# Patient Record
Sex: FEMALE | Race: OTHER | HISPANIC OR LATINO | Employment: UNEMPLOYED | URBAN - METROPOLITAN AREA
[De-identification: names, ages, dates, MRNs, and addresses within clinical notes are randomized per-mention and may not be internally consistent; named-entity substitution may affect disease eponyms.]

---

## 2024-01-26 ENCOUNTER — APPOINTMENT (OUTPATIENT)
Dept: RADIOLOGY | Facility: HOSPITAL | Age: 49
End: 2024-01-26
Payer: COMMERCIAL

## 2024-01-26 ENCOUNTER — HOSPITAL ENCOUNTER (OUTPATIENT)
Facility: HOSPITAL | Age: 49
Setting detail: OBSERVATION
Discharge: HOME | End: 2024-01-29
Attending: EMERGENCY MEDICINE | Admitting: INTERNAL MEDICINE
Payer: COMMERCIAL

## 2024-01-26 DIAGNOSIS — R18.8 OTHER ASCITES: Primary | ICD-10-CM

## 2024-01-26 DIAGNOSIS — N39.0 ACUTE UTI: ICD-10-CM

## 2024-01-26 LAB
ALBUMIN SERPL-MCNC: 2.6 G/DL (ref 3.5–5)
ALP BLD-CCNC: 417 U/L (ref 35–125)
ALT SERPL-CCNC: 31 U/L (ref 5–40)
AMMONIA PLAS-SCNC: 89 UMOL/L (ref 12–45)
ANION GAP SERPL CALC-SCNC: 8 MMOL/L
APPEARANCE UR: ABNORMAL
AST SERPL-CCNC: 48 U/L (ref 5–40)
BACTERIA #/AREA URNS AUTO: ABNORMAL /HPF
BASOPHILS # BLD AUTO: 0.02 X10*3/UL (ref 0–0.1)
BASOPHILS NFR BLD AUTO: 0.4 %
BILIRUB SERPL-MCNC: 1.4 MG/DL (ref 0.1–1.2)
BILIRUB UR STRIP.AUTO-MCNC: NEGATIVE MG/DL
BUN SERPL-MCNC: 11 MG/DL (ref 8–25)
CALCIUM SERPL-MCNC: 8.4 MG/DL (ref 8.5–10.4)
CHLORIDE SERPL-SCNC: 105 MMOL/L (ref 97–107)
CO2 SERPL-SCNC: 23 MMOL/L (ref 24–31)
COLOR UR: YELLOW
CREAT SERPL-MCNC: 0.5 MG/DL (ref 0.4–1.6)
EGFRCR SERPLBLD CKD-EPI 2021: >90 ML/MIN/1.73M*2
EOSINOPHIL # BLD AUTO: 0.21 X10*3/UL (ref 0–0.7)
EOSINOPHIL NFR BLD AUTO: 4.3 %
ERYTHROCYTE [DISTWIDTH] IN BLOOD BY AUTOMATED COUNT: 18.6 % (ref 11.5–14.5)
GLUCOSE BLD MANUAL STRIP-MCNC: 147 MG/DL (ref 74–99)
GLUCOSE SERPL-MCNC: 323 MG/DL (ref 65–99)
GLUCOSE UR STRIP.AUTO-MCNC: ABNORMAL MG/DL
HCT VFR BLD AUTO: 32.2 % (ref 36–46)
HGB BLD-MCNC: 9.6 G/DL (ref 12–16)
HYALINE CASTS #/AREA URNS AUTO: ABNORMAL /LPF
IMM GRANULOCYTES # BLD AUTO: 0.01 X10*3/UL (ref 0–0.7)
IMM GRANULOCYTES NFR BLD AUTO: 0.2 % (ref 0–0.9)
KETONES UR STRIP.AUTO-MCNC: NEGATIVE MG/DL
LACTATE BLDV-SCNC: 1.1 MMOL/L (ref 0.4–2)
LEUKOCYTE ESTERASE UR QL STRIP.AUTO: ABNORMAL
LYMPHOCYTES # BLD AUTO: 1.19 X10*3/UL (ref 1.2–4.8)
LYMPHOCYTES NFR BLD AUTO: 24.6 %
MCH RBC QN AUTO: 26.1 PG (ref 26–34)
MCHC RBC AUTO-ENTMCNC: 29.8 G/DL (ref 32–36)
MCV RBC AUTO: 88 FL (ref 80–100)
MONOCYTES # BLD AUTO: 0.48 X10*3/UL (ref 0.1–1)
MONOCYTES NFR BLD AUTO: 9.9 %
MUCOUS THREADS #/AREA URNS AUTO: ABNORMAL /LPF
NEUTROPHILS # BLD AUTO: 2.92 X10*3/UL (ref 1.2–7.7)
NEUTROPHILS NFR BLD AUTO: 60.6 %
NITRITE UR QL STRIP.AUTO: NEGATIVE
NRBC BLD-RTO: 0 /100 WBCS (ref 0–0)
PH UR STRIP.AUTO: 6 [PH]
PLATELET # BLD AUTO: 93 X10*3/UL (ref 150–450)
POTASSIUM SERPL-SCNC: 4.5 MMOL/L (ref 3.4–5.1)
PROT SERPL-MCNC: 7.2 G/DL (ref 5.9–7.9)
PROT UR STRIP.AUTO-MCNC: ABNORMAL MG/DL
RBC # BLD AUTO: 3.68 X10*6/UL (ref 4–5.2)
RBC # UR STRIP.AUTO: ABNORMAL /UL
RBC #/AREA URNS AUTO: ABNORMAL /HPF
RBC MORPH BLD: NORMAL
SODIUM SERPL-SCNC: 136 MMOL/L (ref 133–145)
SP GR UR STRIP.AUTO: 1.04
SQUAMOUS #/AREA URNS AUTO: ABNORMAL /HPF
UROBILINOGEN UR STRIP.AUTO-MCNC: NORMAL MG/DL
WBC # BLD AUTO: 4.8 X10*3/UL (ref 4.4–11.3)
WBC #/AREA URNS AUTO: ABNORMAL /HPF
WBC CLUMPS #/AREA URNS AUTO: ABNORMAL /HPF
YEAST BUDDING #/AREA UR COMP ASSIST: PRESENT /HPF

## 2024-01-26 PROCEDURE — 83605 ASSAY OF LACTIC ACID: CPT | Performed by: EMERGENCY MEDICINE

## 2024-01-26 PROCEDURE — 99285 EMERGENCY DEPT VISIT HI MDM: CPT | Performed by: EMERGENCY MEDICINE

## 2024-01-26 PROCEDURE — G0378 HOSPITAL OBSERVATION PER HR: HCPCS

## 2024-01-26 PROCEDURE — 80053 COMPREHEN METABOLIC PANEL: CPT | Performed by: EMERGENCY MEDICINE

## 2024-01-26 PROCEDURE — 96376 TX/PRO/DX INJ SAME DRUG ADON: CPT

## 2024-01-26 PROCEDURE — 96376 TX/PRO/DX INJ SAME DRUG ADON: CPT | Mod: 59 | Performed by: EMERGENCY MEDICINE

## 2024-01-26 PROCEDURE — 82140 ASSAY OF AMMONIA: CPT | Performed by: EMERGENCY MEDICINE

## 2024-01-26 PROCEDURE — 96375 TX/PRO/DX INJ NEW DRUG ADDON: CPT

## 2024-01-26 PROCEDURE — 36415 COLL VENOUS BLD VENIPUNCTURE: CPT | Performed by: EMERGENCY MEDICINE

## 2024-01-26 PROCEDURE — 2550000001 HC RX 255 CONTRASTS: Performed by: EMERGENCY MEDICINE

## 2024-01-26 PROCEDURE — 74177 CT ABD & PELVIS W/CONTRAST: CPT

## 2024-01-26 PROCEDURE — 2500000004 HC RX 250 GENERAL PHARMACY W/ HCPCS (ALT 636 FOR OP/ED): Performed by: EMERGENCY MEDICINE

## 2024-01-26 PROCEDURE — 2500000005 HC RX 250 GENERAL PHARMACY W/O HCPCS: Performed by: INTERNAL MEDICINE

## 2024-01-26 PROCEDURE — 96375 TX/PRO/DX INJ NEW DRUG ADDON: CPT | Mod: 59 | Performed by: EMERGENCY MEDICINE

## 2024-01-26 PROCEDURE — 82947 ASSAY GLUCOSE BLOOD QUANT: CPT | Mod: 59

## 2024-01-26 PROCEDURE — 49083 ABD PARACENTESIS W/IMAGING: CPT

## 2024-01-26 PROCEDURE — 87086 URINE CULTURE/COLONY COUNT: CPT | Mod: WESLAB | Performed by: EMERGENCY MEDICINE

## 2024-01-26 PROCEDURE — 2500000001 HC RX 250 WO HCPCS SELF ADMINISTERED DRUGS (ALT 637 FOR MEDICARE OP): Performed by: INTERNAL MEDICINE

## 2024-01-26 PROCEDURE — 96365 THER/PROPH/DIAG IV INF INIT: CPT | Mod: 59 | Performed by: EMERGENCY MEDICINE

## 2024-01-26 PROCEDURE — 85025 COMPLETE CBC W/AUTO DIFF WBC: CPT | Performed by: EMERGENCY MEDICINE

## 2024-01-26 PROCEDURE — 96365 THER/PROPH/DIAG IV INF INIT: CPT

## 2024-01-26 PROCEDURE — 81001 URINALYSIS AUTO W/SCOPE: CPT | Performed by: EMERGENCY MEDICINE

## 2024-01-26 RX ORDER — DOCUSATE SODIUM 100 MG/1
100 CAPSULE, LIQUID FILLED ORAL 2 TIMES DAILY
Status: ON HOLD | COMMUNITY
End: 2024-02-15 | Stop reason: SDUPTHER

## 2024-01-26 RX ORDER — INSULIN GLARGINE 100 [IU]/ML
25 INJECTION, SOLUTION SUBCUTANEOUS 2 TIMES DAILY
COMMUNITY

## 2024-01-26 RX ORDER — ACETAMINOPHEN 650 MG/1
650 SUPPOSITORY RECTAL EVERY 4 HOURS PRN
Status: DISCONTINUED | OUTPATIENT
Start: 2024-01-26 | End: 2024-01-27

## 2024-01-26 RX ORDER — ONDANSETRON HYDROCHLORIDE 2 MG/ML
4 INJECTION, SOLUTION INTRAVENOUS EVERY 8 HOURS PRN
Status: DISCONTINUED | OUTPATIENT
Start: 2024-01-26 | End: 2024-01-26 | Stop reason: DRUGHIGH

## 2024-01-26 RX ORDER — URSODIOL 300 MG/1
300 CAPSULE ORAL 3 TIMES DAILY
COMMUNITY

## 2024-01-26 RX ORDER — SPIRONOLACTONE 50 MG/1
50 TABLET, FILM COATED ORAL DAILY
Status: ON HOLD | COMMUNITY
End: 2024-01-29 | Stop reason: SDUPTHER

## 2024-01-26 RX ORDER — ACETAMINOPHEN 500 MG
5 TABLET ORAL NIGHTLY
Status: DISCONTINUED | OUTPATIENT
Start: 2024-01-26 | End: 2024-01-29 | Stop reason: HOSPADM

## 2024-01-26 RX ORDER — PANTOPRAZOLE SODIUM 20 MG/1
20 TABLET, DELAYED RELEASE ORAL
COMMUNITY
End: 2024-02-19 | Stop reason: WASHOUT

## 2024-01-26 RX ORDER — GUAIFENESIN 600 MG/1
600 TABLET, EXTENDED RELEASE ORAL EVERY 12 HOURS PRN
Status: DISCONTINUED | OUTPATIENT
Start: 2024-01-26 | End: 2024-01-29 | Stop reason: HOSPADM

## 2024-01-26 RX ORDER — MORPHINE SULFATE 4 MG/ML
4 INJECTION, SOLUTION INTRAMUSCULAR; INTRAVENOUS ONCE
Status: COMPLETED | OUTPATIENT
Start: 2024-01-26 | End: 2024-01-26

## 2024-01-26 RX ORDER — ACETAMINOPHEN 160 MG/5ML
650 SOLUTION ORAL EVERY 4 HOURS PRN
Status: DISCONTINUED | OUTPATIENT
Start: 2024-01-26 | End: 2024-01-27

## 2024-01-26 RX ORDER — ONDANSETRON HYDROCHLORIDE 2 MG/ML
4 INJECTION, SOLUTION INTRAVENOUS ONCE
Status: COMPLETED | OUTPATIENT
Start: 2024-01-26 | End: 2024-01-26

## 2024-01-26 RX ORDER — FENOFIBRATE 145 MG/1
145 TABLET, FILM COATED ORAL DAILY
COMMUNITY

## 2024-01-26 RX ORDER — FUROSEMIDE 40 MG/1
40 TABLET ORAL DAILY
Status: ON HOLD | COMMUNITY
End: 2024-01-29 | Stop reason: SDUPTHER

## 2024-01-26 RX ORDER — ATORVASTATIN CALCIUM 80 MG/1
80 TABLET, FILM COATED ORAL DAILY
COMMUNITY

## 2024-01-26 RX ORDER — ONDANSETRON HYDROCHLORIDE 2 MG/ML
4 INJECTION, SOLUTION INTRAVENOUS EVERY 6 HOURS PRN
Status: DISCONTINUED | OUTPATIENT
Start: 2024-01-26 | End: 2024-01-29 | Stop reason: HOSPADM

## 2024-01-26 RX ORDER — ACETAMINOPHEN 325 MG/1
325 TABLET ORAL EVERY 6 HOURS PRN
COMMUNITY
End: 2024-02-23 | Stop reason: WASHOUT

## 2024-01-26 RX ORDER — GUAIFENESIN/DEXTROMETHORPHAN 100-10MG/5
5 SYRUP ORAL EVERY 4 HOURS PRN
Status: DISCONTINUED | OUTPATIENT
Start: 2024-01-26 | End: 2024-01-29 | Stop reason: HOSPADM

## 2024-01-26 RX ORDER — CEFTRIAXONE 1 G/50ML
1 INJECTION, SOLUTION INTRAVENOUS ONCE
Status: COMPLETED | OUTPATIENT
Start: 2024-01-26 | End: 2024-01-26

## 2024-01-26 RX ORDER — TRAZODONE HYDROCHLORIDE 50 MG/1
25 TABLET ORAL NIGHTLY
COMMUNITY

## 2024-01-26 RX ORDER — CEPHALEXIN 500 MG/1
500 CAPSULE ORAL 3 TIMES DAILY
COMMUNITY
End: 2024-01-29 | Stop reason: HOSPADM

## 2024-01-26 RX ORDER — INSULIN LISPRO 100 [IU]/ML
4 INJECTION, SOLUTION INTRAVENOUS; SUBCUTANEOUS
Status: ON HOLD | COMMUNITY
End: 2024-05-31 | Stop reason: WASHOUT

## 2024-01-26 RX ORDER — POLYETHYLENE GLYCOL 3350 17 G/17G
17 POWDER, FOR SOLUTION ORAL DAILY PRN
Status: DISCONTINUED | OUTPATIENT
Start: 2024-01-26 | End: 2024-01-29 | Stop reason: HOSPADM

## 2024-01-26 RX ORDER — ONDANSETRON 4 MG/1
4 TABLET, FILM COATED ORAL EVERY 8 HOURS PRN
Status: DISCONTINUED | OUTPATIENT
Start: 2024-01-26 | End: 2024-01-29 | Stop reason: HOSPADM

## 2024-01-26 RX ORDER — CEFTRIAXONE 1 G/50ML
1 INJECTION, SOLUTION INTRAVENOUS EVERY 24 HOURS
Status: DISCONTINUED | OUTPATIENT
Start: 2024-01-27 | End: 2024-01-29 | Stop reason: HOSPADM

## 2024-01-26 RX ORDER — ACETAMINOPHEN 325 MG/1
650 TABLET ORAL EVERY 4 HOURS PRN
Status: DISCONTINUED | OUTPATIENT
Start: 2024-01-26 | End: 2024-01-27

## 2024-01-26 RX ADMIN — Medication 5 MG: at 21:58

## 2024-01-26 RX ADMIN — IOHEXOL 75 ML: 350 INJECTION, SOLUTION INTRAVENOUS at 10:53

## 2024-01-26 RX ADMIN — ONDANSETRON HYDROCHLORIDE 4 MG: 4 TABLET, FILM COATED ORAL at 21:58

## 2024-01-26 RX ADMIN — MORPHINE SULFATE 4 MG: 4 INJECTION, SOLUTION INTRAMUSCULAR; INTRAVENOUS at 13:59

## 2024-01-26 RX ADMIN — MORPHINE SULFATE 4 MG: 4 INJECTION, SOLUTION INTRAMUSCULAR; INTRAVENOUS at 08:54

## 2024-01-26 RX ADMIN — ONDANSETRON 4 MG: 2 INJECTION INTRAMUSCULAR; INTRAVENOUS at 08:55

## 2024-01-26 RX ADMIN — CEFTRIAXONE SODIUM 1 G: 1 INJECTION, SOLUTION INTRAVENOUS at 13:59

## 2024-01-26 SDOH — ECONOMIC STABILITY: FOOD INSECURITY: WITHIN THE PAST 12 MONTHS, THE FOOD YOU BOUGHT JUST DIDN'T LAST AND YOU DIDN'T HAVE MONEY TO GET MORE.: NEVER TRUE

## 2024-01-26 SDOH — HEALTH STABILITY: PHYSICAL HEALTH: ON AVERAGE, HOW MANY DAYS PER WEEK DO YOU ENGAGE IN MODERATE TO STRENUOUS EXERCISE (LIKE A BRISK WALK)?: 0 DAYS

## 2024-01-26 SDOH — SOCIAL STABILITY: SOCIAL NETWORK: ARE YOU MARRIED, WIDOWED, DIVORCED, SEPARATED, NEVER MARRIED, OR LIVING WITH A PARTNER?: MARRIED

## 2024-01-26 SDOH — ECONOMIC STABILITY: INCOME INSECURITY: IN THE PAST 12 MONTHS, HAS THE ELECTRIC, GAS, OIL, OR WATER COMPANY THREATENED TO SHUT OFF SERVICE IN YOUR HOME?: NO

## 2024-01-26 SDOH — HEALTH STABILITY: MENTAL HEALTH: HOW OFTEN DO YOU HAVE 6 OR MORE DRINKS ON ONE OCCASION?: NEVER

## 2024-01-26 SDOH — SOCIAL STABILITY: SOCIAL INSECURITY: DO YOU FEEL UNSAFE GOING BACK TO THE PLACE WHERE YOU ARE LIVING?: NO

## 2024-01-26 SDOH — ECONOMIC STABILITY: HOUSING INSECURITY
IN THE LAST 12 MONTHS, WAS THERE A TIME WHEN YOU DID NOT HAVE A STEADY PLACE TO SLEEP OR SLEPT IN A SHELTER (INCLUDING NOW)?: NO

## 2024-01-26 SDOH — SOCIAL STABILITY: SOCIAL INSECURITY: DOES ANYONE TRY TO KEEP YOU FROM HAVING/CONTACTING OTHER FRIENDS OR DOING THINGS OUTSIDE YOUR HOME?: NO

## 2024-01-26 SDOH — SOCIAL STABILITY: SOCIAL NETWORK: HOW OFTEN DO YOU ATTENT MEETINGS OF THE CLUB OR ORGANIZATION YOU BELONG TO?: NEVER

## 2024-01-26 SDOH — SOCIAL STABILITY: SOCIAL INSECURITY
WITHIN THE LAST YEAR, HAVE YOU BEEN KICKED, HIT, SLAPPED, OR OTHERWISE PHYSICALLY HURT BY YOUR PARTNER OR EX-PARTNER?: NO

## 2024-01-26 SDOH — SOCIAL STABILITY: SOCIAL INSECURITY: HAS ANYONE EVER THREATENED TO HURT YOUR FAMILY OR YOUR PETS?: NO

## 2024-01-26 SDOH — ECONOMIC STABILITY: INCOME INSECURITY: HOW HARD IS IT FOR YOU TO PAY FOR THE VERY BASICS LIKE FOOD, HOUSING, MEDICAL CARE, AND HEATING?: NOT HARD AT ALL

## 2024-01-26 SDOH — SOCIAL STABILITY: SOCIAL INSECURITY: WITHIN THE LAST YEAR, HAVE YOU BEEN HUMILIATED OR EMOTIONALLY ABUSED IN OTHER WAYS BY YOUR PARTNER OR EX-PARTNER?: NO

## 2024-01-26 SDOH — SOCIAL STABILITY: SOCIAL NETWORK
IN A TYPICAL WEEK, HOW MANY TIMES DO YOU TALK ON THE PHONE WITH FAMILY, FRIENDS, OR NEIGHBORS?: MORE THAN THREE TIMES A WEEK

## 2024-01-26 SDOH — SOCIAL STABILITY: SOCIAL INSECURITY
WITHIN THE LAST YEAR, HAVE TO BEEN RAPED OR FORCED TO HAVE ANY KIND OF SEXUAL ACTIVITY BY YOUR PARTNER OR EX-PARTNER?: NO

## 2024-01-26 SDOH — ECONOMIC STABILITY: TRANSPORTATION INSECURITY
IN THE PAST 12 MONTHS, HAS THE LACK OF TRANSPORTATION KEPT YOU FROM MEDICAL APPOINTMENTS OR FROM GETTING MEDICATIONS?: NO

## 2024-01-26 SDOH — HEALTH STABILITY: MENTAL HEALTH: HOW MANY STANDARD DRINKS CONTAINING ALCOHOL DO YOU HAVE ON A TYPICAL DAY?: PATIENT DOES NOT DRINK

## 2024-01-26 SDOH — ECONOMIC STABILITY: FOOD INSECURITY: WITHIN THE PAST 12 MONTHS, YOU WORRIED THAT YOUR FOOD WOULD RUN OUT BEFORE YOU GOT MONEY TO BUY MORE.: NEVER TRUE

## 2024-01-26 SDOH — SOCIAL STABILITY: SOCIAL NETWORK: HOW OFTEN DO YOU ATTEND CHURCH OR RELIGIOUS SERVICES?: NEVER

## 2024-01-26 SDOH — HEALTH STABILITY: PHYSICAL HEALTH: ON AVERAGE, HOW MANY MINUTES DO YOU ENGAGE IN EXERCISE AT THIS LEVEL?: 0 MIN

## 2024-01-26 SDOH — HEALTH STABILITY: MENTAL HEALTH
STRESS IS WHEN SOMEONE FEELS TENSE, NERVOUS, ANXIOUS, OR CAN'T SLEEP AT NIGHT BECAUSE THEIR MIND IS TROUBLED. HOW STRESSED ARE YOU?: NOT AT ALL

## 2024-01-26 SDOH — SOCIAL STABILITY: SOCIAL NETWORK
DO YOU BELONG TO ANY CLUBS OR ORGANIZATIONS SUCH AS CHURCH GROUPS UNIONS, FRATERNAL OR ATHLETIC GROUPS, OR SCHOOL GROUPS?: NO

## 2024-01-26 SDOH — SOCIAL STABILITY: SOCIAL INSECURITY: WITHIN THE LAST YEAR, HAVE YOU BEEN AFRAID OF YOUR PARTNER OR EX-PARTNER?: NO

## 2024-01-26 SDOH — HEALTH STABILITY: MENTAL HEALTH: HOW OFTEN DO YOU HAVE A DRINK CONTAINING ALCOHOL?: NEVER

## 2024-01-26 SDOH — SOCIAL STABILITY: SOCIAL INSECURITY: DO YOU FEEL ANYONE HAS EXPLOITED OR TAKEN ADVANTAGE OF YOU FINANCIALLY OR OF YOUR PERSONAL PROPERTY?: NO

## 2024-01-26 SDOH — ECONOMIC STABILITY: INCOME INSECURITY: IN THE LAST 12 MONTHS, WAS THERE A TIME WHEN YOU WERE NOT ABLE TO PAY THE MORTGAGE OR RENT ON TIME?: NO

## 2024-01-26 SDOH — SOCIAL STABILITY: SOCIAL NETWORK: HOW OFTEN DO YOU GET TOGETHER WITH FRIENDS OR RELATIVES?: MORE THAN THREE TIMES A WEEK

## 2024-01-26 SDOH — SOCIAL STABILITY: SOCIAL INSECURITY: HAVE YOU HAD THOUGHTS OF HARMING ANYONE ELSE?: NO

## 2024-01-26 SDOH — ECONOMIC STABILITY: TRANSPORTATION INSECURITY
IN THE PAST 12 MONTHS, HAS LACK OF TRANSPORTATION KEPT YOU FROM MEETINGS, WORK, OR FROM GETTING THINGS NEEDED FOR DAILY LIVING?: NO

## 2024-01-26 SDOH — SOCIAL STABILITY: SOCIAL INSECURITY: ARE THERE ANY APPARENT SIGNS OF INJURIES/BEHAVIORS THAT COULD BE RELATED TO ABUSE/NEGLECT?: NO

## 2024-01-26 SDOH — SOCIAL STABILITY: SOCIAL INSECURITY: ABUSE: ADULT

## 2024-01-26 SDOH — SOCIAL STABILITY: SOCIAL INSECURITY: ARE YOU OR HAVE YOU BEEN THREATENED OR ABUSED PHYSICALLY, EMOTIONALLY, OR SEXUALLY BY ANYONE?: NO

## 2024-01-26 SDOH — ECONOMIC STABILITY: HOUSING INSECURITY: IN THE LAST 12 MONTHS, HOW MANY PLACES HAVE YOU LIVED?: 1

## 2024-01-26 ASSESSMENT — PATIENT HEALTH QUESTIONNAIRE - PHQ9
SUM OF ALL RESPONSES TO PHQ9 QUESTIONS 1 & 2: 0
1. LITTLE INTEREST OR PLEASURE IN DOING THINGS: NOT AT ALL
2. FEELING DOWN, DEPRESSED OR HOPELESS: NOT AT ALL

## 2024-01-26 ASSESSMENT — COGNITIVE AND FUNCTIONAL STATUS - GENERAL
PATIENT BASELINE BEDBOUND: NO
DAILY ACTIVITIY SCORE: 24
MOBILITY SCORE: 24

## 2024-01-26 ASSESSMENT — LIFESTYLE VARIABLES
HAVE PEOPLE ANNOYED YOU BY CRITICIZING YOUR DRINKING: NO
HOW OFTEN DO YOU HAVE 6 OR MORE DRINKS ON ONE OCCASION: NEVER
AUDIT-C TOTAL SCORE: 0
SUBSTANCE_ABUSE_PAST_12_MONTHS: NO
AUDIT-C TOTAL SCORE: 0
REASON UNABLE TO ASSESS: NO
AUDIT-C TOTAL SCORE: 0
EVER FELT BAD OR GUILTY ABOUT YOUR DRINKING: NO
SKIP TO QUESTIONS 9-10: 1
EVER HAD A DRINK FIRST THING IN THE MORNING TO STEADY YOUR NERVES TO GET RID OF A HANGOVER: NO
HAVE YOU EVER FELT YOU SHOULD CUT DOWN ON YOUR DRINKING: NO
HOW MANY STANDARD DRINKS CONTAINING ALCOHOL DO YOU HAVE ON A TYPICAL DAY: PATIENT DOES NOT DRINK
PRESCIPTION_ABUSE_PAST_12_MONTHS: NO
SKIP TO QUESTIONS 9-10: 1
HOW OFTEN DO YOU HAVE A DRINK CONTAINING ALCOHOL: NEVER

## 2024-01-26 ASSESSMENT — ACTIVITIES OF DAILY LIVING (ADL)
DRESSING YOURSELF: INDEPENDENT
PATIENT'S MEMORY ADEQUATE TO SAFELY COMPLETE DAILY ACTIVITIES?: YES
WALKS IN HOME: INDEPENDENT
HEARING - RIGHT EAR: FUNCTIONAL
JUDGMENT_ADEQUATE_SAFELY_COMPLETE_DAILY_ACTIVITIES: YES
FEEDING YOURSELF: INDEPENDENT
TOILETING: INDEPENDENT
HEARING - LEFT EAR: FUNCTIONAL
GROOMING: INDEPENDENT
BATHING: INDEPENDENT
LACK_OF_TRANSPORTATION: NO
LACK_OF_TRANSPORTATION: NO
ADEQUATE_TO_COMPLETE_ADL: YES

## 2024-01-26 ASSESSMENT — PAIN DESCRIPTION - LOCATION: LOCATION: ABDOMEN

## 2024-01-26 ASSESSMENT — PAIN - FUNCTIONAL ASSESSMENT: PAIN_FUNCTIONAL_ASSESSMENT: 0-10

## 2024-01-26 ASSESSMENT — COLUMBIA-SUICIDE SEVERITY RATING SCALE - C-SSRS
2. HAVE YOU ACTUALLY HAD ANY THOUGHTS OF KILLING YOURSELF?: NO
1. IN THE PAST MONTH, HAVE YOU WISHED YOU WERE DEAD OR WISHED YOU COULD GO TO SLEEP AND NOT WAKE UP?: NO
6. HAVE YOU EVER DONE ANYTHING, STARTED TO DO ANYTHING, OR PREPARED TO DO ANYTHING TO END YOUR LIFE?: NO

## 2024-01-26 ASSESSMENT — PAIN SCALES - GENERAL
PAINLEVEL_OUTOF10: 4
PAINLEVEL_OUTOF10: 8
PAINLEVEL_OUTOF10: 4

## 2024-01-26 NOTE — PROGRESS NOTES
01/26/24 1808   Current Planned Discharge Disposition   Current Planned Discharge Disposition Home

## 2024-01-26 NOTE — PROGRESS NOTES
01/26/24 1807   [facility] Disability Status   Are you deaf or do you have serious difficulty hearing? N   Are you blind or do you have serious difficulty seeing, even when wearing glasses? N   Because of a physical, mental, or emotional condition, do you have serious difficulty concentrating, remembering, or making decisions? (5 years old or older) N   Do you have serious difficulty walking or climbing stairs? N   Do you have serious difficulty dressing or bathing? N   Because of a physical, mental, or emotional condition, do you have serious difficulty doing errands alone such as visiting the doctor? N

## 2024-01-26 NOTE — PROGRESS NOTES
01/26/24 1806   Discharge Planning   Living Arrangements Spouse/significant other;Children   Support Systems Spouse/significant other;Children   Type of Residence Private residence   Number of Stairs to Enter Residence 14   Number of Stairs Within Residence 0   Who is requesting discharge planning? Provider   Home or Post Acute Services None   Patient expects to be discharged to: Home   Does the patient need discharge transport arranged? No   Financial Resource Strain   How hard is it for you to pay for the very basics like food, housing, medical care, and heating? Not hard   Housing Stability   In the last 12 months, was there a time when you were not able to pay the mortgage or rent on time? N   In the last 12 months, how many places have you lived? 1   In the last 12 months, was there a time when you did not have a steady place to sleep or slept in a shelter (including now)? N   Transportation Needs   In the past 12 months, has lack of transportation kept you from medical appointments or from getting medications? no   In the past 12 months, has lack of transportation kept you from meetings, work, or from getting things needed for daily living? No   Patient Choice   Patient / Family choosing to utilize agency / facility established prior to hospitalization No

## 2024-01-26 NOTE — PROGRESS NOTES
01/26/24 1802   Physical Activity   On average, how many days per week do you engage in moderate to strenuous exercise (like a brisk walk)? 0 days   On average, how many minutes do you engage in exercise at this level? 0 min   Financial Resource Strain   How hard is it for you to pay for the very basics like food, housing, medical care, and heating? Not hard   Housing Stability   In the last 12 months, was there a time when you were not able to pay the mortgage or rent on time? N   In the last 12 months, how many places have you lived? 1   In the last 12 months, was there a time when you did not have a steady place to sleep or slept in a shelter (including now)? N   Transportation Needs   In the past 12 months, has lack of transportation kept you from medical appointments or from getting medications? no   In the past 12 months, has lack of transportation kept you from meetings, work, or from getting things needed for daily living? No   Food Insecurity   Within the past 12 months, you worried that your food would run out before you got the money to buy more. Never true   Within the past 12 months, the food you bought just didn't last and you didn't have money to get more. Never true   Stress   Do you feel stress - tense, restless, nervous, or anxious, or unable to sleep at night because your mind is troubled all the time - these days? Not at all   Social Connections   In a typical week, how many times do you talk on the phone with family, friends, or neighbors? More than 3   How often do you get together with friends or relatives? More than 3   How often do you attend Mandaeism or Mormonism services? Never   Do you belong to any clubs or organizations such as Mandaeism groups, unions, fraternal or athletic groups, or school groups? No   How often do you attend meetings of the clubs or organizations you belong to? Never   Are you , , , , never , or living with a partner?     Intimate Partner Violence   Within the last year, have you been afraid of your partner or ex-partner? No   Within the last year, have you been humiliated or emotionally abused in other ways by your partner or ex-partner? No   Within the last year, have you been kicked, hit, slapped, or otherwise physically hurt by your partner or ex-partner? No   Within the last year, have you been raped or forced to have any kind of sexual activity by your partner or ex-partner? No   Alcohol Use   Q1: How often do you have a drink containing alcohol? Never   Q2: How many drinks containing alcohol do you have on a typical day when you are drinking? None   Q3: How often do you have six or more drinks on one occasion? Never   Utilities   In the past 12 months has the electric, gas, oil, or water company threatened to shut off services in your home? No

## 2024-01-26 NOTE — ED PROVIDER NOTES
HPI   Chief Complaint   Patient presents with    Abdominal Pain     Patient with hx of cirrhosis coming in with abdominal distention, abdominal pain, 17 episodes of diarrhea since yesterday with nausea and vomiting. Patient has appointment for paracentesis on Feb 1st.       79-year-old female here for chief complaint of abdominal pain nausea and diarrhea for the last week.  She has chills but no fever.  She has a history of fatty liver and does get her belly drained.  She is scheduled to have it drained on the first but feels like she cannot wait.  She has had 19 episodes of diarrhea overnight.  She has a past medical history of fatty liver only she states.                          No data recorded                  Patient History   History reviewed. No pertinent past medical history.  Past Surgical History:   Procedure Laterality Date    CT GUIDED IMAGING FOR NEEDLE PLACEMENT  10/14/2020    CT GUIDED IMAGING FOR NEEDLE PLACEMENT [facility]    US GUIDED ABDOMINAL PARACENTESIS  10/8/2020    US GUIDED ABDOMINAL PARACENTESIS [facility]     No family history on file.  Social History     Tobacco Use    Smoking status: Never    Smokeless tobacco: Never   Substance Use Topics    Alcohol use: Never    Drug use: Never       Physical Exam   ED Triage Vitals [01/26/24 0655]   Temperature Heart Rate Respirations BP   36.3 °C (97.3 °F) 98 18 127/76      Pulse Ox Temp Source Heart Rate Source Patient Position   100 % Temporal Monitor Sitting      BP Location FiO2 (%)     Left arm --       Physical Exam  Vitals and nursing note reviewed.   Constitutional:       Appearance: Normal appearance.   HENT:      Head: Normocephalic and atraumatic.      Nose: Nose normal.      Mouth/Throat:      Mouth: Mucous membranes are moist.   Eyes:      Extraocular Movements: Extraocular movements intact.      Pupils: Pupils are equal, round, and reactive to light.   Cardiovascular:      Rate and Rhythm: Normal rate and regular  rhythm.   Pulmonary:      Effort: Pulmonary effort is normal.      Breath sounds: Normal breath sounds.   Abdominal:      General: Abdomen is protuberant. Bowel sounds are increased. There is distension.      Palpations: Abdomen is soft.      Tenderness: There is abdominal tenderness.      Comments: Positive fluid wave   Musculoskeletal:         General: Normal range of motion.      Cervical back: Normal range of motion.   Skin:     General: Skin is warm and dry.      Capillary Refill: Capillary refill takes less than 2 seconds.      Comments: 4+ lower extremity edema bilaterally   Neurological:      General: No focal deficit present.      Mental Status: She is alert.   Psychiatric:         Mood and Affect: Mood normal.         ED Course & MDM   Diagnoses as of 02/03/24 0806   Other ascites   Acute UTI       Medical Decision Making      Medical Decision Making: Patient was worked up with lab work and found to have a significant urinary tract infection.  Patient states she feels very weak.  I spoke with interventional radiology and they will drain this patient's abdomen today for paracentesis.  Patient still does not feel comfortable going home and at this time cannot get up and get out of bed.  Dr. Parmer accepted the patient for admission at this time.  I do not feel at this time the patient is infected with peritonitis.  White blood cell count normal, patient states she just needs drained.  [unfilled]     EKG interpreted by myself (ED attending physician): N/A    Differential Diagnoses Considered: Ascites worsening liver disease sepsis    Chronic Medical Conditions Significantly Affecting Care: Patient has a history of nonalcoholic liver cirrhosis    External Records Reviewed: I reviewed recent and relevant outside records including: Previous CMP    Social Determinants of Health Significantly Affecting Care: Lives with family    Diagnostic testing considered: Paracentesis    Negra Bartlett  [facility]  Emergency Medicine          Procedure  Procedures     Negra Bartlett DO  02/03/24 0806

## 2024-01-26 NOTE — CARE PLAN
Pt has a Living Will --not on file    ADOD: 1-2 days    Pt lives at home with her  and dtr in a 2nd floor apt; 14 steps to climb to get to her apt.  She works Full time with her    She does not drive; her family drives her to her appointments  She does not use 02, not cpap or bipap, no nebulizer. She is a diabetic and she monitors her BS daily  She is being tx for depression with Trazadone; denies SI  She is independent with ADL's  She wears glasses and no hearing aids.  She is here with a UTI  No anticipated discharge needs    DISCHARGE PLAN: HOME WITH FAMILY

## 2024-01-27 LAB
ALBUMIN SERPL-MCNC: 2.2 G/DL (ref 3.5–5)
ALP BLD-CCNC: 284 U/L (ref 35–125)
ALT SERPL-CCNC: 26 U/L (ref 5–40)
ANION GAP SERPL CALC-SCNC: 8 MMOL/L
AST SERPL-CCNC: 46 U/L (ref 5–40)
BACTERIA UR CULT: NORMAL
BILIRUB SERPL-MCNC: 1.2 MG/DL (ref 0.1–1.2)
BUN SERPL-MCNC: 11 MG/DL (ref 8–25)
CALCIUM SERPL-MCNC: 7.9 MG/DL (ref 8.5–10.4)
CHLORIDE SERPL-SCNC: 108 MMOL/L (ref 97–107)
CO2 SERPL-SCNC: 23 MMOL/L (ref 24–31)
CREAT SERPL-MCNC: 0.4 MG/DL (ref 0.4–1.6)
EGFRCR SERPLBLD CKD-EPI 2021: >90 ML/MIN/1.73M*2
ERYTHROCYTE [DISTWIDTH] IN BLOOD BY AUTOMATED COUNT: 18.5 % (ref 11.5–14.5)
GLUCOSE BLD MANUAL STRIP-MCNC: 205 MG/DL (ref 74–99)
GLUCOSE BLD MANUAL STRIP-MCNC: 259 MG/DL (ref 74–99)
GLUCOSE BLD MANUAL STRIP-MCNC: 283 MG/DL (ref 74–99)
GLUCOSE SERPL-MCNC: 246 MG/DL (ref 65–99)
HCT VFR BLD AUTO: 29.3 % (ref 36–46)
HGB BLD-MCNC: 9.1 G/DL (ref 12–16)
MCH RBC QN AUTO: 26.3 PG (ref 26–34)
MCHC RBC AUTO-ENTMCNC: 31.1 G/DL (ref 32–36)
MCV RBC AUTO: 85 FL (ref 80–100)
NRBC BLD-RTO: 0 /100 WBCS (ref 0–0)
PLATELET # BLD AUTO: 76 X10*3/UL (ref 150–450)
POTASSIUM SERPL-SCNC: 3.9 MMOL/L (ref 3.4–5.1)
PROT SERPL-MCNC: 6.1 G/DL (ref 5.9–7.9)
RBC # BLD AUTO: 3.46 X10*6/UL (ref 4–5.2)
SODIUM SERPL-SCNC: 139 MMOL/L (ref 133–145)
WBC # BLD AUTO: 3.8 X10*3/UL (ref 4.4–11.3)

## 2024-01-27 PROCEDURE — 80053 COMPREHEN METABOLIC PANEL: CPT | Performed by: INTERNAL MEDICINE

## 2024-01-27 PROCEDURE — 97165 OT EVAL LOW COMPLEX 30 MIN: CPT | Mod: GO

## 2024-01-27 PROCEDURE — 2500000001 HC RX 250 WO HCPCS SELF ADMINISTERED DRUGS (ALT 637 FOR MEDICARE OP): Performed by: INTERNAL MEDICINE

## 2024-01-27 PROCEDURE — 36415 COLL VENOUS BLD VENIPUNCTURE: CPT

## 2024-01-27 PROCEDURE — 97535 SELF CARE MNGMENT TRAINING: CPT | Mod: GO

## 2024-01-27 PROCEDURE — 85027 COMPLETE CBC AUTOMATED: CPT | Performed by: INTERNAL MEDICINE

## 2024-01-27 PROCEDURE — 2500000004 HC RX 250 GENERAL PHARMACY W/ HCPCS (ALT 636 FOR OP/ED): Performed by: INTERNAL MEDICINE

## 2024-01-27 PROCEDURE — 82947 ASSAY GLUCOSE BLOOD QUANT: CPT | Mod: 59

## 2024-01-27 PROCEDURE — 36415 COLL VENOUS BLD VENIPUNCTURE: CPT | Performed by: INTERNAL MEDICINE

## 2024-01-27 PROCEDURE — 96366 THER/PROPH/DIAG IV INF ADDON: CPT | Mod: 59 | Performed by: EMERGENCY MEDICINE

## 2024-01-27 PROCEDURE — 97161 PT EVAL LOW COMPLEX 20 MIN: CPT | Mod: GP

## 2024-01-27 PROCEDURE — 2500000005 HC RX 250 GENERAL PHARMACY W/O HCPCS: Performed by: INTERNAL MEDICINE

## 2024-01-27 PROCEDURE — 82105 ALPHA-FETOPROTEIN SERUM: CPT | Mod: WESLAB

## 2024-01-27 PROCEDURE — 2500000001 HC RX 250 WO HCPCS SELF ADMINISTERED DRUGS (ALT 637 FOR MEDICARE OP)

## 2024-01-27 PROCEDURE — 80074 ACUTE HEPATITIS PANEL: CPT | Mod: WESLAB

## 2024-01-27 PROCEDURE — G0378 HOSPITAL OBSERVATION PER HR: HCPCS

## 2024-01-27 RX ORDER — FUROSEMIDE 40 MG/1
40 TABLET ORAL DAILY
Status: DISCONTINUED | OUTPATIENT
Start: 2024-01-27 | End: 2024-01-28

## 2024-01-27 RX ORDER — TRAMADOL HYDROCHLORIDE 50 MG/1
50 TABLET ORAL EVERY 6 HOURS PRN
Status: DISCONTINUED | OUTPATIENT
Start: 2024-01-27 | End: 2024-01-29 | Stop reason: HOSPADM

## 2024-01-27 RX ORDER — TRAMADOL HYDROCHLORIDE 50 MG/1
50 TABLET ORAL EVERY 6 HOURS PRN
Status: DISCONTINUED | OUTPATIENT
Start: 2024-01-27 | End: 2024-01-27 | Stop reason: SDUPTHER

## 2024-01-27 RX ORDER — SPIRONOLACTONE 50 MG/1
50 TABLET, FILM COATED ORAL DAILY
Status: DISCONTINUED | OUTPATIENT
Start: 2024-01-27 | End: 2024-01-28

## 2024-01-27 RX ADMIN — SPIRONOLACTONE 50 MG: 50 TABLET ORAL at 15:20

## 2024-01-27 RX ADMIN — ACETAMINOPHEN 650 MG: 325 TABLET ORAL at 13:28

## 2024-01-27 RX ADMIN — ONDANSETRON HYDROCHLORIDE 4 MG: 4 TABLET, FILM COATED ORAL at 13:28

## 2024-01-27 RX ADMIN — FUROSEMIDE 40 MG: 40 TABLET ORAL at 15:20

## 2024-01-27 RX ADMIN — TRAMADOL HYDROCHLORIDE 50 MG: 50 TABLET ORAL at 20:17

## 2024-01-27 RX ADMIN — CEFTRIAXONE SODIUM 1 G: 1 INJECTION, SOLUTION INTRAVENOUS at 13:28

## 2024-01-27 RX ADMIN — ONDANSETRON HYDROCHLORIDE 4 MG: 4 TABLET, FILM COATED ORAL at 20:18

## 2024-01-27 RX ADMIN — Medication 5 MG: at 20:17

## 2024-01-27 ASSESSMENT — COGNITIVE AND FUNCTIONAL STATUS - GENERAL
HELP NEEDED FOR BATHING: A LITTLE
WALKING IN HOSPITAL ROOM: A LITTLE
MOBILITY SCORE: 22
TOILETING: A LITTLE
DAILY ACTIVITIY SCORE: 24
DAILY ACTIVITIY SCORE: 21
DRESSING REGULAR LOWER BODY CLOTHING: A LITTLE
CLIMB 3 TO 5 STEPS WITH RAILING: A LITTLE

## 2024-01-27 ASSESSMENT — ENCOUNTER SYMPTOMS
ALLERGIC/IMMUNOLOGIC NEGATIVE: 1
CONSTITUTIONAL NEGATIVE: 1
RESPIRATORY NEGATIVE: 1
PSYCHIATRIC NEGATIVE: 1
EYES NEGATIVE: 1
ABDOMINAL DISTENTION: 1
HEMATOLOGIC/LYMPHATIC NEGATIVE: 1
ENDOCRINE NEGATIVE: 1
MUSCULOSKELETAL NEGATIVE: 1
CARDIOVASCULAR NEGATIVE: 1
NEUROLOGICAL NEGATIVE: 1

## 2024-01-27 ASSESSMENT — PAIN SCALES - GENERAL
PAINLEVEL_OUTOF10: 0 - NO PAIN
PAINLEVEL_OUTOF10: 7
PAINLEVEL_OUTOF10: 6
PAINLEVEL_OUTOF10: 4
PAINLEVEL_OUTOF10: 7

## 2024-01-27 ASSESSMENT — ACTIVITIES OF DAILY LIVING (ADL)
BATHING_ASSISTANCE: MINIMAL
ADL_ASSISTANCE: INDEPENDENT
HOME_MANAGEMENT_TIME_ENTRY: 8

## 2024-01-27 ASSESSMENT — PAIN - FUNCTIONAL ASSESSMENT
PAIN_FUNCTIONAL_ASSESSMENT: 0-10

## 2024-01-27 ASSESSMENT — PAIN DESCRIPTION - LOCATION: LOCATION: ABDOMEN

## 2024-01-27 NOTE — CARE PLAN
The patient's goals for the shift include no pain    The clinical goals for the shift include decrease fluid

## 2024-01-27 NOTE — PROGRESS NOTES
Physical Therapy    Physical Therapy Evaluation    Patient Name: Alfonzo Flanagan  MRN: [id number]  Today's Date: 1/27/2024   Time Calculation  Start Time: 0826  Stop Time: 0838  Time Calculation (min): 12 min    Assessment/Plan   PT Assessment  PT Assessment Results: Decreased endurance, Decreased strength, Impaired balance, Decreased mobility, Impaired sensation, Obesity, Pain  Rehab Prognosis: Good  Barriers to Discharge: none  Evaluation/Treatment Tolerance:  (Pt tolerated eval well)  Medical Staff Made Aware: Yes  Strengths: Ability to acquire knowledge, Premorbid level of function, Support of Caregivers  End of Session Communication: Bedside nurse  Assessment Comment: Pt is a 48 y/o female evaluated by PT for impaired mobility. Per EMR, pt is hospitalized d/t acute UTI and ascites. Pt presents with the problems listed above which negatively impact her tolerance to functional mobility. Pt would benefit from continued PT intervention during hospitalization to maximize functional endurance and independence upon discharge. She has 14 steps to enter her apartment. She may also benefit from low intensity rehab upon discharge to maximize safety and return to OF upon return home.  End of Session Patient Position: Bed, 2 rail up, Alarm off, not on at start of session  IP OR SWING BED PT PLAN  Inpatient or Swing Bed: Inpatient  PT Plan  Treatment/Interventions: Transfer training, Gait training, Bed mobility, Stair training, Balance training, Neuromuscular re-education, Strengthening, Endurance training, Therapeutic exercise, Therapeutic activity, Home exercise program  PT Plan: Skilled PT  PT Frequency: 3 times per week  PT Discharge Recommendations: Low intensity level of continued care  Equipment Recommended upon Discharge: Wheeled walker  PT Recommended Transfer Status: Independent  PT - OK to Discharge: Yes      Subjective   General Visit Information:  General  Reason for Referral: Impaired mobility  Referred By:  Dr. Howard MD  Past Medical History Relevant to Rehab: DM, Ascites, UTI  Family/Caregiver Present: No  Prior to Session Communication: Bedside nurse  Patient Position Received: Bed, 2 rail up, Alarm off, not on at start of session  Preferred Learning Style: verbal  General Comment: Pt is agreeable to PT eval. RN clears pt for participation.  Home Living:  Home Living  Type of Home: Apartment  Lives With: Spouse, Adult children  Home Adaptive Equipment: Walker rolling or standard (FWW)  Home Layout: One level  Home Access: Stairs to enter with rails  Entrance Stairs-Rails: Left  Entrance Stairs-Number of Steps: 14  Prior Level of Function:  Prior Function Per Pt/Caregiver Report  Level of [place]: Independent with ADLs and functional transfers, Needs assistance with homemaking  Receives Help From: Family  ADL Assistance: Independent  Ambulatory Assistance: Independent  Vocational: Retired  Prior Function Comments: Pt amb household using FWW. One person assist for 14 steps to enter apartment.  Precautions:  Precautions  Medical Precautions: No known precautions/limitation  Vital Signs:  Vital Signs  Heart Rate: 87  Heart Rate Source: Monitor  Patient Position: Sitting    Objective   Pain:  Pain Assessment  Pain Assessment: 0-10  Pain Score: 6  Pain Type: Acute pain  Pain Location: Abdomen  Patient's Stated Pain Goal: No pain  Pain Interventions: Ambulation/increased activity, Repositioned, Distraction  Cognition:  Cognition  Overall Cognitive Status: Within Functional Limits    General Assessments:       Activity Tolerance  Endurance: Decreased tolerance for upright activites  Activity Tolerance Comments:  (Pt reports decreased tolerance to upright activities, requires increased time for home ADL performance. Amb distance limited in PT eval d/t fatigue.)    Sensation  Sensation Comment: Pt reports parasthesias b/l feet d/t diabetic neuropathy    Strength  Strength Comments: BLE strength assessed via function.  Gross BLE strength 4/5.  Strength  Strength Comments: BLE strength assessed via function. Gross BLE strength 4/5.    Coordination  Movements are Fluid and Coordinated: Yes (assess via function)    Postural Control  Postural Control: Within Functional Limits    Static Sitting Balance  Static Sitting-Balance Support: No upper extremity supported  Static Sitting-Level of Assistance: Independent  Static Sitting-Comment/Number of Minutes: 5    Static Standing Balance  Static Standing-Balance Support: No upper extremity supported  Static Standing-Level of Assistance: Independent  Static Standing-Comment/Number of Minutes: 2  Dynamic Standing Balance  Dynamic Standing-Balance Support: No upper extremity supported  Dynamic Standing-Balance: Reaching for objects, Forward lean  Dynamic Standing-Comments:  (CGA for safety with functional standing reaching for items at bedside)  Functional Assessments:       Bed Mobility  Bed Mobility: Yes  Bed Mobility 1  Bed Mobility 1: Supine to sitting  Level of Assistance 1: Modified independent  Bed Mobility Comments 1: HOB elevated  Bed Mobility 2  Bed Mobility  2: Sitting to supine  Level of Assistance 2: Modified independent  Bed Mobility Comments 2: HOB elevated    Transfers  Transfer: Yes  Transfer 1  Transfer From 1: Sit to, Stand to  Transfer to 1: Bed, Sit, Stand  Technique 1: Stand to sit, Sit to stand  Transfer Device 1:  (no device)  Transfer Level of Assistance 1: Modified independent    Ambulation/Gait Training  Ambulation/Gait Training Performed: Yes  Ambulation/Gait Training 1  Surface 1: Level tile  Device 1: Rolling walker  Assistance 1: Distant supervision  Comments/Distance (ft) 1: 70 (Distant sup for safety, slowed gait speed, WNL foot clearance and step length)    Stairs  Stairs: Yes  Stairs  Rails 1: Left (BUE on unilateral HR)  Assistance 1: Contact guard  Comment/Number of Steps 1: 4 (step-to pattern, increased time required d/t fatigue, pt leans on handrail with  elbow for support/balance)      Encounter Problems       Encounter Problems (Active)       Mobility       LTG - Patient will navigate 8 steps with unilateral rail and CGA (Progressing)       Start:  01/27/24    Expected End:  02/24/24            STG - Patient will ascend and descend a flight of stairs using unilateral handrail with CGA (Progressing)       Start:  01/27/24    Expected End:  02/24/24            Goal 2 (Progressing)       Start:  01/27/24    Expected End:  02/24/24       Pt will ambulate 300' using FWW with mod I                Education Documentation  No documentation found.  Education Comments  No comments found.

## 2024-01-27 NOTE — NURSING NOTE
Unable to reach  Dr. Lawrence to update him about patient home medication.   Followed up with a message which was seen by him.  Waiting for admission orders.

## 2024-01-27 NOTE — PROGRESS NOTES
Occupational Therapy    Evaluation/Treatment    Patient Name: Alfonzo Flanagan  MRN: [id number]  : 1975  Today's Date: 24  Time Calculation  Start Time: 1335  Stop Time: 1358  Time Calculation (min): 23 min       Assessment:  OT Assessment: Referral recieved, chart reviewed, and evaluation complete.  Patient presents with decreased standing balance which negatively effects ADLs and mobility.  Would benefit from skilled OT services.  Recommend low intensity rehab services  Prognosis: Good  Barriers to Discharge: None  Evaluation/Treatment Tolerance: Patient tolerated treatment well  Medical Staff Made Aware: Yes  End of Session Communication: Bedside nurse  End of Session Patient Position: Bed, 2 rail up    Plan:  Treatment Interventions: ADL retraining, Patient/family training, Functional transfer training  OT Frequency: 3 times per week  OT Discharge Recommendations: Low intensity level of continued care  Equipment Recommended upon Discharge: Wheeled walker  OT Recommended Transfer Status: Stand by assist  OT - OK to Discharge: Yes  Treatment Interventions: ADL retraining, Patient/family training, Functional transfer training    Subjective   Current Problem:  1. Other ascites        2. Acute UTI          General:   OT Received On: 24  General  Reason for Referral: decreased functional status  Referred By: Luis Alfredo Lawrence MD  Past Medical History Relevant to Rehab: cirrhosis, ascites, UTI  Family/Caregiver Present: No  Prior to Session Communication: Bedside nurse  Patient Position Received: Bed, 2 rail up  General Comment: 49 year old  female admitted with c/o abdomimnal pain, hausea, dirrarhea    Precautions:  Hearing/Visual Limitations: hearing WFL, wears glasses  Medical Precautions: Fall precautions     Pain:  Pain Assessment  Pain Assessment: 0-10  Pain Score: 7  Pain Type: Acute pain  Pain Location: Abdomen    Objective   Cognition:  Overall Cognitive Status: Within Functional  Limits      Home Living:  Type of Home: Apartment  Lives With: Spouse  Home Adaptive Equipment: Walker rolling or standard  Home Layout: One level  Home Access: Stairs to enter with rails    Prior Function:  Level of Person: Independent with ADLs and functional transfers, Independent with homemaking with ambulation     ADL:  Eating Assistance: Independent  Grooming Assistance: Independent  Bathing Assistance: Minimal  UE Dressing Assistance: Independent  LE Dressing Assistance: Minimal  Toileting Assistance with Device: Minimal    Activities of Daily Living: Grooming  Grooming Level of Assistance: Independent  Grooming Where Assessed: Standing sinkside    Toileting  Toileting Level of Assistance: Close supervision  Where Assessed: Toilet    Activity Tolerance:  Endurance: Decreased tolerance for upright activites    Bed Mobility/Transfers: Bed Mobility  Bed Mobility: Yes  Bed Mobility 1  Bed Mobility 1: Supine to sitting  Level of Assistance 1: Independent  Bed Mobility 2  Bed Mobility  2: Sitting to supine  Level of Assistance 2: Independent    Transfers  Transfer: Yes  Transfer 1  Transfer From 1: Bed to  Transfer to 1: Stand  Technique 1: Sit to stand  Transfer Level of Assistance 1: Close supervision  Transfers 2  Transfer From 2: Stand to  Transfer to 2: Sit  Technique 2: Stand to sit  Transfer Level of Assistance 2: Close supervision    Sitting Balance:  Static Sitting Balance  Static Sitting-Balance Support: Feet supported  Static Sitting-Level of Assistance: Independent      Therapy/Activity:   performed functional mobiliy to and from the bathroom with minimal assist and no device.  She was reaching out for objects to stabilize herself.  Had LOB x2 and would benefit from use of 2ww.      Sensation:  Light Touch: No apparent deficits    Strength:  Strength Comments: BUE 3+/5     Coordination:  Movements are Fluid and Coordinated: Yes     Hand Function:  Hand Function  Gross Grasp:  Functional  Coordination: Impaired    Outcome Measures: [facility] Daily Activity  Putting on and taking off regular lower body clothing: A little  Bathing (including washing, rinsing, drying): A little  Putting on and taking off regular upper body clothing: None  Toileting, which includes using toilet, bedpan or urinal: A little  Taking care of personal grooming such as brushing teeth: None  Eating Meals: None  Daily Activity - Total Score: 21      Goals:    Problem: Bathing  Goal: STG - Patient will bathe body UB/LB independent   Outcome: Progressing     Problem: Dressings Lower Extremities  Goal: LTG - Patient will dress lower body independent  Outcome: Progressing     Problem: Instrumental Activities of Daily Living  Goal: LTG - Patient will independently complete basic home making activities to return to independent functioning at home independent with 2ww as needed  Outcome: Progressing     Problem: Mobility  Goal: Performed functional mobility independent with 2ww  Outcome: Progressing

## 2024-01-27 NOTE — CARE PLAN
Problem: Bathing  Goal: STG - Patient will bathe body UB/LB independent   Outcome: Progressing     Problem: Dressings Lower Extremities  Goal: LTG - Patient will dress lower body independent  Outcome: Progressing     Problem: Instrumental Activities of Daily Living  Goal: LTG - Patient will independently complete basic home making activities to return to independent functioning at home independent with 2ww as needed  Outcome: Progressing     Problem: Mobility  Goal: Performed functional mobility independent with 2ww  Outcome: Progressing

## 2024-01-27 NOTE — NURSING NOTE
PT walked patient to bathroom and said she was unsteady and would need a walker to and stand by assist. Messaged attending for med orders, and hypo and hyper glycemic protocol.

## 2024-01-27 NOTE — CARE PLAN
Problem: Pain  Goal: My pain/discomfort is manageable  Outcome: Progressing   The patient's goals for the shift include no pain    The clinical goals for the shift include decreased fluid

## 2024-01-27 NOTE — H&P
History Of Present Illness  Alfonzo Flanagan is a 49 y.o. female presenting with abdominal pain and abdominal surgery.  Patient had a UTI and was started on antibiotics.  Patient said her symptoms did not improve.  With this complaint she came to the emergency room emergency room initial workup was done and patient found to have UTI with hematuria.  Patient also had ascites and was complaining of severe abdominal pain.  With this complaint she has been admitted to the floor for further management.  Patient denies any nausea or vomiting.  No diarrhea, dysuria,.  Currently.  No hematemesis no hematochezia or melena.      Past Medical History  History reviewed. No pertinent past medical history.    Surgical History  Past Surgical History:   Procedure Laterality Date    CT GUIDED IMAGING FOR NEEDLE PLACEMENT  10/14/2020    CT GUIDED IMAGING FOR NEEDLE PLACEMENT [facility]    US GUIDED ABDOMINAL PARACENTESIS  10/8/2020    US GUIDED ABDOMINAL PARACENTESIS [facility]        Social History  Nonalcoholic cirrhosis, hypertriglyceridemia, hyperlipidemia, diabetes mellitus type 2, and cholelithiasis    Family History  No family history on file.     Allergies  Patient has no known allergies.    Review of Systems  I reviewed all systems reviewed as above otherwise is negative.  Physical Exam  HEENT:  Head externally atraumatic, no pallor, no icterus, extraocular movements intact, pupils reactive to light, oral mucosa moist and throat clear.  Neck:  Supple, no JVP, no palpable adenopathy or thyromegaly.  No carotid bruit.  Chest:  Clear to auscultation and resonant.  Heart:  Regular rate and rhythm, no murmur or gallop could be appreciated.  Abdomen: Distended, ascites present, bowel sounds present, normoactive, no palpable hepatosplenomegaly.  Extremities: Bilateral edema, pulses present, no cyanosis or clubbing.  CNS:  Patient alert, oriented to time, place and person.  Power 5/5 all over and deep tendon  reflexes symmetrical, cranial nerves 2-12 grossly intact.  Skin:  No active rash.  Musculoskeletal:  No joint swelling or erythema, range of movement normal.  Last Recorded Vitals  Heart Rate:  [66-87]   Temp:  [36.2 °C (97.2 °F)-37 °C (98.6 °F)]   Resp:  [14-17]   BP: (114-126)/(57-71)   SpO2:  [97 %-100 %]       Relevant Results        Results for orders placed or performed during the hospital encounter of 01/26/24 (from the past 24 hour(s))   POCT GLUCOSE   Result Value Ref Range    POCT Glucose 147 (H) 74 - 99 mg/dL   Comprehensive metabolic panel   Result Value Ref Range    Glucose 246 (H) 65 - 99 mg/dL    Sodium 139 133 - 145 mmol/L    Potassium 3.9 3.4 - 5.1 mmol/L    Chloride 108 (H) 97 - 107 mmol/L    Bicarbonate 23 (L) 24 - 31 mmol/L    Urea Nitrogen 11 8 - 25 mg/dL    Creatinine 0.40 0.40 - 1.60 mg/dL    eGFR >90 >60 mL/min/1.73m*2    Calcium 7.9 (L) 8.5 - 10.4 mg/dL    Albumin 2.2 (L) 3.5 - 5.0 g/dL    Alkaline Phosphatase 284 (H) 35 - 125 U/L    Total Protein 6.1 5.9 - 7.9 g/dL    AST 46 (H) 5 - 40 U/L    Bilirubin, Total 1.2 0.1 - 1.2 mg/dL    ALT 26 5 - 40 U/L    Anion Gap 8 <=19 mmol/L   CBC   Result Value Ref Range    WBC 3.8 (L) 4.4 - 11.3 x10*3/uL    nRBC 0.0 0.0 - 0.0 /100 WBCs    RBC 3.46 (L) 4.00 - 5.20 x10*6/uL    Hemoglobin 9.1 (L) 12.0 - 16.0 g/dL    Hematocrit 29.3 (L) 36.0 - 46.0 %    MCV 85 80 - 100 fL    MCH 26.3 26.0 - 34.0 pg    MCHC 31.1 (L) 32.0 - 36.0 g/dL    RDW 18.5 (H) 11.5 - 14.5 %    Platelets 76 (L) 150 - 450 x10*3/uL   POCT GLUCOSE   Result Value Ref Range    POCT Glucose 205 (H) 74 - 99 mg/dL   POCT GLUCOSE   Result Value Ref Range    POCT Glucose 283 (H) 74 - 99 mg/dL     Prior to Admission medications    Medication Sig Start Date End Date Taking? Authorizing Provider   acetaminophen (Tylenol) 325 mg tablet Take 1 tablet (325 mg) by mouth every 6 hours if needed for mild pain (1 - 3).    Historical Provider, MD   atorvastatin (Lipitor) 80 mg tablet Take 1 tablet (80 mg) by  mouth once daily.    Aleshia Provider, MD   cephalexin (Keflex) 500 mg capsule Take 1 capsule (500 mg) by mouth 3 times a day.    Aleshia Maynard MD   docusate sodium (Colace) 100 mg capsule Take 1 capsule (100 mg) by mouth 2 times a day.    Aleshia Provider, MD   fenofibrate (Tricor) 145 mg tablet Take 1 tablet (145 mg) by mouth once daily.    Aleshia Maynard MD   furosemide (Lasix) 40 mg tablet Take 1 tablet (40 mg) by mouth once daily.    Aleshia Provider, MD   insulin glargine (Lantus U-100 Insulin) 100 unit/mL injection Inject 20 Units under the skin once daily in the morning. Take as directed per insulin instructions.    Aleshia Provider, MD   insulin lispro (HumaLOG) 100 unit/mL injection Inject under the skin 3 times a day with meals. Take as directed per insulin instructions.    Aleshia Provider, MD   pantoprazole (ProtoNix) 20 mg EC tablet Take 1 tablet (20 mg) by mouth 2 times a day before meals. Do not crush, chew, or split.    Historical Provider, MD   spironolactone (Aldactone) 50 mg tablet Take 1 tablet (50 mg) by mouth once daily.    Historical Provider, MD   traZODone (Desyrel) 50 mg tablet Take 0.5 tablets (25 mg) by mouth once daily at bedtime.    Historical Provider, MD   ursodiol (Actigall) 300 mg capsule Take 1 capsule (300 mg) by mouth 3 times a day.    Aleshia Provider, MD       Current Facility-Administered Medications:     acetaminophen (Tylenol) tablet 650 mg, 650 mg, oral, q4h PRN, 650 mg at 01/27/24 1328 **OR** acetaminophen (Tylenol) oral liquid 650 mg, 650 mg, oral, q4h PRN **OR** acetaminophen (Tylenol) suppository 650 mg, 650 mg, rectal, q4h PRN, Luis Alfredo Lawrence MD    benzocaine-menthol (Cepastat Sore Throat) 15-3.6 mg lozenge 1 lozenge, 1 lozenge, Mouth/Throat, q2h PRN, Luis Alfredo Lawrence MD    cefTRIAXone (Rocephin) IVPB 1 g, 1 g, intravenous, q24h, Luis Alfredo Lawrence MD, Stopped at 01/27/24 1358    dextromethorphan-guaifenesin (Robitussin DM)   mg/5 mL oral liquid 5 mL, 5 mL, oral, q4h PRN, Luis Alfredo Lawrence MD    furosemide (Lasix) tablet 40 mg, 40 mg, oral, Daily, ESME Knutson, 40 mg at 01/27/24 1520    guaiFENesin (Mucinex) 12 hr tablet 600 mg, 600 mg, oral, q12h PRN, Luis Alfredo Lawrence MD    melatonin tablet 5 mg, 5 mg, oral, Nightly, Luis Alfredo Lawrence MD, 5 mg at 01/26/24 2158    ondansetron (Zofran) injection 4 mg, 4 mg, intravenous, q6h PRN, Luis Alfredo Lawrence MD    ondansetron (Zofran) tablet 4 mg, 4 mg, oral, q8h PRN, 4 mg at 01/27/24 1328 **OR** [DISCONTINUED] ondansetron (Zofran) injection 4 mg, 4 mg, intravenous, q8h PRN, Luis Alfredo Lawrence MD    polyethylene glycol (Glycolax, Miralax) packet 17 g, 17 g, oral, Daily PRN, Luis Alfredo Lawrence MD    spironolactone (Aldactone) tablet 50 mg, 50 mg, oral, Daily, ESME Knutson, 50 mg at 01/27/24 1520    traMADol (Ultram) tablet 50 mg, 50 mg, oral, q6h PRN, ESME Knutson  CT abdomen pelvis w IV contrast    Result Date: 1/26/2024  Interpreted By:  Goyo Peterson, STUDY: CT ABDOMEN PELVIS W IV CONTRAST; 1/26/2024 10:52 am   INDICATION: Signs/Symptoms:abd pain ascites;   COMPARISON: None   ACCESSION NUMBER(S): [id number]   ORDERING CLINICIAN: LILLIE IYER   TECHNIQUE: Contiguous axial images of the abdomen/pelvis were performed with IV contrast. 75 ml of Omnipaque 350 was utilized. Coronal and sagittal reformatted images were also obtained. All CT examinations are performed with 1 or more of the following dose reduction techniques: Automated exposure control, adjustment of mA and/or kv according to patient's size, or use of iterative reconstruction techniques.     FINDINGS: The liver is nodular in contour consistent with cirrhosis. No focal hepatic lesions are seen. Prior cholecystectomy with surgical clips in the gallbladder fossa. The common bile duct, pancreas, and adrenal glands are unremarkable. The spleen is mildly enlarged  measuring 13.7 cm in length.   The kidneys enhance symmetrically. No urolithiasis is seen. No hydroureteronephrosis is seen.   The visualized aorta is unremarkable.   The small bowel is not dilated. The appendix is not visualized however there has no evidence to suggest appendicitis. The colon is unremarkable with no evidence for acute inflammatory process.   Mild-moderate volume ascites throughout the abdomen and pelvis. There is also mild diffuse anasarca.   The bladder is well distended with no gross wall thickening.   The visualized osseous structures are intact.   Limited images of the lower thorax are unremarkable.       Hepatic cirrhosis. No focal hepatic lesion.   Mild splenomegaly.   Mild-moderate volume ascites throughout the abdomen and pelvis.   Signed by: Goyo Peterson 1/26/2024 12:54 PM Dictation workstation:   [id number]    US ASCITES SURVEY    Result Date: 1/22/2024  * * *Final Report* * * DATE OF EXAM: Jan 22 2024 10:27AM   [facility]   1016  -  US ASCITES SURVEY  / ACCESSION #  [id number] PROCEDURE REASON: ascites      * * * * Physician Interpretation * * * * RESULT: US ASCITES SURVEY HISTORY: Ascites TECHNIQUE: Grayscale ultrasound imaging was performed in the area of concern and images were saved to the permanent image archive. RESULT: See impression    IMPRESSION: Ascites survey was obtained throughout the abdomen. Mild amount of ascites in all 4 quadrants of the abdomen. No large single pocket was present and therapeutic paracentesis was not performed at this time. Transcribed Using Voice Recognition Transcribe Date/Time: Jan 22 2024 10:46A Dictated by: LEENA VINSON MD This examination was interpreted and the report reviewed and electronically signed by: LEENA VINSON MD on Jan 22 2024 10:48AM  EST    CT ABD/PEL WO IVCON    Result Date: 1/21/2024  * * *Final Report* * * DATE OF EXAM: Jan 21 2024  9:43AM   [facility]   0531  -  CT ABD/PEL WO IVCON  / ACCESSION #  [id number] PROCEDURE REASON: Bowel  obstruction suspected      * * * * Physician Interpretation * * * * RESULT: EXAMINATION:  CT ABDOMEN AND PELVIS WITHOUT IV CONTRAST CLINICAL HISTORY: Bowel obstruction suspected TECHNIQUE: Non-IV contrast imaging of the abdomen and pelvis was performed using standard technique, scanning from just above the dome of the diaphragm to the symphysis pubis.  Unenhanced imaging is limited for the evaluation of some intra-abdominal and pelvic pathology. MQ:  CTAPWO_3 Contrast: IV: None : ml of CT Radiation dose: Integrated Dose-length product (DLP) for this visit =   428 mGy*cm. CT Dose Reduction Employed: Automated exposure control(AEC) and iterative recon COMPARISON: CT 01/10/2024. RESULT: Abdomen / Pelvis: Liver: Liver has a nodular contour and is atrophic, consistent with cirrhosis.  No focal hepatic lesions within limitations of this noncontrasted study. Biliary: Gallbladder is absent. Spleen: No splenomegaly. Pancreas: Unremarkable. Adrenals: No mass. Kidneys: Kidneys are symmetric in size without hydronephrosis or renal stones. GI Tract: Diffuse gastric wall thickening. Colon is normal in caliber without obstruction.  Wall thickening of the transverse colon at the splenic flexure.  Appendix not visualized. Small bowel is normal in caliber without obstruction.  Walls of the small bowel are edematous. Lymph Nodes: No lymphadenopathy. Mesentery/peritoneum: Moderate volume ascites. Retroperitoneum: No mass. Vasculature: Arterial atherosclerotic disease without aneurysm. Pelvis: Urinary bladder is unremarkable.  Uterus is present.  No pelvic lymphadenopathy. Bones/Soft Tissues: Fat-containing ventral wall hernias.  Subcutaneous edema.  No suspicious osseous lesions.  L5 pars defect on the left.  Mild degenerative changes in thoracolumbar spine.. Lower thorax: 0.9 cm nodule in the right middle lobe (image 4, 2)  (topogram) images: No additional findings.    IMPRESSION: 1.  Cirrhotic hepatic morphology with moderate  volume ascites 2.  Wall thickening of the stomach, could be reactive due to surrounding ascites but gastritis is possible. 3.  Walls of the small bowel and colon are edematous, likely reactive due to adjacent ascites. 4.  No evidence of small bowel obstruction. 5.  0.9 cm nodule in the right middle lobe.  Recommend short-term follow-up in 3 months to document stability. ACTIONABLE RESULT: FOLLOW-UP Acuity: Actionable Findings: Thoracic-Lung nodules Routing code:  RI_1 Recommendation: CT Chest WO IVCON Time Frame: Additional evaluation as described in the impression COMMUNICATION: Results will be communicated with the ordering provider via Epic staff message or phone message by [facility] within 2 business days of report finalization. --END OF FINDING-- Transcribed Using Voice Recognition Transcribe Date/Time: Jan 21 2024 10:50A Dictated by: KAIN RUSH MD This examination was interpreted and the report reviewed and electronically signed by: KAIN RUSH MD on Jan 21 2024 11:00AM  EST    XR CHEST 1V FRONTAL PORT    Result Date: 1/21/2024  * * *Final Report* * * DATE OF EXAM: Jan 21 2024  9:10AM   [facility]   5376  -  XR CHEST 1V FRONTAL PORT  / ACCESSION #  [id number] PROCEDURE REASON: Shortness of breath      * * * * Physician Interpretation * * * * RESULT: EXAMINATION:  CHEST RADIOGRAPH (PORTABLE SINGLE VIEW AP) Exam Date/Time:  1/21/2024 9:10 AM CLINICAL HISTORY: Shortness of breath MQ:  XCPR_5 Comparison:  01/07/2024. RESULT: Lines, tubes, and devices:  None. Lungs and pleura:  The lungs remain unremarkable with no evidence of infiltrates.  The pleural margins appear normal. Cardiomediastinal silhouette:  Stable cardiomediastinal silhouette. Other:  The visualized bony thorax appears unremarkable.    IMPRESSION: Stable exam with no evidence of acute disease. Transcribed Using Voice Recognition Transcribe Date/Time: Jan 21 2024  9:41A Dictated by: DAMASO PERALTA MD This examination was interpreted  and the report reviewed and electronically signed by: DAMASO PERALTA MD on Jan 21 2024  9:42AM  EST    US ASCITES SURVEY    Result Date: 1/11/2024  * * *Final Report* * * DATE OF EXAM: Jan 11 2024 10:41AM   [facility]   1016  -  US ASCITES SURVEY  / ACCESSION #  [id number] PROCEDURE REASON: ascites      * * * * Physician Interpretation * * * * RESULT: ULTRASOUND ASCITES SURVEY AND ULTRASOUND-GUIDED PARACENTESIS: 01/11/2024 CLINICAL DATA: Ascites FINDINGS: Fluid in the RLQ was localized by ultrasound. An image was stored in the electronic archive. The procedure, risks, benefits, and alternatives were discussed with the patient.  The patient provided prior written consent. An audible timeout was conducted to verify the patients name, MRN, procedure, Allergies and clotting profile. Following sterile preparation and local anesthesia in the RLQ using  8cc of 1% lidocaine an 8 Uzbek fenestrated catheter was introduced into the fluid.  There was spontaneous return of straw colored fluid.  A volume of 2500 cc of fluid was aspirated. Fluid was prepared for laboratory evaluation per primary service. The patient tolerated the procedure satisfactorily without complication and was discharged to her room in stable condition. Impression: COMPLETED PARACENTESIS. Transcribed Using Voice Recognition Transcribe Date/Time: Jan 11 2024 10:47A Dictated by: FEI HAMMOND MD This examination was interpreted and the report reviewed and electronically signed by: FEI HAMMOND MD on Jan 11 2024 10:48AM  EST    IMAGING GUIDED PARACENTESIS    Result Date: 1/11/2024  * * *Final Report* * * DATE OF EXAM: Jan 11 2024 10:41AM   [facility]   2039  -  US PARACENTESIS BI  / ACCESSION #  [id number] PROCEDURE REASON: ascites      * * * * Physician Interpretation * * * * RESULT: ULTRASOUND ASCITES SURVEY AND ULTRASOUND-GUIDED PARACENTESIS: 01/11/2024 CLINICAL DATA: Ascites FINDINGS: Fluid in the RLQ was localized by ultrasound. An image was stored in the electronic  archive. The procedure, risks, benefits, and alternatives were discussed with the patient.  The patient provided prior written consent. An audible timeout was conducted to verify the patients name, MRN, procedure, Allergies and clotting profile. Following sterile preparation and local anesthesia in the RLQ using  8cc of 1% lidocaine an 8 Georgian fenestrated catheter was introduced into the fluid.  There was spontaneous return of straw colored fluid.  A volume of 2500 cc of fluid was aspirated. Fluid was prepared for laboratory evaluation per primary service. The patient tolerated the procedure satisfactorily without complication and was discharged to her room in stable condition. Impression: COMPLETED PARACENTESIS. Transcribed Using Voice Recognition Transcribe Date/Time: Jan 11 2024 10:47A Dictated by: FEI HAMMOND MD This examination was interpreted and the report reviewed and electronically signed by: FEI HAMMOND MD on Jan 11 2024 10:48AM  EST    CT ABD/PEL W IVCON    Result Date: 1/10/2024  * * *Final Report* * * DATE OF EXAM: Davi 10 2024  2:26PM   [facility]   0530  -  CT ABD/PEL W IVCON  / ACCESSION #  [id number] PROCEDURE REASON: Abdominal pain, acute, nonlocalized      * * * * Physician Interpretation * * * * RESULT: EXAMINATION:  CT ABD/PEL W IVCON CLINICAL HISTORY: Abdominal pain, acute, nonlocalized abdominal ascites/pain TECHNIQUE: CT of the abdomen and pelvis was performed using standard technique, scanning from just above the dome of the diaphragm to the symphysis pubis. Contrast: IV:  100 ml of Omnipaque 350 Dose-Length Product (DLP): 830 mGy*cm CT Dose Reduction Employed: Automated exposure control(AEC) and iterative recon COMPARISON: CT abdomen and pelvis without contrast 01/07/2024 RESULT: Lower thorax: No consolidations. Liver: Cirrhotic liver morphology. Biliary: Cholecystectomy Spleen: The spleen measures up to 14 cm in AP diameter Pancreas: No mass or ductal dilation. Adrenals: No mass. Kidneys: No  enhancing mass or hydronephrosis. GI tract: Stable wall thickening of loops of small bowel, stomach and ascending colon, likely due to portal hypertension. No bowel obstruction. Appendix: Not visualized Lymph nodes: No lymphadenopathy. Mesentry/Peritoneum/Retroperitoneum: Moderate volume ascites. Vasculature: Recannulization of the periumbilical vein, esophageal, gastric and mesenteric varices.  The main portal and splenic veins are patent. No abdominal aortic aneurysm or branch occlusion. Pelvis: Normal appearance of uterus and adnexa. Normal bladder.  Mild hyperattenuating fluid in the bladder, possibly due to proteinaceous debris. Soft Tissues: Small ventral hernias containing fat, varices and fluid. Bones: No acute findings.    IMPRESSION: Cirrhosis with sequela of portal hypertension including moderate volume ascites. Transcribed Using Voice Recognition Transcribe Date/Time: Davi 10 2024  3:37P Dictated by: JEMMA DENNY MD This examination was interpreted and the report reviewed and electronically signed by: JEMMA DENNY MD on Davi 10 2024  3:47PM  EST    US ASCITES SURVEY    Result Date: 1/10/2024  * * *Final Report* * * DATE OF EXAM: Davi 10 2024 10:47AM   [facility]   1016  -  US ASCITES SURVEY  / ACCESSION #  [id number] PROCEDURE REASON: Ascites      * * * * Physician Interpretation * * * * RESULT: ULTRASOUND ASCITES SURVEY: 01/10/2024 CLINICAL DATA: Ascites FINDINGS: Four quadrant examination of the abdomen identifies a small amount of ascites, minimal in the right upper quadrant and small bowel in the right lower quadrant and left upper quadrant.  Minimal in the left lower quadrant.    IMPRESSION: SMALL AMOUNT OF ABDOMINAL ASCITES. Transcribed Using Voice Recognition Transcribe Date/Time: Davi 10 2024 11:02A Dictated by: FEI HAMMOND MD This examination was interpreted and the report reviewed and electronically signed by: FEI HAMMOND MD on Davi 10 2024 11:03AM  EST    CT ABD/PEL W IVCON    Result Date:  1/7/2024  * * *Final Report* * * DATE OF EXAM: Jan 7 2024 10:21PM   [facility]   0530  -  CT ABD/PEL W IVCON  / ACCESSION #  [id number] PROCEDURE REASON: Abdominal abscess/infection suspected      * * * * Physician Interpretation * * * * RESULT: EXAMINATION:  CT ABDOMEN AND PELVIS WITH IV CONTRAST CLINICAL HISTORY: Abdominal pain TECHNIQUE: CT of the abdomen and pelvis was performed using standard technique, scanning from just above the dome of the diaphragm to the symphysis pubis. MQ:  CTAP_3  Contrast: IV:  100 ml of Omnipaque 300 : ml of CT Radiation dose: Integrated Dose-length product (DLP) for this visit =   736 mGy*cm. CT Dose Reduction Employed: Automated exposure control(AEC) and iterative recon COMPARISON: CT abdomen pelvis 12/25/2023. RESULT: Liver: Cirrhotic liver.. Biliary: No bile duct dilation. Spleen: No mass. No splenomegaly. Pancreas: No mass or duct dilation. Adrenals: No mass. Kidneys: No hydronephrosis. GI tract: The stomach is mildly distended with intraluminal enteric contents. There is associated wall thickening of the stomach. A few mildly dilated small bowel loops are present, without a discrete transition point. These loops measure up to 3.1 cm in diameter and are mildly thick-walled. Lymph nodes: No abdominal or pelvic lymphadenopathy. Mesentery/Peritoneum: moderate volume ascites. Retroperitoneum: No mass. Vasculature: Recanalized umbilical vein. Esophageal varices. Pelvis: Free fluid. Bones/Soft Tissues: Small umbilical hernia containing mesenteric fat and vessels. Lower thorax: Unchanged right middle lobe 9 mm nodule (2-7).  (topogram) images: No additional findings.    IMPRESSION: Cirrhotic liver with stigmata of portal hypertension including ascites and varices. Mildly distended stomach with associated gastric wall thickening which may represent gastritis. A few mildly dilated thick-walled bowel loops without a discrete transition point. The dilation likely represents an underlying  ileus. The wall thickening may represent portal enteropathy or enteritis. Transcribed Using Voice Recognition Transcribe Date/Time: Jan 7 2024 10:43P Dictated by: GREGORY FUNK MD This examination was interpreted and the report reviewed and electronically signed by: GREGORY FUNK MD on Jan 7 2024 10:52PM  EST    XR CHEST 1V FRONTAL PORT    Result Date: 1/7/2024  * * *Final Report* * * DATE OF EXAM: Jan 7 2024  8:21PM   [facility]   5376  -  XR CHEST 1V FRONTAL PORT  / ACCESSION #  [id number] PROCEDURE REASON: Fatigue and malaise      * * * * Physician Interpretation * * * * RESULT: EXAMINATION:  CHEST RADIOGRAPH (PORTABLE SINGLE VIEW AP) Exam Date/Time:  1/7/2024 8:21 PM CLINICAL HISTORY: Fatigue and malaise MQ:  XCPR_5 Comparison:  11/11/2023 RESULT: There has been a relatively shallow inspiration, with bibasilar atelectasis.  There is multilevel degenerative change seen within the thoracic spine.  Bilateral shoulder DJD.  Calcific tendinitis is seen about both shoulders.  There is no griffin pulmonary consolidation, pleural effusion, pneumothorax, or pulmonary vascular redistribution.    IMPRESSION: Relatively shallow inspiration, with bibasilar atelectasis.   No acute pulmonary process is identified. Transcribed Using Voice Recognition Transcribe Date/Time: Jan 7 2024  8:53P Dictated by: KIYA FLAHERTY MD This examination was interpreted and the report reviewed and electronically signed by: KIYA FLAHERTY MD on Jan 7 2024  8:54PM  EST    No results found for the last 90 days.       Assessment/Plan   Principal Problem:    UTI (urinary tract infection)  Active Problems:    Other ascites    Acute UTI  Cirrhosis  Ascites  Hypertension  Diabetes mellitus type 2  Hyperlipidemia  Hypertriglyceridemia    Plan: Continue current medication.  Give supportive care.  Give physical therapy and Occupational Therapy.  Patient has ascites.  Patient had paracentesis.  Interventional radiology consulted and obtained.  Monitor for  now.  Monitor blood sugar q. H&H's and cover the sliding scale insulin.  Patient was consulted and obtained.  Patient complaining of severe abdominal pain.  Patient started on tramadol.  Patient has got UTI.  Patient has been started antibiotics empirically.  Check urine culture and change antibiotics accordingly.  We will take DVT, fall, aspiration, decubitus and DVT precautions.      Luis Alfredo Lawrence MD

## 2024-01-27 NOTE — PROGRESS NOTES
Alfonzo Flanagan is a 49 y.o. female on day 0 of admission presenting with UTI (urinary tract infection).    Subjective   Patient seen and examined.  Lying in the bed.  Comfortable.  Not in any acute distress.  Patient denies any headache or dizziness.  No nausea or vomiting.  Pain is better with tramadol       Objective     Physical Exam  HEENT: Atraumatic, no pallor, no icterus, oral mucosa moist and throat clear  Neck:  Supple, no JVP, no palpable adenopathy or thyromegaly.  No carotid bruit.  Chest:  Clear to auscultation and resonant.  Heart:  Regular rate and rhythm, no murmur or gallop could be appreciated.  Abdomen: Ascites present.  Bowel sounds present.  Extremities: Bilateral 2+ edema, pulses present, no cyanosis or clubbing.  CNS:  Patient alert, oriented to time, place and person.  Power 5/5 all over and deep tendon reflexes symmetrical, cranial nerves 2-12 grossly intact.  Skin:  No active rash.  Musculoskeletal:  No joint swelling or erythema, range of movement normal.  Last Recorded Vitals  Heart Rate:  [66-87]   Temp:  [36.2 °C (97.2 °F)-37 °C (98.6 °F)]   Resp:  [14-17]   BP: (114-126)/(57-71)   SpO2:  [97 %-100 %]     Intake/Output last 3 Shifts:  No intake/output data recorded.    Relevant Results  No results found for the last 90 days.    Results for orders placed or performed during the hospital encounter of 01/26/24 (from the past 24 hour(s))   POCT GLUCOSE   Result Value Ref Range    POCT Glucose 147 (H) 74 - 99 mg/dL   Comprehensive metabolic panel   Result Value Ref Range    Glucose 246 (H) 65 - 99 mg/dL    Sodium 139 133 - 145 mmol/L    Potassium 3.9 3.4 - 5.1 mmol/L    Chloride 108 (H) 97 - 107 mmol/L    Bicarbonate 23 (L) 24 - 31 mmol/L    Urea Nitrogen 11 8 - 25 mg/dL    Creatinine 0.40 0.40 - 1.60 mg/dL    eGFR >90 >60 mL/min/1.73m*2    Calcium 7.9 (L) 8.5 - 10.4 mg/dL    Albumin 2.2 (L) 3.5 - 5.0 g/dL    Alkaline Phosphatase 284 (H) 35 - 125 U/L    Total Protein 6.1 5.9 - 7.9 g/dL     AST 46 (H) 5 - 40 U/L    Bilirubin, Total 1.2 0.1 - 1.2 mg/dL    ALT 26 5 - 40 U/L    Anion Gap 8 <=19 mmol/L   CBC   Result Value Ref Range    WBC 3.8 (L) 4.4 - 11.3 x10*3/uL    nRBC 0.0 0.0 - 0.0 /100 WBCs    RBC 3.46 (L) 4.00 - 5.20 x10*6/uL    Hemoglobin 9.1 (L) 12.0 - 16.0 g/dL    Hematocrit 29.3 (L) 36.0 - 46.0 %    MCV 85 80 - 100 fL    MCH 26.3 26.0 - 34.0 pg    MCHC 31.1 (L) 32.0 - 36.0 g/dL    RDW 18.5 (H) 11.5 - 14.5 %    Platelets 76 (L) 150 - 450 x10*3/uL   POCT GLUCOSE   Result Value Ref Range    POCT Glucose 205 (H) 74 - 99 mg/dL   POCT GLUCOSE   Result Value Ref Range    POCT Glucose 283 (H) 74 - 99 mg/dL        Current Facility-Administered Medications:     acetaminophen (Tylenol) tablet 650 mg, 650 mg, oral, q4h PRN, 650 mg at 01/27/24 1328 **OR** acetaminophen (Tylenol) oral liquid 650 mg, 650 mg, oral, q4h PRN **OR** acetaminophen (Tylenol) suppository 650 mg, 650 mg, rectal, q4h PRN, Luis Alfredo Lawrence MD    benzocaine-menthol (Cepastat Sore Throat) 15-3.6 mg lozenge 1 lozenge, 1 lozenge, Mouth/Throat, q2h PRN, Luis Alfredo Lawrence MD    cefTRIAXone (Rocephin) IVPB 1 g, 1 g, intravenous, q24h, Luis Alfredo Lawrence MD, Stopped at 01/27/24 1358    dextromethorphan-guaifenesin (Robitussin DM)  mg/5 mL oral liquid 5 mL, 5 mL, oral, q4h PRN, Luis Alfredo Lawrence MD    furosemide (Lasix) tablet 40 mg, 40 mg, oral, Daily, Adrianne A Primiano, APRN-CNP, 40 mg at 01/27/24 1520    guaiFENesin (Mucinex) 12 hr tablet 600 mg, 600 mg, oral, q12h PRN, Luis Alfredo Lawrence MD    melatonin tablet 5 mg, 5 mg, oral, Nightly, Luis Alfredo Lawrence MD, 5 mg at 01/26/24 9757    ondansetron (Zofran) injection 4 mg, 4 mg, intravenous, q6h PRN, Luis Alfredo Lawrence MD    ondansetron (Zofran) tablet 4 mg, 4 mg, oral, q8h PRN, 4 mg at 01/27/24 1328 **OR** [DISCONTINUED] ondansetron (Zofran) injection 4 mg, 4 mg, intravenous, q8h PRN, Luis Alfredo Lawrence MD    polyethylene glycol (Glycolax, Miralax) packet 17 g, 17 g,  oral, Daily PRN, Luis Alfredo Lawrence MD    spironolactone (Aldactone) tablet 50 mg, 50 mg, oral, Daily, Adrianne SAV Primiano, APRN-CNP, 50 mg at 01/27/24 1520    traMADol (Ultram) tablet 50 mg, 50 mg, oral, q6h PRN, Adrianne A Primiano, APRN-CNP    traMADol (Ultram) tablet 50 mg, 50 mg, oral, q6h PRN, Luis Alfredo Lawrence MD   Assessment/Plan   Principal Problem:    UTI (urinary tract infection)  Active Problems:    Other ascites    Acute UTI  Ascites  Hypertension  Diabetes mellitus type 2  Hyperlipidemia  Triglyceridemia    Plan: Continue current medication.  Patient started on diuretics.  Monitor input output and daily weight.  Monitor blood sugar.  We will treat DVT, fall, aspiration, decubitus and DVT precautions.           Luis Alfredo Lawrence MD

## 2024-01-27 NOTE — CONSULTS
Inpatient consult to gastroenterology  Consult performed by: Adrianne Powell, APRN-CNP  Consult ordered by: Luis Alfredo Lawrence MD          Reason For Consult  Abdominal distention   Ascites of liver     History Of Present Illness  Alfonzo Flanagan is a 49 y.o. female presenting with abdominal distention.  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 Patient was recently seen by her PCP 3 days ago and was noted that she had gained 26 pounds in the past 3 weeks. Colonoscopy on 6/5/2023 at [facility] showed hemorrhoids found on perianal exam. The examination was otherwise normal. Recent EGD on 12/27/23 Esophageal ulcer, no bleeding. No varices.  Patients states 7/10 generalized abd pain. Denies any nausea, vomiting. Abd is very distended. She reports regular bowel movements with no blood in stool. She states she normally sees Dr. Whitlock for GI.     Past Medical History  She has no past medical history on file.    Surgical History  She has a past surgical history that includes CT guided imaging for needle placement (10/14/2020) and US guided abdominal paracentesis (10/8/2020).     Social History  She reports that she has never smoked. She has never used smokeless tobacco. She reports that she does not drink alcohol and does not use drugs.    Family History  No family history on file.     Allergies  Patient has no known allergies.    Review of Systems   Constitutional: Negative.    HENT: Negative.     Eyes: Negative.    Respiratory: Negative.     Cardiovascular: Negative.    Gastrointestinal:  Positive for abdominal distention.   Endocrine: Negative.    Genitourinary: Negative.    Musculoskeletal: Negative.    Allergic/Immunologic: Negative.    Neurological: Negative.    Hematological: Negative.    Psychiatric/Behavioral: Negative.          Physical Exam  HENT:      Head: Normocephalic.      Nose: Nose normal.       "Mouth/Throat:      Mouth: Mucous membranes are moist.   Eyes:      Pupils: Pupils are equal, round, and reactive to light.   Cardiovascular:      Rate and Rhythm: Normal rate.   Pulmonary:      Effort: Pulmonary effort is normal.   Abdominal:      General: There is distension.   Musculoskeletal:         General: Swelling present.   Skin:     General: Skin is warm.   Neurological:      General: No focal deficit present.      Mental Status: She is alert.   Psychiatric:         Mood and Affect: Mood normal.          Last Recorded Vitals  Blood pressure 126/71, pulse 86, temperature 36.7 °C (98.1 °F), temperature source Oral, resp. rate 16, height 1.6 m (5' 3\"), weight 83.9 kg (185 lb), SpO2 100 %.    Relevant Results  CT abdomen pelvis w IV contrast    Result Date: 1/26/2024  Interpreted By:  Goyo Peterson, STUDY: CT ABDOMEN PELVIS W IV CONTRAST; 1/26/2024 10:52 am   INDICATION: Signs/Symptoms:abd pain ascites;   COMPARISON: None   ACCESSION NUMBER(S): [id number]   ORDERING CLINICIAN: LILLIE IYER   TECHNIQUE: Contiguous axial images of the abdomen/pelvis were performed with IV contrast. 75 ml of Omnipaque 350 was utilized. Coronal and sagittal reformatted images were also obtained. All CT examinations are performed with 1 or more of the following dose reduction techniques: Automated exposure control, adjustment of mA and/or kv according to patient's size, or use of iterative reconstruction techniques.     FINDINGS: The liver is nodular in contour consistent with cirrhosis. No focal hepatic lesions are seen. Prior cholecystectomy with surgical clips in the gallbladder fossa. The common bile duct, pancreas, and adrenal glands are unremarkable. The spleen is mildly enlarged measuring 13.7 cm in length.   The kidneys enhance symmetrically. No urolithiasis is seen. No hydroureteronephrosis is seen.   The visualized aorta is unremarkable.   The small bowel is not dilated. The appendix is not visualized " however there has no evidence to suggest appendicitis. The colon is unremarkable with no evidence for acute inflammatory process.   Mild-moderate volume ascites throughout the abdomen and pelvis. There is also mild diffuse anasarca.   The bladder is well distended with no gross wall thickening.   The visualized osseous structures are intact.   Limited images of the lower thorax are unremarkable.       Hepatic cirrhosis. No focal hepatic lesion.   Mild splenomegaly.   Mild-moderate volume ascites throughout the abdomen and pelvis.   Signed by: Goyo Peterson 1/26/2024 12:54 PM Dictation workstation:   [id number]    US ASCITES SURVEY    Result Date: 1/22/2024  * * *Final Report* * * DATE OF EXAM: Jan 22 2024 10:27AM   [facility]   1016  -  US ASCITES SURVEY  / ACCESSION #  [id number] PROCEDURE REASON: ascites      * * * * Physician Interpretation * * * * RESULT: US ASCITES SURVEY HISTORY: Ascites TECHNIQUE: Grayscale ultrasound imaging was performed in the area of concern and images were saved to the permanent image archive. RESULT: See impression    IMPRESSION: Ascites survey was obtained throughout the abdomen. Mild amount of ascites in all 4 quadrants of the abdomen. No large single pocket was present and therapeutic paracentesis was not performed at this time. Transcribed Using Voice Recognition Transcribe Date/Time: Jan 22 2024 10:46A Dictated by: LEENA VINSON MD This examination was interpreted and the report reviewed and electronically signed by: LEENA VINSON MD on Jan 22 2024 10:48AM  EST    CT ABD/PEL WO IVCON    Result Date: 1/21/2024  * * *Final Report* * * DATE OF EXAM: Jan 21 2024  9:43AM   [facility]   0531  -  CT ABD/PEL WO IVCON  / ACCESSION #  [id number] PROCEDURE REASON: Bowel obstruction suspected      * * * * Physician Interpretation * * * * RESULT: EXAMINATION:  CT ABDOMEN AND PELVIS WITHOUT IV CONTRAST CLINICAL HISTORY: Bowel obstruction suspected TECHNIQUE: Non-IV contrast imaging of the abdomen and  pelvis was performed using standard technique, scanning from just above the dome of the diaphragm to the symphysis pubis.  Unenhanced imaging is limited for the evaluation of some intra-abdominal and pelvic pathology. MQ:  CTAPWO_3 Contrast: IV: None : ml of CT Radiation dose: Integrated Dose-length product (DLP) for this visit =   428 mGy*cm. CT Dose Reduction Employed: Automated exposure control(AEC) and iterative recon COMPARISON: CT 01/10/2024. RESULT: Abdomen / Pelvis: Liver: Liver has a nodular contour and is atrophic, consistent with cirrhosis.  No focal hepatic lesions within limitations of this noncontrasted study. Biliary: Gallbladder is absent. Spleen: No splenomegaly. Pancreas: Unremarkable. Adrenals: No mass. Kidneys: Kidneys are symmetric in size without hydronephrosis or renal stones. GI Tract: Diffuse gastric wall thickening. Colon is normal in caliber without obstruction.  Wall thickening of the transverse colon at the splenic flexure.  Appendix not visualized. Small bowel is normal in caliber without obstruction.  Walls of the small bowel are edematous. Lymph Nodes: No lymphadenopathy. Mesentery/peritoneum: Moderate volume ascites. Retroperitoneum: No mass. Vasculature: Arterial atherosclerotic disease without aneurysm. Pelvis: Urinary bladder is unremarkable.  Uterus is present.  No pelvic lymphadenopathy. Bones/Soft Tissues: Fat-containing ventral wall hernias.  Subcutaneous edema.  No suspicious osseous lesions.  L5 pars defect on the left.  Mild degenerative changes in thoracolumbar spine.. Lower thorax: 0.9 cm nodule in the right middle lobe (image 4, 2)  (topogram) images: No additional findings.    IMPRESSION: 1.  Cirrhotic hepatic morphology with moderate volume ascites 2.  Wall thickening of the stomach, could be reactive due to surrounding ascites but gastritis is possible. 3.  Walls of the small bowel and colon are edematous, likely reactive due to adjacent ascites. 4.  No evidence  of small bowel obstruction. 5.  0.9 cm nodule in the right middle lobe.  Recommend short-term follow-up in 3 months to document stability. ACTIONABLE RESULT: FOLLOW-UP Acuity: Actionable Findings: Thoracic-Lung nodules Routing code:  RI_1 Recommendation: CT Chest WO IVCON Time Frame: Additional evaluation as described in the impression COMMUNICATION: Results will be communicated with the ordering provider via Epic staff message or phone message by [facility] within 2 business days of report finalization. --END OF FINDING-- Transcribed Using Voice Recognition Transcribe Date/Time: Jan 21 2024 10:50A Dictated by: KAIN RUSH MD This examination was interpreted and the report reviewed and electronically signed by: KAIN RUSH MD on Jan 21 2024 11:00AM  EST    XR CHEST 1V FRONTAL PORT    Result Date: 1/21/2024  * * *Final Report* * * DATE OF EXAM: Jan 21 2024  9:10AM   E[facility]   5376  -  XR CHEST 1V FRONTAL PORT  / ACCESSION #  [id number] PROCEDURE REASON: Shortness of breath      * * * * Physician Interpretation * * * * RESULT: EXAMINATION:  CHEST RADIOGRAPH (PORTABLE SINGLE VIEW AP) Exam Date/Time:  1/21/2024 9:10 AM CLINICAL HISTORY: Shortness of breath MQ:  XCPR_5 Comparison:  01/07/2024. RESULT: Lines, tubes, and devices:  None. Lungs and pleura:  The lungs remain unremarkable with no evidence of infiltrates.  The pleural margins appear normal. Cardiomediastinal silhouette:  Stable cardiomediastinal silhouette. Other:  The visualized bony thorax appears unremarkable.    IMPRESSION: Stable exam with no evidence of acute disease. Transcribed Using Voice Recognition Transcribe Date/Time: Jan 21 2024  9:41A Dictated by: DAMASO PEARLTA MD This examination was interpreted and the report reviewed and electronically signed by: DAMASO PERALTA MD on Jan 21 2024  9:42AM  EST    US ASCITES SURVEY    Result Date: 1/11/2024  * * *Final Report* * * DATE OF EXAM: Jan 11 2024 10:41AM   [facility]   1016  -  US ASCITES  SURVEY  / ACCESSION #  [id number] PROCEDURE REASON: ascites      * * * * Physician Interpretation * * * * RESULT: ULTRASOUND ASCITES SURVEY AND ULTRASOUND-GUIDED PARACENTESIS: 01/11/2024 CLINICAL DATA: Ascites FINDINGS: Fluid in the RLQ was localized by ultrasound. An image was stored in the electronic archive. The procedure, risks, benefits, and alternatives were discussed with the patient.  The patient provided prior written consent. An audible timeout was conducted to verify the patients name, MRN, procedure, Allergies and clotting profile. Following sterile preparation and local anesthesia in the RLQ using  8cc of 1% lidocaine an 8 [place] fenestrated catheter was introduced into the fluid.  There was spontaneous return of straw colored fluid.  A volume of 2500 cc of fluid was aspirated. Fluid was prepared for laboratory evaluation per primary service. The patient tolerated the procedure satisfactorily without complication and was discharged to her room in stable condition. Impression: COMPLETED PARACENTESIS. Transcribed Using Voice Recognition Transcribe Date/Time: Jan 11 2024 10:47A Dictated by: FEI HAMMOND MD This examination was interpreted and the report reviewed and electronically signed by: FEI HAMMOND MD on Jan 11 2024 10:48AM  EST    IMAGING GUIDED PARACENTESIS    Result Date: 1/11/2024  * * *Final Report* * * DATE OF EXAM: Jan 11 2024 10:41AM   EU[facility]   2039  -  US PARACENTESIS BI  / ACCESSION #  [id number] PROCEDURE REASON: ascites      * * * * Physician Interpretation * * * * RESULT: ULTRASOUND ASCITES SURVEY AND ULTRASOUND-GUIDED PARACENTESIS: 01/11/2024 CLINICAL DATA: Ascites FINDINGS: Fluid in the RLQ was localized by ultrasound. An image was stored in the electronic archive. The procedure, risks, benefits, and alternatives were discussed with the patient.  The patient provided prior written consent. An audible timeout was conducted to verify the patients name, MRN, procedure, Allergies and clotting  profile. Following sterile preparation and local anesthesia in the RLQ using  8cc of 1% lidocaine an 8 [place] fenestrated catheter was introduced into the fluid.  There was spontaneous return of straw colored fluid.  A volume of 2500 cc of fluid was aspirated. Fluid was prepared for laboratory evaluation per primary service. The patient tolerated the procedure satisfactorily without complication and was discharged to her room in stable condition. Impression: COMPLETED PARACENTESIS. Transcribed Using Voice Recognition Transcribe Date/Time: Jan 11 2024 10:47A Dictated by: FEI HAMMOND MD This examination was interpreted and the report reviewed and electronically signed by: FEI HAMMOND MD on Jan 11 2024 10:48AM  EST    CT ABD/PEL W IVCON    Result Date: 1/10/2024  * * *Final Report* * * DATE OF EXAM: Davi 10 2024  2:26PM   [facility]   0530  -  CT ABD/PEL W IVCON  / ACCESSION #  [id number] PROCEDURE REASON: Abdominal pain, acute, nonlocalized      * * * * Physician Interpretation * * * * RESULT: EXAMINATION:  CT ABD/PEL W IVCON CLINICAL HISTORY: Abdominal pain, acute, nonlocalized abdominal ascites/pain TECHNIQUE: CT of the abdomen and pelvis was performed using standard technique, scanning from just above the dome of the diaphragm to the symphysis pubis. Contrast: IV:  100 ml of Omnipaque 350 Dose-Length Product (DLP): 830 mGy*cm CT Dose Reduction Employed: Automated exposure control(AEC) and iterative recon COMPARISON: CT abdomen and pelvis without contrast 01/07/2024 RESULT: Lower thorax: No consolidations. Liver: Cirrhotic liver morphology. Biliary: Cholecystectomy Spleen: The spleen measures up to 14 cm in AP diameter Pancreas: No mass or ductal dilation. Adrenals: No mass. Kidneys: No enhancing mass or hydronephrosis. GI tract: Stable wall thickening of loops of small bowel, stomach and ascending colon, likely due to portal hypertension. No bowel obstruction. Appendix: Not visualized Lymph nodes: No lymphadenopathy.  Mesentry/Peritoneum/Retroperitoneum: Moderate volume ascites. Vasculature: Recannulization of the periumbilical vein, esophageal, gastric and mesenteric varices.  The main portal and splenic veins are patent. No abdominal aortic aneurysm or branch occlusion. Pelvis: Normal appearance of uterus and adnexa. Normal bladder.  Mild hyperattenuating fluid in the bladder, possibly due to proteinaceous debris. Soft Tissues: Small ventral hernias containing fat, varices and fluid. Bones: No acute findings.    IMPRESSION: Cirrhosis with sequela of portal hypertension including moderate volume ascites. Transcribed Using Voice Recognition Transcribe Date/Time: Davi 10 2024  3:37P Dictated by: JEMMA DENNY MD This examination was interpreted and the report reviewed and electronically signed by: JEMMA DENNY MD on Davi 10 2024  3:47PM  EST    US ASCITES SURVEY    Result Date: 1/10/2024  * * *Final Report* * * DATE OF EXAM: Davi 10 2024 10:47AM   U   1016  -  US ASCITES SURVEY  / ACCESSION #  [id number] PROCEDURE REASON: Ascites      * * * * Physician Interpretation * * * * RESULT: ULTRASOUND ASCITES SURVEY: 01/10/2024 CLINICAL DATA: Ascites FINDINGS: Four quadrant examination of the abdomen identifies a small amount of ascites, minimal in the right upper quadrant and small bowel in the right lower quadrant and left upper quadrant.  Minimal in the left lower quadrant.    IMPRESSION: SMALL AMOUNT OF ABDOMINAL ASCITES. Transcribed Using Voice Recognition Transcribe Date/Time: Davi 10 2024 11:02A Dictated by: FEI HAMMOND MD This examination was interpreted and the report reviewed and electronically signed by: FEI HAMMOND MD on Davi 10 2024 11:03AM  EST    CT ABD/PEL W IVCON    Result Date: 1/7/2024  * * *Final Report* * * DATE OF EXAM: Jan 7 2024 10:21PM   [facility]   0530  -  CT ABD/PEL W IVCON  / ACCESSION #  [id number] PROCEDURE REASON: Abdominal abscess/infection suspected      * * * * Physician Interpretation * * * *  RESULT: EXAMINATION:  CT ABDOMEN AND PELVIS WITH IV CONTRAST CLINICAL HISTORY: Abdominal pain TECHNIQUE: CT of the abdomen and pelvis was performed using standard technique, scanning from just above the dome of the diaphragm to the symphysis pubis. MQ:  CTAP_3  Contrast: IV:  100 ml of Omnipaque 300 : ml of CT Radiation dose: Integrated Dose-length product (DLP) for this visit =   736 mGy*cm. CT Dose Reduction Employed: Automated exposure control(AEC) and iterative recon COMPARISON: CT abdomen pelvis 12/25/2023. RESULT: Liver: Cirrhotic liver.. Biliary: No bile duct dilation. Spleen: No mass. No splenomegaly. Pancreas: No mass or duct dilation. Adrenals: No mass. Kidneys: No hydronephrosis. GI tract: The stomach is mildly distended with intraluminal enteric contents. There is associated wall thickening of the stomach. A few mildly dilated small bowel loops are present, without a discrete transition point. These loops measure up to 3.1 cm in diameter and are mildly thick-walled. Lymph nodes: No abdominal or pelvic lymphadenopathy. Mesentery/Peritoneum: moderate volume ascites. Retroperitoneum: No mass. Vasculature: Recanalized umbilical vein. Esophageal varices. Pelvis: Free fluid. Bones/Soft Tissues: Small umbilical hernia containing mesenteric fat and vessels. Lower thorax: Unchanged right middle lobe 9 mm nodule (2-7).  (topogram) images: No additional findings.    IMPRESSION: Cirrhotic liver with stigmata of portal hypertension including ascites and varices. Mildly distended stomach with associated gastric wall thickening which may represent gastritis. A few mildly dilated thick-walled bowel loops without a discrete transition point. The dilation likely represents an underlying ileus. The wall thickening may represent portal enteropathy or enteritis. Transcribed Using Voice Recognition Transcribe Date/Time: Jan 7 2024 10:43P Dictated by: GREGORY FUNK MD This examination was interpreted and the report  reviewed and electronically signed by: GREGORY FUNK MD on Jan 7 2024 10:52PM  EST    XR CHEST 1V FRONTAL PORT    Result Date: 1/7/2024  * * *Final Report* * * DATE OF EXAM: Jan 7 2024  8:21PM   EUX   5376  -  XR CHEST 1V FRONTAL PORT  / ACCESSION #  [id number] PROCEDURE REASON: Fatigue and malaise      * * * * Physician Interpretation * * * * RESULT: EXAMINATION:  CHEST RADIOGRAPH (PORTABLE SINGLE VIEW AP) Exam Date/Time:  1/7/2024 8:21 PM CLINICAL HISTORY: Fatigue and malaise MQ:  XCPR_5 Comparison:  11/11/2023 RESULT: There has been a relatively shallow inspiration, with bibasilar atelectasis.  There is multilevel degenerative change seen within the thoracic spine.  Bilateral shoulder DJD.  Calcific tendinitis is seen about both shoulders.  There is no griffin pulmonary consolidation, pleural effusion, pneumothorax, or pulmonary vascular redistribution.    IMPRESSION: Relatively shallow inspiration, with bibasilar atelectasis.   No acute pulmonary process is identified. Transcribed Using Voice Recognition Transcribe Date/Time: Jan 7 2024  8:53P Dictated by: KIYA FLAHERTY MD This examination was interpreted and the report reviewed and electronically signed by: KIYA FLAHERTY MD on Jan 7 2024  8:54PM  EST    Scheduled medications  cefTRIAXone, 1 g, intravenous, q24h  melatonin, 5 mg, oral, Nightly      Continuous medications     PRN medications  PRN medications: acetaminophen **OR** acetaminophen **OR** acetaminophen, benzocaine-menthol, dextromethorphan-guaifenesin, guaiFENesin, ondansetron, ondansetron **OR** [DISCONTINUED] ondansetron, polyethylene glycol  Results for orders placed or performed during the hospital encounter of 01/26/24 (from the past 24 hour(s))   POCT GLUCOSE   Result Value Ref Range    POCT Glucose 147 (H) 74 - 99 mg/dL   Comprehensive metabolic panel   Result Value Ref Range    Glucose 246 (H) 65 - 99 mg/dL    Sodium 139 133 - 145 mmol/L    Potassium 3.9 3.4 - 5.1 mmol/L    Chloride 108  (H) 97 - 107 mmol/L    Bicarbonate 23 (L) 24 - 31 mmol/L    Urea Nitrogen 11 8 - 25 mg/dL    Creatinine 0.40 0.40 - 1.60 mg/dL    eGFR >90 >60 mL/min/1.73m*2    Calcium 7.9 (L) 8.5 - 10.4 mg/dL    Albumin 2.2 (L) 3.5 - 5.0 g/dL    Alkaline Phosphatase 284 (H) 35 - 125 U/L    Total Protein 6.1 5.9 - 7.9 g/dL    AST 46 (H) 5 - 40 U/L    Bilirubin, Total 1.2 0.1 - 1.2 mg/dL    ALT 26 5 - 40 U/L    Anion Gap 8 <=19 mmol/L   CBC   Result Value Ref Range    WBC 3.8 (L) 4.4 - 11.3 x10*3/uL    nRBC 0.0 0.0 - 0.0 /100 WBCs    RBC 3.46 (L) 4.00 - 5.20 x10*6/uL    Hemoglobin 9.1 (L) 12.0 - 16.0 g/dL    Hematocrit 29.3 (L) 36.0 - 46.0 %    MCV 85 80 - 100 fL    MCH 26.3 26.0 - 34.0 pg    MCHC 31.1 (L) 32.0 - 36.0 g/dL    RDW 18.5 (H) 11.5 - 14.5 %    Platelets 76 (L) 150 - 450 x10*3/uL   POCT GLUCOSE   Result Value Ref Range    POCT Glucose 205 (H) 74 - 99 mg/dL   POCT GLUCOSE   Result Value Ref Range    POCT Glucose 283 (H) 74 - 99 mg/dL        Assessment/Plan     #Liver Cirrhosis due to primary biliary cholangitis   HCC- AFP 3.6 on  7/19/23  HE: Monitor mental status, A/O x3   EV: monitor for varices, last EGD on 12/27/23 by Dr. Zaman at [facility] Normal oropharynx. Esophageal ulcer, no bleeding and no stigmata of recent bleeding.  Ascites: CT shows mild-moderate volume ascites throughout the abdomen and pelvis. Start lasix/aldactone.  IR consult placed     Adrianne Powell, APRN-CNP

## 2024-01-28 LAB
AFP SERPL-MCNC: 4 NG/ML (ref 0–9)
ALBUMIN SERPL-MCNC: 2.6 G/DL (ref 3.5–5)
ALP BLD-CCNC: 302 U/L (ref 35–125)
ALT SERPL-CCNC: 30 U/L (ref 5–40)
ANION GAP SERPL CALC-SCNC: 7 MMOL/L
AST SERPL-CCNC: 56 U/L (ref 5–40)
BASOPHILS # BLD AUTO: 0.02 X10*3/UL (ref 0–0.1)
BASOPHILS NFR BLD AUTO: 0.5 %
BILIRUB SERPL-MCNC: 1.3 MG/DL (ref 0.1–1.2)
BUN SERPL-MCNC: 7 MG/DL (ref 8–25)
CALCIUM SERPL-MCNC: 8.2 MG/DL (ref 8.5–10.4)
CHLORIDE SERPL-SCNC: 104 MMOL/L (ref 97–107)
CO2 SERPL-SCNC: 27 MMOL/L (ref 24–31)
CREAT SERPL-MCNC: 0.5 MG/DL (ref 0.4–1.6)
EGFRCR SERPLBLD CKD-EPI 2021: >90 ML/MIN/1.73M*2
EOSINOPHIL # BLD AUTO: 0.19 X10*3/UL (ref 0–0.7)
EOSINOPHIL NFR BLD AUTO: 5 %
ERYTHROCYTE [DISTWIDTH] IN BLOOD BY AUTOMATED COUNT: 18.3 % (ref 11.5–14.5)
GLUCOSE BLD MANUAL STRIP-MCNC: 210 MG/DL (ref 74–99)
GLUCOSE BLD MANUAL STRIP-MCNC: 291 MG/DL (ref 74–99)
GLUCOSE BLD MANUAL STRIP-MCNC: 313 MG/DL (ref 74–99)
GLUCOSE SERPL-MCNC: 355 MG/DL (ref 65–99)
HAV IGM SER QL: NONREACTIVE
HBV CORE IGM SER QL: NONREACTIVE
HBV SURFACE AG SERPL QL IA: NONREACTIVE
HCT VFR BLD AUTO: 30.3 % (ref 36–46)
HCV AB SER QL: NONREACTIVE
HGB BLD-MCNC: 9.5 G/DL (ref 12–16)
IMM GRANULOCYTES # BLD AUTO: 0.01 X10*3/UL (ref 0–0.7)
IMM GRANULOCYTES NFR BLD AUTO: 0.3 % (ref 0–0.9)
INR PPP: 1.3 (ref 0.9–1.2)
LYMPHOCYTES # BLD AUTO: 0.97 X10*3/UL (ref 1.2–4.8)
LYMPHOCYTES NFR BLD AUTO: 25.5 %
MCH RBC QN AUTO: 26.8 PG (ref 26–34)
MCHC RBC AUTO-ENTMCNC: 31.4 G/DL (ref 32–36)
MCV RBC AUTO: 85 FL (ref 80–100)
MONOCYTES # BLD AUTO: 0.49 X10*3/UL (ref 0.1–1)
MONOCYTES NFR BLD AUTO: 12.9 %
NEUTROPHILS # BLD AUTO: 2.13 X10*3/UL (ref 1.2–7.7)
NEUTROPHILS NFR BLD AUTO: 55.8 %
NRBC BLD-RTO: 0 /100 WBCS (ref 0–0)
PLATELET # BLD AUTO: 79 X10*3/UL (ref 150–450)
POTASSIUM SERPL-SCNC: 3.9 MMOL/L (ref 3.4–5.1)
PROT SERPL-MCNC: 6.9 G/DL (ref 5.9–7.9)
PROTHROMBIN TIME: 13.3 SECONDS (ref 9.3–12.7)
RBC # BLD AUTO: 3.55 X10*6/UL (ref 4–5.2)
SODIUM SERPL-SCNC: 138 MMOL/L (ref 133–145)
WBC # BLD AUTO: 3.8 X10*3/UL (ref 4.4–11.3)

## 2024-01-28 PROCEDURE — 82947 ASSAY GLUCOSE BLOOD QUANT: CPT | Mod: 59

## 2024-01-28 PROCEDURE — 85025 COMPLETE CBC W/AUTO DIFF WBC: CPT

## 2024-01-28 PROCEDURE — 2500000001 HC RX 250 WO HCPCS SELF ADMINISTERED DRUGS (ALT 637 FOR MEDICARE OP): Performed by: INTERNAL MEDICINE

## 2024-01-28 PROCEDURE — 36415 COLL VENOUS BLD VENIPUNCTURE: CPT

## 2024-01-28 PROCEDURE — 96366 THER/PROPH/DIAG IV INF ADDON: CPT | Mod: 59 | Performed by: EMERGENCY MEDICINE

## 2024-01-28 PROCEDURE — 2500000004 HC RX 250 GENERAL PHARMACY W/ HCPCS (ALT 636 FOR OP/ED): Performed by: INTERNAL MEDICINE

## 2024-01-28 PROCEDURE — 80053 COMPREHEN METABOLIC PANEL: CPT

## 2024-01-28 PROCEDURE — 2500000005 HC RX 250 GENERAL PHARMACY W/O HCPCS: Performed by: INTERNAL MEDICINE

## 2024-01-28 PROCEDURE — 85610 PROTHROMBIN TIME: CPT

## 2024-01-28 PROCEDURE — G0378 HOSPITAL OBSERVATION PER HR: HCPCS

## 2024-01-28 PROCEDURE — 2500000001 HC RX 250 WO HCPCS SELF ADMINISTERED DRUGS (ALT 637 FOR MEDICARE OP)

## 2024-01-28 RX ORDER — DEXTROSE MONOHYDRATE 100 MG/ML
0.3 INJECTION, SOLUTION INTRAVENOUS ONCE AS NEEDED
Status: DISCONTINUED | OUTPATIENT
Start: 2024-01-28 | End: 2024-01-29 | Stop reason: HOSPADM

## 2024-01-28 RX ORDER — PANTOPRAZOLE SODIUM 20 MG/1
20 TABLET, DELAYED RELEASE ORAL
Status: DISCONTINUED | OUTPATIENT
Start: 2024-01-29 | End: 2024-01-29 | Stop reason: HOSPADM

## 2024-01-28 RX ORDER — TRAZODONE HYDROCHLORIDE 50 MG/1
25 TABLET ORAL NIGHTLY
Status: DISCONTINUED | OUTPATIENT
Start: 2024-01-28 | End: 2024-01-29 | Stop reason: HOSPADM

## 2024-01-28 RX ORDER — INSULIN GLARGINE 100 [IU]/ML
20 INJECTION, SOLUTION SUBCUTANEOUS EVERY MORNING
Status: DISCONTINUED | OUTPATIENT
Start: 2024-01-29 | End: 2024-01-29 | Stop reason: HOSPADM

## 2024-01-28 RX ORDER — ATORVASTATIN CALCIUM 80 MG/1
80 TABLET, FILM COATED ORAL DAILY
Status: DISCONTINUED | OUTPATIENT
Start: 2024-01-28 | End: 2024-01-29 | Stop reason: HOSPADM

## 2024-01-28 RX ORDER — DOCUSATE SODIUM 100 MG/1
100 CAPSULE, LIQUID FILLED ORAL 2 TIMES DAILY
Status: DISCONTINUED | OUTPATIENT
Start: 2024-01-28 | End: 2024-01-29 | Stop reason: HOSPADM

## 2024-01-28 RX ORDER — URSODIOL 300 MG/1
300 CAPSULE ORAL 3 TIMES DAILY
Status: DISCONTINUED | OUTPATIENT
Start: 2024-01-28 | End: 2024-01-29 | Stop reason: HOSPADM

## 2024-01-28 RX ORDER — FENOFIBRATE 160 MG/1
160 TABLET ORAL DAILY
Status: DISCONTINUED | OUTPATIENT
Start: 2024-01-28 | End: 2024-01-29 | Stop reason: HOSPADM

## 2024-01-28 RX ORDER — SPIRONOLACTONE 50 MG/1
50 TABLET, FILM COATED ORAL DAILY
Status: DISCONTINUED | OUTPATIENT
Start: 2024-01-28 | End: 2024-01-29 | Stop reason: HOSPADM

## 2024-01-28 RX ORDER — FUROSEMIDE 40 MG/1
40 TABLET ORAL DAILY
Status: DISCONTINUED | OUTPATIENT
Start: 2024-01-28 | End: 2024-01-29 | Stop reason: HOSPADM

## 2024-01-28 RX ORDER — DEXTROSE 50 % IN WATER (D50W) INTRAVENOUS SYRINGE
25
Status: DISCONTINUED | OUTPATIENT
Start: 2024-01-28 | End: 2024-01-29 | Stop reason: HOSPADM

## 2024-01-28 RX ADMIN — SPIRONOLACTONE 50 MG: 50 TABLET ORAL at 08:37

## 2024-01-28 RX ADMIN — ONDANSETRON HYDROCHLORIDE 4 MG: 4 TABLET, FILM COATED ORAL at 14:23

## 2024-01-28 RX ADMIN — TRAMADOL HYDROCHLORIDE 50 MG: 50 TABLET ORAL at 02:10

## 2024-01-28 RX ADMIN — FUROSEMIDE 40 MG: 40 TABLET ORAL at 08:37

## 2024-01-28 RX ADMIN — ATORVASTATIN CALCIUM 80 MG: 80 TABLET, FILM COATED ORAL at 18:51

## 2024-01-28 RX ADMIN — FENOFIBRATE 160 MG: 160 TABLET ORAL at 18:51

## 2024-01-28 RX ADMIN — CEFTRIAXONE SODIUM 1 G: 1 INJECTION, SOLUTION INTRAVENOUS at 14:23

## 2024-01-28 RX ADMIN — Medication 5 MG: at 20:54

## 2024-01-28 RX ADMIN — URSODIOL 300 MG: 300 CAPSULE ORAL at 20:53

## 2024-01-28 RX ADMIN — TRAMADOL HYDROCHLORIDE 50 MG: 50 TABLET ORAL at 20:53

## 2024-01-28 RX ADMIN — TRAZODONE HYDROCHLORIDE 25 MG: 50 TABLET ORAL at 20:54

## 2024-01-28 RX ADMIN — DOCUSATE SODIUM 100 MG: 100 CAPSULE, LIQUID FILLED ORAL at 20:53

## 2024-01-28 RX ADMIN — TRAMADOL HYDROCHLORIDE 50 MG: 50 TABLET ORAL at 08:37

## 2024-01-28 RX ADMIN — TRAMADOL HYDROCHLORIDE 50 MG: 50 TABLET ORAL at 14:24

## 2024-01-28 ASSESSMENT — PAIN DESCRIPTION - LOCATION
LOCATION: ABDOMEN

## 2024-01-28 ASSESSMENT — COGNITIVE AND FUNCTIONAL STATUS - GENERAL
DAILY ACTIVITIY SCORE: 24
WALKING IN HOSPITAL ROOM: A LITTLE
CLIMB 3 TO 5 STEPS WITH RAILING: A LITTLE
MOBILITY SCORE: 22

## 2024-01-28 ASSESSMENT — PAIN - FUNCTIONAL ASSESSMENT
PAIN_FUNCTIONAL_ASSESSMENT: 0-10
PAIN_FUNCTIONAL_ASSESSMENT: FLACC (FACE, LEGS, ACTIVITY, CRY, CONSOLABILITY)
PAIN_FUNCTIONAL_ASSESSMENT: 0-10
PAIN_FUNCTIONAL_ASSESSMENT: FLACC (FACE, LEGS, ACTIVITY, CRY, CONSOLABILITY)
PAIN_FUNCTIONAL_ASSESSMENT: 0-10
PAIN_FUNCTIONAL_ASSESSMENT: 0-10

## 2024-01-28 ASSESSMENT — PAIN SCALES - GENERAL
PAINLEVEL_OUTOF10: 1
PAINLEVEL_OUTOF10: 2
PAINLEVEL_OUTOF10: 7
PAINLEVEL_OUTOF10: 0 - NO PAIN
PAINLEVEL_OUTOF10: 6
PAINLEVEL_OUTOF10: 0 - NO PAIN
PAINLEVEL_OUTOF10: 7
PAINLEVEL_OUTOF10: 6

## 2024-01-28 NOTE — CARE PLAN
The patient's goals for the shift include no pain    The clinical goals for the shift include The patient will remain safe during this shift.    Over the shift, the patient did not make progress toward the following goals. Barriers to progression include . Recommendations to address these barriers include   Problem: Pain  Goal: My pain/discomfort is manageable  Outcome: Progressing     Problem: Bathing  Goal: STG - Patient will bathe body UB/LB independent   Outcome: Progressing     Problem: Dressings Lower Extremities  Goal: LTG - Patient will dress lower body independent  Outcome: Progressing     Problem: Instrumental Activities of Daily Living  Goal: LTG - Patient will independently complete basic home making activities to return to independent functioning at home independent with 2ww as needed  Outcome: Progressing   .

## 2024-01-28 NOTE — PROGRESS NOTES
Alfonzo Flanagan is a 49 y.o. female on day 0 of admission presenting with UTI (urinary tract infection).    Subjective   The patient was seen and examined.  Complaining of loss of appetite.  Denies any headache or dizziness.       Objective     Physical Exam  HEENT:  Head externally atraumatic, no pallor, no icterus, extraocular movements intact, oral mucosa moist and throat clear.  Neck:  Supple, no JVP, no palpable adenopathy or thyromegaly.  No carotid bruit.  Chest:  Clear to auscultation and resonant.  Heart:  Regular rate and rhythm, no murmur or gallop could be appreciated.  Abdomen:  Soft, nontender, bowel sounds present, normoactive, no palpable hepatosplenomegaly.  Ascites present.  Extremities: Bilateral edema is better, pulses present, no cyanosis or clubbing.  CNS:  Patient alert, oriented to time, place and person.  Power 5/5 all over and deep tendon reflexes symmetrical, cranial nerves 2-12 grossly intact.  Skin:  No active rash.  Musculoskeletal:  No joint swelling or erythema, range of movement normal.  Last Recorded Vitals  Heart Rate:  [81-88]   Temp:  [36.8 °C (98.2 °F)-37 °C (98.6 °F)]   Resp:  [17-18]   BP: (104-124)/(54-74)   SpO2:  [97 %-99 %]     Intake/Output last 3 Shifts:  I/O last 3 completed shifts:  In: 460 (5.5 mL/kg) [P.O.:360; IV Piggyback:100]  Out: - (0 mL/kg)   Weight: 83.9 kg     Relevant Results  No results found for the last 90 days.    Results for orders placed or performed during the hospital encounter of 01/26/24 (from the past 24 hour(s))   POCT GLUCOSE   Result Value Ref Range    POCT Glucose 259 (H) 74 - 99 mg/dL   Hepatitis Panel, Acute   Result Value Ref Range    Hepatitis B Surface AG Nonreactive Nonreactive    Hepatitis A  AB- IgM      Hepatitis B Core AB; IgM      Hepatitis C AB     Alpha-Fetoprotein   Result Value Ref Range    Alpha-Fetoprotein 4 0 - 9 ng/mL   POCT GLUCOSE   Result Value Ref Range    POCT Glucose 210 (H) 74 - 99 mg/dL   POCT GLUCOSE   Result  Value Ref Range    POCT Glucose 291 (H) 74 - 99 mg/dL   Protime-INR   Result Value Ref Range    Protime 13.3 (H) 9.3 - 12.7 seconds    INR 1.3 (H) 0.9 - 1.2   CBC and Auto Differential   Result Value Ref Range    WBC 3.8 (L) 4.4 - 11.3 x10*3/uL    nRBC 0.0 0.0 - 0.0 /100 WBCs    RBC 3.55 (L) 4.00 - 5.20 x10*6/uL    Hemoglobin 9.5 (L) 12.0 - 16.0 g/dL    Hematocrit 30.3 (L) 36.0 - 46.0 %    MCV 85 80 - 100 fL    MCH 26.8 26.0 - 34.0 pg    MCHC 31.4 (L) 32.0 - 36.0 g/dL    RDW 18.3 (H) 11.5 - 14.5 %    Platelets 79 (L) 150 - 450 x10*3/uL    Neutrophils % 55.8 40.0 - 80.0 %    Immature Granulocytes %, Automated 0.3 0.0 - 0.9 %    Lymphocytes % 25.5 13.0 - 44.0 %    Monocytes % 12.9 2.0 - 10.0 %    Eosinophils % 5.0 0.0 - 6.0 %    Basophils % 0.5 0.0 - 2.0 %    Neutrophils Absolute 2.13 1.20 - 7.70 x10*3/uL    Immature Granulocytes Absolute, Automated 0.01 0.00 - 0.70 x10*3/uL    Lymphocytes Absolute 0.97 (L) 1.20 - 4.80 x10*3/uL    Monocytes Absolute 0.49 0.10 - 1.00 x10*3/uL    Eosinophils Absolute 0.19 0.00 - 0.70 x10*3/uL    Basophils Absolute 0.02 0.00 - 0.10 x10*3/uL   Comprehensive Metabolic Panel   Result Value Ref Range    Glucose 355 (H) 65 - 99 mg/dL    Sodium 138 133 - 145 mmol/L    Potassium 3.9 3.4 - 5.1 mmol/L    Chloride 104 97 - 107 mmol/L    Bicarbonate 27 24 - 31 mmol/L    Urea Nitrogen 7 (L) 8 - 25 mg/dL    Creatinine 0.50 0.40 - 1.60 mg/dL    eGFR >90 >60 mL/min/1.73m*2    Calcium 8.2 (L) 8.5 - 10.4 mg/dL    Albumin 2.6 (L) 3.5 - 5.0 g/dL    Alkaline Phosphatase 302 (H) 35 - 125 U/L    Total Protein 6.9 5.9 - 7.9 g/dL    AST 56 (H) 5 - 40 U/L    Bilirubin, Total 1.3 (H) 0.1 - 1.2 mg/dL    ALT 30 5 - 40 U/L    Anion Gap 7 <=19 mmol/L        Current Facility-Administered Medications:     benzocaine-menthol (Cepastat Sore Throat) 15-3.6 mg lozenge 1 lozenge, 1 lozenge, Mouth/Throat, q2h PRN, Luis Alfredo Lawrence MD    cefTRIAXone (Rocephin) IVPB 1 g, 1 g, intravenous, q24h, Luis Alfredo Lawrence MD,  Stopped at 01/28/24 1453    dextromethorphan-guaifenesin (Robitussin DM)  mg/5 mL oral liquid 5 mL, 5 mL, oral, q4h PRN, Luis Alfredo Lawrence MD    furosemide (Lasix) tablet 40 mg, 40 mg, oral, Daily, Adriannedominga Powell APRN-CNP, 40 mg at 01/28/24 0837    guaiFENesin (Mucinex) 12 hr tablet 600 mg, 600 mg, oral, q12h PRN, Luis Alfredo Lawrence MD    melatonin tablet 5 mg, 5 mg, oral, Nightly, Luis Alfredo Lawrence MD, 5 mg at 01/27/24 2017    ondansetron (Zofran) injection 4 mg, 4 mg, intravenous, q6h PRN, Luis Alfredo Lawrence MD    ondansetron (Zofran) tablet 4 mg, 4 mg, oral, q8h PRN, 4 mg at 01/28/24 1423 **OR** [DISCONTINUED] ondansetron (Zofran) injection 4 mg, 4 mg, intravenous, q8h PRN, Luis Alfredo Lawrence MD    polyethylene glycol (Glycolax, Miralax) packet 17 g, 17 g, oral, Daily PRN, Luis Alfredo Lawrence MD    spironolactone (Aldactone) tablet 50 mg, 50 mg, oral, Daily, Adrianne SAV Powell, APRN-CNP, 50 mg at 01/28/24 0837    traMADol (Ultram) tablet 50 mg, 50 mg, oral, q6h PRN, Adrianne SAV Powell, APRN-CNP, 50 mg at 01/28/24 1424   Assessment/Plan   Principal Problem:    UTI (urinary tract infection)  Active Problems:    Other ascites    Acute UTI  Ascites      Plan: Continue current medication.  Supportive care.  Continue antibiotics.  Patient will need a paracentesis.  Possible tomorrow.  Continue the diuretic.  Monitor input output and will monitor electrolytes.  We will take DVT, fall, aspiration, decubitus and DVT precautions.         Luis Alfredo Lawrence MD

## 2024-01-28 NOTE — PROGRESS NOTES
"Alfonzo Flanagan is a 49 y.o. female on day 0 of admission presenting with UTI (urinary tract infection).    Subjective      Patient sitting up in bed, no acute distress.  She still has abdominal pain/distention , scheduled for paracentesis tomorrow    Objective     Physical Exam  HENT:      Head: Normocephalic.      Right Ear: Tympanic membrane normal.      Nose: Nose normal.      Mouth/Throat:      Mouth: Mucous membranes are moist.   Eyes:      Pupils: Pupils are equal, round, and reactive to light.   Cardiovascular:      Rate and Rhythm: Normal rate.   Pulmonary:      Effort: Pulmonary effort is normal.   Abdominal:      General: There is distension.   Musculoskeletal:         General: Normal range of motion.      Cervical back: Normal range of motion.   Skin:     General: Skin is warm.   Neurological:      General: No focal deficit present.      Mental Status: She is alert.   Psychiatric:         Mood and Affect: Mood normal.         Last Recorded Vitals  Blood pressure 104/55, pulse 84, temperature 37 °C (98.6 °F), temperature source Oral, resp. rate 18, height 1.6 m (5' 3\"), weight 83.9 kg (185 lb), SpO2 97 %.  Intake/Output last 3 Shifts:  I/O last 3 completed shifts:  In: 460 (5.5 mL/kg) [P.O.:360; IV Piggyback:100]  Out: - (0 mL/kg)   Weight: 83.9 kg     CT abdomen pelvis w IV contrast    Result Date: 1/26/2024  Interpreted By:  Goyo Peterson, STUDY: CT ABDOMEN PELVIS W IV CONTRAST; 1/26/2024 10:52 am   INDICATION: Signs/Symptoms:abd pain ascites;   COMPARISON: None   ACCESSION NUMBER(S): [id number]   ORDERING CLINICIAN: LILLIE IYER   TECHNIQUE: Contiguous axial images of the abdomen/pelvis were performed with IV contrast. 75 ml of Omnipaque 350 was utilized. Coronal and sagittal reformatted images were also obtained. All CT examinations are performed with 1 or more of the following dose reduction techniques: Automated exposure control, adjustment of mA and/or kv according to patient's " size, or use of iterative reconstruction techniques.     FINDINGS: The liver is nodular in contour consistent with cirrhosis. No focal hepatic lesions are seen. Prior cholecystectomy with surgical clips in the gallbladder fossa. The common bile duct, pancreas, and adrenal glands are unremarkable. The spleen is mildly enlarged measuring 13.7 cm in length.   The kidneys enhance symmetrically. No urolithiasis is seen. No hydroureteronephrosis is seen.   The visualized aorta is unremarkable.   The small bowel is not dilated. The appendix is not visualized however there has no evidence to suggest appendicitis. The colon is unremarkable with no evidence for acute inflammatory process.   Mild-moderate volume ascites throughout the abdomen and pelvis. There is also mild diffuse anasarca.   The bladder is well distended with no gross wall thickening.   The visualized osseous structures are intact.   Limited images of the lower thorax are unremarkable.       Hepatic cirrhosis. No focal hepatic lesion.   Mild splenomegaly.   Mild-moderate volume ascites throughout the abdomen and pelvis.   Signed by: Goyo Peterson 1/26/2024 12:54 PM Dictation workstation:   [id number]    US ASCITES SURVEY    Result Date: 1/22/2024  * * *Final Report* * * DATE OF EXAM: Jan 22 2024 10:27AM   [facility]   1016  -  US ASCITES SURVEY  / ACCESSION #  [id number] PROCEDURE REASON: ascites      * * * * Physician Interpretation * * * * RESULT: US ASCITES SURVEY HISTORY: Ascites TECHNIQUE: Grayscale ultrasound imaging was performed in the area of concern and images were saved to the permanent image archive. RESULT: See impression    IMPRESSION: Ascites survey was obtained throughout the abdomen. Mild amount of ascites in all 4 quadrants of the abdomen. No large single pocket was present and therapeutic paracentesis was not performed at this time. Transcribed Using Voice Recognition Transcribe Date/Time: Jan 22 2024 10:46A Dictated by: LEENA VINSON MD This  examination was interpreted and the report reviewed and electronically signed by: LEENA VINSON MD on Jan 22 2024 10:48AM  EST    CT ABD/PEL WO IVCON    Result Date: 1/21/2024  * * *Final Report* * * DATE OF EXAM: Jan 21 2024  9:43AM   [facility]   0531  -  CT ABD/PEL WO IVCON  / ACCESSION #  [id number] PROCEDURE REASON: Bowel obstruction suspected      * * * * Physician Interpretation * * * * RESULT: EXAMINATION:  CT ABDOMEN AND PELVIS WITHOUT IV CONTRAST CLINICAL HISTORY: Bowel obstruction suspected TECHNIQUE: Non-IV contrast imaging of the abdomen and pelvis was performed using standard technique, scanning from just above the dome of the diaphragm to the symphysis pubis.  Unenhanced imaging is limited for the evaluation of some intra-abdominal and pelvic pathology. MQ:  CTAPWO_3 Contrast: IV: None : ml of CT Radiation dose: Integrated Dose-length product (DLP) for this visit =   428 mGy*cm. CT Dose Reduction Employed: Automated exposure control(AEC) and iterative recon COMPARISON: CT 01/10/2024. RESULT: Abdomen / Pelvis: Liver: Liver has a nodular contour and is atrophic, consistent with cirrhosis.  No focal hepatic lesions within limitations of this noncontrasted study. Biliary: Gallbladder is absent. Spleen: No splenomegaly. Pancreas: Unremarkable. Adrenals: No mass. Kidneys: Kidneys are symmetric in size without hydronephrosis or renal stones. GI Tract: Diffuse gastric wall thickening. Colon is normal in caliber without obstruction.  Wall thickening of the transverse colon at the splenic flexure.  Appendix not visualized. Small bowel is normal in caliber without obstruction.  Walls of the small bowel are edematous. Lymph Nodes: No lymphadenopathy. Mesentery/peritoneum: Moderate volume ascites. Retroperitoneum: No mass. Vasculature: Arterial atherosclerotic disease without aneurysm. Pelvis: Urinary bladder is unremarkable.  Uterus is present.  No pelvic lymphadenopathy. Bones/Soft Tissues: Fat-containing ventral wall  hernias.  Subcutaneous edema.  No suspicious osseous lesions.  L5 pars defect on the left.  Mild degenerative changes in thoracolumbar spine.. Lower thorax: 0.9 cm nodule in the right middle lobe (image 4, 2)  (topogram) images: No additional findings.    IMPRESSION: 1.  Cirrhotic hepatic morphology with moderate volume ascites 2.  Wall thickening of the stomach, could be reactive due to surrounding ascites but gastritis is possible. 3.  Walls of the small bowel and colon are edematous, likely reactive due to adjacent ascites. 4.  No evidence of small bowel obstruction. 5.  0.9 cm nodule in the right middle lobe.  Recommend short-term follow-up in 3 months to document stability. ACTIONABLE RESULT: FOLLOW-UP Acuity: Actionable Findings: Thoracic-Lung nodules Routing code:  RI_1 Recommendation: CT Chest WO IVCON Time Frame: Additional evaluation as described in the impression COMMUNICATION: Results will be communicated with the ordering provider via Epic staff message or phone message by [facility] within 2 business days of report finalization. --END OF FINDING-- Transcribed Using Voice Recognition Transcribe Date/Time: Jan 21 2024 10:50A Dictated by: KAIN RUSH MD This examination was interpreted and the report reviewed and electronically signed by: KAIN RUSH MD on Jan 21 2024 11:00AM  EST    XR CHEST 1V FRONTAL PORT    Result Date: 1/21/2024  * * *Final Report* * * DATE OF EXAM: Jan 21 2024  9:10AM   [facility]   [id number]  -  XR CHEST 1V FRONTAL PORT  / ACCESSION #  [id number] PROCEDURE REASON: Shortness of breath      * * * * Physician Interpretation * * * * RESULT: EXAMINATION:  CHEST RADIOGRAPH (PORTABLE SINGLE VIEW AP) Exam Date/Time:  1/21/2024 9:10 AM CLINICAL HISTORY: Shortness of breath MQ:  XCPR_5 Comparison:  01/07/2024. RESULT: Lines, tubes, and devices:  None. Lungs and pleura:  The lungs remain unremarkable with no evidence of infiltrates.  The pleural margins appear normal.  Cardiomediastinal silhouette:  Stable cardiomediastinal silhouette. Other:  The visualized bony thorax appears unremarkable.    IMPRESSION: Stable exam with no evidence of acute disease. Transcribed Using Voice Recognition Transcribe Date/Time: Jan 21 2024  9:41A Dictated by: DAMASO PERALTA MD This examination was interpreted and the report reviewed and electronically signed by: DAMASO PERALTA MD on Jan 21 2024  9:42AM  EST    US ASCITES SURVEY    Result Date: 1/11/2024  * * *Final Report* * * DATE OF EXAM: Jan 11 2024 10:41AM   [place] [id number]  -  US ASCITES SURVEY  / ACCESSION #  [id number] PROCEDURE REASON: ascites      * * * * Physician Interpretation * * * * RESULT: ULTRASOUND ASCITES SURVEY AND ULTRASOUND-GUIDED PARACENTESIS: 01/11/2024 CLINICAL DATA: Ascites FINDINGS: Fluid in the RLQ was localized by ultrasound. An image was stored in the electronic archive. The procedure, risks, benefits, and alternatives were discussed with the patient.  The patient provided prior written consent. An audible timeout was conducted to verify the patients name, MRN, procedure, Allergies and clotting profile. Following sterile preparation and local anesthesia in the RLQ using  8cc of 1% lidocaine an 8 Persian fenestrated catheter was introduced into the fluid.  There was spontaneous return of straw colored fluid.  A volume of 2500 cc of fluid was aspirated. Fluid was prepared for laboratory evaluation per primary service. The patient tolerated the procedure satisfactorily without complication and was discharged to her room in stable condition. Impression: COMPLETED PARACENTESIS. Transcribed Using Voice Recognition Transcribe Date/Time: Jan 11 2024 10:47A Dictated by: FEI HAMMOND MD This examination was interpreted and the report reviewed and electronically signed by: FEI HAMMOND MD on Jan 11 2024 10:48AM  EST    IMAGING GUIDED PARACENTESIS    Result Date: 1/11/2024  * * *Final Report* * * DATE OF EXAM: Jan 11 2024 10:41AM   [facility]    2039  -  US PARACENTESIS BI  / ACCESSION #  [id number] PROCEDURE REASON: ascites      * * * * Physician Interpretation * * * * RESULT: ULTRASOUND ASCITES SURVEY AND ULTRASOUND-GUIDED PARACENTESIS: 01/11/2024 CLINICAL DATA: Ascites FINDINGS: Fluid in the RLQ was localized by ultrasound. An image was stored in the electronic archive. The procedure, risks, benefits, and alternatives were discussed with the patient.  The patient provided prior written consent. An audible timeout was conducted to verify the patients name, MRN, procedure, Allergies and clotting profile. Following sterile preparation and local anesthesia in the RLQ using  8cc of 1% lidocaine an 8 Amharic fenestrated catheter was introduced into the fluid.  There was spontaneous return of straw colored fluid.  A volume of 2500 cc of fluid was aspirated. Fluid was prepared for laboratory evaluation per primary service. The patient tolerated the procedure satisfactorily without complication and was discharged to her room in stable condition. Impression: COMPLETED PARACENTESIS. Transcribed Using Voice Recognition Transcribe Date/Time: Jan 11 2024 10:47A Dictated by: FEI HAMMOND MD This examination was interpreted and the report reviewed and electronically signed by: FEI HAMMOND MD on Jan 11 2024 10:48AM  EST    CT ABD/PEL W IVCON    Result Date: 1/10/2024  * * *Final Report* * * DATE OF EXAM: Davi 10 2024  2:26PM   [facility]   0530  -  CT ABD/PEL W IVCON  / ACCESSION #  [id number] PROCEDURE REASON: Abdominal pain, acute, nonlocalized      * * * * Physician Interpretation * * * * RESULT: EXAMINATION:  CT ABD/PEL W IVCON CLINICAL HISTORY: Abdominal pain, acute, nonlocalized abdominal ascites/pain TECHNIQUE: CT of the abdomen and pelvis was performed using standard technique, scanning from just above the dome of the diaphragm to the symphysis pubis. Contrast: IV:  100 ml of Omnipaque 350 Dose-Length Product (DLP): 830 mGy*cm CT Dose Reduction Employed: Automated  exposure control(AEC) and iterative recon COMPARISON: CT abdomen and pelvis without contrast 01/07/2024 RESULT: Lower thorax: No consolidations. Liver: Cirrhotic liver morphology. Biliary: Cholecystectomy Spleen: The spleen measures up to 14 cm in AP diameter Pancreas: No mass or ductal dilation. Adrenals: No mass. Kidneys: No enhancing mass or hydronephrosis. GI tract: Stable wall thickening of loops of small bowel, stomach and ascending colon, likely due to portal hypertension. No bowel obstruction. Appendix: Not visualized Lymph nodes: No lymphadenopathy. Mesentry/Peritoneum/Retroperitoneum: Moderate volume ascites. Vasculature: Recannulization of the periumbilical vein, esophageal, gastric and mesenteric varices.  The main portal and splenic veins are patent. No abdominal aortic aneurysm or branch occlusion. Pelvis: Normal appearance of uterus and adnexa. Normal bladder.  Mild hyperattenuating fluid in the bladder, possibly due to proteinaceous debris. Soft Tissues: Small ventral hernias containing fat, varices and fluid. Bones: No acute findings.    IMPRESSION: Cirrhosis with sequela of portal hypertension including moderate volume ascites. Transcribed Using Voice Recognition Transcribe Date/Time: Daiv 10 2024  3:37P Dictated by: JEMMA DENNY MD This examination was interpreted and the report reviewed and electronically signed by: JEMMA DENNY MD on Davi 10 2024  3:47PM  [facility]    US ASCITES SURVEY    Result Date: 1/10/2024  * * *Final Report* * * DATE OF EXAM: Davi 10 2024 10:47AM   [facility]   1016  -   ASCITES SURVEY  / ACCESSION #  [id number] PROCEDURE REASON: Ascites      * * * * Physician Interpretation * * * * RESULT: ULTRASOUND ASCITES SURVEY: 01/10/2024 CLINICAL DATA: Ascites FINDINGS: Four quadrant examination of the abdomen identifies a small amount of ascites, minimal in the right upper quadrant and small bowel in the right lower quadrant and left upper quadrant.  Minimal in the left lower  quadrant.    IMPRESSION: SMALL AMOUNT OF ABDOMINAL ASCITES. Transcribed Using Voice Recognition Transcribe Date/Time: Davi 10 2024 11:02A Dictated by: FEI HAMMOND MD This examination was interpreted and the report reviewed and electronically signed by: FEI HAMMOND MD on Davi 10 2024 11:03AM  EST    CT ABD/PEL W IVCON    Result Date: 1/7/2024  * * *Final Report* * * DATE OF EXAM: Jan 7 2024 10:21PM   [facility]   0530  -  CT ABD/PEL W IVCON  / ACCESSION #  [id number] PROCEDURE REASON: Abdominal abscess/infection suspected      * * * * Physician Interpretation * * * * RESULT: EXAMINATION:  CT ABDOMEN AND PELVIS WITH IV CONTRAST CLINICAL HISTORY: Abdominal pain TECHNIQUE: CT of the abdomen and pelvis was performed using standard technique, scanning from just above the dome of the diaphragm to the symphysis pubis. MQ:  CTAP_3  Contrast: IV:  100 ml of Omnipaque 300 : ml of CT Radiation dose: Integrated Dose-length product (DLP) for this visit =   736 mGy*cm. CT Dose Reduction Employed: Automated exposure control(AEC) and iterative recon COMPARISON: CT abdomen pelvis 12/25/2023. RESULT: Liver: Cirrhotic liver.. Biliary: No bile duct dilation. Spleen: No mass. No splenomegaly. Pancreas: No mass or duct dilation. Adrenals: No mass. Kidneys: No hydronephrosis. GI tract: The stomach is mildly distended with intraluminal enteric contents. There is associated wall thickening of the stomach. A few mildly dilated small bowel loops are present, without a discrete transition point. These loops measure up to 3.1 cm in diameter and are mildly thick-walled. Lymph nodes: No abdominal or pelvic lymphadenopathy. Mesentery/Peritoneum: moderate volume ascites. Retroperitoneum: No mass. Vasculature: Recanalized umbilical vein. Esophageal varices. Pelvis: Free fluid. Bones/Soft Tissues: Small umbilical hernia containing mesenteric fat and vessels. Lower thorax: Unchanged right middle lobe 9 mm nodule (2-7).  (topogram) images: No additional  findings.    IMPRESSION: Cirrhotic liver with stigmata of portal hypertension including ascites and varices. Mildly distended stomach with associated gastric wall thickening which may represent gastritis. A few mildly dilated thick-walled bowel loops without a discrete transition point. The dilation likely represents an underlying ileus. The wall thickening may represent portal enteropathy or enteritis. Transcribed Using Voice Recognition Transcribe Date/Time: Jan 7 2024 10:43P Dictated by: GREGORY FUNK MD This examination was interpreted and the report reviewed and electronically signed by: GREGORY FUNK MD on Jan 7 2024 10:52PM  EST    XR CHEST 1V FRONTAL PORT    Result Date: 1/7/2024  * * *Final Report* * * DATE OF EXAM: Jan 7 2024  8:21PM   [facility]   [id number]  -  XR CHEST 1V FRONTAL PORT  / ACCESSION #  [id number] PROCEDURE REASON: Fatigue and malaise      * * * * Physician Interpretation * * * * RESULT: EXAMINATION:  CHEST RADIOGRAPH (PORTABLE SINGLE VIEW AP) Exam Date/Time:  1/7/2024 8:21 PM CLINICAL HISTORY: Fatigue and malaise MQ:  XCPR_5 Comparison:  11/11/2023 RESULT: There has been a relatively shallow inspiration, with bibasilar atelectasis.  There is multilevel degenerative change seen within the thoracic spine.  Bilateral shoulder DJD.  Calcific tendinitis is seen about both shoulders.  There is no griffin pulmonary consolidation, pleural effusion, pneumothorax, or pulmonary vascular redistribution.    IMPRESSION: Relatively shallow inspiration, with bibasilar atelectasis.   No acute pulmonary process is identified. Transcribed Using Voice Recognition Transcribe Date/Time: Jan 7 2024  8:53P Dictated by: KIYA FLAHERTY MD This examination was interpreted and the report reviewed and electronically signed by: KIYA FLAHERTY MD on Jan 7 2024  8:54PM  EST      Scheduled medications  cefTRIAXone, 1 g, intravenous, q24h  furosemide, 40 mg, oral, Daily  melatonin, 5 mg, oral, Nightly  spironolactone, 50 mg,  oral, Daily      Continuous medications     PRN medications  PRN medications: benzocaine-menthol, dextromethorphan-guaifenesin, guaiFENesin, ondansetron, ondansetron **OR** [DISCONTINUED] ondansetron, polyethylene glycol, traMADol  Results for orders placed or performed during the hospital encounter of 01/26/24 (from the past 24 hour(s))   POCT GLUCOSE   Result Value Ref Range    POCT Glucose 259 (H) 74 - 99 mg/dL   POCT GLUCOSE   Result Value Ref Range    POCT Glucose 210 (H) 74 - 99 mg/dL   POCT GLUCOSE   Result Value Ref Range    POCT Glucose 291 (H) 74 - 99 mg/dL               Assessment/Plan   Principal Problem:    UTI (urinary tract infection)  Active Problems:    Other ascites    Acute UTI     #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 Zaman at [facility] Normal oropharynx. Esophageal ulcer, no bleeding and no stigmata of recent bleeding.  Ascites: CT shows mild-moderate volume ascites throughout the abdomen and pelvis. Start lasix/aldactone.  IR consult placed.  Will have paracentesis tomorrow  Fluid analysis ordered  Hep panel pending    Anemia   Normocytic anemia Hgb 9.1  Iron panel ordered  No overt bleeding   Monitor H/H   Transfuse less than 7  Increased risk for bleeding   Will recheck CBC, CMP, INR     Adrianne Powell, APRN-CNP

## 2024-01-29 ENCOUNTER — APPOINTMENT (OUTPATIENT)
Dept: RADIOLOGY | Facility: HOSPITAL | Age: 49
End: 2024-01-29
Payer: COMMERCIAL

## 2024-01-29 VITALS
RESPIRATION RATE: 16 BRPM | OXYGEN SATURATION: 99 % | BODY MASS INDEX: 32.78 KG/M2 | DIASTOLIC BLOOD PRESSURE: 55 MMHG | HEART RATE: 89 BPM | SYSTOLIC BLOOD PRESSURE: 102 MMHG | TEMPERATURE: 98.2 F | HEIGHT: 63 IN | WEIGHT: 185 LBS

## 2024-01-29 LAB
ALBUMIN SERPL-MCNC: 2.3 G/DL (ref 3.5–5)
ALP BLD-CCNC: 269 U/L (ref 35–125)
ALT SERPL-CCNC: 26 U/L (ref 5–40)
ANION GAP SERPL CALC-SCNC: 7 MMOL/L
AST SERPL-CCNC: 50 U/L (ref 5–40)
BASOPHILS # BLD AUTO: 0.02 X10*3/UL (ref 0–0.1)
BASOPHILS NFR BLD AUTO: 0.5 %
BILIRUB SERPL-MCNC: 1.1 MG/DL (ref 0.1–1.2)
BUN SERPL-MCNC: 9 MG/DL (ref 8–25)
CALCIUM SERPL-MCNC: 8 MG/DL (ref 8.5–10.4)
CHLORIDE SERPL-SCNC: 105 MMOL/L (ref 97–107)
CO2 SERPL-SCNC: 27 MMOL/L (ref 24–31)
CREAT SERPL-MCNC: 0.5 MG/DL (ref 0.4–1.6)
EGFRCR SERPLBLD CKD-EPI 2021: >90 ML/MIN/1.73M*2
EOSINOPHIL # BLD AUTO: 0.23 X10*3/UL (ref 0–0.7)
EOSINOPHIL NFR BLD AUTO: 5.9 %
ERYTHROCYTE [DISTWIDTH] IN BLOOD BY AUTOMATED COUNT: 18.4 % (ref 11.5–14.5)
FERRITIN SERPL-MCNC: 33 NG/ML (ref 13–150)
FOLATE SERPL-MCNC: 13.6 NG/ML (ref 4.2–19.9)
GLUCOSE BLD MANUAL STRIP-MCNC: 133 MG/DL (ref 74–99)
GLUCOSE BLD MANUAL STRIP-MCNC: 179 MG/DL (ref 74–99)
GLUCOSE BLD MANUAL STRIP-MCNC: 298 MG/DL (ref 74–99)
GLUCOSE SERPL-MCNC: 151 MG/DL (ref 65–99)
HCT VFR BLD AUTO: 28.5 % (ref 36–46)
HGB BLD-MCNC: 9.1 G/DL (ref 12–16)
IMM GRANULOCYTES # BLD AUTO: 0.01 X10*3/UL (ref 0–0.7)
IMM GRANULOCYTES NFR BLD AUTO: 0.3 % (ref 0–0.9)
IRON SATN MFR SERPL: 13 % (ref 12–50)
IRON SERPL-MCNC: 32 UG/DL (ref 30–160)
LYMPHOCYTES # BLD AUTO: 1.29 X10*3/UL (ref 1.2–4.8)
LYMPHOCYTES NFR BLD AUTO: 32.8 %
MCH RBC QN AUTO: 26.3 PG (ref 26–34)
MCHC RBC AUTO-ENTMCNC: 31.9 G/DL (ref 32–36)
MCV RBC AUTO: 82 FL (ref 80–100)
MONOCYTES # BLD AUTO: 0.66 X10*3/UL (ref 0.1–1)
MONOCYTES NFR BLD AUTO: 16.8 %
NEUTROPHILS # BLD AUTO: 1.72 X10*3/UL (ref 1.2–7.7)
NEUTROPHILS NFR BLD AUTO: 43.7 %
NRBC BLD-RTO: 0 /100 WBCS (ref 0–0)
PLATELET # BLD AUTO: 79 X10*3/UL (ref 150–450)
POTASSIUM SERPL-SCNC: 3.5 MMOL/L (ref 3.4–5.1)
PROT SERPL-MCNC: 6.4 G/DL (ref 5.9–7.9)
RBC # BLD AUTO: 3.46 X10*6/UL (ref 4–5.2)
SODIUM SERPL-SCNC: 139 MMOL/L (ref 133–145)
TIBC SERPL-MCNC: 253 UG/DL (ref 228–428)
UIBC SERPL-MCNC: 221 UG/DL (ref 110–370)
VIT B12 SERPL-MCNC: 726 PG/ML (ref 211–946)
WBC # BLD AUTO: 3.9 X10*3/UL (ref 4.4–11.3)

## 2024-01-29 PROCEDURE — G0378 HOSPITAL OBSERVATION PER HR: HCPCS

## 2024-01-29 PROCEDURE — 83540 ASSAY OF IRON: CPT

## 2024-01-29 PROCEDURE — 82607 VITAMIN B-12: CPT

## 2024-01-29 PROCEDURE — 36415 COLL VENOUS BLD VENIPUNCTURE: CPT | Performed by: INTERNAL MEDICINE

## 2024-01-29 PROCEDURE — 82947 ASSAY GLUCOSE BLOOD QUANT: CPT | Mod: 59

## 2024-01-29 PROCEDURE — 84075 ASSAY ALKALINE PHOSPHATASE: CPT | Performed by: INTERNAL MEDICINE

## 2024-01-29 PROCEDURE — 97116 GAIT TRAINING THERAPY: CPT | Mod: GP

## 2024-01-29 PROCEDURE — 2500000004 HC RX 250 GENERAL PHARMACY W/ HCPCS (ALT 636 FOR OP/ED): Performed by: INTERNAL MEDICINE

## 2024-01-29 PROCEDURE — 49083 ABD PARACENTESIS W/IMAGING: CPT

## 2024-01-29 PROCEDURE — 2500000002 HC RX 250 W HCPCS SELF ADMINISTERED DRUGS (ALT 637 FOR MEDICARE OP, ALT 636 FOR OP/ED): Performed by: INTERNAL MEDICINE

## 2024-01-29 PROCEDURE — 2500000001 HC RX 250 WO HCPCS SELF ADMINISTERED DRUGS (ALT 637 FOR MEDICARE OP): Performed by: INTERNAL MEDICINE

## 2024-01-29 PROCEDURE — 82728 ASSAY OF FERRITIN: CPT

## 2024-01-29 PROCEDURE — 96366 THER/PROPH/DIAG IV INF ADDON: CPT | Mod: 59 | Performed by: EMERGENCY MEDICINE

## 2024-01-29 PROCEDURE — 82746 ASSAY OF FOLIC ACID SERUM: CPT

## 2024-01-29 PROCEDURE — 85025 COMPLETE CBC W/AUTO DIFF WBC: CPT | Performed by: INTERNAL MEDICINE

## 2024-01-29 PROCEDURE — 2500000005 HC RX 250 GENERAL PHARMACY W/O HCPCS: Performed by: RADIOLOGY

## 2024-01-29 PROCEDURE — 2500000001 HC RX 250 WO HCPCS SELF ADMINISTERED DRUGS (ALT 637 FOR MEDICARE OP)

## 2024-01-29 PROCEDURE — 2720000007 HC OR 272 NO HCPCS

## 2024-01-29 RX ORDER — FUROSEMIDE 40 MG/1
40 TABLET ORAL DAILY
Qty: 30 TABLET | Refills: 1 | Status: ON HOLD | OUTPATIENT
Start: 2024-01-29 | End: 2024-02-15 | Stop reason: SDUPTHER

## 2024-01-29 RX ORDER — SPIRONOLACTONE 50 MG/1
50 TABLET, FILM COATED ORAL 2 TIMES DAILY
Qty: 60 TABLET | Refills: 1 | Status: ON HOLD | OUTPATIENT
Start: 2024-01-29 | End: 2024-02-15 | Stop reason: SDUPTHER

## 2024-01-29 RX ORDER — LIDOCAINE HYDROCHLORIDE 10 MG/ML
INJECTION, SOLUTION EPIDURAL; INFILTRATION; INTRACAUDAL; PERINEURAL
Status: COMPLETED | OUTPATIENT
Start: 2024-01-29 | End: 2024-01-29

## 2024-01-29 RX ADMIN — CEFTRIAXONE SODIUM 1 G: 1 INJECTION, SOLUTION INTRAVENOUS at 14:19

## 2024-01-29 RX ADMIN — TRAMADOL HYDROCHLORIDE 50 MG: 50 TABLET ORAL at 06:37

## 2024-01-29 RX ADMIN — INSULIN GLARGINE 20 UNITS: 100 INJECTION, SOLUTION SUBCUTANEOUS at 09:06

## 2024-01-29 RX ADMIN — FUROSEMIDE 40 MG: 40 TABLET ORAL at 08:38

## 2024-01-29 RX ADMIN — URSODIOL 300 MG: 300 CAPSULE ORAL at 14:19

## 2024-01-29 RX ADMIN — PANTOPRAZOLE SODIUM 20 MG: 20 TABLET, DELAYED RELEASE ORAL at 06:37

## 2024-01-29 RX ADMIN — INSULIN HUMAN 9 UNITS: 100 INJECTION, SOLUTION PARENTERAL at 16:20

## 2024-01-29 RX ADMIN — FENOFIBRATE 160 MG: 160 TABLET ORAL at 08:38

## 2024-01-29 RX ADMIN — INSULIN HUMAN 3 UNITS: 100 INJECTION, SOLUTION PARENTERAL at 11:30

## 2024-01-29 RX ADMIN — ATORVASTATIN CALCIUM 80 MG: 80 TABLET, FILM COATED ORAL at 08:38

## 2024-01-29 RX ADMIN — PANTOPRAZOLE SODIUM 20 MG: 20 TABLET, DELAYED RELEASE ORAL at 15:32

## 2024-01-29 RX ADMIN — DOCUSATE SODIUM 100 MG: 100 CAPSULE, LIQUID FILLED ORAL at 08:38

## 2024-01-29 RX ADMIN — SPIRONOLACTONE 50 MG: 50 TABLET ORAL at 08:38

## 2024-01-29 RX ADMIN — URSODIOL 300 MG: 300 CAPSULE ORAL at 08:38

## 2024-01-29 RX ADMIN — LIDOCAINE HYDROCHLORIDE 5 ML: 10 INJECTION, SOLUTION EPIDURAL; INFILTRATION; INTRACAUDAL; PERINEURAL at 09:42

## 2024-01-29 ASSESSMENT — PAIN SCALES - GENERAL
PAINLEVEL_OUTOF10: 5 - MODERATE PAIN
PAINLEVEL_OUTOF10: 0 - NO PAIN

## 2024-01-29 ASSESSMENT — COGNITIVE AND FUNCTIONAL STATUS - GENERAL
CLIMB 3 TO 5 STEPS WITH RAILING: A LITTLE
MOBILITY SCORE: 22
DAILY ACTIVITIY SCORE: 24
WALKING IN HOSPITAL ROOM: A LITTLE
MOBILITY SCORE: 22
CLIMB 3 TO 5 STEPS WITH RAILING: A LITTLE
WALKING IN HOSPITAL ROOM: A LITTLE

## 2024-01-29 ASSESSMENT — PAIN - FUNCTIONAL ASSESSMENT
PAIN_FUNCTIONAL_ASSESSMENT: 0-10

## 2024-01-29 ASSESSMENT — PAIN DESCRIPTION - LOCATION: LOCATION: ABDOMEN

## 2024-01-29 NOTE — CARE PLAN
The patient's goals for the shift include no pain    The clinical goals for the shift include control pain during shift      Problem: Pain  Goal: My pain/discomfort is manageable  Outcome: Progressing     Problem: Bathing  Goal: STG - Patient will bathe body UB/LB independent   Outcome: Progressing     Problem: Bathing  Goal: STG - Patient will bathe body UB/LB independent   Outcome: Progressing

## 2024-01-29 NOTE — CARE PLAN
The patient's goals for the shift include no pain    The clinical goals for the shift include fluid management, ambulation    Problem: Pain  Goal: My pain/discomfort is manageable  Outcome: Met  Flowsheets (Taken 1/29/2024 1547)  Resident's pain/discomfort is manageable:   Include resident/family/caregiver in decisions related to pain management   Offer non-pharmacological pain management interventions   Administer pain medication prior to activities that may trigger pain   Identify and avoid pain triggers     Problem: Fall/Injury  Goal: Not fall by end of shift  Outcome: Met     Problem: Risk for excess fluid volume  Goal: I will remain free from complications r/t CHF  Outcome: Met     Problem: Skin  Goal: Prevent/manage excess moisture  Outcome: Met  Flowsheets (Taken 1/29/2024 3383)  Prevent/manage excess moisture:   Moisturize dry skin   Cleanse incontinence/protect with barrier cream  Goal: Prevent/minimize sheer/friction injuries  Outcome: Met  Flowsheets (Taken 1/29/2024 7460)  Prevent/minimize sheer/friction injuries: Use pull sheet

## 2024-01-29 NOTE — NURSING NOTE
pt verbalizes understanding of medications, next dose due, where to  medications and side effects of meds.  Pt verbalizes understanding of follow up appointments and when and how to schedule these.  Pt understands s/s to call MD for. Pt verbalizes understanding of all components of discharge instructions.  PIV removed.

## 2024-01-29 NOTE — NURSING NOTE
Pt resting in bed, denies needs at this time. Respirations regular and unlabred, no sign of distress noted.

## 2024-01-29 NOTE — PROGRESS NOTES
Alfonzo Flanagan is a 49 y.o. female on day 0 of admission presenting with UTI (urinary tract infection).     Paracentesis today. Patient is up ad allie in the room.  Discharge plan is home with spouse.  No skilled needs.     DC PLAN IS SECURE                       Shannan Puentes RN

## 2024-01-29 NOTE — PROGRESS NOTES
Physical Therapy    Physical Therapy Treatment    Patient Name: Alfonzo Flanagan  MRN: [id number]  Today's Date: 1/29/2024  Time Calculation  Start Time: 1345  Stop Time: 1355  Time Calculation (min): 10 min     Assessment/Plan   PT Assessment  PT Assessment Results: Decreased strength, Decreased endurance, Impaired balance, Decreased mobility, Decreased safety awareness  Rehab Prognosis: Good  Evaluation/Treatment Tolerance: Patient tolerated treatment well  Medical Staff Made Aware: Yes  Strengths: Ability to acquire knowledge, Premorbid level of function  End of Session Communication: Bedside nurse  Assessment Comment: Good progress towards therapy goals, continues to demonstrate mild balance deficits and decreased overall activity tolerance; will continue to treat as tolerated.  End of Session Patient Position: Bed, 2 rail up, Alarm off, not on at start of session  PT Plan  Inpatient/Swing Bed or Outpatient: Inpatient  PT Plan  Treatment/Interventions: Gait training, Stair training, Balance training, Strengthening, Endurance training, Therapeutic exercise  PT Plan: Skilled PT  PT Frequency: 3 times per week  PT Discharge Recommendations: Low intensity level of continued care  Equipment Recommended upon Discharge: Wheeled walker  PT Recommended Transfer Status: Contact guard  PT - OK to Discharge: Yes    General Visit Information:   PT  Visit  PT Received On: 01/29/24  Response to Previous Treatment: Patient with no complaints from previous session.  General  Reason for Referral: impaired functional mobility  Referred By: Luis Alfredo Lawrence MD  Past Medical History Relevant to Rehab: cirrhosis, ascites, UTI  Family/Caregiver Present: No  Prior to Session Communication: Bedside nurse  Patient Position Received: Bed, 2 rail up, Alarm on  Preferred Learning Style: verbal  General Comment: Pt cleared for therapy via RN, received in supine, NAD, agreeable to participate in therapy.    Subjective  "  Precautions:  Precautions  Hearing/Visual Limitations: hearing WFL, wears glasses  Medical Precautions: Fall precautions    Objective   Pain:  Pain Assessment  Pain Assessment: 0-10  Pain Score: 0 - No pain  Cognition:  Cognition  Overall Cognitive Status: Within Functional Limits  Postural Control:  Postural Control  Postural Control: Within Functional Limits  Posture Comment: abdominal ascites  Static Sitting Balance  Static Sitting-Balance Support: Feet supported, No upper extremity supported  Static Sitting-Level of Assistance: Independent  Static Standing Balance  Static Standing-Balance Support: No upper extremity supported  Static Standing-Level of Assistance: Close supervision  Extremity/Trunk Assessments:  RLE   RLE : Within Functional Limits  LLE   LLE : Within Functional Limits  Activity Tolerance:  Activity Tolerance  Endurance: Tolerates 10 - 20 min exercise with multiple rests  Treatments:  Therapeutic Exercise  Therapeutic Exercise Performed: No (declined; states \"it will hurt if I exercise my legs.\")    Bed Mobility  Bed Mobility: Yes  Bed Mobility 1  Bed Mobility 1: Supine to sitting  Level of Assistance 1: Independent  Bed Mobility 2  Bed Mobility  2: Sitting to supine  Level of Assistance 2: Independent    Ambulation/Gait Training  Ambulation/Gait Training Performed: Yes  Ambulation/Gait Training 1  Surface 1: Level tile  Device 1: No device  Assistance 1: Hand held assistance, Contact guard  Quality of Gait 1:  (decreased prateek, reciprocating pattern; 3-4 episodes of minor LOB with pt self-correction; states if she moves her eyes she gets dizzy)  Comments/Distance (ft) 1: 40' x 2  Transfers  Transfer: Yes  Transfer 1  Transfer From 1: Sit to  Transfer to 1: Stand  Technique 1: Sit to stand  Transfer Level of Assistance 1: Close supervision  Transfers 2  Transfer From 2: Stand to  Transfer to 2: Sit  Technique 2: Stand to sit  Transfer Level of Assistance 2: Close " supervision    Stairs  Stairs: Yes  Stairs  Rails 1: Right  Device 1: Railing  Assistance 1: Minimum assistance (non-reciprocating pattern to ascend/descend; min assist x 1 to steady)  Comment/Number of Steps 1: 9    Outcome Measures:  [facility] Basic Mobility  Turning from your back to your side while in a flat bed without using bedrails: None  Moving from lying on your back to sitting on the side of a flat bed without using bedrails: None  Moving to and from bed to chair (including a wheelchair): None  Standing up from a chair using your arms (e.g. wheelchair or bedside chair): None  To walk in hospital room: A little  Climbing 3-5 steps with railing: A little  Basic Mobility - Total Score: 22    Education Documentation  Handouts, taught by Melida Schulz PT at 1/29/2024  2:26 PM.  Learner: Patient  Readiness: Acceptance  Method: Explanation, Demonstration  Response: Needs Reinforcement    Precautions, taught by Melida Schulz PT at 1/29/2024  2:26 PM.  Learner: Patient  Readiness: Acceptance  Method: Explanation, Demonstration  Response: Needs Reinforcement    Body Mechanics, taught by Melida Schulz PT at 1/29/2024  2:26 PM.  Learner: Patient  Readiness: Acceptance  Method: Explanation, Demonstration  Response: Needs Reinforcement    Home Exercise Program, taught by Melida Schulz PT at 1/29/2024  2:26 PM.  Learner: Patient  Readiness: Acceptance  Method: Explanation, Demonstration  Response: Needs Reinforcement    Mobility Training, taught by Melida Schulz, PT at 1/29/2024  2:26 PM.  Learner: Patient  Readiness: Acceptance  Method: Explanation, Demonstration  Response: Needs Reinforcement    Education Comments  No comments found.        OP EDUCATION:  Outpatient Education  Individual(s) Educated: Patient  Education Provided: Fall Risk  Risk and Benefits Discussed with Patient/Caregiver/Other: yes  Patient/Caregiver Demonstrated Understanding: yes  Plan of Care Discussed and Agreed Upon: yes  Patient  Response to Education: Patient/Caregiver Verbalized Understanding of Information    Encounter Problems       Encounter Problems (Active)       Mobility       LTG - Patient will navigate 8 steps with unilateral rail and CGA (Progressing)       Start:  01/27/24    Expected End:  01/29/24            STG - Patient will ascend and descend a flight of stairs using unilateral handrail with CGA (Progressing)       Start:  01/27/24    Expected End:  01/29/24            Goal 2 (Progressing)       Start:  01/27/24    Expected End:  01/29/24       Pt will ambulate 300' using FWW with mod I            Mobility       Performed functional mobility independent with 2ww (Progressing)       Start:  01/27/24    Expected End:  01/29/24

## 2024-01-29 NOTE — PROGRESS NOTES
Alfonzo Flanagan is a 49 y.o. female on day 0 of admission presenting with UTI (urinary tract infection).    Subjective   Patient was seen and examined.  Lying in the bed.  Comfortable.  Not in acute distress.  Patient denies any headache or dizziness.  No nausea or vomiting.  Patient had a paracentesis done today.  Fluid was noted.  Patient is feeling better.       Objective     Physical Exam  HEENT:  Head externally atraumatic, no pallor, no icterus, extraocular movements intact, pupils reacting to light, oral mucosa moist and throat clear.  Neck:  Supple, no JVP, no palpable adenopathy or thyromegaly.  No carotid bruit.  Chest:  Clear to auscultation and resonant.  Heart:  Regular rate and rhythm, no murmur or gallop could be appreciated.  Abdomen:  Soft, nontender, bowel sounds present, normoactive, no palpable hepatosplenomegaly.  Ascites present.  Extremities: Bilateral edema present, pulses present, no cyanosis or clubbing.  CNS:  Patient alert, oriented to time, place and person.  Power 5/5 all over and deep tendon reflexes symmetrical, cranial nerves 2-12 grossly intact.  Skin:  No active rash.  Musculoskeletal:  No joint swelling or erythema, range of movement normal.  Last Recorded Vitals  Heart Rate:  [85-95]   Temp:  [36.7 °C (98.1 °F)-36.9 °C (98.4 °F)]   Resp:  [17-18]   BP: (101-124)/(56-76)   SpO2:  [95 %-100 %]     Intake/Output last 3 Shifts:  I/O last 3 completed shifts:  In: 1130 (13.5 mL/kg) [P.O.:1080; IV Piggyback:50]  Out: - (0 mL/kg)   Weight: 83.9 kg     Relevant Results  No results found for the last 90 days.    Results for orders placed or performed during the hospital encounter of 01/26/24 (from the past 24 hour(s))   POCT GLUCOSE   Result Value Ref Range    POCT Glucose 313 (H) 74 - 99 mg/dL   CBC and Auto Differential   Result Value Ref Range    WBC 3.9 (L) 4.4 - 11.3 x10*3/uL    nRBC 0.0 0.0 - 0.0 /100 WBCs    RBC 3.46 (L) 4.00 - 5.20 x10*6/uL    Hemoglobin 9.1 (L) 12.0 - 16.0  g/dL    Hematocrit 28.5 (L) 36.0 - 46.0 %    MCV 82 80 - 100 fL    MCH 26.3 26.0 - 34.0 pg    MCHC 31.9 (L) 32.0 - 36.0 g/dL    RDW 18.4 (H) 11.5 - 14.5 %    Platelets 79 (L) 150 - 450 x10*3/uL    Neutrophils % 43.7 40.0 - 80.0 %    Immature Granulocytes %, Automated 0.3 0.0 - 0.9 %    Lymphocytes % 32.8 13.0 - 44.0 %    Monocytes % 16.8 2.0 - 10.0 %    Eosinophils % 5.9 0.0 - 6.0 %    Basophils % 0.5 0.0 - 2.0 %    Neutrophils Absolute 1.72 1.20 - 7.70 x10*3/uL    Immature Granulocytes Absolute, Automated 0.01 0.00 - 0.70 x10*3/uL    Lymphocytes Absolute 1.29 1.20 - 4.80 x10*3/uL    Monocytes Absolute 0.66 0.10 - 1.00 x10*3/uL    Eosinophils Absolute 0.23 0.00 - 0.70 x10*3/uL    Basophils Absolute 0.02 0.00 - 0.10 x10*3/uL   Comprehensive Metabolic Panel   Result Value Ref Range    Glucose 151 (H) 65 - 99 mg/dL    Sodium 139 133 - 145 mmol/L    Potassium 3.5 3.4 - 5.1 mmol/L    Chloride 105 97 - 107 mmol/L    Bicarbonate 27 24 - 31 mmol/L    Urea Nitrogen 9 8 - 25 mg/dL    Creatinine 0.50 0.40 - 1.60 mg/dL    eGFR >90 >60 mL/min/1.73m*2    Calcium 8.0 (L) 8.5 - 10.4 mg/dL    Albumin 2.3 (L) 3.5 - 5.0 g/dL    Alkaline Phosphatase 269 (H) 35 - 125 U/L    Total Protein 6.4 5.9 - 7.9 g/dL    AST 50 (H) 5 - 40 U/L    Bilirubin, Total 1.1 0.1 - 1.2 mg/dL    ALT 26 5 - 40 U/L    Anion Gap 7 <=19 mmol/L   POCT GLUCOSE   Result Value Ref Range    POCT Glucose 133 (H) 74 - 99 mg/dL   POCT GLUCOSE   Result Value Ref Range    POCT Glucose 179 (H) 74 - 99 mg/dL        Current Facility-Administered Medications:     atorvastatin (Lipitor) tablet 80 mg, 80 mg, oral, Daily, Luis Alfredo Lawrence MD, 80 mg at 01/29/24 0838    benzocaine-menthol (Cepastat Sore Throat) 15-3.6 mg lozenge 1 lozenge, 1 lozenge, Mouth/Throat, q2h PRN, Luis Alfredo Lawrence MD    cefTRIAXone (Rocephin) IVPB 1 g, 1 g, intravenous, q24h, Luis Alfredo Lawrence MD, Last Rate: 100 mL/hr at 01/29/24 1419, 1 g at 01/29/24 1419    dextromethorphan-guaifenesin  (Robitussin DM)  mg/5 mL oral liquid 5 mL, 5 mL, oral, q4h PRN, Luis Alfredo Lawrence MD    dextrose 10 % in water (D10W) infusion, 0.3 g/kg/hr, intravenous, Once PRN, Luis Alfredo Lawrence MD    dextrose 50 % injection 25 g, 25 g, intravenous, q15 min PRN, Luis Alfredo Lawrence MD    docusate sodium (Colace) capsule 100 mg, 100 mg, oral, BID, Luis Alfredo Lawrence MD, 100 mg at 01/29/24 0838    fenofibrate (Triglide) tablet 160 mg, 160 mg, oral, Daily, Luis Alfredo Lawrence MD, 160 mg at 01/29/24 0838    furosemide (Lasix) tablet 40 mg, 40 mg, oral, Daily, Luis Alfredo Lawrence MD, 40 mg at 01/29/24 0838    glucagon (Glucagen) injection 1 mg, 1 mg, intramuscular, q15 min PRN, Luis Alfredo Lawrence MD    guaiFENesin (Mucinex) 12 hr tablet 600 mg, 600 mg, oral, q12h PRN, Luis Alfredo Lawrence MD    insulin glargine (Lantus) injection 20 Units, 20 Units, subcutaneous, q AM, Luis Alfredo Lawrence MD, 20 Units at 01/29/24 0906    insulin regular (HumuLIN R) injection 0-15 Units, 0-15 Units, subcutaneous, TID with meals, Luis Alfredo Lawrence MD, 3 Units at 01/29/24 1130    melatonin tablet 5 mg, 5 mg, oral, Nightly, Luis Alfredo Lawrence MD, 5 mg at 01/28/24 2054    ondansetron (Zofran) injection 4 mg, 4 mg, intravenous, q6h PRN, Luis Alfredo Lawrence MD    ondansetron (Zofran) tablet 4 mg, 4 mg, oral, q8h PRN, 4 mg at 01/28/24 1423 **OR** [DISCONTINUED] ondansetron (Zofran) injection 4 mg, 4 mg, intravenous, q8h PRN, Luis Alfredo Lawrence MD    pantoprazole (ProtoNix) EC tablet 20 mg, 20 mg, oral, BID AC, Luis Alfredo Lawrence MD, 20 mg at 01/29/24 0637    polyethylene glycol (Glycolax, Miralax) packet 17 g, 17 g, oral, Daily PRN, Luis Alfredo Lawrence MD    spironolactone (Aldactone) tablet 50 mg, 50 mg, oral, Daily, Luis Alfredo Lawrence MD, 50 mg at 01/29/24 0838    traMADol (Ultram) tablet 50 mg, 50 mg, oral, q6h PRN, Luis Alfredo Lawrence MD, 50 mg at 01/29/24 0637    traZODone (Desyrel) tablet 25 mg, 25 mg, oral, Nightly, Luis Alfredo ELIZALDE  MD Howard, 25 mg at 01/28/24 2054    ursodiol (Actigall) capsule 300 mg, 300 mg, oral, TID, Luis Alfredo Lawrence MD, 300 mg at 01/29/24 1419   Assessment/Plan   Principal Problem:    UTI (urinary tract infection)  Active Problems:    Other ascites    Acute UTI  Ascites  Cirrhosis      Plan: Continue current medication get supportive care.  Patient had paracentesis done.  Followed fluid removed.  Patient has been started on Lasix and Aldactone.  Swelling is improved.  Patient is doing fine now.  Patient being discharged in stable condition.       Luis Alfredo Lawrence MD

## 2024-01-29 NOTE — DISCHARGE SUMMARY
Discharge Diagnosis  UTI (urinary tract infection)    Issues Requiring Follow-Up  Ascites  Cirrhosis  Leg edema  Hyperlipidemia  Diabetes  Hypertriglyceridemia  Cholelithiasis    Discharge Meds     Your medication list        ASK your doctor about these medications        Instructions Last Dose Given Next Dose Due   acetaminophen 325 mg tablet  Commonly known as: Tylenol           atorvastatin 80 mg tablet  Commonly known as: Lipitor           cephalexin 500 mg capsule  Commonly known as: Keflex           docusate sodium 100 mg capsule  Commonly known as: Colace           fenofibrate 145 mg tablet  Commonly known as: Tricor           furosemide 40 mg tablet  Commonly known as: Lasix           insulin lispro 100 unit/mL injection  Commonly known as: HumaLOG           Lantus U-100 Insulin 100 unit/mL injection  Generic drug: insulin glargine           pantoprazole 20 mg EC tablet  Commonly known as: ProtoNix           spironolactone 50 mg tablet  Commonly known as: Aldactone           traZODone 50 mg tablet  Commonly known as: Desyrel           ursodiol 300 mg capsule  Commonly known as: Actigall                    Test Results Pending At Discharge  Pending Labs       Order Current Status    Extra Urine Gray Tube Collected (01/26/24 1019)    Urinalysis with Reflex Culture and Microscopic In process            Hospital Course   Patient was admitted with a complaint of abdominal pain and ascites.  Patient was also found to have a UTI.  Patient was started on antibiotics.  Patient had ascites.  Paracentesis was done.  Condition improved.  Patient doing fine now.  Patient is being discharged in stable condition.    Pertinent Physical Exam At Time of Discharge  Physical Exam    Outpatient Follow-Up  No future appointments.      Luis Alfredo Lawrence MD

## 2024-01-29 NOTE — PROGRESS NOTES
Occupational Therapy                 Therapy Communication Note    Patient Name: Alfonzo Flanagan  MRN: [id number]  Today's Date: 1/29/2024     Discipline: Occupational Therapy    Missed Visit Reason: Missed Visit Reason: Other (Comment) (pt up ad allie in room, completing ADL tasks and functional mobility without assist per RN.)    Missed Time: Cancel    Comment:

## 2024-02-07 ENCOUNTER — APPOINTMENT (OUTPATIENT)
Dept: RADIOLOGY | Facility: HOSPITAL | Age: 49
End: 2024-02-07
Payer: COMMERCIAL

## 2024-02-07 ENCOUNTER — HOSPITAL ENCOUNTER (INPATIENT)
Facility: HOSPITAL | Age: 49
LOS: 6 days | Discharge: HOME | End: 2024-02-15
Attending: STUDENT IN AN ORGANIZED HEALTH CARE EDUCATION/TRAINING PROGRAM | Admitting: INTERNAL MEDICINE
Payer: COMMERCIAL

## 2024-02-07 DIAGNOSIS — R10.13 ABDOMINAL PAIN, EPIGASTRIC: Primary | ICD-10-CM

## 2024-02-07 DIAGNOSIS — K59.00 CONSTIPATION, UNSPECIFIED CONSTIPATION TYPE: ICD-10-CM

## 2024-02-07 DIAGNOSIS — R18.8 ASCITES OF LIVER: ICD-10-CM

## 2024-02-07 DIAGNOSIS — R18.8 OTHER ASCITES: ICD-10-CM

## 2024-02-07 DIAGNOSIS — R10.9 ABDOMINAL PAIN, UNSPECIFIED ABDOMINAL LOCATION: ICD-10-CM

## 2024-02-07 DIAGNOSIS — L02.214 ABSCESS OF LEFT GROIN: ICD-10-CM

## 2024-02-07 LAB
ALBUMIN SERPL-MCNC: 2.8 G/DL (ref 3.5–5)
ALP BLD-CCNC: 429 U/L (ref 35–125)
ALT SERPL-CCNC: 33 U/L (ref 5–40)
ANION GAP SERPL CALC-SCNC: 10 MMOL/L
APPEARANCE UR: CLEAR
AST SERPL-CCNC: 50 U/L (ref 5–40)
BASOPHILS # BLD AUTO: 0.03 X10*3/UL (ref 0–0.1)
BASOPHILS NFR BLD AUTO: 0.7 %
BILIRUB SERPL-MCNC: 1.2 MG/DL (ref 0.1–1.2)
BILIRUB UR STRIP.AUTO-MCNC: NEGATIVE MG/DL
BUN SERPL-MCNC: 9 MG/DL (ref 8–25)
CALCIUM SERPL-MCNC: 8.9 MG/DL (ref 8.5–10.4)
CHLORIDE SERPL-SCNC: 96 MMOL/L (ref 97–107)
CO2 SERPL-SCNC: 24 MMOL/L (ref 24–31)
COLOR UR: COLORLESS
CREAT SERPL-MCNC: 0.5 MG/DL (ref 0.4–1.6)
EGFRCR SERPLBLD CKD-EPI 2021: >90 ML/MIN/1.73M*2
EOSINOPHIL # BLD AUTO: 0.31 X10*3/UL (ref 0–0.7)
EOSINOPHIL NFR BLD AUTO: 7 %
ERYTHROCYTE [DISTWIDTH] IN BLOOD BY AUTOMATED COUNT: 17.2 % (ref 11.5–14.5)
GLUCOSE BLD MANUAL STRIP-MCNC: 339 MG/DL (ref 74–99)
GLUCOSE BLD MANUAL STRIP-MCNC: 364 MG/DL (ref 74–99)
GLUCOSE SERPL-MCNC: 558 MG/DL (ref 65–99)
GLUCOSE UR STRIP.AUTO-MCNC: ABNORMAL MG/DL
HCG SERPL-ACNC: <1 MIU/ML
HCT VFR BLD AUTO: 33.8 % (ref 36–46)
HGB BLD-MCNC: 10.6 G/DL (ref 12–16)
IMM GRANULOCYTES # BLD AUTO: 0.01 X10*3/UL (ref 0–0.7)
IMM GRANULOCYTES NFR BLD AUTO: 0.2 % (ref 0–0.9)
KETONES UR STRIP.AUTO-MCNC: NEGATIVE MG/DL
LACTATE BLDV-SCNC: 2.4 MMOL/L (ref 0.4–2)
LACTATE BLDV-SCNC: 3.3 MMOL/L (ref 0.4–2)
LEUKOCYTE ESTERASE UR QL STRIP.AUTO: NEGATIVE
LIPASE SERPL-CCNC: 66 U/L (ref 16–63)
LYMPHOCYTES # BLD AUTO: 1.15 X10*3/UL (ref 1.2–4.8)
LYMPHOCYTES NFR BLD AUTO: 25.8 %
MAGNESIUM SERPL-MCNC: 2 MG/DL (ref 1.6–3.1)
MCH RBC QN AUTO: 26.7 PG (ref 26–34)
MCHC RBC AUTO-ENTMCNC: 31.4 G/DL (ref 32–36)
MCV RBC AUTO: 85 FL (ref 80–100)
MONOCYTES # BLD AUTO: 0.41 X10*3/UL (ref 0.1–1)
MONOCYTES NFR BLD AUTO: 9.2 %
NEUTROPHILS # BLD AUTO: 2.55 X10*3/UL (ref 1.2–7.7)
NEUTROPHILS NFR BLD AUTO: 57.1 %
NITRITE UR QL STRIP.AUTO: NEGATIVE
NRBC BLD-RTO: 0 /100 WBCS (ref 0–0)
PH UR STRIP.AUTO: 6 [PH]
PLATELET # BLD AUTO: 83 X10*3/UL (ref 150–450)
POTASSIUM SERPL-SCNC: 4.8 MMOL/L (ref 3.4–5.1)
PROT SERPL-MCNC: 8.5 G/DL (ref 5.9–7.9)
PROT UR STRIP.AUTO-MCNC: NEGATIVE MG/DL
RBC # BLD AUTO: 3.97 X10*6/UL (ref 4–5.2)
RBC # UR STRIP.AUTO: NEGATIVE /UL
RBC MORPH BLD: NORMAL
SODIUM SERPL-SCNC: 130 MMOL/L (ref 133–145)
SP GR UR STRIP.AUTO: 1.03
UROBILINOGEN UR STRIP.AUTO-MCNC: NORMAL MG/DL
WBC # BLD AUTO: 4.5 X10*3/UL (ref 4.4–11.3)

## 2024-02-07 PROCEDURE — G0378 HOSPITAL OBSERVATION PER HR: HCPCS

## 2024-02-07 PROCEDURE — 99285 EMERGENCY DEPT VISIT HI MDM: CPT | Mod: 25 | Performed by: STUDENT IN AN ORGANIZED HEALTH CARE EDUCATION/TRAINING PROGRAM

## 2024-02-07 PROCEDURE — 2500000001 HC RX 250 WO HCPCS SELF ADMINISTERED DRUGS (ALT 637 FOR MEDICARE OP): Performed by: STUDENT IN AN ORGANIZED HEALTH CARE EDUCATION/TRAINING PROGRAM

## 2024-02-07 PROCEDURE — 81003 URINALYSIS AUTO W/O SCOPE: CPT | Performed by: EMERGENCY MEDICINE

## 2024-02-07 PROCEDURE — 83605 ASSAY OF LACTIC ACID: CPT | Performed by: EMERGENCY MEDICINE

## 2024-02-07 PROCEDURE — 84702 CHORIONIC GONADOTROPIN TEST: CPT | Performed by: EMERGENCY MEDICINE

## 2024-02-07 PROCEDURE — 85025 COMPLETE CBC W/AUTO DIFF WBC: CPT | Performed by: EMERGENCY MEDICINE

## 2024-02-07 PROCEDURE — 36415 COLL VENOUS BLD VENIPUNCTURE: CPT | Performed by: EMERGENCY MEDICINE

## 2024-02-07 PROCEDURE — 82947 ASSAY GLUCOSE BLOOD QUANT: CPT

## 2024-02-07 PROCEDURE — 83690 ASSAY OF LIPASE: CPT | Performed by: EMERGENCY MEDICINE

## 2024-02-07 PROCEDURE — 83735 ASSAY OF MAGNESIUM: CPT | Performed by: EMERGENCY MEDICINE

## 2024-02-07 PROCEDURE — 96361 HYDRATE IV INFUSION ADD-ON: CPT

## 2024-02-07 PROCEDURE — 74177 CT ABD & PELVIS W/CONTRAST: CPT

## 2024-02-07 PROCEDURE — 80053 COMPREHEN METABOLIC PANEL: CPT | Performed by: EMERGENCY MEDICINE

## 2024-02-07 PROCEDURE — C9113 INJ PANTOPRAZOLE SODIUM, VIA: HCPCS | Performed by: STUDENT IN AN ORGANIZED HEALTH CARE EDUCATION/TRAINING PROGRAM

## 2024-02-07 PROCEDURE — 2550000001 HC RX 255 CONTRASTS: Performed by: STUDENT IN AN ORGANIZED HEALTH CARE EDUCATION/TRAINING PROGRAM

## 2024-02-07 PROCEDURE — 2500000004 HC RX 250 GENERAL PHARMACY W/ HCPCS (ALT 636 FOR OP/ED): Performed by: EMERGENCY MEDICINE

## 2024-02-07 PROCEDURE — 2500000004 HC RX 250 GENERAL PHARMACY W/ HCPCS (ALT 636 FOR OP/ED): Performed by: STUDENT IN AN ORGANIZED HEALTH CARE EDUCATION/TRAINING PROGRAM

## 2024-02-07 PROCEDURE — 96374 THER/PROPH/DIAG INJ IV PUSH: CPT

## 2024-02-07 RX ORDER — POLYETHYLENE GLYCOL 3350 17 G/17G
17 POWDER, FOR SOLUTION ORAL DAILY PRN
Status: DISCONTINUED | OUTPATIENT
Start: 2024-02-07 | End: 2024-02-09

## 2024-02-07 RX ORDER — PANTOPRAZOLE SODIUM 20 MG/1
20 TABLET, DELAYED RELEASE ORAL
Status: DISCONTINUED | OUTPATIENT
Start: 2024-02-08 | End: 2024-02-15 | Stop reason: HOSPADM

## 2024-02-07 RX ORDER — FENTANYL CITRATE 50 UG/ML
50 INJECTION, SOLUTION INTRAMUSCULAR; INTRAVENOUS ONCE
Status: COMPLETED | OUTPATIENT
Start: 2024-02-07 | End: 2024-02-07

## 2024-02-07 RX ORDER — INSULIN GLARGINE 100 [IU]/ML
20 INJECTION, SOLUTION SUBCUTANEOUS EVERY MORNING
Status: DISCONTINUED | OUTPATIENT
Start: 2024-02-08 | End: 2024-02-15 | Stop reason: HOSPADM

## 2024-02-07 RX ORDER — DOCUSATE SODIUM 100 MG/1
100 CAPSULE, LIQUID FILLED ORAL 2 TIMES DAILY
Status: DISCONTINUED | OUTPATIENT
Start: 2024-02-08 | End: 2024-02-15 | Stop reason: HOSPADM

## 2024-02-07 RX ORDER — INSULIN LISPRO 100 [IU]/ML
0-15 INJECTION, SOLUTION INTRAVENOUS; SUBCUTANEOUS EVERY 4 HOURS
Status: DISCONTINUED | OUTPATIENT
Start: 2024-02-08 | End: 2024-02-14

## 2024-02-07 RX ORDER — GUAIFENESIN 600 MG/1
600 TABLET, EXTENDED RELEASE ORAL EVERY 12 HOURS PRN
Status: DISCONTINUED | OUTPATIENT
Start: 2024-02-07 | End: 2024-02-15 | Stop reason: HOSPADM

## 2024-02-07 RX ORDER — DEXTROSE 50 % IN WATER (D50W) INTRAVENOUS SYRINGE
25
Status: DISCONTINUED | OUTPATIENT
Start: 2024-02-07 | End: 2024-02-15 | Stop reason: HOSPADM

## 2024-02-07 RX ORDER — ACETAMINOPHEN 325 MG/1
650 TABLET ORAL EVERY 4 HOURS PRN
Status: DISCONTINUED | OUTPATIENT
Start: 2024-02-07 | End: 2024-02-15 | Stop reason: HOSPADM

## 2024-02-07 RX ORDER — FENOFIBRATE 160 MG/1
160 TABLET ORAL DAILY
Status: DISCONTINUED | OUTPATIENT
Start: 2024-02-08 | End: 2024-02-15 | Stop reason: HOSPADM

## 2024-02-07 RX ORDER — ACETAMINOPHEN 325 MG/1
325 TABLET ORAL EVERY 6 HOURS PRN
Status: DISCONTINUED | OUTPATIENT
Start: 2024-02-07 | End: 2024-02-15 | Stop reason: HOSPADM

## 2024-02-07 RX ORDER — ACETAMINOPHEN 650 MG/1
650 SUPPOSITORY RECTAL EVERY 4 HOURS PRN
Status: DISCONTINUED | OUTPATIENT
Start: 2024-02-07 | End: 2024-02-15 | Stop reason: HOSPADM

## 2024-02-07 RX ORDER — PANTOPRAZOLE SODIUM 40 MG/10ML
40 INJECTION, POWDER, LYOPHILIZED, FOR SOLUTION INTRAVENOUS ONCE
Status: COMPLETED | OUTPATIENT
Start: 2024-02-07 | End: 2024-02-07

## 2024-02-07 RX ORDER — DEXTROSE MONOHYDRATE 100 MG/ML
0.3 INJECTION, SOLUTION INTRAVENOUS ONCE AS NEEDED
Status: DISCONTINUED | OUTPATIENT
Start: 2024-02-07 | End: 2024-02-15 | Stop reason: HOSPADM

## 2024-02-07 RX ORDER — ALUMINUM HYDROXIDE, MAGNESIUM HYDROXIDE, AND SIMETHICONE 1200; 120; 1200 MG/30ML; MG/30ML; MG/30ML
30 SUSPENSION ORAL ONCE
Status: COMPLETED | OUTPATIENT
Start: 2024-02-07 | End: 2024-02-07

## 2024-02-07 RX ORDER — ATORVASTATIN CALCIUM 80 MG/1
80 TABLET, FILM COATED ORAL DAILY
Status: DISCONTINUED | OUTPATIENT
Start: 2024-02-08 | End: 2024-02-15 | Stop reason: HOSPADM

## 2024-02-07 RX ORDER — LIDOCAINE HYDROCHLORIDE 20 MG/ML
15 SOLUTION OROPHARYNGEAL ONCE
Status: COMPLETED | OUTPATIENT
Start: 2024-02-07 | End: 2024-02-07

## 2024-02-07 RX ORDER — GUAIFENESIN/DEXTROMETHORPHAN 100-10MG/5
5 SYRUP ORAL EVERY 4 HOURS PRN
Status: DISCONTINUED | OUTPATIENT
Start: 2024-02-07 | End: 2024-02-15 | Stop reason: HOSPADM

## 2024-02-07 RX ORDER — TRAZODONE HYDROCHLORIDE 50 MG/1
25 TABLET ORAL NIGHTLY
Status: DISCONTINUED | OUTPATIENT
Start: 2024-02-08 | End: 2024-02-15 | Stop reason: HOSPADM

## 2024-02-07 RX ORDER — URSODIOL 300 MG/1
300 CAPSULE ORAL 3 TIMES DAILY
Status: DISCONTINUED | OUTPATIENT
Start: 2024-02-08 | End: 2024-02-15 | Stop reason: HOSPADM

## 2024-02-07 RX ORDER — ONDANSETRON HYDROCHLORIDE 2 MG/ML
4 INJECTION, SOLUTION INTRAVENOUS ONCE
Status: COMPLETED | OUTPATIENT
Start: 2024-02-07 | End: 2024-02-07

## 2024-02-07 RX ORDER — ACETAMINOPHEN 160 MG/5ML
650 SOLUTION ORAL EVERY 4 HOURS PRN
Status: DISCONTINUED | OUTPATIENT
Start: 2024-02-07 | End: 2024-02-15 | Stop reason: HOSPADM

## 2024-02-07 RX ORDER — SODIUM CHLORIDE 9 MG/ML
75 INJECTION, SOLUTION INTRAVENOUS CONTINUOUS
Status: DISCONTINUED | OUTPATIENT
Start: 2024-02-07 | End: 2024-02-15 | Stop reason: HOSPADM

## 2024-02-07 RX ORDER — FUROSEMIDE 40 MG/1
40 TABLET ORAL DAILY
Status: DISCONTINUED | OUTPATIENT
Start: 2024-02-08 | End: 2024-02-15 | Stop reason: HOSPADM

## 2024-02-07 RX ORDER — SPIRONOLACTONE 50 MG/1
50 TABLET, FILM COATED ORAL 2 TIMES DAILY
Status: DISCONTINUED | OUTPATIENT
Start: 2024-02-08 | End: 2024-02-15 | Stop reason: HOSPADM

## 2024-02-07 RX ADMIN — IOHEXOL 75 ML: 350 INJECTION, SOLUTION INTRAVENOUS at 15:34

## 2024-02-07 RX ADMIN — LIDOCAINE HYDROCHLORIDE 15 ML: 20 SOLUTION ORAL; TOPICAL at 19:22

## 2024-02-07 RX ADMIN — PANTOPRAZOLE SODIUM 40 MG: 40 INJECTION, POWDER, FOR SOLUTION INTRAVENOUS at 19:22

## 2024-02-07 RX ADMIN — FENTANYL CITRATE 50 MCG: 0.05 INJECTION, SOLUTION INTRAMUSCULAR; INTRAVENOUS at 14:45

## 2024-02-07 RX ADMIN — ALUMINUM HYDROXIDE, MAGNESIUM HYDROXIDE, AND SIMETHICONE 30 ML: 200; 200; 20 SUSPENSION ORAL at 19:22

## 2024-02-07 RX ADMIN — SODIUM CHLORIDE 75 ML/HR: 900 INJECTION, SOLUTION INTRAVENOUS at 19:22

## 2024-02-07 RX ADMIN — ONDANSETRON HYDROCHLORIDE 4 MG: 2 INJECTION INTRAMUSCULAR; INTRAVENOUS at 14:45

## 2024-02-07 RX ADMIN — SODIUM CHLORIDE 500 ML: 9 INJECTION, SOLUTION INTRAVENOUS at 14:45

## 2024-02-07 SDOH — SOCIAL STABILITY: SOCIAL INSECURITY: ARE YOU OR HAVE YOU BEEN THREATENED OR ABUSED PHYSICALLY, EMOTIONALLY, OR SEXUALLY BY ANYONE?: NO

## 2024-02-07 SDOH — SOCIAL STABILITY: SOCIAL INSECURITY: DO YOU FEEL ANYONE HAS EXPLOITED OR TAKEN ADVANTAGE OF YOU FINANCIALLY OR OF YOUR PERSONAL PROPERTY?: NO

## 2024-02-07 SDOH — SOCIAL STABILITY: SOCIAL INSECURITY: HAS ANYONE EVER THREATENED TO HURT YOUR FAMILY OR YOUR PETS?: NO

## 2024-02-07 SDOH — SOCIAL STABILITY: SOCIAL INSECURITY: DOES ANYONE TRY TO KEEP YOU FROM HAVING/CONTACTING OTHER FRIENDS OR DOING THINGS OUTSIDE YOUR HOME?: NO

## 2024-02-07 SDOH — SOCIAL STABILITY: SOCIAL INSECURITY: DO YOU FEEL UNSAFE GOING BACK TO THE PLACE WHERE YOU ARE LIVING?: NO

## 2024-02-07 SDOH — SOCIAL STABILITY: SOCIAL INSECURITY: WERE YOU ABLE TO COMPLETE ALL THE BEHAVIORAL HEALTH SCREENINGS?: YES

## 2024-02-07 SDOH — SOCIAL STABILITY: SOCIAL INSECURITY: ABUSE: ADULT

## 2024-02-07 SDOH — SOCIAL STABILITY: SOCIAL INSECURITY: HAVE YOU HAD THOUGHTS OF HARMING ANYONE ELSE?: YES

## 2024-02-07 SDOH — SOCIAL STABILITY: SOCIAL INSECURITY: ARE THERE ANY APPARENT SIGNS OF INJURIES/BEHAVIORS THAT COULD BE RELATED TO ABUSE/NEGLECT?: NO

## 2024-02-07 ASSESSMENT — COLUMBIA-SUICIDE SEVERITY RATING SCALE - C-SSRS
6. HAVE YOU EVER DONE ANYTHING, STARTED TO DO ANYTHING, OR PREPARED TO DO ANYTHING TO END YOUR LIFE?: NO
1. IN THE PAST MONTH, HAVE YOU WISHED YOU WERE DEAD OR WISHED YOU COULD GO TO SLEEP AND NOT WAKE UP?: NO
2. HAVE YOU ACTUALLY HAD ANY THOUGHTS OF KILLING YOURSELF?: NO

## 2024-02-07 ASSESSMENT — PAIN SCALES - GENERAL
PAINLEVEL_OUTOF10: 7
PAINLEVEL_OUTOF10: 8

## 2024-02-07 ASSESSMENT — PAIN DESCRIPTION - LOCATION: LOCATION: ABDOMEN

## 2024-02-07 ASSESSMENT — COGNITIVE AND FUNCTIONAL STATUS - GENERAL
MOBILITY SCORE: 24
PATIENT BASELINE BEDBOUND: NO
DAILY ACTIVITIY SCORE: 24

## 2024-02-07 ASSESSMENT — LIFESTYLE VARIABLES
HAVE PEOPLE ANNOYED YOU BY CRITICIZING YOUR DRINKING: NO
HOW OFTEN DO YOU HAVE 6 OR MORE DRINKS ON ONE OCCASION: NEVER
EVER HAD A DRINK FIRST THING IN THE MORNING TO STEADY YOUR NERVES TO GET RID OF A HANGOVER: NO
AUDIT-C TOTAL SCORE: 0
SKIP TO QUESTIONS 9-10: 1
SUBSTANCE_ABUSE_PAST_12_MONTHS: NO
PRESCIPTION_ABUSE_PAST_12_MONTHS: NO
AUDIT-C TOTAL SCORE: 0
HOW MANY STANDARD DRINKS CONTAINING ALCOHOL DO YOU HAVE ON A TYPICAL DAY: PATIENT DOES NOT DRINK
HOW OFTEN DO YOU HAVE A DRINK CONTAINING ALCOHOL: NEVER
EVER FELT BAD OR GUILTY ABOUT YOUR DRINKING: NO
HAVE YOU EVER FELT YOU SHOULD CUT DOWN ON YOUR DRINKING: NO

## 2024-02-07 ASSESSMENT — PATIENT HEALTH QUESTIONNAIRE - PHQ9
1. LITTLE INTEREST OR PLEASURE IN DOING THINGS: NOT AT ALL
2. FEELING DOWN, DEPRESSED OR HOPELESS: NOT AT ALL
SUM OF ALL RESPONSES TO PHQ9 QUESTIONS 1 & 2: 0

## 2024-02-07 ASSESSMENT — ACTIVITIES OF DAILY LIVING (ADL)
HEARING - RIGHT EAR: FUNCTIONAL
PATIENT'S MEMORY ADEQUATE TO SAFELY COMPLETE DAILY ACTIVITIES?: YES
DRESSING YOURSELF: INDEPENDENT
JUDGMENT_ADEQUATE_SAFELY_COMPLETE_DAILY_ACTIVITIES: YES
FEEDING YOURSELF: INDEPENDENT
BATHING: INDEPENDENT
ASSISTIVE_DEVICE: NONE;WALKER
ADEQUATE_TO_COMPLETE_ADL: YES
TOILETING: INDEPENDENT
WALKS IN HOME: INDEPENDENT
HEARING - LEFT EAR: FUNCTIONAL
LACK_OF_TRANSPORTATION: NO
GROOMING: INDEPENDENT

## 2024-02-07 ASSESSMENT — PAIN DESCRIPTION - PAIN TYPE: TYPE: ACUTE PAIN

## 2024-02-07 ASSESSMENT — PAIN - FUNCTIONAL ASSESSMENT: PAIN_FUNCTIONAL_ASSESSMENT: 0-10

## 2024-02-07 NOTE — ED TRIAGE NOTES
TRIAGE NOTE   I saw the patient as the Clinician in Triage and performed a brief history and physical exam, established acuity, and ordered appropriate tests to develop basic plan of care. Patient will be seen by an MEG, resident and/or physician who will independently evaluate the patient. Please see subsequent provider notes for further details and disposition.     Brief HPI: In brief, Alfonzo Flanagan is a 49 y.o. female that presents for abdominal pain.  She has a significant diagnosis of primary biliary cholangitis with resultant liver failure.  Since last Thursday she has been having worsening cramping spasmodic abdominal pain in the epigastric area.  She states that when she has pain Tylenol or Naprosyn will take it away however this has been unrelenting.  She has associated nausea and vomiting.  Decreased appetite.    Focused Physical exam:   Diffuse abdominal tenderness without overt distention or guarding.  Abdomen is nonsurgical    Plan/MDM:   Abdominal workup initiated she was provided pain and nausea medicine.  CT scan ordered.  No significant ascites.  Low suspicion for SBP.    Please see subsequent provider note for further details and disposition

## 2024-02-07 NOTE — ED PROVIDER NOTES
HPI   Chief Complaint   Patient presents with    Abdominal Pain    Nausea    Vomiting       49 y.o. female that presents for abdominal pain.  History of primary biliary cholangitis with resultant liver failure.  Since last Thursday she has been having worsening cramping spasmodic abdominal pain in the epigastric area.  She states that when she has pain Tylenol or Naprosyn will take it away however this has been unrelenting.  She has associated nausea and vomiting and diarrhea.  Decreased appetite.    Son at bedside, states that she has been increasingly weak over the past few days.  No fevers.                          [place] Coma Scale Score: 15                     Patient History   No past medical history on file.  Past Surgical History:   Procedure Laterality Date    CT GUIDED IMAGING FOR NEEDLE PLACEMENT  10/14/2020    CT GUIDED IMAGING FOR NEEDLE PLACEMENT [facility]    US GUIDED ABDOMINAL PARACENTESIS  10/8/2020    US GUIDED ABDOMINAL PARACENTESIS [facility]     No family history on file.  Social History     Tobacco Use    Smoking status: Never    Smokeless tobacco: Never   Substance Use Topics    Alcohol use: Never    Drug use: Never       Physical Exam   ED Triage Vitals [02/07/24 1341]   Temperature Heart Rate Respirations BP   36.5 °C (97.7 °F) 94 18 135/73      Pulse Ox Temp Source Heart Rate Source Patient Position   99 % Temporal Monitor --      BP Location FiO2 (%)     -- --       Physical Exam  Vitals and nursing note reviewed.   Constitutional:       General: She is not in acute distress.     Appearance: She is not ill-appearing.   HENT:      Head: Normocephalic and atraumatic.      Mouth/Throat:      Mouth: Mucous membranes are moist.      Pharynx: Oropharynx is clear.   Eyes:      Extraocular Movements: Extraocular movements intact.      Conjunctiva/sclera: Conjunctivae normal.      Pupils: Pupils are equal, round, and reactive to light.   Cardiovascular:      Rate and Rhythm:  Normal rate and regular rhythm.   Pulmonary:      Effort: Pulmonary effort is normal. No respiratory distress.      Breath sounds: Normal breath sounds.   Abdominal:      General: There is no distension.      Palpations: Abdomen is soft.      Tenderness: There is abdominal tenderness in the right upper quadrant, epigastric area, periumbilical area and left upper quadrant.   Musculoskeletal:         General: No swelling or deformity. Normal range of motion.      Cervical back: Normal range of motion and neck supple.      Right lower le+ Pitting Edema present.      Left lower le+ Pitting Edema present.   Skin:     General: Skin is warm and dry.      Capillary Refill: Capillary refill takes less than 2 seconds.      Coloration: Skin is jaundiced.   Neurological:      General: No focal deficit present.      Mental Status: She is alert and oriented to person, place, and time. Mental status is at baseline.   Psychiatric:         Mood and Affect: Mood normal.         Behavior: Behavior normal.         ED Course & MDM   ED Course as of 24   1436 POCT Lactate, Venous(!): 3.3  Hx of liver cirrhosis [JM]   1448 Urinalysis with Reflex Culture and Microscopic(!)  No evidence of infection [JM]   1524 GLUCOSE(!!): 558 [JM]   1535 WBC: 4.5 [JM]   1535 HEMOGLOBIN(!): 10.6  baseline [JM]   1535 Platelets(!): 83  baseline [JM]   1612 POCT Lactate, Venous(!): 2.4  Improved after 500cc fluid [JM]   1743 CT abdomen pelvis w IV contrast  IMPRESSION:  Findings consistent with cirrhosis and portal hypertension. Free fluid within the peritoneal cavity is again noted, though decreased  as compared to previous exam   []      ED Course User Index  [] Renae Hernández MD         Diagnoses as of 24   Abdominal pain, epigastric   Ascites of liver       Medical Decision Making  49 y.o. female presents with upper abdominal pain.  Considered cholecystitis, choledocholithiasis, pancreatitis,  PUD, perforated bowel, mesenteric ischemia, colitis, IBD, small bowel obstruction, AAA, ACS.  Patient with history of cirrhosis secondary to primary biliary cholangitis.  Patient lipase 66.  CT abdomen without acute change from prior imaging.  Patient reexamined multiple times, with persistent severe upper abdominal pain consistent with pancreatitis clinically.  Spoke with patient's primary care provider who agrees to admission for further pain management.  Patient started on IV fluids and recommended to stay NPO.    Chronic Medical Conditions Significantly Affecting Care: Primary biliary cholangitis, liver cirrhosis     External Records Reviewed: I reviewed recent and relevant outside records including: Previous notes, inpatient records, previous Labs, previous radiology          Procedure  Procedures     Renae Hernández MD  02/07/24 6222

## 2024-02-08 ENCOUNTER — APPOINTMENT (OUTPATIENT)
Dept: CARDIOLOGY | Facility: HOSPITAL | Age: 49
End: 2024-02-08
Payer: COMMERCIAL

## 2024-02-08 LAB
ALBUMIN SERPL-MCNC: 2.2 G/DL (ref 3.5–5)
ALP BLD-CCNC: 289 U/L (ref 35–125)
ALT SERPL-CCNC: 26 U/L (ref 5–40)
ANION GAP SERPL CALC-SCNC: 6 MMOL/L
APPEARANCE UR: CLEAR
AST SERPL-CCNC: 40 U/L (ref 5–40)
BILIRUB SERPL-MCNC: 0.9 MG/DL (ref 0.1–1.2)
BILIRUB UR STRIP.AUTO-MCNC: NEGATIVE MG/DL
BUN SERPL-MCNC: 7 MG/DL (ref 8–25)
CALCIUM SERPL-MCNC: 8 MG/DL (ref 8.5–10.4)
CHLORIDE SERPL-SCNC: 105 MMOL/L (ref 97–107)
CO2 SERPL-SCNC: 26 MMOL/L (ref 24–31)
COLOR UR: YELLOW
CREAT SERPL-MCNC: 0.5 MG/DL (ref 0.4–1.6)
EGFRCR SERPLBLD CKD-EPI 2021: >90 ML/MIN/1.73M*2
ERYTHROCYTE [DISTWIDTH] IN BLOOD BY AUTOMATED COUNT: 16.6 % (ref 11.5–14.5)
GLUCOSE BLD MANUAL STRIP-MCNC: 119 MG/DL (ref 74–99)
GLUCOSE BLD MANUAL STRIP-MCNC: 138 MG/DL (ref 74–99)
GLUCOSE BLD MANUAL STRIP-MCNC: 213 MG/DL (ref 74–99)
GLUCOSE BLD MANUAL STRIP-MCNC: 232 MG/DL (ref 74–99)
GLUCOSE BLD MANUAL STRIP-MCNC: 235 MG/DL (ref 74–99)
GLUCOSE BLD MANUAL STRIP-MCNC: 265 MG/DL (ref 74–99)
GLUCOSE BLD MANUAL STRIP-MCNC: 411 MG/DL (ref 74–99)
GLUCOSE SERPL-MCNC: 138 MG/DL (ref 65–99)
GLUCOSE UR STRIP.AUTO-MCNC: ABNORMAL MG/DL
HCT VFR BLD AUTO: 26.7 % (ref 36–46)
HGB BLD-MCNC: 8.8 G/DL (ref 12–16)
KETONES UR STRIP.AUTO-MCNC: NEGATIVE MG/DL
LEUKOCYTE ESTERASE UR QL STRIP.AUTO: ABNORMAL
MCH RBC QN AUTO: 25.9 PG (ref 26–34)
MCHC RBC AUTO-ENTMCNC: 33 G/DL (ref 32–36)
MCV RBC AUTO: 79 FL (ref 80–100)
MUCOUS THREADS #/AREA URNS AUTO: ABNORMAL /LPF
NITRITE UR QL STRIP.AUTO: NEGATIVE
NRBC BLD-RTO: 0 /100 WBCS (ref 0–0)
PH UR STRIP.AUTO: 7 [PH]
PLATELET # BLD AUTO: 81 X10*3/UL (ref 150–450)
POTASSIUM SERPL-SCNC: 3.7 MMOL/L (ref 3.4–5.1)
PROT SERPL-MCNC: 6.7 G/DL (ref 5.9–7.9)
PROT UR STRIP.AUTO-MCNC: NEGATIVE MG/DL
RBC # BLD AUTO: 3.4 X10*6/UL (ref 4–5.2)
RBC # UR STRIP.AUTO: NEGATIVE /UL
RBC #/AREA URNS AUTO: ABNORMAL /HPF
SODIUM SERPL-SCNC: 137 MMOL/L (ref 133–145)
SP GR UR STRIP.AUTO: 1.02
SQUAMOUS #/AREA URNS AUTO: ABNORMAL /HPF
UROBILINOGEN UR STRIP.AUTO-MCNC: NORMAL MG/DL
WBC # BLD AUTO: 3.8 X10*3/UL (ref 4.4–11.3)
WBC #/AREA URNS AUTO: ABNORMAL /HPF

## 2024-02-08 PROCEDURE — 97161 PT EVAL LOW COMPLEX 20 MIN: CPT | Mod: GP

## 2024-02-08 PROCEDURE — 80053 COMPREHEN METABOLIC PANEL: CPT | Performed by: INTERNAL MEDICINE

## 2024-02-08 PROCEDURE — 93005 ELECTROCARDIOGRAM TRACING: CPT

## 2024-02-08 PROCEDURE — 85027 COMPLETE CBC AUTOMATED: CPT | Performed by: INTERNAL MEDICINE

## 2024-02-08 PROCEDURE — 2500000001 HC RX 250 WO HCPCS SELF ADMINISTERED DRUGS (ALT 637 FOR MEDICARE OP): Performed by: INTERNAL MEDICINE

## 2024-02-08 PROCEDURE — 2500000002 HC RX 250 W HCPCS SELF ADMINISTERED DRUGS (ALT 637 FOR MEDICARE OP, ALT 636 FOR OP/ED): Performed by: INTERNAL MEDICINE

## 2024-02-08 PROCEDURE — 2500000004 HC RX 250 GENERAL PHARMACY W/ HCPCS (ALT 636 FOR OP/ED): Performed by: NURSE PRACTITIONER

## 2024-02-08 PROCEDURE — 2500000004 HC RX 250 GENERAL PHARMACY W/ HCPCS (ALT 636 FOR OP/ED): Performed by: INTERNAL MEDICINE

## 2024-02-08 PROCEDURE — 82947 ASSAY GLUCOSE BLOOD QUANT: CPT

## 2024-02-08 PROCEDURE — G0378 HOSPITAL OBSERVATION PER HR: HCPCS

## 2024-02-08 PROCEDURE — 36415 COLL VENOUS BLD VENIPUNCTURE: CPT | Performed by: INTERNAL MEDICINE

## 2024-02-08 PROCEDURE — 81001 URINALYSIS AUTO W/SCOPE: CPT | Performed by: INTERNAL MEDICINE

## 2024-02-08 RX ORDER — ONDANSETRON HYDROCHLORIDE 2 MG/ML
4 INJECTION, SOLUTION INTRAVENOUS EVERY 6 HOURS PRN
Status: DISCONTINUED | OUTPATIENT
Start: 2024-02-08 | End: 2024-02-11

## 2024-02-08 RX ORDER — CEFTRIAXONE 1 G/50ML
1 INJECTION, SOLUTION INTRAVENOUS EVERY 24 HOURS
Status: DISCONTINUED | OUTPATIENT
Start: 2024-02-08 | End: 2024-02-13

## 2024-02-08 RX ORDER — MORPHINE SULFATE 2 MG/ML
2 INJECTION, SOLUTION INTRAMUSCULAR; INTRAVENOUS EVERY 4 HOURS PRN
Status: DISCONTINUED | OUTPATIENT
Start: 2024-02-08 | End: 2024-02-13

## 2024-02-08 RX ORDER — KETOROLAC TROMETHAMINE 30 MG/ML
15 INJECTION, SOLUTION INTRAMUSCULAR; INTRAVENOUS EVERY 6 HOURS PRN
Status: DISPENSED | OUTPATIENT
Start: 2024-02-08 | End: 2024-02-13

## 2024-02-08 RX ADMIN — DOCUSATE SODIUM 100 MG: 100 CAPSULE, LIQUID FILLED ORAL at 00:46

## 2024-02-08 RX ADMIN — TRAZODONE HYDROCHLORIDE 25 MG: 50 TABLET ORAL at 22:16

## 2024-02-08 RX ADMIN — KETOROLAC TROMETHAMINE 15 MG: 30 INJECTION, SOLUTION INTRAMUSCULAR; INTRAVENOUS at 17:47

## 2024-02-08 RX ADMIN — INSULIN LISPRO 6 UNITS: 100 INJECTION, SOLUTION INTRAVENOUS; SUBCUTANEOUS at 13:13

## 2024-02-08 RX ADMIN — CEFTRIAXONE SODIUM 1 G: 1 INJECTION, SOLUTION INTRAVENOUS at 22:29

## 2024-02-08 RX ADMIN — URSODIOL 300 MG: 300 CAPSULE ORAL at 15:35

## 2024-02-08 RX ADMIN — DOCUSATE SODIUM 100 MG: 100 CAPSULE, LIQUID FILLED ORAL at 22:16

## 2024-02-08 RX ADMIN — FUROSEMIDE 40 MG: 40 TABLET ORAL at 09:29

## 2024-02-08 RX ADMIN — INSULIN LISPRO 15 UNITS: 100 INJECTION, SOLUTION INTRAVENOUS; SUBCUTANEOUS at 23:26

## 2024-02-08 RX ADMIN — FENOFIBRATE 160 MG: 160 TABLET ORAL at 09:29

## 2024-02-08 RX ADMIN — ATORVASTATIN CALCIUM 80 MG: 80 TABLET, FILM COATED ORAL at 09:29

## 2024-02-08 RX ADMIN — PANTOPRAZOLE SODIUM 20 MG: 20 TABLET, DELAYED RELEASE ORAL at 17:49

## 2024-02-08 RX ADMIN — TRAZODONE HYDROCHLORIDE 25 MG: 50 TABLET ORAL at 00:46

## 2024-02-08 RX ADMIN — URSODIOL 300 MG: 300 CAPSULE ORAL at 09:30

## 2024-02-08 RX ADMIN — MORPHINE SULFATE 2 MG: 2 INJECTION, SOLUTION INTRAMUSCULAR; INTRAVENOUS at 22:24

## 2024-02-08 RX ADMIN — ONDANSETRON 4 MG: 2 INJECTION INTRAMUSCULAR; INTRAVENOUS at 12:09

## 2024-02-08 RX ADMIN — INSULIN LISPRO 9 UNITS: 100 INJECTION, SOLUTION INTRAVENOUS; SUBCUTANEOUS at 00:42

## 2024-02-08 RX ADMIN — ACETAMINOPHEN 650 MG: 325 TABLET ORAL at 15:24

## 2024-02-08 RX ADMIN — SPIRONOLACTONE 50 MG: 50 TABLET ORAL at 22:16

## 2024-02-08 RX ADMIN — PANTOPRAZOLE SODIUM 20 MG: 20 TABLET, DELAYED RELEASE ORAL at 06:23

## 2024-02-08 RX ADMIN — URSODIOL 300 MG: 300 CAPSULE ORAL at 22:16

## 2024-02-08 RX ADMIN — SPIRONOLACTONE 50 MG: 50 TABLET ORAL at 09:29

## 2024-02-08 RX ADMIN — ACETAMINOPHEN 325 MG: 325 TABLET ORAL at 09:36

## 2024-02-08 RX ADMIN — SODIUM CHLORIDE 75 ML/HR: 900 INJECTION, SOLUTION INTRAVENOUS at 07:15

## 2024-02-08 RX ADMIN — DOCUSATE SODIUM 100 MG: 100 CAPSULE, LIQUID FILLED ORAL at 09:29

## 2024-02-08 RX ADMIN — INSULIN GLARGINE 20 UNITS: 100 INJECTION, SOLUTION SUBCUTANEOUS at 09:30

## 2024-02-08 RX ADMIN — INSULIN LISPRO 6 UNITS: 100 INJECTION, SOLUTION INTRAVENOUS; SUBCUTANEOUS at 18:33

## 2024-02-08 RX ADMIN — KETOROLAC TROMETHAMINE 15 MG: 30 INJECTION, SOLUTION INTRAMUSCULAR; INTRAVENOUS at 12:08

## 2024-02-08 ASSESSMENT — PAIN SCALES - GENERAL
PAINLEVEL_OUTOF10: 6
PAINLEVEL_OUTOF10: 8
PAINLEVEL_OUTOF10: 4
PAINLEVEL_OUTOF10: 3
PAINLEVEL_OUTOF10: 8
PAINLEVEL_OUTOF10: 6
PAINLEVEL_OUTOF10: 7
PAINLEVEL_OUTOF10: 8

## 2024-02-08 ASSESSMENT — PAIN - FUNCTIONAL ASSESSMENT
PAIN_FUNCTIONAL_ASSESSMENT: 0-10

## 2024-02-08 ASSESSMENT — PAIN DESCRIPTION - LOCATION
LOCATION: ABDOMEN

## 2024-02-08 ASSESSMENT — COGNITIVE AND FUNCTIONAL STATUS - GENERAL
WALKING IN HOSPITAL ROOM: A LITTLE
MOBILITY SCORE: 21
CLIMB 3 TO 5 STEPS WITH RAILING: A LOT

## 2024-02-08 ASSESSMENT — ACTIVITIES OF DAILY LIVING (ADL)
LACK_OF_TRANSPORTATION: NO
ADL_ASSISTANCE: INDEPENDENT

## 2024-02-08 NOTE — PROGRESS NOTES
Physical Therapy    Physical Therapy Evaluation    Patient Name: Alfonzo Flanagan  MRN: [id number]  Today's Date: 2/8/2024   Time Calculation  Start Time: 1022  Stop Time: 1032  Time Calculation (min): 10 min    Assessment/Plan   PT Assessment  PT Assessment Results: Decreased strength, Decreased endurance, Impaired balance, Decreased mobility, Pain  Rehab Prognosis: Good  Evaluation/Treatment Tolerance: Patient tolerated treatment well  Medical Staff Made Aware: Yes  Strengths: Ability to acquire knowledge, Premorbid level of function, Support of extended family/friends  Barriers to Participation: Comorbidities  End of Session Communication: Bedside nurse  Assessment Comment: Pt demonstrates mildly impaired functional mobility from baseline level; pt with pain, mild balance deficits, and decreased overall activity tolerance; would benefit from additional acute PT services to increase ambulation distances and practice stair negotiation.  End of Session Patient Position: Bed, 2 rail up, Alarm off, not on at start of session  IP OR SWING BED PT PLAN  Inpatient or Swing Bed: Inpatient  PT Plan  Treatment/Interventions: Gait training, Stair training, Balance training, Therapeutic exercise, Endurance training  PT Plan: Skilled PT  PT Frequency: 3 times per week  PT Discharge Recommendations: Low intensity level of continued care  Equipment Recommended upon Discharge: Wheeled walker  PT Recommended Transfer Status: Stand by assist  PT - OK to Discharge: Yes    Subjective   General Visit Information:  General  Reason for Referral: impaired functional mobility (Pt is a 49 year-old F with h/o primary biliary cholangitis with liver failure; admitted from home with c/o abdominal pain; findings consistent with pacreatitis clinically; CT abdomen negative for acute changes. Pt admitted for pain management.)  Referred By: Dr. Howard MD  Past Medical History Relevant to Rehab: ascites, UTI  Family/Caregiver Present: No  Prior to  Session Communication: Bedside nurse  Patient Position Received: Bed, 2 rail up, Alarm off, not on at start of session  Preferred Learning Style: verbal  General Comment: Pt cleared for therapy via RN, received in long sitting in bed, NAD, agreeable to participate in therapy. (+) IV  Home Living:  Home Living  Type of Home: Apartment  Lives With: Spouse, Adult children (daughter; son comes during day while daughter is at work)  Home Adaptive Equipment: Walker rolling or standard  Home Layout: One level  Home Access: Stairs to enter with rails  Entrance Stairs-Rails: Left  Entrance Stairs-Number of Steps: 14  Bathroom Shower/Tub: Tub/shower unit  Prior Level of Function:  Prior Function Per Pt/Caregiver Report  Level of [place]: Independent with ADLs and functional transfers, Independent with homemaking with ambulation  Receives Help From: Family  ADL Assistance: Independent  Homemaking Assistance: Independent  Ambulatory Assistance: Independent (mod indep with RW; supervision for stair negotiation)  Precautions:  Precautions  Hearing/Visual Limitations: hearing/vision WFL  Medical Precautions: Fall precautions    Objective   Pain:  Pain Assessment  Pain Assessment: 0-10  Pain Score: 8  Pain Type: Acute pain  Pain Location: Abdomen  Pain Interventions: Ambulation/increased activity (RN aware)  Cognition:  Cognition  Overall Cognitive Status: Within Functional Limits    General Assessments:  General Observation  General Observation: pleasant/cooperative     Activity Tolerance  Endurance: Tolerates 10 - 20 min exercise with multiple rests    Sensation  Light Touch: No apparent deficits  Sensation Comment: pt denies paresthesias    Strength  Strength Comments: BLE > 4/5  Strength  Strength Comments: BLE > 4/5    Coordination  Movements are Fluid and Coordinated: Yes    Postural Control  Postural Control: Within Functional Limits    Static Sitting Balance  Static Sitting-Balance Support: No upper extremity  supported, Feet supported  Static Sitting-Level of Assistance: Independent    Static Standing Balance  Static Standing-Balance Support: Bilateral upper extremity supported  Static Standing-Level of Assistance: Close supervision  Functional Assessments:       Bed Mobility  Bed Mobility: Yes  Bed Mobility 1  Bed Mobility 1: Supine to sitting, Sitting to supine  Level of Assistance 1: Independent    Transfers  Transfer: Yes  Transfer 1  Transfer From 1: Sit to, Stand to  Transfer to 1: Sit, Stand  Technique 1: Sit to stand, Stand to sit  Transfer Device 1: Walker  Transfer Level of Assistance 1: Modified independent    Ambulation/Gait Training  Ambulation/Gait Training Performed: Yes  Ambulation/Gait Training 1  Surface 1: Level tile  Device 1: Rolling walker  Assistance 1: Close supervision  Quality of Gait 1:  (decreased prateek, reciprocating pattern)  Comments/Distance (ft) 1: 75' x 2    Stairs  Stairs: No       Extremity/Trunk Assessments:  RLE   RLE : Within Functional Limits  LLE   LLE : Within Functional Limits  Outcome Measures:  [facility] Basic Mobility  Turning from your back to your side while in a flat bed without using bedrails: None  Moving from lying on your back to sitting on the side of a flat bed without using bedrails: None  Moving to and from bed to chair (including a wheelchair): None  Standing up from a chair using your arms (e.g. wheelchair or bedside chair): None  To walk in hospital room: A little  Climbing 3-5 steps with railing: A lot  Basic Mobility - Total Score: 21    Encounter Problems       Encounter Problems (Active)       Mobility       STG - Patient will ambulate 150' with rolling walker and modified independence. (Progressing)       Start:  02/08/24    Expected End:  02/22/24            STG - Patient will ascend and descend a flight of stairs with use of one handrail and supervision for safety. (Not Progressing)       Start:  02/08/24    Expected End:  02/22/24               Pain               Education Documentation  Home Exercise Program, taught by Melida Schulz, PT at 2/8/2024 11:25 AM.  Learner: Patient  Readiness: Acceptance  Method: Explanation, Demonstration  Response: Needs Reinforcement    Mobility Training, taught by Melida Schulz, PT at 2/8/2024 11:25 AM.  Learner: Patient  Readiness: Acceptance  Method: Explanation, Demonstration  Response: Needs Reinforcement    Education Comments  No comments found.

## 2024-02-08 NOTE — CARE PLAN
The patient's goals for the shift include      The clinical goals for the shift include Pain control, adequate sleep    Over the shift, the patient did not make progress toward the following goals. Barriers to progression include . Recommendations to address these barriers include .      Problem: Pain  Goal: My pain/discomfort is manageable  Outcome: Progressing     Problem: Safety  Goal: Patient will be injury free during hospitalization  Outcome: Progressing  Goal: I will remain free of falls  Outcome: Progressing     Problem: Daily Care  Goal: Daily care needs are met  Outcome: Progressing     Problem: Psychosocial Needs  Goal: Demonstrates ability to cope with hospitalization/illness  Outcome: Progressing  Goal: Collaborate with me, my family, and caregiver to identify my specific goals  Outcome: Progressing  Flowsheets (Taken 2/7/2024 2140)  Cultural Requests During Hospitalization: None  Spiritual Requests During Hospitalization: None     Problem: Discharge Barriers  Goal: My discharge needs are met  Outcome: Progressing     Problem: Pain  Goal: Takes deep breaths with improved pain control throughout the shift  Outcome: Progressing  Goal: Turns in bed with improved pain control throughout the shift  Outcome: Progressing  Goal: Walks with improved pain control throughout the shift  Outcome: Progressing  Goal: Performs ADL's with improved pain control throughout shift  Outcome: Progressing  Goal: Participates in PT with improved pain control throughout the shift  Outcome: Progressing  Goal: Free from opioid side effects throughout the shift  Outcome: Progressing  Goal: Free from acute confusion related to pain meds throughout the shift  Outcome: Progressing     Order review at end of shift completed. Patient on bed awake, no distress noted.  Expresses no other needs at the present.  Safety measures in place, call light within reach.  Plan of care ongoing.

## 2024-02-08 NOTE — H&P
Chief complaint abdominal pain    History Of Present Illness  Alfonzo Flanagan is a 49 y.o. female presenting with abdominal pain, nausea, vomiting.  Reviewed, stated that medical history significant for cirrhosis with ascites, cholelithiasis and chronic pancreatitis as well as type 2 diabetes and hypertriglyceridemia.  She was recently admitted to [facility] on 1/26.  She was treated for urinary tract infection and ascites.  She underwent a paracentesis.  She was discharged 1/29.  She states that on Thursday, her symptoms returned.  She reports diffuse abdominal pain mostly in the upper abdomen.  The pain was worse than before.  She reports nausea and vomiting.  She denies any hematemesis.  Her last bowel movement was the date prior to admission.  She denies any black tarry or red bloody stools.  She denies NSAID use.  She does not smoke or drink alcohol.  She denies any chest pain, palpitation shortness of breath or cough.  She denies any subjective fevers chills or sweats.  She presented back to the emergency department for evaluation.  In the emergency department, initial workup was done.  CT abdomen and pelvis per radiology showed findings consistent with cirrhosis and portal hypertension, free fluid within the peritoneal cavity again noted decreased compared to prior to exam.  Initial urinalysis obtained showed glucosuria.  Otherwise, urinalysis was unremarkable.  Lactate was 3.3 with a repeat of 2.4.  Lipase was 66.  AST 50.  ALT 33.  Alk Phos 429.  Glucose 558.  CBC showed a stable white blood cell count of 4.5.  Stable hemoglobin 10.6 with hematocrit of 33.8.  Platelet count 83.  She was treated in the emergency department IV fluids and analgesics and was admitted.  At the time of my evaluation, she is resting in bed in no acute distress.  She complains of diffuse abdominal pain mostly in the upper abdomen.  She is tolerating clear liquids.  She is passing gas but has  not had a bowel movement today.  She denies any urinary symptoms.  She denies any subjective fevers chills or sweats.  She denies any other complaints at this time.    Past Medical History  Significant for hypertension, hypertriglyceridemia, hyperlipidemia, type 2 diabetes mellitus, cirrhosis with ascites, cholelithiasis, chronic pancreatitis,    History reviewed. No pertinent past medical history.    Surgical History  Past Surgical History:   Procedure Laterality Date    CT GUIDED IMAGING FOR NEEDLE PLACEMENT  10/14/2020    CT GUIDED IMAGING FOR NEEDLE PLACEMENT [facility]    US GUIDED ABDOMINAL PARACENTESIS  10/8/2020    US GUIDED ABDOMINAL PARACENTESIS [facility]        Social History  She reports that she has never smoked. She has never used smokeless tobacco. She reports that she does not drink alcohol and does not use drugs.    Family History  No family history on file.     Allergies  Gluten    Review of Systems   Constitutional: Negative.    HENT: Negative.     Eyes: Negative.    Respiratory: Negative.     Cardiovascular: Negative.    Gastrointestinal:  Positive for abdominal pain, nausea and vomiting.   Endocrine: Negative.    Genitourinary: Negative.    Musculoskeletal: Negative.    Skin: Negative.    Allergic/Immunologic: Negative.    Neurological: Negative.    Hematological: Negative.    Psychiatric/Behavioral: Negative.       Physical Exam  Vitals reviewed.   Constitutional:       General: She is not in acute distress.     Appearance: She is obese. She is ill-appearing. She is not toxic-appearing.   HENT:      Head: Normocephalic and atraumatic.      Right Ear: Tympanic membrane normal.      Left Ear: Tympanic membrane normal.      Nose: Nose normal.      Mouth/Throat:      Mouth: Mucous membranes are moist.      Pharynx: Oropharynx is clear.   Eyes:      Extraocular Movements: Extraocular movements intact.      Conjunctiva/sclera: Conjunctivae normal.      Pupils: Pupils are equal,  "round, and reactive to light.   Cardiovascular:      Rate and Rhythm: Normal rate and regular rhythm.      Pulses: Normal pulses.      Heart sounds: Normal heart sounds.   Pulmonary:      Effort: Pulmonary effort is normal. No respiratory distress.      Breath sounds: Normal breath sounds. No wheezing or rales.   Abdominal:      General: Bowel sounds are normal. There is distension.      Palpations: Abdomen is soft.      Tenderness: There is abdominal tenderness.   Genitourinary:     Comments: Deferred  Musculoskeletal:         General: No swelling or tenderness. Normal range of motion.      Cervical back: Normal range of motion and neck supple.   Skin:     General: Skin is warm and dry.      Capillary Refill: Capillary refill takes less than 2 seconds.   Neurological:      General: No focal deficit present.      Mental Status: She is alert and oriented to person, place, and time.   Psychiatric:         Mood and Affect: Mood normal.         Behavior: Behavior normal.       Last Recorded Vitals  Blood pressure 109/56, pulse 80, temperature 36.6 °C (97.9 °F), temperature source Oral, resp. rate 16, height 1.575 m (5' 2\"), weight 72.6 kg (160 lb), SpO2 96 %.    Relevant Results  Results for orders placed or performed during the hospital encounter of 02/07/24 (from the past 24 hour(s))   POCT GLUCOSE   Result Value Ref Range    POCT Glucose 364 (H) 74 - 99 mg/dL   POCT GLUCOSE   Result Value Ref Range    POCT Glucose 339 (H) 74 - 99 mg/dL   POCT GLUCOSE   Result Value Ref Range    POCT Glucose 265 (H) 74 - 99 mg/dL   POCT GLUCOSE   Result Value Ref Range    POCT Glucose 138 (H) 74 - 99 mg/dL   Urinalysis with Reflex Microscopic   Result Value Ref Range    Color, Urine Yellow Light-Yellow, Yellow, Dark-Yellow    Appearance, Urine Clear Clear    Specific Gravity, Urine 1.020 1.005 - 1.035    pH, Urine 7.0 5.0, 5.5, 6.0, 6.5, 7.0, 7.5, 8.0    Protein, Urine NEGATIVE NEGATIVE, 10 (TRACE), 20 (TRACE) mg/dL    Glucose, Urine " 1000 (4+) (A) Normal mg/dL    Blood, Urine NEGATIVE NEGATIVE    Ketones, Urine NEGATIVE NEGATIVE mg/dL    Bilirubin, Urine NEGATIVE NEGATIVE    Urobilinogen, Urine Normal Normal mg/dL    Nitrite, Urine NEGATIVE NEGATIVE    Leukocyte Esterase, Urine 250 Mckenzie/µL (A) NEGATIVE   Microscopic Only, Urine   Result Value Ref Range    WBC, Urine 6-10 (A) 1-5, NONE /HPF    RBC, Urine 6-10 (A) NONE, 1-2, 3-5 /HPF    Squamous Epithelial Cells, Urine 10-25 (FEW) Reference range not established. /HPF    Mucus, Urine FEW Reference range not established. /LPF   Comprehensive metabolic panel   Result Value Ref Range    Glucose 138 (H) 65 - 99 mg/dL    Sodium 137 133 - 145 mmol/L    Potassium 3.7 3.4 - 5.1 mmol/L    Chloride 105 97 - 107 mmol/L    Bicarbonate 26 24 - 31 mmol/L    Urea Nitrogen 7 (L) 8 - 25 mg/dL    Creatinine 0.50 0.40 - 1.60 mg/dL    eGFR >90 >60 mL/min/1.73m*2    Calcium 8.0 (L) 8.5 - 10.4 mg/dL    Albumin 2.2 (L) 3.5 - 5.0 g/dL    Alkaline Phosphatase 289 (H) 35 - 125 U/L    Total Protein 6.7 5.9 - 7.9 g/dL    AST 40 5 - 40 U/L    Bilirubin, Total 0.9 0.1 - 1.2 mg/dL    ALT 26 5 - 40 U/L    Anion Gap 6 <=19 mmol/L   CBC   Result Value Ref Range    WBC 3.8 (L) 4.4 - 11.3 x10*3/uL    nRBC 0.0 0.0 - 0.0 /100 WBCs    RBC 3.40 (L) 4.00 - 5.20 x10*6/uL    Hemoglobin 8.8 (L) 12.0 - 16.0 g/dL    Hematocrit 26.7 (L) 36.0 - 46.0 %    MCV 79 (L) 80 - 100 fL    MCH 25.9 (L) 26.0 - 34.0 pg    MCHC 33.0 32.0 - 36.0 g/dL    RDW 16.6 (H) 11.5 - 14.5 %    Platelets 81 (L) 150 - 450 x10*3/uL   POCT GLUCOSE   Result Value Ref Range    POCT Glucose 119 (H) 74 - 99 mg/dL   POCT GLUCOSE   Result Value Ref Range    POCT Glucose 213 (H) 74 - 99 mg/dL   POCT GLUCOSE   Result Value Ref Range    POCT Glucose 232 (H) 74 - 99 mg/dL     CT abdomen pelvis w IV contrast    Result Date: 2/7/2024  Interpreted By:  Bebeto Gottlieb, STUDY: CT ABDOMEN PELVIS W IV CONTRAST; 2/7/2024 3:43 pm   INDICATION: Signs/Symptoms:epigastric colicky pain.    COMPARISON: January 26, 2024.   ACCESSION NUMBER(S): [id number]   ORDERING CLINICIAN: PRANEETH BARRERA   TECHNIQUE: Oral contrast was not administered. 75 ml Omnipaque 350 was injected intravenously. CT of the Abdomen and Pelvis with intravenous contrast was performed. Axial, sagittal and coronal reformatted images were reviewed.   All CT examinations are performed with 1 or more of the following dose reduction techniques: Automated exposure control, adjustment of mA and/or kv according to patient's size, or use of iterative reconstruction techniques.   FINDINGS: Lower Chest: Unremarkable.   Abdomen: Liver: There is diffuse nodular contour of the liver. There is recanalization of the umbilical vein collateral vessels are noted in the gastrohepatic ligament. Esophageal varices are noted. Bile Ducts: Normal caliber. Gallbladder: Surgical clips are noted in the gallbladder fossa. Pancreas: Unremarkable. Spleen: Unremarkable. Adrenals: Normal. Kidneys: Normal.   Pelvis: No pelvic masses. Bladder: Unremarkable.   Bowel: No bowel wall thickening or abnormal distention to suggest bowel obstruction.. Mesenteric Lymph Nodes: No enlarged mesenteric lymph nodes. Peritoneum: Free fluid is noted lateral to the liver extending into the subhepatic recess and right pericolic gutter. Free fluid is also noted lateral to the spleen. Free fluid is also noted within the pelvis. Vessels: Unremarkable Retroperitoneum: No retroperitoneal adenopathy. Abdominal Wall: Umbilical hernia contains fluid Bones: No acute bony abnormalities.       Findings consistent with cirrhosis and portal hypertension. Free fluid within the peritoneal cavity is again noted, though decreased as compared to previous exam   MACRO: none   Signed by: Bebeto Gottlieb 2/7/2024 4:52 PM Dictation workstation:   [id number]     Scheduled medications  atorvastatin, 80 mg, oral, Daily  docusate sodium, 100 mg, oral, BID  fenofibrate, 160 mg, oral, Daily  furosemide, 40 mg,  oral, Daily  insulin glargine, 20 Units, subcutaneous, q AM  insulin lispro, 0-15 Units, subcutaneous, q4h  pantoprazole, 20 mg, oral, BID AC  spironolactone, 50 mg, oral, BID  traZODone, 25 mg, oral, Nightly  ursodiol, 300 mg, oral, TID      Continuous medications  sodium chloride 0.9%, 75 mL/hr, Last Rate: 75 mL/hr (02/08/24 0715)      PRN medications  PRN medications: acetaminophen **OR** acetaminophen **OR** acetaminophen, acetaminophen, benzocaine-menthoL, dextromethorphan-guaifenesin, dextrose 10 % in water (D10W), dextrose, glucagon, guaiFENesin, ketorolac, ondansetron, polyethylene glycol    ASSESSMENT:  Abdominal pain  Elevated lipase - chronic pancreatitis  Hepatic cirrhosis, ascites  Portal hypertension  Elevated LFT's  Lactic acidosis  Pyuria  Type 2 diabetes mellitus  Hyperglycemia  Hypertriglyceridemia  Hyperlipidemia    PLAN:  Consult Gastroenterology.  IV fluids.  Clear liquid diet only.  IV Ceftriaxone for pyuria, also to cover continuous bacterial peritonitis.  As needed IV analgesics, antiemetics.  Follow urine culture.  Monitor temperature and white blood cell count.  Monitor point-of-care glucose.  Continue insulin.  Adjust insulin as needed for glycemic control.  Monitor CMP, CBC.  Increase activity as tolerated.  Supportive care.  Patient reassured.  At this time, she has persistent pain requiring IV analgesics for pain control and at this time her condition requires IV fluids, IV antibiotics, IV analgesics and continued monitoring.  Case management consultation for discharge planning.  We will take DVT, fall, aspiration and decubitus precautions during her stay in the hospital.  The plan is discussed with the patient, she is agreeable.  Orders written per Dr. Lawrence.  Any additions or modifications at his discretion.      Leny Daniels, APRN-CNP

## 2024-02-08 NOTE — NURSING NOTE
Patient stating complaints of 7/10 abd pain. Messaged Sol GOMES toradol and tylenol are not working.

## 2024-02-08 NOTE — PROGRESS NOTES
Patient is day 1 admission for abdominal pain, ADOD 1 day.      Met with patient at bedside to discuss discharge planning. Patient is A&OX3 from home with her spouse and her daughter, they reside in a second floor apartment with 10 steps to enter.   There is no elevator access.  Prior to admission patient was primarily independent with her ADLs, however she sometimes needs assistance from her spouse and her daughter.   Patient uses a walker for ambulation and is not active with any home care services.  Discussed LOW level therapy recommendations, and patient is agreeable to home health care.  Preferences reviewed and discussed, she is agreeable to [facility].  Dr. Freeman is her PCP.  Patient expresses some difficulty managing her medications at home, will add SN to home care referral.       Patient will need an internal home care referral for SN, PT, OT services on discharge.          Home with [facility].  Discharge plan NOT secure  DO NOT DC without speaking to care coordination   Will need INTERNAL home health care referral placed prior to discharge

## 2024-02-08 NOTE — NURSING NOTE
Received report from Jesús, ED Nurse.    2136H: Patient arrived on the floor per wheelchair from ED.  No distress or discomfort noted.  Oriented to room.  All needs attended.  Safety measures ensured and call light in reach.      2223H: Called Dr. Lawrence for admission orders.  Waiting for the orders.

## 2024-02-09 PROBLEM — R10.13 ABDOMINAL PAIN, EPIGASTRIC: Status: ACTIVE | Noted: 2024-02-09

## 2024-02-09 LAB
ALBUMIN SERPL-MCNC: 2 G/DL (ref 3.5–5)
ALP BLD-CCNC: 254 U/L (ref 35–125)
ALT SERPL-CCNC: 25 U/L (ref 5–40)
ANION GAP SERPL CALC-SCNC: 4 MMOL/L
AST SERPL-CCNC: 40 U/L (ref 5–40)
BILIRUB SERPL-MCNC: 0.7 MG/DL (ref 0.1–1.2)
BUN SERPL-MCNC: 13 MG/DL (ref 8–25)
CALCIUM SERPL-MCNC: 7.9 MG/DL (ref 8.5–10.4)
CHLORIDE SERPL-SCNC: 106 MMOL/L (ref 97–107)
CO2 SERPL-SCNC: 28 MMOL/L (ref 24–31)
CREAT SERPL-MCNC: 0.7 MG/DL (ref 0.4–1.6)
EGFRCR SERPLBLD CKD-EPI 2021: >90 ML/MIN/1.73M*2
ERYTHROCYTE [DISTWIDTH] IN BLOOD BY AUTOMATED COUNT: 17.2 % (ref 11.5–14.5)
EST. AVERAGE GLUCOSE BLD GHB EST-MCNC: 278 MG/DL
GLUCOSE BLD MANUAL STRIP-MCNC: 163 MG/DL (ref 74–99)
GLUCOSE BLD MANUAL STRIP-MCNC: 180 MG/DL (ref 74–99)
GLUCOSE BLD MANUAL STRIP-MCNC: 182 MG/DL (ref 74–99)
GLUCOSE BLD MANUAL STRIP-MCNC: 209 MG/DL (ref 74–99)
GLUCOSE BLD MANUAL STRIP-MCNC: 221 MG/DL (ref 74–99)
GLUCOSE BLD MANUAL STRIP-MCNC: 344 MG/DL (ref 74–99)
GLUCOSE SERPL-MCNC: 235 MG/DL (ref 65–99)
HBA1C MFR BLD: 11.3 %
HCT VFR BLD AUTO: 27.4 % (ref 36–46)
HGB BLD-MCNC: 8.8 G/DL (ref 12–16)
MCH RBC QN AUTO: 26.1 PG (ref 26–34)
MCHC RBC AUTO-ENTMCNC: 32.1 G/DL (ref 32–36)
MCV RBC AUTO: 81 FL (ref 80–100)
NRBC BLD-RTO: 0 /100 WBCS (ref 0–0)
PLATELET # BLD AUTO: 76 X10*3/UL (ref 150–450)
POTASSIUM SERPL-SCNC: 4.3 MMOL/L (ref 3.4–5.1)
PROT SERPL-MCNC: 6.2 G/DL (ref 5.9–7.9)
RBC # BLD AUTO: 3.37 X10*6/UL (ref 4–5.2)
SODIUM SERPL-SCNC: 138 MMOL/L (ref 133–145)
WBC # BLD AUTO: 4.1 X10*3/UL (ref 4.4–11.3)

## 2024-02-09 PROCEDURE — 83036 HEMOGLOBIN GLYCOSYLATED A1C: CPT | Performed by: NURSE PRACTITIONER

## 2024-02-09 PROCEDURE — 1200000002 HC GENERAL ROOM WITH TELEMETRY DAILY

## 2024-02-09 PROCEDURE — 2500000002 HC RX 250 W HCPCS SELF ADMINISTERED DRUGS (ALT 637 FOR MEDICARE OP, ALT 636 FOR OP/ED): Performed by: INTERNAL MEDICINE

## 2024-02-09 PROCEDURE — 36415 COLL VENOUS BLD VENIPUNCTURE: CPT | Performed by: NURSE PRACTITIONER

## 2024-02-09 PROCEDURE — 2500000004 HC RX 250 GENERAL PHARMACY W/ HCPCS (ALT 636 FOR OP/ED): Performed by: NURSE PRACTITIONER

## 2024-02-09 PROCEDURE — 2500000004 HC RX 250 GENERAL PHARMACY W/ HCPCS (ALT 636 FOR OP/ED): Performed by: INTERNAL MEDICINE

## 2024-02-09 PROCEDURE — 2500000002 HC RX 250 W HCPCS SELF ADMINISTERED DRUGS (ALT 637 FOR MEDICARE OP, ALT 636 FOR OP/ED)

## 2024-02-09 PROCEDURE — 2500000004 HC RX 250 GENERAL PHARMACY W/ HCPCS (ALT 636 FOR OP/ED)

## 2024-02-09 PROCEDURE — 80053 COMPREHEN METABOLIC PANEL: CPT | Performed by: NURSE PRACTITIONER

## 2024-02-09 PROCEDURE — 82947 ASSAY GLUCOSE BLOOD QUANT: CPT

## 2024-02-09 PROCEDURE — 85027 COMPLETE CBC AUTOMATED: CPT | Performed by: NURSE PRACTITIONER

## 2024-02-09 PROCEDURE — 2500000001 HC RX 250 WO HCPCS SELF ADMINISTERED DRUGS (ALT 637 FOR MEDICARE OP): Performed by: INTERNAL MEDICINE

## 2024-02-09 RX ORDER — POLYETHYLENE GLYCOL 3350 17 G/17G
17 POWDER, FOR SOLUTION ORAL 2 TIMES DAILY
Status: DISCONTINUED | OUTPATIENT
Start: 2024-02-09 | End: 2024-02-15 | Stop reason: HOSPADM

## 2024-02-09 RX ORDER — SUCRALFATE 1 G/10ML
1 SUSPENSION ORAL
Status: DISCONTINUED | OUTPATIENT
Start: 2024-02-09 | End: 2024-02-15 | Stop reason: HOSPADM

## 2024-02-09 RX ADMIN — URSODIOL 300 MG: 300 CAPSULE ORAL at 10:07

## 2024-02-09 RX ADMIN — FENOFIBRATE 160 MG: 160 TABLET ORAL at 10:08

## 2024-02-09 RX ADMIN — INSULIN LISPRO 3 UNITS: 100 INJECTION, SOLUTION INTRAVENOUS; SUBCUTANEOUS at 10:20

## 2024-02-09 RX ADMIN — SUCRALFATE 1 G: 1 SUSPENSION ORAL at 12:35

## 2024-02-09 RX ADMIN — INSULIN LISPRO 3 UNITS: 100 INJECTION, SOLUTION INTRAVENOUS; SUBCUTANEOUS at 12:35

## 2024-02-09 RX ADMIN — INSULIN LISPRO 6 UNITS: 100 INJECTION, SOLUTION INTRAVENOUS; SUBCUTANEOUS at 04:51

## 2024-02-09 RX ADMIN — FUROSEMIDE 40 MG: 40 TABLET ORAL at 10:08

## 2024-02-09 RX ADMIN — PANTOPRAZOLE SODIUM 20 MG: 20 TABLET, DELAYED RELEASE ORAL at 16:12

## 2024-02-09 RX ADMIN — ATORVASTATIN CALCIUM 80 MG: 80 TABLET, FILM COATED ORAL at 10:08

## 2024-02-09 RX ADMIN — URSODIOL 300 MG: 300 CAPSULE ORAL at 20:41

## 2024-02-09 RX ADMIN — KETOROLAC TROMETHAMINE 15 MG: 30 INJECTION, SOLUTION INTRAMUSCULAR; INTRAVENOUS at 10:24

## 2024-02-09 RX ADMIN — URSODIOL 300 MG: 300 CAPSULE ORAL at 16:12

## 2024-02-09 RX ADMIN — POLYETHYLENE GLYCOL 3350 17 G: 17 POWDER, FOR SOLUTION ORAL at 12:35

## 2024-02-09 RX ADMIN — ONDANSETRON 4 MG: 2 INJECTION INTRAMUSCULAR; INTRAVENOUS at 20:51

## 2024-02-09 RX ADMIN — INSULIN LISPRO 12 UNITS: 100 INJECTION, SOLUTION INTRAVENOUS; SUBCUTANEOUS at 01:14

## 2024-02-09 RX ADMIN — MORPHINE SULFATE 2 MG: 2 INJECTION, SOLUTION INTRAMUSCULAR; INTRAVENOUS at 16:12

## 2024-02-09 RX ADMIN — SUCRALFATE 1 G: 1 SUSPENSION ORAL at 16:12

## 2024-02-09 RX ADMIN — INSULIN LISPRO 6 UNITS: 100 INJECTION, SOLUTION INTRAVENOUS; SUBCUTANEOUS at 20:41

## 2024-02-09 RX ADMIN — PANTOPRAZOLE SODIUM 20 MG: 20 TABLET, DELAYED RELEASE ORAL at 06:28

## 2024-02-09 RX ADMIN — DOCUSATE SODIUM 100 MG: 100 CAPSULE, LIQUID FILLED ORAL at 20:41

## 2024-02-09 RX ADMIN — SUCRALFATE 1 G: 1 SUSPENSION ORAL at 20:40

## 2024-02-09 RX ADMIN — POLYETHYLENE GLYCOL 3350 17 G: 17 POWDER, FOR SOLUTION ORAL at 20:41

## 2024-02-09 RX ADMIN — SPIRONOLACTONE 50 MG: 50 TABLET ORAL at 20:41

## 2024-02-09 RX ADMIN — TRAZODONE HYDROCHLORIDE 25 MG: 50 TABLET ORAL at 20:41

## 2024-02-09 RX ADMIN — SPIRONOLACTONE 50 MG: 50 TABLET ORAL at 10:08

## 2024-02-09 RX ADMIN — DOCUSATE SODIUM 100 MG: 100 CAPSULE, LIQUID FILLED ORAL at 10:07

## 2024-02-09 RX ADMIN — INSULIN GLARGINE 20 UNITS: 100 INJECTION, SOLUTION SUBCUTANEOUS at 10:21

## 2024-02-09 RX ADMIN — ONDANSETRON 4 MG: 2 INJECTION INTRAMUSCULAR; INTRAVENOUS at 10:05

## 2024-02-09 ASSESSMENT — ENCOUNTER SYMPTOMS
CONSTIPATION: 1
ENDOCRINE NEGATIVE: 1
NEUROLOGICAL NEGATIVE: 1
CONSTITUTIONAL NEGATIVE: 1
ABDOMINAL PAIN: 1
CARDIOVASCULAR NEGATIVE: 1
VOMITING: 1
RESPIRATORY NEGATIVE: 1
HEMATOLOGIC/LYMPHATIC NEGATIVE: 1
EYES NEGATIVE: 1
NAUSEA: 1
ALLERGIC/IMMUNOLOGIC NEGATIVE: 1
MUSCULOSKELETAL NEGATIVE: 1
PSYCHIATRIC NEGATIVE: 1

## 2024-02-09 ASSESSMENT — COGNITIVE AND FUNCTIONAL STATUS - GENERAL
WALKING IN HOSPITAL ROOM: A LITTLE
CLIMB 3 TO 5 STEPS WITH RAILING: A LITTLE
MOBILITY SCORE: 22
DAILY ACTIVITIY SCORE: 24

## 2024-02-09 ASSESSMENT — PAIN - FUNCTIONAL ASSESSMENT
PAIN_FUNCTIONAL_ASSESSMENT: WONG-BAKER FACES
PAIN_FUNCTIONAL_ASSESSMENT: 0-10

## 2024-02-09 ASSESSMENT — PAIN DESCRIPTION - DESCRIPTORS: DESCRIPTORS: ACHING

## 2024-02-09 ASSESSMENT — PAIN SCALES - WONG BAKER: WONGBAKER_NUMERICALRESPONSE: NO HURT

## 2024-02-09 ASSESSMENT — PAIN SCALES - GENERAL
PAINLEVEL_OUTOF10: 8
PAINLEVEL_OUTOF10: 8

## 2024-02-09 NOTE — PROGRESS NOTES
Alfonzo Flanagan is a 49 y.o. female on day 0 of admission presenting with Abdominal pain.    Met with patient at bedside.  Patient would still like [facility] when discharged. PCP is Dr. Freeman. Will need an internal referral for [facility] RN, PT, OT.  Patient stated she is having difficulty figuring out her medications and would like a nurse to help her.              Shannan Puentes RN

## 2024-02-09 NOTE — NURSING NOTE
"2245 Attending physician aware of . To place orders.   2325 alright per attending physician to give 15 units of humalog.  0129 contacted attending physician regarding BS of 344. Per attending \"follow the protocol\", you \"don't have to call me all the time.\"  "

## 2024-02-09 NOTE — CONSULTS
Inpatient consult to Gastroenterology  Consult performed by: ESME Knutson  Consult ordered by: ESME Sotelo      Reason For Consult  Abdominal pain    History Of Present Illness  Alfonzo Flanagan is a 49 y.o. female presenting with abdominal pain, nausea, distention.  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  Patient was recently seen by our service on 1/27. She was treated for urinary tract infection and ascites.  She underwent a paracentesis and 3.5 L removed.  She is primarily followed by Dr. Whitlock, she had a colonoscopy on 6/5/2023 at [facility] showed hemorrhoids found on perianal exam. The examination was otherwise normal. Recent EGD on 12/27/23 Esophageal ulcer, no bleeding. No varices. ER workup showed CT abdomen and pelvis findings consistent with cirrhosis and portal hypertension, free fluid within the peritoneal cavity again noted decreased compared to prior to exam. Lactate was 3.3 with a repeat of 2.4.  Lipase was 66.  AST 50.  ALT 33.  Alk Phos 429. Glucose 558.  Normal white count, H/H stable at 10.6.  Patient seen and examined this morning, her main complaint is epigastric pain, with nausea and vomiting.  She reports feeling more constipated the last couple days, no BM yesterday or today.  Patient is not currently on any blood thinners, denies any use of NSAIDs, alcohol.     Past Medical History  She has no past medical history on file.    Surgical History  She has a past surgical history that includes CT guided imaging for needle placement (10/14/2020) and US guided abdominal paracentesis (10/8/2020).     Social History  She reports that she has never smoked. She has never used smokeless tobacco. She reports that she does not drink alcohol and does not use drugs.    Family History  No family history on file.     Allergies  Gluten    Review of Systems   Constitutional:  "Negative.    HENT: Negative.     Eyes: Negative.    Respiratory: Negative.     Cardiovascular: Negative.    Gastrointestinal:  Positive for constipation.   Endocrine: Negative.    Genitourinary: Negative.    Musculoskeletal: Negative.    Skin: Negative.    Allergic/Immunologic: Negative.    Neurological: Negative.    Hematological: Negative.    Psychiatric/Behavioral: Negative.          Physical Exam  HENT:      Head: Normocephalic.      Nose: Nose normal.      Mouth/Throat:      Mouth: Mucous membranes are moist.   Eyes:      Pupils: Pupils are equal, round, and reactive to light.   Cardiovascular:      Rate and Rhythm: Normal rate.      Pulses: Normal pulses.   Pulmonary:      Effort: Pulmonary effort is normal.   Abdominal:      General: Abdomen is flat.   Musculoskeletal:         General: Normal range of motion.      Cervical back: Normal range of motion.   Skin:     General: Skin is warm.   Neurological:      General: No focal deficit present.      Mental Status: She is alert.   Psychiatric:         Mood and Affect: Mood normal.          Last Recorded Vitals  Blood pressure 98/56, pulse 87, temperature 36.7 °C (98.1 °F), temperature source Oral, resp. rate 16, height 1.575 m (5' 2\"), weight 72.6 kg (160 lb), SpO2 98 %.    Relevant Results  CT abdomen pelvis w IV contrast    Result Date: 2/7/2024  Interpreted By:  Bebeto Gottlieb, STUDY: CT ABDOMEN PELVIS W IV CONTRAST; 2/7/2024 3:43 pm   INDICATION: Signs/Symptoms:epigastric colicky pain.   COMPARISON: January 26, 2024.   ACCESSION NUMBER(S): [id number]   ORDERING CLINICIAN: PRANEETH BARRERA   TECHNIQUE: Oral contrast was not administered. 75 ml Omnipaque 350 was injected intravenously. CT of the Abdomen and Pelvis with intravenous contrast was performed. Axial, sagittal and coronal reformatted images were reviewed.   All CT examinations are performed with 1 or more of the following dose reduction techniques: Automated exposure control, adjustment of mA and/or " kv according to patient's size, or use of iterative reconstruction techniques.   FINDINGS: Lower Chest: Unremarkable.   Abdomen: Liver: There is diffuse nodular contour of the liver. There is recanalization of the umbilical vein collateral vessels are noted in the gastrohepatic ligament. Esophageal varices are noted. Bile Ducts: Normal caliber. Gallbladder: Surgical clips are noted in the gallbladder fossa. Pancreas: Unremarkable. Spleen: Unremarkable. Adrenals: Normal. Kidneys: Normal.   Pelvis: No pelvic masses. Bladder: Unremarkable.   Bowel: No bowel wall thickening or abnormal distention to suggest bowel obstruction.. Mesenteric Lymph Nodes: No enlarged mesenteric lymph nodes. Peritoneum: Free fluid is noted lateral to the liver extending into the subhepatic recess and right pericolic gutter. Free fluid is also noted lateral to the spleen. Free fluid is also noted within the pelvis. Vessels: Unremarkable Retroperitoneum: No retroperitoneal adenopathy. Abdominal Wall: Umbilical hernia contains fluid Bones: No acute bony abnormalities.       Findings consistent with cirrhosis and portal hypertension. Free fluid within the peritoneal cavity is again noted, though decreased as compared to previous exam   MACRO: none   Signed by: Bebeto Gottlieb 2/7/2024 4:52 PM Dictation workstation:   [id number]    US guided abdominal paracentesis    Result Date: 1/31/2024  Interpreted By:  Cornelius Weber, STUDY: US GUIDED ABDOMINAL PARACENTESIS; 1/29/2024 10:04 am   INDICATION: Signs/Symptoms:ascites.   COMPARISON: None.   ACCESSION NUMBER(S): [id number]   ORDERING CLINICIAN: ANUPAM FRANCIS   TECHNIQUE: Ultrasound-guided paracentesis   FINDINGS: Informed consent obtained. Patient positioned supine. Right lower quadrant prepped, draped and anesthetized. Under ultrasound guidance, 8 Portuguese centesis sheath needle inserted into peritoneal cavity. 3.5 Lof clear yellowfluid aspirated. Patient tolerated procedure well        Ultrasound-guided paracentesis.   Signed by: Cornelius Weber 1/31/2024 3:36 PM Dictation workstation:   [id number]    CT abdomen pelvis w IV contrast    Result Date: 1/26/2024  Interpreted By:  Goyo Peterson, STUDY: CT ABDOMEN PELVIS W IV CONTRAST; 1/26/2024 10:52 am   INDICATION: Signs/Symptoms:abd pain ascites;   COMPARISON: None   ACCESSION NUMBER(S): [id number]   ORDERING CLINICIAN: LILLIE IYER   TECHNIQUE: Contiguous axial images of the abdomen/pelvis were performed with IV contrast. 75 ml of Omnipaque 350 was utilized. Coronal and sagittal reformatted images were also obtained. All CT examinations are performed with 1 or more of the following dose reduction techniques: Automated exposure control, adjustment of mA and/or kv according to patient's size, or use of iterative reconstruction techniques.     FINDINGS: The liver is nodular in contour consistent with cirrhosis. No focal hepatic lesions are seen. Prior cholecystectomy with surgical clips in the gallbladder fossa. The common bile duct, pancreas, and adrenal glands are unremarkable. The spleen is mildly enlarged measuring 13.7 cm in length.   The kidneys enhance symmetrically. No urolithiasis is seen. No hydroureteronephrosis is seen.   The visualized aorta is unremarkable.   The small bowel is not dilated. The appendix is not visualized however there has no evidence to suggest appendicitis. The colon is unremarkable with no evidence for acute inflammatory process.   Mild-moderate volume ascites throughout the abdomen and pelvis. There is also mild diffuse anasarca.   The bladder is well distended with no gross wall thickening.   The visualized osseous structures are intact.   Limited images of the lower thorax are unremarkable.       Hepatic cirrhosis. No focal hepatic lesion.   Mild splenomegaly.   Mild-moderate volume ascites throughout the abdomen and pelvis.   Signed by: Goyo Peterson 1/26/2024 12:54 PM Dictation workstation:    [id number]    US ASCITES SURVEY    Result Date: 1/22/2024  * * *Final Report* * * DATE OF EXAM: Jan 22 2024 10:27AM   [facility]   1016  -  US ASCITES SURVEY  / ACCESSION #  [id number] PROCEDURE REASON: ascites      * * * * Physician Interpretation * * * * RESULT: US ASCITES SURVEY HISTORY: Ascites TECHNIQUE: Grayscale ultrasound imaging was performed in the area of concern and images were saved to the permanent image archive. RESULT: See impression    IMPRESSION: Ascites survey was obtained throughout the abdomen. Mild amount of ascites in all 4 quadrants of the abdomen. No large single pocket was present and therapeutic paracentesis was not performed at this time. Transcribed Using Voice Recognition Transcribe Date/Time: Jan 22 2024 10:46A Dictated by: LEENA VINSON MD This examination was interpreted and the report reviewed and electronically signed by: LEENA VINSON MD on Jan 22 2024 10:48AM  EST    CT ABD/PEL WO IVCON    Result Date: 1/21/2024  * * *Final Report* * * DATE OF EXAM: Jan 21 2024  9:43AM   [facility]   0531  -  CT ABD/PEL WO IVCON  / ACCESSION #  [id number] PROCEDURE REASON: Bowel obstruction suspected      * * * * Physician Interpretation * * * * RESULT: EXAMINATION:  CT ABDOMEN AND PELVIS WITHOUT IV CONTRAST CLINICAL HISTORY: Bowel obstruction suspected TECHNIQUE: Non-IV contrast imaging of the abdomen and pelvis was performed using standard technique, scanning from just above the dome of the diaphragm to the symphysis pubis.  Unenhanced imaging is limited for the evaluation of some intra-abdominal and pelvic pathology. MQ:  CTAPWO_3 Contrast: IV: None : ml of CT Radiation dose: Integrated Dose-length product (DLP) for this visit =   428 mGy*cm. CT Dose Reduction Employed: Automated exposure control(AEC) and iterative recon COMPARISON: CT 01/10/2024. RESULT: Abdomen / Pelvis: Liver: Liver has a nodular contour and is atrophic, consistent with cirrhosis.  No focal hepatic lesions within limitations of this  noncontrasted study. Biliary: Gallbladder is absent. Spleen: No splenomegaly. Pancreas: Unremarkable. Adrenals: No mass. Kidneys: Kidneys are symmetric in size without hydronephrosis or renal stones. GI Tract: Diffuse gastric wall thickening. Colon is normal in caliber without obstruction.  Wall thickening of the transverse colon at the splenic flexure.  Appendix not visualized. Small bowel is normal in caliber without obstruction.  Walls of the small bowel are edematous. Lymph Nodes: No lymphadenopathy. Mesentery/peritoneum: Moderate volume ascites. Retroperitoneum: No mass. Vasculature: Arterial atherosclerotic disease without aneurysm. Pelvis: Urinary bladder is unremarkable.  Uterus is present.  No pelvic lymphadenopathy. Bones/Soft Tissues: Fat-containing ventral wall hernias.  Subcutaneous edema.  No suspicious osseous lesions.  L5 pars defect on the left.  Mild degenerative changes in thoracolumbar spine.. Lower thorax: 0.9 cm nodule in the right middle lobe (image 4, 2)  (topogram) images: No additional findings.    IMPRESSION: 1.  Cirrhotic hepatic morphology with moderate volume ascites 2.  Wall thickening of the stomach, could be reactive due to surrounding ascites but gastritis is possible. 3.  Walls of the small bowel and colon are edematous, likely reactive due to adjacent ascites. 4.  No evidence of small bowel obstruction. 5.  0.9 cm nodule in the right middle lobe.  Recommend short-term follow-up in 3 months to document stability. ACTIONABLE RESULT: FOLLOW-UP Acuity: Actionable Findings: Thoracic-Lung nodules Routing code:  RI_1 Recommendation: CT Chest WO IVCON Time Frame: Additional evaluation as described in the impression COMMUNICATION: Results will be communicated with the ordering provider via Epic staff message or phone message by [facility] within 2 business days of report finalization. --END OF FINDING-- Transcribed Using Voice Recognition Transcribe Date/Time: Jan 21  2024 10:50A Dictated by: KAIN RUSH MD This examination was interpreted and the report reviewed and electronically signed by: KAIN RUSH MD on Jan 21 2024 11:00AM  EST    XR CHEST 1V FRONTAL PORT    Result Date: 1/21/2024  * * *Final Report* * * DATE OF EXAM: Jan 21 2024  9:10AM   [facility]   5376  -  XR CHEST 1V FRONTAL PORT  / ACCESSION #  [id number] PROCEDURE REASON: Shortness of breath      * * * * Physician Interpretation * * * * RESULT: EXAMINATION:  CHEST RADIOGRAPH (PORTABLE SINGLE VIEW AP) Exam Date/Time:  1/21/2024 9:10 AM CLINICAL HISTORY: Shortness of breath MQ:  XCPR_5 Comparison:  01/07/2024. RESULT: Lines, tubes, and devices:  None. Lungs and pleura:  The lungs remain unremarkable with no evidence of infiltrates.  The pleural margins appear normal. Cardiomediastinal silhouette:  Stable cardiomediastinal silhouette. Other:  The visualized bony thorax appears unremarkable.    IMPRESSION: Stable exam with no evidence of acute disease. Transcribed Using Voice Recognition Transcribe Date/Time: Jan 21 2024  9:41A Dictated by: DAMASO PERALTA MD This examination was interpreted and the report reviewed and electronically signed by: DAMASO PERALTA MD on Jan 21 2024  9:42AM  EST    US ASCITES SURVEY    Result Date: 1/11/2024  * * *Final Report* * * DATE OF EXAM: Jan 11 2024 10:41AM   [facility]   1016  -  US ASCITES SURVEY  / ACCESSION #  [id number] PROCEDURE REASON: ascites      * * * * Physician Interpretation * * * * RESULT: ULTRASOUND ASCITES SURVEY AND ULTRASOUND-GUIDED PARACENTESIS: 01/11/2024 CLINICAL DATA: Ascites FINDINGS: Fluid in the RLQ was localized by ultrasound. An image was stored in the electronic archive. The procedure, risks, benefits, and alternatives were discussed with the patient.  The patient provided prior written consent. An audible timeout was conducted to verify the patients name, MRN, procedure, Allergies and clotting profile. Following sterile preparation and local anesthesia in the  RLQ using  8cc of 1% lidocaine an 8 [place] fenestrated catheter was introduced into the fluid.  There was spontaneous return of straw colored fluid.  A volume of 2500 cc of fluid was aspirated. Fluid was prepared for laboratory evaluation per primary service. The patient tolerated the procedure satisfactorily without complication and was discharged to her room in stable condition. Impression: COMPLETED PARACENTESIS. Transcribed Using Voice Recognition Transcribe Date/Time: Jan 11 2024 10:47A Dictated by: FEI HAMMOND MD This examination was interpreted and the report reviewed and electronically signed by: FEI HAMMOND MD on Jan 11 2024 10:48AM  EST    IMAGING GUIDED PARACENTESIS    Result Date: 1/11/2024  * * *Final Report* * * DATE OF EXAM: Jan 11 2024 10:41AM   [facility]   2039  -  US PARACENTESIS BI  / ACCESSION #  [id number] PROCEDURE REASON: ascites      * * * * Physician Interpretation * * * * RESULT: ULTRASOUND ASCITES SURVEY AND ULTRASOUND-GUIDED PARACENTESIS: 01/11/2024 CLINICAL DATA: Ascites FINDINGS: Fluid in the RLQ was localized by ultrasound. An image was stored in the electronic archive. The procedure, risks, benefits, and alternatives were discussed with the patient.  The patient provided prior written consent. An audible timeout was conducted to verify the patients name, MRN, procedure, Allergies and clotting profile. Following sterile preparation and local anesthesia in the RLQ using  8cc of 1% lidocaine an 8 [place]ian fenestrated catheter was introduced into the fluid.  There was spontaneous return of straw colored fluid.  A volume of 2500 cc of fluid was aspirated. Fluid was prepared for laboratory evaluation per primary service. The patient tolerated the procedure satisfactorily without complication and was discharged to her room in stable condition. Impression: COMPLETED PARACENTESIS. Transcribed Using Voice Recognition Transcribe Date/Time: Jan 11 2024 10:47A Dictated by: FEI HAMMOND MD This  examination was interpreted and the report reviewed and electronically signed by: FEI HAMMOND MD on Jan 11 2024 10:48AM  EST    CT ABD/PEL W IVCON    Result Date: 1/10/2024  * * *Final Report* * * DATE OF EXAM: Davi 10 2024  2:26PM   [facility]   0530  -  CT ABD/PEL W IVCON  / ACCESSION #  [id number] PROCEDURE REASON: Abdominal pain, acute, nonlocalized      * * * * Physician Interpretation * * * * RESULT: EXAMINATION:  CT ABD/PEL W IVCON CLINICAL HISTORY: Abdominal pain, acute, nonlocalized abdominal ascites/pain TECHNIQUE: CT of the abdomen and pelvis was performed using standard technique, scanning from just above the dome of the diaphragm to the symphysis pubis. Contrast: IV:  100 ml of Omnipaque 350 Dose-Length Product (DLP): 830 mGy*cm CT Dose Reduction Employed: Automated exposure control(AEC) and iterative recon COMPARISON: CT abdomen and pelvis without contrast 01/07/2024 RESULT: Lower thorax: No consolidations. Liver: Cirrhotic liver morphology. Biliary: Cholecystectomy Spleen: The spleen measures up to 14 cm in AP diameter Pancreas: No mass or ductal dilation. Adrenals: No mass. Kidneys: No enhancing mass or hydronephrosis. GI tract: Stable wall thickening of loops of small bowel, stomach and ascending colon, likely due to portal hypertension. No bowel obstruction. Appendix: Not visualized Lymph nodes: No lymphadenopathy. Mesentry/Peritoneum/Retroperitoneum: Moderate volume ascites. Vasculature: Recannulization of the periumbilical vein, esophageal, gastric and mesenteric varices.  The main portal and splenic veins are patent. No abdominal aortic aneurysm or branch occlusion. Pelvis: Normal appearance of uterus and adnexa. Normal bladder.  Mild hyperattenuating fluid in the bladder, possibly due to proteinaceous debris. Soft Tissues: Small ventral hernias containing fat, varices and fluid. Bones: No acute findings.    IMPRESSION: Cirrhosis with sequela of portal hypertension including moderate volume ascites.  Transcribed Using Voice Recognition Transcribe Date/Time: Davi 10 2024  3:37P Dictated by: JEMMA DENNY MD This examination was interpreted and the report reviewed and electronically signed by: JEMMA DENNY MD on Davi 10 2024  3:47PM  EST    US ASCITES SURVEY    Result Date: 1/10/2024  * * *Final Report* * * DATE OF EXAM: Davi 10 2024 10:47AM   [place] [id number]  -   ASCITES SURVEY  / ACCESSION #  [id number] PROCEDURE REASON: Ascites      * * * * Physician Interpretation * * * * RESULT: ULTRASOUND ASCITES SURVEY: 01/10/2024 CLINICAL DATA: Ascites FINDINGS: Four quadrant examination of the abdomen identifies a small amount of ascites, minimal in the right upper quadrant and small bowel in the right lower quadrant and left upper quadrant.  Minimal in the left lower quadrant.    IMPRESSION: SMALL AMOUNT OF ABDOMINAL ASCITES. Transcribed Using Voice Recognition Transcribe Date/Time: Davi 10 2024 11:02A Dictated by: FEI HAMMOND MD This examination was interpreted and the report reviewed and electronically signed by: FEI HAMMOND MD on Davi 10 2024 11:03AM  EST      Scheduled medications  atorvastatin, 80 mg, oral, Daily  cefTRIAXone, 1 g, intravenous, q24h  docusate sodium, 100 mg, oral, BID  fenofibrate, 160 mg, oral, Daily  furosemide, 40 mg, oral, Daily  insulin glargine, 20 Units, subcutaneous, q AM  insulin lispro, 0-15 Units, subcutaneous, q4h  insulin regular, 0-10 Units, subcutaneous, Nightly at 0300  pantoprazole, 20 mg, oral, BID AC  spironolactone, 50 mg, oral, BID  traZODone, 25 mg, oral, Nightly  ursodiol, 300 mg, oral, TID      Continuous medications  sodium chloride 0.9%, 75 mL/hr, Last Rate: 75 mL/hr (02/09/24 0626)      PRN medications  PRN medications: acetaminophen **OR** acetaminophen **OR** acetaminophen, acetaminophen, benzocaine-menthoL, dextromethorphan-guaifenesin, dextrose 10 % in water (D10W), dextrose, glucagon, guaiFENesin, ketorolac, morphine, ondansetron, polyethylene glycol  Results  for orders placed or performed during the hospital encounter of 02/07/24 (from the past 24 hour(s))   POCT GLUCOSE   Result Value Ref Range    POCT Glucose 213 (H) 74 - 99 mg/dL   POCT GLUCOSE   Result Value Ref Range    POCT Glucose 232 (H) 74 - 99 mg/dL   POCT GLUCOSE   Result Value Ref Range    POCT Glucose 235 (H) 74 - 99 mg/dL   POCT GLUCOSE   Result Value Ref Range    POCT Glucose 411 (H) 74 - 99 mg/dL   POCT GLUCOSE   Result Value Ref Range    POCT Glucose 344 (H) 74 - 99 mg/dL   POCT GLUCOSE   Result Value Ref Range    POCT Glucose 221 (H) 74 - 99 mg/dL   Comprehensive Metabolic Panel   Result Value Ref Range    Glucose 235 (H) 65 - 99 mg/dL    Sodium 138 133 - 145 mmol/L    Potassium 4.3 3.4 - 5.1 mmol/L    Chloride 106 97 - 107 mmol/L    Bicarbonate 28 24 - 31 mmol/L    Urea Nitrogen 13 8 - 25 mg/dL    Creatinine 0.70 0.40 - 1.60 mg/dL    eGFR >90 >60 mL/min/1.73m*2    Calcium 7.9 (L) 8.5 - 10.4 mg/dL    Albumin 2.0 (L) 3.5 - 5.0 g/dL    Alkaline Phosphatase 254 (H) 35 - 125 U/L    Total Protein 6.2 5.9 - 7.9 g/dL    AST 40 5 - 40 U/L    Bilirubin, Total 0.7 0.1 - 1.2 mg/dL    ALT 25 5 - 40 U/L    Anion Gap 4 <=19 mmol/L   CBC   Result Value Ref Range    WBC 4.1 (L) 4.4 - 11.3 x10*3/uL    nRBC 0.0 0.0 - 0.0 /100 WBCs    RBC 3.37 (L) 4.00 - 5.20 x10*6/uL    Hemoglobin 8.8 (L) 12.0 - 16.0 g/dL    Hematocrit 27.4 (L) 36.0 - 46.0 %    MCV 81 80 - 100 fL    MCH 26.1 26.0 - 34.0 pg    MCHC 32.1 32.0 - 36.0 g/dL    RDW 17.2 (H) 11.5 - 14.5 %    Platelets 76 (L) 150 - 450 x10*3/uL   Hemoglobin A1c   Result Value Ref Range    Hemoglobin A1C 11.3 (H) See below %    Estimated Average Glucose 278 Not Established mg/dL   POCT GLUCOSE   Result Value Ref Range    POCT Glucose 163 (H) 74 - 99 mg/dL        Assessment/Plan     Epigastric pain/nausea/vomiting  CT scan unremarkable and consistent with cirrhosis and portal hypertension.   Recent EGD on 12/27/23 Esophageal ulcer, no bleeding. No varices.  Will start on PPI  twice daily  Add Carafate  Was started on IV antibiotic for pyuria, UA culture pending  Continue IV antiemetics as ordered    #Liver Cirrhosis due to primary biliary cholangitis   HCC- AFP 3.6 on  7/19/23  HE: Monitor mental status, A/O x3   EV: monitor for varices, last EGD on 12/27/23 by Dr. Zaman at [facility] Normal oropharynx. Esophageal ulcer, no bleeding and no stigmata of recent bleeding.  Ascites: CT shows Free fluid within the peritoneal cavity is again noted   Will continue lasix/aldactone.  IR consult placed     Adrianne Powell, APRN-CNP

## 2024-02-09 NOTE — PROGRESS NOTES
Alfonzo Flanagan is a 49 y.o. female on day 0 of admission presenting with Abdominal pain.    Subjective   Patient seen and examined.  Resting in bed in no acute distress.  Awake alert oriented x 3.  Complains of increased nausea, abdominal pain improved to 7/10 from 8/10 yesterday.  No vomiting.  No intake.  + Flatus.  No bowel movement yesterday or today.  + Urinary symptoms dysuria, itching.  No fevers chills or sweats.    Objective     Physical Exam  Vitals reviewed.   Constitutional:       General: She is not in acute distress.     Appearance: She is obese. She is not ill-appearing, toxic-appearing or diaphoretic.   HENT:      Head: Normocephalic and atraumatic.      Right Ear: Tympanic membrane normal.      Left Ear: Tympanic membrane normal.      Nose: Nose normal.      Mouth/Throat:      Mouth: Mucous membranes are moist.      Pharynx: Oropharynx is clear.   Eyes:      Extraocular Movements: Extraocular movements intact.      Conjunctiva/sclera: Conjunctivae normal.      Pupils: Pupils are equal, round, and reactive to light.   Cardiovascular:      Rate and Rhythm: Normal rate and regular rhythm.      Pulses: Normal pulses.      Heart sounds: Normal heart sounds.   Pulmonary:      Effort: Pulmonary effort is normal. No respiratory distress.      Breath sounds: Normal breath sounds. No wheezing, rhonchi or rales.      Comments: Deferred  Abdominal:      General: Bowel sounds are normal. There is distension.      Palpations: Abdomen is soft.      Tenderness: There is abdominal tenderness. There is right CVA tenderness and left CVA tenderness.   Genitourinary:     Comments: Deferred  Musculoskeletal:         General: No swelling or tenderness. Normal range of motion.      Cervical back: Normal range of motion and neck supple.   Skin:     General: Skin is warm and dry.      Capillary Refill: Capillary refill takes less than 2 seconds.   Neurological:      General: No focal deficit present.      Mental Status:  "She is alert and oriented to person, place, and time.   Psychiatric:         Mood and Affect: Mood normal.         Behavior: Behavior normal.       Last Recorded Vitals  Blood pressure 98/56, pulse 87, temperature 36.7 °C (98.1 °F), temperature source Oral, resp. rate 16, height 1.575 m (5' 2\"), weight 72.6 kg (160 lb), SpO2 98 %.    Intake/Output last 3 Shifts:  I/O last 3 completed shifts:  In: 2090 (28.8 mL/kg) [I.V.:2090 (28.8 mL/kg)]  Out: 0 (0 mL/kg)   Weight: 72.6 kg     Telemetry normal sinus rhythm rate 70's    Relevant Results  Results for orders placed or performed during the hospital encounter of 02/07/24 (from the past 24 hour(s))   POCT GLUCOSE   Result Value Ref Range    POCT Glucose 213 (H) 74 - 99 mg/dL   POCT GLUCOSE   Result Value Ref Range    POCT Glucose 232 (H) 74 - 99 mg/dL   POCT GLUCOSE   Result Value Ref Range    POCT Glucose 235 (H) 74 - 99 mg/dL   POCT GLUCOSE   Result Value Ref Range    POCT Glucose 411 (H) 74 - 99 mg/dL   POCT GLUCOSE   Result Value Ref Range    POCT Glucose 344 (H) 74 - 99 mg/dL   POCT GLUCOSE   Result Value Ref Range    POCT Glucose 221 (H) 74 - 99 mg/dL   Comprehensive Metabolic Panel   Result Value Ref Range    Glucose 235 (H) 65 - 99 mg/dL    Sodium 138 133 - 145 mmol/L    Potassium 4.3 3.4 - 5.1 mmol/L    Chloride 106 97 - 107 mmol/L    Bicarbonate 28 24 - 31 mmol/L    Urea Nitrogen 13 8 - 25 mg/dL    Creatinine 0.70 0.40 - 1.60 mg/dL    eGFR >90 >60 mL/min/1.73m*2    Calcium 7.9 (L) 8.5 - 10.4 mg/dL    Albumin 2.0 (L) 3.5 - 5.0 g/dL    Alkaline Phosphatase 254 (H) 35 - 125 U/L    Total Protein 6.2 5.9 - 7.9 g/dL    AST 40 5 - 40 U/L    Bilirubin, Total 0.7 0.1 - 1.2 mg/dL    ALT 25 5 - 40 U/L    Anion Gap 4 <=19 mmol/L   CBC   Result Value Ref Range    WBC 4.1 (L) 4.4 - 11.3 x10*3/uL    nRBC 0.0 0.0 - 0.0 /100 WBCs    RBC 3.37 (L) 4.00 - 5.20 x10*6/uL    Hemoglobin 8.8 (L) 12.0 - 16.0 g/dL    Hematocrit 27.4 (L) 36.0 - 46.0 %    MCV 81 80 - 100 fL    MCH 26.1 " 26.0 - 34.0 pg    MCHC 32.1 32.0 - 36.0 g/dL    RDW 17.2 (H) 11.5 - 14.5 %    Platelets 76 (L) 150 - 450 x10*3/uL   Hemoglobin A1c   Result Value Ref Range    Hemoglobin A1C 11.3 (H) See below %    Estimated Average Glucose 278 Not Established mg/dL   POCT GLUCOSE   Result Value Ref Range    POCT Glucose 163 (H) 74 - 99 mg/dL     No results found for the last 90 days.    CT abdomen pelvis w IV contrast    Result Date: 2/7/2024  Interpreted By:  Bebeto Gottlieb, STUDY: CT ABDOMEN PELVIS W IV CONTRAST; 2/7/2024 3:43 pm   INDICATION: Signs/Symptoms:epigastric colicky pain.   COMPARISON: January 26, 2024.   ACCESSION NUMBER(S): [id number]   ORDERING CLINICIAN: PRANEETH BARRERA   TECHNIQUE: Oral contrast was not administered. 75 ml Omnipaque 350 was injected intravenously. CT of the Abdomen and Pelvis with intravenous contrast was performed. Axial, sagittal and coronal reformatted images were reviewed.   All CT examinations are performed with 1 or more of the following dose reduction techniques: Automated exposure control, adjustment of mA and/or kv according to patient's size, or use of iterative reconstruction techniques.   FINDINGS: Lower Chest: Unremarkable.   Abdomen: Liver: There is diffuse nodular contour of the liver. There is recanalization of the umbilical vein collateral vessels are noted in the gastrohepatic ligament. Esophageal varices are noted. Bile Ducts: Normal caliber. Gallbladder: Surgical clips are noted in the gallbladder fossa. Pancreas: Unremarkable. Spleen: Unremarkable. Adrenals: Normal. Kidneys: Normal.   Pelvis: No pelvic masses. Bladder: Unremarkable.   Bowel: No bowel wall thickening or abnormal distention to suggest bowel obstruction.. Mesenteric Lymph Nodes: No enlarged mesenteric lymph nodes. Peritoneum: Free fluid is noted lateral to the liver extending into the subhepatic recess and right pericolic gutter. Free fluid is also noted lateral to the spleen. Free fluid is also noted within the  pelvis. Vessels: Unremarkable Retroperitoneum: No retroperitoneal adenopathy. Abdominal Wall: Umbilical hernia contains fluid Bones: No acute bony abnormalities.       Findings consistent with cirrhosis and portal hypertension. Free fluid within the peritoneal cavity is again noted, though decreased as compared to previous exam   MACRO: none   Signed by: Bebeto Gottlieb 2/7/2024 4:52 PM Dictation workstation:   [id number]     Scheduled medications  atorvastatin, 80 mg, oral, Daily  cefTRIAXone, 1 g, intravenous, q24h  docusate sodium, 100 mg, oral, BID  fenofibrate, 160 mg, oral, Daily  furosemide, 40 mg, oral, Daily  insulin glargine, 20 Units, subcutaneous, q AM  insulin lispro, 0-15 Units, subcutaneous, q4h  insulin regular, 0-10 Units, subcutaneous, Nightly at 0300  pantoprazole, 20 mg, oral, BID AC  spironolactone, 50 mg, oral, BID  traZODone, 25 mg, oral, Nightly  ursodiol, 300 mg, oral, TID      Continuous medications  sodium chloride 0.9%, 75 mL/hr, Last Rate: 75 mL/hr (02/09/24 0626)      PRN medications  PRN medications: acetaminophen **OR** acetaminophen **OR** acetaminophen, acetaminophen, benzocaine-menthoL, dextromethorphan-guaifenesin, dextrose 10 % in water (D10W), dextrose, glucagon, guaiFENesin, ketorolac, morphine, ondansetron, polyethylene glycol    ASSESSMENT:  Abdominal pain, nausea (vomiting - resolved)   Back pain  Elevated lipase - chronic pancreatitis  Hepatic cirrhosis, ascites  Portal hypertension  Elevated LFT's  Lactic acidosis  Pyuria rule out UTI  Type 2 diabetes mellitus  Hyperglycemia  Hypertriglyceridemia  Hyperlipidemia    PLAN:  Gastroenterology consultation.  Discussed with team.  IR consultation for paracentesis.  Symptom control.  IV fluids.  P.r.n IV analgesics, anti-emetics.  Clear liquids as tolerated.  Check urine culture.  IV Ceftriaxone.  I's and O's.  Bowel regime.  Avoid constipation.  Point of care glucose reviewed.  Monitor point-of-care glucose AC/HS.  Continue  insulin.  Adjust insulin as needed for glycemic control.  Increase activity.  Supportive care.  Patient reassured.  Case management following for discharge planning.  Discussed with Gastroenterology NP and Dr. aLwrence.    MADYSON Sotelo-CNP

## 2024-02-09 NOTE — CARE PLAN
Problem: Pain  Goal: My pain/discomfort is manageable  Outcome: Progressing     Problem: Safety  Goal: Patient will be injury free during hospitalization  Outcome: Progressing  Goal: I will remain free of falls  Outcome: Progressing     Problem: Daily Care  Goal: Daily care needs are met  Outcome: Progressing     Problem: Psychosocial Needs  Goal: Demonstrates ability to cope with hospitalization/illness  Outcome: Progressing  Goal: Collaborate with me, my family, and caregiver to identify my specific goals  Outcome: Progressing     Problem: Discharge Barriers  Goal: My discharge needs are met  Outcome: Progressing     Problem: Pain  Goal: Takes deep breaths with improved pain control throughout the shift  Outcome: Progressing  Goal: Turns in bed with improved pain control throughout the shift  Outcome: Progressing  Goal: Walks with improved pain control throughout the shift  Outcome: Progressing  Goal: Performs ADL's with improved pain control throughout shift  Outcome: Progressing  Goal: Participates in PT with improved pain control throughout the shift  Outcome: Progressing  Goal: Free from opioid side effects throughout the shift  Outcome: Progressing  Goal: Free from acute confusion related to pain meds throughout the shift  Outcome: Progressing     Problem: Pain  Goal: STG - Patients pain is managed to allow active participation in daily activities  Outcome: Progressing

## 2024-02-10 LAB
ALBUMIN SERPL-MCNC: 2 G/DL (ref 3.5–5)
ALP BLD-CCNC: 225 U/L (ref 35–125)
ALT SERPL-CCNC: 23 U/L (ref 5–40)
ANION GAP SERPL CALC-SCNC: 5 MMOL/L
AST SERPL-CCNC: 37 U/L (ref 5–40)
BILIRUB SERPL-MCNC: 0.8 MG/DL (ref 0.1–1.2)
BUN SERPL-MCNC: 20 MG/DL (ref 8–25)
CALCIUM SERPL-MCNC: 7.8 MG/DL (ref 8.5–10.4)
CHLORIDE SERPL-SCNC: 106 MMOL/L (ref 97–107)
CO2 SERPL-SCNC: 27 MMOL/L (ref 24–31)
CREAT SERPL-MCNC: 0.7 MG/DL (ref 0.4–1.6)
EGFRCR SERPLBLD CKD-EPI 2021: >90 ML/MIN/1.73M*2
ERYTHROCYTE [DISTWIDTH] IN BLOOD BY AUTOMATED COUNT: 17.2 % (ref 11.5–14.5)
GLUCOSE BLD MANUAL STRIP-MCNC: 121 MG/DL (ref 74–99)
GLUCOSE BLD MANUAL STRIP-MCNC: 126 MG/DL (ref 74–99)
GLUCOSE BLD MANUAL STRIP-MCNC: 176 MG/DL (ref 74–99)
GLUCOSE BLD MANUAL STRIP-MCNC: 177 MG/DL (ref 74–99)
GLUCOSE BLD MANUAL STRIP-MCNC: 190 MG/DL (ref 74–99)
GLUCOSE BLD MANUAL STRIP-MCNC: 202 MG/DL (ref 74–99)
GLUCOSE BLD MANUAL STRIP-MCNC: 291 MG/DL (ref 74–99)
GLUCOSE SERPL-MCNC: 239 MG/DL (ref 65–99)
HCT VFR BLD AUTO: 27.1 % (ref 36–46)
HGB BLD-MCNC: 9 G/DL (ref 12–16)
MCH RBC QN AUTO: 25.8 PG (ref 26–34)
MCHC RBC AUTO-ENTMCNC: 33.2 G/DL (ref 32–36)
MCV RBC AUTO: 78 FL (ref 80–100)
NRBC BLD-RTO: 0 /100 WBCS (ref 0–0)
PLATELET # BLD AUTO: 77 X10*3/UL (ref 150–450)
POTASSIUM SERPL-SCNC: 3.9 MMOL/L (ref 3.4–5.1)
PROT SERPL-MCNC: 6.2 G/DL (ref 5.9–7.9)
RBC # BLD AUTO: 3.49 X10*6/UL (ref 4–5.2)
SODIUM SERPL-SCNC: 138 MMOL/L (ref 133–145)
WBC # BLD AUTO: 4.3 X10*3/UL (ref 4.4–11.3)

## 2024-02-10 PROCEDURE — 97165 OT EVAL LOW COMPLEX 30 MIN: CPT | Mod: GO

## 2024-02-10 PROCEDURE — 2500000002 HC RX 250 W HCPCS SELF ADMINISTERED DRUGS (ALT 637 FOR MEDICARE OP, ALT 636 FOR OP/ED): Performed by: INTERNAL MEDICINE

## 2024-02-10 PROCEDURE — 82947 ASSAY GLUCOSE BLOOD QUANT: CPT

## 2024-02-10 PROCEDURE — 2500000001 HC RX 250 WO HCPCS SELF ADMINISTERED DRUGS (ALT 637 FOR MEDICARE OP): Performed by: INTERNAL MEDICINE

## 2024-02-10 PROCEDURE — 85027 COMPLETE CBC AUTOMATED: CPT | Performed by: NURSE PRACTITIONER

## 2024-02-10 PROCEDURE — 1200000002 HC GENERAL ROOM WITH TELEMETRY DAILY

## 2024-02-10 PROCEDURE — 2500000004 HC RX 250 GENERAL PHARMACY W/ HCPCS (ALT 636 FOR OP/ED): Performed by: INTERNAL MEDICINE

## 2024-02-10 PROCEDURE — 2500000002 HC RX 250 W HCPCS SELF ADMINISTERED DRUGS (ALT 637 FOR MEDICARE OP, ALT 636 FOR OP/ED)

## 2024-02-10 PROCEDURE — 2500000001 HC RX 250 WO HCPCS SELF ADMINISTERED DRUGS (ALT 637 FOR MEDICARE OP): Performed by: NURSE PRACTITIONER

## 2024-02-10 PROCEDURE — 2500000004 HC RX 250 GENERAL PHARMACY W/ HCPCS (ALT 636 FOR OP/ED): Performed by: NURSE PRACTITIONER

## 2024-02-10 PROCEDURE — 2500000005 HC RX 250 GENERAL PHARMACY W/O HCPCS: Performed by: INTERNAL MEDICINE

## 2024-02-10 PROCEDURE — 80053 COMPREHEN METABOLIC PANEL: CPT | Performed by: NURSE PRACTITIONER

## 2024-02-10 PROCEDURE — 36415 COLL VENOUS BLD VENIPUNCTURE: CPT | Performed by: NURSE PRACTITIONER

## 2024-02-10 PROCEDURE — 2500000004 HC RX 250 GENERAL PHARMACY W/ HCPCS (ALT 636 FOR OP/ED)

## 2024-02-10 RX ORDER — LIDOCAINE 560 MG/1
1 PATCH PERCUTANEOUS; TOPICAL; TRANSDERMAL DAILY
Status: DISCONTINUED | OUTPATIENT
Start: 2024-02-10 | End: 2024-02-15 | Stop reason: HOSPADM

## 2024-02-10 RX ORDER — TIZANIDINE 2 MG/1
2 TABLET ORAL EVERY 8 HOURS PRN
Status: DISCONTINUED | OUTPATIENT
Start: 2024-02-10 | End: 2024-02-15 | Stop reason: HOSPADM

## 2024-02-10 RX ORDER — METOCLOPRAMIDE 10 MG/1
5 TABLET ORAL
Status: DISCONTINUED | OUTPATIENT
Start: 2024-02-10 | End: 2024-02-11

## 2024-02-10 RX ADMIN — FENOFIBRATE 160 MG: 160 TABLET ORAL at 09:19

## 2024-02-10 RX ADMIN — CEFTRIAXONE SODIUM 1 G: 1 INJECTION, SOLUTION INTRAVENOUS at 22:36

## 2024-02-10 RX ADMIN — URSODIOL 300 MG: 300 CAPSULE ORAL at 20:48

## 2024-02-10 RX ADMIN — METOCLOPRAMIDE 5 MG: 10 TABLET ORAL at 20:46

## 2024-02-10 RX ADMIN — POLYETHYLENE GLYCOL 3350 17 G: 17 POWDER, FOR SOLUTION ORAL at 09:20

## 2024-02-10 RX ADMIN — MORPHINE SULFATE 2 MG: 2 INJECTION, SOLUTION INTRAMUSCULAR; INTRAVENOUS at 20:46

## 2024-02-10 RX ADMIN — INSULIN LISPRO 6 UNITS: 100 INJECTION, SOLUTION INTRAVENOUS; SUBCUTANEOUS at 05:16

## 2024-02-10 RX ADMIN — INSULIN LISPRO 3 UNITS: 100 INJECTION, SOLUTION INTRAVENOUS; SUBCUTANEOUS at 20:50

## 2024-02-10 RX ADMIN — INSULIN GLARGINE 20 UNITS: 100 INJECTION, SOLUTION SUBCUTANEOUS at 09:17

## 2024-02-10 RX ADMIN — URSODIOL 300 MG: 300 CAPSULE ORAL at 09:20

## 2024-02-10 RX ADMIN — INSULIN LISPRO 3 UNITS: 100 INJECTION, SOLUTION INTRAVENOUS; SUBCUTANEOUS at 17:04

## 2024-02-10 RX ADMIN — SODIUM CHLORIDE 75 ML/HR: 900 INJECTION, SOLUTION INTRAVENOUS at 17:09

## 2024-02-10 RX ADMIN — SUCRALFATE 1 G: 1 SUSPENSION ORAL at 12:25

## 2024-02-10 RX ADMIN — KETOROLAC TROMETHAMINE 15 MG: 30 INJECTION, SOLUTION INTRAMUSCULAR; INTRAVENOUS at 13:01

## 2024-02-10 RX ADMIN — SUCRALFATE 1 G: 1 SUSPENSION ORAL at 20:48

## 2024-02-10 RX ADMIN — ONDANSETRON 4 MG: 2 INJECTION INTRAMUSCULAR; INTRAVENOUS at 20:45

## 2024-02-10 RX ADMIN — INSULIN LISPRO 3 UNITS: 100 INJECTION, SOLUTION INTRAVENOUS; SUBCUTANEOUS at 12:55

## 2024-02-10 RX ADMIN — INSULIN LISPRO 9 UNITS: 100 INJECTION, SOLUTION INTRAVENOUS; SUBCUTANEOUS at 00:09

## 2024-02-10 RX ADMIN — SUCRALFATE 1 G: 1 SUSPENSION ORAL at 15:28

## 2024-02-10 RX ADMIN — URSODIOL 300 MG: 300 CAPSULE ORAL at 17:04

## 2024-02-10 RX ADMIN — CEFTRIAXONE SODIUM 1 G: 1 INJECTION, SOLUTION INTRAVENOUS at 00:09

## 2024-02-10 RX ADMIN — PANTOPRAZOLE SODIUM 20 MG: 20 TABLET, DELAYED RELEASE ORAL at 06:15

## 2024-02-10 RX ADMIN — LIDOCAINE 1 PATCH: 4 PATCH TOPICAL at 15:27

## 2024-02-10 RX ADMIN — PANTOPRAZOLE SODIUM 20 MG: 20 TABLET, DELAYED RELEASE ORAL at 15:28

## 2024-02-10 RX ADMIN — TRAZODONE HYDROCHLORIDE 25 MG: 50 TABLET ORAL at 20:47

## 2024-02-10 RX ADMIN — METOCLOPRAMIDE 5 MG: 10 TABLET ORAL at 12:24

## 2024-02-10 RX ADMIN — METOCLOPRAMIDE 5 MG: 10 TABLET ORAL at 15:28

## 2024-02-10 RX ADMIN — SUCRALFATE 1 G: 1 SUSPENSION ORAL at 06:15

## 2024-02-10 RX ADMIN — DOCUSATE SODIUM 100 MG: 100 CAPSULE, LIQUID FILLED ORAL at 09:19

## 2024-02-10 ASSESSMENT — COGNITIVE AND FUNCTIONAL STATUS - GENERAL
MOBILITY SCORE: 24
MOBILITY SCORE: 24
DAILY ACTIVITIY SCORE: 24
MOBILITY SCORE: 24

## 2024-02-10 ASSESSMENT — PAIN - FUNCTIONAL ASSESSMENT
PAIN_FUNCTIONAL_ASSESSMENT: 0-10

## 2024-02-10 ASSESSMENT — PAIN SCALES - GENERAL
PAINLEVEL_OUTOF10: 3
PAINLEVEL_OUTOF10: 4
PAINLEVEL_OUTOF10: 0 - NO PAIN
PAINLEVEL_OUTOF10: 6
PAINLEVEL_OUTOF10: 7
PAINLEVEL_OUTOF10: 0 - NO PAIN

## 2024-02-10 ASSESSMENT — ACTIVITIES OF DAILY LIVING (ADL)
BATHING_ASSISTANCE: MINIMAL
ADL_ASSISTANCE: INDEPENDENT

## 2024-02-10 ASSESSMENT — PAIN DESCRIPTION - LOCATION: LOCATION: NECK

## 2024-02-10 NOTE — NURSING NOTE
This nurse notified Dr. Lawrence that patients last blood pressure was 91/51. This nurse was given telephone orders with read back from Dr. Lawrence to not administer patients ordered Lipitor, Lasix, Aldactone. Will reassess patient vital signs. Will continue to monitor patient to ensure patient safety.

## 2024-02-10 NOTE — PROGRESS NOTES
Occupational Therapy    Evaluation    Patient Name: Alfonzo Flanagan  MRN: [id number]  Today's Date: 2/10/2024  Time Calculation  Start Time: 0845  Stop Time: 0902  Time Calculation (min): 17 min        Assessment:  OT Assessment: Patient demonstrating to be completing ADLs, transfers, short functional mobility at baseline  End of Session Communication: Bedside nurse  End of Session Patient Position: Bed, 2 rail up, Alarm off, not on at start of session  Strengths: Premorbid level of function, Support of extended family/friends  Plan:  No Skilled OT: At baseline function  OT Frequency: OT eval only  OT Discharge Recommendations: Low intensity level of continued care  OT - OK to Discharge: Yes       Subjective   Current Problem:  1. Abdominal pain, epigastric        2. Ascites of liver          General:  General  Reason for Referral: Abdominal Pain  Referred By: Dr. Howard MD  Past Medical History Relevant to Rehab: ascites, UTI  Prior to Session Communication: Bedside nurse  Patient Position Received: Alarm off, not on at start of session  Preferred Learning Style: verbal  General Comment: Standing next to bed upon arrival, agreeable to participate in therapy. (+) IV  Pain:  Pain Assessment  Pain Assessment: 0-10  Pain Score: 4  Pain Type: Acute pain  Pain Location: Abdomen  Pain Interventions: Ambulation/increased activity    Objective   Cognition:  Overall Cognitive Status: Within Functional Limits       Home Living:  Type of Home: Apartment  Lives With: Spouse, Adult children  Home Adaptive Equipment: Walker rolling or standard  Home Layout: One level  Home Access: Stairs to enter with rails  Entrance Stairs-Rails: Left  Entrance Stairs-Number of Steps: 14  Bathroom Shower/Tub: Tub/shower unit  Bathroom Toilet: Standard  Prior Function:  Level of [place]: Independent with ADLs and functional transfers, Independent with homemaking with ambulation  Receives Help From: Family  ADL Assistance: Independent  "(\"daughter helps with showers a little\")  Homemaking Assistance: Independent  Ambulatory Assistance: Independent  Hand Dominance: Left    ADL:  Eating Assistance: Independent  Grooming Assistance: Independent  Bathing Assistance: Minimal  Bathing Deficit: Setup, Steadying, Supervision/safety, Increased time to complete   UE Dressing Assistance: Independent  LE Dressing Assistance: Modified independent (Device)  LE Dressing Deficit: Don/doff R sock, Don/doff L sock (Sitting EOB,)  Toileting Assistance with Device: Modified independent (Reports being up as needed in room to use the restroom)  ADL Comments: Patient demonstrating ADLs upon eval at baseline  Activity Tolerance:  Endurance: Tolerates 10 - 20 min exercise with multiple rests  Bed Mobility/Transfers: Bed Mobility  Bed Mobility: Yes  Bed Mobility 1  Bed Mobility 1: Supine to sitting, Sitting to supine  Level of Assistance 1: Independent    Transfers  Transfer: Yes  Transfer 1  Transfer From 1: Sit to, Stand to  Transfer to 1: Sit, Stand  Technique 1: Sit to stand, Stand to sit  Transfer Level of Assistance 1: Modified independent  Transfers 2  Transfer From 2: Sit to, Stand to  Transfer to 2: Sit, Stand, Toilet  Technique 2: Sit to stand, Stand to sit  Transfer Level of Assistance 2: Modified independent  Ambulation/Gait Training:  Ambulation/Gait Training  Ambulation/Gait Training Performed: Yes  Ambulation/Gait Training 1  Surface 1: Level tile  Device 1:  (Utilizing IV pole for support)  Assistance 1: Independent  Comments/Distance (ft) 1: Short functional mobility bed<>bathroom    Vision:Vision - Complex Assessment  Vision Comments: Wears reading glasses  Sensation:  Sensation Comment: Pt reports of numbess in hands and feet  Strength:  Strength Comments: WFL    Coordination:  Movements are Fluid and Coordinated: Yes     Extremities: RUE   RUE : Within Functional Limits and LUE   LUE: Within Functional Limits  Outcome Measures:[facility] Daily " Activity  Putting on and taking off regular lower body clothing: None  Bathing (including washing, rinsing, drying): None  Putting on and taking off regular upper body clothing: None  Toileting, which includes using toilet, bedpan or urinal: None  Taking care of personal grooming such as brushing teeth: None  Eating Meals: None  Daily Activity - Total Score: 24

## 2024-02-10 NOTE — CARE PLAN
Problem: Pain  Goal: My pain/discomfort is manageable  Outcome: Progressing     Problem: Safety  Goal: Patient will be injury free during hospitalization  Outcome: Progressing  Goal: I will remain free of falls  Outcome: Progressing     Problem: Daily Care  Goal: Daily care needs are met  Outcome: Progressing     Problem: Psychosocial Needs  Goal: Demonstrates ability to cope with hospitalization/illness  Outcome: Progressing  Goal: Collaborate with me, my family, and caregiver to identify my specific goals  Outcome: Progressing     Problem: Discharge Barriers  Goal: My discharge needs are met  Outcome: Progressing     Problem: Pain  Goal: Takes deep breaths with improved pain control throughout the shift  Outcome: Progressing  Goal: Turns in bed with improved pain control throughout the shift  Outcome: Progressing  Goal: Walks with improved pain control throughout the shift  Outcome: Progressing  Goal: Performs ADL's with improved pain control throughout shift  Outcome: Progressing  Goal: Participates in PT with improved pain control throughout the shift  Outcome: Progressing  Goal: Free from opioid side effects throughout the shift  Outcome: Progressing  Goal: Free from acute confusion related to pain meds throughout the shift  Outcome: Progressing     Problem: Pain  Goal: STG - Patients pain is managed to allow active participation in daily activities  Outcome: Progressing   The patient's goals for the shift include  maintain stable blood sugar, blood pressure    The clinical goals for the shift include stable BS / BP

## 2024-02-10 NOTE — NURSING NOTE
Patient alert and oriented x 4. Patient is in bed, watching television. Patient assessed at this time. Patient has clear lung sounds upon auscultation. Patient is on room air. Patient is on TELE. Patients abdomen is round, distended, tender with active bowel sounds. Patient reports nausea and diarrhea. Providers are notified and aware. Patient does not appear to be in any distress. Fall precautions reviewed and implemented. Bed brakes locked, bed in lowest position, call light within reach, bed alarm activated. Will continue to monitor patient to ensure patient safety.

## 2024-02-10 NOTE — PROGRESS NOTES
Alfonzo Flanagan is a 49 y.o. female on day 1 of admission presenting with Abdominal pain.    Subjective   The patient was seen and examined.  Lying in the bed.  Comfortable.  Did not look in acute distress.  Patient denied any headache or dizziness.  No nausea or vomiting.  No diarrhea, dysuria, hematuria frequency.  Patient is still complaining of pain in the back and also complaining of pain in the neck now.  Is also complaining of abdominal pain.       Objective     Physical Exam  HEENT:  Head externally atraumatic, no pallor, no icterus, extraocular movements intact, pupils reacting to light, oral mucosa moist and throat clear.  Neck:  Supple, no JVP, no palpable adenopathy or thyromegaly.  No carotid bruit.  Chest:  Clear to auscultation and resonant.  Heart:  Regular rate and rhythm, no murmur or gallop could be appreciated.  Abdomen:  Soft, nontender, bowel sounds present, normoactive, no palpable hepatosplenomegaly.  Extremities:  No edema, pulses present, no cyanosis or clubbing.  CNS:  Patient alert, oriented to time, place and person.  Power 5/5 all over and deep tendon reflexes symmetrical, cranial nerves 2-12 grossly intact.  Skin:  No active rash.  Musculoskeletal:  No joint swelling or erythema, range of movement normal.  Last Recorded Vitals  Heart Rate:  [79-88]   Temp:  [36.6 °C (97.9 °F)-37 °C (98.6 °F)]   Resp:  [16-17]   BP: ()/(45-55)   SpO2:  [97 %-100 %]     Intake/Output last 3 Shifts:  I/O last 3 completed shifts:  In: 386.5 (5.3 mL/kg) [I.V.:386.5 (5.3 mL/kg)]  Out: 0 (0 mL/kg)   Weight: 72.6 kg     Relevant Results  No results found for the last 90 days.    Results for orders placed or performed during the hospital encounter of 02/07/24 (from the past 24 hour(s))   POCT GLUCOSE   Result Value Ref Range    POCT Glucose 180 (H) 74 - 99 mg/dL   POCT GLUCOSE   Result Value Ref Range    POCT Glucose 209 (H) 74 - 99 mg/dL   POCT GLUCOSE   Result Value Ref Range    POCT Glucose 291 (H)  74 - 99 mg/dL   Comprehensive Metabolic Panel   Result Value Ref Range    Glucose 239 (H) 65 - 99 mg/dL    Sodium 138 133 - 145 mmol/L    Potassium 3.9 3.4 - 5.1 mmol/L    Chloride 106 97 - 107 mmol/L    Bicarbonate 27 24 - 31 mmol/L    Urea Nitrogen 20 8 - 25 mg/dL    Creatinine 0.70 0.40 - 1.60 mg/dL    eGFR >90 >60 mL/min/1.73m*2    Calcium 7.8 (L) 8.5 - 10.4 mg/dL    Albumin 2.0 (L) 3.5 - 5.0 g/dL    Alkaline Phosphatase 225 (H) 35 - 125 U/L    Total Protein 6.2 5.9 - 7.9 g/dL    AST 37 5 - 40 U/L    Bilirubin, Total 0.8 0.1 - 1.2 mg/dL    ALT 23 5 - 40 U/L    Anion Gap 5 <=19 mmol/L   CBC   Result Value Ref Range    WBC 4.3 (L) 4.4 - 11.3 x10*3/uL    nRBC 0.0 0.0 - 0.0 /100 WBCs    RBC 3.49 (L) 4.00 - 5.20 x10*6/uL    Hemoglobin 9.0 (L) 12.0 - 16.0 g/dL    Hematocrit 27.1 (L) 36.0 - 46.0 %    MCV 78 (L) 80 - 100 fL    MCH 25.8 (L) 26.0 - 34.0 pg    MCHC 33.2 32.0 - 36.0 g/dL    RDW 17.2 (H) 11.5 - 14.5 %    Platelets 77 (L) 150 - 450 x10*3/uL   POCT GLUCOSE   Result Value Ref Range    POCT Glucose 202 (H) 74 - 99 mg/dL   POCT GLUCOSE   Result Value Ref Range    POCT Glucose 121 (H) 74 - 99 mg/dL   POCT GLUCOSE   Result Value Ref Range    POCT Glucose 176 (H) 74 - 99 mg/dL        Current Facility-Administered Medications:     acetaminophen (Tylenol) tablet 650 mg, 650 mg, oral, q4h PRN, 650 mg at 02/08/24 1524 **OR** acetaminophen (Tylenol) oral liquid 650 mg, 650 mg, oral, q4h PRN **OR** acetaminophen (Tylenol) suppository 650 mg, 650 mg, rectal, q4h PRN, Luis Alfredo Lawrence MD    acetaminophen (Tylenol) tablet 325 mg, 325 mg, oral, q6h PRN, Luis Alfredo Lawrence MD, 325 mg at 02/08/24 0936    atorvastatin (Lipitor) tablet 80 mg, 80 mg, oral, Daily, Luis Alfredo Lawrence MD, 80 mg at 02/09/24 1008    benzocaine-menthoL (Chloraseptic) 6-10 mg lozenge 1 lozenge, 1 lozenge, Mouth/Throat, q2h PRN, Luis Alfredo Lawrence MD    cefTRIAXone (Rocephin) IVPB 1 g, 1 g, intravenous, q24h, Leny Daniels, APRN-CNP, Stopped at  02/10/24 0039    dextromethorphan-guaifenesin (Robitussin DM)  mg/5 mL oral liquid 5 mL, 5 mL, oral, q4h PRN, Luis Alfredo Lawrence MD    dextrose 10 % in water (D10W) infusion, 0.3 g/kg/hr, intravenous, Once PRN, Luis Alfredo Lawrence MD    dextrose 50 % injection 25 g, 25 g, intravenous, q15 min PRN, Luis Alfredo Lawrence MD    docusate sodium (Colace) capsule 100 mg, 100 mg, oral, BID, Luis Alfredo Lawrence MD, 100 mg at 02/10/24 0919    fenofibrate (Triglide) tablet 160 mg, 160 mg, oral, Daily, Luis Alfredo Lawrence MD, 160 mg at 02/10/24 0919    furosemide (Lasix) tablet 40 mg, 40 mg, oral, Daily, Luis Alfredo Lawrence MD, 40 mg at 02/09/24 1008    glucagon (Glucagen) injection 1 mg, 1 mg, intramuscular, q15 min PRN, Luis Alfredo Lawrence MD    guaiFENesin (Mucinex) 12 hr tablet 600 mg, 600 mg, oral, q12h PRN, Luis Alfredo Lawrence MD    insulin glargine (Lantus) injection 20 Units, 20 Units, subcutaneous, q AM, Luis Alfredo Lawrence MD, 20 Units at 02/10/24 0917    insulin lispro (HumaLOG) injection 0-15 Units, 0-15 Units, subcutaneous, q4h, Luis Alfredo Lawrence MD, 3 Units at 02/10/24 1255    insulin regular (HumuLIN R,NovoLIN R) injection 0-10 Units, 0-10 Units, subcutaneous, Nightly at 0300, Luis Alfredo Lawrence MD    ketorolac (Toradol) injection 15 mg, 15 mg, intravenous, q6h PRN, Leny Daniels, APRN-CNP, 15 mg at 02/10/24 1301    metoclopramide (Reglan) tablet 5 mg, 5 mg, oral, Before meals & nightly, Alaina Mishra, MADYSON-CNP, 5 mg at 02/10/24 1224    morphine injection 2 mg, 2 mg, intravenous, q4h PRN, Luis Alfredo Lawrence MD, 2 mg at 02/09/24 1612    ondansetron (Zofran) injection 4 mg, 4 mg, intravenous, q6h PRN, MADYSON Sotelo-CNP, 4 mg at 02/09/24 2051    pantoprazole (ProtoNix) EC tablet 20 mg, 20 mg, oral, BID AC, Luis Alfredo Lawrence MD, 20 mg at 02/10/24 0615    polyethylene glycol (Glycolax, Miralax) packet 17 g, 17 g, oral, BID, MADYSON Knutson-CNP, 17 g at 02/10/24 0920    sodium  chloride 0.9% infusion, 75 mL/hr, intravenous, Continuous, Luis Alfredo Lawrence MD, Last Rate: 75 mL/hr at 02/10/24 0645, 75 mL/hr at 02/10/24 0645    spironolactone (Aldactone) tablet 50 mg, 50 mg, oral, BID, Luis Alfredo Lawrence MD, 50 mg at 02/09/24 2041    sucralfate (Carafate) suspension 1 g, 1 g, oral, Before meals & nightly, Adrianne Powell, APRN-CNP, 1 g at 02/10/24 1225    traZODone (Desyrel) tablet 25 mg, 25 mg, oral, Nightly, Luis Alfredo Lawrence MD, 25 mg at 02/09/24 2041    ursodiol (Actigall) capsule 300 mg, 300 mg, oral, TID, Luis Alfredo Lawrence MD, 300 mg at 02/10/24 0920   Assessment/Plan   Principal Problem:    Abdominal pain  Active Problems:    Abdominal pain, epigastric  Neck pain  Back pain  Primary.  Cholangitis  Cirrhosis  Continue current medication.  Pain control.  Patient is complaining of pain in the neck.  Lidocaine patch.  Give patient muscle relaxants.  Will take DVT, fall, aspiration decubitus question.  This has been discussed with patient and she is agreeable to it.         Luis Alfredo Lawrence MD

## 2024-02-10 NOTE — NURSING NOTE
2359 notified attending physician of manual BP 86/45 and patient reporting some dizziness. No new orders.   0513 checked patient BP, manual BP 84/46. Notified attending physician. No new orders.

## 2024-02-10 NOTE — PROGRESS NOTES
"Alfonzo Flanagan is a 49 y.o. female on day 1 of admission presenting with Abdominal pain.    Subjective   Persistent epigastric pain        Objective     Physical Exam  Constitutional:       Appearance: Normal appearance.   HENT:      Head: Normocephalic and atraumatic.      Mouth/Throat:      Mouth: Mucous membranes are moist.   Pulmonary:      Effort: Pulmonary effort is normal.   Abdominal:      General: There is no distension.      Palpations: Abdomen is soft.      Tenderness: There is abdominal tenderness. There is no guarding.   Musculoskeletal:         General: Normal range of motion.      Cervical back: Normal range of motion.   Skin:     General: Skin is warm and dry.   Neurological:      General: No focal deficit present.      Mental Status: She is alert. Mental status is at baseline.   Psychiatric:         Mood and Affect: Mood normal.         Last Recorded Vitals  Blood pressure 91/51, pulse 79, temperature 37 °C (98.6 °F), temperature source Oral, resp. rate 17, height 1.575 m (5' 2\"), weight 72.6 kg (160 lb), SpO2 97 %.  Intake/Output last 3 Shifts:  I/O last 3 completed shifts:  In: 386.5 (5.3 mL/kg) [I.V.:386.5 (5.3 mL/kg)]  Out: 0 (0 mL/kg)   Weight: 72.6 kg     Relevant Results                Results for orders placed or performed during the hospital encounter of 02/07/24 (from the past 24 hour(s))   POCT GLUCOSE   Result Value Ref Range    POCT Glucose 182 (H) 74 - 99 mg/dL   POCT GLUCOSE   Result Value Ref Range    POCT Glucose 180 (H) 74 - 99 mg/dL   POCT GLUCOSE   Result Value Ref Range    POCT Glucose 209 (H) 74 - 99 mg/dL   POCT GLUCOSE   Result Value Ref Range    POCT Glucose 291 (H) 74 - 99 mg/dL   Comprehensive Metabolic Panel   Result Value Ref Range    Glucose 239 (H) 65 - 99 mg/dL    Sodium 138 133 - 145 mmol/L    Potassium 3.9 3.4 - 5.1 mmol/L    Chloride 106 97 - 107 mmol/L    Bicarbonate 27 24 - 31 mmol/L    Urea Nitrogen 20 8 - 25 mg/dL    Creatinine 0.70 0.40 - 1.60 mg/dL    " eGFR >90 >60 mL/min/1.73m*2    Calcium 7.8 (L) 8.5 - 10.4 mg/dL    Albumin 2.0 (L) 3.5 - 5.0 g/dL    Alkaline Phosphatase 225 (H) 35 - 125 U/L    Total Protein 6.2 5.9 - 7.9 g/dL    AST 37 5 - 40 U/L    Bilirubin, Total 0.8 0.1 - 1.2 mg/dL    ALT 23 5 - 40 U/L    Anion Gap 5 <=19 mmol/L   CBC   Result Value Ref Range    WBC 4.3 (L) 4.4 - 11.3 x10*3/uL    nRBC 0.0 0.0 - 0.0 /100 WBCs    RBC 3.49 (L) 4.00 - 5.20 x10*6/uL    Hemoglobin 9.0 (L) 12.0 - 16.0 g/dL    Hematocrit 27.1 (L) 36.0 - 46.0 %    MCV 78 (L) 80 - 100 fL    MCH 25.8 (L) 26.0 - 34.0 pg    MCHC 33.2 32.0 - 36.0 g/dL    RDW 17.2 (H) 11.5 - 14.5 %    Platelets 77 (L) 150 - 450 x10*3/uL   POCT GLUCOSE   Result Value Ref Range    POCT Glucose 202 (H) 74 - 99 mg/dL   POCT GLUCOSE   Result Value Ref Range    POCT Glucose 121 (H) 74 - 99 mg/dL     CT abdomen pelvis w IV contrast    Result Date: 2/7/2024  Interpreted By:  Bebeto Gottlieb, STUDY: CT ABDOMEN PELVIS W IV CONTRAST; 2/7/2024 3:43 pm   INDICATION: Signs/Symptoms:epigastric colicky pain.   COMPARISON: January 26, 2024.   ACCESSION NUMBER(S): [id number]   ORDERING CLINICIAN: PRANEETH BARRERA   TECHNIQUE: Oral contrast was not administered. 75 ml Omnipaque 350 was injected intravenously. CT of the Abdomen and Pelvis with intravenous contrast was performed. Axial, sagittal and coronal reformatted images were reviewed.   All CT examinations are performed with 1 or more of the following dose reduction techniques: Automated exposure control, adjustment of mA and/or kv according to patient's size, or use of iterative reconstruction techniques.   FINDINGS: Lower Chest: Unremarkable.   Abdomen: Liver: There is diffuse nodular contour of the liver. There is recanalization of the umbilical vein collateral vessels are noted in the gastrohepatic ligament. Esophageal varices are noted. Bile Ducts: Normal caliber. Gallbladder: Surgical clips are noted in the gallbladder fossa. Pancreas: Unremarkable. Spleen:  Unremarkable. Adrenals: Normal. Kidneys: Normal.   Pelvis: No pelvic masses. Bladder: Unremarkable.   Bowel: No bowel wall thickening or abnormal distention to suggest bowel obstruction.. Mesenteric Lymph Nodes: No enlarged mesenteric lymph nodes. Peritoneum: Free fluid is noted lateral to the liver extending into the subhepatic recess and right pericolic gutter. Free fluid is also noted lateral to the spleen. Free fluid is also noted within the pelvis. Vessels: Unremarkable Retroperitoneum: No retroperitoneal adenopathy. Abdominal Wall: Umbilical hernia contains fluid Bones: No acute bony abnormalities.       Findings consistent with cirrhosis and portal hypertension. Free fluid within the peritoneal cavity is again noted, though decreased as compared to previous exam   MACRO: none   Signed by: Bebeto Gottlieb 2/7/2024 4:52 PM Dictation workstation:   [id number]    US guided abdominal paracentesis    Result Date: 1/31/2024  Interpreted By:  Cornelius Weber, STUDY: US GUIDED ABDOMINAL PARACENTESIS; 1/29/2024 10:04 am   INDICATION: Signs/Symptoms:ascites.   COMPARISON: None.   ACCESSION NUMBER(S): [id number]   ORDERING CLINICIAN: ANUPAM FRANCIS   TECHNIQUE: Ultrasound-guided paracentesis   FINDINGS: Informed consent obtained. Patient positioned supine. Right lower quadrant prepped, draped and anesthetized. Under ultrasound guidance, 8 [place]ian centesis sheath needle inserted into peritoneal cavity. 3.5 Lof clear yellowfluid aspirated. Patient tolerated procedure well       Ultrasound-guided paracentesis.   Signed by: Cornelius Weber 1/31/2024 3:36 PM Dictation workstation:   [id number]    CT abdomen pelvis w IV contrast    Result Date: 1/26/2024  Interpreted By:  Goyo Peterson, STUDY: CT ABDOMEN PELVIS W IV CONTRAST; 1/26/2024 10:52 am   INDICATION: Signs/Symptoms:abd pain ascites;   COMPARISON: None   ACCESSION NUMBER(S): [id number]   ORDERING CLINICIAN: LILLIE IYER   TECHNIQUE: Contiguous  axial images of the abdomen/pelvis were performed with IV contrast. 75 ml of Omnipaque 350 was utilized. Coronal and sagittal reformatted images were also obtained. All CT examinations are performed with 1 or more of the following dose reduction techniques: Automated exposure control, adjustment of mA and/or kv according to patient's size, or use of iterative reconstruction techniques.     FINDINGS: The liver is nodular in contour consistent with cirrhosis. No focal hepatic lesions are seen. Prior cholecystectomy with surgical clips in the gallbladder fossa. The common bile duct, pancreas, and adrenal glands are unremarkable. The spleen is mildly enlarged measuring 13.7 cm in length.   The kidneys enhance symmetrically. No urolithiasis is seen. No hydroureteronephrosis is seen.   The visualized aorta is unremarkable.   The small bowel is not dilated. The appendix is not visualized however there has no evidence to suggest appendicitis. The colon is unremarkable with no evidence for acute inflammatory process.   Mild-moderate volume ascites throughout the abdomen and pelvis. There is also mild diffuse anasarca.   The bladder is well distended with no gross wall thickening.   The visualized osseous structures are intact.   Limited images of the lower thorax are unremarkable.       Hepatic cirrhosis. No focal hepatic lesion.   Mild splenomegaly.   Mild-moderate volume ascites throughout the abdomen and pelvis.   Signed by: Goyo Peterson 1/26/2024 12:54 PM Dictation workstation:   [id number]    US ASCITES SURVEY    Result Date: 1/22/2024  * * *Final Report* * * DATE OF EXAM: Jan 22 2024 10:27AM   REFUGIO   1016  -  US ASCITES SURVEY  / ACCESSION #  [id number] PROCEDURE REASON: ascites      * * * * Physician Interpretation * * * * RESULT: US ASCITES SURVEY HISTORY: Ascites TECHNIQUE: Grayscale ultrasound imaging was performed in the area of concern and images were saved to the permanent image archive. RESULT: See  impression    IMPRESSION: Ascites survey was obtained throughout the abdomen. Mild amount of ascites in all 4 quadrants of the abdomen. No large single pocket was present and therapeutic paracentesis was not performed at this time. Transcribed Using Voice Recognition Transcribe Date/Time: Jan 22 2024 10:46A Dictated by: LEENA VINSON MD This examination was interpreted and the report reviewed and electronically signed by: LEENA VINSON MD on Jan 22 2024 10:48AM  EST    CT ABD/PEL WO IVCON    Result Date: 1/21/2024  * * *Final Report* * * DATE OF EXAM: Jan 21 2024  9:43AM   E[facility]   0531  -  CT ABD/PEL WO IVCON  / ACCESSION #  [id number] PROCEDURE REASON: Bowel obstruction suspected      * * * * Physician Interpretation * * * * RESULT: EXAMINATION:  CT ABDOMEN AND PELVIS WITHOUT IV CONTRAST CLINICAL HISTORY: Bowel obstruction suspected TECHNIQUE: Non-IV contrast imaging of the abdomen and pelvis was performed using standard technique, scanning from just above the dome of the diaphragm to the symphysis pubis.  Unenhanced imaging is limited for the evaluation of some intra-abdominal and pelvic pathology. MQ:  CTAPWO_3 Contrast: IV: None : ml of CT Radiation dose: Integrated Dose-length product (DLP) for this visit =   428 mGy*cm. CT Dose Reduction Employed: Automated exposure control(AEC) and iterative recon COMPARISON: CT 01/10/2024. RESULT: Abdomen / Pelvis: Liver: Liver has a nodular contour and is atrophic, consistent with cirrhosis.  No focal hepatic lesions within limitations of this noncontrasted study. Biliary: Gallbladder is absent. Spleen: No splenomegaly. Pancreas: Unremarkable. Adrenals: No mass. Kidneys: Kidneys are symmetric in size without hydronephrosis or renal stones. GI Tract: Diffuse gastric wall thickening. Colon is normal in caliber without obstruction.  Wall thickening of the transverse colon at the splenic flexure.  Appendix not visualized. Small bowel is normal in caliber without obstruction.   Walls of the small bowel are edematous. Lymph Nodes: No lymphadenopathy. Mesentery/peritoneum: Moderate volume ascites. Retroperitoneum: No mass. Vasculature: Arterial atherosclerotic disease without aneurysm. Pelvis: Urinary bladder is unremarkable.  Uterus is present.  No pelvic lymphadenopathy. Bones/Soft Tissues: Fat-containing ventral wall hernias.  Subcutaneous edema.  No suspicious osseous lesions.  L5 pars defect on the left.  Mild degenerative changes in thoracolumbar spine.. Lower thorax: 0.9 cm nodule in the right middle lobe (image 4, 2)  (topogram) images: No additional findings.    IMPRESSION: 1.  Cirrhotic hepatic morphology with moderate volume ascites 2.  Wall thickening of the stomach, could be reactive due to surrounding ascites but gastritis is possible. 3.  Walls of the small bowel and colon are edematous, likely reactive due to adjacent ascites. 4.  No evidence of small bowel obstruction. 5.  0.9 cm nodule in the right middle lobe.  Recommend short-term follow-up in 3 months to document stability. ACTIONABLE RESULT: FOLLOW-UP Acuity: Actionable Findings: Thoracic-Lung nodules Routing code:  RI_1 Recommendation: CT Chest WO IVCON Time Frame: Additional evaluation as described in the impression COMMUNICATION: Results will be communicated with the ordering provider via Epic staff message or phone message by [facility] within 2 business days of report finalization. --END OF FINDING-- Transcribed Using Voice Recognition Transcribe Date/Time: Jan 21 2024 10:50A Dictated by: KAIN RUSH MD This examination was interpreted and the report reviewed and electronically signed by: KAIN RUSH MD on Jan 21 2024 11:00AM  EST    XR CHEST 1V FRONTAL PORT    Result Date: 1/21/2024  * * *Final Report* * * DATE OF EXAM: Jan 21 2024  9:10AM   E[facility]   5376  -  XR CHEST 1V FRONTAL PORT  / ACCESSION #  [id number] PROCEDURE REASON: Shortness of breath      * * * * Physician Interpretation * *  "* * RESULT: EXAMINATION:  CHEST RADIOGRAPH (PORTABLE SINGLE VIEW AP) Exam Date/Time:  1/21/2024 9:10 AM CLINICAL HISTORY: Shortness of breath MQ:  XCPR_5 Comparison:  01/07/2024. RESULT: Lines, tubes, and devices:  None. Lungs and pleura:  The lungs remain unremarkable with no evidence of infiltrates.  The pleural margins appear normal. Cardiomediastinal silhouette:  Stable cardiomediastinal silhouette. Other:  The visualized bony thorax appears unremarkable.    IMPRESSION: Stable exam with no evidence of acute disease. Transcribed Using Voice Recognition Transcribe Date/Time: Jan 21 2024  9:41A Dictated by: DAMASO PERALTA MD This examination was interpreted and the report reviewed and electronically signed by: DAMASO PERALTA MD on Jan 21 2024  9:42AM  EST                Assessment/Plan   Principal Problem:    Abdominal pain  Active Problems:    Abdominal pain, epigastric    Epigastric pain/nausea/vomiting (chronic recurrent unclear etiology)  CT scan no acute findings   Recent EGD on 12/27/23 Esophageal ulcer, no bleeding. No varices. Was recently on Fluconazole as well   PPI twice daily  Continue Carafate  Was started on IV antibiotic for pyuria, UA culture pending  Continue IV antiemetics as ordered    Patient mentions vomiting \"pieces of food.\" Hgb A1C 11.3. ?component of gastroparesis contributing to chronic NV . We can trial scheduled Reglan. 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 Zaman at [facility] Normal oropharynx. Esophageal ulcer, no bleeding and no stigmata of recent bleeding.  Ascites: CT shows Free fluid within the peritoneal cavity is again noted (small amount, unlikely to need paracentesis)  Will continue lasix/aldactone.       I spent 20 minutes in the professional and overall care of this patient.      Alaina Mishra, APRN-CNP      "

## 2024-02-11 LAB
GLUCOSE BLD MANUAL STRIP-MCNC: 217 MG/DL (ref 74–99)
GLUCOSE BLD MANUAL STRIP-MCNC: 240 MG/DL (ref 74–99)
GLUCOSE BLD MANUAL STRIP-MCNC: 241 MG/DL (ref 74–99)
GLUCOSE BLD MANUAL STRIP-MCNC: 73 MG/DL (ref 74–99)
GLUCOSE BLD MANUAL STRIP-MCNC: 76 MG/DL (ref 74–99)
GLUCOSE BLD MANUAL STRIP-MCNC: 85 MG/DL (ref 74–99)

## 2024-02-11 PROCEDURE — 2500000002 HC RX 250 W HCPCS SELF ADMINISTERED DRUGS (ALT 637 FOR MEDICARE OP, ALT 636 FOR OP/ED)

## 2024-02-11 PROCEDURE — 2500000004 HC RX 250 GENERAL PHARMACY W/ HCPCS (ALT 636 FOR OP/ED)

## 2024-02-11 PROCEDURE — 1200000002 HC GENERAL ROOM WITH TELEMETRY DAILY

## 2024-02-11 PROCEDURE — 2500000004 HC RX 250 GENERAL PHARMACY W/ HCPCS (ALT 636 FOR OP/ED): Performed by: NURSE PRACTITIONER

## 2024-02-11 PROCEDURE — 82947 ASSAY GLUCOSE BLOOD QUANT: CPT

## 2024-02-11 PROCEDURE — 2500000005 HC RX 250 GENERAL PHARMACY W/O HCPCS: Performed by: INTERNAL MEDICINE

## 2024-02-11 PROCEDURE — 2500000001 HC RX 250 WO HCPCS SELF ADMINISTERED DRUGS (ALT 637 FOR MEDICARE OP): Performed by: NURSE PRACTITIONER

## 2024-02-11 PROCEDURE — 2500000004 HC RX 250 GENERAL PHARMACY W/ HCPCS (ALT 636 FOR OP/ED): Performed by: INTERNAL MEDICINE

## 2024-02-11 PROCEDURE — 2500000002 HC RX 250 W HCPCS SELF ADMINISTERED DRUGS (ALT 637 FOR MEDICARE OP, ALT 636 FOR OP/ED): Performed by: INTERNAL MEDICINE

## 2024-02-11 PROCEDURE — 2500000001 HC RX 250 WO HCPCS SELF ADMINISTERED DRUGS (ALT 637 FOR MEDICARE OP): Performed by: INTERNAL MEDICINE

## 2024-02-11 RX ORDER — ERYTHROMYCIN 250 MG/1
250 TABLET, DELAYED RELEASE ORAL
Status: DISCONTINUED | OUTPATIENT
Start: 2024-02-11 | End: 2024-02-13

## 2024-02-11 RX ADMIN — METOCLOPRAMIDE 5 MG: 10 TABLET ORAL at 06:03

## 2024-02-11 RX ADMIN — SUCRALFATE 1 G: 1 SUSPENSION ORAL at 13:00

## 2024-02-11 RX ADMIN — ERYTHROMYCIN 250 MG: 250 TABLET, DELAYED RELEASE ORAL at 13:00

## 2024-02-11 RX ADMIN — DOCUSATE SODIUM 100 MG: 100 CAPSULE, LIQUID FILLED ORAL at 21:00

## 2024-02-11 RX ADMIN — SODIUM CHLORIDE 75 ML/HR: 900 INJECTION, SOLUTION INTRAVENOUS at 21:08

## 2024-02-11 RX ADMIN — KETOROLAC TROMETHAMINE 15 MG: 30 INJECTION, SOLUTION INTRAMUSCULAR; INTRAVENOUS at 21:08

## 2024-02-11 RX ADMIN — SPIRONOLACTONE 50 MG: 50 TABLET ORAL at 21:01

## 2024-02-11 RX ADMIN — URSODIOL 300 MG: 300 CAPSULE ORAL at 10:02

## 2024-02-11 RX ADMIN — TRAZODONE HYDROCHLORIDE 25 MG: 50 TABLET ORAL at 21:01

## 2024-02-11 RX ADMIN — ERYTHROMYCIN 250 MG: 250 TABLET, DELAYED RELEASE ORAL at 21:02

## 2024-02-11 RX ADMIN — SUCRALFATE 1 G: 1 SUSPENSION ORAL at 17:26

## 2024-02-11 RX ADMIN — URSODIOL 300 MG: 300 CAPSULE ORAL at 17:26

## 2024-02-11 RX ADMIN — ERYTHROMYCIN 250 MG: 250 TABLET, DELAYED RELEASE ORAL at 17:26

## 2024-02-11 RX ADMIN — URSODIOL 300 MG: 300 CAPSULE ORAL at 21:02

## 2024-02-11 RX ADMIN — INSULIN LISPRO 6 UNITS: 100 INJECTION, SOLUTION INTRAVENOUS; SUBCUTANEOUS at 17:26

## 2024-02-11 RX ADMIN — INSULIN LISPRO 6 UNITS: 100 INJECTION, SOLUTION INTRAVENOUS; SUBCUTANEOUS at 13:01

## 2024-02-11 RX ADMIN — FENOFIBRATE 160 MG: 160 TABLET ORAL at 10:02

## 2024-02-11 RX ADMIN — POLYETHYLENE GLYCOL 3350 17 G: 17 POWDER, FOR SOLUTION ORAL at 21:07

## 2024-02-11 RX ADMIN — SUCRALFATE 1 G: 1 SUSPENSION ORAL at 06:04

## 2024-02-11 RX ADMIN — SUCRALFATE 1 G: 1 SUSPENSION ORAL at 21:01

## 2024-02-11 RX ADMIN — DOCUSATE SODIUM 100 MG: 100 CAPSULE, LIQUID FILLED ORAL at 10:02

## 2024-02-11 RX ADMIN — PANTOPRAZOLE SODIUM 20 MG: 20 TABLET, DELAYED RELEASE ORAL at 17:26

## 2024-02-11 RX ADMIN — INSULIN LISPRO 6 UNITS: 100 INJECTION, SOLUTION INTRAVENOUS; SUBCUTANEOUS at 21:13

## 2024-02-11 RX ADMIN — PANTOPRAZOLE SODIUM 20 MG: 20 TABLET, DELAYED RELEASE ORAL at 06:04

## 2024-02-11 RX ADMIN — CEFTRIAXONE SODIUM 1 G: 1 INJECTION, SOLUTION INTRAVENOUS at 22:16

## 2024-02-11 RX ADMIN — LIDOCAINE 1 PATCH: 4 PATCH TOPICAL at 10:01

## 2024-02-11 ASSESSMENT — COGNITIVE AND FUNCTIONAL STATUS - GENERAL
DAILY ACTIVITIY SCORE: 24
DAILY ACTIVITIY SCORE: 24
MOBILITY SCORE: 24
MOBILITY SCORE: 24

## 2024-02-11 ASSESSMENT — PAIN DESCRIPTION - LOCATION: LOCATION: NECK

## 2024-02-11 ASSESSMENT — PAIN - FUNCTIONAL ASSESSMENT
PAIN_FUNCTIONAL_ASSESSMENT: 0-10

## 2024-02-11 ASSESSMENT — PAIN SCALES - GENERAL
PAINLEVEL_OUTOF10: 0 - NO PAIN
PAINLEVEL_OUTOF10: 0 - NO PAIN
PAINLEVEL_OUTOF10: 3
PAINLEVEL_OUTOF10: 0 - NO PAIN
PAINLEVEL_OUTOF10: 0 - NO PAIN

## 2024-02-11 NOTE — NURSING NOTE
Notified physician regarding pt low BP 98/55, Held spironolactone 50 mg.patient is asymptomatic. No New orders at this time.

## 2024-02-11 NOTE — PROGRESS NOTES
Alfonzo Flanagan is a 49 y.o. female on day 2 of admission presenting with Abdominal pain.    Subjective   The patient was seen and examined.  Lying in the bed.  Comfortable.  Did not appear in acute distress.  Patient denied any headache, dizziness, nausea, vomiting, diarrhea, dysuria, hematuria frequency.  Patient still complaining of epigastric pain    Objective     Physical Exam  HEENT:  Head externally atraumatic, no pallor, no icterus, extraocular movements intact, pupils reacting to light, oral mucosa moist and throat clear.  Neck:  Supple, no JVP, no palpable adenopathy or thyromegaly.  No carotid bruit.  Chest:  Clear to auscultation and resonant.  Heart:  Regular rate and rhythm, no murmur or gallop could be appreciated.  Abdomen: Distended.  Drain in the right upper quadrant epigastric tenderness.  Pulses present.    Extremities:  No edema, pulses present, no cyanosis or clubbing.  CNS:  Patient alert, oriented to time, place and person.  Power 5/5 all over and deep tendon reflexes symmetrical, cranial nerves 2-12 grossly intact.  Skin:  No active rash.  Musculoskeletal:  No joint swelling or erythema, range of movement normal.  Last Recorded Vitals  Heart Rate:  [79-97]   Temp:  [36.9 °C (98.4 °F)-37.1 °C (98.8 °F)]   Resp:  [16-18]   BP: ()/(44-55)   SpO2:  [95 %-96 %]     Intake/Output last 3 Shifts:  I/O last 3 completed shifts:  In: 1874.8 (25.8 mL/kg) [P.O.:240; I.V.:1634.8 (22.5 mL/kg)]  Out: - (0 mL/kg)   Weight: 72.6 kg     Relevant Results  No results found for the last 90 days.    Results for orders placed or performed during the hospital encounter of 02/07/24 (from the past 24 hour(s))   POCT GLUCOSE   Result Value Ref Range    POCT Glucose 177 (H) 74 - 99 mg/dL   POCT GLUCOSE   Result Value Ref Range    POCT Glucose 126 (H) 74 - 99 mg/dL   POCT GLUCOSE   Result Value Ref Range    POCT Glucose 85 74 - 99 mg/dL   POCT GLUCOSE   Result Value Ref Range    POCT Glucose 73 (L) 74 - 99  mg/dL   POCT GLUCOSE   Result Value Ref Range    POCT Glucose 76 74 - 99 mg/dL   POCT GLUCOSE   Result Value Ref Range    POCT Glucose 240 (H) 74 - 99 mg/dL        Current Facility-Administered Medications:     acetaminophen (Tylenol) tablet 650 mg, 650 mg, oral, q4h PRN, 650 mg at 02/08/24 1524 **OR** acetaminophen (Tylenol) oral liquid 650 mg, 650 mg, oral, q4h PRN **OR** acetaminophen (Tylenol) suppository 650 mg, 650 mg, rectal, q4h PRN, Luis Alfredo Lawrence MD    acetaminophen (Tylenol) tablet 325 mg, 325 mg, oral, q6h PRN, Luis Alfredo Lawrence MD, 325 mg at 02/08/24 0936    atorvastatin (Lipitor) tablet 80 mg, 80 mg, oral, Daily, Luis Alfredo Lawrence MD, 80 mg at 02/09/24 1008    benzocaine-menthoL (Chloraseptic) 6-10 mg lozenge 1 lozenge, 1 lozenge, Mouth/Throat, q2h PRN, Luis Alfredo Lawrence MD    cefTRIAXone (Rocephin) IVPB 1 g, 1 g, intravenous, q24h, ESME Sotelo, Stopped at 02/10/24 2306    dextromethorphan-guaifenesin (Robitussin DM)  mg/5 mL oral liquid 5 mL, 5 mL, oral, q4h PRN, Luis Alfredo Lawrence MD    dextrose 10 % in water (D10W) infusion, 0.3 g/kg/hr, intravenous, Once PRN, Luis Alfredo Lawrence MD    dextrose 50 % injection 25 g, 25 g, intravenous, q15 min PRN, Luis Alfredo Lawrence MD    docusate sodium (Colace) capsule 100 mg, 100 mg, oral, BID, Luis Alfredo Lawrence MD, 100 mg at 02/11/24 1002    erythromycin (Deven-Tab) EC tablet 250 mg, 250 mg, oral, Before meals & nightly, MADYSON Thibodeaux-CNP, 250 mg at 02/11/24 1300    fenofibrate (Triglide) tablet 160 mg, 160 mg, oral, Daily, Luis Alfredo Lawrence MD, 160 mg at 02/11/24 1002    furosemide (Lasix) tablet 40 mg, 40 mg, oral, Daily, Luis Alfredo Lawrence MD, 40 mg at 02/09/24 1008    glucagon (Glucagen) injection 1 mg, 1 mg, intramuscular, q15 min PRN, Luis Alfredo Lawrence MD    guaiFENesin (Mucinex) 12 hr tablet 600 mg, 600 mg, oral, q12h PRN, Luis Alfredo Lawrence MD    insulin glargine (Lantus) injection 20 Units, 20 Units,  subcutaneous, q AM, Luis Alfredo Lawrence MD, 20 Units at 02/10/24 0917    insulin lispro (HumaLOG) injection 0-15 Units, 0-15 Units, subcutaneous, q4h, Luis Alfredo Lawrence MD, 6 Units at 02/11/24 1301    insulin regular (HumuLIN R,NovoLIN R) injection 0-10 Units, 0-10 Units, subcutaneous, Nightly at 0300, Luis Alfredo Lawrence MD    ketorolac (Toradol) injection 15 mg, 15 mg, intravenous, q6h PRN, MADYSON Sotelo-CNP, 15 mg at 02/10/24 1301    lidocaine 4 % patch 1 patch, 1 patch, transdermal, Daily, Luis Alfredo Lawrence MD, 1 patch at 02/11/24 1001    morphine injection 2 mg, 2 mg, intravenous, q4h PRN, Luis Alfredo Lawrence MD, 2 mg at 02/10/24 2046    pantoprazole (ProtoNix) EC tablet 20 mg, 20 mg, oral, BID AC, Luis Alfredo Lawrence MD, 20 mg at 02/11/24 0604    polyethylene glycol (Glycolax, Miralax) packet 17 g, 17 g, oral, BID, ESME Knutson, 17 g at 02/10/24 0920    sodium chloride 0.9% infusion, 75 mL/hr, intravenous, Continuous, Luis Alfredo Lawrence MD, Last Rate: 75 mL/hr at 02/11/24 0347, 75 mL/hr at 02/11/24 0347    spironolactone (Aldactone) tablet 50 mg, 50 mg, oral, BID, Luis Alfredo Lawrence MD, 50 mg at 02/09/24 2041    sucralfate (Carafate) suspension 1 g, 1 g, oral, Before meals & nightly, ALBAN KnutsonCNP, 1 g at 02/11/24 1300    tiZANidine (Zanaflex) tablet 2 mg, 2 mg, oral, q8h PRN, Luis Alfredo Lawrence MD    traZODone (Desyrel) tablet 25 mg, 25 mg, oral, Nightly, Luis Alfredo Lawrence MD, 25 mg at 02/10/24 2047    ursodiol (Actigall) capsule 300 mg, 300 mg, oral, TID, Luis Alfredo Lawrence MD, 300 mg at 02/11/24 1002   Assessment/Plan   Principal Problem:    Abdominal pain  Active Problems:    Abdominal pain, epigastric  Acute on chronic pancreatitis  Portal hypertension  Lactic acidosis  Pyuria  Diabetes mellitus type 2  Hyperglycemia  Hypertriglyceridemia  Hyperlipidemia.    Continue current medications.  Supportive care.  Physical therapy and Occupational Therapy.   Monitor association improved.  Give supportive care.  Control pain.  Continue to advance diet.  Stop pain subsides tomorrow and patient is able to tolerate diet tomorrow without significant pain, patient can be discharged home.  Patient has low blood pressure.  Lasix and Aldactone was held temporarily.  Monitor for now      Luis Alfredo Lawrence MD

## 2024-02-11 NOTE — NURSING NOTE
Patient alert and oriented x 4. Patient is in bed, watching television. Patient assessed at this time. Patient has clear lung sounds upon auscultation. Patient is on room air. Patient is on TELE. Patients abdomen is round, distended, tender with active bowel sounds. Patient reports nausea. Patient denying any diarrhea or vomiting at this time. Providers are notified and aware. Patient does not appear to be in any distress. Fall precautions reviewed and implemented. Bed brakes locked, bed in lowest position, call light within reach, bed alarm activated. Will continue to monitor patient to ensure patient safety.

## 2024-02-11 NOTE — NURSING NOTE
This nurse notified  that patients blood pressure at 0607 is 93/55 and at 0843 patients blood 103/53 and patients blood sugar was 76. This nurse was given telephone orders with read back to hold patients Lipitor, Lasix, Spironolactone, and Lantus at this time. Will reassess patient to ensure patient safety.

## 2024-02-11 NOTE — CARE PLAN
Problem: Pain  Goal: My pain/discomfort is manageable  Outcome: Progressing     Problem: Safety  Goal: Patient will be injury free during hospitalization  Outcome: Progressing  Goal: I will remain free of falls  Outcome: Progressing     Problem: Daily Care  Goal: Daily care needs are met  Outcome: Progressing     Problem: Psychosocial Needs  Goal: Demonstrates ability to cope with hospitalization/illness  Outcome: Progressing  Goal: Collaborate with me, my family, and caregiver to identify my specific goals  Outcome: Progressing     Problem: Discharge Barriers  Goal: My discharge needs are met  Outcome: Progressing     Problem: Pain  Goal: Takes deep breaths with improved pain control throughout the shift  Outcome: Progressing  Goal: Turns in bed with improved pain control throughout the shift  Outcome: Progressing  Goal: Walks with improved pain control throughout the shift  Outcome: Progressing  Goal: Performs ADL's with improved pain control throughout shift  Outcome: Progressing  Goal: Participates in PT with improved pain control throughout the shift  Outcome: Progressing  Goal: Free from opioid side effects throughout the shift  Outcome: Progressing  Goal: Free from acute confusion related to pain meds throughout the shift  Outcome: Progressing     Problem: Pain  Goal: STG - Patients pain is managed to allow active participation in daily activities  Outcome: Progressing   The patient's goals for the shift include  no nausea, no diarrhea, no dizziness    The clinical goals for the shift include PATIENT WILL REMAIN STABLE Blood Pressure

## 2024-02-11 NOTE — CARE PLAN
The patient's goals for the shift include      The clinical goals for the shift include PATIENT WILL REMAIN STABLE Blood Pressure      Problem: Pain  Goal: My pain/discomfort is manageable  Outcome: Progressing     Problem: Safety  Goal: Patient will be injury free during hospitalization  Outcome: Progressing  Goal: I will remain free of falls  Outcome: Progressing     Problem: Daily Care  Goal: Daily care needs are met  Outcome: Progressing     Problem: Psychosocial Needs  Goal: Demonstrates ability to cope with hospitalization/illness  Outcome: Progressing  Goal: Collaborate with me, my family, and caregiver to identify my specific goals  Outcome: Progressing     Problem: Discharge Barriers  Goal: My discharge needs are met  Outcome: Progressing     Problem: Pain  Goal: Takes deep breaths with improved pain control throughout the shift  Outcome: Progressing  Goal: Turns in bed with improved pain control throughout the shift  Outcome: Progressing  Goal: Walks with improved pain control throughout the shift  Outcome: Progressing  Goal: Performs ADL's with improved pain control throughout shift  Outcome: Progressing  Goal: Participates in PT with improved pain control throughout the shift  Outcome: Progressing  Goal: Free from opioid side effects throughout the shift  Outcome: Progressing  Goal: Free from acute confusion related to pain meds throughout the shift  Outcome: Progressing     Problem: Pain  Goal: STG - Patients pain is managed to allow active participation in daily activities  Outcome: Progressing

## 2024-02-12 LAB
ANION GAP SERPL CALC-SCNC: 6 MMOL/L
APPEARANCE UR: CLEAR
BASOPHILS # BLD AUTO: 0.03 X10*3/UL (ref 0–0.1)
BASOPHILS NFR BLD AUTO: 0.6 %
BILIRUB UR STRIP.AUTO-MCNC: NEGATIVE MG/DL
BUN SERPL-MCNC: 11 MG/DL (ref 8–25)
CALCIUM SERPL-MCNC: 8 MG/DL (ref 8.5–10.4)
CHLORIDE SERPL-SCNC: 105 MMOL/L (ref 97–107)
CO2 SERPL-SCNC: 24 MMOL/L (ref 24–31)
COLOR UR: YELLOW
CREAT SERPL-MCNC: 0.6 MG/DL (ref 0.4–1.6)
EGFRCR SERPLBLD CKD-EPI 2021: >90 ML/MIN/1.73M*2
EOSINOPHIL # BLD AUTO: 0.28 X10*3/UL (ref 0–0.7)
EOSINOPHIL NFR BLD AUTO: 5.9 %
ERYTHROCYTE [DISTWIDTH] IN BLOOD BY AUTOMATED COUNT: 17.4 % (ref 11.5–14.5)
GLUCOSE BLD MANUAL STRIP-MCNC: 131 MG/DL (ref 74–99)
GLUCOSE BLD MANUAL STRIP-MCNC: 165 MG/DL (ref 74–99)
GLUCOSE BLD MANUAL STRIP-MCNC: 172 MG/DL (ref 74–99)
GLUCOSE BLD MANUAL STRIP-MCNC: 208 MG/DL (ref 74–99)
GLUCOSE BLD MANUAL STRIP-MCNC: 254 MG/DL (ref 74–99)
GLUCOSE BLD MANUAL STRIP-MCNC: 295 MG/DL (ref 74–99)
GLUCOSE SERPL-MCNC: 148 MG/DL (ref 65–99)
GLUCOSE UR STRIP.AUTO-MCNC: ABNORMAL MG/DL
HCT VFR BLD AUTO: 30.7 % (ref 36–46)
HGB BLD-MCNC: 9.5 G/DL (ref 12–16)
IMM GRANULOCYTES # BLD AUTO: 0.02 X10*3/UL (ref 0–0.7)
IMM GRANULOCYTES NFR BLD AUTO: 0.4 % (ref 0–0.9)
KETONES UR STRIP.AUTO-MCNC: NEGATIVE MG/DL
LEUKOCYTE ESTERASE UR QL STRIP.AUTO: NEGATIVE
LYMPHOCYTES # BLD AUTO: 1.58 X10*3/UL (ref 1.2–4.8)
LYMPHOCYTES NFR BLD AUTO: 33.5 %
MCH RBC QN AUTO: 25.9 PG (ref 26–34)
MCHC RBC AUTO-ENTMCNC: 30.9 G/DL (ref 32–36)
MCV RBC AUTO: 84 FL (ref 80–100)
MONOCYTES # BLD AUTO: 0.62 X10*3/UL (ref 0.1–1)
MONOCYTES NFR BLD AUTO: 13.1 %
NEUTROPHILS # BLD AUTO: 2.19 X10*3/UL (ref 1.2–7.7)
NEUTROPHILS NFR BLD AUTO: 46.5 %
NITRITE UR QL STRIP.AUTO: NEGATIVE
NRBC BLD-RTO: 0 /100 WBCS (ref 0–0)
PH UR STRIP.AUTO: 5.5 [PH]
PLATELET # BLD AUTO: 68 X10*3/UL (ref 150–450)
POTASSIUM SERPL-SCNC: 4.3 MMOL/L (ref 3.4–5.1)
PROT UR STRIP.AUTO-MCNC: NEGATIVE MG/DL
RBC # BLD AUTO: 3.67 X10*6/UL (ref 4–5.2)
RBC # UR STRIP.AUTO: NEGATIVE /UL
SODIUM SERPL-SCNC: 135 MMOL/L (ref 133–145)
SP GR UR STRIP.AUTO: 1.02
UROBILINOGEN UR STRIP.AUTO-MCNC: ABNORMAL MG/DL
WBC # BLD AUTO: 4.7 X10*3/UL (ref 4.4–11.3)

## 2024-02-12 PROCEDURE — 2500000002 HC RX 250 W HCPCS SELF ADMINISTERED DRUGS (ALT 637 FOR MEDICARE OP, ALT 636 FOR OP/ED)

## 2024-02-12 PROCEDURE — 81003 URINALYSIS AUTO W/O SCOPE: CPT | Performed by: NURSE PRACTITIONER

## 2024-02-12 PROCEDURE — 36415 COLL VENOUS BLD VENIPUNCTURE: CPT | Performed by: INTERNAL MEDICINE

## 2024-02-12 PROCEDURE — 82947 ASSAY GLUCOSE BLOOD QUANT: CPT

## 2024-02-12 PROCEDURE — 2500000004 HC RX 250 GENERAL PHARMACY W/ HCPCS (ALT 636 FOR OP/ED): Performed by: INTERNAL MEDICINE

## 2024-02-12 PROCEDURE — 97116 GAIT TRAINING THERAPY: CPT | Mod: GP,CQ

## 2024-02-12 PROCEDURE — 2500000001 HC RX 250 WO HCPCS SELF ADMINISTERED DRUGS (ALT 637 FOR MEDICARE OP): Performed by: NURSE PRACTITIONER

## 2024-02-12 PROCEDURE — 2500000002 HC RX 250 W HCPCS SELF ADMINISTERED DRUGS (ALT 637 FOR MEDICARE OP, ALT 636 FOR OP/ED): Performed by: INTERNAL MEDICINE

## 2024-02-12 PROCEDURE — 83993 ASSAY FOR CALPROTECTIN FECAL: CPT | Performed by: NURSE PRACTITIONER

## 2024-02-12 PROCEDURE — 2500000001 HC RX 250 WO HCPCS SELF ADMINISTERED DRUGS (ALT 637 FOR MEDICARE OP): Performed by: INTERNAL MEDICINE

## 2024-02-12 PROCEDURE — 2500000004 HC RX 250 GENERAL PHARMACY W/ HCPCS (ALT 636 FOR OP/ED): Performed by: NURSE PRACTITIONER

## 2024-02-12 PROCEDURE — 85025 COMPLETE CBC W/AUTO DIFF WBC: CPT | Performed by: INTERNAL MEDICINE

## 2024-02-12 PROCEDURE — 97530 THERAPEUTIC ACTIVITIES: CPT | Mod: GP,CQ

## 2024-02-12 PROCEDURE — 97110 THERAPEUTIC EXERCISES: CPT | Mod: GP,CQ

## 2024-02-12 PROCEDURE — 2500000005 HC RX 250 GENERAL PHARMACY W/O HCPCS: Performed by: INTERNAL MEDICINE

## 2024-02-12 PROCEDURE — 1200000002 HC GENERAL ROOM WITH TELEMETRY DAILY

## 2024-02-12 PROCEDURE — 80048 BASIC METABOLIC PNL TOTAL CA: CPT | Performed by: INTERNAL MEDICINE

## 2024-02-12 RX ADMIN — PANTOPRAZOLE SODIUM 20 MG: 20 TABLET, DELAYED RELEASE ORAL at 06:22

## 2024-02-12 RX ADMIN — URSODIOL 300 MG: 300 CAPSULE ORAL at 22:52

## 2024-02-12 RX ADMIN — MORPHINE SULFATE 2 MG: 2 INJECTION, SOLUTION INTRAMUSCULAR; INTRAVENOUS at 00:32

## 2024-02-12 RX ADMIN — ERYTHROMYCIN 250 MG: 250 TABLET, DELAYED RELEASE ORAL at 12:01

## 2024-02-12 RX ADMIN — CEFTRIAXONE SODIUM 1 G: 1 INJECTION, SOLUTION INTRAVENOUS at 22:53

## 2024-02-12 RX ADMIN — SUCRALFATE 1 G: 1 SUSPENSION ORAL at 22:59

## 2024-02-12 RX ADMIN — FENOFIBRATE 160 MG: 160 TABLET ORAL at 12:01

## 2024-02-12 RX ADMIN — INSULIN LISPRO 9 UNITS: 100 INJECTION, SOLUTION INTRAVENOUS; SUBCUTANEOUS at 17:56

## 2024-02-12 RX ADMIN — LIDOCAINE 1 PATCH: 4 PATCH TOPICAL at 12:01

## 2024-02-12 RX ADMIN — SUCRALFATE 1 G: 1 SUSPENSION ORAL at 06:22

## 2024-02-12 RX ADMIN — ACETAMINOPHEN 650 MG: 325 TABLET ORAL at 22:50

## 2024-02-12 RX ADMIN — URSODIOL 300 MG: 300 CAPSULE ORAL at 12:02

## 2024-02-12 RX ADMIN — TRAZODONE HYDROCHLORIDE 25 MG: 50 TABLET ORAL at 22:52

## 2024-02-12 RX ADMIN — INSULIN LISPRO 9 UNITS: 100 INJECTION, SOLUTION INTRAVENOUS; SUBCUTANEOUS at 12:05

## 2024-02-12 RX ADMIN — ERYTHROMYCIN 250 MG: 250 TABLET, DELAYED RELEASE ORAL at 06:22

## 2024-02-12 RX ADMIN — TIZANIDINE 2 MG: 2 TABLET ORAL at 22:50

## 2024-02-12 RX ADMIN — ERYTHROMYCIN 250 MG: 250 TABLET, DELAYED RELEASE ORAL at 15:57

## 2024-02-12 RX ADMIN — KETOROLAC TROMETHAMINE 15 MG: 30 INJECTION, SOLUTION INTRAMUSCULAR; INTRAVENOUS at 18:27

## 2024-02-12 RX ADMIN — INSULIN LISPRO 6 UNITS: 100 INJECTION, SOLUTION INTRAVENOUS; SUBCUTANEOUS at 22:56

## 2024-02-12 RX ADMIN — DOCUSATE SODIUM 100 MG: 100 CAPSULE, LIQUID FILLED ORAL at 12:01

## 2024-02-12 RX ADMIN — ERYTHROMYCIN 250 MG: 250 TABLET, DELAYED RELEASE ORAL at 22:52

## 2024-02-12 RX ADMIN — URSODIOL 300 MG: 300 CAPSULE ORAL at 15:57

## 2024-02-12 RX ADMIN — INSULIN GLARGINE 20 UNITS: 100 INJECTION, SOLUTION SUBCUTANEOUS at 12:04

## 2024-02-12 RX ADMIN — INSULIN LISPRO 3 UNITS: 100 INJECTION, SOLUTION INTRAVENOUS; SUBCUTANEOUS at 00:32

## 2024-02-12 RX ADMIN — SUCRALFATE 1 G: 1 SUSPENSION ORAL at 15:57

## 2024-02-12 RX ADMIN — PANTOPRAZOLE SODIUM 20 MG: 20 TABLET, DELAYED RELEASE ORAL at 15:57

## 2024-02-12 RX ADMIN — KETOROLAC TROMETHAMINE 15 MG: 30 INJECTION, SOLUTION INTRAMUSCULAR; INTRAVENOUS at 12:16

## 2024-02-12 RX ADMIN — SPIRONOLACTONE 50 MG: 50 TABLET ORAL at 22:53

## 2024-02-12 RX ADMIN — SUCRALFATE 1 G: 1 SUSPENSION ORAL at 12:09

## 2024-02-12 ASSESSMENT — PAIN - FUNCTIONAL ASSESSMENT
PAIN_FUNCTIONAL_ASSESSMENT: 0-10
PAIN_FUNCTIONAL_ASSESSMENT: WONG-BAKER FACES
PAIN_FUNCTIONAL_ASSESSMENT: 0-10

## 2024-02-12 ASSESSMENT — COGNITIVE AND FUNCTIONAL STATUS - GENERAL
MOBILITY SCORE: 22
WALKING IN HOSPITAL ROOM: A LITTLE
DAILY ACTIVITIY SCORE: 24
CLIMB 3 TO 5 STEPS WITH RAILING: A LITTLE

## 2024-02-12 ASSESSMENT — PAIN DESCRIPTION - LOCATION
LOCATION: ABDOMEN
LOCATION: ABDOMEN

## 2024-02-12 ASSESSMENT — PAIN SCALES - GENERAL
PAINLEVEL_OUTOF10: 0 - NO PAIN
PAINLEVEL_OUTOF10: 6
PAINLEVEL_OUTOF10: 0 - NO PAIN
PAINLEVEL_OUTOF10: 6
PAINLEVEL_OUTOF10: 7
PAINLEVEL_OUTOF10: 0 - NO PAIN
PAINLEVEL_OUTOF10: 0 - NO PAIN

## 2024-02-12 NOTE — NURSING NOTE
This nurse notified  that patient is reporting dizziness and blood pressure is 107/63 and heart rate is 81, and blood sugar 131. This nurse was given instruction to give ordered lantus and to recheck blood pressure. Will reassess blood pressure and update . Will continue to monitor patient to ensure patient safety.

## 2024-02-12 NOTE — CARE PLAN
Problem: Pain  Goal: My pain/discomfort is manageable  2/12/2024 0051 by Nataliia Mejia RN  Outcome: Progressing  2/12/2024 0050 by Nataliia Mejia RN  Outcome: Progressing     Problem: Safety  Goal: Patient will be injury free during hospitalization  2/12/2024 0051 by Nataliia Mejia RN  Outcome: Progressing  2/12/2024 0050 by Nataliia Mejia RN  Outcome: Progressing  Goal: I will remain free of falls  2/12/2024 0051 by Nataliia Mejia RN  Outcome: Progressing  2/12/2024 0050 by Nataliia Mejia RN  Outcome: Progressing     Problem: Daily Care  Goal: Daily care needs are met  2/12/2024 0051 by Nataliia Mejia RN  Outcome: Progressing  2/12/2024 0050 by Nataliia Mejia RN  Outcome: Progressing     Problem: Psychosocial Needs  Goal: Demonstrates ability to cope with hospitalization/illness  2/12/2024 0051 by Nataliia Mejia RN  Outcome: Progressing  2/12/2024 0050 by Nataliia Mejia RN  Outcome: Progressing  Goal: Collaborate with me, my family, and caregiver to identify my specific goals  2/12/2024 0051 by Nataliia Mejia RN  Outcome: Progressing  2/12/2024 0050 by Nataliia Mejia RN  Outcome: Progressing     Problem: Discharge Barriers  Goal: My discharge needs are met  2/12/2024 0051 by Nataliia Mejia RN  Outcome: Progressing  2/12/2024 0050 by Nataliia Mejia RN  Outcome: Progressing     Problem: Pain  Goal: Takes deep breaths with improved pain control throughout the shift  2/12/2024 0051 by Nataliia Mejia RN  Outcome: Progressing  2/12/2024 0050 by Nataliia Mejia RN  Outcome: Progressing  Goal: Turns in bed with improved pain control throughout the shift  2/12/2024 0051 by Nataliia Mejia RN  Outcome: Progressing  2/12/2024 0050 by Nataliia Mejia RN  Outcome: Progressing  Goal: Walks with improved pain control throughout the shift  2/12/2024 0051 by Nataliia Mejia RN  Outcome: Progressing  2/12/2024 0050 by Nataliia Mejia RN  Outcome:  Progressing  Goal: Performs ADL's with improved pain control throughout shift  2/12/2024 0051 by Nataliia Mejia RN  Outcome: Progressing  2/12/2024 0050 by Nataliia Mejia RN  Outcome: Progressing  Goal: Participates in PT with improved pain control throughout the shift  2/12/2024 0051 by Nataliia Mejia RN  Outcome: Progressing  2/12/2024 0050 by Nataliia Mejia RN  Outcome: Progressing  Goal: Free from opioid side effects throughout the shift  2/12/2024 0051 by Nataliia Mejia RN  Outcome: Progressing  2/12/2024 0050 by Nataliia Mejia RN  Outcome: Progressing  Goal: Free from acute confusion related to pain meds throughout the shift  2/12/2024 0051 by Nataliia Mejia RN  Outcome: Progressing  2/12/2024 0050 by Nataliia Mejia RN  Outcome: Progressing     Problem: Pain  Goal: STG - Patients pain is managed to allow active participation in daily activities  2/12/2024 0051 by Nataliia Mejia RN  Outcome: Progressing  2/12/2024 0050 by Nataliia Mejia RN  Outcome: Progressing   The patient's goals for the shift include      The clinical goals for the shift include pt will remain safe during shift

## 2024-02-12 NOTE — CONSULTS
"Inpatient Diabetes Education Consult    Reason for Visit:  Alfonzo Flanagan is a 49 y.o. female who presents to ED on 2/7/2024 at 13:51 with c/o \"abdominal pain, nausea & vomiting\".  BS in ED was 558 mg/dl.      Consulting Service/Provider: WALDEMAR Avery    Visit Type: Initial visit    Visit Modality: In-person    Discharge Equipment/Supply Needs:  Patient states, \"she needs a glucometer, test strips,, lancets and lancing device. Also needs prescription for insulin pen.    Met with patient for 30 minutes to discuss self care management of T2DM. Patient states, Korean is her first language but she understands English.    Patient states, [facility] doctor took her off all her insulin about 1-2 months ago and not sure why.  States, \"only taking Metformin now but not sure of dose\".  Admits to testing her BS at least one time a day but states, \"she doesn't do it more because \"it hurts her fingers\".  Encouraged her to use the sides of her fingers not the pads of her fingers.  Verbalizes understanding.  Discussed HbA1c 11.3% dated 2/9/24, discussed long term complications of hyperglycemia on the body.  Verbalizes understanding.  At the bedside she is drinking a sugary drink from [employer]  Discussed how she needs to only drink fluids without sugar, such as diet pop, diet tea, water, diet fruit juices.  Patient admits to drinking a log of apple juice at home.  Encouraged her again to only drink diet drinks or water.  Verbalizes understanding.      Encouraged outpatient diabetes education for free but patient denies stating, \"she doesn't need it\".    BS today 2/12/24 at 06:21 was 131 mg/dl & 11:15 was 254 mg/dl. Patient getting Lantus 20 units qAM, Lispro 0-15 units q4hour, regular insulin 0-10 SSI qHS      Patient History and Assessment:    Previous diagnosis: Type 2  Patient known to Diabetes Education department: No  Treatment prior to hospital admission: Oral medications Metformin,   Insulin: Rapid acting, Long acting    PTA " "Medications:    Current Outpatient Medications   Medication Instructions    acetaminophen (TYLENOL) 325 mg, oral, Every 6 hours PRN    atorvastatin (LIPITOR) 80 mg, oral, Daily    docusate sodium (COLACE) 100 mg, oral, 2 times daily    fenofibrate (TRICOR) 145 mg, oral, Daily    furosemide (LASIX) 40 mg, oral, Daily    insulin lispro (HumaLOG) 100 unit/mL injection subcutaneous, 3 times daily with meals, Take as directed per insulin instructions.    Lantus U-100 Insulin 20 Units, subcutaneous, Every morning, Take as directed per insulin instructions.    pantoprazole (PROTONIX) 20 mg, oral, 2 times daily before meals, Do not crush, chew, or split.    spironolactone (ALDACTONE) 50 mg, oral, 2 times daily    traZODone (DESYREL) 25 mg, oral, Nightly    ursodiol (ACTIGALL) 300 mg, oral, 3 times daily       Glucose   Date/Time Value Ref Range Status   02/12/2024 04:55  (H) 65 - 99 mg/dL Final   02/10/2024 04:40  (H) 65 - 99 mg/dL Final   02/09/2024 05:20  (H) 65 - 99 mg/dL Final   02/08/2024 06:33  (H) 65 - 99 mg/dL Final   02/07/2024 02:13  (HH) 65 - 99 mg/dL Final     Comment:     Result rechecked   01/29/2024 05:02  (H) 65 - 99 mg/dL Final   01/28/2024 02:05  (H) 65 - 99 mg/dL Final   01/27/2024 05:18  (H) 65 - 99 mg/dL Final   01/26/2024 08:33  (H) 65 - 99 mg/dL Final   06/23/2023 01:47  (H) 65 - 99 MG/DL Final     Comment:     RESULT CHECKED   VERIPHY     02/20/2023 12:05  (H) 65 - 99 MG/DL Final   02/06/2023 07:38  (H) 65 - 99 MG/DL Final   01/06/2023 06:59  (H) 65 - 99 MG/DL Final   11/16/2022 10:13  (H) 65 - 99 MG/DL Final   11/10/2022 11:38  (H) 65 - 99 MG/DL Final   10/02/2022 09:23  (H) 65 - 99 MG/DL Final   09/21/2022 01:08  (H) 65 - 99 MG/DL Final   09/10/2022 03:35  (H) 65 - 99 MG/DL Final   07/30/2022 11:46  (H) 65 - 99 MG/DL Final     No results found for: \"CPEPTIDE\"  Hemoglobin A1C   Date " Value Ref Range Status   02/09/2024 11.3 (H) See below % Final   10/21/2023 11.7 (H) 4.3 - 5.6 % Final     Comment:     American Diabetes Association guidelines indicate that patients with HgbA1c in the range 5.7-6.4% are at increased risk for development of diabetes, and intervention by lifestyle modification may be beneficial. HgbA1c greater or equal to 6.5% is considered diagnostic of diabetes.   09/07/2023 11.5 (H) 4.3 - 5.6 % Final     Comment:     American Diabetes Association guidelines indicate that patients with HgbA1c in the range 5.7-6.4% are at increased risk for development of diabetes, and intervention by lifestyle modification may be beneficial. HgbA1c greater or equal to 6.5% is considered diagnostic of diabetes.   08/08/2023 10.7 (H) 4.3 - 5.6 % Final     Comment:     American Diabetes Association guidelines indicate that patients with HgbA1c in the range 5.7-6.4% are at increased risk for development of diabetes, and intervention by lifestyle modification may be beneficial. HgbA1c greater or equal to 6.5% is considered diagnostic of diabetes.   05/27/2023 10.0 (H) 4.3 - 5.6 % Final     Comment:     American Diabetes Association guidelines indicate that patients with HgbA1c in the range 5.7-6.4% are at increased risk for development of diabetes, and intervention by lifestyle modification may be beneficial. HgbA1c greater or equal to 6.5% is considered diagnostic of diabetes.       Patient Learning/Readiness Assessment:  Discussed target BS ranges, s/s of hypo-hyper glycemia and actions to take.  Patient verbalizes understanding.            Education Outcome/Recommendations:        Recommendations for bedside nursing:  Pt is drinking a sugary drink from [employer], encouraged her to drink only diet drinks.  Please keep encouraging her to only drink sugar free fluids.     Recommendations for Providers: Inpatient consult to Dietitian    Patient denies outpatient diabetes education.

## 2024-02-12 NOTE — PROGRESS NOTES
Reviewed patient record given persistent epigastric pain. She has had 22 CT scans of the abdomen and pelvis within the past 12 months. In Fall 2023, she was seen by [facility] having a CT enterography. There was narrow and inlammation within small intestine. No diagnosed IBD but had + lactoferrin level as well. She was scheduled for an outpatient small bowel capsule at some point as well     She will likely need repeat endoscopy at biopsy

## 2024-02-12 NOTE — PROGRESS NOTES
Physical Therapy    Physical Therapy Treatment    Patient Name: Alfonzo Flanagan  MRN: [id number]  Today's Date: 2/12/2024  Time Calculation  Start Time: 1413  Stop Time: 1440  Time Calculation (min): 27 min       Assessment/Plan   PT Assessment  End of Session Communication: Bedside nurse  End of Session Patient Position: Up in chair, Alarm on  PT Plan  Inpatient/Swing Bed or Outpatient: Inpatient  PT Plan  Treatment/Interventions: Gait training, Stair training, Balance training, Therapeutic exercise, Endurance training  PT Plan: Skilled PT  PT Frequency: 3 times per week  PT Discharge Recommendations: Low intensity level of continued care  Equipment Recommended upon Discharge: Wheeled walker  PT Recommended Transfer Status: Stand by assist  PT - OK to Discharge: Yes      General Visit Information:   PT  Visit  PT Received On: 02/12/24  General  Prior to Session Communication: Bedside nurse  Patient Position Received: Bed, 2 rail up (EOB)  General Comment: Cleared by nursing for therapy, Prior to treatment patient at EOB and agreeable to tx.    Subjective        Objective   Pain:  Pain Assessment  Pain Assessment: Hall-Baker FACES  Pain Score: 0 - No pain       Treatments:  Therapeutic Exercise  Therapeutic Exercise Performed: Yes  Therapeutic Exercise Activity 1: Bilateral standing hip flexion x5/ Bilateral seated hip flexion x5  Therapeutic Exercise Activity 2: Bilateral hamstring curls x15  Therapeutic Exercise Activity 3: Bilateral knee extension x15  Therapeutic Exercise Activity 4: Bilateral standing calf raises x15  Therapeutic Exercise Activity 5: Glute sets x15    Ambulation/Gait Training  Ambulation/Gait Training Performed: Yes  Ambulation/Gait Training 1  Surface 1: Level tile  Device 1: Rolling walker  Gait Support Devices: Gait belt  Assistance 1: Close supervision  Comments/Distance (ft) 1: 50' with RW, decreased step height , decreased step length,  and narrow SLADE.  Transfers  Transfer:  Yes  Transfer 1  Transfer From 1: Sit to  Transfer to 1: Stand  Transfer Device 1: Walker  Transfer Level of Assistance 1: Close supervision  Transfers 2  Transfer From 2: Stand to  Transfer to 2: Sit  Transfer Device 2: Walker  Transfer Level of Assistance 2: Close supervision    Stairs: Yes  Stair Training: Ascend/Descend 1 flight, 1 rail (left) reciprocating pattern, close supervision.     Outcome Measures:  [facility] Basic Mobility  Turning from your back to your side while in a flat bed without using bedrails: None  Moving from lying on your back to sitting on the side of a flat bed without using bedrails: None  Moving to and from bed to chair (including a wheelchair): None  Standing up from a chair using your arms (e.g. wheelchair or bedside chair): None  To walk in hospital room: A little  Climbing 3-5 steps with railing: A little  Basic Mobility - Total Score: 22      DARREL EMMANUELELL, S-PTA           Encounter Problems       Encounter Problems (Active)       Mobility       STG - Patient will ambulate 150' with rolling walker and modified independence. (Progressing)       Start:  02/08/24    Expected End:  02/22/24            STG - Patient will ascend and descend a flight of stairs with use of one handrail and supervision for safety. (Met)       Start:  02/08/24    Expected End:  02/22/24               Pain

## 2024-02-12 NOTE — PROGRESS NOTES
Alfonzo Flanagan is a 49 y.o. female on day 3 of admission presenting with Abdominal pain.    Subjective   Patient seen and examined.  Resting in bed in no acute distress.  Awake alert oriented x 3.  Complains of abdominal pain, nausea.  No vomiting.  Tolerating diet without increased pain or nausea.  + Bowel movements.  Increased urinary symptoms.  No fevers chills or sweats.    Spoke with nursing, diuretics held yesterday due to low blood pressure.    Objective     Physical Exam  Vitals reviewed.   Constitutional:       General: She is not in acute distress.     Appearance: She is obese. She is not ill-appearing, toxic-appearing or diaphoretic.   HENT:      Head: Normocephalic and atraumatic.      Right Ear: Tympanic membrane normal.      Left Ear: Tympanic membrane normal.      Nose: Nose normal.      Mouth/Throat:      Mouth: Mucous membranes are moist.      Pharynx: Oropharynx is clear.   Eyes:      Extraocular Movements: Extraocular movements intact.      Conjunctiva/sclera: Conjunctivae normal.      Pupils: Pupils are equal, round, and reactive to light.   Cardiovascular:      Rate and Rhythm: Normal rate and regular rhythm.      Pulses: Normal pulses.      Heart sounds: Normal heart sounds.   Pulmonary:      Effort: Pulmonary effort is normal. No respiratory distress.      Breath sounds: Normal breath sounds. No wheezing, rhonchi or rales.      Comments: Deferred  Abdominal:      General: Bowel sounds are normal. There is distension.      Palpations: Abdomen is soft.      Tenderness: There is abdominal tenderness.   Genitourinary:     Comments: Deferred  Musculoskeletal:         General: No swelling or tenderness. Normal range of motion.      Cervical back: Normal range of motion and neck supple.   Skin:     General: Skin is warm and dry.      Capillary Refill: Capillary refill takes less than 2 seconds.   Neurological:      General: No focal deficit present.      Mental Status: She is alert and oriented  "to person, place, and time.   Psychiatric:         Mood and Affect: Mood normal.         Behavior: Behavior normal.       Last Recorded Vitals  Blood pressure 107/63, pulse 81, temperature 36.3 °C (97.3 °F), temperature source Oral, resp. rate 17, height 1.575 m (5' 2\"), weight 72.6 kg (160 lb), SpO2 99 %.    Intake/Output last 3 Shifts:  I/O last 3 completed shifts:  In: 1448.4 (20 mL/kg) [I.V.:1448.4 (20 mL/kg)]  Out: - (0 mL/kg)   Weight: 72.6 kg     Relevant Results  Results for orders placed or performed during the hospital encounter of 02/07/24 (from the past 24 hour(s))   POCT GLUCOSE   Result Value Ref Range    POCT Glucose 241 (H) 74 - 99 mg/dL   POCT GLUCOSE   Result Value Ref Range    POCT Glucose 217 (H) 74 - 99 mg/dL   POCT GLUCOSE   Result Value Ref Range    POCT Glucose 172 (H) 74 - 99 mg/dL   POCT GLUCOSE   Result Value Ref Range    POCT Glucose 165 (H) 74 - 99 mg/dL   CBC and Auto Differential   Result Value Ref Range    WBC 4.7 4.4 - 11.3 x10*3/uL    nRBC 0.0 0.0 - 0.0 /100 WBCs    RBC 3.67 (L) 4.00 - 5.20 x10*6/uL    Hemoglobin 9.5 (L) 12.0 - 16.0 g/dL    Hematocrit 30.7 (L) 36.0 - 46.0 %    MCV 84 80 - 100 fL    MCH 25.9 (L) 26.0 - 34.0 pg    MCHC 30.9 (L) 32.0 - 36.0 g/dL    RDW 17.4 (H) 11.5 - 14.5 %    Platelets 68 (L) 150 - 450 x10*3/uL    Neutrophils % 46.5 40.0 - 80.0 %    Immature Granulocytes %, Automated 0.4 0.0 - 0.9 %    Lymphocytes % 33.5 13.0 - 44.0 %    Monocytes % 13.1 2.0 - 10.0 %    Eosinophils % 5.9 0.0 - 6.0 %    Basophils % 0.6 0.0 - 2.0 %    Neutrophils Absolute 2.19 1.20 - 7.70 x10*3/uL    Immature Granulocytes Absolute, Automated 0.02 0.00 - 0.70 x10*3/uL    Lymphocytes Absolute 1.58 1.20 - 4.80 x10*3/uL    Monocytes Absolute 0.62 0.10 - 1.00 x10*3/uL    Eosinophils Absolute 0.28 0.00 - 0.70 x10*3/uL    Basophils Absolute 0.03 0.00 - 0.10 x10*3/uL   Basic Metabolic Panel   Result Value Ref Range    Glucose 148 (H) 65 - 99 mg/dL    Sodium 135 133 - 145 mmol/L    Potassium " 4.3 3.4 - 5.1 mmol/L    Chloride 105 97 - 107 mmol/L    Bicarbonate 24 24 - 31 mmol/L    Urea Nitrogen 11 8 - 25 mg/dL    Creatinine 0.60 0.40 - 1.60 mg/dL    eGFR >90 >60 mL/min/1.73m*2    Calcium 8.0 (L) 8.5 - 10.4 mg/dL    Anion Gap 6 <=19 mmol/L   POCT GLUCOSE   Result Value Ref Range    POCT Glucose 131 (H) 74 - 99 mg/dL   POCT GLUCOSE   Result Value Ref Range    POCT Glucose 254 (H) 74 - 99 mg/dL     No results found.    Scheduled medications  atorvastatin, 80 mg, oral, Daily  cefTRIAXone, 1 g, intravenous, q24h  docusate sodium, 100 mg, oral, BID  erythromycin, 250 mg, oral, Before meals & nightly  fenofibrate, 160 mg, oral, Daily  furosemide, 40 mg, oral, Daily  insulin glargine, 20 Units, subcutaneous, q AM  insulin lispro, 0-15 Units, subcutaneous, q4h  insulin regular, 0-10 Units, subcutaneous, Nightly at 0300  lidocaine, 1 patch, transdermal, Daily  pantoprazole, 20 mg, oral, BID AC  polyethylene glycol, 17 g, oral, BID  spironolactone, 50 mg, oral, BID  sucralfate, 1 g, oral, Before meals & nightly  traZODone, 25 mg, oral, Nightly  ursodiol, 300 mg, oral, TID      Continuous medications  sodium chloride 0.9%, 75 mL/hr, Last Rate: 75 mL/hr (02/11/24 2108)      PRN medications  PRN medications: acetaminophen **OR** acetaminophen **OR** acetaminophen, acetaminophen, benzocaine-menthoL, dextromethorphan-guaifenesin, dextrose 10 % in water (D10W), dextrose, glucagon, guaiFENesin, ketorolac, morphine, tiZANidine    ASSESSMENT:  Abdominal pain, nausea (vomiting - resolved)   Back pain  Elevated lipase - chronic pancreatitis  Hepatic cirrhosis, ascites  Portal hypertension  Elevated LFT's  Lactic acidosis  Pyuria - dysuria  Type 2 diabetes mellitus  Hyperglycemia  Hypertriglyceridemia  Hyperlipidemia  Normocytic anemia  Thrombocytopenia    PLAN:  Patient continues to complain of abdominal pain and nausea.  Gastroenterology following.  Input appreciated.  Monitor symptoms.  If no improvement, further plan per  Gastroenterology.  Erythromycin.  Diet.  Symptom control.  Ppi.  P.r.n analgesics, anti-emetics.  No indication for paracentesis on CT.  Continue Lasix, Aldactone.  Repeat urinalysis, culture.  Continue IV Ceftriaxone for now.  Monitor temperature and white blood cell count.  Glucose reviewed.  Continue insulin.  Adjust insulin as needed for glycemic control.  Increase activity.  Ambulate.  Monitor CMP, CBC.  Supportive care.  Patient reassured.  Case management following for discharge planning.    ADDENDUM:  Spoke with nursing, orthostatic blood pressures reviewed, negative, asymptomatic.  Monitor.      Leny Daniels, APRN-CNP

## 2024-02-12 NOTE — NURSING NOTE
This nurse notified Sol VICTORIA that patient is reporting dizziness and blood pressure was 107/63. This nurse notified her that over the weekend patients blood pressures were running low and Dr. Lawrence held lasix, lipitor, and spirolactone. This nurse received verbal orders to get orthostatic blood pressure on patient, and to hold patients lasix, lipitor, and spirolactone. Will continue to monitor patient to ensure patient safety.

## 2024-02-12 NOTE — NURSING NOTE
"Patient alert and oriented x 4. Patient is in bed, watching television. Patient assessed at this time. Patient has clear lung sounds upon auscultation. Patient is on room air. Patient is on TELE. Patients abdomen is round, distended, tender with active bowel sounds. Patient reports feeling \"weak\" at this time. Providers are aware and notified. This nurse completed ortho static blood pressures and notified Sol VICTORIA of results. Patient denying any nausea, diarrhea or vomiting at this time. Patient does not appear to be in any distress. Fall precautions reviewed and implemented. Bed brakes locked, bed in lowest position, call light within reach, bed alarm activated. Will continue to monitor patient to ensure patient safety.   "

## 2024-02-12 NOTE — PROGRESS NOTES
"Alfonzo Flanagan is a 49 y.o. female on day 3 of admission presenting with Abdominal pain.    Subjective   Still feeling a little \"jumpy\" today. She had some nausea overnight. Epigastric pain a little better        Objective     Physical Exam  Constitutional:       Appearance: Normal appearance.   HENT:      Head: Normocephalic and atraumatic.      Mouth/Throat:      Mouth: Mucous membranes are moist.   Pulmonary:      Effort: Pulmonary effort is normal.   Abdominal:      General: There is no distension.      Palpations: Abdomen is soft.      Tenderness: There is abdominal tenderness. There is no guarding.   Musculoskeletal:         General: Normal range of motion.      Cervical back: Normal range of motion.   Skin:     General: Skin is warm and dry.   Neurological:      General: No focal deficit present.      Mental Status: She is alert. Mental status is at baseline.   Psychiatric:         Mood and Affect: Mood normal.         Last Recorded Vitals  Blood pressure 114/55, pulse 81, temperature 36.8 °C (98.2 °F), temperature source Oral, resp. rate 18, height 1.575 m (5' 2\"), weight 72.6 kg (160 lb), SpO2 100 %.  Intake/Output last 3 Shifts:  I/O last 3 completed shifts:  In: 1448.4 (20 mL/kg) [I.V.:1448.4 (20 mL/kg)]  Out: - (0 mL/kg)   Weight: 72.6 kg     Relevant Results                Results for orders placed or performed during the hospital encounter of 02/07/24 (from the past 24 hour(s))   POCT GLUCOSE   Result Value Ref Range    POCT Glucose 240 (H) 74 - 99 mg/dL   POCT GLUCOSE   Result Value Ref Range    POCT Glucose 241 (H) 74 - 99 mg/dL   POCT GLUCOSE   Result Value Ref Range    POCT Glucose 217 (H) 74 - 99 mg/dL   POCT GLUCOSE   Result Value Ref Range    POCT Glucose 172 (H) 74 - 99 mg/dL   POCT GLUCOSE   Result Value Ref Range    POCT Glucose 165 (H) 74 - 99 mg/dL   CBC and Auto Differential   Result Value Ref Range    WBC 4.7 4.4 - 11.3 x10*3/uL    nRBC 0.0 0.0 - 0.0 /100 WBCs    RBC 3.67 (L) 4.00 - " 5.20 x10*6/uL    Hemoglobin 9.5 (L) 12.0 - 16.0 g/dL    Hematocrit 30.7 (L) 36.0 - 46.0 %    MCV 84 80 - 100 fL    MCH 25.9 (L) 26.0 - 34.0 pg    MCHC 30.9 (L) 32.0 - 36.0 g/dL    RDW 17.4 (H) 11.5 - 14.5 %    Platelets 68 (L) 150 - 450 x10*3/uL    Neutrophils % 46.5 40.0 - 80.0 %    Immature Granulocytes %, Automated 0.4 0.0 - 0.9 %    Lymphocytes % 33.5 13.0 - 44.0 %    Monocytes % 13.1 2.0 - 10.0 %    Eosinophils % 5.9 0.0 - 6.0 %    Basophils % 0.6 0.0 - 2.0 %    Neutrophils Absolute 2.19 1.20 - 7.70 x10*3/uL    Immature Granulocytes Absolute, Automated 0.02 0.00 - 0.70 x10*3/uL    Lymphocytes Absolute 1.58 1.20 - 4.80 x10*3/uL    Monocytes Absolute 0.62 0.10 - 1.00 x10*3/uL    Eosinophils Absolute 0.28 0.00 - 0.70 x10*3/uL    Basophils Absolute 0.03 0.00 - 0.10 x10*3/uL   Basic Metabolic Panel   Result Value Ref Range    Glucose 148 (H) 65 - 99 mg/dL    Sodium 135 133 - 145 mmol/L    Potassium 4.3 3.4 - 5.1 mmol/L    Chloride 105 97 - 107 mmol/L    Bicarbonate 24 24 - 31 mmol/L    Urea Nitrogen 11 8 - 25 mg/dL    Creatinine 0.60 0.40 - 1.60 mg/dL    eGFR >90 >60 mL/min/1.73m*2    Calcium 8.0 (L) 8.5 - 10.4 mg/dL    Anion Gap 6 <=19 mmol/L   POCT GLUCOSE   Result Value Ref Range    POCT Glucose 131 (H) 74 - 99 mg/dL     CT abdomen pelvis w IV contrast    Result Date: 2/7/2024  Interpreted By:  Bebeto Gottlieb, STUDY: CT ABDOMEN PELVIS W IV CONTRAST; 2/7/2024 3:43 pm   INDICATION: Signs/Symptoms:epigastric colicky pain.   COMPARISON: January 26, 2024.   ACCESSION NUMBER(S): [id number]   ORDERING CLINICIAN: PRANEETH BARRERA   TECHNIQUE: Oral contrast was not administered. 75 ml Omnipaque 350 was injected intravenously. CT of the Abdomen and Pelvis with intravenous contrast was performed. Axial, sagittal and coronal reformatted images were reviewed.   All CT examinations are performed with 1 or more of the following dose reduction techniques: Automated exposure control, adjustment of mA and/or kv according to  patient's size, or use of iterative reconstruction techniques.   FINDINGS: Lower Chest: Unremarkable.   Abdomen: Liver: There is diffuse nodular contour of the liver. There is recanalization of the umbilical vein collateral vessels are noted in the gastrohepatic ligament. Esophageal varices are noted. Bile Ducts: Normal caliber. Gallbladder: Surgical clips are noted in the gallbladder fossa. Pancreas: Unremarkable. Spleen: Unremarkable. Adrenals: Normal. Kidneys: Normal.   Pelvis: No pelvic masses. Bladder: Unremarkable.   Bowel: No bowel wall thickening or abnormal distention to suggest bowel obstruction.. Mesenteric Lymph Nodes: No enlarged mesenteric lymph nodes. Peritoneum: Free fluid is noted lateral to the liver extending into the subhepatic recess and right pericolic gutter. Free fluid is also noted lateral to the spleen. Free fluid is also noted within the pelvis. Vessels: Unremarkable Retroperitoneum: No retroperitoneal adenopathy. Abdominal Wall: Umbilical hernia contains fluid Bones: No acute bony abnormalities.       Findings consistent with cirrhosis and portal hypertension. Free fluid within the peritoneal cavity is again noted, though decreased as compared to previous exam   MACRO: none   Signed by: Bebeto Gottlieb 2/7/2024 4:52 PM Dictation workstation:   [id number]    US guided abdominal paracentesis    Result Date: 1/31/2024  Interpreted By:  Cornelius Weber, STUDY: US GUIDED ABDOMINAL PARACENTESIS; 1/29/2024 10:04 am   INDICATION: Signs/Symptoms:ascites.   COMPARISON: None.   ACCESSION NUMBER(S): [id number]   ORDERING CLINICIAN: ANUPAM FRANCIS   TECHNIQUE: Ultrasound-guided paracentesis   FINDINGS: Informed consent obtained. Patient positioned supine. Right lower quadrant prepped, draped and anesthetized. Under ultrasound guidance, 8 Romanian centesis sheath needle inserted into peritoneal cavity. 3.5 Lof clear yellowfluid aspirated. Patient tolerated procedure well       Ultrasound-guided  paracentesis.   Signed by: Cornelius Weber 1/31/2024 3:36 PM Dictation workstation:   [id number]    CT abdomen pelvis w IV contrast    Result Date: 1/26/2024  Interpreted By:  Goyo Peterson, STUDY: CT ABDOMEN PELVIS W IV CONTRAST; 1/26/2024 10:52 am   INDICATION: Signs/Symptoms:abd pain ascites;   COMPARISON: None   ACCESSION NUMBER(S): [id number]   ORDERING CLINICIAN: LILLIE IYER   TECHNIQUE: Contiguous axial images of the abdomen/pelvis were performed with IV contrast. 75 ml of Omnipaque 350 was utilized. Coronal and sagittal reformatted images were also obtained. All CT examinations are performed with 1 or more of the following dose reduction techniques: Automated exposure control, adjustment of mA and/or kv according to patient's size, or use of iterative reconstruction techniques.     FINDINGS: The liver is nodular in contour consistent with cirrhosis. No focal hepatic lesions are seen. Prior cholecystectomy with surgical clips in the gallbladder fossa. The common bile duct, pancreas, and adrenal glands are unremarkable. The spleen is mildly enlarged measuring 13.7 cm in length.   The kidneys enhance symmetrically. No urolithiasis is seen. No hydroureteronephrosis is seen.   The visualized aorta is unremarkable.   The small bowel is not dilated. The appendix is not visualized however there has no evidence to suggest appendicitis. The colon is unremarkable with no evidence for acute inflammatory process.   Mild-moderate volume ascites throughout the abdomen and pelvis. There is also mild diffuse anasarca.   The bladder is well distended with no gross wall thickening.   The visualized osseous structures are intact.   Limited images of the lower thorax are unremarkable.       Hepatic cirrhosis. No focal hepatic lesion.   Mild splenomegaly.   Mild-moderate volume ascites throughout the abdomen and pelvis.   Signed by: Goyo Peterson 1/26/2024 12:54 PM Dictation workstation:   [id number]    US  ASCITES SURVEY    Result Date: 1/22/2024  * * *Final Report* * * DATE OF EXAM: Jan 22 2024 10:27AM   EUU   1016  -  US ASCITES SURVEY  / ACCESSION #  [id number] PROCEDURE REASON: ascites      * * * * Physician Interpretation * * * * RESULT: US ASCITES SURVEY HISTORY: Ascites TECHNIQUE: Grayscale ultrasound imaging was performed in the area of concern and images were saved to the permanent image archive. RESULT: See impression    IMPRESSION: Ascites survey was obtained throughout the abdomen. Mild amount of ascites in all 4 quadrants of the abdomen. No large single pocket was present and therapeutic paracentesis was not performed at this time. Transcribed Using Voice Recognition Transcribe Date/Time: Jan 22 2024 10:46A Dictated by: LEENA VINSON MD This examination was interpreted and the report reviewed and electronically signed by: LEENA VINSON MD on Jan 22 2024 10:48AM  EST    CT ABD/PEL WO IVCON    Result Date: 1/21/2024  * * *Final Report* * * DATE OF EXAM: Jan 21 2024  9:43AM   [facility]   0531  -  CT ABD/PEL WO IVCON  / ACCESSION #  [id number] PROCEDURE REASON: Bowel obstruction suspected      * * * * Physician Interpretation * * * * RESULT: EXAMINATION:  CT ABDOMEN AND PELVIS WITHOUT IV CONTRAST CLINICAL HISTORY: Bowel obstruction suspected TECHNIQUE: Non-IV contrast imaging of the abdomen and pelvis was performed using standard technique, scanning from just above the dome of the diaphragm to the symphysis pubis.  Unenhanced imaging is limited for the evaluation of some intra-abdominal and pelvic pathology. MQ:  CTAPWO_3 Contrast: IV: None : ml of CT Radiation dose: Integrated Dose-length product (DLP) for this visit =   428 mGy*cm. CT Dose Reduction Employed: Automated exposure control(AEC) and iterative recon COMPARISON: CT 01/10/2024. RESULT: Abdomen / Pelvis: Liver: Liver has a nodular contour and is atrophic, consistent with cirrhosis.  No focal hepatic lesions within limitations of this noncontrasted study.  Biliary: Gallbladder is absent. Spleen: No splenomegaly. Pancreas: Unremarkable. Adrenals: No mass. Kidneys: Kidneys are symmetric in size without hydronephrosis or renal stones. GI Tract: Diffuse gastric wall thickening. Colon is normal in caliber without obstruction.  Wall thickening of the transverse colon at the splenic flexure.  Appendix not visualized. Small bowel is normal in caliber without obstruction.  Walls of the small bowel are edematous. Lymph Nodes: No lymphadenopathy. Mesentery/peritoneum: Moderate volume ascites. Retroperitoneum: No mass. Vasculature: Arterial atherosclerotic disease without aneurysm. Pelvis: Urinary bladder is unremarkable.  Uterus is present.  No pelvic lymphadenopathy. Bones/Soft Tissues: Fat-containing ventral wall hernias.  Subcutaneous edema.  No suspicious osseous lesions.  L5 pars defect on the left.  Mild degenerative changes in thoracolumbar spine.. Lower thorax: 0.9 cm nodule in the right middle lobe (image 4, 2)  (topogram) images: No additional findings.    IMPRESSION: 1.  Cirrhotic hepatic morphology with moderate volume ascites 2.  Wall thickening of the stomach, could be reactive due to surrounding ascites but gastritis is possible. 3.  Walls of the small bowel and colon are edematous, likely reactive due to adjacent ascites. 4.  No evidence of small bowel obstruction. 5.  0.9 cm nodule in the right middle lobe.  Recommend short-term follow-up in 3 months to document stability. ACTIONABLE RESULT: FOLLOW-UP Acuity: Actionable Findings: Thoracic-Lung nodules Routing code:  RI_1 Recommendation: CT Chest WO IVCON Time Frame: Additional evaluation as described in the impression COMMUNICATION: Results will be communicated with the ordering provider via Epic staff message or phone message by [facility] within 2 business days of report finalization. --END OF FINDING-- Transcribed Using Voice Recognition Transcribe Date/Time: Jan 21 2024 10:50A Dictated by:  "KAIN RUSH MD This examination was interpreted and the report reviewed and electronically signed by: KAIN RUSH MD on Jan 21 2024 11:00AM  EST    XR CHEST 1V FRONTAL PORT    Result Date: 1/21/2024  * * *Final Report* * * DATE OF EXAM: Jan 21 2024  9:10AM   E[facility]   5376  -  XR CHEST 1V FRONTAL PORT  / ACCESSION #  [id number] PROCEDURE REASON: Shortness of breath      * * * * Physician Interpretation * * * * RESULT: EXAMINATION:  CHEST RADIOGRAPH (PORTABLE SINGLE VIEW AP) Exam Date/Time:  1/21/2024 9:10 AM CLINICAL HISTORY: Shortness of breath MQ:  XCPR_5 Comparison:  01/07/2024. RESULT: Lines, tubes, and devices:  None. Lungs and pleura:  The lungs remain unremarkable with no evidence of infiltrates.  The pleural margins appear normal. Cardiomediastinal silhouette:  Stable cardiomediastinal silhouette. Other:  The visualized bony thorax appears unremarkable.    IMPRESSION: Stable exam with no evidence of acute disease. Transcribed Using Voice Recognition Transcribe Date/Time: Jan 21 2024  9:41A Dictated by: DAMASO PERALTA MD This examination was interpreted and the report reviewed and electronically signed by: DAMASO PERALTA MD on Jan 21 2024  9:42AM  EST                Assessment/Plan   Principal Problem:    Abdominal pain  Active Problems:    Abdominal pain, epigastric    Epigastric pain/nausea/vomiting (chronic recurrent unclear etiology)  CT scan no acute findings   Recent EGD on 12/27/23 Esophageal ulcer, no bleeding. No varices. Was recently on Fluconazole as well   PPI twice daily  Continue Carafate    Patient mentions vomiting \"pieces of food.\" Hgb A1C 11.3. ?component of gastroparesis contributing to chronic NV. I do think underlying gastroparesis exacerbation related to UTI is in the differential     Patient reports feeling jumpy today. I did stop the Reglan. Will trial scheduled Erythromycin for possible gastroparesis     If able to tolerate a diet, she can be discharged from a GI standpoint " with outpatient follow up. We can determine need for repeat EGD at that time. 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 Zaman at [facility] Normal oropharynx. Esophageal ulcer, no bleeding and no stigmata of recent bleeding.  Ascites: CT shows Free fluid within the peritoneal cavity is again noted (small amount, unlikely to need paracentesis)  Will continue lasix/aldactone.       I spent 20 minutes in the professional and overall care of this patient.      Alaina Mishra, APRN-CNP

## 2024-02-12 NOTE — CARE PLAN
The patient's goals for the shift include      The clinical goals for the shift include pt will remain safe during shift    Problem: Pain  Goal: My pain/discomfort is manageable  Outcome: Progressing     Problem: Safety  Goal: Patient will be injury free during hospitalization  Outcome: Progressing  Goal: I will remain free of falls  Outcome: Progressing     Problem: Daily Care  Goal: Daily care needs are met  Outcome: Progressing     Problem: Psychosocial Needs  Goal: Demonstrates ability to cope with hospitalization/illness  Outcome: Progressing  Goal: Collaborate with me, my family, and caregiver to identify my specific goals  Outcome: Progressing     Problem: Discharge Barriers  Goal: My discharge needs are met  Outcome: Progressing     Problem: Pain  Goal: My pain/discomfort is manageable  Outcome: Progressing     Problem: Pain  Goal: Takes deep breaths with improved pain control throughout the shift  Outcome: Progressing  Goal: Turns in bed with improved pain control throughout the shift  Outcome: Progressing  Goal: Walks with improved pain control throughout the shift  Outcome: Progressing  Goal: Performs ADL's with improved pain control throughout shift  Outcome: Progressing  Goal: Participates in PT with improved pain control throughout the shift  Outcome: Progressing  Goal: Free from opioid side effects throughout the shift  Outcome: Progressing  Goal: Free from acute confusion related to pain meds throughout the shift  Outcome: Progressing

## 2024-02-13 LAB
ALBUMIN SERPL-MCNC: 1.9 G/DL (ref 3.5–5)
ALP BLD-CCNC: 222 U/L (ref 35–125)
ALT SERPL-CCNC: 18 U/L (ref 5–40)
ANION GAP SERPL CALC-SCNC: 5 MMOL/L
AST SERPL-CCNC: 30 U/L (ref 5–40)
BILIRUB SERPL-MCNC: 0.8 MG/DL (ref 0.1–1.2)
BUN SERPL-MCNC: 15 MG/DL (ref 8–25)
CALCIUM SERPL-MCNC: 7.8 MG/DL (ref 8.5–10.4)
CHLORIDE SERPL-SCNC: 107 MMOL/L (ref 97–107)
CO2 SERPL-SCNC: 24 MMOL/L (ref 24–31)
CREAT SERPL-MCNC: 0.6 MG/DL (ref 0.4–1.6)
EGFRCR SERPLBLD CKD-EPI 2021: >90 ML/MIN/1.73M*2
ERYTHROCYTE [DISTWIDTH] IN BLOOD BY AUTOMATED COUNT: 17.4 % (ref 11.5–14.5)
GLUCOSE BLD MANUAL STRIP-MCNC: 163 MG/DL (ref 74–99)
GLUCOSE BLD MANUAL STRIP-MCNC: 170 MG/DL (ref 74–99)
GLUCOSE BLD MANUAL STRIP-MCNC: 185 MG/DL (ref 74–99)
GLUCOSE BLD MANUAL STRIP-MCNC: 203 MG/DL (ref 74–99)
GLUCOSE BLD MANUAL STRIP-MCNC: 295 MG/DL (ref 74–99)
GLUCOSE SERPL-MCNC: 235 MG/DL (ref 65–99)
HCT VFR BLD AUTO: 28.6 % (ref 36–46)
HGB BLD-MCNC: 8.9 G/DL (ref 12–16)
HOLD SPECIMEN: NORMAL
MCH RBC QN AUTO: 25.9 PG (ref 26–34)
MCHC RBC AUTO-ENTMCNC: 31.1 G/DL (ref 32–36)
MCV RBC AUTO: 83 FL (ref 80–100)
NRBC BLD-RTO: 0 /100 WBCS (ref 0–0)
PLATELET # BLD AUTO: 78 X10*3/UL (ref 150–450)
POTASSIUM SERPL-SCNC: 4.2 MMOL/L (ref 3.4–5.1)
PROT SERPL-MCNC: 6 G/DL (ref 5.9–7.9)
RBC # BLD AUTO: 3.44 X10*6/UL (ref 4–5.2)
SODIUM SERPL-SCNC: 136 MMOL/L (ref 133–145)
WBC # BLD AUTO: 4.2 X10*3/UL (ref 4.4–11.3)

## 2024-02-13 PROCEDURE — 97110 THERAPEUTIC EXERCISES: CPT | Mod: GP,CQ

## 2024-02-13 PROCEDURE — 36415 COLL VENOUS BLD VENIPUNCTURE: CPT | Performed by: NURSE PRACTITIONER

## 2024-02-13 PROCEDURE — 85027 COMPLETE CBC AUTOMATED: CPT | Performed by: NURSE PRACTITIONER

## 2024-02-13 PROCEDURE — 2500000004 HC RX 250 GENERAL PHARMACY W/ HCPCS (ALT 636 FOR OP/ED): Performed by: NURSE PRACTITIONER

## 2024-02-13 PROCEDURE — 82947 ASSAY GLUCOSE BLOOD QUANT: CPT

## 2024-02-13 PROCEDURE — 2500000001 HC RX 250 WO HCPCS SELF ADMINISTERED DRUGS (ALT 637 FOR MEDICARE OP): Performed by: NURSE PRACTITIONER

## 2024-02-13 PROCEDURE — 80053 COMPREHEN METABOLIC PANEL: CPT | Performed by: NURSE PRACTITIONER

## 2024-02-13 PROCEDURE — 2500000002 HC RX 250 W HCPCS SELF ADMINISTERED DRUGS (ALT 637 FOR MEDICARE OP, ALT 636 FOR OP/ED): Performed by: INTERNAL MEDICINE

## 2024-02-13 PROCEDURE — 1200000002 HC GENERAL ROOM WITH TELEMETRY DAILY

## 2024-02-13 PROCEDURE — 2500000004 HC RX 250 GENERAL PHARMACY W/ HCPCS (ALT 636 FOR OP/ED): Performed by: INTERNAL MEDICINE

## 2024-02-13 PROCEDURE — 99222 1ST HOSP IP/OBS MODERATE 55: CPT | Performed by: STUDENT IN AN ORGANIZED HEALTH CARE EDUCATION/TRAINING PROGRAM

## 2024-02-13 PROCEDURE — 2500000002 HC RX 250 W HCPCS SELF ADMINISTERED DRUGS (ALT 637 FOR MEDICARE OP, ALT 636 FOR OP/ED)

## 2024-02-13 PROCEDURE — 2500000004 HC RX 250 GENERAL PHARMACY W/ HCPCS (ALT 636 FOR OP/ED)

## 2024-02-13 PROCEDURE — 2500000001 HC RX 250 WO HCPCS SELF ADMINISTERED DRUGS (ALT 637 FOR MEDICARE OP): Performed by: INTERNAL MEDICINE

## 2024-02-13 PROCEDURE — 97116 GAIT TRAINING THERAPY: CPT | Mod: GP,CQ

## 2024-02-13 RX ADMIN — MORPHINE SULFATE 2 MG: 2 INJECTION, SOLUTION INTRAMUSCULAR; INTRAVENOUS at 08:35

## 2024-02-13 RX ADMIN — POLYETHYLENE GLYCOL 3350 17 G: 17 POWDER, FOR SOLUTION ORAL at 20:39

## 2024-02-13 RX ADMIN — INSULIN GLARGINE 20 UNITS: 100 INJECTION, SOLUTION SUBCUTANEOUS at 08:29

## 2024-02-13 RX ADMIN — ERYTHROMYCIN 250 MG: 250 TABLET, DELAYED RELEASE ORAL at 11:33

## 2024-02-13 RX ADMIN — URSODIOL 300 MG: 300 CAPSULE ORAL at 08:29

## 2024-02-13 RX ADMIN — TIZANIDINE 2 MG: 2 TABLET ORAL at 20:40

## 2024-02-13 RX ADMIN — INSULIN LISPRO 6 UNITS: 100 INJECTION, SOLUTION INTRAVENOUS; SUBCUTANEOUS at 16:49

## 2024-02-13 RX ADMIN — URSODIOL 300 MG: 300 CAPSULE ORAL at 20:40

## 2024-02-13 RX ADMIN — DOCUSATE SODIUM 100 MG: 100 CAPSULE, LIQUID FILLED ORAL at 20:40

## 2024-02-13 RX ADMIN — ACETAMINOPHEN 650 MG: 325 TABLET ORAL at 20:40

## 2024-02-13 RX ADMIN — SUCRALFATE 1 G: 1 SUSPENSION ORAL at 11:33

## 2024-02-13 RX ADMIN — INSULIN LISPRO 9 UNITS: 100 INJECTION, SOLUTION INTRAVENOUS; SUBCUTANEOUS at 03:00

## 2024-02-13 RX ADMIN — INSULIN LISPRO 2 UNITS: 100 INJECTION, SOLUTION INTRAVENOUS; SUBCUTANEOUS at 12:00

## 2024-02-13 RX ADMIN — INSULIN LISPRO 3 UNITS: 100 INJECTION, SOLUTION INTRAVENOUS; SUBCUTANEOUS at 08:29

## 2024-02-13 RX ADMIN — PANTOPRAZOLE SODIUM 20 MG: 20 TABLET, DELAYED RELEASE ORAL at 07:02

## 2024-02-13 RX ADMIN — INSULIN LISPRO 3 UNITS: 100 INJECTION, SOLUTION INTRAVENOUS; SUBCUTANEOUS at 20:45

## 2024-02-13 RX ADMIN — SUCRALFATE 1 G: 1 SUSPENSION ORAL at 20:39

## 2024-02-13 RX ADMIN — ERYTHROMYCIN 250 MG: 250 TABLET, DELAYED RELEASE ORAL at 07:02

## 2024-02-13 RX ADMIN — PIPERACILLIN SODIUM AND TAZOBACTAM SODIUM 3.38 G: 3; .375 INJECTION, SOLUTION INTRAVENOUS at 21:58

## 2024-02-13 RX ADMIN — PANTOPRAZOLE SODIUM 20 MG: 20 TABLET, DELAYED RELEASE ORAL at 16:49

## 2024-02-13 RX ADMIN — TRAZODONE HYDROCHLORIDE 25 MG: 50 TABLET ORAL at 20:43

## 2024-02-13 RX ADMIN — URSODIOL 300 MG: 300 CAPSULE ORAL at 16:49

## 2024-02-13 RX ADMIN — SUCRALFATE 1 G: 1 SUSPENSION ORAL at 16:49

## 2024-02-13 RX ADMIN — ATORVASTATIN CALCIUM 80 MG: 80 TABLET, FILM COATED ORAL at 20:40

## 2024-02-13 RX ADMIN — SODIUM CHLORIDE 75 ML/HR: 900 INJECTION, SOLUTION INTRAVENOUS at 11:33

## 2024-02-13 RX ADMIN — FENOFIBRATE 160 MG: 160 TABLET ORAL at 08:29

## 2024-02-13 RX ADMIN — SPIRONOLACTONE 50 MG: 50 TABLET ORAL at 20:40

## 2024-02-13 RX ADMIN — ERYTHROMYCIN 250 MG: 250 TABLET, DELAYED RELEASE ORAL at 16:49

## 2024-02-13 RX ADMIN — SUCRALFATE 1 G: 1 SUSPENSION ORAL at 07:02

## 2024-02-13 ASSESSMENT — PAIN - FUNCTIONAL ASSESSMENT
PAIN_FUNCTIONAL_ASSESSMENT: WONG-BAKER FACES
PAIN_FUNCTIONAL_ASSESSMENT: 0-10
PAIN_FUNCTIONAL_ASSESSMENT: 0-10

## 2024-02-13 ASSESSMENT — ENCOUNTER SYMPTOMS
NAUSEA: 0
BRUISES/BLEEDS EASILY: 0
CHEST TIGHTNESS: 0
FACIAL ASYMMETRY: 0
UNEXPECTED WEIGHT CHANGE: 0
ADENOPATHY: 0
WOUND: 1
SPEECH DIFFICULTY: 0
TROUBLE SWALLOWING: 0
ABDOMINAL DISTENTION: 1
HEMATURIA: 0
DIARRHEA: 0
BLOOD IN STOOL: 0
FEVER: 0
VOMITING: 0
SHORTNESS OF BREATH: 0
ABDOMINAL PAIN: 1
DYSURIA: 0
CHILLS: 0
PALPITATIONS: 0
SORE THROAT: 0
HEADACHES: 0
ARTHRALGIAS: 0
VOICE CHANGE: 0

## 2024-02-13 ASSESSMENT — COGNITIVE AND FUNCTIONAL STATUS - GENERAL
CLIMB 3 TO 5 STEPS WITH RAILING: A LITTLE
DAILY ACTIVITIY SCORE: 24
MOBILITY SCORE: 24
MOBILITY SCORE: 22
WALKING IN HOSPITAL ROOM: A LITTLE
CLIMB 3 TO 5 STEPS WITH RAILING: A LITTLE
MOBILITY SCORE: 22
WALKING IN HOSPITAL ROOM: A LITTLE
DAILY ACTIVITIY SCORE: 24

## 2024-02-13 ASSESSMENT — PAIN SCALES - GENERAL
PAINLEVEL_OUTOF10: 5 - MODERATE PAIN
PAINLEVEL_OUTOF10: 8
PAINLEVEL_OUTOF10: 4

## 2024-02-13 ASSESSMENT — ACTIVITIES OF DAILY LIVING (ADL): LACK_OF_TRANSPORTATION: NO

## 2024-02-13 NOTE — PROGRESS NOTES
Alfonzo Flanagan is a 49 y.o. female on day 4 of admission presenting with Abdominal pain.    Subjective   Patient seen and examined.  Resting in bed in no acute distress.  Awake alert oriented x 3.  Complains of abdominal pain, nausea, no vomiting, tolerating diet states she plans to have soup for lunch, soft bowel movements.  States she does not feel ready to go home.    Objective     Physical Exam  Vitals reviewed.   Constitutional:       General: She is not in acute distress.     Appearance: She is obese. She is not ill-appearing, toxic-appearing or diaphoretic.   HENT:      Head: Normocephalic and atraumatic.      Right Ear: Tympanic membrane normal.      Left Ear: Tympanic membrane normal.      Nose: Nose normal.      Mouth/Throat:      Mouth: Mucous membranes are moist.      Pharynx: Oropharynx is clear.   Eyes:      Extraocular Movements: Extraocular movements intact.      Conjunctiva/sclera: Conjunctivae normal.      Pupils: Pupils are equal, round, and reactive to light.   Cardiovascular:      Rate and Rhythm: Normal rate and regular rhythm.      Pulses: Normal pulses.      Heart sounds: Normal heart sounds.   Pulmonary:      Effort: Pulmonary effort is normal. No respiratory distress.      Breath sounds: Normal breath sounds. No wheezing, rhonchi or rales.      Comments: Deferred  Abdominal:      General: Bowel sounds are normal. There is distension.      Palpations: Abdomen is soft.      Tenderness: There is abdominal tenderness.   Genitourinary:     Comments: Deferred  Musculoskeletal:         General: No swelling or tenderness. Normal range of motion.      Cervical back: Normal range of motion and neck supple.   Skin:     General: Skin is warm and dry.      Capillary Refill: Capillary refill takes less than 2 seconds.   Neurological:      General: No focal deficit present.      Mental Status: She is alert and oriented to person, place, and time.   Psychiatric:         Mood and Affect: Mood normal.    "      Behavior: Behavior normal.       Last Recorded Vitals  Blood pressure 95/56, pulse 100, temperature 36.8 °C (98.2 °F), temperature source Oral, resp. rate 17, height 1.575 m (5' 2\"), weight 72.6 kg (160 lb), SpO2 98 %.    Intake/Output last 3 Shifts:  No intake/output data recorded.    Telemetry normal sinus rhythm rate 80's    Relevant Results  Results for orders placed or performed during the hospital encounter of 02/07/24 (from the past 24 hour(s))   Urinalysis with Reflex Culture and Microscopic   Result Value Ref Range    Color, Urine Yellow Light-Yellow, Yellow, Dark-Yellow    Appearance, Urine Clear Clear    Specific Gravity, Urine 1.022 1.005 - 1.035    pH, Urine 5.5 5.0, 5.5, 6.0, 6.5, 7.0, 7.5, 8.0    Protein, Urine NEGATIVE NEGATIVE, 10 (TRACE), 20 (TRACE) mg/dL    Glucose, Urine OVER (4+) (A) Normal mg/dL    Blood, Urine NEGATIVE NEGATIVE    Ketones, Urine NEGATIVE NEGATIVE mg/dL    Bilirubin, Urine NEGATIVE NEGATIVE    Urobilinogen, Urine 3 (1+) (A) Normal mg/dL    Nitrite, Urine NEGATIVE NEGATIVE    Leukocyte Esterase, Urine NEGATIVE NEGATIVE   Extra Urine Gray Tube   Result Value Ref Range    Extra Tube Hold for add-ons.    POCT GLUCOSE   Result Value Ref Range    POCT Glucose 295 (H) 74 - 99 mg/dL   POCT GLUCOSE   Result Value Ref Range    POCT Glucose 208 (H) 74 - 99 mg/dL   POCT GLUCOSE   Result Value Ref Range    POCT Glucose 295 (H) 74 - 99 mg/dL   Comprehensive Metabolic Panel   Result Value Ref Range    Glucose 235 (H) 65 - 99 mg/dL    Sodium 136 133 - 145 mmol/L    Potassium 4.2 3.4 - 5.1 mmol/L    Chloride 107 97 - 107 mmol/L    Bicarbonate 24 24 - 31 mmol/L    Urea Nitrogen 15 8 - 25 mg/dL    Creatinine 0.60 0.40 - 1.60 mg/dL    eGFR >90 >60 mL/min/1.73m*2    Calcium 7.8 (L) 8.5 - 10.4 mg/dL    Albumin 1.9 (L) 3.5 - 5.0 g/dL    Alkaline Phosphatase 222 (H) 35 - 125 U/L    Total Protein 6.0 5.9 - 7.9 g/dL    AST 30 5 - 40 U/L    Bilirubin, Total 0.8 0.1 - 1.2 mg/dL    ALT 18 5 - 40 U/L "    Anion Gap 5 <=19 mmol/L   CBC   Result Value Ref Range    WBC 4.2 (L) 4.4 - 11.3 x10*3/uL    nRBC 0.0 0.0 - 0.0 /100 WBCs    RBC 3.44 (L) 4.00 - 5.20 x10*6/uL    Hemoglobin 8.9 (L) 12.0 - 16.0 g/dL    Hematocrit 28.6 (L) 36.0 - 46.0 %    MCV 83 80 - 100 fL    MCH 25.9 (L) 26.0 - 34.0 pg    MCHC 31.1 (L) 32.0 - 36.0 g/dL    RDW 17.4 (H) 11.5 - 14.5 %    Platelets 78 (L) 150 - 450 x10*3/uL   POCT GLUCOSE   Result Value Ref Range    POCT Glucose 170 (H) 74 - 99 mg/dL   POCT GLUCOSE   Result Value Ref Range    POCT Glucose 163 (H) 74 - 99 mg/dL     No results found.    Scheduled medications  atorvastatin, 80 mg, oral, Daily  cefTRIAXone, 1 g, intravenous, q24h  docusate sodium, 100 mg, oral, BID  erythromycin, 250 mg, oral, Before meals & nightly  fenofibrate, 160 mg, oral, Daily  furosemide, 40 mg, oral, Daily  insulin glargine, 20 Units, subcutaneous, q AM  insulin lispro, 0-15 Units, subcutaneous, q4h  insulin regular, 0-10 Units, subcutaneous, Nightly at 0300  lidocaine, 1 patch, transdermal, Daily  pantoprazole, 20 mg, oral, BID AC  polyethylene glycol, 17 g, oral, BID  spironolactone, 50 mg, oral, BID  sucralfate, 1 g, oral, Before meals & nightly  traZODone, 25 mg, oral, Nightly  ursodiol, 300 mg, oral, TID      Continuous medications  sodium chloride 0.9%, 75 mL/hr, Last Rate: 75 mL/hr (02/13/24 1133)      PRN medications  PRN medications: acetaminophen **OR** acetaminophen **OR** acetaminophen, acetaminophen, benzocaine-menthoL, dextromethorphan-guaifenesin, dextrose 10 % in water (D10W), dextrose, glucagon, guaiFENesin, morphine, tiZANidine    ASSESSMENT:  Abdominal pain, nausea (vomiting - resolved)   Back pain  Elevated lipase - chronic pancreatitis  Hepatic cirrhosis, ascites  Portal hypertension  Elevated LFT's  Lactic acidosis  Pyuria - dysuria  Type 2 diabetes mellitus  Hyperglycemia  Hypertriglyceridemia  Hyperlipidemia  Normocytic anemia  Thrombocytopenia    PLAN:  Per Gastroenterology, no further  inpatient work-up, treatment at this time, okay to discharge, outpatient follow up with Gastroenterologist, Dr. Zaman, as scheduled.  Outpatient small bowel capsule endoscopy.  Discussed with patient, she does not feel ready to go home.  Diet as tolerated.  P.r.n analgesics - p.o.  P.r.n anti-emetics.  Ppi.  Monitor symptoms.  Urinalysis culture unremarkable.  Point of care glucose reviewed.  Continue insulin.  Adjust insulin as needed for glycemic control.  Increase activity.  Ambulate.  Supportive care.  Patient reassured.  Case management following for discharge planning.  Discharge plan home with home health care.  Anticipate discharge.  Discussed with Dr. Lawrence.    ADDENDUM:  Per nursing patient with groin hydradenitis painful and bleeding.  On examination, left labial/groin region firm area, previous bloody drainage, surrounding erythema, tenderness.  Adjust IV antibiotics.  Consult General surgery and Infectious disease.  Discussed with Dr. Lawrence.    Leny Daniels, APRKYRA-CNP

## 2024-02-13 NOTE — CARE PLAN
The patient's goals for the shift include      The clinical goals for the shift include patient will remain free from injury    Problem: Pain  Goal: My pain/discomfort is manageable  Outcome: Progressing     Problem: Safety  Goal: Patient will be injury free during hospitalization  Outcome: Progressing  Goal: I will remain free of falls  Outcome: Progressing     Problem: Daily Care  Goal: Daily care needs are met  Outcome: Progressing     Problem: Psychosocial Needs  Goal: Demonstrates ability to cope with hospitalization/illness  Outcome: Progressing  Goal: Collaborate with me, my family, and caregiver to identify my specific goals  Outcome: Progressing     Problem: Discharge Barriers  Goal: My discharge needs are met  Outcome: Progressing     Problem: Pain  Goal: Takes deep breaths with improved pain control throughout the shift  Outcome: Progressing  Goal: Turns in bed with improved pain control throughout the shift  Outcome: Progressing  Goal: Walks with improved pain control throughout the shift  Outcome: Progressing  Goal: Performs ADL's with improved pain control throughout shift  Outcome: Progressing  Goal: Participates in PT with improved pain control throughout the shift  Outcome: Progressing  Goal: Free from opioid side effects throughout the shift  Outcome: Progressing  Goal: Free from acute confusion related to pain meds throughout the shift  Outcome: Progressing     Problem: Pain  Goal: STG - Patients pain is managed to allow active participation in daily activities  Outcome: Progressing

## 2024-02-13 NOTE — PROGRESS NOTES
"Alfonzo Flanagan is a 49 y.o. female on day 4 of admission presenting with Abdominal pain.    Subjective   Overall feeling better. Having pain at night \"can't sleep\"    Objective     Physical Exam  Constitutional:       Appearance: Normal appearance.   HENT:      Head: Normocephalic and atraumatic.      Mouth/Throat:      Mouth: Mucous membranes are moist.   Pulmonary:      Effort: Pulmonary effort is normal.   Abdominal:      General: There is no distension.      Palpations: Abdomen is soft.      Tenderness: There is abdominal tenderness. There is no guarding.   Musculoskeletal:         General: Normal range of motion.      Cervical back: Normal range of motion.   Skin:     General: Skin is warm and dry.   Neurological:      General: No focal deficit present.      Mental Status: She is alert. Mental status is at baseline.   Psychiatric:         Mood and Affect: Mood normal.         Last Recorded Vitals  Blood pressure 95/56, pulse 100, temperature 36.8 °C (98.2 °F), temperature source Oral, resp. rate 17, height 1.575 m (5' 2\"), weight 72.6 kg (160 lb), SpO2 98 %.  Intake/Output last 3 Shifts:  No intake/output data recorded.    Relevant Results                Results for orders placed or performed during the hospital encounter of 02/07/24 (from the past 24 hour(s))   POCT GLUCOSE   Result Value Ref Range    POCT Glucose 254 (H) 74 - 99 mg/dL   Urinalysis with Reflex Culture and Microscopic   Result Value Ref Range    Color, Urine Yellow Light-Yellow, Yellow, Dark-Yellow    Appearance, Urine Clear Clear    Specific Gravity, Urine 1.022 1.005 - 1.035    pH, Urine 5.5 5.0, 5.5, 6.0, 6.5, 7.0, 7.5, 8.0    Protein, Urine NEGATIVE NEGATIVE, 10 (TRACE), 20 (TRACE) mg/dL    Glucose, Urine OVER (4+) (A) Normal mg/dL    Blood, Urine NEGATIVE NEGATIVE    Ketones, Urine NEGATIVE NEGATIVE mg/dL    Bilirubin, Urine NEGATIVE NEGATIVE    Urobilinogen, Urine 3 (1+) (A) Normal mg/dL    Nitrite, Urine NEGATIVE NEGATIVE    " Leukocyte Esterase, Urine NEGATIVE NEGATIVE   Extra Urine Gray Tube   Result Value Ref Range    Extra Tube Hold for add-ons.    POCT GLUCOSE   Result Value Ref Range    POCT Glucose 295 (H) 74 - 99 mg/dL   POCT GLUCOSE   Result Value Ref Range    POCT Glucose 208 (H) 74 - 99 mg/dL   POCT GLUCOSE   Result Value Ref Range    POCT Glucose 295 (H) 74 - 99 mg/dL   Comprehensive Metabolic Panel   Result Value Ref Range    Glucose 235 (H) 65 - 99 mg/dL    Sodium 136 133 - 145 mmol/L    Potassium 4.2 3.4 - 5.1 mmol/L    Chloride 107 97 - 107 mmol/L    Bicarbonate 24 24 - 31 mmol/L    Urea Nitrogen 15 8 - 25 mg/dL    Creatinine 0.60 0.40 - 1.60 mg/dL    eGFR >90 >60 mL/min/1.73m*2    Calcium 7.8 (L) 8.5 - 10.4 mg/dL    Albumin 1.9 (L) 3.5 - 5.0 g/dL    Alkaline Phosphatase 222 (H) 35 - 125 U/L    Total Protein 6.0 5.9 - 7.9 g/dL    AST 30 5 - 40 U/L    Bilirubin, Total 0.8 0.1 - 1.2 mg/dL    ALT 18 5 - 40 U/L    Anion Gap 5 <=19 mmol/L   CBC   Result Value Ref Range    WBC 4.2 (L) 4.4 - 11.3 x10*3/uL    nRBC 0.0 0.0 - 0.0 /100 WBCs    RBC 3.44 (L) 4.00 - 5.20 x10*6/uL    Hemoglobin 8.9 (L) 12.0 - 16.0 g/dL    Hematocrit 28.6 (L) 36.0 - 46.0 %    MCV 83 80 - 100 fL    MCH 25.9 (L) 26.0 - 34.0 pg    MCHC 31.1 (L) 32.0 - 36.0 g/dL    RDW 17.4 (H) 11.5 - 14.5 %    Platelets 78 (L) 150 - 450 x10*3/uL   POCT GLUCOSE   Result Value Ref Range    POCT Glucose 170 (H) 74 - 99 mg/dL     CT abdomen pelvis w IV contrast    Result Date: 2/7/2024  Interpreted By:  Bebeto Gottlieb, STUDY: CT ABDOMEN PELVIS W IV CONTRAST; 2/7/2024 3:43 pm   INDICATION: Signs/Symptoms:epigastric colicky pain.   COMPARISON: January 26, 2024.   ACCESSION NUMBER(S): [id number]   ORDERING CLINICIAN: PRANEETH BARRERA   TECHNIQUE: Oral contrast was not administered. 75 ml Omnipaque 350 was injected intravenously. CT of the Abdomen and Pelvis with intravenous contrast was performed. Axial, sagittal and coronal reformatted images were reviewed.   All CT examinations  are performed with 1 or more of the following dose reduction techniques: Automated exposure control, adjustment of mA and/or kv according to patient's size, or use of iterative reconstruction techniques.   FINDINGS: Lower Chest: Unremarkable.   Abdomen: Liver: There is diffuse nodular contour of the liver. There is recanalization of the umbilical vein collateral vessels are noted in the gastrohepatic ligament. Esophageal varices are noted. Bile Ducts: Normal caliber. Gallbladder: Surgical clips are noted in the gallbladder fossa. Pancreas: Unremarkable. Spleen: Unremarkable. Adrenals: Normal. Kidneys: Normal.   Pelvis: No pelvic masses. Bladder: Unremarkable.   Bowel: No bowel wall thickening or abnormal distention to suggest bowel obstruction.. Mesenteric Lymph Nodes: No enlarged mesenteric lymph nodes. Peritoneum: Free fluid is noted lateral to the liver extending into the subhepatic recess and right pericolic gutter. Free fluid is also noted lateral to the spleen. Free fluid is also noted within the pelvis. Vessels: Unremarkable Retroperitoneum: No retroperitoneal adenopathy. Abdominal Wall: Umbilical hernia contains fluid Bones: No acute bony abnormalities.       Findings consistent with cirrhosis and portal hypertension. Free fluid within the peritoneal cavity is again noted, though decreased as compared to previous exam   MACRO: none   Signed by: Bebeto Gottlieb 2/7/2024 4:52 PM Dictation workstation:   [id number]    US guided abdominal paracentesis    Result Date: 1/31/2024  Interpreted By:  Cornelius Weber, STUDY: US GUIDED ABDOMINAL PARACENTESIS; 1/29/2024 10:04 am   INDICATION: Signs/Symptoms:ascites.   COMPARISON: None.   ACCESSION NUMBER(S): [id number]   ORDERING CLINICIAN: ANUPAM FRANCIS   TECHNIQUE: Ultrasound-guided paracentesis   FINDINGS: Informed consent obtained. Patient positioned supine. Right lower quadrant prepped, draped and anesthetized. Under ultrasound guidance, 8 Citizen of [place] [employer]  sheath needle inserted into peritoneal cavity. 3.5 Lof clear yellowfluid aspirated. Patient tolerated procedure well       Ultrasound-guided paracentesis.   Signed by: Cornelius Weber 1/31/2024 3:36 PM Dictation workstation:   [id number]    CT abdomen pelvis w IV contrast    Result Date: 1/26/2024  Interpreted By:  Goyo Peterson, STUDY: CT ABDOMEN PELVIS W IV CONTRAST; 1/26/2024 10:52 am   INDICATION: Signs/Symptoms:abd pain ascites;   COMPARISON: None   ACCESSION NUMBER(S): [id number]   ORDERING CLINICIAN: LILLIE IYER   TECHNIQUE: Contiguous axial images of the abdomen/pelvis were performed with IV contrast. 75 ml of Omnipaque 350 was utilized. Coronal and sagittal reformatted images were also obtained. All CT examinations are performed with 1 or more of the following dose reduction techniques: Automated exposure control, adjustment of mA and/or kv according to patient's size, or use of iterative reconstruction techniques.     FINDINGS: The liver is nodular in contour consistent with cirrhosis. No focal hepatic lesions are seen. Prior cholecystectomy with surgical clips in the gallbladder fossa. The common bile duct, pancreas, and adrenal glands are unremarkable. The spleen is mildly enlarged measuring 13.7 cm in length.   The kidneys enhance symmetrically. No urolithiasis is seen. No hydroureteronephrosis is seen.   The visualized aorta is unremarkable.   The small bowel is not dilated. The appendix is not visualized however there has no evidence to suggest appendicitis. The colon is unremarkable with no evidence for acute inflammatory process.   Mild-moderate volume ascites throughout the abdomen and pelvis. There is also mild diffuse anasarca.   The bladder is well distended with no gross wall thickening.   The visualized osseous structures are intact.   Limited images of the lower thorax are unremarkable.       Hepatic cirrhosis. No focal hepatic lesion.   Mild splenomegaly.   Mild-moderate  volume ascites throughout the abdomen and pelvis.   Signed by: Goyo Peterson 1/26/2024 12:54 PM Dictation workstation:   [id number]    US ASCITES SURVEY    Result Date: 1/22/2024  * * *Final Report* * * DATE OF EXAM: Jan 22 2024 10:27AM   EUU   1016  -  US ASCITES SURVEY  / ACCESSION #  [id number] PROCEDURE REASON: ascites      * * * * Physician Interpretation * * * * RESULT: US ASCITES SURVEY HISTORY: Ascites TECHNIQUE: Grayscale ultrasound imaging was performed in the area of concern and images were saved to the permanent image archive. RESULT: See impression    IMPRESSION: Ascites survey was obtained throughout the abdomen. Mild amount of ascites in all 4 quadrants of the abdomen. No large single pocket was present and therapeutic paracentesis was not performed at this time. Transcribed Using Voice Recognition Transcribe Date/Time: Jan 22 2024 10:46A Dictated by: LEENA VINSON MD This examination was interpreted and the report reviewed and electronically signed by: LEENA VINSON MD on Jan 22 2024 10:48AM  EST    CT ABD/PEL WO IVCON    Result Date: 1/21/2024  * * *Final Report* * * DATE OF EXAM: Jan 21 2024  9:43AM   E[facility]   0531  -  CT ABD/PEL WO IVCON  / ACCESSION #  [id number] PROCEDURE REASON: Bowel obstruction suspected      * * * * Physician Interpretation * * * * RESULT: EXAMINATION:  CT ABDOMEN AND PELVIS WITHOUT IV CONTRAST CLINICAL HISTORY: Bowel obstruction suspected TECHNIQUE: Non-IV contrast imaging of the abdomen and pelvis was performed using standard technique, scanning from just above the dome of the diaphragm to the symphysis pubis.  Unenhanced imaging is limited for the evaluation of some intra-abdominal and pelvic pathology. MQ:  CTAPWO_3 Contrast: IV: None : ml of CT Radiation dose: Integrated Dose-length product (DLP) for this visit =   428 mGy*cm. CT Dose Reduction Employed: Automated exposure control(AEC) and iterative recon COMPARISON: CT 01/10/2024. RESULT: Abdomen / Pelvis: Liver:  Liver has a nodular contour and is atrophic, consistent with cirrhosis.  No focal hepatic lesions within limitations of this noncontrasted study. Biliary: Gallbladder is absent. Spleen: No splenomegaly. Pancreas: Unremarkable. Adrenals: No mass. Kidneys: Kidneys are symmetric in size without hydronephrosis or renal stones. GI Tract: Diffuse gastric wall thickening. Colon is normal in caliber without obstruction.  Wall thickening of the transverse colon at the splenic flexure.  Appendix not visualized. Small bowel is normal in caliber without obstruction.  Walls of the small bowel are edematous. Lymph Nodes: No lymphadenopathy. Mesentery/peritoneum: Moderate volume ascites. Retroperitoneum: No mass. Vasculature: Arterial atherosclerotic disease without aneurysm. Pelvis: Urinary bladder is unremarkable.  Uterus is present.  No pelvic lymphadenopathy. Bones/Soft Tissues: Fat-containing ventral wall hernias.  Subcutaneous edema.  No suspicious osseous lesions.  L5 pars defect on the left.  Mild degenerative changes in thoracolumbar spine.. Lower thorax: 0.9 cm nodule in the right middle lobe (image 4, 2)  (topogram) images: No additional findings.    IMPRESSION: 1.  Cirrhotic hepatic morphology with moderate volume ascites 2.  Wall thickening of the stomach, could be reactive due to surrounding ascites but gastritis is possible. 3.  Walls of the small bowel and colon are edematous, likely reactive due to adjacent ascites. 4.  No evidence of small bowel obstruction. 5.  0.9 cm nodule in the right middle lobe.  Recommend short-term follow-up in 3 months to document stability. ACTIONABLE RESULT: FOLLOW-UP Acuity: Actionable Findings: Thoracic-Lung nodules Routing code:  RI_1 Recommendation: CT Chest WO IVCON Time Frame: Additional evaluation as described in the impression COMMUNICATION: Results will be communicated with the ordering provider via Epic staff message or phone message by [facility] within 2  business days of report finalization. --END OF FINDING-- Transcribed Using Voice Recognition Transcribe Date/Time: Jan 21 2024 10:50A Dictated by: KAIN RUSH MD This examination was interpreted and the report reviewed and electronically signed by: KAIN RUSH MD on Jan 21 2024 11:00AM  EST    XR CHEST 1V FRONTAL PORT    Result Date: 1/21/2024  * * *Final Report* * * DATE OF EXAM: Jan 21 2024  9:10AM   EUX   5376  -  XR CHEST 1V FRONTAL PORT  / ACCESSION #  [id number] PROCEDURE REASON: Shortness of breath      * * * * Physician Interpretation * * * * RESULT: EXAMINATION:  CHEST RADIOGRAPH (PORTABLE SINGLE VIEW AP) Exam Date/Time:  1/21/2024 9:10 AM CLINICAL HISTORY: Shortness of breath MQ:  XCPR_5 Comparison:  01/07/2024. RESULT: Lines, tubes, and devices:  None. Lungs and pleura:  The lungs remain unremarkable with no evidence of infiltrates.  The pleural margins appear normal. Cardiomediastinal silhouette:  Stable cardiomediastinal silhouette. Other:  The visualized bony thorax appears unremarkable.    IMPRESSION: Stable exam with no evidence of acute disease. Transcribed Using Voice Recognition Transcribe Date/Time: Jan 21 2024  9:41A Dictated by: DAMASO PERALTA MD This examination was interpreted and the report reviewed and electronically signed by: DAMSAO PERALTA MD on Jan 21 2024  9:42AM  EST                Assessment/Plan   Principal Problem:    Abdominal pain  Active Problems:    Abdominal pain, epigastric    Epigastric pain/nausea/vomiting (chronic recurrent unclear etiology)  CT scan no acute findings   Recent EGD on 12/27/23 Esophageal ulcer, no bleeding. No varices. Was recently on Fluconazole as well   PPI twice daily  Continue Carafate    Reviewed patient record given persistent epigastric pain. She has had 22 CT scans of just the abdomen and pelvis within the past 12 months. In Fall 2023, she was seen by [facility] having a CT enterography. There was narrow and inlammation within small intestine.  No diagnosed IBD but had + lactoferrin level as well. She does have celiac. She was scheduled for an outpatient small bowel capsule at some point but never followed up     This is a chronic, recurrent epigastric pain of unclear etiology. At this point, she is able to tolerate a diet. I do not think inpatient endoscopy is of clinical benefit. She will need to have outpatient capsule. ?IBD. I did send fecal clem yesterday     No barriers to DC from GI standpoint. She is scheduled to see Dr Zaman tomorrow. 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 Zaman at [facility] Normal oropharynx. Esophageal ulcer, no bleeding and no stigmata of recent bleeding.  Ascites: CT shows Free fluid within the peritoneal cavity is again noted (small amount, unlikely to need paracentesis)  Will continue lasix/aldactone.       I spent 20 minutes in the professional and overall care of this patient.      Alaina Mishra, APRN-CNP

## 2024-02-13 NOTE — PROGRESS NOTES
Physical Therapy    Physical Therapy Treatment    Patient Name: Alfonzo Flanagan  MRN: [id number]  Today's Date: 2/13/2024  Time Calculation  Start Time: 1329  Stop Time: 1401  Time Calculation (min): 32 min       Assessment/Plan   PT Assessment  End of Session Communication: Bedside nurse  End of Session Patient Position: Bed, 2 rail up, Alarm off, not on at start of session  PT Plan  Inpatient/Swing Bed or Outpatient: Inpatient  PT Plan  Treatment/Interventions: Gait training, Stair training, Balance training, Therapeutic exercise, Endurance training  PT Plan: Skilled PT  PT Frequency: 3 times per week  PT Discharge Recommendations: Low intensity level of continued care  Equipment Recommended upon Discharge: Wheeled walker  PT Recommended Transfer Status: Stand by assist  PT - OK to Discharge: Yes      General Visit Information:   PT  Visit  PT Received On: 02/13/24  General  Prior to Session Communication: Bedside nurse  Patient Position Received: Bed, 2 rail up, Alarm off, not on at start of session  General Comment: Cleared by nursing for therapy, Prior to treatment patient in supine and agreeable to tx.    Subjective        Objective   Pain:  Pain Assessment  Pain Assessment: Hall-Baker FACES  Pain Score: 5 - Moderate pain  Pain Type: Acute pain  Pain Location: Leg  Pain Orientation: Left (RN notified)       Treatments:  Therapeutic Exercise  Therapeutic Exercise Performed: Yes  Therapeutic Exercise Activity 1: Bilateral seated hip flexion x15  Therapeutic Exercise Activity 2: Bilateral hamstring curls x15  Therapeutic Exercise Activity 3: Bilateral knee extension x15  Therapeutic Exercise Activity 4: Bilateral standing hip extension x15  Therapeutic Exercise Activity 5: Bilateral standing hip abduction x15  Therapeutic Exercise Activity 6: Bilateral seated adduction with pillow x15    Bed Mobility  Bed Mobility: Yes  Bed Mobility 1  Bed Mobility 1: Supine to sitting  Level of Assistance 1: Distant  supervision  Bed Mobility 2  Bed Mobility  2: Sitting to supine  Level of Assistance 2: Distant supervision    Ambulation/Gait Training  Ambulation/Gait Training Performed: Yes  Ambulation/Gait Training 1  Surface 1: Level tile  Device 1: Rolling walker  Assistance 1: Close supervision  Comments/Distance (ft) 1: 75'x2 with RW, slow prateek, decreased step length and forward flexed posture.  Transfers  Transfer: Yes  Transfer 1  Transfer From 1: Sit to  Transfer to 1: Stand  Transfer Device 1: Walker  Transfer Level of Assistance 1: Close supervision  Transfers 2  Transfer From 2: Stand to  Transfer to 2: Sit  Transfer Device 2: Walker  Transfer Level of Assistance 2: Close supervision    Outcome Measures:  [facility] Basic Mobility  Turning from your back to your side while in a flat bed without using bedrails: None  Moving from lying on your back to sitting on the side of a flat bed without using bedrails: None  Moving to and from bed to chair (including a wheelchair): None  Standing up from a chair using your arms (e.g. wheelchair or bedside chair): None  To walk in hospital room: A little  Climbing 3-5 steps with railing: A little  Basic Mobility - Total Score: 22      DARREL EMMANUELELL, S-PTA         Encounter Problems       Encounter Problems (Active)       Pain             Encounter Problems (Resolved)       Mobility       STG - Patient will ambulate 150' with rolling walker and modified independence. (Met)       Start:  02/08/24    Expected End:  02/22/24    Resolved:  02/13/24         STG - Patient will ascend and descend a flight of stairs with use of one handrail and supervision for safety. (Met)       Start:  02/08/24    Expected End:  02/22/24    Resolved:  02/12/24

## 2024-02-13 NOTE — CONSULTS
"Inpatient Diabetes Education Consult Follow up:    BS today 2/13/24 at 11:28 was 163 mg/dl at 07:14 was 170 mg/dl.  No changes with insulin medications, getting the same.   Current Outpatient Medications   Medication Instructions    acetaminophen (TYLENOL) 325 mg, oral, Every 6 hours PRN    atorvastatin (LIPITOR) 80 mg, oral, Daily    docusate sodium (COLACE) 100 mg, oral, 2 times daily    fenofibrate (TRICOR) 145 mg, oral, Daily    furosemide (LASIX) 40 mg, oral, Daily    insulin lispro (HumaLOG) 100 unit/mL injection subcutaneous, 3 times daily with meals, Take as directed per insulin instructions.    Lantus U-100 Insulin 20 Units, subcutaneous, Every morning, Take as directed per insulin instructions.    pantoprazole (PROTONIX) 20 mg, oral, 2 times daily before meals, Do not crush, chew, or split.    spironolactone (ALDACTONE) 50 mg, oral, 2 times daily    traZODone (DESYREL) 25 mg, oral, Nightly    ursodiol (ACTIGALL) 300 mg, oral, 3 times daily       Glucose   Date/Time Value Ref Range Status   02/13/2024 05:53  (H) 65 - 99 mg/dL Final   02/12/2024 04:55  (H) 65 - 99 mg/dL Final   02/10/2024 04:40  (H) 65 - 99 mg/dL Final   02/09/2024 05:20  (H) 65 - 99 mg/dL Final   02/08/2024 06:33  (H) 65 - 99 mg/dL Final   02/07/2024 02:13  (HH) 65 - 99 mg/dL Final     Comment:     Result rechecked   01/29/2024 05:02  (H) 65 - 99 mg/dL Final   01/28/2024 02:05  (H) 65 - 99 mg/dL Final   01/27/2024 05:18  (H) 65 - 99 mg/dL Final   01/26/2024 08:33  (H) 65 - 99 mg/dL Final     No results found for: \"CPEPTIDE\"  Hemoglobin A1C   Date Value Ref Range Status   02/09/2024 11.3 (H) See below % Final   10/21/2023 11.7 (H) 4.3 - 5.6 % Final     Comment:     American Diabetes Association guidelines indicate that patients with HgbA1c in the range 5.7-6.4% are at increased risk for development of diabetes, and intervention by lifestyle modification may be beneficial. HgbA1c " greater or equal to 6.5% is considered diagnostic of diabetes.   09/07/2023 11.5 (H) 4.3 - 5.6 % Final     Comment:     American Diabetes Association guidelines indicate that patients with HgbA1c in the range 5.7-6.4% are at increased risk for development of diabetes, and intervention by lifestyle modification may be beneficial. HgbA1c greater or equal to 6.5% is considered diagnostic of diabetes.   08/08/2023 10.7 (H) 4.3 - 5.6 % Final     Comment:     American Diabetes Association guidelines indicate that patients with HgbA1c in the range 5.7-6.4% are at increased risk for development of diabetes, and intervention by lifestyle modification may be beneficial. HgbA1c greater or equal to 6.5% is considered diagnostic of diabetes.   05/27/2023 10.0 (H) 4.3 - 5.6 % Final     Comment:     American Diabetes Association guidelines indicate that patients with HgbA1c in the range 5.7-6.4% are at increased risk for development of diabetes, and intervention by lifestyle modification may be beneficial. HgbA1c greater or equal to 6.5% is considered diagnostic of diabetes.          Picato Counseling:  I discussed with the patient the risks of Picato including but not limited to erythema, scaling, itching, weeping, crusting, and pain.

## 2024-02-13 NOTE — PROGRESS NOTES
TCC spoke to patient at the bedside. Pt is concerned about all of her medications she is taking. She wants help at home for this. TCC will notify bed RN to help with medication education. Also referred patient to [facility] at home for this also. Requested an INTERNAL referral for home care for RN to be placed by Sol ROSAS.     PATIENT DOES NOT HAVE A SECURE DISCHARGE PLAN.        02/13/24 1201   Discharge Planning   Living Arrangements Spouse/significant other   Support Systems Spouse/significant other   Assistance Needed medication education   Type of Residence Private residence   Number of Stairs to Enter Residence 10   Number of Stairs Within Residence 0   Do you have animals or pets at home? No   Who is requesting discharge planning? Provider   Home or Post Acute Services In home services   Type of Home Care Services Home nursing visits   Patient expects to be discharged to: Health @ home   Does the patient need discharge transport arranged? Yes   RoundTrip coordination needed? Yes   Has discharge transport been arranged? No   Financial Resource Strain   How hard is it for you to pay for the very basics like food, housing, medical care, and heating? Not hard   Housing Stability   In the last 12 months, was there a time when you were not able to pay the mortgage or rent on time? N   In the last 12 months, how many places have you lived? 0   Transportation Needs   In the past 12 months, has lack of transportation kept you from medical appointments or from getting medications? no   In the past 12 months, has lack of transportation kept you from meetings, work, or from getting things needed for daily living? No   Patient Choice   Provider Choice list and CMS website (https://medicare.gov/care-compare#search) for post-acute Quality and Resource Measure Data were provided and reviewed with: Patient   Patient / Family choosing to utilize agency / facility established prior to hospitalization No

## 2024-02-13 NOTE — PROGRESS NOTES
02/13/24 1211   Current Planned Discharge Disposition   Current Planned Discharge Disposition Home H

## 2024-02-14 LAB
ALBUMIN SERPL-MCNC: 2 G/DL (ref 3.5–5)
ALP BLD-CCNC: 208 U/L (ref 35–125)
ALT SERPL-CCNC: 19 U/L (ref 5–40)
ANION GAP SERPL CALC-SCNC: 10 MMOL/L
AST SERPL-CCNC: 32 U/L (ref 5–40)
BILIRUB SERPL-MCNC: 1 MG/DL (ref 0.1–1.2)
BUN SERPL-MCNC: 11 MG/DL (ref 8–25)
CALCIUM SERPL-MCNC: 8 MG/DL (ref 8.5–10.4)
CHLORIDE SERPL-SCNC: 108 MMOL/L (ref 97–107)
CO2 SERPL-SCNC: 22 MMOL/L (ref 24–31)
CREAT SERPL-MCNC: 0.6 MG/DL (ref 0.4–1.6)
EGFRCR SERPLBLD CKD-EPI 2021: >90 ML/MIN/1.73M*2
ERYTHROCYTE [DISTWIDTH] IN BLOOD BY AUTOMATED COUNT: 17.4 % (ref 11.5–14.5)
GLUCOSE BLD MANUAL STRIP-MCNC: 125 MG/DL (ref 74–99)
GLUCOSE BLD MANUAL STRIP-MCNC: 165 MG/DL (ref 74–99)
GLUCOSE BLD MANUAL STRIP-MCNC: 176 MG/DL (ref 74–99)
GLUCOSE BLD MANUAL STRIP-MCNC: 271 MG/DL (ref 74–99)
GLUCOSE BLD MANUAL STRIP-MCNC: 288 MG/DL (ref 74–99)
GLUCOSE BLD MANUAL STRIP-MCNC: 297 MG/DL (ref 74–99)
GLUCOSE BLD MANUAL STRIP-MCNC: 94 MG/DL (ref 74–99)
GLUCOSE SERPL-MCNC: 116 MG/DL (ref 65–99)
HCT VFR BLD AUTO: 30.2 % (ref 36–46)
HGB BLD-MCNC: 9.5 G/DL (ref 12–16)
MCH RBC QN AUTO: 26.2 PG (ref 26–34)
MCHC RBC AUTO-ENTMCNC: 31.5 G/DL (ref 32–36)
MCV RBC AUTO: 83 FL (ref 80–100)
NRBC BLD-RTO: 0 /100 WBCS (ref 0–0)
PLATELET # BLD AUTO: 87 X10*3/UL (ref 150–450)
POTASSIUM SERPL-SCNC: 4 MMOL/L (ref 3.4–5.1)
PROT SERPL-MCNC: 6.4 G/DL (ref 5.9–7.9)
RBC # BLD AUTO: 3.63 X10*6/UL (ref 4–5.2)
SODIUM SERPL-SCNC: 140 MMOL/L (ref 133–145)
WBC # BLD AUTO: 4.4 X10*3/UL (ref 4.4–11.3)

## 2024-02-14 PROCEDURE — 85027 COMPLETE CBC AUTOMATED: CPT | Performed by: NURSE PRACTITIONER

## 2024-02-14 PROCEDURE — 84075 ASSAY ALKALINE PHOSPHATASE: CPT | Performed by: NURSE PRACTITIONER

## 2024-02-14 PROCEDURE — 2500000005 HC RX 250 GENERAL PHARMACY W/O HCPCS: Performed by: INTERNAL MEDICINE

## 2024-02-14 PROCEDURE — 2500000004 HC RX 250 GENERAL PHARMACY W/ HCPCS (ALT 636 FOR OP/ED): Performed by: INTERNAL MEDICINE

## 2024-02-14 PROCEDURE — 2500000001 HC RX 250 WO HCPCS SELF ADMINISTERED DRUGS (ALT 637 FOR MEDICARE OP): Performed by: INTERNAL MEDICINE

## 2024-02-14 PROCEDURE — 2500000002 HC RX 250 W HCPCS SELF ADMINISTERED DRUGS (ALT 637 FOR MEDICARE OP, ALT 636 FOR OP/ED)

## 2024-02-14 PROCEDURE — 36415 COLL VENOUS BLD VENIPUNCTURE: CPT | Performed by: NURSE PRACTITIONER

## 2024-02-14 PROCEDURE — 97116 GAIT TRAINING THERAPY: CPT | Mod: GP

## 2024-02-14 PROCEDURE — 2500000002 HC RX 250 W HCPCS SELF ADMINISTERED DRUGS (ALT 637 FOR MEDICARE OP, ALT 636 FOR OP/ED): Performed by: INTERNAL MEDICINE

## 2024-02-14 PROCEDURE — 2500000004 HC RX 250 GENERAL PHARMACY W/ HCPCS (ALT 636 FOR OP/ED): Performed by: NURSE PRACTITIONER

## 2024-02-14 PROCEDURE — 1200000002 HC GENERAL ROOM WITH TELEMETRY DAILY

## 2024-02-14 PROCEDURE — 2500000004 HC RX 250 GENERAL PHARMACY W/ HCPCS (ALT 636 FOR OP/ED)

## 2024-02-14 PROCEDURE — 2500000001 HC RX 250 WO HCPCS SELF ADMINISTERED DRUGS (ALT 637 FOR MEDICARE OP): Performed by: STUDENT IN AN ORGANIZED HEALTH CARE EDUCATION/TRAINING PROGRAM

## 2024-02-14 PROCEDURE — 82947 ASSAY GLUCOSE BLOOD QUANT: CPT

## 2024-02-14 RX ORDER — INSULIN LISPRO 100 [IU]/ML
0-15 INJECTION, SOLUTION INTRAVENOUS; SUBCUTANEOUS
Status: DISCONTINUED | OUTPATIENT
Start: 2024-02-14 | End: 2024-02-15 | Stop reason: HOSPADM

## 2024-02-14 RX ORDER — MORPHINE SULFATE 2 MG/ML
2 INJECTION, SOLUTION INTRAMUSCULAR; INTRAVENOUS EVERY 4 HOURS PRN
Status: DISCONTINUED | OUTPATIENT
Start: 2024-02-14 | End: 2024-02-15 | Stop reason: HOSPADM

## 2024-02-14 RX ORDER — DOXYCYCLINE 100 MG/1
100 CAPSULE ORAL EVERY 12 HOURS SCHEDULED
Status: DISCONTINUED | OUTPATIENT
Start: 2024-02-14 | End: 2024-02-15 | Stop reason: HOSPADM

## 2024-02-14 RX ADMIN — PIPERACILLIN SODIUM AND TAZOBACTAM SODIUM 3.38 G: 3; .375 INJECTION, SOLUTION INTRAVENOUS at 11:02

## 2024-02-14 RX ADMIN — DOXYCYCLINE HYCLATE 100 MG: 100 CAPSULE ORAL at 21:00

## 2024-02-14 RX ADMIN — SUCRALFATE 1 G: 1 SUSPENSION ORAL at 11:02

## 2024-02-14 RX ADMIN — INSULIN LISPRO 3 UNITS: 100 INJECTION, SOLUTION INTRAVENOUS; SUBCUTANEOUS at 00:14

## 2024-02-14 RX ADMIN — SPIRONOLACTONE 50 MG: 50 TABLET ORAL at 21:00

## 2024-02-14 RX ADMIN — URSODIOL 300 MG: 300 CAPSULE ORAL at 17:52

## 2024-02-14 RX ADMIN — SUCRALFATE 1 G: 1 SUSPENSION ORAL at 21:00

## 2024-02-14 RX ADMIN — PANTOPRAZOLE SODIUM 20 MG: 20 TABLET, DELAYED RELEASE ORAL at 05:33

## 2024-02-14 RX ADMIN — DOCUSATE SODIUM 100 MG: 100 CAPSULE, LIQUID FILLED ORAL at 09:02

## 2024-02-14 RX ADMIN — TIZANIDINE 2 MG: 2 TABLET ORAL at 11:02

## 2024-02-14 RX ADMIN — ATORVASTATIN CALCIUM 80 MG: 80 TABLET, FILM COATED ORAL at 21:00

## 2024-02-14 RX ADMIN — FUROSEMIDE 40 MG: 40 TABLET ORAL at 09:02

## 2024-02-14 RX ADMIN — URSODIOL 300 MG: 300 CAPSULE ORAL at 21:00

## 2024-02-14 RX ADMIN — SUCRALFATE 1 G: 1 SUSPENSION ORAL at 17:19

## 2024-02-14 RX ADMIN — URSODIOL 300 MG: 300 CAPSULE ORAL at 09:03

## 2024-02-14 RX ADMIN — INSULIN LISPRO 9 UNITS: 100 INJECTION, SOLUTION INTRAVENOUS; SUBCUTANEOUS at 17:21

## 2024-02-14 RX ADMIN — PIPERACILLIN SODIUM AND TAZOBACTAM SODIUM 3.38 G: 3; .375 INJECTION, SOLUTION INTRAVENOUS at 22:41

## 2024-02-14 RX ADMIN — TRAZODONE HYDROCHLORIDE 25 MG: 50 TABLET ORAL at 21:00

## 2024-02-14 RX ADMIN — TIZANIDINE 2 MG: 2 TABLET ORAL at 21:14

## 2024-02-14 RX ADMIN — INSULIN LISPRO 3 UNITS: 100 INJECTION, SOLUTION INTRAVENOUS; SUBCUTANEOUS at 11:46

## 2024-02-14 RX ADMIN — SPIRONOLACTONE 50 MG: 50 TABLET ORAL at 09:02

## 2024-02-14 RX ADMIN — POLYETHYLENE GLYCOL 3350 17 G: 17 POWDER, FOR SOLUTION ORAL at 21:06

## 2024-02-14 RX ADMIN — POLYETHYLENE GLYCOL 3350 17 G: 17 POWDER, FOR SOLUTION ORAL at 09:05

## 2024-02-14 RX ADMIN — ACETAMINOPHEN 650 MG: 325 TABLET ORAL at 11:01

## 2024-02-14 RX ADMIN — ACETAMINOPHEN 650 MG: 325 TABLET ORAL at 02:12

## 2024-02-14 RX ADMIN — INSULIN GLARGINE 20 UNITS: 100 INJECTION, SOLUTION SUBCUTANEOUS at 09:00

## 2024-02-14 RX ADMIN — DOCUSATE SODIUM 100 MG: 100 CAPSULE, LIQUID FILLED ORAL at 21:00

## 2024-02-14 RX ADMIN — MORPHINE SULFATE 2 MG: 2 INJECTION, SOLUTION INTRAMUSCULAR; INTRAVENOUS at 15:31

## 2024-02-14 RX ADMIN — PIPERACILLIN SODIUM AND TAZOBACTAM SODIUM 3.38 G: 3; .375 INJECTION, SOLUTION INTRAVENOUS at 17:19

## 2024-02-14 RX ADMIN — SODIUM CHLORIDE 75 ML/HR: 900 INJECTION, SOLUTION INTRAVENOUS at 17:27

## 2024-02-14 RX ADMIN — PIPERACILLIN SODIUM AND TAZOBACTAM SODIUM 3.38 G: 3; .375 INJECTION, SOLUTION INTRAVENOUS at 05:33

## 2024-02-14 RX ADMIN — LIDOCAINE 1 PATCH: 4 PATCH TOPICAL at 09:03

## 2024-02-14 RX ADMIN — SUCRALFATE 1 G: 1 SUSPENSION ORAL at 05:33

## 2024-02-14 RX ADMIN — PANTOPRAZOLE SODIUM 20 MG: 20 TABLET, DELAYED RELEASE ORAL at 17:19

## 2024-02-14 RX ADMIN — FENOFIBRATE 160 MG: 160 TABLET ORAL at 09:02

## 2024-02-14 ASSESSMENT — PAIN DESCRIPTION - LOCATION
LOCATION: ABDOMEN

## 2024-02-14 ASSESSMENT — PAIN SCALES - GENERAL
PAINLEVEL_OUTOF10: 3
PAINLEVEL_OUTOF10: 4
PAINLEVEL_OUTOF10: 8
PAINLEVEL_OUTOF10: 8
PAINLEVEL_OUTOF10: 7
PAINLEVEL_OUTOF10: 8

## 2024-02-14 ASSESSMENT — PAIN DESCRIPTION - ORIENTATION
ORIENTATION: MID;ANTERIOR
ORIENTATION: MID;ANTERIOR

## 2024-02-14 ASSESSMENT — PAIN - FUNCTIONAL ASSESSMENT
PAIN_FUNCTIONAL_ASSESSMENT: 0-10
PAIN_FUNCTIONAL_ASSESSMENT: FLACC (FACE, LEGS, ACTIVITY, CRY, CONSOLABILITY)

## 2024-02-14 ASSESSMENT — COGNITIVE AND FUNCTIONAL STATUS - GENERAL
CLIMB 3 TO 5 STEPS WITH RAILING: A LITTLE
MOBILITY SCORE: 23

## 2024-02-14 ASSESSMENT — PAIN SCALES - PAIN ASSESSMENT IN ADVANCED DEMENTIA (PAINAD): BREATHING: NORMAL

## 2024-02-14 NOTE — PROGRESS NOTES
"Alfonzo Flanagan is a 49 y.o. female on day 5 of admission presenting with Abdominal pain.    Subjective   NO fevers. No leukocytosis. Left groin abscess with very little drainage.   Complains of chronic abdominal pain.        Objective     Physical Exam  Constitutional:       Appearance: Normal appearance.   HENT:      Head: Normocephalic and atraumatic.      Right Ear: Tympanic membrane normal.      Nose: Nose normal.      Mouth/Throat:      Mouth: Mucous membranes are moist.   Eyes:      Pupils: Pupils are equal, round, and reactive to light.   Cardiovascular:      Rate and Rhythm: Normal rate and regular rhythm.      Pulses: Normal pulses.   Pulmonary:      Effort: Pulmonary effort is normal.      Breath sounds: Normal breath sounds.   Abdominal:      General: Bowel sounds are normal. There is distension.      Palpations: Abdomen is soft.      Tenderness: There is no abdominal tenderness. There is no guarding.      Hernia: No hernia is present.   Genitourinary:     Rectum: Normal.   Musculoskeletal:         General: Normal range of motion.   Skin:     General: Skin is warm and dry.   Neurological:      General: No focal deficit present.      Mental Status: She is alert.   Psychiatric:         Mood and Affect: Mood normal.         Behavior: Behavior normal.         Last Recorded Vitals  Blood pressure 99/50, pulse 85, temperature 36.8 °C (98.2 °F), temperature source Oral, resp. rate 17, height 1.575 m (5' 2\"), weight 72.6 kg (160 lb), SpO2 100 %.  Intake/Output last 3 Shifts:  I/O last 3 completed shifts:  In: 2586.5 (35.6 mL/kg) [P.O.:1025; I.V.:1561.5 (21.5 mL/kg)]  Out: - (0 mL/kg)   Weight: 72.6 kg     Relevant Results  Lab Results   Component Value Date    GLUCOSE 116 (H) 02/14/2024    CALCIUM 8.0 (L) 02/14/2024     02/14/2024    K 4.0 02/14/2024    CO2 22 (L) 02/14/2024     (H) 02/14/2024    BUN 11 02/14/2024    CREATININE 0.60 02/14/2024     Lab Results   Component Value Date    WBC 4.4 " 02/14/2024    HGB 9.5 (L) 02/14/2024    HCT 30.2 (L) 02/14/2024    MCV 83 02/14/2024    PLT 87 (L) 02/14/2024                       Assessment/Plan   Principal Problem:    Abdominal pain  Active Problems:    Abdominal pain, epigastric    2/14/24 still with chronic abdominal pain. Abscess to left groin with minimal drainage. Continue iv antbx. Dischargeable from surgical perspective with GI/hepatology follow up. Gi prophylaxis.        I spent 15 minutes in the professional and overall care of this patient.      Brittany Olguin, APRN-CNP

## 2024-02-14 NOTE — CARE PLAN
Plan on discharge home on Thursday; internal order for [facility]; notified [facility] of referral at this date and time      DISCHARGE PLAN: HOME WITH [facility]; REFERRAL PLACED TO [facility]--MUST MAKE SURE THAT [facility] CAN ACCEPT THIS PT PRIOR TO DISCHARGE

## 2024-02-14 NOTE — PROGRESS NOTES
Physical Therapy    Physical Therapy Treatment    Patient Name: Alfonzo Flanagan  MRN: [id number]  Today's Date: 2/14/2024  Time Calculation  Start Time: 1020  Stop Time: 1035  Time Calculation (min): 15 min     Assessment/Plan   PT Assessment  Rehab Prognosis: Good  Evaluation/Treatment Tolerance: Patient tolerated treatment well  Medical Staff Made Aware: Yes  Strengths: Ability to acquire knowledge, Premorbid level of function, Support of extended family/friends  Barriers to Participation: Comorbidities  End of Session Communication: Bedside nurse  Assessment Comment: Pt has met all therapy goals at this time, no further acute skilled PT needs indicated at this time; will d/c from PT.  End of Session Patient Position: Bed, 2 rail up, Alarm on  PT Plan  Inpatient/Swing Bed or Outpatient: Inpatient  PT Plan  Treatment/Interventions: Gait training, Stair training, Balance training, Therapeutic exercise, Endurance training  PT Plan: Skilled PT  PT Eval Only Reason: At baseline function  PT Frequency: 3 times per week  PT Discharge Recommendations: No further acute PT  Equipment Recommended upon Discharge: Wheeled walker  PT Recommended Transfer Status: Independent  PT - OK to Discharge: Yes    General Visit Information:   PT  Visit  PT Received On: 02/14/24  Response to Previous Treatment: Patient with no complaints from previous session.  General  Reason for Referral: impaired functional mobility  Referred By: Dr. Howard MD  Past Medical History Relevant to Rehab: ascites, UTI  Family/Caregiver Present: No  Prior to Session Communication: Bedside nurse  Patient Position Received: Bed, 2 rail up, Alarm off, not on at start of session  Preferred Learning Style: verbal  General Comment: Pt cleared for therapy via RN, received in supine, NAD, requesting assistance with toileting d/t incontinent of BM; (+) IV    Subjective   Precautions:  Precautions  Hearing/Visual Limitations: hearing/vision WFL    Objective    Pain:  Pain Assessment  Pain Assessment: FLACC (Face, Legs, Activity, Cry, Consolability)  Pain Score: 3  Pain Type: Acute pain  Pain Location: Abdomen  Pain Interventions: Ambulation/increased activity  Cognition:  Cognition  Overall Cognitive Status: Within Functional Limits  Orientation Level: Oriented X4  Postural Control:  Postural Control  Postural Control: Within Functional Limits  Static Sitting Balance  Static Sitting-Balance Support: No upper extremity supported, Feet supported  Static Sitting-Level of Assistance: Independent  Dynamic Sitting Balance  Dynamic Sitting-Balance Support: No upper extremity supported, Feet supported  Dynamic Sitting-Balance:  (toileting)  Dynamic Sitting-Comments: independent  Static Standing Balance  Static Standing-Balance Support: Bilateral upper extremity supported  Static Standing-Level of Assistance: Modified independent  Extremity/Trunk Assessments:  RLE   RLE : Within Functional Limits  LLE   LLE : Within Functional Limits  Activity Tolerance:  Activity Tolerance  Endurance: Tolerates 10 - 20 min exercise with multiple rests  Treatments:  Therapeutic Exercise  Therapeutic Exercise Performed: No    Therapeutic Activity  Therapeutic Activity Performed: Yes  Therapeutic Activity 1: indep to perform seated toileting activities and standing activites in bathroom including donning new brief and hand washing    Bed Mobility 1  Bed Mobility 1: Supine to sitting, Sitting to supine  Level of Assistance 1: Independent    Ambulation/Gait Training  Ambulation/Gait Training Performed: Yes  Ambulation/Gait Training 1  Surface 1: Level tile  Device 1: Rolling walker  Assistance 1: Modified independent  Quality of Gait 1:  (decreased prateek, reciprocating pattern, steady gait)  Comments/Distance (ft) 1: 150;  Transfers  Transfer: Yes  Transfer 1  Transfer From 1: Sit to, Stand to  Transfer to 1: Stand, Sit  Technique 1: Sit to stand, Stand to sit  Transfer Device 1: Walker  Transfer  Level of Assistance 1: Modified independent    Stairs  Stairs: No (declined, states she has already practiced stairs)    Outcome Measures:  [facility] Basic Mobility  Turning from your back to your side while in a flat bed without using bedrails: None  Moving from lying on your back to sitting on the side of a flat bed without using bedrails: None  Moving to and from bed to chair (including a wheelchair): None  Standing up from a chair using your arms (e.g. wheelchair or bedside chair): None  To walk in hospital room: None  Climbing 3-5 steps with railing: A little  Basic Mobility - Total Score: 23    Education Documentation  Home Exercise Program, taught by Melida Schulz PT at 2/14/2024 11:28 AM.  Learner: Patient  Readiness: Acceptance  Method: Explanation, Demonstration  Response: Demonstrated Understanding, Needs Reinforcement    Mobility Training, taught by Melida Schulz, PT at 2/14/2024 11:28 AM.  Learner: Patient  Readiness: Acceptance  Method: Explanation, Demonstration  Response: Demonstrated Understanding, Needs Reinforcement    Education Comments  No comments found.      OP EDUCATION:  Outpatient Education  Individual(s) Educated: Patient  Education Provided: Home Exercise Program, POC  Risk and Benefits Discussed with Patient/Caregiver/Other: yes  Patient/Caregiver Demonstrated Understanding: yes  Plan of Care Discussed and Agreed Upon: yes  Patient Response to Education: Patient/Caregiver Verbalized Understanding of Information    Encounter Problems       Encounter Problems (Active)       Pain             Encounter Problems (Resolved)       Mobility       STG - Patient will ambulate 150' with rolling walker and modified independence. (Met)       Start:  02/08/24    Expected End:  02/22/24    Resolved:  02/13/24         STG - Patient will ascend and descend a flight of stairs with use of one handrail and supervision for safety. (Met)       Start:  02/08/24    Expected End:  02/22/24    Resolved:   02/12/24

## 2024-02-14 NOTE — CONSULTS
Reason For Consult  Abscess- hidradenitis    History Of Present Illness  Alfonzo Flanagan is a 49 y.o. 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 Imaging shows ascites but no surgical causes, currently being managed by GI. Surgery consulted for left groin abscess. Pt with hx hidradenitis and has numerous abscesses. She reports they enlarge and she starts to feel ill with occasional nausea and fevers however they drain on their own and she feels better. This abscess started 2 days ago and drain with a lot of bloody purulent output earlier today. Zosyn has been started.      Past Medical History  Cirrhosis  hidradenitis    Surgical History  She has a past surgical history that includes CT guided imaging for needle placement (10/14/2020) and US guided abdominal paracentesis (10/8/2020).     Social History  She reports that she has never smoked. She has never used smokeless tobacco. She reports that she does not drink alcohol and does not use drugs.    Family History  No family history on file.     Allergies  Gluten    Review of Systems  Review of Systems   Constitutional:  Negative for chills, fever and unexpected weight change.   HENT:  Negative for sneezing, sore throat, trouble swallowing and voice change.    Respiratory:  Negative for chest tightness and shortness of breath.    Cardiovascular:  Negative for chest pain and palpitations.   Gastrointestinal:  Positive for abdominal distention and abdominal pain. Negative for blood in stool, diarrhea, nausea and vomiting.   Endocrine: Negative for cold intolerance and heat intolerance.   Genitourinary:  Negative for decreased urine volume, dysuria and hematuria.   Musculoskeletal:  Negative for arthralgias and gait problem.   Skin:  Positive for wound. Negative for rash.   Neurological:  Negative for facial asymmetry, speech difficulty and headaches.   Hematological:  Negative for adenopathy. Does  "not bruise/bleed easily.   Psychiatric/Behavioral:  Negative for self-injury and suicidal ideas.          Physical Exam  Physical Exam  Vitals and nursing note reviewed.   Constitutional:       Appearance: Normal appearance.   HENT:      Head: Normocephalic and atraumatic.      Mouth/Throat:      Mouth: Mucous membranes are moist.      Pharynx: Oropharynx is clear.   Eyes:      Extraocular Movements: Extraocular movements intact.      Pupils: Pupils are equal, round, and reactive to light.   Cardiovascular:      Rate and Rhythm: Normal rate and regular rhythm.      Pulses: Normal pulses.   Pulmonary:      Effort: Pulmonary effort is normal.      Breath sounds: Normal breath sounds.   Abdominal:      General: There is distension.      Palpations: Abdomen is soft.      Tenderness: There is abdominal tenderness (generalized).   Musculoskeletal:      Cervical back: Normal range of motion and neck supple.   Skin:     General: Skin is warm and dry.      Comments: Hidradenitis of bilateral groin, left groin with evidence of previous drainage, no fluctuance, induration present   Neurological:      General: No focal deficit present.      Mental Status: She is alert and oriented to person, place, and time.   Psychiatric:         Mood and Affect: Mood normal.         Behavior: Behavior normal.           Last Recorded Vitals  Blood pressure 97/55, pulse 98, temperature 37.1 °C (98.8 °F), temperature source Oral, resp. rate 17, height 1.575 m (5' 2\"), weight 72.6 kg (160 lb), SpO2 98 %.    Relevant Results  Reviewed CT scan     Assessment/Plan   Hidradenitis    Hidradenitis present in multiple locations, bilateral groin and axilla. Groin abscess spontaneously drained today without evidence of persistent collection. There is induration and cellulitis of the surrounding skin. She has been started on Zosyn. Would recommend empiric coverage for MRSA given recurrent abscesses and multiple hospital encounters. No evidence of systemic " or deep space infection so would do clindamycin for 1 week. At the time of this note, ID has been consulted so will defer ABX recs to their discretion.    Florecita Vivas MD

## 2024-02-14 NOTE — CONSULTS
Inpatient consult to Infectious Diseases  Consult performed by: Brittany Clements DO  Consult ordered by: MADYSON Sotelo-CNP          Referred by Dr Howard Hardy MD: Landen Freeman MD    Reason For Consult  Left labia abscess    History Of Present Illness  Alfonzo Flanagan is a 49 y.o. female presenting with abd pain, N/V. She had a history of recent UTI it seems    CT A/P on admission with findings consistent with cirrhosis and portal hypertension with free fluid in the peritoneal cavity    She was evaluated by GI team.    She was noted to have groin/labial abscess over the past 48 hours. General surgery team evaluated the patient. She does have a hx of hidradenitis in the b/l groin and axilla. The groin abscess was drained yesterday by general surgery.     ID consult was requested for abx recommendations. No cultures that I see. She feels a bit better overall. Abscess still draining     Past Medical History  She has no past medical history on file.    Surgical History  She has a past surgical history that includes CT guided imaging for needle placement (10/14/2020) and US guided abdominal paracentesis (10/8/2020).     Social History  She reports that she has never smoked. She has never used smokeless tobacco. She reports that she does not drink alcohol and does not use drugs.   Travel Screening       Question Response    Do you have any of the following new or worsening symptoms? Abdominal pain    In the last 10 days, have you been in contact with someone who was confirmed or suspected to have Coronavirus/COVID-19? No / Unsure    Have you had a COVID-19 viral test in the last 10 days? No    Have you traveled internationally or domestically in the last month? No          Travel History   Travel since 01/14/24    No documented travel since 01/14/24           Family History  No family history on file.  Allergies  Gluten     Immunization History   Administered Date(s) Administered    Pfizer  "Purple Cap SARS-CoV-2 05/16/2021, 06/13/2021     Review of Systems  No fevers, chills, N/V/D, abd pain, SOB, chest pain, headache, sore throat, dysuria. +left groin pain. Otherwise ROS is negative     Physical Exam  General: AAOx3, NAD, nontoxic appearing  Eyes: PERRL, EOMI, no scleral icterus  ENT: no oral thrush or lesions  Resp:  normal resp effort  Abd: soft, nondistended  : no granda; left labial abscess with drainage  Ext: no edema  Skin: no rashes     Range of Vitals (last 24 hours)  Heart Rate:  [85-98]   Temp:  [36.8 °C (98.2 °F)-37.1 °C (98.8 °F)]   Resp:  [17]   BP: (97-99)/(50-59)   SpO2:  [96 %-100 %]     Relevant Results  Lab Results   Component Value Date    WBC 4.4 02/14/2024    HGB 9.5 (L) 02/14/2024    HCT 30.2 (L) 02/14/2024    MCV 83 02/14/2024    PLT 87 (L) 02/14/2024      Results from last 72 hours   Lab Units 02/14/24  0636   SODIUM mmol/L 140   POTASSIUM mmol/L 4.0   CHLORIDE mmol/L 108*   CO2 mmol/L 22*   BUN mg/dL 11   CREATININE mg/dL 0.60   GLUCOSE mg/dL 116*   CALCIUM mg/dL 8.0*   ANION GAP mmol/L 10   EGFR mL/min/1.73m*2 >90     Results from last 72 hours   Lab Units 02/14/24  0636   ALK PHOS U/L 208*   BILIRUBIN TOTAL mg/dL 1.0   PROTEIN TOTAL g/dL 6.4   ALT U/L 19   AST U/L 32   ALBUMIN g/dL 2.0*     Estimated Creatinine Clearance: 108.1 mL/min (by C-G formula based on SCr of 0.6 mg/dL).  No results found for: \"CRP\", \"SEDRATE\"  No results found for: \"HIV1X2\", \"HIVCONF\", \"[id number]\"  No results found for: \"HCVPCRQUANT\"    Cultures/Micro  No results found for the last 14 days.  none     Imaging:  CT A/P  IMPRESSION:  Findings consistent with cirrhosis and portal hypertension. Free  fluid within the peritoneal cavity is again noted, though decreased  as compared to previous exam    Assessment:  Groin lesion in the setting of hidradenitis  Abd pain    Plan/Recommendations:  Wound care per general surgery team  Mi lassiter while she remains in house  Will add oral doxycyline 100mg " BID  When ready for discharge, ok to switch to doxycyline 100mg BID plus augmentin 875/125 BID to complete a 7 day course, in the absence of culture data    Will sign off, please recall if any questions. Discussed with NP Sol Clements DO  [facility]  #[phone]

## 2024-02-14 NOTE — PROGRESS NOTES
Alfonzo Flanagan is a 49 y.o. female on day 5 of admission presenting with Abdominal pain.    Subjective   Patient seen and examined.  Resting in bed in no acute distress.  Awake alert oriented x 3.  Better.  Abdominal pain.  Nausea.  No vomiting.  States she is not eating much other than liquids.  + Bowel movement today.  She has an outpatient appointment scheduled this am with her Gastroenterologist, Dr. Zaman.  Discussed outpatient endoscopy.  She does not feel ready to go home.    Objective     Physical Exam  Vitals reviewed.   Constitutional:       General: She is not in acute distress.     Appearance: She is obese. She is not ill-appearing, toxic-appearing or diaphoretic.   HENT:      Head: Normocephalic and atraumatic.      Right Ear: Tympanic membrane normal.      Left Ear: Tympanic membrane normal.      Nose: Nose normal.      Mouth/Throat:      Mouth: Mucous membranes are moist.      Pharynx: Oropharynx is clear.   Eyes:      Extraocular Movements: Extraocular movements intact.      Conjunctiva/sclera: Conjunctivae normal.      Pupils: Pupils are equal, round, and reactive to light.   Cardiovascular:      Rate and Rhythm: Normal rate and regular rhythm.      Pulses: Normal pulses.      Heart sounds: Normal heart sounds.   Pulmonary:      Effort: Pulmonary effort is normal. No respiratory distress.      Breath sounds: Normal breath sounds. No wheezing, rhonchi or rales.      Comments: Deferred  Abdominal:      General: Bowel sounds are normal. There is distension.      Palpations: Abdomen is soft.      Tenderness: There is abdominal tenderness.   Genitourinary:     Comments: Deferred  Musculoskeletal:         General: No swelling or tenderness. Normal range of motion.      Cervical back: Normal range of motion and neck supple.   Skin:     General: Skin is warm and dry.      Capillary Refill: Capillary refill takes less than 2 seconds.   Neurological:      General: No focal deficit present.      Mental  "Status: She is alert and oriented to person, place, and time.   Psychiatric:         Mood and Affect: Mood normal.         Behavior: Behavior normal.       Last Recorded Vitals  Blood pressure 99/50, pulse 85, temperature 36.8 °C (98.2 °F), temperature source Oral, resp. rate 17, height 1.575 m (5' 2\"), weight 72.6 kg (160 lb), SpO2 100 %.    Intake/Output last 3 Shifts:  I/O last 3 completed shifts:  In: 2586.5 (35.6 mL/kg) [P.O.:1025; I.V.:1561.5 (21.5 mL/kg)]  Out: - (0 mL/kg)   Weight: 72.6 kg     Telemetry normal sinus rhythm rate 90's    Relevant Results  Results for orders placed or performed during the hospital encounter of 02/07/24 (from the past 24 hour(s))   POCT GLUCOSE   Result Value Ref Range    POCT Glucose 163 (H) 74 - 99 mg/dL   POCT GLUCOSE   Result Value Ref Range    POCT Glucose 203 (H) 74 - 99 mg/dL   POCT GLUCOSE   Result Value Ref Range    POCT Glucose 185 (H) 74 - 99 mg/dL   POCT GLUCOSE   Result Value Ref Range    POCT Glucose 165 (H) 74 - 99 mg/dL   POCT GLUCOSE   Result Value Ref Range    POCT Glucose 94 74 - 99 mg/dL   Comprehensive Metabolic Panel   Result Value Ref Range    Glucose 116 (H) 65 - 99 mg/dL    Sodium 140 133 - 145 mmol/L    Potassium 4.0 3.4 - 5.1 mmol/L    Chloride 108 (H) 97 - 107 mmol/L    Bicarbonate 22 (L) 24 - 31 mmol/L    Urea Nitrogen 11 8 - 25 mg/dL    Creatinine 0.60 0.40 - 1.60 mg/dL    eGFR >90 >60 mL/min/1.73m*2    Calcium 8.0 (L) 8.5 - 10.4 mg/dL    Albumin 2.0 (L) 3.5 - 5.0 g/dL    Alkaline Phosphatase 208 (H) 35 - 125 U/L    Total Protein 6.4 5.9 - 7.9 g/dL    AST 32 5 - 40 U/L    Bilirubin, Total 1.0 0.1 - 1.2 mg/dL    ALT 19 5 - 40 U/L    Anion Gap 10 <=19 mmol/L   CBC   Result Value Ref Range    WBC 4.4 4.4 - 11.3 x10*3/uL    nRBC 0.0 0.0 - 0.0 /100 WBCs    RBC 3.63 (L) 4.00 - 5.20 x10*6/uL    Hemoglobin 9.5 (L) 12.0 - 16.0 g/dL    Hematocrit 30.2 (L) 36.0 - 46.0 %    MCV 83 80 - 100 fL    MCH 26.2 26.0 - 34.0 pg    MCHC 31.5 (L) 32.0 - 36.0 g/dL    RDW " 17.4 (H) 11.5 - 14.5 %    Platelets 87 (L) 150 - 450 x10*3/uL   POCT GLUCOSE   Result Value Ref Range    POCT Glucose 125 (H) 74 - 99 mg/dL     No results found for the last 90 days.      No results found.    Scheduled medications  atorvastatin, 80 mg, oral, Daily  docusate sodium, 100 mg, oral, BID  fenofibrate, 160 mg, oral, Daily  furosemide, 40 mg, oral, Daily  insulin glargine, 20 Units, subcutaneous, q AM  insulin lispro, 0-15 Units, subcutaneous, q4h  insulin regular, 0-10 Units, subcutaneous, Nightly at 0300  lidocaine, 1 patch, transdermal, Daily  pantoprazole, 20 mg, oral, BID AC  piperacillin-tazobactam, 3.375 g, intravenous, q6h  polyethylene glycol, 17 g, oral, BID  spironolactone, 50 mg, oral, BID  sucralfate, 1 g, oral, Before meals & nightly  traZODone, 25 mg, oral, Nightly  ursodiol, 300 mg, oral, TID      Continuous medications  sodium chloride 0.9%, 75 mL/hr, Last Rate: 75 mL/hr (02/14/24 0339)      PRN medications  PRN medications: acetaminophen **OR** acetaminophen **OR** acetaminophen, acetaminophen, benzocaine-menthoL, dextromethorphan-guaifenesin, dextrose 10 % in water (D10W), dextrose, glucagon, guaiFENesin, tiZANidine    ASSESSMENT:  Abdominal pain, nausea (vomiting - resolved)   Back pain  Elevated lipase - chronic pancreatitis  Hepatic cirrhosis, ascites  Portal hypertension  Elevated LFT's  Lactic acidosis  Pyuria - dysuria  Type 2 diabetes mellitus  Hyperglycemia  Hypertriglyceridemia  Hyperlipidemia  Normocytic anemia  Thrombocytopenia  Hydradenitis/abscess    PLAN:  Patient continues to complain of abdominal pain and nausea.  Gastroenterology advised she follow up outpatient for small bowel capsule endoscopy.  She has an outpatient appointment scheduled this am with her Gastroenterologist, Dr. Zaman.  She complains of abdominal pain and nausea and does not feel ready to go home.  Discussed with Dr. Lawrence.  Symptom control.  Analgesics, anti-emetics.  Diet as tolerated.  Avoid  constipation.  Gastroenterology consultation -- follow-up.  General surgery also following.  Input appreciated.  Left groin hydradenitis/abscess improved.  Culture drainage, if able.  Local care.  IV Zosyn.  Infectious disease consultation.  Monitor.  Continue current medications.  Monitor blood pressure.  Point of care glucose reviewed.  Continue insulin.  Adjust insulin as needed for glycemic control.  PT/OT.  Fall precautions.  Ambulating.  Supportive care.  Patient reassured.  Case management following for discharge planning.  Discharge plan home with home health care.  Anticipate discharge after cleared by consultations.  Discussed with patient, nursing and Dr. Lawrence.      Leny Daniels, APRN-CNP

## 2024-02-14 NOTE — NURSING NOTE
Assumed care of patient at this time, patient is resting in bed with brake in place and call light in reach denies any needs

## 2024-02-15 ENCOUNTER — HOME HEALTH ADMISSION (OUTPATIENT)
Dept: HOME HEALTH SERVICES | Facility: HOME HEALTH | Age: 49
End: 2024-02-15
Payer: COMMERCIAL

## 2024-02-15 VITALS
RESPIRATION RATE: 16 BRPM | SYSTOLIC BLOOD PRESSURE: 85 MMHG | TEMPERATURE: 98.2 F | HEIGHT: 62 IN | BODY MASS INDEX: 29.44 KG/M2 | HEART RATE: 79 BPM | DIASTOLIC BLOOD PRESSURE: 52 MMHG | WEIGHT: 160 LBS | OXYGEN SATURATION: 99 %

## 2024-02-15 LAB
CALPROTECTIN STL-MCNT: 293 UG/G
GLUCOSE BLD MANUAL STRIP-MCNC: 162 MG/DL (ref 74–99)
GLUCOSE BLD MANUAL STRIP-MCNC: 227 MG/DL (ref 74–99)
GLUCOSE BLD MANUAL STRIP-MCNC: 236 MG/DL (ref 74–99)
GLUCOSE BLD MANUAL STRIP-MCNC: 250 MG/DL (ref 74–99)

## 2024-02-15 PROCEDURE — 2500000004 HC RX 250 GENERAL PHARMACY W/ HCPCS (ALT 636 FOR OP/ED)

## 2024-02-15 PROCEDURE — 2500000002 HC RX 250 W HCPCS SELF ADMINISTERED DRUGS (ALT 637 FOR MEDICARE OP, ALT 636 FOR OP/ED)

## 2024-02-15 PROCEDURE — 2500000001 HC RX 250 WO HCPCS SELF ADMINISTERED DRUGS (ALT 637 FOR MEDICARE OP): Performed by: STUDENT IN AN ORGANIZED HEALTH CARE EDUCATION/TRAINING PROGRAM

## 2024-02-15 PROCEDURE — 2500000005 HC RX 250 GENERAL PHARMACY W/O HCPCS: Performed by: INTERNAL MEDICINE

## 2024-02-15 PROCEDURE — 2500000004 HC RX 250 GENERAL PHARMACY W/ HCPCS (ALT 636 FOR OP/ED): Performed by: NURSE PRACTITIONER

## 2024-02-15 PROCEDURE — 2500000004 HC RX 250 GENERAL PHARMACY W/ HCPCS (ALT 636 FOR OP/ED): Performed by: INTERNAL MEDICINE

## 2024-02-15 PROCEDURE — 82947 ASSAY GLUCOSE BLOOD QUANT: CPT

## 2024-02-15 PROCEDURE — 2500000001 HC RX 250 WO HCPCS SELF ADMINISTERED DRUGS (ALT 637 FOR MEDICARE OP): Performed by: INTERNAL MEDICINE

## 2024-02-15 PROCEDURE — 2500000002 HC RX 250 W HCPCS SELF ADMINISTERED DRUGS (ALT 637 FOR MEDICARE OP, ALT 636 FOR OP/ED): Performed by: INTERNAL MEDICINE

## 2024-02-15 PROCEDURE — 2500000002 HC RX 250 W HCPCS SELF ADMINISTERED DRUGS (ALT 637 FOR MEDICARE OP, ALT 636 FOR OP/ED): Performed by: NURSE PRACTITIONER

## 2024-02-15 RX ORDER — DOCUSATE SODIUM 100 MG/1
100 CAPSULE, LIQUID FILLED ORAL 2 TIMES DAILY
Start: 2024-02-15

## 2024-02-15 RX ORDER — LIDOCAINE 560 MG/1
1 PATCH PERCUTANEOUS; TOPICAL; TRANSDERMAL DAILY
Qty: 30 PATCH | Refills: 0 | Status: SHIPPED | OUTPATIENT
Start: 2024-02-16 | End: 2024-06-10 | Stop reason: WASHOUT

## 2024-02-15 RX ORDER — FUROSEMIDE 40 MG/1
40 TABLET ORAL DAILY
Start: 2024-02-15

## 2024-02-15 RX ORDER — AMOXICILLIN AND CLAVULANATE POTASSIUM 875; 125 MG/1; MG/1
1 TABLET, FILM COATED ORAL 2 TIMES DAILY
Qty: 12 TABLET | Refills: 0 | Status: SHIPPED | OUTPATIENT
Start: 2024-02-15 | End: 2024-02-21

## 2024-02-15 RX ORDER — DOXYCYCLINE 100 MG/1
100 CAPSULE ORAL EVERY 12 HOURS SCHEDULED
Qty: 12 CAPSULE | Refills: 0 | Status: SHIPPED | OUTPATIENT
Start: 2024-02-15 | End: 2024-02-21

## 2024-02-15 RX ORDER — BUDESONIDE 3 MG/1
3 CAPSULE, COATED PELLETS ORAL 3 TIMES DAILY
COMMUNITY
End: 2024-02-23 | Stop reason: WASHOUT

## 2024-02-15 RX ORDER — SUCRALFATE 1 G/10ML
1 SUSPENSION ORAL
Qty: 1200 ML | Refills: 0 | Status: SHIPPED | OUTPATIENT
Start: 2024-02-15

## 2024-02-15 RX ORDER — POLYETHYLENE GLYCOL 3350 17 G/17G
17 POWDER, FOR SOLUTION ORAL 2 TIMES DAILY
Qty: 60 PACKET | Refills: 0 | Status: SHIPPED | OUTPATIENT
Start: 2024-02-15

## 2024-02-15 RX ORDER — SPIRONOLACTONE 50 MG/1
50 TABLET, FILM COATED ORAL 2 TIMES DAILY
Start: 2024-02-15

## 2024-02-15 RX ORDER — METFORMIN HYDROCHLORIDE 1000 MG/1
1000 TABLET ORAL
COMMUNITY

## 2024-02-15 RX ADMIN — URSODIOL 300 MG: 300 CAPSULE ORAL at 16:14

## 2024-02-15 RX ADMIN — DOXYCYCLINE HYCLATE 100 MG: 100 CAPSULE ORAL at 09:09

## 2024-02-15 RX ADMIN — LIDOCAINE 1 PATCH: 4 PATCH TOPICAL at 09:08

## 2024-02-15 RX ADMIN — ACETAMINOPHEN 650 MG: 325 TABLET ORAL at 10:22

## 2024-02-15 RX ADMIN — SUCRALFATE 1 G: 1 SUSPENSION ORAL at 12:12

## 2024-02-15 RX ADMIN — PANTOPRAZOLE SODIUM 20 MG: 20 TABLET, DELAYED RELEASE ORAL at 06:18

## 2024-02-15 RX ADMIN — INSULIN LISPRO 3 UNITS: 100 INJECTION, SOLUTION INTRAVENOUS; SUBCUTANEOUS at 08:57

## 2024-02-15 RX ADMIN — SODIUM CHLORIDE 75 ML/HR: 900 INJECTION, SOLUTION INTRAVENOUS at 10:23

## 2024-02-15 RX ADMIN — INSULIN GLARGINE 20 UNITS: 100 INJECTION, SOLUTION SUBCUTANEOUS at 09:08

## 2024-02-15 RX ADMIN — SUCRALFATE 1 G: 1 SUSPENSION ORAL at 16:15

## 2024-02-15 RX ADMIN — PANTOPRAZOLE SODIUM 20 MG: 20 TABLET, DELAYED RELEASE ORAL at 16:15

## 2024-02-15 RX ADMIN — FENOFIBRATE 160 MG: 160 TABLET ORAL at 09:09

## 2024-02-15 RX ADMIN — INSULIN LISPRO 6 UNITS: 100 INJECTION, SOLUTION INTRAVENOUS; SUBCUTANEOUS at 16:17

## 2024-02-15 RX ADMIN — DOCUSATE SODIUM 100 MG: 100 CAPSULE, LIQUID FILLED ORAL at 09:09

## 2024-02-15 RX ADMIN — INSULIN HUMAN 4 UNITS: 100 INJECTION, SOLUTION PARENTERAL at 03:09

## 2024-02-15 RX ADMIN — URSODIOL 300 MG: 300 CAPSULE ORAL at 09:08

## 2024-02-15 RX ADMIN — POLYETHYLENE GLYCOL 3350 17 G: 17 POWDER, FOR SOLUTION ORAL at 09:09

## 2024-02-15 RX ADMIN — PIPERACILLIN SODIUM AND TAZOBACTAM SODIUM 3.38 G: 3; .375 INJECTION, SOLUTION INTRAVENOUS at 10:10

## 2024-02-15 RX ADMIN — SUCRALFATE 1 G: 1 SUSPENSION ORAL at 06:18

## 2024-02-15 RX ADMIN — PIPERACILLIN SODIUM AND TAZOBACTAM SODIUM 3.38 G: 3; .375 INJECTION, SOLUTION INTRAVENOUS at 03:58

## 2024-02-15 RX ADMIN — INSULIN LISPRO 6 UNITS: 100 INJECTION, SOLUTION INTRAVENOUS; SUBCUTANEOUS at 12:12

## 2024-02-15 ASSESSMENT — PAIN SCALES - GENERAL
PAINLEVEL_OUTOF10: 8
PAINLEVEL_OUTOF10: 0 - NO PAIN
PAINLEVEL_OUTOF10: 7

## 2024-02-15 ASSESSMENT — COGNITIVE AND FUNCTIONAL STATUS - GENERAL
CLIMB 3 TO 5 STEPS WITH RAILING: A LOT
MOVING TO AND FROM BED TO CHAIR: A LITTLE
DRESSING REGULAR UPPER BODY CLOTHING: A LITTLE
TOILETING: A LITTLE
WALKING IN HOSPITAL ROOM: A LOT
PERSONAL GROOMING: A LITTLE
DAILY ACTIVITIY SCORE: 19
MOBILITY SCORE: 16
MOVING FROM LYING ON BACK TO SITTING ON SIDE OF FLAT BED WITH BEDRAILS: A LITTLE
HELP NEEDED FOR BATHING: A LITTLE
TURNING FROM BACK TO SIDE WHILE IN FLAT BAD: A LITTLE
DRESSING REGULAR LOWER BODY CLOTHING: A LITTLE
STANDING UP FROM CHAIR USING ARMS: A LITTLE

## 2024-02-15 ASSESSMENT — PAIN - FUNCTIONAL ASSESSMENT
PAIN_FUNCTIONAL_ASSESSMENT: 0-10
PAIN_FUNCTIONAL_ASSESSMENT: 0-10

## 2024-02-15 NOTE — PROGRESS NOTES
Alfonzo Flanagan is a 49 y.o. female on day 6 of admission presenting with Abdominal pain.    Subjective   Patient seen and examined.  Resting in bed in no acute distress.  Awake alert and oriented x 3.  No new complaints.  Abdominal pain improved - not controlled with Tylenol.  Intermittent nausea.  No vomiting.  Tolerating 75% of diet.  Gluten-free.  + Bowel movements.  Left groin improving.  No dizziness.  Discharge plan home with spouse, child and home health care RN for medication assistance.    Objective     Physical Exam  Vitals reviewed.   Constitutional:       General: She is not in acute distress.     Appearance: She is obese. She is not ill-appearing, toxic-appearing or diaphoretic.   HENT:      Head: Normocephalic and atraumatic.      Right Ear: Tympanic membrane normal.      Left Ear: Tympanic membrane normal.      Nose: Nose normal.      Mouth/Throat:      Mouth: Mucous membranes are moist.      Pharynx: Oropharynx is clear.   Eyes:      Extraocular Movements: Extraocular movements intact.      Conjunctiva/sclera: Conjunctivae normal.      Pupils: Pupils are equal, round, and reactive to light.   Cardiovascular:      Rate and Rhythm: Normal rate and regular rhythm.      Pulses: Normal pulses.      Heart sounds: Normal heart sounds.   Pulmonary:      Effort: Pulmonary effort is normal. No respiratory distress.      Breath sounds: Normal breath sounds. No wheezing, rhonchi or rales.      Comments: Deferred  Abdominal:      General: Bowel sounds are normal. There is no distension.      Palpations: Abdomen is soft.      Tenderness: There is abdominal tenderness.   Genitourinary:     Comments: Deferred  Musculoskeletal:         General: No swelling or tenderness. Normal range of motion.      Cervical back: Normal range of motion and neck supple.   Skin:     General: Skin is warm and dry.      Capillary Refill: Capillary refill takes less than 2 seconds.      Comments: Left labia/groin abscess decreased  "in size no drainage or surrounding erythema, decreased tenderness.   Neurological:      General: No focal deficit present.      Mental Status: She is alert and oriented to person, place, and time.   Psychiatric:         Mood and Affect: Mood normal.         Behavior: Behavior normal.       Last Recorded Vitals  Blood pressure (!) 92/44, pulse 81, temperature 36.8 °C (98.2 °F), temperature source Oral, resp. rate 16, height 1.575 m (5' 2\"), weight 72.6 kg (160 lb), SpO2 99 %.    Intake/Output last 3 Shifts:  I/O last 3 completed shifts:  In: 1918.8 (26.4 mL/kg) [I.V.:1918.8 (26.4 mL/kg)]  Out: - (0 mL/kg)   Weight: 72.6 kg     Relevant Results  Results for orders placed or performed during the hospital encounter of 02/07/24 (from the past 24 hour(s))   POCT GLUCOSE   Result Value Ref Range    POCT Glucose 271 (H) 74 - 99 mg/dL   POCT GLUCOSE   Result Value Ref Range    POCT Glucose 288 (H) 74 - 99 mg/dL   POCT GLUCOSE   Result Value Ref Range    POCT Glucose 297 (H) 74 - 99 mg/dL   POCT GLUCOSE   Result Value Ref Range    POCT Glucose 227 (H) 74 - 99 mg/dL   POCT GLUCOSE   Result Value Ref Range    POCT Glucose 162 (H) 74 - 99 mg/dL   POCT GLUCOSE   Result Value Ref Range    POCT Glucose 236 (H) 74 - 99 mg/dL     No results found.    Scheduled medications  atorvastatin, 80 mg, oral, Daily  docusate sodium, 100 mg, oral, BID  doxycycline, 100 mg, oral, q12h RIVKA  fenofibrate, 160 mg, oral, Daily  furosemide, 40 mg, oral, Daily  insulin glargine, 20 Units, subcutaneous, q AM  insulin lispro, 0-15 Units, subcutaneous, TID with meals  insulin regular, 0-10 Units, subcutaneous, Nightly at 0300  lidocaine, 1 patch, transdermal, Daily  pantoprazole, 20 mg, oral, BID AC  piperacillin-tazobactam, 3.375 g, intravenous, q6h  polyethylene glycol, 17 g, oral, BID  spironolactone, 50 mg, oral, BID  sucralfate, 1 g, oral, Before meals & nightly  traZODone, 25 mg, oral, Nightly  ursodiol, 300 mg, oral, TID      Continuous " medications  sodium chloride 0.9%, 75 mL/hr, Last Rate: 75 mL/hr (02/15/24 1023)      PRN medications  PRN medications: acetaminophen **OR** acetaminophen **OR** acetaminophen, acetaminophen, benzocaine-menthoL, dextromethorphan-guaifenesin, dextrose 10 % in water (D10W), dextrose, glucagon, guaiFENesin, morphine, tiZANidine      ASSESSMENT:  Abdominal pain, nausea (vomiting - resolved)   Back pain  Elevated lipase - chronic pancreatitis  Hepatic cirrhosis, ascites  Portal hypertension  Elevated LFT's  Lactic acidosis  Pyuria - dysuria  Type 2 diabetes mellitus  Hyperglycemia  Hypertriglyceridemia  Hyperlipidemia  Normocytic anemia  Thrombocytopenia  Hydradenitis/abscess improved  Hypotension    PLAN:  Abdominal pain improved.  Nausea no vomiting.  Tolerating diet.  + Bowel movements.  Discussed with Gastroenterology NP.  Advised okay to discharge and follow-up outpatient with Gastroenterologist as scheduled Monday for evaluation for outpatient capsule endoscopy.  Continue current medications.  Symptom control.  Analgesics.  Anti-emetics.  Diabetic, gluten-free diet as tolerated.  Avoid constipation.  Left groin hidradenitis abscess improved.  No drainage.  General surgery and Infectious disease input appreciated.  Local care.  Antibiotics per Infectious disease -- IV Zosyn inpatient, p.o Doxycycline.  Transition to p.o Doxycycline 100 milligrams p.o BID + Augmentin 875/125 BID to complete a 7 day course pending any culture data.  Warm compresses and as needed analgesics.  Outpatient follow-up.  Blood pressures reviewed.  Repeat orthostatic blood pressures today and follow-up.  Ambulating.  Increase activity.  Supportive care.  Patient reassured.  Case management following for discharge planning.  Anticipate discharge home with home health care, RN for medication assistance.  Discussed with patient, nursing and Dr. Lawrence.    1335:  Spoke with nursing orthostatic vital signs reviewed. Asymptomatic.  Add knee high  compression stockings.  Okay to discharge home.    Leny Daniels, APRN-CNP

## 2024-02-15 NOTE — CONSULTS
"Inpatient Diabetes Education Consult follow up:     BS today 2/15/24 at 11:39 was 236 mg/dl.  No change in medication, still getting Lantus 20 units qAM. Humalog 0-15 units q 4 hour, regular insulin 0-10 units at bedtime.  Will continue to monitor POCT.  Denies needs.   Current Outpatient Medications   Medication Instructions    acetaminophen (TYLENOL) 325 mg, oral, Every 6 hours PRN    atorvastatin (LIPITOR) 80 mg, oral, Daily    docusate sodium (COLACE) 100 mg, oral, 2 times daily    fenofibrate (TRICOR) 145 mg, oral, Daily    furosemide (LASIX) 40 mg, oral, Daily    insulin lispro (HumaLOG) 100 unit/mL injection subcutaneous, 3 times daily with meals, Take as directed per insulin instructions.    Lantus U-100 Insulin 20 Units, subcutaneous, Every morning, Take as directed per insulin instructions.    pantoprazole (PROTONIX) 20 mg, oral, 2 times daily before meals, Do not crush, chew, or split.    spironolactone (ALDACTONE) 50 mg, oral, 2 times daily    traZODone (DESYREL) 25 mg, oral, Nightly    ursodiol (ACTIGALL) 300 mg, oral, 3 times daily       Glucose   Date/Time Value Ref Range Status   02/14/2024 06:36  (H) 65 - 99 mg/dL Final   02/13/2024 05:53  (H) 65 - 99 mg/dL Final   02/12/2024 04:55  (H) 65 - 99 mg/dL Final   02/10/2024 04:40  (H) 65 - 99 mg/dL Final   02/09/2024 05:20  (H) 65 - 99 mg/dL Final   02/08/2024 06:33  (H) 65 - 99 mg/dL Final   02/07/2024 02:13  (HH) 65 - 99 mg/dL Final     Comment:     Result rechecked   01/29/2024 05:02  (H) 65 - 99 mg/dL Final   01/28/2024 02:05  (H) 65 - 99 mg/dL Final   01/27/2024 05:18  (H) 65 - 99 mg/dL Final     No results found for: \"CPEPTIDE\"  Hemoglobin A1C   Date Value Ref Range Status   02/09/2024 11.3 (H) See below % Final   10/21/2023 11.7 (H) 4.3 - 5.6 % Final     Comment:     American Diabetes Association guidelines indicate that patients with HgbA1c in the range 5.7-6.4% are at increased risk for " development of diabetes, and intervention by lifestyle modification may be beneficial. HgbA1c greater or equal to 6.5% is considered diagnostic of diabetes.   09/07/2023 11.5 (H) 4.3 - 5.6 % Final     Comment:     American Diabetes Association guidelines indicate that patients with HgbA1c in the range 5.7-6.4% are at increased risk for development of diabetes, and intervention by lifestyle modification may be beneficial. HgbA1c greater or equal to 6.5% is considered diagnostic of diabetes.   08/08/2023 10.7 (H) 4.3 - 5.6 % Final     Comment:     American Diabetes Association guidelines indicate that patients with HgbA1c in the range 5.7-6.4% are at increased risk for development of diabetes, and intervention by lifestyle modification may be beneficial. HgbA1c greater or equal to 6.5% is considered diagnostic of diabetes.   05/27/2023 10.0 (H) 4.3 - 5.6 % Final     Comment:     American Diabetes Association guidelines indicate that patients with HgbA1c in the range 5.7-6.4% are at increased risk for development of diabetes, and intervention by lifestyle modification may be beneficial. HgbA1c greater or equal to 6.5% is considered diagnostic of diabetes.

## 2024-02-15 NOTE — CARE PLAN
Problem: Pain  Goal: My pain/discomfort is manageable  Outcome: Progressing     Problem: Safety  Goal: Patient will be injury free during hospitalization  Outcome: Progressing  Goal: I will remain free of falls  Outcome: Progressing     Problem: Daily Care  Goal: Daily care needs are met  Outcome: Progressing     Problem: Psychosocial Needs  Goal: Demonstrates ability to cope with hospitalization/illness  Outcome: Progressing  Goal: Collaborate with me, my family, and caregiver to identify my specific goals  Outcome: Progressing     Problem: Discharge Barriers  Goal: My discharge needs are met  Outcome: Progressing     Problem: Pain  Goal: Takes deep breaths with improved pain control throughout the shift  Outcome: Progressing  Goal: Turns in bed with improved pain control throughout the shift  Outcome: Progressing  Goal: Walks with improved pain control throughout the shift  Outcome: Progressing  Goal: Performs ADL's with improved pain control throughout shift  Outcome: Progressing  Goal: Participates in PT with improved pain control throughout the shift  Outcome: Progressing  Goal: Free from opioid side effects throughout the shift  Outcome: Progressing  Goal: Free from acute confusion related to pain meds throughout the shift  Outcome: Progressing   The patient's goals for the shift include      The clinical goals for the shift include maintain safety and improve pain control    Over the shift, the patient did not make progress toward the following goals. Barriers to progression include . Recommendations to address these barriers include .

## 2024-02-15 NOTE — PROGRESS NOTES
Patient is inpatient day 6 for abdominal pain.  ADOD 1 day.     Met with patient at bedside to discuss discharge planning.  Patient maintains plans to return home with [facility], internal home care referral is needed for RN services for medication management.   [facility] has already been placed.        UPDATE 1630:  Patient is medically cleared for discharge today.  Internal home care referral in place.  Message sent to GARETH Melvin with [facility] to review and process home care referral.   [facility] referral also in place for follow up on discharge      DC plan secure

## 2024-02-15 NOTE — PROGRESS NOTES
"Alfonzo Flanagan is a 49 y.o. female on day 6 of admission presenting with Abdominal pain.    Subjective   Patient continues to complain of abdominal pain.  She  is tolerating diet.  No nausea, vomiting       Objective     Physical Exam  HENT:      Head: Normocephalic.      Right Ear: Tympanic membrane normal.      Nose: Nose normal.      Mouth/Throat:      Mouth: Mucous membranes are moist.   Eyes:      Pupils: Pupils are equal, round, and reactive to light.   Cardiovascular:      Rate and Rhythm: Normal rate.   Pulmonary:      Effort: Pulmonary effort is normal.   Abdominal:      Palpations: Abdomen is soft.   Musculoskeletal:         General: Normal range of motion.      Cervical back: Normal range of motion.   Skin:     General: Skin is warm.   Neurological:      General: No focal deficit present.      Mental Status: She is alert.   Psychiatric:         Mood and Affect: Mood normal.         Last Recorded Vitals  Blood pressure 85/52, pulse 79, temperature 36.8 °C (98.2 °F), temperature source Oral, resp. rate 16, height 1.575 m (5' 2\"), weight 72.6 kg (160 lb), SpO2 99 %.  Intake/Output last 3 Shifts:  I/O last 3 completed shifts:  In: 1918.8 (26.4 mL/kg) [I.V.:1918.8 (26.4 mL/kg)]  Out: - (0 mL/kg)   Weight: 72.6 kg     Relevant Results      Scheduled medications  atorvastatin, 80 mg, oral, Daily  docusate sodium, 100 mg, oral, BID  doxycycline, 100 mg, oral, q12h RIVKA  fenofibrate, 160 mg, oral, Daily  furosemide, 40 mg, oral, Daily  insulin glargine, 20 Units, subcutaneous, q AM  insulin lispro, 0-15 Units, subcutaneous, TID with meals  insulin regular, 0-10 Units, subcutaneous, Nightly at 0300  lidocaine, 1 patch, transdermal, Daily  pantoprazole, 20 mg, oral, BID AC  piperacillin-tazobactam, 3.375 g, intravenous, q6h  polyethylene glycol, 17 g, oral, BID  spironolactone, 50 mg, oral, BID  sucralfate, 1 g, oral, Before meals & nightly  traZODone, 25 mg, oral, Nightly  ursodiol, 300 mg, oral, " TID      Continuous medications  sodium chloride 0.9%, 75 mL/hr, Last Rate: 75 mL/hr (02/15/24 1023)      PRN medications  PRN medications: acetaminophen **OR** acetaminophen **OR** acetaminophen, acetaminophen, benzocaine-menthoL, dextromethorphan-guaifenesin, dextrose 10 % in water (D10W), dextrose, glucagon, guaiFENesin, morphine, tiZANidine  Results for orders placed or performed during the hospital encounter of 02/07/24 (from the past 24 hour(s))   POCT GLUCOSE   Result Value Ref Range    POCT Glucose 271 (H) 74 - 99 mg/dL   POCT GLUCOSE   Result Value Ref Range    POCT Glucose 288 (H) 74 - 99 mg/dL   POCT GLUCOSE   Result Value Ref Range    POCT Glucose 297 (H) 74 - 99 mg/dL   POCT GLUCOSE   Result Value Ref Range    POCT Glucose 227 (H) 74 - 99 mg/dL   POCT GLUCOSE   Result Value Ref Range    POCT Glucose 162 (H) 74 - 99 mg/dL   POCT GLUCOSE   Result Value Ref Range    POCT Glucose 236 (H) 74 - 99 mg/dL             Assessment/Plan   Principal Problem:    Abdominal pain  Active Problems:    Abdominal pain, epigastric    Epigastric pain/nausea/vomiting (chronic recurrent unclear etiology)  No reported fevers, leukocytosis.    Patient has a scheduled follow-up with Dr. Zaman on Monday. This is a chronic, recurrent epigastric pain of unclear etiology.   She ultimately needs capsule endoscopy  Agree with plan to DC with home care     Reviewed patient record given persistent epigastric pain. She has had 22 CT scans of just the abdomen and pelvis within the past 12 months. In Fall 2023, she was seen by [facility] having a CT enterography. There was narrow and inlammation within small intestine. No diagnosed IBD but had + lactoferrin level as well. She does have celiac. She was scheduled for an outpatient small bowel capsule at some point but never followed up        Adrianne Pwoell, APRN-CNP

## 2024-02-15 NOTE — DISCHARGE INSTR - OTHER ORDERS
The Home Care Agency you selected will contact you within 72 hours to schedule a Home Care Visit. If you have any questions prior to the call, please feel free to contact  home care [phone].

## 2024-02-15 NOTE — NURSING NOTE
Report received from off-going RN, assumed patient care at this time. Patient lying in bed, watching TV,A&O x 3. IV fluid NS infusing @ 75 ml/hr per MAR. Call light in reach, no needs expressed at this time. Will continue current plan of care and update as necessary.

## 2024-02-15 NOTE — DISCHARGE SUMMARY
Discharge Diagnosis  Abdominal pain, nausea (vomiting - resolved)   Back pain  Elevated lipase - chronic pancreatitis  Hepatic cirrhosis, ascites  Portal hypertension  Elevated LFT's  Lactic acidosis  Pyuria - dysuria  Type 2 diabetes mellitus  Hyperglycemia  Hypertriglyceridemia  Hyperlipidemia  Normocytic anemia  Thrombocytopenia  Hydradenitis/abscess improved  Hypotension    Issues Requiring Follow-Up  Above    Discharge Meds     Your medication list        START taking these medications        Instructions Last Dose Given Next Dose Due   amoxicillin-pot clavulanate 875-125 mg tablet  Commonly known as: Augmentin      Take 1 tablet by mouth 2 times a day for 6 days.       doxycycline 100 mg capsule  Commonly known as: Vibramycin      Take 1 capsule (100 mg) by mouth every 12 hours for 12 doses. Take with at least 8 ounces (large glass) of water, do not lie down for 30 minutes after       lidocaine 4 % patch  Start taking on: February 16, 2024      Place 1 patch over 12 hours on the skin once daily. Apply to site of pain. Remove & discard patch within 12 hours or as directed by MD. Do not start before February 16, 2024.       polyethylene glycol 17 gram packet  Commonly known as: Glycolax, Miralax      Take 17 g by mouth 2 times a day. Hold for loose stool.       sucralfate 100 mg/mL suspension  Commonly known as: Carafate      Take 10 mL (1 g) by mouth 4 times a day before meals.              CHANGE how you take these medications        Instructions Last Dose Given Next Dose Due   docusate sodium 100 mg capsule  Commonly known as: Colace  What changed: additional instructions      Take 1 capsule (100 mg) by mouth 2 times a day. Hold for loose stool.       furosemide 40 mg tablet  Commonly known as: Lasix  What changed: additional instructions      Take 1 tablet (40 mg) by mouth once daily. Hold for dizziness.       spironolactone 50 mg tablet  Commonly known as: Aldactone  What changed: additional instructions       Take 1 tablet (50 mg) by mouth 2 times a day. Hold for dizziness.              CONTINUE taking these medications        Instructions Last Dose Given Next Dose Due   acetaminophen 325 mg tablet  Commonly known as: Tylenol           atorvastatin 80 mg tablet  Commonly known as: Lipitor           budesonide EC 3 mg 24 hr capsule  Commonly known as: Entocort EC           fenofibrate 145 mg tablet  Commonly known as: Tricor           insulin lispro 100 unit/mL injection  Commonly known as: HumaLOG           Lantus U-100 Insulin 100 unit/mL injection  Generic drug: insulin glargine           metFORMIN 1,000 mg tablet  Commonly known as: Glucophage           pantoprazole 20 mg EC tablet  Commonly known as: ProtoNix           traZODone 50 mg tablet  Commonly known as: Desyrel           ursodiol 300 mg capsule  Commonly known as: Actigall                     Where to Get Your Medications        These medications were sent to [id number] - [place], [place] - [address] [address]  [address] [facility] [address]      Phone: [phone]   amoxicillin-pot clavulanate 875-125 mg tablet  doxycycline 100 mg capsule  lidocaine 4 % patch  polyethylene glycol 17 gram packet  sucralfate 100 mg/mL suspension       Information about where to get these medications is not yet available    Ask your nurse or doctor about these medications  docusate sodium 100 mg capsule  furosemide 40 mg tablet  spironolactone 50 mg tablet         Test Results Pending At Discharge  Pending Labs       Order Current Status    Extra Urine Gray Tube Collected (02/07/24 1413)    Urinalysis with Reflex Culture and Microscopic In process          Hospital Course  This is a very pleasant 49-year-old female with a past medical history significant for 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  She was recently hospitalized at [facility]  [facility].  She was treated for urinary tract infection and ascites.  She underwent paracentesis.  She was discharged home.  She developed diffuse abdominal pain, nausea and vomiting.  She presented back to the emergency department.  In the emergency department, initial workup was done.  CT abdomen pelvis per radiology showed findings consistent with cirrhosis and portal hypertension, free fluid within the peritoneal cavity decreased compared to the prior exam.  Initial urinalysis showed glucosuria, otherwise was unremarkable.  Lactic acid was 2.3 with repeat of 2.4.  Lipase was 66.  AST 50.  ALT 33. Alk phosphatase 429.  Glucose 558.  CBC showed a stable white blood cell count of 4.5.  Hemoglobin 10.6.  Hematocrit of 33.8.  Platelet count 83.  She was treated with IV fluids and analgesics and was admitted.  She was evaluated and treated by Gastroenterology.  She was treated conservatively with analgesics and antiemetics.  She was cleared for discharge, advised outpatient follow-up with her Gastroenterologist for outpatient capsule endoscopy.  She was evaluated and treated by General surgery and Infectious disease for left groin hidradenitis/abscess.  She was treated with IV antibiotics and local care.  She was cleared for discharge on oral Doxycycline and Augmentin.  Her condition improved.  She is stable for discharge home.      Pertinent Physical Exam At Time of Discharge  See physical examination.    Outpatient Follow-Up  Follow up with Gastroenterology Monday 2/19/2024 as scheduled.     Follow up with primary care provider in 1 week.       MADYSON Sotelo-CNP

## 2024-02-16 ENCOUNTER — PATIENT OUTREACH (OUTPATIENT)
Dept: CARE COORDINATION | Facility: CLINIC | Age: 49
End: 2024-02-16
Payer: COMMERCIAL

## 2024-02-16 ENCOUNTER — DOCUMENTATION (OUTPATIENT)
Dept: HOME HEALTH SERVICES | Facility: HOME HEALTH | Age: 49
End: 2024-02-16
Payer: COMMERCIAL

## 2024-02-16 ENCOUNTER — PATIENT OUTREACH (OUTPATIENT)
Dept: HOME HEALTH SERVICES | Age: 49
End: 2024-02-16
Payer: COMMERCIAL

## 2024-02-16 SDOH — HEALTH STABILITY: MENTAL HEALTH: HOW OFTEN DO YOU HAVE 6 OR MORE DRINKS ON ONE OCCASION?: NEVER

## 2024-02-16 SDOH — SOCIAL STABILITY: SOCIAL INSECURITY: WITHIN THE LAST YEAR, HAVE YOU BEEN AFRAID OF YOUR PARTNER OR EX-PARTNER?: NO

## 2024-02-16 SDOH — ECONOMIC STABILITY: INCOME INSECURITY: IN THE PAST 12 MONTHS, HAS THE ELECTRIC, GAS, OIL, OR WATER COMPANY THREATENED TO SHUT OFF SERVICE IN YOUR HOME?: NO

## 2024-02-16 SDOH — HEALTH STABILITY: MENTAL HEALTH
STRESS IS WHEN SOMEONE FEELS TENSE, NERVOUS, ANXIOUS, OR CAN'T SLEEP AT NIGHT BECAUSE THEIR MIND IS TROUBLED. HOW STRESSED ARE YOU?: TO SOME EXTENT

## 2024-02-16 SDOH — ECONOMIC STABILITY: FOOD INSECURITY
ARE ANY OF YOUR NEEDS URGENT? FOR EXAMPLE, UNCERTAINTY OF WHERE YOU WILL GET YOUR NEXT MEAL OR NOT HAVING THE MEDICATIONS YOU NEED TO TAKE TOMORROW.: NO

## 2024-02-16 SDOH — SOCIAL STABILITY: SOCIAL NETWORK: ARE YOU MARRIED, WIDOWED, DIVORCED, SEPARATED, NEVER MARRIED, OR LIVING WITH A PARTNER?: MARRIED

## 2024-02-16 SDOH — ECONOMIC STABILITY: INCOME INSECURITY: HOW HARD IS IT FOR YOU TO PAY FOR THE VERY BASICS LIKE FOOD, HOUSING, MEDICAL CARE, AND HEATING?: VERY HARD

## 2024-02-16 SDOH — ECONOMIC STABILITY: INCOME INSECURITY: IN THE LAST 12 MONTHS, WAS THERE A TIME WHEN YOU WERE NOT ABLE TO PAY THE MORTGAGE OR RENT ON TIME?: NO

## 2024-02-16 SDOH — ECONOMIC STABILITY: FOOD INSECURITY: WITHIN THE PAST 12 MONTHS, THE FOOD YOU BOUGHT JUST DIDN'T LAST AND YOU DIDN'T HAVE MONEY TO GET MORE.: OFTEN TRUE

## 2024-02-16 SDOH — ECONOMIC STABILITY: HOUSING INSECURITY: IN THE LAST 12 MONTHS, HOW MANY PLACES HAVE YOU LIVED?: 0

## 2024-02-16 SDOH — HEALTH STABILITY: MENTAL HEALTH: HOW OFTEN DO YOU HAVE A DRINK CONTAINING ALCOHOL?: NEVER

## 2024-02-16 SDOH — HEALTH STABILITY: PHYSICAL HEALTH: ON AVERAGE, HOW MANY DAYS PER WEEK DO YOU ENGAGE IN MODERATE TO STRENUOUS EXERCISE (LIKE A BRISK WALK)?: 0 DAYS

## 2024-02-16 SDOH — ECONOMIC STABILITY: FOOD INSECURITY: WITHIN THE PAST 12 MONTHS, YOU WORRIED THAT YOUR FOOD WOULD RUN OUT BEFORE YOU GOT MONEY TO BUY MORE.: OFTEN TRUE

## 2024-02-16 SDOH — SOCIAL STABILITY: SOCIAL INSECURITY: WITHIN THE LAST YEAR, HAVE YOU BEEN HUMILIATED OR EMOTIONALLY ABUSED IN OTHER WAYS BY YOUR PARTNER OR EX-PARTNER?: NO

## 2024-02-16 SDOH — SOCIAL STABILITY: SOCIAL NETWORK: HOW OFTEN DO YOU GET TOGETHER WITH FRIENDS OR RELATIVES?: NEVER

## 2024-02-16 SDOH — HEALTH STABILITY: PHYSICAL HEALTH: ON AVERAGE, HOW MANY MINUTES DO YOU ENGAGE IN EXERCISE AT THIS LEVEL?: 0 MIN

## 2024-02-16 SDOH — SOCIAL STABILITY: SOCIAL NETWORK: HOW OFTEN DO YOU ATTEND CHURCH OR RELIGIOUS SERVICES?: NEVER

## 2024-02-16 SDOH — ECONOMIC STABILITY: GENERAL: WOULD YOU LIKE HELP WITH ANY OF THE FOLLOWING NEEDS?: FOOD INSECURITY

## 2024-02-16 SDOH — HEALTH STABILITY: MENTAL HEALTH: HOW MANY STANDARD DRINKS CONTAINING ALCOHOL DO YOU HAVE ON A TYPICAL DAY?: PATIENT DOES NOT DRINK

## 2024-02-16 SDOH — SOCIAL STABILITY: SOCIAL NETWORK: HOW OFTEN DO YOU ATTENT MEETINGS OF THE CLUB OR ORGANIZATION YOU BELONG TO?: NEVER

## 2024-02-16 ASSESSMENT — LIFESTYLE VARIABLES
SKIP TO QUESTIONS 9-10: 1
AUDIT-C TOTAL SCORE: 0

## 2024-02-16 NOTE — PROGRESS NOTES
Daily Call Note:     Received incoming call from patient's daughter d/t  message left for the patient.  Pt is primarily Armenian speaking, so her daughter M&M translated during the call.  Initially pt wasn't interested d/t her previous hospitalization at [facility].  She was upset with the care, and was often confused about her meds, etc.  But, I did explain how the program could help assist and benefit the patient and they were both agreeable.  Initial [facility] 2/19 @ 1830.  Per dtr if calls were made around 1800 she would be there to assist, as patient and  only speak a little bit of English.  So, if dtr not available interpretor line would need to be called.     Pt Education:   Barriers:   Topics for Daily Review:   Pt demonstrates clear understanding:     Daily Weight:  There were no vitals filed for this visit.   Last 3 Weights:  Wt Readings from Last 7 Encounters:   02/07/24 72.6 kg (160 lb)   01/26/24 83.9 kg (185 lb)   05/03/23 80.7 kg (178 lb)   03/29/23 86.4 kg (190 lb 6.4 oz)   02/28/23 76.2 kg (168 lb)   08/02/22 83 kg (183 lb)   03/02/22 87.1 kg (192 lb)       Masimo Device:    Masimo Clinical Impression:     Virtual Visits--Scheduled (Most Recent Date at Top)  Follow up Appointments  Recent Visits  No visits were found meeting these conditions.  Showing recent visits within past 30 days and meeting all other requirements  Future Appointments  No visits were found meeting these conditions.  Showing future appointments within next 90 days and meeting all other requirements       Frequency of RN Calls & Virtual Visits per Team Agreement:     Medication issues Addressed (what was done):     Follow up appointments scheduled by [facility] Staff:   Referrals made by [facility] staff:       Acute Hospital At Home Care Transitions Assessment    Patient's Address:   [address]  [place] [address]  **  If this is not the address patient will receive services - alert team and address in EMR**       Patient  Contacts:  Extended Emergency Contact Information  Primary Emergency Contact: Rah Modi  Address: [address] [place] [address]  Home Phone: [phone]  Relation: Spouse  Secondary Emergency Contact: Claudia Hartley  Mobile Phone: [phone]  Relation: Daughter                                Patient's Preferred Phone: [phone]  Patient's E-mail: [email or web address]

## 2024-02-16 NOTE — HH CARE COORDINATION
Home Care received a Referral for Nursing. We have processed the referral for a Start of Care on 02/17.     If you have any questions or concerns, please feel free to contact us at [phone]. Follow the prompts, enter your five digit zip code, and you will be directed to your care team on EAST 1.

## 2024-02-17 ENCOUNTER — HOME CARE VISIT (OUTPATIENT)
Dept: HOME HEALTH SERVICES | Facility: HOME HEALTH | Age: 49
End: 2024-02-17
Payer: COMMERCIAL

## 2024-02-17 ENCOUNTER — PATIENT OUTREACH (OUTPATIENT)
Dept: HOME HEALTH SERVICES | Age: 49
End: 2024-02-17
Payer: COMMERCIAL

## 2024-02-17 VITALS
OXYGEN SATURATION: 99 % | RESPIRATION RATE: 12 BRPM | SYSTOLIC BLOOD PRESSURE: 118 MMHG | DIASTOLIC BLOOD PRESSURE: 64 MMHG | HEART RATE: 84 BPM | TEMPERATURE: 98.7 F

## 2024-02-17 PROCEDURE — 0023 HH SOC

## 2024-02-17 PROCEDURE — G0299 HHS/HOSPICE OF RN EA 15 MIN: HCPCS

## 2024-02-17 ASSESSMENT — PAIN SCALES - PAIN ASSESSMENT IN ADVANCED DEMENTIA (PAINAD)
NEGVOCALIZATION: 0 - NONE.
NEGVOCALIZATION: 0
FACIALEXPRESSION: 0 - SMILING OR INEXPRESSIVE.
BREATHING: 0
TOTALSCORE: 0
BODYLANGUAGE: 0 - RELAXED.
BODYLANGUAGE: 0
CONSOLABILITY: 0
FACIALEXPRESSION: 0
CONSOLABILITY: 0 - NO NEED TO CONSOLE.

## 2024-02-17 ASSESSMENT — ENCOUNTER SYMPTOMS
VOMITING: 1
COUGH: 1
DEPRESSION: 0
PAIN LOCATION: BACK
SHORTNESS OF BREATH: 1
DRY SKIN: 1
NAUSEA: 1
SORE THROAT: 1
SPUTUM PRODUCTION: 1
HIGHEST PAIN SEVERITY IN PAST 24 HOURS: 8/10
SUBJECTIVE PAIN PROGRESSION: UNCHANGED
DIZZINESS: 1
OCCASIONAL FEELINGS OF UNSTEADINESS: 1
MUSCLE WEAKNESS: 1
DIARRHEA: 1
SPUTUM COLOR: CLEAR
LAST BOWEL MOVEMENT: 66887
LOWEST PAIN SEVERITY IN PAST 24 HOURS: 3/10
FORGETFULNESS: 1
PAIN: 1
TREMORS: 1
FATIGUE: 1
LOSS OF SENSATION IN FEET: 1
CHANGE IN APPETITE: UNCHANGED
STOOL FREQUENCY: LESS THAN DAILY
DYSPNEA ACTIVITY LEVEL: AFTER AMBULATING LESS THAN 10 FT
APPETITE LEVEL: GOOD
TINGLING: 1
DESCRIPTION OF MEMORY LOSS: SHORT TERM
DESCRIPTION OF MEMORY LOSS: LONG TERM
ABDOMINAL PAIN: 1
HEADACHES: 1
PAIN SEVERITY GOAL: 0/10

## 2024-02-17 ASSESSMENT — ACTIVITIES OF DAILY LIVING (ADL)
ENTERING_EXITING_HOME: MINIMUM ASSIST
AMBULATION ASSISTANCE: 1
OASIS_M1830: 03
AMBULATION ASSISTANCE: ONE PERSON

## 2024-02-19 ENCOUNTER — PATIENT OUTREACH (OUTPATIENT)
Dept: HOME HEALTH SERVICES | Age: 49
End: 2024-02-19

## 2024-02-19 RX ORDER — PANTOPRAZOLE SODIUM 40 MG/1
40 TABLET, DELAYED RELEASE ORAL
COMMUNITY
Start: 2024-01-24 | End: 2024-04-23

## 2024-02-20 ENCOUNTER — HOME CARE VISIT (OUTPATIENT)
Dept: HOME HEALTH SERVICES | Facility: HOME HEALTH | Age: 49
End: 2024-02-20
Payer: COMMERCIAL

## 2024-02-21 ENCOUNTER — PATIENT OUTREACH (OUTPATIENT)
Dept: HOME HEALTH SERVICES | Age: 49
End: 2024-02-21
Payer: COMMERCIAL

## 2024-02-22 ENCOUNTER — PATIENT OUTREACH (OUTPATIENT)
Dept: HOME HEALTH SERVICES | Age: 49
End: 2024-02-22
Payer: COMMERCIAL

## 2024-02-22 RX ORDER — TRAMADOL HYDROCHLORIDE 50 MG/1
50 TABLET ORAL
COMMUNITY
Start: 2024-02-21 | End: 2024-06-10 | Stop reason: ALTCHOICE

## 2024-02-22 RX ORDER — BLOOD-GLUCOSE SENSOR
EACH MISCELLANEOUS
Status: ON HOLD | COMMUNITY
Start: 2024-02-21 | End: 2024-04-18

## 2024-02-22 NOTE — PROGRESS NOTES
Daily call completed. Pt states she is feeling much better. Pt just received her sensor today but has not gotten a reading yet. Pt and daughter deny any further needs/questions/concerns at this time. Aware of upcoming appts. [facility] 2/23 at 0900.    Pt Education: y  Barriers: language (daughter translates)  Topics for Daily Review: bgt, daily needs  Pt demonstrates clear understanding: Yes    Daily Weight:  There were no vitals filed for this visit.   Last 3 Weights:  Wt Readings from Last 7 Encounters:   02/07/24 72.6 kg (160 lb)   01/26/24 83.9 kg (185 lb)   05/03/23 80.7 kg (178 lb)   03/29/23 86.4 kg (190 lb 6.4 oz)   02/28/23 76.2 kg (168 lb)   08/02/22 83 kg (183 lb)   03/02/22 87.1 kg (192 lb)       Masimo Device: No   Masimo Clinical Impression: na    Virtual Visits--Scheduled (Most Recent Date at Top)  Follow up Appointments  Recent Visits  No visits were found meeting these conditions.  Showing recent visits within past 30 days and meeting all other requirements  Future Appointments  No visits were found meeting these conditions.  Showing future appointments within next 90 days and meeting all other requirements       Frequency of RN Calls & Virtual Visits per Team Agreement: Healthy at Home Frequency: Daily    Medication issues Addressed (what was done): na    Follow up appointments scheduled by [facility] Staff: na  Referrals made by [facility] staff: na      [facility] Care Transitions Assessment    Patient's Address:   [address] [place] [address] [address]  [facility] [address]  **  If this is not the address patient will receive services - alert team and address in EMR**       Patient Contacts:  Extended Emergency Contact Information  Primary Emergency Contact: Rah Modi  Address: [address] [place] [address]  Home Phone: [phone]  Relation: Spouse  Secondary Emergency Contact: Claudia Hartley  Mobile Phone: [phone]  Relation: Daughter                                Patient's Preferred  Phone: [phone]  Patient's E-mail: [email or web address]

## 2024-02-22 NOTE — PROGRESS NOTES
Daily call completed. Pt states she is doing fine today. States that she went to the pharmacy today but they didn't give her the sensors and doesn't understand why. Pt and daughter say they are going to call tomorrow to figure out why she wasn't able to receive the sensors. Pt was not able to check her sugar at all today. Denies any symptoms of high or low blood sugar. Denies any needs/questions/concerns at this time. Aware of upcoming appts. [facility] scheduled for 2/23 at 0900.0    Pt Education: y  Barriers: language (assisted w daughter)  Topics for Daily Review: bgts, daily needs  Pt demonstrates clear understanding: Yes    Daily Weight:  There were no vitals filed for this visit.   Last 3 Weights:  Wt Readings from Last 7 Encounters:   02/07/24 72.6 kg (160 lb)   01/26/24 83.9 kg (185 lb)   05/03/23 80.7 kg (178 lb)   03/29/23 86.4 kg (190 lb 6.4 oz)   02/28/23 76.2 kg (168 lb)   08/02/22 83 kg (183 lb)   03/02/22 87.1 kg (192 lb)       Masimo Device: No   Masimo Clinical Impression: na    Virtual Visits--Scheduled (Most Recent Date at Top)  Follow up Appointments  Recent Visits  No visits were found meeting these conditions.  Showing recent visits within past 30 days and meeting all other requirements  Future Appointments  No visits were found meeting these conditions.  Showing future appointments within next 90 days and meeting all other requirements       Frequency of RN Calls & Virtual Visits per Team Agreement: Healthy at Home Frequency: Daily    Medication issues Addressed (what was done): na    Follow up appointments scheduled by [facility] Staff: na  Referrals made by [facility] staff: linda          Patient's Address:   [address]  [place] [address]  **  If this is not the address patient will receive services - alert team and address in EMR**       Patient Contacts:  Extended Emergency Contact Information  Primary Emergency Contact: Rah Modi  Address: [address] [place]  [address]  Home Phone: [phone]  Relation: Spouse  Secondary Emergency Contact: Claudia Hartley  Mobile Phone: [phone]  Relation: Daughter                                Patient's Preferred Phone: [phone]  Patient's E-mail: [email or web address]

## 2024-02-23 ENCOUNTER — HOME CARE VISIT (OUTPATIENT)
Dept: HOME HEALTH SERVICES | Facility: HOME HEALTH | Age: 49
End: 2024-02-23
Payer: COMMERCIAL

## 2024-02-23 ENCOUNTER — PATIENT OUTREACH (OUTPATIENT)
Dept: HOME HEALTH SERVICES | Age: 49
End: 2024-02-23

## 2024-02-23 VITALS
SYSTOLIC BLOOD PRESSURE: 110 MMHG | OXYGEN SATURATION: 100 % | RESPIRATION RATE: 16 BRPM | HEART RATE: 86 BPM | DIASTOLIC BLOOD PRESSURE: 64 MMHG | TEMPERATURE: 98.7 F

## 2024-02-23 PROCEDURE — G0300 HHS/HOSPICE OF LPN EA 15 MIN: HCPCS

## 2024-02-23 ASSESSMENT — ENCOUNTER SYMPTOMS
DENIES PAIN: 1
LAST BOWEL MOVEMENT: 66893
CHANGE IN APPETITE: UNCHANGED
APPETITE LEVEL: GOOD

## 2024-02-23 NOTE — PROGRESS NOTES
Weekly [facility] call completed with Dr SAV munguia and Joaquim from pharmacy patients daughter Gila on the call also Patient is doing ok she has started using the Dexcom CGM yesterday confirmed patient is doing lantus 25units in the AM went over her meal time insulin patient should be doing mealtime insulin and SS unclear if she is doing that correctly patient has been taking her metformin patient has not had any SOB but she did have some chest pain last evening it has resolved Glucose this am 240 patient has been able to get all her medications no issues with cost she has been getting to all of her appointments patient has homecare they have been coming out to the house ok medication list reviewed with pharmacy patient was discharged on 2 antibiotics have not completed yet patient is on lasix and spironolactone should be doing daily weights  patient has tramadol for pain if needed patient states the wound to her groin has improved reviewed her insulin she is going to start taking 25 units lantus @ HS tonight and she is taking Lispro 4 units with each meal encouraged to document her glucose levels and keep a log patient has a scale reviewed the importance of doing her daily weights the same time each day no other medications needs questions and concerns addressed next [facility] scheduled 3/1/24 @ 0900     Patient's Address:   [address] [place] [address] [address]  [place] [address]  **  If this is not the address patient will receive services - alert team and address in EMR**       Patient Contacts:  Extended Emergency Contact Information  Primary Emergency Contact: Rah Modi  Address: [address] [place] [address]  Home Phone: [phone]  Relation: Spouse  Secondary Emergency Contact: Claudia Hartley  Mobile Phone: [phone]  Relation: Daughter                                Patient's Preferred Phone: [phone]  Patient's E-mail: [email or web address]

## 2024-02-24 ENCOUNTER — PATIENT OUTREACH (OUTPATIENT)
Dept: HOME HEALTH SERVICES | Age: 49
End: 2024-02-24
Payer: COMMERCIAL

## 2024-02-24 NOTE — PROGRESS NOTES
Daily call completed. Pt states she is doing well today. Has no complaints. Pt states she took her bgt before lunch, it was 327. Pt took her insulin and daily meds. Took bgt during daily call and it was 210. Pt denies any further questions or needs from us today. Aware of upcoming appts. [facility] scheduled for 3/1 at 0900.    Pt Education: y  Barriers: language  Topics for Daily Review: daily needs, bgt  Pt demonstrates clear understanding: Yes    Daily Weight:  There were no vitals filed for this visit.   Last 3 Weights:  Wt Readings from Last 7 Encounters:   02/07/24 72.6 kg (160 lb)   01/26/24 83.9 kg (185 lb)   05/03/23 80.7 kg (178 lb)   03/29/23 86.4 kg (190 lb 6.4 oz)   02/28/23 76.2 kg (168 lb)   08/02/22 83 kg (183 lb)   03/02/22 87.1 kg (192 lb)       Masimo Device: No   Masimo Clinical Impression: na    Virtual Visits--Scheduled (Most Recent Date at Top)  Follow up Appointments  Recent Visits  No visits were found meeting these conditions.  Showing recent visits within past 30 days and meeting all other requirements  Future Appointments  No visits were found meeting these conditions.  Showing future appointments within next 90 days and meeting all other requirements       Frequency of RN Calls & Virtual Visits per Team Agreement: Healthy at Home Frequency: Daily    Medication issues Addressed (what was done): linda    Follow up appointments scheduled by [facility] Staff: linda  Referrals made by [facility] staff: linda

## 2024-02-26 ENCOUNTER — PATIENT OUTREACH (OUTPATIENT)
Dept: HOME HEALTH SERVICES | Age: 49
End: 2024-02-26
Payer: COMMERCIAL

## 2024-02-26 NOTE — PROGRESS NOTES
Daily Call Note:   @ 18:41 Daughter, Mika, translated for Ms. Cedillo.  Ms. Flanagan declined a trans later.  I spoke with the patient's daughter, Mika.  Patient states she is skipping doses of insulin because sometimes she is afraid to give herself insulin.  Ms. Flanagan has been a diabetic for about 13 years, per her daughter.  I stressed the importance of taking insulin as prescribed.  I advised patient and her daughter of possible hospitalization due to high blood sugars if she continue to miss doses of insulin.      Blood Sugar Readings  2/23/24 -Blood sugar 240  2/24/24- Blood sugar 327  2/26/24  -Blood sugar 326  Notified Marilyn GOMES via secure chat.    Ms. Flanagan states she is feeling fine.  Blood sugar is 326. Blood sugar reading.  It was difficult to understand patient.  I advised patient we will call her back later with a trans later.      Pt Education:   Barriers:   Topics for Daily Review:   Pt demonstrates clear understanding:     Daily Weight:  There were no vitals filed for this visit.   Last 3 Weights:  Wt Readings from Last 7 Encounters:   02/07/24 72.6 kg (160 lb)   01/26/24 83.9 kg (185 lb)   05/03/23 80.7 kg (178 lb)   03/29/23 86.4 kg (190 lb 6.4 oz)   02/28/23 76.2 kg (168 lb)   08/02/22 83 kg (183 lb)   03/02/22 87.1 kg (192 lb)       Masimo Device:    Masimo Clinical Impression:     Virtual Visits--Scheduled (Most Recent Date at Top)  Follow up Appointments  Recent Visits  No visits were found meeting these conditions.  Showing recent visits within past 30 days and meeting all other requirements  Future Appointments  No visits were found meeting these conditions.  Showing future appointments within next 90 days and meeting all other requirements       Frequency of RN Calls & Virtual Visits per Team Agreement:     Medication issues Addressed (what was done):     Follow up appointments scheduled by [facility] Staff: Referrals made by [facility] staff:       Acute Hospital At Home Care Transitions  Assessment    Patient's Address:   [address]  [facility] [address]  **  If this is not the address patient will receive services - alert team and address in EMR**       Patient Contacts:  Extended Emergency Contact Information  Primary Emergency Contact: Rah Modi  Address: [address] [place] [address]  Home Phone: [phone]  Relation: Spouse  Secondary Emergency Contact: Claudia Hartley  Mobile Phone: [phone]  Relation: Daughter                                Patient's Preferred Phone: [phone]  Patient's E-mail: [email or web address]

## 2024-02-27 ENCOUNTER — PATIENT OUTREACH (OUTPATIENT)
Dept: HOME HEALTH SERVICES | Age: 49
End: 2024-02-27
Payer: COMMERCIAL

## 2024-02-27 ENCOUNTER — HOME CARE VISIT (OUTPATIENT)
Dept: HOME HEALTH SERVICES | Facility: HOME HEALTH | Age: 49
End: 2024-02-27
Payer: COMMERCIAL

## 2024-02-27 VITALS
SYSTOLIC BLOOD PRESSURE: 110 MMHG | OXYGEN SATURATION: 99 % | DIASTOLIC BLOOD PRESSURE: 62 MMHG | TEMPERATURE: 97.8 F | RESPIRATION RATE: 18 BRPM | HEART RATE: 85 BPM

## 2024-02-27 PROCEDURE — G0300 HHS/HOSPICE OF LPN EA 15 MIN: HCPCS

## 2024-02-27 ASSESSMENT — ENCOUNTER SYMPTOMS
LAST BOWEL MOVEMENT: 66897
DENIES PAIN: 1
APPETITE LEVEL: GOOD
CHANGE IN APPETITE: UNCHANGED

## 2024-02-27 NOTE — PROGRESS NOTES
Acute Hospital [facility] Home Care Transitions Assessment  Daily Call Note:   Alfonzo Flanagan blood sugar has been elevated over the last 3 days as of yesterday evening.   Pt states she is skipping doses because she is afraid of giving herself insulin.  Her daughter stated patient has been diabetic for about 13 years.   I explained the risk of skipping insulin.  2/23 -240 2/24- 327 2/26 -326  I notified Dr. Matos today due to Marilyn NP was not added to secure chat in error. Dr. Matos stated patient need to take Insulin.  No new orders.    We will discuss during today [facility].    We will notify Ms. Flanagan and continue to monitor.      Pt Education:   Barriers:   Topics for Daily Review:   Pt demonstrates clear understanding:     Daily Weight:  There were no vitals filed for this visit.   Last 3 Weights:  Wt Readings from Last 7 Encounters:   02/07/24 72.6 kg (160 lb)   01/26/24 83.9 kg (185 lb)   05/03/23 80.7 kg (178 lb)   03/29/23 86.4 kg (190 lb 6.4 oz)   02/28/23 76.2 kg (168 lb)   08/02/22 83 kg (183 lb)   03/02/22 87.1 kg (192 lb)       Masimo Device:    Masimo Clinical Impression:     Virtual Visits--Scheduled (Most Recent Date at Top)  Follow up Appointments  Recent Visits  No visits were found meeting these conditions.  Showing recent visits within past 30 days and meeting all other requirements  Future Appointments  No visits were found meeting these conditions.  Showing future appointments within next 90 days and meeting all other requirements       Frequency of RN Calls & Virtual Visits per Team Agreement:     Medication issues Addressed (what was done):     Follow up appointments scheduled by [facility] Staff:   Referrals made by [facility] staff:           Patient's Address:   [address]  [facility] [facility] [address]  **  If this is not the address patient will receive services - alert team and address in EMR**       Patient Contacts:  Extended Emergency Contact Information  Primary Emergency Contact:  Rah Modi  Address: [address] [place] [address]  Home Phone: [phone]  Relation: Spouse  Secondary Emergency Contact: Claudia Hartley  Mobile Phone: [phone]  Relation: Daughter                                Patient's Preferred Phone: [phone]  Patient's E-mail: [email or web address]

## 2024-02-27 NOTE — PROGRESS NOTES
Daily Call Note: Daily call complete, spoke with daughter, Claudia. She states patient is sleeping now because she wasn't feeling well this morning. Her stomach was hurting, and yesterday she had a sharp abdominal pain and felt dizzy, this lasted for couple minutes, then resolved. She reports blood glucose  241 this morning. She reports patient did take her insulin yesterday. I explained the importance of taking her insulin as ordered, she verbalized understanding and said that she does tell her that she needs to take it. Reminded of next [facility] 3/1/24 at 0900, verbalized understanding. No other questions or concerns at this time.    Pt Education:   Barriers:   Topics for Daily Review: blood sugar  Pt demonstrates clear understanding: Yes    Daily Weight:  There were no vitals filed for this visit.   Last 3 Weights:  Wt Readings from Last 7 Encounters:   02/07/24 72.6 kg (160 lb)   01/26/24 83.9 kg (185 lb)   05/03/23 80.7 kg (178 lb)   03/29/23 86.4 kg (190 lb 6.4 oz)   02/28/23 76.2 kg (168 lb)   08/02/22 83 kg (183 lb)   03/02/22 87.1 kg (192 lb)       Masimo Device: No   Masimo Clinical Impression: N/A    Virtual Visits--Scheduled (Most Recent Date at Top)  Follow up Appointments  Recent Visits  No visits were found meeting these conditions.  Showing recent visits within past 30 days and meeting all other requirements  Future Appointments  No visits were found meeting these conditions.  Showing future appointments within next 90 days and meeting all other requirements       Frequency of RN Calls & Virtual Visits per Team Agreement: Healthy at Home Frequency: Daily    Medication issues Addressed (what was done): none    Follow up appointments scheduled by [facility] Staff: none  Referrals made by [facility] staff: none      Acute Hospital At Home Care Transitions Assessment    Patient's Address:   [address]  [facility] [address]  **  If this is not the address patient will receive services - alert team and  address in EMR**       Patient Contacts:  Extended Emergency Contact Information  Primary Emergency Contact: Rah Modi  Address: [address] [place] [address]  Home Phone: [phone]  Relation: Spouse  Secondary Emergency Contact: Claudia Hartley  Mobile Phone: [phone]  Relation: Daughter                                Patient's Preferred Phone: [phone]  Patient's E-mail: [email or web address]

## 2024-02-28 ENCOUNTER — PATIENT OUTREACH (OUTPATIENT)
Dept: HOME HEALTH SERVICES | Age: 49
End: 2024-02-28
Payer: COMMERCIAL

## 2024-02-29 ENCOUNTER — PATIENT OUTREACH (OUTPATIENT)
Dept: HOME HEALTH SERVICES | Age: 49
End: 2024-02-29
Payer: COMMERCIAL

## 2024-02-29 ENCOUNTER — DOCUMENTATION (OUTPATIENT)
Dept: HOME HEALTH SERVICES | Age: 49
End: 2024-02-29
Payer: COMMERCIAL

## 2024-02-29 NOTE — PROGRESS NOTES
Attempted to do daily call, no answer, unable to leave a message.

## 2024-02-29 NOTE — PROGRESS NOTES
Daily Call Note: Daily call completed,     Pt Education: ***  Barriers: ***  Topics for Daily Review: ***  Pt demonstrates clear understanding: {Patient Understandin}    Daily Weight:  There were no vitals filed for this visit.   Last 3 Weights:  Wt Readings from Last 7 Encounters:   24 72.6 kg (160 lb)   24 83.9 kg (185 lb)   23 80.7 kg (178 lb)   23 86.4 kg (190 lb 6.4 oz)   23 76.2 kg (168 lb)   22 83 kg (183 lb)   22 87.1 kg (192 lb)       Masimo Device: {Healthy at Home Masimo:21035}   Masimo Clinical Impression: ***    Virtual Visits--Scheduled (Most Recent Date at Top)  Follow up Appointments  Recent Visits  No visits were found meeting these conditions.  Showing recent visits within past 30 days and meeting all other requirements  Future Appointments  No visits were found meeting these conditions.  Showing future appointments within next 90 days and meeting all other requirements       Frequency of RN Calls & Virtual Visits per Team Agreement: {Healthy at Home Frequency:01176}    Medication issues Addressed (what was done): ***    Follow up appointments scheduled by [facility] Staff: ***  Referrals made by [facility] staff: ***      Acute Hospital At Home Care Transitions Assessment    Patient's Address:   [address] [place], [place] [address]  **  If this is not the address patient will receive services - alert team and address in EMR**       Patient Contacts:  Extended Emergency Contact Information  Primary Emergency Contact: LyRah  Address: [address] [place], [place] [address]  Home Phone: [phone]  Relation: Spouse  Secondary Emergency Contact: Claudia Hartley  Mobile Phone: [phone]  Relation: Daughter                                Patient's Preferred Phone: [phone]  Patient's E-mail: [email or web address]

## 2024-03-01 ENCOUNTER — TELEMEDICINE (OUTPATIENT)
Dept: PHARMACY | Facility: HOSPITAL | Age: 49
End: 2024-03-01
Payer: COMMERCIAL

## 2024-03-01 ENCOUNTER — PATIENT OUTREACH (OUTPATIENT)
Dept: HOME HEALTH SERVICES | Age: 49
End: 2024-03-01

## 2024-03-01 ENCOUNTER — HOME CARE VISIT (OUTPATIENT)
Dept: HOME HEALTH SERVICES | Facility: HOME HEALTH | Age: 49
End: 2024-03-01
Payer: COMMERCIAL

## 2024-03-01 VITALS
TEMPERATURE: 98.7 F | RESPIRATION RATE: 18 BRPM | OXYGEN SATURATION: 100 % | HEART RATE: 86 BPM | DIASTOLIC BLOOD PRESSURE: 64 MMHG | SYSTOLIC BLOOD PRESSURE: 118 MMHG

## 2024-03-01 DIAGNOSIS — Z79.4 TYPE 2 DIABETES MELLITUS WITH HYPERGLYCEMIA, WITH LONG-TERM CURRENT USE OF INSULIN (MULTI): ICD-10-CM

## 2024-03-01 DIAGNOSIS — E11.65 TYPE 2 DIABETES MELLITUS WITH HYPERGLYCEMIA, WITH LONG-TERM CURRENT USE OF INSULIN (MULTI): ICD-10-CM

## 2024-03-01 PROCEDURE — G0300 HHS/HOSPICE OF LPN EA 15 MIN: HCPCS

## 2024-03-01 ASSESSMENT — ENCOUNTER SYMPTOMS
DENIES PAIN: 1
CHANGE IN APPETITE: VARYING
APPETITE LEVEL: GOOD
LAST BOWEL MOVEMENT: 66900

## 2024-03-01 NOTE — PROGRESS NOTES
"Daily Call Note: Weekly HHVC complete with Lynn VICTORIA, Laisha Delong, patient and patient's daughter, Claudia. Daughter reports that she feels better today, but she does have a HA. She states that blood sugar at 0700 was 393 and now it's just reading \"high\". She reports that she did take lantus 25 units last night. She also took 8 units of Lispro insulin x2 doses last night for \"high\" reading on CGM. Lynn instructed patient and daughter to increase lantus insulin to 25 units q 12 hours instead of just at bedtime, verbalized understanding. She has a nurse that visits weekly that was also notified of increase in insulin. Lynn also instructed patient to increase fluid intake, specifically, increase water intake today, and to call and report if blood sugar drops less than 70, verbalized understanding. Reviewed upcoming appointments, daughter will call endocrinologist to schedule appointment and try to get sooner appointment with PCP. Next HHVC 3/8/24 at 0900. No other questions or concerns at this time.      Pt Education: see note  Barriers: Salvadorean speaking  Topics for Daily Review: blood glucose  Pt demonstrates clear understanding: Yes    Daily Weight:  There were no vitals filed for this visit.   Last 3 Weights:  Wt Readings from Last 7 Encounters:   02/07/24 72.6 kg (160 lb)   01/26/24 83.9 kg (185 lb)   05/03/23 80.7 kg (178 lb)   03/29/23 86.4 kg (190 lb 6.4 oz)   02/28/23 76.2 kg (168 lb)   08/02/22 83 kg (183 lb)   03/02/22 87.1 kg (192 lb)       Masimo Device: No   Masimo Clinical Impression: N/A    Virtual Visits--Scheduled (Most Recent Date at Top)  Follow up Appointments  Recent Visits  No visits were found meeting these conditions.  Showing recent visits within past 30 days and meeting all other requirements  Future Appointments  No visits were found meeting these conditions.  Showing future appointments within next 90 days and meeting all other requirements       Frequency of RN " Calls & Virtual Visits per Team Agreement: Healthy at Home Frequency: Daily    Medication issues Addressed (what was done): see note    Follow up appointments scheduled by [facility] Staff: none  Referrals made by [facility] staff: none      Acute Hospital At Home Care Transitions Assessment    Patient's Address:   [address] [facility] [address]  [facility] [address]  **  If this is not the address patient will receive services - alert team and address in EMR**       Patient Contacts:  Extended Emergency Contact Information  Primary Emergency Contact: Rah Modi  Address: [address] [place] [address]  Home Phone: [phone]  Relation: Spouse  Secondary Emergency Contact: Claudia Hartley  Mobile Phone: [phone]  Relation: Daughter                                Patient's Preferred Phone: [phone]  Patient's E-mail: [email or web address] ([email or web address])

## 2024-03-01 NOTE — PROGRESS NOTES
Pharmacy Post-Discharge Visit  Alfonzo Flanagan is a 49 y.o. female was referred to Clinical Pharmacy Team to complete a post-discharge medication optimization and monitoring visit.  The patient was referred for their diabetes management. She also has a history of pancreatitis. She is also currently enrolled in our [facility]. She is Montenegrin speaking so her daughter is on the calls to help translate.     Admission Date: 2/7/24  Discharge Date: 2/15/24    Referring Provider: Gaston Silva DO  PCP: Landen Freeman MD    Subjective   Allergies   Allergen Reactions    Gluten Diarrhea       [id number] - [facility] [address] [facility]  [address] [facility] [address]  Phone: [phone] Fax: [phone]      Social History     Social History Narrative    Not on file        Notable Medication changes following discharge:  Start: augmentin, doxy, lidocaine, carafate and miralax   Stop: none  Change: none    Diabetes  She presents for her follow-up diabetic visit. She has type 2 diabetes mellitus. Her disease course has been worsening. There are no hypoglycemic associated symptoms. There are no hypoglycemic complications. Current diabetic treatment includes insulin injections and oral agent (monotherapy). She is compliant with treatment some of the time. Her weight is stable. Her home blood glucose trend is fluctuating dramatically. Her overall blood glucose range is >200 mg/dl. An ACE inhibitor/angiotensin II receptor blocker is not being taken.         Review of Systems    Medication System Management:  Affordability/Accessibility: Medicaid  Adherence/Organization: some concern - daughter tries to help as best she can and she also has a home care nurse that comes once a week and tries to help but if she has to do her insulin by herself majority of the time she will not do it  Adverse Effects: headaches     Objective     There were no vitals taken for this visit.      LAB  Lab Results   Component Value Date    BILITOT 1.0 02/14/2024    CALCIUM 8.0 (L) 02/14/2024    CO2 22 (L) 02/14/2024     (H) 02/14/2024    CREATININE 0.60 02/14/2024    GLUCOSE 116 (H) 02/14/2024    ALKPHOS 208 (H) 02/14/2024    K 4.0 02/14/2024    PROT 6.4 02/14/2024     02/14/2024    AST 32 02/14/2024    ALT 19 02/14/2024    BUN 11 02/14/2024    ANIONGAP 10 02/14/2024    MG 2.00 02/07/2024    ALBUMIN 2.0 (L) 02/14/2024    LIPASE 66 (H) 02/07/2024     Lab Results   Component Value Date    TRIG 98 10/12/2022    CHOL 183 10/12/2022    LDLCALC 146 (H) 10/12/2022    HDL 37 (L) 10/12/2022     Lab Results   Component Value Date    HGBA1C 10.2 (H) 02/19/2024         Current Outpatient Medications on File Prior to Visit   Medication Sig Dispense Refill    atorvastatin (Lipitor) 80 mg tablet Take 1 tablet (80 mg) by mouth once daily.      docusate sodium (Colace) 100 mg capsule Take 1 capsule (100 mg) by mouth 2 times a day. Hold for loose stool.      ergocalciferol (Vitamin D-2) 1.25 MG (26450 UT) capsule Take 1 capsule (50,000 Units) by mouth 1 (one) time per week.      fenofibrate (Tricor) 145 mg tablet Take 1 tablet (145 mg) by mouth once daily.      FreeStyle Juan 3 Sensor device       furosemide (Lasix) 40 mg tablet Take 1 tablet (40 mg) by mouth once daily. Hold for dizziness.      insulin glargine (Lantus U-100 Insulin) 100 unit/mL injection Inject 25 Units under the skin once daily at bedtime. Take as directed per insulin instructions.      insulin lispro (HumaLOG) 100 unit/mL injection Inject 0.04 mL (4 Units) under the skin 3 times a day with meals. Take as directed per insulin instructions.      lidocaine 4 % patch Place 1 patch over 12 hours on the skin once daily. Apply to site of pain. Remove & discard patch within 12 hours or as directed by MD. Do not start before February 16, 2024. 30 patch 0    metFORMIN (Glucophage) 1,000 mg tablet Take 1 tablet (1,000 mg) by mouth 2 times a day with  "meals.      pantoprazole (ProtoNix) 40 mg EC tablet Take 1 tablet (40 mg) by mouth once daily.      polyethylene glycol (Glycolax, Miralax) 17 gram packet Take 17 g by mouth 2 times a day. Hold for loose stool. 60 packet 0    spironolactone (Aldactone) 50 mg tablet Take 1 tablet (50 mg) by mouth 2 times a day. Hold for dizziness.      sucralfate (Carafate) 100 mg/mL suspension Take 10 mL (1 g) by mouth 4 times a day before meals. 1200 mL 0    traMADol (Ultram) 50 mg tablet Take 1 tablet (50 mg) by mouth.      traZODone (Desyrel) 50 mg tablet Take 0.5 tablets (25 mg) by mouth once daily at bedtime.      ursodiol (Actigall) 300 mg capsule Take 1 capsule (300 mg) by mouth 3 times a day.       No current facility-administered medications on file prior to visit.        HISTORICAL PHARMACOTHERAPY  -completed both antibiotics from the hospital for a wound in [facility] groin area - says has healed fine     DRUG INTERACTIONS  - none    Assessment/Plan   Problem List Items Addressed This Visit    None  Visit Diagnoses       Type 2 diabetes mellitus with hyperglycemia, with long-term current use of insulin (CMS/[facility])            -she is very non-compliant with giving herself her insulin, says she is \"scared\" to do it because she does not want to go low. We had a discussion about the importance of doing her injections as directed because her sugars have been very high and she is going to end up back in the hospital if they do not become more controlled again  -increase lantus to 25 units twice daily   -we will continue the 4 units with meals (hard to add a SS because hard to teach how to do this over the phone), if day time sugars still elevated the next few days though we will increase the meal time doses  -continue metformin 1,000mg twice and stated she has been compliant with taking this  -she has a CGM so able to monitor and we will having nursing do daily calls with her to keep log as well and make any further adjustments as " needed     Will follow up in 1 week     Continue all meds under the continuation of care with the referring provider and clinical pharmacy team.    Joaquim Zaragoza PharmD     Verbal consent to manage patient's drug therapy was obtained from [the patient and/or an individual authorized to act on behalf of a patient]. They were informed they may decline to participate or withdraw from participation in pharmacy services at any time.

## 2024-03-01 NOTE — PROGRESS NOTES
Daily Call Note: Daily call completed. Pt had no complaints, Blood sugar was reading High on CGM. Talked with Dr. Ash,  Pt was instructed to give herself another 8 units of Lispro. Pt had taken her Lantus 25 units in the AM instead of PM, then she did not take her Lispro in the AM, or in the afternoon, had taken 4 unit of Lispro around 1830. Pts accucheck at 10:21 was 366. (Look at chart below with details). At 1953 CGM still read high, pt doubled checked with her accucheck machine and it also read high. Dr. Ash was updated, pt gave herself another 8 units of Lispro per verbal order.  At 2045 pts CGM read 393. Dr. Ash was updated. Pt will check her sugar before she goes to bed and if remains on the higher side pt will take her Lantus 25 units. Pt has a [facility] conference call at 0900 tomorrow.      Time  Heart Rate BP POX Blood Glucose Weight   10:21    366    1830    High-pt received lispro 4 units Dr. Ash had pt give 8 more units   19:17    High 1953-compared with accucheck still high, Pt received 8 more units of Lispro   20:45     393 Pt to take her Lantus 25 units at HS-recheck her sugars     Pt Education: y  Barriers: slight language barrier   Topics for Daily Review: insulin dosing  Pt demonstrates clear understanding: Yes    Daily Weight:  There were no vitals filed for this visit.   Last 3 Weights:  Wt Readings from Last 7 Encounters:   02/07/24 72.6 kg (160 lb)   01/26/24 83.9 kg (185 lb)   05/03/23 80.7 kg (178 lb)   03/29/23 86.4 kg (190 lb 6.4 oz)   02/28/23 76.2 kg (168 lb)   08/02/22 83 kg (183 lb)   03/02/22 87.1 kg (192 lb)       Masimo Device: No   Masimo Clinical Impression:     Virtual Visits--Scheduled (Most Recent Date at Top)  Follow up Appointments  Recent Visits  No visits were found meeting these conditions.  Showing recent visits within past 30 days and meeting all other requirements  Future Appointments  No visits were found meeting these conditions.  Showing future  appointments within next 90 days and meeting all other requirements       Frequency of RN Calls & Virtual Visits per Team Agreement: Healthy at Home Frequency: Daily    Medication issues Addressed (what was done): yes, re enforced insulin    Follow up appointments scheduled by [facility] Staff: n  Referrals made by [facility] staff: n      Acute Hospital At Home Care Transitions Assessment    Patient's Address:   [address] [facility] [address]  [place] [id number]  **  If this is not the address patient will receive services - alert team and address in EMR**       Patient Contacts:  Extended Emergency Contact Information  Primary Emergency Contact: Rah Modi  Address: [address]           [place] [id number]  Home Phone: [phone]  Relation: Spouse  Secondary Emergency Contact: Claudia Hartley  Mobile Phone: [phone]  Relation: Daughter                                Patient's Preferred Phone: [phone]  Patient's E-mail: [email or web address]

## 2024-03-02 ENCOUNTER — PATIENT OUTREACH (OUTPATIENT)
Dept: HOME HEALTH SERVICES | Age: 49
End: 2024-03-02

## 2024-03-02 NOTE — PROGRESS NOTES
Daily call completed patient is doing ok except for her chronic belly pain she states her glucose levels have been better today currently 285 has been 225-305 range today states she is doing better with her insulin staes she has been using the 4 units of lispro with meals no other questions and concerns no medication needs     Patient's Address:   [address] [facility] [address]  [facility] [address]  **  If this is not the address patient will receive services - alert team and address in EMR**       Patient Contacts:  Extended Emergency Contact Information  Primary Emergency Contact: Rah Modi  Address: [address] [place] [address]  Home Phone: [phone]  Relation: Spouse  Secondary Emergency Contact: Claudia Hartley  Mobile Phone: [phone]  Relation: Daughter                                Patient's Preferred Phone: [phone]  Patient's E-mail: [email or web address]

## 2024-03-03 ENCOUNTER — DOCUMENTATION (OUTPATIENT)
Dept: HOME HEALTH SERVICES | Age: 49
End: 2024-03-03

## 2024-03-04 ENCOUNTER — PATIENT OUTREACH (OUTPATIENT)
Dept: HOME HEALTH SERVICES | Age: 49
End: 2024-03-04

## 2024-03-04 NOTE — PROGRESS NOTES
Daily Call Note: 19:30- called pt to check on her blood sugars today.  Pt states she took the 25 units of Lantus this morning and her 4 units of Lispro.  At 1:30 her sugar was 324, took 4 units of her Lispro. At dinner she took 4 units of her Lispro but did not check her sugars. At 6:32 her CGM read high, then at 19:30 it was 400. I instructed pt to always check her sugar levels before she administers her insulin. Ask if she follows a diabetic diet and she said yes but today she did eat a lot of rice and that is why her sugars might be high. I sent a secure chat to Dr. Ash. He wants pt to take lispro 10 units and her Lantus 25 units at bedtime if her sugar is higher than 300. I called pt, now her CGM is reading high, Pt is going to take her insulin, take her blood sugar at 2100 and call me if it is still high. Pt has no complaints.    Pt Education: y  Barriers: slight language barrier, needs continuous diabetic teaching  Topics for Daily Review: insulin dosing, checking her sugars,   Pt demonstrates clear understanding: Yes    Daily Weight:  There were no vitals filed for this visit.   Last 3 Weights:  Wt Readings from Last 7 Encounters:   02/07/24 72.6 kg (160 lb)   01/26/24 83.9 kg (185 lb)   05/03/23 80.7 kg (178 lb)   03/29/23 86.4 kg (190 lb 6.4 oz)   02/28/23 76.2 kg (168 lb)   08/02/22 83 kg (183 lb)   03/02/22 87.1 kg (192 lb)       Masimo Device: No   Masimo Clinical Impression:     Virtual Visits--Scheduled (Most Recent Date at Top)  Follow up Appointments  Recent Visits  No visits were found meeting these conditions.  Showing recent visits within past 30 days and meeting all other requirements  Future Appointments  No visits were found meeting these conditions.  Showing future appointments within next 90 days and meeting all other requirements       Frequency of RN Calls & Virtual Visits per Team Agreement: Healthy at Home Frequency: Daily    Medication issues Addressed (what was done): n    Follow up  appointments scheduled by [facility] Staff: n  Referrals made by [facility] staff: n      Acute Hospital At Home Care Transitions Assessment    Patient's Address:   [address] [address]  **  If this is not the address patient will receive services - alert team and address in EMR**       Patient Contacts:  Extended Emergency Contact Information  Primary Emergency Contact: Rah Modi  Address: [address] [place] [address]  Home Phone: [phone]  Relation: Spouse  Secondary Emergency Contact: Claudia Hartley  Mobile Phone: [phone]  Relation: Daughter                                Patient's Preferred Phone: [phone]  Patient's E-mail: [email or web address]

## 2024-03-05 NOTE — PROGRESS NOTES
Daily Call Note: Daily call complete. Patient reports blood sugars today   9:48 am 181  1:09 pm 241  5:45 pm HIGH  7:09 pm HIGH- she just took 25 units lantus, which she takes twice a day and 4 units humalog with each meal. She reports that she feels ok but she does have a FERNANDEZ. Dr. Solo notified of above blood sugar results, awaiting orders. Reminded patient of next [facility] 3/8/24 at 0900, verbalized understanding. No other questions at this time.     Pt Education:   Barriers: none  Topics for Daily Review: blood sugar  Pt demonstrates clear understanding: Yes    Daily Weight:  There were no vitals filed for this visit.   Last 3 Weights:  Wt Readings from Last 7 Encounters:   02/07/24 72.6 kg (160 lb)   01/26/24 83.9 kg (185 lb)   05/03/23 80.7 kg (178 lb)   03/29/23 86.4 kg (190 lb 6.4 oz)   02/28/23 76.2 kg (168 lb)   08/02/22 83 kg (183 lb)   03/02/22 87.1 kg (192 lb)       Masimo Device: No   Masimo Clinical Impression: N/A    Virtual Visits--Scheduled (Most Recent Date at Top)  Follow up Appointments  Recent Visits  No visits were found meeting these conditions.  Showing recent visits within past 30 days and meeting all other requirements  Future Appointments  No visits were found meeting these conditions.  Showing future appointments within next 90 days and meeting all other requirements       Frequency of RN Calls & Virtual Visits per Team Agreement: Healthy at Home Frequency: Daily    Medication issues Addressed (what was done): see note    Follow up appointments scheduled by [facility] Staff: none  Referrals made by [facility] staff: none      Acute Hospital At Home Care Transitions Assessment    Patient's Address:   [address] [place] [address] [address]  [facility] [address]  **  If this is not the address patient will receive services - alert team and address in EMR**       Patient Contacts:  Extended Emergency Contact Information  Primary Emergency Contact: Rah Modi  Address: [address] [place] [address]  [address]  Home Phone: [phone]  Relation: Spouse  Secondary Emergency Contact: Claudia Hartley  Mobile Phone: [phone]  Relation: Daughter                                Patient's Preferred Phone: [phone]  Patient's E-mail: [email or web address]

## 2024-03-06 ENCOUNTER — PATIENT OUTREACH (OUTPATIENT)
Dept: HOME HEALTH SERVICES | Age: 49
End: 2024-03-06

## 2024-03-06 ENCOUNTER — HOME CARE VISIT (OUTPATIENT)
Dept: HOME HEALTH SERVICES | Facility: HOME HEALTH | Age: 49
End: 2024-03-06
Payer: COMMERCIAL

## 2024-03-06 ASSESSMENT — ACTIVITIES OF DAILY LIVING (ADL)
OASIS_M1830: 02
HOME_HEALTH_OASIS: 00

## 2024-03-06 NOTE — PROGRESS NOTES
Daily Call Note: [facility] daily call complete.  Spoke w pt's daughter, she reports pt is feeling well.  , pt taking insulin as ordered.  Dr Cristina Montano notified of elevated  BGM, no new orders at this time.  All questions/ concerns addressed.       Pt Education:   Barriers:   Topics for Daily Review:   Pt demonstrates clear understanding: No    Daily Weight:  There were no vitals filed for this visit.   Last 3 Weights:  Wt Readings from Last 7 Encounters:   02/07/24 72.6 kg (160 lb)   01/26/24 83.9 kg (185 lb)   05/03/23 80.7 kg (178 lb)   03/29/23 86.4 kg (190 lb 6.4 oz)   02/28/23 76.2 kg (168 lb)   08/02/22 83 kg (183 lb)   03/02/22 87.1 kg (192 lb)       Masimo Device: No   Masimo Clinical Impression:     Virtual Visits--Scheduled (Most Recent Date at Top)  Follow up Appointments  Recent Visits  No visits were found meeting these conditions.  Showing recent visits within past 30 days and meeting all other requirements  Future Appointments  No visits were found meeting these conditions.  Showing future appointments within next 90 days and meeting all other requirements       Frequency of RN Calls & Virtual Visits per Team Agreement: Healthy at Home Frequency: Daily    Medication issues Addressed (what was done):     Follow up appointments scheduled by [facility] Staff:   Referrals made by [facility] staff:       [facility] Care Transitions Assessment    Patient's Address:   [address] [place] [address] [address]  [place] [id number]  **  If this is not the address patient will receive services - alert team and address in EMR**       Patient Contacts:  Extended Emergency Contact Information  Primary Emergency Contact: Rah Modi  Address: [id number] [place], [place] [address]  Home Phone: [phone]  Relation: Spouse  Secondary Emergency Contact: Claudia Hartley  Mobile Phone: [phone]  Relation: Daughter                                Patient's Preferred Phone: [phone]  Patient's E-mail:  [email or web address]

## 2024-03-07 NOTE — PROGRESS NOTES
[facility] Care Transitions Assessment    Patient's Address:   [address]  [facility] [address]  **  If this is not the address patient will receive services - alert team and address in EMR**       Patient Contacts:  Extended Emergency Contact Information  Primary Emergency Contact: Rah Modi  Address: [address] [place] [address]  Home Phone: [phone]  Relation: Spouse  Secondary Emergency Contact: Claudia Hartley  Mobile Phone: [phone]  Relation: Daughter                                Patient's Preferred Phone: [phone]  Patient's E-mail: [email or web address]

## 2024-03-08 ENCOUNTER — APPOINTMENT (OUTPATIENT)
Dept: PHARMACY | Facility: HOSPITAL | Age: 49
End: 2024-03-08

## 2024-03-08 ENCOUNTER — APPOINTMENT (OUTPATIENT)
Dept: CARE COORDINATION | Age: 49
End: 2024-03-08

## 2024-03-15 NOTE — PROGRESS NOTES
Outreach call to patient to support a smooth transition of care from recent admission.  Left voicemail message for patient with my contact information [phone].  Brittany Cid RN

## 2024-04-17 ENCOUNTER — APPOINTMENT (OUTPATIENT)
Dept: RADIOLOGY | Facility: HOSPITAL | Age: 49
End: 2024-04-17

## 2024-04-17 ENCOUNTER — HOSPITAL ENCOUNTER (OUTPATIENT)
Facility: HOSPITAL | Age: 49
Setting detail: OBSERVATION
Discharge: HOME | End: 2024-04-18
Attending: EMERGENCY MEDICINE | Admitting: INTERNAL MEDICINE

## 2024-04-17 DIAGNOSIS — E44.0 MODERATE PROTEIN-CALORIE MALNUTRITION (MULTI): ICD-10-CM

## 2024-04-17 DIAGNOSIS — R11.0 NAUSEA: ICD-10-CM

## 2024-04-17 DIAGNOSIS — R10.10 PAIN OF UPPER ABDOMEN: ICD-10-CM

## 2024-04-17 DIAGNOSIS — R18.8 OTHER ASCITES: Primary | ICD-10-CM

## 2024-04-17 DIAGNOSIS — E72.20 HYPERAMMONEMIA (MULTI): ICD-10-CM

## 2024-04-17 DIAGNOSIS — R18.8 CIRRHOSIS OF LIVER WITH ASCITES, UNSPECIFIED HEPATIC CIRRHOSIS TYPE (MULTI): ICD-10-CM

## 2024-04-17 DIAGNOSIS — K74.60 CIRRHOSIS OF LIVER WITH ASCITES, UNSPECIFIED HEPATIC CIRRHOSIS TYPE (MULTI): ICD-10-CM

## 2024-04-17 DIAGNOSIS — R06.02 SHORTNESS OF BREATH: ICD-10-CM

## 2024-04-17 DIAGNOSIS — N30.00 ACUTE CYSTITIS WITHOUT HEMATURIA: ICD-10-CM

## 2024-04-17 PROCEDURE — 85610 PROTHROMBIN TIME: CPT | Performed by: EMERGENCY MEDICINE

## 2024-04-17 PROCEDURE — 81025 URINE PREGNANCY TEST: CPT | Performed by: EMERGENCY MEDICINE

## 2024-04-17 PROCEDURE — 85025 COMPLETE CBC W/AUTO DIFF WBC: CPT | Performed by: EMERGENCY MEDICINE

## 2024-04-17 PROCEDURE — 80053 COMPREHEN METABOLIC PANEL: CPT | Performed by: EMERGENCY MEDICINE

## 2024-04-17 PROCEDURE — 99285 EMERGENCY DEPT VISIT HI MDM: CPT

## 2024-04-17 PROCEDURE — 87086 URINE CULTURE/COLONY COUNT: CPT | Mod: WESLAB | Performed by: EMERGENCY MEDICINE

## 2024-04-17 PROCEDURE — 80320 DRUG SCREEN QUANTALCOHOLS: CPT | Performed by: EMERGENCY MEDICINE

## 2024-04-17 PROCEDURE — 80179 DRUG ASSAY SALICYLATE: CPT | Performed by: EMERGENCY MEDICINE

## 2024-04-17 PROCEDURE — 82140 ASSAY OF AMMONIA: CPT | Performed by: EMERGENCY MEDICINE

## 2024-04-17 PROCEDURE — 83690 ASSAY OF LIPASE: CPT | Performed by: EMERGENCY MEDICINE

## 2024-04-17 PROCEDURE — 81001 URINALYSIS AUTO W/SCOPE: CPT | Performed by: EMERGENCY MEDICINE

## 2024-04-17 PROCEDURE — 85730 THROMBOPLASTIN TIME PARTIAL: CPT | Performed by: EMERGENCY MEDICINE

## 2024-04-17 PROCEDURE — 83735 ASSAY OF MAGNESIUM: CPT | Performed by: EMERGENCY MEDICINE

## 2024-04-17 PROCEDURE — 36415 COLL VENOUS BLD VENIPUNCTURE: CPT | Performed by: EMERGENCY MEDICINE

## 2024-04-17 PROCEDURE — 84484 ASSAY OF TROPONIN QUANT: CPT | Performed by: EMERGENCY MEDICINE

## 2024-04-17 PROCEDURE — 71046 X-RAY EXAM CHEST 2 VIEWS: CPT

## 2024-04-17 RX ORDER — KETOROLAC TROMETHAMINE 30 MG/ML
15 INJECTION, SOLUTION INTRAMUSCULAR; INTRAVENOUS ONCE
Status: COMPLETED | OUTPATIENT
Start: 2024-04-17 | End: 2024-04-18

## 2024-04-17 RX ORDER — ONDANSETRON HYDROCHLORIDE 2 MG/ML
4 INJECTION, SOLUTION INTRAVENOUS ONCE
Status: COMPLETED | OUTPATIENT
Start: 2024-04-17 | End: 2024-04-18

## 2024-04-17 RX ORDER — FAMOTIDINE 10 MG/ML
20 INJECTION INTRAVENOUS ONCE
Status: COMPLETED | OUTPATIENT
Start: 2024-04-17 | End: 2024-04-18

## 2024-04-17 ASSESSMENT — PAIN DESCRIPTION - LOCATION: LOCATION: ABDOMEN

## 2024-04-17 ASSESSMENT — LIFESTYLE VARIABLES
HAVE YOU EVER FELT YOU SHOULD CUT DOWN ON YOUR DRINKING: NO
EVER HAD A DRINK FIRST THING IN THE MORNING TO STEADY YOUR NERVES TO GET RID OF A HANGOVER: NO
TOTAL SCORE: 0
HAVE PEOPLE ANNOYED YOU BY CRITICIZING YOUR DRINKING: NO
EVER FELT BAD OR GUILTY ABOUT YOUR DRINKING: NO

## 2024-04-17 ASSESSMENT — PAIN - FUNCTIONAL ASSESSMENT: PAIN_FUNCTIONAL_ASSESSMENT: 0-10

## 2024-04-17 ASSESSMENT — PAIN SCALES - GENERAL: PAINLEVEL_OUTOF10: 9

## 2024-04-18 ENCOUNTER — APPOINTMENT (OUTPATIENT)
Dept: RADIOLOGY | Facility: HOSPITAL | Age: 49
End: 2024-04-18

## 2024-04-18 ENCOUNTER — APPOINTMENT (OUTPATIENT)
Dept: CARDIOLOGY | Facility: HOSPITAL | Age: 49
End: 2024-04-18

## 2024-04-18 VITALS
BODY MASS INDEX: 30.16 KG/M2 | HEART RATE: 97 BPM | HEIGHT: 63 IN | RESPIRATION RATE: 19 BRPM | WEIGHT: 170.2 LBS | SYSTOLIC BLOOD PRESSURE: 117 MMHG | TEMPERATURE: 98.6 F | OXYGEN SATURATION: 100 % | DIASTOLIC BLOOD PRESSURE: 68 MMHG

## 2024-04-18 LAB
ALBUMIN SERPL-MCNC: 2.4 G/DL (ref 3.5–5)
ALBUMIN SERPL-MCNC: 2.7 G/DL (ref 3.5–5)
ALP BLD-CCNC: 359 U/L (ref 35–125)
ALP BLD-CCNC: 419 U/L (ref 35–125)
ALT SERPL-CCNC: 29 U/L (ref 5–40)
ALT SERPL-CCNC: 32 U/L (ref 5–40)
AMMONIA PLAS-SCNC: 103 UMOL/L (ref 12–45)
AMMONIA PLAS-SCNC: 174 UMOL/L (ref 12–45)
ANION GAP SERPL CALC-SCNC: 10 MMOL/L
ANION GAP SERPL CALC-SCNC: 9 MMOL/L
APAP SERPL-MCNC: <5 UG/ML
APPEARANCE UR: CLEAR
APTT PPP: 25.2 SECONDS (ref 22–32.5)
AST SERPL-CCNC: 46 U/L (ref 5–40)
AST SERPL-CCNC: 58 U/L (ref 5–40)
ATRIAL RATE: 99 BPM
BASOPHILS # BLD AUTO: 0.02 X10*3/UL (ref 0–0.1)
BASOPHILS NFR BLD AUTO: 0.4 %
BASOPHILS NFR FLD MANUAL: 0 %
BILIRUB SERPL-MCNC: 1.3 MG/DL (ref 0.1–1.2)
BILIRUB SERPL-MCNC: 1.3 MG/DL (ref 0.1–1.2)
BILIRUB UR STRIP.AUTO-MCNC: NEGATIVE MG/DL
BLASTS NFR FLD MANUAL: 0 %
BUN SERPL-MCNC: 6 MG/DL (ref 8–25)
BUN SERPL-MCNC: 9 MG/DL (ref 8–25)
CALCIUM SERPL-MCNC: 7.8 MG/DL (ref 8.5–10.4)
CALCIUM SERPL-MCNC: 8.5 MG/DL (ref 8.5–10.4)
CHLORIDE SERPL-SCNC: 104 MMOL/L (ref 97–107)
CHLORIDE SERPL-SCNC: 110 MMOL/L (ref 97–107)
CLARITY FLD: CLEAR
CO2 SERPL-SCNC: 22 MMOL/L (ref 24–31)
CO2 SERPL-SCNC: 24 MMOL/L (ref 24–31)
COLOR FLD: YELLOW
COLOR UR: YELLOW
CREAT SERPL-MCNC: 0.4 MG/DL (ref 0.4–1.6)
CREAT SERPL-MCNC: 0.5 MG/DL (ref 0.4–1.6)
EGFRCR SERPLBLD CKD-EPI 2021: >90 ML/MIN/1.73M*2
EGFRCR SERPLBLD CKD-EPI 2021: >90 ML/MIN/1.73M*2
EOSINOPHIL # BLD AUTO: 0.27 X10*3/UL (ref 0–0.7)
EOSINOPHIL NFR BLD AUTO: 5.2 %
EOSINOPHIL NFR FLD MANUAL: 0 %
ERYTHROCYTE [DISTWIDTH] IN BLOOD BY AUTOMATED COUNT: 18.1 % (ref 11.5–14.5)
ERYTHROCYTE [DISTWIDTH] IN BLOOD BY AUTOMATED COUNT: 18.8 % (ref 11.5–14.5)
ETHANOL SERPL-MCNC: <0.01 G/DL
FERRITIN SERPL-MCNC: 27 NG/ML (ref 13–150)
FOLATE SERPL-MCNC: 17.6 NG/ML (ref 4.2–19.9)
GIANT PLATELETS BLD QL SMEAR: NORMAL
GLUCOSE BLD MANUAL STRIP-MCNC: 91 MG/DL (ref 74–99)
GLUCOSE SERPL-MCNC: 124 MG/DL (ref 65–99)
GLUCOSE SERPL-MCNC: 284 MG/DL (ref 65–99)
GLUCOSE UR STRIP.AUTO-MCNC: ABNORMAL MG/DL
HCG UR QL IA.RAPID: NEGATIVE
HCT VFR BLD AUTO: 27.5 % (ref 36–46)
HCT VFR BLD AUTO: 30.9 % (ref 36–46)
HGB BLD-MCNC: 9.1 G/DL (ref 12–16)
HGB BLD-MCNC: 9.7 G/DL (ref 12–16)
HOLD SPECIMEN: NORMAL
IMM GRANULOCYTES # BLD AUTO: 0.01 X10*3/UL (ref 0–0.7)
IMM GRANULOCYTES NFR BLD AUTO: 0.2 % (ref 0–0.9)
IMMATURE GRANULOCYTES IN FLUID: 0 %
INR PPP: 1.2 (ref 0.9–1.2)
IRON SATN MFR SERPL: 14 % (ref 12–50)
IRON SERPL-MCNC: 36 UG/DL (ref 30–160)
KETONES UR STRIP.AUTO-MCNC: ABNORMAL MG/DL
LEUKOCYTE ESTERASE UR QL STRIP.AUTO: ABNORMAL
LIPASE SERPL-CCNC: 51 U/L (ref 16–63)
LYMPHOCYTES # BLD AUTO: 1.52 X10*3/UL (ref 1.2–4.8)
LYMPHOCYTES NFR BLD AUTO: 29.4 %
LYMPHOCYTES NFR FLD MANUAL: 23 %
MAGNESIUM SERPL-MCNC: 1.8 MG/DL (ref 1.6–3.1)
MCH RBC QN AUTO: 24.6 PG (ref 26–34)
MCH RBC QN AUTO: 24.9 PG (ref 26–34)
MCHC RBC AUTO-ENTMCNC: 31.4 G/DL (ref 32–36)
MCHC RBC AUTO-ENTMCNC: 33.1 G/DL (ref 32–36)
MCV RBC AUTO: 74 FL (ref 80–100)
MCV RBC AUTO: 79 FL (ref 80–100)
MONOCYTES # BLD AUTO: 0.56 X10*3/UL (ref 0.1–1)
MONOCYTES NFR BLD AUTO: 10.8 %
MONOS+MACROS NFR FLD MANUAL: 77 %
MUCOUS THREADS #/AREA URNS AUTO: ABNORMAL /LPF
NEUTROPHILS # BLD AUTO: 2.79 X10*3/UL (ref 1.2–7.7)
NEUTROPHILS NFR BLD AUTO: 54 %
NEUTROPHILS NFR FLD MANUAL: 0 %
NITRITE UR QL STRIP.AUTO: NEGATIVE
NRBC BLD-RTO: 0 /100 WBCS (ref 0–0)
NRBC BLD-RTO: 0 /100 WBCS (ref 0–0)
OTHER CELLS NFR FLD MANUAL: 0 %
P AXIS: 47 DEGREES
P OFFSET: 179 MS
P ONSET: 128 MS
PH UR STRIP.AUTO: 5.5 [PH]
PLASMA CELLS NFR FLD MANUAL: 0 %
PLATELET # BLD AUTO: 102 X10*3/UL (ref 150–450)
PLATELET # BLD AUTO: 77 X10*3/UL (ref 150–450)
POTASSIUM SERPL-SCNC: 4 MMOL/L (ref 3.4–5.1)
POTASSIUM SERPL-SCNC: 4.1 MMOL/L (ref 3.4–5.1)
PR INTERVAL: 180 MS
PROT SERPL-MCNC: 6.5 G/DL (ref 5.9–7.9)
PROT SERPL-MCNC: 7.6 G/DL (ref 5.9–7.9)
PROT UR STRIP.AUTO-MCNC: ABNORMAL MG/DL
PROTHROMBIN TIME: 12.8 SECONDS (ref 9.3–12.7)
Q ONSET: 218 MS
QRS COUNT: 16 BEATS
QRS DURATION: 74 MS
QT INTERVAL: 368 MS
QTC CALCULATION(BAZETT): 472 MS
QTC FREDERICIA: 434 MS
R AXIS: 10 DEGREES
RBC # BLD AUTO: 3.7 X10*6/UL (ref 4–5.2)
RBC # BLD AUTO: 3.9 X10*6/UL (ref 4–5.2)
RBC # FLD AUTO: 1000 /UL
RBC # UR STRIP.AUTO: NEGATIVE /UL
RBC #/AREA URNS AUTO: ABNORMAL /HPF
RBC MORPH BLD: NORMAL
SALICYLATES SERPL-MCNC: <0 MG/DL
SODIUM SERPL-SCNC: 138 MMOL/L (ref 133–145)
SODIUM SERPL-SCNC: 141 MMOL/L (ref 133–145)
SP GR UR STRIP.AUTO: 1.04
SQUAMOUS #/AREA URNS AUTO: ABNORMAL /HPF
T AXIS: 5 DEGREES
T OFFSET: 402 MS
TIBC SERPL-MCNC: 253 UG/DL (ref 228–428)
TOTAL CELLS COUNTED FLD: 100
TROPONIN T SERPL-MCNC: <6 NG/L
UIBC SERPL-MCNC: 217 UG/DL (ref 110–370)
UROBILINOGEN UR STRIP.AUTO-MCNC: NORMAL MG/DL
VENTRICULAR RATE: 99 BPM
VIT B12 SERPL-MCNC: 837 PG/ML (ref 211–946)
WBC # BLD AUTO: 4.8 X10*3/UL (ref 4.4–11.3)
WBC # BLD AUTO: 5.2 X10*3/UL (ref 4.4–11.3)
WBC # FLD AUTO: 225 /UL
WBC #/AREA URNS AUTO: ABNORMAL /HPF

## 2024-04-18 PROCEDURE — 2500000005 HC RX 250 GENERAL PHARMACY W/O HCPCS: Performed by: INTERNAL MEDICINE

## 2024-04-18 PROCEDURE — 82140 ASSAY OF AMMONIA: CPT | Performed by: NURSE PRACTITIONER

## 2024-04-18 PROCEDURE — 96361 HYDRATE IV INFUSION ADD-ON: CPT | Mod: 59 | Performed by: INTERNAL MEDICINE

## 2024-04-18 PROCEDURE — 36415 COLL VENOUS BLD VENIPUNCTURE: CPT | Performed by: EMERGENCY MEDICINE

## 2024-04-18 PROCEDURE — 87070 CULTURE OTHR SPECIMN AEROBIC: CPT | Mod: WESLAB | Performed by: INTERNAL MEDICINE

## 2024-04-18 PROCEDURE — 74177 CT ABD & PELVIS W/CONTRAST: CPT

## 2024-04-18 PROCEDURE — G0378 HOSPITAL OBSERVATION PER HR: HCPCS

## 2024-04-18 PROCEDURE — 96365 THER/PROPH/DIAG IV INF INIT: CPT | Mod: 59 | Performed by: INTERNAL MEDICINE

## 2024-04-18 PROCEDURE — 84157 ASSAY OF PROTEIN OTHER: CPT | Mod: WESLAB | Performed by: INTERNAL MEDICINE

## 2024-04-18 PROCEDURE — 85027 COMPLETE CBC AUTOMATED: CPT | Performed by: NURSE PRACTITIONER

## 2024-04-18 PROCEDURE — 84484 ASSAY OF TROPONIN QUANT: CPT | Performed by: EMERGENCY MEDICINE

## 2024-04-18 PROCEDURE — 89051 BODY FLUID CELL COUNT: CPT | Performed by: INTERNAL MEDICINE

## 2024-04-18 PROCEDURE — 71046 X-RAY EXAM CHEST 2 VIEWS: CPT | Performed by: RADIOLOGY

## 2024-04-18 PROCEDURE — 49083 ABD PARACENTESIS W/IMAGING: CPT

## 2024-04-18 PROCEDURE — 82945 GLUCOSE OTHER FLUID: CPT | Mod: WESLAB | Performed by: INTERNAL MEDICINE

## 2024-04-18 PROCEDURE — 2500000001 HC RX 250 WO HCPCS SELF ADMINISTERED DRUGS (ALT 637 FOR MEDICARE OP): Performed by: INTERNAL MEDICINE

## 2024-04-18 PROCEDURE — 2500000005 HC RX 250 GENERAL PHARMACY W/O HCPCS: Performed by: EMERGENCY MEDICINE

## 2024-04-18 PROCEDURE — 2500000004 HC RX 250 GENERAL PHARMACY W/ HCPCS (ALT 636 FOR OP/ED): Performed by: NURSE PRACTITIONER

## 2024-04-18 PROCEDURE — 82607 VITAMIN B-12: CPT | Performed by: NURSE PRACTITIONER

## 2024-04-18 PROCEDURE — 82947 ASSAY GLUCOSE BLOOD QUANT: CPT | Mod: 59

## 2024-04-18 PROCEDURE — C9113 INJ PANTOPRAZOLE SODIUM, VIA: HCPCS | Performed by: NURSE PRACTITIONER

## 2024-04-18 PROCEDURE — C1729 CATH, DRAINAGE: HCPCS

## 2024-04-18 PROCEDURE — 84075 ASSAY ALKALINE PHOSPHATASE: CPT | Performed by: NURSE PRACTITIONER

## 2024-04-18 PROCEDURE — 96375 TX/PRO/DX INJ NEW DRUG ADDON: CPT | Mod: 59 | Performed by: INTERNAL MEDICINE

## 2024-04-18 PROCEDURE — 76705 ECHO EXAM OF ABDOMEN: CPT | Performed by: RADIOLOGY

## 2024-04-18 PROCEDURE — 2720000007 HC OR 272 NO HCPCS

## 2024-04-18 PROCEDURE — 2500000006 HC RX 250 W HCPCS SELF ADMINISTERED DRUGS (ALT 637 FOR ALL PAYERS): Performed by: INTERNAL MEDICINE

## 2024-04-18 PROCEDURE — 2500000004 HC RX 250 GENERAL PHARMACY W/ HCPCS (ALT 636 FOR OP/ED): Performed by: EMERGENCY MEDICINE

## 2024-04-18 PROCEDURE — 93005 ELECTROCARDIOGRAM TRACING: CPT

## 2024-04-18 PROCEDURE — 82042 OTHER SOURCE ALBUMIN QUAN EA: CPT | Mod: WESLAB | Performed by: INTERNAL MEDICINE

## 2024-04-18 PROCEDURE — 2550000001 HC RX 255 CONTRASTS: Performed by: EMERGENCY MEDICINE

## 2024-04-18 PROCEDURE — 83540 ASSAY OF IRON: CPT | Performed by: NURSE PRACTITIONER

## 2024-04-18 PROCEDURE — 82746 ASSAY OF FOLIC ACID SERUM: CPT | Performed by: NURSE PRACTITIONER

## 2024-04-18 PROCEDURE — 83550 IRON BINDING TEST: CPT | Performed by: NURSE PRACTITIONER

## 2024-04-18 PROCEDURE — 82728 ASSAY OF FERRITIN: CPT | Performed by: NURSE PRACTITIONER

## 2024-04-18 PROCEDURE — 74177 CT ABD & PELVIS W/CONTRAST: CPT | Performed by: RADIOLOGY

## 2024-04-18 PROCEDURE — 2500000001 HC RX 250 WO HCPCS SELF ADMINISTERED DRUGS (ALT 637 FOR MEDICARE OP): Performed by: EMERGENCY MEDICINE

## 2024-04-18 RX ORDER — GUAIFENESIN 600 MG/1
600 TABLET, EXTENDED RELEASE ORAL EVERY 12 HOURS PRN
Status: DISCONTINUED | OUTPATIENT
Start: 2024-04-18 | End: 2024-04-18 | Stop reason: HOSPADM

## 2024-04-18 RX ORDER — PANTOPRAZOLE SODIUM 40 MG/1
40 TABLET, DELAYED RELEASE ORAL DAILY
Status: DISCONTINUED | OUTPATIENT
Start: 2024-04-18 | End: 2024-04-18 | Stop reason: HOSPADM

## 2024-04-18 RX ORDER — URSODIOL 300 MG/1
300 CAPSULE ORAL 3 TIMES DAILY
Status: DISCONTINUED | OUTPATIENT
Start: 2024-04-18 | End: 2024-04-18 | Stop reason: HOSPADM

## 2024-04-18 RX ORDER — LIDOCAINE 560 MG/1
1 PATCH PERCUTANEOUS; TOPICAL; TRANSDERMAL DAILY
Status: DISCONTINUED | OUTPATIENT
Start: 2024-04-18 | End: 2024-04-18 | Stop reason: HOSPADM

## 2024-04-18 RX ORDER — CEFTRIAXONE 1 G/50ML
1 INJECTION, SOLUTION INTRAVENOUS EVERY 24 HOURS
Status: DISCONTINUED | OUTPATIENT
Start: 2024-04-18 | End: 2024-04-18 | Stop reason: HOSPADM

## 2024-04-18 RX ORDER — FENOFIBRATE 160 MG/1
160 TABLET ORAL DAILY
Status: DISCONTINUED | OUTPATIENT
Start: 2024-04-18 | End: 2024-04-18 | Stop reason: HOSPADM

## 2024-04-18 RX ORDER — TRAMADOL HYDROCHLORIDE 50 MG/1
50 TABLET ORAL EVERY 6 HOURS PRN
Status: DISCONTINUED | OUTPATIENT
Start: 2024-04-18 | End: 2024-04-18 | Stop reason: HOSPADM

## 2024-04-18 RX ORDER — LACTULOSE 10 G/15ML
20 SOLUTION ORAL DAILY
Status: DISCONTINUED | OUTPATIENT
Start: 2024-04-18 | End: 2024-04-18 | Stop reason: HOSPADM

## 2024-04-18 RX ORDER — SPIRONOLACTONE 50 MG/1
50 TABLET, FILM COATED ORAL 2 TIMES DAILY
Status: DISCONTINUED | OUTPATIENT
Start: 2024-04-18 | End: 2024-04-18 | Stop reason: HOSPADM

## 2024-04-18 RX ORDER — GUAIFENESIN/DEXTROMETHORPHAN 100-10MG/5
5 SYRUP ORAL EVERY 4 HOURS PRN
Status: DISCONTINUED | OUTPATIENT
Start: 2024-04-18 | End: 2024-04-18 | Stop reason: HOSPADM

## 2024-04-18 RX ORDER — LACTULOSE 10 G/15ML
20 SOLUTION ORAL DAILY
Qty: 900 ML | Refills: 0 | Status: SHIPPED | OUTPATIENT
Start: 2024-04-19 | End: 2024-07-12 | Stop reason: HOSPADM

## 2024-04-18 RX ORDER — TRAZODONE HYDROCHLORIDE 50 MG/1
25 TABLET ORAL NIGHTLY
Status: DISCONTINUED | OUTPATIENT
Start: 2024-04-18 | End: 2024-04-18 | Stop reason: HOSPADM

## 2024-04-18 RX ORDER — ACETAMINOPHEN 650 MG/1
650 SUPPOSITORY RECTAL EVERY 4 HOURS PRN
Status: DISCONTINUED | OUTPATIENT
Start: 2024-04-18 | End: 2024-04-18 | Stop reason: HOSPADM

## 2024-04-18 RX ORDER — POLYETHYLENE GLYCOL 3350 17 G/17G
17 POWDER, FOR SOLUTION ORAL 2 TIMES DAILY
Status: DISCONTINUED | OUTPATIENT
Start: 2024-04-18 | End: 2024-04-18 | Stop reason: HOSPADM

## 2024-04-18 RX ORDER — ACETAMINOPHEN 325 MG/1
650 TABLET ORAL EVERY 4 HOURS PRN
Status: DISCONTINUED | OUTPATIENT
Start: 2024-04-18 | End: 2024-04-18 | Stop reason: HOSPADM

## 2024-04-18 RX ORDER — FUROSEMIDE 40 MG/1
40 TABLET ORAL DAILY
Status: DISCONTINUED | OUTPATIENT
Start: 2024-04-18 | End: 2024-04-18 | Stop reason: HOSPADM

## 2024-04-18 RX ORDER — CIPROFLOXACIN 250 MG/1
250 TABLET, FILM COATED ORAL 2 TIMES DAILY
Qty: 6 TABLET | Refills: 0 | Status: SHIPPED | OUTPATIENT
Start: 2024-04-18 | End: 2024-04-21

## 2024-04-18 RX ORDER — ERGOCALCIFEROL 1.25 MG/1
50000 CAPSULE ORAL
Status: DISCONTINUED | OUTPATIENT
Start: 2024-04-21 | End: 2024-04-18 | Stop reason: HOSPADM

## 2024-04-18 RX ORDER — INSULIN GLARGINE 100 [IU]/ML
25 INJECTION, SOLUTION SUBCUTANEOUS 2 TIMES DAILY
Status: DISCONTINUED | OUTPATIENT
Start: 2024-04-18 | End: 2024-04-18 | Stop reason: HOSPADM

## 2024-04-18 RX ORDER — INSULIN LISPRO 100 [IU]/ML
4 INJECTION, SOLUTION INTRAVENOUS; SUBCUTANEOUS
Status: DISCONTINUED | OUTPATIENT
Start: 2024-04-18 | End: 2024-04-18 | Stop reason: HOSPADM

## 2024-04-18 RX ORDER — MORPHINE SULFATE 2 MG/ML
2 INJECTION, SOLUTION INTRAMUSCULAR; INTRAVENOUS ONCE
Status: COMPLETED | OUTPATIENT
Start: 2024-04-18 | End: 2024-04-18

## 2024-04-18 RX ORDER — POLYETHYLENE GLYCOL 3350 17 G/17G
17 POWDER, FOR SOLUTION ORAL DAILY PRN
Status: DISCONTINUED | OUTPATIENT
Start: 2024-04-18 | End: 2024-04-18 | Stop reason: HOSPADM

## 2024-04-18 RX ORDER — METFORMIN HYDROCHLORIDE 1000 MG/1
1000 TABLET ORAL
Status: DISCONTINUED | OUTPATIENT
Start: 2024-04-18 | End: 2024-04-18 | Stop reason: HOSPADM

## 2024-04-18 RX ORDER — PANTOPRAZOLE SODIUM 40 MG/10ML
40 INJECTION, POWDER, LYOPHILIZED, FOR SOLUTION INTRAVENOUS DAILY
Status: DISCONTINUED | OUTPATIENT
Start: 2024-04-18 | End: 2024-04-18 | Stop reason: HOSPADM

## 2024-04-18 RX ORDER — DOCUSATE SODIUM 100 MG/1
100 CAPSULE, LIQUID FILLED ORAL 2 TIMES DAILY
Status: DISCONTINUED | OUTPATIENT
Start: 2024-04-18 | End: 2024-04-18 | Stop reason: HOSPADM

## 2024-04-18 RX ORDER — SUCRALFATE 1 G/10ML
1 SUSPENSION ORAL
Status: DISCONTINUED | OUTPATIENT
Start: 2024-04-18 | End: 2024-04-18 | Stop reason: HOSPADM

## 2024-04-18 RX ORDER — ACETAMINOPHEN 160 MG/5ML
650 SOLUTION ORAL EVERY 4 HOURS PRN
Status: DISCONTINUED | OUTPATIENT
Start: 2024-04-18 | End: 2024-04-18 | Stop reason: HOSPADM

## 2024-04-18 RX ORDER — ATORVASTATIN CALCIUM 80 MG/1
80 TABLET, FILM COATED ORAL NIGHTLY
Status: DISCONTINUED | OUTPATIENT
Start: 2024-04-18 | End: 2024-04-18 | Stop reason: HOSPADM

## 2024-04-18 RX ADMIN — SODIUM CHLORIDE 500 ML: 900 INJECTION, SOLUTION INTRAVENOUS at 00:02

## 2024-04-18 RX ADMIN — PANTOPRAZOLE SODIUM 40 MG: 40 INJECTION, POWDER, FOR SOLUTION INTRAVENOUS at 13:11

## 2024-04-18 RX ADMIN — FUROSEMIDE 40 MG: 40 TABLET ORAL at 17:58

## 2024-04-18 RX ADMIN — DIPHENHYDRAMINE HYDROCHLORIDE AND LIDOCAINE HYDROCHLORIDE AND ALUMINUM HYDROXIDE AND MAGNESIUM HYDRO 10 ML: KIT at 00:32

## 2024-04-18 RX ADMIN — SUCRALFATE 1 G: 1 SUSPENSION ORAL at 17:57

## 2024-04-18 RX ADMIN — KETOROLAC TROMETHAMINE 15 MG: 30 INJECTION, SOLUTION INTRAMUSCULAR at 00:03

## 2024-04-18 RX ADMIN — MORPHINE SULFATE 2 MG: 2 INJECTION, SOLUTION INTRAMUSCULAR; INTRAVENOUS at 02:28

## 2024-04-18 RX ADMIN — URSODIOL 300 MG: 300 CAPSULE ORAL at 17:58

## 2024-04-18 RX ADMIN — SPIRONOLACTONE 50 MG: 50 TABLET ORAL at 17:58

## 2024-04-18 RX ADMIN — IOHEXOL 75 ML: 350 INJECTION, SOLUTION INTRAVENOUS at 00:57

## 2024-04-18 RX ADMIN — LACTULOSE 20 G: 10 SOLUTION ORAL at 09:12

## 2024-04-18 RX ADMIN — FAMOTIDINE 20 MG: 10 INJECTION INTRAVENOUS at 00:03

## 2024-04-18 RX ADMIN — FENOFIBRATE 160 MG: 160 TABLET ORAL at 17:58

## 2024-04-18 RX ADMIN — ONDANSETRON 4 MG: 2 INJECTION INTRAMUSCULAR; INTRAVENOUS at 00:03

## 2024-04-18 RX ADMIN — METFORMIN HYDROCHLORIDE 1000 MG: 1000 TABLET ORAL at 17:58

## 2024-04-18 RX ADMIN — CEFTRIAXONE SODIUM 1 G: 1 INJECTION, SOLUTION INTRAVENOUS at 13:11

## 2024-04-18 RX ADMIN — LIDOCAINE 1 PATCH: 4 PATCH TOPICAL at 17:57

## 2024-04-18 SDOH — SOCIAL STABILITY: SOCIAL INSECURITY: WITHIN THE LAST YEAR, HAVE YOU BEEN AFRAID OF YOUR PARTNER OR EX-PARTNER?: NO

## 2024-04-18 SDOH — SOCIAL STABILITY: SOCIAL INSECURITY: ARE THERE ANY APPARENT SIGNS OF INJURIES/BEHAVIORS THAT COULD BE RELATED TO ABUSE/NEGLECT?: NO

## 2024-04-18 SDOH — ECONOMIC STABILITY: INCOME INSECURITY: IN THE PAST 12 MONTHS, HAS THE ELECTRIC, GAS, OIL, OR WATER COMPANY THREATENED TO SHUT OFF SERVICE IN YOUR HOME?: NO

## 2024-04-18 SDOH — SOCIAL STABILITY: SOCIAL INSECURITY: WITHIN THE LAST YEAR, HAVE YOU BEEN HUMILIATED OR EMOTIONALLY ABUSED IN OTHER WAYS BY YOUR PARTNER OR EX-PARTNER?: NO

## 2024-04-18 SDOH — ECONOMIC STABILITY: INCOME INSECURITY: HOW HARD IS IT FOR YOU TO PAY FOR THE VERY BASICS LIKE FOOD, HOUSING, MEDICAL CARE, AND HEATING?: NOT HARD AT ALL

## 2024-04-18 SDOH — ECONOMIC STABILITY: FOOD INSECURITY: WITHIN THE PAST 12 MONTHS, YOU WORRIED THAT YOUR FOOD WOULD RUN OUT BEFORE YOU GOT MONEY TO BUY MORE.: NEVER TRUE

## 2024-04-18 SDOH — SOCIAL STABILITY: SOCIAL NETWORK: HOW OFTEN DO YOU ATTEND CHURCH OR RELIGIOUS SERVICES?: MORE THAN 4 TIMES PER YEAR

## 2024-04-18 SDOH — HEALTH STABILITY: MENTAL HEALTH: HOW OFTEN DO YOU HAVE A DRINK CONTAINING ALCOHOL?: NEVER

## 2024-04-18 SDOH — SOCIAL STABILITY: SOCIAL NETWORK: ARE YOU MARRIED, WIDOWED, DIVORCED, SEPARATED, NEVER MARRIED, OR LIVING WITH A PARTNER?: MARRIED

## 2024-04-18 SDOH — SOCIAL STABILITY: SOCIAL NETWORK: IN A TYPICAL WEEK, HOW MANY TIMES DO YOU TALK ON THE PHONE WITH FAMILY, FRIENDS, OR NEIGHBORS?: NEVER

## 2024-04-18 SDOH — ECONOMIC STABILITY: INCOME INSECURITY: IN THE LAST 12 MONTHS, WAS THERE A TIME WHEN YOU WERE NOT ABLE TO PAY THE MORTGAGE OR RENT ON TIME?: NO

## 2024-04-18 SDOH — ECONOMIC STABILITY: FOOD INSECURITY: WITHIN THE PAST 12 MONTHS, THE FOOD YOU BOUGHT JUST DIDN'T LAST AND YOU DIDN'T HAVE MONEY TO GET MORE.: NEVER TRUE

## 2024-04-18 SDOH — SOCIAL STABILITY: SOCIAL NETWORK: HOW OFTEN DO YOU GET TOGETHER WITH FRIENDS OR RELATIVES?: NEVER

## 2024-04-18 SDOH — SOCIAL STABILITY: SOCIAL INSECURITY: HAS ANYONE EVER THREATENED TO HURT YOUR FAMILY OR YOUR PETS?: NO

## 2024-04-18 SDOH — SOCIAL STABILITY: SOCIAL INSECURITY: DOES ANYONE TRY TO KEEP YOU FROM HAVING/CONTACTING OTHER FRIENDS OR DOING THINGS OUTSIDE YOUR HOME?: NO

## 2024-04-18 SDOH — HEALTH STABILITY: MENTAL HEALTH
HOW OFTEN DO YOU NEED TO HAVE SOMEONE HELP YOU WHEN YOU READ INSTRUCTIONS, PAMPHLETS, OR OTHER WRITTEN MATERIAL FROM YOUR DOCTOR OR PHARMACY?: ALWAYS

## 2024-04-18 SDOH — SOCIAL STABILITY: SOCIAL NETWORK: HOW OFTEN DO YOU ATTENT MEETINGS OF THE CLUB OR ORGANIZATION YOU BELONG TO?: NEVER

## 2024-04-18 SDOH — HEALTH STABILITY: MENTAL HEALTH: HOW MANY STANDARD DRINKS CONTAINING ALCOHOL DO YOU HAVE ON A TYPICAL DAY?: PATIENT DOES NOT DRINK

## 2024-04-18 SDOH — SOCIAL STABILITY: SOCIAL INSECURITY: DO YOU FEEL ANYONE HAS EXPLOITED OR TAKEN ADVANTAGE OF YOU FINANCIALLY OR OF YOUR PERSONAL PROPERTY?: NO

## 2024-04-18 SDOH — HEALTH STABILITY: MENTAL HEALTH: HOW OFTEN DO YOU HAVE 6 OR MORE DRINKS ON ONE OCCASION?: NEVER

## 2024-04-18 SDOH — ECONOMIC STABILITY: HOUSING INSECURITY: IN THE LAST 12 MONTHS, HOW MANY PLACES HAVE YOU LIVED?: 1

## 2024-04-18 SDOH — HEALTH STABILITY: PHYSICAL HEALTH: ON AVERAGE, HOW MANY MINUTES DO YOU ENGAGE IN EXERCISE AT THIS LEVEL?: 0 MIN

## 2024-04-18 SDOH — SOCIAL STABILITY: SOCIAL INSECURITY: HAVE YOU HAD THOUGHTS OF HARMING ANYONE ELSE?: NO

## 2024-04-18 SDOH — SOCIAL STABILITY: SOCIAL INSECURITY: HAVE YOU HAD ANY THOUGHTS OF HARMING ANYONE ELSE?: NO

## 2024-04-18 SDOH — SOCIAL STABILITY: SOCIAL INSECURITY: DO YOU FEEL UNSAFE GOING BACK TO THE PLACE WHERE YOU ARE LIVING?: NO

## 2024-04-18 SDOH — SOCIAL STABILITY: SOCIAL INSECURITY: ARE YOU OR HAVE YOU BEEN THREATENED OR ABUSED PHYSICALLY, EMOTIONALLY, OR SEXUALLY BY ANYONE?: NO

## 2024-04-18 SDOH — HEALTH STABILITY: PHYSICAL HEALTH: ON AVERAGE, HOW MANY DAYS PER WEEK DO YOU ENGAGE IN MODERATE TO STRENUOUS EXERCISE (LIKE A BRISK WALK)?: 0 DAYS

## 2024-04-18 SDOH — SOCIAL STABILITY: SOCIAL INSECURITY: ABUSE: ADULT

## 2024-04-18 ASSESSMENT — ACTIVITIES OF DAILY LIVING (ADL)
HEARING - LEFT EAR: FUNCTIONAL
HEARING - LEFT EAR: FUNCTIONAL
PATIENT'S MEMORY ADEQUATE TO SAFELY COMPLETE DAILY ACTIVITIES?: YES
BATHING: INDEPENDENT
JUDGMENT_ADEQUATE_SAFELY_COMPLETE_DAILY_ACTIVITIES: YES
ADEQUATE_TO_COMPLETE_ADL: YES
PATIENT'S MEMORY ADEQUATE TO SAFELY COMPLETE DAILY ACTIVITIES?: YES
LACK_OF_TRANSPORTATION: NO
FEEDING YOURSELF: INDEPENDENT
TOILETING: INDEPENDENT
DRESSING YOURSELF: INDEPENDENT
HEARING - RIGHT EAR: FUNCTIONAL
ADEQUATE_TO_COMPLETE_ADL: YES
WALKS IN HOME: INDEPENDENT
JUDGMENT_ADEQUATE_SAFELY_COMPLETE_DAILY_ACTIVITIES: YES
WALKS IN HOME: INDEPENDENT
DRESSING YOURSELF: INDEPENDENT
ASSISTIVE_DEVICE: NONE;WALKER
LACK_OF_TRANSPORTATION: NO
BATHING: INDEPENDENT
GROOMING: INDEPENDENT
ASSISTIVE_DEVICE: NONE;WALKER
GROOMING: INDEPENDENT
FEEDING YOURSELF: INDEPENDENT
LACK_OF_TRANSPORTATION: NO
HEARING - RIGHT EAR: FUNCTIONAL

## 2024-04-18 ASSESSMENT — PAIN - FUNCTIONAL ASSESSMENT
PAIN_FUNCTIONAL_ASSESSMENT: 0-10
PAIN_FUNCTIONAL_ASSESSMENT: 0-10

## 2024-04-18 ASSESSMENT — COGNITIVE AND FUNCTIONAL STATUS - GENERAL
PERSONAL GROOMING: A LITTLE
CLIMB 3 TO 5 STEPS WITH RAILING: A LITTLE
PATIENT BASELINE BEDBOUND: NO
DAILY ACTIVITIY SCORE: 22
HELP NEEDED FOR BATHING: A LITTLE
WALKING IN HOSPITAL ROOM: A LITTLE
MOBILITY SCORE: 22

## 2024-04-18 ASSESSMENT — LIFESTYLE VARIABLES
AUDIT-C TOTAL SCORE: 0
AUDIT-C TOTAL SCORE: 0
HOW OFTEN DO YOU HAVE 6 OR MORE DRINKS ON ONE OCCASION: NEVER
HOW OFTEN DO YOU HAVE A DRINK CONTAINING ALCOHOL: NEVER
HOW MANY STANDARD DRINKS CONTAINING ALCOHOL DO YOU HAVE ON A TYPICAL DAY: PATIENT DOES NOT DRINK
SKIP TO QUESTIONS 9-10: 1
SKIP TO QUESTIONS 9-10: 1
AUDIT-C TOTAL SCORE: 0

## 2024-04-18 ASSESSMENT — PAIN SCALES - GENERAL
PAINLEVEL_OUTOF10: 0 - NO PAIN
PAINLEVEL_OUTOF10: 4
PAINLEVEL_OUTOF10: 0 - NO PAIN
PAINLEVEL_OUTOF10: 6
PAINLEVEL_OUTOF10: 7
PAINLEVEL_OUTOF10: 9

## 2024-04-18 ASSESSMENT — ENCOUNTER SYMPTOMS
NEUROLOGICAL NEGATIVE: 1
ENDOCRINE NEGATIVE: 1
RESPIRATORY NEGATIVE: 1
HEMATOLOGIC/LYMPHATIC NEGATIVE: 1
ALLERGIC/IMMUNOLOGIC NEGATIVE: 1
NAUSEA: 1
MUSCULOSKELETAL NEGATIVE: 1
APPETITE CHANGE: 1
EYES NEGATIVE: 1
ABDOMINAL PAIN: 1
PSYCHIATRIC NEGATIVE: 1
CARDIOVASCULAR NEGATIVE: 1

## 2024-04-18 ASSESSMENT — PAIN DESCRIPTION - LOCATION
LOCATION: ABDOMEN
LOCATION: ABDOMEN

## 2024-04-18 ASSESSMENT — PAIN DESCRIPTION - ORIENTATION: ORIENTATION: MID

## 2024-04-18 NOTE — PROGRESS NOTES
Occupational Therapy                 Therapy Communication Note    Patient Name: Alfonzo Flanagan  MRN: [id number]  Today's Date: 4/18/2024     Discipline: Occupational Therapy    Missed Visit Reason: Missed Visit Reason: Other (Comment) (patient currently being transported from ED to 4th floor at time of attempt.)    Missed Time: Attempt    Comment:

## 2024-04-18 NOTE — PROGRESS NOTES
04/18/24 0941   Current Planned Discharge Disposition   Current Planned Discharge Disposition Home

## 2024-04-18 NOTE — DISCHARGE SUMMARY
Discharge Diagnosis  Abdominal pain  Nausea  Hepatic cirrhosis with ascites status post paracentesis  Portal hypertension  Hyperammonemia improved  Chronic pancreatitis  Pyuria urinary tract infection  Hyperlipidemia  Hypertriglyceridemia  Type 2 diabetes mellitus  Moderate protein calorie malnutrition    Issues Requiring Follow-Up  As above    Discharge Meds     Your medication list        START taking these medications        Instructions Last Dose Given Next Dose Due   ciprofloxacin 250 mg tablet  Commonly known as: Cipro      Take 1 tablet (250 mg) by mouth 2 times a day for 3 days.       lactulose 20 gram/30 mL oral solution  Start taking on: April 19, 2024      Take 30 mL (20 g) by mouth once daily. Hold for greater than 3 bowel movements in 24 hours.              CONTINUE taking these medications        Instructions Last Dose Given Next Dose Due   atorvastatin 80 mg tablet  Commonly known as: Lipitor           docusate sodium 100 mg capsule  Commonly known as: Colace      Take 1 capsule (100 mg) by mouth 2 times a day. Hold for loose stool.       ergocalciferol 1.25 MG (87522 UT) capsule  Commonly known as: Vitamin D-2           fenofibrate 145 mg tablet  Commonly known as: Tricor           furosemide 40 mg tablet  Commonly known as: Lasix      Take 1 tablet (40 mg) by mouth once daily. Hold for dizziness.       insulin lispro 100 unit/mL injection  Commonly known as: HumaLOG           Lantus U-100 Insulin 100 unit/mL injection  Generic drug: insulin glargine           lidocaine 4 % patch      Place 1 patch over 12 hours on the skin once daily. Apply to site of pain. Remove & discard patch within 12 hours or as directed by MD. Do not start before February 16, 2024.       metFORMIN 1,000 mg tablet  Commonly known as: Glucophage           pantoprazole 40 mg EC tablet  Commonly known as: ProtoNix           polyethylene glycol 17 gram packet  Commonly known as: Glycolax, Miralax      Take 17 g by mouth 2 times a  day. Hold for loose stool.       spironolactone 50 mg tablet  Commonly known as: Aldactone      Take 1 tablet (50 mg) by mouth 2 times a day. Hold for dizziness.       sucralfate 100 mg/mL suspension  Commonly known as: Carafate      Take 10 mL (1 g) by mouth 4 times a day before meals.       traMADol 50 mg tablet  Commonly known as: Ultram           traZODone 50 mg tablet  Commonly known as: Desyrel           ursodiol 300 mg capsule  Commonly known as: Actigall                     Where to Get Your Medications        These medications were sent to [id number] - [facility] [address] [facility]  [id number] [place] [address]      Phone: [phone]   ciprofloxacin 250 mg tablet  lactulose 20 gram/30 mL oral solution         Test Results Pending At Discharge  Pending Labs       Order Current Status    Albumin, Fluid In process    Albumin, Fluid In process    Glucose, Fluid In process    Glucose, Fluid In process    Protein, Total Fluid In process    Protein, Total Fluid In process    Sterile Fluid Culture/Smear In process    Urine Culture In process            Hospital Course  This is a very pleasant 49-year-old female with past medical history significant for hepatic cirrhosis with ascites requiring paracentesis, portal hypertension, chronic pancreatitis presenting with abdominal pain and nausea.  In the emergency department, initial workup was done.  CT abdomen/pelvis per radiology mild abdominal and pelvic ascites, fluid-filled ascending colon, correlate for diarrhea, colitis.  Urinalysis showed glucose, ketones, 25 leukocytes, 1-20 white blood cells.  Culture was sent.  She was treated in the emergency department.  Interventional radiology was consulted for abdominal paracentesis.  She underwent an abdominal paracentesis yielding 1.8 L of clear yellow fluid.  The fluid was sent for analysis.  She was treated with IV Ceftriaxone for pyuria, possible urinary tract infection.  She was treated  lactulose for hyperammonemia.  She is asymptomatic.  Her condition improved.  She is stable for discharge home.  She is advised outpatient follow-up with her primary care provider and [facility].    Pertinent Physical Exam At Time of Discharge  See physical examination    Outpatient Follow-Up  No future appointments.    Follow-up with primary care provider in 1 week    Follow-up with [facility]ology in 1 week    CMP, ammonia level Monday.  Results to primary care provider    MADYSON Sotelo-CNP

## 2024-04-18 NOTE — DISCHARGE INSTRUCTIONS
If you have any questions, please contact Dr. Lawrence's office at [phone].    Call in 2 days for final urine culture results [phone].

## 2024-04-18 NOTE — PROGRESS NOTES
04/18/24 0938   Discharge Planning   Living Arrangements Spouse/significant other   Support Systems Spouse/significant other;Children   Type of Residence Private residence   Number of Stairs to Enter Residence 0  (no steps to enter the bldg but pt lives on the second floor)   Number of Stairs Within Residence 0   Do you have animals or pets at home? No   Who is requesting discharge planning? Provider   Home or Post Acute Services None   Patient expects to be discharged to: Home with family   Does the patient need discharge transport arranged? No   Financial Resource Strain   How hard is it for you to pay for the very basics like food, housing, medical care, and heating? Not hard   Housing Stability   In the last 12 months, was there a time when you were not able to pay the mortgage or rent on time? N   In the last 12 months, how many places have you lived? 1   In the last 12 months, was there a time when you did not have a steady place to sleep or slept in a shelter (including now)? N   Transportation Needs   In the past 12 months, has lack of transportation kept you from medical appointments or from getting medications? no   In the past 12 months, has lack of transportation kept you from meetings, work, or from getting things needed for daily living? No   Patient Choice   Patient / Family choosing to utilize agency / facility established prior to hospitalization No

## 2024-04-18 NOTE — CARE PLAN
Pt does not have a POA or Living Will  ADOD: 2 days  Pt lives at home with her  and dtr  She lives on the second floor of an apt bldg with no elevator. She has neuropathy from diabetes and finds it hard to climb the stairs; she said that she has requested a 1st floor apt; sometimes she uses a walker, she has had 3 recent falls.  Pt does not have insurance at this time; she said that she has signed up for a medicaid product and she should receive her card in the mail; pt request another Medicaid phone number so she can call to track her insurance/card; will give pt the number for [place] medicaid  She has a hx of depression and anxiety and is treated for both.  She does the grocery shopping with her  and dtr; her  and dtr do the cooking and cleaning and laundry. She wears glasses, no hearing aids.  She can speak English, but reads very little English. Her primary Language is Bel[place]ian   Pt is here for ABD pain  10:47 pt was given information in [place]ian to pt for ObamaCare, [place] Medicaid and [facility] on [facility] [place] and [facility] who can provide a PCP and they have a pharmacy    DISCHARGE PLAN: HOME WITH FAMILY

## 2024-04-18 NOTE — PROGRESS NOTES
04/18/24 0940   [facility] Disability Status   Are you deaf or do you have serious difficulty hearing? N   Are you blind or do you have serious difficulty seeing, even when wearing glasses? N   Because of a physical, mental, or emotional condition, do you have serious difficulty concentrating, remembering, or making decisions? (5 years old or older) N   Do you have serious difficulty walking or climbing stairs? Y  (Pt has neuropathy from diabetes)   Do you have serious difficulty dressing or bathing? N   Because of a physical, mental, or emotional condition, do you have serious difficulty doing errands alone such as visiting the doctor? N  (Pt does the shopping with her dtr and )

## 2024-04-18 NOTE — H&P
Chief Complaint Abdominal pain, nausea    History Of Present Illness  Alfonzo Flanagan is a very pleasant 49 y.o. female with a past medical history significant for hepatic cirrhosis with ascites, portal hypertension, chronic pancreatitis presenting with abdominal pain and nausea.  She was in her usual state of health until the date prior to admission.  Last month, she was admitted to [facility].  She underwent abdominal paracentesis.  She reports taking all medications as prescribed including 2 diuretics.  She denies any alcohol use.  She denies any blood thinners or NSAIDs.  She reports increased abdominal pain, distention and associated nausea.  She denies any vomiting.  She denies any diarrhea or constipation.  She presented to [facility] for evaluation.  In the emergency department, initial workup was done.  CMP showed a glucose of 284.  Sodium 138.  Potassium 4.0.  Bicarbonate 24.  Anion gap 10.  BUN 6.  Creatinine 0.4.  Calcium 8.5.  Albumin 2.7.  Alk phosphatase 419.  ALT 32.  AST 58.  Total bilirubin 1.3.  Ammonia 174.  Total protein 7.6.  Magnesium 1.8.  Lipase 51.  WBC showed a white blood cell count of 5.2.  Hemoglobin 9.7.  Hematocrit 30.9.  Platelet count 102.  Alcohol level less than 0.010.  Two-view chest x-ray per radiology showed no evidence of acute cardiopulmonary disease.  CT abdomen and pelvis, per radiology, showed liver cirrhosis and sequela of portal hypertension with recanalization of the periumbilical vein, mild abdominal and pelvic ascites, fluid-filled ascending colon, correlate for diarrhea, colitis.  She was treated in the emergency department and was admitted.  Interventional radiology was consulted for paracentesis.  Gastroenterology was consulted.    Past Medical History  History reviewed. No pertinent past medical history.    Surgical History  Past Surgical History:   Procedure Laterality Date    CT GUIDED IMAGING FOR NEEDLE PLACEMENT   10/14/2020    CT GUIDED IMAGING FOR NEEDLE PLACEMENT [facility]    US GUIDED ABDOMINAL PARACENTESIS  10/8/2020    US GUIDED ABDOMINAL PARACENTESIS [facility]        Social History  She reports that she has never smoked. She has never used smokeless tobacco. She reports that she does not drink alcohol and does not use drugs.    Family History  No family history on file.     Allergies  Gluten    Review of Systems   Constitutional:  Positive for appetite change.   HENT: Negative.     Eyes: Negative.    Respiratory: Negative.     Cardiovascular: Negative.    Gastrointestinal:  Positive for abdominal pain and nausea.   Endocrine: Negative.    Genitourinary: Negative.    Musculoskeletal: Negative.    Skin: Negative.    Allergic/Immunologic: Negative.    Neurological: Negative.    Hematological: Negative.    Psychiatric/Behavioral: Negative.     All other systems reviewed and are negative.      Physical Exam  Vitals and nursing note reviewed.   Constitutional:       General: She is not in acute distress.     Appearance: Normal appearance. She is normal weight. She is ill-appearing. She is not toxic-appearing or diaphoretic.   HENT:      Head: Normocephalic and atraumatic.      Right Ear: Tympanic membrane normal.      Left Ear: Tympanic membrane normal.      Nose: Nose normal.      Mouth/Throat:      Mouth: Mucous membranes are moist.      Pharynx: Oropharynx is clear.   Eyes:      Extraocular Movements: Extraocular movements intact.      Conjunctiva/sclera: Conjunctivae normal.      Pupils: Pupils are equal, round, and reactive to light.   Cardiovascular:      Rate and Rhythm: Normal rate and regular rhythm.      Pulses: Normal pulses.      Heart sounds: Normal heart sounds. No murmur heard.  Pulmonary:      Effort: Pulmonary effort is normal. No respiratory distress.      Breath sounds: Normal breath sounds. No wheezing, rhonchi or rales.      Comments: Room air.  Abdominal:      General: Bowel sounds  "are normal. There is distension.      Palpations: Abdomen is soft.      Tenderness: There is abdominal tenderness.   Genitourinary:     Comments: Deferred.  Musculoskeletal:         General: No swelling or tenderness. Normal range of motion.      Cervical back: Normal range of motion and neck supple.   Skin:     General: Skin is warm and dry.      Capillary Refill: Capillary refill takes less than 2 seconds.   Neurological:      General: No focal deficit present.      Mental Status: She is alert and oriented to person, place, and time.   Psychiatric:         Mood and Affect: Mood normal.         Behavior: Behavior normal.       Last Recorded Vitals  Blood pressure 100/65, pulse 80, temperature 36.6 °C (97.9 °F), temperature source Temporal, resp. rate 20, height 1.6 m (5' 3\"), weight 77.2 kg (170 lb 3.2 oz), SpO2 99%.    Relevant Results  Results for orders placed or performed during the hospital encounter of 04/17/24 (from the past 24 hour(s))   CBC and Auto Differential   Result Value Ref Range    WBC 5.2 4.4 - 11.3 x10*3/uL    nRBC 0.0 0.0 - 0.0 /100 WBCs    RBC 3.90 (L) 4.00 - 5.20 x10*6/uL    Hemoglobin 9.7 (L) 12.0 - 16.0 g/dL    Hematocrit 30.9 (L) 36.0 - 46.0 %    MCV 79 (L) 80 - 100 fL    MCH 24.9 (L) 26.0 - 34.0 pg    MCHC 31.4 (L) 32.0 - 36.0 g/dL    RDW 18.8 (H) 11.5 - 14.5 %    Platelets 102 (L) 150 - 450 x10*3/uL    Neutrophils % 54.0 40.0 - 80.0 %    Immature Granulocytes %, Automated 0.2 0.0 - 0.9 %    Lymphocytes % 29.4 13.0 - 44.0 %    Monocytes % 10.8 2.0 - 10.0 %    Eosinophils % 5.2 0.0 - 6.0 %    Basophils % 0.4 0.0 - 2.0 %    Neutrophils Absolute 2.79 1.20 - 7.70 x10*3/uL    Immature Granulocytes Absolute, Automated 0.01 0.00 - 0.70 x10*3/uL    Lymphocytes Absolute 1.52 1.20 - 4.80 x10*3/uL    Monocytes Absolute 0.56 0.10 - 1.00 x10*3/uL    Eosinophils Absolute 0.27 0.00 - 0.70 x10*3/uL    Basophils Absolute 0.02 0.00 - 0.10 x10*3/uL   Comprehensive Metabolic Panel   Result Value Ref Range    " Glucose 284 (H) 65 - 99 mg/dL    Sodium 138 133 - 145 mmol/L    Potassium 4.0 3.4 - 5.1 mmol/L    Chloride 104 97 - 107 mmol/L    Bicarbonate 24 24 - 31 mmol/L    Urea Nitrogen 6 (L) 8 - 25 mg/dL    Creatinine 0.40 0.40 - 1.60 mg/dL    eGFR >90 >60 mL/min/1.73m*2    Calcium 8.5 8.5 - 10.4 mg/dL    Albumin 2.7 (L) 3.5 - 5.0 g/dL    Alkaline Phosphatase 419 (H) 35 - 125 U/L    Total Protein 7.6 5.9 - 7.9 g/dL    AST 58 (H) 5 - 40 U/L    Bilirubin, Total 1.3 (H) 0.1 - 1.2 mg/dL    ALT 32 5 - 40 U/L    Anion Gap 10 <=19 mmol/L   Magnesium   Result Value Ref Range    Magnesium 1.80 1.60 - 3.10 mg/dL   Lipase   Result Value Ref Range    Lipase 51 16 - 63 U/L   Ammonia   Result Value Ref Range    Ammonia 174 (H) 12 - 45 umol/L   Protime-INR   Result Value Ref Range    Protime 12.8 (H) 9.3 - 12.7 seconds    INR 1.2 0.9 - 1.2   APTT   Result Value Ref Range    aPTT 25.2 22.0 - 32.5 seconds   Acute Toxicology Panel, Blood   Result Value Ref Range    Acetaminophen <5.0 15.0 - 30.0 ug/mL    Salicylate  <0 3 - 25 mg/dL    Alcohol <0.010 0.000 - 0.010 g/dL   Serial Troponin, Initial ([facility])   Result Value Ref Range    Troponin T, High Sensitivity <6 <=14 ng/L   Morphology   Result Value Ref Range    RBC Morphology See Below     Giant Platelets Few    hCG, Urine, Qualitative   Result Value Ref Range    HCG, Urine NEGATIVE NEGATIVE   Urinalysis with Reflex Culture and Microscopic   Result Value Ref Range    Color, Urine Yellow Light-Yellow, Yellow, Dark-Yellow    Appearance, Urine Clear Clear    Specific Gravity, Urine 1.043 (N) 1.005 - 1.035    pH, Urine 5.5 5.0, 5.5, 6.0, 6.5, 7.0, 7.5, 8.0    Protein, Urine 10 (TRACE) NEGATIVE, 10 (TRACE), 20 (TRACE) mg/dL    Glucose, Urine OVER (4+) (A) Normal mg/dL    Blood, Urine NEGATIVE NEGATIVE    Ketones, Urine TRACE (A) NEGATIVE mg/dL    Bilirubin, Urine NEGATIVE NEGATIVE    Urobilinogen, Urine Normal Normal mg/dL    Nitrite, Urine NEGATIVE NEGATIVE    Leukocyte Esterase, Urine 25 Mckenzie/µL  (A) NEGATIVE   Extra Urine Gray Tube   Result Value Ref Range    Extra Tube Hold for add-ons.    Microscopic Only, Urine   Result Value Ref Range    WBC, Urine 11-20 (A) 1-5, NONE /HPF    RBC, Urine NONE NONE, 1-2, 3-5 /HPF    Squamous Epithelial Cells, Urine 1-9 (SPARSE) Reference range not established. /HPF    Mucus, Urine 2+ Reference range not established. /LPF   Serial Troponin, 2 Hour ([facility])   Result Value Ref Range    Troponin T, High Sensitivity <6 <=14 ng/L   ECG 12 lead   Result Value Ref Range    Ventricular Rate 99 BPM    Atrial Rate 99 BPM    IA Interval 180 ms    QRS Duration 74 ms    QT Interval 368 ms    QTC Calculation(Bazett) 472 ms    P Axis 47 degrees    R Axis 10 degrees    T Axis 5 degrees    QRS Count 16 beats    Q Onset 218 ms    P Onset 128 ms    P Offset 179 ms    T Offset 402 ms    QTC Fredericia 434 ms   Serial Troponin, 6 Hour ([facility])   Result Value Ref Range    Troponin T, High Sensitivity <6 <=14 ng/L   Comprehensive Metabolic Panel   Result Value Ref Range    Glucose 124 (H) 65 - 99 mg/dL    Sodium 141 133 - 145 mmol/L    Potassium 4.1 3.4 - 5.1 mmol/L    Chloride 110 (H) 97 - 107 mmol/L    Bicarbonate 22 (L) 24 - 31 mmol/L    Urea Nitrogen 9 8 - 25 mg/dL    Creatinine 0.50 0.40 - 1.60 mg/dL    eGFR >90 >60 mL/min/1.73m*2    Calcium 7.8 (L) 8.5 - 10.4 mg/dL    Albumin 2.4 (L) 3.5 - 5.0 g/dL    Alkaline Phosphatase 359 (H) 35 - 125 U/L    Total Protein 6.5 5.9 - 7.9 g/dL    AST 46 (H) 5 - 40 U/L    Bilirubin, Total 1.3 (H) 0.1 - 1.2 mg/dL    ALT 29 5 - 40 U/L    Anion Gap 9 <=19 mmol/L   Ammonia   Result Value Ref Range    Ammonia 103 (H) 12 - 45 umol/L   CBC   Result Value Ref Range    WBC 4.8 4.4 - 11.3 x10*3/uL    nRBC 0.0 0.0 - 0.0 /100 WBCs    RBC 3.70 (L) 4.00 - 5.20 x10*6/uL    Hemoglobin 9.1 (L) 12.0 - 16.0 g/dL    Hematocrit 27.5 (L) 36.0 - 46.0 %    MCV 74 (L) 80 - 100 fL    MCH 24.6 (L) 26.0 - 34.0 pg    MCHC 33.1 32.0 - 36.0 g/dL    RDW 18.1 (H) 11.5 - 14.5 %    Platelets 77  (L) 150 - 450 x10*3/uL   POCT GLUCOSE   Result Value Ref Range    POCT Glucose 91 74 - 99 mg/dL     No results found for the last 90 days.    US guided abdominal paracentesis    Result Date: 4/18/2024  Interpreted By:  Cornelius Weber, STUDY: US GUIDED ABDOMINAL PARACENTESIS; 4/18/2024 3:27 pm   INDICATION: Ascites.   COMPARISON: None.   ACCESSION NUMBER(S): [id number]   ORDERING CLINICIAN: SADE LARRY   TECHNIQUE: Ultrasound-guided paracentesis   FINDINGS: Informed consent obtained. Patient positioned supine. Right lower quadrant prepped, draped and anesthetized. Under ultrasound guidance, 8 Wolof centesis sheath needle inserted into peritoneal cavity. 1.8 Lof clear yellowfluid aspiratedwith sample sent for analysis. Patient tolerated procedure well       Ultrasound-guided paracentesis.   Signed by: Cornelius Weber 4/18/2024 3:38 PM Dictation workstation:   [id number]    ECG 12 lead    Result Date: 4/18/2024  Normal sinus rhythm Cannot rule out Anterior infarct , age undetermined Abnormal ECG No previous ECGs available    CT abdomen pelvis w IV contrast    Result Date: 4/18/2024  Interpreted By:  Fredy Hernandez, STUDY: CT ABDOMEN PELVIS W IV CONTRAST;  4/18/2024 1:04 am   INDICATION: Signs/Symptoms:CP.   COMPARISON: 2/7/2024   ACCESSION NUMBER(S): [id number]   ORDERING CLINICIAN: MOIZ ORELLANA   TECHNIQUE: Contiguous axial images of the abdomen and pelvis were obtained after the intravenous administration of  contrast. Coronal and sagittal reformatted images were obtained from the axial images.   FINDINGS: There is limited evaluation of the lung bases. No basilar airspace disease. No pleural effusion.   There is diminutive size of the liver and nodular contour compatible with cirrhosis. Postsurgical change of cholecystectomy. Sequela of portal hypertension with recanalization of the paraumbilical vein.   The pancreas, spleen, and adrenal glands appear unremarkable.   Stable enhancement of the  kidneys. No hydronephrosis.   There is mild abdominal and pelvic ascites. There is midline abdominal wall hernia with mild herniation of ascites.   Fluid-filled ascending colon. No evidence of bowel obstruction. Mild wall thickening of small bowel loops may be reactive to the ascites or secondary to hypoproteinemia.   Urinary bladder is underdistended and not well evaluated.   Limited evaluation of the uterus and adnexa.   No acute fracture of the lumbar spine.       Liver cirrhosis and sequela of portal hypertension with recanalization of the paraumbilical vein. Mild abdominal and pelvic ascites.   Fluid-filled ascending colon; please correlate for diarrhea/colitis.   MACRO: None   Signed by: Fredy Hernandez 4/18/2024 1:35 AM Dictation workstation:   [id number]    XR chest 2 views    Result Date: 4/18/2024  Interpreted By:  Agus Mosqueda, STUDY: XR CHEST 2 VIEWS;  4/18/2024 12:03 am   INDICATION: Signs/Symptoms:Chest Pain.   COMPARISON: None.   ACCESSION NUMBER(S): [id number]   ORDERING CLINICIAN: MOIZ ORELLANA   FINDINGS:         CARDIOMEDIASTINAL SILHOUETTE: Cardiomediastinal silhouette is normal in size and configuration.   LUNGS: Lungs are clear.   ABDOMEN: No remarkable upper abdominal findings.   BONES: No acute osseous changes.       1.  No evidence of acute cardiopulmonary process.       MACRO: None   Signed by: Agus Mosqueda 4/18/2024 12:20 AM Dictation workstation:   [id number]     Scheduled medications  atorvastatin, 80 mg, oral, Nightly  cefTRIAXone, 1 g, intravenous, q24h  docusate sodium, 100 mg, oral, BID  [START ON 4/21/2024] ergocalciferol, 50,000 Units, oral, Every Sunday  fenofibrate, 160 mg, oral, Daily  furosemide, 40 mg, oral, Daily  insulin glargine, 25 Units, subcutaneous, BID  insulin lispro, 4 Units, subcutaneous, TID with meals  lactulose, 20 g, oral, Daily  lidocaine, 1 patch, transdermal, Daily  metFORMIN, 1,000 mg, oral, BID with meals  pantoprazole, 40 mg, oral,  Daily  pantoprazole, 40 mg, intravenous, Daily  polyethylene glycol, 17 g, oral, BID  spironolactone, 50 mg, oral, BID  sucralfate, 1 g, oral, Before meals & nightly  traZODone, 25 mg, oral, Nightly  ursodiol, 300 mg, oral, TID      Continuous medications     PRN medications  PRN medications: acetaminophen **OR** acetaminophen **OR** acetaminophen, benzocaine-menthol, dextromethorphan-guaifenesin, guaiFENesin, polyethylene glycol, traMADol      ASSESSMENT:  Abdominal pain  Nausea  Hepatic cirrhosis with ascites  Portal hypertension  Hyperammonemia  Chronic pancreatitis  Pyuria rule out urinary tract infection  Hyperlipidemia  Hypertriglyceridemia  Type 2 diabetes mellitus  Moderate protein calorie malnutrition    PLAN:  IR consultation for abdominal paracentesis.  Symptom control.  As needed analgesics, antiemetics.  Continue diuretics.  Low-sodium diet.  Elevated ammonia level noted.  Asymptomatic.  Lactulose.  Hold for > 3 bowel movements in 24 hours.  Urinalysis noted.  IV Ceftriaxone.  Follow-up urine culture results.  Afebrile.  Nontoxic-appearing.  Follow-up labs.  Continue home medications as prescribed as appropriate.  Point-of-care glucose reviewed.  Monitor point-of-care glucose ACHS.  Continue home insulin.  Glycemic control.  Diabetes education.  Outpatient follow-up with PCP for blood glucose monitoring and management.  Discussed CODE STATUS.  She wishes to be a full code.  Supportive.  Patient reassured.  Case management consultation for discharge planning.  We will take DVT, fall, aspiration and decubitus precautions during her stay in the hospital.  The plan has been discussed with the patient.  She is agreeable.  Orders written per Dr. Lawrence.  Any additions or modifications at his discretion.    ADDENDUM:  Status post paracentesis yielding 1.8 L of clear yellow fluid aspirated, sample sent for analysis.  Discussed with Dr. Lawrence.  Symptoms improved.  Okay to discharge home today.  Discharge on  p.o. Ciprofloxacin.  Follow-up in 2 days for final urine culture results.  Follow-up with PCP in 1 week.  Follow-up with [facility].      Leny Daniels, MADYSON-CNP

## 2024-04-18 NOTE — ED PROVIDER NOTES
HPI   Chief Complaint   Patient presents with    Abdominal Pain     Patient with hx of liver disease and having worsening abdominal pain, had a paracentesis last week with 2L drained.        49-year-old female with a history of nonalcoholic fatty liver with cirrhosis, diabetes, hypertension, obesity, pancreatitis, remote cholecystectomy,  comes to the emergency department with a chief complaint of abdominal pain.  The patient states that she has been compliant with her medications including the lactulose and a couple of weeks ago required a 2 L drainage at [facility] for tense ascites.  She states she has not had any fever or mental status changes.  She states that she is having 6-8 bowel movements daily due to compliance with lactulose.  No sick contacts.  She states the pain is worst in the area of her pancreas and that she has had pancreatitis previously.  She also notes an acute weight gain and significant abdominal distention.  She notes early satiety and that the chest pain radiates from the upper abdomen.  She denies any unilateral leg swelling, history of chemotherapy, shortness of breath, cough, history of PE or DVT.       used: No (I am a native Japanese speaker and the history and exam were done in Japanese)                        Niya Coma Scale Score: 15                     Patient History   No past medical history on file.  Past Surgical History:   Procedure Laterality Date    CT GUIDED IMAGING FOR NEEDLE PLACEMENT  10/14/2020    CT GUIDED IMAGING FOR NEEDLE PLACEMENT [facility]    US GUIDED ABDOMINAL PARACENTESIS  10/8/2020    US GUIDED ABDOMINAL PARACENTESIS [facility]     No family history on file.  Social History     Tobacco Use    Smoking status: Never    Smokeless tobacco: Never   Substance Use Topics    Alcohol use: Never    Drug use: Never       Physical Exam   ED Triage Vitals [24 2300]   Temperature Heart Rate Respirations BP   36.6  °C (97.9 °F) (!) 106 16 120/80      Pulse Ox Temp Source Heart Rate Source Patient Position   98 % Temporal Monitor Sitting      BP Location FiO2 (%)     Left arm --       Physical Exam  Exam conducted with a chaperone present.   Constitutional:       General: She is not in acute distress.     Appearance: She is obese. She is ill-appearing. She is not toxic-appearing or diaphoretic.   Eyes:      General: Scleral icterus present.      Extraocular Movements: Extraocular movements intact.      Pupils: Pupils are equal, round, and reactive to light.   Cardiovascular:      Rate and Rhythm: Tachycardia present.   Pulmonary:      Effort: Pulmonary effort is normal.      Breath sounds: No wheezing.   Chest:      Chest wall: No tenderness.   Abdominal:      General: Abdomen is protuberant. There is distension.      Palpations: Abdomen is soft. There is shifting dullness and hepatomegaly.      Tenderness: There is abdominal tenderness in the right upper quadrant, epigastric area and left upper quadrant. There is guarding. There is no right CVA tenderness, left CVA tenderness or rebound.      Hernia: A hernia is present. Hernia is present in the umbilical area.   Skin:     General: Skin is dry.      Coloration: Skin is pale.   Neurological:      General: No focal deficit present.      Mental Status: She is alert and oriented to person, place, and time.   Psychiatric:         Mood and Affect: Mood normal.         ED Course & MDM   ED Course as of 04/18/24 1004   Thu Apr 18, 2024   0205 CT abdomen pelvis w IV contrast  IMPRESSION:  Liver cirrhosis and sequela of portal hypertension with  recanalization of the paraumbilical vein. Mild abdominal and pelvic  ascites.      Fluid-filled ascending colon; please correlate for diarrhea/colitis.   [EG]   0208 Negative urine pregnancy test, [EG]   0209 Comprehensive Metabolic Panel(!)  CMP remarkable for hyperglycemia without elevated anion gap acidosis or evidence of HHS.  Chronic  hepatitis [EG]   0209 CBC and Auto Differential(!)  Chronic anemia and thrombocytopenia at baseline.  No leukocytosis or leukopenia [EG]   0210 Acute Toxicology Panel, Blood  Negative for Tylenol and alcohol [EG]   0210 Protime-INR(!)  Minimally elevated pro time likely secondary to chronic hepatitis [EG]   0210 Lipase  Not consistent with pancreatitis [EG]   0249 Urinalysis with Reflex Culture and Microscopic(!)  Evidence of minimal leukocyte Estrace and significant glycosuria, but nitrite negative and not complaining of a urinary tract infection symptoms.  Most likely contaminant and culture is pending [EG]   0250 ECG 12 lead  ECG performed at 2:30 AM on April 18, 2024 and interpreted by me at 2:35 AM showing a normal sinus rhythm, indeterminate axis, otherwise intervals within normal limits, no STEMI.  Nonspecific ST-T wave abnormality.  No previous for comparison. [EG]      ED Course User Index  [EG] Melida Guthrie MD         Diagnoses as of 04/18/24 1004   Other ascites   Nausea   Pain of upper abdomen   Hyperammonemia (Multi)   Cirrhosis of liver with ascites, unspecified hepatic cirrhosis type (Multi)   Shortness of breath       Medical Decision Making    HPI:  As Above  PMHx/PSHx/Meds/Allergies/SH/FH as per nursing documentation and reviewed.  Review of systems: Total of 10 systems reviewed and otherwise negative except as noted elsewhere    DDX: As described in MDM    If performed, radiology listed above interpreted by me and confirmed by the Radiologist.  Medications administered during this visit (name and route): see MAR  Social determinants of health considered for this visit: lives at home  If performed, EKG interpreted by me and detailed above    MDM Summary/considerations:  49-year-old female presenting with recurrence of ascites.  This is causing her shortness of breath and would benefit from repeat paracentesis for palliation of symptoms.  Her labs were reviewed and she has an elevated  ammonia despite being compliant with lactulose.  The patient's primary care doctor admits to Dr. Lawrence    The patient was seen and triaged by our nursing/medic staff, their vitals were taken and the staff notes were reviewed.  If the patient arrived by an EMS squad or an outside agency, we discussed the case with transporting EMS medic, police, or other historians. My initial assessment was attention to their airway, breathing, and circulatory status.  We addressed any immediate or life threatening findings and completed a medical history and a physical exam if the patient or those legally responsible were in agreement with this.     **Disclaimer:  This note was dictated by speech recognition technology.  Minor errors in transcription may be present.  Please contact for clarification or corrections.        Amount and/or Complexity of Data Reviewed  External Data Reviewed: labs and notes.  Labs: ordered. Decision-making details documented in ED Course.  Radiology: ordered and independent interpretation performed. Decision-making details documented in ED Course.  ECG/medicine tests: ordered and independent interpretation performed. Decision-making details documented in ED Course.        Procedure  Procedures     Melida Guthrie MD  04/18/24 6910

## 2024-04-18 NOTE — CARE PLAN
The patient's goals for the shift include pain will decrease    The clinical goals for the shift include patient will have a decreasepain formy shift

## 2024-04-18 NOTE — PROGRESS NOTES
04/18/24 0935   Physical Activity   On average, how many days per week do you engage in moderate to strenuous exercise (like a brisk walk)? 0 days   On average, how many minutes do you engage in exercise at this level? 0 min   Financial Resource Strain   How hard is it for you to pay for the very basics like food, housing, medical care, and heating? Not hard   Housing Stability   In the last 12 months, was there a time when you were not able to pay the mortgage or rent on time? N   In the last 12 months, how many places have you lived? 1   In the last 12 months, was there a time when you did not have a steady place to sleep or slept in a shelter (including now)? N   Transportation Needs   In the past 12 months, has lack of transportation kept you from medical appointments or from getting medications? no   In the past 12 months, has lack of transportation kept you from meetings, work, or from getting things needed for daily living? No   Food Insecurity   Within the past 12 months, you worried that your food would run out before you got the money to buy more. Never true   Within the past 12 months, the food you bought just didn't last and you didn't have money to get more. Never true   Stress   Do you feel stress - tense, restless, nervous, or anxious, or unable to sleep at night because your mind is troubled all the time - these days? Not at all   Social Connections   In a typical week, how many times do you talk on the phone with family, friends, or neighbors? Never  (Pt lives with her  and dtr)   How often do you get together with friends or relatives? Never  (Pts closest family are her  and dtr)   How often do you attend Mu-ism or Catholic services? More than 4   Do you belong to any clubs or organizations such as Mu-ism groups, unions, fraternal or athletic groups, or school groups? No   How often do you attend meetings of the clubs or organizations you belong to? Never   Are you ,  , , , never , or living with a partner?    Intimate Partner Violence   Within the last year, have you been afraid of your partner or ex-partner? No   Within the last year, have you been humiliated or emotionally abused in other ways by your partner or ex-partner? No   Within the last year, have you been kicked, hit, slapped, or otherwise physically hurt by your partner or ex-partner? No   Within the last year, have you been raped or forced to have any kind of sexual activity by your partner or ex-partner? No   Alcohol Use   Q1: How often do you have a drink containing alcohol? Never   Q2: How many drinks containing alcohol do you have on a typical day when you are drinking? None   Q3: How often do you have six or more drinks on one occasion? Never   Utilities   In the past 12 months has the electric, gas, oil, or water company threatened to shut off services in your home? No   Health Literacy   How often do you need to have someone help you when you read instructions, pamphlets, or other written material from your doctor or pharmacy? Always  (Pt is literate in Ecuadorean, no Englist)

## 2024-04-19 ENCOUNTER — PATIENT OUTREACH (OUTPATIENT)
Dept: CARE COORDINATION | Facility: CLINIC | Age: 49
End: 2024-04-19

## 2024-04-19 LAB
ALBUMIN FLD-MCNC: <0.5 G/DL
BACTERIA UR CULT: NORMAL
GLUCOSE FLD-MCNC: 127 MG/DL
PROT FLD-MCNC: 0.8 G/DL

## 2024-04-19 NOTE — PROGRESS NOTES
"Discharge facility: [facility]  Discharge diagnosis:   Abdominal pain  Nausea  Hepatic cirrhosis with ascites status post paracentesis  Portal hypertension  Hyperammonemia improved  Chronic pancreatitis  Pyuria urinary tract infection  Hyperlipidemia  Hypertriglyceridemia  Type 2 diabetes mellitus  Moderate protein calorie malnutrition  Admission date: 4/18/24  Discharge date: 4/18/24  PCP Appointment Date: 5/3/24  A1C 11.3 on 2/9/24    Outreach call to patient to support a smooth transition of care from recent admission.  Spoke with patient, Ms. Flanagan informed me that \"she is not interested in CLAUDIA or LTCM service at this time due to she already has a CM w/ [facility] and she will continue to work with one person, she works closely with a CM at [facility] in [place] and they are working on all of her healthcare needs, she also has a DM Educator to help her lower her A1C, she will continue to work with them.\"    Thanked patient for her time to speak w/ me today.    Brittany Cid RN    "

## 2024-04-22 LAB
BACTERIA FLD CULT: NORMAL
GRAM STN SPEC: NORMAL
GRAM STN SPEC: NORMAL

## 2024-05-15 ENCOUNTER — HOSPITAL ENCOUNTER (EMERGENCY)
Facility: HOSPITAL | Age: 49
Discharge: HOME | End: 2024-05-15
Attending: EMERGENCY MEDICINE
Payer: COMMERCIAL

## 2024-05-15 ENCOUNTER — APPOINTMENT (OUTPATIENT)
Dept: CARDIOLOGY | Facility: HOSPITAL | Age: 49
End: 2024-05-15
Payer: COMMERCIAL

## 2024-05-15 ENCOUNTER — APPOINTMENT (OUTPATIENT)
Dept: RADIOLOGY | Facility: HOSPITAL | Age: 49
End: 2024-05-15
Payer: COMMERCIAL

## 2024-05-15 VITALS
HEIGHT: 63 IN | TEMPERATURE: 98.4 F | SYSTOLIC BLOOD PRESSURE: 126 MMHG | BODY MASS INDEX: 31.36 KG/M2 | DIASTOLIC BLOOD PRESSURE: 65 MMHG | RESPIRATION RATE: 15 BRPM | HEART RATE: 80 BPM | OXYGEN SATURATION: 100 % | WEIGHT: 177 LBS

## 2024-05-15 DIAGNOSIS — R19.7 DIARRHEA, UNSPECIFIED TYPE: ICD-10-CM

## 2024-05-15 DIAGNOSIS — R11.2 NAUSEA AND VOMITING, UNSPECIFIED VOMITING TYPE: ICD-10-CM

## 2024-05-15 DIAGNOSIS — N30.90 CYSTITIS: ICD-10-CM

## 2024-05-15 DIAGNOSIS — K74.60 CIRRHOSIS OF LIVER WITH ASCITES, UNSPECIFIED HEPATIC CIRRHOSIS TYPE (MULTI): Primary | ICD-10-CM

## 2024-05-15 DIAGNOSIS — R18.8 CIRRHOSIS OF LIVER WITH ASCITES, UNSPECIFIED HEPATIC CIRRHOSIS TYPE (MULTI): Primary | ICD-10-CM

## 2024-05-15 LAB
ALBUMIN SERPL-MCNC: 2.9 G/DL (ref 3.5–5)
ALP BLD-CCNC: 541 U/L (ref 35–125)
ALT SERPL-CCNC: 35 U/L (ref 5–40)
ANION GAP SERPL CALC-SCNC: 7 MMOL/L
APPEARANCE UR: CLEAR
AST SERPL-CCNC: 64 U/L (ref 5–40)
BACTERIA #/AREA URNS AUTO: ABNORMAL /HPF
BASOPHILS # BLD AUTO: 0.02 X10*3/UL (ref 0–0.1)
BASOPHILS NFR BLD AUTO: 0.7 %
BILIRUB SERPL-MCNC: 1.4 MG/DL (ref 0.1–1.2)
BILIRUB UR STRIP.AUTO-MCNC: NEGATIVE MG/DL
BUN SERPL-MCNC: 9 MG/DL (ref 8–25)
CALCIUM SERPL-MCNC: 8 MG/DL (ref 8.5–10.4)
CHLORIDE SERPL-SCNC: 107 MMOL/L (ref 97–107)
CO2 SERPL-SCNC: 26 MMOL/L (ref 24–31)
COLOR UR: ABNORMAL
CREAT SERPL-MCNC: 0.4 MG/DL (ref 0.4–1.6)
EGFRCR SERPLBLD CKD-EPI 2021: >90 ML/MIN/1.73M*2
EOSINOPHIL # BLD AUTO: 0.15 X10*3/UL (ref 0–0.7)
EOSINOPHIL NFR BLD AUTO: 5 %
ERYTHROCYTE [DISTWIDTH] IN BLOOD BY AUTOMATED COUNT: 20.3 % (ref 11.5–14.5)
GLUCOSE SERPL-MCNC: 298 MG/DL (ref 65–99)
GLUCOSE UR STRIP.AUTO-MCNC: ABNORMAL MG/DL
HCG UR QL IA.RAPID: NEGATIVE
HCT VFR BLD AUTO: 30.9 % (ref 36–46)
HGB BLD-MCNC: 9.6 G/DL (ref 12–16)
HOLD SPECIMEN: NORMAL
HYPOCHROMIA BLD QL SMEAR: NORMAL
IMM GRANULOCYTES # BLD AUTO: 0.01 X10*3/UL (ref 0–0.7)
IMM GRANULOCYTES NFR BLD AUTO: 0.3 % (ref 0–0.9)
KETONES UR STRIP.AUTO-MCNC: NEGATIVE MG/DL
LEUKOCYTE ESTERASE UR QL STRIP.AUTO: ABNORMAL
LIPASE SERPL-CCNC: 43 U/L (ref 16–63)
LYMPHOCYTES # BLD AUTO: 0.97 X10*3/UL (ref 1.2–4.8)
LYMPHOCYTES NFR BLD AUTO: 32.6 %
MCH RBC QN AUTO: 24.5 PG (ref 26–34)
MCHC RBC AUTO-ENTMCNC: 31.1 G/DL (ref 32–36)
MCV RBC AUTO: 79 FL (ref 80–100)
MONOCYTES # BLD AUTO: 0.42 X10*3/UL (ref 0.1–1)
MONOCYTES NFR BLD AUTO: 14.1 %
MUCOUS THREADS #/AREA URNS AUTO: ABNORMAL /LPF
NEUTROPHILS # BLD AUTO: 1.41 X10*3/UL (ref 1.2–7.7)
NEUTROPHILS NFR BLD AUTO: 47.3 %
NITRITE UR QL STRIP.AUTO: NEGATIVE
NRBC BLD-RTO: 0 /100 WBCS (ref 0–0)
NT-PROBNP SERPL-MCNC: 39 PG/ML (ref 0–192)
OVALOCYTES BLD QL SMEAR: NORMAL
PH UR STRIP.AUTO: 6 [PH]
PLATELET # BLD AUTO: 72 X10*3/UL (ref 150–450)
POTASSIUM SERPL-SCNC: 4 MMOL/L (ref 3.4–5.1)
PROT SERPL-MCNC: 7.2 G/DL (ref 5.9–7.9)
PROT UR STRIP.AUTO-MCNC: NEGATIVE MG/DL
RBC # BLD AUTO: 3.92 X10*6/UL (ref 4–5.2)
RBC # UR STRIP.AUTO: NEGATIVE /UL
RBC #/AREA URNS AUTO: ABNORMAL /HPF
RBC MORPH BLD: NORMAL
SODIUM SERPL-SCNC: 140 MMOL/L (ref 133–145)
SP GR UR STRIP.AUTO: 1.02
SQUAMOUS #/AREA URNS AUTO: ABNORMAL /HPF
TARGETS BLD QL SMEAR: NORMAL
UROBILINOGEN UR STRIP.AUTO-MCNC: NORMAL MG/DL
WBC # BLD AUTO: 3 X10*3/UL (ref 4.4–11.3)
WBC #/AREA URNS AUTO: ABNORMAL /HPF

## 2024-05-15 PROCEDURE — 85025 COMPLETE CBC W/AUTO DIFF WBC: CPT | Performed by: EMERGENCY MEDICINE

## 2024-05-15 PROCEDURE — 80053 COMPREHEN METABOLIC PANEL: CPT | Performed by: EMERGENCY MEDICINE

## 2024-05-15 PROCEDURE — 83880 ASSAY OF NATRIURETIC PEPTIDE: CPT | Performed by: EMERGENCY MEDICINE

## 2024-05-15 PROCEDURE — 87186 SC STD MICRODIL/AGAR DIL: CPT | Mod: WESLAB | Performed by: EMERGENCY MEDICINE

## 2024-05-15 PROCEDURE — 36415 COLL VENOUS BLD VENIPUNCTURE: CPT | Performed by: EMERGENCY MEDICINE

## 2024-05-15 PROCEDURE — 81025 URINE PREGNANCY TEST: CPT | Performed by: EMERGENCY MEDICINE

## 2024-05-15 PROCEDURE — 96375 TX/PRO/DX INJ NEW DRUG ADDON: CPT

## 2024-05-15 PROCEDURE — 71046 X-RAY EXAM CHEST 2 VIEWS: CPT

## 2024-05-15 PROCEDURE — 83690 ASSAY OF LIPASE: CPT | Performed by: EMERGENCY MEDICINE

## 2024-05-15 PROCEDURE — 87086 URINE CULTURE/COLONY COUNT: CPT | Mod: WESLAB | Performed by: EMERGENCY MEDICINE

## 2024-05-15 PROCEDURE — 93005 ELECTROCARDIOGRAM TRACING: CPT

## 2024-05-15 PROCEDURE — 81001 URINALYSIS AUTO W/SCOPE: CPT | Performed by: EMERGENCY MEDICINE

## 2024-05-15 PROCEDURE — 99284 EMERGENCY DEPT VISIT MOD MDM: CPT | Mod: 25

## 2024-05-15 PROCEDURE — 71046 X-RAY EXAM CHEST 2 VIEWS: CPT | Performed by: RADIOLOGY

## 2024-05-15 PROCEDURE — 2500000004 HC RX 250 GENERAL PHARMACY W/ HCPCS (ALT 636 FOR OP/ED): Performed by: EMERGENCY MEDICINE

## 2024-05-15 PROCEDURE — 96374 THER/PROPH/DIAG INJ IV PUSH: CPT

## 2024-05-15 RX ORDER — FUROSEMIDE 10 MG/ML
40 INJECTION INTRAMUSCULAR; INTRAVENOUS ONCE
Status: COMPLETED | OUTPATIENT
Start: 2024-05-15 | End: 2024-05-15

## 2024-05-15 RX ORDER — NITROFURANTOIN 25; 75 MG/1; MG/1
100 CAPSULE ORAL 2 TIMES DAILY
Qty: 10 CAPSULE | Refills: 0 | Status: SHIPPED | OUTPATIENT
Start: 2024-05-15 | End: 2024-05-20 | Stop reason: ALTCHOICE

## 2024-05-15 RX ORDER — ONDANSETRON 4 MG/1
4 TABLET, ORALLY DISINTEGRATING ORAL EVERY 8 HOURS PRN
Qty: 30 TABLET | Refills: 0 | Status: SHIPPED | OUTPATIENT
Start: 2024-05-15

## 2024-05-15 RX ORDER — ONDANSETRON HYDROCHLORIDE 2 MG/ML
4 INJECTION, SOLUTION INTRAVENOUS ONCE
Status: COMPLETED | OUTPATIENT
Start: 2024-05-15 | End: 2024-05-15

## 2024-05-15 RX ORDER — DICYCLOMINE HYDROCHLORIDE 20 MG/1
20 TABLET ORAL
Qty: 30 TABLET | Refills: 0 | Status: SHIPPED | OUTPATIENT
Start: 2024-05-15 | End: 2024-06-10 | Stop reason: SDUPTHER

## 2024-05-15 RX ORDER — KETOROLAC TROMETHAMINE 30 MG/ML
15 INJECTION, SOLUTION INTRAMUSCULAR; INTRAVENOUS ONCE
Status: COMPLETED | OUTPATIENT
Start: 2024-05-15 | End: 2024-05-15

## 2024-05-15 RX ADMIN — KETOROLAC TROMETHAMINE 15 MG: 30 INJECTION, SOLUTION INTRAMUSCULAR at 10:02

## 2024-05-15 RX ADMIN — FUROSEMIDE 40 MG: 10 INJECTION, SOLUTION INTRAMUSCULAR; INTRAVENOUS at 10:02

## 2024-05-15 RX ADMIN — ONDANSETRON 4 MG: 2 INJECTION INTRAMUSCULAR; INTRAVENOUS at 10:03

## 2024-05-15 ASSESSMENT — LIFESTYLE VARIABLES
EVER FELT BAD OR GUILTY ABOUT YOUR DRINKING: NO
HAVE PEOPLE ANNOYED YOU BY CRITICIZING YOUR DRINKING: NO
EVER HAD A DRINK FIRST THING IN THE MORNING TO STEADY YOUR NERVES TO GET RID OF A HANGOVER: NO
TOTAL SCORE: 0
HAVE YOU EVER FELT YOU SHOULD CUT DOWN ON YOUR DRINKING: NO

## 2024-05-15 ASSESSMENT — PAIN SCALES - GENERAL: PAINLEVEL_OUTOF10: 2

## 2024-05-15 ASSESSMENT — PAIN DESCRIPTION - PAIN TYPE: TYPE: ACUTE PAIN

## 2024-05-15 ASSESSMENT — COLUMBIA-SUICIDE SEVERITY RATING SCALE - C-SSRS
6. HAVE YOU EVER DONE ANYTHING, STARTED TO DO ANYTHING, OR PREPARED TO DO ANYTHING TO END YOUR LIFE?: NO
2. HAVE YOU ACTUALLY HAD ANY THOUGHTS OF KILLING YOURSELF?: NO
1. IN THE PAST MONTH, HAVE YOU WISHED YOU WERE DEAD OR WISHED YOU COULD GO TO SLEEP AND NOT WAKE UP?: NO

## 2024-05-15 ASSESSMENT — PAIN - FUNCTIONAL ASSESSMENT: PAIN_FUNCTIONAL_ASSESSMENT: 0-10

## 2024-05-15 ASSESSMENT — PAIN DESCRIPTION - ORIENTATION: ORIENTATION: LEFT;LOWER

## 2024-05-15 ASSESSMENT — PAIN DESCRIPTION - LOCATION: LOCATION: ABDOMEN

## 2024-05-15 NOTE — ED PROVIDER NOTES
HPI   Chief Complaint   Patient presents with    Abdominal Pain    Chest Pain    Vomiting       49-year-old female with chronic nonalcoholic cirrhosis presents for evaluation of abdominal bloating, leg swelling over the past few days.  She does take Lasix which she reports she has been taking.  States she had some vomiting and diarrhea.  No griffin abdominal pain just a bloating sensation.  Legs are more swollen than usual.  Does admit to having to have paracentesis most recently last month and follows with gastroenterology at [facility].  She states that they advised her to schedule outpatient paracentesis but she reports they did not give her the phone number.  No fever.  Denies other complaints.  In triage patient reported she was having chest pain but denies this on my assessment just states that she feels pressure in her abdomen as though she had eaten large amount of food.      History provided by:  Medical records and patient                      [place] Coma Scale Score: 15                     Patient History   No past medical history on file.  Past Surgical History:   Procedure Laterality Date    CT GUIDED IMAGING FOR NEEDLE PLACEMENT  10/14/2020    CT GUIDED IMAGING FOR NEEDLE PLACEMENT [facility]    US GUIDED ABDOMINAL PARACENTESIS  10/8/2020    US GUIDED ABDOMINAL PARACENTESIS [facility]     No family history on file.  Social History     Tobacco Use    Smoking status: Never    Smokeless tobacco: Never   Substance Use Topics    Alcohol use: Never    Drug use: Never       Physical Exam   ED Triage Vitals [05/15/24 0923]   Temperature Heart Rate Respirations BP   36.9 °C (98.4 °F) 80 15 126/65      Pulse Ox Temp Source Heart Rate Source Patient Position   100 % Temporal Monitor Sitting      BP Location FiO2 (%)     Left arm --       Physical Exam  Vitals and nursing note reviewed.     General: Vitals reviewed. Awake, alert, well-developed, well-nourished, NAD  HEENT: NC/AT, PERRL,  MMM  Neck: Supple, trachea midline  Respiratory: No respiratory distress, lungs clear to auscultation bilaterally, no wheezes, rhonchi, or rales  CV: Regular rate and regular rhythm, no murmur/gallop/rubs  Abdomen/GI: Soft, non-tender, distended with positive fluid wave, no rebound, guarding, or rigidity, normal bowel sounds  Extremities: Moving all extremities, no deformities, 2-3+ bilateral symmetrical pitting edema of the lower legs and ankles  Neuro: A/O, normal speech  Skin: Warm, dry. No rashes identified    ED Course & MDM   ED Course as of 05/15/24 1410   Wed May 15, 2024   0922 EKG on my independent or potation: Normal sinus rhythm 77 bpm, normal axis, normal intervals, no acute ST or T wave abnormalities [MARIBELL]      ED Course User Index  [MARIBELL] Tamiko Mcmullen MD         Diagnoses as of 05/15/24 1410   Cirrhosis of liver with ascites, unspecified hepatic cirrhosis type (Multi)   Nausea and vomiting, unspecified vomiting type   Diarrhea, unspecified type   Cystitis       Medical Decision Making  49-year-old female presents for abdominal bloating/distention and leg swelling in the setting of chronic ascites and cirrhosis.  She does have a positive fluid wave but otherwise nontender exam.  She is hemodynamically stable nontoxic-appearing.  She does report she had some vomiting and diarrhea therefore consider additional intra-abdominal pathologies possibly viral gastroenteritis, overall low suspicion for appendicitis, pancreatitis, cholecystitis among others given lack of focality on exam.  Exam is also nonobstructive therefore while CT was considered do not feel advanced imaging is indicated at this time.  Labs obtained with mild leukopenia otherwise without significant abnormality.  She does have chronic anemia not significant change from her baseline.  Hyperglycemia without evidence of DKA.  Symptomatically with IV Zofran, IV Lasix with improvement.  At this time patient may require outpatient paracentesis  preferred to interventional radiology for outpatient scheduling no indication for emergent need at this time.  Given lack of abdominal tenderness low suspicion for spontaneous bacterial peritonitis.  Advised to increase her Lasix to twice daily for the next 3 days in the interim.  Return and follow-up instructions discussed.    Discussed that more than 50% of abdominal pain that comes to the Emergency Department goes undiagnosed and that there were no emergent findings in workup today. Discussed that certain diagnoese such as appendicitis, colitis, diverticulitis, cholelithiasis or other illnesses are undetectable early on in their course and may not be seen on the first visit.  I recommended abdominal re-examination in 12-24 hours if  symptoms are not significantly improved, sooner if worsening.       Amount and/or Complexity of Data Reviewed  External Data Reviewed: notes.     Details: 4/18/2024 internal medicine discharge summary reviewed at which time patient had paracentesis during hospitalization  Labs: ordered. Decision-making details documented in ED Course.  ECG/medicine tests: ordered and independent interpretation performed. Decision-making details documented in ED Course.        Procedure  Procedures     Tamiko Mcmullen MD  05/15/24 1898

## 2024-05-15 NOTE — DISCHARGE INSTRUCTIONS
The cause of your symptoms is somewhat unclear may be related to increasing fluid accumulation in your abdomen.  Increase your Lasix to 40 milligrams twice daily next 3 days and schedule an appointment to follow-up with interventional radiology as above.  A consultation was placed and they should call you as well or you may discuss with your gastroenterologist for outpatient scheduling

## 2024-05-15 NOTE — Clinical Note
Alfonzo Flanagan was seen and treated in our emergency department on 5/15/2024.  She may return to work on 05/16/2024.       If you have any questions or concerns, please don't hesitate to call.      Tamiko Mcmullen MD

## 2024-05-15 NOTE — ED TRIAGE NOTES
Abd swelling, bilateral leg swelling, cp, vomiting with diarrhea since 0700 today.  Diabetic T2, 250 mg/dl this morning. AOx4

## 2024-05-16 LAB
ATRIAL RATE: 77 BPM
P AXIS: 72 DEGREES
P OFFSET: 188 MS
P ONSET: 139 MS
PR INTERVAL: 174 MS
Q ONSET: 226 MS
QRS COUNT: 12 BEATS
QRS DURATION: 78 MS
QT INTERVAL: 426 MS
QTC CALCULATION(BAZETT): 482 MS
QTC FREDERICIA: 462 MS
R AXIS: 70 DEGREES
T AXIS: 56 DEGREES
T OFFSET: 439 MS
VENTRICULAR RATE: 77 BPM

## 2024-05-19 LAB
BACTERIA UR CULT: ABNORMAL
BACTERIA UR CULT: ABNORMAL

## 2024-05-20 ENCOUNTER — TELEPHONE (OUTPATIENT)
Dept: PHARMACY | Facility: HOSPITAL | Age: 49
End: 2024-05-20

## 2024-05-20 DIAGNOSIS — N39.0 URINARY TRACT INFECTION WITHOUT HEMATURIA, SITE UNSPECIFIED: Primary | ICD-10-CM

## 2024-05-20 RX ORDER — AMOXICILLIN AND CLAVULANATE POTASSIUM 875; 125 MG/1; MG/1
875 TABLET, FILM COATED ORAL 2 TIMES DAILY
Qty: 14 TABLET | Refills: 0 | Status: SHIPPED | OUTPATIENT
Start: 2024-05-20 | End: 2024-06-01 | Stop reason: HOSPADM

## 2024-05-20 NOTE — PROGRESS NOTES
EDPD Note: Bug-Drug Mismatch    Contacted Ms. Alfonzo Flanagan regarding a positive urine culture/result that was taken during their recent emergency room visit. I completed education with patient. The patient is not being treated appropriately with Macrobid 100mg and was instructed to stop taking this.    Patient in ED with abdominal fullness, likely iso of ascites. UTI resulted (+). During call, patient endorsed ongoing symptoms of urinary frequency, urgency, and burning indicating UTI not completed covered by Macrobid. Augmentin appropriate to cover BOTH kleb pneumoniae and E Faecium in urine.    The following prescription was sent to the patient's preferred pharmacy. No further follow up needed from EDPD Team.   Drug: Augmentin 875mg tablets  Sig: Take 1 tablet by mouth twice a day for 7 days  Days Supply: 7 (14 tablets)    Susceptibility data from last 90 days.  Collected Specimen Info Organism Amoxicillin/Clavulanate Ampicillin Ampicillin/Sulbactam Cefazolin Cefazolin (uncomplicated UTIs only) Ciprofloxacin Gentamicin Levofloxacin Nitrofurantoin Penicillin Piperacillin/Tazobactam   05/15/24 Urine from Clean Catch/Voided Enterococcus faecium   S     R   S  S  S      Klebsiella pneumoniae/variicola  S  R  S  S  S  S  S   I   S     Collected Specimen Info Organism Trimethoprim/Sulfamethoxazole Vancomycin   05/15/24 Urine from Clean Catch/Voided Enterococcus faecium  R  S     Klebsiella pneumoniae/variicola  S        Rebecca Mark, PharmD

## 2024-05-25 ENCOUNTER — HOSPITAL ENCOUNTER (INPATIENT)
Facility: HOSPITAL | Age: 49
LOS: 2 days | Discharge: HOME | End: 2024-06-01
Attending: EMERGENCY MEDICINE | Admitting: INTERNAL MEDICINE
Payer: COMMERCIAL

## 2024-05-25 ENCOUNTER — APPOINTMENT (OUTPATIENT)
Dept: RADIOLOGY | Facility: HOSPITAL | Age: 49
End: 2024-05-25
Payer: COMMERCIAL

## 2024-05-25 ENCOUNTER — APPOINTMENT (OUTPATIENT)
Dept: CARDIOLOGY | Facility: HOSPITAL | Age: 49
End: 2024-05-25
Payer: COMMERCIAL

## 2024-05-25 DIAGNOSIS — K76.6 PORTAL HYPERTENSION (MULTI): ICD-10-CM

## 2024-05-25 DIAGNOSIS — M54.9 OTHER CHRONIC BACK PAIN: ICD-10-CM

## 2024-05-25 DIAGNOSIS — R21 RASH: ICD-10-CM

## 2024-05-25 DIAGNOSIS — M79.604 PAIN IN BOTH LOWER EXTREMITIES: ICD-10-CM

## 2024-05-25 DIAGNOSIS — E83.42 HYPOMAGNESEMIA: ICD-10-CM

## 2024-05-25 DIAGNOSIS — R10.84 ABDOMINAL PAIN, GENERALIZED: Primary | ICD-10-CM

## 2024-05-25 DIAGNOSIS — R10.13 ABDOMINAL PAIN, EPIGASTRIC: ICD-10-CM

## 2024-05-25 DIAGNOSIS — E11.649 UNCONTROLLED TYPE 2 DIABETES MELLITUS WITH HYPOGLYCEMIA WITHOUT COMA (MULTI): ICD-10-CM

## 2024-05-25 DIAGNOSIS — R18.8 OTHER ASCITES: ICD-10-CM

## 2024-05-25 DIAGNOSIS — G89.29 OTHER CHRONIC BACK PAIN: ICD-10-CM

## 2024-05-25 DIAGNOSIS — R10.84 GENERALIZED ABDOMINAL PAIN: ICD-10-CM

## 2024-05-25 DIAGNOSIS — M79.605 PAIN IN BOTH LOWER EXTREMITIES: ICD-10-CM

## 2024-05-25 LAB
ALBUMIN SERPL-MCNC: 2.8 G/DL (ref 3.5–5)
ALP BLD-CCNC: 515 U/L (ref 35–125)
ALT SERPL-CCNC: 27 U/L (ref 5–40)
ANION GAP SERPL CALC-SCNC: 3 MMOL/L
APPEARANCE UR: ABNORMAL
AST SERPL-CCNC: 48 U/L (ref 5–40)
BASOPHILS # BLD AUTO: 0.02 X10*3/UL (ref 0–0.1)
BASOPHILS NFR BLD AUTO: 0.4 %
BILIRUB SERPL-MCNC: 1.5 MG/DL (ref 0.1–1.2)
BILIRUB UR STRIP.AUTO-MCNC: NEGATIVE MG/DL
BUN SERPL-MCNC: 18 MG/DL (ref 8–25)
CALCIUM SERPL-MCNC: 8.3 MG/DL (ref 8.5–10.4)
CHLORIDE SERPL-SCNC: 102 MMOL/L (ref 97–107)
CO2 SERPL-SCNC: 28 MMOL/L (ref 24–31)
COLOR UR: YELLOW
CREAT SERPL-MCNC: 0.7 MG/DL (ref 0.4–1.6)
EGFRCR SERPLBLD CKD-EPI 2021: >90 ML/MIN/1.73M*2
EOSINOPHIL # BLD AUTO: 0.24 X10*3/UL (ref 0–0.7)
EOSINOPHIL NFR BLD AUTO: 4.8 %
ERYTHROCYTE [DISTWIDTH] IN BLOOD BY AUTOMATED COUNT: 20.2 % (ref 11.5–14.5)
GLUCOSE SERPL-MCNC: 253 MG/DL (ref 65–99)
GLUCOSE UR STRIP.AUTO-MCNC: ABNORMAL MG/DL
HCT VFR BLD AUTO: 28.9 % (ref 36–46)
HGB BLD-MCNC: 9 G/DL (ref 12–16)
HYPOCHROMIA BLD QL SMEAR: NORMAL
IMM GRANULOCYTES # BLD AUTO: 0.01 X10*3/UL (ref 0–0.7)
IMM GRANULOCYTES NFR BLD AUTO: 0.2 % (ref 0–0.9)
KETONES UR STRIP.AUTO-MCNC: NEGATIVE MG/DL
LEUKOCYTE ESTERASE UR QL STRIP.AUTO: ABNORMAL
LIPASE SERPL-CCNC: 54 U/L (ref 16–63)
LYMPHOCYTES # BLD AUTO: 1.33 X10*3/UL (ref 1.2–4.8)
LYMPHOCYTES NFR BLD AUTO: 26.4 %
MCH RBC QN AUTO: 24.6 PG (ref 26–34)
MCHC RBC AUTO-ENTMCNC: 31.1 G/DL (ref 32–36)
MCV RBC AUTO: 79 FL (ref 80–100)
MONOCYTES # BLD AUTO: 0.45 X10*3/UL (ref 0.1–1)
MONOCYTES NFR BLD AUTO: 8.9 %
MUCOUS THREADS #/AREA URNS AUTO: ABNORMAL /LPF
NEUTROPHILS # BLD AUTO: 2.98 X10*3/UL (ref 1.2–7.7)
NEUTROPHILS NFR BLD AUTO: 59.3 %
NITRITE UR QL STRIP.AUTO: NEGATIVE
NRBC BLD-RTO: 0 /100 WBCS (ref 0–0)
NT-PROBNP SERPL-MCNC: <36 PG/ML (ref 0–192)
PH UR STRIP.AUTO: 7.5 [PH]
PLATELET # BLD AUTO: 83 X10*3/UL (ref 150–450)
POTASSIUM SERPL-SCNC: 4.3 MMOL/L (ref 3.4–5.1)
PROT SERPL-MCNC: 7.3 G/DL (ref 5.9–7.9)
PROT UR STRIP.AUTO-MCNC: ABNORMAL MG/DL
RBC # BLD AUTO: 3.66 X10*6/UL (ref 4–5.2)
RBC # UR STRIP.AUTO: ABNORMAL /UL
RBC #/AREA URNS AUTO: ABNORMAL /HPF
RBC MORPH BLD: NORMAL
SODIUM SERPL-SCNC: 133 MMOL/L (ref 133–145)
SP GR UR STRIP.AUTO: 1.02
SQUAMOUS #/AREA URNS AUTO: ABNORMAL /HPF
TROPONIN T SERPL-MCNC: 6 NG/L
TROPONIN T SERPL-MCNC: <6 NG/L
UROBILINOGEN UR STRIP.AUTO-MCNC: NORMAL MG/DL
WBC # BLD AUTO: 5 X10*3/UL (ref 4.4–11.3)
WBC #/AREA URNS AUTO: ABNORMAL /HPF

## 2024-05-25 PROCEDURE — 93010 ELECTROCARDIOGRAM REPORT: CPT | Performed by: INTERNAL MEDICINE

## 2024-05-25 PROCEDURE — 2550000001 HC RX 255 CONTRASTS: Performed by: EMERGENCY MEDICINE

## 2024-05-25 PROCEDURE — 85025 COMPLETE CBC W/AUTO DIFF WBC: CPT | Performed by: PHYSICIAN ASSISTANT

## 2024-05-25 PROCEDURE — 71046 X-RAY EXAM CHEST 2 VIEWS: CPT | Performed by: RADIOLOGY

## 2024-05-25 PROCEDURE — 2500000004 HC RX 250 GENERAL PHARMACY W/ HCPCS (ALT 636 FOR OP/ED): Performed by: PHYSICIAN ASSISTANT

## 2024-05-25 PROCEDURE — 80053 COMPREHEN METABOLIC PANEL: CPT | Performed by: PHYSICIAN ASSISTANT

## 2024-05-25 PROCEDURE — 93005 ELECTROCARDIOGRAM TRACING: CPT

## 2024-05-25 PROCEDURE — 36415 COLL VENOUS BLD VENIPUNCTURE: CPT | Performed by: PHYSICIAN ASSISTANT

## 2024-05-25 PROCEDURE — 2500000004 HC RX 250 GENERAL PHARMACY W/ HCPCS (ALT 636 FOR OP/ED): Performed by: EMERGENCY MEDICINE

## 2024-05-25 PROCEDURE — 96375 TX/PRO/DX INJ NEW DRUG ADDON: CPT

## 2024-05-25 PROCEDURE — 87086 URINE CULTURE/COLONY COUNT: CPT | Mod: WESLAB | Performed by: PHYSICIAN ASSISTANT

## 2024-05-25 PROCEDURE — 74177 CT ABD & PELVIS W/CONTRAST: CPT | Performed by: RADIOLOGY

## 2024-05-25 PROCEDURE — 2500000002 HC RX 250 W HCPCS SELF ADMINISTERED DRUGS (ALT 637 FOR MEDICARE OP, ALT 636 FOR OP/ED): Performed by: PHYSICIAN ASSISTANT

## 2024-05-25 PROCEDURE — 84484 ASSAY OF TROPONIN QUANT: CPT | Performed by: PHYSICIAN ASSISTANT

## 2024-05-25 PROCEDURE — 81001 URINALYSIS AUTO W/SCOPE: CPT | Performed by: PHYSICIAN ASSISTANT

## 2024-05-25 PROCEDURE — 94640 AIRWAY INHALATION TREATMENT: CPT

## 2024-05-25 PROCEDURE — 83880 ASSAY OF NATRIURETIC PEPTIDE: CPT | Performed by: PHYSICIAN ASSISTANT

## 2024-05-25 PROCEDURE — 71046 X-RAY EXAM CHEST 2 VIEWS: CPT

## 2024-05-25 PROCEDURE — 83690 ASSAY OF LIPASE: CPT | Performed by: PHYSICIAN ASSISTANT

## 2024-05-25 PROCEDURE — 99285 EMERGENCY DEPT VISIT HI MDM: CPT | Mod: 25

## 2024-05-25 PROCEDURE — 96365 THER/PROPH/DIAG IV INF INIT: CPT

## 2024-05-25 PROCEDURE — 74177 CT ABD & PELVIS W/CONTRAST: CPT

## 2024-05-25 RX ORDER — ONDANSETRON HYDROCHLORIDE 2 MG/ML
4 INJECTION, SOLUTION INTRAVENOUS ONCE
Status: COMPLETED | OUTPATIENT
Start: 2024-05-25 | End: 2024-05-25

## 2024-05-25 RX ORDER — IPRATROPIUM BROMIDE AND ALBUTEROL SULFATE 2.5; .5 MG/3ML; MG/3ML
3 SOLUTION RESPIRATORY (INHALATION) ONCE
Status: COMPLETED | OUTPATIENT
Start: 2024-05-25 | End: 2024-05-25

## 2024-05-25 RX ORDER — FENTANYL CITRATE 50 UG/ML
50 INJECTION, SOLUTION INTRAMUSCULAR; INTRAVENOUS ONCE
Status: COMPLETED | OUTPATIENT
Start: 2024-05-25 | End: 2024-05-25

## 2024-05-25 RX ORDER — CEFTRIAXONE 1 G/50ML
1 INJECTION, SOLUTION INTRAVENOUS ONCE
Status: COMPLETED | OUTPATIENT
Start: 2024-05-25 | End: 2024-05-25

## 2024-05-25 RX ADMIN — ONDANSETRON HYDROCHLORIDE 4 MG: 2 INJECTION INTRAMUSCULAR; INTRAVENOUS at 18:30

## 2024-05-25 RX ADMIN — IPRATROPIUM BROMIDE AND ALBUTEROL SULFATE 3 ML: .5; 3 SOLUTION RESPIRATORY (INHALATION) at 18:30

## 2024-05-25 RX ADMIN — IOHEXOL 75 ML: 350 INJECTION, SOLUTION INTRAVENOUS at 19:52

## 2024-05-25 RX ADMIN — CEFTRIAXONE SODIUM 1 G: 1 INJECTION, SOLUTION INTRAVENOUS at 20:56

## 2024-05-25 RX ADMIN — FENTANYL CITRATE 50 MCG: 50 INJECTION INTRAMUSCULAR; INTRAVENOUS at 21:28

## 2024-05-25 ASSESSMENT — PAIN DESCRIPTION - PAIN TYPE: TYPE: ACUTE PAIN

## 2024-05-25 ASSESSMENT — PAIN SCALES - GENERAL
PAINLEVEL_OUTOF10: 3
PAINLEVEL_OUTOF10: 8

## 2024-05-25 ASSESSMENT — LIFESTYLE VARIABLES
TOTAL SCORE: 0
EVER FELT BAD OR GUILTY ABOUT YOUR DRINKING: NO
HAVE PEOPLE ANNOYED YOU BY CRITICIZING YOUR DRINKING: NO
HAVE YOU EVER FELT YOU SHOULD CUT DOWN ON YOUR DRINKING: NO
EVER HAD A DRINK FIRST THING IN THE MORNING TO STEADY YOUR NERVES TO GET RID OF A HANGOVER: NO

## 2024-05-25 ASSESSMENT — PAIN DESCRIPTION - LOCATION: LOCATION: ABDOMEN

## 2024-05-25 ASSESSMENT — PAIN DESCRIPTION - DESCRIPTORS
DESCRIPTORS: PRESSURE
DESCRIPTORS: OTHER (COMMENT)

## 2024-05-25 ASSESSMENT — PAIN DESCRIPTION - PROGRESSION: CLINICAL_PROGRESSION: NOT CHANGED

## 2024-05-25 ASSESSMENT — PAIN - FUNCTIONAL ASSESSMENT: PAIN_FUNCTIONAL_ASSESSMENT: 0-10

## 2024-05-25 NOTE — ED PROVIDER NOTES
HPI   Chief Complaint   Patient presents with    Abdominal Pain    Chest Pain     Chest pain and Abdominal distention started about 2 weeks ago. It's getting much worse. Feels very uncomfortable, having difficulty sleeping.       Is a 49-year-old female with a past medical history of liver cirrhosis presenting to the emergency department for complaints of increasing abdominal pain x 2 weeks.  Patient states that her abdomen has been coming larger and more distended.  She states that when she lays flat at night is difficult for her to breathe and sleep due to the pain and distention of her abdomen.  She states that she was seen in the emergency department a couple days ago and was sent home.  She states that she was told she had some fluid but there was not enough for it to be drained.  She states that she has felt nauseous but no vomiting.  She denies diarrhea or constipation.  She states that her stools have appeared darker.  No urinary complaints.  Patient states that the abdominal distention feels like it is pushing up into her chest causing her to feel short of breath and pain in her chest.      Please see HPI for pertinent positive and negative ROS.                    Arnold Coma Scale Score: 15                     Patient History   No past medical history on file.  Past Surgical History:   Procedure Laterality Date    CT GUIDED IMAGING FOR NEEDLE PLACEMENT  10/14/2020    CT GUIDED IMAGING FOR NEEDLE PLACEMENT [facility]    US GUIDED ABDOMINAL PARACENTESIS  10/8/2020    US GUIDED ABDOMINAL PARACENTESIS [facility]     No family history on file.  Social History     Tobacco Use    Smoking status: Never    Smokeless tobacco: Never   Substance Use Topics    Alcohol use: Never    Drug use: Never       Physical Exam   ED Triage Vitals [05/25/24 1803]   Temperature Heart Rate Respirations BP   37 °C (98.6 °F) 93 18 115/64      Pulse Ox Temp Source Heart Rate Source Patient Position   100 % Tympanic  -- Sitting      BP Location FiO2 (%)     Left arm --       Physical Exam  GENERAL APPEARANCE: Awake and alert. No acute respiratory distress.   VITAL SIGNS: As per the nurses' triage record.  HEENT: Normocephalic, atraumatic. Extraocular muscles are intact. Conjunctiva are pink. Negative scleral icterus. Mucous membranes are moist. Tongue in the midline. Oropharynx clear, uvula midline.   NECK: Soft, nontender and supple, full gross ROM, no meningeal signs.  CHEST: Nontender to palpation.  Left lower lobe inspiratory wheeze.  Symmetric rise and fall of chest wall.   HEART: Clear S1 and S2. Regular rate and rhythm. No murmurs appreciated on auscultation.  Strong and equal pulses in the extremities.  ABDOMEN: Soft, distention of abdomen with diffuse tenderness.    MUSCULOSKELETAL: The calves are nontender to palpation.  Pedal pitting edema bilaterally.  Full gross active range of motion. Ambulating on own with no acute difficulties  NEUROLOGICAL: Awake, alert and oriented x 3. Motor power intact in the upper and lower extremities. Sensation is intact to light touch in the upper and lower extremities. Patient answering questions appropriately.   IMMUNOLOGICAL: No lymphatic streaking noted  DERMATOLOGIC: Warm and dry without petechiae, rashes, or ecchymosis noted on visible skin.   PYSCH: Cooperative with appropriate mood and affect.  ED Course & MDM   ED Course as of 05/26/24 1312   Sat May 25, 2024   1814 Normal sinus rhythm with a rate of 91.  Low voltage QRS.  GA interval is 162.  Similar to EKG of April 18, 2024 [JN]   Jessica May 26, 2024   0335 History.  49-year-old female with a history of increasing abdominal distention.  She says she usually needs to have it drained.  It is made it difficult for her to breathe  Physical exam.  49-year-old pleasant female sitting quietly and speaking clearly.  She is not toxic but appears uncomfortable.  Abdomen is distended.  Medical decision making.  Plan is to bring the patient  into the hospital for further treatment and evaluation which will likely consist paracentesis and other interventions. [JN]      ED Course User Index  [JN] Kev Castillo MD         Diagnoses as of 05/26/24 1312   Abdominal pain, generalized   Other ascites       Medical Decision Making  Parts of this chart have been completed using voice recognition software. Please excuse any errors of transcription.  My thought process and reason for plan has been formulated from the time that I saw the patient until the time of disposition and is not specific to one specific moment during their visit and furthermore my MDM encompasses this entire chart and not only this text box.      HPI: Detailed above.    Exam: A medically appropriate exam performed, outlined above, given the known history and presentation.    History obtained from: Patient    EKG: See my supervising physician's EKG interpretation    Medications given during visit:  Medications   traMADol (Ultram) tablet 50 mg (50 mg oral Given 5/26/24 0823)   ondansetron (Zofran) injection 4 mg (4 mg intravenous Given 5/26/24 1145)   ondansetron (Zofran) injection 4 mg (4 mg intravenous Given 5/25/24 1830)   ipratropium-albuteroL (Duo-Neb) 0.5-2.5 mg/3 mL nebulizer solution 3 mL (3 mL nebulization Given 5/25/24 1830)   cefTRIAXone (Rocephin) IVPB 1 g (0 g intravenous Stopped 5/25/24 2129)   iohexol (OMNIPaque) 350 mg iodine/mL solution 75 mL (75 mL intravenous Given 5/25/24 1952)   fentaNYL PF (Sublimaze) injection 50 mcg (50 mcg intravenous Given 5/25/24 2128)        Diagnostic/tests  Labs Reviewed   CBC WITH AUTO DIFFERENTIAL - Abnormal       Result Value    WBC 5.0      nRBC 0.0      RBC 3.66 (*)     Hemoglobin 9.0 (*)     Hematocrit 28.9 (*)     MCV 79 (*)     MCH 24.6 (*)     MCHC 31.1 (*)     RDW 20.2 (*)     Platelets 83 (*)     Neutrophils % 59.3      Immature Granulocytes %, Automated 0.2      Lymphocytes % 26.4      Monocytes % 8.9      Eosinophils % 4.8       Basophils % 0.4      Neutrophils Absolute 2.98      Immature Granulocytes Absolute, Automated 0.01      Lymphocytes Absolute 1.33      Monocytes Absolute 0.45      Eosinophils Absolute 0.24      Basophils Absolute 0.02     COMPREHENSIVE METABOLIC PANEL - Abnormal    Glucose 253 (*)     Sodium 133      Potassium 4.3      Chloride 102      Bicarbonate 28      Urea Nitrogen 18      Creatinine 0.70      eGFR >90      Calcium 8.3 (*)     Albumin 2.8 (*)     Alkaline Phosphatase 515 (*)     Total Protein 7.3      AST 48 (*)     Bilirubin, Total 1.5 (*)     ALT 27      Anion Gap 3     URINALYSIS WITH REFLEX CULTURE AND MICROSCOPIC - Abnormal    Color, Urine Yellow      Appearance, Urine Turbid (*)     Specific Gravity, Urine 1.022      pH, Urine 7.5      Protein, Urine 10 (TRACE)      Glucose, Urine 1000 (4+) (*)     Blood, Urine 0.03 (TRACE) (*)     Ketones, Urine NEGATIVE      Bilirubin, Urine NEGATIVE      Urobilinogen, Urine Normal      Nitrite, Urine NEGATIVE      Leukocyte Esterase, Urine 500 Mckenzie/µL (*)    MICROSCOPIC ONLY, URINE - Abnormal    WBC, Urine 11-20 (*)     RBC, Urine 11-20 (*)     Squamous Epithelial Cells, Urine 26-50 (1+)      Mucus, Urine FEW     POCT GLUCOSE - Abnormal    POCT Glucose 114 (*)    LIPASE - Normal    Lipase 54     SERIAL TROPONIN, INITIAL ([facility]) - Normal    Troponin T, High Sensitivity 6     N-TERMINAL PROBNP - Normal    PROBNP <36      Narrative:     Reference ranges are based on clinical submission data. These ranges represent the 95th percentile of normal cut-off points. As NT Pro- BNP values approach 1000 pg/ml, clinical symptoms are more likely associated with CHF.   SERIAL TROPONIN,  2 HOUR ([facility]) - Normal    Troponin T, High Sensitivity <6     URINE CULTURE   TROPONIN T SERIES, HIGH SENSITIVITY (0, 2 HR, 6 HR)    Narrative:     The following orders were created for panel order Troponin T Series, High Sensitivity (0, 2HR, 6HR).  Procedure                                Abnormality         Status                     ---------                               -----------         ------                     Serial Troponin, Initial...[[id number]]  Normal              Final result               Serial Troponin, 2 Hour ...[[id number]]  Normal              Final result               Serial Troponin, 6 Hour ...[[id number]]                                                   Please view results for these tests on the individual orders.   URINALYSIS WITH REFLEX CULTURE AND MICROSCOPIC    Narrative:     The following orders were created for panel order Urinalysis with Reflex Culture and Microscopic.  Procedure                               Abnormality         Status                     ---------                               -----------         ------                     Urinalysis with Reflex C...[[id number]]  Abnormal            Final result               Extra Urine Gray Tube[[id number]]                                                         Please view results for these tests on the individual orders.   EXTRA URINE GRAY TUBE   MORPHOLOGY    RBC Morphology See Below      Hypochromia Mild        CT abdomen pelvis w IV contrast   Final Result   Cirrhotic morphology of the liver with moderate volume ascites and   stranding diffusely throughout the mesentery.        Supraumbilical ventral abdominal wall hernia containing fat and fluid.        Underdistention versus wall thickening diffusely throughout the small   and large bowel as well as the stomach. Findings may be related to   passive congestion. Enterocolitis or gastritis, however, is not   excluded in the appropriate clinical setting.             MACRO:   None.        Signed by: Evan Finkelstein 5/25/2024 8:48 PM   Dictation workstation:   [id number]      XR chest 2 views   Final Result   No acute cardiopulmonary process.        MACRO:   None        Signed by: Cj Lee 5/25/2024 8:00 PM   Dictation workstation:   [id number]            Considerations/further MDM:  Patient was seen in conjucntion with my supervising physician, Dr. Castillo. Please refer to his note.    Patient presents with stable vital signs.  Blood pressure 115/64, temperature 98.6 °F, heart rate 93 bpm, respirations 18, 100% on room air    Differential diagnosis includes but not limited to ascites secondary to liver cirrhosis versus bowel obstruction versus ACS versus pleural effusion versus pneumothorax versus pneumonia      CT scan of abdomen pelvis with IV contrast shows moderate ascites.  Chest x-ray shows no acute cardiopulmonary process.  Laboratory evaluation was grossly unremarkable.  The MP shows elevation in alk phos, AST and total bilirubin which has been present on previous CMP's.  There is a low albumin which is likely due to liver cirrhosis.  Urinalysis does show evidence of urinary tract infection.  Patient was treated with IV ceftriaxone 1 g for this.  CBC showed a microcytic anemia that remains unchanged from previous CBCs.  Initial troponin T 6.  Repeat troponin T was pending at time of admission.  proBNP less than 36.  Lipase 54.  Patient will be admitted for ascites secondary to cirrhosis likely needing paracentesis and other interventions.    Patient was treated with IV ceftriaxone 1 g, DuoNeb 4 bilateral wheezing and IV Zofran for nausea with improvement in symptoms.    The patient was evaluated in the emergency department and an etiology requiring emergent treatment or admission to hospital was identified.  The patient/family was counseled on clinical expression, expectations, and plan along with recommendations for admission.  All questions were answered and involved parties were understanding and agreeable to course of treatment.  Case was discussed with admitting physician, Dr. bailey.  Bed type ED treatment and further ED work-up decided by joint decision making with admitting team and any consultants.  Patient stable for admission per my  assessment and further management of patient will be deferred to the inpatient setting.      Procedure  Procedures     Amada Gerber PA-C  05/26/24 [id number]

## 2024-05-25 NOTE — Clinical Note
2.1L of clear, yellow fluid drained. Pt tolerated well. Bandaid applied to RLQ. Sample sent to lab for analysis.

## 2024-05-26 LAB — GLUCOSE BLD MANUAL STRIP-MCNC: 114 MG/DL (ref 74–99)

## 2024-05-26 PROCEDURE — G0378 HOSPITAL OBSERVATION PER HR: HCPCS

## 2024-05-26 PROCEDURE — 2500000004 HC RX 250 GENERAL PHARMACY W/ HCPCS (ALT 636 FOR OP/ED): Performed by: INTERNAL MEDICINE

## 2024-05-26 PROCEDURE — 82947 ASSAY GLUCOSE BLOOD QUANT: CPT

## 2024-05-26 PROCEDURE — 2500000001 HC RX 250 WO HCPCS SELF ADMINISTERED DRUGS (ALT 637 FOR MEDICARE OP): Performed by: INTERNAL MEDICINE

## 2024-05-26 PROCEDURE — 2500000002 HC RX 250 W HCPCS SELF ADMINISTERED DRUGS (ALT 637 FOR MEDICARE OP, ALT 636 FOR OP/ED): Performed by: INTERNAL MEDICINE

## 2024-05-26 RX ORDER — POLYETHYLENE GLYCOL 3350 17 G/17G
17 POWDER, FOR SOLUTION ORAL 2 TIMES DAILY
Status: DISCONTINUED | OUTPATIENT
Start: 2024-05-26 | End: 2024-06-01 | Stop reason: HOSPADM

## 2024-05-26 RX ORDER — CEFTRIAXONE 1 G/50ML
1 INJECTION, SOLUTION INTRAVENOUS EVERY 24 HOURS
Status: CANCELLED | OUTPATIENT
Start: 2024-05-26

## 2024-05-26 RX ORDER — AMOXICILLIN AND CLAVULANATE POTASSIUM 875; 125 MG/1; MG/1
875 TABLET, FILM COATED ORAL 2 TIMES DAILY
Status: CANCELLED | OUTPATIENT
Start: 2024-05-26

## 2024-05-26 RX ORDER — DOCUSATE SODIUM 100 MG/1
100 CAPSULE, LIQUID FILLED ORAL 2 TIMES DAILY
Status: DISCONTINUED | OUTPATIENT
Start: 2024-05-26 | End: 2024-06-01 | Stop reason: HOSPADM

## 2024-05-26 RX ORDER — TRAMADOL HYDROCHLORIDE 50 MG/1
50 TABLET ORAL EVERY 6 HOURS PRN
Status: DISCONTINUED | OUTPATIENT
Start: 2024-05-26 | End: 2024-05-26

## 2024-05-26 RX ORDER — FUROSEMIDE 40 MG/1
40 TABLET ORAL DAILY
Status: DISCONTINUED | OUTPATIENT
Start: 2024-05-26 | End: 2024-06-01 | Stop reason: HOSPADM

## 2024-05-26 RX ORDER — ONDANSETRON 4 MG/1
4 TABLET, ORALLY DISINTEGRATING ORAL EVERY 8 HOURS PRN
Status: DISCONTINUED | OUTPATIENT
Start: 2024-05-26 | End: 2024-06-01 | Stop reason: HOSPADM

## 2024-05-26 RX ORDER — INSULIN LISPRO 100 [IU]/ML
4 INJECTION, SOLUTION INTRAVENOUS; SUBCUTANEOUS
Status: DISCONTINUED | OUTPATIENT
Start: 2024-05-27 | End: 2024-06-01 | Stop reason: HOSPADM

## 2024-05-26 RX ORDER — ONDANSETRON HYDROCHLORIDE 2 MG/ML
4 INJECTION, SOLUTION INTRAVENOUS EVERY 8 HOURS PRN
Status: DISCONTINUED | OUTPATIENT
Start: 2024-05-26 | End: 2024-05-26

## 2024-05-26 RX ORDER — SPIRONOLACTONE 50 MG/1
50 TABLET, FILM COATED ORAL 2 TIMES DAILY
Status: DISCONTINUED | OUTPATIENT
Start: 2024-05-26 | End: 2024-06-01 | Stop reason: HOSPADM

## 2024-05-26 RX ORDER — LIDOCAINE 560 MG/1
1 PATCH PERCUTANEOUS; TOPICAL; TRANSDERMAL DAILY
Status: DISCONTINUED | OUTPATIENT
Start: 2024-05-26 | End: 2024-06-01 | Stop reason: HOSPADM

## 2024-05-26 RX ORDER — FUROSEMIDE 10 MG/ML
40 INJECTION INTRAMUSCULAR; INTRAVENOUS EVERY 8 HOURS
Status: CANCELLED | OUTPATIENT
Start: 2024-05-26 | End: 2024-05-27

## 2024-05-26 RX ORDER — URSODIOL 300 MG/1
300 CAPSULE ORAL 3 TIMES DAILY
Status: DISCONTINUED | OUTPATIENT
Start: 2024-05-26 | End: 2024-06-01 | Stop reason: HOSPADM

## 2024-05-26 RX ORDER — LACTULOSE 10 G/15ML
20 SOLUTION ORAL DAILY
Status: DISCONTINUED | OUTPATIENT
Start: 2024-05-26 | End: 2024-05-27

## 2024-05-26 RX ORDER — ATORVASTATIN CALCIUM 80 MG/1
80 TABLET, FILM COATED ORAL NIGHTLY
Status: DISCONTINUED | OUTPATIENT
Start: 2024-05-26 | End: 2024-06-01 | Stop reason: HOSPADM

## 2024-05-26 RX ORDER — ERGOCALCIFEROL 1.25 MG/1
50000 CAPSULE ORAL
Status: DISCONTINUED | OUTPATIENT
Start: 2024-05-26 | End: 2024-06-01 | Stop reason: HOSPADM

## 2024-05-26 RX ORDER — TRAZODONE HYDROCHLORIDE 50 MG/1
25 TABLET ORAL NIGHTLY
Status: DISCONTINUED | OUTPATIENT
Start: 2024-05-26 | End: 2024-06-01 | Stop reason: HOSPADM

## 2024-05-26 RX ORDER — INSULIN GLARGINE 100 [IU]/ML
25 INJECTION, SOLUTION SUBCUTANEOUS 2 TIMES DAILY
Status: DISCONTINUED | OUTPATIENT
Start: 2024-05-26 | End: 2024-06-01 | Stop reason: HOSPADM

## 2024-05-26 RX ORDER — SUCRALFATE 1 G/10ML
1 SUSPENSION ORAL
Status: DISCONTINUED | OUTPATIENT
Start: 2024-05-26 | End: 2024-06-01 | Stop reason: HOSPADM

## 2024-05-26 RX ORDER — METFORMIN HYDROCHLORIDE 1000 MG/1
1000 TABLET ORAL
Status: DISCONTINUED | OUTPATIENT
Start: 2024-05-27 | End: 2024-06-01 | Stop reason: HOSPADM

## 2024-05-26 RX ORDER — FENOFIBRATE 160 MG/1
160 TABLET ORAL DAILY
Status: DISCONTINUED | OUTPATIENT
Start: 2024-05-26 | End: 2024-06-01 | Stop reason: HOSPADM

## 2024-05-26 RX ORDER — TRAMADOL HYDROCHLORIDE 50 MG/1
50 TABLET ORAL EVERY 6 HOURS PRN
Status: DISCONTINUED | OUTPATIENT
Start: 2024-05-26 | End: 2024-06-01 | Stop reason: HOSPADM

## 2024-05-26 RX ORDER — DICYCLOMINE HYDROCHLORIDE 10 MG/1
20 CAPSULE ORAL
Status: DISCONTINUED | OUTPATIENT
Start: 2024-05-26 | End: 2024-06-01 | Stop reason: HOSPADM

## 2024-05-26 RX ADMIN — ONDANSETRON 4 MG: 2 INJECTION INTRAMUSCULAR; INTRAVENOUS at 11:45

## 2024-05-26 RX ADMIN — TRAMADOL HYDROCHLORIDE 50 MG: 50 TABLET ORAL at 14:45

## 2024-05-26 RX ADMIN — DOCUSATE SODIUM 100 MG: 100 CAPSULE, LIQUID FILLED ORAL at 20:37

## 2024-05-26 RX ADMIN — SUCRALFATE 1 G: 1 SUSPENSION ORAL at 20:38

## 2024-05-26 RX ADMIN — TRAZODONE HYDROCHLORIDE 25 MG: 50 TABLET ORAL at 20:37

## 2024-05-26 RX ADMIN — INSULIN GLARGINE 25 UNITS: 100 INJECTION, SOLUTION SUBCUTANEOUS at 20:52

## 2024-05-26 RX ADMIN — ATORVASTATIN CALCIUM 80 MG: 80 TABLET, FILM COATED ORAL at 20:37

## 2024-05-26 RX ADMIN — TRAMADOL HYDROCHLORIDE 50 MG: 50 TABLET ORAL at 08:23

## 2024-05-26 RX ADMIN — TRAMADOL HYDROCHLORIDE 50 MG: 50 TABLET, COATED ORAL at 20:46

## 2024-05-26 RX ADMIN — SPIRONOLACTONE 50 MG: 50 TABLET ORAL at 20:38

## 2024-05-26 RX ADMIN — URSODIOL 300 MG: 300 CAPSULE ORAL at 20:37

## 2024-05-26 RX ADMIN — DICYCLOMINE HYDROCHLORIDE 20 MG: 10 CAPSULE ORAL at 20:37

## 2024-05-26 RX ADMIN — ERGOCALCIFEROL 50000 UNITS: 1.25 CAPSULE ORAL at 20:46

## 2024-05-26 SDOH — SOCIAL STABILITY: SOCIAL INSECURITY: DOES ANYONE TRY TO KEEP YOU FROM HAVING/CONTACTING OTHER FRIENDS OR DOING THINGS OUTSIDE YOUR HOME?: NO

## 2024-05-26 SDOH — SOCIAL STABILITY: SOCIAL INSECURITY: ARE THERE ANY APPARENT SIGNS OF INJURIES/BEHAVIORS THAT COULD BE RELATED TO ABUSE/NEGLECT?: NO

## 2024-05-26 SDOH — SOCIAL STABILITY: SOCIAL INSECURITY: ABUSE: ADULT

## 2024-05-26 SDOH — SOCIAL STABILITY: SOCIAL INSECURITY: HAVE YOU HAD ANY THOUGHTS OF HARMING ANYONE ELSE?: NO

## 2024-05-26 SDOH — SOCIAL STABILITY: SOCIAL INSECURITY: ARE YOU OR HAVE YOU BEEN THREATENED OR ABUSED PHYSICALLY, EMOTIONALLY, OR SEXUALLY BY ANYONE?: NO

## 2024-05-26 SDOH — SOCIAL STABILITY: SOCIAL INSECURITY: DO YOU FEEL ANYONE HAS EXPLOITED OR TAKEN ADVANTAGE OF YOU FINANCIALLY OR OF YOUR PERSONAL PROPERTY?: NO

## 2024-05-26 SDOH — SOCIAL STABILITY: SOCIAL INSECURITY: HAVE YOU HAD THOUGHTS OF HARMING ANYONE ELSE?: NO

## 2024-05-26 SDOH — SOCIAL STABILITY: SOCIAL INSECURITY: WERE YOU ABLE TO COMPLETE ALL THE BEHAVIORAL HEALTH SCREENINGS?: YES

## 2024-05-26 SDOH — SOCIAL STABILITY: SOCIAL INSECURITY: DO YOU FEEL UNSAFE GOING BACK TO THE PLACE WHERE YOU ARE LIVING?: NO

## 2024-05-26 SDOH — SOCIAL STABILITY: SOCIAL INSECURITY: HAS ANYONE EVER THREATENED TO HURT YOUR FAMILY OR YOUR PETS?: NO

## 2024-05-26 ASSESSMENT — ACTIVITIES OF DAILY LIVING (ADL)
FEEDING YOURSELF: INDEPENDENT
GROOMING: INDEPENDENT
HEARING - LEFT EAR: FUNCTIONAL
LACK_OF_TRANSPORTATION: NO
PATIENT'S MEMORY ADEQUATE TO SAFELY COMPLETE DAILY ACTIVITIES?: YES
HEARING - RIGHT EAR: FUNCTIONAL
ADEQUATE_TO_COMPLETE_ADL: YES
BATHING: INDEPENDENT
FEEDING YOURSELF: INDEPENDENT
DRESSING YOURSELF: INDEPENDENT
WALKS IN HOME: INDEPENDENT
TOILETING: INDEPENDENT
HEARING - LEFT EAR: FUNCTIONAL
JUDGMENT_ADEQUATE_SAFELY_COMPLETE_DAILY_ACTIVITIES: YES
TOILETING: INDEPENDENT
PATIENT'S MEMORY ADEQUATE TO SAFELY COMPLETE DAILY ACTIVITIES?: YES
GROOMING: INDEPENDENT
WALKS IN HOME: INDEPENDENT
HEARING - RIGHT EAR: FUNCTIONAL
DRESSING YOURSELF: INDEPENDENT
JUDGMENT_ADEQUATE_SAFELY_COMPLETE_DAILY_ACTIVITIES: YES
BATHING: INDEPENDENT

## 2024-05-26 ASSESSMENT — COGNITIVE AND FUNCTIONAL STATUS - GENERAL
MOBILITY SCORE: 24
PATIENT BASELINE BEDBOUND: NO
DAILY ACTIVITIY SCORE: 24
MOBILITY SCORE: 24
MOBILITY SCORE: 24
DAILY ACTIVITIY SCORE: 24
DAILY ACTIVITIY SCORE: 24

## 2024-05-26 ASSESSMENT — LIFESTYLE VARIABLES
HOW MANY STANDARD DRINKS CONTAINING ALCOHOL DO YOU HAVE ON A TYPICAL DAY: PATIENT DOES NOT DRINK
SKIP TO QUESTIONS 9-10: 1
AUDIT-C TOTAL SCORE: 0
HOW OFTEN DO YOU HAVE 6 OR MORE DRINKS ON ONE OCCASION: NEVER
HOW OFTEN DO YOU HAVE A DRINK CONTAINING ALCOHOL: NEVER
AUDIT-C TOTAL SCORE: 0

## 2024-05-26 ASSESSMENT — PAIN DESCRIPTION - LOCATION: LOCATION: ABDOMEN

## 2024-05-26 ASSESSMENT — PAIN DESCRIPTION - DESCRIPTORS
DESCRIPTORS: RADIATING
DESCRIPTORS: ACHING;RADIATING

## 2024-05-26 ASSESSMENT — PAIN SCALES - GENERAL
PAINLEVEL_OUTOF10: 6
PAINLEVEL_OUTOF10: 7
PAINLEVEL_OUTOF10: 3
PAINLEVEL_OUTOF10: 8
PAINLEVEL_OUTOF10: 8

## 2024-05-26 ASSESSMENT — PAIN - FUNCTIONAL ASSESSMENT
PAIN_FUNCTIONAL_ASSESSMENT: 0-10

## 2024-05-26 ASSESSMENT — PAIN SCALES - PAIN ASSESSMENT IN ADVANCED DEMENTIA (PAINAD): TOTALSCORE: MEDICATION (SEE MAR)

## 2024-05-26 ASSESSMENT — PATIENT HEALTH QUESTIONNAIRE - PHQ9
SUM OF ALL RESPONSES TO PHQ9 QUESTIONS 1 & 2: 0
2. FEELING DOWN, DEPRESSED OR HOPELESS: NOT AT ALL
1. LITTLE INTEREST OR PLEASURE IN DOING THINGS: NOT AT ALL

## 2024-05-26 NOTE — NURSING NOTE
Called MD for admitting orders, MD answered and aware patient is on the floor.   Handoff report given to oncoming nurse regarding orders still pending to be placed.

## 2024-05-26 NOTE — H&P
Alfonzo Flanagan is a 49 y.o. female on day 0 of admission presenting with Abdominal pain, generalized.    Subjective   The patient is 49 years old female with past medical history significant for liver cirrhosis, portal hypertension, abdominal pain, recurrent hospital admissions came to the hospital with a complaint of worsening abdominal distention, PND, orthopnea, worsening gastritis and abdominal pain.  With this complaint she came to emergency room pending emergency room initial workup done and patient has been admitted to hospital for further management.  Patient denies any no nausea or vomiting.  No diarrhea, dysuria, paragastric.  No hematemesis, hematochezia or melena.  Patient also complained of dark stools.  No hematemesis, hematochezia or melena.  Patient also complained of leg swelling and weight gain.     Objective     Physical Exam  HEENT:  Head externally atraumatic,  extraocular movements intact, oral mucosa moist  Neck:  Supple, no JVP, no palpable adenopathy or thyromegaly.  No carotid bruit.  Chest:  Clear to auscultation and resonant.  Heart:  Regular rate and rhythm, no murmur or gallop could be appreciated.  Abdomen:  Soft, nontender, bowel sounds present, normoactive, no palpable hepatosplenomegaly.  Extremities:  No edema, pulses present, no cyanosis or clubbing.  CNS:  Patient alert, oriented to time, place and person.    No new deficit.  Cranial nerves 2-12 grossly intact  Skin:  No active rash.  Musculoskeletal:  No  apparent joint swelling or erythema, range of movement normal.    Last Recorded Vitals  Heart Rate:  [81-94]   Temp:  [36.6 °C (97.9 °F)-37.1 °C (98.8 °F)]   Resp:  [16-18]   BP: (102-125)/(58-70)   SpO2:  [99 %-100 %]     Intake/Output last 3 Shifts:  No intake/output data recorded.    Relevant Results  Susceptibility data from last 90 days.  Collected Specimen Info Organism Amoxicillin/Clavulanate Ampicillin Ampicillin/Sulbactam Cefazolin Cefazolin (uncomplicated UTIs  only) Ciprofloxacin Gentamicin Levofloxacin Nitrofurantoin Penicillin Piperacillin/Tazobactam   05/15/24 Urine from Clean Catch/Voided Enterococcus faecium   S     R   S  S  S      Klebsiella pneumoniae/variicola  S  R  S  S  S  S  S   I   S     Collected Specimen Info Organism Trimethoprim/Sulfamethoxazole Vancomycin   05/15/24 Urine from Clean Catch/Voided Enterococcus faecium  R  S     Klebsiella pneumoniae/variicola  S      Results for orders placed or performed during the hospital encounter of 05/25/24 (from the past 24 hour(s))   Urinalysis with Reflex Culture and Microscopic   Result Value Ref Range    Color, Urine Yellow Light-Yellow, Yellow, Dark-Yellow    Appearance, Urine Turbid (N) Clear    Specific Gravity, Urine 1.022 1.005 - 1.035    pH, Urine 7.5 5.0, 5.5, 6.0, 6.5, 7.0, 7.5, 8.0    Protein, Urine 10 (TRACE) NEGATIVE, 10 (TRACE), 20 (TRACE) mg/dL    Glucose, Urine 1000 (4+) (A) Normal mg/dL    Blood, Urine 0.03 (TRACE) (A) NEGATIVE    Ketones, Urine NEGATIVE NEGATIVE mg/dL    Bilirubin, Urine NEGATIVE NEGATIVE    Urobilinogen, Urine Normal Normal mg/dL    Nitrite, Urine NEGATIVE NEGATIVE    Leukocyte Esterase, Urine 500 Mckenzie/µL (A) NEGATIVE   Microscopic Only, Urine   Result Value Ref Range    WBC, Urine 11-20 (A) 1-5, NONE /HPF    RBC, Urine 11-20 (A) NONE, 1-2, 3-5 /HPF    Squamous Epithelial Cells, Urine 26-50 (1+) Reference range not established. /HPF    Mucus, Urine FEW Reference range not established. /LPF   CBC and Auto Differential   Result Value Ref Range    WBC 5.0 4.4 - 11.3 x10*3/uL    nRBC 0.0 0.0 - 0.0 /100 WBCs    RBC 3.66 (L) 4.00 - 5.20 x10*6/uL    Hemoglobin 9.0 (L) 12.0 - 16.0 g/dL    Hematocrit 28.9 (L) 36.0 - 46.0 %    MCV 79 (L) 80 - 100 fL    MCH 24.6 (L) 26.0 - 34.0 pg    MCHC 31.1 (L) 32.0 - 36.0 g/dL    RDW 20.2 (H) 11.5 - 14.5 %    Platelets 83 (L) 150 - 450 x10*3/uL    Neutrophils % 59.3 40.0 - 80.0 %    Immature Granulocytes %, Automated 0.2 0.0 - 0.9 %    Lymphocytes %  26.4 13.0 - 44.0 %    Monocytes % 8.9 2.0 - 10.0 %    Eosinophils % 4.8 0.0 - 6.0 %    Basophils % 0.4 0.0 - 2.0 %    Neutrophils Absolute 2.98 1.20 - 7.70 x10*3/uL    Immature Granulocytes Absolute, Automated 0.01 0.00 - 0.70 x10*3/uL    Lymphocytes Absolute 1.33 1.20 - 4.80 x10*3/uL    Monocytes Absolute 0.45 0.10 - 1.00 x10*3/uL    Eosinophils Absolute 0.24 0.00 - 0.70 x10*3/uL    Basophils Absolute 0.02 0.00 - 0.10 x10*3/uL   Comprehensive metabolic panel   Result Value Ref Range    Glucose 253 (H) 65 - 99 mg/dL    Sodium 133 133 - 145 mmol/L    Potassium 4.3 3.4 - 5.1 mmol/L    Chloride 102 97 - 107 mmol/L    Bicarbonate 28 24 - 31 mmol/L    Urea Nitrogen 18 8 - 25 mg/dL    Creatinine 0.70 0.40 - 1.60 mg/dL    eGFR >90 >60 mL/min/1.73m*2    Calcium 8.3 (L) 8.5 - 10.4 mg/dL    Albumin 2.8 (L) 3.5 - 5.0 g/dL    Alkaline Phosphatase 515 (H) 35 - 125 U/L    Total Protein 7.3 5.9 - 7.9 g/dL    AST 48 (H) 5 - 40 U/L    Bilirubin, Total 1.5 (H) 0.1 - 1.2 mg/dL    ALT 27 5 - 40 U/L    Anion Gap 3 <=19 mmol/L   Lipase   Result Value Ref Range    Lipase 54 16 - 63 U/L   Serial Troponin, Initial ([facility])   Result Value Ref Range    Troponin T, High Sensitivity 6 <=14 ng/L   NT Pro-BNP   Result Value Ref Range    PROBNP <36 0 - 192 pg/mL   Morphology   Result Value Ref Range    RBC Morphology See Below     Hypochromia Mild    Serial Troponin, 2 Hour ([facility])   Result Value Ref Range    Troponin T, High Sensitivity <6 <=14 ng/L   POCT GLUCOSE   Result Value Ref Range    POCT Glucose 114 (H) 74 - 99 mg/dL        Current Facility-Administered Medications:     ondansetron (Zofran) injection 4 mg, 4 mg, intravenous, q8h PRN, Luis Alfredo Lawrence MD, 4 mg at 05/26/24 1145    traMADol (Ultram) tablet 50 mg, 50 mg, oral, q6h PRN, Luis Alfredo Lawrence MD, 50 mg at 05/26/24 1445   Assessment/Plan   Principal Problem:    Abdominal pain, generalized  Cirrhosis  Abdominal pain  Hepatic cirrhosis with ascites  Portal  hypertension  Hyperammonemia  Chronic pancreatitis  Pyuria  Hyperlipidemia  Hypertriglyceridemia  Diabetes mellitus type 2 uncontrolled  Moderate protein calorie malnutrition.  Urinary tract infection    Plan: Continue current medication.  Monitor blood sugar q. H&H's cover the sliding scale insulin.  Patient had UTI.  Patient started antibiotics.  Patient has cirrhosis and ascites.  Patient said that recently she had an ultrasound done give supportive care.  Monitor CBC and BMP.  Continue IV diuretics.  Will treat DVT, fall, aspiration, decubitus and weight precaution.  Gastroenterology.  Will take DVT, fall, aspiration, decubitus and DVT precautions.    Luis Alfredo Lawrence MD

## 2024-05-26 NOTE — NURSING NOTE
Patient has no diet order, vital sign order, or diabetic orders. Patient has been on the unit since 04:30am. Dr. Lawrence made aware by multiple nurses. Order only for pain medicine. No other active orders at this time.

## 2024-05-26 NOTE — CARE PLAN
The patient's goals for the shift include      The clinical goals for the shift include reduce abdominal disconfort      Problem: Pain - Adult  Goal: Verbalizes/displays adequate comfort level or baseline comfort level  Outcome: Progressing     Problem: Safety - Adult  Goal: Free from fall injury  Outcome: Progressing     Problem: Discharge Planning  Goal: Discharge to home or other facility with appropriate resources  Outcome: Progressing     Problem: Chronic Conditions and Co-morbidities  Goal: Patient's chronic conditions and co-morbidity symptoms are monitored and maintained or improved  Outcome: Progressing     Problem: Pain  Goal: Takes deep breaths with improved pain control throughout the shift  Outcome: Progressing  Goal: Turns in bed with improved pain control throughout the shift  Outcome: Progressing  Goal: Walks with improved pain control throughout the shift  Outcome: Progressing  Goal: Performs ADL's with improved pain control throughout shift  Outcome: Progressing  Goal: Participates in PT with improved pain control throughout the shift  Outcome: Progressing  Goal: Free from opioid side effects throughout the shift  Outcome: Progressing  Goal: Free from acute confusion related to pain meds throughout the shift  Outcome: Progressing

## 2024-05-26 NOTE — NURSING NOTE
Dr. Lawrence made aware that patient has been here since 04:30 am with no active orders yet. Patient has been complaining of pain since being admitted.

## 2024-05-26 NOTE — NURSING NOTE
Dr. Emerson called for admission orders. Patient has been here since 04:30am without orders. Per Dr. Emerson, call add him to chat and call dr lawrence again. Dr Lawrence called again and said he will put orders in.

## 2024-05-27 LAB
ALBUMIN SERPL-MCNC: 2.3 G/DL (ref 3.5–5)
ALP BLD-CCNC: 387 U/L (ref 35–125)
ALT SERPL-CCNC: 24 U/L (ref 5–40)
ANION GAP SERPL CALC-SCNC: 7 MMOL/L
AST SERPL-CCNC: 50 U/L (ref 5–40)
BACTERIA UR CULT: NO GROWTH
BASOPHILS # BLD AUTO: 0.02 X10*3/UL (ref 0–0.1)
BASOPHILS NFR BLD AUTO: 0.5 %
BILIRUB DIRECT SERPL-MCNC: 0.6 MG/DL (ref 0–0.2)
BILIRUB SERPL-MCNC: 1.1 MG/DL (ref 0.1–1.2)
BUN SERPL-MCNC: 8 MG/DL (ref 8–25)
CALCIUM SERPL-MCNC: 7.9 MG/DL (ref 8.5–10.4)
CHLORIDE SERPL-SCNC: 105 MMOL/L (ref 97–107)
CO2 SERPL-SCNC: 26 MMOL/L (ref 24–31)
CREAT SERPL-MCNC: 0.6 MG/DL (ref 0.4–1.6)
EGFRCR SERPLBLD CKD-EPI 2021: >90 ML/MIN/1.73M*2
EOSINOPHIL # BLD AUTO: 0.23 X10*3/UL (ref 0–0.7)
EOSINOPHIL NFR BLD AUTO: 6.1 %
ERYTHROCYTE [DISTWIDTH] IN BLOOD BY AUTOMATED COUNT: 20.1 % (ref 11.5–14.5)
FERRITIN SERPL-MCNC: 20 NG/ML (ref 13–150)
GLUCOSE BLD MANUAL STRIP-MCNC: 135 MG/DL (ref 74–99)
GLUCOSE BLD MANUAL STRIP-MCNC: 142 MG/DL (ref 74–99)
GLUCOSE BLD MANUAL STRIP-MCNC: 153 MG/DL (ref 74–99)
GLUCOSE BLD MANUAL STRIP-MCNC: 162 MG/DL (ref 74–99)
GLUCOSE BLD MANUAL STRIP-MCNC: 311 MG/DL (ref 74–99)
GLUCOSE SERPL-MCNC: 155 MG/DL (ref 65–99)
HCT VFR BLD AUTO: 26.5 % (ref 36–46)
HGB BLD-MCNC: 8.2 G/DL (ref 12–16)
HYPOCHROMIA BLD QL SMEAR: NORMAL
IMM GRANULOCYTES # BLD AUTO: 0.01 X10*3/UL (ref 0–0.7)
IMM GRANULOCYTES NFR BLD AUTO: 0.3 % (ref 0–0.9)
INR PPP: 1.3 (ref 0.9–1.2)
IRON SATN MFR SERPL: 10 % (ref 12–50)
IRON SERPL-MCNC: 24 UG/DL (ref 30–160)
LYMPHOCYTES # BLD AUTO: 1.3 X10*3/UL (ref 1.2–4.8)
LYMPHOCYTES NFR BLD AUTO: 34.7 %
MCH RBC QN AUTO: 24.3 PG (ref 26–34)
MCHC RBC AUTO-ENTMCNC: 30.9 G/DL (ref 32–36)
MCV RBC AUTO: 79 FL (ref 80–100)
MONOCYTES # BLD AUTO: 0.48 X10*3/UL (ref 0.1–1)
MONOCYTES NFR BLD AUTO: 12.8 %
NEUTROPHILS # BLD AUTO: 1.71 X10*3/UL (ref 1.2–7.7)
NEUTROPHILS NFR BLD AUTO: 45.6 %
NRBC BLD-RTO: 0 /100 WBCS (ref 0–0)
OVALOCYTES BLD QL SMEAR: NORMAL
PLATELET # BLD AUTO: 79 X10*3/UL (ref 150–450)
POTASSIUM SERPL-SCNC: 4.2 MMOL/L (ref 3.4–5.1)
PROT SERPL-MCNC: 6.3 G/DL (ref 5.9–7.9)
PROTHROMBIN TIME: 13.3 SECONDS (ref 9.3–12.7)
RBC # BLD AUTO: 3.37 X10*6/UL (ref 4–5.2)
RBC MORPH BLD: NORMAL
SODIUM SERPL-SCNC: 138 MMOL/L (ref 133–145)
TARGETS BLD QL SMEAR: NORMAL
TIBC SERPL-MCNC: 248 UG/DL (ref 228–428)
UIBC SERPL-MCNC: 224 UG/DL (ref 110–370)
WBC # BLD AUTO: 3.8 X10*3/UL (ref 4.4–11.3)

## 2024-05-27 PROCEDURE — 85025 COMPLETE CBC W/AUTO DIFF WBC: CPT | Performed by: INTERNAL MEDICINE

## 2024-05-27 PROCEDURE — 85610 PROTHROMBIN TIME: CPT

## 2024-05-27 PROCEDURE — 83540 ASSAY OF IRON: CPT

## 2024-05-27 PROCEDURE — 82728 ASSAY OF FERRITIN: CPT

## 2024-05-27 PROCEDURE — 80053 COMPREHEN METABOLIC PANEL: CPT | Performed by: INTERNAL MEDICINE

## 2024-05-27 PROCEDURE — G0378 HOSPITAL OBSERVATION PER HR: HCPCS

## 2024-05-27 PROCEDURE — 82947 ASSAY GLUCOSE BLOOD QUANT: CPT

## 2024-05-27 PROCEDURE — 82248 BILIRUBIN DIRECT: CPT | Performed by: NURSE PRACTITIONER

## 2024-05-27 PROCEDURE — 36415 COLL VENOUS BLD VENIPUNCTURE: CPT

## 2024-05-27 PROCEDURE — 2500000004 HC RX 250 GENERAL PHARMACY W/ HCPCS (ALT 636 FOR OP/ED): Performed by: INTERNAL MEDICINE

## 2024-05-27 PROCEDURE — 2500000002 HC RX 250 W HCPCS SELF ADMINISTERED DRUGS (ALT 637 FOR MEDICARE OP, ALT 636 FOR OP/ED): Performed by: INTERNAL MEDICINE

## 2024-05-27 PROCEDURE — 2500000005 HC RX 250 GENERAL PHARMACY W/O HCPCS: Performed by: INTERNAL MEDICINE

## 2024-05-27 PROCEDURE — 36415 COLL VENOUS BLD VENIPUNCTURE: CPT | Performed by: INTERNAL MEDICINE

## 2024-05-27 PROCEDURE — 2500000001 HC RX 250 WO HCPCS SELF ADMINISTERED DRUGS (ALT 637 FOR MEDICARE OP)

## 2024-05-27 PROCEDURE — 2500000001 HC RX 250 WO HCPCS SELF ADMINISTERED DRUGS (ALT 637 FOR MEDICARE OP): Performed by: INTERNAL MEDICINE

## 2024-05-27 RX ORDER — DEXTROSE 50 % IN WATER (D50W) INTRAVENOUS SYRINGE
12.5
Status: DISCONTINUED | OUTPATIENT
Start: 2024-05-27 | End: 2024-06-01 | Stop reason: HOSPADM

## 2024-05-27 RX ORDER — LACTULOSE 10 G/15ML
20 SOLUTION ORAL 3 TIMES DAILY
Status: DISCONTINUED | OUTPATIENT
Start: 2024-05-27 | End: 2024-06-01 | Stop reason: HOSPADM

## 2024-05-27 RX ORDER — DEXTROSE 50 % IN WATER (D50W) INTRAVENOUS SYRINGE
25
Status: DISCONTINUED | OUTPATIENT
Start: 2024-05-27 | End: 2024-06-01 | Stop reason: HOSPADM

## 2024-05-27 RX ADMIN — SPIRONOLACTONE 50 MG: 50 TABLET ORAL at 20:23

## 2024-05-27 RX ADMIN — FENOFIBRATE 160 MG: 160 TABLET ORAL at 08:06

## 2024-05-27 RX ADMIN — ATORVASTATIN CALCIUM 80 MG: 80 TABLET, FILM COATED ORAL at 20:23

## 2024-05-27 RX ADMIN — INSULIN LISPRO 4 UNITS: 100 INJECTION, SOLUTION INTRAVENOUS; SUBCUTANEOUS at 12:15

## 2024-05-27 RX ADMIN — DICYCLOMINE HYDROCHLORIDE 20 MG: 10 CAPSULE ORAL at 10:20

## 2024-05-27 RX ADMIN — INSULIN LISPRO 4 UNITS: 100 INJECTION, SOLUTION INTRAVENOUS; SUBCUTANEOUS at 15:59

## 2024-05-27 RX ADMIN — DICYCLOMINE HYDROCHLORIDE 20 MG: 10 CAPSULE ORAL at 15:59

## 2024-05-27 RX ADMIN — SUCRALFATE 1 G: 1 SUSPENSION ORAL at 10:20

## 2024-05-27 RX ADMIN — SPIRONOLACTONE 50 MG: 50 TABLET ORAL at 08:07

## 2024-05-27 RX ADMIN — INSULIN GLARGINE 25 UNITS: 100 INJECTION, SOLUTION SUBCUTANEOUS at 08:05

## 2024-05-27 RX ADMIN — METFORMIN HYDROCHLORIDE 1000 MG: 1000 TABLET, FILM COATED ORAL at 15:59

## 2024-05-27 RX ADMIN — LACTULOSE 20 G: 10 SOLUTION ORAL at 08:06

## 2024-05-27 RX ADMIN — TRAMADOL HYDROCHLORIDE 50 MG: 50 TABLET, COATED ORAL at 10:20

## 2024-05-27 RX ADMIN — INSULIN GLARGINE 25 UNITS: 100 INJECTION, SOLUTION SUBCUTANEOUS at 21:00

## 2024-05-27 RX ADMIN — FUROSEMIDE 40 MG: 40 TABLET ORAL at 08:06

## 2024-05-27 RX ADMIN — LACTULOSE 20 G: 10 SOLUTION ORAL at 20:23

## 2024-05-27 RX ADMIN — SUCRALFATE 1 G: 1 SUSPENSION ORAL at 15:58

## 2024-05-27 RX ADMIN — TRAZODONE HYDROCHLORIDE 25 MG: 50 TABLET ORAL at 20:23

## 2024-05-27 RX ADMIN — DICYCLOMINE HYDROCHLORIDE 20 MG: 10 CAPSULE ORAL at 06:43

## 2024-05-27 RX ADMIN — DICYCLOMINE HYDROCHLORIDE 20 MG: 10 CAPSULE ORAL at 20:23

## 2024-05-27 RX ADMIN — ONDANSETRON 4 MG: 4 TABLET, ORALLY DISINTEGRATING ORAL at 18:08

## 2024-05-27 RX ADMIN — DOCUSATE SODIUM 100 MG: 100 CAPSULE, LIQUID FILLED ORAL at 20:23

## 2024-05-27 RX ADMIN — SUCRALFATE 1 G: 1 SUSPENSION ORAL at 06:42

## 2024-05-27 RX ADMIN — URSODIOL 300 MG: 300 CAPSULE ORAL at 16:02

## 2024-05-27 RX ADMIN — POLYETHYLENE GLYCOL 3350 17 G: 17 POWDER, FOR SOLUTION ORAL at 20:24

## 2024-05-27 RX ADMIN — METFORMIN HYDROCHLORIDE 1000 MG: 1000 TABLET, FILM COATED ORAL at 08:00

## 2024-05-27 RX ADMIN — SUCRALFATE 1 G: 1 SUSPENSION ORAL at 20:23

## 2024-05-27 RX ADMIN — TRAMADOL HYDROCHLORIDE 50 MG: 50 TABLET, COATED ORAL at 18:08

## 2024-05-27 RX ADMIN — URSODIOL 300 MG: 300 CAPSULE ORAL at 08:06

## 2024-05-27 RX ADMIN — URSODIOL 300 MG: 300 CAPSULE ORAL at 20:24

## 2024-05-27 ASSESSMENT — ENCOUNTER SYMPTOMS
NEUROLOGICAL NEGATIVE: 1
ENDOCRINE NEGATIVE: 1
MUSCULOSKELETAL NEGATIVE: 1
CONSTITUTIONAL NEGATIVE: 1
RESPIRATORY NEGATIVE: 1
PSYCHIATRIC NEGATIVE: 1
ALLERGIC/IMMUNOLOGIC NEGATIVE: 1
EYES NEGATIVE: 1
ABDOMINAL DISTENTION: 1
HEMATOLOGIC/LYMPHATIC NEGATIVE: 1
CARDIOVASCULAR NEGATIVE: 1

## 2024-05-27 ASSESSMENT — COGNITIVE AND FUNCTIONAL STATUS - GENERAL
MOBILITY SCORE: 24
MOBILITY SCORE: 24
DAILY ACTIVITIY SCORE: 24
DAILY ACTIVITIY SCORE: 24

## 2024-05-27 ASSESSMENT — PAIN SCALES - GENERAL
PAINLEVEL_OUTOF10: 0 - NO PAIN
PAINLEVEL_OUTOF10: 4
PAINLEVEL_OUTOF10: 7
PAINLEVEL_OUTOF10: 8
PAINLEVEL_OUTOF10: 4
PAINLEVEL_OUTOF10: 9

## 2024-05-27 ASSESSMENT — PAIN - FUNCTIONAL ASSESSMENT
PAIN_FUNCTIONAL_ASSESSMENT: 0-10

## 2024-05-27 ASSESSMENT — PAIN DESCRIPTION - LOCATION: LOCATION: ABDOMEN

## 2024-05-27 NOTE — NURSING NOTE
Patient complaining of 8/10 abdomen pain radiating to the back. Patient stated that tramadol provides little relief. Sol Daniels NP made aware.

## 2024-05-27 NOTE — CONSULTS
Chart reviewed , patient is in the shower , case discussed with CNP , agree with current management with Ursodiol and Lactulose , IR consulted

## 2024-05-27 NOTE — CARE PLAN
Problem: Pain - Adult  Goal: Verbalizes/displays adequate comfort level or baseline comfort level  Outcome: Progressing     Problem: Safety - Adult  Goal: Free from fall injury  Outcome: Progressing     Problem: Discharge Planning  Goal: Discharge to home or other facility with appropriate resources  Outcome: Progressing     Problem: Chronic Conditions and Co-morbidities  Goal: Patient's chronic conditions and co-morbidity symptoms are monitored and maintained or improved  Outcome: Progressing     Problem: Pain  Goal: Takes deep breaths with improved pain control throughout the shift  Outcome: Progressing  Goal: Turns in bed with improved pain control throughout the shift  Outcome: Progressing  Goal: Walks with improved pain control throughout the shift  Outcome: Progressing  Goal: Performs ADL's with improved pain control throughout shift  Outcome: Progressing  Goal: Participates in PT with improved pain control throughout the shift  Outcome: Progressing  Goal: Free from opioid side effects throughout the shift  Outcome: Progressing  Goal: Free from acute confusion related to pain meds throughout the shift  Outcome: Progressing   The patient's goals for the shift include      The clinical goals for the shift include pain management

## 2024-05-27 NOTE — PROGRESS NOTES
Alfonzo Flanagan is a 49 y.o. female on day 0 of admission presenting with Abdominal pain, generalized.    Subjective   Patient seen and examined.  Resting in bed in no acute distress.  Awake alert oriented x 3.  Mid, right abdominal pain 8/10.  + Nausea.  Emesis yesterday, none today.  Tolerating clear liquids.  No bowel movement since Saturday, black.  + Dysuria, itching.  Urine yellow.    Objective     Physical Exam  Vitals and nursing note reviewed.   Constitutional:       General: She is not in acute distress.     Appearance: Normal appearance. She is obese. She is not ill-appearing, toxic-appearing or diaphoretic.   HENT:      Head: Normocephalic and atraumatic.      Right Ear: Tympanic membrane normal.      Left Ear: Tympanic membrane normal.      Nose: Nose normal.      Mouth/Throat:      Mouth: Mucous membranes are moist.      Pharynx: Oropharynx is clear.   Eyes:      Extraocular Movements: Extraocular movements intact.      Conjunctiva/sclera: Conjunctivae normal.      Pupils: Pupils are equal, round, and reactive to light.   Cardiovascular:      Rate and Rhythm: Normal rate and regular rhythm.      Pulses: Normal pulses.      Heart sounds: Normal heart sounds. No murmur heard.  Pulmonary:      Effort: Pulmonary effort is normal. No respiratory distress.      Breath sounds: Normal breath sounds. No wheezing or rales.   Abdominal:      General: Bowel sounds are normal. There is distension.      Palpations: Abdomen is soft.      Tenderness: There is abdominal tenderness.      Comments: + Ascites.   Genitourinary:     Comments: Deferred.  Musculoskeletal:         General: No swelling or tenderness. Normal range of motion.      Cervical back: Normal range of motion and neck supple.   Skin:     General: Skin is warm and dry.      Capillary Refill: Capillary refill takes less than 2 seconds.   Neurological:      General: No focal deficit present.      Mental Status: She is alert and oriented to person,  "place, and time.   Psychiatric:         Mood and Affect: Mood normal.         Behavior: Behavior normal.       Last Recorded Vitals  Blood pressure 109/59, pulse 81, temperature 36.7 °C (98.1 °F), temperature source Oral, resp. rate 18, height 1.6 m (5' 3\"), weight 81.6 kg (180 lb), SpO2 95%.    Intake/Output last 3 Shifts:  I/O last 3 completed shifts:  In: 750 (9.2 mL/kg) [P.O.:750]  Out: - (0 mL/kg)   Weight: 81.6 kg     Relevant Results  Results for orders placed or performed during the hospital encounter of 05/25/24 (from the past 24 hour(s))   POCT GLUCOSE   Result Value Ref Range    POCT Glucose 311 (H) 74 - 99 mg/dL   POCT GLUCOSE   Result Value Ref Range    POCT Glucose 142 (H) 74 - 99 mg/dL   CBC and Auto Differential   Result Value Ref Range    WBC 3.8 (L) 4.4 - 11.3 x10*3/uL    nRBC 0.0 0.0 - 0.0 /100 WBCs    RBC 3.37 (L) 4.00 - 5.20 x10*6/uL    Hemoglobin 8.2 (L) 12.0 - 16.0 g/dL    Hematocrit 26.5 (L) 36.0 - 46.0 %    MCV 79 (L) 80 - 100 fL    MCH 24.3 (L) 26.0 - 34.0 pg    MCHC 30.9 (L) 32.0 - 36.0 g/dL    RDW 20.1 (H) 11.5 - 14.5 %    Platelets 79 (L) 150 - 450 x10*3/uL    Neutrophils % 45.6 40.0 - 80.0 %    Immature Granulocytes %, Automated 0.3 0.0 - 0.9 %    Lymphocytes % 34.7 13.0 - 44.0 %    Monocytes % 12.8 2.0 - 10.0 %    Eosinophils % 6.1 0.0 - 6.0 %    Basophils % 0.5 0.0 - 2.0 %    Neutrophils Absolute 1.71 1.20 - 7.70 x10*3/uL    Immature Granulocytes Absolute, Automated 0.01 0.00 - 0.70 x10*3/uL    Lymphocytes Absolute 1.30 1.20 - 4.80 x10*3/uL    Monocytes Absolute 0.48 0.10 - 1.00 x10*3/uL    Eosinophils Absolute 0.23 0.00 - 0.70 x10*3/uL    Basophils Absolute 0.02 0.00 - 0.10 x10*3/uL   Basic Metabolic Panel   Result Value Ref Range    Glucose 155 (H) 65 - 99 mg/dL    Sodium 138 133 - 145 mmol/L    Potassium 4.2 3.4 - 5.1 mmol/L    Chloride 105 97 - 107 mmol/L    Bicarbonate 26 24 - 31 mmol/L    Urea Nitrogen 8 8 - 25 mg/dL    Creatinine 0.60 0.40 - 1.60 mg/dL    eGFR >90 >60 " mL/min/1.73m*2    Calcium 7.9 (L) 8.5 - 10.4 mg/dL    Anion Gap 7 <=19 mmol/L   Morphology   Result Value Ref Range    RBC Morphology See Below     Hypochromia Mild     Target Cells Few     Ovalocytes Few    Hepatic function panel   Result Value Ref Range    AST 50 (H) 5 - 40 U/L    ALT 24 5 - 40 U/L    Alkaline Phosphatase 387 (H) 35 - 125 U/L    Bilirubin, Total 1.1 0.1 - 1.2 mg/dL    Bilirubin, Direct 0.6 (H) 0.0 - 0.2 mg/dL    Total Protein 6.3 5.9 - 7.9 g/dL    Albumin 2.3 (L) 3.5 - 5.0 g/dL     Susceptibility data from last 90 days.  Collected Specimen Info Organism Amoxicillin/Clavulanate Ampicillin Ampicillin/Sulbactam Cefazolin Cefazolin (uncomplicated UTIs only) Ciprofloxacin Gentamicin Levofloxacin Nitrofurantoin Penicillin Piperacillin/Tazobactam   05/15/24 Urine from Clean Catch/Voided Enterococcus faecium   S     R   S  S  S      Klebsiella pneumoniae/variicola  S  R  S  S  S  S  S   I   S     Collected Specimen Info Organism Trimethoprim/Sulfamethoxazole Vancomycin   05/15/24 Urine from Clean Catch/Voided Enterococcus faecium  R  S     Klebsiella pneumoniae/variicola  S      CT abdomen pelvis w IV contrast    Result Date: 5/25/2024  Interpreted By:  Finkelstein, Evan, STUDY: CT ABDOMEN PELVIS W IV CONTRAST;  5/25/2024 8:04 pm   INDICATION: Signs/Symptoms:abdominal pain and distension, hx of cirrhosis, concerned for ascites.   COMPARISON: None.   ACCESSION NUMBER(S): [id number]   ORDERING CLINICIAN: BERTHA SCHILLING   TECHNIQUE: Axial CT images of the abdomen and pelvis with coronal and sagittal reconstructed images obtained after intravenous administration of   FINDINGS: LOWER CHEST: No acute abnormality of the lung bases.   ABDOMEN:   LIVER: Nodular contour of the liver. BILE DUCTS: Normal caliber. GALLBLADDER: Surgically absent. PORTAL VEIN: Patent SPLEEN: Unremarkable. PANCREAS: Unremarkable. ADRENALS: Unremarkable. KIDNEYS, URETERS and URINARY BLADDER: Symmetric renal enhancement. No  hydronephrosis or perinephric fluid collection.  Bladder is within normal limits REPRODUCTIVE ORGANS: No pelvic masses.   ABDOMINAL WALL: Diffuse subcutaneous body wall edema. Supraumbilical ventral abdominal wall hernia defect containing fat and fluid. PERITONEUM: No free air. Moderate volume ascites and stranding diffusely throughout the mesentery.   BOWEL: Underdistention versus wall thickening diffusely throughout the small and large bowel as well as the stomach. Nondilated appendix.   VESSELS: No aortic aneurysm. Mild aortoiliac calcifications. RETROPERITONEUM: No pathologically enlarged retroperitoneal lymph nodes.   BONES: No acute osseous abnormality.       Cirrhotic morphology of the liver with moderate volume ascites and stranding diffusely throughout the mesentery.   Supraumbilical ventral abdominal wall hernia containing fat and fluid.   Underdistention versus wall thickening diffusely throughout the small and large bowel as well as the stomach. Findings may be related to passive congestion. Enterocolitis or gastritis, however, is not excluded in the appropriate clinical setting.     MACRO: None.   Signed by: Evan Finkelstein 5/25/2024 8:48 PM Dictation workstation:   [id number]    XR chest 2 views    Result Date: 5/25/2024  Interpreted By:  Cj Lee, STUDY: XR CHEST 2 VIEWS;  5/25/2024 7:28 pm   INDICATION: Signs/Symptoms:sob.   COMPARISON: Chest x-ray 05/15/2024   ACCESSION NUMBER(S): [id number]   ORDERING CLINICIAN: BERTHA SCHILLING   FINDINGS:     CARDIOMEDIASTINAL SILHOUETTE: Cardiomediastinal silhouette is normal in size and configuration.   LUNGS: No consolidation, pleural effusion or pneumothorax.   ABDOMEN: Surgical clips noted in the right upper quadrant.   BONES: No acute osseous abnormality. Calcifications along bilateral humeral heads likely relate to calcific tendinosis.       No acute cardiopulmonary process.   MACRO: None   Signed by: Cj Lee 5/25/2024 8:00 PM Dictation  workstation:   [id number]     Scheduled medications  atorvastatin, 80 mg, oral, Nightly  dicyclomine, 20 mg, oral, Before meals & nightly  docusate sodium, 100 mg, oral, BID  ergocalciferol, 50,000 Units, oral, Every Sunday  fenofibrate, 160 mg, oral, Daily  furosemide, 40 mg, oral, Daily  insulin glargine, 25 Units, subcutaneous, BID  insulin lispro, 4 Units, subcutaneous, TID  lactulose, 20 g, oral, Daily  lidocaine, 1 patch, transdermal, Daily  metFORMIN, 1,000 mg, oral, BID  polyethylene glycol, 17 g, oral, BID  spironolactone, 50 mg, oral, BID  sucralfate, 1 g, oral, Before meals & nightly  traZODone, 25 mg, oral, Nightly  ursodiol, 300 mg, oral, TID      Continuous medications     PRN medications  PRN medications: dextrose, dextrose, glucagon, glucagon, ondansetron ODT, traMADol      ASSESSMENT:  Abdominal pain  Hepatic cirrhosis with ascites  Portal hypertension  Chronic pancreatitis  Hyperammonemia  Melena  Leukopenia  Microcytic anemia  Hyperlipidemia  Hypertriglyceridemia  Urinary tract infection  Recent urinary tract infection - Klebsiella pneumoniae/variicola, Enterococcus faecium  Moderate protein calorie malnutrition  Type 2 diabetes mellitus    PLAN:  Gastroenterology consultation.  Diuretics.  Lasix.  Spironolactone.  Clear liquid diet.  Low sodium diet when advanced.  IR consultation for paracentesis per Gastroenterology.  Symptom control.  Analgesics.  Monitor stool.  Monitor H&H.  Avoid constipation.  Urine culture pending.  Follow-up results.  Monitor I's and O's.  Monitor temperature and white blood cell count.  Monitor point of care glucose.  Insulin.  Hypoglycemia protocol.  Increase activity as tolerated.  Supportive care.  Patient reassured.  Discussed with patient, Dr. Lawrence.      Discussed with Gastroenterology Adrianne GONZALEZ CNP.      MADYSON Sotelo-CNP

## 2024-05-27 NOTE — CONSULTS
Inpatient consult to Gastroenterology  Consult performed by: Adrianne Powell, MADYSON-CNP  Consult ordered by: Luis Alfredo Lawrence MD          Reason For Consult  Ascites/Cirrhosis     History Of Present Illness  Alfonzo Flanagan is a 49 y.o. female presenting with with abdominal pain, nausea, distention.  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  Patient has been seen by our service for for similar symptoms in the past. She is primarily followed by Dr. Whitlock, she had a colonoscopy on 6/5/2023 at [facility] showed hemorrhoids found on perianal exam. The examination was otherwise normal. EGD on 12/27/23 Esophageal ulcer, no bleeding. No varices. ER workup showed CT abdomen Cirrhotic morphology of the liver with moderate volume ascites and stranding diffusely throughout the mesentery.  Patient seen and examined this morning, her main complaint is abdominal distention and constipation.  She reports feeling more constipated the last couple days, no BM yesterday or today.  Reports she had a dark BM a few days ago. Patient is not currently on any blood thinners, denies any use of NSAIDs, alcohol.      Past Medical History  She has no past medical history on file.    Surgical History  She has a past surgical history that includes CT guided imaging for needle placement (10/14/2020) and US guided abdominal paracentesis (10/8/2020).     Social History  She reports that she has never smoked. She has never used smokeless tobacco. She reports that she does not drink alcohol and does not use drugs.    Family History  No family history on file.     Allergies  Gluten    Review of Systems   Constitutional: Negative.    HENT: Negative.     Eyes: Negative.    Respiratory: Negative.     Cardiovascular: Negative.    Gastrointestinal:  Positive for abdominal distention.   Endocrine: Negative.    Genitourinary: Negative.    Musculoskeletal:  "Negative.    Skin: Negative.    Allergic/Immunologic: Negative.    Neurological: Negative.    Hematological: Negative.    Psychiatric/Behavioral: Negative.          Physical Exam  HENT:      Head: Normocephalic.      Nose: Nose normal.   Eyes:      Pupils: Pupils are equal, round, and reactive to light.   Pulmonary:      Effort: Pulmonary effort is normal.   Abdominal:      General: There is distension.   Musculoskeletal:         General: Normal range of motion.      Cervical back: Normal range of motion.   Skin:     General: Skin is warm.   Neurological:      General: No focal deficit present.      Mental Status: She is alert.   Psychiatric:         Mood and Affect: Mood normal.          Last Recorded Vitals  Blood pressure 109/59, pulse 81, temperature 36.7 °C (98.1 °F), temperature source Oral, resp. rate 18, height 1.6 m (5' 3\"), weight 81.6 kg (180 lb), SpO2 95%.    Relevant Results  CT abdomen pelvis w IV contrast    Result Date: 5/25/2024  Interpreted By:  Finkelstein, Evan, STUDY: CT ABDOMEN PELVIS W IV CONTRAST;  5/25/2024 8:04 pm   INDICATION: Signs/Symptoms:abdominal pain and distension, hx of cirrhosis, concerned for ascites.   COMPARISON: None.   ACCESSION NUMBER(S): [id number]   ORDERING CLINICIAN: BERTHA SCHILLING   TECHNIQUE: Axial CT images of the abdomen and pelvis with coronal and sagittal reconstructed images obtained after intravenous administration of   FINDINGS: LOWER CHEST: No acute abnormality of the lung bases.   ABDOMEN:   LIVER: Nodular contour of the liver. BILE DUCTS: Normal caliber. GALLBLADDER: Surgically absent. PORTAL VEIN: Patent SPLEEN: Unremarkable. PANCREAS: Unremarkable. ADRENALS: Unremarkable. KIDNEYS, URETERS and URINARY BLADDER: Symmetric renal enhancement. No hydronephrosis or perinephric fluid collection.  Bladder is within normal limits REPRODUCTIVE ORGANS: No pelvic masses.   ABDOMINAL WALL: Diffuse subcutaneous body wall edema. Supraumbilical ventral abdominal wall " hernia defect containing fat and fluid. PERITONEUM: No free air. Moderate volume ascites and stranding diffusely throughout the mesentery.   BOWEL: Underdistention versus wall thickening diffusely throughout the small and large bowel as well as the stomach. Nondilated appendix.   VESSELS: No aortic aneurysm. Mild aortoiliac calcifications. RETROPERITONEUM: No pathologically enlarged retroperitoneal lymph nodes.   BONES: No acute osseous abnormality.       Cirrhotic morphology of the liver with moderate volume ascites and stranding diffusely throughout the mesentery.   Supraumbilical ventral abdominal wall hernia containing fat and fluid.   Underdistention versus wall thickening diffusely throughout the small and large bowel as well as the stomach. Findings may be related to passive congestion. Enterocolitis or gastritis, however, is not excluded in the appropriate clinical setting.     MACRO: None.   Signed by: Evan Finkelstein 5/25/2024 8:48 PM Dictation workstation:   [id number]    XR chest 2 views    Result Date: 5/25/2024  Interpreted By:  Cj Lee, STUDY: XR CHEST 2 VIEWS;  5/25/2024 7:28 pm   INDICATION: Signs/Symptoms:sob.   COMPARISON: Chest x-ray 05/15/2024   ACCESSION NUMBER(S): [id number]   ORDERING CLINICIAN: BERTHA SCHILLING   FINDINGS:     CARDIOMEDIASTINAL SILHOUETTE: Cardiomediastinal silhouette is normal in size and configuration.   LUNGS: No consolidation, pleural effusion or pneumothorax.   ABDOMEN: Surgical clips noted in the right upper quadrant.   BONES: No acute osseous abnormality. Calcifications along bilateral humeral heads likely relate to calcific tendinosis.       No acute cardiopulmonary process.   MACRO: None   Signed by: Cj Lee 5/25/2024 8:00 PM Dictation workstation:   [id number]    Scheduled medications  atorvastatin, 80 mg, oral, Nightly  dicyclomine, 20 mg, oral, Before meals & nightly  docusate sodium, 100 mg, oral, BID  ergocalciferol, 50,000 Units, oral,  Every Sunday  fenofibrate, 160 mg, oral, Daily  furosemide, 40 mg, oral, Daily  insulin glargine, 25 Units, subcutaneous, BID  insulin lispro, 4 Units, subcutaneous, TID  lactulose, 20 g, oral, Daily  lidocaine, 1 patch, transdermal, Daily  metFORMIN, 1,000 mg, oral, BID  polyethylene glycol, 17 g, oral, BID  spironolactone, 50 mg, oral, BID  sucralfate, 1 g, oral, Before meals & nightly  traZODone, 25 mg, oral, Nightly  ursodiol, 300 mg, oral, TID      Continuous medications     PRN medications  PRN medications: dextrose, dextrose, glucagon, glucagon, ondansetron ODT, traMADol  Results for orders placed or performed during the hospital encounter of 05/25/24 (from the past 24 hour(s))   POCT GLUCOSE   Result Value Ref Range    POCT Glucose 311 (H) 74 - 99 mg/dL   POCT GLUCOSE   Result Value Ref Range    POCT Glucose 142 (H) 74 - 99 mg/dL   CBC and Auto Differential   Result Value Ref Range    WBC 3.8 (L) 4.4 - 11.3 x10*3/uL    nRBC 0.0 0.0 - 0.0 /100 WBCs    RBC 3.37 (L) 4.00 - 5.20 x10*6/uL    Hemoglobin 8.2 (L) 12.0 - 16.0 g/dL    Hematocrit 26.5 (L) 36.0 - 46.0 %    MCV 79 (L) 80 - 100 fL    MCH 24.3 (L) 26.0 - 34.0 pg    MCHC 30.9 (L) 32.0 - 36.0 g/dL    RDW 20.1 (H) 11.5 - 14.5 %    Platelets 79 (L) 150 - 450 x10*3/uL    Neutrophils % 45.6 40.0 - 80.0 %    Immature Granulocytes %, Automated 0.3 0.0 - 0.9 %    Lymphocytes % 34.7 13.0 - 44.0 %    Monocytes % 12.8 2.0 - 10.0 %    Eosinophils % 6.1 0.0 - 6.0 %    Basophils % 0.5 0.0 - 2.0 %    Neutrophils Absolute 1.71 1.20 - 7.70 x10*3/uL    Immature Granulocytes Absolute, Automated 0.01 0.00 - 0.70 x10*3/uL    Lymphocytes Absolute 1.30 1.20 - 4.80 x10*3/uL    Monocytes Absolute 0.48 0.10 - 1.00 x10*3/uL    Eosinophils Absolute 0.23 0.00 - 0.70 x10*3/uL    Basophils Absolute 0.02 0.00 - 0.10 x10*3/uL   Basic Metabolic Panel   Result Value Ref Range    Glucose 155 (H) 65 - 99 mg/dL    Sodium 138 133 - 145 mmol/L    Potassium 4.2 3.4 - 5.1 mmol/L    Chloride 105 97  - 107 mmol/L    Bicarbonate 26 24 - 31 mmol/L    Urea Nitrogen 8 8 - 25 mg/dL    Creatinine 0.60 0.40 - 1.60 mg/dL    eGFR >90 >60 mL/min/1.73m*2    Calcium 7.9 (L) 8.5 - 10.4 mg/dL    Anion Gap 7 <=19 mmol/L   Morphology   Result Value Ref Range    RBC Morphology See Below     Hypochromia Mild     Target Cells Few     Ovalocytes Few    Hepatic function panel   Result Value Ref Range    AST 50 (H) 5 - 40 U/L    ALT 24 5 - 40 U/L    Alkaline Phosphatase 387 (H) 35 - 125 U/L    Bilirubin, Total 1.1 0.1 - 1.2 mg/dL    Bilirubin, Direct 0.6 (H) 0.0 - 0.2 mg/dL    Total Protein 6.3 5.9 - 7.9 g/dL    Albumin 2.3 (L) 3.5 - 5.0 g/dL        Assessment/Plan     #Liver Cirrhosis due to primary biliary cholangitis   HCC- AFP ordered  HE: Monitor mental status, A/O x 3, continue Lactulose as ordered   EV: monitor for varices, last EGD on 12/27/23 by Dr. Zaman at [facility] Normal oropharynx. Esophageal ulcer, no bleeding and no stigmata of recent bleeding.  Ascites: CT moderate volume ascites and stranding diffusely throughout the mesentery. Will continue lasix/aldactone.  IR consult placed for para, fluid analysis ordered   Continue to monitor Hepatic panel, CBC, INR  Hgb remains stable 8.2. No overt bleeding     Adrianne Powell, APRN-CNP

## 2024-05-27 NOTE — CARE PLAN
The patient's goals for the shift include      The clinical goals for the shift include pain management      Problem: Pain - Adult  Goal: Verbalizes/displays adequate comfort level or baseline comfort level  Outcome: Progressing     Problem: Safety - Adult  Goal: Free from fall injury  Outcome: Progressing     Problem: Discharge Planning  Goal: Discharge to home or other facility with appropriate resources  Outcome: Progressing     Problem: Chronic Conditions and Co-morbidities  Goal: Patient's chronic conditions and co-morbidity symptoms are monitored and maintained or improved  Outcome: Progressing     Problem: Pain  Goal: Takes deep breaths with improved pain control throughout the shift  Outcome: Progressing  Goal: Turns in bed with improved pain control throughout the shift  Outcome: Progressing  Goal: Walks with improved pain control throughout the shift  Outcome: Progressing  Goal: Performs ADL's with improved pain control throughout shift  Outcome: Progressing  Goal: Participates in PT with improved pain control throughout the shift  Outcome: Progressing  Goal: Free from opioid side effects throughout the shift  Outcome: Progressing  Goal: Free from acute confusion related to pain meds throughout the shift  Outcome: Progressing

## 2024-05-28 ENCOUNTER — APPOINTMENT (OUTPATIENT)
Dept: RADIOLOGY | Facility: HOSPITAL | Age: 49
End: 2024-05-28
Payer: COMMERCIAL

## 2024-05-28 ENCOUNTER — DOCUMENTATION (OUTPATIENT)
Dept: RESEARCH | Age: 49
End: 2024-05-28

## 2024-05-28 LAB
ALBUMIN SERPL-MCNC: 2.4 G/DL (ref 3.5–5)
ALP BLD-CCNC: 345 U/L (ref 35–125)
ALT SERPL-CCNC: 24 U/L (ref 5–40)
ANION GAP SERPL CALC-SCNC: 7 MMOL/L
AST SERPL-CCNC: 48 U/L (ref 5–40)
BASOPHILS NFR FLD MANUAL: 0 %
BILIRUB SERPL-MCNC: 1.2 MG/DL (ref 0.1–1.2)
BLASTS NFR FLD MANUAL: 0 %
BUN SERPL-MCNC: 8 MG/DL (ref 8–25)
CALCIUM SERPL-MCNC: 8.6 MG/DL (ref 8.5–10.4)
CHLORIDE SERPL-SCNC: 104 MMOL/L (ref 97–107)
CLARITY FLD: ABNORMAL
CO2 SERPL-SCNC: 27 MMOL/L (ref 24–31)
COLOR FLD: YELLOW
CREAT SERPL-MCNC: 0.7 MG/DL (ref 0.4–1.6)
EGFRCR SERPLBLD CKD-EPI 2021: >90 ML/MIN/1.73M*2
EOSINOPHIL NFR FLD MANUAL: 0 %
ERYTHROCYTE [DISTWIDTH] IN BLOOD BY AUTOMATED COUNT: 19.9 % (ref 11.5–14.5)
GLUCOSE BLD MANUAL STRIP-MCNC: 108 MG/DL (ref 74–99)
GLUCOSE BLD MANUAL STRIP-MCNC: 168 MG/DL (ref 74–99)
GLUCOSE BLD MANUAL STRIP-MCNC: 76 MG/DL (ref 74–99)
GLUCOSE SERPL-MCNC: 80 MG/DL (ref 65–99)
HCT VFR BLD AUTO: 28.8 % (ref 36–46)
HGB BLD-MCNC: 8.8 G/DL (ref 12–16)
IMMATURE GRANULOCYTES IN FLUID: 0 %
LYMPHOCYTES NFR FLD MANUAL: 22 %
MCH RBC QN AUTO: 23.7 PG (ref 26–34)
MCHC RBC AUTO-ENTMCNC: 30.6 G/DL (ref 32–36)
MCV RBC AUTO: 78 FL (ref 80–100)
MONOS+MACROS NFR FLD MANUAL: 68 %
NEUTROPHILS NFR FLD MANUAL: 3 %
NRBC BLD-RTO: 0 /100 WBCS (ref 0–0)
OTHER CELLS NFR FLD MANUAL: 7 %
PLASMA CELLS NFR FLD MANUAL: 0 %
PLATELET # BLD AUTO: 84 X10*3/UL (ref 150–450)
POTASSIUM SERPL-SCNC: 4.1 MMOL/L (ref 3.4–5.1)
PROT SERPL-MCNC: 6.5 G/DL (ref 5.9–7.9)
RBC # BLD AUTO: 3.71 X10*6/UL (ref 4–5.2)
RBC # FLD AUTO: 472 /UL
SODIUM SERPL-SCNC: 138 MMOL/L (ref 133–145)
TOTAL CELLS COUNTED PRT: 100
WBC # BLD AUTO: 4.1 X10*3/UL (ref 4.4–11.3)
WBC # FLD AUTO: 254 /UL

## 2024-05-28 PROCEDURE — 2500000002 HC RX 250 W HCPCS SELF ADMINISTERED DRUGS (ALT 637 FOR MEDICARE OP, ALT 636 FOR OP/ED): Performed by: INTERNAL MEDICINE

## 2024-05-28 PROCEDURE — 0W9G3ZZ DRAINAGE OF PERITONEAL CAVITY, PERCUTANEOUS APPROACH: ICD-10-PCS | Performed by: RADIOLOGY

## 2024-05-28 PROCEDURE — 2500000001 HC RX 250 WO HCPCS SELF ADMINISTERED DRUGS (ALT 637 FOR MEDICARE OP): Performed by: NURSE PRACTITIONER

## 2024-05-28 PROCEDURE — 85027 COMPLETE CBC AUTOMATED: CPT | Performed by: NURSE PRACTITIONER

## 2024-05-28 PROCEDURE — C1729 CATH, DRAINAGE: HCPCS

## 2024-05-28 PROCEDURE — 2500000005 HC RX 250 GENERAL PHARMACY W/O HCPCS: Performed by: RADIOLOGY

## 2024-05-28 PROCEDURE — 80053 COMPREHEN METABOLIC PANEL: CPT | Performed by: NURSE PRACTITIONER

## 2024-05-28 PROCEDURE — 2720000007 HC OR 272 NO HCPCS

## 2024-05-28 PROCEDURE — 88104 CYTOPATH FL NONGYN SMEARS: CPT | Performed by: PATHOLOGY

## 2024-05-28 PROCEDURE — 49083 ABD PARACENTESIS W/IMAGING: CPT

## 2024-05-28 PROCEDURE — 76705 ECHO EXAM OF ABDOMEN: CPT | Performed by: RADIOLOGY

## 2024-05-28 PROCEDURE — 2500000001 HC RX 250 WO HCPCS SELF ADMINISTERED DRUGS (ALT 637 FOR MEDICARE OP)

## 2024-05-28 PROCEDURE — 2500000001 HC RX 250 WO HCPCS SELF ADMINISTERED DRUGS (ALT 637 FOR MEDICARE OP): Performed by: INTERNAL MEDICINE

## 2024-05-28 PROCEDURE — 82947 ASSAY GLUCOSE BLOOD QUANT: CPT

## 2024-05-28 PROCEDURE — 89051 BODY FLUID CELL COUNT: CPT

## 2024-05-28 PROCEDURE — G0378 HOSPITAL OBSERVATION PER HR: HCPCS

## 2024-05-28 PROCEDURE — 2500000004 HC RX 250 GENERAL PHARMACY W/ HCPCS (ALT 636 FOR OP/ED): Performed by: INTERNAL MEDICINE

## 2024-05-28 PROCEDURE — 36415 COLL VENOUS BLD VENIPUNCTURE: CPT | Performed by: NURSE PRACTITIONER

## 2024-05-28 RX ORDER — BISACODYL 10 MG/1
10 SUPPOSITORY RECTAL DAILY
Status: DISCONTINUED | OUTPATIENT
Start: 2024-05-28 | End: 2024-06-01 | Stop reason: HOSPADM

## 2024-05-28 RX ORDER — LIDOCAINE HYDROCHLORIDE 10 MG/ML
INJECTION, SOLUTION EPIDURAL; INFILTRATION; INTRACAUDAL; PERINEURAL
Status: COMPLETED | OUTPATIENT
Start: 2024-05-28 | End: 2024-05-28

## 2024-05-28 RX ADMIN — INSULIN LISPRO 4 UNITS: 100 INJECTION, SOLUTION INTRAVENOUS; SUBCUTANEOUS at 11:41

## 2024-05-28 RX ADMIN — ATORVASTATIN CALCIUM 80 MG: 80 TABLET, FILM COATED ORAL at 20:14

## 2024-05-28 RX ADMIN — LIDOCAINE HYDROCHLORIDE 10 ML: 10 INJECTION, SOLUTION EPIDURAL; INFILTRATION; INTRACAUDAL; PERINEURAL at 10:53

## 2024-05-28 RX ADMIN — LACTULOSE 20 G: 10 SOLUTION ORAL at 17:26

## 2024-05-28 RX ADMIN — URSODIOL 300 MG: 300 CAPSULE ORAL at 17:26

## 2024-05-28 RX ADMIN — FENOFIBRATE 160 MG: 160 TABLET ORAL at 08:42

## 2024-05-28 RX ADMIN — URSODIOL 300 MG: 300 CAPSULE ORAL at 20:14

## 2024-05-28 RX ADMIN — DOCUSATE SODIUM 100 MG: 100 CAPSULE, LIQUID FILLED ORAL at 08:42

## 2024-05-28 RX ADMIN — SUCRALFATE 1 G: 1 SUSPENSION ORAL at 20:14

## 2024-05-28 RX ADMIN — POLYETHYLENE GLYCOL 3350 17 G: 17 POWDER, FOR SOLUTION ORAL at 20:14

## 2024-05-28 RX ADMIN — DICYCLOMINE HYDROCHLORIDE 20 MG: 10 CAPSULE ORAL at 11:41

## 2024-05-28 RX ADMIN — DICYCLOMINE HYDROCHLORIDE 20 MG: 10 CAPSULE ORAL at 17:26

## 2024-05-28 RX ADMIN — METFORMIN HYDROCHLORIDE 1000 MG: 1000 TABLET, FILM COATED ORAL at 08:43

## 2024-05-28 RX ADMIN — DICYCLOMINE HYDROCHLORIDE 20 MG: 10 CAPSULE ORAL at 20:14

## 2024-05-28 RX ADMIN — DICYCLOMINE HYDROCHLORIDE 20 MG: 10 CAPSULE ORAL at 06:42

## 2024-05-28 RX ADMIN — LACTULOSE 20 G: 10 SOLUTION ORAL at 08:43

## 2024-05-28 RX ADMIN — TRAMADOL HYDROCHLORIDE 50 MG: 50 TABLET, COATED ORAL at 20:14

## 2024-05-28 RX ADMIN — SUCRALFATE 1 G: 1 SUSPENSION ORAL at 11:41

## 2024-05-28 RX ADMIN — SUCRALFATE 1 G: 1 SUSPENSION ORAL at 17:26

## 2024-05-28 RX ADMIN — METFORMIN HYDROCHLORIDE 1000 MG: 1000 TABLET, FILM COATED ORAL at 17:26

## 2024-05-28 RX ADMIN — SUCRALFATE 1 G: 1 SUSPENSION ORAL at 06:42

## 2024-05-28 RX ADMIN — BISACODYL 10 MG: 10 SUPPOSITORY RECTAL at 11:41

## 2024-05-28 RX ADMIN — SPIRONOLACTONE 50 MG: 50 TABLET ORAL at 08:43

## 2024-05-28 RX ADMIN — SPIRONOLACTONE 50 MG: 50 TABLET ORAL at 20:14

## 2024-05-28 RX ADMIN — FUROSEMIDE 40 MG: 40 TABLET ORAL at 08:42

## 2024-05-28 RX ADMIN — INSULIN GLARGINE 25 UNITS: 100 INJECTION, SOLUTION SUBCUTANEOUS at 20:15

## 2024-05-28 RX ADMIN — DOCUSATE SODIUM 100 MG: 100 CAPSULE, LIQUID FILLED ORAL at 20:14

## 2024-05-28 RX ADMIN — LACTULOSE 20 G: 10 SOLUTION ORAL at 20:13

## 2024-05-28 RX ADMIN — TRAZODONE HYDROCHLORIDE 25 MG: 50 TABLET ORAL at 20:14

## 2024-05-28 RX ADMIN — POLYETHYLENE GLYCOL 3350 17 G: 17 POWDER, FOR SOLUTION ORAL at 08:43

## 2024-05-28 RX ADMIN — URSODIOL 300 MG: 300 CAPSULE ORAL at 08:43

## 2024-05-28 SDOH — HEALTH STABILITY: PHYSICAL HEALTH: ON AVERAGE, HOW MANY MINUTES DO YOU ENGAGE IN EXERCISE AT THIS LEVEL?: 20 MIN

## 2024-05-28 SDOH — SOCIAL STABILITY: SOCIAL NETWORK: ARE YOU MARRIED, WIDOWED, DIVORCED, SEPARATED, NEVER MARRIED, OR LIVING WITH A PARTNER?: MARRIED

## 2024-05-28 SDOH — ECONOMIC STABILITY: INCOME INSECURITY: IN THE PAST 12 MONTHS, HAS THE ELECTRIC, GAS, OIL, OR WATER COMPANY THREATENED TO SHUT OFF SERVICE IN YOUR HOME?: NO

## 2024-05-28 SDOH — ECONOMIC STABILITY: FOOD INSECURITY: WITHIN THE PAST 12 MONTHS, THE FOOD YOU BOUGHT JUST DIDN'T LAST AND YOU DIDN'T HAVE MONEY TO GET MORE.: NEVER TRUE

## 2024-05-28 SDOH — ECONOMIC STABILITY: FOOD INSECURITY: WITHIN THE PAST 12 MONTHS, YOU WORRIED THAT YOUR FOOD WOULD RUN OUT BEFORE YOU GOT MONEY TO BUY MORE.: NEVER TRUE

## 2024-05-28 SDOH — SOCIAL STABILITY: SOCIAL INSECURITY: WITHIN THE LAST YEAR, HAVE YOU BEEN AFRAID OF YOUR PARTNER OR EX-PARTNER?: NO

## 2024-05-28 SDOH — ECONOMIC STABILITY: INCOME INSECURITY: HOW HARD IS IT FOR YOU TO PAY FOR THE VERY BASICS LIKE FOOD, HOUSING, MEDICAL CARE, AND HEATING?: HARD

## 2024-05-28 SDOH — HEALTH STABILITY: MENTAL HEALTH: HOW MANY STANDARD DRINKS CONTAINING ALCOHOL DO YOU HAVE ON A TYPICAL DAY?: PATIENT DOES NOT DRINK

## 2024-05-28 SDOH — ECONOMIC STABILITY: INCOME INSECURITY: IN THE LAST 12 MONTHS, WAS THERE A TIME WHEN YOU WERE NOT ABLE TO PAY THE MORTGAGE OR RENT ON TIME?: NO

## 2024-05-28 SDOH — SOCIAL STABILITY: SOCIAL NETWORK: IN A TYPICAL WEEK, HOW MANY TIMES DO YOU TALK ON THE PHONE WITH FAMILY, FRIENDS, OR NEIGHBORS?: NEVER

## 2024-05-28 SDOH — HEALTH STABILITY: PHYSICAL HEALTH: ON AVERAGE, HOW MANY DAYS PER WEEK DO YOU ENGAGE IN MODERATE TO STRENUOUS EXERCISE (LIKE A BRISK WALK)?: 7 DAYS

## 2024-05-28 SDOH — ECONOMIC STABILITY: HOUSING INSECURITY: IN THE LAST 12 MONTHS, HOW MANY PLACES HAVE YOU LIVED?: 1

## 2024-05-28 SDOH — HEALTH STABILITY: MENTAL HEALTH: HOW OFTEN DO YOU HAVE 6 OR MORE DRINKS ON ONE OCCASION?: NEVER

## 2024-05-28 SDOH — SOCIAL STABILITY: SOCIAL INSECURITY: WITHIN THE LAST YEAR, HAVE YOU BEEN HUMILIATED OR EMOTIONALLY ABUSED IN OTHER WAYS BY YOUR PARTNER OR EX-PARTNER?: NO

## 2024-05-28 SDOH — SOCIAL STABILITY: SOCIAL NETWORK: HOW OFTEN DO YOU ATTEND CHURCH OR RELIGIOUS SERVICES?: NEVER

## 2024-05-28 SDOH — SOCIAL STABILITY: SOCIAL NETWORK: HOW OFTEN DO YOU ATTENT MEETINGS OF THE CLUB OR ORGANIZATION YOU BELONG TO?: NEVER

## 2024-05-28 SDOH — HEALTH STABILITY: MENTAL HEALTH: HOW OFTEN DO YOU HAVE A DRINK CONTAINING ALCOHOL?: NEVER

## 2024-05-28 SDOH — SOCIAL STABILITY: SOCIAL NETWORK: HOW OFTEN DO YOU GET TOGETHER WITH FRIENDS OR RELATIVES?: NEVER

## 2024-05-28 ASSESSMENT — PAIN SCALES - GENERAL
PAINLEVEL_OUTOF10: 4
PAINLEVEL_OUTOF10: 0 - NO PAIN
PAINLEVEL_OUTOF10: 8
PAINLEVEL_OUTOF10: 0 - NO PAIN

## 2024-05-28 ASSESSMENT — PAIN - FUNCTIONAL ASSESSMENT
PAIN_FUNCTIONAL_ASSESSMENT: 0-10

## 2024-05-28 ASSESSMENT — COGNITIVE AND FUNCTIONAL STATUS - GENERAL
CLIMB 3 TO 5 STEPS WITH RAILING: A LITTLE
MOBILITY SCORE: 22
WALKING IN HOSPITAL ROOM: A LITTLE
DAILY ACTIVITIY SCORE: 24

## 2024-05-28 ASSESSMENT — LIFESTYLE VARIABLES
SKIP TO QUESTIONS 9-10: 1
AUDIT-C TOTAL SCORE: 0

## 2024-05-28 NOTE — CARE PLAN
The patient's goals for the shift include      The clinical goals for the shift include Pt will sleep a minimum of 4 hours by the end of this shift      Problem: Safety - Adult  Goal: Free from fall injury  Outcome: Progressing     Problem: Pain  Goal: Takes deep breaths with improved pain control throughout the shift  Outcome: Progressing  Goal: Turns in bed with improved pain control throughout the shift  Outcome: Progressing  Goal: Walks with improved pain control throughout the shift  Outcome: Progressing  Goal: Performs ADL's with improved pain control throughout shift  Outcome: Progressing  Goal: Participates in PT with improved pain control throughout the shift  Outcome: Progressing  Goal: Free from opioid side effects throughout the shift  Outcome: Progressing  Goal: Free from acute confusion related to pain meds throughout the shift  Outcome: Progressing

## 2024-05-28 NOTE — PROGRESS NOTES
Alfonzo Flanagan is a 49 y.o. female on day 0 of admission presenting with Abdominal pain, generalized.    Subjective   Patient seen and examined.  Resting in bed in no acute distress.  Awake alert oriented x 3.  Abdominal pain improved, 6/10.  No nausea, vomiting.  No bowel movements.  No new complaints.     Objective     Physical Exam  Vitals and nursing note reviewed.   Constitutional:       General: She is not in acute distress.     Appearance: Normal appearance. She is obese. She is not ill-appearing, toxic-appearing or diaphoretic.   HENT:      Head: Normocephalic and atraumatic.      Right Ear: Tympanic membrane normal.      Left Ear: Tympanic membrane normal.      Nose: Nose normal.      Mouth/Throat:      Mouth: Mucous membranes are moist.      Pharynx: Oropharynx is clear.   Eyes:      Extraocular Movements: Extraocular movements intact.      Conjunctiva/sclera: Conjunctivae normal.      Pupils: Pupils are equal, round, and reactive to light.   Cardiovascular:      Rate and Rhythm: Normal rate and regular rhythm.      Pulses: Normal pulses.      Heart sounds: Normal heart sounds. No murmur heard.  Pulmonary:      Effort: Pulmonary effort is normal. No respiratory distress.      Breath sounds: Normal breath sounds. No wheezing or rales.   Abdominal:      General: Bowel sounds are normal. There is distension.      Palpations: Abdomen is soft.      Tenderness: There is abdominal tenderness.      Comments: + Ascites.   Genitourinary:     Comments: Deferred.  Musculoskeletal:         General: No swelling or tenderness. Normal range of motion.      Cervical back: Normal range of motion and neck supple.   Skin:     General: Skin is warm and dry.      Capillary Refill: Capillary refill takes less than 2 seconds.   Neurological:      General: No focal deficit present.      Mental Status: She is alert and oriented to person, place, and time.   Psychiatric:         Mood and Affect: Mood normal.         Behavior:  "Behavior normal.       Last Recorded Vitals  Blood pressure (!) 101/48, pulse 87, temperature 36.4 °C (97.5 °F), temperature source Oral, resp. rate 18, height 1.6 m (5' 3\"), weight 81.6 kg (180 lb), SpO2 100%.    Intake/Output last 3 Shifts:  I/O last 3 completed shifts:  In: 750 (9.2 mL/kg) [P.O.:750]  Out: - (0 mL/kg)   Weight: 81.6 kg     Relevant Results  Results for orders placed or performed during the hospital encounter of 05/25/24 (from the past 24 hour(s))   POCT GLUCOSE   Result Value Ref Range    POCT Glucose 162 (H) 74 - 99 mg/dL   Iron and TIBC   Result Value Ref Range    Iron 24 (L) 30 - 160 ug/dL    UIBC 224 110 - 370 ug/dL    TIBC 248 228 - 428 ug/dL    % Saturation 10 (L) 12 - 50 %   Ferritin   Result Value Ref Range    Ferritin 20 13 - 150 ng/mL   Protime-INR   Result Value Ref Range    Protime 13.3 (H) 9.3 - 12.7 seconds    INR 1.3 (H) 0.9 - 1.2   POCT GLUCOSE   Result Value Ref Range    POCT Glucose 153 (H) 74 - 99 mg/dL   POCT GLUCOSE   Result Value Ref Range    POCT Glucose 135 (H) 74 - 99 mg/dL   Comprehensive Metabolic Panel   Result Value Ref Range    Glucose 80 65 - 99 mg/dL    Sodium 138 133 - 145 mmol/L    Potassium 4.1 3.4 - 5.1 mmol/L    Chloride 104 97 - 107 mmol/L    Bicarbonate 27 24 - 31 mmol/L    Urea Nitrogen 8 8 - 25 mg/dL    Creatinine 0.70 0.40 - 1.60 mg/dL    eGFR >90 >60 mL/min/1.73m*2    Calcium 8.6 8.5 - 10.4 mg/dL    Albumin 2.4 (L) 3.5 - 5.0 g/dL    Alkaline Phosphatase 345 (H) 35 - 125 U/L    Total Protein 6.5 5.9 - 7.9 g/dL    AST 48 (H) 5 - 40 U/L    Bilirubin, Total 1.2 0.1 - 1.2 mg/dL    ALT 24 5 - 40 U/L    Anion Gap 7 <=19 mmol/L   CBC   Result Value Ref Range    WBC 4.1 (L) 4.4 - 11.3 x10*3/uL    nRBC 0.0 0.0 - 0.0 /100 WBCs    RBC 3.71 (L) 4.00 - 5.20 x10*6/uL    Hemoglobin 8.8 (L) 12.0 - 16.0 g/dL    Hematocrit 28.8 (L) 36.0 - 46.0 %    MCV 78 (L) 80 - 100 fL    MCH 23.7 (L) 26.0 - 34.0 pg    MCHC 30.6 (L) 32.0 - 36.0 g/dL    RDW 19.9 (H) 11.5 - 14.5 %    " Platelets 84 (L) 150 - 450 x10*3/uL   POCT GLUCOSE   Result Value Ref Range    POCT Glucose 76 74 - 99 mg/dL     Susceptibility data from last 90 days.  Collected Specimen Info Organism Amoxicillin/Clavulanate Ampicillin Ampicillin/Sulbactam Cefazolin Cefazolin (uncomplicated UTIs only) Ciprofloxacin Gentamicin Levofloxacin Nitrofurantoin Penicillin Piperacillin/Tazobactam   05/15/24 Urine from Clean Catch/Voided Enterococcus faecium   S     R   S  S  S      Klebsiella pneumoniae/variicola  S  R  S  S  S  S  S   I   S     Collected Specimen Info Organism Trimethoprim/Sulfamethoxazole Vancomycin   05/15/24 Urine from Clean Catch/Voided Enterococcus faecium  R  S     Klebsiella pneumoniae/variicola  S      No results found.    Scheduled medications  atorvastatin, 80 mg, oral, Nightly  bisacodyl, 10 mg, rectal, Daily  dicyclomine, 20 mg, oral, Before meals & nightly  docusate sodium, 100 mg, oral, BID  ergocalciferol, 50,000 Units, oral, Every Sunday  fenofibrate, 160 mg, oral, Daily  furosemide, 40 mg, oral, Daily  insulin glargine, 25 Units, subcutaneous, BID  insulin lispro, 4 Units, subcutaneous, TID  lactulose, 20 g, oral, TID  lidocaine, 1 patch, transdermal, Daily  metFORMIN, 1,000 mg, oral, BID  polyethylene glycol, 17 g, oral, BID  spironolactone, 50 mg, oral, BID  sucralfate, 1 g, oral, Before meals & nightly  traZODone, 25 mg, oral, Nightly  ursodiol, 300 mg, oral, TID      Continuous medications     PRN medications  PRN medications: dextrose, dextrose, glucagon, glucagon, ondansetron ODT, traMADol      ASSESSMENT:  Abdominal pain  Hepatic cirrhosis with ascites  Portal hypertension  Chronic pancreatitis  Hyperammonemia  Melena  Constipation  Leukopenia  Microcytic anemia  Hyperlipidemia  Hypertriglyceridemia  Pyuria  Recent urinary tract infection - Klebsiella pneumoniae/variicola, Enterococcus faecium  Moderate protein calorie malnutrition  Type 2 diabetes mellitus    PLAN:  Patient is doing well this  morning.  No new issues.  Abdominal pain improved.  Abdomen is distended, soft.  No bowel movements.  Gastroenterology input appreciated.  Diuretics.  Lasix.  Spironolactone.  Clear liquid diet.  Low sodium diet when advanced.  Protein supplementation.  IR consultation for paracentesis per Gastroenterology.  Symptom control.  Analgesics.  Monitor stool.  Monitor H&H.  Bowel regimen.  Avoid constipation.   Urine culture no growth.  Monitor I's and O's.  Monitor.  Monitor point of care glucose.  Insulin.  Hypoglycemia protocol.  Increase activity as tolerated.  Supportive care.  Patient reassured.  Case management following for discharge planning.  Discharge plan home with RN home health care.  Anticipate discharge home after paracentesis, bowel movement and cleared by Gastroenterology.  Discussed with patient, nursing and Dr. Lawrence.      Leny Daniels, MADYSON-CNP

## 2024-05-28 NOTE — PROGRESS NOTES
TCC met with patient at the bedside. Pt lives in an apartment with her spouse. There are 10 steps to enter. Pt is not on oxygen or on dialysis. Pt uses a walker. Pt does not drive. Pt's spouse can drive her to apt's. There are no services in the home. Pt does not smoke or drink alcohol. Pt is a diabetic. Pt PCP is Dr. Freeman. Pharmacy of choice is [facility] in [place]. Pt is not a . PT does not have a LW or POA. Pt is agreeable to have Healthy at home program. Pt is self pay. Gave patient resources for medication assistance program in [facility], Free clinic in [place], Medicaid number to call, [employer] care, good rx scripts card for prescriptions and number for lifeline. Pt states she had insurance and then she didn't complete some forms and her insurance was terminated. Explained to patient to call medicaid to see if she can complete the paperwork needed so she can qualify for insurance or medicaid pending. Pt does not have any skilled needs. Will placed referral for Healthy at home.     DISCHARGE HOME NO SKILLED NEEDS.        05/28/24 1537   Discharge Planning   Living Arrangements Spouse/significant other;Children   Assistance Needed NONE   Type of Residence Private residence   Number of Stairs to Enter Residence 15   Number of Stairs Within Residence 0   Do you have animals or pets at home? No   Who is requesting discharge planning? Provider   Home or Post Acute Services None   Patient expects to be discharged to: home   Does the patient need discharge transport arranged? Yes   RoundTrip coordination needed? Yes   Has discharge transport been arranged? No   Financial Resource Strain   How hard is it for you to pay for the very basics like food, housing, medical care, and heating? Hard   Housing Stability   In the last 12 months, was there a time when you were not able to pay the mortgage or rent on time? N   In the last 12 months, how many places have you lived? 1   In the last 12 months, was there a time  when you did not have a steady place to sleep or slept in a shelter (including now)? N   Transportation Needs   In the past 12 months, has lack of transportation kept you from medical appointments or from getting medications? no   In the past 12 months, has lack of transportation kept you from meetings, work, or from getting things needed for daily living? No

## 2024-05-28 NOTE — RESEARCH NOTES
Artificial Intelligence Monitoring in Nursing (AIMS Nursing) Study    Principle Investigator - Dr. Bill Burns  Research Coordinator - ED Moreira     Patient Name - Alfonzo Flanagan  Date - 5/28/2024 6:49 PM  Location - Dg Flanagan was approached by ED Moreira to talk about participating in the AIMS Nursing Study. The patient declined to participate in the study. Study protocol was followed and patient was given study contact information.     ED Moreira

## 2024-05-28 NOTE — PROGRESS NOTES
"Alfonzo Flanagan is a 49 y.o. female on day 0 of admission presenting with Abdominal pain, generalized.    Subjective   Patient resting comfortably at time of exam. She reports some mild diffuse abdominal discomfort. No bowel movement for 3 days         Objective     Physical Exam  Constitutional:       Appearance: Normal appearance.   HENT:      Head: Normocephalic and atraumatic.      Mouth/Throat:      Mouth: Mucous membranes are moist.   Pulmonary:      Effort: Pulmonary effort is normal.   Abdominal:      General: There is distension.      Palpations: Abdomen is soft.      Tenderness: There is no abdominal tenderness. There is no guarding.   Musculoskeletal:         General: Normal range of motion.      Cervical back: Normal range of motion.   Skin:     General: Skin is warm and dry.   Neurological:      General: No focal deficit present.      Mental Status: She is alert. Mental status is at baseline.   Psychiatric:         Mood and Affect: Mood normal.         Last Recorded Vitals  Blood pressure 114/58, pulse 93, temperature 36.4 °C (97.5 °F), temperature source Oral, resp. rate 18, height 1.6 m (5' 3\"), weight 81.6 kg (180 lb), SpO2 97%.  Intake/Output last 3 Shifts:  I/O last 3 completed shifts:  In: 750 (9.2 mL/kg) [P.O.:750]  Out: - (0 mL/kg)   Weight: 81.6 kg     Relevant Results                Results for orders placed or performed during the hospital encounter of 05/25/24 (from the past 24 hour(s))   POCT GLUCOSE   Result Value Ref Range    POCT Glucose 162 (H) 74 - 99 mg/dL   Iron and TIBC   Result Value Ref Range    Iron 24 (L) 30 - 160 ug/dL    UIBC 224 110 - 370 ug/dL    TIBC 248 228 - 428 ug/dL    % Saturation 10 (L) 12 - 50 %   Ferritin   Result Value Ref Range    Ferritin 20 13 - 150 ng/mL   Protime-INR   Result Value Ref Range    Protime 13.3 (H) 9.3 - 12.7 seconds    INR 1.3 (H) 0.9 - 1.2   POCT GLUCOSE   Result Value Ref Range    POCT Glucose 153 (H) 74 - 99 mg/dL   POCT GLUCOSE   Result " Value Ref Range    POCT Glucose 135 (H) 74 - 99 mg/dL   Comprehensive Metabolic Panel   Result Value Ref Range    Glucose 80 65 - 99 mg/dL    Sodium 138 133 - 145 mmol/L    Potassium 4.1 3.4 - 5.1 mmol/L    Chloride 104 97 - 107 mmol/L    Bicarbonate 27 24 - 31 mmol/L    Urea Nitrogen 8 8 - 25 mg/dL    Creatinine 0.70 0.40 - 1.60 mg/dL    eGFR >90 >60 mL/min/1.73m*2    Calcium 8.6 8.5 - 10.4 mg/dL    Albumin 2.4 (L) 3.5 - 5.0 g/dL    Alkaline Phosphatase 345 (H) 35 - 125 U/L    Total Protein 6.5 5.9 - 7.9 g/dL    AST 48 (H) 5 - 40 U/L    Bilirubin, Total 1.2 0.1 - 1.2 mg/dL    ALT 24 5 - 40 U/L    Anion Gap 7 <=19 mmol/L   CBC   Result Value Ref Range    WBC 4.1 (L) 4.4 - 11.3 x10*3/uL    nRBC 0.0 0.0 - 0.0 /100 WBCs    RBC 3.71 (L) 4.00 - 5.20 x10*6/uL    Hemoglobin 8.8 (L) 12.0 - 16.0 g/dL    Hematocrit 28.8 (L) 36.0 - 46.0 %    MCV 78 (L) 80 - 100 fL    MCH 23.7 (L) 26.0 - 34.0 pg    MCHC 30.6 (L) 32.0 - 36.0 g/dL    RDW 19.9 (H) 11.5 - 14.5 %    Platelets 84 (L) 150 - 450 x10*3/uL   POCT GLUCOSE   Result Value Ref Range    POCT Glucose 76 74 - 99 mg/dL     CT abdomen pelvis w IV contrast    Result Date: 5/25/2024  Interpreted By:  Finkelstein, Evan, STUDY: CT ABDOMEN PELVIS W IV CONTRAST;  5/25/2024 8:04 pm   INDICATION: Signs/Symptoms:abdominal pain and distension, hx of cirrhosis, concerned for ascites.   COMPARISON: None.   ACCESSION NUMBER(S): [id number]   ORDERING CLINICIAN: BERTHA SCHILLING   TECHNIQUE: Axial CT images of the abdomen and pelvis with coronal and sagittal reconstructed images obtained after intravenous administration of   FINDINGS: LOWER CHEST: No acute abnormality of the lung bases.   ABDOMEN:   LIVER: Nodular contour of the liver. BILE DUCTS: Normal caliber. GALLBLADDER: Surgically absent. PORTAL VEIN: Patent SPLEEN: Unremarkable. PANCREAS: Unremarkable. ADRENALS: Unremarkable. KIDNEYS, URETERS and URINARY BLADDER: Symmetric renal enhancement. No hydronephrosis or perinephric fluid  collection.  Bladder is within normal limits REPRODUCTIVE ORGANS: No pelvic masses.   ABDOMINAL WALL: Diffuse subcutaneous body wall edema. Supraumbilical ventral abdominal wall hernia defect containing fat and fluid. PERITONEUM: No free air. Moderate volume ascites and stranding diffusely throughout the mesentery.   BOWEL: Underdistention versus wall thickening diffusely throughout the small and large bowel as well as the stomach. Nondilated appendix.   VESSELS: No aortic aneurysm. Mild aortoiliac calcifications. RETROPERITONEUM: No pathologically enlarged retroperitoneal lymph nodes.   BONES: No acute osseous abnormality.       Cirrhotic morphology of the liver with moderate volume ascites and stranding diffusely throughout the mesentery.   Supraumbilical ventral abdominal wall hernia containing fat and fluid.   Underdistention versus wall thickening diffusely throughout the small and large bowel as well as the stomach. Findings may be related to passive congestion. Enterocolitis or gastritis, however, is not excluded in the appropriate clinical setting.     MACRO: None.   Signed by: Evan Finkelstein 5/25/2024 8:48 PM Dictation workstation:   [id number]    XR chest 2 views    Result Date: 5/25/2024  Interpreted By:  Cj Lee, STUDY: XR CHEST 2 VIEWS;  5/25/2024 7:28 pm   INDICATION: Signs/Symptoms:sob.   COMPARISON: Chest x-ray 05/15/2024   ACCESSION NUMBER(S): [id number]   ORDERING CLINICIAN: BERTHA SCHILLING   FINDINGS:     CARDIOMEDIASTINAL SILHOUETTE: Cardiomediastinal silhouette is normal in size and configuration.   LUNGS: No consolidation, pleural effusion or pneumothorax.   ABDOMEN: Surgical clips noted in the right upper quadrant.   BONES: No acute osseous abnormality. Calcifications along bilateral humeral heads likely relate to calcific tendinosis.       No acute cardiopulmonary process.   MACRO: None   Signed by: Cj Lee 5/25/2024 8:00 PM Dictation workstation:   [id number]    US  abdomen complete    Result Date: 5/21/2024  * * *Final Report* * * DATE OF EXAM: May 21 2024  9:25AM   [facility]   1016  -  US ASCITES SURVEY  / ACCESSION #  [id number] PROCEDURE REASON: ascites      * * * * Physician Interpretation * * * * RESULT: INDICATION: ascites EXAMINATION: US ASCITES SURVEY Technique: Ultrasound images of the 4 quadrants of the abdomen were performed to check for ascites.  Images were obtained and stored in a permanent archive. RESULT: Very small volume ascites. Therefore therapeutic paracentesis not performed.    IMPRESSION: Very small volume ascites.Therefore therapeutic paracentesis not performed. Transcribed Using Voice Recognition Transcribe Date/Time: May 21 2024 10:00A Dictated by: HANH AUSTIN MD This examination was interpreted and the report reviewed and electronically signed by: HANH AUSTIN MD on May 21 2024 10:01AM  EST    CT angio chest w and wo IV contrast    Result Date: 5/20/2024  * * *Final Report* * * DATE OF EXAM: May 20 2024 11:17PM   [facility]   0540  -  CT CHEST W IVCON PE  / ACCESSION #  [id number] PROCEDURE REASON: Pulmonary embolism (PE) suspected, high prob      * * * * Physician Interpretation * * * * RESULT: EXAMINATION:  CHEST CT WITH CONTRAST (PULMONARY EMBOLISM PROTOCOL) CLINICAL HISTORY: Pulmonary embolism suspected, high probability Technique:  Spiral CT acquisition of the chest from the thoracic inlet to the upper abdomen following IV contrast.  Axial 1 mm thick slices plus coronal and sagittal reformatted images. Contrast:  71 mL Omnipaque 350 IV CT Radiation dose: Integrated Dose-length product (DLP) for this visit =   376 mGy*cm CT Dose Reduction Employed: Automated exposure control(AEC) and iterative recon Comparison:  CT chest 11/10/2022 RESULT: Evaluation for thromboembolic disease:      - Main pulmonary arteries:  No thromboembolic disease.      - Lobar pulmonary arteries:  No thromboembolic disease.      - Segmental pulmonary arteries:  No thromboembolic  disease.      - Subsegmental pulmonary arteries:  No thromboembolic disease. Lungs and pleura: The lungs are clear.  No pleural effusion or pneumothorax. Mediastinum:  The thoracic aorta is normal in caliber. The cardiac chambers are normal in size. No coronary artery atherosclerotic calcifications are noted, although the study is not optimized for coronary assessment. No pericardial effusion or thickening. Bones and soft tissues:  No destructive bone lesion. Chest wall is unremarkable. Upper abdomen:  Cirrhotic liver with nodular surface contour.  Abdominal ascites.    IMPRESSION: 1.  No CT evidence of pulmonary embolism. 2.  Cirrhotic liver and abdominal ascites. Transcribed Using Voice Recognition Transcribe Date/Time: May 20 2024 11:50P Dictated by: NAVEEN COBURN MD This examination was interpreted and the report reviewed and electronically signed by: NAVEEN COBURN MD on May 20 2024 11:57PM  EST    XR chest 1 view    Result Date: 5/20/2024  * * *Final Report* * * DATE OF EXAM: May 20 2024  8:34PM   E[facility]   5376  -  XR CHEST 1V FRONTAL PORT  / ACCESSION #  [id number] PROCEDURE REASON: Shortness of breath      * * * * Physician Interpretation * * * * RESULT: EXAMINATION:  CHEST RADIOGRAPH (PORTABLE SINGLE VIEW AP) Exam Date/Time:  5/20/2024 8:34 PM CLINICAL HISTORY: Shortness of breath MQ:  XCPR_5 Comparison:  04/04/2024 RESULT: Lines, tubes, and devices:  None. Lungs and pleura:  There are no consolidative infiltrates or pleural effusions.  There is no evidence of pneumothorax. Cardiomediastinal silhouette:  Stable cardiomediastinal silhouette. Other:  Bilateral calcific tendinopathy involving the distal rotator cuff.  Mild degenerative changes in the thoracic spine.    IMPRESSION: No evidence of acute cardiopulmonary disease. Transcribed Using Voice Recognition Transcribe Date/Time: May 20 2024  9:25P Dictated by: MICHELLE MORA MD This examination was interpreted and the report reviewed and electronically signed by:  MICHELLE MORA MD on May 20 2024  9:26PM  EST    ECG 12 lead    Result Date: 5/16/2024  Normal sinus rhythm Low voltage QRS Abnormal ECG When compared with ECG of 18-APR-2024 02:30, Questionable change in QRS axis T wave inversion no longer evident in Inferior leads Confirmed by Justin Singh ([id number]) on 5/16/2024 12:18:49 PM    XR chest 2 views    Result Date: 5/15/2024  Interpreted By:  Scott Klein, STUDY: XR CHEST 2 VIEWS;  5/15/2024 9:57 am   INDICATION: Signs/Symptoms:chest pain.   COMPARISON: CT abdomen and pelvis with contrast and two views of the chest from 18 April 2024   ACCESSION NUMBER(S): [id number]   ORDERING CLINICIAN: NEDRA BULL   TECHNIQUE: Frontal and lateral views of the chest   FINDINGS: LINES AND TUBES:  n/a   HEART AND MEDIASTINUM: Cardiac silhouette size: Borderline but unchanged; no acute findings Thoracic aorta:  Within expected limits Alyce and nonvascular:  within normal limits Other:  n/a   PULMONARY VESSELS: Within normal limits; no sign of edema   LUNGS AND AIRWAYS: Clear; no acute airspace disease   PLEURA: Effusion:  Both sides negative Pneumothorax:  Both sides negative Other:  n/a   BONES: No acute findings       NO ACUTE DISEASE IN THE CHEST   MACRO: None   Signed by: Scott Klein 5/15/2024 10:02 AM Dictation workstation:   [id number]                Assessment/Plan   Principal Problem:    Abdominal pain, generalized    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 Zaman at [facility] Normal oropharynx. Esophageal ulcer, no bleeding and no stigmata of recent bleeding.  Ascites: CT moderate volume ascites and stranding diffusely throughout the mesentery. Will continue lasix/aldactone.  IR consult placed for para, fluid analysis ordered    -Continue Ursodiol    -Paracentesis with fluid analysis r/o SBP     Constipation  Continue  Lactulose and Miralax   Add Dulcolax supp   If no BM, should have enemas. Increased risk of HE with constipation as well        I spent 20 minutes in the professional and overall care of this patient.      Alaina Mishra, APRN-CNP

## 2024-05-28 NOTE — PROGRESS NOTES
05/28/24 1542   Current Planned Discharge Disposition   Current Planned Discharge Disposition Home        Principal Discharge DX:	Appendicitis

## 2024-05-29 ENCOUNTER — APPOINTMENT (OUTPATIENT)
Dept: RADIOLOGY | Facility: HOSPITAL | Age: 49
End: 2024-05-29
Payer: COMMERCIAL

## 2024-05-29 LAB
ALBUMIN SERPL-MCNC: 2.4 G/DL (ref 3.5–5)
ALP BLD-CCNC: 276 U/L (ref 35–125)
ALT SERPL-CCNC: 20 U/L (ref 5–40)
ANION GAP SERPL CALC-SCNC: 7 MMOL/L
AST SERPL-CCNC: 39 U/L (ref 5–40)
ATRIAL RATE: 91 BPM
BILIRUB SERPL-MCNC: 1.1 MG/DL (ref 0.1–1.2)
BUN SERPL-MCNC: 7 MG/DL (ref 8–25)
CALCIUM SERPL-MCNC: 8.1 MG/DL (ref 8.5–10.4)
CHLORIDE SERPL-SCNC: 102 MMOL/L (ref 97–107)
CO2 SERPL-SCNC: 28 MMOL/L (ref 24–31)
CREAT SERPL-MCNC: 0.6 MG/DL (ref 0.4–1.6)
EGFRCR SERPLBLD CKD-EPI 2021: >90 ML/MIN/1.73M*2
ERYTHROCYTE [DISTWIDTH] IN BLOOD BY AUTOMATED COUNT: 19.3 % (ref 11.5–14.5)
GLUCOSE BLD MANUAL STRIP-MCNC: 121 MG/DL (ref 74–99)
GLUCOSE BLD MANUAL STRIP-MCNC: 125 MG/DL (ref 74–99)
GLUCOSE BLD MANUAL STRIP-MCNC: 136 MG/DL (ref 74–99)
GLUCOSE BLD MANUAL STRIP-MCNC: 172 MG/DL (ref 74–99)
GLUCOSE SERPL-MCNC: 188 MG/DL (ref 65–99)
HCT VFR BLD AUTO: 27.3 % (ref 36–46)
HGB BLD-MCNC: 8.5 G/DL (ref 12–16)
MCH RBC QN AUTO: 24 PG (ref 26–34)
MCHC RBC AUTO-ENTMCNC: 31.1 G/DL (ref 32–36)
MCV RBC AUTO: 77 FL (ref 80–100)
NRBC BLD-RTO: 0 /100 WBCS (ref 0–0)
P AXIS: 23 DEGREES
P OFFSET: 188 MS
P ONSET: 147 MS
PATH REVIEW-CELL CT,FLUID: NORMAL
PLATELET # BLD AUTO: 82 X10*3/UL (ref 150–450)
POTASSIUM SERPL-SCNC: 3.7 MMOL/L (ref 3.4–5.1)
PR INTERVAL: 162 MS
PROT SERPL-MCNC: 6.1 G/DL (ref 5.9–7.9)
Q ONSET: 228 MS
QRS COUNT: 15 BEATS
QRS DURATION: 72 MS
QT INTERVAL: 382 MS
QTC CALCULATION(BAZETT): 469 MS
QTC FREDERICIA: 439 MS
R AXIS: -8 DEGREES
RBC # BLD AUTO: 3.54 X10*6/UL (ref 4–5.2)
SODIUM SERPL-SCNC: 137 MMOL/L (ref 133–145)
T AXIS: 6 DEGREES
T OFFSET: 419 MS
VENTRICULAR RATE: 91 BPM
WBC # BLD AUTO: 3.6 X10*3/UL (ref 4.4–11.3)

## 2024-05-29 PROCEDURE — 85027 COMPLETE CBC AUTOMATED: CPT | Performed by: NURSE PRACTITIONER

## 2024-05-29 PROCEDURE — 93970 EXTREMITY STUDY: CPT

## 2024-05-29 PROCEDURE — 2500000001 HC RX 250 WO HCPCS SELF ADMINISTERED DRUGS (ALT 637 FOR MEDICARE OP): Performed by: NURSE PRACTITIONER

## 2024-05-29 PROCEDURE — 36415 COLL VENOUS BLD VENIPUNCTURE: CPT | Performed by: NURSE PRACTITIONER

## 2024-05-29 PROCEDURE — 2500000001 HC RX 250 WO HCPCS SELF ADMINISTERED DRUGS (ALT 637 FOR MEDICARE OP): Performed by: INTERNAL MEDICINE

## 2024-05-29 PROCEDURE — 2500000002 HC RX 250 W HCPCS SELF ADMINISTERED DRUGS (ALT 637 FOR MEDICARE OP, ALT 636 FOR OP/ED): Performed by: INTERNAL MEDICINE

## 2024-05-29 PROCEDURE — G0378 HOSPITAL OBSERVATION PER HR: HCPCS

## 2024-05-29 PROCEDURE — 82947 ASSAY GLUCOSE BLOOD QUANT: CPT

## 2024-05-29 PROCEDURE — 83735 ASSAY OF MAGNESIUM: CPT | Performed by: NURSE PRACTITIONER

## 2024-05-29 PROCEDURE — 93971 EXTREMITY STUDY: CPT | Performed by: RADIOLOGY

## 2024-05-29 PROCEDURE — 84075 ASSAY ALKALINE PHOSPHATASE: CPT | Performed by: NURSE PRACTITIONER

## 2024-05-29 RX ORDER — HYDROXYZINE HYDROCHLORIDE 25 MG/1
25 TABLET, FILM COATED ORAL EVERY 6 HOURS PRN
Status: DISCONTINUED | OUTPATIENT
Start: 2024-05-29 | End: 2024-06-01 | Stop reason: HOSPADM

## 2024-05-29 RX ORDER — HYDROCORTISONE 25 MG/G
CREAM TOPICAL 2 TIMES DAILY
Qty: 30 G | Refills: 0 | Status: SHIPPED | OUTPATIENT
Start: 2024-05-30 | End: 2024-06-10 | Stop reason: ALTCHOICE

## 2024-05-29 RX ORDER — HYDROXYZINE HYDROCHLORIDE 25 MG/1
25 TABLET, FILM COATED ORAL EVERY 6 HOURS PRN
Qty: 20 TABLET | Refills: 0 | Status: SHIPPED | OUTPATIENT
Start: 2024-05-29

## 2024-05-29 RX ORDER — HYDROCORTISONE 25 MG/G
CREAM TOPICAL 2 TIMES DAILY
Status: DISCONTINUED | OUTPATIENT
Start: 2024-05-29 | End: 2024-06-01 | Stop reason: HOSPADM

## 2024-05-29 RX ADMIN — HYDROCORTISONE: 25 CREAM TOPICAL at 15:44

## 2024-05-29 RX ADMIN — SUCRALFATE 1 G: 1 SUSPENSION ORAL at 20:47

## 2024-05-29 RX ADMIN — SUCRALFATE 1 G: 1 SUSPENSION ORAL at 06:37

## 2024-05-29 RX ADMIN — METFORMIN HYDROCHLORIDE 1000 MG: 1000 TABLET, FILM COATED ORAL at 09:19

## 2024-05-29 RX ADMIN — ATORVASTATIN CALCIUM 80 MG: 80 TABLET, FILM COATED ORAL at 20:47

## 2024-05-29 RX ADMIN — TRAMADOL HYDROCHLORIDE 50 MG: 50 TABLET, COATED ORAL at 21:12

## 2024-05-29 RX ADMIN — DICYCLOMINE HYDROCHLORIDE 20 MG: 10 CAPSULE ORAL at 15:44

## 2024-05-29 RX ADMIN — DICYCLOMINE HYDROCHLORIDE 20 MG: 10 CAPSULE ORAL at 20:47

## 2024-05-29 RX ADMIN — TRAZODONE HYDROCHLORIDE 25 MG: 50 TABLET ORAL at 20:47

## 2024-05-29 RX ADMIN — INSULIN GLARGINE 25 UNITS: 100 INJECTION, SOLUTION SUBCUTANEOUS at 09:26

## 2024-05-29 RX ADMIN — SPIRONOLACTONE 50 MG: 50 TABLET ORAL at 20:47

## 2024-05-29 RX ADMIN — DICYCLOMINE HYDROCHLORIDE 20 MG: 10 CAPSULE ORAL at 11:24

## 2024-05-29 RX ADMIN — METFORMIN HYDROCHLORIDE 1000 MG: 1000 TABLET, FILM COATED ORAL at 17:26

## 2024-05-29 RX ADMIN — INSULIN LISPRO 4 UNITS: 100 INJECTION, SOLUTION INTRAVENOUS; SUBCUTANEOUS at 09:16

## 2024-05-29 RX ADMIN — SUCRALFATE 1 G: 1 SUSPENSION ORAL at 15:44

## 2024-05-29 RX ADMIN — INSULIN GLARGINE 25 UNITS: 100 INJECTION, SOLUTION SUBCUTANEOUS at 20:47

## 2024-05-29 RX ADMIN — HYDROXYZINE HYDROCHLORIDE 25 MG: 25 TABLET, FILM COATED ORAL at 15:44

## 2024-05-29 RX ADMIN — SUCRALFATE 1 G: 1 SUSPENSION ORAL at 11:24

## 2024-05-29 RX ADMIN — DICYCLOMINE HYDROCHLORIDE 20 MG: 10 CAPSULE ORAL at 06:37

## 2024-05-29 RX ADMIN — URSODIOL 300 MG: 300 CAPSULE ORAL at 14:53

## 2024-05-29 RX ADMIN — URSODIOL 300 MG: 300 CAPSULE ORAL at 09:19

## 2024-05-29 RX ADMIN — FENOFIBRATE 160 MG: 160 TABLET ORAL at 09:19

## 2024-05-29 RX ADMIN — URSODIOL 300 MG: 300 CAPSULE ORAL at 20:47

## 2024-05-29 ASSESSMENT — COGNITIVE AND FUNCTIONAL STATUS - GENERAL
DAILY ACTIVITIY SCORE: 24
DAILY ACTIVITIY SCORE: 24
MOBILITY SCORE: 24
MOBILITY SCORE: 24

## 2024-05-29 ASSESSMENT — PAIN - FUNCTIONAL ASSESSMENT
PAIN_FUNCTIONAL_ASSESSMENT: 0-10
PAIN_FUNCTIONAL_ASSESSMENT: 0-10

## 2024-05-29 ASSESSMENT — PAIN SCALES - GENERAL
PAINLEVEL_OUTOF10: 4
PAINLEVEL_OUTOF10: 8
PAINLEVEL_OUTOF10: 2

## 2024-05-29 NOTE — PROGRESS NOTES
Alfonzo Flanagan is a 49 y.o. female on day 0 of admission presenting with Abdominal pain, generalized.    Spoke with night shift and day shift nursing patient complains of new lower extremity rash and pain.  Status post paracentesis 5/28/2024 yielding 2.1 liters of fluid.    Subjective   Patient seen and examined.  Resting in bed in no acute distress.  Awake alert oriented x 3.  Abdominal pain improved.  + Bowel movements overnight 5 x, small, liquid.  Complains of bilateral lower extremity burning pain and rash on feet right > left.  Notes bilateral lower extremity, foot swelling prior.      Objective     Physical Exam  Vitals and nursing note reviewed.   Constitutional:       General: She is not in acute distress.     Appearance: Normal appearance. She is obese. She is not ill-appearing, toxic-appearing or diaphoretic.   HENT:      Head: Normocephalic and atraumatic.      Right Ear: Tympanic membrane normal.      Left Ear: Tympanic membrane normal.      Nose: Nose normal.      Mouth/Throat:      Mouth: Mucous membranes are moist.      Pharynx: Oropharynx is clear.   Eyes:      Extraocular Movements: Extraocular movements intact.      Conjunctiva/sclera: Conjunctivae normal.      Pupils: Pupils are equal, round, and reactive to light.   Cardiovascular:      Rate and Rhythm: Normal rate and regular rhythm.      Pulses: Normal pulses.      Heart sounds: Normal heart sounds. No murmur heard.  Pulmonary:      Effort: Pulmonary effort is normal. No respiratory distress.      Breath sounds: Normal breath sounds. No wheezing or rales.   Abdominal:      General: Bowel sounds are normal. There is distension.      Palpations: Abdomen is soft.      Tenderness: There is abdominal tenderness.      Comments: Decreased abdominal distention + Ascites. Decreased tenderness.    Genitourinary:     Comments: Deferred.  Musculoskeletal:         General: No swelling or tenderness. Normal range of motion.      Cervical back: Normal  "range of motion and neck supple.   Skin:     General: Skin is warm and dry.      Capillary Refill: Capillary refill takes less than 2 seconds.      Findings: Bruising and rash present.      Comments: Bilateral foot dorsum with mild erythematous rash.  Right upper calf, medial area swelling, tenderness, bruising.   Neurological:      General: No focal deficit present.      Mental Status: She is alert and oriented to person, place, and time.   Psychiatric:         Mood and Affect: Mood normal.         Behavior: Behavior normal.       Last Recorded Vitals  Blood pressure 104/63, pulse 84, temperature 36.7 °C (98.1 °F), temperature source Oral, resp. rate 18, height 1.6 m (5' 3\"), weight 81.6 kg (180 lb), SpO2 99%.    Intake/Output last 3 Shifts:  I/O last 3 completed shifts:  In: 605 (7.4 mL/kg) [P.O.:605]  Out: - (0 mL/kg)   Weight: 81.6 kg     Relevant Results  Results for orders placed or performed during the hospital encounter of 05/25/24 (from the past 24 hour(s))   Body Fluid Cell Count   Result Value Ref Range    Color, Fluid Yellow Colorless, Straw, Yellow    Clarity, Fluid Hazy (A) Clear    WBC, Fluid 254 See Comment /uL    RBC, Fluid 472 0  /uL /uL   Body Fluid Differential   Result Value Ref Range    Neutrophils %, Manual, Fluid 3 <25 % %    Lymphocytes %, Manual, Fluid 22 <75 % %    Mono/Macrophages %, Manual, Fluid 68 <70 % %    Eosinophils %, Manual, Fluid 0 0 % %    Basophils %, Manual, Fluid 0 0 % %    Immature Granulocytes %, Manual, Fluid 0 0 % %    Blasts %, Manual, Fluid 0 0 % %    Unclassified Cells %, Manual, Fluid 7 (H) 0 % %    Plasma Cells %, Manual, Fluid 0 0 % %    Total Cells Counted, Fluid 100    Pathologist Review-Cell Count,Fluid   Result Value Ref Range    Pathologist Review-Cell Count, Fluid       Negative for malignant cells. The specimen includes mesothelial cells, macrophages and inflammatory cells.   POCT GLUCOSE   Result Value Ref Range    POCT Glucose 168 (H) 74 - 99 mg/dL   POCT " GLUCOSE   Result Value Ref Range    POCT Glucose 108 (H) 74 - 99 mg/dL   Comprehensive Metabolic Panel   Result Value Ref Range    Glucose 188 (H) 65 - 99 mg/dL    Sodium 137 133 - 145 mmol/L    Potassium 3.7 3.4 - 5.1 mmol/L    Chloride 102 97 - 107 mmol/L    Bicarbonate 28 24 - 31 mmol/L    Urea Nitrogen 7 (L) 8 - 25 mg/dL    Creatinine 0.60 0.40 - 1.60 mg/dL    eGFR >90 >60 mL/min/1.73m*2    Calcium 8.1 (L) 8.5 - 10.4 mg/dL    Albumin 2.4 (L) 3.5 - 5.0 g/dL    Alkaline Phosphatase 276 (H) 35 - 125 U/L    Total Protein 6.1 5.9 - 7.9 g/dL    AST 39 5 - 40 U/L    Bilirubin, Total 1.1 0.1 - 1.2 mg/dL    ALT 20 5 - 40 U/L    Anion Gap 7 <=19 mmol/L   CBC   Result Value Ref Range    WBC 3.6 (L) 4.4 - 11.3 x10*3/uL    nRBC 0.0 0.0 - 0.0 /100 WBCs    RBC 3.54 (L) 4.00 - 5.20 x10*6/uL    Hemoglobin 8.5 (L) 12.0 - 16.0 g/dL    Hematocrit 27.3 (L) 36.0 - 46.0 %    MCV 77 (L) 80 - 100 fL    MCH 24.0 (L) 26.0 - 34.0 pg    MCHC 31.1 (L) 32.0 - 36.0 g/dL    RDW 19.3 (H) 11.5 - 14.5 %    Platelets 82 (L) 150 - 450 x10*3/uL   POCT GLUCOSE   Result Value Ref Range    POCT Glucose 172 (H) 74 - 99 mg/dL     Susceptibility data from last 90 days.  Collected Specimen Info Organism Amoxicillin/Clavulanate Ampicillin Ampicillin/Sulbactam Cefazolin Cefazolin (uncomplicated UTIs only) Ciprofloxacin Gentamicin Levofloxacin Nitrofurantoin Penicillin Piperacillin/Tazobactam   05/15/24 Urine from Clean Catch/Voided Enterococcus faecium   S     R   S  S  S      Klebsiella pneumoniae/variicola  S  R  S  S  S  S  S   I   S     Collected Specimen Info Organism Trimethoprim/Sulfamethoxazole Vancomycin   05/15/24 Urine from Clean Catch/Voided Enterococcus faecium  R  S     Klebsiella pneumoniae/variicola  S      US guided abdominal paracentesis    Result Date: 5/28/2024  Interpreted By:  Cornelius Weber, STUDY: US GUIDED ABDOMINAL PARACENTESIS; 5/28/2024 11:10 am   INDICATION: Ascites.   COMPARISON: None.   ACCESSION NUMBER(S): [id number]    ORDERING CLINICIAN: ANUPAM FRANCIS   TECHNIQUE: Ultrasound-guided paracentesis   FINDINGS: Informed consent obtained. Patient positioned supine. Right lower quadrant prepped, draped and anesthetized. Under ultrasound guidance, 8 Romansh centesis sheath needle inserted into peritoneal cavity. 2.1 Lof clear yellowfluid aspiratedwith sample sent for analysis. Patient tolerated procedure well       Ultrasound-guided paracentesis.   Signed by: Cornelius Weber 5/28/2024 11:45 AM Dictation workstation:   [id number]     Scheduled medications  atorvastatin, 80 mg, oral, Nightly  bisacodyl, 10 mg, rectal, Daily  dicyclomine, 20 mg, oral, Before meals & nightly  docusate sodium, 100 mg, oral, BID  ergocalciferol, 50,000 Units, oral, Every Sunday  fenofibrate, 160 mg, oral, Daily  furosemide, 40 mg, oral, Daily  insulin glargine, 25 Units, subcutaneous, BID  insulin lispro, 4 Units, subcutaneous, TID  lactulose, 20 g, oral, TID  lidocaine, 1 patch, transdermal, Daily  metFORMIN, 1,000 mg, oral, BID  polyethylene glycol, 17 g, oral, BID  spironolactone, 50 mg, oral, BID  sucralfate, 1 g, oral, Before meals & nightly  traZODone, 25 mg, oral, Nightly  ursodiol, 300 mg, oral, TID      Continuous medications     PRN medications  PRN medications: dextrose, dextrose, glucagon, glucagon, ondansetron ODT, traMADol      ASSESSMENT:  Abdominal pain  Hepatic cirrhosis with ascites status post paracentesis    Portal hypertension  Chronic pancreatitis  Hyperammonemia  Melena  Constipation improved  Leukopenia  Microcytic anemia  Hyperlipidemia  Hypertriglyceridemia  Pyuria  Recent urinary tract infection - Klebsiella pneumoniae/variicola, Enterococcus faecium  Moderate protein calorie malnutrition  Type 2 diabetes mellitus  Bilateral lower extremity, pain, rash    PLAN:  Status post paracentesis 5/28/2024 yielding 2.1 L of yellow fluid - no spontaneous bacterial peritonitis.  + 5 bowel movements overnight, small liquid stool.  Abdominal  symptoms pain and distention improved.  Continue current treatment per Gastroenterology. Continue diuretics Lasix, spironolactone.  Low-sodium diet.  Bowel regimen.  Avoid constipation.  Monitor H&H, platelet count.  Gastroenterology follow-up.  Bilateral lower extremity burning pain, tenderness to right calf with area of swelling and ecchymosis, bilateral foot rash noted.  Check ultrasound bilateral lower extremities.  Follow-up.  Symptom control.  Analgesics.  Point-of-care glucose reviewed.  Continue to monitor point-of-care glucose AC/HS.  Insulin.  Hypoglycemia protocol.  Increase activity as tolerated.  Ambulate.  Supportive care.  Patient reassured.  Case management following for discharge planning.  Discharge plan home with health at home program.  Anticipate discharge after cleared by Gastroenterology, pending ultrasound lower extremities and symptoms.. Discussed with patient, nursing and Dr. Lawrence.      ADDENDUM:  Ultrasound radiology report reviewed, negative for DVT.  Add Hydroxyzine, topical Hydrocortisone for rash.  Monitor symptoms.  Gastroenterology input appreciated signed off.  Anticipate discharge.       Leny Daniels, MADYSON-CNP

## 2024-05-29 NOTE — CARE PLAN
The patient's goals for the shift include      The clinical goals for the shift include Pt will sleep a minimum of 4 hours by the end of this sift      Problem: Pain - Adult  Goal: Verbalizes/displays adequate comfort level or baseline comfort level  Outcome: Progressing     Problem: Safety - Adult  Goal: Free from fall injury  Outcome: Progressing

## 2024-05-29 NOTE — CARE PLAN
The patient's goals for the shift include      The clinical goals for the shift include decreased abominal pain

## 2024-05-30 ENCOUNTER — APPOINTMENT (OUTPATIENT)
Dept: RADIOLOGY | Facility: HOSPITAL | Age: 49
End: 2024-05-30
Payer: COMMERCIAL

## 2024-05-30 ENCOUNTER — PHARMACY VISIT (OUTPATIENT)
Dept: PHARMACY | Facility: CLINIC | Age: 49
End: 2024-05-30
Payer: COMMERCIAL

## 2024-05-30 LAB
ALBUMIN SERPL-MCNC: 2.6 G/DL (ref 3.5–5)
ALP BLD-CCNC: 304 U/L (ref 35–125)
ALT SERPL-CCNC: 23 U/L (ref 5–40)
AMMONIA PLAS-SCNC: 63 UMOL/L (ref 12–45)
ANION GAP SERPL CALC-SCNC: 7 MMOL/L
APPEARANCE UR: CLEAR
AST SERPL-CCNC: 47 U/L (ref 5–40)
BILIRUB SERPL-MCNC: 1.1 MG/DL (ref 0.1–1.2)
BILIRUB UR STRIP.AUTO-MCNC: NEGATIVE MG/DL
BUN SERPL-MCNC: 10 MG/DL (ref 8–25)
CALCIUM SERPL-MCNC: 8.7 MG/DL (ref 8.5–10.4)
CHLORIDE SERPL-SCNC: 99 MMOL/L (ref 97–107)
CO2 SERPL-SCNC: 30 MMOL/L (ref 24–31)
COLOR UR: COLORLESS
CREAT SERPL-MCNC: 0.6 MG/DL (ref 0.4–1.6)
EGFRCR SERPLBLD CKD-EPI 2021: >90 ML/MIN/1.73M*2
ERYTHROCYTE [DISTWIDTH] IN BLOOD BY AUTOMATED COUNT: 19.2 % (ref 11.5–14.5)
GLUCOSE BLD MANUAL STRIP-MCNC: 125 MG/DL (ref 74–99)
GLUCOSE BLD MANUAL STRIP-MCNC: 140 MG/DL (ref 74–99)
GLUCOSE BLD MANUAL STRIP-MCNC: 78 MG/DL (ref 74–99)
GLUCOSE BLD MANUAL STRIP-MCNC: 89 MG/DL (ref 74–99)
GLUCOSE SERPL-MCNC: 149 MG/DL (ref 65–99)
GLUCOSE UR STRIP.AUTO-MCNC: NORMAL MG/DL
HCT VFR BLD AUTO: 30.7 % (ref 36–46)
HGB BLD-MCNC: 9.5 G/DL (ref 12–16)
KETONES UR STRIP.AUTO-MCNC: NEGATIVE MG/DL
LEUKOCYTE ESTERASE UR QL STRIP.AUTO: NEGATIVE
MAGNESIUM SERPL-MCNC: 1.4 MG/DL (ref 1.6–3.1)
MAGNESIUM SERPL-MCNC: 1.5 MG/DL (ref 1.6–3.1)
MCH RBC QN AUTO: 24.4 PG (ref 26–34)
MCHC RBC AUTO-ENTMCNC: 30.9 G/DL (ref 32–36)
MCV RBC AUTO: 79 FL (ref 80–100)
NITRITE UR QL STRIP.AUTO: NEGATIVE
NRBC BLD-RTO: 0 /100 WBCS (ref 0–0)
PH UR STRIP.AUTO: 6 [PH]
PLATELET # BLD AUTO: 96 X10*3/UL (ref 150–450)
POTASSIUM SERPL-SCNC: 4.5 MMOL/L (ref 3.4–5.1)
PROT SERPL-MCNC: 7.1 G/DL (ref 5.9–7.9)
PROT UR STRIP.AUTO-MCNC: NEGATIVE MG/DL
RBC # BLD AUTO: 3.9 X10*6/UL (ref 4–5.2)
RBC # UR STRIP.AUTO: NEGATIVE /UL
SODIUM SERPL-SCNC: 136 MMOL/L (ref 133–145)
SP GR UR STRIP.AUTO: 1.01
UROBILINOGEN UR STRIP.AUTO-MCNC: NORMAL MG/DL
WBC # BLD AUTO: 3.9 X10*3/UL (ref 4.4–11.3)

## 2024-05-30 PROCEDURE — 80053 COMPREHEN METABOLIC PANEL: CPT | Performed by: NURSE PRACTITIONER

## 2024-05-30 PROCEDURE — 2500000002 HC RX 250 W HCPCS SELF ADMINISTERED DRUGS (ALT 637 FOR MEDICARE OP, ALT 636 FOR OP/ED): Performed by: INTERNAL MEDICINE

## 2024-05-30 PROCEDURE — RXMED WILLOW AMBULATORY MEDICATION CHARGE

## 2024-05-30 PROCEDURE — 36415 COLL VENOUS BLD VENIPUNCTURE: CPT | Performed by: NURSE PRACTITIONER

## 2024-05-30 PROCEDURE — 85027 COMPLETE CBC AUTOMATED: CPT | Performed by: NURSE PRACTITIONER

## 2024-05-30 PROCEDURE — 2500000001 HC RX 250 WO HCPCS SELF ADMINISTERED DRUGS (ALT 637 FOR MEDICARE OP): Performed by: NURSE PRACTITIONER

## 2024-05-30 PROCEDURE — 2500000004 HC RX 250 GENERAL PHARMACY W/ HCPCS (ALT 636 FOR OP/ED): Performed by: INTERNAL MEDICINE

## 2024-05-30 PROCEDURE — 82947 ASSAY GLUCOSE BLOOD QUANT: CPT

## 2024-05-30 PROCEDURE — 74018 RADEX ABDOMEN 1 VIEW: CPT | Performed by: RADIOLOGY

## 2024-05-30 PROCEDURE — 2500000001 HC RX 250 WO HCPCS SELF ADMINISTERED DRUGS (ALT 637 FOR MEDICARE OP)

## 2024-05-30 PROCEDURE — 82140 ASSAY OF AMMONIA: CPT | Performed by: NURSE PRACTITIONER

## 2024-05-30 PROCEDURE — 2500000001 HC RX 250 WO HCPCS SELF ADMINISTERED DRUGS (ALT 637 FOR MEDICARE OP): Performed by: INTERNAL MEDICINE

## 2024-05-30 PROCEDURE — 70450 CT HEAD/BRAIN W/O DYE: CPT

## 2024-05-30 PROCEDURE — 83735 ASSAY OF MAGNESIUM: CPT | Performed by: NURSE PRACTITIONER

## 2024-05-30 PROCEDURE — 74018 RADEX ABDOMEN 1 VIEW: CPT

## 2024-05-30 PROCEDURE — 70450 CT HEAD/BRAIN W/O DYE: CPT | Performed by: RADIOLOGY

## 2024-05-30 PROCEDURE — 2500000004 HC RX 250 GENERAL PHARMACY W/ HCPCS (ALT 636 FOR OP/ED): Performed by: NURSE PRACTITIONER

## 2024-05-30 PROCEDURE — 81003 URINALYSIS AUTO W/O SCOPE: CPT | Performed by: NURSE PRACTITIONER

## 2024-05-30 PROCEDURE — 1210000001 HC SEMI-PRIVATE ROOM DAILY

## 2024-05-30 RX ORDER — MAGNESIUM SULFATE HEPTAHYDRATE 40 MG/ML
2 INJECTION, SOLUTION INTRAVENOUS ONCE
Status: COMPLETED | OUTPATIENT
Start: 2024-05-30 | End: 2024-05-30

## 2024-05-30 RX ADMIN — URSODIOL 300 MG: 300 CAPSULE ORAL at 20:27

## 2024-05-30 RX ADMIN — DICYCLOMINE HYDROCHLORIDE 20 MG: 10 CAPSULE ORAL at 15:22

## 2024-05-30 RX ADMIN — MAGNESIUM SULFATE HEPTAHYDRATE 2 G: 40 INJECTION, SOLUTION INTRAVENOUS at 13:40

## 2024-05-30 RX ADMIN — LACTULOSE 20 G: 10 SOLUTION ORAL at 09:00

## 2024-05-30 RX ADMIN — HYDROXYZINE HYDROCHLORIDE 25 MG: 25 TABLET, FILM COATED ORAL at 16:08

## 2024-05-30 RX ADMIN — SPIRONOLACTONE 50 MG: 50 TABLET ORAL at 20:26

## 2024-05-30 RX ADMIN — HYDROCORTISONE: 25 CREAM TOPICAL at 08:43

## 2024-05-30 RX ADMIN — SUCRALFATE 1 G: 1 SUSPENSION ORAL at 11:38

## 2024-05-30 RX ADMIN — DOCUSATE SODIUM 100 MG: 100 CAPSULE, LIQUID FILLED ORAL at 20:26

## 2024-05-30 RX ADMIN — TRAZODONE HYDROCHLORIDE 25 MG: 50 TABLET ORAL at 20:26

## 2024-05-30 RX ADMIN — FENOFIBRATE 160 MG: 160 TABLET ORAL at 08:41

## 2024-05-30 RX ADMIN — FUROSEMIDE 40 MG: 40 TABLET ORAL at 08:41

## 2024-05-30 RX ADMIN — SUCRALFATE 1 G: 1 SUSPENSION ORAL at 15:22

## 2024-05-30 RX ADMIN — DICYCLOMINE HYDROCHLORIDE 20 MG: 10 CAPSULE ORAL at 20:27

## 2024-05-30 RX ADMIN — DICYCLOMINE HYDROCHLORIDE 20 MG: 10 CAPSULE ORAL at 11:38

## 2024-05-30 RX ADMIN — ATORVASTATIN CALCIUM 80 MG: 80 TABLET, FILM COATED ORAL at 20:26

## 2024-05-30 RX ADMIN — SUCRALFATE 1 G: 1 SUSPENSION ORAL at 06:29

## 2024-05-30 RX ADMIN — URSODIOL 300 MG: 300 CAPSULE ORAL at 08:42

## 2024-05-30 RX ADMIN — SUCRALFATE 1 G: 1 SUSPENSION ORAL at 20:27

## 2024-05-30 RX ADMIN — HYDROCORTISONE: 25 CREAM TOPICAL at 20:30

## 2024-05-30 RX ADMIN — LACTULOSE 20 G: 10 SOLUTION ORAL at 20:26

## 2024-05-30 RX ADMIN — SPIRONOLACTONE 50 MG: 50 TABLET ORAL at 08:41

## 2024-05-30 RX ADMIN — URSODIOL 300 MG: 300 CAPSULE ORAL at 15:22

## 2024-05-30 RX ADMIN — DICYCLOMINE HYDROCHLORIDE 20 MG: 10 CAPSULE ORAL at 06:29

## 2024-05-30 RX ADMIN — INSULIN LISPRO 4 UNITS: 100 INJECTION, SOLUTION INTRAVENOUS; SUBCUTANEOUS at 11:58

## 2024-05-30 RX ADMIN — LACTULOSE 20 G: 10 SOLUTION ORAL at 15:22

## 2024-05-30 RX ADMIN — TRAMADOL HYDROCHLORIDE 50 MG: 50 TABLET, COATED ORAL at 20:33

## 2024-05-30 RX ADMIN — TRAMADOL HYDROCHLORIDE 50 MG: 50 TABLET, COATED ORAL at 11:40

## 2024-05-30 RX ADMIN — METFORMIN HYDROCHLORIDE 1000 MG: 1000 TABLET, FILM COATED ORAL at 17:20

## 2024-05-30 ASSESSMENT — PAIN SCALES - GENERAL
PAINLEVEL_OUTOF10: 4
PAINLEVEL_OUTOF10: 8
PAINLEVEL_OUTOF10: 7

## 2024-05-30 ASSESSMENT — PAIN SCALES - WONG BAKER: WONGBAKER_NUMERICALRESPONSE: HURTS WHOLE LOT

## 2024-05-30 ASSESSMENT — PAIN DESCRIPTION - LOCATION: LOCATION: ABDOMEN

## 2024-05-30 ASSESSMENT — PAIN DESCRIPTION - DESCRIPTORS
DESCRIPTORS: ACHING
DESCRIPTORS: ACHING

## 2024-05-30 ASSESSMENT — PAIN - FUNCTIONAL ASSESSMENT
PAIN_FUNCTIONAL_ASSESSMENT: 0-10

## 2024-05-30 ASSESSMENT — PAIN DESCRIPTION - ORIENTATION: ORIENTATION: RIGHT

## 2024-05-30 NOTE — PROGRESS NOTES
Alfonzo Flanagan is a 49 y.o. female on day 0 of admission presenting with Abdominal pain, generalized.    Subjective   Patient seen and examined.  Resting in bed in no acute distress.  Awake alert oriented x 3.  Foggy, confused.  Increased mid abdominal pain, no nausea, vomiting.  + Loose stools.  Tolerating p.o.  Poor intake.  Lower extremity burning pain, foot rash unchanged.  No other complaints.      Nursing at bedside.  Blood glucose noted.    Objective     Physical Exam  Vitals and nursing note reviewed.   Constitutional:       General: She is not in acute distress.     Appearance: Normal appearance. She is obese. She is not ill-appearing, toxic-appearing or diaphoretic.   HENT:      Head: Normocephalic and atraumatic.      Right Ear: Tympanic membrane normal.      Left Ear: Tympanic membrane normal.      Nose: Nose normal.      Mouth/Throat:      Mouth: Mucous membranes are moist.      Pharynx: Oropharynx is clear.   Eyes:      Extraocular Movements: Extraocular movements intact.      Conjunctiva/sclera: Conjunctivae normal.      Pupils: Pupils are equal, round, and reactive to light.   Cardiovascular:      Rate and Rhythm: Normal rate and regular rhythm.      Pulses: Normal pulses.      Heart sounds: Normal heart sounds. No murmur heard.  Pulmonary:      Effort: Pulmonary effort is normal. No respiratory distress.      Breath sounds: Normal breath sounds. No wheezing or rales.   Abdominal:      General: Bowel sounds are normal. There is distension.      Palpations: Abdomen is soft.      Tenderness: There is abdominal tenderness.      Comments: Increased abdominal distention + ascites. Increased tenderness.    Genitourinary:     Comments: Deferred.  Musculoskeletal:         General: No swelling or tenderness. Normal range of motion.      Cervical back: Normal range of motion and neck supple.   Skin:     General: Skin is warm and dry.      Capillary Refill: Capillary refill takes less than 2 seconds.       "Findings: No bruising.      Comments: Trace bilateral foot dorsum with mild erythematous rash nearly resolved.  Right upper calf, medial area swelling, tenderness, bruising resolved.   Neurological:      General: No focal deficit present.      Mental Status: She is alert and oriented to person, place, and time.   Psychiatric:         Mood and Affect: Mood normal.         Behavior: Behavior normal.       Last Recorded Vitals  Blood pressure 111/56, pulse 92, temperature 36.8 °C (98.2 °F), temperature source Oral, resp. rate 17, height 1.6 m (5' 3\"), weight 81.6 kg (180 lb), SpO2 100%.    Intake/Output last 3 Shifts:  No intake/output data recorded.    Relevant Results  Results for orders placed or performed during the hospital encounter of 05/25/24 (from the past 24 hour(s))   POCT GLUCOSE   Result Value Ref Range    POCT Glucose 125 (H) 74 - 99 mg/dL   POCT GLUCOSE   Result Value Ref Range    POCT Glucose 121 (H) 74 - 99 mg/dL   POCT GLUCOSE   Result Value Ref Range    POCT Glucose 136 (H) 74 - 99 mg/dL   POCT GLUCOSE   Result Value Ref Range    POCT Glucose 78 74 - 99 mg/dL     Susceptibility data from last 90 days.  Collected Specimen Info Organism Amoxicillin/Clavulanate Ampicillin Ampicillin/Sulbactam Cefazolin Cefazolin (uncomplicated UTIs only) Ciprofloxacin Gentamicin Levofloxacin Nitrofurantoin Penicillin Piperacillin/Tazobactam   05/15/24 Urine from Clean Catch/Voided Enterococcus faecium   S     R   S  S  S      Klebsiella pneumoniae/variicola  S  R  S  S  S  S  S   I   S     Collected Specimen Info Organism Trimethoprim/Sulfamethoxazole Vancomycin   05/15/24 Urine from Clean Catch/Voided Enterococcus faecium  R  S     Klebsiella pneumoniae/variicola  S      Lower extremity venous duplex bilateral    Result Date: 5/29/2024  Interpreted By:  Alberta Granados, STUDY: [facility] LOWER EXTREMITY VENOUS DUPLEX BILATERAL 5/29/2024 11:13 am   INDICATION: Signs/Symptoms:Bilateral lower extremity pain. Right lower " extremity swelling.   COMPARISON: None available.   ACCESSION NUMBER(S): [id number]   ORDERING CLINICIAN: HEENA MCWILLIAMS   TECHNIQUE: High-resolution and real time compression images as well as color Doppler ultrasound of each lower extremity is performed. Attempts were made to visualize the posterior tibial and peroneal veins.   FINDINGS: There is normal compressibility and flow observed within common femoral, femoral, and popliteal veins bilaterally with flow and compressibility of the posterior tibial and peroneal veins bilaterally observed as well.       No evidence of DVT within either lower extremity.   Signed by: Alberta Granados 5/29/2024 11:51 AM Dictation workstation:   [id number]    US guided abdominal paracentesis    Result Date: 5/28/2024  Interpreted By:  Cornelius Weber, STUDY: US GUIDED ABDOMINAL PARACENTESIS; 5/28/2024 11:10 am   INDICATION: Ascites.   COMPARISON: None.   ACCESSION NUMBER(S): [id number]   ORDERING CLINICIAN: ANUPAM FRANCIS   TECHNIQUE: Ultrasound-guided paracentesis   FINDINGS: Informed consent obtained. Patient positioned supine. Right lower quadrant prepped, draped and anesthetized. Under ultrasound guidance, 8 [place] centesis sheath needle inserted into peritoneal cavity. 2.1 Lof clear yellowfluid aspiratedwith sample sent for analysis. Patient tolerated procedure well       Ultrasound-guided paracentesis.   Signed by: Cornelius Weber 5/28/2024 11:45 AM Dictation workstation:   [id number]     Scheduled medications  atorvastatin, 80 mg, oral, Nightly  bisacodyl, 10 mg, rectal, Daily  dicyclomine, 20 mg, oral, Before meals & nightly  docusate sodium, 100 mg, oral, BID  ergocalciferol, 50,000 Units, oral, Every Sunday  fenofibrate, 160 mg, oral, Daily  furosemide, 40 mg, oral, Daily  hydrocortisone, , Topical, BID  insulin glargine, 25 Units, subcutaneous, BID  insulin lispro, 4 Units, subcutaneous, TID  lactulose, 20 g, oral, TID  lidocaine, 1 patch, transdermal, Daily  metFORMIN,  1,000 mg, oral, BID  polyethylene glycol, 17 g, oral, BID  spironolactone, 50 mg, oral, BID  sucralfate, 1 g, oral, Before meals & nightly  traZODone, 25 mg, oral, Nightly  ursodiol, 300 mg, oral, TID      Continuous medications     PRN medications  PRN medications: dextrose, dextrose, glucagon, glucagon, hydrOXYzine HCL, ondansetron ODT, traMADol      ASSESSMENT:  Altered mental status possible hepatic encephalopathy   Abdominal pain, distention - worsening  Hepatic cirrhosis with ascites status post paracentesis    Portal hypertension  Chronic pancreatitis  Hyperammonemia  Melena resolved  Constipation improved  Leukopenia  Microcytic anemia  Hyperlipidemia  Hypertriglyceridemia  Pyuria  Recent urinary tract infection - Klebsiella pneumoniae/variicola, Enterococcus faecium  Moderate protein calorie malnutrition  Type 2 diabetes mellitus  Bilateral lower extremity, pain, rash    PLAN:  No focal deficits.  Check ammonia level.  Follow-up.  Monitor mentation.  Check bladder scan.  KUB.  Follow-up.  Encouraged compliance with medications, Lactulose.  Monitor symptoms.  Continue diuretics Lasix, Spironolactone with parameters.  Low-sodium diet as tolerated.  Labs reviewed.  H&H, platelet count noted.  Stable.  Lower extremity ultrasound negative for DVT.  Continue symptom control.  Continue topical steroid.  Antihistamine.  Analgesics.  Monitor.  Point-of-care glucose reviewed.  Hold insulin, metformin.  Monitor glucose AC/HS.  Encourage intake.  Hypoglycemia protocol.  Supportive care.  Patient reassured.  Case management following for discharge planning.  Discussed with patient, nursing and Dr. Lawrence.    ADDENDUM:  I spoke with nursing, patient reporting facial paresthesia.  Ammonia 63.  STAT CT head without contrast.  Labs bmp, cbc magnesium.     Spoke with patient, reports symptoms started this morning - examination unchanged.    CT head without contrast radiology results reviewed, no acute findings.  KUB  radiology results reviewed.  Non specific bowel la pattern.  Discussed with Dr. Lawrence, nursing.        Leny Daniels, MADYSON-CNP

## 2024-05-30 NOTE — CARE PLAN
The patient's goals for the shift include      The clinical goals for the shift include Pt will sleep a minimum of 4 hours by the end of this shift      Problem: Pain - Adult  Goal: Verbalizes/displays adequate comfort level or baseline comfort level  Outcome: Progressing     Problem: Safety - Adult  Goal: Free from fall injury  Outcome: Progressing     Problem: Discharge Planning  Goal: Discharge to home or other facility with appropriate resources  Outcome: Progressing

## 2024-05-30 NOTE — PROGRESS NOTES
05/30/24 6133   Discharge Planning   Patient expects to be discharged [facility] with [facility]

## 2024-05-31 ENCOUNTER — PHARMACY VISIT (OUTPATIENT)
Dept: PHARMACY | Facility: CLINIC | Age: 49
End: 2024-05-31
Payer: COMMERCIAL

## 2024-05-31 LAB
AMMONIA PLAS-SCNC: 41 UMOL/L (ref 12–45)
AMYLASE SERPL-CCNC: 66 U/L (ref 28–100)
ANION GAP SERPL CALC-SCNC: 8 MMOL/L
BUN SERPL-MCNC: 10 MG/DL (ref 8–25)
CALCIUM SERPL-MCNC: 8.6 MG/DL (ref 8.5–10.4)
CHLORIDE SERPL-SCNC: 99 MMOL/L (ref 97–107)
CO2 SERPL-SCNC: 31 MMOL/L (ref 24–31)
CREAT SERPL-MCNC: 0.7 MG/DL (ref 0.4–1.6)
EGFRCR SERPLBLD CKD-EPI 2021: >90 ML/MIN/1.73M*2
ERYTHROCYTE [DISTWIDTH] IN BLOOD BY AUTOMATED COUNT: 19.3 % (ref 11.5–14.5)
GLUCOSE BLD MANUAL STRIP-MCNC: 142 MG/DL (ref 74–99)
GLUCOSE BLD MANUAL STRIP-MCNC: 143 MG/DL (ref 74–99)
GLUCOSE BLD MANUAL STRIP-MCNC: 147 MG/DL (ref 74–99)
GLUCOSE BLD MANUAL STRIP-MCNC: 165 MG/DL (ref 74–99)
GLUCOSE BLD MANUAL STRIP-MCNC: 65 MG/DL (ref 74–99)
GLUCOSE BLD MANUAL STRIP-MCNC: 82 MG/DL (ref 74–99)
GLUCOSE SERPL-MCNC: 74 MG/DL (ref 65–99)
HCT VFR BLD AUTO: 30.8 % (ref 36–46)
HGB BLD-MCNC: 9.6 G/DL (ref 12–16)
HOLD SPECIMEN: NORMAL
LIPASE SERPL-CCNC: 30 U/L (ref 16–63)
MAGNESIUM SERPL-MCNC: 1.7 MG/DL (ref 1.6–3.1)
MCH RBC QN AUTO: 23.9 PG (ref 26–34)
MCHC RBC AUTO-ENTMCNC: 31.2 G/DL (ref 32–36)
MCV RBC AUTO: 77 FL (ref 80–100)
NRBC BLD-RTO: 0 /100 WBCS (ref 0–0)
PLATELET # BLD AUTO: 101 X10*3/UL (ref 150–450)
POTASSIUM SERPL-SCNC: 4.1 MMOL/L (ref 3.4–5.1)
RBC # BLD AUTO: 4.01 X10*6/UL (ref 4–5.2)
SODIUM SERPL-SCNC: 138 MMOL/L (ref 133–145)
URATE SERPL-MCNC: 3 MG/DL (ref 2.5–6.8)
WBC # BLD AUTO: 4.2 X10*3/UL (ref 4.4–11.3)

## 2024-05-31 PROCEDURE — 2500000001 HC RX 250 WO HCPCS SELF ADMINISTERED DRUGS (ALT 637 FOR MEDICARE OP): Performed by: INTERNAL MEDICINE

## 2024-05-31 PROCEDURE — 84550 ASSAY OF BLOOD/URIC ACID: CPT | Performed by: NURSE PRACTITIONER

## 2024-05-31 PROCEDURE — 83735 ASSAY OF MAGNESIUM: CPT | Performed by: NURSE PRACTITIONER

## 2024-05-31 PROCEDURE — C9113 INJ PANTOPRAZOLE SODIUM, VIA: HCPCS | Performed by: NURSE PRACTITIONER

## 2024-05-31 PROCEDURE — 36415 COLL VENOUS BLD VENIPUNCTURE: CPT | Performed by: NURSE PRACTITIONER

## 2024-05-31 PROCEDURE — 2500000004 HC RX 250 GENERAL PHARMACY W/ HCPCS (ALT 636 FOR OP/ED): Performed by: INTERNAL MEDICINE

## 2024-05-31 PROCEDURE — 85027 COMPLETE CBC AUTOMATED: CPT | Performed by: NURSE PRACTITIONER

## 2024-05-31 PROCEDURE — 2500000004 HC RX 250 GENERAL PHARMACY W/ HCPCS (ALT 636 FOR OP/ED): Performed by: NURSE PRACTITIONER

## 2024-05-31 PROCEDURE — 82947 ASSAY GLUCOSE BLOOD QUANT: CPT

## 2024-05-31 PROCEDURE — 2500000001 HC RX 250 WO HCPCS SELF ADMINISTERED DRUGS (ALT 637 FOR MEDICARE OP): Performed by: NURSE PRACTITIONER

## 2024-05-31 PROCEDURE — 2500000002 HC RX 250 W HCPCS SELF ADMINISTERED DRUGS (ALT 637 FOR MEDICARE OP, ALT 636 FOR OP/ED): Performed by: INTERNAL MEDICINE

## 2024-05-31 PROCEDURE — 2500000001 HC RX 250 WO HCPCS SELF ADMINISTERED DRUGS (ALT 637 FOR MEDICARE OP)

## 2024-05-31 PROCEDURE — 1210000001 HC SEMI-PRIVATE ROOM DAILY

## 2024-05-31 PROCEDURE — 80048 BASIC METABOLIC PNL TOTAL CA: CPT | Performed by: NURSE PRACTITIONER

## 2024-05-31 PROCEDURE — RXMED WILLOW AMBULATORY MEDICATION CHARGE

## 2024-05-31 PROCEDURE — 82150 ASSAY OF AMYLASE: CPT | Performed by: NURSE PRACTITIONER

## 2024-05-31 PROCEDURE — 82140 ASSAY OF AMMONIA: CPT | Performed by: NURSE PRACTITIONER

## 2024-05-31 PROCEDURE — 83690 ASSAY OF LIPASE: CPT | Performed by: NURSE PRACTITIONER

## 2024-05-31 RX ORDER — LANOLIN ALCOHOL/MO/W.PET/CERES
400 CREAM (GRAM) TOPICAL DAILY
Qty: 30 TABLET | Refills: 0 | Status: SHIPPED | OUTPATIENT
Start: 2024-06-01

## 2024-05-31 RX ORDER — PANTOPRAZOLE SODIUM 40 MG/10ML
40 INJECTION, POWDER, LYOPHILIZED, FOR SOLUTION INTRAVENOUS DAILY
Status: DISCONTINUED | OUTPATIENT
Start: 2024-05-31 | End: 2024-06-01 | Stop reason: HOSPADM

## 2024-05-31 RX ORDER — INSULIN LISPRO 100 [IU]/ML
1-40 INJECTION, SOLUTION INTRAVENOUS; SUBCUTANEOUS
COMMUNITY

## 2024-05-31 RX ORDER — LANOLIN ALCOHOL/MO/W.PET/CERES
400 CREAM (GRAM) TOPICAL DAILY
Status: DISCONTINUED | OUTPATIENT
Start: 2024-05-31 | End: 2024-06-01 | Stop reason: HOSPADM

## 2024-05-31 RX ADMIN — SPIRONOLACTONE 50 MG: 50 TABLET ORAL at 22:14

## 2024-05-31 RX ADMIN — Medication 400 MG: at 09:41

## 2024-05-31 RX ADMIN — BISACODYL 10 MG: 10 SUPPOSITORY RECTAL at 09:41

## 2024-05-31 RX ADMIN — TRAMADOL HYDROCHLORIDE 50 MG: 50 TABLET, COATED ORAL at 22:14

## 2024-05-31 RX ADMIN — DICYCLOMINE HYDROCHLORIDE 20 MG: 10 CAPSULE ORAL at 06:21

## 2024-05-31 RX ADMIN — POLYETHYLENE GLYCOL 3350 17 G: 17 POWDER, FOR SOLUTION ORAL at 09:40

## 2024-05-31 RX ADMIN — DOCUSATE SODIUM 100 MG: 100 CAPSULE, LIQUID FILLED ORAL at 22:14

## 2024-05-31 RX ADMIN — POLYETHYLENE GLYCOL 3350 17 G: 17 POWDER, FOR SOLUTION ORAL at 22:13

## 2024-05-31 RX ADMIN — ATORVASTATIN CALCIUM 80 MG: 80 TABLET, FILM COATED ORAL at 22:14

## 2024-05-31 RX ADMIN — DOCUSATE SODIUM 100 MG: 100 CAPSULE, LIQUID FILLED ORAL at 09:41

## 2024-05-31 RX ADMIN — URSODIOL 300 MG: 300 CAPSULE ORAL at 09:41

## 2024-05-31 RX ADMIN — DICYCLOMINE HYDROCHLORIDE 20 MG: 10 CAPSULE ORAL at 22:14

## 2024-05-31 RX ADMIN — SUCRALFATE 1 G: 1 SUSPENSION ORAL at 06:21

## 2024-05-31 RX ADMIN — SUCRALFATE 1 G: 1 SUSPENSION ORAL at 16:18

## 2024-05-31 RX ADMIN — LACTULOSE 20 G: 10 SOLUTION ORAL at 22:14

## 2024-05-31 RX ADMIN — FUROSEMIDE 40 MG: 40 TABLET ORAL at 09:41

## 2024-05-31 RX ADMIN — URSODIOL 300 MG: 300 CAPSULE ORAL at 22:13

## 2024-05-31 RX ADMIN — SUCRALFATE 1 G: 1 SUSPENSION ORAL at 10:00

## 2024-05-31 RX ADMIN — LACTULOSE 20 G: 10 SOLUTION ORAL at 16:18

## 2024-05-31 RX ADMIN — DICYCLOMINE HYDROCHLORIDE 20 MG: 10 CAPSULE ORAL at 10:00

## 2024-05-31 RX ADMIN — PANTOPRAZOLE SODIUM 40 MG: 40 INJECTION, POWDER, FOR SOLUTION INTRAVENOUS at 18:53

## 2024-05-31 RX ADMIN — METFORMIN HYDROCHLORIDE 1000 MG: 1000 TABLET, FILM COATED ORAL at 16:18

## 2024-05-31 RX ADMIN — TRAZODONE HYDROCHLORIDE 25 MG: 50 TABLET ORAL at 22:14

## 2024-05-31 RX ADMIN — DICYCLOMINE HYDROCHLORIDE 20 MG: 10 CAPSULE ORAL at 16:18

## 2024-05-31 RX ADMIN — FENOFIBRATE 160 MG: 160 TABLET ORAL at 09:40

## 2024-05-31 RX ADMIN — HYDROCORTISONE: 25 CREAM TOPICAL at 22:27

## 2024-05-31 RX ADMIN — HYDROCORTISONE: 25 CREAM TOPICAL at 09:42

## 2024-05-31 RX ADMIN — METFORMIN HYDROCHLORIDE 1000 MG: 1000 TABLET, FILM COATED ORAL at 09:40

## 2024-05-31 RX ADMIN — URSODIOL 300 MG: 300 CAPSULE ORAL at 16:18

## 2024-05-31 RX ADMIN — SUCRALFATE 1 G: 1 SUSPENSION ORAL at 22:14

## 2024-05-31 RX ADMIN — SPIRONOLACTONE 50 MG: 50 TABLET ORAL at 09:41

## 2024-05-31 RX ADMIN — LACTULOSE 20 G: 10 SOLUTION ORAL at 09:40

## 2024-05-31 ASSESSMENT — PAIN SCALES - GENERAL
PAINLEVEL_OUTOF10: 8
PAINLEVEL_OUTOF10: 4
PAINLEVEL_OUTOF10: 0 - NO PAIN

## 2024-05-31 ASSESSMENT — COGNITIVE AND FUNCTIONAL STATUS - GENERAL
MOBILITY SCORE: 24
DAILY ACTIVITIY SCORE: 24
DAILY ACTIVITIY SCORE: 24
MOBILITY SCORE: 24

## 2024-05-31 ASSESSMENT — PAIN - FUNCTIONAL ASSESSMENT
PAIN_FUNCTIONAL_ASSESSMENT: 0-10
PAIN_FUNCTIONAL_ASSESSMENT: WONG-BAKER FACES

## 2024-05-31 ASSESSMENT — PAIN DESCRIPTION - DESCRIPTORS: DESCRIPTORS: ACHING

## 2024-05-31 ASSESSMENT — PAIN DESCRIPTION - ORIENTATION: ORIENTATION: MID

## 2024-05-31 ASSESSMENT — PAIN DESCRIPTION - LOCATION: LOCATION: ABDOMEN

## 2024-05-31 NOTE — CARE PLAN
The patient's goals for the shift include      The clinical goals for the shift include Patient will remain safe during my shift.      Problem: Pain - Adult  Goal: Verbalizes/displays adequate comfort level or baseline comfort level  Outcome: Progressing     Problem: Safety - Adult  Goal: Free from fall injury  Outcome: Progressing     Problem: Discharge Planning  Goal: Discharge to home or other facility with appropriate resources  Outcome: Progressing     Problem: Chronic Conditions and Co-morbidities  Goal: Patient's chronic conditions and co-morbidity symptoms are monitored and maintained or improved  Outcome: Progressing     Problem: Pain  Goal: Takes deep breaths with improved pain control throughout the shift  Outcome: Progressing  Goal: Turns in bed with improved pain control throughout the shift  Outcome: Progressing  Goal: Walks with improved pain control throughout the shift  Outcome: Progressing  Goal: Performs ADL's with improved pain control throughout shift  Outcome: Progressing  Goal: Participates in PT with improved pain control throughout the shift  Outcome: Progressing  Goal: Free from opioid side effects throughout the shift  Outcome: Progressing  Goal: Free from acute confusion related to pain meds throughout the shift  Outcome: Progressing

## 2024-05-31 NOTE — PROGRESS NOTES
Alfonzo Flanagan is a 49 y.o. female on day 1 of admission presenting with Abdominal pain, generalized.    Subjective   Patient seen and examined.  Resting in bed in no acute distress.  Awake alert oriented x 3.  No new complaints.  Abdominal pain improved - baseline.  No nausea, vomiting.  Tolerating diet.  Bilateral foot, lower extremity pain, numbness + facial/scalp numbness, unchanged.     Spoke with nursing, glucose 65 this am.  5/30/2024 post void residual > 500 mL, voided afterwards -- urinalysis unremarkable.  + Bowel movements.  No new issues.      Objective     Physical Exam  Vitals and nursing note reviewed.   Constitutional:       General: She is not in acute distress.     Appearance: Normal appearance. She is obese. She is not ill-appearing, toxic-appearing or diaphoretic.   HENT:      Head: Normocephalic and atraumatic.      Right Ear: Tympanic membrane normal.      Left Ear: Tympanic membrane normal.      Nose: Nose normal.      Mouth/Throat:      Mouth: Mucous membranes are moist.      Pharynx: Oropharynx is clear.   Eyes:      Extraocular Movements: Extraocular movements intact.      Conjunctiva/sclera: Conjunctivae normal.      Pupils: Pupils are equal, round, and reactive to light.   Cardiovascular:      Rate and Rhythm: Normal rate and regular rhythm.      Pulses: Normal pulses.      Heart sounds: Normal heart sounds. No murmur heard.  Pulmonary:      Effort: Pulmonary effort is normal. No respiratory distress.      Breath sounds: Normal breath sounds. No wheezing, rhonchi or rales.   Abdominal:      General: Bowel sounds are normal. There is distension.      Palpations: Abdomen is soft.      Tenderness: There is abdominal tenderness.      Comments: Decreased mild abdominal distention + ascites. Mild tenderness.   Genitourinary:     Comments: Deferred.  Musculoskeletal:         General: No swelling or tenderness. Normal range of motion.      Cervical back: Normal range of motion and neck  "supple.      Comments: Lower extremity tenderness.   Skin:     General: Skin is warm and dry.      Capillary Refill: Capillary refill takes less than 2 seconds.      Findings: No bruising.      Comments: Trace bilateral foot dorsum with mild erythematous rash nearly resolved.    Neurological:      General: No focal deficit present.      Mental Status: She is alert and oriented to person, place, and time.   Psychiatric:         Mood and Affect: Mood normal.         Behavior: Behavior normal.       Last Recorded Vitals  Blood pressure 125/55, pulse 91, temperature 37 °C (98.6 °F), temperature source Oral, resp. rate 18, height 1.6 m (5' 3\"), weight 81.6 kg (180 lb), SpO2 99%.    Intake/Output last 3 Shifts:  I/O last 3 completed shifts:  In: - (0 mL/kg)   Out: 500 (6.1 mL/kg) [Urine:500 (0.2 mL/kg/hr)]  Weight: 81.6 kg     Relevant Results  Results for orders placed or performed during the hospital encounter of 05/25/24 (from the past 24 hour(s))   Urinalysis with Reflex Culture and Microscopic   Result Value Ref Range    Color, Urine Colorless (N) Light-Yellow, Yellow, Dark-Yellow    Appearance, Urine Clear Clear    Specific Gravity, Urine 1.007 1.005 - 1.035    pH, Urine 6.0 5.0, 5.5, 6.0, 6.5, 7.0, 7.5, 8.0    Protein, Urine NEGATIVE NEGATIVE, 10 (TRACE), 20 (TRACE) mg/dL    Glucose, Urine Normal Normal mg/dL    Blood, Urine NEGATIVE NEGATIVE    Ketones, Urine NEGATIVE NEGATIVE mg/dL    Bilirubin, Urine NEGATIVE NEGATIVE    Urobilinogen, Urine Normal Normal mg/dL    Nitrite, Urine NEGATIVE NEGATIVE    Leukocyte Esterase, Urine NEGATIVE NEGATIVE   Extra Urine Gray Tube   Result Value Ref Range    Extra Tube Hold for add-ons.    POCT GLUCOSE   Result Value Ref Range    POCT Glucose 125 (H) 74 - 99 mg/dL   POCT GLUCOSE   Result Value Ref Range    POCT Glucose 89 74 - 99 mg/dL   Basic Metabolic Panel   Result Value Ref Range    Glucose 74 65 - 99 mg/dL    Sodium 138 133 - 145 mmol/L    Potassium 4.1 3.4 - 5.1 mmol/L    " Chloride 99 97 - 107 mmol/L    Bicarbonate 31 24 - 31 mmol/L    Urea Nitrogen 10 8 - 25 mg/dL    Creatinine 0.70 0.40 - 1.60 mg/dL    eGFR >90 >60 mL/min/1.73m*2    Calcium 8.6 8.5 - 10.4 mg/dL    Anion Gap 8 <=19 mmol/L   Magnesium   Result Value Ref Range    Magnesium 1.70 1.60 - 3.10 mg/dL   CBC   Result Value Ref Range    WBC 4.2 (L) 4.4 - 11.3 x10*3/uL    nRBC 0.0 0.0 - 0.0 /100 WBCs    RBC 4.01 4.00 - 5.20 x10*6/uL    Hemoglobin 9.6 (L) 12.0 - 16.0 g/dL    Hematocrit 30.8 (L) 36.0 - 46.0 %    MCV 77 (L) 80 - 100 fL    MCH 23.9 (L) 26.0 - 34.0 pg    MCHC 31.2 (L) 32.0 - 36.0 g/dL    RDW 19.3 (H) 11.5 - 14.5 %    Platelets 101 (L) 150 - 450 x10*3/uL   Ammonia   Result Value Ref Range    Ammonia 41 12 - 45 umol/L   POCT GLUCOSE   Result Value Ref Range    POCT Glucose 65 (L) 74 - 99 mg/dL   POCT GLUCOSE   Result Value Ref Range    POCT Glucose 82 74 - 99 mg/dL   POCT GLUCOSE   Result Value Ref Range    POCT Glucose 165 (H) 74 - 99 mg/dL     Susceptibility data from last 90 days.  Collected Specimen Info Organism Amoxicillin/Clavulanate Ampicillin Ampicillin/Sulbactam Cefazolin Cefazolin (uncomplicated UTIs only) Ciprofloxacin Gentamicin Levofloxacin Nitrofurantoin Penicillin Piperacillin/Tazobactam   05/15/24 Urine from Clean Catch/Voided Enterococcus faecium   S     R   S  S  S      Klebsiella pneumoniae/variicola  S  R  S  S  S  S  S   I   S     Collected Specimen Info Organism Trimethoprim/Sulfamethoxazole Vancomycin   05/15/24 Urine from Clean Catch/Voided Enterococcus faecium  R  S     Klebsiella pneumoniae/variicola  S      CT head wo IV contrast    Result Date: 5/30/2024  Interpreted By:  Bill Cordero, STUDY: CT HEAD WO IV CONTRAST;  5/30/2024 10:25 am   INDICATION: Signs/Symptoms:AMS.   COMPARISON: None.   ACCESSION NUMBER(S): [id number]   ORDERING CLINICIAN: HEENA MCWILLIAMS   TECHNIQUE: Noncontrast axial CT scan of head was performed. Angled reformats in brain and bone windows were generated. The  images were reviewed in bone, brain, blood and soft tissue windows.   FINDINGS: CSF Spaces: The ventricles, sulci and basal cisterns are within normal limits. There is no extraaxial fluid collection.   Parenchyma:  The grey-white differentiation is intact. There is no mass effect or midline shift.  There is no intracranial hemorrhage. Small benign-appearing lipoma involving the falx, most typically incidental.   Calvarium: The calvarium is unremarkable.   Paranasal sinuses and mastoids: Visualized paranasal sinuses and mastoids are clear.       No evidence of acute intracranial abnormality. If there is persistent concern for acute cerebral insult, MRI is recommended.     MACRO: None   Signed by: Bill Cordero 5/30/2024 11:32 AM Dictation workstation:   [id number]    XR abdomen 1 view    Result Date: 5/30/2024  Interpreted By:  Bill Cordero, STUDY: XR ABDOMEN 1 VIEW;  5/30/2024 10:16 am   INDICATION: Signs/Symptoms:Abdominal pain.   COMPARISON: April 18, 2024 CT abdomen and pelvis   ACCESSION NUMBER(S): [id number]   ORDERING CLINICIAN: HEENA MCWILLIAMS   FINDINGS: 3 separate AP views are submitted demonstrating nonspecific bowel gas pattern with relative paucity of gas. Limited evaluation of pneumoperitoneum on supine imaging, however no gross evidence of free air is noted. Similar abdominal protuberance.   Visualized lungs are clear.   Osseous structures demonstrate no acute bony changes.       1.  Nonspecific bowel gas pattern. If there continues to be concern, CT correlation recommended.   MACRO: None   Signed by: Bill Cordero 5/30/2024 11:28 AM Dictation workstation:   [id number]     Scheduled medications  atorvastatin, 80 mg, oral, Nightly  bisacodyl, 10 mg, rectal, Daily  dicyclomine, 20 mg, oral, Before meals & nightly  docusate sodium, 100 mg, oral, BID  ergocalciferol, 50,000 Units, oral, Every Sunday  fenofibrate, 160 mg, oral, Daily  furosemide, 40 mg, oral, Daily  hydrocortisone, , Topical,  BID  [Held by provider] insulin glargine, 25 Units, subcutaneous, BID  [Held by provider] insulin lispro, 4 Units, subcutaneous, TID  lactulose, 20 g, oral, TID  lidocaine, 1 patch, transdermal, Daily  magnesium oxide, 400 mg, oral, Daily  metFORMIN, 1,000 mg, oral, BID  polyethylene glycol, 17 g, oral, BID  spironolactone, 50 mg, oral, BID  sucralfate, 1 g, oral, Before meals & nightly  traZODone, 25 mg, oral, Nightly  ursodiol, 300 mg, oral, TID      Continuous medications     PRN medications  PRN medications: dextrose, dextrose, glucagon, glucagon, hydrOXYzine HCL, ondansetron ODT, traMADol      ASSESSMENT:  Altered mental status possible hepatic encephalopathy - resolved  Abdominal pain, distention - improved  Hepatic cirrhosis with ascites status post paracentesis    Portal hypertension  Chronic pancreatitis  Hyperammonemia  Melena resolved  Constipation improved  Leukopenia improved  Microcytic anemia  Hyperlipidemia  Hypertriglyceridemia  Pyuria  Recent urinary tract infection - Klebsiella pneumoniae/variicola, Enterococcus faecium  Moderate protein calorie malnutrition  Type 2 diabetes mellitus with hypoglycemia  Bilateral lower extremity, pain, rash  Hypomagnesemia - resolved    PLAN:  Patient is doing well this morning.  No new issues.  Mentation stable.  Abdominal symptoms improved, baseline.  No nausea or vomiting.  Tolerating diet.  Voiding.  Ammonia 63 --> 41.  Continue Lactulose and bowel regimen.  Add medications as needed for bowels.  Avoid constipation.  Diuretics Lasix, Spironolactone with parameters.  Low potassium diet.  Outpatient follow-up with [facility].  CBC reviewed.  Stable.  Outpatient follow-up.  Bilateral foot, lower extremity pain right > left.  Check uric acid.  Continue topical steroid.  As needed Hydroxyzine.  As needed analgesics, Tramadol.  Consider Gabapentin.  Point-of-care glucose reviewed.  Monitor point-of-care glucose AC/HS.  Hold insulin.  Encourage p.o intake.   Hypoglycemia protocol.  Reviewed update home medications.  Monitor point-of-care glucose AC/HS.  Encourage intake.  Supportive care.  Patient reassured.  Case management following for discharge planning.  [facility] with [facility].  Anticipate discharge. Discussed with patient, nursing, Dr. Lawrence.        Leny Daniels, MADYSON-CNP

## 2024-05-31 NOTE — CARE PLAN
The patient's goals for the shift include      The clinical goals for the shift include patient will have adequate pain control

## 2024-05-31 NOTE — NURSING NOTE
Patients Bs this an was 65. Gave juice, repeated Bs was 82. Breakfast ordered. Sol Daniels, RALPH notified.

## 2024-05-31 NOTE — PROGRESS NOTES
05/31/24 1650   Discharge Planning   Patient [facility] be [facility]     Anticipate discharge soon. [facility] referral sent.

## 2024-06-01 VITALS
RESPIRATION RATE: 17 BRPM | SYSTOLIC BLOOD PRESSURE: 113 MMHG | OXYGEN SATURATION: 100 % | DIASTOLIC BLOOD PRESSURE: 59 MMHG | HEART RATE: 90 BPM | TEMPERATURE: 97.7 F | HEIGHT: 63 IN | WEIGHT: 180 LBS | BODY MASS INDEX: 31.89 KG/M2

## 2024-06-01 LAB
GLUCOSE BLD MANUAL STRIP-MCNC: 106 MG/DL (ref 74–99)
GLUCOSE BLD MANUAL STRIP-MCNC: 141 MG/DL (ref 74–99)
GLUCOSE BLD MANUAL STRIP-MCNC: 191 MG/DL (ref 74–99)

## 2024-06-01 PROCEDURE — C9113 INJ PANTOPRAZOLE SODIUM, VIA: HCPCS | Performed by: NURSE PRACTITIONER

## 2024-06-01 PROCEDURE — 2500000001 HC RX 250 WO HCPCS SELF ADMINISTERED DRUGS (ALT 637 FOR MEDICARE OP): Performed by: NURSE PRACTITIONER

## 2024-06-01 PROCEDURE — 2500000001 HC RX 250 WO HCPCS SELF ADMINISTERED DRUGS (ALT 637 FOR MEDICARE OP): Performed by: INTERNAL MEDICINE

## 2024-06-01 PROCEDURE — 2500000004 HC RX 250 GENERAL PHARMACY W/ HCPCS (ALT 636 FOR OP/ED): Performed by: INTERNAL MEDICINE

## 2024-06-01 PROCEDURE — 2500000002 HC RX 250 W HCPCS SELF ADMINISTERED DRUGS (ALT 637 FOR MEDICARE OP, ALT 636 FOR OP/ED): Performed by: INTERNAL MEDICINE

## 2024-06-01 PROCEDURE — 2500000004 HC RX 250 GENERAL PHARMACY W/ HCPCS (ALT 636 FOR OP/ED): Performed by: NURSE PRACTITIONER

## 2024-06-01 PROCEDURE — 2500000001 HC RX 250 WO HCPCS SELF ADMINISTERED DRUGS (ALT 637 FOR MEDICARE OP)

## 2024-06-01 PROCEDURE — 82947 ASSAY GLUCOSE BLOOD QUANT: CPT

## 2024-06-01 RX ORDER — PANTOPRAZOLE SODIUM 40 MG/1
40 TABLET, DELAYED RELEASE ORAL DAILY
Qty: 30 TABLET | Refills: 0 | Status: SHIPPED | OUTPATIENT
Start: 2024-06-01 | End: 2024-07-05

## 2024-06-01 RX ORDER — GABAPENTIN 100 MG/1
200 CAPSULE ORAL 3 TIMES DAILY
Qty: 180 CAPSULE | Refills: 0 | Status: SHIPPED | OUTPATIENT
Start: 2024-06-01 | End: 2024-07-01

## 2024-06-01 RX ORDER — TRAMADOL HYDROCHLORIDE 50 MG/1
50 TABLET ORAL EVERY 6 HOURS PRN
Qty: 15 TABLET | Refills: 0 | Status: SHIPPED | OUTPATIENT
Start: 2024-06-01

## 2024-06-01 RX ORDER — NADOLOL 20 MG/1
20 TABLET ORAL DAILY
Qty: 30 TABLET | Refills: 0 | Status: SHIPPED | OUTPATIENT
Start: 2024-06-01 | End: 2024-07-05

## 2024-06-01 RX ADMIN — FUROSEMIDE 40 MG: 40 TABLET ORAL at 08:47

## 2024-06-01 RX ADMIN — SUCRALFATE 1 G: 1 SUSPENSION ORAL at 06:31

## 2024-06-01 RX ADMIN — DICYCLOMINE HYDROCHLORIDE 20 MG: 10 CAPSULE ORAL at 16:46

## 2024-06-01 RX ADMIN — DICYCLOMINE HYDROCHLORIDE 20 MG: 10 CAPSULE ORAL at 11:28

## 2024-06-01 RX ADMIN — URSODIOL 300 MG: 300 CAPSULE ORAL at 08:47

## 2024-06-01 RX ADMIN — FENOFIBRATE 160 MG: 160 TABLET ORAL at 08:47

## 2024-06-01 RX ADMIN — METFORMIN HYDROCHLORIDE 1000 MG: 1000 TABLET, FILM COATED ORAL at 16:47

## 2024-06-01 RX ADMIN — SUCRALFATE 1 G: 1 SUSPENSION ORAL at 16:46

## 2024-06-01 RX ADMIN — SUCRALFATE 1 G: 1 SUSPENSION ORAL at 11:28

## 2024-06-01 RX ADMIN — SPIRONOLACTONE 50 MG: 50 TABLET ORAL at 08:47

## 2024-06-01 RX ADMIN — METFORMIN HYDROCHLORIDE 1000 MG: 1000 TABLET, FILM COATED ORAL at 08:47

## 2024-06-01 RX ADMIN — DOCUSATE SODIUM 100 MG: 100 CAPSULE, LIQUID FILLED ORAL at 08:46

## 2024-06-01 RX ADMIN — URSODIOL 300 MG: 300 CAPSULE ORAL at 16:46

## 2024-06-01 RX ADMIN — POLYETHYLENE GLYCOL 3350 17 G: 17 POWDER, FOR SOLUTION ORAL at 08:46

## 2024-06-01 RX ADMIN — HYDROCORTISONE: 25 CREAM TOPICAL at 08:49

## 2024-06-01 RX ADMIN — DICYCLOMINE HYDROCHLORIDE 20 MG: 10 CAPSULE ORAL at 06:31

## 2024-06-01 RX ADMIN — PANTOPRAZOLE SODIUM 40 MG: 40 INJECTION, POWDER, FOR SOLUTION INTRAVENOUS at 08:46

## 2024-06-01 RX ADMIN — LACTULOSE 20 G: 10 SOLUTION ORAL at 16:46

## 2024-06-01 RX ADMIN — LACTULOSE 20 G: 10 SOLUTION ORAL at 08:46

## 2024-06-01 RX ADMIN — Medication 400 MG: at 08:47

## 2024-06-01 ASSESSMENT — COGNITIVE AND FUNCTIONAL STATUS - GENERAL
DAILY ACTIVITIY SCORE: 24
MOBILITY SCORE: 24

## 2024-06-01 ASSESSMENT — PAIN - FUNCTIONAL ASSESSMENT
PAIN_FUNCTIONAL_ASSESSMENT: 0-10
PAIN_FUNCTIONAL_ASSESSMENT: 0-10

## 2024-06-01 ASSESSMENT — PAIN SCALES - GENERAL
PAINLEVEL_OUTOF10: 0 - NO PAIN
PAINLEVEL_OUTOF10: 0 - NO PAIN

## 2024-06-01 NOTE — CARE PLAN
The patient's goals for the shift include      The clinical goals for the shift include Patient will have adequate pain control for my shift.      Problem: Pain - Adult  Goal: Verbalizes/displays adequate comfort level or baseline comfort level  Outcome: Progressing     Problem: Safety - Adult  Goal: Free from fall injury  Outcome: Progressing     Problem: Discharge Planning  Goal: Discharge to home or other facility with appropriate resources  Outcome: Progressing     Problem: Chronic Conditions and Co-morbidities  Goal: Patient's chronic conditions and co-morbidity symptoms are monitored and maintained or improved  Outcome: Progressing     Problem: Pain  Goal: Takes deep breaths with improved pain control throughout the shift  Outcome: Progressing  Goal: Turns in bed with improved pain control throughout the shift  Outcome: Progressing  Goal: Walks with improved pain control throughout the shift  Outcome: Progressing  Goal: Performs ADL's with improved pain control throughout shift  Outcome: Progressing  Goal: Participates in PT with improved pain control throughout the shift  Outcome: Progressing  Goal: Free from opioid side effects throughout the shift  Outcome: Progressing  Goal: Free from acute confusion related to pain meds throughout the shift  Outcome: Progressing     Problem: Diabetes  Goal: Achieve decreasing blood glucose levels by end of shift  Outcome: Progressing  Goal: Increase stability of blood glucose readings by end of shift  Outcome: Progressing  Goal: Decrease in ketones present in urine by end of shift  Outcome: Progressing  Goal: Maintain electrolyte levels within acceptable range throughout shift  Outcome: Progressing  Goal: Maintain glucose levels >70mg/dl to <250mg/dl throughout shift  Outcome: Progressing  Goal: No changes in neurological exam by end of shift  Outcome: Progressing  Goal: Learn about and adhere to nutrition recommendations by end of shift  Outcome: Progressing  Goal: Vital  signs within normal range for age by end of shift  Outcome: Progressing  Goal: Increase self care and/or family involovement by end of shift  Outcome: Progressing  Goal: Receive DSME education by end of shift  Outcome: Progressing

## 2024-06-01 NOTE — DISCHARGE SUMMARY
Discharge Diagnosis  Abdominal pain, generalized    Issues Requiring Follow-Up  Cirrhosis  Ascites  Back pain  GERD    Discharge Meds     Your medication list        START taking these medications        Instructions Last Dose Given Next Dose Due   gabapentin 100 mg capsule  Commonly known as: Neurontin      Take 2 capsules (200 mg) by mouth 3 times a day.       hydrocortisone 2.5 % cream      Apply topically to rash 2 times a day for 7 days.       hydrOXYzine HCL 25 mg tablet  Commonly known as: Atarax      Take 1 tablet (25 mg) by mouth every 6 hours if needed for itching or allergies.       magnesium oxide 400 mg (241.3 mg magnesium) tablet  Commonly known as: Mag-Ox      Take 1 tablet (400 mg) by mouth once daily.       nadolol 20 mg tablet  Commonly known as: Corgard      Take 1 tablet (20 mg) by mouth once daily.       pantoprazole 40 mg EC tablet  Commonly known as: ProtoNix      Take 1 tablet (40 mg) by mouth once daily. Do not crush, chew, or split.              CONTINUE taking these medications        Instructions Last Dose Given Next Dose Due   atorvastatin 80 mg tablet  Commonly known as: Lipitor           dicyclomine 20 mg tablet  Commonly known as: Bentyl      Take 1 tablet (20 mg) by mouth 4 times a day before meals.       docusate sodium 100 mg capsule  Commonly known as: Colace      Take 1 capsule (100 mg) by mouth 2 times a day. Hold for loose stool.       ergocalciferol 1.25 MG (52349 UT) capsule  Commonly known as: Vitamin D-2           fenofibrate 145 mg tablet  Commonly known as: Tricor           furosemide 40 mg tablet  Commonly known as: Lasix      Take 1 tablet (40 mg) by mouth once daily. Hold for dizziness.       lactulose 20 gram/30 mL oral solution      Take 30 mL (20 g) by mouth once daily. Hold for greater than 3 bowel movements in 24 hours.       Lantus U-100 Insulin 100 unit/mL injection  Generic drug: insulin glargine           lidocaine 4 % patch      Place 1 patch over 12 hours on  the skin once daily. Apply to site of pain. Remove & discard patch within 12 hours or as directed by MD. Do not start before February 16, 2024.       metFORMIN 1,000 mg tablet  Commonly known as: Glucophage           ondansetron ODT 4 mg disintegrating tablet  Commonly known as: Zofran-ODT      Take 1 tablet (4 mg) by mouth every 8 hours if needed for nausea or vomiting.       polyethylene glycol 17 gram packet  Commonly known as: Glycolax, Miralax      Take 17 g by mouth 2 times a day. Hold for loose stool.       spironolactone 50 mg tablet  Commonly known as: Aldactone      Take 1 tablet (50 mg) by mouth 2 times a day. Hold for dizziness.       sucralfate 100 mg/mL suspension  Commonly known as: Carafate      Take 10 mL (1 g) by mouth 4 times a day before meals.       traMADol 50 mg tablet  Commonly known as: Ultram           traZODone 50 mg tablet  Commonly known as: Desyrel           ursodiol 300 mg capsule  Commonly known as: Actigall                  STOP taking these medications      amoxicillin-pot clavulanate 875-125 mg tablet  Commonly known as: Augmentin               ASK your doctor about these medications        Instructions Last Dose Given Next Dose Due   insulin lispro 100 unit/mL injection  Commonly known as: HumaLOG  Ask about: Which instructions should I use?                     Where to Get Your Medications        These medications were sent to [id number] [address] [facility]  [address] [facility] [address]      Phone: [phone]   gabapentin 100 mg capsule  nadolol 20 mg tablet  pantoprazole 40 mg EC tablet       These medications were sent to [facility]  [address] [id number]      Hours: 9 AM to 6 PM Mon-Fri, 9 AM to 1 PM Sat Phone: [phone]   hydrocortisone 2.5 % cream  hydrOXYzine HCL 25 mg tablet  magnesium oxide 400 mg (241.3 mg magnesium) tablet         Test Results Pending At Discharge  Pending Labs       Order  Current Status    Extra Urine Gray Tube Collected (05/25/24 0246)    Urinalysis with Reflex Culture and Microscopic In process            Hospital Course   Patient admitted complaint of abdominal pain.  There was suspicion with ascites due to cirrhosis.  Ultrasound was done.  Patient was seen by gastroenterology.  Dementia radiology consult was obtained.  Presents with a questionable there was not much fluid.  Patient continued complain of pain in the abdomen.  CAT scan did not show any intra-abdominal pathology.  Patient complaining of numbness all over the face.  CT scan of the brain was done came back negative.  Patient continued to complain on her feet most likely that numbness is due to anxiety or also due to cirrhosis.  Anyhow patient is on the gabapentin.  Patient complaining of abdominal pain.  That could be due to cirrhosis or could also be referred pain from the back.  Anyhow patient is on gabapentin.  Patient being discharged home on gabapentin and Protonix.        Pertinent Physical Exam At Time of Discharge  Physical Exam    Outpatient Follow-Up  No future appointments.      Luis Alfredo Lawrence MD

## 2024-06-01 NOTE — NURSING NOTE
Pt discharged to home in stable condition. IV removed from LT wrist. Pt verbalizes all understanding of home-going medication regimen and follow up appointments. Pt denies any needs or concerns at discharge. All belongings sent home with patient.

## 2024-06-01 NOTE — PROGRESS NOTES
Alfonzo Flanagan is a 49 y.o. female on day 2 of admission presenting with Abdominal pain, generalized.    Subjective   The patient was seen and examined for lying in the bed.  Comfortable.  Not in acute distress.  Patient still complaining of pain in the back and numbness of the face.  But it is better than before.       Objective     Physical Exam  HEENT:  Head externally atraumatic,  extraocular movements intact, oral mucosa moist  Neck:  Supple, no JVP, no palpable adenopathy or thyromegaly.  No carotid bruit.  Chest:  Clear to auscultation and resonant.  Heart:  Regular rate and rhythm, no murmur or gallop could be appreciated.  Abdomen:  Soft, mild diffuse tenderness, ventral hernia present, bowel sounds present, normoactive, no palpable hepatosplenomegaly.  Extremities:  No edema, pulses present, no cyanosis or clubbing.  CNS:  Patient alert, oriented to time, place and person.    No new deficit.  Cranial nerves 2-12 grossly intact  Skin:  No active rash.  Musculoskeletal:  No  apparent joint swelling or erythema, range of movement normal.  Last Recorded Vitals  Heart Rate:  []   Temp:  [32 °C (89.6 °F)-36.7 °C (98.1 °F)]   Resp:  [16-18]   BP: (108-116)/(62-71)   SpO2:  [91 %-97 %]     Intake/Output last 3 Shifts:  I/O last 3 completed shifts:  In: 225 (2.8 mL/kg) [P.O.:225]  Out: 400 (4.9 mL/kg) [Urine:400 (0.1 mL/kg/hr)]  Weight: 81.6 kg     Relevant Results  Susceptibility data from last 90 days.  Collected Specimen Info Organism Amoxicillin/Clavulanate Ampicillin Ampicillin/Sulbactam Cefazolin Cefazolin (uncomplicated UTIs only) Ciprofloxacin Gentamicin Levofloxacin Nitrofurantoin Penicillin Piperacillin/Tazobactam   05/15/24 Urine from Clean Catch/Voided Enterococcus faecium   S     R   S  S  S      Klebsiella pneumoniae/variicola  S  R  S  S  S  S  S   I   S     Collected Specimen Info Organism Trimethoprim/Sulfamethoxazole Vancomycin   05/15/24 Urine from Clean Catch/Voided Enterococcus  faecium  R  S     Klebsiella pneumoniae/variicola  S      Results for orders placed or performed during the hospital encounter of 05/25/24 (from the past 24 hour(s))   POCT GLUCOSE   Result Value Ref Range    POCT Glucose 147 (H) 74 - 99 mg/dL   POCT GLUCOSE   Result Value Ref Range    POCT Glucose 142 (H) 74 - 99 mg/dL   POCT GLUCOSE   Result Value Ref Range    POCT Glucose 143 (H) 74 - 99 mg/dL   POCT GLUCOSE   Result Value Ref Range    POCT Glucose 106 (H) 74 - 99 mg/dL   POCT GLUCOSE   Result Value Ref Range    POCT Glucose 191 (H) 74 - 99 mg/dL        Current Facility-Administered Medications:     atorvastatin (Lipitor) tablet 80 mg, 80 mg, oral, Nightly, Luis Alfredo Lawrence MD, 80 mg at 05/31/24 2214    bisacodyl (Dulcolax) suppository 10 mg, 10 mg, rectal, Daily, MADYSON Thibodeaux-CNP, 10 mg at 05/31/24 0941    dextrose 50 % injection 12.5 g, 12.5 g, intravenous, q15 min PRN, MADYSON Sotelo-CNP    dextrose 50 % injection 25 g, 25 g, intravenous, q15 min PRN, ESME Sotelo    dicyclomine (Bentyl) capsule 20 mg, 20 mg, oral, Before meals & nightly, Luis Alfredo Lawrence MD, 20 mg at 06/01/24 1128    docusate sodium (Colace) capsule 100 mg, 100 mg, oral, BID, Luis Alfredo Lawrence MD, 100 mg at 06/01/24 0846    ergocalciferol (Vitamin D-2) capsule 50,000 Units, 50,000 Units, oral, Every Sunday, Luis Alfredo Lawrence MD, 50,000 Units at 05/26/24 2046    fenofibrate (Triglide) tablet 160 mg, 160 mg, oral, Daily, Luis Alfredo Lawrence MD, 160 mg at 06/01/24 0847    furosemide (Lasix) tablet 40 mg, 40 mg, oral, Daily, MADYSON Sotelo-CNP, 40 mg at 06/01/24 0847    glucagon (Glucagen) injection 1 mg, 1 mg, intramuscular, q15 min PRN, ESME Sotelo    glucagon (Glucagen) injection 1 mg, 1 mg, intramuscular, q15 min PRN, ESME Sotelo    hydrocortisone 2.5 % cream, , Topical, BID, ESME Sotelo, Given at 06/01/24 0849    hydrOXYzine HCL (Atarax) tablet  25 mg, 25 mg, oral, q6h PRN, ESME Sotelo, 25 mg at 05/30/24 1608    [Held by provider] insulin glargine (Lantus) injection 25 Units, 25 Units, subcutaneous, BID, Luis Alfredo Lawrence MD, 25 Units at 05/29/24 2047    [Held by provider] insulin lispro (HumaLOG) injection 4 Units, 4 Units, subcutaneous, TID, Luis Alfredo Lawrence MD, 4 Units at 05/30/24 1158    lactulose 20 gram/30 mL oral solution 20 g, 20 g, oral, TID, ESME Knutson, 20 g at 06/01/24 0846    lidocaine 4 % patch 1 patch, 1 patch, transdermal, Daily, Luis Alfredo Lawrence MD    magnesium oxide (Mag-Ox) tablet 400 mg, 400 mg, oral, Daily, ESME Sotelo, 400 mg at 06/01/24 0847    metFORMIN (Glucophage) tablet 1,000 mg, 1,000 mg, oral, BID, Luis Alfredo Lawrence MD, 1,000 mg at 06/01/24 0847    ondansetron ODT (Zofran-ODT) disintegrating tablet 4 mg, 4 mg, oral, q8h PRN, Luis Alfredo Lawrence MD, 4 mg at 05/27/24 1808    pantoprazole (ProtoNix) injection 40 mg, 40 mg, intravenous, Daily, ESME Sotelo, 40 mg at 06/01/24 0846    polyethylene glycol (Glycolax, Miralax) packet 17 g, 17 g, oral, BID, Luis Alfredo Lawrence MD, 17 g at 06/01/24 0846    spironolactone (Aldactone) tablet 50 mg, 50 mg, oral, BID, ESME Sotelo, 50 mg at 06/01/24 0847    sucralfate (Carafate) suspension 1 g, 1 g, oral, Before meals & nightly, Luis Alfredo Lawrence MD, 1 g at 06/01/24 1128    traMADol (Ultram) tablet 50 mg, 50 mg, oral, q6h PRN, Luis Alfredo Lawrence MD, 50 mg at 05/31/24 2214    traZODone (Desyrel) tablet 25 mg, 25 mg, oral, Nightly, Luis Alfredo Lawrence MD, 25 mg at 05/31/24 2214    ursodiol (Actigall) capsule 300 mg, 300 mg, oral, TID, Luis Alfredo Lawrence MD, 300 mg at 06/01/24 0847   Assessment/Plan   Principal Problem:    Abdominal pain, generalized  Cirrhosis  Ascites  GERD  Low back pain with radiculopathy    Plan: Continue current medication.  Patient is complaining of numbness all over the body,  especially the face.  Gait will be due to anxiety.  Patient started on gabapentin to help her anxiety and.  Neuropathy.  Patient medically stable.  Patient not eating or drinking much due to portal hypertension and got engorgement with the fluid.  Patient encouraged to increase oral intake.  Continue with the Lasix and add low-dose nadolol.         Luis Alfredo Lawrence MD

## 2024-06-02 ENCOUNTER — PATIENT OUTREACH (OUTPATIENT)
Dept: HOME HEALTH SERVICES | Age: 49
End: 2024-06-02
Payer: COMMERCIAL

## 2024-06-02 PROBLEM — E11.9 DIABETES MELLITUS WITHOUT COMPLICATION (MULTI): Status: ACTIVE | Noted: 2023-02-20

## 2024-06-02 SDOH — SOCIAL STABILITY: SOCIAL INSECURITY: WITHIN THE LAST YEAR, HAVE YOU BEEN AFRAID OF YOUR PARTNER OR EX-PARTNER?: NO

## 2024-06-02 SDOH — ECONOMIC STABILITY: INCOME INSECURITY: HOW HARD IS IT FOR YOU TO PAY FOR THE VERY BASICS LIKE FOOD, HOUSING, MEDICAL CARE, AND HEATING?: HARD

## 2024-06-02 SDOH — SOCIAL STABILITY: SOCIAL NETWORK: ARE YOU MARRIED, WIDOWED, DIVORCED, SEPARATED, NEVER MARRIED, OR LIVING WITH A PARTNER?: MARRIED

## 2024-06-02 SDOH — SOCIAL STABILITY: SOCIAL NETWORK: HOW OFTEN DO YOU ATTENT MEETINGS OF THE CLUB OR ORGANIZATION YOU BELONG TO?: NEVER

## 2024-06-02 SDOH — ECONOMIC STABILITY: INCOME INSECURITY: IN THE LAST 12 MONTHS, WAS THERE A TIME WHEN YOU WERE NOT ABLE TO PAY THE MORTGAGE OR RENT ON TIME?: NO

## 2024-06-02 SDOH — HEALTH STABILITY: MENTAL HEALTH: HOW OFTEN DO YOU HAVE 6 OR MORE DRINKS ON ONE OCCASION?: NEVER

## 2024-06-02 SDOH — SOCIAL STABILITY: SOCIAL INSECURITY: WITHIN THE LAST YEAR, HAVE YOU BEEN HUMILIATED OR EMOTIONALLY ABUSED IN OTHER WAYS BY YOUR PARTNER OR EX-PARTNER?: NO

## 2024-06-02 SDOH — HEALTH STABILITY: MENTAL HEALTH: HOW OFTEN DO YOU HAVE A DRINK CONTAINING ALCOHOL?: NEVER

## 2024-06-02 SDOH — ECONOMIC STABILITY: FOOD INSECURITY: WITHIN THE PAST 12 MONTHS, YOU WORRIED THAT YOUR FOOD WOULD RUN OUT BEFORE YOU GOT MONEY TO BUY MORE.: NEVER TRUE

## 2024-06-02 SDOH — HEALTH STABILITY: MENTAL HEALTH
HOW OFTEN DO YOU NEED TO HAVE SOMEONE HELP YOU WHEN YOU READ INSTRUCTIONS, PAMPHLETS, OR OTHER WRITTEN MATERIAL FROM YOUR DOCTOR OR PHARMACY?: SOMETIMES

## 2024-06-02 SDOH — ECONOMIC STABILITY: FOOD INSECURITY: WITHIN THE PAST 12 MONTHS, THE FOOD YOU BOUGHT JUST DIDN'T LAST AND YOU DIDN'T HAVE MONEY TO GET MORE.: NEVER TRUE

## 2024-06-02 SDOH — SOCIAL STABILITY: SOCIAL NETWORK: IN A TYPICAL WEEK, HOW MANY TIMES DO YOU TALK ON THE PHONE WITH FAMILY, FRIENDS, OR NEIGHBORS?: NEVER

## 2024-06-02 SDOH — ECONOMIC STABILITY: HOUSING INSECURITY: IN THE LAST 12 MONTHS, HOW MANY PLACES HAVE YOU LIVED?: 1

## 2024-06-02 SDOH — HEALTH STABILITY: MENTAL HEALTH: HOW MANY STANDARD DRINKS CONTAINING ALCOHOL DO YOU HAVE ON A TYPICAL DAY?: PATIENT DOES NOT DRINK

## 2024-06-02 SDOH — ECONOMIC STABILITY: INCOME INSECURITY: IN THE PAST 12 MONTHS, HAS THE ELECTRIC, GAS, OIL, OR WATER COMPANY THREATENED TO SHUT OFF SERVICE IN YOUR HOME?: NO

## 2024-06-02 SDOH — HEALTH STABILITY: PHYSICAL HEALTH: ON AVERAGE, HOW MANY DAYS PER WEEK DO YOU ENGAGE IN MODERATE TO STRENUOUS EXERCISE (LIKE A BRISK WALK)?: 7 DAYS

## 2024-06-02 SDOH — HEALTH STABILITY: PHYSICAL HEALTH: ON AVERAGE, HOW MANY MINUTES DO YOU ENGAGE IN EXERCISE AT THIS LEVEL?: 20 MIN

## 2024-06-02 SDOH — SOCIAL STABILITY: SOCIAL NETWORK: HOW OFTEN DO YOU GET TOGETHER WITH FRIENDS OR RELATIVES?: NEVER

## 2024-06-02 ASSESSMENT — LIFESTYLE VARIABLES
AUDIT-C TOTAL SCORE: 0
SKIP TO QUESTIONS 9-10: 1

## 2024-06-02 NOTE — PROGRESS NOTES
Enrollment call  Patient agreeable to [facility] ([facility]y at Home). Enrollment call completed and program overview explained to patient, with appropriate numbers/info given.   Initial visit is 6/3 @ 10AM USE LANGUAGE LINE DTR NA    RE ENROLL   49 y.o. female presenting with with abdominal pain, nausea, distention.  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  Patient has been seen by our service for for similar symptoms in the past. She is primarily followed by Dr. Whitlock   .Discharge Diagnosis  Abdominal pain, generalized     Issues Requiring Follow-Up  Cirrhosis  Ascites  Back pain  GERD       -Patient being discharged home on gabapentin and Protonix.     PCP DR Landen Freeman  Needs FUV in 1 week  GI MD [facility]  FU as advised

## 2024-06-03 ENCOUNTER — PATIENT OUTREACH (OUTPATIENT)
Dept: HOME HEALTH SERVICES | Age: 49
End: 2024-06-03

## 2024-06-03 NOTE — PROGRESS NOTES
[facility] call not completed as patient did not answer calls twice. A message was left by the Azerbaijani interpretor requesting she return the call, and reschedule the [facility] appointment.

## 2024-06-04 ENCOUNTER — PATIENT OUTREACH (OUTPATIENT)
Dept: PRIMARY CARE | Facility: CLINIC | Age: 49
End: 2024-06-04
Payer: COMMERCIAL

## 2024-06-05 ENCOUNTER — PATIENT OUTREACH (OUTPATIENT)
Dept: HOME HEALTH SERVICES | Age: 49
End: 2024-06-05
Payer: COMMERCIAL

## 2024-06-06 ENCOUNTER — PATIENT OUTREACH (OUTPATIENT)
Dept: HOME HEALTH SERVICES | Age: 49
End: 2024-06-06
Payer: COMMERCIAL

## 2024-06-06 NOTE — PROGRESS NOTES
Daily Call Note:   6/5/24 Daily call completed. Spoke to patient's daughter who translated for mom. Mom stated that she is feeling a lot better today. Patient did not check her blood sugar today. Educated patient on the importance of checking blood sugar according to doctor order. Patient stated that she will check her blood sugar tomorrow morning.  Pt Education: Monitoring blood glucose regularly  Barriers: Language (primary language Espanol)  Topics for Daily Review: Blood Glucose  Pt demonstrates clear understanding: Yes    Daily Weight:  There were no vitals filed for this visit.   Last 3 Weights:  Wt Readings from Last 7 Encounters:   05/25/24 81.6 kg (180 lb)   05/15/24 80.3 kg (177 lb)   04/17/24 77.2 kg (170 lb 3.2 oz)   02/07/24 72.6 kg (160 lb)   01/26/24 83.9 kg (185 lb)   05/03/23 80.7 kg (178 lb)   03/29/23 86.4 kg (190 lb 6.4 oz)       Masimo Device: No   Masimo Clinical Impression: N/A    Virtual Visits--Scheduled (Most Recent Date at Top)  Follow up Appointments  Recent Visits  No visits were found meeting these conditions.  Showing recent visits within past 30 days and meeting all other requirements  Future Appointments  No visits were found meeting these conditions.  Showing future appointments within next 90 days and meeting all other requirements       Frequency of RN Calls & Virtual Visits per Team Agreement: [facility] Home Frequency: Daily    Medication issues Addressed (what was done): None    Follow up appointments scheduled by [facility] Staff: None  Referrals made by [facility] staff: No

## 2024-06-07 ENCOUNTER — PATIENT OUTREACH (OUTPATIENT)
Dept: HOME HEALTH SERVICES | Age: 49
End: 2024-06-07
Payer: COMMERCIAL

## 2024-06-07 NOTE — PROGRESS NOTES
Daily Call Note: 6/6 @ 20:00- Daily call completed,  used. Pt states she has been having abd pain all day but got better after 4 PM today. Pt also had diarrhea all night, resolved, and some nausea. Pt states her stomach looks more swollen and her weight went form 157 Ibs to 161 ibs. Encouraged pt to go to the ER , Pt states she will if her pain comes back or symptoms return. I explained that she needs to be evaluated if her abd is filling up with fluid. Patient understands and states she might be seen.  Pts sugar is 134 in AM, went over her insulin. Discussed the importance of checking her sugars more than once a day. Pt still needs a PCP and GI appointment. Pt just received her medicaid card today and pt would like to verify it before appointments are made. Pt did not take any vitals today. Next [facility] appointment Monday 6/10 @ 1930    Topics for Daily Review: going to the ER for evaluation, checking her sugars more frequently   Pt demonstrates clear understanding: Yes    Daily Weight:  There were no vitals filed for this visit.   Last 3 Weights:  Wt Readings from Last 7 Encounters:   05/25/24 81.6 kg (180 lb)   05/15/24 80.3 kg (177 lb)   04/17/24 77.2 kg (170 lb 3.2 oz)   02/07/24 72.6 kg (160 lb)   01/26/24 83.9 kg (185 lb)   05/03/23 80.7 kg (178 lb)   03/29/23 86.4 kg (190 lb 6.4 oz)       Masimo Device: No   Masimo Clinical Impression:     Virtual Visits--Scheduled (Most Recent Date at Top)  Follow up Appointments  Recent Visits  No visits were found meeting these conditions.  Showing recent visits within past 30 days and meeting all other requirements  Future Appointments  No visits were found meeting these conditions.  Showing future appointments within next 90 days and meeting all other requirements       Frequency of RN Calls & Virtual Visits per Team Agreement: [facility] Home Frequency: Daily    Medication issues Addressed (what was done): n    Follow up appointments scheduled by [facility] Staff:  n  Referrals made by [facility] staff: n

## 2024-06-08 ENCOUNTER — PATIENT OUTREACH (OUTPATIENT)
Dept: HOME HEALTH SERVICES | Age: 49
End: 2024-06-08
Payer: COMMERCIAL

## 2024-06-08 ENCOUNTER — TELEPHONE (OUTPATIENT)
Dept: INTERNAL MEDICINE | Facility: HOSPITAL | Age: 49
End: 2024-06-08
Payer: COMMERCIAL

## 2024-06-08 NOTE — PROGRESS NOTES
Daily Call Note: 6/7 @ 20:18- daily call completed with pt and . Pt states she is feeling better today. No nausea, vomiting or diarrhea. Pt still having abd pain on and off but states it is better. Pt weight is down 2 Ibs from yesterday. Today's weight 159 Ibs. I encouraged pt to go to the ER for any increase abd pain, abd swelling or new symptoms. Pt BP today is 129/72. Pt states she takes her blood sugar twice a day. Pt takes  Glargine 25 units BID and a sliding scale TID. Pt states she gives herself insulin depending how what she eats. Did some teaching on taking her accucheck TID and giving herself insulin depending on the test results. Pt expressed understanding. Pts sugar tonight was 164. All questions answered.     Pt Education: y  Barriers: language barrier  Topics for Daily Review: checking her accuchecks, reasons to go to the ER  Pt demonstrates clear understanding: Yes    Daily Weight:6/7- 72.2 Kg (159 Ibs)  There were no vitals filed for this visit.   Last 3 Weights:  Wt Readings from Last 7 Encounters:   05/25/24 81.6 kg (180 lb)   05/15/24 80.3 kg (177 lb)   04/17/24 77.2 kg (170 lb 3.2 oz)   02/07/24 72.6 kg (160 lb)   01/26/24 83.9 kg (185 lb)   05/03/23 80.7 kg (178 lb)   03/29/23 86.4 kg (190 lb 6.4 oz)       Masimo Device: No   Masimo Clinical Impression:     Virtual Visits--Scheduled (Most Recent Date at Top)  Follow up Appointments  Recent Visits  No visits were found meeting these conditions.  Showing recent visits within past 30 days and meeting all other requirements  Future Appointments  No visits were found meeting these conditions.  Showing future appointments within next 90 days and meeting all other requirements       Frequency of RN Calls & Virtual Visits per Team Agreement: [facility] Home Frequency: Daily    Medication issues Addressed (what was done): n    Follow up appointments scheduled by [facility] Staff: n  Referrals made by [facility] staff: n

## 2024-06-09 ENCOUNTER — PATIENT OUTREACH (OUTPATIENT)
Dept: HOME HEALTH SERVICES | Age: 49
End: 2024-06-09
Payer: COMMERCIAL

## 2024-06-09 ENCOUNTER — HOSPITAL ENCOUNTER (EMERGENCY)
Facility: HOSPITAL | Age: 49
Discharge: HOME | End: 2024-06-10
Attending: EMERGENCY MEDICINE
Payer: COMMERCIAL

## 2024-06-09 DIAGNOSIS — R10.9 ABDOMINAL PAIN, UNSPECIFIED ABDOMINAL LOCATION: ICD-10-CM

## 2024-06-09 DIAGNOSIS — R19.7 DIARRHEA, UNSPECIFIED TYPE: Primary | ICD-10-CM

## 2024-06-09 DIAGNOSIS — Z87.19 HISTORY OF CIRRHOSIS: ICD-10-CM

## 2024-06-09 DIAGNOSIS — R73.9 HYPERGLYCEMIA: ICD-10-CM

## 2024-06-09 DIAGNOSIS — K85.90 ACUTE PANCREATITIS WITHOUT INFECTION OR NECROSIS, UNSPECIFIED PANCREATITIS TYPE (HHS-HCC): ICD-10-CM

## 2024-06-09 DIAGNOSIS — R11.2 NAUSEA AND VOMITING, UNSPECIFIED VOMITING TYPE: ICD-10-CM

## 2024-06-09 PROCEDURE — 85025 COMPLETE CBC W/AUTO DIFF WBC: CPT | Performed by: EMERGENCY MEDICINE

## 2024-06-09 PROCEDURE — 83690 ASSAY OF LIPASE: CPT | Performed by: EMERGENCY MEDICINE

## 2024-06-09 PROCEDURE — 80053 COMPREHEN METABOLIC PANEL: CPT | Performed by: EMERGENCY MEDICINE

## 2024-06-09 PROCEDURE — 80320 DRUG SCREEN QUANTALCOHOLS: CPT | Performed by: EMERGENCY MEDICINE

## 2024-06-09 PROCEDURE — 81003 URINALYSIS AUTO W/O SCOPE: CPT | Performed by: EMERGENCY MEDICINE

## 2024-06-09 PROCEDURE — 81025 URINE PREGNANCY TEST: CPT | Performed by: EMERGENCY MEDICINE

## 2024-06-09 PROCEDURE — 36415 COLL VENOUS BLD VENIPUNCTURE: CPT | Performed by: EMERGENCY MEDICINE

## 2024-06-09 PROCEDURE — 80307 DRUG TEST PRSMV CHEM ANLYZR: CPT | Performed by: EMERGENCY MEDICINE

## 2024-06-09 PROCEDURE — 99284 EMERGENCY DEPT VISIT MOD MDM: CPT

## 2024-06-09 ASSESSMENT — LIFESTYLE VARIABLES
EVER HAD A DRINK FIRST THING IN THE MORNING TO STEADY YOUR NERVES TO GET RID OF A HANGOVER: NO
EVER FELT BAD OR GUILTY ABOUT YOUR DRINKING: NO
HAVE PEOPLE ANNOYED YOU BY CRITICIZING YOUR DRINKING: NO
TOTAL SCORE: 0
HAVE YOU EVER FELT YOU SHOULD CUT DOWN ON YOUR DRINKING: NO

## 2024-06-09 ASSESSMENT — PAIN DESCRIPTION - DESCRIPTORS: DESCRIPTORS: SHARP;SQUEEZING

## 2024-06-09 ASSESSMENT — PAIN DESCRIPTION - LOCATION: LOCATION: ABDOMEN

## 2024-06-09 ASSESSMENT — PAIN SCALES - GENERAL: PAINLEVEL_OUTOF10: 8

## 2024-06-09 ASSESSMENT — PAIN DESCRIPTION - ONSET: ONSET: SUDDEN

## 2024-06-09 ASSESSMENT — PAIN - FUNCTIONAL ASSESSMENT: PAIN_FUNCTIONAL_ASSESSMENT: 0-10

## 2024-06-09 ASSESSMENT — PAIN DESCRIPTION - PROGRESSION: CLINICAL_PROGRESSION: GRADUALLY WORSENING

## 2024-06-09 ASSESSMENT — PAIN DESCRIPTION - FREQUENCY: FREQUENCY: CONSTANT/CONTINUOUS

## 2024-06-09 ASSESSMENT — PAIN DESCRIPTION - ORIENTATION: ORIENTATION: MID

## 2024-06-09 ASSESSMENT — PAIN DESCRIPTION - PAIN TYPE: TYPE: ACUTE PAIN

## 2024-06-09 NOTE — PROGRESS NOTES
1935- Awaiting call back from patient & daughter. Voicemail recently left for patient by Ema Jenkins RN to follow up on elevated BS today.

## 2024-06-09 NOTE — PROGRESS NOTES
1920- Call to daughter to check for glucose reading- No answer, left VM  1502- Pt took 25 units lantus this morning and 15 units lispro coverage at lunch time for sugar 362. Dr Henson made aware and no addtl insulin coverage at this time. Check sugar approx 1 hour after pt gives herself her dinner time coverage and notify provider if glucose above 250.     Call to pt daughter- Lunch time blood sugar today was 362      Patient's Address:   [address] [place], [place] [address]  **  If this is not the address patient will receive services - alert team and address in EMR**       Patient Contacts:  Extended Emergency Contact Information  Primary Emergency Contact: Rah Modi  Address: [address] [place] [address]  Home Phone: [phone]  Relation: Spouse  Secondary Emergency Contact: Claudia Hartley  Mobile Phone: [phone]  Relation: Daughter                                Patient's Preferred Phone: [phone]  Patient's E-mail: [email or web address]

## 2024-06-09 NOTE — PROGRESS NOTES
"2100- Daily call completed with patient's daughter who translated for patient. Reports she is feeling well today. Only had one episode of diarrhea this morning and ABD pain improving, rating 3/10. Did not take vitals today & encouraged to do so. Reported AM & dinner 's & doesn't remember how much insulin she took for either meal. Lunch BS read \"HIGH\" & did not contact us or take any insulin at all, provided education on meter reading & informed them to call us next time. Instructed patient's daughter to be more active in helping with insulin administration or reminding patient to take. Also, has ordered Lantus 25 units BID & did not take at all today.  as of 30 minutes ago & instructed them to administer Lantus 25 units now.      2116- Contacted Dr. Ash via secure chat for additional insulin this evening. Ordered Lispro 12 units to be given in addition to Lantus 25 units tonight. Called back patient's daughter and instructed to administer additional insulin tonight. Verbalized understanding & states they will give now. Informed we will follow up tomorrow regarding BS. Reminded of initial [facility] 6/10 @ 1930 & patient's daughter states she will be available for call & we can reach out to her directly.   "

## 2024-06-09 NOTE — Clinical Note
Alfonzo Flanagan was seen and treated in our emergency department on 6/9/2024.  She may return to work on 06/12/2024.       If you have any questions or concerns, please don't hesitate to call.      Melida Guthrie MD

## 2024-06-10 ENCOUNTER — PATIENT OUTREACH (OUTPATIENT)
Dept: HOME HEALTH SERVICES | Age: 49
End: 2024-06-10

## 2024-06-10 ENCOUNTER — TELEMEDICINE (OUTPATIENT)
Dept: CARE COORDINATION | Age: 49
End: 2024-06-10
Payer: COMMERCIAL

## 2024-06-10 ENCOUNTER — APPOINTMENT (OUTPATIENT)
Dept: RADIOLOGY | Facility: HOSPITAL | Age: 49
End: 2024-06-10
Payer: COMMERCIAL

## 2024-06-10 VITALS
SYSTOLIC BLOOD PRESSURE: 112 MMHG | OXYGEN SATURATION: 100 % | RESPIRATION RATE: 18 BRPM | BODY MASS INDEX: 31.89 KG/M2 | HEIGHT: 63 IN | DIASTOLIC BLOOD PRESSURE: 71 MMHG | WEIGHT: 180 LBS | HEART RATE: 72 BPM | TEMPERATURE: 98.1 F

## 2024-06-10 DIAGNOSIS — K74.60 HEPATIC CIRRHOSIS, UNSPECIFIED HEPATIC CIRRHOSIS TYPE, UNSPECIFIED WHETHER ASCITES PRESENT (MULTI): ICD-10-CM

## 2024-06-10 DIAGNOSIS — E11.9 DIABETES MELLITUS WITHOUT COMPLICATION (MULTI): ICD-10-CM

## 2024-06-10 DIAGNOSIS — R10.13 ABDOMINAL PAIN, EPIGASTRIC: Primary | ICD-10-CM

## 2024-06-10 DIAGNOSIS — R20.0 NUMBNESS: ICD-10-CM

## 2024-06-10 LAB
ALBUMIN SERPL-MCNC: 2.9 G/DL (ref 3.5–5)
ALP BLD-CCNC: 533 U/L (ref 35–125)
ALT SERPL-CCNC: 36 U/L (ref 5–40)
AMPHETAMINES UR QL SCN>1000 NG/ML: NEGATIVE
ANION GAP SERPL CALC-SCNC: 6 MMOL/L
APAP SERPL-MCNC: <5 UG/ML
APPEARANCE UR: CLEAR
AST SERPL-CCNC: 56 U/L (ref 5–40)
BARBITURATES UR QL SCN>300 NG/ML: NEGATIVE
BASOPHILS # BLD AUTO: 0.04 X10*3/UL (ref 0–0.1)
BASOPHILS NFR BLD AUTO: 0.8 %
BENZODIAZ UR QL SCN>300 NG/ML: NEGATIVE
BILIRUB SERPL-MCNC: 1.1 MG/DL (ref 0.1–1.2)
BILIRUB UR STRIP.AUTO-MCNC: NEGATIVE MG/DL
BUN SERPL-MCNC: 6 MG/DL (ref 8–25)
BZE UR QL SCN>300 NG/ML: NEGATIVE
C COLI+JEJ+UPSA DNA STL QL NAA+PROBE: NOT DETECTED
CALCIUM SERPL-MCNC: 8.1 MG/DL (ref 8.5–10.4)
CANNABINOIDS UR QL SCN>50 NG/ML: NEGATIVE
CHLORIDE SERPL-SCNC: 109 MMOL/L (ref 97–107)
CO2 SERPL-SCNC: 19 MMOL/L (ref 24–31)
COLOR UR: ABNORMAL
CREAT SERPL-MCNC: 0.5 MG/DL (ref 0.4–1.6)
EC STX1 GENE STL QL NAA+PROBE: NOT DETECTED
EC STX2 GENE STL QL NAA+PROBE: NOT DETECTED
EGFRCR SERPLBLD CKD-EPI 2021: >90 ML/MIN/1.73M*2
EOSINOPHIL # BLD AUTO: 0.48 X10*3/UL (ref 0–0.7)
EOSINOPHIL NFR BLD AUTO: 9.8 %
ERYTHROCYTE [DISTWIDTH] IN BLOOD BY AUTOMATED COUNT: 19.5 % (ref 11.5–14.5)
ETHANOL SERPL-MCNC: <0.01 G/DL
FENTANYL+NORFENTANYL UR QL SCN: NEGATIVE
GLUCOSE BLD MANUAL STRIP-MCNC: 261 MG/DL (ref 74–99)
GLUCOSE SERPL-MCNC: 469 MG/DL (ref 65–99)
GLUCOSE UR STRIP.AUTO-MCNC: ABNORMAL MG/DL
HCG UR QL IA.RAPID: NEGATIVE
HCT VFR BLD AUTO: 32.7 % (ref 36–46)
HGB BLD-MCNC: 9.9 G/DL (ref 12–16)
HOLD SPECIMEN: NORMAL
IMM GRANULOCYTES # BLD AUTO: 0 X10*3/UL (ref 0–0.7)
IMM GRANULOCYTES NFR BLD AUTO: 0 % (ref 0–0.9)
KETONES UR STRIP.AUTO-MCNC: NEGATIVE MG/DL
LACTATE BLDV-SCNC: 1.2 MMOL/L (ref 0.4–2)
LEUKOCYTE ESTERASE UR QL STRIP.AUTO: NEGATIVE
LIPASE SERPL-CCNC: 72 U/L (ref 16–63)
LYMPHOCYTES # BLD AUTO: 1.57 X10*3/UL (ref 1.2–4.8)
LYMPHOCYTES NFR BLD AUTO: 32.1 %
MCH RBC QN AUTO: 23.7 PG (ref 26–34)
MCHC RBC AUTO-ENTMCNC: 30.3 G/DL (ref 32–36)
MCV RBC AUTO: 78 FL (ref 80–100)
METHADONE UR QL SCN>300 NG/ML: NEGATIVE
MONOCYTES # BLD AUTO: 0.45 X10*3/UL (ref 0.1–1)
MONOCYTES NFR BLD AUTO: 9.2 %
NEUTROPHILS # BLD AUTO: 2.35 X10*3/UL (ref 1.2–7.7)
NEUTROPHILS NFR BLD AUTO: 48.1 %
NITRITE UR QL STRIP.AUTO: NEGATIVE
NOROVIRUS GI + GII RNA STL NAA+PROBE: NOT DETECTED
NRBC BLD-RTO: 0 /100 WBCS (ref 0–0)
OPIATES UR QL SCN>300 NG/ML: NEGATIVE
OXYCODONE UR QL: NEGATIVE
PCP UR QL SCN>25 NG/ML: NEGATIVE
PH UR STRIP.AUTO: 5.5 [PH]
PLATELET # BLD AUTO: 87 X10*3/UL (ref 150–450)
POTASSIUM SERPL-SCNC: 4.2 MMOL/L (ref 3.4–5.1)
PROT SERPL-MCNC: 7.5 G/DL (ref 5.9–7.9)
PROT UR STRIP.AUTO-MCNC: NEGATIVE MG/DL
RBC # BLD AUTO: 4.18 X10*6/UL (ref 4–5.2)
RBC # UR STRIP.AUTO: NEGATIVE /UL
RBC MORPH BLD: NORMAL
RV RNA STL NAA+PROBE: NOT DETECTED
SALICYLATES SERPL-MCNC: <0 MG/DL
SALMONELLA DNA STL QL NAA+PROBE: NOT DETECTED
SHIGELLA DNA SPEC QL NAA+PROBE: NOT DETECTED
SODIUM SERPL-SCNC: 134 MMOL/L (ref 133–145)
SP GR UR STRIP.AUTO: 1.04
UROBILINOGEN UR STRIP.AUTO-MCNC: NORMAL MG/DL
V CHOLERAE DNA STL QL NAA+PROBE: NOT DETECTED
WBC # BLD AUTO: 4.9 X10*3/UL (ref 4.4–11.3)
Y ENTEROCOL DNA STL QL NAA+PROBE: NOT DETECTED

## 2024-06-10 PROCEDURE — 82947 ASSAY GLUCOSE BLOOD QUANT: CPT

## 2024-06-10 PROCEDURE — 36415 COLL VENOUS BLD VENIPUNCTURE: CPT | Performed by: EMERGENCY MEDICINE

## 2024-06-10 PROCEDURE — 74177 CT ABD & PELVIS W/CONTRAST: CPT

## 2024-06-10 PROCEDURE — 87075 CULTR BACTERIA EXCEPT BLOOD: CPT | Mod: WESLAB | Performed by: EMERGENCY MEDICINE

## 2024-06-10 PROCEDURE — 87506 IADNA-DNA/RNA PROBE TQ 6-11: CPT | Mod: WESLAB | Performed by: EMERGENCY MEDICINE

## 2024-06-10 PROCEDURE — 2500000001 HC RX 250 WO HCPCS SELF ADMINISTERED DRUGS (ALT 637 FOR MEDICARE OP): Performed by: EMERGENCY MEDICINE

## 2024-06-10 PROCEDURE — 74177 CT ABD & PELVIS W/CONTRAST: CPT | Performed by: RADIOLOGY

## 2024-06-10 PROCEDURE — 83605 ASSAY OF LACTIC ACID: CPT | Performed by: EMERGENCY MEDICINE

## 2024-06-10 PROCEDURE — 96374 THER/PROPH/DIAG INJ IV PUSH: CPT | Mod: 59

## 2024-06-10 PROCEDURE — 2550000001 HC RX 255 CONTRASTS: Performed by: EMERGENCY MEDICINE

## 2024-06-10 PROCEDURE — 96375 TX/PRO/DX INJ NEW DRUG ADDON: CPT

## 2024-06-10 PROCEDURE — 87040 BLOOD CULTURE FOR BACTERIA: CPT | Mod: WESLAB | Performed by: EMERGENCY MEDICINE

## 2024-06-10 PROCEDURE — 2500000004 HC RX 250 GENERAL PHARMACY W/ HCPCS (ALT 636 FOR OP/ED): Performed by: EMERGENCY MEDICINE

## 2024-06-10 PROCEDURE — 96376 TX/PRO/DX INJ SAME DRUG ADON: CPT

## 2024-06-10 PROCEDURE — 96361 HYDRATE IV INFUSION ADD-ON: CPT

## 2024-06-10 RX ORDER — DICYCLOMINE HYDROCHLORIDE 10 MG/1
20 CAPSULE ORAL ONCE
Status: COMPLETED | OUTPATIENT
Start: 2024-06-10 | End: 2024-06-10

## 2024-06-10 RX ORDER — KETOROLAC TROMETHAMINE 30 MG/ML
15 INJECTION, SOLUTION INTRAMUSCULAR; INTRAVENOUS ONCE
Status: COMPLETED | OUTPATIENT
Start: 2024-06-10 | End: 2024-06-10

## 2024-06-10 RX ORDER — FAMOTIDINE 10 MG/ML
20 INJECTION INTRAVENOUS ONCE
Status: COMPLETED | OUTPATIENT
Start: 2024-06-10 | End: 2024-06-10

## 2024-06-10 RX ORDER — ONDANSETRON HYDROCHLORIDE 2 MG/ML
4 INJECTION, SOLUTION INTRAVENOUS ONCE
Status: COMPLETED | OUTPATIENT
Start: 2024-06-10 | End: 2024-06-10

## 2024-06-10 RX ORDER — DICYCLOMINE HYDROCHLORIDE 10 MG/1
20 CAPSULE ORAL 4 TIMES DAILY
Qty: 120 CAPSULE | Refills: 0 | Status: ON HOLD | OUTPATIENT
Start: 2024-06-10 | End: 2024-06-25

## 2024-06-10 RX ORDER — ONDANSETRON 4 MG/1
4 TABLET, ORALLY DISINTEGRATING ORAL EVERY 8 HOURS PRN
Qty: 20 TABLET | Refills: 0 | Status: SHIPPED | OUTPATIENT
Start: 2024-06-10 | End: 2024-06-10 | Stop reason: SDUPTHER

## 2024-06-10 RX ADMIN — ONDANSETRON 4 MG: 2 INJECTION INTRAMUSCULAR; INTRAVENOUS at 01:25

## 2024-06-10 RX ADMIN — IOHEXOL 75 ML: 350 INJECTION, SOLUTION INTRAVENOUS at 02:09

## 2024-06-10 RX ADMIN — KETOROLAC TROMETHAMINE 15 MG: 30 INJECTION, SOLUTION INTRAMUSCULAR at 01:25

## 2024-06-10 RX ADMIN — KETOROLAC TROMETHAMINE 15 MG: 30 INJECTION, SOLUTION INTRAMUSCULAR at 03:55

## 2024-06-10 RX ADMIN — FAMOTIDINE 20 MG: 10 INJECTION, SOLUTION INTRAVENOUS at 03:56

## 2024-06-10 RX ADMIN — SODIUM CHLORIDE 1572 ML: 900 INJECTION, SOLUTION INTRAVENOUS at 01:31

## 2024-06-10 RX ADMIN — DICYCLOMINE HYDROCHLORIDE 20 MG: 10 CAPSULE ORAL at 01:25

## 2024-06-10 NOTE — ED NOTES
Went over discharge instructions, prescriptions and referrals with pt. Pt verbalized understanding. No further issues at time of discharge.     Dai Bang RN  06/10/24 0741

## 2024-06-10 NOTE — PROGRESS NOTES
Brief summary of hospitalization or reason for referral  Admission Dx and Associated Referral Dx: : 49-year-old female with past medical history of hepatic cirrhosis secondary to primary biliary cholangitis, hyperlipidemia, diabetes, thrombocytopenia, PTSD, fibromyalgia, asthma, anxiety who was admitted from 5/26-6/1 after she went to the ED with complaints of of worsening abdominal distention, PND, orthopnea, worsening gastritis and abdominal pain. CT did not show any intra-abdominal pathology. She was also complaining of numbness all over her face. CT of the brain was unremarkable. She was started on protonix and gabapentin.     Interval Subjective: She said she is feeling better but was seen in the ED yesterday for abdominal pain again. This has since resolved. Denies nausea, vomiting, diarrhea, or heartburn. Compliant with pantoprazole. Numbness on her face is improved with gabapentin.     She typically checks blood sugar 3 times per day but did not check today. Compliant with lantus, lispro, and metformin.     Masimo: No  Oxygen: No  CPAP/BIPAP: No    Wt Readings from Last 3 Encounters:   06/09/24 81.6 kg (180 lb)   05/25/24 81.6 kg (180 lb)   05/15/24 80.3 kg (177 lb)       Medications Issues/Refill need  Medication Follow up action needed: None     Requested Prescriptions      No prescriptions requested or ordered in this encounter       Upcoming Visits:  Recent Visits  No visits were found meeting these conditions.  Showing recent visits within past 30 days and meeting all other requirements  Today's Visits  Date Type Provider Dept   06/10/24 Appointment [facility]   Showing today's visits and meeting all other requirements  Future Appointments  No visits were found meeting these conditions.  Showing future appointments within next 90 days and meeting all other requirements      Interval or Pertinent Labs/Testing:  Lab Review:   Hemoglobin A1C   Date/Time Value Ref Range  Status   02/19/2024 10:08 AM 10.2 (H) 4.3 - 5.6 % Final     Comment:     American Diabetes Association guidelines indicate that patients with HgbA1c in the range 5.7-6.4% are at increased risk for development of diabetes, and intervention by lifestyle modification may be beneficial. HgbA1c greater or equal to 6.5% is considered diagnostic of diabetes.   02/09/2024 05:20 AM 11.3 (H) See below % Final   10/21/2023 05:17 AM 11.7 (H) 4.3 - 5.6 % Final     Comment:     American Diabetes Association guidelines indicate that patients with HgbA1c in the range 5.7-6.4% are at increased risk for development of diabetes, and intervention by lifestyle modification may be beneficial. HgbA1c greater or equal to 6.5% is considered diagnostic of diabetes.   09/07/2023 08:29 PM 11.5 (H) 4.3 - 5.6 % Final     Comment:     American Diabetes Association guidelines indicate that patients with HgbA1c in the range 5.7-6.4% are at increased risk for development of diabetes, and intervention by lifestyle modification may be beneficial. HgbA1c greater or equal to 6.5% is considered diagnostic of diabetes.       Lab Results   Component Value Date     06/09/2024    K 4.2 06/09/2024     (H) 06/09/2024    CO2 19 (L) 06/09/2024    BUN 6 (L) 06/09/2024    CREATININE 0.50 06/09/2024    GLUCOSE 469 (H) 06/09/2024    CALCIUM 8.1 (L) 06/09/2024        SDOH Concerns & Interventions: None     Assessment/Plan  Abdominal pain: Denies any pain since this morning in the ED. Continue pantoprazole. Referral placed for gastroenterology   Numbness: Well controlled with gabapentin, continue   Cirrhosis: Needs to reschedule appt with hepatologist.   Diabetes: Continue lantus, lispro, and metformin. Check blood sugars QID at least     Advised to schedule sooner appt with PCP (appt scheduled for October)    Phone call completed with myself, patient, patient's daughter Kerry who translated, and Lynn Washington, APRN-CNP  [facility]  Home

## 2024-06-10 NOTE — ED PROVIDER NOTES
HPI   Chief Complaint   Patient presents with    Abdominal Pain     Pt presents ambulatory through triage with c/o diarrhea, and abd pain that started this morning. Pt states she has had approx 16 episodes of diarrhea. Pt also has c/o nausea. Pt states she has liver cirrhosis and frequently gets paracentesis. Pt also has a hx of cholecystectomy.        49 years old female with past medical history significant for liver cirrhosis, portal hypertension, abdominal pain, recurrent hospital admissions came to the hospital with a complaint of worsening abdominal distention, PND, orthopnea, worsening gastritis and abdominal pain. Pt presents ambulatory through triage with c/o diarrhea, and abd pain that started this morning. Pt states she has had approx 16 episodes of diarrhea. Pt also has c/o nausea. Pt states she has liver cirrhosis and frequently gets paracentesis. Pt also has a hx of cholecystectomy.      History provided by:  Patient   used: No                        No data recorded                     Patient History   Past Medical History:   Diagnosis Date    Cirrhosis of liver (Multi)     Diabetes mellitus (Multi)      Past Surgical History:   Procedure Laterality Date    ABDOMINAL SURGERY      CT GUIDED IMAGING FOR NEEDLE PLACEMENT  10/14/2020    CT GUIDED IMAGING FOR NEEDLE PLACEMENT [facility]    US GUIDED ABDOMINAL PARACENTESIS  10/08/2020    US GUIDED ABDOMINAL PARACENTESIS [facility]     No family history on file.  Social History     Tobacco Use    Smoking status: Never    Smokeless tobacco: Never   Substance Use Topics    Alcohol use: Never    Drug use: Never       Physical Exam   ED Triage Vitals [06/09/24 2335]   Temperature Heart Rate Respirations BP   36.7 °C (98.1 °F) (!) 113 20 120/70      Pulse Ox Temp Source Heart Rate Source Patient Position   99 % Temporal Monitor Standing      BP Location FiO2 (%)     Left arm --       Physical Exam  Vitals and nursing note  reviewed.   Constitutional:       General: She is not in acute distress.     Appearance: She is well-developed and normal weight. She is ill-appearing. She is not toxic-appearing or diaphoretic.   HENT:      Head: Normocephalic and atraumatic.   Eyes:      Conjunctiva/sclera: Conjunctivae normal.   Cardiovascular:      Rate and Rhythm: Normal rate and regular rhythm.      Heart sounds: No murmur heard.  Pulmonary:      Effort: Pulmonary effort is normal. No respiratory distress.      Breath sounds: Normal breath sounds.   Abdominal:      Palpations: Abdomen is soft.      Tenderness: There is generalized abdominal tenderness. There is guarding. There is no right CVA tenderness, left CVA tenderness or rebound.   Musculoskeletal:         General: No swelling.      Cervical back: Neck supple.   Skin:     General: Skin is warm and dry.      Capillary Refill: Capillary refill takes less than 2 seconds.   Neurological:      General: No focal deficit present.      Mental Status: She is alert.   Psychiatric:         Mood and Affect: Mood normal.         ED Course & MDM   ED Course as of 06/12/24 2255   Mon Олег 10, 2024   0616 Urinalysis with Reflex Culture and Microscopic(!)  Remarkable for significant glucosuria without evidence of urinary tract infection, no ketones [EG]   0616 Drug Screen, Urine  Negative urine drug screen [EG]   0616 Lipase(!)  Minimally elevated lipase [EG]   0617 Comprehensive Metabolic Panel(!)  Hyperglycemia not consistent with DKA or HHS, no elevated anion gap.  Elevated alk phos when compared with previous without hyperbilirubinemia and baseline liver function tests [EG]   0618 CBC with Differential(!)  CBC remarkable for chronic anemia that is at her baseline, no leukocytosis, no leukopenia, baseline thrombocytopenia. [EG]   0619 CT abdomen pelvis w IV contrast  IMPRESSION:  Cirrhotic appearance of the liver. Moderate abdominopelvic ascites  and mesenteric stranding. Lymphadenopathy, likely  reactive.      Small bowel wall thickening is again noted which could be reactive or  related to passive congestion. Infectious process considered less  likely however clinical correlation is again suggested.      Fluid contents in the colon suggestive of diarrheal process.   [EG]      ED Course User Index  [EG] Melida Guthrie MD         Diagnoses as of 06/12/24 9522   Diarrhea, unspecified type   Abdominal pain, unspecified abdominal location   Nausea and vomiting, unspecified vomiting type   History of cirrhosis   Hyperglycemia   Acute pancreatitis without infection or necrosis, unspecified pancreatitis type (HHS-HCC)       Medical Decision Making    HPI:  As Above  PMHx/PSHx/Meds/Allergies/SH/FH as per nursing documentation and reviewed.  Review of systems: Total of 10 systems reviewed and otherwise negative except as noted elsewhere    DDX: As described in MDM    If performed, radiology listed above interpreted by me and confirmed by the Radiologist.  Medications administered during this visit (name and route): see MAR  Social determinants of health considered for this visit: lives at home  If performed, EKG interpreted by me and detailed above    MDM Summary/considerations:  49-year-old female returning to the emergency department with a chief complaint of abdominal pain nausea and vomiting.  The patient has a history of cirrhosis and has had multiple complications of her health recently.  She was evaluated with labs and imaging.  These are not consistent with emergent pathology such as obstructions, perforations, colitis, obstructive nephropathy, urinary tract infection, DKA, HHS, electrolyte disturbance.  Patient already has multiple medications at home and will get a Bentyl refill.  She is instructed to follow-up with her primary care provider and gastroenterologist.  Of note she has a minimally elevated lipase which is well below 3 times the normal limit.  She is given instructions on bland diet  and management of pancreatitis outpatient.  Patient has a prescription for Zofran already.    Prescriptions provided include: Oral Bentyl    The patient was seen and triaged by our nursing/medic staff, their vitals were taken and the staff notes were reviewed.  If the patient arrived by an EMS squad or an outside agency, we discussed the case with transporting EMS medic, police, or other historians. My initial assessment was attention to their airway, breathing, and circulatory status.  We addressed any immediate or life threatening findings and completed a medical history and a physical exam if the patient or those legally responsible were in agreement with this.   Prior to the patient being discharged, I or my PA/NP or the nursing staff discussed the differential, results and discharge plan with the patient and/or family/friend/caregiver if present.  I emphasized the importance of follow-up in 2-3 days unless otherwise specified.  I explained reasons for the patient to return to the Emergency Department. Additional verbal discharge instructions were also given and discussed with the patient to supplement those generated by the EMR. We also discussed medications that were prescribed (if any) including common side effects and interactions. The patient was advised to abstain from driving, operating heavy machinery or making significant decisions while taking medications such as antihistamines, benzodiazepines, opiates and muscle relaxers. All questions were addressed.  They understand return precautions and discharge instructions. The patient and/or family/friend/caregiver expressed understanding.  **Disclaimer:  This note was dictated by speech recognition technology.  Minor errors in transcription may be present.  Please contact for clarification or corrections.      Amount and/or Complexity of Data Reviewed  External Data Reviewed: labs, radiology and notes.  Labs: ordered. Decision-making details documented in ED  Course.  Radiology: ordered and independent interpretation performed. Decision-making details documented in ED Course.  ECG/medicine tests: ordered and independent interpretation performed. Decision-making details documented in ED Course.        Procedure  Procedures     Melida Guthrie MD  06/12/24 9504

## 2024-06-10 NOTE — PROGRESS NOTES
Daily Call Note: [facility] initial completed with patient, patient's daughter Madison, & Marilyn Washington NP. Recent hospital stay & ER visit reviewed. ABD pain resolved since ER discharge this morning. Denies n/v, heartburn. Had 2 episodes of diarrhea today. Facial numbness resolved since gabapentin started. Confirmed she is taking.     Patient states she hasn't had a chance to check her BS today, but reports it was 180 in ER this morning before discharged. Patient aware she is supposed to check BS TID. Verbalized understanding Lantus 25 units BID. Verbalized she is supposed to take Lispro TID with meals, but hasn't taken anything since in ER.     Denies having any questions or concerns regarding medications. Marilyn Washington NP reviewed discharge meds to home meds one at a time. Patient stated she has not smoked in a very long time. Denies needing med refills.     Patient has not yet made an appointment with GI. Referral placed & daughter will call to make appointment. Patient to see Hepatology, but appointment was missed due to hospitalization. Family states they will make new appointment for her regarding waitlist for liver transplant through [facility]. PCP Dr. Freeman made for October. Family to call tomorrow to try to move appointment up sooner. Given scheduling number to central scheduling @ [phone].     Next [facility] 6/18 @ 1300. Daughter Madison to be on call at [phone].     Pt Education: BS checks & following insulin regimen  Barriers: Kiswahili speaking, Madison translated.   Topics for Daily Review: BS  Pt demonstrates clear understanding: Yes    Daily Weight:  There were no vitals filed for this visit.   Last 3 Weights:  Wt Readings from Last 7 Encounters:   06/09/24 81.6 kg (180 lb)   05/25/24 81.6 kg (180 lb)   05/15/24 80.3 kg (177 lb)   04/17/24 77.2 kg (170 lb 3.2 oz)   02/07/24 72.6 kg (160 lb)   01/26/24 83.9 kg (185 lb)   05/03/23 80.7 kg (178 lb)       Masimo Device: No   Masimo Clinical Impression: none    Virtual  Visits--Scheduled (Most Recent Date at Top)  Follow up Appointments  Recent Visits  No visits were found meeting these conditions.  Showing recent visits within past 30 days and meeting all other requirements  Future Appointments  No visits were found meeting these conditions.  Showing future appointments within next 90 days and meeting all other requirements       Frequency of RN Calls & Virtual Visits per Team Agreement: [facility] Frequency: Daily    Medication issues Addressed (what was done): no refills needed.     Follow up appointments scheduled by [facility] Staff: none- patient prefers her daughter make her appointments.   Referrals made by [facility] staff: LILIA

## 2024-06-11 ENCOUNTER — PATIENT OUTREACH (OUTPATIENT)
Dept: HOME HEALTH SERVICES | Age: 49
End: 2024-06-11
Payer: COMMERCIAL

## 2024-06-11 VITALS — HEART RATE: 113 BPM | DIASTOLIC BLOOD PRESSURE: 74 MMHG | SYSTOLIC BLOOD PRESSURE: 139 MMHG

## 2024-06-12 ENCOUNTER — PATIENT OUTREACH (OUTPATIENT)
Dept: HOME HEALTH SERVICES | Age: 49
End: 2024-06-12
Payer: COMMERCIAL

## 2024-06-12 NOTE — PROGRESS NOTES
Daily call to patient's daughter, Madison. Patient is asleep at this time. Reports patient had fallen forward flat on her stomach yesterday 6/11 in bathtub. Feeling sore today, has bruises on boths knees and on ABD. Denied hitting head or need for ER eval at this time. Recommended to seek treatment if pain or bruising to ABD worsens. Reports  this morning, obtained while eating breakfast. Did not obtain any vitals today. Had made GI appointment with  provider, but they cancelled it because office contacted them & said visit would cost $900 due to current insurance. She states they were given contact info for another GI specialist that can see her in [facility]. She has not made that appointment yet, because they're office was closed. Will try to schedule this week. No other needs at this time. Next [facility] 6/18 @ 1 PM.

## 2024-06-12 NOTE — PROGRESS NOTES
Daily Call Note:     Daily call completed with the patient via the  line.  She states she is doing okay. She checked her BS this morning and it was 283.  She has no questions or concerns and is taking her medications.  She states she is having a little bit of abdominal pain, but denies any diarrhea.  Her dtr Madison, is helping her to schedule her appts.  She took her BP while on the phone 139/74 , .  Pt had no add'l questions or concerns.  Aware of upcoming appts.     Pt Education:   Barriers:   Topics for Daily Review:   Pt demonstrates clear understanding:    Daily Weight:  There were no vitals filed for this visit.   Last 3 Weights:  Wt Readings from Last 7 Encounters:   06/09/24 81.6 kg (180 lb)   05/25/24 81.6 kg (180 lb)   05/15/24 80.3 kg (177 lb)   04/17/24 77.2 kg (170 lb 3.2 oz)   02/07/24 72.6 kg (160 lb)   01/26/24 83.9 kg (185 lb)   05/03/23 80.7 kg (178 lb)       Masimo Device:    Masimo Clinical Impression:     Virtual Visits--Scheduled (Most Recent Date at Top)  Follow up Appointments  Recent Visits  No visits were found meeting these conditions.  Showing recent visits within past 30 days and meeting all other requirements  Future Appointments  No visits were found meeting these conditions.  Showing future appointments within next 90 days and meeting all other requirements       Frequency of RN Calls & Virtual Visits per Team Agreement:     Medication issues Addressed (what was done):    Follow up appointments scheduled by [facility] Staff:   Referrals made by [facility] staff:

## 2024-06-13 ENCOUNTER — PATIENT OUTREACH (OUTPATIENT)
Dept: HOME HEALTH SERVICES | Age: 49
End: 2024-06-13
Payer: COMMERCIAL

## 2024-06-14 ENCOUNTER — PATIENT OUTREACH (OUTPATIENT)
Dept: HOME HEALTH SERVICES | Age: 49
End: 2024-06-14
Payer: COMMERCIAL

## 2024-06-14 VITALS
SYSTOLIC BLOOD PRESSURE: 172 MMHG | HEART RATE: 105 BPM | BODY MASS INDEX: 30.29 KG/M2 | WEIGHT: 171 LBS | DIASTOLIC BLOOD PRESSURE: 72 MMHG

## 2024-06-14 LAB
BACTERIA BLD CULT: NORMAL
BACTERIA BLD CULT: NORMAL

## 2024-06-14 PROCEDURE — 99285 EMERGENCY DEPT VISIT HI MDM: CPT

## 2024-06-14 ASSESSMENT — PAIN DESCRIPTION - DESCRIPTORS
DESCRIPTORS: ACHING;PRESSURE
DESCRIPTORS: ACHING

## 2024-06-14 ASSESSMENT — PAIN SCALES - GENERAL: PAINLEVEL_OUTOF10: 3

## 2024-06-14 ASSESSMENT — PAIN DESCRIPTION - FREQUENCY: FREQUENCY: CONSTANT/CONTINUOUS

## 2024-06-14 ASSESSMENT — PAIN DESCRIPTION - LOCATION: LOCATION: ABDOMEN

## 2024-06-14 ASSESSMENT — PAIN DESCRIPTION - PROGRESSION: CLINICAL_PROGRESSION: NOT CHANGED

## 2024-06-14 ASSESSMENT — PAIN DESCRIPTION - ONSET: ONSET: SUDDEN

## 2024-06-14 ASSESSMENT — PAIN DESCRIPTION - PAIN TYPE: TYPE: ACUTE PAIN

## 2024-06-14 ASSESSMENT — PAIN - FUNCTIONAL ASSESSMENT: PAIN_FUNCTIONAL_ASSESSMENT: 0-10

## 2024-06-14 NOTE — PROGRESS NOTES
Spoke with daughter, she said pt abdomen is increasing. She will call tomorrow to try to get her a appt gi. No cp of sob, cp or edema (except in belly). Daughter said bruising and pain from previous fall is not getting worse. Daughter was educated on Importance of daily wt, especially since pt is prone to fluid retention . Pt has a decrease appetite, daughter educated on the important to keep pt hydrated with less intake. B.S 150 THIS AM. No other concerns as of now. [facility] confirmed.

## 2024-06-15 ENCOUNTER — HOSPITAL ENCOUNTER (OUTPATIENT)
Facility: HOSPITAL | Age: 49
Setting detail: OBSERVATION
Discharge: HOME | End: 2024-06-18
Attending: EMERGENCY MEDICINE | Admitting: INTERNAL MEDICINE
Payer: COMMERCIAL

## 2024-06-15 ENCOUNTER — APPOINTMENT (OUTPATIENT)
Dept: CARDIOLOGY | Facility: HOSPITAL | Age: 49
End: 2024-06-15
Payer: COMMERCIAL

## 2024-06-15 ENCOUNTER — APPOINTMENT (OUTPATIENT)
Dept: RADIOLOGY | Facility: HOSPITAL | Age: 49
End: 2024-06-15
Payer: COMMERCIAL

## 2024-06-15 DIAGNOSIS — R10.84 GENERALIZED ABDOMINAL PAIN: Primary | ICD-10-CM

## 2024-06-15 DIAGNOSIS — D61.818 PANCYTOPENIA (MULTI): ICD-10-CM

## 2024-06-15 DIAGNOSIS — R18.8 OTHER ASCITES: ICD-10-CM

## 2024-06-15 LAB
ALBUMIN SERPL-MCNC: 2.8 G/DL (ref 3.5–5)
ALP BLD-CCNC: 449 U/L (ref 35–125)
ALT SERPL-CCNC: 31 U/L (ref 5–40)
ANION GAP SERPL CALC-SCNC: 8 MMOL/L
APPEARANCE UR: CLEAR
AST SERPL-CCNC: 48 U/L (ref 5–40)
BACTERIA #/AREA URNS AUTO: ABNORMAL /HPF
BASOPHILS # BLD AUTO: 0.03 X10*3/UL (ref 0–0.1)
BASOPHILS NFR BLD AUTO: 0.7 %
BILIRUB SERPL-MCNC: 1.3 MG/DL (ref 0.1–1.2)
BILIRUB UR STRIP.AUTO-MCNC: NEGATIVE MG/DL
BUN SERPL-MCNC: 8 MG/DL (ref 8–25)
BURR CELLS BLD QL SMEAR: NORMAL
CALCIUM SERPL-MCNC: 8.4 MG/DL (ref 8.5–10.4)
CHLORIDE SERPL-SCNC: 106 MMOL/L (ref 97–107)
CO2 SERPL-SCNC: 24 MMOL/L (ref 24–31)
COLOR UR: ABNORMAL
CREAT SERPL-MCNC: 0.5 MG/DL (ref 0.4–1.6)
EGFRCR SERPLBLD CKD-EPI 2021: >90 ML/MIN/1.73M*2
EOSINOPHIL # BLD AUTO: 0.43 X10*3/UL (ref 0–0.7)
EOSINOPHIL NFR BLD AUTO: 9.9 %
ERYTHROCYTE [DISTWIDTH] IN BLOOD BY AUTOMATED COUNT: 20.6 % (ref 11.5–14.5)
FAT [#/AREA] IN URINE BY COMPUTER ASSISTED METHOD: PRESENT /HPF
GLUCOSE BLD MANUAL STRIP-MCNC: 177 MG/DL (ref 74–99)
GLUCOSE BLD MANUAL STRIP-MCNC: 244 MG/DL (ref 74–99)
GLUCOSE BLD MANUAL STRIP-MCNC: 271 MG/DL (ref 74–99)
GLUCOSE SERPL-MCNC: 272 MG/DL (ref 65–99)
GLUCOSE UR STRIP.AUTO-MCNC: ABNORMAL MG/DL
HCT VFR BLD AUTO: 27.6 % (ref 36–46)
HGB BLD-MCNC: 8.7 G/DL (ref 12–16)
IMM GRANULOCYTES # BLD AUTO: 0.01 X10*3/UL (ref 0–0.7)
IMM GRANULOCYTES NFR BLD AUTO: 0.2 % (ref 0–0.9)
KETONES UR STRIP.AUTO-MCNC: NEGATIVE MG/DL
LEUKOCYTE ESTERASE UR QL STRIP.AUTO: ABNORMAL
LIPASE SERPL-CCNC: 57 U/L (ref 16–63)
LYMPHOCYTES # BLD AUTO: 1.36 X10*3/UL (ref 1.2–4.8)
LYMPHOCYTES NFR BLD AUTO: 31.3 %
MCH RBC QN AUTO: 24.4 PG (ref 26–34)
MCHC RBC AUTO-ENTMCNC: 31.5 G/DL (ref 32–36)
MCV RBC AUTO: 77 FL (ref 80–100)
MONOCYTES # BLD AUTO: 0.49 X10*3/UL (ref 0.1–1)
MONOCYTES NFR BLD AUTO: 11.3 %
NEUTROPHILS # BLD AUTO: 2.02 X10*3/UL (ref 1.2–7.7)
NEUTROPHILS NFR BLD AUTO: 46.6 %
NITRITE UR QL STRIP.AUTO: NEGATIVE
NRBC BLD-RTO: 0 /100 WBCS (ref 0–0)
OVALOCYTES BLD QL SMEAR: NORMAL
PH UR STRIP.AUTO: 5.5 [PH]
PLATELET # BLD AUTO: 63 X10*3/UL (ref 150–450)
POTASSIUM SERPL-SCNC: 3.7 MMOL/L (ref 3.4–5.1)
PROT SERPL-MCNC: 7.2 G/DL (ref 5.9–7.9)
PROT UR STRIP.AUTO-MCNC: NEGATIVE MG/DL
RBC # BLD AUTO: 3.57 X10*6/UL (ref 4–5.2)
RBC # UR STRIP.AUTO: ABNORMAL /UL
RBC #/AREA URNS AUTO: ABNORMAL /HPF
RBC MORPH BLD: NORMAL
SODIUM SERPL-SCNC: 138 MMOL/L (ref 133–145)
SP GR UR STRIP.AUTO: 1.02
SQUAMOUS #/AREA URNS AUTO: ABNORMAL /HPF
TARGETS BLD QL SMEAR: NORMAL
TROPONIN T SERPL-MCNC: <6 NG/L
TROPONIN T SERPL-MCNC: <6 NG/L
UROBILINOGEN UR STRIP.AUTO-MCNC: NORMAL MG/DL
WBC # BLD AUTO: 4.3 X10*3/UL (ref 4.4–11.3)
WBC #/AREA URNS AUTO: ABNORMAL /HPF

## 2024-06-15 PROCEDURE — 71045 X-RAY EXAM CHEST 1 VIEW: CPT

## 2024-06-15 PROCEDURE — 2500000001 HC RX 250 WO HCPCS SELF ADMINISTERED DRUGS (ALT 637 FOR MEDICARE OP): Performed by: INTERNAL MEDICINE

## 2024-06-15 PROCEDURE — 96375 TX/PRO/DX INJ NEW DRUG ADDON: CPT

## 2024-06-15 PROCEDURE — 36415 COLL VENOUS BLD VENIPUNCTURE: CPT | Performed by: EMERGENCY MEDICINE

## 2024-06-15 PROCEDURE — 96376 TX/PRO/DX INJ SAME DRUG ADON: CPT

## 2024-06-15 PROCEDURE — 71045 X-RAY EXAM CHEST 1 VIEW: CPT | Performed by: RADIOLOGY

## 2024-06-15 PROCEDURE — G0378 HOSPITAL OBSERVATION PER HR: HCPCS

## 2024-06-15 PROCEDURE — 83690 ASSAY OF LIPASE: CPT | Performed by: EMERGENCY MEDICINE

## 2024-06-15 PROCEDURE — 93005 ELECTROCARDIOGRAM TRACING: CPT

## 2024-06-15 PROCEDURE — 82947 ASSAY GLUCOSE BLOOD QUANT: CPT

## 2024-06-15 PROCEDURE — 2500000004 HC RX 250 GENERAL PHARMACY W/ HCPCS (ALT 636 FOR OP/ED): Performed by: INTERNAL MEDICINE

## 2024-06-15 PROCEDURE — 81001 URINALYSIS AUTO W/SCOPE: CPT | Performed by: INTERNAL MEDICINE

## 2024-06-15 PROCEDURE — 2500000002 HC RX 250 W HCPCS SELF ADMINISTERED DRUGS (ALT 637 FOR MEDICARE OP, ALT 636 FOR OP/ED): Performed by: INTERNAL MEDICINE

## 2024-06-15 PROCEDURE — 2500000004 HC RX 250 GENERAL PHARMACY W/ HCPCS (ALT 636 FOR OP/ED): Performed by: EMERGENCY MEDICINE

## 2024-06-15 PROCEDURE — 74176 CT ABD & PELVIS W/O CONTRAST: CPT | Performed by: RADIOLOGY

## 2024-06-15 PROCEDURE — 80053 COMPREHEN METABOLIC PANEL: CPT | Performed by: EMERGENCY MEDICINE

## 2024-06-15 PROCEDURE — 84484 ASSAY OF TROPONIN QUANT: CPT | Performed by: EMERGENCY MEDICINE

## 2024-06-15 PROCEDURE — 96374 THER/PROPH/DIAG INJ IV PUSH: CPT

## 2024-06-15 PROCEDURE — 85025 COMPLETE CBC W/AUTO DIFF WBC: CPT | Performed by: EMERGENCY MEDICINE

## 2024-06-15 PROCEDURE — 74176 CT ABD & PELVIS W/O CONTRAST: CPT

## 2024-06-15 RX ORDER — DEXTROSE 50 % IN WATER (D50W) INTRAVENOUS SYRINGE
12.5
Status: DISCONTINUED | OUTPATIENT
Start: 2024-06-15 | End: 2024-06-18 | Stop reason: HOSPADM

## 2024-06-15 RX ORDER — GUAIFENESIN 600 MG/1
600 TABLET, EXTENDED RELEASE ORAL EVERY 12 HOURS PRN
Status: DISCONTINUED | OUTPATIENT
Start: 2024-06-15 | End: 2024-06-18 | Stop reason: HOSPADM

## 2024-06-15 RX ORDER — POLYETHYLENE GLYCOL 3350 17 G/17G
17 POWDER, FOR SOLUTION ORAL DAILY PRN
Status: DISCONTINUED | OUTPATIENT
Start: 2024-06-15 | End: 2024-06-18 | Stop reason: HOSPADM

## 2024-06-15 RX ORDER — TRAZODONE HYDROCHLORIDE 50 MG/1
25 TABLET ORAL NIGHTLY
Status: DISCONTINUED | OUTPATIENT
Start: 2024-06-15 | End: 2024-06-18 | Stop reason: HOSPADM

## 2024-06-15 RX ORDER — ACETAMINOPHEN 650 MG/1
650 SUPPOSITORY RECTAL EVERY 4 HOURS PRN
Status: DISCONTINUED | OUTPATIENT
Start: 2024-06-15 | End: 2024-06-18 | Stop reason: HOSPADM

## 2024-06-15 RX ORDER — ACETAMINOPHEN 325 MG/1
650 TABLET ORAL EVERY 4 HOURS PRN
Status: DISCONTINUED | OUTPATIENT
Start: 2024-06-15 | End: 2024-06-18 | Stop reason: HOSPADM

## 2024-06-15 RX ORDER — URSODIOL 300 MG/1
300 CAPSULE ORAL 3 TIMES DAILY
Status: DISCONTINUED | OUTPATIENT
Start: 2024-06-15 | End: 2024-06-18 | Stop reason: HOSPADM

## 2024-06-15 RX ORDER — LANOLIN ALCOHOL/MO/W.PET/CERES
400 CREAM (GRAM) TOPICAL DAILY
Status: DISCONTINUED | OUTPATIENT
Start: 2024-06-15 | End: 2024-06-18 | Stop reason: HOSPADM

## 2024-06-15 RX ORDER — ONDANSETRON HYDROCHLORIDE 2 MG/ML
4 INJECTION, SOLUTION INTRAVENOUS ONCE
Status: COMPLETED | OUTPATIENT
Start: 2024-06-15 | End: 2024-06-15

## 2024-06-15 RX ORDER — HYDROXYZINE HYDROCHLORIDE 25 MG/1
25 TABLET, FILM COATED ORAL EVERY 6 HOURS PRN
Status: DISCONTINUED | OUTPATIENT
Start: 2024-06-15 | End: 2024-06-18 | Stop reason: HOSPADM

## 2024-06-15 RX ORDER — NADOLOL 20 MG/1
20 TABLET ORAL DAILY
Status: DISCONTINUED | OUTPATIENT
Start: 2024-06-15 | End: 2024-06-18 | Stop reason: HOSPADM

## 2024-06-15 RX ORDER — GUAIFENESIN/DEXTROMETHORPHAN 100-10MG/5
5 SYRUP ORAL EVERY 4 HOURS PRN
Status: DISCONTINUED | OUTPATIENT
Start: 2024-06-15 | End: 2024-06-18 | Stop reason: HOSPADM

## 2024-06-15 RX ORDER — SPIRONOLACTONE 25 MG/1
50 TABLET ORAL 2 TIMES DAILY
Status: DISCONTINUED | OUTPATIENT
Start: 2024-06-15 | End: 2024-06-18 | Stop reason: HOSPADM

## 2024-06-15 RX ORDER — SUCRALFATE 1 G/10ML
1 SUSPENSION ORAL
Status: DISCONTINUED | OUTPATIENT
Start: 2024-06-15 | End: 2024-06-18 | Stop reason: HOSPADM

## 2024-06-15 RX ORDER — INSULIN LISPRO 100 [IU]/ML
0-15 INJECTION, SOLUTION INTRAVENOUS; SUBCUTANEOUS
Status: DISCONTINUED | OUTPATIENT
Start: 2024-06-15 | End: 2024-06-18 | Stop reason: HOSPADM

## 2024-06-15 RX ORDER — METFORMIN HYDROCHLORIDE 500 MG/1
1000 TABLET ORAL
Status: DISCONTINUED | OUTPATIENT
Start: 2024-06-15 | End: 2024-06-18 | Stop reason: HOSPADM

## 2024-06-15 RX ORDER — LACTULOSE 10 G/15ML
20 SOLUTION ORAL DAILY
Status: DISCONTINUED | OUTPATIENT
Start: 2024-06-15 | End: 2024-06-18 | Stop reason: HOSPADM

## 2024-06-15 RX ORDER — INSULIN GLARGINE 100 [IU]/ML
25 INJECTION, SOLUTION SUBCUTANEOUS 2 TIMES DAILY
Status: DISCONTINUED | OUTPATIENT
Start: 2024-06-15 | End: 2024-06-18 | Stop reason: HOSPADM

## 2024-06-15 RX ORDER — DOCUSATE SODIUM 100 MG/1
100 CAPSULE, LIQUID FILLED ORAL 2 TIMES DAILY
Status: DISCONTINUED | OUTPATIENT
Start: 2024-06-15 | End: 2024-06-18 | Stop reason: HOSPADM

## 2024-06-15 RX ORDER — FENTANYL CITRATE 50 UG/ML
100 INJECTION, SOLUTION INTRAMUSCULAR; INTRAVENOUS ONCE
Status: COMPLETED | OUTPATIENT
Start: 2024-06-15 | End: 2024-06-15

## 2024-06-15 RX ORDER — TRAMADOL HYDROCHLORIDE 50 MG/1
50 TABLET ORAL EVERY 6 HOURS PRN
Status: DISCONTINUED | OUTPATIENT
Start: 2024-06-15 | End: 2024-06-18 | Stop reason: HOSPADM

## 2024-06-15 RX ORDER — POLYETHYLENE GLYCOL 3350 17 G/17G
17 POWDER, FOR SOLUTION ORAL 2 TIMES DAILY
Status: DISCONTINUED | OUTPATIENT
Start: 2024-06-15 | End: 2024-06-18 | Stop reason: HOSPADM

## 2024-06-15 RX ORDER — PANTOPRAZOLE SODIUM 40 MG/1
40 TABLET, DELAYED RELEASE ORAL DAILY
Status: DISCONTINUED | OUTPATIENT
Start: 2024-06-15 | End: 2024-06-18 | Stop reason: HOSPADM

## 2024-06-15 RX ORDER — INSULIN LISPRO 100 [IU]/ML
1-40 INJECTION, SOLUTION INTRAVENOUS; SUBCUTANEOUS
Status: DISCONTINUED | OUTPATIENT
Start: 2024-06-15 | End: 2024-06-18 | Stop reason: HOSPADM

## 2024-06-15 RX ORDER — ATORVASTATIN CALCIUM 80 MG/1
80 TABLET, FILM COATED ORAL NIGHTLY
Status: DISCONTINUED | OUTPATIENT
Start: 2024-06-15 | End: 2024-06-18 | Stop reason: HOSPADM

## 2024-06-15 RX ORDER — DICYCLOMINE HYDROCHLORIDE 10 MG/1
20 CAPSULE ORAL 4 TIMES DAILY
Status: DISCONTINUED | OUTPATIENT
Start: 2024-06-15 | End: 2024-06-18 | Stop reason: HOSPADM

## 2024-06-15 RX ORDER — ONDANSETRON 4 MG/1
4 TABLET, ORALLY DISINTEGRATING ORAL EVERY 8 HOURS PRN
Status: DISCONTINUED | OUTPATIENT
Start: 2024-06-15 | End: 2024-06-18 | Stop reason: HOSPADM

## 2024-06-15 RX ORDER — DEXTROSE 50 % IN WATER (D50W) INTRAVENOUS SYRINGE
25
Status: DISCONTINUED | OUTPATIENT
Start: 2024-06-15 | End: 2024-06-18 | Stop reason: HOSPADM

## 2024-06-15 RX ORDER — FENOFIBRATE 160 MG/1
160 TABLET ORAL DAILY
Status: DISCONTINUED | OUTPATIENT
Start: 2024-06-15 | End: 2024-06-18 | Stop reason: HOSPADM

## 2024-06-15 RX ORDER — FUROSEMIDE 40 MG/1
40 TABLET ORAL DAILY
Status: DISCONTINUED | OUTPATIENT
Start: 2024-06-15 | End: 2024-06-18 | Stop reason: HOSPADM

## 2024-06-15 RX ORDER — ACETAMINOPHEN 160 MG/5ML
650 SOLUTION ORAL EVERY 4 HOURS PRN
Status: DISCONTINUED | OUTPATIENT
Start: 2024-06-15 | End: 2024-06-18 | Stop reason: HOSPADM

## 2024-06-15 RX ORDER — MORPHINE SULFATE 4 MG/ML
4 INJECTION, SOLUTION INTRAMUSCULAR; INTRAVENOUS ONCE
Status: COMPLETED | OUTPATIENT
Start: 2024-06-15 | End: 2024-06-15

## 2024-06-15 RX ORDER — GABAPENTIN 100 MG/1
200 CAPSULE ORAL 3 TIMES DAILY
Status: DISCONTINUED | OUTPATIENT
Start: 2024-06-15 | End: 2024-06-18 | Stop reason: HOSPADM

## 2024-06-15 SDOH — SOCIAL STABILITY: SOCIAL INSECURITY: DO YOU FEEL UNSAFE GOING BACK TO THE PLACE WHERE YOU ARE LIVING?: NO

## 2024-06-15 SDOH — HEALTH STABILITY: MENTAL HEALTH
HOW OFTEN DO YOU NEED TO HAVE SOMEONE HELP YOU WHEN YOU READ INSTRUCTIONS, PAMPHLETS, OR OTHER WRITTEN MATERIAL FROM YOUR DOCTOR OR PHARMACY?: NEVER

## 2024-06-15 SDOH — SOCIAL STABILITY: SOCIAL INSECURITY: DOES ANYONE TRY TO KEEP YOU FROM HAVING/CONTACTING OTHER FRIENDS OR DOING THINGS OUTSIDE YOUR HOME?: NO

## 2024-06-15 SDOH — HEALTH STABILITY: MENTAL HEALTH: HOW OFTEN DO YOU HAVE A DRINK CONTAINING ALCOHOL?: NEVER

## 2024-06-15 SDOH — SOCIAL STABILITY: SOCIAL INSECURITY: WITHIN THE LAST YEAR, HAVE YOU BEEN HUMILIATED OR EMOTIONALLY ABUSED IN OTHER WAYS BY YOUR PARTNER OR EX-PARTNER?: NO

## 2024-06-15 SDOH — HEALTH STABILITY: MENTAL HEALTH: HOW MANY STANDARD DRINKS CONTAINING ALCOHOL DO YOU HAVE ON A TYPICAL DAY?: PATIENT DOES NOT DRINK

## 2024-06-15 SDOH — SOCIAL STABILITY: SOCIAL INSECURITY: HAVE YOU HAD THOUGHTS OF HARMING ANYONE ELSE?: NO

## 2024-06-15 SDOH — ECONOMIC STABILITY: FOOD INSECURITY: WITHIN THE PAST 12 MONTHS, YOU WORRIED THAT YOUR FOOD WOULD RUN OUT BEFORE YOU GOT MONEY TO BUY MORE.: NEVER TRUE

## 2024-06-15 SDOH — HEALTH STABILITY: MENTAL HEALTH: HOW OFTEN DO YOU HAVE 6 OR MORE DRINKS ON ONE OCCASION?: NEVER

## 2024-06-15 SDOH — SOCIAL STABILITY: SOCIAL NETWORK: ARE YOU MARRIED, WIDOWED, DIVORCED, SEPARATED, NEVER MARRIED, OR LIVING WITH A PARTNER?: MARRIED

## 2024-06-15 SDOH — ECONOMIC STABILITY: FOOD INSECURITY: WITHIN THE PAST 12 MONTHS, THE FOOD YOU BOUGHT JUST DIDN'T LAST AND YOU DIDN'T HAVE MONEY TO GET MORE.: NEVER TRUE

## 2024-06-15 SDOH — SOCIAL STABILITY: SOCIAL INSECURITY: HAS ANYONE EVER THREATENED TO HURT YOUR FAMILY OR YOUR PETS?: NO

## 2024-06-15 SDOH — SOCIAL STABILITY: SOCIAL INSECURITY: ARE YOU OR HAVE YOU BEEN THREATENED OR ABUSED PHYSICALLY, EMOTIONALLY, OR SEXUALLY BY ANYONE?: NO

## 2024-06-15 SDOH — SOCIAL STABILITY: SOCIAL NETWORK: HOW OFTEN DO YOU ATTEND CHURCH OR RELIGIOUS SERVICES?: 1 TO 4 TIMES PER YEAR

## 2024-06-15 SDOH — SOCIAL STABILITY: SOCIAL INSECURITY: DO YOU FEEL ANYONE HAS EXPLOITED OR TAKEN ADVANTAGE OF YOU FINANCIALLY OR OF YOUR PERSONAL PROPERTY?: NO

## 2024-06-15 SDOH — ECONOMIC STABILITY: INCOME INSECURITY: IN THE PAST 12 MONTHS, HAS THE ELECTRIC, GAS, OIL, OR WATER COMPANY THREATENED TO SHUT OFF SERVICE IN YOUR HOME?: NO

## 2024-06-15 SDOH — SOCIAL STABILITY: SOCIAL INSECURITY: WITHIN THE LAST YEAR, HAVE YOU BEEN AFRAID OF YOUR PARTNER OR EX-PARTNER?: NO

## 2024-06-15 SDOH — SOCIAL STABILITY: SOCIAL NETWORK: HOW OFTEN DO YOU ATTENT MEETINGS OF THE CLUB OR ORGANIZATION YOU BELONG TO?: NEVER

## 2024-06-15 SDOH — SOCIAL STABILITY: SOCIAL INSECURITY: ARE THERE ANY APPARENT SIGNS OF INJURIES/BEHAVIORS THAT COULD BE RELATED TO ABUSE/NEGLECT?: NO

## 2024-06-15 SDOH — SOCIAL STABILITY: SOCIAL INSECURITY: ABUSE: ADULT

## 2024-06-15 SDOH — SOCIAL STABILITY: SOCIAL INSECURITY: WERE YOU ABLE TO COMPLETE ALL THE BEHAVIORAL HEALTH SCREENINGS?: YES

## 2024-06-15 SDOH — HEALTH STABILITY: PHYSICAL HEALTH: ON AVERAGE, HOW MANY MINUTES DO YOU ENGAGE IN EXERCISE AT THIS LEVEL?: 50 MIN

## 2024-06-15 SDOH — HEALTH STABILITY: PHYSICAL HEALTH: ON AVERAGE, HOW MANY DAYS PER WEEK DO YOU ENGAGE IN MODERATE TO STRENUOUS EXERCISE (LIKE A BRISK WALK)?: 4 DAYS

## 2024-06-15 SDOH — HEALTH STABILITY: PHYSICAL HEALTH: ON AVERAGE, HOW MANY MINUTES DO YOU ENGAGE IN EXERCISE AT THIS LEVEL?: 40 MIN

## 2024-06-15 SDOH — SOCIAL STABILITY: SOCIAL INSECURITY: HAVE YOU HAD ANY THOUGHTS OF HARMING ANYONE ELSE?: NO

## 2024-06-15 SDOH — SOCIAL STABILITY: SOCIAL NETWORK: HOW OFTEN DO YOU GET TOGETHER WITH FRIENDS OR RELATIVES?: THREE TIMES A WEEK

## 2024-06-15 SDOH — HEALTH STABILITY: PHYSICAL HEALTH: ON AVERAGE, HOW MANY DAYS PER WEEK DO YOU ENGAGE IN MODERATE TO STRENUOUS EXERCISE (LIKE A BRISK WALK)?: 3 DAYS

## 2024-06-15 ASSESSMENT — LIFESTYLE VARIABLES
HOW OFTEN DO YOU HAVE 6 OR MORE DRINKS ON ONE OCCASION: NEVER
EVER HAD A DRINK FIRST THING IN THE MORNING TO STEADY YOUR NERVES TO GET RID OF A HANGOVER: NO
HAVE YOU EVER FELT YOU SHOULD CUT DOWN ON YOUR DRINKING: NO
HOW MANY STANDARD DRINKS CONTAINING ALCOHOL DO YOU HAVE ON A TYPICAL DAY: PATIENT DOES NOT DRINK
HAVE PEOPLE ANNOYED YOU BY CRITICIZING YOUR DRINKING: NO
TOTAL SCORE: 0
HOW OFTEN DO YOU HAVE A DRINK CONTAINING ALCOHOL: NEVER
EVER FELT BAD OR GUILTY ABOUT YOUR DRINKING: NO
AUDIT-C TOTAL SCORE: 0
PRESCIPTION_ABUSE_PAST_12_MONTHS: NO
AUDIT-C TOTAL SCORE: 0
SKIP TO QUESTIONS 9-10: 1
AUDIT-C TOTAL SCORE: 0
SUBSTANCE_ABUSE_PAST_12_MONTHS: NO
SKIP TO QUESTIONS 9-10: 1
AUDIT-C TOTAL SCORE: 0
SKIP TO QUESTIONS 9-10: 1

## 2024-06-15 ASSESSMENT — ACTIVITIES OF DAILY LIVING (ADL)
HEARING - LEFT EAR: FUNCTIONAL
WALKS IN HOME: INDEPENDENT
HEARING - RIGHT EAR: FUNCTIONAL
GROOMING: INDEPENDENT
BATHING: INDEPENDENT
ADEQUATE_TO_COMPLETE_ADL: YES
TOILETING: INDEPENDENT
DRESSING YOURSELF: INDEPENDENT
PATIENT'S MEMORY ADEQUATE TO SAFELY COMPLETE DAILY ACTIVITIES?: YES
FEEDING YOURSELF: INDEPENDENT
LACK_OF_TRANSPORTATION: NO
JUDGMENT_ADEQUATE_SAFELY_COMPLETE_DAILY_ACTIVITIES: YES

## 2024-06-15 ASSESSMENT — COGNITIVE AND FUNCTIONAL STATUS - GENERAL
MOBILITY SCORE: 24
PATIENT BASELINE BEDBOUND: NO
DAILY ACTIVITIY SCORE: 24
MOBILITY SCORE: 24
DAILY ACTIVITIY SCORE: 24

## 2024-06-15 ASSESSMENT — PAIN SCALES - GENERAL
PAINLEVEL_OUTOF10: 8
PAINLEVEL_OUTOF10: 7
PAINLEVEL_OUTOF10: 6
PAINLEVEL_OUTOF10: 0 - NO PAIN
PAINLEVEL_OUTOF10: 0 - NO PAIN
PAINLEVEL_OUTOF10: 7

## 2024-06-15 ASSESSMENT — PAIN - FUNCTIONAL ASSESSMENT: PAIN_FUNCTIONAL_ASSESSMENT: 0-10

## 2024-06-15 ASSESSMENT — PAIN DESCRIPTION - LOCATION: LOCATION: ABDOMEN

## 2024-06-15 ASSESSMENT — PAIN DESCRIPTION - ORIENTATION: ORIENTATION: MID

## 2024-06-15 NOTE — PROGRESS NOTES
"Alfonzo Flanagan is a 49 year old female in observation for chronic abdominal pain, ascites r/t non-alcoholic cirrhosis, pancytopenia.      06/15/24 1055   Current Planned Discharge Disposition   Current Planned Discharge Disposition Home     Patient is a re-admit.  She has had several ED visits as well as an admission 05/25-06/01/2024 for abdominal pain, ascites, diarrhea.   05/31/2024 referral was made for [facility], patient states she has been receiving daily phone calls from a nurse.  During same admission, it was noted that patient's insurance was not active d/t missing/incomplete paperwork, patient advised by Care Transitions to contact Medicaid. Currently, patient stated \"I don't know if I have insurance or not\". [employer] is on file as her carrier.   ADOD 06/17/2024  Plan is to discharge home with no needs,  will transport.     Danika Reyes RN-BSN  "

## 2024-06-15 NOTE — ED TRIAGE NOTES
Pt presents to ED from home for ascites with pain and nausea. Pt states she has nonalcoholic cirrhosis.

## 2024-06-15 NOTE — PROGRESS NOTES
Pt is c/o increase sob, abdominal pain. She said her pain is an 8. Pt has not been able to find GI doctor close to her that will take her insurance. Pt has been urge to go to ED since her symptoms are getting worse. Vitals are in flowsheet.

## 2024-06-15 NOTE — PROGRESS NOTES
06/15/24 1054   Discharge Planning   Living Arrangements Spouse/significant other   Support Systems Spouse/significant other   Type of Residence Private residence   Number of Stairs to Enter Residence 15   Number of Stairs Within Residence 0   Do you have animals or pets at home? No   Who is requesting discharge planning? Provider   Home or Post Acute Services None   Patient expects to be discharged to: home   Does the patient need discharge transport arranged? No   Patient Choice   Provider Choice list and CMS website (https://medicare.gov/care-compare#search) for post-acute Quality and Resource Measure Data were provided and reviewed with: Other (Comment)  (no skilled needs)   Patient / Family choosing to utilize agency / facility established prior to hospitalization No

## 2024-06-15 NOTE — PROGRESS NOTES
Pt care accepted from Dr Ocampo at 0600 with the patient awaiting paracentesis by interventional radiology.      The patient is a 49-year-old female presenting to the emergency department for evaluation of abdominal distention which she reports is causing her to be short of breath.  She does have diffuse abdominal pain.  She states that her symptoms are chronic but have been gradually worsening over the past several weeks.  The patient states that she does have a history of ascites from cirrhosis and has required paracentesis but she cannot find a gastroenterologist near her that accepts her insurance so she has not had it done for a while.  Abdominal pain is diffuse.  Chronic.  Constant.  Dull aching pain.  No better or worse.  No radiation.  She has nausea without vomiting.  No diarrhea or constipation.  No urinary complaints.  No vaginal discharge.  No headache or visual changes.  No chest pain.  No palpitations.  No diaphoresis.  All pertinent positives and negatives are recorded above.  All other systems reviewed and otherwise negative.  Vital signs within normal limits.  Physical exam with a well-nourished well-developed female in no acute distress.  HEENT exam with dry mucous membranes but otherwise unremarkable.  She has no evidence of airway compromise or respiratory distress.  She does have a distended abdomen with diffuse tenderness to palpation.  No rebound or guarding.  No palpable masses.  No flank pain with percussion or palpation.  She has no gross motor, neurologic or vascular deficits on exam.      EKG with normal sinus rhythm at 86 bpm, normal axis, normal voltage, normal ST segment, no diffuse flattening of the T waves      Diagnostic labs with mild hyperglycemia, mild hyperbilirubinemia, mildly elevated AST and pancytopenia but otherwise unremarkable.      Initial Troponin T < 6.  Repeat trop <6      XR chest 1 view   Final Result   No radiographic evidence of acute cardiopulmonary pathology.         MACRO:   None.        Signed by: Evan Finkelstein 6/15/2024 7:01 AM   Dictation workstation:   [id number]      CT abdomen pelvis wo IV contrast   Final Result   Findings compatible with cirrhosis. Lack of contrast limits   evaluation for discrete hypervascular mass. If of clinical concern   this can be further evaluated with nonemergent dedicated liver CT or   MRI.        Moderate ascites with diffuse haziness of the mesentery.        There is diffuse bowel wall thickening involving the small and large   bowel as described above. These findings may relate to   hypoproteinemic state and ascites. Correlate with symptomatology if   there is concern for early infectious/inflammatory enterocolitis.        Additional findings as described above.        MACRO:   None        Signed by: Cj Lee 6/15/2024 1:49 AM   Dictation workstation:   [id number]      Consult to Interventional Radiology    (Results Pending)        Patient does not have any evidence of ischemia on EKG or cardiac enzymes.  No events on telemetry.  The patient does not have any evidence of airway compromise or respiratory distress.  She does have a distended abdomen.  CT abdomen pelvis with evidence of cirrhosis and ascites.  Chest x-ray without acute process.  No evidence of pneumonia or pneumothorax.  No evidence of CHF.  No widening of the mediastinum.  The patient does have equal pulses bilaterally.      Dr. Ocampo did consult Dr Lawrence for admission and Dr Lawrence reportedly requested that he attempt to have IR perform paracentesis and release the pt to go home.  He did consult IR for the paracentesis but this was pending at the time of turnover of care.      I tried to obtain an estimated time for the paracentesis to be performed by IR was was informed that IR is not able today for this procedure and that the patient to be admitted for interventional radiology procedure at another time or transferred.  There were no beds available  within the system for transfer for this at this time.      The patient was admitted by Dr. Lawrence for further management while awaiting interventional radiology procedure.      Impression/diagnosis  Dyspnea, dyspnea on exertion  Cirrhosis with recurrent ascites  Abdominal pain/distention, acute on chronic, diffuse  Pancytopenia      I independently reviewed the results of the EKG and diagnostic labs      I reviewed the results of the diagnostic labs and diagnostic imaging.  Formal radiology reading was completed by the radiologist

## 2024-06-15 NOTE — PROGRESS NOTES
Physical Therapy                 Therapy Communication Note    Patient Name: Alfonzo Flanagan  MRN: [id number]  Today's Date: 6/15/2024     Discipline: Physical Therapy    PT orders received and chart reviewed; PT screen. pt has been up ad allie per staff and pt report.  denies needs/concerns for homegoing.  denies functional deficits.  agreeable to DC PT.

## 2024-06-15 NOTE — ED PROVIDER NOTES
HPI   Chief Complaint   Patient presents with    Abdominal Pain       HPI  49-year-old female history of cirrhosis presents abdominal pain.  Patient seen a few days ago for the same follow-up with her doctor who recommended come back to the emergency room if her condition worsens.  Patient states that her gastroenterologist no longer takes her insurance.  She has required paracentesis in the past.  Her abdominal pain is diffuse.  Some nausea.  8 out of 10 pain.  Nothing seems to make it better or worse.  No fevers or chills.  No urinary complaints.  No other complaints.                  [place] Coma Scale Score: 15                     Patient History   Past Medical History:   Diagnosis Date    Cirrhosis of liver (Multi)     Diabetes mellitus (Multi)      Past Surgical History:   Procedure Laterality Date    ABDOMINAL SURGERY      CT GUIDED IMAGING FOR NEEDLE PLACEMENT  10/14/2020    CT GUIDED IMAGING FOR NEEDLE PLACEMENT [facility]    US GUIDED ABDOMINAL PARACENTESIS  10/08/2020    US GUIDED ABDOMINAL PARACENTESIS [facility]     No family history on file.  Social History     Tobacco Use    Smoking status: Never    Smokeless tobacco: Never   Substance Use Topics    Alcohol use: Never    Drug use: Never       Physical Exam   ED Triage Vitals [06/14/24 2315]   Temperature Heart Rate Respirations BP   36.4 °C (97.5 °F) 99 18 128/66      Pulse Ox Temp Source Heart Rate Source Patient Position   99 % Temporal Monitor Sitting      BP Location FiO2 (%)     Left arm --       Physical Exam  General:  Awake, alert, no acute distress.  Head: Normocephalic, Atraumatic  Neck: Supple, trachea midline, no stridor  Skin: Warm and dry, no rashes   Lungs: Clear to auscultation bilaterally no acute respiratory distress, speaking in full sentences without difficulty  CV: Regular Rate Rhythm with no obvious murmurs gallops rubs noted, no jugular venous distention, no pedal edema   Abdomen: Soft, diffusely tender,  distended, positive bowel sounds, no peritoneal signs  Neuro:  No gross focal neurologic deficits, NIH is 0  Musculoskeletal:  Full range of motion in all 4 extremities  Psychiatric:  Alert oriented x 3, Good insight into condition.  ED Course & MDM   Diagnoses as of 06/15/24 1748   Generalized abdominal pain   Other ascites   Pancytopenia (Multi)       Medical Decision Making  Patient's laboratory studies are consistent with her cirrhosis and unchanged from [facility] when she was here.  Her CT abdomen pelvis shows moderate amount of ascites.  Given the patient's history and CT findings I feel she would benefit from paracentesis.  I spoke with Dr. Lawrence who is admitted the patient in the past.  He recommends keeping her in the emergency department getting paracentesis from the emergency department and hopefully she can be discharged to home.  I discussed this plan with the patient at bedside she was comfortable with this plan.  She was treated with IV fluids, morphine, fentanyl, Zofran.    Patient will be endorsed to Dr. Shin for disposition.    Procedure  Procedures     Chago Ocampo, DO  06/15/24 [id number]       Chago Ocampo, DO  06/15/24 [id number]

## 2024-06-16 LAB
ALBUMIN SERPL-MCNC: 2.3 G/DL (ref 3.5–5)
ALP BLD-CCNC: 303 U/L (ref 35–125)
ALT SERPL-CCNC: 25 U/L (ref 5–40)
ANION GAP SERPL CALC-SCNC: 8 MMOL/L
AST SERPL-CCNC: 38 U/L (ref 5–40)
BILIRUB SERPL-MCNC: 1.4 MG/DL (ref 0.1–1.2)
BUN SERPL-MCNC: 7 MG/DL (ref 8–25)
CALCIUM SERPL-MCNC: 8.2 MG/DL (ref 8.5–10.4)
CHLORIDE SERPL-SCNC: 107 MMOL/L (ref 97–107)
CO2 SERPL-SCNC: 24 MMOL/L (ref 24–31)
CREAT SERPL-MCNC: 0.5 MG/DL (ref 0.4–1.6)
EGFRCR SERPLBLD CKD-EPI 2021: >90 ML/MIN/1.73M*2
ERYTHROCYTE [DISTWIDTH] IN BLOOD BY AUTOMATED COUNT: 20.2 % (ref 11.5–14.5)
GLUCOSE BLD MANUAL STRIP-MCNC: 197 MG/DL (ref 74–99)
GLUCOSE BLD MANUAL STRIP-MCNC: 282 MG/DL (ref 74–99)
GLUCOSE BLD MANUAL STRIP-MCNC: 286 MG/DL (ref 74–99)
GLUCOSE BLD MANUAL STRIP-MCNC: 82 MG/DL (ref 74–99)
GLUCOSE SERPL-MCNC: 83 MG/DL (ref 65–99)
HCT VFR BLD AUTO: 27.8 % (ref 36–46)
HGB BLD-MCNC: 8.8 G/DL (ref 12–16)
MCH RBC QN AUTO: 24.2 PG (ref 26–34)
MCHC RBC AUTO-ENTMCNC: 31.7 G/DL (ref 32–36)
MCV RBC AUTO: 77 FL (ref 80–100)
NRBC BLD-RTO: 0 /100 WBCS (ref 0–0)
PLATELET # BLD AUTO: 67 X10*3/UL (ref 150–450)
POTASSIUM SERPL-SCNC: 3.6 MMOL/L (ref 3.4–5.1)
PROT SERPL-MCNC: 6.4 G/DL (ref 5.9–7.9)
RBC # BLD AUTO: 3.63 X10*6/UL (ref 4–5.2)
SODIUM SERPL-SCNC: 139 MMOL/L (ref 133–145)
WBC # BLD AUTO: 4.4 X10*3/UL (ref 4.4–11.3)

## 2024-06-16 PROCEDURE — 2500000001 HC RX 250 WO HCPCS SELF ADMINISTERED DRUGS (ALT 637 FOR MEDICARE OP): Performed by: INTERNAL MEDICINE

## 2024-06-16 PROCEDURE — 82947 ASSAY GLUCOSE BLOOD QUANT: CPT

## 2024-06-16 PROCEDURE — 85027 COMPLETE CBC AUTOMATED: CPT | Performed by: INTERNAL MEDICINE

## 2024-06-16 PROCEDURE — 36415 COLL VENOUS BLD VENIPUNCTURE: CPT | Performed by: INTERNAL MEDICINE

## 2024-06-16 PROCEDURE — G0378 HOSPITAL OBSERVATION PER HR: HCPCS

## 2024-06-16 PROCEDURE — 2500000004 HC RX 250 GENERAL PHARMACY W/ HCPCS (ALT 636 FOR OP/ED): Performed by: INTERNAL MEDICINE

## 2024-06-16 PROCEDURE — 2500000002 HC RX 250 W HCPCS SELF ADMINISTERED DRUGS (ALT 637 FOR MEDICARE OP, ALT 636 FOR OP/ED): Performed by: INTERNAL MEDICINE

## 2024-06-16 PROCEDURE — 80053 COMPREHEN METABOLIC PANEL: CPT | Performed by: INTERNAL MEDICINE

## 2024-06-16 ASSESSMENT — ENCOUNTER SYMPTOMS
RESPIRATORY NEGATIVE: 1
NEUROLOGICAL NEGATIVE: 1
PSYCHIATRIC NEGATIVE: 1
EYES NEGATIVE: 1
CONSTITUTIONAL NEGATIVE: 1
ALLERGIC/IMMUNOLOGIC NEGATIVE: 1
ABDOMINAL DISTENTION: 1
CARDIOVASCULAR NEGATIVE: 1
HEMATOLOGIC/LYMPHATIC NEGATIVE: 1
MUSCULOSKELETAL NEGATIVE: 1

## 2024-06-16 ASSESSMENT — COGNITIVE AND FUNCTIONAL STATUS - GENERAL
MOBILITY SCORE: 24
DAILY ACTIVITIY SCORE: 24
DAILY ACTIVITIY SCORE: 24
MOBILITY SCORE: 24

## 2024-06-16 ASSESSMENT — PAIN SCALES - GENERAL
PAINLEVEL_OUTOF10: 6
PAINLEVEL_OUTOF10: 0 - NO PAIN

## 2024-06-16 ASSESSMENT — PAIN DESCRIPTION - LOCATION: LOCATION: ABDOMEN

## 2024-06-16 NOTE — NURSING NOTE
Assumed patient care. BSSR received from previous Nurse. Seen patient lying on bed awake, no distress or discomfort noted. Safety measures ensured and call light in reach.   Plan of care ongoing.

## 2024-06-16 NOTE — H&P
History Of Present Illness  Alfonzo Flanagan is a 49 y.o. female presenting with complaint of abdominal pain.  Patient was in the hospital 2 days before and was discharged with advised to follow-up with a gastroenterologist.  Patient that her gastroenterologist does not take her insurance patient had required paracentesis.  Patient is complaining of pain.  Diffuse, 8/10 intensity.  Patient also has nausea and occasional vomiting.  Patient denies any fever chills or rigor.  No diarrhea, dysuria, hematuria frequency.  Discussed with the ER physician initially.  It was advised to arrange for paracentesis and patient can be discharged home after paracentesis.  No need to admit the patient.  But paracentesis could not be arranged due to weekend and patient was in pain and refused to go home..  [facility] decided to admit the patient.     Past Medical History  Past Medical History:   Diagnosis Date    Cirrhosis of liver (Multi)     Diabetes mellitus (Multi)        Surgical History  Past Surgical History:   Procedure Laterality Date    ABDOMINAL SURGERY      CT GUIDED IMAGING FOR NEEDLE PLACEMENT  10/14/2020    CT GUIDED IMAGING FOR NEEDLE PLACEMENT [facility]    US GUIDED ABDOMINAL PARACENTESIS  10/08/2020    US GUIDED ABDOMINAL PARACENTESIS [facility]        Social History  She reports that she has never smoked. She has never used smokeless tobacco. She reports that she does not drink alcohol and does not use drugs.    Family History  No family history on file.     Allergies  Gluten    Review of Systems  I reviewed all systems reviewed as above otherwise is negative.  Physical Exam  HEENT:  Head externally atraumatic, no pallor, no icterus, extraocular movements intact, pupils reactive to light, oral mucosa moist and throat clear.  Neck:  Supple, no JVP, no palpable adenopathy or thyromegaly.  No carotid bruit.  Chest:  Clear to auscultation and resonant.  Heart:  Regular rate and rhythm, no murmur or  gallop could be appreciated.  Abdomen:  Soft, nontender, bowel sounds present, normoactive, no palpable hepatosplenomegaly.  Extremities:  No edema, pulses present, no cyanosis or clubbing.  CNS:  Patient alert, oriented to time, place and person.  Power 5/5 all over and deep tendon reflexes symmetrical, cranial nerves 2-12 grossly intact.  Skin:  No active rash.  Musculoskeletal:  No joint swelling or erythema, range of movement normal.  Last Recorded Vitals  Heart Rate:  [71-86]   Temp:  [36.4 °C (97.5 °F)-37.1 °C (98.8 °F)]   Resp:  [17-18]   BP: ()/(50-61)   SpO2:  [97 %-100 %]       Relevant Results        Results for orders placed or performed during the hospital encounter of 06/15/24 (from the past 24 hour(s))   POCT GLUCOSE   Result Value Ref Range    POCT Glucose 244 (H) 74 - 99 mg/dL   POCT GLUCOSE   Result Value Ref Range    POCT Glucose 177 (H) 74 - 99 mg/dL   Comprehensive metabolic panel   Result Value Ref Range    Glucose 83 65 - 99 mg/dL    Sodium 139 133 - 145 mmol/L    Potassium 3.6 3.4 - 5.1 mmol/L    Chloride 107 97 - 107 mmol/L    Bicarbonate 24 24 - 31 mmol/L    Urea Nitrogen 7 (L) 8 - 25 mg/dL    Creatinine 0.50 0.40 - 1.60 mg/dL    eGFR >90 >60 mL/min/1.73m*2    Calcium 8.2 (L) 8.5 - 10.4 mg/dL    Albumin 2.3 (L) 3.5 - 5.0 g/dL    Alkaline Phosphatase 303 (H) 35 - 125 U/L    Total Protein 6.4 5.9 - 7.9 g/dL    AST 38 5 - 40 U/L    Bilirubin, Total 1.4 (H) 0.1 - 1.2 mg/dL    ALT 25 5 - 40 U/L    Anion Gap 8 <=19 mmol/L   CBC   Result Value Ref Range    WBC 4.4 4.4 - 11.3 x10*3/uL    nRBC 0.0 0.0 - 0.0 /100 WBCs    RBC 3.63 (L) 4.00 - 5.20 x10*6/uL    Hemoglobin 8.8 (L) 12.0 - 16.0 g/dL    Hematocrit 27.8 (L) 36.0 - 46.0 %    MCV 77 (L) 80 - 100 fL    MCH 24.2 (L) 26.0 - 34.0 pg    MCHC 31.7 (L) 32.0 - 36.0 g/dL    RDW 20.2 (H) 11.5 - 14.5 %    Platelets 67 (L) 150 - 450 x10*3/uL   POCT GLUCOSE   Result Value Ref Range    POCT Glucose 82 74 - 99 mg/dL   POCT GLUCOSE   Result Value Ref  Range    POCT Glucose 282 (H) 74 - 99 mg/dL     Prior to Admission medications    Medication Sig Start Date End Date Taking? Authorizing Provider   dicyclomine (Bentyl) 10 mg capsule Take 2 capsules (20 mg) by mouth 4 times a day for 15 days. 6/10/24 6/25/24 Yes Melida Guthrie MD   lactulose 20 gram/30 mL oral solution Take 30 mL (20 g) by mouth once daily. Hold for greater than 3 bowel movements in 24 hours. 4/19/24  Yes ESME Sotelo   atorvastatin (Lipitor) 80 mg tablet Take 1 tablet (80 mg) by mouth once daily.    Historical Provider, MD   docusate sodium (Colace) 100 mg capsule Take 1 capsule (100 mg) by mouth 2 times a day. Hold for loose stool. 2/15/24   ESME Sotelo   fenofibrate (Tricor) 145 mg tablet Take 1 tablet (145 mg) by mouth once daily.    Historical Provider, MD   furosemide (Lasix) 40 mg tablet Take 1 tablet (40 mg) by mouth once daily. Hold for dizziness. 2/15/24   ESME Sotelo   gabapentin (Neurontin) 100 mg capsule Take 2 capsules (200 mg) by mouth 3 times a day. 6/1/24 7/1/24  Luis Alfredo Lawrence MD   hydrOXYzine HCL (Atarax) 25 mg tablet Take 1 tablet (25 mg) by mouth every 6 hours if needed for itching or allergies. 5/29/24   ESME Sotelo   insulin glargine (Lantus U-100 Insulin) 100 unit/mL injection Inject 25 Units under the skin 2 times a day.    Historical Provider, MD   insulin lispro (HumaLOG) 100 unit/mL injection Inject 0.01-0.4 mL (1-40 Units) under the skin 3 times daily (morning, midday, late afternoon). Take as directed per insulin instructions.    Historical Provider, MD   magnesium oxide (Mag-Ox) 400 mg (241.3 mg magnesium) tablet Take 1 tablet (400 mg) by mouth once daily. 6/1/24   ESME Sotelo   metFORMIN (Glucophage) 1,000 mg tablet Take 1 tablet (1,000 mg) by mouth 2 times daily (morning and late afternoon).    Historical Provider, MD   nadolol (Corgard) 20 mg tablet Take 1 tablet (20 mg) by  mouth once daily. 6/1/24 7/1/24  Luis Alfredo Lawrence MD   ondansetron ODT (Zofran-ODT) 4 mg disintegrating tablet Take 1 tablet (4 mg) by mouth every 8 hours if needed for nausea or vomiting. 5/15/24   Tamiko Mcmullen MD   pantoprazole (ProtoNix) 40 mg EC tablet Take 1 tablet (40 mg) by mouth once daily. Do not crush, chew, or split. 6/1/24 7/1/24  Luis Alfredo Lawrence MD   polyethylene glycol (Glycolax, Miralax) 17 gram packet Take 17 g by mouth 2 times a day. Hold for loose stool. 2/15/24   ESME Sotelo   spironolactone (Aldactone) 50 mg tablet Take 1 tablet (50 mg) by mouth 2 times a day. Hold for dizziness. 2/15/24   ESME Sotelo   sucralfate (Carafate) 100 mg/mL suspension Take 10 mL (1 g) by mouth 4 times a day before meals. 2/15/24   ESME Sotelo   traMADol (Ultram) 50 mg tablet Take 1 tablet (50 mg) by mouth every 6 hours if needed for severe pain (7 - 10). 6/1/24   Luis Alfredo Lawrence MD   traZODone (Desyrel) 50 mg tablet Take 0.5 tablets (25 mg) by mouth once daily at bedtime.    Historical Provider, MD   ursodiol (Actigall) 300 mg capsule Take 1 capsule (300 mg) by mouth 3 times a day.    Historical Provider, MD   dicyclomine (Bentyl) 20 mg tablet Take 1 tablet (20 mg) by mouth 4 times a day before meals. 5/15/24 6/10/24  Tamiko Mcmullen MD   ergocalciferol (Vitamin D-2) 1.25 MG (88443 UT) capsule Take 1 capsule (50,000 Units) by mouth 1 (one) time per week. 2/22/24 6/10/24  Historical Provider, MD   hydrocortisone 2.5 % cream Apply topically to rash 2 times a day for 7 days. 5/30/24 6/10/24  ESME Sotelo   lidocaine 4 % patch Place 1 patch over 12 hours on the skin once daily. Apply to site of pain. Remove & discard patch within 12 hours or as directed by MD. Do not start before February 16, 2024. 2/16/24 6/10/24  ESME Sotelo   ondansetron ODT (Zofran-ODT) 4 mg disintegrating tablet Take 1 tablet (4 mg) by mouth every 8 hours if  needed for nausea for up to 7 days. 6/10/24 6/10/24  Melida Guthrie MD   traMADol (Ultram) 50 mg tablet Take 1 tablet (50 mg) by mouth. 2/21/24 6/10/24  Historical Provider, MD       Current Facility-Administered Medications:     acetaminophen (Tylenol) tablet 650 mg, 650 mg, oral, q4h PRN **OR** acetaminophen (Tylenol) oral liquid 650 mg, 650 mg, oral, q4h PRN **OR** acetaminophen (Tylenol) suppository 650 mg, 650 mg, rectal, q4h PRN, Luis Alfredo Lawrence MD    atorvastatin (Lipitor) tablet 80 mg, 80 mg, oral, Nightly, Luis Alfredo Lawrence MD, 80 mg at 06/15/24 2131    benzocaine-menthol (Cepastat Sore Throat) lozenge 1 lozenge, 1 lozenge, Mouth/Throat, q2h PRN, Luis Alfredo Lawrence MD    dextromethorphan-guaifenesin (Robitussin DM)  mg/5 mL oral liquid 5 mL, 5 mL, oral, q4h PRN, Luis Alfredo Lawrence MD    dextrose 50 % injection 12.5 g, 12.5 g, intravenous, q15 min PRN, Luis Alfredo Lawrence MD    dextrose 50 % injection 25 g, 25 g, intravenous, q15 min PRN, Luis Alfredo Lawrence MD    dicyclomine (Bentyl) capsule 20 mg, 20 mg, oral, 4x daily, Luis Alfredo Lawrence MD, 20 mg at 06/16/24 1153    docusate sodium (Colace) capsule 100 mg, 100 mg, oral, BID, Luis Alfredo Lawrence MD, 100 mg at 06/16/24 0937    fenofibrate (Triglide) tablet 160 mg, 160 mg, oral, Daily, Luis Alfredo Lawrence MD, 160 mg at 06/16/24 0937    furosemide (Lasix) tablet 40 mg, 40 mg, oral, Daily, Luis Alfredo Lawrence MD, 40 mg at 06/16/24 0937    gabapentin (Neurontin) capsule 200 mg, 200 mg, oral, TID, Luis Alfredo Lawrence MD, 200 mg at 06/16/24 0937    glucagon (Glucagen) injection 1 mg, 1 mg, intramuscular, q15 min PRN, Luis Alfredo Lawrence MD    glucagon (Glucagen) injection 1 mg, 1 mg, intramuscular, q15 min PRN, Luis Alfredo Lawrence MD    guaiFENesin (Mucinex) 12 hr tablet 600 mg, 600 mg, oral, q12h PRN, Luis Alfredo Lawrence MD    hydrOXYzine HCL (Atarax) tablet 25 mg, 25 mg, oral, q6h PRN, Luis Alfredo Lawrence MD    insulin glargine  (Lantus) injection 25 Units, 25 Units, subcutaneous, BID, Luis Alfredo Lawrence MD, 25 Units at 06/16/24 0941    insulin lispro (HumaLOG) injection 0-15 Units, 0-15 Units, subcutaneous, TID, Luis Alfredo Lawernce MD, 9 Units at 06/16/24 1153    insulin lispro (HumaLOG) injection 1-40 Units, 1-40 Units, subcutaneous, TID, Luis Alfredo Lawrence MD    lactulose 20 gram/30 mL oral solution 20 g, 20 g, oral, Daily, Luis Alfredo Lawrence MD, 20 g at 06/16/24 0936    magnesium oxide (Mag-Ox) tablet 400 mg, 400 mg, oral, Daily, Luis Alfredo Lawrence MD, 400 mg at 06/16/24 0937    metFORMIN (Glucophage) tablet 1,000 mg, 1,000 mg, oral, BID, Luis Alfredo Lawrence MD, 1,000 mg at 06/16/24 0937    nadolol (Corgard) tablet 20 mg, 20 mg, oral, Daily, Luis Alfredo Lawrence MD, 20 mg at 06/16/24 0937    ondansetron ODT (Zofran-ODT) disintegrating tablet 4 mg, 4 mg, oral, q8h PRN, Luis Alfredo Lawrence MD    pantoprazole (ProtoNix) EC tablet 40 mg, 40 mg, oral, Daily, Luis Alfredo Lawrence MD, 40 mg at 06/16/24 0937    polyethylene glycol (Glycolax, Miralax) packet 17 g, 17 g, oral, BID, Luis Alfredo Lawrence MD, 17 g at 06/16/24 0936    polyethylene glycol (Glycolax, Miralax) packet 17 g, 17 g, oral, Daily PRN, Luis Alfredo Lawrence MD    spironolactone (Aldactone) tablet 50 mg, 50 mg, oral, BID, Luis Alfredo Lawrence MD, 50 mg at 06/16/24 0937    sucralfate (Carafate) suspension 1 g, 1 g, oral, Before meals & nightly, Luis Alfredo Lawrence MD, 1 g at 06/16/24 1153    traMADol (Ultram) tablet 50 mg, 50 mg, oral, q6h PRN, Luis Alfredo Lawrence MD, 50 mg at 06/15/24 1209    traZODone (Desyrel) tablet 25 mg, 25 mg, oral, Nightly, Luis Alfredo Lawrence MD, 25 mg at 06/15/24 2131    ursodiol (Actigall) capsule 300 mg, 300 mg, oral, TID, Luis Alfredo Lawrence MD, 300 mg at 06/16/24 0937  XR chest 1 view    Result Date: 6/15/2024  Interpreted By:  Finkelstein, Evan, STUDY: XR CHEST 1 VIEW;  6/15/2024 6:50 am   INDICATION: Signs/Symptoms:dyspnea.   COMPARISON: Chest  radiograph 05/25/2024   ACCESSION NUMBER(S): [id number]   ORDERING CLINICIAN: DORA ESPINOZA   FINDINGS:     CARDIOMEDIASTINAL SILHOUETTE: Cardiomediastinal silhouette is normal in size and configuration.   LUNGS: No pulmonary consolidation, pleural effusion or pneumothorax.   ABDOMEN: No remarkable upper abdominal findings.   BONES: No acute osseous abnormality.       No radiographic evidence of acute cardiopulmonary pathology.   MACRO: None.   Signed by: Evan Finkelstein 6/15/2024 7:01 AM Dictation workstation:   [id number]    CT abdomen pelvis wo IV contrast    Result Date: 6/15/2024  Interpreted By:  Cj Lee, STUDY: CT ABDOMEN PELVIS WO IV CONTRAST;  6/15/2024 1:41 am   INDICATION: Signs/Symptoms:Abdominal pain.   COMPARISON: CT scan of the abdomen pelvis 06/10/2024   ACCESSION NUMBER(S): [id number]   ORDERING CLINICIAN: DIPTI BROWN   TECHNIQUE: Axial noncontrast CT images of the abdomen and pelvis with coronal and sagittal reconstructed images.   FINDINGS:   LOWER CHEST: There is a 7 mm nodule in the right middle lobe, stable from prior CT.   ABDOMEN: Lack of intravenous contrast limits evaluation of vessels and solid organs. LIVER: Atrophic nodular liver compatible with cirrhosis. Lack of contrast limits evaluation for discrete hypervascular mass. There is recanalization of the paraumbilical veins. BILE DUCTS: Normal caliber. GALLBLADDER: Status post cholecystectomy. PANCREAS: Mild fatty atrophy of the pancreas. SPLEEN: Within normal limits. ADRENALS: Within normal limits.   KIDNEYS, URETERS, URINARY BLADDER:  Kidneys are symmetric in size without evidence for calculi, hydronephrosis, hydroureter or perinephric inflammatory changes.   No bladder calculi bladder is partially distended with mild wall thickening.   VESSELS:  Calcific atherosclerosis of the aortoiliac vessels. No aortic aneurysm. RETROPERITONEUM: Nonenlarged periaortic lymph nodes noted.   PELVIS:   REPRODUCTIVE ORGANS: Uterus is  present. No adnexal mass.   BOWEL: Marked distention of the stomach with ingested contents. Visualized small bowel loops demonstrate mild diffuse wall thickening. Appendix is not identified with certainty. Mild wall thickening involving the ascending and transverse colon. A few scattered colonic diverticula without evidence for acute diverticulitis. No pneumatosis or portal venous gas. PERITONEUM: Moderate ascites with diffuse haziness of the mesentery. Findings are stable from prior CT. ABDOMINAL WALL: Small ventral hernias containing fat and fluid. BONES: Multilevel degenerative changes of the spine.       Findings compatible with cirrhosis. Lack of contrast limits evaluation for discrete hypervascular mass. If of clinical concern this can be further evaluated with nonemergent dedicated liver CT or MRI.   Moderate ascites with diffuse haziness of the mesentery.   There is diffuse bowel wall thickening involving the small and large bowel as described above. These findings may relate to hypoproteinemic state and ascites. Correlate with symptomatology if there is concern for early infectious/inflammatory enterocolitis.   Additional findings as described above.   MACRO: None   Signed by: Cj Lee 6/15/2024 1:49 AM Dictation workstation:   [id number]    CT abdomen pelvis w IV contrast    Result Date: 6/10/2024  Interpreted By:  Christina Edmondson, STUDY: CT ABDOMEN PELVIS W IV CONTRAST;  6/10/2024 2:14 am   INDICATION: Signs/Symptoms:pain, diarrhea.   COMPARISON: 04/18/2024, 05/25/2024   ACCESSION NUMBER(S): [id number]   ORDERING CLINICIAN: MOIZ ORELLANA   TECHNIQUE: Axial CT images of the abdomen and pelvis with coronal and sagittal reconstructed images obtained after intravenous administration of contrast   FINDINGS: LOWER CHEST: No acute abnormality of the lung bases. BONES: No acute osseous abnormality. Degenerative changes present. ABDOMINAL WALL: Mild anasarca. A supraumbilical fluid contained  hernia noted. A left lateral paraumbilical hernia with stranding is present.   ABDOMEN:   LIVER: The liver is significantly nodular in morphology. No definite lesion identified. BILE DUCTS: Normal caliber. GALLBLADDER: Cholecystectomy. PANCREAS: Mild specific peripancreatic stranding likely related to mesenteric edema. SPLEEN: Within normal limits. ADRENALS: Within normal limits. KIDNEYS and URETERS: Symmetric renal enhancement. No hydronephrosis or perinephric fluid collection.     VESSELS: Atherosclerotic calcifications without aneurysmal dilatation seen. RETROPERITONEUM: Stranding noted and ascites with mild retroperitoneal lymphadenopathy.   PELVIS:   REPRODUCTIVE ORGANS: No pelvic masses. BLADDER: Within normal limits.   BOWEL: Fluid noted within regions of the colon. Wall thickening throughout the small bowel which may be reactive or related to congestion. The stomach is grossly unremarkable. PERITONEUM: Moderate abdominopelvic ascites and diffuse mesenteric stranding. Numerous mesenteric nonenlarged or mildly enlarged lymph nodes noted. No fluid collection or free air.       Cirrhotic appearance of the liver. Moderate abdominopelvic ascites and mesenteric stranding. Lymphadenopathy, likely reactive.   Small bowel wall thickening is again noted which could be reactive or related to passive congestion. Infectious process considered less likely however clinical correlation is again suggested.   Fluid contents in the colon suggestive of diarrheal process.   MACRO: None   Signed by: Christina Edmondson 6/10/2024 2:46 AM Dictation workstation:   [id number]    CT head wo IV contrast    Result Date: 5/30/2024  Interpreted By:  Bill Cordero, STUDY: CT HEAD WO IV CONTRAST;  5/30/2024 10:25 am   INDICATION: Signs/Symptoms:AMS.   COMPARISON: None.   ACCESSION NUMBER(S): [id number]   ORDERING CLINICIAN: HEENA CMWILLIAMS   TECHNIQUE: Noncontrast axial CT scan of head was performed. Angled reformats in brain and bone  windows were generated. The images were reviewed in bone, brain, blood and soft tissue windows.   FINDINGS: CSF Spaces: The ventricles, sulci and basal cisterns are within normal limits. There is no extraaxial fluid collection.   Parenchyma:  The grey-white differentiation is intact. There is no mass effect or midline shift.  There is no intracranial hemorrhage. Small benign-appearing lipoma involving the falx, most typically incidental.   Calvarium: The calvarium is unremarkable.   Paranasal sinuses and mastoids: Visualized paranasal sinuses and mastoids are clear.       No evidence of acute intracranial abnormality. If there is persistent concern for acute cerebral insult, MRI is recommended.     MACRO: None   Signed by: Bill Cordero 5/30/2024 11:32 AM Dictation workstation:   [email or web address]    XR abdomen 1 view    Result Date: 5/30/2024  Interpreted By:  Bill Cordero, STUDY: XR ABDOMEN 1 VIEW;  5/30/2024 10:16 am   INDICATION: Signs/Symptoms:Abdominal pain.   COMPARISON: April 18, 2024 CT abdomen and pelvis   ACCESSION NUMBER(S): [id number]   ORDERING CLINICIAN: HEENA MCWILLIAMS   FINDINGS: 3 separate AP views are submitted demonstrating nonspecific bowel gas pattern with relative paucity of gas. Limited evaluation of pneumoperitoneum on supine imaging, however no gross evidence of free air is noted. Similar abdominal protuberance.   Visualized lungs are clear.   Osseous structures demonstrate no acute bony changes.       1.  Nonspecific bowel gas pattern. If there continues to be concern, CT correlation recommended.   MACRO: None   Signed by: Bill Cordero 5/30/2024 11:28 AM Dictation workstation:   [id number]    ECG 12 lead    Result Date: 5/29/2024  Normal sinus rhythm Low voltage QRS Borderline ECG When compared with ECG of 15-MAY-2024 09:20, Questionable change in QRS axis T wave inversion now evident in Inferior leads Confirmed by Geronimo William ([id number]) on 5/29/2024 9:34:20 PM    Lower extremity  venous duplex bilateral    Result Date: 5/29/2024  Interpreted By:  Alberat Granados, STUDY: VASC US LOWER EXTREMITY VENOUS DUPLEX BILATERAL 5/29/2024 11:13 am   INDICATION: Signs/Symptoms:Bilateral lower extremity pain. Right lower extremity swelling.   COMPARISON: None available.   ACCESSION NUMBER(S): [id number]   ORDERING CLINICIAN: HEENA MCWILLIAMS   TECHNIQUE: High-resolution and real time compression images as well as color Doppler ultrasound of each lower extremity is performed. Attempts were made to visualize the posterior tibial and peroneal veins.   FINDINGS: There is normal compressibility and flow observed within common femoral, femoral, and popliteal veins bilaterally with flow and compressibility of the posterior tibial and peroneal veins bilaterally observed as well.       No evidence of DVT within either lower extremity.   Signed by: Alberta Granados 5/29/2024 11:51 AM Dictation workstation:   [id number]    US guided abdominal paracentesis    Result Date: 5/28/2024  Interpreted By:  Cornelius Weber, STUDY: US GUIDED ABDOMINAL PARACENTESIS; 5/28/2024 11:10 am   INDICATION: Ascites.   COMPARISON: None.   ACCESSION NUMBER(S): [id number]   ORDERING CLINICIAN: ANUPAM FRANCIS   TECHNIQUE: Ultrasound-guided paracentesis   FINDINGS: Informed consent obtained. Patient positioned supine. Right lower quadrant prepped, draped and anesthetized. Under ultrasound guidance, 8 Kiswahili centesis sheath needle inserted into peritoneal cavity. 2.1 Lof clear yellowfluid aspiratedwith sample sent for analysis. Patient tolerated procedure well       Ultrasound-guided paracentesis.   Signed by: Cornelius Weber 5/28/2024 11:45 AM Dictation workstation:   [id number]    CT abdomen pelvis w IV contrast    Result Date: 5/25/2024  Interpreted By:  Finkelstein, Evan, STUDY: CT ABDOMEN PELVIS W IV CONTRAST;  5/25/2024 8:04 pm   INDICATION: Signs/Symptoms:abdominal pain and distension, hx of cirrhosis, concerned for ascites.   COMPARISON:  None.   ACCESSION NUMBER(S): [id number]   ORDERING CLINICIAN: BERTHA SCHILLING   TECHNIQUE: Axial CT images of the abdomen and pelvis with coronal and sagittal reconstructed images obtained after intravenous administration of   FINDINGS: LOWER CHEST: No acute abnormality of the lung bases.   ABDOMEN:   LIVER: Nodular contour of the liver. BILE DUCTS: Normal caliber. GALLBLADDER: Surgically absent. PORTAL VEIN: Patent SPLEEN: Unremarkable. PANCREAS: Unremarkable. ADRENALS: Unremarkable. KIDNEYS, URETERS and URINARY BLADDER: Symmetric renal enhancement. No hydronephrosis or perinephric fluid collection.  Bladder is within normal limits REPRODUCTIVE ORGANS: No pelvic masses.   ABDOMINAL WALL: Diffuse subcutaneous body wall edema. Supraumbilical ventral abdominal wall hernia defect containing fat and fluid. PERITONEUM: No free air. Moderate volume ascites and stranding diffusely throughout the mesentery.   BOWEL: Underdistention versus wall thickening diffusely throughout the small and large bowel as well as the stomach. Nondilated appendix.   VESSELS: No aortic aneurysm. Mild aortoiliac calcifications. RETROPERITONEUM: No pathologically enlarged retroperitoneal lymph nodes.   BONES: No acute osseous abnormality.       Cirrhotic morphology of the liver with moderate volume ascites and stranding diffusely throughout the mesentery.   Supraumbilical ventral abdominal wall hernia containing fat and fluid.   Underdistention versus wall thickening diffusely throughout the small and large bowel as well as the stomach. Findings may be related to passive congestion. Enterocolitis or gastritis, however, is not excluded in the appropriate clinical setting.     MACRO: None.   Signed by: Evan Finkelstein 5/25/2024 8:48 PM Dictation workstation:   [id number]    XR chest 2 views    Result Date: 5/25/2024  Interpreted By:  Cj Lee, STUDY: XR CHEST 2 VIEWS;  5/25/2024 7:28 pm   INDICATION: Signs/Symptoms:sob.   COMPARISON:  Chest x-ray 05/15/2024   ACCESSION NUMBER(S): [id number]   ORDERING CLINICIAN: BERTHA SCHILLING   FINDINGS:     CARDIOMEDIASTINAL SILHOUETTE: Cardiomediastinal silhouette is normal in size and configuration.   LUNGS: No consolidation, pleural effusion or pneumothorax.   ABDOMEN: Surgical clips noted in the right upper quadrant.   BONES: No acute osseous abnormality. Calcifications along bilateral humeral heads likely relate to calcific tendinosis.       No acute cardiopulmonary process.   MACRO: None   Signed by: Cj Lee 5/25/2024 8:00 PM Dictation workstation:   [id number]    US abdomen complete    Result Date: 5/21/2024  * * *Final Report* * * DATE OF EXAM: May 21 2024  9:25AM   [facility]   1016  -  US ASCITES SURVEY  / ACCESSION #  [id number] PROCEDURE REASON: ascites      * * * * Physician Interpretation * * * * RESULT: INDICATION: ascites EXAMINATION: US ASCITES SURVEY Technique: Ultrasound images of the 4 quadrants of the abdomen were performed to check for ascites.  Images were obtained and stored in a permanent archive. RESULT: Very small volume ascites. Therefore therapeutic paracentesis not performed.    IMPRESSION: Very small volume ascites.Therefore therapeutic paracentesis not performed. Transcribed Using Voice Recognition Transcribe Date/Time: May 21 2024 10:00A Dictated by: HANH AUSTIN MD This examination was interpreted and the report reviewed and electronically signed by: HANH AUSTIN MD on May 21 2024 10:01AM  EST    CT angio chest w and wo IV contrast    Result Date: 5/20/2024  * * *Final Report* * * DATE OF EXAM: May 20 2024 11:17PM   [facility]   [id number]  -  CT CHEST W IVCON PE  / ACCESSION #  [id number] PROCEDURE REASON: Pulmonary embolism (PE) suspected, high prob      * * * * Physician Interpretation * * * * RESULT: EXAMINATION:  CHEST CT WITH CONTRAST (PULMONARY EMBOLISM PROTOCOL) CLINICAL HISTORY: Pulmonary embolism suspected, high probability Technique:  Spiral CT acquisition of the chest  from the thoracic inlet to the upper abdomen following IV contrast.  Axial 1 mm thick slices plus coronal and sagittal reformatted images. Contrast:  71 mL Omnipaque 350 IV CT Radiation dose: Integrated Dose-length product (DLP) for this visit =   376 mGy*cm CT Dose Reduction Employed: Automated exposure control(AEC) and iterative recon Comparison:  CT chest 11/10/2022 RESULT: Evaluation for thromboembolic disease:      - Main pulmonary arteries:  No thromboembolic disease.      - Lobar pulmonary arteries:  No thromboembolic disease.      - Segmental pulmonary arteries:  No thromboembolic disease.      - Subsegmental pulmonary arteries:  No thromboembolic disease. Lungs and pleura: The lungs are clear.  No pleural effusion or pneumothorax. Mediastinum:  The thoracic aorta is normal in caliber. The cardiac chambers are normal in size. No coronary artery atherosclerotic calcifications are noted, although the study is not optimized for coronary assessment. No pericardial effusion or thickening. Bones and soft tissues:  No destructive bone lesion. Chest wall is unremarkable. Upper abdomen:  Cirrhotic liver with nodular surface contour.  Abdominal ascites.    IMPRESSION: 1.  No CT evidence of pulmonary embolism. 2.  Cirrhotic liver and abdominal ascites. Transcribed Using Voice Recognition Transcribe Date/Time: May 20 2024 11:50P Dictated by: NAVEEN COBURN MD This examination was interpreted and the report reviewed and electronically signed by: NAVEEN COBURN MD on May 20 2024 11:57PM  EST    XR chest 1 view    Result Date: 5/20/2024  * * *Final Report* * * DATE OF EXAM: May 20 2024  8:34PM   [facility]   [id number]  -  XR CHEST 1V FRONTAL PORT  / ACCESSION #  [id number] PROCEDURE REASON: Shortness of breath      * * * * Physician Interpretation * * * * RESULT: EXAMINATION:  CHEST RADIOGRAPH (PORTABLE SINGLE VIEW AP) Exam Date/Time:  5/20/2024 8:34 PM CLINICAL HISTORY: Shortness of breath MQ:  XCPR_5 Comparison:  04/04/2024 RESULT:  Lines, tubes, and devices:  None. Lungs and pleura:  There are no consolidative infiltrates or pleural effusions.  There is no evidence of pneumothorax. Cardiomediastinal silhouette:  Stable cardiomediastinal silhouette. Other:  Bilateral calcific tendinopathy involving the distal rotator cuff.  Mild degenerative changes in the thoracic spine.    IMPRESSION: No evidence of acute cardiopulmonary disease. Transcribed Using Voice Recognition Transcribe Date/Time: May 20 2024  9:25P Dictated by: MICHELLE MORA MD This examination was interpreted and the report reviewed and electronically signed by: MICHELLE MORA MD on May 20 2024  9:26PM  EST    Susceptibility data from last 90 days.  Collected Specimen Info Organism Amoxicillin/Clavulanate Ampicillin Ampicillin/Sulbactam Cefazolin Cefazolin (uncomplicated UTIs only) Ciprofloxacin Gentamicin Levofloxacin Nitrofurantoin Penicillin Piperacillin/Tazobactam   05/15/24 Urine from Clean Catch/Voided Enterococcus faecium   S     R   S  S  S      Klebsiella pneumoniae/variicola  S  R  S  S  S  S  S   I   S     Collected Specimen Info Organism Trimethoprim/Sulfamethoxazole Vancomycin   05/15/24 Urine from Clean Catch/Voided Enterococcus faecium  R  S     Klebsiella pneumoniae/variicola  S         Assessment/Plan   Principal Problem:    Generalized abdominal pain  Cirrhosis of the liver  Diabetes mellitus type 2  Abdominal pain  Ascites  Portal hypertension  Chronic pancreatitis  Hyperlipidemia  Hypertriglyceridemia  Moderate protein energy malnutrition    Plan: Continue current medication.  Supportive care.  Physical therapy and Occupational Therapy to control pain.  Consult interventional etiology.  Consult gastroenterology.  Control pain.  We will treat DVT, fall, aspiration, decubitus, and DVT precaution.  This has been discussed with the patient and is agreeable to it.        Luis Alfredo Lawrence MD

## 2024-06-16 NOTE — CARE PLAN
The patient's goals for the shift include      The clinical goals for the shift include Adequate sleep    Over the shift, the patient did not make progress toward the following goals. Barriers to progression include . Recommendations to address these barriers include .      Problem: Pain - Adult  Goal: Verbalizes/displays adequate comfort level or baseline comfort level  Outcome: Progressing     Problem: Safety - Adult  Goal: Free from fall injury  Outcome: Progressing     Problem: Discharge Planning  Goal: Discharge to home or other facility with appropriate resources  Outcome: Progressing     Problem: Chronic Conditions and Co-morbidities  Goal: Patient's chronic conditions and co-morbidity symptoms are monitored and maintained or improved  Outcome: Progressing     Problem: Diabetes  Goal: Achieve decreasing blood glucose levels by end of shift  6/16/2024 0545 by Maxwell Cortes RN  Outcome: Met  6/16/2024 0038 by Maxwell Cortes RN  Flowsheets (Taken 6/16/2024 0038)  Achieve decreasing blood glucose levels by end of shift: Med administration/monitoring of effect  Goal: Increase stability of blood glucose readings by end of shift  6/16/2024 0545 by Maxwell Cortes RN  Outcome: Progressing  6/16/2024 0038 by Maxwell Cortes RN  Flowsheets (Taken 6/16/2024 0038)  Increase stability of blood glucose readings by end of shift: Med administration/monitoring of effect  Goal: Decrease in ketones present in urine by end of shift  6/16/2024 0545 by Maxwell Cortes RN  Outcome: Progressing  6/16/2024 0038 by Maxwell Cortes RN  Flowsheets (Taken 6/16/2024 0038)  Decrease in ketones present in urine by end of shift: Med administration/monitoring of effect  Goal: Maintain electrolyte levels within acceptable range throughout shift  6/16/2024 0545 by Maxwell Cortes RN  Outcome: Met  6/16/2024 0038 by Maxwell Cortes RN  Flowsheets (Taken 6/16/2024 0038)  Maintain electrolyte levels within acceptable range throughout shift:   Med administration/monitoring of  effect   Monitor urine output  Goal: Maintain glucose levels >70mg/dl to <250mg/dl throughout shift  6/16/2024 0545 by Maxwell Cortes RN  Outcome: Met  6/16/2024 0038 by Maxwell Cortes RN  Flowsheets (Taken 6/16/2024 0038)  Maintain glucose levels >70mg/dl to <250mg/dl throughout shift: Med administration/monitoring of effect  Goal: No changes in neurological exam by end of shift  Outcome: Met  Goal: Learn about and adhere to nutrition recommendations by end of shift  6/16/2024 0545 by Maxwell Cortes RN  Outcome: Progressing  6/16/2024 0038 by Maxwell Cortes RN  Flowsheets (Taken 6/16/2024 0038)  Learn about and adhere to nutrition recommendations by end of shift: Ensure/encourage compliance with appropriate diet  Goal: Vital signs within normal range for age by end of shift  6/16/2024 0545 by Maxwell Cortes RN  Outcome: Met  6/16/2024 0038 by Maxwell Cortes RN  Flowsheets (Taken 6/16/2024 0038)  Vital signs within normal range for age by end of shift: Med administration/monitoring of effect  Goal: Increase self care and/or family involovement by end of shift  Outcome: Progressing  Goal: Receive DSME education by end of shift  6/16/2024 0545 by Maxwell Cortes RN  Outcome: Progressing  6/16/2024 0038 by Maxwell Cortes RN  Flowsheets (Taken 6/16/2024 0038)  Receive DSME education by end of shift: Provide patient centered education on Diabetic Self Management Education

## 2024-06-16 NOTE — CONSULTS
Inpatient consult to gastroenterology  Consult performed by: Adrianne Powell, MADYSON-CNP  Consult ordered by: Luis Alfredo Lawrence MD          Reason For Consult  Ascites/Cirrhosis     History Of Present Illness  Alfonzo Flanagan is a 49 y.o. female  with abdominal pain, nausea, and abdominal distention. 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  Patient is well known to our service and has been seen during multiple admissions. Last one on on May 27. She is primarily followed by Dr. Whitlock, she had a colonoscopy on 6/5/2023 at [facility] showed hemorrhoids found on perianal exam. The examination was otherwise normal. EGD on 12/27/23 Esophageal ulcer, no bleeding. No varices. CT scan showed cirrhotic appearance of the liver. Moderate abdominopelvic ascites and mesenteric stranding. Lymphadenopathy, likely reactive.  Small bowel wall thickening is again noted which could be reactive or related to passive congestion.  Patient seen and examined this morning, her main complaint is abdominal distention and constipation.  She reports feeling more constipated the last couple days, no BM yesterday or today.  Denies any melena/hematochezia. No nausea, vomiting, hematemesis. Patient is not currently on any blood thinners, denies any use of NSAIDs, alcohol.      Past Medical History  She has a past medical history of Cirrhosis of liver (Multi) and Diabetes mellitus (Multi).    Surgical History  She has a past surgical history that includes CT guided imaging for needle placement (10/14/2020); US guided abdominal paracentesis (10/08/2020); and Abdominal surgery.     Social History  She reports that she has never smoked. She has never used smokeless tobacco. She reports that she does not drink alcohol and does not use drugs.    Family History  No family history on file.     Allergies  Gluten    Review of Systems   Constitutional:  "Negative.    HENT: Negative.     Eyes: Negative.    Respiratory: Negative.     Cardiovascular: Negative.    Gastrointestinal:  Positive for abdominal distention.   Genitourinary: Negative.    Musculoskeletal: Negative.    Skin: Negative.    Allergic/Immunologic: Negative.    Neurological: Negative.    Hematological: Negative.    Psychiatric/Behavioral: Negative.          Physical Exam  HENT:      Head: Normocephalic.      Right Ear: Tympanic membrane normal.      Nose: Nose normal.      Mouth/Throat:      Mouth: Mucous membranes are moist.   Eyes:      Pupils: Pupils are equal, round, and reactive to light.   Cardiovascular:      Rate and Rhythm: Normal rate.   Pulmonary:      Effort: Pulmonary effort is normal.   Abdominal:      General: There is distension.   Musculoskeletal:         General: Normal range of motion.      Cervical back: Normal range of motion.   Skin:     General: Skin is warm.   Neurological:      General: No focal deficit present.      Mental Status: She is alert.   Psychiatric:         Mood and Affect: Mood normal.          Last Recorded Vitals  Blood pressure 100/50, pulse 86, temperature 36.6 °C (97.9 °F), temperature source Oral, resp. rate 17, height 1.6 m (5' 3\"), weight 77.6 kg (171 lb), SpO2 100%.    Relevant Results  XR chest 1 view    Result Date: 6/15/2024  Interpreted By:  Finkelstein, Evan, STUDY: XR CHEST 1 VIEW;  6/15/2024 6:50 am   INDICATION: Signs/Symptoms:dyspnea.   COMPARISON: Chest radiograph 05/25/2024   ACCESSION NUMBER(S): [id number]   ORDERING CLINICIAN: DORA ESPINOZA   FINDINGS:     CARDIOMEDIASTINAL SILHOUETTE: Cardiomediastinal silhouette is normal in size and configuration.   LUNGS: No pulmonary consolidation, pleural effusion or pneumothorax.   ABDOMEN: No remarkable upper abdominal findings.   BONES: No acute osseous abnormality.       No radiographic evidence of acute cardiopulmonary pathology.   MACRO: None.   Signed by: Evan Finkelstein 6/15/2024 7:01 AM " Dictation workstation:   [id number]    CT abdomen pelvis wo IV contrast    Result Date: 6/15/2024  Interpreted By:  Cj Lee, STUDY: CT ABDOMEN PELVIS WO IV CONTRAST;  6/15/2024 1:41 am   INDICATION: Signs/Symptoms:Abdominal pain.   COMPARISON: CT scan of the abdomen pelvis 06/10/2024   ACCESSION NUMBER(S): [id number]   ORDERING CLINICIAN: DIPTI BROWN   TECHNIQUE: Axial noncontrast CT images of the abdomen and pelvis with coronal and sagittal reconstructed images.   FINDINGS:   LOWER CHEST: There is a 7 mm nodule in the right middle lobe, stable from prior CT.   ABDOMEN: Lack of intravenous contrast limits evaluation of vessels and solid organs. LIVER: Atrophic nodular liver compatible with cirrhosis. Lack of contrast limits evaluation for discrete hypervascular mass. There is recanalization of the paraumbilical veins. BILE DUCTS: Normal caliber. GALLBLADDER: Status post cholecystectomy. PANCREAS: Mild fatty atrophy of the pancreas. SPLEEN: Within normal limits. ADRENALS: Within normal limits.   KIDNEYS, URETERS, URINARY BLADDER:  Kidneys are symmetric in size without evidence for calculi, hydronephrosis, hydroureter or perinephric inflammatory changes.   No bladder calculi bladder is partially distended with mild wall thickening.   VESSELS:  Calcific atherosclerosis of the aortoiliac vessels. No aortic aneurysm. RETROPERITONEUM: Nonenlarged periaortic lymph nodes noted.   PELVIS:   REPRODUCTIVE ORGANS: Uterus is present. No adnexal mass.   BOWEL: Marked distention of the stomach with ingested contents. Visualized small bowel loops demonstrate mild diffuse wall thickening. Appendix is not identified with certainty. Mild wall thickening involving the ascending and transverse colon. A few scattered colonic diverticula without evidence for acute diverticulitis. No pneumatosis or portal venous gas. PERITONEUM: Moderate ascites with diffuse haziness of the mesentery. Findings are stable from prior CT.  ABDOMINAL WALL: Small ventral hernias containing fat and fluid. BONES: Multilevel degenerative changes of the spine.       Findings compatible with cirrhosis. Lack of contrast limits evaluation for discrete hypervascular mass. If of clinical concern this can be further evaluated with nonemergent dedicated liver CT or MRI.   Moderate ascites with diffuse haziness of the mesentery.   There is diffuse bowel wall thickening involving the small and large bowel as described above. These findings may relate to hypoproteinemic state and ascites. Correlate with symptomatology if there is concern for early infectious/inflammatory enterocolitis.   Additional findings as described above.   MACRO: None   Signed by: Cj Lee 6/15/2024 1:49 AM Dictation workstation:   [id number]    Scheduled medications  atorvastatin, 80 mg, oral, Nightly  dicyclomine, 20 mg, oral, 4x daily  docusate sodium, 100 mg, oral, BID  fenofibrate, 160 mg, oral, Daily  furosemide, 40 mg, oral, Daily  gabapentin, 200 mg, oral, TID  insulin glargine, 25 Units, subcutaneous, BID  insulin lispro, 0-15 Units, subcutaneous, TID  insulin lispro, 1-40 Units, subcutaneous, TID  lactulose, 20 g, oral, Daily  magnesium oxide, 400 mg, oral, Daily  metFORMIN, 1,000 mg, oral, BID  nadolol, 20 mg, oral, Daily  pantoprazole, 40 mg, oral, Daily  polyethylene glycol, 17 g, oral, BID  spironolactone, 50 mg, oral, BID  sucralfate, 1 g, oral, Before meals & nightly  traZODone, 25 mg, oral, Nightly  ursodiol, 300 mg, oral, TID      Continuous medications     PRN medications  PRN medications: acetaminophen **OR** acetaminophen **OR** acetaminophen, benzocaine-menthol, dextromethorphan-guaifenesin, dextrose, dextrose, glucagon, glucagon, guaiFENesin, hydrOXYzine HCL, ondansetron ODT, polyethylene glycol, traMADol  Results for orders placed or performed during the hospital encounter of 06/15/24 (from the past 24 hour(s))   Urinalysis with Reflex Microscopic   Result Value  Ref Range    Color, Urine Light-Yellow Light-Yellow, Yellow, Dark-Yellow    Appearance, Urine Clear Clear    Specific Gravity, Urine 1.023 1.005 - 1.035    pH, Urine 5.5 5.0, 5.5, 6.0, 6.5, 7.0, 7.5, 8.0    Protein, Urine NEGATIVE NEGATIVE, 10 (TRACE), 20 (TRACE) mg/dL    Glucose, Urine OVER (4+) (A) Normal mg/dL    Blood, Urine 0.06 (1+) (A) NEGATIVE    Ketones, Urine NEGATIVE NEGATIVE mg/dL    Bilirubin, Urine NEGATIVE NEGATIVE    Urobilinogen, Urine Normal Normal mg/dL    Nitrite, Urine NEGATIVE NEGATIVE    Leukocyte Esterase, Urine 75 Mckenzie/µL (A) NEGATIVE   Microscopic Only, Urine   Result Value Ref Range    WBC, Urine 21-50 (A) 1-5, NONE /HPF    RBC, Urine NONE NONE, 1-2, 3-5 /HPF    Squamous Epithelial Cells, Urine 1-9 (SPARSE) Reference range not established. /HPF    Bacteria, Urine 3+ (A) NONE SEEN /HPF    Fat, Urine PRESENT (A) NONE /HPF   POCT GLUCOSE   Result Value Ref Range    POCT Glucose 244 (H) 74 - 99 mg/dL   POCT GLUCOSE   Result Value Ref Range    POCT Glucose 177 (H) 74 - 99 mg/dL   Comprehensive metabolic panel   Result Value Ref Range    Glucose 83 65 - 99 mg/dL    Sodium 139 133 - 145 mmol/L    Potassium 3.6 3.4 - 5.1 mmol/L    Chloride 107 97 - 107 mmol/L    Bicarbonate 24 24 - 31 mmol/L    Urea Nitrogen 7 (L) 8 - 25 mg/dL    Creatinine 0.50 0.40 - 1.60 mg/dL    eGFR >90 >60 mL/min/1.73m*2    Calcium 8.2 (L) 8.5 - 10.4 mg/dL    Albumin 2.3 (L) 3.5 - 5.0 g/dL    Alkaline Phosphatase 303 (H) 35 - 125 U/L    Total Protein 6.4 5.9 - 7.9 g/dL    AST 38 5 - 40 U/L    Bilirubin, Total 1.4 (H) 0.1 - 1.2 mg/dL    ALT 25 5 - 40 U/L    Anion Gap 8 <=19 mmol/L   CBC   Result Value Ref Range    WBC 4.4 4.4 - 11.3 x10*3/uL    nRBC 0.0 0.0 - 0.0 /100 WBCs    RBC 3.63 (L) 4.00 - 5.20 x10*6/uL    Hemoglobin 8.8 (L) 12.0 - 16.0 g/dL    Hematocrit 27.8 (L) 36.0 - 46.0 %    MCV 77 (L) 80 - 100 fL    MCH 24.2 (L) 26.0 - 34.0 pg    MCHC 31.7 (L) 32.0 - 36.0 g/dL    RDW 20.2 (H) 11.5 - 14.5 %    Platelets 67 (L) 150  - 450 x10*3/uL   POCT GLUCOSE   Result Value Ref Range    POCT Glucose 82 74 - 99 mg/dL   POCT GLUCOSE   Result Value Ref Range    POCT Glucose 282 (H) 74 - 99 mg/dL        Assessment/Plan   #Liver 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 Zaman at [facility] Normal oropharynx. Esophageal ulcer, no bleeding and no stigmata of recent bleeding.  Ascites: CT showed moderate ascites with diffuse haziness of the mesentery  Will continue lasix/aldactone.  IR consult placed for para, fluid analysis ordered   Continue to monitor Hepatic panel, CBC, INR  Hgb remains stable 8.8 No overt bleeding          Adrianne Powell, APRN-CNP

## 2024-06-17 ENCOUNTER — APPOINTMENT (OUTPATIENT)
Dept: RADIOLOGY | Facility: HOSPITAL | Age: 49
End: 2024-06-17
Payer: COMMERCIAL

## 2024-06-17 LAB
ALBUMIN SERPL-MCNC: 2.4 G/DL (ref 3.5–5)
ALP BLD-CCNC: 267 U/L (ref 35–125)
ALT SERPL-CCNC: 24 U/L (ref 5–40)
ANION GAP SERPL CALC-SCNC: 7 MMOL/L
ANION GAP SERPL CALC-SCNC: 8 MMOL/L
AST SERPL-CCNC: 38 U/L (ref 5–40)
BASOPHILS NFR FLD MANUAL: 0 %
BILIRUB SERPL-MCNC: 1.1 MG/DL (ref 0.1–1.2)
BLASTS NFR FLD MANUAL: 0 %
BUN SERPL-MCNC: 11 MG/DL (ref 8–25)
BUN SERPL-MCNC: 9 MG/DL (ref 8–25)
CALCIUM SERPL-MCNC: 8.3 MG/DL (ref 8.5–10.4)
CALCIUM SERPL-MCNC: 8.4 MG/DL (ref 8.5–10.4)
CHLORIDE SERPL-SCNC: 100 MMOL/L (ref 97–107)
CHLORIDE SERPL-SCNC: 107 MMOL/L (ref 97–107)
CLARITY FLD: ABNORMAL
CO2 SERPL-SCNC: 26 MMOL/L (ref 24–31)
CO2 SERPL-SCNC: 26 MMOL/L (ref 24–31)
COLOR FLD: YELLOW
CREAT SERPL-MCNC: 0.6 MG/DL (ref 0.4–1.6)
CREAT SERPL-MCNC: 0.7 MG/DL (ref 0.4–1.6)
EGFRCR SERPLBLD CKD-EPI 2021: >90 ML/MIN/1.73M*2
EGFRCR SERPLBLD CKD-EPI 2021: >90 ML/MIN/1.73M*2
EOSINOPHIL NFR FLD MANUAL: 0 %
ERYTHROCYTE [DISTWIDTH] IN BLOOD BY AUTOMATED COUNT: 20.4 % (ref 11.5–14.5)
ERYTHROCYTE [DISTWIDTH] IN BLOOD BY AUTOMATED COUNT: 20.4 % (ref 11.5–14.5)
GLUCOSE BLD MANUAL STRIP-MCNC: 107 MG/DL (ref 74–99)
GLUCOSE BLD MANUAL STRIP-MCNC: 119 MG/DL (ref 74–99)
GLUCOSE BLD MANUAL STRIP-MCNC: 144 MG/DL (ref 74–99)
GLUCOSE BLD MANUAL STRIP-MCNC: 241 MG/DL (ref 74–99)
GLUCOSE BLD MANUAL STRIP-MCNC: 257 MG/DL (ref 74–99)
GLUCOSE SERPL-MCNC: 136 MG/DL (ref 65–99)
GLUCOSE SERPL-MCNC: 248 MG/DL (ref 65–99)
HCT VFR BLD AUTO: 29.3 % (ref 36–46)
HCT VFR BLD AUTO: 30.4 % (ref 36–46)
HGB BLD-MCNC: 9.1 G/DL (ref 12–16)
HGB BLD-MCNC: 9.3 G/DL (ref 12–16)
HOLD SPECIMEN: NORMAL
IMMATURE GRANULOCYTES IN FLUID: 0 %
LYMPHOCYTES NFR FLD MANUAL: 19 %
MCH RBC QN AUTO: 24 PG (ref 26–34)
MCH RBC QN AUTO: 24 PG (ref 26–34)
MCHC RBC AUTO-ENTMCNC: 30.6 G/DL (ref 32–36)
MCHC RBC AUTO-ENTMCNC: 31.1 G/DL (ref 32–36)
MCV RBC AUTO: 77 FL (ref 80–100)
MCV RBC AUTO: 79 FL (ref 80–100)
MONOS+MACROS NFR FLD MANUAL: 78 %
NEUTROPHILS NFR FLD MANUAL: 3 %
NRBC BLD-RTO: 0 /100 WBCS (ref 0–0)
NRBC BLD-RTO: 0 /100 WBCS (ref 0–0)
OTHER CELLS NFR FLD MANUAL: 0 %
PLASMA CELLS NFR FLD MANUAL: 0 %
PLATELET # BLD AUTO: 64 X10*3/UL (ref 150–450)
PLATELET # BLD AUTO: 71 X10*3/UL (ref 150–450)
POTASSIUM SERPL-SCNC: 3.7 MMOL/L (ref 3.4–5.1)
POTASSIUM SERPL-SCNC: 4.1 MMOL/L (ref 3.4–5.1)
PROT SERPL-MCNC: 7 G/DL (ref 5.9–7.9)
RBC # BLD AUTO: 3.79 X10*6/UL (ref 4–5.2)
RBC # BLD AUTO: 3.87 X10*6/UL (ref 4–5.2)
RBC # FLD AUTO: 1000 /UL
SODIUM SERPL-SCNC: 133 MMOL/L (ref 133–145)
SODIUM SERPL-SCNC: 141 MMOL/L (ref 133–145)
TOTAL CELLS COUNTED FLD: 100
WBC # BLD AUTO: 3.8 X10*3/UL (ref 4.4–11.3)
WBC # BLD AUTO: 4.4 X10*3/UL (ref 4.4–11.3)
WBC # FLD AUTO: 272 /UL

## 2024-06-17 PROCEDURE — 82947 ASSAY GLUCOSE BLOOD QUANT: CPT

## 2024-06-17 PROCEDURE — 89051 BODY FLUID CELL COUNT: CPT

## 2024-06-17 PROCEDURE — 2500000002 HC RX 250 W HCPCS SELF ADMINISTERED DRUGS (ALT 637 FOR MEDICARE OP, ALT 636 FOR OP/ED): Performed by: INTERNAL MEDICINE

## 2024-06-17 PROCEDURE — 76700 US EXAM ABDOM COMPLETE: CPT | Performed by: STUDENT IN AN ORGANIZED HEALTH CARE EDUCATION/TRAINING PROGRAM

## 2024-06-17 PROCEDURE — 2500000004 HC RX 250 GENERAL PHARMACY W/ HCPCS (ALT 636 FOR OP/ED): Performed by: INTERNAL MEDICINE

## 2024-06-17 PROCEDURE — 80048 BASIC METABOLIC PNL TOTAL CA: CPT | Mod: CCI

## 2024-06-17 PROCEDURE — 2720000007 HC OR 272 NO HCPCS

## 2024-06-17 PROCEDURE — G0378 HOSPITAL OBSERVATION PER HR: HCPCS

## 2024-06-17 PROCEDURE — 2500000005 HC RX 250 GENERAL PHARMACY W/O HCPCS: Performed by: NURSE PRACTITIONER

## 2024-06-17 PROCEDURE — 80048 BASIC METABOLIC PNL TOTAL CA: CPT | Performed by: NURSE PRACTITIONER

## 2024-06-17 PROCEDURE — C1729 CATH, DRAINAGE: HCPCS

## 2024-06-17 PROCEDURE — 96376 TX/PRO/DX INJ SAME DRUG ADON: CPT | Mod: 59

## 2024-06-17 PROCEDURE — 2500000001 HC RX 250 WO HCPCS SELF ADMINISTERED DRUGS (ALT 637 FOR MEDICARE OP): Performed by: INTERNAL MEDICINE

## 2024-06-17 PROCEDURE — 85027 COMPLETE CBC AUTOMATED: CPT | Performed by: NURSE PRACTITIONER

## 2024-06-17 PROCEDURE — 49083 ABD PARACENTESIS W/IMAGING: CPT

## 2024-06-17 PROCEDURE — 2500000005 HC RX 250 GENERAL PHARMACY W/O HCPCS: Performed by: INTERNAL MEDICINE

## 2024-06-17 PROCEDURE — 2500000005 HC RX 250 GENERAL PHARMACY W/O HCPCS: Performed by: RADIOLOGY

## 2024-06-17 PROCEDURE — 36415 COLL VENOUS BLD VENIPUNCTURE: CPT

## 2024-06-17 PROCEDURE — 36415 COLL VENOUS BLD VENIPUNCTURE: CPT | Performed by: NURSE PRACTITIONER

## 2024-06-17 PROCEDURE — 85027 COMPLETE CBC AUTOMATED: CPT

## 2024-06-17 PROCEDURE — 76700 US EXAM ABDOM COMPLETE: CPT

## 2024-06-17 PROCEDURE — 2500000004 HC RX 250 GENERAL PHARMACY W/ HCPCS (ALT 636 FOR OP/ED)

## 2024-06-17 RX ORDER — LIDOCAINE 560 MG/1
1 PATCH PERCUTANEOUS; TOPICAL; TRANSDERMAL DAILY
Status: DISCONTINUED | OUTPATIENT
Start: 2024-06-17 | End: 2024-06-18 | Stop reason: HOSPADM

## 2024-06-17 RX ORDER — MORPHINE SULFATE 2 MG/ML
1 INJECTION, SOLUTION INTRAMUSCULAR; INTRAVENOUS ONCE
Status: COMPLETED | OUTPATIENT
Start: 2024-06-17 | End: 2024-06-17

## 2024-06-17 RX ORDER — LIDOCAINE HYDROCHLORIDE 10 MG/ML
INJECTION, SOLUTION EPIDURAL; INFILTRATION; INTRACAUDAL; PERINEURAL
Status: COMPLETED | OUTPATIENT
Start: 2024-06-17 | End: 2024-06-17

## 2024-06-17 ASSESSMENT — COGNITIVE AND FUNCTIONAL STATUS - GENERAL
DAILY ACTIVITIY SCORE: 24
MOBILITY SCORE: 24

## 2024-06-17 ASSESSMENT — PAIN SCALES - WONG BAKER: WONGBAKER_NUMERICALRESPONSE: NO HURT

## 2024-06-17 ASSESSMENT — PAIN SCALES - GENERAL
PAINLEVEL_OUTOF10: 0 - NO PAIN
PAINLEVEL_OUTOF10: 3
PAINLEVEL_OUTOF10: 7
PAINLEVEL_OUTOF10: 0 - NO PAIN
PAINLEVEL_OUTOF10: 8

## 2024-06-17 ASSESSMENT — PAIN DESCRIPTION - LOCATION
LOCATION: ABDOMEN
LOCATION: BACK

## 2024-06-17 ASSESSMENT — PAIN - FUNCTIONAL ASSESSMENT
PAIN_FUNCTIONAL_ASSESSMENT: 0-10
PAIN_FUNCTIONAL_ASSESSMENT: 0-10

## 2024-06-17 ASSESSMENT — PAIN SCALES - PAIN ASSESSMENT IN ADVANCED DEMENTIA (PAINAD): TOTALSCORE: MEDICATION (SEE MAR)

## 2024-06-17 NOTE — PROCEDURES
Interventional Radiology Brief Postprocedure Note    Attending: Zoey Montano MD    Assistant:   Staff Role   No Staff Documented       Diagnosis:   1. Generalized abdominal pain        2. Other ascites        3. Pancytopenia (Multi)            Description of procedure: paracentesis LLQ    Timeout:  Yes    Procedure Area: Procedure Area     Anesthesia:   Local/Topical    Complications: None    Estimated Blood Loss: none    Medications  As of 06/17/24 1007      ondansetron (Zofran) injection 4 mg (mg) Total dose:  8 mg Dosing weight:  77.6      Date/Time Rate/Dose/Volume Action       06/15/24  0201 4 mg Given      0444 4 mg Given               morphine injection 4 mg (mg) Total dose:  4 mg Dosing weight:  77.6      Date/Time Rate/Dose/Volume Action       06/15/24  0201 4 mg Given               fentaNYL PF (Sublimaze) injection 100 mcg (mcg) Total dose:  100 mcg Dosing weight:  77.6      Date/Time Rate/Dose/Volume Action       06/15/24  0415 100 mcg Given               atorvastatin (Lipitor) tablet 80 mg (mg) Total dose:  160 mg Dosing weight:  77.6      Date/Time Rate/Dose/Volume Action       06/15/24  2131 80 mg Given     06/16/24  2157 80 mg Given               insulin glargine (Lantus) injection 25 Units (Units) Total dose:  100 Units Dosing weight:  77.6      Date/Time Rate/Dose/Volume Action       06/15/24  2134 25 Units Given     06/16/24  0941 25 Units Given      2158 25 Units Given     06/17/24  0859 25 Units Given               traZODone (Desyrel) tablet 25 mg (mg) Total dose:  50 mg Dosing weight:  77.6      Date/Time Rate/Dose/Volume Action       06/15/24  2131 25 mg Given     06/16/24  2157 25 mg Given               ursodiol (Actigall) capsule 300 mg (mg) Total dose:  2,100 mg Dosing weight:  77.6      Date/Time Rate/Dose/Volume Action       06/15/24  1155 300 mg Given      1518 300 mg Given      2131 300 mg Given     06/16/24  0937 300 mg Given      1706 300 mg Given      2206 300 mg Given      06/17/24  0908 300 mg Given               furosemide (Lasix) tablet 40 mg (mg) Total dose:  120 mg Dosing weight:  77.6      Date/Time Rate/Dose/Volume Action       06/15/24  1156 40 mg Given     06/16/24 0937 40 mg Given     06/17/24  0857 40 mg Given               spironolactone (Aldactone) tablet 50 mg (mg) Total dose:  200 mg* Dosing weight:  77.6   *Administration not included in total     Date/Time Rate/Dose/Volume Action       06/15/24  1156 50 mg Given      2131 50 mg Given     06/16/24 0937 50 mg Given      2100 *50 mg Missed     06/17/24  0858 50 mg Given               sucralfate (Carafate) suspension 1 g (g) Total dose:  8 g Dosing weight:  77.6      Date/Time Rate/Dose/Volume Action       06/15/24  1155 1 g Given      1517 1 g Given      2130 1 g Given     06/16/24  0615 1 g Given      1153 1 g Given      1706 1 g Given      2157 1 g Given     06/17/24  0613 1 g Given               docusate sodium (Colace) capsule 100 mg (mg) Total dose:  500 mg Dosing weight:  77.6      Date/Time Rate/Dose/Volume Action       06/15/24  1156 100 mg Given      2131 100 mg Given     06/16/24 0937 100 mg Given      2158 100 mg Given     06/17/24  0858 100 mg Given               polyethylene glycol (Glycolax, Miralax) packet 17 g (g) Total dose:  68 g* Dosing weight:  77.6   *Administration not included in total     Date/Time Rate/Dose/Volume Action       06/15/24  1155 17 g Given      2131 17 g Given     06/16/24  0936 17 g Given      2158 17 g Given     06/17/24  0900 *17 g Missed               polyethylene glycol (Glycolax, Miralax) packet 17 g (g) Total dose:  Cannot be calculated* Dosing weight:  77.6   *Administration dose not documented     Date/Time Rate/Dose/Volume Action       06/15/24  Canceled Entry               metFORMIN (Glucophage) tablet 1,000 mg (mg) Total dose:  4,000 mg Dosing weight:  77.6      Date/Time Rate/Dose/Volume Action       06/15/24  1517 1,000 mg Given     06/16/24  0937 1,000 mg Given       1706 1,000 mg Given     06/17/24  0858 1,000 mg Given               lactulose 20 gram/30 mL oral solution 20 g (g) Total dose:  60 g Dosing weight:  77.6      Date/Time Rate/Dose/Volume Action       06/15/24  1155 20 g Given     06/16/24  0936 20 g Given     06/17/24  0858 20 g Given               magnesium oxide (Mag-Ox) tablet 400 mg (mg) Total dose:  1,200 mg Dosing weight:  77.6      Date/Time Rate/Dose/Volume Action       06/15/24  1156 400 mg Given     06/16/24  0937 400 mg Given     06/17/24  0858 400 mg Given               insulin lispro (HumaLOG) injection 1-40 Units (Units) Total dose:  Cannot be calculated* Dosing weight:  77.6   *Administration dose not documented     Date/Time Rate/Dose/Volume Action       06/15/24  1200 *Not included in total Missed      1700 *Not included in total Missed     06/16/24  0800 *Not included in total Missed      1200 *Not included in total Missed      1700 *Not included in total Missed     06/17/24  0800 *Not included in total Missed               insulin lispro (HumaLOG) injection 0-15 Units (Units) Total dose:  33 Units Dosing weight:  77.6      Date/Time Rate/Dose/Volume Action       06/15/24  1359 9 Units Given      1801 6 Units Given     06/16/24  0800 *Not included in total Missed      1153 9 Units Given      1721 9 Units Given     06/17/24  0800 *Not included in total Missed               gabapentin (Neurontin) capsule 200 mg (mg) Total dose:  1,200 mg Dosing weight:  77.6      Date/Time Rate/Dose/Volume Action       06/15/24  1517 200 mg Given      2131 200 mg Given     06/16/24  0937 200 mg Given      1706 200 mg Given      2157 200 mg Given     06/17/24  0857 200 mg Given               pantoprazole (ProtoNix) EC tablet 40 mg (mg) Total dose:  120 mg Dosing weight:  77.6      Date/Time Rate/Dose/Volume Action       06/15/24  1156 40 mg Given     06/16/24  0937 40 mg Given     06/17/24  0858 40 mg Given               nadolol (Corgard) tablet 20 mg (mg) Total dose:   60 mg Dosing weight:  77.6      Date/Time Rate/Dose/Volume Action       06/15/24  1155 20 mg Given     06/16/24  0937 20 mg Given     06/17/24  0857 20 mg Given               traMADol (Ultram) tablet 50 mg (mg) Total dose:  100 mg Dosing weight:  77.6      Date/Time Rate/Dose/Volume Action       06/15/24  1209 50 mg Given     06/16/24  2203 50 mg Given               dicyclomine (Bentyl) capsule 20 mg (mg) Total dose:  160 mg Dosing weight:  77.6      Date/Time Rate/Dose/Volume Action       06/15/24  1155 20 mg Given      1517 20 mg Given      2130 20 mg Given     06/16/24  0615 20 mg Given      1153 20 mg Given      1706 20 mg Given      2158 20 mg Given     06/17/24  0613 20 mg Given               fenofibrate (Triglide) tablet 160 mg (mg) Total dose:  480 mg Dosing weight:  77.6      Date/Time Rate/Dose/Volume Action       06/15/24  1156 160 mg Given     06/16/24  0937 160 mg Given     06/17/24  0858 160 mg Given               lidocaine PF (Xylocaine) 10 mg/mL (1 %) injection (mL) Total volume:  5 mL      Date/Time Rate/Dose/Volume Action       06/17/24  0959 5 mL Given                   * Cannot find log *      See detailed result report with images in PACS.    The patient tolerated the procedure well without incident or complication and is in stable condition.

## 2024-06-17 NOTE — DISCHARGE INSTRUCTIONS
If you have any questions, please contact Dr. Lawrence's office at [phone].     Follow-up with Gastroenterology as advised.

## 2024-06-17 NOTE — CARE PLAN
The patient's goals for the shift include      The clinical goals for the shift include patient will ahve eyagyn0th pain managment    Problem: Pain - Adult  Goal: Verbalizes/displays adequate comfort level or baseline comfort level  Outcome: Progressing     Problem: Safety - Adult  Goal: Free from fall injury  Outcome: Progressing     Problem: Chronic Conditions and Co-morbidities  Goal: Patient's chronic conditions and co-morbidity symptoms are monitored and maintained or improved  Outcome: Progressing     Problem: Discharge Planning  Goal: Discharge to home or other facility with appropriate resources  Outcome: Progressing     Problem: Diabetes  Goal: Increase stability of blood glucose readings by end of shift  Outcome: Progressing  Goal: Decrease in ketones present in urine by end of shift  Outcome: Progressing  Goal: Learn about and adhere to nutrition recommendations by end of shift  Outcome: Progressing  Goal: Increase self care and/or family involovement by end of shift  Outcome: Progressing  Goal: Receive DSME education by end of shift  Outcome: Progressing

## 2024-06-17 NOTE — PROGRESS NOTES
Alfonzo Flanagan is a 49 y.o. female on day 0 of admission presenting with Generalized abdominal pain.    Subjective     Patient was seen and examined.  Lying in the bed comfortably.  Did not look in acute distress.  No nausea or vomiting.  No fever chills or rigors no cough or expectoration.       Objective     Physical Exam  HEENT:  Head externally atraumatic,  extraocular movements intact, oral mucosa moist  Neck:  Supple, no JVP, no palpable adenopathy or thyromegaly.  No carotid bruit.  Chest:  Clear to auscultation and resonant.  Heart:  Regular rate and rhythm, no murmur or gallop could be appreciated.  Abdomen:   Distended, diffusely tender, ascites present,, bowel sounds present, normoactive, no palpable hepatosplenomegaly.  Extremities:  No edema, pulses present, no cyanosis or clubbing.  CNS:  Patient alert, oriented to time, place and person.    No new deficit.  Cranial nerves 2-12 grossly intact  Skin:  No active rash.  Musculoskeletal:  No  apparent joint swelling or erythema, range of movement normal.  Last Recorded Vitals  Heart Rate:  [80-86]   Temp:  [36.6 °C (97.9 °F)-37.1 °C (98.8 °F)]   Resp:  [17-18]   BP: (100-102)/(50-59)   SpO2:  [99 %-100 %]     Intake/Output last 3 Shifts:  I/O last 3 completed shifts:  In: - (0 mL/kg)   Out: 1 (0 mL/kg) [Urine:1 (0 mL/kg/hr)]  Weight: 77.6 kg     Relevant Results  Susceptibility data from last 90 days.  Collected Specimen Info Organism Amoxicillin/Clavulanate Ampicillin Ampicillin/Sulbactam Cefazolin Cefazolin (uncomplicated UTIs only) Ciprofloxacin Gentamicin Levofloxacin Nitrofurantoin Penicillin Piperacillin/Tazobactam   05/15/24 Urine from Clean Catch/Voided Enterococcus faecium   S     R   S  S  S      Klebsiella pneumoniae/variicola  S  R  S  S  S  S  S   I   S     Collected Specimen Info Organism Trimethoprim/Sulfamethoxazole Vancomycin   05/15/24 Urine from Clean Catch/Voided Enterococcus faecium  R  S     Klebsiella pneumoniae/variicola  S       Results for orders placed or performed during the hospital encounter of 06/15/24 (from the past 24 hour(s))   Comprehensive metabolic panel   Result Value Ref Range    Glucose 83 65 - 99 mg/dL    Sodium 139 133 - 145 mmol/L    Potassium 3.6 3.4 - 5.1 mmol/L    Chloride 107 97 - 107 mmol/L    Bicarbonate 24 24 - 31 mmol/L    Urea Nitrogen 7 (L) 8 - 25 mg/dL    Creatinine 0.50 0.40 - 1.60 mg/dL    eGFR >90 >60 mL/min/1.73m*2    Calcium 8.2 (L) 8.5 - 10.4 mg/dL    Albumin 2.3 (L) 3.5 - 5.0 g/dL    Alkaline Phosphatase 303 (H) 35 - 125 U/L    Total Protein 6.4 5.9 - 7.9 g/dL    AST 38 5 - 40 U/L    Bilirubin, Total 1.4 (H) 0.1 - 1.2 mg/dL    ALT 25 5 - 40 U/L    Anion Gap 8 <=19 mmol/L   CBC   Result Value Ref Range    WBC 4.4 4.4 - 11.3 x10*3/uL    nRBC 0.0 0.0 - 0.0 /100 WBCs    RBC 3.63 (L) 4.00 - 5.20 x10*6/uL    Hemoglobin 8.8 (L) 12.0 - 16.0 g/dL    Hematocrit 27.8 (L) 36.0 - 46.0 %    MCV 77 (L) 80 - 100 fL    MCH 24.2 (L) 26.0 - 34.0 pg    MCHC 31.7 (L) 32.0 - 36.0 g/dL    RDW 20.2 (H) 11.5 - 14.5 %    Platelets 67 (L) 150 - 450 x10*3/uL   POCT GLUCOSE   Result Value Ref Range    POCT Glucose 82 74 - 99 mg/dL   POCT GLUCOSE   Result Value Ref Range    POCT Glucose 282 (H) 74 - 99 mg/dL   POCT GLUCOSE   Result Value Ref Range    POCT Glucose 286 (H) 74 - 99 mg/dL   POCT GLUCOSE   Result Value Ref Range    POCT Glucose 197 (H) 74 - 99 mg/dL        Current Facility-Administered Medications:     acetaminophen (Tylenol) tablet 650 mg, 650 mg, oral, q4h PRN **OR** acetaminophen (Tylenol) oral liquid 650 mg, 650 mg, oral, q4h PRN **OR** acetaminophen (Tylenol) suppository 650 mg, 650 mg, rectal, q4h PRN, Luis Alfredo Lawrence MD    atorvastatin (Lipitor) tablet 80 mg, 80 mg, oral, Nightly, Luis Alfredo Lawrence MD, 80 mg at 06/16/24 4873    benzocaine-menthol (Cepastat Sore Throat) lozenge 1 lozenge, 1 lozenge, Mouth/Throat, q2h PRN, Luis Alfredo Lawrence MD    dextromethorphan-guaifenesin (Robitussin DM)  mg/5 mL  oral liquid 5 mL, 5 mL, oral, q4h PRN, Luis Alfredo Lawrence MD    dextrose 50 % injection 12.5 g, 12.5 g, intravenous, q15 min PRN, Luis Alfredo Lawrence MD    dextrose 50 % injection 25 g, 25 g, intravenous, q15 min PRN, Luis Alfredo Lawrence MD    dicyclomine (Bentyl) capsule 20 mg, 20 mg, oral, 4x daily, Luis Alfredo Lawrence MD, 20 mg at 06/16/24 2158    docusate sodium (Colace) capsule 100 mg, 100 mg, oral, BID, Luis Alfredo Lawrence MD, 100 mg at 06/16/24 2158    fenofibrate (Triglide) tablet 160 mg, 160 mg, oral, Daily, Luis Alfredo Lawrence MD, 160 mg at 06/16/24 0937    furosemide (Lasix) tablet 40 mg, 40 mg, oral, Daily, Luis Alfredo Lawrence MD, 40 mg at 06/16/24 0937    gabapentin (Neurontin) capsule 200 mg, 200 mg, oral, TID, Luis Alfredo Lawrence MD, 200 mg at 06/16/24 2157    glucagon (Glucagen) injection 1 mg, 1 mg, intramuscular, q15 min PRN, Luis Alfredo Lawrence MD    glucagon (Glucagen) injection 1 mg, 1 mg, intramuscular, q15 min PRN, Luis Alfredo Lawrence MD    guaiFENesin (Mucinex) 12 hr tablet 600 mg, 600 mg, oral, q12h PRN, Luis Alfredo Lawrence MD    hydrOXYzine HCL (Atarax) tablet 25 mg, 25 mg, oral, q6h PRN, Luis Alfredo Lawrence MD    insulin glargine (Lantus) injection 25 Units, 25 Units, subcutaneous, BID, Luis Alfredo Lawrence MD, 25 Units at 06/16/24 2158    insulin lispro (HumaLOG) injection 0-15 Units, 0-15 Units, subcutaneous, TID, Luis Alfredo Lawrence MD, 9 Units at 06/16/24 1721    insulin lispro (HumaLOG) injection 1-40 Units, 1-40 Units, subcutaneous, TID, Luis Alfredo Lawrence MD    lactulose 20 gram/30 mL oral solution 20 g, 20 g, oral, Daily, Luis Alfredo Lawrence MD, 20 g at 06/16/24 0936    magnesium oxide (Mag-Ox) tablet 400 mg, 400 mg, oral, Daily, Luis Alfredo Lawrence MD, 400 mg at 06/16/24 0937    metFORMIN (Glucophage) tablet 1,000 mg, 1,000 mg, oral, BID, Luis Alfredo Lawrence MD, 1,000 mg at 06/16/24 1706    nadolol (Corgard) tablet 20 mg, 20 mg, oral, Daily, Luis Alfredo Lawrence MD, 20 mg at  06/16/24 0937    ondansetron ODT (Zofran-ODT) disintegrating tablet 4 mg, 4 mg, oral, q8h PRN, Luis Alfredo Lawrence MD    pantoprazole (ProtoNix) EC tablet 40 mg, 40 mg, oral, Daily, Luis Alfredo Lawrence MD, 40 mg at 06/16/24 0937    polyethylene glycol (Glycolax, Miralax) packet 17 g, 17 g, oral, BID, Luis Alfredo Lawrence MD, 17 g at 06/16/24 2158    polyethylene glycol (Glycolax, Miralax) packet 17 g, 17 g, oral, Daily PRN, Luis Alfredo Lawrence MD    spironolactone (Aldactone) tablet 50 mg, 50 mg, oral, BID, Luis Alfredo Lawrence MD, 50 mg at 06/16/24 0937    sucralfate (Carafate) suspension 1 g, 1 g, oral, Before meals & nightly, Luis Alfredo Lawrence MD, 1 g at 06/16/24 2157    traMADol (Ultram) tablet 50 mg, 50 mg, oral, q6h PRN, Luis Alfredo Lawrence MD, 50 mg at 06/16/24 2203    traZODone (Desyrel) tablet 25 mg, 25 mg, oral, Nightly, Luis Alfredo Lawrence MD, 25 mg at 06/16/24 2157    ursodiol (Actigall) capsule 300 mg, 300 mg, oral, TID, Luis Alfredo Lawrence MD, 300 mg at 06/16/24 2206   Assessment/Plan   Principal Problem:    Generalized abdominal pain   Grossly clubbing   Diabetes mellitus type 2   Abdominal pain    ascites  Portal hypertension   Chronic pancreatitis   Hyperlipidemia   Non alcoholic steatotic hepatitis    Protein energy malnutrition   Hypoalbuminemia  Macrocytic anemia    Thrombocytopenia    Plan Continue current medication give supportive care.  Give physical.  Occupation therapy monitor CBC and basic metabolic panel.  Monitor electrolytes replete as needed.  Encourage p.o. intake.  Monitor input and output and daily weight.  We will take deep vein thrombosis, fall, aspiration, decubitus   And deep vein thrombosis precaution.      Luis Alfredo Lawrence MD

## 2024-06-17 NOTE — NURSING NOTE
Patient to IR for paracentesis. 1.2 L removed.  Patient stable and ambulating in room independently.

## 2024-06-17 NOTE — PROGRESS NOTES
Occupational Therapy                 Therapy Communication Note    Patient Name: Alfonzo Flanagan  MRN: [id number]  Today's Date: 6/17/2024     Discipline: Occupational Therapy    Missed Visit Reason: Other (Comment) (OT screen completed. Pt reports no concerns with self-care or functional mobility and has been up ad allie independently. Defer OT eval.)

## 2024-06-17 NOTE — PROGRESS NOTES
Alfonzo Flanagan is a 49 y.o. female on day 0 of admission presenting with Generalized abdominal pain.    Subjective   Patient seen and examined.  Resting in bed in no acute distress.  Awake alert oriented x 3.  Status post paracentesis.  Complaints of 6/10 mid and right upper abdominal pain, nausea, no vomiting.  Last bowel movement 2 days ago.      Objective     Physical Exam  Vitals and nursing note reviewed.   Constitutional:       General: She is not in acute distress.     Appearance: Normal appearance. She is normal weight. She is ill-appearing. She is not toxic-appearing or diaphoretic.   HENT:      Head: Normocephalic and atraumatic.      Right Ear: Tympanic membrane normal.      Left Ear: Tympanic membrane normal.      Nose: Nose normal.      Mouth/Throat:      Mouth: Mucous membranes are moist.      Pharynx: Oropharynx is clear.   Eyes:      Extraocular Movements: Extraocular movements intact.      Conjunctiva/sclera: Conjunctivae normal.      Pupils: Pupils are equal, round, and reactive to light.   Cardiovascular:      Rate and Rhythm: Normal rate and regular rhythm.      Pulses: Normal pulses.      Heart sounds: Normal heart sounds. No murmur heard.  Pulmonary:      Effort: Pulmonary effort is normal. No respiratory distress.      Breath sounds: Normal breath sounds. No wheezing, rhonchi or rales.   Abdominal:      General: Bowel sounds are normal. There is distension.      Palpations: Abdomen is soft.      Tenderness: There is abdominal tenderness.      Comments: Mid and right upper abdominal tenderness.    Genitourinary:     Comments: Deferred.  Musculoskeletal:         General: Tenderness present. No swelling. Normal range of motion.      Cervical back: Normal range of motion and neck supple.      Right lower leg: No edema.      Left lower leg: No edema.      Comments: Left lower back tenderness.     Skin:     General: Skin is warm and dry.      Capillary Refill: Capillary refill takes less than 2  "seconds.      Comments: Band-aid in place at paracentesis site.      Neurological:      General: No focal deficit present.      Mental Status: She is alert and oriented to person, place, and time.   Psychiatric:         Mood and Affect: Mood normal.         Behavior: Behavior normal.       Last Recorded Vitals  Blood pressure 99/58, pulse 72, temperature 36.6 °C (97.9 °F), temperature source Oral, resp. rate 16, height 1.6 m (5' 3\"), weight 77.6 kg (171 lb), SpO2 100%.    Intake/Output last 3 Shifts:  I/O last 3 completed shifts:  In: - (0 mL/kg)   Out: 1 (0 mL/kg) [Urine:1 (0 mL/kg/hr)]  Weight: 77.6 kg     Relevant Results  Results for orders placed or performed during the hospital encounter of 06/15/24 (from the past 24 hour(s))   POCT GLUCOSE   Result Value Ref Range    POCT Glucose 282 (H) 74 - 99 mg/dL   POCT GLUCOSE   Result Value Ref Range    POCT Glucose 286 (H) 74 - 99 mg/dL   POCT GLUCOSE   Result Value Ref Range    POCT Glucose 197 (H) 74 - 99 mg/dL   Basic metabolic panel   Result Value Ref Range    Glucose 136 (H) 65 - 99 mg/dL    Sodium 141 133 - 145 mmol/L    Potassium 4.1 3.4 - 5.1 mmol/L    Chloride 107 97 - 107 mmol/L    Bicarbonate 26 24 - 31 mmol/L    Urea Nitrogen 9 8 - 25 mg/dL    Creatinine 0.70 0.40 - 1.60 mg/dL    eGFR >90 >60 mL/min/1.73m*2    Calcium 8.3 (L) 8.5 - 10.4 mg/dL    Anion Gap 8 <=19 mmol/L   CBC   Result Value Ref Range    WBC 4.4 4.4 - 11.3 x10*3/uL    nRBC 0.0 0.0 - 0.0 /100 WBCs    RBC 3.79 (L) 4.00 - 5.20 x10*6/uL    Hemoglobin 9.1 (L) 12.0 - 16.0 g/dL    Hematocrit 29.3 (L) 36.0 - 46.0 %    MCV 77 (L) 80 - 100 fL    MCH 24.0 (L) 26.0 - 34.0 pg    MCHC 31.1 (L) 32.0 - 36.0 g/dL    RDW 20.4 (H) 11.5 - 14.5 %    Platelets 71 (L) 150 - 450 x10*3/uL   POCT GLUCOSE   Result Value Ref Range    POCT Glucose 119 (H) 74 - 99 mg/dL     Susceptibility data from last 90 days.  Collected Specimen Info Organism Amoxicillin/Clavulanate Ampicillin Ampicillin/Sulbactam Cefazolin Cefazolin " (uncomplicated UTIs only) Ciprofloxacin Gentamicin Levofloxacin Nitrofurantoin Penicillin Piperacillin/Tazobactam   05/15/24 Urine from Clean Catch/Voided Enterococcus faecium   S     R   S  S  S      Klebsiella pneumoniae/variicola  S  R  S  S  S  S  S   I   S     Collected Specimen Info Organism Trimethoprim/Sulfamethoxazole Vancomycin   05/15/24 Urine from Clean Catch/Voided Enterococcus faecium  R  S     Klebsiella pneumoniae/variicola  S      No results found.    Scheduled medications  atorvastatin, 80 mg, oral, Nightly  dicyclomine, 20 mg, oral, 4x daily  docusate sodium, 100 mg, oral, BID  fenofibrate, 160 mg, oral, Daily  furosemide, 40 mg, oral, Daily  gabapentin, 200 mg, oral, TID  insulin glargine, 25 Units, subcutaneous, BID  insulin lispro, 0-15 Units, subcutaneous, TID  insulin lispro, 1-40 Units, subcutaneous, TID  lactulose, 20 g, oral, Daily  lidocaine, 1 patch, transdermal, Daily  magnesium oxide, 400 mg, oral, Daily  metFORMIN, 1,000 mg, oral, BID  nadolol, 20 mg, oral, Daily  pantoprazole, 40 mg, oral, Daily  polyethylene glycol, 17 g, oral, BID  spironolactone, 50 mg, oral, BID  sucralfate, 1 g, oral, Before meals & nightly  traZODone, 25 mg, oral, Nightly  ursodiol, 300 mg, oral, TID      Continuous medications     PRN medications  PRN medications: acetaminophen **OR** acetaminophen **OR** acetaminophen, benzocaine-menthol, dextromethorphan-guaifenesin, dextrose, dextrose, glucagon, glucagon, guaiFENesin, hydrOXYzine HCL, ondansetron ODT, polyethylene glycol, traMADol      ASSESSMENT:  Abdominal pain  Hepatic cirrhosis with ascites status post paracentesis  Portal hypertension  Chronic pancreatitis  Microcytic anemia  Thrombocytopenia  Hyperlipidemia  Hypertriglyceridemia  Type 2 diabetes mellitus  Moderate protein calorie malnutrition  Back pain, musculoskeletal    PLAN:  Status post paracentesis yielding 1.2 liters fluid.  Fluid analysis reviewed - no spontaneous bacterial peritonitis.   Analgesics.  Low sodium diet.  Diuretics.  Lactulose titrate for 3 bowel movements in 24 hours.  Bentyl.  Diet as tolerated.  Protein supplementation.  Gastroenterology following.  Follow-up.  ADA diet.  Monitor point of care glucose AC/HS.  Continue current medications.  Adjust medications as needed for glycemic control.  Hypoglycemia protocol.  Add Lidocaine patch.  Continue Gabapentin.  Increase activity as tolerated.  DVT prophylaxis.  SCD's.  GI prophylaxis. PPI.  Supportive care.  Patient reassured.  Case management following for discharge planning.  Discharge plan home.  Anticipate discharge home today if okay with Gastroenterology.  Discussed with patient and Dr. Lawrence.    ADDENDUM:  Per Gastroenterology, okay for discharge.  Discussed with patient.  She complains of increased abdominal pain post paracentesis and does not feel ready for discharge home.        Leny Daniels, APRKYRA-CNP

## 2024-06-17 NOTE — CARE PLAN
The patient's goals for the shift include      The clinical goals for the shift include maintain BS      Problem: Pain - Adult  Goal: Verbalizes/displays adequate comfort level or baseline comfort level  Outcome: Progressing     Problem: Safety - Adult  Goal: Free from fall injury  Outcome: Progressing     Problem: Discharge Planning  Goal: Discharge to home or other facility with appropriate resources  Outcome: Progressing     Problem: Chronic Conditions and Co-morbidities  Goal: Patient's chronic conditions and co-morbidity symptoms are monitored and maintained or improved  Outcome: Progressing     Problem: Diabetes  Goal: Increase stability of blood glucose readings by end of shift  Outcome: Progressing  Goal: Decrease in ketones present in urine by end of shift  Outcome: Progressing  Goal: Learn about and adhere to nutrition recommendations by end of shift  Outcome: Progressing  Goal: Increase self care and/or family involovement by end of shift  Outcome: Progressing  Goal: Receive DSME education by end of shift  Outcome: Progressing

## 2024-06-17 NOTE — PROGRESS NOTES
"Alfonzo Flanagan is a 49 y.o. female on day 0 of admission presenting with Generalized abdominal pain.    Subjective   Doing okay.  Still having abdominal discomfort.  Awaiting to be tapped later this morning       Objective     Physical Exam    Last Recorded Vitals  Blood pressure 99/58, pulse 72, temperature 36.6 °C (97.9 °F), temperature source Oral, resp. rate 16, height 1.6 m (5' 3\"), weight 77.6 kg (171 lb), SpO2 100%.  Intake/Output last 3 Shifts:  I/O last 3 completed shifts:  In: - (0 mL/kg)   Out: 1 (0 mL/kg) [Urine:1 (0 mL/kg/hr)]  Weight: 77.6 kg     Relevant Results      XR chest 1 view    Result Date: 6/15/2024  Interpreted By:  Finkelstein, Evan, STUDY: XR CHEST 1 VIEW;  6/15/2024 6:50 am   INDICATION: Signs/Symptoms:dyspnea.   COMPARISON: Chest radiograph 05/25/2024   ACCESSION NUMBER(S): [id number]   ORDERING CLINICIAN: DORA ESPINOZA   FINDINGS:     CARDIOMEDIASTINAL SILHOUETTE: Cardiomediastinal silhouette is normal in size and configuration.   LUNGS: No pulmonary consolidation, pleural effusion or pneumothorax.   ABDOMEN: No remarkable upper abdominal findings.   BONES: No acute osseous abnormality.       No radiographic evidence of acute cardiopulmonary pathology.   MACRO: None.   Signed by: Evan Finkelstein 6/15/2024 7:01 AM Dictation workstation:   [id number]    CT abdomen pelvis wo IV contrast    Result Date: 6/15/2024  Interpreted By:  Cj Lee, STUDY: CT ABDOMEN PELVIS WO IV CONTRAST;  6/15/2024 1:41 am   INDICATION: Signs/Symptoms:Abdominal pain.   COMPARISON: CT scan of the abdomen pelvis 06/10/2024   ACCESSION NUMBER(S): [id number]   ORDERING CLINICIAN: DIPTI BROWN   TECHNIQUE: Axial noncontrast CT images of the abdomen and pelvis with coronal and sagittal reconstructed images.   FINDINGS:   LOWER CHEST: There is a 7 mm nodule in the right middle lobe, stable from prior CT.   ABDOMEN: Lack of intravenous contrast limits evaluation of vessels and solid organs. LIVER: Atrophic " nodular liver compatible with cirrhosis. Lack of contrast limits evaluation for discrete hypervascular mass. There is recanalization of the paraumbilical veins. BILE DUCTS: Normal caliber. GALLBLADDER: Status post cholecystectomy. PANCREAS: Mild fatty atrophy of the pancreas. SPLEEN: Within normal limits. ADRENALS: Within normal limits.   KIDNEYS, URETERS, URINARY BLADDER:  Kidneys are symmetric in size without evidence for calculi, hydronephrosis, hydroureter or perinephric inflammatory changes.   No bladder calculi bladder is partially distended with mild wall thickening.   VESSELS:  Calcific atherosclerosis of the aortoiliac vessels. No aortic aneurysm. RETROPERITONEUM: Nonenlarged periaortic lymph nodes noted.   PELVIS:   REPRODUCTIVE ORGANS: Uterus is present. No adnexal mass.   BOWEL: Marked distention of the stomach with ingested contents. Visualized small bowel loops demonstrate mild diffuse wall thickening. Appendix is not identified with certainty. Mild wall thickening involving the ascending and transverse colon. A few scattered colonic diverticula without evidence for acute diverticulitis. No pneumatosis or portal venous gas. PERITONEUM: Moderate ascites with diffuse haziness of the mesentery. Findings are stable from prior CT. ABDOMINAL WALL: Small ventral hernias containing fat and fluid. BONES: Multilevel degenerative changes of the spine.       Findings compatible with cirrhosis. Lack of contrast limits evaluation for discrete hypervascular mass. If of clinical concern this can be further evaluated with nonemergent dedicated liver CT or MRI.   Moderate ascites with diffuse haziness of the mesentery.   There is diffuse bowel wall thickening involving the small and large bowel as described above. These findings may relate to hypoproteinemic state and ascites. Correlate with symptomatology if there is concern for early infectious/inflammatory enterocolitis.   Additional findings as described above.    MACRO: None   Signed by: Cj Lee 6/15/2024 1:49 AM Dictation workstation:   [id number]    CT abdomen pelvis w IV contrast    Result Date: 6/10/2024  Interpreted By:  Christina Edmondson, STUDY: CT ABDOMEN PELVIS W IV CONTRAST;  6/10/2024 2:14 am   INDICATION: Signs/Symptoms:pain, diarrhea.   COMPARISON: 04/18/2024, 05/25/2024   ACCESSION NUMBER(S): [id number]   ORDERING CLINICIAN: MOIZ ORELLANA   TECHNIQUE: Axial CT images of the abdomen and pelvis with coronal and sagittal reconstructed images obtained after intravenous administration of contrast   FINDINGS: LOWER CHEST: No acute abnormality of the lung bases. BONES: No acute osseous abnormality. Degenerative changes present. ABDOMINAL WALL: Mild anasarca. A supraumbilical fluid contained hernia noted. A left lateral paraumbilical hernia with stranding is present.   ABDOMEN:   LIVER: The liver is significantly nodular in morphology. No definite lesion identified. BILE DUCTS: Normal caliber. GALLBLADDER: Cholecystectomy. PANCREAS: Mild specific peripancreatic stranding likely related to mesenteric edema. SPLEEN: Within normal limits. ADRENALS: Within normal limits. KIDNEYS and URETERS: Symmetric renal enhancement. No hydronephrosis or perinephric fluid collection.     VESSELS: Atherosclerotic calcifications without aneurysmal dilatation seen. RETROPERITONEUM: Stranding noted and ascites with mild retroperitoneal lymphadenopathy.   PELVIS:   REPRODUCTIVE ORGANS: No pelvic masses. BLADDER: Within normal limits.   BOWEL: Fluid noted within regions of the colon. Wall thickening throughout the small bowel which may be reactive or related to congestion. The stomach is grossly unremarkable. PERITONEUM: Moderate abdominopelvic ascites and diffuse mesenteric stranding. Numerous mesenteric nonenlarged or mildly enlarged lymph nodes noted. No fluid collection or free air.       Cirrhotic appearance of the liver. Moderate abdominopelvic ascites and mesenteric  stranding. Lymphadenopathy, likely reactive.   Small bowel wall thickening is again noted which could be reactive or related to passive congestion. Infectious process considered less likely however clinical correlation is again suggested.   Fluid contents in the colon suggestive of diarrheal process.   MACRO: None   Signed by: Christina Edmondson 6/10/2024 2:46 AM Dictation workstation:   [id number]    CT head wo IV contrast    Result Date: 5/30/2024  Interpreted By:  Bill Cordero, STUDY: CT HEAD WO IV CONTRAST;  5/30/2024 10:25 am   INDICATION: Signs/Symptoms:AMS.   COMPARISON: None.   ACCESSION NUMBER(S): [id number]   ORDERING CLINICIAN: HEENA MCWILLIAMS   TECHNIQUE: Noncontrast axial CT scan of head was performed. Angled reformats in brain and bone windows were generated. The images were reviewed in bone, brain, blood and soft tissue windows.   FINDINGS: CSF Spaces: The ventricles, sulci and basal cisterns are within normal limits. There is no extraaxial fluid collection.   Parenchyma:  The grey-white differentiation is intact. There is no mass effect or midline shift.  There is no intracranial hemorrhage. Small benign-appearing lipoma involving the falx, most typically incidental.   Calvarium: The calvarium is unremarkable.   Paranasal sinuses and mastoids: Visualized paranasal sinuses and mastoids are clear.       No evidence of acute intracranial abnormality. If there is persistent concern for acute cerebral insult, MRI is recommended.     MACRO: None   Signed by: Bill Cordero 5/30/2024 11:32 AM Dictation workstation:   [id number]    XR abdomen 1 view    Result Date: 5/30/2024  Interpreted By:  Bill Cordero, STUDY: XR ABDOMEN 1 VIEW;  5/30/2024 10:16 am   INDICATION: Signs/Symptoms:Abdominal pain.   COMPARISON: April 18, 2024 CT abdomen and pelvis   ACCESSION NUMBER(S): [id number]   ORDERING CLINICIAN: HEENA MCWILLIAMS   FINDINGS: 3 separate AP views are submitted demonstrating nonspecific bowel gas  pattern with relative paucity of gas. Limited evaluation of pneumoperitoneum on supine imaging, however no gross evidence of free air is noted. Similar abdominal protuberance.   Visualized lungs are clear.   Osseous structures demonstrate no acute bony changes.       1.  Nonspecific bowel gas pattern. If there continues to be concern, CT correlation recommended.   MACRO: None   Signed by: Bill Cordero 5/30/2024 11:28 AM Dictation workstation:   [id number]    ECG 12 lead    Result Date: 5/29/2024  Normal sinus rhythm Low voltage QRS Borderline ECG When compared with ECG of 15-MAY-2024 09:20, Questionable change in QRS axis T wave inversion now evident in Inferior leads Confirmed by Geronimo William ([id number]) on 5/29/2024 9:34:20 PM    Lower extremity venous duplex bilateral    Result Date: 5/29/2024  Interpreted By:  Alberta Granados, STUDY: VASC US LOWER EXTREMITY VENOUS DUPLEX BILATERAL 5/29/2024 11:13 am   INDICATION: Signs/Symptoms:Bilateral lower extremity pain. Right lower extremity swelling.   COMPARISON: None available.   ACCESSION NUMBER(S): [id number]   ORDERING CLINICIAN: HEENA MCWILLIAMS   TECHNIQUE: High-resolution and real time compression images as well as color Doppler ultrasound of each lower extremity is performed. Attempts were made to visualize the posterior tibial and peroneal veins.   FINDINGS: There is normal compressibility and flow observed within common femoral, femoral, and popliteal veins bilaterally with flow and compressibility of the posterior tibial and peroneal veins bilaterally observed as well.       No evidence of DVT within either lower extremity.   Signed by: Alberta Granados 5/29/2024 11:51 AM Dictation workstation:   [id number]    US guided abdominal paracentesis    Result Date: 5/28/2024  Interpreted By:  Cornelius Weber, STUDY: US GUIDED ABDOMINAL PARACENTESIS; 5/28/2024 11:10 am   INDICATION: Ascites.   COMPARISON: None.   ACCESSION NUMBER(S): [id number]   ORDERING CLINICIAN:  ANUPAM FRANCIS   TECHNIQUE: Ultrasound-guided paracentesis   FINDINGS: Informed consent obtained. Patient positioned supine. Right lower quadrant prepped, draped and anesthetized. Under ultrasound guidance, 8 [place] centesis sheath needle inserted into peritoneal cavity. 2.1 Lof clear yellowfluid aspiratedwith sample sent for analysis. Patient tolerated procedure well       Ultrasound-guided paracentesis.   Signed by: Cornelius Weber 5/28/2024 11:45 AM Dictation workstation:   [id number]    CT abdomen pelvis w IV contrast    Result Date: 5/25/2024  Interpreted By:  Finkelstein, Evan, STUDY: CT ABDOMEN PELVIS W IV CONTRAST;  5/25/2024 8:04 pm   INDICATION: Signs/Symptoms:abdominal pain and distension, hx of cirrhosis, concerned for ascites.   COMPARISON: None.   ACCESSION NUMBER(S): [id number]   ORDERING CLINICIAN: BERTHA SCHILLING   TECHNIQUE: Axial CT images of the abdomen and pelvis with coronal and sagittal reconstructed images obtained after intravenous administration of   FINDINGS: LOWER CHEST: No acute abnormality of the lung bases.   ABDOMEN:   LIVER: Nodular contour of the liver. BILE DUCTS: Normal caliber. GALLBLADDER: Surgically absent. PORTAL VEIN: Patent SPLEEN: Unremarkable. PANCREAS: Unremarkable. ADRENALS: Unremarkable. KIDNEYS, URETERS and URINARY BLADDER: Symmetric renal enhancement. No hydronephrosis or perinephric fluid collection.  Bladder is within normal limits REPRODUCTIVE ORGANS: No pelvic masses.   ABDOMINAL WALL: Diffuse subcutaneous body wall edema. Supraumbilical ventral abdominal wall hernia defect containing fat and fluid. PERITONEUM: No free air. Moderate volume ascites and stranding diffusely throughout the mesentery.   BOWEL: Underdistention versus wall thickening diffusely throughout the small and large bowel as well as the stomach. Nondilated appendix.   VESSELS: No aortic aneurysm. Mild aortoiliac calcifications. RETROPERITONEUM: No pathologically enlarged retroperitoneal  lymph nodes.   BONES: No acute osseous abnormality.       Cirrhotic morphology of the liver with moderate volume ascites and stranding diffusely throughout the mesentery.   Supraumbilical ventral abdominal wall hernia containing fat and fluid.   Underdistention versus wall thickening diffusely throughout the small and large bowel as well as the stomach. Findings may be related to passive congestion. Enterocolitis or gastritis, however, is not excluded in the appropriate clinical setting.     MACRO: None.   Signed by: Evan Finkelstein 5/25/2024 8:48 PM Dictation workstation:   [id number]    XR chest 2 views    Result Date: 5/25/2024  Interpreted By:  Cj Lee, STUDY: XR CHEST 2 VIEWS;  5/25/2024 7:28 pm   INDICATION: Signs/Symptoms:sob.   COMPARISON: Chest x-ray 05/15/2024   ACCESSION NUMBER(S): [id number]   ORDERING CLINICIAN: BERTHA SCHILLING   FINDINGS:     CARDIOMEDIASTINAL SILHOUETTE: Cardiomediastinal silhouette is normal in size and configuration.   LUNGS: No consolidation, pleural effusion or pneumothorax.   ABDOMEN: Surgical clips noted in the right upper quadrant.   BONES: No acute osseous abnormality. Calcifications along bilateral humeral heads likely relate to calcific tendinosis.       No acute cardiopulmonary process.   MACRO: None   Signed by: Cj Lee 5/25/2024 8:00 PM Dictation workstation:   [id number]    US abdomen complete    Result Date: 5/21/2024  * * *Final Report* * * DATE OF EXAM: May 21 2024  9:25AM   [facility]   1016  -  US ASCITES SURVEY  / ACCESSION #  [id number] PROCEDURE REASON: ascites      * * * * Physician Interpretation * * * * RESULT: INDICATION: ascites EXAMINATION: US ASCITES SURVEY Technique: Ultrasound images of the 4 quadrants of the abdomen were performed to check for ascites.  Images were obtained and stored in a permanent archive. RESULT: Very small volume ascites. Therefore therapeutic paracentesis not performed.    IMPRESSION: Very small volume  ascites.Therefore therapeutic paracentesis not performed. Transcribed Using Voice Recognition Transcribe Date/Time: May 21 2024 10:00A Dictated by: HANH AUSTIN MD This examination was interpreted and the report reviewed and electronically signed by: HANH AUSTIN MD on May 21 2024 10:01AM  EST    CT angio chest w and wo IV contrast    Result Date: 5/20/2024  * * *Final Report* * * DATE OF EXAM: May 20 2024 11:17PM   [facility]   [id number]  -  CT CHEST W IVCON PE  / ACCESSION #  [id number] PROCEDURE REASON: Pulmonary embolism (PE) suspected, high prob      * * * * Physician Interpretation * * * * RESULT: EXAMINATION:  CHEST CT WITH CONTRAST (PULMONARY EMBOLISM PROTOCOL) CLINICAL HISTORY: Pulmonary embolism suspected, high probability Technique:  Spiral CT acquisition of the chest from the thoracic inlet to the upper abdomen following IV contrast.  Axial 1 mm thick slices plus coronal and sagittal reformatted images. Contrast:  71 mL Omnipaque 350 IV CT Radiation dose: Integrated Dose-length product (DLP) for this visit =   376 mGy*cm CT Dose Reduction Employed: Automated exposure control(AEC) and iterative recon Comparison:  CT chest 11/10/2022 RESULT: Evaluation for thromboembolic disease:      - Main pulmonary arteries:  No thromboembolic disease.      - Lobar pulmonary arteries:  No thromboembolic disease.      - Segmental pulmonary arteries:  No thromboembolic disease.      - Subsegmental pulmonary arteries:  No thromboembolic disease. Lungs and pleura: The lungs are clear.  No pleural effusion or pneumothorax. Mediastinum:  The thoracic aorta is normal in caliber. The cardiac chambers are normal in size. No coronary artery atherosclerotic calcifications are noted, although the study is not optimized for coronary assessment. No pericardial effusion or thickening. Bones and soft tissues:  No destructive bone lesion. Chest wall is unremarkable. Upper abdomen:  Cirrhotic liver with nodular surface contour.  Abdominal  ascites.    IMPRESSION: 1.  No CT evidence of pulmonary embolism. 2.  Cirrhotic liver and abdominal ascites. Transcribed Using Voice Recognition Transcribe Date/Time: May 20 2024 11:50P Dictated by: NAVEEN COBURN MD This examination was interpreted and the report reviewed and electronically signed by: NAVEEN COBURN MD on May 20 2024 11:57PM  EST    XR chest 1 view    Result Date: 5/20/2024  * * *Final Report* * * DATE OF EXAM: May 20 2024  8:34PM   [facility]   [id number]  -  XR CHEST 1V FRONTAL PORT  / ACCESSION #  [id number] PROCEDURE REASON: Shortness of breath      * * * * Physician Interpretation * * * * RESULT: EXAMINATION:  CHEST RADIOGRAPH (PORTABLE SINGLE VIEW AP) Exam Date/Time:  5/20/2024 8:34 PM CLINICAL HISTORY: Shortness of breath MQ:  XCPR_5 Comparison:  04/04/2024 RESULT: Lines, tubes, and devices:  None. Lungs and pleura:  There are no consolidative infiltrates or pleural effusions.  There is no evidence of pneumothorax. Cardiomediastinal silhouette:  Stable cardiomediastinal silhouette. Other:  Bilateral calcific tendinopathy involving the distal rotator cuff.  Mild degenerative changes in the thoracic spine.    IMPRESSION: No evidence of acute cardiopulmonary disease. 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 Zaman at [facility] Normal oropharynx. Esophageal ulcer, no bleeding and no stigmata of recent bleeding.  Ascites: CT showed moderate ascites with diffuse haziness of the mesentery  Will  continue lasix 40 mg daily/aldactone 50 mg twice daily.  IR consult placed for para, fluid analysis ordered, awaiting to be tapped later this morning  Continue to monitor Hepatic panel, CBC, INR  Hgb remains stable 9.1 with no overt bleeding      Rachid Lu, APRN-CNP       all other ROS negative except as per HPI

## 2024-06-17 NOTE — PROGRESS NOTES
Alfonzo Flanagan is a 49 y.o. female on day 0 of admission presenting with Generalized abdominal pain.    Patient went to IR for paracentesis this morning.   Plan is to discharge home with no needs,  will transport.     Danika Reyes RN

## 2024-06-18 ENCOUNTER — APPOINTMENT (OUTPATIENT)
Dept: CARE COORDINATION | Age: 49
End: 2024-06-18
Payer: COMMERCIAL

## 2024-06-18 VITALS
DIASTOLIC BLOOD PRESSURE: 59 MMHG | OXYGEN SATURATION: 99 % | SYSTOLIC BLOOD PRESSURE: 99 MMHG | TEMPERATURE: 98.2 F | RESPIRATION RATE: 15 BRPM | HEART RATE: 75 BPM | WEIGHT: 171 LBS | HEIGHT: 63 IN | BODY MASS INDEX: 30.3 KG/M2

## 2024-06-18 LAB
ALBUMIN SERPL-MCNC: 2.4 G/DL (ref 3.5–5)
ALP BLD-CCNC: 246 U/L (ref 35–125)
ALT SERPL-CCNC: 23 U/L (ref 5–40)
ANION GAP SERPL CALC-SCNC: 6 MMOL/L
AST SERPL-CCNC: 38 U/L (ref 5–40)
BILIRUB SERPL-MCNC: 1.1 MG/DL (ref 0.1–1.2)
BUN SERPL-MCNC: 13 MG/DL (ref 8–25)
CALCIUM SERPL-MCNC: 8.4 MG/DL (ref 8.5–10.4)
CHLORIDE SERPL-SCNC: 104 MMOL/L (ref 97–107)
CO2 SERPL-SCNC: 28 MMOL/L (ref 24–31)
CREAT SERPL-MCNC: 0.6 MG/DL (ref 0.4–1.6)
EGFRCR SERPLBLD CKD-EPI 2021: >90 ML/MIN/1.73M*2
ERYTHROCYTE [DISTWIDTH] IN BLOOD BY AUTOMATED COUNT: 20.4 % (ref 11.5–14.5)
GLUCOSE BLD MANUAL STRIP-MCNC: 130 MG/DL (ref 74–99)
GLUCOSE BLD MANUAL STRIP-MCNC: 90 MG/DL (ref 74–99)
GLUCOSE SERPL-MCNC: 89 MG/DL (ref 65–99)
HCT VFR BLD AUTO: 29.6 % (ref 36–46)
HGB BLD-MCNC: 9.3 G/DL (ref 12–16)
MCH RBC QN AUTO: 24.1 PG (ref 26–34)
MCHC RBC AUTO-ENTMCNC: 31.4 G/DL (ref 32–36)
MCV RBC AUTO: 77 FL (ref 80–100)
NRBC BLD-RTO: 0 /100 WBCS (ref 0–0)
PLATELET # BLD AUTO: 73 X10*3/UL (ref 150–450)
POTASSIUM SERPL-SCNC: 4 MMOL/L (ref 3.4–5.1)
PROT SERPL-MCNC: 6.7 G/DL (ref 5.9–7.9)
RBC # BLD AUTO: 3.86 X10*6/UL (ref 4–5.2)
SODIUM SERPL-SCNC: 138 MMOL/L (ref 133–145)
WBC # BLD AUTO: 6.1 X10*3/UL (ref 4.4–11.3)

## 2024-06-18 PROCEDURE — 84075 ASSAY ALKALINE PHOSPHATASE: CPT | Performed by: NURSE PRACTITIONER

## 2024-06-18 PROCEDURE — 2500000002 HC RX 250 W HCPCS SELF ADMINISTERED DRUGS (ALT 637 FOR MEDICARE OP, ALT 636 FOR OP/ED): Performed by: INTERNAL MEDICINE

## 2024-06-18 PROCEDURE — 2500000005 HC RX 250 GENERAL PHARMACY W/O HCPCS: Performed by: INTERNAL MEDICINE

## 2024-06-18 PROCEDURE — G0378 HOSPITAL OBSERVATION PER HR: HCPCS

## 2024-06-18 PROCEDURE — 36415 COLL VENOUS BLD VENIPUNCTURE: CPT | Performed by: NURSE PRACTITIONER

## 2024-06-18 PROCEDURE — 85027 COMPLETE CBC AUTOMATED: CPT | Performed by: NURSE PRACTITIONER

## 2024-06-18 PROCEDURE — 82947 ASSAY GLUCOSE BLOOD QUANT: CPT

## 2024-06-18 PROCEDURE — 2500000004 HC RX 250 GENERAL PHARMACY W/ HCPCS (ALT 636 FOR OP/ED): Performed by: INTERNAL MEDICINE

## 2024-06-18 PROCEDURE — 2500000001 HC RX 250 WO HCPCS SELF ADMINISTERED DRUGS (ALT 637 FOR MEDICARE OP): Performed by: INTERNAL MEDICINE

## 2024-06-18 PROCEDURE — 2500000005 HC RX 250 GENERAL PHARMACY W/O HCPCS: Performed by: NURSE PRACTITIONER

## 2024-06-18 ASSESSMENT — ENCOUNTER SYMPTOMS
FEVER: 0
WOUND: 0
ABDOMINAL PAIN: 0
MYALGIAS: 0
NAUSEA: 1
MUSCULOSKELETAL NEGATIVE: 1
BRUISES/BLEEDS EASILY: 1
PSYCHIATRIC NEGATIVE: 1
DIFFICULTY URINATING: 0
RESPIRATORY NEGATIVE: 1
TROUBLE SWALLOWING: 0
COUGH: 0
ABDOMINAL DISTENTION: 1
BLOOD IN STOOL: 0
VOMITING: 1
CHILLS: 0
CONSTIPATION: 0
NEUROLOGICAL NEGATIVE: 1
RECTAL PAIN: 0
SHORTNESS OF BREATH: 0
DIARRHEA: 1
DIZZINESS: 0
CONSTITUTIONAL NEGATIVE: 1
WEAKNESS: 0
FATIGUE: 0
LIGHT-HEADEDNESS: 0

## 2024-06-18 ASSESSMENT — PAIN DESCRIPTION - LOCATION: LOCATION: ABDOMEN

## 2024-06-18 ASSESSMENT — PAIN SCALES - GENERAL: PAINLEVEL_OUTOF10: 6

## 2024-06-18 NOTE — PROGRESS NOTES
"Alfonzo Flanagan is a 49 y.o. female on day 0 of admission presenting with Generalized abdominal pain.    Subjective   Last night had some severe abdominal pain and nausea.  Feeling a little bit better today.  Had 1.2 L removed from paracentesis yesterday and negative for SBP       Objective     Physical Exam    Last Recorded Vitals  Blood pressure 99/59, pulse 75, temperature 36.8 °C (98.2 °F), temperature source Oral, resp. rate 15, height 1.6 m (5' 3\"), weight 77.6 kg (171 lb), SpO2 99%.  Intake/Output last 3 Shifts:  No intake/output data recorded.    Relevant Results  Results for orders placed or performed during the hospital encounter of 06/15/24 (from the past 24 hour(s))   POCT GLUCOSE   Result Value Ref Range    POCT Glucose 107 (H) 74 - 99 mg/dL   POCT GLUCOSE   Result Value Ref Range    POCT Glucose 257 (H) 74 - 99 mg/dL   POCT GLUCOSE   Result Value Ref Range    POCT Glucose 241 (H) 74 - 99 mg/dL   CBC   Result Value Ref Range    WBC 3.8 (L) 4.4 - 11.3 x10*3/uL    nRBC 0.0 0.0 - 0.0 /100 WBCs    RBC 3.87 (L) 4.00 - 5.20 x10*6/uL    Hemoglobin 9.3 (L) 12.0 - 16.0 g/dL    Hematocrit 30.4 (L) 36.0 - 46.0 %    MCV 79 (L) 80 - 100 fL    MCH 24.0 (L) 26.0 - 34.0 pg    MCHC 30.6 (L) 32.0 - 36.0 g/dL    RDW 20.4 (H) 11.5 - 14.5 %    Platelets 64 (L) 150 - 450 x10*3/uL   Comprehensive Metabolic Panel   Result Value Ref Range    Glucose 248 (H) 65 - 99 mg/dL    Sodium 133 133 - 145 mmol/L    Potassium 3.7 3.4 - 5.1 mmol/L    Chloride 100 97 - 107 mmol/L    Bicarbonate 26 24 - 31 mmol/L    Urea Nitrogen 11 8 - 25 mg/dL    Creatinine 0.60 0.40 - 1.60 mg/dL    eGFR >90 >60 mL/min/1.73m*2    Calcium 8.4 (L) 8.5 - 10.4 mg/dL    Albumin 2.4 (L) 3.5 - 5.0 g/dL    Alkaline Phosphatase 267 (H) 35 - 125 U/L    Total Protein 7.0 5.9 - 7.9 g/dL    AST 38 5 - 40 U/L    Bilirubin, Total 1.1 0.1 - 1.2 mg/dL    ALT 24 5 - 40 U/L    Anion Gap 7 <=19 mmol/L   POCT GLUCOSE   Result Value Ref Range    POCT Glucose 144 (H) 74 - 99 " mg/dL   Comprehensive Metabolic Panel   Result Value Ref Range    Glucose 89 65 - 99 mg/dL    Sodium 138 133 - 145 mmol/L    Potassium 4.0 3.4 - 5.1 mmol/L    Chloride 104 97 - 107 mmol/L    Bicarbonate 28 24 - 31 mmol/L    Urea Nitrogen 13 8 - 25 mg/dL    Creatinine 0.60 0.40 - 1.60 mg/dL    eGFR >90 >60 mL/min/1.73m*2    Calcium 8.4 (L) 8.5 - 10.4 mg/dL    Albumin 2.4 (L) 3.5 - 5.0 g/dL    Alkaline Phosphatase 246 (H) 35 - 125 U/L    Total Protein 6.7 5.9 - 7.9 g/dL    AST 38 5 - 40 U/L    Bilirubin, Total 1.1 0.1 - 1.2 mg/dL    ALT 23 5 - 40 U/L    Anion Gap 6 <=19 mmol/L   CBC   Result Value Ref Range    WBC 6.1 4.4 - 11.3 x10*3/uL    nRBC 0.0 0.0 - 0.0 /100 WBCs    RBC 3.86 (L) 4.00 - 5.20 x10*6/uL    Hemoglobin 9.3 (L) 12.0 - 16.0 g/dL    Hematocrit 29.6 (L) 36.0 - 46.0 %    MCV 77 (L) 80 - 100 fL    MCH 24.1 (L) 26.0 - 34.0 pg    MCHC 31.4 (L) 32.0 - 36.0 g/dL    RDW 20.4 (H) 11.5 - 14.5 %    Platelets 73 (L) 150 - 450 x10*3/uL   POCT GLUCOSE   Result Value Ref Range    POCT Glucose 90 74 - 99 mg/dL   POCT GLUCOSE   Result Value Ref Range    POCT Glucose 130 (H) 74 - 99 mg/dL      US abdomen complete    Result Date: 6/17/2024  Interpreted By:  Shane De, STUDY: US ABDOMEN COMPLETE;  6/17/2024 7:58 pm   INDICATION: 48 y/o   F with  Signs/Symptoms:Abdominal pain.   COMPARISON: None.   ACCESSION NUMBER(S): [id number]   ORDERING CLINICIAN: HEENA MCWILLIAMS   TECHNIQUE: Multiple grayscale and color Doppler static and dynamic images of the abdomen were obtained.   FINDINGS: PANCREAS: Visualized portions are unremarkable.   LIVER: Craniocaudal length: 14.4 cm, within normal limits. Echogenicity/Echotexture: Heterogeneous. Morphology: Cirrhotic. Mass: None.   BILE DUCTS: Intrahepatic ducts: Non-dilated. Common bile duct diameter: 4.3 cm   GALLBLADDER: Surgically removed.   SPLEEN: Maximum length: 12.3 cm, upper limits of normal for size.   RIGHT KIDNEY: Craniocaudal length: 10.7 cm, within normal limits. No  "hydronephrosis.     LEFT KIDNEY: Craniocaudal length: 10.4 cm, within normal limits. No hydronephrosis.     URINARY BLADDER: No significant abnormality. The bilateral ureteral jets are seen.   ABDOMINAL AORTA AND IVC: Visualized portions are unremarkable. The main portal vein is patent.   PLEURAL/PERITONEAL FLUID: Trace amount of right upper quadrant ascites noted.       1. Slightly decreased amount of ascites compared to prior study.   2. Redemonstration of cirrhotic liver morphology and borderline splenomegaly.     MACRO: None.   Signed by: Shane De 6/17/2024 8:36 PM Dictation workstation:   [id number]    US guided abdominal paracentesis    Result Date: 6/17/2024  Interpreted By:  Sissy Montano, STUDY: US GUIDED ABDOMINAL PARACENTESIS;  6/17/2024 10:33 am   INDICATION: Signs/Symptoms:Ascites.   COMPARISON: None.   ACCESSION NUMBER(S): [id number]   ORDERING CLINICIAN: SADE LARRY   TECHNIQUE: INTERVENTIONALIST(S): Sissy Montano MD   The history and physical exam pertinent to the procedure were reviewed and no updates were made.\"   CONSENT: The patient/patient's POA/next of kin was informed of the nature of the proposed procedure. The purposes, alternatives, risks, and benefits were explained and discussed. All questions were answered and consent was obtained.   RADIATION EXPOSURE: None   SEDATION: None   MEDICATION/CONTRAST: No additional   TIME OUT: A time out was performed immediately prior to procedure start with the interventional team, correctly identifying the patient name, date of birth, MRN, procedure, anatomy (including marking of site and side), patient position, procedure consent form, relevant laboratory and imaging test results, antibiotic administration, safety precautions, and procedure-specific equipment needs.   COMPLICATIONS: No immediate adverse events identified.   FINDINGS: Sonographic evaluation of the abdomen demonstrated small ascites. Left lower quadrant pocket was " targeted. Skin prepped and draped in usual sterile fashion. 1% lidocaine for anesthesia. 1.2 L removed after accessing the area with a 5 Pakistani Yueh catheter. Sample sent to lab. Fluid clear yellow. Access site covered with sterile bandage after removal of catheter.       Technically successful paracentesis.   I was present for and/or performed the critical portions of the procedure and immediately available throughout the entire procedure.   I personally reviewed the image(s)/study and interpretation. I agree with the findings as stated.   Performed and dictated at [facility] [place].   MACRO: None   Signed by: Sissy Montano 6/17/2024 3:21 PM Dictation workstation:   [id number]    XR chest 1 view    Result Date: 6/15/2024  Interpreted By:  Finkelstein, Evan, STUDY: XR CHEST 1 VIEW;  6/15/2024 6:50 am   INDICATION: Signs/Symptoms:dyspnea.   COMPARISON: Chest radiograph 05/25/2024   ACCESSION NUMBER(S): [id number]   ORDERING CLINICIAN: DORA ESPINOZA   FINDINGS:     CARDIOMEDIASTINAL SILHOUETTE: Cardiomediastinal silhouette is normal in size and configuration.   LUNGS: No pulmonary consolidation, pleural effusion or pneumothorax.   ABDOMEN: No remarkable upper abdominal findings.   BONES: No acute osseous abnormality.       No radiographic evidence of acute cardiopulmonary pathology.   MACRO: None.   Signed by: Evan Finkelstein 6/15/2024 7:01 AM Dictation workstation:   [id number]    CT abdomen pelvis wo IV contrast    Result Date: 6/15/2024  Interpreted By:  Cj Lee, STUDY: CT ABDOMEN PELVIS WO IV CONTRAST;  6/15/2024 1:41 am   INDICATION: Signs/Symptoms:Abdominal pain.   COMPARISON: CT scan of the abdomen pelvis 06/10/2024   ACCESSION NUMBER(S): [id number]   ORDERING CLINICIAN: DIPTI BROWN   TECHNIQUE: Axial noncontrast CT images of the abdomen and pelvis with coronal and sagittal reconstructed images.   FINDINGS:   LOWER CHEST: There is a 7 mm nodule in the right middle  lobe, stable from prior CT.   ABDOMEN: Lack of intravenous contrast limits evaluation of vessels and solid organs. LIVER: Atrophic nodular liver compatible with cirrhosis. Lack of contrast limits evaluation for discrete hypervascular mass. There is recanalization of the paraumbilical veins. BILE DUCTS: Normal caliber. GALLBLADDER: Status post cholecystectomy. PANCREAS: Mild fatty atrophy of the pancreas. SPLEEN: Within normal limits. ADRENALS: Within normal limits.   KIDNEYS, URETERS, URINARY BLADDER:  Kidneys are symmetric in size without evidence for calculi, hydronephrosis, hydroureter or perinephric inflammatory changes.   No bladder calculi bladder is partially distended with mild wall thickening.   VESSELS:  Calcific atherosclerosis of the aortoiliac vessels. No aortic aneurysm. RETROPERITONEUM: Nonenlarged periaortic lymph nodes noted.   PELVIS:   REPRODUCTIVE ORGANS: Uterus is present. No adnexal mass.   BOWEL: Marked distention of the stomach with ingested contents. Visualized small bowel loops demonstrate mild diffuse wall thickening. Appendix is not identified with certainty. Mild wall thickening involving the ascending and transverse colon. A few scattered colonic diverticula without evidence for acute diverticulitis. No pneumatosis or portal venous gas. PERITONEUM: Moderate ascites with diffuse haziness of the mesentery. Findings are stable from prior CT. ABDOMINAL WALL: Small ventral hernias containing fat and fluid. BONES: Multilevel degenerative changes of the spine.       Findings compatible with cirrhosis. Lack of contrast limits evaluation for discrete hypervascular mass. If of clinical concern this can be further evaluated with nonemergent dedicated liver CT or MRI.   Moderate ascites with diffuse haziness of the mesentery.   There is diffuse bowel wall thickening involving the small and large bowel as described above. These findings may relate to hypoproteinemic state and ascites. Correlate with  symptomatology if there is concern for early infectious/inflammatory enterocolitis.   Additional findings as described above.   MACRO: None   Signed by: Cj Lee 6/15/2024 1:49 AM Dictation workstation:   [id number]    CT abdomen pelvis w IV contrast    Result Date: 6/10/2024  Interpreted By:  Christina Edmondson, STUDY: CT ABDOMEN PELVIS W IV CONTRAST;  6/10/2024 2:14 am   INDICATION: Signs/Symptoms:pain, diarrhea.   COMPARISON: 04/18/2024, 05/25/2024   ACCESSION NUMBER(S): [id number]   ORDERING CLINICIAN: MOIZ ORELLANA   TECHNIQUE: Axial CT images of the abdomen and pelvis with coronal and sagittal reconstructed images obtained after intravenous administration of contrast   FINDINGS: LOWER CHEST: No acute abnormality of the lung bases. BONES: No acute osseous abnormality. Degenerative changes present. ABDOMINAL WALL: Mild anasarca. A supraumbilical fluid contained hernia noted. A left lateral paraumbilical hernia with stranding is present.   ABDOMEN:   LIVER: The liver is significantly nodular in morphology. No definite lesion identified. BILE DUCTS: Normal caliber. GALLBLADDER: Cholecystectomy. PANCREAS: Mild specific peripancreatic stranding likely related to mesenteric edema. SPLEEN: Within normal limits. ADRENALS: Within normal limits. KIDNEYS and URETERS: Symmetric renal enhancement. No hydronephrosis or perinephric fluid collection.     VESSELS: Atherosclerotic calcifications without aneurysmal dilatation seen. RETROPERITONEUM: Stranding noted and ascites with mild retroperitoneal lymphadenopathy.   PELVIS:   REPRODUCTIVE ORGANS: No pelvic masses. BLADDER: Within normal limits.   BOWEL: Fluid noted within regions of the colon. Wall thickening throughout the small bowel which may be reactive or related to congestion. The stomach is grossly unremarkable. PERITONEUM: Moderate abdominopelvic ascites and diffuse mesenteric stranding. Numerous mesenteric nonenlarged or mildly enlarged lymph nodes  noted. No fluid collection or free air.       Cirrhotic appearance of the liver. Moderate abdominopelvic ascites and mesenteric stranding. Lymphadenopathy, likely reactive.   Small bowel wall thickening is again noted which could be reactive or related to passive congestion. Infectious process considered less likely however clinical correlation is again suggested.   Fluid contents in the colon suggestive of diarrheal process.   MACRO: None   Signed by: Christina Edmondson 6/10/2024 2:46 AM Dictation workstation:   [id number]    CT head wo IV contrast    Result Date: 5/30/2024  Interpreted By:  Bill Cordero, STUDY: CT HEAD WO IV CONTRAST;  5/30/2024 10:25 am   INDICATION: Signs/Symptoms:AMS.   COMPARISON: None.   ACCESSION NUMBER(S): [id number]   ORDERING CLINICIAN: HEENA MCWILLIAMS   TECHNIQUE: Noncontrast axial CT scan of head was performed. Angled reformats in brain and bone windows were generated. The images were reviewed in bone, brain, blood and soft tissue windows.   FINDINGS: CSF Spaces: The ventricles, sulci and basal cisterns are within normal limits. There is no extraaxial fluid collection.   Parenchyma:  The grey-white differentiation is intact. There is no mass effect or midline shift.  There is no intracranial hemorrhage. Small benign-appearing lipoma involving the falx, most typically incidental.   Calvarium: The calvarium is unremarkable.   Paranasal sinuses and mastoids: Visualized paranasal sinuses and mastoids are clear.       No evidence of acute intracranial abnormality. If there is persistent concern for acute cerebral insult, MRI is recommended.     MACRO: None   Signed by: Bill Cordero 5/30/2024 11:32 AM Dictation workstation:   [id number]    XR abdomen 1 view    Result Date: 5/30/2024  Interpreted By:  Bill Cordero, STUDY: XR ABDOMEN 1 VIEW;  5/30/2024 10:16 am   INDICATION: Signs/Symptoms:Abdominal pain.   COMPARISON: April 18, 2024 CT abdomen and pelvis   ACCESSION NUMBER(S):  [id number]   ORDERING CLINICIAN: HEENA MCWILLIAMS   FINDINGS: 3 separate AP views are submitted demonstrating nonspecific bowel gas pattern with relative paucity of gas. Limited evaluation of pneumoperitoneum on supine imaging, however no gross evidence of free air is noted. Similar abdominal protuberance.   Visualized lungs are clear.   Osseous structures demonstrate no acute bony changes.       1.  Nonspecific bowel gas pattern. If there continues to be concern, CT correlation recommended.   MACRO: None   Signed by: Bill Cordero 5/30/2024 11:28 AM Dictation workstation:   [id number]    ECG 12 lead    Result Date: 5/29/2024  Normal sinus rhythm Low voltage QRS Borderline ECG When compared with ECG of 15-MAY-2024 09:20, Questionable change in QRS axis T wave inversion now evident in Inferior leads Confirmed by Geronimo William ([id number]) on 5/29/2024 9:34:20 PM    Lower extremity venous duplex bilateral    Result Date: 5/29/2024  Interpreted By:  Alberta Granados, STUDY: [facility] US LOWER EXTREMITY VENOUS DUPLEX BILATERAL 5/29/2024 11:13 am   INDICATION: Signs/Symptoms:Bilateral lower extremity pain. Right lower extremity swelling.   COMPARISON: None available.   ACCESSION NUMBER(S): [id number]   ORDERING CLINICIAN: HEENA MCWILLIAMS   TECHNIQUE: High-resolution and real time compression images as well as color Doppler ultrasound of each lower extremity is performed. Attempts were made to visualize the posterior tibial and peroneal veins.   FINDINGS: There is normal compressibility and flow observed within common femoral, femoral, and popliteal veins bilaterally with flow and compressibility of the posterior tibial and peroneal veins bilaterally observed as well.       No evidence of DVT within either lower extremity.   Signed by: Alberta Granados 5/29/2024 11:51 AM Dictation workstation:   [id number]    US guided abdominal paracentesis    Result Date: 5/28/2024  Interpreted By:  Cornelius Weber, STUDY: US GUIDED ABDOMINAL  PARACENTESIS; 5/28/2024 11:10 am   INDICATION: Ascites.   COMPARISON: None.   ACCESSION NUMBER(S): [id number]   ORDERING CLINICIAN: ANUPAM FRANCIS   TECHNIQUE: Ultrasound-guided paracentesis   FINDINGS: Informed consent obtained. Patient positioned supine. Right lower quadrant prepped, draped and anesthetized. Under ultrasound guidance, 8 Peruvian centesis sheath needle inserted into peritoneal cavity. 2.1 Lof clear yellowfluid aspiratedwith sample sent for analysis. Patient tolerated procedure well       Ultrasound-guided paracentesis.   Signed by: Cornelius Weber 5/28/2024 11:45 AM Dictation workstation:   [id number]    CT abdomen pelvis w IV contrast    Result Date: 5/25/2024  Interpreted By:  Finkelstein, Evan, STUDY: CT ABDOMEN PELVIS W IV CONTRAST;  5/25/2024 8:04 pm   INDICATION: Signs/Symptoms:abdominal pain and distension, hx of cirrhosis, concerned for ascites.   COMPARISON: None.   ACCESSION NUMBER(S): [id number]   ORDERING CLINICIAN: BERTHA SCHILLING   TECHNIQUE: Axial CT images of the abdomen and pelvis with coronal and sagittal reconstructed images obtained after intravenous administration of   FINDINGS: LOWER CHEST: No acute abnormality of the lung bases.   ABDOMEN:   LIVER: Nodular contour of the liver. BILE DUCTS: Normal caliber. GALLBLADDER: Surgically absent. PORTAL VEIN: Patent SPLEEN: Unremarkable. PANCREAS: Unremarkable. ADRENALS: Unremarkable. KIDNEYS, URETERS and URINARY BLADDER: Symmetric renal enhancement. No hydronephrosis or perinephric fluid collection.  Bladder is within normal limits REPRODUCTIVE ORGANS: No pelvic masses.   ABDOMINAL WALL: Diffuse subcutaneous body wall edema. Supraumbilical ventral abdominal wall hernia defect containing fat and fluid. PERITONEUM: No free air. Moderate volume ascites and stranding diffusely throughout the mesentery.   BOWEL: Underdistention versus wall thickening diffusely throughout the small and large bowel as well as the stomach. Nondilated  appendix.   VESSELS: No aortic aneurysm. Mild aortoiliac calcifications. RETROPERITONEUM: No pathologically enlarged retroperitoneal lymph nodes.   BONES: No acute osseous abnormality.       Cirrhotic morphology of the liver with moderate volume ascites and stranding diffusely throughout the mesentery.   Supraumbilical ventral abdominal wall hernia containing fat and fluid.   Underdistention versus wall thickening diffusely throughout the small and large bowel as well as the stomach. Findings may be related to passive congestion. Enterocolitis or gastritis, however, is not excluded in the appropriate clinical setting.     MACRO: None.   Signed by: Evan Finkelstein 5/25/2024 8:48 PM Dictation workstation:   [id number]    XR chest 2 views    Result Date: 5/25/2024  Interpreted By:  Cj Lee, STUDY: XR CHEST 2 VIEWS;  5/25/2024 7:28 pm   INDICATION: Signs/Symptoms:sob.   COMPARISON: Chest x-ray 05/15/2024   ACCESSION NUMBER(S): [id number]   ORDERING CLINICIAN: BERTHA SCHILLING   FINDINGS:     CARDIOMEDIASTINAL SILHOUETTE: Cardiomediastinal silhouette is normal in size and configuration.   LUNGS: No consolidation, pleural effusion or pneumothorax.   ABDOMEN: Surgical clips noted in the right upper quadrant.   BONES: No acute osseous abnormality. Calcifications along bilateral humeral heads likely relate to calcific tendinosis.       No acute cardiopulmonary process.   MACRO: None   Signed by: Cj Lee 5/25/2024 8:00 PM Dictation workstation:   [id number]    US abdomen complete    Result Date: 5/21/2024  * * *Final Report* * * DATE OF EXAM: May 21 2024  9:25AM   [facility]   1016  -  US ASCITES SURVEY  / ACCESSION #  [id number] PROCEDURE REASON: ascites      * * * * Physician Interpretation * * * * RESULT: INDICATION: ascites EXAMINATION: US ASCITES SURVEY Technique: Ultrasound images of the 4 quadrants of the abdomen were performed to check for ascites.  Images were obtained and stored in a permanent archive.  RESULT: Very small volume ascites. Therefore therapeutic paracentesis not performed.    IMPRESSION: Very small volume ascites.Therefore therapeutic paracentesis not performed. Transcribed Using Voice Recognition Transcribe Date/Time: May 21 2024 10:00A Dictated by: HANH AUSTIN MD This examination was interpreted and the report reviewed and electronically signed by: HANH AUSTIN MD on May 21 2024 10:01AM  EST    CT angio chest w and wo IV contrast    Result Date: 5/20/2024  * * *Final Report* * * DATE OF EXAM: May 20 2024 11:17PM   [facility]   0540  -  CT CHEST W IVCON PE  / ACCESSION #  [id number] PROCEDURE REASON: Pulmonary embolism (PE) suspected, high prob      * * * * Physician Interpretation * * * * RESULT: EXAMINATION:  CHEST CT WITH CONTRAST (PULMONARY EMBOLISM PROTOCOL) CLINICAL HISTORY: Pulmonary embolism suspected, high probability Technique:  Spiral CT acquisition of the chest from the thoracic inlet to the upper abdomen following IV contrast.  Axial 1 mm thick slices plus coronal and sagittal reformatted images. Contrast:  71 mL Omnipaque 350 IV CT Radiation dose: Integrated Dose-length product (DLP) for this visit =   376 mGy*cm CT Dose Reduction Employed: Automated exposure control(AEC) and iterative recon Comparison:  CT chest 11/10/2022 RESULT: Evaluation for thromboembolic disease:      - Main pulmonary arteries:  No thromboembolic disease.      - Lobar pulmonary arteries:  No thromboembolic disease.      - Segmental pulmonary arteries:  No thromboembolic disease.      - Subsegmental pulmonary arteries:  No thromboembolic disease. Lungs and pleura: The lungs are clear.  No pleural effusion or pneumothorax. Mediastinum:  The thoracic aorta is normal in caliber. The cardiac chambers are normal in size. No coronary artery atherosclerotic calcifications are noted, although the study is not optimized for coronary assessment. No pericardial effusion or thickening. Bones and soft tissues:  No destructive  bone lesion. Chest wall is unremarkable. Upper abdomen:  Cirrhotic liver with nodular surface contour.  Abdominal ascites.    IMPRESSION: 1.  No CT evidence of pulmonary embolism. 2.  Cirrhotic liver and abdominal ascites. Transcribed Using Voice Recognition Transcribe Date/Time: May 20 2024 11:50P Dictated by: NAVEEN COBURN MD This examination was interpreted and the report reviewed and electronically signed by: NAVEEN COBURN MD on May 20 2024 11:57PM  EST    XR chest 1 view    Result Date: 5/20/2024  * * *Final Report* * * DATE OF EXAM: May 20 2024  8:34PM   [facility]   [id number]  -  XR CHEST 1V FRONTAL PORT  / ACCESSION #  [id number] PROCEDURE REASON: Shortness of breath      * * * * Physician Interpretation * * * * RESULT: EXAMINATION:  CHEST RADIOGRAPH (PORTABLE SINGLE VIEW AP) Exam Date/Time:  5/20/2024 8:34 PM CLINICAL HISTORY: Shortness of breath MQ:  XCPR_5 Comparison:  04/04/2024 RESULT: Lines, tubes, and devices:  None. Lungs and pleura:  There are no consolidative infiltrates or pleural effusions.  There is no evidence of pneumothorax. Cardiomediastinal silhouette:  Stable cardiomediastinal silhouette. Other:  Bilateral calcific tendinopathy involving the distal rotator cuff.  Mild degenerative changes in the thoracic spine.    IMPRESSION: No evidence of acute cardiopulmonary disease. 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 Zaman at [facility] Normal oropharynx. Esophageal ulcer, no bleeding and no  stigmata of recent bleeding.  Ascites: CT showed moderate ascites with diffuse haziness of the mesentery  Will continue lasix 40 mg daily/aldactone 50 mg twice daily.  Paracentesis yesterday with 1.2 L removed, fluid analysis negative for SBP.  Suspect her pain is somewhat somatic, she is already receiving Bentyl.  Repeat ultrasound and repeat labs were unremarkable last night  Continue to monitor Hepatic panel, CBC, INR  Hgb remains stable 9.3 with no overt bleeding    No barriers to discharge from GI perspective.  Discussed with patient that she needs to be on fluid restriction and low-salt diet going forward at home, she also needs close follow-up with a gastroenterologist or hepatologist for her cirrhosis management.  Can follow-up in our office in 2 weeks    Rachid Lu, MADYSON-CNP

## 2024-06-18 NOTE — CARE PLAN
The patient's goals for the shift include discharge    The clinical goals for the shift include pt will have adequate pain managment      Problem: Pain - Adult  Goal: Verbalizes/displays adequate comfort level or baseline comfort level  Outcome: Progressing     Problem: Safety - Adult  Goal: Free from fall injury  Outcome: Progressing     Problem: Discharge Planning  Goal: Discharge to home or other facility with appropriate resources  Outcome: Progressing

## 2024-06-18 NOTE — CONSULTS
"Assessment/Plan     Inpatient consult to Acute Care Surgery  Consult performed by: ESME Barrios  Consult ordered by: ESME Sotelo  Reason for consult: Abdominal pain, nausea, vomiting  Assessment/Recommendations: Patient with an extensive history of abdominal symptoms to include pain, nausea, vomiting, diarrhea, constipation. She was advised to follow up with gastroenterologist however it was not accepted under her insurance. There are no acute issues noted on CT scan and abdominal ultrasound. Patient should follow up with hepatologist after discharge and further workup of her continued abdominal pain should be completed. Continue with symptom management with antiemetics and analgesics. No surgical issues or intervention advised. Will sign off. Thank you for consult.     Discussed with Dr. 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 She reports it as a \"squeezing\" type pain, localized to her mid upper abdomen, mid right abdomen and radiating to her back. Imaging demonstrated the need for paracentesis, which was completed and 1.2 L were removed, SBP ruled out. US of the abdomen demonstrates borderline splenomegaly, otherwise no acute issues. Patient has been advised to follow up with her GI for these complaints, however she states that the Gastroenterologist was not covered under her insurance. We have been asked to see this patient for continued abdominal pain, nausea, emesis x1 last night and diarrhea x2 last night. Currently, patient is reporting her pain 8/10. She is nauseas. No additional diarrhea or vomiting since last night. Prior to admission, no changes in her bowel movements.     Abdominal Pain  Associated symptoms include diarrhea, nausea and vomiting. Pertinent negatives include no constipation, " fever or myalgias.       Review of Systems  Review of Systems   Constitutional: Negative.  Negative for chills, fatigue and fever.   HENT: Negative.  Negative for trouble swallowing.    Respiratory: Negative.  Negative for cough and shortness of breath.    Cardiovascular:  Negative for chest pain.   Gastrointestinal:  Positive for abdominal distention, diarrhea, nausea and vomiting. Negative for abdominal pain, blood in stool, constipation and rectal pain.   Genitourinary:  Negative for difficulty urinating.   Musculoskeletal: Negative.  Negative for myalgias.   Skin: Negative.  Negative for rash and wound.   Neurological: Negative.  Negative for dizziness, weakness and light-headedness.   Hematological:  Bruises/bleeds easily.   Psychiatric/Behavioral: Negative.         Objective     Vital signs for last 24 hours:  Temp:  [36.8 °C (98.2 °F)-36.9 °C (98.4 °F)] 36.8 °C (98.2 °F)  Heart Rate:  [75-81] 75  Resp:  [15-17] 15  BP: ()/(51-66) 99/59    Intake/Output this shift:  No intake/output data recorded.    Physical Exam  Physical Exam  Vitals and nursing note reviewed.   Constitutional:       General: She is not in acute distress.     Appearance: Normal appearance. She is not ill-appearing or toxic-appearing.   HENT:      Head: Normocephalic and atraumatic.   Cardiovascular:      Rate and Rhythm: Normal rate.   Pulmonary:      Effort: Pulmonary effort is normal.   Abdominal:      General: Abdomen is protuberant. Bowel sounds are normal. There is distension.      Palpations: Abdomen is soft.      Tenderness: There is generalized abdominal tenderness and tenderness in the right upper quadrant and epigastric area. Negative signs include McBurney's sign.   Musculoskeletal:         General: No swelling or tenderness.   Skin:     General: Skin is warm and dry.   Neurological:      Mental Status: She is alert and oriented to person, place, and time.         Labs  Lab Results   Component Value Date    WBC 6.1  06/18/2024    HGB 9.3 (L) 06/18/2024    HCT 29.6 (L) 06/18/2024    MCV 77 (L) 06/18/2024    PLT 73 (L) 06/18/2024     Lab Results   Component Value Date    GLUCOSE 89 06/18/2024    CALCIUM 8.4 (L) 06/18/2024     06/18/2024    K 4.0 06/18/2024    CO2 28 06/18/2024     06/18/2024    BUN 13 06/18/2024    CREATININE 0.60 06/18/2024     === 06/15/24 ===    CT ABDOMEN PELVIS WO IV CONTRAST    - Impression -  Findings compatible with cirrhosis. Lack of contrast limits  evaluation for discrete hypervascular mass. If of clinical concern  this can be further evaluated with nonemergent dedicated liver CT or  MRI.    Moderate ascites with diffuse haziness of the mesentery.    There is diffuse bowel wall thickening involving the small and large  bowel as described above. These findings may relate to  hypoproteinemic state and ascites. Correlate with symptomatology if  there is concern for early infectious/inflammatory enterocolitis.    Additional findings as described above.    MACRO:  None    Signed by: Cj Lee 6/15/2024 1:49 AM  Dictation workstation:   [id number]  === 06/15/24 ===    US ABDOMEN COMPLETE    - Impression -  1. Slightly decreased amount of ascites compared to prior study.    2. Redemonstration of cirrhotic liver morphology and borderline  splenomegaly.      MACRO:  None.    Signed by: Shane De 6/17/2024 8:36 PM  Dictation workstation:   [id number]

## 2024-06-18 NOTE — PROGRESS NOTES
Alfonzo Flanagan is a 49 y.o. female on day 0 of admission presenting with Generalized abdominal pain.    6/17/2024: Increased abdominal pain, abdominal ultrasound complete, GI notified by nursing, CMP, CBC.  Radiology report reviewed:  Slightly decreased amount of ascites.  Labs reviewed - stable.    Subjective   Patient seen and examined.  Resting in bed in no acute distress.  Awake alert oriented x 3.  Complains of mid and right upper abdominal pain, nausea, vomiting, diarrhea. + 3 episodes of vomiting overnight, nauseated no vomiting this morning, ate gluten free bread and 1/2 sausage this morning.  Back pain improved.     Spoke with nursing, no new issues, reports of nausea, no emesis observed.     Objective     Physical Exam  Vitals and nursing note reviewed.   Constitutional:       General: She is not in acute distress.     Appearance: Normal appearance. She is normal weight. She is not ill-appearing, toxic-appearing or diaphoretic.   HENT:      Head: Normocephalic and atraumatic.      Right Ear: Tympanic membrane normal.      Left Ear: Tympanic membrane normal.      Nose: Nose normal.      Mouth/Throat:      Mouth: Mucous membranes are moist.      Pharynx: Oropharynx is clear.   Eyes:      Extraocular Movements: Extraocular movements intact.      Conjunctiva/sclera: Conjunctivae normal.      Pupils: Pupils are equal, round, and reactive to light.   Cardiovascular:      Rate and Rhythm: Normal rate and regular rhythm.      Pulses: Normal pulses.      Heart sounds: Normal heart sounds. No murmur heard.  Pulmonary:      Effort: Pulmonary effort is normal. No respiratory distress.      Breath sounds: Normal breath sounds. No wheezing, rhonchi or rales.   Abdominal:      General: Bowel sounds are normal. There is no distension.      Palpations: Abdomen is soft.      Tenderness: There is abdominal tenderness.      Comments: Mid and right upper abdominal tenderness.  Improved.    Genitourinary:     Comments:  "Deferred.  Musculoskeletal:         General: No swelling or tenderness. Normal range of motion.      Cervical back: Normal range of motion and neck supple.      Right lower leg: No edema.      Left lower leg: No edema.      Comments: Left lower back tenderness.     Skin:     General: Skin is warm and dry.      Capillary Refill: Capillary refill takes less than 2 seconds.      Comments: Band-aid in place at paracentesis site.      Neurological:      General: No focal deficit present.      Mental Status: She is alert and oriented to person, place, and time.   Psychiatric:         Mood and Affect: Mood normal.         Behavior: Behavior normal.       Last Recorded Vitals  Blood pressure 99/59, pulse 75, temperature 36.8 °C (98.2 °F), temperature source Oral, resp. rate 15, height 1.6 m (5' 3\"), weight 77.6 kg (171 lb), SpO2 99%.    Intake/Output last 3 Shifts:  No intake/output data recorded.    Relevant Results  Results for orders placed or performed during the hospital encounter of 06/15/24 (from the past 24 hour(s))   POCT GLUCOSE   Result Value Ref Range    POCT Glucose 107 (H) 74 - 99 mg/dL   POCT GLUCOSE   Result Value Ref Range    POCT Glucose 257 (H) 74 - 99 mg/dL   POCT GLUCOSE   Result Value Ref Range    POCT Glucose 241 (H) 74 - 99 mg/dL   CBC   Result Value Ref Range    WBC 3.8 (L) 4.4 - 11.3 x10*3/uL    nRBC 0.0 0.0 - 0.0 /100 WBCs    RBC 3.87 (L) 4.00 - 5.20 x10*6/uL    Hemoglobin 9.3 (L) 12.0 - 16.0 g/dL    Hematocrit 30.4 (L) 36.0 - 46.0 %    MCV 79 (L) 80 - 100 fL    MCH 24.0 (L) 26.0 - 34.0 pg    MCHC 30.6 (L) 32.0 - 36.0 g/dL    RDW 20.4 (H) 11.5 - 14.5 %    Platelets 64 (L) 150 - 450 x10*3/uL   Comprehensive Metabolic Panel   Result Value Ref Range    Glucose 248 (H) 65 - 99 mg/dL    Sodium 133 133 - 145 mmol/L    Potassium 3.7 3.4 - 5.1 mmol/L    Chloride 100 97 - 107 mmol/L    Bicarbonate 26 24 - 31 mmol/L    Urea Nitrogen 11 8 - 25 mg/dL    Creatinine 0.60 0.40 - 1.60 mg/dL    eGFR >90 >60 " mL/min/1.73m*2    Calcium 8.4 (L) 8.5 - 10.4 mg/dL    Albumin 2.4 (L) 3.5 - 5.0 g/dL    Alkaline Phosphatase 267 (H) 35 - 125 U/L    Total Protein 7.0 5.9 - 7.9 g/dL    AST 38 5 - 40 U/L    Bilirubin, Total 1.1 0.1 - 1.2 mg/dL    ALT 24 5 - 40 U/L    Anion Gap 7 <=19 mmol/L   POCT GLUCOSE   Result Value Ref Range    POCT Glucose 144 (H) 74 - 99 mg/dL   Comprehensive Metabolic Panel   Result Value Ref Range    Glucose 89 65 - 99 mg/dL    Sodium 138 133 - 145 mmol/L    Potassium 4.0 3.4 - 5.1 mmol/L    Chloride 104 97 - 107 mmol/L    Bicarbonate 28 24 - 31 mmol/L    Urea Nitrogen 13 8 - 25 mg/dL    Creatinine 0.60 0.40 - 1.60 mg/dL    eGFR >90 >60 mL/min/1.73m*2    Calcium 8.4 (L) 8.5 - 10.4 mg/dL    Albumin 2.4 (L) 3.5 - 5.0 g/dL    Alkaline Phosphatase 246 (H) 35 - 125 U/L    Total Protein 6.7 5.9 - 7.9 g/dL    AST 38 5 - 40 U/L    Bilirubin, Total 1.1 0.1 - 1.2 mg/dL    ALT 23 5 - 40 U/L    Anion Gap 6 <=19 mmol/L   CBC   Result Value Ref Range    WBC 6.1 4.4 - 11.3 x10*3/uL    nRBC 0.0 0.0 - 0.0 /100 WBCs    RBC 3.86 (L) 4.00 - 5.20 x10*6/uL    Hemoglobin 9.3 (L) 12.0 - 16.0 g/dL    Hematocrit 29.6 (L) 36.0 - 46.0 %    MCV 77 (L) 80 - 100 fL    MCH 24.1 (L) 26.0 - 34.0 pg    MCHC 31.4 (L) 32.0 - 36.0 g/dL    RDW 20.4 (H) 11.5 - 14.5 %    Platelets 73 (L) 150 - 450 x10*3/uL   POCT GLUCOSE   Result Value Ref Range    POCT Glucose 90 74 - 99 mg/dL     Susceptibility data from last 90 days.  Collected Specimen Info Organism Amoxicillin/Clavulanate Ampicillin Ampicillin/Sulbactam Cefazolin Cefazolin (uncomplicated UTIs only) Ciprofloxacin Gentamicin Levofloxacin Nitrofurantoin Penicillin Piperacillin/Tazobactam   05/15/24 Urine from Clean Catch/Voided Enterococcus faecium   S     R   S  S  S      Klebsiella pneumoniae/variicola  S  R  S  S  S  S  S   I   S     Collected Specimen Info Organism Trimethoprim/Sulfamethoxazole Vancomycin   05/15/24 Urine from Clean Catch/Voided Enterococcus faecium  R  S     Klebsiella  "pneumoniae/variicola  S      US abdomen complete    Result Date: 6/17/2024  Interpreted By:  Shane De, STUDY: US ABDOMEN COMPLETE;  6/17/2024 7:58 pm   INDICATION: 48 y/o   F with  Signs/Symptoms:Abdominal pain.   COMPARISON: None.   ACCESSION NUMBER(S): [id number]   ORDERING CLINICIAN: HEENA MCWILLIAMS   TECHNIQUE: Multiple grayscale and color Doppler static and dynamic images of the abdomen were obtained.   FINDINGS: PANCREAS: Visualized portions are unremarkable.   LIVER: Craniocaudal length: 14.4 cm, within normal limits. Echogenicity/Echotexture: Heterogeneous. Morphology: Cirrhotic. Mass: None.   BILE DUCTS: Intrahepatic ducts: Non-dilated. Common bile duct diameter: 4.3 cm   GALLBLADDER: Surgically removed.   SPLEEN: Maximum length: 12.3 cm, upper limits of normal for size.   RIGHT KIDNEY: Craniocaudal length: 10.7 cm, within normal limits. No hydronephrosis.     LEFT KIDNEY: Craniocaudal length: 10.4 cm, within normal limits. No hydronephrosis.     URINARY BLADDER: No significant abnormality. The bilateral ureteral jets are seen.   ABDOMINAL AORTA AND IVC: Visualized portions are unremarkable. The main portal vein is patent.   PLEURAL/PERITONEAL FLUID: Trace amount of right upper quadrant ascites noted.       1. Slightly decreased amount of ascites compared to prior study.   2. Redemonstration of cirrhotic liver morphology and borderline splenomegaly.     MACRO: None.   Signed by: Shane De 6/17/2024 8:36 PM Dictation workstation:   [id number]    US guided abdominal paracentesis    Result Date: 6/17/2024  Interpreted By:  Sissy Montano, STUDY: US GUIDED ABDOMINAL PARACENTESIS;  6/17/2024 10:33 am   INDICATION: Signs/Symptoms:Ascites.   COMPARISON: None.   ACCESSION NUMBER(S): [id number]   ORDERING CLINICIAN: SADE LARRY   TECHNIQUE: INTERVENTIONALIST(S): Sissy Montano MD   The history and physical exam pertinent to the procedure were reviewed and no updates were made.\"   CONSENT: The " patient/patient's POA/next of kin was informed of the nature of the proposed procedure. The purposes, alternatives, risks, and benefits were explained and discussed. All questions were answered and consent was obtained.   RADIATION EXPOSURE: None   SEDATION: None   MEDICATION/CONTRAST: No additional   TIME OUT: A time out was performed immediately prior to procedure start with the interventional team, correctly identifying the patient name, date of birth, MRN, procedure, anatomy (including marking of site and side), patient position, procedure consent form, relevant laboratory and imaging test results, antibiotic administration, safety precautions, and procedure-specific equipment needs.   COMPLICATIONS: No immediate adverse events identified.   FINDINGS: Sonographic evaluation of the abdomen demonstrated small ascites. Left lower quadrant pocket was targeted. Skin prepped and draped in usual sterile fashion. 1% lidocaine for anesthesia. 1.2 L removed after accessing the area with a 5 Divehi SozializeMe catheter. Sample sent to lab. Fluid clear yellow. Access site covered with sterile bandage after removal of catheter.       Technically successful paracentesis.   I was present for and/or performed the critical portions of the procedure and immediately available throughout the entire procedure.   I personally reviewed the image(s)/study and interpretation. I agree with the findings as stated.   Performed and dictated at [facility].   MACRO: None   Signed by: Sissy Montano 6/17/2024 3:21 PM Dictation workstation:   [id number]     Scheduled medications  atorvastatin, 80 mg, oral, Nightly  dicyclomine, 20 mg, oral, 4x daily  docusate sodium, 100 mg, oral, BID  fenofibrate, 160 mg, oral, Daily  furosemide, 40 mg, oral, Daily  gabapentin, 200 mg, oral, TID  insulin glargine, 25 Units, subcutaneous, BID  insulin lispro, 0-15 Units, subcutaneous, TID  insulin lispro, 1-40 Units, subcutaneous,  TID  lactulose, 20 g, oral, Daily  lidocaine, 1 patch, transdermal, Daily  magnesium oxide, 400 mg, oral, Daily  metFORMIN, 1,000 mg, oral, BID  nadolol, 20 mg, oral, Daily  pantoprazole, 40 mg, oral, Daily  polyethylene glycol, 17 g, oral, BID  spironolactone, 50 mg, oral, BID  sucralfate, 1 g, oral, Before meals & nightly  traZODone, 25 mg, oral, Nightly  ursodiol, 300 mg, oral, TID      Continuous medications     PRN medications  PRN medications: acetaminophen **OR** acetaminophen **OR** acetaminophen, benzocaine-menthol, dextromethorphan-guaifenesin, dextrose, dextrose, glucagon, glucagon, guaiFENesin, hydrOXYzine HCL, ondansetron ODT, polyethylene glycol, traMADol      ASSESSMENT:  Abdominal pain, nausea, vomiting  Hepatic cirrhosis with ascites status post paracentesis  Portal hypertension  Chronic pancreatitis  Microcytic anemia  Thrombocytopenia  Hyperlipidemia  Hypertriglyceridemia  Type 2 diabetes mellitus  Moderate protein calorie malnutrition  Back pain, musculoskeletal - improved    PLAN:  Discussed with Gastroenterology Rachid SHARMA CNP this morning.  Continue current medications and treatment, stable for discharge, outpatient follow-up.  Patient continues to report abdominal pain, + nausea, vomiting.  Consult General surgery.  Follow-up recommendations.  Continue medical management.  As needed analgesics, anti-emetics.  Bentyl.  Gabapentin.  Low sodium diet.  Diuretics.  Lasix.  Spironolactone.  Lactulose.  Titrate for 3 bowel movements in 24 hours.  Diet as tolerated.  Protein supplementation.  Monitor point of care glucose AC/HS.  Continue current medications.  Adjust medications as needed for glycemic control.  Hypoglycemia protocol.  Increase activity.  DVT prophylaxis.  SCD's.  GI prophylaxis.  PPI.  Supportive care.  Patient reassured.  Case management following for discharge planning.  Discharge plan home.  Okay to discharge home after cleared by General surgery.  Discussed with patient, nursing,  Gastroenterology and Dr. Lawrence.        MADYSON Sotelo-CNP

## 2024-06-18 NOTE — CARE PLAN
Problem: Pain - Adult  Goal: Verbalizes/displays adequate comfort level or baseline comfort level  6/17/2024 2148 by Nataliia Mejia RN  Outcome: Progressing  6/17/2024 2147 by Nataliia Mejia RN  Outcome: Progressing  6/17/2024 2147 by Nataliia Mejia RN  Outcome: Progressing  6/17/2024 2145 by Nataliia Mejia RN  Outcome: Progressing     Problem: Safety - Adult  Goal: Free from fall injury  6/17/2024 2148 by Nataliia Mejia RN  Outcome: Progressing  6/17/2024 2147 by Nataliia Mejia RN  Outcome: Progressing  6/17/2024 2147 by Nataliia Mejia RN  Outcome: Progressing  6/17/2024 2145 by Nataliia Mejia RN  Outcome: Progressing     Problem: Discharge Planning  Goal: Discharge to home or other facility with appropriate resources  6/17/2024 2148 by Nataliia Mejia RN  Outcome: Progressing  6/17/2024 2147 by Nataliia Mejia RN  Outcome: Progressing  6/17/2024 2147 by Nataliia Mejia RN  Outcome: Progressing  6/17/2024 2145 by Nataliia Mejia RN  Outcome: Progressing     Problem: Chronic Conditions and Co-morbidities  Goal: Patient's chronic conditions and co-morbidity symptoms are monitored and maintained or improved  6/17/2024 2148 by Nataliia Mejia RN  Outcome: Progressing  6/17/2024 2147 by Nataliia Mejia RN  Outcome: Progressing  6/17/2024 2147 by Nataliia Mejia RN  Outcome: Progressing  6/17/2024 2145 by Nataliia Mejia RN  Outcome: Progressing   The patient's goals for the shift include discharge    The clinical goals for the shift include pt will have adequate pain managment

## 2024-06-18 NOTE — NURSING NOTE
Reviewed discharge with patient, patient had no questions all follow up appointments reviewed. No new medications is awaiting a ride

## 2024-06-18 NOTE — CARE PLAN
The patient's goals for the shift include discharge    The clinical goals for the shift include pt will have adequate pain managment    Problem: Pain - Adult  Goal: Verbalizes/displays adequate comfort level or baseline comfort level  Outcome: Progressing     Problem: Safety - Adult  Goal: Free from fall injury  Outcome: Progressing     Problem: Discharge Planning  Goal: Discharge to home or other facility with appropriate resources  Outcome: Progressing     Problem: Chronic Conditions and Co-morbidities  Goal: Patient's chronic conditions and co-morbidity symptoms are monitored and maintained or improved  Outcome: Progressing     Problem: Diabetes  Goal: Increase stability of blood glucose readings by end of shift  Outcome: Progressing  Goal: Decrease in ketones present in urine by end of shift  Outcome: Progressing  Goal: Learn about and adhere to nutrition recommendations by end of shift  Outcome: Progressing  Goal: Increase self care and/or family involovement by end of shift  Outcome: Progressing  Goal: Receive DSME education by end of shift  Outcome: Progressing

## 2024-06-20 ENCOUNTER — APPOINTMENT (OUTPATIENT)
Dept: RADIOLOGY | Facility: HOSPITAL | Age: 49
DRG: 441 | End: 2024-06-20
Payer: COMMERCIAL

## 2024-06-20 ENCOUNTER — APPOINTMENT (OUTPATIENT)
Dept: CARDIOLOGY | Facility: HOSPITAL | Age: 49
DRG: 441 | End: 2024-06-20
Payer: COMMERCIAL

## 2024-06-20 ENCOUNTER — HOSPITAL ENCOUNTER (INPATIENT)
Facility: HOSPITAL | Age: 49
DRG: 441 | End: 2024-06-20
Attending: EMERGENCY MEDICINE | Admitting: INTERNAL MEDICINE
Payer: COMMERCIAL

## 2024-06-20 DIAGNOSIS — K76.82 HEPATIC ENCEPHALOPATHY (MULTI): Primary | ICD-10-CM

## 2024-06-20 DIAGNOSIS — N39.0 ACUTE UTI: ICD-10-CM

## 2024-06-20 LAB
ALBUMIN SERPL-MCNC: 2.7 G/DL (ref 3.5–5)
ALP BLD-CCNC: 281 U/L (ref 35–125)
ALT SERPL-CCNC: 25 U/L (ref 5–40)
AMMONIA PLAS-SCNC: 100 UMOL/L (ref 12–45)
ANION GAP SERPL CALC-SCNC: 8 MMOL/L
APTT PPP: 26.3 SECONDS (ref 22–32.5)
AST SERPL-CCNC: 40 U/L (ref 5–40)
BILIRUB SERPL-MCNC: 1.4 MG/DL (ref 0.1–1.2)
BUN SERPL-MCNC: 11 MG/DL (ref 8–25)
CALCIUM SERPL-MCNC: 8.8 MG/DL (ref 8.5–10.4)
CHLORIDE SERPL-SCNC: 103 MMOL/L (ref 97–107)
CO2 SERPL-SCNC: 28 MMOL/L (ref 24–31)
CREAT SERPL-MCNC: 0.7 MG/DL (ref 0.4–1.6)
EGFRCR SERPLBLD CKD-EPI 2021: >90 ML/MIN/1.73M*2
ERYTHROCYTE [DISTWIDTH] IN BLOOD BY AUTOMATED COUNT: 21.1 % (ref 11.5–14.5)
GLUCOSE SERPL-MCNC: 213 MG/DL (ref 65–99)
HCT VFR BLD AUTO: 33 % (ref 36–46)
HGB BLD-MCNC: 10.3 G/DL (ref 12–16)
INR PPP: 1.2 (ref 0.9–1.2)
LIPASE SERPL-CCNC: 47 U/L (ref 16–63)
MCH RBC QN AUTO: 24.2 PG (ref 26–34)
MCHC RBC AUTO-ENTMCNC: 31.2 G/DL (ref 32–36)
MCV RBC AUTO: 78 FL (ref 80–100)
NRBC BLD-RTO: 0 /100 WBCS (ref 0–0)
PLATELET # BLD AUTO: 78 X10*3/UL (ref 150–450)
POTASSIUM SERPL-SCNC: 4.3 MMOL/L (ref 3.4–5.1)
PROT SERPL-MCNC: 7.2 G/DL (ref 5.9–7.9)
PROTHROMBIN TIME: 12.8 SECONDS (ref 9.3–12.7)
RBC # BLD AUTO: 4.26 X10*6/UL (ref 4–5.2)
SODIUM SERPL-SCNC: 139 MMOL/L (ref 133–145)
TROPONIN T SERPL-MCNC: 7 NG/L
WBC # BLD AUTO: 4.8 X10*3/UL (ref 4.4–11.3)

## 2024-06-20 PROCEDURE — 80053 COMPREHEN METABOLIC PANEL: CPT | Performed by: EMERGENCY MEDICINE

## 2024-06-20 PROCEDURE — 99285 EMERGENCY DEPT VISIT HI MDM: CPT

## 2024-06-20 PROCEDURE — 93005 ELECTROCARDIOGRAM TRACING: CPT

## 2024-06-20 PROCEDURE — 70450 CT HEAD/BRAIN W/O DYE: CPT | Performed by: INTERNAL MEDICINE

## 2024-06-20 PROCEDURE — G0378 HOSPITAL OBSERVATION PER HR: HCPCS

## 2024-06-20 PROCEDURE — 84484 ASSAY OF TROPONIN QUANT: CPT | Performed by: EMERGENCY MEDICINE

## 2024-06-20 PROCEDURE — 70450 CT HEAD/BRAIN W/O DYE: CPT

## 2024-06-20 PROCEDURE — 2500000001 HC RX 250 WO HCPCS SELF ADMINISTERED DRUGS (ALT 637 FOR MEDICARE OP): Performed by: EMERGENCY MEDICINE

## 2024-06-20 PROCEDURE — 85730 THROMBOPLASTIN TIME PARTIAL: CPT | Performed by: EMERGENCY MEDICINE

## 2024-06-20 PROCEDURE — 83690 ASSAY OF LIPASE: CPT | Performed by: EMERGENCY MEDICINE

## 2024-06-20 PROCEDURE — 36415 COLL VENOUS BLD VENIPUNCTURE: CPT | Performed by: EMERGENCY MEDICINE

## 2024-06-20 PROCEDURE — 74177 CT ABD & PELVIS W/CONTRAST: CPT | Performed by: INTERNAL MEDICINE

## 2024-06-20 PROCEDURE — 2550000001 HC RX 255 CONTRASTS: Performed by: EMERGENCY MEDICINE

## 2024-06-20 PROCEDURE — 85610 PROTHROMBIN TIME: CPT | Performed by: EMERGENCY MEDICINE

## 2024-06-20 PROCEDURE — 82140 ASSAY OF AMMONIA: CPT | Performed by: EMERGENCY MEDICINE

## 2024-06-20 PROCEDURE — 2500000004 HC RX 250 GENERAL PHARMACY W/ HCPCS (ALT 636 FOR OP/ED): Performed by: EMERGENCY MEDICINE

## 2024-06-20 PROCEDURE — 85027 COMPLETE CBC AUTOMATED: CPT | Performed by: EMERGENCY MEDICINE

## 2024-06-20 PROCEDURE — 74177 CT ABD & PELVIS W/CONTRAST: CPT

## 2024-06-20 RX ORDER — LACTULOSE 10 G/15ML
20 SOLUTION ORAL ONCE
Status: COMPLETED | OUTPATIENT
Start: 2024-06-20 | End: 2024-06-20

## 2024-06-20 RX ADMIN — IOHEXOL 75 ML: 350 INJECTION, SOLUTION INTRAVENOUS at 13:29

## 2024-06-20 RX ADMIN — SODIUM CHLORIDE 1000 ML: 900 INJECTION, SOLUTION INTRAVENOUS at 13:18

## 2024-06-20 RX ADMIN — LACTULOSE 20 G: 20 SOLUTION ORAL at 15:53

## 2024-06-20 ASSESSMENT — COLUMBIA-SUICIDE SEVERITY RATING SCALE - C-SSRS
2. HAVE YOU ACTUALLY HAD ANY THOUGHTS OF KILLING YOURSELF?: NO
6. HAVE YOU EVER DONE ANYTHING, STARTED TO DO ANYTHING, OR PREPARED TO DO ANYTHING TO END YOUR LIFE?: NO
1. IN THE PAST MONTH, HAVE YOU WISHED YOU WERE DEAD OR WISHED YOU COULD GO TO SLEEP AND NOT WAKE UP?: NO

## 2024-06-20 NOTE — DISCHARGE SUMMARY
Discharge Diagnosis  Abdominal pain, nausea, vomiting improved  Hepatic cirrhosis with ascites status post paracentesis  Portal hypertension  Chronic pancreatitis  Microcytic anemia  Thrombocytopenia  Hyperlipidemia  Hypertriglyceridemia  Type 2 diabetes mellitus  Moderate protein calorie malnutrition  Back pain, musculoskeletal - improved    Issues Requiring Follow-Up  Above    Discharge Meds     Your medication list        CONTINUE taking these medications        Instructions Last Dose Given Next Dose Due   atorvastatin 80 mg tablet  Commonly known as: Lipitor           dicyclomine 10 mg capsule  Commonly known as: Bentyl      Take 2 capsules (20 mg) by mouth 4 times a day for 15 days.       docusate sodium 100 mg capsule  Commonly known as: Colace      Take 1 capsule (100 mg) by mouth 2 times a day. Hold for loose stool.       fenofibrate 145 mg tablet  Commonly known as: Tricor           furosemide 40 mg tablet  Commonly known as: Lasix      Take 1 tablet (40 mg) by mouth once daily. Hold for dizziness.       gabapentin 100 mg capsule  Commonly known as: Neurontin      Take 2 capsules (200 mg) by mouth 3 times a day.       hydrOXYzine HCL 25 mg tablet  Commonly known as: Atarax      Take 1 tablet (25 mg) by mouth every 6 hours if needed for itching or allergies.       insulin lispro 100 unit/mL injection  Commonly known as: HumaLOG           lactulose 20 gram/30 mL oral solution      Take 30 mL (20 g) by mouth once daily. Hold for greater than 3 bowel movements in 24 hours.       Lantus U-100 Insulin 100 unit/mL injection  Generic drug: insulin glargine           magnesium oxide 400 mg (241.3 mg magnesium) tablet  Commonly known as: Mag-Ox      Take 1 tablet (400 mg) by mouth once daily.       metFORMIN 1,000 mg tablet  Commonly known as: Glucophage           nadolol 20 mg tablet  Commonly known as: Corgard      Take 1 tablet (20 mg) by mouth once daily.       ondansetron ODT 4 mg disintegrating tablet  Commonly  known as: Zofran-ODT      Take 1 tablet (4 mg) by mouth every 8 hours if needed for nausea or vomiting.       pantoprazole 40 mg EC tablet  Commonly known as: ProtoNix      Take 1 tablet (40 mg) by mouth once daily. Do not crush, chew, or split.       polyethylene glycol 17 gram packet  Commonly known as: Glycolax, Miralax      Take 17 g by mouth 2 times a day. Hold for loose stool.       spironolactone 50 mg tablet  Commonly known as: Aldactone      Take 1 tablet (50 mg) by mouth 2 times a day. Hold for dizziness.       sucralfate 100 mg/mL suspension  Commonly known as: Carafate      Take 10 mL (1 g) by mouth 4 times a day before meals.       traMADol 50 mg tablet  Commonly known as: Ultram      Take 1 tablet (50 mg) by mouth every 6 hours if needed for severe pain (7 - 10).       traZODone 50 mg tablet  Commonly known as: Desyrel           ursodiol 300 mg capsule  Commonly known as: Actigall                    Test Results Pending At Discharge  Pending Labs       No current pending labs.            Hospital Course  This is a very pleasant 49-year-old female presenting to the emergency department with complaints of abdominal pain nausea and vomiting.  She was previously in the hospital and advised to follow-up outpatient with Gastroenterology however she stated that Gastroenterology did not accept her insurance.  She complained of abdominal pain, nausea and occasional vomiting.  She denied any other complaints.  She required a paracentesis.  She presented to the emergency department for evaluation.  She was treated in the emergency department.  It was advised to arrange for paracentesis and discharge home however the paracentesis could not be arranged over the weekend..  She was admitted.  She was evaluated and treated by Gastroenterology.  She underwent a paracentesis yielding 1.2 L of fluid.  The fluid was sent for analysis and was negative for spontaneous bacterial peritonitis..  She was treated symptomatically  for abdominal pain, nausea and vomiting.  She was evaluated by General surgery.  She was cleared by Gastroenterology and General surgery for discharge.  Her condition improved.  She was advised outpatient follow-up with her primary care provider and with [facility] as advised.  She was discharged home in stable condition.    Pertinent Physical Exam At Time of Discharge  See physical examination.     Outpatient Follow-Up  No future appointments.    Follow-up with primary care provider in 1 week.    Follow-up with [facility] as advised.       Leny Daniels, MADYSON-CNP

## 2024-06-20 NOTE — PROGRESS NOTES
Pharmacy Medication History Review    Alfonzo Flanagan is a 49 y.o. female admitted for Hepatic encephalopathy (Multi). Pharmacy reviewed the patient's [id number] medications and allergies for accuracy.    Medications ADDED:  none  Medications CHANGED:  none  Medications REMOVED:   Metformin 1000mg     The list below reflects the updated PTA list. Comments regarding how patient may be taking medications differently can be found in the Admit Orders Activity  Prior to Admission Medications   Prescriptions Last Dose Informant   atorvastatin (Lipitor) 80 mg tablet  self   Sig: Take 1 tablet (80 mg) by mouth once daily.   dicyclomine (Bentyl) 10 mg capsule  self   Sig: Take 2 capsules (20 mg) by mouth 4 times a day for 15 days.   docusate sodium (Colace) 100 mg capsule  self   Sig: Take 1 capsule (100 mg) by mouth 2 times a day. Hold for loose stool.   fenofibrate (Tricor) 145 mg tablet  self   Sig: Take 1 tablet (145 mg) by mouth once daily.   furosemide (Lasix) 40 mg tablet  self   Sig: Take 1 tablet (40 mg) by mouth once daily. Hold for dizziness.   gabapentin (Neurontin) 100 mg capsule  self   Sig: Take 2 capsules (200 mg) by mouth 3 times a day.   hydrOXYzine HCL (Atarax) 25 mg tablet  self   Sig: Take 1 tablet (25 mg) by mouth every 6 hours if needed for itching or allergies.   insulin glargine (Lantus U-100 Insulin) 100 unit/mL injection  self   Sig: Inject 25 Units under the skin 2 times a day.   insulin lispro (HumaLOG) 100 unit/mL injection  self   Sig: Inject 0.01-0.4 mL (1-40 Units) under the skin 3 times daily (morning, midday, late afternoon). Take as directed per insulin instructions.   lactulose 20 gram/30 mL oral solution  self   Sig: Take 30 mL (20 g) by mouth once daily. Hold for greater than 3 bowel movements in 24 hours.   magnesium oxide (Mag-Ox) 400 mg (241.3 mg magnesium) tablet  self   Sig: Take 1 tablet (400 mg) by mouth once daily.   nadolol (Corgard) 20 mg tablet  self   Sig: Take 1  tablet (20 mg) by mouth once daily.   ondansetron ODT (Zofran-ODT) 4 mg disintegrating tablet  self   Sig: Take 1 tablet (4 mg) by mouth every 8 hours if needed for nausea or vomiting.   pantoprazole (ProtoNix) 40 mg EC tablet  self   Sig: Take 1 tablet (40 mg) by mouth once daily. Do not crush, chew, or split.   polyethylene glycol (Glycolax, Miralax) 17 gram packet  self   Sig: Take 17 g by mouth 2 times a day. Hold for loose stool.   spironolactone (Aldactone) 50 mg tablet  self   Sig: Take 1 tablet (50 mg) by mouth 2 times a day. Hold for dizziness.   sucralfate (Carafate) 100 mg/mL suspension  self   Sig: Take 10 mL (1 g) by mouth 4 times a day before meals.   traMADol (Ultram) 50 mg tablet  self   Sig: Take 1 tablet (50 mg) by mouth every 6 hours if needed for severe pain (7 - 10).   traZODone (Desyrel) 50 mg tablet  self   Sig: Take 0.5 tablets (25 mg) by mouth once daily at bedtime.   ursodiol (Actigall) 300 mg capsule  self   Sig: Take 1 capsule (300 mg) by mouth 3 times a day.      Facility-Administered Medications: None        The list below reflects the updated allergy list. Please review each documented allergy for additional clarification and justification.  Allergies  Reviewed by Patti Shook on 6/20/2024        Severity Reactions Comments    Gluten Not Specified Diarrhea             Pharmacy has been updated to [facility].    Sources used to complete the med history include dispense history, PTA medication list, patient interview. Patient is a poor historian.    Below are additional concerns with the patient's PTA list.  Patient really unable to confirm with certainty current medications. The only medication I could get a for sure confirmation or  denial of use on was Metformin. I removed from the list.    Patti Shook, [facility]  Please reach out via [employer] Secure Chat for questions

## 2024-06-20 NOTE — ED TRIAGE NOTES
Pt reports that she is very tired and shaky since last night and has a headache.  Also reports weakness. Pt is diabetic and has cirrhosis.

## 2024-06-20 NOTE — ED PROVIDER NOTES
HPI   Chief Complaint   Patient presents with    tired    shaky       49-year-old female presenting today with family reports that the patient was recently discharged after having fluid drained from her belly as a consequence of cirrhosis.  The patient has been at home and on the first day out of hospital seemed well.  She fairly quickly developed more progressive confusion to the point that today they seem to be having trouble holding a conversation with her without her forgetting what she was talking about.  She has been wandering about the house cannot seem to hold her concentration.  They note yesterday she sustained a fall but they did not note any loss of consciousness and she does not seem to complain of any injuries.                          [place] Coma Scale Score: 15                     Patient History   Past Medical History:   Diagnosis Date    Cirrhosis of liver (Multi)     Diabetes mellitus (Multi)      Past Surgical History:   Procedure Laterality Date    ABDOMINAL SURGERY      CT GUIDED IMAGING FOR NEEDLE PLACEMENT  10/14/2020    CT GUIDED IMAGING FOR NEEDLE PLACEMENT [facility]    US GUIDED ABDOMINAL PARACENTESIS  10/08/2020    US GUIDED ABDOMINAL PARACENTESIS [facility]     No family history on file.  Social History     Tobacco Use    Smoking status: Never    Smokeless tobacco: Never   Substance Use Topics    Alcohol use: Never    Drug use: Never       Physical Exam   ED Triage Vitals [06/20/24 1122]   Temperature Heart Rate Respirations BP   37 °C (98.6 °F) 91 18 108/73      Pulse Ox Temp Source Heart Rate Source Patient Position   100 % Temporal Monitor;Brachial Sitting      BP Location FiO2 (%)     Left arm --       Physical Exam  Vitals and nursing note reviewed.   HENT:      Head: Normocephalic and atraumatic.   Eyes:      Extraocular Movements: Extraocular movements intact.      Pupils: Pupils are equal, round, and reactive to light.   Cardiovascular:      Rate and Rhythm:  Normal rate and regular rhythm.   Pulmonary:      Effort: Pulmonary effort is normal.      Breath sounds: Normal breath sounds.   Abdominal:      Comments: Abdomen exhibits very mild distention with mild discomfort specifically with palpation of the right upper quadrant.  She exhibits no guarding or rebound or abdominal tenderness and other quadrants.   Musculoskeletal:         General: No swelling.   Skin:     Capillary Refill: Capillary refill takes less than 2 seconds.   Neurological:      Mental Status: She is alert.      Comments: Patient is awake.  Alert oriented to person and place not to time.  No focal deficits.         ED Course & MDM   ED Course as of 06/20/24 1539   Thu Jun 20, 2024   1306 Sinus rhythm rate of 82.  NC is normal.  QRS duration is normal.  QTc is normal.  No specific ST or or T wave changes. [AH]      ED Course User Index  [AH] Gaston Garcia MD         Diagnoses as of 06/20/24 1539   Hepatic encephalopathy (Multi)       Medical Decision Making  Patient presenting with increasing confusion with known liver disease.  Patient has been taking her lactulose but by report she has had maybe 1 bowel movement every other day.      With her ammonia level trending up and her current symptoms are certainly concerning for hepatic encephalopathy.  I have also evaluated the CT findings which show some enhancement inflammatory changes of the colon suspicion is that this from portal venous hypertension though peritonitis is certainly in the differential.  To exam the patient is afebrile she does not have significant leukocytosis and she does not manifest any overt peritoneal signs on abdominal examination for my exam.  I discussed the patient with Dr. Gaviria on-call for the hospitalist service he will facilitate admission to the hospital I have ordered a dose of lactulose and I explained discussed that she is not on any rifaximin or secondary ammonia abating measures.    Amount and/or Complexity of Data  Reviewed  Labs: ordered.     Details: 1.  Troponin is normal.  #2 CMP is overall unremarkable.  #3 lipase is normal.  #4 CBC shows persistent mild anemia and thrombocytopenia this is overall not markedly changed compared to previous.  #5 coagulation panel is unremarkable.  #8 ammonia level is increasing now to level of 100  Radiology: ordered and independent interpretation performed.     Details: CT scan of the head without contrast reveals no obvious evidence of intracranial hemorrhage.        Procedure  Procedures     Gaston Garcia MD  06/20/24 7414

## 2024-06-21 LAB
ALBUMIN SERPL-MCNC: 2.3 G/DL (ref 3.5–5)
ALP BLD-CCNC: 210 U/L (ref 35–125)
ALT SERPL-CCNC: 22 U/L (ref 5–40)
AMMONIA PLAS-SCNC: 110 UMOL/L (ref 12–45)
ANION GAP SERPL CALC-SCNC: 6 MMOL/L
AST SERPL-CCNC: 35 U/L (ref 5–40)
BILIRUB SERPL-MCNC: 1.2 MG/DL (ref 0.1–1.2)
BUN SERPL-MCNC: 11 MG/DL (ref 8–25)
CALCIUM SERPL-MCNC: 8.3 MG/DL (ref 8.5–10.4)
CHLORIDE SERPL-SCNC: 107 MMOL/L (ref 97–107)
CO2 SERPL-SCNC: 28 MMOL/L (ref 24–31)
CREAT SERPL-MCNC: 0.5 MG/DL (ref 0.4–1.6)
EGFRCR SERPLBLD CKD-EPI 2021: >90 ML/MIN/1.73M*2
ERYTHROCYTE [DISTWIDTH] IN BLOOD BY AUTOMATED COUNT: 20.7 % (ref 11.5–14.5)
EST. AVERAGE GLUCOSE BLD GHB EST-MCNC: 217 MG/DL
FERRITIN SERPL-MCNC: 24 NG/ML (ref 13–150)
FOLATE SERPL-MCNC: 14.4 NG/ML (ref 4.2–19.9)
GLUCOSE BLD MANUAL STRIP-MCNC: 115 MG/DL (ref 74–99)
GLUCOSE BLD MANUAL STRIP-MCNC: 177 MG/DL (ref 74–99)
GLUCOSE BLD MANUAL STRIP-MCNC: 278 MG/DL (ref 74–99)
GLUCOSE BLD MANUAL STRIP-MCNC: 294 MG/DL (ref 74–99)
GLUCOSE SERPL-MCNC: 157 MG/DL (ref 65–99)
HBA1C MFR BLD: 9.2 %
HCT VFR BLD AUTO: 28.5 % (ref 36–46)
HGB BLD-MCNC: 8.8 G/DL (ref 12–16)
IRON SATN MFR SERPL: 12 % (ref 12–50)
IRON SERPL-MCNC: 31 UG/DL (ref 30–160)
MCH RBC QN AUTO: 23.8 PG (ref 26–34)
MCHC RBC AUTO-ENTMCNC: 30.9 G/DL (ref 32–36)
MCV RBC AUTO: 77 FL (ref 80–100)
NRBC BLD-RTO: 0 /100 WBCS (ref 0–0)
PLATELET # BLD AUTO: 71 X10*3/UL (ref 150–450)
POTASSIUM SERPL-SCNC: 3.8 MMOL/L (ref 3.4–5.1)
PROT SERPL-MCNC: 6.3 G/DL (ref 5.9–7.9)
RBC # BLD AUTO: 3.69 X10*6/UL (ref 4–5.2)
SODIUM SERPL-SCNC: 141 MMOL/L (ref 133–145)
TIBC SERPL-MCNC: 258 UG/DL (ref 228–428)
UIBC SERPL-MCNC: 227 UG/DL (ref 110–370)
VIT B12 SERPL-MCNC: 861 PG/ML (ref 211–946)
WBC # BLD AUTO: 4.4 X10*3/UL (ref 4.4–11.3)

## 2024-06-21 PROCEDURE — 80053 COMPREHEN METABOLIC PANEL: CPT | Performed by: NURSE PRACTITIONER

## 2024-06-21 PROCEDURE — 83036 HEMOGLOBIN GLYCOSYLATED A1C: CPT | Performed by: NURSE PRACTITIONER

## 2024-06-21 PROCEDURE — 2500000002 HC RX 250 W HCPCS SELF ADMINISTERED DRUGS (ALT 637 FOR MEDICARE OP, ALT 636 FOR OP/ED): Performed by: NURSE PRACTITIONER

## 2024-06-21 PROCEDURE — 2500000001 HC RX 250 WO HCPCS SELF ADMINISTERED DRUGS (ALT 637 FOR MEDICARE OP): Performed by: INTERNAL MEDICINE

## 2024-06-21 PROCEDURE — 2500000001 HC RX 250 WO HCPCS SELF ADMINISTERED DRUGS (ALT 637 FOR MEDICARE OP): Performed by: NURSE PRACTITIONER

## 2024-06-21 PROCEDURE — 82140 ASSAY OF AMMONIA: CPT | Performed by: NURSE PRACTITIONER

## 2024-06-21 PROCEDURE — 82728 ASSAY OF FERRITIN: CPT | Performed by: NURSE PRACTITIONER

## 2024-06-21 PROCEDURE — 36415 COLL VENOUS BLD VENIPUNCTURE: CPT | Performed by: NURSE PRACTITIONER

## 2024-06-21 PROCEDURE — 1210000001 HC SEMI-PRIVATE ROOM DAILY

## 2024-06-21 PROCEDURE — 2500000004 HC RX 250 GENERAL PHARMACY W/ HCPCS (ALT 636 FOR OP/ED): Performed by: INTERNAL MEDICINE

## 2024-06-21 PROCEDURE — 85027 COMPLETE CBC AUTOMATED: CPT | Performed by: NURSE PRACTITIONER

## 2024-06-21 PROCEDURE — 82947 ASSAY GLUCOSE BLOOD QUANT: CPT

## 2024-06-21 PROCEDURE — 97530 THERAPEUTIC ACTIVITIES: CPT | Mod: GO

## 2024-06-21 PROCEDURE — 83550 IRON BINDING TEST: CPT | Performed by: NURSE PRACTITIONER

## 2024-06-21 PROCEDURE — 82746 ASSAY OF FOLIC ACID SERUM: CPT | Performed by: NURSE PRACTITIONER

## 2024-06-21 PROCEDURE — 97166 OT EVAL MOD COMPLEX 45 MIN: CPT | Mod: GO

## 2024-06-21 PROCEDURE — 2500000002 HC RX 250 W HCPCS SELF ADMINISTERED DRUGS (ALT 637 FOR MEDICARE OP, ALT 636 FOR OP/ED): Performed by: INTERNAL MEDICINE

## 2024-06-21 PROCEDURE — 82607 VITAMIN B-12: CPT | Performed by: NURSE PRACTITIONER

## 2024-06-21 PROCEDURE — 82270 OCCULT BLOOD FECES: CPT | Performed by: NURSE PRACTITIONER

## 2024-06-21 RX ORDER — POLYETHYLENE GLYCOL 3350 17 G/17G
17 POWDER, FOR SOLUTION ORAL 2 TIMES DAILY
Status: DISCONTINUED | OUTPATIENT
Start: 2024-06-21 | End: 2024-06-25 | Stop reason: HOSPADM

## 2024-06-21 RX ORDER — GABAPENTIN 100 MG/1
200 CAPSULE ORAL 3 TIMES DAILY
Status: DISCONTINUED | OUTPATIENT
Start: 2024-06-21 | End: 2024-06-25 | Stop reason: HOSPADM

## 2024-06-21 RX ORDER — DEXTROSE 50 % IN WATER (D50W) INTRAVENOUS SYRINGE
25
Status: DISCONTINUED | OUTPATIENT
Start: 2024-06-21 | End: 2024-06-25 | Stop reason: HOSPADM

## 2024-06-21 RX ORDER — LANOLIN ALCOHOL/MO/W.PET/CERES
400 CREAM (GRAM) TOPICAL DAILY
Status: DISCONTINUED | OUTPATIENT
Start: 2024-06-21 | End: 2024-06-25 | Stop reason: HOSPADM

## 2024-06-21 RX ORDER — SUCRALFATE 1 G/10ML
1 SUSPENSION ORAL
Status: DISCONTINUED | OUTPATIENT
Start: 2024-06-21 | End: 2024-06-25 | Stop reason: HOSPADM

## 2024-06-21 RX ORDER — INSULIN GLARGINE 100 [IU]/ML
25 INJECTION, SOLUTION SUBCUTANEOUS 2 TIMES DAILY
Status: DISCONTINUED | OUTPATIENT
Start: 2024-06-21 | End: 2024-06-21

## 2024-06-21 RX ORDER — ATORVASTATIN CALCIUM 80 MG/1
80 TABLET, FILM COATED ORAL NIGHTLY
Status: DISCONTINUED | OUTPATIENT
Start: 2024-06-21 | End: 2024-06-25 | Stop reason: HOSPADM

## 2024-06-21 RX ORDER — HYDROXYZINE HYDROCHLORIDE 25 MG/1
25 TABLET, FILM COATED ORAL EVERY 6 HOURS PRN
Status: DISCONTINUED | OUTPATIENT
Start: 2024-06-21 | End: 2024-06-25 | Stop reason: HOSPADM

## 2024-06-21 RX ORDER — SPIRONOLACTONE 50 MG/1
50 TABLET, FILM COATED ORAL 2 TIMES DAILY
Status: DISCONTINUED | OUTPATIENT
Start: 2024-06-21 | End: 2024-06-25 | Stop reason: HOSPADM

## 2024-06-21 RX ORDER — TRAZODONE HYDROCHLORIDE 50 MG/1
25 TABLET ORAL NIGHTLY
Status: DISCONTINUED | OUTPATIENT
Start: 2024-06-21 | End: 2024-06-25 | Stop reason: HOSPADM

## 2024-06-21 RX ORDER — ONDANSETRON 4 MG/1
4 TABLET, ORALLY DISINTEGRATING ORAL EVERY 6 HOURS PRN
Status: DISCONTINUED | OUTPATIENT
Start: 2024-06-21 | End: 2024-06-25 | Stop reason: HOSPADM

## 2024-06-21 RX ORDER — INSULIN LISPRO 100 [IU]/ML
0-15 INJECTION, SOLUTION INTRAVENOUS; SUBCUTANEOUS
Status: DISCONTINUED | OUTPATIENT
Start: 2024-06-21 | End: 2024-06-25 | Stop reason: HOSPADM

## 2024-06-21 RX ORDER — NADOLOL 40 MG/1
20 TABLET ORAL DAILY
Status: DISCONTINUED | OUTPATIENT
Start: 2024-06-21 | End: 2024-06-25 | Stop reason: HOSPADM

## 2024-06-21 RX ORDER — DEXTROSE 50 % IN WATER (D50W) INTRAVENOUS SYRINGE
12.5
Status: DISCONTINUED | OUTPATIENT
Start: 2024-06-21 | End: 2024-06-25 | Stop reason: HOSPADM

## 2024-06-21 RX ORDER — TRAMADOL HYDROCHLORIDE 50 MG/1
50 TABLET ORAL EVERY 6 HOURS PRN
Status: DISCONTINUED | OUTPATIENT
Start: 2024-06-21 | End: 2024-06-25 | Stop reason: HOSPADM

## 2024-06-21 RX ORDER — FENOFIBRATE 160 MG/1
160 TABLET ORAL DAILY
Status: DISCONTINUED | OUTPATIENT
Start: 2024-06-21 | End: 2024-06-25 | Stop reason: HOSPADM

## 2024-06-21 RX ORDER — URSODIOL 300 MG/1
300 CAPSULE ORAL 3 TIMES DAILY
Status: DISCONTINUED | OUTPATIENT
Start: 2024-06-21 | End: 2024-06-25 | Stop reason: HOSPADM

## 2024-06-21 RX ORDER — LACTULOSE 10 G/15ML
20 SOLUTION ORAL 3 TIMES DAILY
Status: DISCONTINUED | OUTPATIENT
Start: 2024-06-21 | End: 2024-06-23

## 2024-06-21 RX ORDER — PANTOPRAZOLE SODIUM 40 MG/1
40 TABLET, DELAYED RELEASE ORAL DAILY
Status: DISCONTINUED | OUTPATIENT
Start: 2024-06-21 | End: 2024-06-25 | Stop reason: HOSPADM

## 2024-06-21 RX ORDER — INSULIN GLARGINE 100 [IU]/ML
25 INJECTION, SOLUTION SUBCUTANEOUS EVERY MORNING
Status: DISCONTINUED | OUTPATIENT
Start: 2024-06-22 | End: 2024-06-25 | Stop reason: HOSPADM

## 2024-06-21 RX ORDER — DICYCLOMINE HYDROCHLORIDE 20 MG/1
20 TABLET ORAL 4 TIMES DAILY
Status: DISCONTINUED | OUTPATIENT
Start: 2024-06-21 | End: 2024-06-25 | Stop reason: HOSPADM

## 2024-06-21 RX ORDER — DOCUSATE SODIUM 100 MG/1
100 CAPSULE, LIQUID FILLED ORAL 2 TIMES DAILY
Status: DISCONTINUED | OUTPATIENT
Start: 2024-06-21 | End: 2024-06-25 | Stop reason: HOSPADM

## 2024-06-21 RX ORDER — FUROSEMIDE 40 MG/1
40 TABLET ORAL DAILY
Status: DISCONTINUED | OUTPATIENT
Start: 2024-06-21 | End: 2024-06-25 | Stop reason: HOSPADM

## 2024-06-21 RX ADMIN — TRAMADOL HYDROCHLORIDE 50 MG: 50 TABLET, COATED ORAL at 02:21

## 2024-06-21 RX ADMIN — GABAPENTIN 200 MG: 100 CAPSULE ORAL at 14:51

## 2024-06-21 RX ADMIN — PANTOPRAZOLE SODIUM 40 MG: 40 TABLET, DELAYED RELEASE ORAL at 08:46

## 2024-06-21 RX ADMIN — DICYCLOMINE HYDROCHLORIDE 20 MG: 20 TABLET ORAL at 06:28

## 2024-06-21 RX ADMIN — DOCUSATE SODIUM 100 MG: 100 CAPSULE, LIQUID FILLED ORAL at 08:37

## 2024-06-21 RX ADMIN — DICYCLOMINE HYDROCHLORIDE 20 MG: 20 TABLET ORAL at 21:41

## 2024-06-21 RX ADMIN — LACTULOSE 20 G: 20 SOLUTION ORAL at 21:40

## 2024-06-21 RX ADMIN — SPIRONOLACTONE 50 MG: 50 TABLET ORAL at 02:22

## 2024-06-21 RX ADMIN — GABAPENTIN 200 MG: 100 CAPSULE ORAL at 21:41

## 2024-06-21 RX ADMIN — LACTULOSE 20 G: 20 SOLUTION ORAL at 14:51

## 2024-06-21 RX ADMIN — HYDROXYZINE HYDROCHLORIDE 25 MG: 25 TABLET ORAL at 02:22

## 2024-06-21 RX ADMIN — GABAPENTIN 200 MG: 100 CAPSULE ORAL at 08:37

## 2024-06-21 RX ADMIN — Medication 400 MG: at 08:37

## 2024-06-21 RX ADMIN — POLYETHYLENE GLYCOL 3350 17 G: 17 POWDER, FOR SOLUTION ORAL at 08:37

## 2024-06-21 RX ADMIN — DOCUSATE SODIUM 100 MG: 100 CAPSULE, LIQUID FILLED ORAL at 21:41

## 2024-06-21 RX ADMIN — URSODIOL 300 MG: 300 CAPSULE ORAL at 08:37

## 2024-06-21 RX ADMIN — ATORVASTATIN CALCIUM 80 MG: 80 TABLET, FILM COATED ORAL at 21:41

## 2024-06-21 RX ADMIN — TRAZODONE HYDROCHLORIDE 25 MG: 50 TABLET ORAL at 21:41

## 2024-06-21 RX ADMIN — DICYCLOMINE HYDROCHLORIDE 20 MG: 20 TABLET ORAL at 16:12

## 2024-06-21 RX ADMIN — DICYCLOMINE HYDROCHLORIDE 20 MG: 20 TABLET ORAL at 12:29

## 2024-06-21 RX ADMIN — SUCRALFATE 1 G: 1 SUSPENSION ORAL at 16:12

## 2024-06-21 RX ADMIN — LACTULOSE 20 G: 20 SOLUTION ORAL at 08:37

## 2024-06-21 RX ADMIN — URSODIOL 300 MG: 300 CAPSULE ORAL at 21:41

## 2024-06-21 RX ADMIN — SUCRALFATE 1 G: 1 SUSPENSION ORAL at 06:28

## 2024-06-21 RX ADMIN — URSODIOL 300 MG: 300 CAPSULE ORAL at 14:51

## 2024-06-21 RX ADMIN — SPIRONOLACTONE 50 MG: 50 TABLET ORAL at 21:41

## 2024-06-21 RX ADMIN — FENOFIBRATE 160 MG: 160 TABLET ORAL at 11:27

## 2024-06-21 RX ADMIN — SUCRALFATE 1 G: 1 SUSPENSION ORAL at 21:41

## 2024-06-21 RX ADMIN — TRAZODONE HYDROCHLORIDE 25 MG: 50 TABLET ORAL at 02:21

## 2024-06-21 RX ADMIN — INSULIN LISPRO 9 UNITS: 100 INJECTION, SOLUTION INTRAVENOUS; SUBCUTANEOUS at 16:58

## 2024-06-21 RX ADMIN — ATORVASTATIN CALCIUM 80 MG: 80 TABLET, FILM COATED ORAL at 02:22

## 2024-06-21 RX ADMIN — TRAMADOL HYDROCHLORIDE 50 MG: 50 TABLET, COATED ORAL at 18:46

## 2024-06-21 RX ADMIN — POLYETHYLENE GLYCOL 3350 17 G: 17 POWDER, FOR SOLUTION ORAL at 21:41

## 2024-06-21 RX ADMIN — SUCRALFATE 1 G: 1 SUSPENSION ORAL at 11:28

## 2024-06-21 SDOH — SOCIAL STABILITY: SOCIAL INSECURITY: DOES ANYONE TRY TO KEEP YOU FROM HAVING/CONTACTING OTHER FRIENDS OR DOING THINGS OUTSIDE YOUR HOME?: NO

## 2024-06-21 SDOH — SOCIAL STABILITY: SOCIAL INSECURITY: HAS ANYONE EVER THREATENED TO HURT YOUR FAMILY OR YOUR PETS?: NO

## 2024-06-21 SDOH — SOCIAL STABILITY: SOCIAL INSECURITY: DO YOU FEEL ANYONE HAS EXPLOITED OR TAKEN ADVANTAGE OF YOU FINANCIALLY OR OF YOUR PERSONAL PROPERTY?: NO

## 2024-06-21 SDOH — SOCIAL STABILITY: SOCIAL INSECURITY: DO YOU FEEL UNSAFE GOING BACK TO THE PLACE WHERE YOU ARE LIVING?: NO

## 2024-06-21 SDOH — SOCIAL STABILITY: SOCIAL INSECURITY: ARE THERE ANY APPARENT SIGNS OF INJURIES/BEHAVIORS THAT COULD BE RELATED TO ABUSE/NEGLECT?: NO

## 2024-06-21 SDOH — SOCIAL STABILITY: SOCIAL INSECURITY: ABUSE: ADULT

## 2024-06-21 SDOH — SOCIAL STABILITY: SOCIAL INSECURITY: ARE YOU OR HAVE YOU BEEN THREATENED OR ABUSED PHYSICALLY, EMOTIONALLY, OR SEXUALLY BY ANYONE?: NO

## 2024-06-21 SDOH — SOCIAL STABILITY: SOCIAL INSECURITY: HAVE YOU HAD ANY THOUGHTS OF HARMING ANYONE ELSE?: NO

## 2024-06-21 SDOH — SOCIAL STABILITY: SOCIAL INSECURITY: HAVE YOU HAD THOUGHTS OF HARMING ANYONE ELSE?: NO

## 2024-06-21 SDOH — SOCIAL STABILITY: SOCIAL INSECURITY: WERE YOU ABLE TO COMPLETE ALL THE BEHAVIORAL HEALTH SCREENINGS?: YES

## 2024-06-21 ASSESSMENT — COGNITIVE AND FUNCTIONAL STATUS - GENERAL
STANDING UP FROM CHAIR USING ARMS: A LITTLE
CLIMB 3 TO 5 STEPS WITH RAILING: A LITTLE
PATIENT BASELINE BEDBOUND: NO
HELP NEEDED FOR BATHING: A LOT
STANDING UP FROM CHAIR USING ARMS: A LITTLE
CLIMB 3 TO 5 STEPS WITH RAILING: A LITTLE
DRESSING REGULAR UPPER BODY CLOTHING: A LITTLE
DAILY ACTIVITIY SCORE: 16
MOBILITY SCORE: 23
MOVING TO AND FROM BED TO CHAIR: A LITTLE
HELP NEEDED FOR BATHING: A LOT
DRESSING REGULAR LOWER BODY CLOTHING: A LITTLE
DAILY ACTIVITIY SCORE: 22
DRESSING REGULAR UPPER BODY CLOTHING: A LITTLE
DRESSING REGULAR LOWER BODY CLOTHING: A LOT
WALKING IN HOSPITAL ROOM: A LITTLE
MOBILITY SCORE: 20
PERSONAL GROOMING: A LITTLE
PERSONAL GROOMING: A LITTLE
MOBILITY SCORE: 20
DAILY ACTIVITIY SCORE: 24
DAILY ACTIVITIY SCORE: 16
TOILETING: A LOT
TOILETING: A LOT
MOVING TO AND FROM BED TO CHAIR: A LITTLE
TOILETING: A LITTLE
DRESSING REGULAR LOWER BODY CLOTHING: A LOT
WALKING IN HOSPITAL ROOM: A LITTLE
CLIMB 3 TO 5 STEPS WITH RAILING: A LITTLE

## 2024-06-21 ASSESSMENT — PAIN - FUNCTIONAL ASSESSMENT
PAIN_FUNCTIONAL_ASSESSMENT: 0-10
PAIN_FUNCTIONAL_ASSESSMENT: FLACC (FACE, LEGS, ACTIVITY, CRY, CONSOLABILITY)
PAIN_FUNCTIONAL_ASSESSMENT: 0-10
PAIN_FUNCTIONAL_ASSESSMENT: 0-10

## 2024-06-21 ASSESSMENT — LIFESTYLE VARIABLES
HOW OFTEN DO YOU HAVE 6 OR MORE DRINKS ON ONE OCCASION: NEVER
HOW OFTEN DO YOU HAVE A DRINK CONTAINING ALCOHOL: NEVER
SUBSTANCE_ABUSE_PAST_12_MONTHS: NO
AUDIT-C TOTAL SCORE: 0
AUDIT-C TOTAL SCORE: 0
SKIP TO QUESTIONS 9-10: 1
HOW MANY STANDARD DRINKS CONTAINING ALCOHOL DO YOU HAVE ON A TYPICAL DAY: PATIENT DOES NOT DRINK
PRESCIPTION_ABUSE_PAST_12_MONTHS: NO

## 2024-06-21 ASSESSMENT — ACTIVITIES OF DAILY LIVING (ADL)
LACK_OF_TRANSPORTATION: NO
PATIENT'S MEMORY ADEQUATE TO SAFELY COMPLETE DAILY ACTIVITIES?: YES
FEEDING YOURSELF: INDEPENDENT
HEARING - LEFT EAR: FUNCTIONAL
ADEQUATE_TO_COMPLETE_ADL: YES
ADEQUATE_TO_COMPLETE_ADL: YES
DRESSING YOURSELF: INDEPENDENT
GROOMING: INDEPENDENT
BATHING: INDEPENDENT
WALKS IN HOME: INDEPENDENT
HEARING - RIGHT EAR: FUNCTIONAL
JUDGMENT_ADEQUATE_SAFELY_COMPLETE_DAILY_ACTIVITIES: YES
GROOMING: INDEPENDENT
BATHING_ASSISTANCE: MODERATE
FEEDING YOURSELF: INDEPENDENT
JUDGMENT_ADEQUATE_SAFELY_COMPLETE_DAILY_ACTIVITIES: YES
PATIENT'S MEMORY ADEQUATE TO SAFELY COMPLETE DAILY ACTIVITIES?: YES
TOILETING: INDEPENDENT
HEARING - RIGHT EAR: FUNCTIONAL
HEARING - LEFT EAR: FUNCTIONAL
ADL_ASSISTANCE: INDEPENDENT
TOILETING: INDEPENDENT
WALKS IN HOME: INDEPENDENT
BATHING: INDEPENDENT
DRESSING YOURSELF: INDEPENDENT

## 2024-06-21 ASSESSMENT — PAIN SCALES - GENERAL
PAINLEVEL_OUTOF10: 0 - NO PAIN
PAINLEVEL_OUTOF10: 3
PAINLEVEL_OUTOF10: 0 - NO PAIN
PAINLEVEL_OUTOF10: 3
PAINLEVEL_OUTOF10: 7
PAINLEVEL_OUTOF10: 5 - MODERATE PAIN
PAINLEVEL_OUTOF10: 0 - NO PAIN

## 2024-06-21 ASSESSMENT — ENCOUNTER SYMPTOMS
HEMATOLOGIC/LYMPHATIC NEGATIVE: 1
ENDOCRINE NEGATIVE: 1
WEAKNESS: 1
MUSCULOSKELETAL NEGATIVE: 1
CONSTITUTIONAL NEGATIVE: 1
RESPIRATORY NEGATIVE: 1
EYES NEGATIVE: 1
ALLERGIC/IMMUNOLOGIC NEGATIVE: 1
CARDIOVASCULAR NEGATIVE: 1
CONFUSION: 1
GASTROINTESTINAL NEGATIVE: 1

## 2024-06-21 ASSESSMENT — PAIN SCALES - WONG BAKER: WONGBAKER_NUMERICALRESPONSE: NO HURT

## 2024-06-21 ASSESSMENT — PAIN DESCRIPTION - LOCATION
LOCATION: ABDOMEN
LOCATION: PELVIS

## 2024-06-21 ASSESSMENT — PAIN DESCRIPTION - ORIENTATION: ORIENTATION: MID

## 2024-06-21 NOTE — SIGNIFICANT EVENT
Orthostatic BP's completed and are as follows:      06/21/24 1004 06/21/24 1007 06/21/24 1009   Vital Signs   Heart Rate 83 85 96   Heart Rate Source Monitor Monitor Monitor   Resp 16 16 16   /64 101/62 91/64   MAP (mmHg) 75 74 73   BP Location Left arm Left arm Left arm   BP Method Automatic Automatic Automatic   Patient Position Lying Sitting Standing   Medical Gas Therapy   SpO2 98 % 99 % 98 %   Pulse Oximetry Type Intermittent Intermittent Intermittent   Oximetry Probe Site Location Finger Finger Finger   Patient Activity During SpO2 Measurement At rest  --   --    Medical Gas Therapy None (Room air) None (Room air) None (Room air)

## 2024-06-21 NOTE — CARE PLAN
The patient's goals for the shift include  sleep     The clinical goals for the shift include  Pt will sleep a minimum of 4 hours by the end of this shift       Problem: Pain - Adult  Goal: Verbalizes/displays adequate comfort level or baseline comfort level  Outcome: Progressing     Problem: Safety - Adult  Goal: Free from fall injury  Outcome: Progressing

## 2024-06-21 NOTE — PROGRESS NOTES
06/21/24 1041   Discharge Planning   Living Arrangements Spouse/significant other   Support Systems Spouse/significant other;Family members   Assistance Needed None, independent with ADLs and IADLs   Type of Residence Private residence   Home or Post Acute Services None   Patient expects to be discharged to: Home   Does the patient need discharge transport arranged? No   RoundTrip coordination needed? No     Spoke with patient to discuss discharge planning. Patient A&Ox3, prior to admission patient independent with all ADL's and IADLS. Patient denies any use of assistive devices, no home oxygen, no HD,Patient drives, Patient lives with spouse. Patient denies any home going needs. Patient states has a nurse that visits her at home through her insurance.     1505 PT/OT to eval, patient undecided if would go to SNF if recommended. Patient given SNF preference List.

## 2024-06-21 NOTE — NURSING NOTE
Pt transferred into room 433 with all belongings. Family at bedside. And Nurse Gila at bedside to receive.

## 2024-06-21 NOTE — H&P
Chief Complaint Confusion, fall    History Of Present Illness  Alfonzo Flanagan is a very pleasant 49 y.o. female presenting with confusion and a fall.  She was in her usual state of health until the date of admission.  She reports confusion and a fall.  She is unable to provide the details of the fall.  She denies striking her head or any injuries.  She reports taking all medications as prescribed.  Per the record, she was doing well upon discharge from the hospital, developed confusion, had difficulty holding conversation and concentrating, wandering about the house.  She sustained a fall at home.  She takes Lactulose twice daily.  She reports 2 bowel movements yesterday.  She presented to the emergency department with family for evaluation.  Emergency department, initial workup was done.  Initial vital signs revealed a temperature of 37 °C.  Pulse 91.  Respiration rate 18.  Blood pressure 108/73.  Pulse oximetry 100% on room air.  12-lead EKG showed normal sinus rhythm, no acute ischemia.  CT head without contrast per radiology showed no acute process.  CT abdomen and pelvis per radiology showed cirrhosis consistent with sequela of portal venous hypertension including splenomegaly, ascites and vascular collateralization, diffuse small bowel and cecal wall thickening edema given presence of cirrhosis differential diagnosis favors portal venous enteropathy, differential can include infectious inflammatory or vascular enteritis, correlate with clinical findings.  She was treated urgency department IV fluids and lactulose and was admitted.  At the time of my evaluation, she is resting in bed in no acute distress.  She is awake alert oriented x 2.  She states her name and date of birth.  She states the month is August and the year is 2023.  She is confused.  She denies any focal deficits.  She denies any specific complaints at this time.     Past Medical History  Past Medical History:   Diagnosis Date    Cirrhosis  of liver (Multi)     Diabetes mellitus (Multi)        Surgical History  Past Surgical History:   Procedure Laterality Date    ABDOMINAL SURGERY      CT GUIDED IMAGING FOR NEEDLE PLACEMENT  10/14/2020    CT GUIDED IMAGING FOR NEEDLE PLACEMENT [facility]    US GUIDED ABDOMINAL PARACENTESIS  10/08/2020    US GUIDED ABDOMINAL PARACENTESIS [facility]        Social History  She reports that she has never smoked. She has never used smokeless tobacco. She reports that she does not drink alcohol and does not use drugs.    Family History  No family history on file.     Allergies  Gluten    Review of Systems   Constitutional: Negative.    HENT: Negative.     Eyes: Negative.    Respiratory: Negative.     Cardiovascular: Negative.    Gastrointestinal: Negative.    Endocrine: Negative.    Genitourinary: Negative.    Musculoskeletal: Negative.    Skin: Negative.    Allergic/Immunologic: Negative.    Neurological:  Positive for weakness.   Hematological: Negative.    Psychiatric/Behavioral:  Positive for confusion.    All other systems reviewed and are negative.         Physical Exam  Vitals and nursing note reviewed.   Constitutional:       General: She is not in acute distress.     Appearance: Normal appearance. She is obese. She is not ill-appearing, toxic-appearing or diaphoretic.      Comments: Chronically ill appearing.   HENT:      Head: Normocephalic and atraumatic.      Right Ear: Tympanic membrane normal.      Left Ear: Tympanic membrane normal.      Nose: Nose normal.      Mouth/Throat:      Mouth: Mucous membranes are moist.      Pharynx: Oropharynx is clear.   Eyes:      Extraocular Movements: Extraocular movements intact.      Conjunctiva/sclera: Conjunctivae normal.      Pupils: Pupils are equal, round, and reactive to light.   Cardiovascular:      Rate and Rhythm: Normal rate and regular rhythm.      Pulses: Normal pulses.      Heart sounds: Normal heart sounds. No murmur heard.  Pulmonary:       "Effort: Pulmonary effort is normal. No respiratory distress.      Breath sounds: Normal breath sounds. No wheezing, rhonchi or rales.      Comments: Room air.  Abdominal:      General: Bowel sounds are normal. There is distension.      Palpations: Abdomen is soft.      Tenderness: There is no abdominal tenderness.      Comments: Slightly distended + ascites.   Genitourinary:     Comments: Deferred.  Musculoskeletal:         General: No swelling or tenderness. Normal range of motion.      Cervical back: Normal range of motion and neck supple.   Skin:     General: Skin is warm and dry.      Capillary Refill: Capillary refill takes less than 2 seconds.   Neurological:      General: No focal deficit present.      Mental Status: She is alert. She is disoriented.      Motor: No weakness.      Comments: Awake alert oriented x 2-3.  States name and birth-date.  States month is August, year is 2023.  Speech clear, coherent.  Follows all commands.  Generalized weakness.  No focal weakness.  Gait deferred.  Cranial nerves II through XII grossly intact.   Psychiatric:         Mood and Affect: Mood normal.         Behavior: Behavior normal.       Last Recorded Vitals  Blood pressure 97/54, pulse 70, temperature 36.5 °C (97.7 °F), temperature source Oral, resp. rate 17, height 1.575 m (5' 2\"), weight 77.6 kg (171 lb), SpO2 98%.    Relevant Results  Results for orders placed or performed during the hospital encounter of 06/20/24 (from the past 24 hour(s))   ECG 12 lead   Result Value Ref Range    Ventricular Rate 82 BPM    Atrial Rate 82 BPM    WY Interval 168 ms    QRS Duration 74 ms    QT Interval 408 ms    QTC Calculation(Bazett) 476 ms    P Axis 58 degrees    R Axis 23 degrees    T Axis 30 degrees    QRS Count 13 beats    Q Onset 217 ms    P Onset 133 ms    P Offset 185 ms    T Offset 421 ms    QTC Fredericia 452 ms   Ammonia   Result Value Ref Range    Ammonia 100 (H) 12 - 45 umol/L   CBC   Result Value Ref Range    WBC 4.8 " 4.4 - 11.3 x10*3/uL    nRBC 0.0 0.0 - 0.0 /100 WBCs    RBC 4.26 4.00 - 5.20 x10*6/uL    Hemoglobin 10.3 (L) 12.0 - 16.0 g/dL    Hematocrit 33.0 (L) 36.0 - 46.0 %    MCV 78 (L) 80 - 100 fL    MCH 24.2 (L) 26.0 - 34.0 pg    MCHC 31.2 (L) 32.0 - 36.0 g/dL    RDW 21.1 (H) 11.5 - 14.5 %    Platelets 78 (L) 150 - 450 x10*3/uL   Comprehensive metabolic panel   Result Value Ref Range    Glucose 213 (H) 65 - 99 mg/dL    Sodium 139 133 - 145 mmol/L    Potassium 4.3 3.4 - 5.1 mmol/L    Chloride 103 97 - 107 mmol/L    Bicarbonate 28 24 - 31 mmol/L    Urea Nitrogen 11 8 - 25 mg/dL    Creatinine 0.70 0.40 - 1.60 mg/dL    eGFR >90 >60 mL/min/1.73m*2    Calcium 8.8 8.5 - 10.4 mg/dL    Albumin 2.7 (L) 3.5 - 5.0 g/dL    Alkaline Phosphatase 281 (H) 35 - 125 U/L    Total Protein 7.2 5.9 - 7.9 g/dL    AST 40 5 - 40 U/L    Bilirubin, Total 1.4 (H) 0.1 - 1.2 mg/dL    ALT 25 5 - 40 U/L    Anion Gap 8 <=19 mmol/L   Lipase   Result Value Ref Range    Lipase 47 16 - 63 U/L   Troponin T   Result Value Ref Range    Troponin T, High Sensitivity 7 <=14 ng/L   Protime-INR   Result Value Ref Range    Protime 12.8 (H) 9.3 - 12.7 seconds    INR 1.2 0.9 - 1.2   APTT   Result Value Ref Range    aPTT 26.3 22.0 - 32.5 seconds   Comprehensive Metabolic Panel   Result Value Ref Range    Glucose 157 (H) 65 - 99 mg/dL    Sodium 141 133 - 145 mmol/L    Potassium 3.8 3.4 - 5.1 mmol/L    Chloride 107 97 - 107 mmol/L    Bicarbonate 28 24 - 31 mmol/L    Urea Nitrogen 11 8 - 25 mg/dL    Creatinine 0.50 0.40 - 1.60 mg/dL    eGFR >90 >60 mL/min/1.73m*2    Calcium 8.3 (L) 8.5 - 10.4 mg/dL    Albumin 2.3 (L) 3.5 - 5.0 g/dL    Alkaline Phosphatase 210 (H) 35 - 125 U/L    Total Protein 6.3 5.9 - 7.9 g/dL    AST 35 5 - 40 U/L    Bilirubin, Total 1.2 0.1 - 1.2 mg/dL    ALT 22 5 - 40 U/L    Anion Gap 6 <=19 mmol/L   CBC   Result Value Ref Range    WBC 4.4 4.4 - 11.3 x10*3/uL    nRBC 0.0 0.0 - 0.0 /100 WBCs    RBC 3.69 (L) 4.00 - 5.20 x10*6/uL    Hemoglobin 8.8 (L) 12.0 -  16.0 g/dL    Hematocrit 28.5 (L) 36.0 - 46.0 %    MCV 77 (L) 80 - 100 fL    MCH 23.8 (L) 26.0 - 34.0 pg    MCHC 30.9 (L) 32.0 - 36.0 g/dL    RDW 20.7 (H) 11.5 - 14.5 %    Platelets 71 (L) 150 - 450 x10*3/uL   Ammonia   Result Value Ref Range    Ammonia 110 (H) 12 - 45 umol/L   POCT GLUCOSE   Result Value Ref Range    POCT Glucose 115 (H) 74 - 99 mg/dL     Susceptibility data from last 90 days.  Collected Specimen Info Organism Amoxicillin/Clavulanate Ampicillin Ampicillin/Sulbactam Cefazolin Cefazolin (uncomplicated UTIs only) Ciprofloxacin Gentamicin Levofloxacin Nitrofurantoin Penicillin Piperacillin/Tazobactam   05/15/24 Urine from Clean Catch/Voided Enterococcus faecium   S     R   S  S  S      Klebsiella pneumoniae/variicola  S  R  S  S  S  S  S   I   S     Collected Specimen Info Organism Trimethoprim/Sulfamethoxazole Vancomycin   05/15/24 Urine from Clean Catch/Voided Enterococcus faecium  R  S     Klebsiella pneumoniae/variicola  S      CT abdomen pelvis w IV contrast    Result Date: 6/20/2024  Interpreted By:  Vivek Amezquita, STUDY: CT ABDOMEN PELVIS W IV CONTRAST; 6/20/2024 1:43 pm   INDICATION: Signs/Symptoms:abdominal pain.  weakness..   COMPARISON: CT abdomen pelvis 06/15/2024.   ACCESSION NUMBER(S): [id number]   ORDERING CLINICIAN: SONIA BROWNLEE   TECHNIQUE: Axial CT imaging of the abdomen and pelvis was performed. Sagittal and coronal reconstructions were performed. 100 milliliters of Optiray 350 contrast was utilized on this study.   FINDINGS: LOWER CHEST: The previously described right middle lobe pulmonary nodule is only partially visualized on the superior most images of this examination. The visualized lung bases are otherwise unremarkable. The heart is normal size without pericardial effusion.The distal esophagus is unremarkable.   ABDOMEN:   LIVER: The liver demonstrates a nodular contour consistent with cirrhosis. BILE DUCTS: The intrahepatic and extrahepatic ducts are not dilated.  GALLBLADDER: The gallbladder is surgically absent. PANCREAS: The pancreas appears unremarkable without evidence of ductal dilatation or masses. SPLEEN: The spleen is normal in size without focal lesions. ADRENAL GLANDS: Bilateral adrenal glands appear normal. KIDNEYS AND URETERS: The kidneys are normal in size and enhance symmetrically. No hydroureteronephrosis or nephroureterolithiasis is identified.   PELVIS:   BLADDER: The urinary bladder appears normal without abnormal wall thickening. REPRODUCTIVE ORGANS: No pelvic masses. BOWEL: The stomach is unremarkable. There is diffuse circumferential mural thickening and edema of the visualized small bowel as well as the cecum. The appendix is not definitively visualized. VESSELS: There is no aneurysmal dilatation of the abdominal aorta. The principal arterial vasculature of the abdomen is patent within the limits of this non angiographic exam. The IVC appears normal. The portal vein and superior mesenteric vein are patent. Note is made of numerous small perisplenic and perigastric vascular collaterals. Note is also made of recanalization of the umbilical vein. PERITONEUM/RETROPERITONEUM/LYMPH NODES: Note is made of a moderate volume of ascites. No abdominopelvic lymphadenopathy is present. BONE AND SOFT TISSUE: No suspicious osseous lesions are identified. Degenerative discogenic disease is noted in the lower thoracic and lumbar spine. A small fat containing periumbilical hernia is visualized.         1.  Note is made of cirrhosis with the imaging findings consistent with the sequela of portal venous hypertension including splenomegaly, ascites, and vascular collateralization. 2. There is diffuse small bowel and cecal wall thickening/edema, given the presence of cirrhosis differential diagnosis favors portal venous enteropathy however additional differential diagnosis can include infectious, inflammatory, or vascular enteritis. Correlation with clinical findings is  recommended.   Signed by: Vivek Amezquita 6/20/2024 2:33 PM Dictation workstation:   [id number]    CT head wo IV contrast    Result Date: 6/20/2024  Interpreted By:  Vivek Amezquita, STUDY: CT HEAD WO IV CONTRAST;  6/20/2024 1:39 pm   INDICATION: Signs/Symptoms:fall.   COMPARISON: CT head 05/30/2024.   ACCESSION NUMBER(S): [id number]   ORDERING CLINICIAN: SONIA BROWNLEE   TECHNIQUE: Noncontrast axial CT scan of head was performed. Angled reformats in brain and bone windows were generated. The images were reviewed in bone, brain, blood and soft tissue windows.   FINDINGS: CSF Spaces: The ventricles, sulci and basal cisterns are within normal limits. There is no extraaxial fluid collection.   Parenchyma: There are 2 punctate hyperdensities located within the right putamen which were present on prior exam however appears slightly more prominent on the current exam. These likely represent parenchymal calcifications. The grey-white differentiation is intact. There is no mass effect or midline shift.  There is no intracranial hemorrhage.   Calvarium: The calvarium is unremarkable.   Paranasal sinuses and mastoids: Visualized paranasal sinuses and mastoids are clear.       No evidence of acute intracranial process.   MACRO: None   Signed by: Vivek Amezquita 6/20/2024 2:20 PM Dictation workstation:   [id number]    ECG 12 lead    Result Date: 6/20/2024  Normal sinus rhythm Cannot rule out Anterior infarct , age undetermined Abnormal ECG When compared with ECG of 15-UBALDO-2024 06:52, (unconfirmed) No significant change was found     Scheduled medications  atorvastatin, 80 mg, oral, Nightly  dicyclomine, 20 mg, oral, 4x daily  docusate sodium, 100 mg, oral, BID  fenofibrate, 160 mg, oral, Daily  furosemide, 40 mg, oral, Daily  gabapentin, 200 mg, oral, TID  [START ON 6/22/2024] insulin glargine, 25 Units, subcutaneous, q AM  lactulose, 20 g, oral, TID  magnesium oxide, 400 mg, oral, Daily  nadolol, 20 mg, oral,  Daily  pantoprazole, 40 mg, oral, Daily  polyethylene glycol, 17 g, oral, BID  spironolactone, 50 mg, oral, BID  sucralfate, 1 g, oral, Before meals & nightly  traZODone, 25 mg, oral, Nightly  ursodiol, 300 mg, oral, TID      Continuous medications     PRN medications  PRN medications: dextrose, dextrose, glucagon, glucagon, hydrOXYzine HCL, ondansetron ODT, traMADol      ASSESSMENT:  Fall  Altered mental status  Hepatic encephalopathy  Hyperammonemia  Abnormal CT abdomen/pelvis  Hepatic cirrhosis with ascites  Portal hypertension  Splenomegaly  Small fat-containing periumbilical hernia  Microcytic anemia  Thrombocytopenia  Hypotension  Type 2 diabetes mellitus   Neuropathy  Degenerative discogenic disease thoracic and lumbar spine    PLAN:  Confused from baseline.  No focal deficits.  CT head without contrast radiology report reviewed, no acute findings.  Suspect hepatic encephalopathy, rule out urinary tract infection.  Check urinalysis, urine culture.  Increased Lactulose to 3 times daily.  Titrate for 3 bowel movements in 24 hours.  Monitor mentation closely.  Monitor ammonia level.  Low-sodium diet.  Monitor blood pressure.  Check orthostatic blood pressures.  Continue home medications as prescribed as appropriate.  Outpatient follow-up with [facility].  Hemoglobin A1c 9.2.  Monitor point-of-care glucose AC/HS.  Patient reports taking Lantus once daily at home in the am.  Poor oral intake this am.  May add sliding scale insulin.  Adjust insulin as needed for glycemic control.  Hypoglycemia protocol.  Diabetes education.  Continue home medications as prescribed as appropriate.  PT/OT evaluation.  Fall precautions.  Up with assistance only.  Bed and chair alarm at all times.  Pressure ulcer prevention measures.  Turn and reposition every 2 hours and as needed.  Heels off bed.  Offloading.  We will take DVT, fall, aspiration and decubitus precautions during her stay in the hospital.  The plan has  been discussed with the patient in detail.  She is agreeable.  Orders written Dr. Lawrence.  Any additions or modifications at his discretion.  Discussed with nursing.      MADYSON Sotelo-CNP

## 2024-06-21 NOTE — PROGRESS NOTES
Occupational Therapy    Evaluation/Treatment    Patient Name: Alfonzo Flanagan  MRN: [id number]  : 1975  Today's Date: 24  Time Calculation  Start Time: 1105  Stop Time: 1125  Time Calculation (min): 20 min       Assessment:  OT Assessment: Pt presents on eval with generalized weakness, cognitive/psychosocial deficits noted, decreased activity tolerance, and impaired standing balance affecting her self-care and functional transfers/mobility. Pt will benefit from continued skilled OT to address these deficits.  Prognosis: Good  Evaluation/Treatment Tolerance: Patient limited by fatigue  End of Session Communication: Bedside nurse  End of Session Patient Position: Bed, 3 rail up, Alarm on (Needs in reach.)  OT Assessment Results: Decreased ADL status, Decreased upper extremity strength, Decreased safe judgment during ADL, Decreased cognition, Decreased endurance, Decreased fine motor control, Decreased functional mobility, Decreased gross motor control, Decreased IADLs, Decreased trunk control for functional activities    Plan:  Treatment Interventions: ADL retraining, Functional transfer training, UE strengthening/ROM, Endurance training, Cognitive reorientation, Patient/family training, Equipment evaluation/education, Neuromuscular reeducation, Fine motor coordination activities, Compensatory technique education  OT Frequency: 4 times per week  OT Discharge Recommendations: Moderate intensity level of continued care  Equipment Recommended upon Discharge: Wheeled walker  OT Recommended Transfer Status: Minimal assist, Assist of 1  OT - OK to Discharge: Yes      Subjective     General:   OT Received On: 24  General  Reason for Referral: Confusion, weakness, impaired ADL's/mobility  Referred By: MADYSON Sotelo-CNP  Past Medical History Relevant to Rehab: cirrhosis of liver, DM  Family/Caregiver Present: No  Prior to Session Communication: Bedside nurse  Patient Position Received: Bed, 3 rail  up, Alarm off, not on at start of session  General Comment: Pt is a 50 yo female admitted to the hospital under observation after being recently discharged with confusion and a fall at home.    Precautions:  Hearing/Visual Limitations: wears reading glasses  Medical Precautions: Fall precautions  Precautions Comment: Pt is mostly Mongolian-speaking, but understands some English.    Pain:  Pain Assessment  Pain Assessment: FLACC (Face, Legs, Activity, Cry, Consolability)  0-10 (Numeric) Pain Score: 0 - No pain    Objective     Cognition:  Overall Cognitive Status: Impaired  Arousal/Alertness: Delayed responses to stimuli  Orientation Level: Disoriented to time (Pt able to state the correct year, but not the correct month)  Following Commands: Follows one step commands with increased time  Safety Judgment: Decreased awareness of need for safety precautions  Cognition Comments: Pt presents with a flat affect, confusion, and delayed overall processing of info. during conversation.  Insight: Moderate  Processing Speed: Delayed    Home Living:  Type of Home: Apartment  Lives With: Spouse, Adult children (Dtr.)  Home Adaptive Equipment: Walker rolling or standard  Home Layout: One level  Home Access: Stairs to enter with rails  Entrance Stairs-Rails:  (unilateral)  Entrance Stairs-Number of Steps: 11  Bathroom Shower/Tub: Tub/shower unit  Bathroom Toilet: Standard  Bathroom Equipment: None  Home Living Comments: Pt is a questionable historian.    Prior Function:  Level of [place]: Independent with ADLs and functional transfers, Independent with homemaking with ambulation  ADL Assistance: Independent (Dtr. only assists with showers as needed.)  Homemaking Assistance: Independent  Ambulatory Assistance: Independent  Hand Dominance: Right  Prior Function Comments: Pt is a questionable historian.    ADL:  Eating Assistance: Modified independent (Device)  Eating Deficit: Increased time to complete (no device)  Grooming  Assistance: Minimal  Grooming Deficit: Standing with assistive device, Steadying, Supervision/safety, Verbal cueing  Bathing Assistance: Moderate  UE Dressing Assistance: Minimal  LE Dressing Assistance: Moderate  Toileting Assistance with Device: Moderate  ADL Comments: Pt's recent rapid decline in function appears to be mostly affected by her worsening cognition and generalized weakness with balance deficits.    Activity Tolerance:  Activity Tolerance Comments: impaired and fatigues easily    Bed Mobility/Transfers: Bed Mobility  Bed Mobility:  (Pt completed supine<>sit in bed with close S for safety and increased time to complete.)    Transfers  Transfer:  (Pt completed sit<>stand with CGA for balance/safety and verbal cues for hand placement.)    Functional Mobility:  Functional Mobility  Functional Mobility Performed:  (Pt only tolerated short functional mobility in the room using a RW with min A for balance/safety throughout and pt demonstrating tremulous BLE movements.)    Standing Balance:  Static Standing Balance  Static Standing-Balance Support: Bilateral upper extremity supported  Static Standing-Level of Assistance: Minimum assistance, Contact guard    Therapy/Activity: Therapeutic Activity  Therapeutic Activity Performed:  (See bed mobility, transfers, functional mobility, and static standing balance.)    Sensation:  Sensation Comment: pt denies paresthesias    Strength:  Strength Comments: JAYLIN's 3+/5    Coordination:  Coordination Comment: JAYLIN's delayed overall     Hand Function:  Hand Function  Gross Grasp: Functional  Coordination: Impaired      Outcome Measures: [facility] Daily Activity  Putting on and taking off regular lower body clothing: A lot  Bathing (including washing, rinsing, drying): A lot  Putting on and taking off regular upper body clothing: A little  Toileting, which includes using toilet, bedpan or urinal: A lot  Taking care of personal grooming such as brushing teeth: A little  Eating  Meals: None  Daily Activity - Total Score: 16      Education Documentation  ADL Training, taught by Michel Curran Jr., OT at 6/21/2024 12:47 PM.  Learner: Patient  Readiness: Acceptance  Method: Explanation  Response: Needs Reinforcement    Education Comments  No comments found.      Goals:  Encounter Problems       Encounter Problems (Active)       OT Goals       Pt will complete all functional transfers and mobility with mod indep/indep using a RW or no device. (Progressing)       Start:  06/21/24    Expected End:  07/26/24            Pt will tolerate functional mobility for a household distance using a RW or no device with mod indep/indep. (Progressing)       Start:  06/21/24    Expected End:  07/26/24            Pt will complete all ADL's with mod indep/indep using a device as needed. (Progressing)       Start:  06/21/24    Expected End:  07/26/24

## 2024-06-21 NOTE — CARE PLAN
The patient's goals for the shift include sleep    The clinical goals for the shift include pt will tolerate TCDB exercises this shift, pt will have pain and nausea controlled this shift        Problem: Pain - Adult  Goal: Verbalizes/displays adequate comfort level or baseline comfort level  Outcome: Progressing  Flowsheets (Taken 6/21/2024 1408)  Verbalizes/displays adequate comfort level or baseline comfort level:   Encourage patient to monitor pain and request assistance   Assess pain using appropriate pain scale     Problem: Safety - Adult  Goal: Free from fall injury  Outcome: Progressing  Flowsheets (Taken 6/21/2024 1408)  Free from fall injury:   Instruct family/caregiver on patient safety   Based on caregiver fall risk screen, instruct family/caregiver to ask for assistance with transferring infant if caregiver noted to have fall risk factors     Problem: Discharge Planning  Goal: Discharge to home or other facility with appropriate resources  Outcome: Progressing  Flowsheets (Taken 6/21/2024 1408)  Discharge to home or other facility with appropriate resources: Identify barriers to discharge with patient and caregiver     Problem: Chronic Conditions and Co-morbidities  Goal: Patient's chronic conditions and co-morbidity symptoms are monitored and maintained or improved  Outcome: Progressing  Flowsheets (Taken 6/21/2024 1408)  Care Plan - Patient's Chronic Conditions and Co-Morbidity Symptoms are Monitored and Maintained or Improved:   Monitor and assess patient's chronic conditions and comorbid symptoms for stability, deterioration, or improvement   Collaborate with multidisciplinary team to address chronic and comorbid conditions and prevent exacerbation or deterioration   Update acute care plan with appropriate goals if chronic or comorbid symptoms are exacerbated and prevent overall improvement and discharge     Problem: Diabetes  Goal: Achieve decreasing blood glucose levels by end of shift  Outcome:  Progressing  Flowsheets (Taken 6/21/2024 1920)  Achieve decreasing blood glucose levels by end of shift: Med administration/monitoring of effect  Goal: Increase stability of blood glucose readings by end of shift  Outcome: Progressing  Flowsheets (Taken 6/21/2024 9886)  Increase stability of blood glucose readings by end of shift: Med administration/monitoring of effect  Goal: Maintain electrolyte levels within acceptable range throughout shift  Outcome: Progressing  Flowsheets (Taken 6/21/2024 9273)  Maintain electrolyte levels within acceptable range throughout shift: Monitor urine output  Goal: Maintain glucose levels >70mg/dl to <250mg/dl throughout shift  Outcome: Progressing  Flowsheets (Taken 6/21/2024 7567)  Maintain glucose levels >70mg/dl to <250mg/dl throughout shift: Med administration/monitoring of effect     Problem: Pain  Goal: Takes deep breaths with improved pain control throughout the shift  Outcome: Progressing  Goal: Turns in bed with improved pain control throughout the shift  Outcome: Progressing  Goal: Walks with improved pain control throughout the shift  Outcome: Progressing  Goal: Performs ADL's with improved pain control throughout shift  Outcome: Progressing  Goal: Participates in PT with improved pain control throughout the shift  Outcome: Progressing  Goal: Free from opioid side effects throughout the shift  Outcome: Progressing  Goal: Free from acute confusion related to pain meds throughout the shift  Outcome: Progressing

## 2024-06-22 LAB
ALBUMIN SERPL-MCNC: 2.2 G/DL (ref 3.5–5)
ALP BLD-CCNC: 219 U/L (ref 35–125)
ALT SERPL-CCNC: 24 U/L (ref 5–40)
AMMONIA PLAS-SCNC: 209 UMOL/L (ref 12–45)
ANION GAP SERPL CALC-SCNC: 5 MMOL/L
AST SERPL-CCNC: 36 U/L (ref 5–40)
BILIRUB SERPL-MCNC: 1 MG/DL (ref 0.1–1.2)
BUN SERPL-MCNC: 9 MG/DL (ref 8–25)
CALCIUM SERPL-MCNC: 8 MG/DL (ref 8.5–10.4)
CHLORIDE SERPL-SCNC: 106 MMOL/L (ref 97–107)
CO2 SERPL-SCNC: 27 MMOL/L (ref 24–31)
CREAT SERPL-MCNC: 0.6 MG/DL (ref 0.4–1.6)
EGFRCR SERPLBLD CKD-EPI 2021: >90 ML/MIN/1.73M*2
ERYTHROCYTE [DISTWIDTH] IN BLOOD BY AUTOMATED COUNT: 20.5 % (ref 11.5–14.5)
GLUCOSE BLD MANUAL STRIP-MCNC: 107 MG/DL (ref 74–99)
GLUCOSE BLD MANUAL STRIP-MCNC: 273 MG/DL (ref 74–99)
GLUCOSE BLD MANUAL STRIP-MCNC: 280 MG/DL (ref 74–99)
GLUCOSE BLD MANUAL STRIP-MCNC: 294 MG/DL (ref 74–99)
GLUCOSE SERPL-MCNC: 307 MG/DL (ref 65–99)
HCT VFR BLD AUTO: 27.9 % (ref 36–46)
HEMOCCULT SP1 STL QL: NEGATIVE
HGB BLD-MCNC: 9 G/DL (ref 12–16)
MCH RBC QN AUTO: 24.5 PG (ref 26–34)
MCHC RBC AUTO-ENTMCNC: 32.3 G/DL (ref 32–36)
MCV RBC AUTO: 76 FL (ref 80–100)
NRBC BLD-RTO: 0 /100 WBCS (ref 0–0)
PLATELET # BLD AUTO: 71 X10*3/UL (ref 150–450)
POTASSIUM SERPL-SCNC: 4.3 MMOL/L (ref 3.4–5.1)
PROT SERPL-MCNC: 6.4 G/DL (ref 5.9–7.9)
RBC # BLD AUTO: 3.67 X10*6/UL (ref 4–5.2)
SODIUM SERPL-SCNC: 138 MMOL/L (ref 133–145)
WBC # BLD AUTO: 3.3 X10*3/UL (ref 4.4–11.3)

## 2024-06-22 PROCEDURE — 2500000001 HC RX 250 WO HCPCS SELF ADMINISTERED DRUGS (ALT 637 FOR MEDICARE OP): Performed by: INTERNAL MEDICINE

## 2024-06-22 PROCEDURE — 97161 PT EVAL LOW COMPLEX 20 MIN: CPT | Mod: GP | Performed by: PHYSICAL THERAPIST

## 2024-06-22 PROCEDURE — 85027 COMPLETE CBC AUTOMATED: CPT | Performed by: NURSE PRACTITIONER

## 2024-06-22 PROCEDURE — 2500000001 HC RX 250 WO HCPCS SELF ADMINISTERED DRUGS (ALT 637 FOR MEDICARE OP): Performed by: NURSE PRACTITIONER

## 2024-06-22 PROCEDURE — 2500000002 HC RX 250 W HCPCS SELF ADMINISTERED DRUGS (ALT 637 FOR MEDICARE OP, ALT 636 FOR OP/ED): Performed by: NURSE PRACTITIONER

## 2024-06-22 PROCEDURE — 82247 BILIRUBIN TOTAL: CPT | Performed by: NURSE PRACTITIONER

## 2024-06-22 PROCEDURE — 97530 THERAPEUTIC ACTIVITIES: CPT | Mod: GO

## 2024-06-22 PROCEDURE — 82140 ASSAY OF AMMONIA: CPT | Performed by: NURSE PRACTITIONER

## 2024-06-22 PROCEDURE — 36415 COLL VENOUS BLD VENIPUNCTURE: CPT | Performed by: NURSE PRACTITIONER

## 2024-06-22 PROCEDURE — 2500000004 HC RX 250 GENERAL PHARMACY W/ HCPCS (ALT 636 FOR OP/ED): Performed by: INTERNAL MEDICINE

## 2024-06-22 PROCEDURE — 1210000001 HC SEMI-PRIVATE ROOM DAILY

## 2024-06-22 PROCEDURE — 97530 THERAPEUTIC ACTIVITIES: CPT | Mod: GP | Performed by: PHYSICAL THERAPIST

## 2024-06-22 PROCEDURE — 82947 ASSAY GLUCOSE BLOOD QUANT: CPT

## 2024-06-22 PROCEDURE — 97535 SELF CARE MNGMENT TRAINING: CPT | Mod: GO

## 2024-06-22 PROCEDURE — 2500000002 HC RX 250 W HCPCS SELF ADMINISTERED DRUGS (ALT 637 FOR MEDICARE OP, ALT 636 FOR OP/ED): Performed by: INTERNAL MEDICINE

## 2024-06-22 PROCEDURE — 2500000005 HC RX 250 GENERAL PHARMACY W/O HCPCS: Performed by: INTERNAL MEDICINE

## 2024-06-22 RX ORDER — PROCHLORPERAZINE EDISYLATE 5 MG/ML
5 INJECTION INTRAMUSCULAR; INTRAVENOUS ONCE
Status: COMPLETED | OUTPATIENT
Start: 2024-06-22 | End: 2024-06-22

## 2024-06-22 RX ADMIN — DICYCLOMINE HYDROCHLORIDE 20 MG: 20 TABLET ORAL at 13:37

## 2024-06-22 RX ADMIN — PANTOPRAZOLE SODIUM 40 MG: 40 TABLET, DELAYED RELEASE ORAL at 11:12

## 2024-06-22 RX ADMIN — FUROSEMIDE 40 MG: 40 TABLET ORAL at 11:12

## 2024-06-22 RX ADMIN — LACTULOSE 20 G: 20 SOLUTION ORAL at 21:34

## 2024-06-22 RX ADMIN — PROCHLORPERAZINE EDISYLATE 5 MG: 5 INJECTION INTRAMUSCULAR; INTRAVENOUS at 11:24

## 2024-06-22 RX ADMIN — TRAMADOL HYDROCHLORIDE 50 MG: 50 TABLET, COATED ORAL at 11:08

## 2024-06-22 RX ADMIN — POLYETHYLENE GLYCOL 3350 17 G: 17 POWDER, FOR SOLUTION ORAL at 21:43

## 2024-06-22 RX ADMIN — LACTULOSE 20 G: 20 SOLUTION ORAL at 11:10

## 2024-06-22 RX ADMIN — SPIRONOLACTONE 50 MG: 50 TABLET ORAL at 21:39

## 2024-06-22 RX ADMIN — URSODIOL 300 MG: 300 CAPSULE ORAL at 21:37

## 2024-06-22 RX ADMIN — INSULIN GLARGINE 25 UNITS: 100 INJECTION, SOLUTION SUBCUTANEOUS at 13:32

## 2024-06-22 RX ADMIN — URSODIOL 300 MG: 300 CAPSULE ORAL at 11:11

## 2024-06-22 RX ADMIN — DICYCLOMINE HYDROCHLORIDE 20 MG: 20 TABLET ORAL at 18:18

## 2024-06-22 RX ADMIN — LACTULOSE 20 G: 20 SOLUTION ORAL at 18:18

## 2024-06-22 RX ADMIN — NADOLOL 20 MG: 40 TABLET ORAL at 13:33

## 2024-06-22 RX ADMIN — SUCRALFATE 1 G: 1 SUSPENSION ORAL at 11:10

## 2024-06-22 RX ADMIN — GABAPENTIN 200 MG: 100 CAPSULE ORAL at 18:18

## 2024-06-22 RX ADMIN — FENOFIBRATE 160 MG: 160 TABLET ORAL at 11:12

## 2024-06-22 RX ADMIN — ONDANSETRON 4 MG: 4 TABLET, ORALLY DISINTEGRATING ORAL at 09:18

## 2024-06-22 RX ADMIN — SUCRALFATE 1 G: 1 SUSPENSION ORAL at 06:34

## 2024-06-22 RX ADMIN — SUCRALFATE 1 G: 1 SUSPENSION ORAL at 21:45

## 2024-06-22 RX ADMIN — DOCUSATE SODIUM 100 MG: 100 CAPSULE, LIQUID FILLED ORAL at 21:38

## 2024-06-22 RX ADMIN — URSODIOL 300 MG: 300 CAPSULE ORAL at 18:18

## 2024-06-22 RX ADMIN — DICYCLOMINE HYDROCHLORIDE 20 MG: 20 TABLET ORAL at 06:34

## 2024-06-22 RX ADMIN — Medication 400 MG: at 13:33

## 2024-06-22 RX ADMIN — INSULIN LISPRO 9 UNITS: 100 INJECTION, SOLUTION INTRAVENOUS; SUBCUTANEOUS at 18:19

## 2024-06-22 RX ADMIN — SUCRALFATE 1 G: 1 SUSPENSION ORAL at 18:18

## 2024-06-22 RX ADMIN — GABAPENTIN 200 MG: 100 CAPSULE ORAL at 11:11

## 2024-06-22 ASSESSMENT — COGNITIVE AND FUNCTIONAL STATUS - GENERAL
DAILY ACTIVITIY SCORE: 14
DRESSING REGULAR LOWER BODY CLOTHING: A LOT
TOILETING: A LOT
HELP NEEDED FOR BATHING: A LOT
STANDING UP FROM CHAIR USING ARMS: A LITTLE
WALKING IN HOSPITAL ROOM: A LITTLE
PERSONAL GROOMING: A LOT
MOVING TO AND FROM BED TO CHAIR: A LITTLE
MOVING FROM LYING ON BACK TO SITTING ON SIDE OF FLAT BED WITH BEDRAILS: A LITTLE
DRESSING REGULAR LOWER BODY CLOTHING: A LOT
DAILY ACTIVITIY SCORE: 16
PERSONAL GROOMING: A LITTLE
CLIMB 3 TO 5 STEPS WITH RAILING: A LOT
HELP NEEDED FOR BATHING: A LOT
MOVING TO AND FROM BED TO CHAIR: A LITTLE
WALKING IN HOSPITAL ROOM: A LITTLE
DRESSING REGULAR UPPER BODY CLOTHING: A LITTLE
TURNING FROM BACK TO SIDE WHILE IN FLAT BAD: A LOT
MOBILITY SCORE: 16
EATING MEALS: A LITTLE
MOBILITY SCORE: 18
CLIMB 3 TO 5 STEPS WITH RAILING: A LOT
DRESSING REGULAR UPPER BODY CLOTHING: A LITTLE
TURNING FROM BACK TO SIDE WHILE IN FLAT BAD: A LITTLE
STANDING UP FROM CHAIR USING ARMS: A LITTLE
TOILETING: A LOT

## 2024-06-22 ASSESSMENT — PAIN SCALES - GENERAL
PAINLEVEL_OUTOF10: 0 - NO PAIN
PAINLEVEL_OUTOF10: 6
PAINLEVEL_OUTOF10: 0 - NO PAIN
PAINLEVEL_OUTOF10: 0 - NO PAIN

## 2024-06-22 ASSESSMENT — ACTIVITIES OF DAILY LIVING (ADL)
HOME_MANAGEMENT_TIME_ENTRY: 13
ADL_ASSISTANCE: INDEPENDENT

## 2024-06-22 ASSESSMENT — PAIN - FUNCTIONAL ASSESSMENT
PAIN_FUNCTIONAL_ASSESSMENT: FLACC (FACE, LEGS, ACTIVITY, CRY, CONSOLABILITY)
PAIN_FUNCTIONAL_ASSESSMENT: 0-10
PAIN_FUNCTIONAL_ASSESSMENT: 0-10

## 2024-06-22 NOTE — PROGRESS NOTES
Physical Therapy    Physical Therapy Evaluation & Treatment    Patient Name: Alfonzo Flanagan  MRN: [id number]  Today's Date: 6/22/2024   Time Calculation  Start Time: 1130  Stop Time: 1148  Time Calculation (min): 18 min    Assessment/Plan   PT Assessment  PT Assessment Results: Decreased strength, Decreased endurance, Impaired balance, Decreased mobility, Decreased coordination  Rehab Prognosis: Good  Strengths: Premorbid level of function, Housing layout  Barriers to Participation: Comorbidities  Assessment Comment: This pt presented with decreased muscular strength/endurance and impaired balance. She required minimally increased assist during mobility compared to her baseline. She would benefit from continued skilled PT services prior to and after d/c to maximize independence as safety with functional mobility.  End of Session Patient Position: Bed, 3 rail up, Alarm on (call light and needs within reach)   IP OR SWING BED PT PLAN  Inpatient or Swing Bed: Inpatient  PT Plan  Treatment/Interventions: Bed mobility, Transfer training, Gait training, Stair training, Balance training, Strengthening, Endurance training, Therapeutic exercise, Therapeutic activity  PT Plan: Ongoing PT  PT Frequency: 4 times per week  PT Discharge Recommendations: Moderate intensity level of continued care  PT Recommended Transfer Status: Assist x1 (FWW)  PT - OK to Discharge: Yes    Subjective   General Visit Information:  General  Reason for Referral: Impaired functional mobility. This 49 year old female presented to the ED from home with c/o confusion and after a fall. She was admitted for hepatic encephalopathy.  Past Medical History Relevant to Rehab: DM, liver cirrhosis  Prior to Session Communication: Bedside nurse (RN cleared pt for participation.)  Patient Position Received: Bed, 3 rail up, Alarm on  General Comment: Pt agreed to session and participated fully.    Home Living:  Home Living  Type of Home: Apartment  Lives With:  Spouse (dtr)  Home Adaptive Equipment: Walker rolling or standard  Home Layout: One level  Home Access:  11 stairs with unilateral rail  Bathroom Shower/Tub: Walk-in shower  Bathroom Toilet: Standard  Bathroom Equipment: None    Prior Level of Function:  Prior Function Per Pt/Caregiver Report  ADL Assistance: Independent  Homemaking Assistance: Independent  Ambulatory Assistance: Independent; reported 2 recent falls  Prior Function Comments: (-) driving    Precautions:  Precautions  Hearing/Visual Limitations: hearing WFL; reading glasses  Medical Precautions: Fall precautions    Objective   Pain:  Pain Assessment: 0-10  0-10 (Numeric) Pain Score: 0 - No pain    Cognition:  Cognition  Overall Cognitive Status: Impaired (flat affect, low energy, confusion, and delayed processing.)    General Assessments:  Activity Tolerance  Endurance: Tolerates less than 10 min exercise, no significant change in vital signs    Sensation  Light Touch: Not tested (denied paresthesias)    ROM/Strength  BLE AROM: WFL  BLE Strength: based on observation of mobility, grossly >/=3+/5    Perception  Motor Planning: Appears intact    Coordination  Movements are Fluid and Coordinated: No (slow rate of all movments)    Postural Control  Within Functional Limits  Posture Comment: fwd head posture    Static Sitting Balance  Static Sitting-Balance Support: Feet supported, Bilateral upper extremity supported  Static Sitting-Level of Assistance: Close supervision  Dynamic Sitting Balance  Dynamic Sitting-Balance Support: Bilateral upper extremity supported (unilateral LE supported)  Dynamic Sitting-Comments: Close S    Static Standing Balance  Static Standing-Balance Support: No upper extremity supported  Static Standing-Level of Assistance: Contact guard  Dynamic Standing Balance  Dynamic Standing-Balance Support: Right upper extremity supported  Dynamic Standing-Comments: Jose x1    Functional Assessments:  Bed Mobility  Bed Mobility 1: Supine to  sitting, Sitting to supine  Level of Assistance 1: Close supervision  Bed Mobility Comments 1: HOB elevated, toward R side    Transfers  Technique 1: Sit to stand, Stand to sit  Transfer Level of Assistance 1: Contact guard  Trials/Comments 1: x1 trial at EOB    Ambulation/Gait Training  Surface 1: Level tile  Device 1: No device  Assistance 1: Minimum assistance, Hand held assistance  Comments/Distance (ft) 1: 25 ft using step through pattern with slowed velocity and decreased bilateral foot clearance. One LOB during turn towrd R side requiring Antoinette x1 to correct. Fairly steady gait.       Treatments:  Therapeutic Exercise  Seated EOB, BLE x10 each. Cued as needed for proper form to obtain maximal benefits.   -DF/PF   -LAQ   -hip abd   -hip flex    Outcome Measures:  [facility] Basic Mobility  Turning from your back to your side while in a flat bed without using bedrails: A little  Moving from lying on your back to sitting on the side of a flat bed without using bedrails: A lot  Moving to and from bed to chair (including a wheelchair): A little  Standing up from a chair using your arms (e.g. wheelchair or bedside chair): A little  To walk in hospital room: A little  Climbing 3-5 steps with railing: A lot  Basic Mobility - Total Score: 16    Encounter Problems       Encounter Problems (Active)       Functional Mobility       Pt will transition supine<>sit with mod I.       Start:  06/22/24    Expected End:  07/21/24            Pt will transfer sit<>stand with FWW & mod I.       Start:  06/22/24    Expected End:  07/21/24            Pt will ambulate >/=150 ft with FWW & mod I.       Start:  06/22/24    Expected End:  07/21/24            Pt will negotiate 11 stairs with unilateral rail & mod I.       Start:  06/22/24    Expected End:  07/21/24               Pain - Adult              Education Documentation  Mobility Training, taught by Marnie Kirk PT at 6/22/2024 12:03 PM.  Learner: Patient  Readiness:  Acceptance  Method: Explanation  Response: Verbalizes Understanding, Demonstrated Understanding, Needs Reinforcement    Education Comments  No comments found.

## 2024-06-22 NOTE — NURSING NOTE
Made call to dr bailey regarding patient still being nautious after zofran at 9am, one time order of phenergan 12.5 ordered

## 2024-06-22 NOTE — CARE PLAN
The patient's goals for the shift include sleep    The clinical goals for the shift include PT will improved ammonia levels    Over the shift, the patient did not make progress toward the following goals. Barriers to progression include . Recommendations to address these barriers include .

## 2024-06-22 NOTE — PROGRESS NOTES
Occupational Therapy    OT Treatment    Patient Name: Alfonzo Flanagan  MRN: [id number]  Today's Date: 6/22/2024  Time Calculation  Start Time: 0940  Stop Time: 1003  Time Calculation (min): 23 min         Assessment:  OT Assessment: Pt is demonstrating minimal functional progress towards her established goals, but continues to present with significant cognitive deficits. Continue POC.  Evaluation/Treatment Tolerance: Patient tolerated treatment well  End of Session Communication: Bedside nurse  End of Session Patient Position: Up in chair, Alarm on (Needs in reach.)    Plan:  Treatment Interventions: ADL retraining, Functional transfer training, UE strengthening/ROM, Endurance training, Cognitive reorientation, Patient/family training, Equipment evaluation/education, Neuromuscular reeducation, Fine motor coordination activities, Compensatory technique education  OT Frequency: 4 times per week  OT Discharge Recommendations: Moderate intensity level of continued care  Equipment Recommended upon Discharge: Wheeled walker  OT Recommended Transfer Status: Minimal assist, Assist of 1  OT - OK to Discharge: Yes      Subjective     Previous Visit Info:  OT Last Visit  OT Received On: 06/22/24    General:  General  Family/Caregiver Present: No  Prior to Session Communication: Bedside nurse  Patient Position Received: Bed, 3 rail up, Alarm on  General Comment: Cleared by nurse prior to session and pt agreeable to OT treatment.    Precautions:  Hearing/Visual Limitations: wears reading glasses  Medical Precautions: Fall precautions  Precautions Comment: Pt is mostly Frisian-speaking, but understands some English.    Pain:  Pain Assessment  Pain Assessment: FLACC (Face, Legs, Activity, Cry, Consolability)  0-10 (Numeric) Pain Score: 0 - No pain    Objective      Cognition:  Cognition  Overall Cognitive Status: Impaired  Cognition Comments: Pt continues to present with a flat affect, low energy, confusion, and delayed overall  processing.    Activities of Daily Living: Grooming  Grooming Level of Assistance: Close supervision, Setup  Grooming Where Assessed: Chair  Grooming Comments: Pt provided with supplies beforehand for brushing her teeth and combing her hair seated in the chair. Pt completed with setup, but required verbal cues to initiate both tasks.    UE Bathing  UE Bathing Level of Assistance: Setup, Close supervision, Minimal verbal cues  UE Bathing Where Assessed: Other (Comment) (Chair level)  UE Bathing Comments: Pt provided with a wash basin and all other supplies beforehand for washing her face and UB while seated in the chair. Pt able to complete with setup, but required verbal cues to initiate task.    Bed Mobility/Transfers: Bed Mobility  Bed Mobility: Yes  Bed Mobility 1  Bed Mobility 1: Supine to sitting  Level of Assistance 1: Close supervision, Minimal verbal cues  Bed Mobility Comments 1: Pt completed with increased time and intermittent verbal cues.    Transfers  Transfer: Yes  Transfer 1  Transfer From 1: Bed to  Transfer to 1: Stand  Technique 1: Sit to stand  Transfer Device 1: Walker  Transfer Level of Assistance 1: Contact guard, Minimal verbal cues  Trials/Comments 1: Steadying assist for balance and verbal cues for safety/sequencing.  Transfers 2  Transfer From 2: Stand to  Transfer to 2: Chair with arms  Technique 2: Stand to sit  Transfer Device 2: Walker  Transfer Level of Assistance 2: Contact guard, Minimal verbal cues  Trials/Comments 2: Steadying assist for balance and verbal cues for safety/sequencing.    Functional Mobility:  Functional Mobility  Functional Mobility Performed: Yes  Functional Mobility 1  Device 1: Rolling walker  Assistance 1: Minimum assistance, Minimal verbal cues  Comments 1: Pt tolerated functional mobility for a short hosuehold distance using a RW with min A for balance/safety and verbal cues as needed.      Outcome Measures:[facility] Daily Activity  Putting on and taking off  regular lower body clothing: A lot  Bathing (including washing, rinsing, drying): A lot  Putting on and taking off regular upper body clothing: A little  Toileting, which includes using toilet, bedpan or urinal: A lot  Taking care of personal grooming such as brushing teeth: A little  Eating Meals: None  Daily Activity - Total Score: 16      Education Documentation  ADL Training, taught by Michel Curran Jr., OT at 6/21/2024 12:47 PM.  Learner: Patient  Readiness: Acceptance  Method: Explanation  Response: Needs Reinforcement    Education Comments  No comments found.      Goals:  Encounter Problems       Encounter Problems (Active)       OT Goals       Pt will complete all functional transfers and mobility with mod indep/indep using a RW or no device. (Progressing)       Start:  06/21/24    Expected End:  07/26/24            Pt will tolerate functional mobility for a household distance using a RW or no device with mod indep/indep. (Progressing)       Start:  06/21/24    Expected End:  07/26/24            Pt will complete all ADL's with mod indep/indep using a device as needed. (Progressing)       Start:  06/21/24    Expected End:  07/26/24

## 2024-06-22 NOTE — PROGRESS NOTES
Alfonzo Flanagan is a 49 y.o. female on day 1 of admission presenting with Hepatic encephalopathy (Multi).    Subjective   The patient was seen and examined feeling in the bed.  Comfortable.  Patient was complaining of nausea in the morning.  It was not controlled with Zofran.  Patient was given a dose of 12.5 mg Phenergan.  Patient is sleepy but easily arousable.  Nausea is better now.       Objective     Physical Exam  HEENT:  Head externally atraumatic,  extraocular movements intact, oral mucosa moist  Neck:  Supple, no JVP, no palpable adenopathy or thyromegaly.  No carotid bruit.  Chest:  Clear to auscultation and resonant.  Heart:  Regular rate and rhythm, no murmur or gallop could be appreciated.  Abdomen:  Soft, distended, diffusely tender, bowel sounds present, normoactive, no palpable hepatosplenomegaly.  Extremities:  No edema, pulses present, no cyanosis or clubbing.  CNS:  Patient alert, oriented to time, place and person.    No new deficit.  Cranial nerves 2-12 grossly intact  Skin:  No active rash.  Musculoskeletal:  No  apparent joint swelling or erythema, range of movement normal.  Last Recorded Vitals  Heart Rate:  []   Temp:  [36.6 °C (97.9 °F)-36.8 °C (98.2 °F)]   Resp:  [13-18]   BP: (101-110)/(57-64)   Weight:  [77.7 kg (171 lb 4.8 oz)]   SpO2:  [95 %-100 %]     Intake/Output last 3 Shifts:  I/O last 3 completed shifts:  In: 490 (6.3 mL/kg) [P.O.:490]  Out: 200 (2.6 mL/kg) [Urine:200 (0.1 mL/kg/hr)]  Weight: 77.7 kg     Relevant Results  Susceptibility data from last 90 days.  Collected Specimen Info Organism Amoxicillin/Clavulanate Ampicillin Ampicillin/Sulbactam Cefazolin Cefazolin (uncomplicated UTIs only) Ciprofloxacin Gentamicin Levofloxacin Nitrofurantoin Penicillin Piperacillin/Tazobactam   05/15/24 Urine from Clean Catch/Voided Enterococcus faecium   S     R   S  S  S      Klebsiella pneumoniae/variicola  S  R  S  S  S  S  S   I   S     Collected Specimen Info Organism  Trimethoprim/Sulfamethoxazole Vancomycin   05/15/24 Urine from Clean Catch/Voided Enterococcus faecium  R  S     Klebsiella pneumoniae/variicola  S      Results for orders placed or performed during the hospital encounter of 06/20/24 (from the past 24 hour(s))   POCT GLUCOSE   Result Value Ref Range    POCT Glucose 278 (H) 74 - 99 mg/dL   POCT GLUCOSE   Result Value Ref Range    POCT Glucose 294 (H) 74 - 99 mg/dL   Comprehensive Metabolic Panel   Result Value Ref Range    Glucose 307 (H) 65 - 99 mg/dL    Sodium 138 133 - 145 mmol/L    Potassium 4.3 3.4 - 5.1 mmol/L    Chloride 106 97 - 107 mmol/L    Bicarbonate 27 24 - 31 mmol/L    Urea Nitrogen 9 8 - 25 mg/dL    Creatinine 0.60 0.40 - 1.60 mg/dL    eGFR >90 >60 mL/min/1.73m*2    Calcium 8.0 (L) 8.5 - 10.4 mg/dL    Albumin 2.2 (L) 3.5 - 5.0 g/dL    Alkaline Phosphatase 219 (H) 35 - 125 U/L    Total Protein 6.4 5.9 - 7.9 g/dL    AST 36 5 - 40 U/L    Bilirubin, Total 1.0 0.1 - 1.2 mg/dL    ALT 24 5 - 40 U/L    Anion Gap 5 <=19 mmol/L   CBC   Result Value Ref Range    WBC 3.3 (L) 4.4 - 11.3 x10*3/uL    nRBC 0.0 0.0 - 0.0 /100 WBCs    RBC 3.67 (L) 4.00 - 5.20 x10*6/uL    Hemoglobin 9.0 (L) 12.0 - 16.0 g/dL    Hematocrit 27.9 (L) 36.0 - 46.0 %    MCV 76 (L) 80 - 100 fL    MCH 24.5 (L) 26.0 - 34.0 pg    MCHC 32.3 32.0 - 36.0 g/dL    RDW 20.5 (H) 11.5 - 14.5 %    Platelets 71 (L) 150 - 450 x10*3/uL   Ammonia   Result Value Ref Range    Ammonia 209 (H) 12 - 45 umol/L   POCT GLUCOSE   Result Value Ref Range    POCT Glucose 273 (H) 74 - 99 mg/dL   POCT GLUCOSE   Result Value Ref Range    POCT Glucose 294 (H) 74 - 99 mg/dL        Current Facility-Administered Medications:     atorvastatin (Lipitor) tablet 80 mg, 80 mg, oral, Nightly, Luis Alfredo Lawrence MD, 80 mg at 06/21/24 2147    dextrose 50 % injection 12.5 g, 12.5 g, intravenous, q15 min PRN, Leny Daniels, APRN-CNP    dextrose 50 % injection 25 g, 25 g, intravenous, q15 min PRN, Leny Daniels, APRN-CNP     dicyclomine (Bentyl) tablet 20 mg, 20 mg, oral, 4x daily, Luis Alfredo Lawrence MD, 20 mg at 06/22/24 1337    docusate sodium (Colace) capsule 100 mg, 100 mg, oral, BID, Luis Alfredo Lawrence MD, 100 mg at 06/21/24 2141    fenofibrate (Triglide) tablet 160 mg, 160 mg, oral, Daily, Luis Alfredo Lawrence MD, 160 mg at 06/22/24 1112    furosemide (Lasix) tablet 40 mg, 40 mg, oral, Daily, ESME Sotelo, 40 mg at 06/22/24 1112    gabapentin (Neurontin) capsule 200 mg, 200 mg, oral, TID, Luis Alfredo Lawrence MD, 200 mg at 06/22/24 1111    glucagon (Glucagen) injection 1 mg, 1 mg, intramuscular, q15 min PRN, ESME Sotelo    glucagon (Glucagen) injection 1 mg, 1 mg, intramuscular, q15 min PRN, ESME Sotelo    hydrOXYzine HCL (Atarax) tablet 25 mg, 25 mg, oral, q6h PRN, Luis Alfredo Lawrence MD, 25 mg at 06/21/24 0222    insulin glargine (Lantus) injection 25 Units, 25 Units, subcutaneous, q AM, ESME Sotelo, 25 Units at 06/22/24 1332    insulin lispro (HumaLOG) injection 0-15 Units, 0-15 Units, subcutaneous, TID, ESME Sotelo, 9 Units at 06/21/24 1658    lactulose 20 gram/30 mL oral solution 20 g, 20 g, oral, TID, Luis Alfredo Lawrence MD, 20 g at 06/22/24 1110    magnesium oxide (Mag-Ox) tablet 400 mg, 400 mg, oral, Daily, Luis Alfredo Lawrence MD, 400 mg at 06/22/24 1333    nadolol (Corgard) tablet 20 mg, 20 mg, oral, Daily, ESME Sotelo, 20 mg at 06/22/24 1333    ondansetron ODT (Zofran-ODT) disintegrating tablet 4 mg, 4 mg, oral, q6h PRN, Luis Alfredo Lawrence MD, 4 mg at 06/22/24 0918    pantoprazole (ProtoNix) EC tablet 40 mg, 40 mg, oral, Daily, Luis Alfredo Lawrence MD, 40 mg at 06/22/24 1112    polyethylene glycol (Glycolax, Miralax) packet 17 g, 17 g, oral, BID, Luis Alfredo Lawrence MD, 17 g at 06/21/24 2141    spironolactone (Aldactone) tablet 50 mg, 50 mg, oral, BID, MADYSON Sotelo-CNP, 50 mg at 06/21/24 2141    sucralfate (Carafate)  suspension 1 g, 1 g, oral, Before meals & nightly, Luis Alfredo Lawrence MD, 1 g at 06/22/24 1110    traMADol (Ultram) tablet 50 mg, 50 mg, oral, q6h PRN, Luis Alfredo Lawrence MD, 50 mg at 06/22/24 1108    traZODone (Desyrel) tablet 25 mg, 25 mg, oral, Nightly, Luis Alfredo Lawrence MD, 25 mg at 06/21/24 2141    ursodiol (Actigall) capsule 300 mg, 300 mg, oral, TID, Luis Alfredo Lawrence MD, 300 mg at 06/22/24 1111   Assessment/Plan   Principal Problem:    Hepatic encephalopathy (Multi)  Cirrhosis  Altered mental status  Elevated ammonia level  Hepatic cirrhosis with ascites  Portal hypertension  Splenomegaly  Small fat-containing periumbilical hernia  Microcytic anemia  Thrombocytopenia  Hypertension  Diabetes mellitus type 2  Neuropathy  Degenerative disc disease  Moderate protein energy malnutrition  Plan: Continue current medication..  Phenergan as needed.  Monitor ammonia level.  Supportive care.  Physical therapy and Occupational Therapy.  Control pain.  Monitor ammonia level.  Get supportive care.  Encourage p.o. intake.  Monitor hemoglobin hematocrit.  Will treat DVT, fall, aspiration, decubitus, and DVT precautions.  This has been discussed with the patient and she is agreeable to it.       Luis Alfredo Lawrence MD

## 2024-06-22 NOTE — CARE PLAN
The patient's goals for the shift include sleep    The clinical goals for the shift include pt will tolerate TCDB exercises this shift, pt will have pain and nausea controlled this shift    O  Problem: Pain - Adult  Goal: Verbalizes/displays adequate comfort level or baseline comfort level  Outcome: Progressing     Problem: Safety - Adult  Goal: Free from fall injury  Outcome: Progressing     Problem: Discharge Planning  Goal: Discharge to home or other facility with appropriate resources  Outcome: Progressing     Problem: Chronic Conditions and Co-morbidities  Goal: Patient's chronic conditions and co-morbidity symptoms are monitored and maintained or improved  Outcome: Progressing     Problem: Diabetes  Goal: Achieve decreasing blood glucose levels by end of shift  Outcome: Progressing  Goal: Increase stability of blood glucose readings by end of shift  Outcome: Progressing  Goal: Maintain electrolyte levels within acceptable range throughout shift  Outcome: Progressing  Goal: Maintain glucose levels >70mg/dl to <250mg/dl throughout shift  Outcome: Progressing     Problem: Pain  Goal: Takes deep breaths with improved pain control throughout the shift  Outcome: Progressing  Goal: Turns in bed with improved pain control throughout the shift  Outcome: Progressing  Goal: Walks with improved pain control throughout the shift  Outcome: Progressing  Goal: Performs ADL's with improved pain control throughout shift  Outcome: Progressing  Goal: Participates in PT with improved pain control throughout the shift  Outcome: Progressing  Goal: Free from opioid side effects throughout the shift  Outcome: Progressing  Goal: Free from acute confusion related to pain meds throughout the shift  Outcome: Progressing

## 2024-06-23 VITALS
OXYGEN SATURATION: 99 % | DIASTOLIC BLOOD PRESSURE: 52 MMHG | HEIGHT: 62 IN | HEART RATE: 85 BPM | TEMPERATURE: 98.6 F | WEIGHT: 171.3 LBS | RESPIRATION RATE: 17 BRPM | BODY MASS INDEX: 31.52 KG/M2 | SYSTOLIC BLOOD PRESSURE: 106 MMHG

## 2024-06-23 LAB
AMMONIA PLAS-SCNC: 85 UMOL/L (ref 12–45)
APPEARANCE UR: ABNORMAL
ATRIAL RATE: 82 BPM
BILIRUB UR STRIP.AUTO-MCNC: NEGATIVE MG/DL
COLOR UR: YELLOW
GLUCOSE BLD MANUAL STRIP-MCNC: 310 MG/DL (ref 74–99)
GLUCOSE BLD MANUAL STRIP-MCNC: 347 MG/DL (ref 74–99)
GLUCOSE BLD MANUAL STRIP-MCNC: 349 MG/DL (ref 74–99)
GLUCOSE BLD MANUAL STRIP-MCNC: 94 MG/DL (ref 74–99)
GLUCOSE UR STRIP.AUTO-MCNC: NORMAL MG/DL
HOLD SPECIMEN: NORMAL
KETONES UR STRIP.AUTO-MCNC: NEGATIVE MG/DL
LEUKOCYTE ESTERASE UR QL STRIP.AUTO: ABNORMAL
MUCOUS THREADS #/AREA URNS AUTO: ABNORMAL /LPF
NITRITE UR QL STRIP.AUTO: NEGATIVE
P AXIS: 58 DEGREES
P OFFSET: 185 MS
P ONSET: 133 MS
PH UR STRIP.AUTO: 8 [PH]
PR INTERVAL: 168 MS
PROT UR STRIP.AUTO-MCNC: ABNORMAL MG/DL
Q ONSET: 217 MS
QRS COUNT: 13 BEATS
QRS DURATION: 74 MS
QT INTERVAL: 408 MS
QTC CALCULATION(BAZETT): 476 MS
QTC FREDERICIA: 452 MS
R AXIS: 23 DEGREES
RBC # UR STRIP.AUTO: ABNORMAL /UL
RBC #/AREA URNS AUTO: ABNORMAL /HPF
SP GR UR STRIP.AUTO: 1.01
SQUAMOUS #/AREA URNS AUTO: ABNORMAL /HPF
T AXIS: 30 DEGREES
T OFFSET: 421 MS
UROBILINOGEN UR STRIP.AUTO-MCNC: ABNORMAL MG/DL
VENTRICULAR RATE: 82 BPM
WBC #/AREA URNS AUTO: >50 /HPF
WBC CLUMPS #/AREA URNS AUTO: ABNORMAL /HPF

## 2024-06-23 PROCEDURE — 82140 ASSAY OF AMMONIA: CPT | Performed by: INTERNAL MEDICINE

## 2024-06-23 PROCEDURE — 2500000002 HC RX 250 W HCPCS SELF ADMINISTERED DRUGS (ALT 637 FOR MEDICARE OP, ALT 636 FOR OP/ED): Performed by: INTERNAL MEDICINE

## 2024-06-23 PROCEDURE — 2500000001 HC RX 250 WO HCPCS SELF ADMINISTERED DRUGS (ALT 637 FOR MEDICARE OP): Performed by: INTERNAL MEDICINE

## 2024-06-23 PROCEDURE — 2500000001 HC RX 250 WO HCPCS SELF ADMINISTERED DRUGS (ALT 637 FOR MEDICARE OP): Performed by: NURSE PRACTITIONER

## 2024-06-23 PROCEDURE — 36415 COLL VENOUS BLD VENIPUNCTURE: CPT | Performed by: INTERNAL MEDICINE

## 2024-06-23 PROCEDURE — 82947 ASSAY GLUCOSE BLOOD QUANT: CPT

## 2024-06-23 PROCEDURE — 2500000002 HC RX 250 W HCPCS SELF ADMINISTERED DRUGS (ALT 637 FOR MEDICARE OP, ALT 636 FOR OP/ED): Performed by: NURSE PRACTITIONER

## 2024-06-23 PROCEDURE — 87086 URINE CULTURE/COLONY COUNT: CPT | Mod: WESLAB | Performed by: INTERNAL MEDICINE

## 2024-06-23 PROCEDURE — 1210000001 HC SEMI-PRIVATE ROOM DAILY

## 2024-06-23 PROCEDURE — 81001 URINALYSIS AUTO W/SCOPE: CPT | Performed by: INTERNAL MEDICINE

## 2024-06-23 PROCEDURE — 2500000004 HC RX 250 GENERAL PHARMACY W/ HCPCS (ALT 636 FOR OP/ED): Performed by: INTERNAL MEDICINE

## 2024-06-23 RX ORDER — LACTULOSE 10 G/15ML
20 SOLUTION ORAL EVERY 4 HOURS
Status: DISCONTINUED | OUTPATIENT
Start: 2024-06-23 | End: 2024-06-25 | Stop reason: HOSPADM

## 2024-06-23 RX ADMIN — SPIRONOLACTONE 50 MG: 50 TABLET ORAL at 21:25

## 2024-06-23 RX ADMIN — URSODIOL 300 MG: 300 CAPSULE ORAL at 21:25

## 2024-06-23 RX ADMIN — URSODIOL 300 MG: 300 CAPSULE ORAL at 09:11

## 2024-06-23 RX ADMIN — Medication 400 MG: at 09:11

## 2024-06-23 RX ADMIN — POLYETHYLENE GLYCOL 3350 17 G: 17 POWDER, FOR SOLUTION ORAL at 21:24

## 2024-06-23 RX ADMIN — POLYETHYLENE GLYCOL 3350 17 G: 17 POWDER, FOR SOLUTION ORAL at 09:11

## 2024-06-23 RX ADMIN — DICYCLOMINE HYDROCHLORIDE 20 MG: 20 TABLET ORAL at 12:16

## 2024-06-23 RX ADMIN — DOCUSATE SODIUM 100 MG: 100 CAPSULE, LIQUID FILLED ORAL at 21:24

## 2024-06-23 RX ADMIN — DOCUSATE SODIUM 100 MG: 100 CAPSULE, LIQUID FILLED ORAL at 09:11

## 2024-06-23 RX ADMIN — ATORVASTATIN CALCIUM 80 MG: 80 TABLET, FILM COATED ORAL at 21:25

## 2024-06-23 RX ADMIN — LACTULOSE 20 G: 20 SOLUTION ORAL at 23:19

## 2024-06-23 RX ADMIN — LACTULOSE 20 G: 20 SOLUTION ORAL at 09:11

## 2024-06-23 RX ADMIN — PANTOPRAZOLE SODIUM 40 MG: 40 TABLET, DELAYED RELEASE ORAL at 09:11

## 2024-06-23 RX ADMIN — LACTULOSE 20 G: 20 SOLUTION ORAL at 18:43

## 2024-06-23 RX ADMIN — RIFAXIMIN 550 MG: 550 TABLET ORAL at 21:25

## 2024-06-23 RX ADMIN — INSULIN LISPRO 12 UNITS: 100 INJECTION, SOLUTION INTRAVENOUS; SUBCUTANEOUS at 16:49

## 2024-06-23 RX ADMIN — LACTULOSE 20 G: 20 SOLUTION ORAL at 15:26

## 2024-06-23 RX ADMIN — URSODIOL 300 MG: 300 CAPSULE ORAL at 15:26

## 2024-06-23 RX ADMIN — INSULIN GLARGINE 25 UNITS: 100 INJECTION, SOLUTION SUBCUTANEOUS at 12:16

## 2024-06-23 RX ADMIN — FENOFIBRATE 160 MG: 160 TABLET ORAL at 09:11

## 2024-06-23 RX ADMIN — SUCRALFATE 1 G: 1 SUSPENSION ORAL at 12:16

## 2024-06-23 RX ADMIN — SUCRALFATE 1 G: 1 SUSPENSION ORAL at 06:38

## 2024-06-23 ASSESSMENT — COGNITIVE AND FUNCTIONAL STATUS - GENERAL
EATING MEALS: A LITTLE
HELP NEEDED FOR BATHING: A LITTLE
DAILY ACTIVITIY SCORE: 18
PERSONAL GROOMING: A LITTLE
MOVING TO AND FROM BED TO CHAIR: A LITTLE
DRESSING REGULAR UPPER BODY CLOTHING: A LITTLE
DRESSING REGULAR LOWER BODY CLOTHING: A LITTLE
TOILETING: A LITTLE
MOBILITY SCORE: 19
STANDING UP FROM CHAIR USING ARMS: A LITTLE
CLIMB 3 TO 5 STEPS WITH RAILING: A LOT
WALKING IN HOSPITAL ROOM: A LITTLE

## 2024-06-23 ASSESSMENT — PAIN SCALES - GENERAL
PAINLEVEL_OUTOF10: 0 - NO PAIN
PAINLEVEL_OUTOF10: 0 - NO PAIN

## 2024-06-23 ASSESSMENT — PAIN - FUNCTIONAL ASSESSMENT
PAIN_FUNCTIONAL_ASSESSMENT: 0-10

## 2024-06-23 NOTE — CARE PLAN
Problem: Pain - Adult  Goal: Verbalizes/displays adequate comfort level or baseline comfort level  Outcome: Progressing     Problem: Safety - Adult  Goal: Free from fall injury  Outcome: Progressing     Problem: Discharge Planning  Goal: Discharge to home or other facility with appropriate resources  Outcome: Progressing     Problem: Chronic Conditions and Co-morbidities  Goal: Patient's chronic conditions and co-morbidity symptoms are monitored and maintained or improved  Outcome: Progressing     Problem: Diabetes  Goal: Achieve decreasing blood glucose levels by end of shift  Outcome: Progressing  Goal: Increase stability of blood glucose readings by end of shift  Outcome: Progressing  Goal: Maintain electrolyte levels within acceptable range throughout shift  Outcome: Progressing  Goal: Maintain glucose levels >70mg/dl to <250mg/dl throughout shift  Outcome: Progressing     Problem: Pain  Goal: Takes deep breaths with improved pain control throughout the shift  Outcome: Progressing  Goal: Turns in bed with improved pain control throughout the shift  Outcome: Progressing  Goal: Walks with improved pain control throughout the shift  Outcome: Progressing  Goal: Performs ADL's with improved pain control throughout shift  Outcome: Progressing  Goal: Participates in PT with improved pain control throughout the shift  Outcome: Progressing  Goal: Free from opioid side effects throughout the shift  Outcome: Progressing  Goal: Free from acute confusion related to pain meds throughout the shift  Outcome: Progressing     Problem: Skin  Goal: Decreased wound size/increased tissue granulation at next dressing change  6/22/2024 2029 by Rosita Lacy RN  Flowsheets (Taken 6/22/2024 2029)  Decreased wound size/increased tissue granulation at next dressing change:   Promote sleep for wound healing   Protective dressings over bony prominences   Utilize specialty bed per algorithm  6/22/2024 2028 by Rosita  Frank Lacy RN  Outcome: Progressing  Goal: Participates in plan/prevention/treatment measures  6/22/2024 2029 by Rosiat Lacy RN  Flowsheets (Taken 6/22/2024 2029)  Participates in plan/prevention/treatment measures:   Discuss with provider PT/OT consult   Elevate heels   Increase activity/out of bed for meals  6/22/2024 2028 by Rosita Lacy RN  Outcome: Progressing  Goal: Prevent/manage excess moisture  6/22/2024 2029 by Rosita Lacy RN  Flowsheets (Taken 6/22/2024 2029)  Prevent/manage excess moisture:   Cleanse incontinence/protect with barrier cream   Moisturize dry skin   Follow provider orders for dressing changes   Monitor for/manage infection if present   Use wicking fabric (obtain order)  6/22/2024 2028 by Rosita Lacy RN  Outcome: Progressing  Goal: Prevent/minimize sheer/friction injuries  6/22/2024 2029 by Rosita Lacy RN  Flowsheets (Taken 6/22/2024 2029)  Prevent/minimize sheer/friction injuries:   Complete micro-shifts as needed if patient unable. Adjust patient position to relieve pressure points, not a full turn   HOB 30 degrees or less   Increase activity/out of bed for meals   Turn/reposition every 2 hours/use positioning/transfer devices   Use pull sheet   Utilize specialty bed per algorithm  6/22/2024 2028 by Rosita Lacy RN  Outcome: Progressing  Goal: Promote/optimize nutrition  6/22/2024 2029 by Rosita Lacy RN  Flowsheets (Taken 6/22/2024 2029)  Promote/optimize nutrition:   Assist with feeding   Consume > 50% meals/supplements   Discuss with provider if NPO > 2 days   Monitor/record intake including meals   Offer water/supplements/favorite foods   Reassess MST if dietician not consulted  6/22/2024 2028 by Rosita Lacy RN  Outcome: Progressing  Goal: Promote skin healing  6/22/2024 2029 by Rosita Lacy RN  Flowsheets  (Taken 6/22/2024 2029)  Promote skin healing:   Assess skin/pad under line(s)/device(s)   Ensure correct size (line/device) and apply per  instructions   Protective dressings over bony prominences   Rotate device position/do not position patient on device   Turn/reposition every 2 hours/use positioning/transfer devices  6/22/2024 2028 by Rosita Lacy RN  Outcome: Progressing   The patient's goals for the shift include sleep    The clinical goals for the shift include patient will stay hds this shift    Over the shift, the patient did not make progress toward the following goals. Barriers to progression include . Recommendations to address these barriers include .

## 2024-06-23 NOTE — CARE PLAN
Problem: Pain - Adult  Goal: Verbalizes/displays adequate comfort level or baseline comfort level  Outcome: Progressing     Problem: Safety - Adult  Goal: Free from fall injury  Outcome: Progressing     Problem: Discharge Planning  Goal: Discharge to home or other facility with appropriate resources  Outcome: Progressing     Problem: Chronic Conditions and Co-morbidities  Goal: Patient's chronic conditions and co-morbidity symptoms are monitored and maintained or improved  Outcome: Progressing     Problem: Diabetes  Goal: Achieve decreasing blood glucose levels by end of shift  Outcome: Progressing  Goal: Increase stability of blood glucose readings by end of shift  Outcome: Progressing  Goal: Maintain electrolyte levels within acceptable range throughout shift  Outcome: Progressing  Goal: Maintain glucose levels >70mg/dl to <250mg/dl throughout shift  Outcome: Progressing     Problem: Pain  Goal: Takes deep breaths with improved pain control throughout the shift  Outcome: Progressing  Goal: Turns in bed with improved pain control throughout the shift  Outcome: Progressing  Goal: Walks with improved pain control throughout the shift  Outcome: Progressing  Goal: Performs ADL's with improved pain control throughout shift  Outcome: Progressing  Goal: Participates in PT with improved pain control throughout the shift  Outcome: Progressing  Goal: Free from opioid side effects throughout the shift  Outcome: Progressing  Goal: Free from acute confusion related to pain meds throughout the shift  Outcome: Progressing     Problem: Skin  Goal: Decreased wound size/increased tissue granulation at next dressing change  Outcome: Progressing  Goal: Participates in plan/prevention/treatment measures  Outcome: Progressing  Goal: Prevent/manage excess moisture  Outcome: Progressing  Goal: Prevent/minimize sheer/friction injuries  Outcome: Progressing  Goal: Promote/optimize nutrition  Outcome: Progressing  Goal: Promote skin  healing  Outcome: Progressing   The patient's goals for the shift include sleep    The clinical goals for the shift include patient will stay hds this shift    Over the shift, the patient did not make progress toward the following goals. Barriers to progression include . Recommendations to address these barriers include .

## 2024-06-23 NOTE — PROGRESS NOTES
Alfonzo Flanagan is a 49 y.o. female on day 2 of admission presenting with Hepatic encephalopathy (Multi).    Subjective   The patient was seen and examined lying in the bed.  Comfortable.  No acute distress.  Patient is slightly more confused and lethargic as compared to before.       Objective     Physical Exam  HEENT:  Head externally atraumatic,  extraocular movements intact, oral mucosa moist  Neck:  Supple, no JVP, no palpable adenopathy or thyromegaly.  No carotid bruit.  Chest:  Clear to auscultation and resonant.  Heart:  Regular rate and rhythm, no murmur or gallop could be appreciated.  Abdomen:  Soft, right upper quadrant tenderness and hepatomegaly present.  Bowel sounds present and normoactive.  Extremities:  No edema, pulses present, no cyanosis or clubbing.  CNS:  Patient alert, oriented to time, place and person.    No new deficit.  Cranial nerves 2-12 grossly intact  Skin:  No active rash.  Musculoskeletal:  No  apparent joint swelling or erythema, range of movement normal.  Last Recorded Vitals  Heart Rate:  [76-85]   Temp:  [36.6 °C (97.9 °F)-37 °C (98.6 °F)]   Resp:  [16-18]   BP: ()/(52-62)   SpO2:  [95 %-99 %]     Intake/Output last 3 Shifts:  I/O last 3 completed shifts:  In: 740 (9.5 mL/kg) [P.O.:740]  Out: 900 (11.6 mL/kg) [Urine:900 (0.3 mL/kg/hr)]  Weight: 77.7 kg     Relevant Results  Susceptibility data from last 90 days.  Collected Specimen Info Organism Amoxicillin/Clavulanate Ampicillin Ampicillin/Sulbactam Cefazolin Cefazolin (uncomplicated UTIs only) Ciprofloxacin Gentamicin Levofloxacin Nitrofurantoin Penicillin Piperacillin/Tazobactam   05/15/24 Urine from Clean Catch/Voided Enterococcus faecium   S     R   S  S  S      Klebsiella pneumoniae/variicola  S  R  S  S  S  S  S   I   S     Collected Specimen Info Organism Trimethoprim/Sulfamethoxazole Vancomycin   05/15/24 Urine from Clean Catch/Voided Enterococcus faecium  R  S     Klebsiella pneumoniae/variicola  S       Results for orders placed or performed during the hospital encounter of 06/20/24 (from the past 24 hour(s))   POCT GLUCOSE   Result Value Ref Range    POCT Glucose 107 (H) 74 - 99 mg/dL   Urinalysis with Reflex Culture and Microscopic   Result Value Ref Range    Color, Urine Yellow Light-Yellow, Yellow, Dark-Yellow    Appearance, Urine Ex.Turbid (N) Clear    Specific Gravity, Urine 1.012 1.005 - 1.035    pH, Urine 8.0 5.0, 5.5, 6.0, 6.5, 7.0, 7.5, 8.0    Protein, Urine 10 (TRACE) NEGATIVE, 10 (TRACE), 20 (TRACE) mg/dL    Glucose, Urine Normal Normal mg/dL    Blood, Urine 0.03 (TRACE) (A) NEGATIVE    Ketones, Urine NEGATIVE NEGATIVE mg/dL    Bilirubin, Urine NEGATIVE NEGATIVE    Urobilinogen, Urine 3 (1+) (A) Normal mg/dL    Nitrite, Urine NEGATIVE NEGATIVE    Leukocyte Esterase, Urine 500 Mckenzie/µL (A) NEGATIVE   Extra Urine Gray Tube   Result Value Ref Range    Extra Tube Hold for add-ons.    Microscopic Only, Urine   Result Value Ref Range    WBC, Urine >50 (A) 1-5, NONE /HPF    WBC Clumps, Urine FEW Reference range not established. /HPF    RBC, Urine 6-10 (A) NONE, 1-2, 3-5 /HPF    Squamous Epithelial Cells, Urine 1-9 (SPARSE) Reference range not established. /HPF    Mucus, Urine FEW Reference range not established. /LPF   POCT GLUCOSE   Result Value Ref Range    POCT Glucose 94 74 - 99 mg/dL   POCT GLUCOSE   Result Value Ref Range    POCT Glucose 310 (H) 74 - 99 mg/dL   POCT GLUCOSE   Result Value Ref Range    POCT Glucose 347 (H) 74 - 99 mg/dL        Current Facility-Administered Medications:     atorvastatin (Lipitor) tablet 80 mg, 80 mg, oral, Nightly, Luis Alfredo Lawrence MD, 80 mg at 06/21/24 9531    dextrose 50 % injection 12.5 g, 12.5 g, intravenous, q15 min PRN, Leny Daniels APRN-CNP    dextrose 50 % injection 25 g, 25 g, intravenous, q15 min PRN, Leny Daniels APRN-CNP    dicyclomine (Bentyl) tablet 20 mg, 20 mg, oral, 4x daily, Luis Alfredo Lawrence MD, 20 mg at 06/23/24 1216    docusate sodium  (Colace) capsule 100 mg, 100 mg, oral, BID, Luis Alfredo Lawrence MD, 100 mg at 06/23/24 0911    fenofibrate (Triglide) tablet 160 mg, 160 mg, oral, Daily, Luis Alfredo Lawrence MD, 160 mg at 06/23/24 0911    furosemide (Lasix) tablet 40 mg, 40 mg, oral, Daily, ESME Sotelo, 40 mg at 06/22/24 1112    gabapentin (Neurontin) capsule 200 mg, 200 mg, oral, TID, Luis Alfredo Lawrence MD, 200 mg at 06/22/24 1818    glucagon (Glucagen) injection 1 mg, 1 mg, intramuscular, q15 min PRN, ESME Sotelo    glucagon (Glucagen) injection 1 mg, 1 mg, intramuscular, q15 min PRN, ESME Sotelo    hydrOXYzine HCL (Atarax) tablet 25 mg, 25 mg, oral, q6h PRN, Luis Alfredo Lawrence MD, 25 mg at 06/21/24 0222    insulin glargine (Lantus) injection 25 Units, 25 Units, subcutaneous, q AM, ESME Sotelo, 25 Units at 06/23/24 1216    insulin lispro (HumaLOG) injection 0-15 Units, 0-15 Units, subcutaneous, TID, ESME Sotelo, 9 Units at 06/22/24 1819    lactulose 20 gram/30 mL oral solution 20 g, 20 g, oral, q4h, Luis Alfredo Lawrence MD    magnesium oxide (Mag-Ox) tablet 400 mg, 400 mg, oral, Daily, Luis Alfredo Lawrence MD, 400 mg at 06/23/24 0911    nadolol (Corgard) tablet 20 mg, 20 mg, oral, Daily, ESME Sotelo, 20 mg at 06/22/24 1333    ondansetron ODT (Zofran-ODT) disintegrating tablet 4 mg, 4 mg, oral, q6h PRN, Luis Alfredo Lawrence MD, 4 mg at 06/22/24 0918    pantoprazole (ProtoNix) EC tablet 40 mg, 40 mg, oral, Daily, Luis Alfredo Lawrence MD, 40 mg at 06/23/24 0911    polyethylene glycol (Glycolax, Miralax) packet 17 g, 17 g, oral, BID, Luis Alfredo Lawrence MD, 17 g at 06/23/24 0911    rifAXIMin (Xifaxan) tablet 550 mg, 550 mg, oral, q12h RIVKA, Luis Alfredo Lawrence MD    spironolactone (Aldactone) tablet 50 mg, 50 mg, oral, BID, Leny Daniels, MADYSON-CNP, 50 mg at 06/22/24 2139    sucralfate (Carafate) suspension 1 g, 1 g, oral, Before meals & nightly, Luis Alfredo ELIZALDE  MD Howard, 1 g at 06/23/24 1216    traMADol (Ultram) tablet 50 mg, 50 mg, oral, q6h PRN, Luis Alfredo Lawrence MD, 50 mg at 06/22/24 1108    traZODone (Desyrel) tablet 25 mg, 25 mg, oral, Nightly, Luis Alfredo Lawrence MD, 25 mg at 06/21/24 2141    ursodiol (Actigall) capsule 300 mg, 300 mg, oral, TID, Luis Alfredo Lawrence MD, 300 mg at 06/23/24 1526   Assessment/Plan   Principal Problem:    Hepatic encephalopathy (Multi)  Cirrhosis  Altered mental status  Portal hypertension  Splenomegaly  Microcytic anemia  Thrombocytopenia  Hypertension  Diabetes mellitus type 2  Neuropathy  Severe protein energy malnutrition    Plan: Continue current medication.  Increase the dose of lactulose to every 4 hours.  Add rifaximin to the current medications.  Give supportive care.  Give physical therapy and Occupational Therapy.  Monitor ammonia level.  We will check DVT, fall, aspiration, decubitus and DVT precautions.         Luis Alfredo Lawrence MD

## 2024-06-23 NOTE — CARE PLAN
The patient's goals for the shift include sleep    The clinical goals for the shift include pt remains free of falls during this shift    Over the shift, the patient did not make progress toward the following goals. Barriers to progression include . Recommendations to address these barriers include .

## 2024-06-24 LAB
ALBUMIN SERPL-MCNC: 2.2 G/DL (ref 3.5–5)
AMMONIA PLAS-SCNC: 116 UMOL/L (ref 12–45)
ANION GAP SERPL CALC-SCNC: 3 MMOL/L
BASOPHILS # BLD AUTO: 0.02 X10*3/UL (ref 0–0.1)
BASOPHILS NFR BLD AUTO: 0.5 %
BUN SERPL-MCNC: 7 MG/DL (ref 8–25)
CALCIUM SERPL-MCNC: 7.8 MG/DL (ref 8.5–10.4)
CHLORIDE SERPL-SCNC: 105 MMOL/L (ref 97–107)
CO2 SERPL-SCNC: 28 MMOL/L (ref 24–31)
CREAT SERPL-MCNC: 0.6 MG/DL (ref 0.4–1.6)
EGFRCR SERPLBLD CKD-EPI 2021: >90 ML/MIN/1.73M*2
EOSINOPHIL # BLD AUTO: 0.34 X10*3/UL (ref 0–0.7)
EOSINOPHIL NFR BLD AUTO: 8.4 %
ERYTHROCYTE [DISTWIDTH] IN BLOOD BY AUTOMATED COUNT: 20.4 % (ref 11.5–14.5)
GLUCOSE BLD MANUAL STRIP-MCNC: 207 MG/DL (ref 74–99)
GLUCOSE BLD MANUAL STRIP-MCNC: 231 MG/DL (ref 74–99)
GLUCOSE BLD MANUAL STRIP-MCNC: 248 MG/DL (ref 74–99)
GLUCOSE BLD MANUAL STRIP-MCNC: 256 MG/DL (ref 74–99)
GLUCOSE SERPL-MCNC: 278 MG/DL (ref 65–99)
HCT VFR BLD AUTO: 29.3 % (ref 36–46)
HGB BLD-MCNC: 9 G/DL (ref 12–16)
IMM GRANULOCYTES # BLD AUTO: 0 X10*3/UL (ref 0–0.7)
IMM GRANULOCYTES NFR BLD AUTO: 0 % (ref 0–0.9)
LYMPHOCYTES # BLD AUTO: 1.45 X10*3/UL (ref 1.2–4.8)
LYMPHOCYTES NFR BLD AUTO: 35.6 %
MCH RBC QN AUTO: 23.9 PG (ref 26–34)
MCHC RBC AUTO-ENTMCNC: 30.7 G/DL (ref 32–36)
MCV RBC AUTO: 78 FL (ref 80–100)
MONOCYTES # BLD AUTO: 0.61 X10*3/UL (ref 0.1–1)
MONOCYTES NFR BLD AUTO: 15 %
NEUTROPHILS # BLD AUTO: 1.65 X10*3/UL (ref 1.2–7.7)
NEUTROPHILS NFR BLD AUTO: 40.5 %
NRBC BLD-RTO: 0 /100 WBCS (ref 0–0)
PHOSPHATE SERPL-MCNC: 2.2 MG/DL (ref 2.5–4.5)
PLATELET # BLD AUTO: 76 X10*3/UL (ref 150–450)
POTASSIUM SERPL-SCNC: 4.1 MMOL/L (ref 3.4–5.1)
RBC # BLD AUTO: 3.76 X10*6/UL (ref 4–5.2)
SODIUM SERPL-SCNC: 136 MMOL/L (ref 133–145)
WBC # BLD AUTO: 4.1 X10*3/UL (ref 4.4–11.3)

## 2024-06-24 PROCEDURE — 2500000001 HC RX 250 WO HCPCS SELF ADMINISTERED DRUGS (ALT 637 FOR MEDICARE OP): Performed by: INTERNAL MEDICINE

## 2024-06-24 PROCEDURE — 82947 ASSAY GLUCOSE BLOOD QUANT: CPT

## 2024-06-24 PROCEDURE — 82140 ASSAY OF AMMONIA: CPT | Performed by: INTERNAL MEDICINE

## 2024-06-24 PROCEDURE — 2500000004 HC RX 250 GENERAL PHARMACY W/ HCPCS (ALT 636 FOR OP/ED): Performed by: NURSE PRACTITIONER

## 2024-06-24 PROCEDURE — 2500000004 HC RX 250 GENERAL PHARMACY W/ HCPCS (ALT 636 FOR OP/ED): Performed by: INTERNAL MEDICINE

## 2024-06-24 PROCEDURE — 97110 THERAPEUTIC EXERCISES: CPT | Mod: GP

## 2024-06-24 PROCEDURE — 2500000001 HC RX 250 WO HCPCS SELF ADMINISTERED DRUGS (ALT 637 FOR MEDICARE OP): Performed by: NURSE PRACTITIONER

## 2024-06-24 PROCEDURE — 97110 THERAPEUTIC EXERCISES: CPT | Mod: GO,CO

## 2024-06-24 PROCEDURE — 1210000001 HC SEMI-PRIVATE ROOM DAILY

## 2024-06-24 PROCEDURE — 2500000002 HC RX 250 W HCPCS SELF ADMINISTERED DRUGS (ALT 637 FOR MEDICARE OP, ALT 636 FOR OP/ED): Performed by: NURSE PRACTITIONER

## 2024-06-24 PROCEDURE — 97530 THERAPEUTIC ACTIVITIES: CPT | Mod: GO,CO

## 2024-06-24 PROCEDURE — 97116 GAIT TRAINING THERAPY: CPT | Mod: GP

## 2024-06-24 PROCEDURE — 85025 COMPLETE CBC W/AUTO DIFF WBC: CPT | Performed by: INTERNAL MEDICINE

## 2024-06-24 PROCEDURE — 80069 RENAL FUNCTION PANEL: CPT | Performed by: INTERNAL MEDICINE

## 2024-06-24 PROCEDURE — 2500000002 HC RX 250 W HCPCS SELF ADMINISTERED DRUGS (ALT 637 FOR MEDICARE OP, ALT 636 FOR OP/ED): Performed by: INTERNAL MEDICINE

## 2024-06-24 PROCEDURE — 36415 COLL VENOUS BLD VENIPUNCTURE: CPT | Performed by: INTERNAL MEDICINE

## 2024-06-24 RX ORDER — CEFTRIAXONE 1 G/50ML
1 INJECTION, SOLUTION INTRAVENOUS EVERY 24 HOURS
Status: DISCONTINUED | OUTPATIENT
Start: 2024-06-24 | End: 2024-06-25 | Stop reason: HOSPADM

## 2024-06-24 RX ADMIN — URSODIOL 300 MG: 300 CAPSULE ORAL at 15:08

## 2024-06-24 RX ADMIN — LACTULOSE 20 G: 20 SOLUTION ORAL at 06:49

## 2024-06-24 RX ADMIN — RIFAXIMIN 550 MG: 550 TABLET ORAL at 21:02

## 2024-06-24 RX ADMIN — FENOFIBRATE 160 MG: 160 TABLET ORAL at 08:19

## 2024-06-24 RX ADMIN — SUCRALFATE 1 G: 1 SUSPENSION ORAL at 11:53

## 2024-06-24 RX ADMIN — CEFTRIAXONE SODIUM 1 G: 1 INJECTION, SOLUTION INTRAVENOUS at 15:08

## 2024-06-24 RX ADMIN — LACTULOSE 20 G: 20 SOLUTION ORAL at 11:53

## 2024-06-24 RX ADMIN — DICYCLOMINE HYDROCHLORIDE 20 MG: 20 TABLET ORAL at 08:19

## 2024-06-24 RX ADMIN — POLYETHYLENE GLYCOL 3350 17 G: 17 POWDER, FOR SOLUTION ORAL at 21:01

## 2024-06-24 RX ADMIN — INSULIN LISPRO 6 UNITS: 100 INJECTION, SOLUTION INTRAVENOUS; SUBCUTANEOUS at 08:22

## 2024-06-24 RX ADMIN — ATORVASTATIN CALCIUM 80 MG: 80 TABLET, FILM COATED ORAL at 21:02

## 2024-06-24 RX ADMIN — DOCUSATE SODIUM 100 MG: 100 CAPSULE, LIQUID FILLED ORAL at 21:03

## 2024-06-24 RX ADMIN — RIFAXIMIN 550 MG: 550 TABLET ORAL at 08:21

## 2024-06-24 RX ADMIN — LACTULOSE 20 G: 20 SOLUTION ORAL at 15:08

## 2024-06-24 RX ADMIN — INSULIN GLARGINE 25 UNITS: 100 INJECTION, SOLUTION SUBCUTANEOUS at 08:22

## 2024-06-24 RX ADMIN — DICYCLOMINE HYDROCHLORIDE 20 MG: 20 TABLET ORAL at 16:41

## 2024-06-24 RX ADMIN — INSULIN LISPRO 6 UNITS: 100 INJECTION, SOLUTION INTRAVENOUS; SUBCUTANEOUS at 16:37

## 2024-06-24 RX ADMIN — SUCRALFATE 1 G: 1 SUSPENSION ORAL at 21:02

## 2024-06-24 RX ADMIN — DICYCLOMINE HYDROCHLORIDE 20 MG: 20 TABLET ORAL at 11:58

## 2024-06-24 RX ADMIN — NADOLOL 20 MG: 40 TABLET ORAL at 08:19

## 2024-06-24 RX ADMIN — PANTOPRAZOLE SODIUM 40 MG: 40 TABLET, DELAYED RELEASE ORAL at 08:21

## 2024-06-24 RX ADMIN — URSODIOL 300 MG: 300 CAPSULE ORAL at 21:01

## 2024-06-24 RX ADMIN — SPIRONOLACTONE 50 MG: 50 TABLET ORAL at 08:21

## 2024-06-24 RX ADMIN — SUCRALFATE 1 G: 1 SUSPENSION ORAL at 06:49

## 2024-06-24 RX ADMIN — TRAZODONE HYDROCHLORIDE 25 MG: 50 TABLET ORAL at 21:03

## 2024-06-24 RX ADMIN — TRAMADOL HYDROCHLORIDE 50 MG: 50 TABLET, COATED ORAL at 17:38

## 2024-06-24 RX ADMIN — DICYCLOMINE HYDROCHLORIDE 20 MG: 20 TABLET ORAL at 21:02

## 2024-06-24 RX ADMIN — INSULIN LISPRO 9 UNITS: 100 INJECTION, SOLUTION INTRAVENOUS; SUBCUTANEOUS at 11:53

## 2024-06-24 RX ADMIN — URSODIOL 300 MG: 300 CAPSULE ORAL at 08:21

## 2024-06-24 RX ADMIN — Medication 400 MG: at 08:19

## 2024-06-24 RX ADMIN — SUCRALFATE 1 G: 1 SUSPENSION ORAL at 15:08

## 2024-06-24 RX ADMIN — POLYETHYLENE GLYCOL 3350 17 G: 17 POWDER, FOR SOLUTION ORAL at 08:21

## 2024-06-24 RX ADMIN — FUROSEMIDE 40 MG: 40 TABLET ORAL at 08:19

## 2024-06-24 RX ADMIN — SPIRONOLACTONE 50 MG: 50 TABLET ORAL at 21:02

## 2024-06-24 RX ADMIN — DOCUSATE SODIUM 100 MG: 100 CAPSULE, LIQUID FILLED ORAL at 08:19

## 2024-06-24 ASSESSMENT — COGNITIVE AND FUNCTIONAL STATUS - GENERAL
PERSONAL GROOMING: A LITTLE
WALKING IN HOSPITAL ROOM: A LITTLE
STANDING UP FROM CHAIR USING ARMS: A LITTLE
EATING MEALS: A LITTLE
HELP NEEDED FOR BATHING: A LITTLE
DAILY ACTIVITIY SCORE: 18
TURNING FROM BACK TO SIDE WHILE IN FLAT BAD: A LITTLE
DRESSING REGULAR UPPER BODY CLOTHING: A LITTLE
CLIMB 3 TO 5 STEPS WITH RAILING: A LITTLE
CLIMB 3 TO 5 STEPS WITH RAILING: A LOT
DRESSING REGULAR UPPER BODY CLOTHING: A LITTLE
MOBILITY SCORE: 21
HELP NEEDED FOR BATHING: A LITTLE
TOILETING: A LITTLE
MOBILITY SCORE: 17
STANDING UP FROM CHAIR USING ARMS: A LITTLE
DAILY ACTIVITIY SCORE: 21
DRESSING REGULAR UPPER BODY CLOTHING: A LITTLE
MOVING FROM LYING ON BACK TO SITTING ON SIDE OF FLAT BED WITH BEDRAILS: A LITTLE
EATING MEALS: A LITTLE
MOVING TO AND FROM BED TO CHAIR: A LITTLE
PERSONAL GROOMING: A LITTLE
DAILY ACTIVITIY SCORE: 18
HELP NEEDED FOR BATHING: A LITTLE
DRESSING REGULAR LOWER BODY CLOTHING: A LITTLE
MOBILITY SCORE: 23
WALKING IN HOSPITAL ROOM: A LITTLE
TOILETING: A LITTLE
DRESSING REGULAR LOWER BODY CLOTHING: A LITTLE
CLIMB 3 TO 5 STEPS WITH RAILING: A LITTLE
DRESSING REGULAR LOWER BODY CLOTHING: A LITTLE

## 2024-06-24 ASSESSMENT — PAIN - FUNCTIONAL ASSESSMENT
PAIN_FUNCTIONAL_ASSESSMENT: 0-10
PAIN_FUNCTIONAL_ASSESSMENT: 0-10
PAIN_FUNCTIONAL_ASSESSMENT: FLACC (FACE, LEGS, ACTIVITY, CRY, CONSOLABILITY)
PAIN_FUNCTIONAL_ASSESSMENT: 0-10

## 2024-06-24 ASSESSMENT — PAIN SCALES - GENERAL
PAINLEVEL_OUTOF10: 6
PAINLEVEL_OUTOF10: 0 - NO PAIN

## 2024-06-24 ASSESSMENT — PAIN DESCRIPTION - DESCRIPTORS: DESCRIPTORS: DULL

## 2024-06-24 NOTE — PROGRESS NOTES
Physical Therapy    Physical Therapy Treatment    Patient Name: Alfonzo Flanagan  MRN: [id number]  Today's Date: 6/24/2024  Time Calculation  Start Time: 0855  Stop Time: 0920  Time Calculation (min): 25 min    Assessment/Plan   PT Assessment  PT Assessment Results: Decreased strength, Decreased endurance, Impaired balance, Decreased mobility, Impaired judgement, Decreased safety awareness  Rehab Prognosis: Good  Evaluation/Treatment Tolerance: Patient tolerated treatment well  Medical Staff Made Aware: Yes  Strengths: Premorbid level of function, Housing layout  Barriers to Participation: Comorbidities  End of Session Communication: Bedside nurse  Assessment Comment: Improved gait quality/tolerance, cueing throughout for sequencing safety, remains a falls risk and would benefit from 24 hr supervision/assist upon d/c.  End of Session Patient Position: Up in chair, Alarm on  PT Plan  Inpatient/Swing Bed or Outpatient: Inpatient  PT Plan  Treatment/Interventions: Bed mobility, Transfer training, Gait training, Stair training, Balance training, Neuromuscular re-education, Strengthening, Endurance training, Therapeutic exercise, Therapeutic activity  PT Plan: Ongoing PT  PT Frequency: 4 times per week  PT Discharge Recommendations: Moderate intensity level of continued care  Equipment Recommended upon Discharge: Wheeled walker  PT Recommended Transfer Status: Assist x1  PT - OK to Discharge: Yes    General Visit Information:   PT  Visit  PT Received On: 06/24/24  Response to Previous Treatment: Patient with no complaints from previous session.  General  Reason for Referral: Impaired functional mobility. This 49 year old female presented to the ED from home with c/o confusion and after a fall. She was admitted for hepatic encephalopathy.  Referred By: ESME Sotelo  Past Medical History Relevant to Rehab: DM, liver cirrhosis  Missed Visit: No  Family/Caregiver Present: No  Prior to Session Communication:  Bedside nurse  Patient Position Received: Bed, 3 rail up, Alarm on  Preferred Learning Style: verbal  General Comment: Pt cleared for therapy via RN, received in supine, NAD, agreeable to participate in therapy. mildly lethargic but easily arousable.    Subjective   Precautions:  Precautions  Hearing/Visual Limitations: hearing WFL; reading glasses  Medical Precautions: Fall precautions    Objective   Pain:  Pain Assessment  Pain Assessment: 0-10  0-10 (Numeric) Pain Score: 0 - No pain  Cognition:  Cognition  Overall Cognitive Status: Within Functional Limits  Arousal/Alertness: Delayed responses to stimuli (mildly lethargic)  Orientation Level: Oriented X4  Following Commands: Follows one step commands with increased time  Safety Judgment: Decreased awareness of need for safety precautions  Cognition Comments: flat affect, pleasant/cooperative  Insight: Mild  Processing Speed: Delayed  Coordination:  Movements are Fluid and Coordinated: No  Coordination Comment: decreased rate of movements  Postural Control:  Postural Control  Postural Control: Within Functional Limits  Static Sitting Balance  Static Sitting-Balance Support: Feet supported  Static Sitting-Level of Assistance: Close supervision  Static Standing Balance  Static Standing-Balance Support: Bilateral upper extremity supported  Static Standing-Level of Assistance: Contact guard  Extremity/Trunk Assessments:  RLE   RLE : Within Functional Limits  LLE   LLE : Within Functional Limits  Activity Tolerance:  Activity Tolerance  Endurance: Tolerates 10 - 20 min exercise with multiple rests  Treatments:  Therapeutic Exercise  Therapeutic Exercise Performed: Yes  Therapeutic Exercise Activity 1: B ankle pumps x 10  Therapeutic Exercise Activity 2: B seated knee ext x 10  Therapeutic Exercise Activity 3: B seated hip flex x 10  Therapeutic Exercise Activity 4: B seated isometric hip abduction x 10  Therapeutic Exercise Activity 5: B seated isometric hip adduction  x 10    Bed Mobility  Bed Mobility: Yes  Bed Mobility 1  Bed Mobility 1: Supine to sitting  Level of Assistance 1: Close supervision  Bed Mobility Comments 1: increased time to completed task    Ambulation/Gait Training  Ambulation/Gait Training Performed: Yes  Ambulation/Gait Training 1  Surface 1: Level tile  Device 1: Rolling walker  Assistance 1: Contact guard  Quality of Gait 1: Decreased step length (decreased prateek, reciprocating pattern, mildly unsteady gait)  Comments/Distance (ft) 1: 40' x 2  Transfers  Transfer: Yes  Transfer 1  Transfer From 1: Sit to  Transfer to 1: Stand  Technique 1: Sit to stand  Transfer Device 1: Walker  Transfer Level of Assistance 1: Close supervision  Transfers 2  Transfer From 2: Stand to  Transfer to 2: Sit  Technique 2: Stand to sit  Transfer Device 2: Walker  Transfer Level of Assistance 2: Close supervision  Trials/Comments 2: cueing for sequencing/safety    Stairs  Stairs: Yes  Stairs  Rails 1: Right  Curb Step 1: No  Device 1: Railing  Assistance 1: Contact guard  Comment/Number of Steps 1: 9 (non-reciprocating pattern to ascend/descend)     Outcome Measures:  [facility] Basic Mobility  Turning from your back to your side while in a flat bed without using bedrails: A little  Moving from lying on your back to sitting on the side of a flat bed without using bedrails: A little  Moving to and from bed to chair (including a wheelchair): A little  Standing up from a chair using your arms (e.g. wheelchair or bedside chair): A little  To walk in hospital room: A little  Climbing 3-5 steps with railing: A lot  Basic Mobility - Total Score: 17    Education Documentation  Mobility Training, taught by Melida Schulz PT at 6/24/2024 10:14 AM.  Learner: Patient  Readiness: Acceptance  Method: Explanation, Demonstration  Response: Needs Reinforcement    Education Comments  No comments found.      OP EDUCATION:  Outpatient Education  Individual(s) Educated: Patient  Education Provided:  Fall Risk, POC  Risk and Benefits Discussed with Patient/Caregiver/Other: yes  Patient/Caregiver Demonstrated Understanding: yes  Plan of Care Discussed and Agreed Upon: yes  Patient Response to Education: Patient/Caregiver Verbalized Understanding of Information    Encounter Problems       Encounter Problems (Active)       Functional Mobility       Pt will transition supine<>sit with mod I. (Progressing)       Start:  06/22/24    Expected End:  07/21/24            Pt will transfer sit<>stand with FWW & mod I. (Progressing)       Start:  06/22/24    Expected End:  07/21/24            Pt will ambulate >/=150 ft with FWW & mod I. (Progressing)       Start:  06/22/24    Expected End:  07/21/24            Pt will negotiate 11 stairs with unilateral rail & mod I. (Progressing)       Start:  06/22/24    Expected End:  07/21/24               Pain - Adult

## 2024-06-24 NOTE — PROGRESS NOTES
Occupational Therapy    OT Treatment    Patient Name: Alfonzo Flanagan  MRN: [id number]  Today's Date: 6/24/2024  Time Calculation  Start Time: 1248  Stop Time: 1312  Time Calculation (min): 24 min         Assessment:  OT Assessment: Tolerated session well, demonstrating improved functional tolerance requiring increased cues for safety awareness. Pt would benefit from continued skilled OT services to improve strength, balance, and functional tolerance to increase independence with ADL tasks  End of Session Communication: Bedside nurse  End of Session Patient Position: Bed, 3 rail up, Alarm on  OT Assessment Results: Decreased ADL status, Decreased upper extremity strength, Decreased safe judgment during ADL, Decreased cognition, Decreased endurance, Decreased fine motor control, Decreased functional mobility, Decreased gross motor control, Decreased IADLs, Decreased trunk control for functional activities  Plan:  Treatment Interventions: ADL retraining, Functional transfer training, UE strengthening/ROM, Endurance training, Cognitive reorientation, Patient/family training, Equipment evaluation/education, Neuromuscular reeducation, Fine motor coordination activities, Compensatory technique education  OT Frequency: 4 times per week  OT Discharge Recommendations: Moderate intensity level of continued care  Equipment Recommended upon Discharge: Wheeled walker  OT Recommended Transfer Status: Minimal assist, Assist of 1  OT - OK to Discharge: Yes  Treatment Interventions: ADL retraining, Functional transfer training, UE strengthening/ROM, Endurance training, Cognitive reorientation, Patient/family training, Equipment evaluation/education, Neuromuscular reeducation, Fine motor coordination activities, Compensatory technique education    Subjective   Previous Visit Info:  OT Last Visit  OT Received On: 06/24/24  General:  General  Prior to Session Communication: Bedside nurse  Patient Position Received: Bed, 3 rail up,  Alarm on  General Comment: Cleared for therapy per RN. Pt supine in bed upon arrival and agreeable to tx  Precautions:  Medical Precautions: Fall precautions    Pain:  Pain Assessment  Pain Assessment: 0-10  0-10 (Numeric) Pain Score: 0 - No pain    Objective    Cognition:  Cognition  Overall Cognitive Status: Within Functional Limits  Arousal/Alertness: Delayed responses to stimuli  Following Commands: Follows one step commands with increased time  Safety Judgment: Decreased awareness of need for safety precautions  Cognition Comments: flat affect, increased cues for safety awareness  Insight: Mild  Impulsive: Mildly    Activities of Daily Living:    Grooming  Grooming Level of Assistance: Close supervision  Grooming Where Assessed: Standing sinkside  Grooming Comments: hand hygiene    UE Dressing  UE Dressing Level of Assistance: Minimum assistance  UE Dressing Where Assessed: Edge of bed  UE Dressing Comments: don/doff back gown    LE Dressing  LE Dressing: Yes  Pants Level of Assistance: Close supervision  Shoe Level of Assistance: Setup, Close supervision  LE Dressing Where Assessed: Edge of bed, Toilet  LE Dressing Comments: pt able to don/doff slippers sitting EOB with s/u with use of figure four dressing technique with increased time and effort required for task completion. Pt don/doffed underwear for toileting task    Toileting  Toileting Level of Assistance: Close supervision  Where Assessed: Toilet  Toileting Comments: inreased time required for toileting task with pt completing pericare hygiene with cues for task efficiency     Bed Mobility/Transfers: Bed Mobility  Bed Mobility: Yes  Bed Mobility 1  Bed Mobility 1: Supine to sitting, Sitting to supine  Level of Assistance 1: Close supervision  Bed Mobility Comments 1: increased time and effort supine<>sit    Transfers  Transfer: Yes  Transfer 1  Technique 1: Sit to stand, Stand to sit  Transfer Device 1: Walker  Transfer Level of Assistance 1: Close  supervision  Trials/Comments 1: cues for proper hand placement and eccentric lowering to EOB    Toilet Transfers  Toilet Transfer Type: To and from  Toilet Transfer to: Standard toilet  Toilet Transfers: Supervision  Toilet Transfers Comments: cues for use of grab bar for UE support    Functional Mobility:  Functional Mobility  Functional Mobility Performed: Yes  Functional Mobility 1  Device 1: Rolling walker  Assistance 1: Contact guard, Minimal verbal cues  Comments 1: functional mobility extended household distance at W with cues for pacing and postural alignment to increase safety awareness, expressing discomfort in GUS feet. Pt demonstrating fair- balance throughout task    Standing Balance:  Dynamic Standing Balance  Dynamic Standing-Balance: Forward lean, Reaching for objects, Reaching across midline  Dynamic Standing-Comments: fair- balance during item retrieval task    Therapy/Activity: Therapeutic Exercise  Therapeutic Exercise Performed: Yes  Therapeutic Exercise Activity 1: participated in AROM BUE exercises 4x10 in all planes to improve strength and functional tolerance to increase independence with transfer tasks with cues provided for proper muscle recruitement    Therapeutic Activity  Therapeutic Activity Performed: Yes  Therapeutic Activity 1: participated in item retrieval task in cabinet, reaching for items at various levels working on functional balance in effort to improve independence with ADL tasks upon homegoing    Outcome Measures:[facility] Daily Activity  Putting on and taking off regular lower body clothing: A little  Bathing (including washing, rinsing, drying): A little  Putting on and taking off regular upper body clothing: A little  Toileting, which includes using toilet, bedpan or urinal: A little  Taking care of personal grooming such as brushing teeth: A little  Eating Meals: A little  Daily Activity - Total Score: 18    Education Documentation  Body Mechanics, taught by Anjelica  DANIELLE Martinez at 6/24/2024  2:12 PM.  Learner: Patient  Readiness: Acceptance  Method: Explanation  Response: Verbalizes Understanding    ADL Training, taught by DANIELLE Stephenson at 6/24/2024  2:12 PM.  Learner: Patient  Readiness: Acceptance  Method: Explanation  Response: Verbalizes Understanding    Education Comments  No comments found.      Problem: OT Goals  Goal: Pt will complete all functional transfers and mobility with mod indep/indep using a RW or no device.  Outcome: Progressing  Goal: Pt will tolerate functional mobility for a household distance using a RW or no device with mod indep/indep.  Outcome: Progressing  Goal: Pt will complete all ADL's with mod indep/indep using a device as needed.  Outcome: Progressing

## 2024-06-24 NOTE — CARE PLAN
Problem: Pain - Adult  Goal: Verbalizes/displays adequate comfort level or baseline comfort level  Outcome: Progressing     Problem: Safety - Adult  Goal: Free from fall injury  Outcome: Progressing     Problem: Discharge Planning  Goal: Discharge to home or other facility with appropriate resources  Outcome: Progressing     Problem: Chronic Conditions and Co-morbidities  Goal: Patient's chronic conditions and co-morbidity symptoms are monitored and maintained or improved  Outcome: Progressing     Problem: Diabetes  Goal: Achieve decreasing blood glucose levels by end of shift  Outcome: Progressing  Goal: Increase stability of blood glucose readings by end of shift  Outcome: Progressing  Goal: Maintain electrolyte levels within acceptable range throughout shift  Outcome: Progressing  Goal: Maintain glucose levels >70mg/dl to <250mg/dl throughout shift  Outcome: Progressing     Problem: Pain  Goal: Takes deep breaths with improved pain control throughout the shift  Outcome: Progressing  Goal: Turns in bed with improved pain control throughout the shift  Outcome: Progressing  Goal: Walks with improved pain control throughout the shift  Outcome: Progressing  Goal: Performs ADL's with improved pain control throughout shift  Outcome: Progressing  Goal: Participates in PT with improved pain control throughout the shift  Outcome: Progressing  Goal: Free from opioid side effects throughout the shift  Outcome: Progressing  Goal: Free from acute confusion related to pain meds throughout the shift  Outcome: Progressing     Problem: Skin  Goal: Decreased wound size/increased tissue granulation at next dressing change  Outcome: Progressing  Flowsheets (Taken 6/23/2024 2234)  Decreased wound size/increased tissue granulation at next dressing change:   Promote sleep for wound healing   Protective dressings over bony prominences   Utilize specialty bed per algorithm  Goal: Participates in plan/prevention/treatment  measures  Outcome: Progressing  Flowsheets (Taken 6/23/2024 2234)  Participates in plan/prevention/treatment measures:   Discuss with provider PT/OT consult   Elevate heels   Increase activity/out of bed for meals  Goal: Prevent/manage excess moisture  Outcome: Progressing  Flowsheets (Taken 6/23/2024 2234)  Prevent/manage excess moisture:   Cleanse incontinence/protect with barrier cream   Follow provider orders for dressing changes   Moisturize dry skin   Monitor for/manage infection if present   Use wicking fabric (obtain order)  Goal: Prevent/minimize sheer/friction injuries  Outcome: Progressing  Flowsheets (Taken 6/23/2024 2234)  Prevent/minimize sheer/friction injuries:   Complete micro-shifts as needed if patient unable. Adjust patient position to relieve pressure points, not a full turn   HOB 30 degrees or less   Increase activity/out of bed for meals   Turn/reposition every 2 hours/use positioning/transfer devices   Use pull sheet   Utilize specialty bed per algorithm  Goal: Promote/optimize nutrition  Outcome: Progressing  Flowsheets (Taken 6/23/2024 2234)  Promote/optimize nutrition:   Assist with feeding   Consume > 50% meals/supplements   Discuss with provider if NPO > 2 days   Monitor/record intake including meals   Offer water/supplements/favorite foods   Reassess MST if dietician not consulted   The patient's goals for the shift include sleep    The clinical goals for the shift include pt is hds this shift    Over the shift, the patient did not make progress toward the following goals. Barriers to progression include . Recommendations to address these barriers include .

## 2024-06-24 NOTE — CARE PLAN
The patient's goals for the shift include sleep    The clinical goals for the shift include up to chair      Problem: Pain - Adult  Goal: Verbalizes/displays adequate comfort level or baseline comfort level  Outcome: Progressing     Problem: Safety - Adult  Goal: Free from fall injury  Outcome: Progressing     Problem: Discharge Planning  Goal: Discharge to home or other facility with appropriate resources  Outcome: Progressing     Patient alert and oriented. Up to bathroom with assistance.

## 2024-06-24 NOTE — PROGRESS NOTES
Alfonzo Flanagan is a 49 y.o. female on day 3 of admission presenting with Hepatic encephalopathy (Multi).    Subjective   Patient seen and examined.  Resting bed in no acute distress.  Awake alert oriented x 3.  Better.  Less confused.  Ambulating.  Nausea improved.  No vomiting.  Tolerating diet.  3-4 small bowel movements in past 24 hours.      Spoke with nursing 3 bowel movements reported overnight.  No other issues.    Objective     Physical Exam  Vitals and nursing note reviewed.   Constitutional:       General: She is not in acute distress.     Appearance: Normal appearance. She is obese. She is not ill-appearing, toxic-appearing or diaphoretic.   HENT:      Head: Normocephalic and atraumatic.      Right Ear: Tympanic membrane normal.      Left Ear: Tympanic membrane normal.      Nose: Nose normal.      Mouth/Throat:      Mouth: Mucous membranes are moist.      Pharynx: Oropharynx is clear.   Eyes:      Extraocular Movements: Extraocular movements intact.      Conjunctiva/sclera: Conjunctivae normal.      Pupils: Pupils are equal, round, and reactive to light.   Cardiovascular:      Rate and Rhythm: Normal rate and regular rhythm.      Pulses: Normal pulses.      Heart sounds: Normal heart sounds. No murmur heard.  Pulmonary:      Effort: Pulmonary effort is normal. No respiratory distress.      Breath sounds: Normal breath sounds. No wheezing, rhonchi or rales.   Abdominal:      General: Bowel sounds are normal. There is no distension.      Palpations: Abdomen is soft.      Tenderness: There is no abdominal tenderness.   Genitourinary:     Comments: deferred.  Musculoskeletal:         General: No swelling or tenderness. Normal range of motion.      Cervical back: Normal range of motion and neck supple.   Skin:     General: Skin is warm and dry.      Capillary Refill: Capillary refill takes less than 2 seconds.   Neurological:      General: No focal deficit present.      Mental Status: She is alert and  "oriented to person, place, and time.   Psychiatric:         Mood and Affect: Mood normal.         Behavior: Behavior normal.       Last Recorded Vitals  Blood pressure 105/62, pulse 82, temperature 36.7 °C (98.1 °F), temperature source Oral, resp. rate 16, height 1.575 m (5' 2\"), weight 77.7 kg (171 lb 4.8 oz), SpO2 100%.    Intake/Output last 3 Shifts:  I/O last 3 completed shifts:  In: 670 (8.6 mL/kg) [P.O.:670]  Out: 600 (7.7 mL/kg) [Urine:600 (0.2 mL/kg/hr)]  Weight: 77.7 kg     Relevant Results  Results for orders placed or performed during the hospital encounter of 06/20/24 (from the past 24 hour(s))   POCT GLUCOSE   Result Value Ref Range    POCT Glucose 347 (H) 74 - 99 mg/dL   Ammonia   Result Value Ref Range    Ammonia 85 (H) 12 - 45 umol/L   POCT GLUCOSE   Result Value Ref Range    POCT Glucose 349 (H) 74 - 99 mg/dL   Ammonia   Result Value Ref Range    Ammonia 116 (H) 12 - 45 umol/L   CBC and Auto Differential   Result Value Ref Range    WBC 4.1 (L) 4.4 - 11.3 x10*3/uL    nRBC 0.0 0.0 - 0.0 /100 WBCs    RBC 3.76 (L) 4.00 - 5.20 x10*6/uL    Hemoglobin 9.0 (L) 12.0 - 16.0 g/dL    Hematocrit 29.3 (L) 36.0 - 46.0 %    MCV 78 (L) 80 - 100 fL    MCH 23.9 (L) 26.0 - 34.0 pg    MCHC 30.7 (L) 32.0 - 36.0 g/dL    RDW 20.4 (H) 11.5 - 14.5 %    Platelets 76 (L) 150 - 450 x10*3/uL    Neutrophils % 40.5 40.0 - 80.0 %    Immature Granulocytes %, Automated 0.0 0.0 - 0.9 %    Lymphocytes % 35.6 13.0 - 44.0 %    Monocytes % 15.0 2.0 - 10.0 %    Eosinophils % 8.4 0.0 - 6.0 %    Basophils % 0.5 0.0 - 2.0 %    Neutrophils Absolute 1.65 1.20 - 7.70 x10*3/uL    Immature Granulocytes Absolute, Automated 0.00 0.00 - 0.70 x10*3/uL    Lymphocytes Absolute 1.45 1.20 - 4.80 x10*3/uL    Monocytes Absolute 0.61 0.10 - 1.00 x10*3/uL    Eosinophils Absolute 0.34 0.00 - 0.70 x10*3/uL    Basophils Absolute 0.02 0.00 - 0.10 x10*3/uL   Renal function panel   Result Value Ref Range    Glucose 278 (H) 65 - 99 mg/dL    Sodium 136 133 - 145 " mmol/L    Potassium 4.1 3.4 - 5.1 mmol/L    Chloride 105 97 - 107 mmol/L    Bicarbonate 28 24 - 31 mmol/L    Urea Nitrogen 7 (L) 8 - 25 mg/dL    Creatinine 0.60 0.40 - 1.60 mg/dL    eGFR >90 >60 mL/min/1.73m*2    Calcium 7.8 (L) 8.5 - 10.4 mg/dL    Phosphorus 2.2 (L) 2.5 - 4.5 mg/dL    Albumin 2.2 (L) 3.5 - 5.0 g/dL    Anion Gap 3 <=19 mmol/L   POCT GLUCOSE   Result Value Ref Range    POCT Glucose 248 (H) 74 - 99 mg/dL   POCT GLUCOSE   Result Value Ref Range    POCT Glucose 256 (H) 74 - 99 mg/dL     Susceptibility data from last 90 days.  Collected Specimen Info Organism Amoxicillin/Clavulanate Ampicillin Ampicillin/Sulbactam Cefazolin Cefazolin (uncomplicated UTIs only) Ciprofloxacin Gentamicin Levofloxacin Nitrofurantoin Penicillin Piperacillin/Tazobactam   05/15/24 Urine from Clean Catch/Voided Enterococcus faecium   S     R   S  S  S      Klebsiella pneumoniae/variicola  S  R  S  S  S  S  S   I   S     Collected Specimen Info Organism Trimethoprim/Sulfamethoxazole Vancomycin   05/15/24 Urine from Clean Catch/Voided Enterococcus faecium  R  S     Klebsiella pneumoniae/variicola  S      No results found.    Scheduled medications  atorvastatin, 80 mg, oral, Nightly  cefTRIAXone, 1 g, intravenous, q24h  dicyclomine, 20 mg, oral, 4x daily  docusate sodium, 100 mg, oral, BID  fenofibrate, 160 mg, oral, Daily  furosemide, 40 mg, oral, Daily  gabapentin, 200 mg, oral, TID  insulin glargine, 25 Units, subcutaneous, q AM  insulin lispro, 0-15 Units, subcutaneous, TID  lactulose, 20 g, oral, q4h  magnesium oxide, 400 mg, oral, Daily  nadolol, 20 mg, oral, Daily  pantoprazole, 40 mg, oral, Daily  polyethylene glycol, 17 g, oral, BID  rifAXIMin, 550 mg, oral, q12h RIVKA  spironolactone, 50 mg, oral, BID  sucralfate, 1 g, oral, Before meals & nightly  traZODone, 25 mg, oral, Nightly  ursodiol, 300 mg, oral, TID      Continuous medications     PRN medications  PRN medications: dextrose, dextrose, glucagon, glucagon, hydrOXYzine  HCL, ondansetron ODT, traMADol      ASSESSMENT:  Fall  Altered mental status improved  Hepatic encephalopathy  Hyperammonemia improved  Abnormal CT abdomen/pelvis  Hepatic cirrhosis with ascites  Portal hypertension  Splenomegaly  Small fat-containing periumbilical hernia  Pancytopenia  Hypotension  Type 2 diabetes mellitus  Neuropathy  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  No new issues.  Mentation improved.  Suspect hepatic encephalopathy secondary to elevated ammonia level plus possible urinary tract infection.  Urine culture pending no growth.  Follow-up urine culture.  Monitor symptoms.  Continue Lactulose 3 times daily.  Titrate for 3 bowel movements 24 hours.  Continue Rifaximin.  Monitor ammonia level.  Monitor mentation.  Continue current medications.  Monitor blood pressure.  Monitor CBC.  Outpatient follow-up with Gastroenterology.  Point-of-care glucose reviewed.  Monitor point-of-care glucose AC/HS.  Continue Lantus.  Sliding scale insulin.  Adjust insulin as needed for glycemic control.  Hypoglycemia protocol.  ADA diet.  Diabetes education.  PT/OT.  Fall precautions.  Up with assistance only.  Bed and chair alarm at all times.  Pressure ulcer prevention measures.  Turn and reposition every 2 hours and as needed.  Heels off bed.  Offloading.  DVT prophylaxis.  SCD's.  GI prophylaxis.  PPI Protonix.  Supportive care.  Patient reassured.  Case management following for discharge planning.  Discharge plan home when medically cleared.  Discussed with patient, nursing and Dr. Lawrence.       Leny Daniels, APRN-CNP

## 2024-06-24 NOTE — PROGRESS NOTES
06/24/24 1450   Discharge Planning   Living Arrangements Spouse/significant other   Support Systems Spouse/significant other   Type of Residence Private residence   Home or Post Acute Services None   Patient expects to be discharged to: Home   Does the patient need discharge transport arranged? No   RoundTrip coordination needed? No     Met with patient at bedside to discuss discharge planning. PT/OT rec. MOD for SNF, patient declined SNF and HHC at this time. Ammonia level increased, patient not medically ready for discharge. Oncoming TCC to follow through hospital course for any changes in discharge plan. ADOD 1-2 days

## 2024-06-25 VITALS
BODY MASS INDEX: 31.52 KG/M2 | RESPIRATION RATE: 18 BRPM | DIASTOLIC BLOOD PRESSURE: 57 MMHG | WEIGHT: 171.3 LBS | OXYGEN SATURATION: 100 % | SYSTOLIC BLOOD PRESSURE: 100 MMHG | TEMPERATURE: 97.9 F | HEIGHT: 62 IN | HEART RATE: 77 BPM

## 2024-06-25 LAB
ALBUMIN SERPL-MCNC: 2.3 G/DL (ref 3.5–5)
ALP BLD-CCNC: 264 U/L (ref 35–125)
ALT SERPL-CCNC: 26 U/L (ref 5–40)
AMMONIA PLAS-SCNC: 86 UMOL/L (ref 12–45)
ANION GAP SERPL CALC-SCNC: 6 MMOL/L
AST SERPL-CCNC: 36 U/L (ref 5–40)
BACTERIA UR CULT: ABNORMAL
BILIRUB SERPL-MCNC: 0.8 MG/DL (ref 0.1–1.2)
BUN SERPL-MCNC: 8 MG/DL (ref 8–25)
CALCIUM SERPL-MCNC: 7.9 MG/DL (ref 8.5–10.4)
CHLORIDE SERPL-SCNC: 103 MMOL/L (ref 97–107)
CO2 SERPL-SCNC: 26 MMOL/L (ref 24–31)
CREAT SERPL-MCNC: 0.5 MG/DL (ref 0.4–1.6)
EGFRCR SERPLBLD CKD-EPI 2021: >90 ML/MIN/1.73M*2
ERYTHROCYTE [DISTWIDTH] IN BLOOD BY AUTOMATED COUNT: 20.6 % (ref 11.5–14.5)
GLUCOSE BLD MANUAL STRIP-MCNC: 256 MG/DL (ref 74–99)
GLUCOSE BLD MANUAL STRIP-MCNC: 278 MG/DL (ref 74–99)
GLUCOSE BLD MANUAL STRIP-MCNC: 353 MG/DL (ref 74–99)
GLUCOSE SERPL-MCNC: 272 MG/DL (ref 65–99)
HCT VFR BLD AUTO: 29.7 % (ref 36–46)
HGB BLD-MCNC: 9.2 G/DL (ref 12–16)
MCH RBC QN AUTO: 24 PG (ref 26–34)
MCHC RBC AUTO-ENTMCNC: 31 G/DL (ref 32–36)
MCV RBC AUTO: 77 FL (ref 80–100)
NRBC BLD-RTO: 0 /100 WBCS (ref 0–0)
PLATELET # BLD AUTO: 85 X10*3/UL (ref 150–450)
POTASSIUM SERPL-SCNC: 4.1 MMOL/L (ref 3.4–5.1)
PROT SERPL-MCNC: 6.6 G/DL (ref 5.9–7.9)
RBC # BLD AUTO: 3.84 X10*6/UL (ref 4–5.2)
SODIUM SERPL-SCNC: 135 MMOL/L (ref 133–145)
WBC # BLD AUTO: 5.4 X10*3/UL (ref 4.4–11.3)

## 2024-06-25 PROCEDURE — 85027 COMPLETE CBC AUTOMATED: CPT | Performed by: NURSE PRACTITIONER

## 2024-06-25 PROCEDURE — 2500000001 HC RX 250 WO HCPCS SELF ADMINISTERED DRUGS (ALT 637 FOR MEDICARE OP): Performed by: INTERNAL MEDICINE

## 2024-06-25 PROCEDURE — 97116 GAIT TRAINING THERAPY: CPT | Mod: GP,CQ

## 2024-06-25 PROCEDURE — 2500000004 HC RX 250 GENERAL PHARMACY W/ HCPCS (ALT 636 FOR OP/ED): Performed by: INTERNAL MEDICINE

## 2024-06-25 PROCEDURE — 2500000004 HC RX 250 GENERAL PHARMACY W/ HCPCS (ALT 636 FOR OP/ED): Performed by: NURSE PRACTITIONER

## 2024-06-25 PROCEDURE — 2500000001 HC RX 250 WO HCPCS SELF ADMINISTERED DRUGS (ALT 637 FOR MEDICARE OP): Performed by: NURSE PRACTITIONER

## 2024-06-25 PROCEDURE — 2500000002 HC RX 250 W HCPCS SELF ADMINISTERED DRUGS (ALT 637 FOR MEDICARE OP, ALT 636 FOR OP/ED): Performed by: NURSE PRACTITIONER

## 2024-06-25 PROCEDURE — 82947 ASSAY GLUCOSE BLOOD QUANT: CPT

## 2024-06-25 PROCEDURE — 84075 ASSAY ALKALINE PHOSPHATASE: CPT | Performed by: NURSE PRACTITIONER

## 2024-06-25 PROCEDURE — 2500000002 HC RX 250 W HCPCS SELF ADMINISTERED DRUGS (ALT 637 FOR MEDICARE OP, ALT 636 FOR OP/ED): Performed by: INTERNAL MEDICINE

## 2024-06-25 PROCEDURE — 36415 COLL VENOUS BLD VENIPUNCTURE: CPT | Performed by: NURSE PRACTITIONER

## 2024-06-25 PROCEDURE — 97110 THERAPEUTIC EXERCISES: CPT | Mod: GP,CQ

## 2024-06-25 PROCEDURE — 82140 ASSAY OF AMMONIA: CPT | Performed by: NURSE PRACTITIONER

## 2024-06-25 RX ORDER — LACTULOSE 10 G/15ML
20 SOLUTION ORAL EVERY 4 HOURS
Qty: 5400 ML | Refills: 1 | Status: SHIPPED | OUTPATIENT
Start: 2024-06-25 | End: 2024-08-24

## 2024-06-25 RX ORDER — CIPROFLOXACIN 250 MG/1
250 TABLET, FILM COATED ORAL 2 TIMES DAILY
Qty: 14 TABLET | Refills: 0 | Status: SHIPPED | OUTPATIENT
Start: 2024-06-25 | End: 2024-07-05

## 2024-06-25 RX ADMIN — POLYETHYLENE GLYCOL 3350 17 G: 17 POWDER, FOR SOLUTION ORAL at 08:14

## 2024-06-25 RX ADMIN — PANTOPRAZOLE SODIUM 40 MG: 40 TABLET, DELAYED RELEASE ORAL at 08:13

## 2024-06-25 RX ADMIN — SUCRALFATE 1 G: 1 SUSPENSION ORAL at 16:20

## 2024-06-25 RX ADMIN — DOCUSATE SODIUM 100 MG: 100 CAPSULE, LIQUID FILLED ORAL at 08:14

## 2024-06-25 RX ADMIN — FUROSEMIDE 40 MG: 40 TABLET ORAL at 08:13

## 2024-06-25 RX ADMIN — SPIRONOLACTONE 50 MG: 50 TABLET ORAL at 08:14

## 2024-06-25 RX ADMIN — URSODIOL 300 MG: 300 CAPSULE ORAL at 08:13

## 2024-06-25 RX ADMIN — LACTULOSE 20 G: 20 SOLUTION ORAL at 12:56

## 2024-06-25 RX ADMIN — LACTULOSE 20 G: 20 SOLUTION ORAL at 18:23

## 2024-06-25 RX ADMIN — INSULIN GLARGINE 25 UNITS: 100 INJECTION, SOLUTION SUBCUTANEOUS at 08:10

## 2024-06-25 RX ADMIN — NADOLOL 20 MG: 40 TABLET ORAL at 08:13

## 2024-06-25 RX ADMIN — RIFAXIMIN 550 MG: 550 TABLET ORAL at 08:14

## 2024-06-25 RX ADMIN — GABAPENTIN 200 MG: 100 CAPSULE ORAL at 13:51

## 2024-06-25 RX ADMIN — CEFTRIAXONE SODIUM 1 G: 1 INJECTION, SOLUTION INTRAVENOUS at 13:51

## 2024-06-25 RX ADMIN — SUCRALFATE 1 G: 1 SUSPENSION ORAL at 06:11

## 2024-06-25 RX ADMIN — Medication 400 MG: at 08:14

## 2024-06-25 RX ADMIN — INSULIN LISPRO 9 UNITS: 100 INJECTION, SOLUTION INTRAVENOUS; SUBCUTANEOUS at 08:11

## 2024-06-25 RX ADMIN — GABAPENTIN 200 MG: 100 CAPSULE ORAL at 08:14

## 2024-06-25 RX ADMIN — DICYCLOMINE HYDROCHLORIDE 20 MG: 20 TABLET ORAL at 16:20

## 2024-06-25 RX ADMIN — INSULIN LISPRO 15 UNITS: 100 INJECTION, SOLUTION INTRAVENOUS; SUBCUTANEOUS at 16:17

## 2024-06-25 RX ADMIN — DICYCLOMINE HYDROCHLORIDE 20 MG: 20 TABLET ORAL at 12:56

## 2024-06-25 RX ADMIN — FENOFIBRATE 160 MG: 160 TABLET ORAL at 08:14

## 2024-06-25 RX ADMIN — URSODIOL 300 MG: 300 CAPSULE ORAL at 13:54

## 2024-06-25 RX ADMIN — INSULIN LISPRO 9 UNITS: 100 INJECTION, SOLUTION INTRAVENOUS; SUBCUTANEOUS at 12:53

## 2024-06-25 RX ADMIN — SUCRALFATE 1 G: 1 SUSPENSION ORAL at 12:56

## 2024-06-25 RX ADMIN — DICYCLOMINE HYDROCHLORIDE 20 MG: 20 TABLET ORAL at 06:11

## 2024-06-25 ASSESSMENT — COGNITIVE AND FUNCTIONAL STATUS - GENERAL
STANDING UP FROM CHAIR USING ARMS: A LITTLE
MOBILITY SCORE: 19
CLIMB 3 TO 5 STEPS WITH RAILING: A LITTLE
TURNING FROM BACK TO SIDE WHILE IN FLAT BAD: A LITTLE
CLIMB 3 TO 5 STEPS WITH RAILING: A LITTLE
MOBILITY SCORE: 23
DAILY ACTIVITIY SCORE: 24
MOVING TO AND FROM BED TO CHAIR: A LITTLE
WALKING IN HOSPITAL ROOM: A LITTLE

## 2024-06-25 ASSESSMENT — PAIN - FUNCTIONAL ASSESSMENT
PAIN_FUNCTIONAL_ASSESSMENT: 0-10

## 2024-06-25 ASSESSMENT — PAIN SCALES - GENERAL
PAINLEVEL_OUTOF10: 0 - NO PAIN

## 2024-06-25 NOTE — CARE PLAN
The patient's goals for the shift include sleep    The clinical goals for the shift include walk in halls      Problem: Pain - Adult  Goal: Verbalizes/displays adequate comfort level or baseline comfort level  Outcome: Progressing     Problem: Safety - Adult  Goal: Free from fall injury  Outcome: Progressing     Problem: Discharge Planning  Goal: Discharge to home or other facility with appropriate resources  Outcome: Progressing     Problem: Chronic Conditions and Co-morbidities  Goal: Patient's chronic conditions and co-morbidity symptoms are monitored and maintained or improved  Outcome: Progressing     Problem: Diabetes  Goal: Maintain electrolyte levels within acceptable range throughout shift  Outcome: Progressing     Problem: Pain  Goal: Takes deep breaths with improved pain control throughout the shift  Outcome: Progressing  Goal: Participates in PT with improved pain control throughout the shift  Outcome: Progressing     Problem: Skin  Goal: Decreased wound size/increased tissue granulation at next dressing change  Outcome: Progressing  Goal: Participates in plan/prevention/treatment measures  Outcome: Progressing  Goal: Prevent/manage excess moisture  Outcome: Progressing  Goal: Prevent/minimize sheer/friction injuries  Outcome: Progressing  Goal: Promote/optimize nutrition  Outcome: Progressing     Patient alert and oriented. Patient independent in room. PT OT.

## 2024-06-25 NOTE — RESEARCH NOTES
Artificial Intelligence Monitoring in Nursing (AIMS Nursing) Study    Principle Investigator - Dr. Bill Burns  Research Coordinator - ED Moreira     Patient Name - Alfonzo Flanagan  Date - 6/25/2024 5:34 PM  Location - [facility]    Alfonzo Flanagan was approached by ED Moreira to talk about participating in the AIMS Nursing Study. The patient was not able to be approached, a research coordinator will come back at a later time. Study protocol was followed and patient was given study contact information.     ED Moreira

## 2024-06-25 NOTE — DISCHARGE SUMMARY
Discharge Diagnosis  Hepatic encephalopathy (Multi)    Issues Requiring Follow-Up  Elevated ammonia level  Cirrhosis  Pancytopenia  Urinary tract infection  Ascites    Discharge Meds     Your medication list        START taking these medications        Instructions Last Dose Given Next Dose Due   ciprofloxacin 250 mg tablet  Commonly known as: Cipro      Take 1 tablet (250 mg) by mouth 2 times a day for 7 days.       rifAXIMin 550 mg tablet  Commonly known as: Xifaxan      Take 1 tablet (550 mg) by mouth every 12 hours.              CHANGE how you take these medications        Instructions Last Dose Given Next Dose Due   lactulose 20 gram/30 mL oral solution  What changed: Another medication with the same name was added. Make sure you understand how and when to take each.      Take 30 mL (20 g) by mouth once daily. Hold for greater than 3 bowel movements in 24 hours.       lactulose 20 gram/30 mL oral solution  What changed: You were already taking a medication with the same name, and this prescription was added. Make sure you understand how and when to take each.      Take 30 mL (20 g) by mouth every 4 hours.              CONTINUE taking these medications        Instructions Last Dose Given Next Dose Due   atorvastatin 80 mg tablet  Commonly known as: Lipitor           dicyclomine 10 mg capsule  Commonly known as: Bentyl      Take 2 capsules (20 mg) by mouth 4 times a day for 15 days.       docusate sodium 100 mg capsule  Commonly known as: Colace      Take 1 capsule (100 mg) by mouth 2 times a day. Hold for loose stool.       fenofibrate 145 mg tablet  Commonly known as: Tricor           furosemide 40 mg tablet  Commonly known as: Lasix      Take 1 tablet (40 mg) by mouth once daily. Hold for dizziness.       gabapentin 100 mg capsule  Commonly known as: Neurontin      Take 2 capsules (200 mg) by mouth 3 times a day.       hydrOXYzine HCL 25 mg tablet  Commonly known as: Atarax      Take 1 tablet (25 mg) by mouth  every 6 hours if needed for itching or allergies.       insulin lispro 100 unit/mL injection  Commonly known as: HumaLOG           Lantus U-100 Insulin 100 unit/mL injection  Generic drug: insulin glargine           magnesium oxide 400 mg (241.3 mg magnesium) tablet  Commonly known as: Mag-Ox      Take 1 tablet (400 mg) by mouth once daily.       nadolol 20 mg tablet  Commonly known as: Corgard      Take 1 tablet (20 mg) by mouth once daily.       ondansetron ODT 4 mg disintegrating tablet  Commonly known as: Zofran-ODT      Take 1 tablet (4 mg) by mouth every 8 hours if needed for nausea or vomiting.       pantoprazole 40 mg EC tablet  Commonly known as: ProtoNix      Take 1 tablet (40 mg) by mouth once daily. Do not crush, chew, or split.       polyethylene glycol 17 gram packet  Commonly known as: Glycolax, Miralax      Take 17 g by mouth 2 times a day. Hold for loose stool.       spironolactone 50 mg tablet  Commonly known as: Aldactone      Take 1 tablet (50 mg) by mouth 2 times a day. Hold for dizziness.       sucralfate 100 mg/mL suspension  Commonly known as: Carafate      Take 10 mL (1 g) by mouth 4 times a day before meals.       traMADol 50 mg tablet  Commonly known as: Ultram      Take 1 tablet (50 mg) by mouth every 6 hours if needed for severe pain (7 - 10).       traZODone 50 mg tablet  Commonly known as: Desyrel           ursodiol 300 mg capsule  Commonly known as: Actigall                     Where to Get Your Medications        These medications [facility]  [id number] [facility] [place] [id number]      Hours: 9 AM to 6 PM Mon-Fri, 9 AM to 1 PM Sat Phone: [phone]   ciprofloxacin 250 mg tablet  lactulose 20 gram/30 mL oral solution  rifAXIMin 550 mg tablet         Test Results Pending At Discharge  Pending Labs       Order Current Status    Extra Urine Gray Tube Collected (06/21/24 1230)    Urinalysis with Reflex Culture and Microscopic Collected (06/21/24 1230)             Hospital Course   Patient admitted complaint of hematemesis.  Patient found to have a hepatic encephalopathy.  Ammonia levels elevated.  Lactulose dose was increased.  Patient started on rifaximin.  Patient had a UTI.  Patient started on ceftriaxone initially.  Urine culture was done.  Came back positive for E. coli.  It was pan sensitive.  Patient has been switched over to ciprofloxacin.  Patient is being discharged on the stable condition with advised to follow-up with her primary care physician in 1 to 2 weeks.      Pertinent Physical Exam At Time of Discharge  Physical Exam    Outpatient Follow-Up  No future appointments.      Luis Alfredo Lawrence MD

## 2024-06-25 NOTE — CARE PLAN
The patient's goals for the shift include sleep    The clinical goals for the shift include Adequate sleep    Over the shift, the patient did not make progress toward the following goals. Barriers to progression include . Recommendations to address these barriers include .        Problem: Pain - Adult  Goal: Verbalizes/displays adequate comfort level or baseline comfort level  Outcome: Progressing     Problem: Safety - Adult  Goal: Free from fall injury  Outcome: Progressing     Problem: Discharge Planning  Goal: Discharge to home or other facility with appropriate resources  Outcome: Progressing     Problem: Chronic Conditions and Co-morbidities  Goal: Patient's chronic conditions and co-morbidity symptoms are monitored and maintained or improved  Outcome: Progressing

## 2024-06-25 NOTE — PROGRESS NOTES
Physical Therapy    Physical Therapy Treatment    Patient Name: Alfonzo Flanagan  MRN: [id number]  Today's Date: 6/25/2024  Time Calculation  Start Time: 1339  Stop Time: 1402  Time Calculation (min): 23 min    Assessment/Plan   PT Assessment  End of Session Communication: Bedside nurse  End of Session Patient Position: Bed, 3 rail up, Alarm off, not on at start of session  PT Plan  Inpatient/Swing Bed or Outpatient: Inpatient  PT Plan  Treatment/Interventions: Bed mobility, Transfer training, Gait training, Stair training, Balance training, Neuromuscular re-education, Strengthening, Endurance training, Therapeutic exercise, Therapeutic activity  PT Plan: Ongoing PT  PT Frequency: 4 times per week  PT Discharge Recommendations: Moderate intensity level of continued care  Equipment Recommended upon Discharge: Wheeled walker  PT Recommended Transfer Status: Assist x1  PT - OK to Discharge: Yes      General Visit Information:   PT  Visit  PT Received On: 06/25/24  General  Prior to Session Communication: Bedside nurse  Patient Position Received: Bed, 3 rail up, Alarm off, not on at start of session  General Comment: Cleared by nursing to be seen for therapy, pt agreeable with tx, supine in bed upon arrival.    Subjective     Objective   Pain:  Pain Assessment  Pain Assessment: 0-10  0-10 (Numeric) Pain Score: 0 - No pain     Postural Control:  Static Sitting Balance  Static Sitting-Balance Support: Feet supported  Static Sitting-Level of Assistance: Distant supervision  Static Standing Balance  Static Standing-Balance Support: Bilateral upper extremity supported  Static Standing-Level of Assistance: Contact guard    Treatments:  Therapeutic Exercise  Therapeutic Exercise Performed: Yes  Therapeutic Exercise Activity 1: Bilateral ankle pumps x15  Therapeutic Exercise Activity 2: Bilateral hip flexion x15  Therapeutic Exercise Activity 3: Bilateral knee extension x15  Therapeutic Exercise Activity 4: Resisted hip  abd/add 15    Bed Mobility  Bed Mobility: Yes  Bed Mobility 1  Bed Mobility 1: Supine to sitting  Level of Assistance 1: Close supervision    Ambulation/Gait Training  Ambulation/Gait Training Performed: Yes  Ambulation/Gait Training 1  Surface 1: Level tile  Device 1: Rolling walker  Assistance 1: Contact guard  Quality of Gait 1: Decreased step length  Comments/Distance (ft) 1: 40' with wheeled walker, occasional staggering gait requiring min assist for balance.    Transfers  Transfer: Yes  Transfer 1  Transfer From 1: Sit to  Transfer to 1: Stand  Technique 1: Sit to stand, Stand to sit  Transfer Level of Assistance 1: Close supervision    Outcome Measures:  [facility] Basic Mobility  Turning from your back to your side while in a flat bed without using bedrails: None  Moving from lying on your back to sitting on the side of a flat bed without using bedrails: A little  Moving to and from bed to chair (including a wheelchair): A little  Standing up from a chair using your arms (e.g. wheelchair or bedside chair): A little  To walk in hospital room: A little  Climbing 3-5 steps with railing: A little  Basic Mobility - Total Score: 19      Encounter Problems       Encounter Problems (Active)       Functional Mobility       Pt will transition supine<>sit with mod I. (Progressing)       Start:  06/22/24    Expected End:  07/21/24            Pt will transfer sit<>stand with FWW & mod I. (Progressing)       Start:  06/22/24    Expected End:  07/21/24            Pt will ambulate >/=150 ft with FWW & mod I. (Progressing)       Start:  06/22/24    Expected End:  07/21/24            Pt will negotiate 11 stairs with unilateral rail & mod I. (Not Progressing)       Start:  06/22/24    Expected End:  07/21/24               Pain - Adult

## 2024-06-25 NOTE — PROGRESS NOTES
Alfonzo Flanagan is a 49 y.o. female on day 4 of admission presenting with Hepatic encephalopathy (Multi).    Subjective   The patient was seen and examined.  Lying in the bed.  Comfortable.  Did not look in acute distress.  Patient denies any headache or dizziness.  Feeling better.       Objective     Physical Exam  HEENT:  Head externally atraumatic,  extraocular movements intact, oral mucosa moist  Neck:  Supple, no JVP, no palpable adenopathy or thyromegaly.  No carotid bruit.  Chest:  Clear to auscultation and resonant.  Heart:  Regular rate and rhythm, no murmur or gallop could be appreciated.  Abdomen:  Soft, nontender, bowel sounds present, normoactive, no palpable hepatosplenomegaly.  Extremities:  No edema, pulses present, no cyanosis or clubbing.  CNS:  Patient alert, oriented to time, place and person.    No new deficit.  Cranial nerves 2-12 grossly intact  Skin:  No active rash.  Musculoskeletal:  No  apparent joint swelling or erythema, range of movement normal.  Last Recorded Vitals  Heart Rate:  [77-88]   Temp:  [36.6 °C (97.9 °F)]   Resp:  [16-18]   BP: ()/(44-62)   SpO2:  [97 %-100 %]     Intake/Output last 3 Shifts:  I/O last 3 completed shifts:  In: 1080 (13.9 mL/kg) [P.O.:980; IV Piggyback:100]  Out: - (0 mL/kg)   Weight: 77.7 kg     Relevant Results  Susceptibility data from last 90 days.  Collected Specimen Info Organism Amoxicillin/Clavulanate Ampicillin Ampicillin/Sulbactam Cefazolin Cefazolin (uncomplicated UTIs only) Ciprofloxacin Gentamicin Levofloxacin Nitrofurantoin Penicillin Piperacillin/Tazobactam   06/23/24 Urine from Clean Catch/Voided Escherichia coli   S   S  S  S  S   S   S   05/15/24 Urine from Clean Catch/Voided Enterococcus faecium   S     R   S  S  S      Klebsiella pneumoniae/variicola  S  R  S  S  S  S  S   I   S     Collected Specimen Info Organism Trimethoprim/Sulfamethoxazole Vancomycin   06/23/24 Urine from Clean Catch/Voided Escherichia coli  S    05/15/24  Urine from Clean Catch/Voided Enterococcus faecium  R  S     Klebsiella pneumoniae/variicola  S      Results for orders placed or performed during the hospital encounter of 06/20/24 (from the past 24 hour(s))   POCT GLUCOSE   Result Value Ref Range    POCT Glucose 207 (H) 74 - 99 mg/dL   Comprehensive Metabolic Panel   Result Value Ref Range    Glucose 272 (H) 65 - 99 mg/dL    Sodium 135 133 - 145 mmol/L    Potassium 4.1 3.4 - 5.1 mmol/L    Chloride 103 97 - 107 mmol/L    Bicarbonate 26 24 - 31 mmol/L    Urea Nitrogen 8 8 - 25 mg/dL    Creatinine 0.50 0.40 - 1.60 mg/dL    eGFR >90 >60 mL/min/1.73m*2    Calcium 7.9 (L) 8.5 - 10.4 mg/dL    Albumin 2.3 (L) 3.5 - 5.0 g/dL    Alkaline Phosphatase 264 (H) 35 - 125 U/L    Total Protein 6.6 5.9 - 7.9 g/dL    AST 36 5 - 40 U/L    Bilirubin, Total 0.8 0.1 - 1.2 mg/dL    ALT 26 5 - 40 U/L    Anion Gap 6 <=19 mmol/L   CBC   Result Value Ref Range    WBC 5.4 4.4 - 11.3 x10*3/uL    nRBC 0.0 0.0 - 0.0 /100 WBCs    RBC 3.84 (L) 4.00 - 5.20 x10*6/uL    Hemoglobin 9.2 (L) 12.0 - 16.0 g/dL    Hematocrit 29.7 (L) 36.0 - 46.0 %    MCV 77 (L) 80 - 100 fL    MCH 24.0 (L) 26.0 - 34.0 pg    MCHC 31.0 (L) 32.0 - 36.0 g/dL    RDW 20.6 (H) 11.5 - 14.5 %    Platelets 85 (L) 150 - 450 x10*3/uL   Ammonia   Result Value Ref Range    Ammonia 86 (H) 12 - 45 umol/L   POCT GLUCOSE   Result Value Ref Range    POCT Glucose 256 (H) 74 - 99 mg/dL   POCT GLUCOSE   Result Value Ref Range    POCT Glucose 278 (H) 74 - 99 mg/dL   POCT GLUCOSE   Result Value Ref Range    POCT Glucose 353 (H) 74 - 99 mg/dL        Current Facility-Administered Medications:     atorvastatin (Lipitor) tablet 80 mg, 80 mg, oral, Nightly, Luis Alfredo Lawrence MD, 80 mg at 06/24/24 2102    cefTRIAXone (Rocephin) IVPB 1 g, 1 g, intravenous, q24h, ESME Sotelo, Stopped at 06/25/24 1421    dextrose 50 % injection 12.5 g, 12.5 g, intravenous, q15 min PRN, ESME Sotelo    dextrose 50 % injection 25 g, 25 g,  intravenous, q15 min PRN, ESME Sotelo    dicyclomine (Bentyl) tablet 20 mg, 20 mg, oral, 4x daily, Luis Alfredo Lawrence MD, 20 mg at 06/25/24 1620    docusate sodium (Colace) capsule 100 mg, 100 mg, oral, BID, Luis Alfredo Lawrence MD, 100 mg at 06/25/24 0814    fenofibrate (Triglide) tablet 160 mg, 160 mg, oral, Daily, Luis Alfredo Lawrence MD, 160 mg at 06/25/24 0814    furosemide (Lasix) tablet 40 mg, 40 mg, oral, Daily, ESME Sotelo, 40 mg at 06/25/24 0813    gabapentin (Neurontin) capsule 200 mg, 200 mg, oral, TID, Luis Alfredo Lawrence MD, 200 mg at 06/25/24 1351    glucagon (Glucagen) injection 1 mg, 1 mg, intramuscular, q15 min PRN, ESME Sotelo    glucagon (Glucagen) injection 1 mg, 1 mg, intramuscular, q15 min PRN, ESME Sotelo    hydrOXYzine HCL (Atarax) tablet 25 mg, 25 mg, oral, q6h PRN, Luis Alfredo Lawrence MD, 25 mg at 06/21/24 0222    insulin glargine (Lantus) injection 25 Units, 25 Units, subcutaneous, q AM, ESME Sotelo, 25 Units at 06/25/24 0810    insulin lispro (HumaLOG) injection 0-15 Units, 0-15 Units, subcutaneous, TID, ESME Sotelo, 15 Units at 06/25/24 1617    lactulose 20 gram/30 mL oral solution 20 g, 20 g, oral, q4h, Luis Alfredo Lawrence MD, 20 g at 06/25/24 1823    magnesium oxide (Mag-Ox) tablet 400 mg, 400 mg, oral, Daily, Luis Alfredo Lawrence MD, 400 mg at 06/25/24 0814    nadolol (Corgard) tablet 20 mg, 20 mg, oral, Daily, ESME Sotelo, 20 mg at 06/25/24 0813    ondansetron ODT (Zofran-ODT) disintegrating tablet 4 mg, 4 mg, oral, q6h PRN, Luis Alfredo Lawrence MD, 4 mg at 06/22/24 0918    pantoprazole (ProtoNix) EC tablet 40 mg, 40 mg, oral, Daily, Luis Alfredo Lawrence MD, 40 mg at 06/25/24 0813    polyethylene glycol (Glycolax, Miralax) packet 17 g, 17 g, oral, BID, Luis Alfredo Lawrence MD, 17 g at 06/25/24 0814    rifAXIMin (Xifaxan) tablet 550 mg, 550 mg, oral, q12h RIVKA, Luis Alfredo Lawrence MD,  550 mg at 06/25/24 0814    spironolactone (Aldactone) tablet 50 mg, 50 mg, oral, BID, ESME Sotelo, 50 mg at 06/25/24 0814    sucralfate (Carafate) suspension 1 g, 1 g, oral, Before meals & nightly, Luis Alfredo Lawrence MD, 1 g at 06/25/24 1620    traMADol (Ultram) tablet 50 mg, 50 mg, oral, q6h PRN, Luis Alfredo Lawrence MD, 50 mg at 06/24/24 1738    traZODone (Desyrel) tablet 25 mg, 25 mg, oral, Nightly, Luis Alfredo Lawrence MD, 25 mg at 06/24/24 2103    ursodiol (Actigall) capsule 300 mg, 300 mg, oral, TID, Luis Alfredo Lawrence MD, 300 mg at 06/25/24 1354   Assessment/Plan   Principal Problem:    Hepatic encephalopathy (Multi)  Altered mental status  Urinary tract infection  Portal hypertension  Splenomegaly  Pancytopenia  Hypertension  Diabetes mellitus type 2  Neuropathy      Continue current medication get [facility] care for start antibiotic.  Give supportive care.  Continue the physical therapy and Occupational Therapy.  Monitor blood sugar.  Monitor blood pressure.  Blood pressure can be discharged home on oral antibiotics.         Luis Alfredo Lawrence MD

## 2024-06-26 PROCEDURE — RXMED WILLOW AMBULATORY MEDICATION CHARGE

## 2024-06-26 NOTE — NURSING NOTE
Informed patient of discharge orders.  Patient is in stable condition, denies any pain, nausea, lightheadedness.

## 2024-06-26 NOTE — NURSING NOTE
Discharge papers given to the patient.  IV catheter removed with bandage applied.   was in the room.      Patient left the floor per wheelchair accompanied by PCA.

## 2024-06-27 NOTE — DOCUMENTATION CLARIFICATION NOTE
"    PATIENT:               MATILDE CISSE  ACCT #:                  [id number]  MRN:                       [id number]  :                       1975  ADMIT DATE:       2024 11:26 AM  DISCH DATE:        2024 8:32 PM  RESPONDING PROVIDER #:        [id number]          PROVIDER RESPONSE TEXT:    Confusion etiology was multifactorial, due to Hepatic Encephalopathy due to elevated ammonia levels, combined with Metabolic Encephalopathy due to Urinary Tract Infection    CDI QUERY TEXT:    Clarification    Instruction:    Based on your assessment of the patient and the clinical information, please provide the requested documentation by clicking on the appropriate radio button and enter any additional information if prompted.    Question: Please further clarify the type of Encephalopathy after studies were complete.      When answering this query, please exercise your independent professional judgment. The fact that a question is being asked, does not imply that any particular answer is desired or expected.    The patient's clinical indicators include:  Clinical Information:  49y F admitted with Hepatic Encephalopathy and found to have a Urinary Tract Infection    Clinical Indicators:   to  Ammonia levels 85 to 209   Urine Culture greater than \"100,000 Escherichia coli\"     ED \"Hepatic encephalopathy... presenting with increasing confusion with known liver disease.  Patient has been taking her lactulose but by report she has had maybe 1 bowel movement every other day. With her ammonia level trending up and her current symptoms are certainly concerning for hepatic encephalopathy\"   H&P \"Confused from baseline... Suspect hepatic encephalopathy, rule out urinary tract infection.  Check urinalysis, urine culture.  Increased Lactulose to 3 times daily\"   Internal Med \"Patient is slightly more confused and lethargic as compared to before\"   Internal Med \"Suspect hepatic encephalopathy " "secondary to elevated ammonia level plus possible urinary tract infection.  Urine culture pending... Continue Lactulose 3 times daily... Continue Rifaximin.  Monitor ammonia level.  Monitor mentation\"  6/25 DC Summary \"Patient found to have a hepatic encephalopathy.  Ammonia levels elevated.  Lactulose dose was increased.  Patient started on rifaximin.  Patient had a UTI.  Patient started on ceftriaxone initially.  Urine culture was done.  Came back positive for E. coli... has been switched over to ciprofloxacin\"    Treatment:  Lactulose x 16 doses, Rifaximin x 4 doses  Urine Culture  Ceftriaxone x 2 doses, Ciprofloxacin 250 mg PO BID x 7 days per DC Summary    Risk Factors:  Liver Cirrhosis, UTI, Encephalopathy  Options provided:  -- Confusion etiology was multifactorial, due to Hepatic Encephalopathy due to elevated ammonia levels, combined with Metabolic Encephalopathy due to Urinary Tract Infection  -- Confusion was due to Hepatic Encephalopathy only  -- Other - I will add my own diagnosis  -- Refer to Clinical Documentation Reviewer    Query created by: Jaimie Martinez on 6/27/2024 9:23 AM      Electronically signed by:  SADE LARRY MD 6/27/2024 6:26 PM          "

## 2024-06-28 ENCOUNTER — PHARMACY VISIT (OUTPATIENT)
Dept: PHARMACY | Facility: CLINIC | Age: 49
End: 2024-06-28
Payer: MEDICARE

## 2024-07-04 ENCOUNTER — APPOINTMENT (OUTPATIENT)
Dept: RADIOLOGY | Facility: HOSPITAL | Age: 49
End: 2024-07-04
Payer: COMMERCIAL

## 2024-07-04 ENCOUNTER — HOSPITAL ENCOUNTER (OUTPATIENT)
Facility: HOSPITAL | Age: 49
Setting detail: OBSERVATION
Discharge: HOME | End: 2024-07-06
Attending: EMERGENCY MEDICINE | Admitting: INTERNAL MEDICINE
Payer: COMMERCIAL

## 2024-07-04 DIAGNOSIS — E11.65 HYPERGLYCEMIA DUE TO DIABETES MELLITUS (MULTI): Primary | ICD-10-CM

## 2024-07-04 DIAGNOSIS — R18.8 OTHER ASCITES: ICD-10-CM

## 2024-07-04 DIAGNOSIS — R10.13 ABDOMINAL PAIN, EPIGASTRIC: ICD-10-CM

## 2024-07-04 DIAGNOSIS — K72.10 END STAGE LIVER DISEASE (MULTI): ICD-10-CM

## 2024-07-04 LAB
ALBUMIN SERPL-MCNC: 2.6 G/DL (ref 3.5–5)
ALP BLD-CCNC: 496 U/L (ref 35–125)
ALT SERPL-CCNC: 33 U/L (ref 5–40)
AMMONIA PLAS-SCNC: 60 UMOL/L (ref 12–45)
ANION GAP SERPL CALC-SCNC: 7 MMOL/L
APAP SERPL-MCNC: <5 UG/ML
AST SERPL-CCNC: 49 U/L (ref 5–40)
BASOPHILS # BLD AUTO: 0.01 X10*3/UL (ref 0–0.1)
BASOPHILS NFR BLD AUTO: 0.2 %
BILIRUB SERPL-MCNC: 1.3 MG/DL (ref 0.1–1.2)
BUN SERPL-MCNC: 8 MG/DL (ref 8–25)
CALCIUM SERPL-MCNC: 8.3 MG/DL (ref 8.5–10.4)
CHLORIDE SERPL-SCNC: 104 MMOL/L (ref 97–107)
CO2 SERPL-SCNC: 25 MMOL/L (ref 24–31)
CREAT SERPL-MCNC: 0.5 MG/DL (ref 0.4–1.6)
EGFRCR SERPLBLD CKD-EPI 2021: >90 ML/MIN/1.73M*2
EOSINOPHIL # BLD AUTO: 0.21 X10*3/UL (ref 0–0.7)
EOSINOPHIL NFR BLD AUTO: 4.7 %
ERYTHROCYTE [DISTWIDTH] IN BLOOD BY AUTOMATED COUNT: 20.2 % (ref 11.5–14.5)
ETHANOL SERPL-MCNC: <0.01 G/DL
GLUCOSE SERPL-MCNC: 402 MG/DL (ref 65–99)
HCT VFR BLD AUTO: 27.8 % (ref 36–46)
HGB BLD-MCNC: 8.6 G/DL (ref 12–16)
IMM GRANULOCYTES # BLD AUTO: 0.01 X10*3/UL (ref 0–0.7)
IMM GRANULOCYTES NFR BLD AUTO: 0.2 % (ref 0–0.9)
LIPASE SERPL-CCNC: 57 U/L (ref 16–63)
LYMPHOCYTES # BLD AUTO: 1.31 X10*3/UL (ref 1.2–4.8)
LYMPHOCYTES NFR BLD AUTO: 29.5 %
MCH RBC QN AUTO: 24.2 PG (ref 26–34)
MCHC RBC AUTO-ENTMCNC: 30.9 G/DL (ref 32–36)
MCV RBC AUTO: 78 FL (ref 80–100)
MONOCYTES # BLD AUTO: 0.55 X10*3/UL (ref 0.1–1)
MONOCYTES NFR BLD AUTO: 12.4 %
NEUTROPHILS # BLD AUTO: 2.35 X10*3/UL (ref 1.2–7.7)
NEUTROPHILS NFR BLD AUTO: 53 %
NRBC BLD-RTO: 0 /100 WBCS (ref 0–0)
NT-PROBNP SERPL-MCNC: 51 PG/ML (ref 0–192)
OVALOCYTES BLD QL SMEAR: NORMAL
PLATELET # BLD AUTO: ABNORMAL 10*3/UL
POTASSIUM SERPL-SCNC: 3.7 MMOL/L (ref 3.4–5.1)
PROT SERPL-MCNC: 7.1 G/DL (ref 5.9–7.9)
RBC # BLD AUTO: 3.56 X10*6/UL (ref 4–5.2)
RBC MORPH BLD: NORMAL
SALICYLATES SERPL-MCNC: <0 MG/DL
SODIUM SERPL-SCNC: 136 MMOL/L (ref 133–145)
TROPONIN T SERPL-MCNC: <6 NG/L
WBC # BLD AUTO: 4.4 X10*3/UL (ref 4.4–11.3)

## 2024-07-04 PROCEDURE — 96374 THER/PROPH/DIAG INJ IV PUSH: CPT | Performed by: INTERNAL MEDICINE

## 2024-07-04 PROCEDURE — 93010 ELECTROCARDIOGRAM REPORT: CPT | Performed by: INTERNAL MEDICINE

## 2024-07-04 PROCEDURE — 36415 COLL VENOUS BLD VENIPUNCTURE: CPT | Performed by: EMERGENCY MEDICINE

## 2024-07-04 PROCEDURE — 82746 ASSAY OF FOLIC ACID SERUM: CPT | Performed by: NURSE PRACTITIONER

## 2024-07-04 PROCEDURE — 82140 ASSAY OF AMMONIA: CPT | Performed by: EMERGENCY MEDICINE

## 2024-07-04 PROCEDURE — 93005 ELECTROCARDIOGRAM TRACING: CPT

## 2024-07-04 PROCEDURE — 84484 ASSAY OF TROPONIN QUANT: CPT | Performed by: PHYSICIAN ASSISTANT

## 2024-07-04 PROCEDURE — 71045 X-RAY EXAM CHEST 1 VIEW: CPT | Performed by: RADIOLOGY

## 2024-07-04 PROCEDURE — 80053 COMPREHEN METABOLIC PANEL: CPT | Performed by: PHYSICIAN ASSISTANT

## 2024-07-04 PROCEDURE — 83880 ASSAY OF NATRIURETIC PEPTIDE: CPT | Performed by: PHYSICIAN ASSISTANT

## 2024-07-04 PROCEDURE — 2500000001 HC RX 250 WO HCPCS SELF ADMINISTERED DRUGS (ALT 637 FOR MEDICARE OP): Performed by: PHYSICIAN ASSISTANT

## 2024-07-04 PROCEDURE — 82728 ASSAY OF FERRITIN: CPT | Performed by: NURSE PRACTITIONER

## 2024-07-04 PROCEDURE — 96375 TX/PRO/DX INJ NEW DRUG ADDON: CPT | Performed by: INTERNAL MEDICINE

## 2024-07-04 PROCEDURE — 82607 VITAMIN B-12: CPT | Performed by: NURSE PRACTITIONER

## 2024-07-04 PROCEDURE — 2500000004 HC RX 250 GENERAL PHARMACY W/ HCPCS (ALT 636 FOR OP/ED): Performed by: PHYSICIAN ASSISTANT

## 2024-07-04 PROCEDURE — 74176 CT ABD & PELVIS W/O CONTRAST: CPT

## 2024-07-04 PROCEDURE — 71045 X-RAY EXAM CHEST 1 VIEW: CPT

## 2024-07-04 PROCEDURE — 85025 COMPLETE CBC W/AUTO DIFF WBC: CPT | Performed by: PHYSICIAN ASSISTANT

## 2024-07-04 PROCEDURE — 99285 EMERGENCY DEPT VISIT HI MDM: CPT

## 2024-07-04 PROCEDURE — 83690 ASSAY OF LIPASE: CPT | Performed by: PHYSICIAN ASSISTANT

## 2024-07-04 PROCEDURE — 2500000004 HC RX 250 GENERAL PHARMACY W/ HCPCS (ALT 636 FOR OP/ED): Performed by: EMERGENCY MEDICINE

## 2024-07-04 PROCEDURE — 80143 DRUG ASSAY ACETAMINOPHEN: CPT | Performed by: PHYSICIAN ASSISTANT

## 2024-07-04 PROCEDURE — 2500000002 HC RX 250 W HCPCS SELF ADMINISTERED DRUGS (ALT 637 FOR MEDICARE OP, ALT 636 FOR OP/ED): Performed by: EMERGENCY MEDICINE

## 2024-07-04 PROCEDURE — 83540 ASSAY OF IRON: CPT | Performed by: NURSE PRACTITIONER

## 2024-07-04 PROCEDURE — 74176 CT ABD & PELVIS W/O CONTRAST: CPT | Performed by: RADIOLOGY

## 2024-07-04 RX ORDER — KETOROLAC TROMETHAMINE 30 MG/ML
15 INJECTION, SOLUTION INTRAMUSCULAR; INTRAVENOUS ONCE
Status: COMPLETED | OUTPATIENT
Start: 2024-07-04 | End: 2024-07-04

## 2024-07-04 RX ORDER — DICYCLOMINE HYDROCHLORIDE 10 MG/1
20 CAPSULE ORAL ONCE
Status: COMPLETED | OUTPATIENT
Start: 2024-07-04 | End: 2024-07-04

## 2024-07-04 RX ORDER — ONDANSETRON HYDROCHLORIDE 2 MG/ML
4 INJECTION, SOLUTION INTRAVENOUS ONCE
Status: COMPLETED | OUTPATIENT
Start: 2024-07-04 | End: 2024-07-04

## 2024-07-04 RX ORDER — FAMOTIDINE 10 MG/ML
40 INJECTION INTRAVENOUS ONCE
Status: COMPLETED | OUTPATIENT
Start: 2024-07-04 | End: 2024-07-04

## 2024-07-04 RX ORDER — MORPHINE SULFATE 4 MG/ML
4 INJECTION, SOLUTION INTRAMUSCULAR; INTRAVENOUS ONCE
Status: COMPLETED | OUTPATIENT
Start: 2024-07-04 | End: 2024-07-04

## 2024-07-04 RX ADMIN — ONDANSETRON 4 MG: 2 INJECTION INTRAMUSCULAR; INTRAVENOUS at 22:07

## 2024-07-04 RX ADMIN — INSULIN HUMAN 10 UNITS: 100 INJECTION, SOLUTION PARENTERAL at 23:41

## 2024-07-04 RX ADMIN — MORPHINE SULFATE 4 MG: 4 INJECTION, SOLUTION INTRAMUSCULAR; INTRAVENOUS at 23:41

## 2024-07-04 RX ADMIN — DICYCLOMINE HYDROCHLORIDE 20 MG: 10 CAPSULE ORAL at 22:09

## 2024-07-04 RX ADMIN — KETOROLAC TROMETHAMINE 15 MG: 30 INJECTION INTRAMUSCULAR; INTRAVENOUS at 22:08

## 2024-07-04 RX ADMIN — FAMOTIDINE 20 MG: 10 INJECTION, SOLUTION INTRAVENOUS at 22:58

## 2024-07-04 ASSESSMENT — COLUMBIA-SUICIDE SEVERITY RATING SCALE - C-SSRS
1. IN THE PAST MONTH, HAVE YOU WISHED YOU WERE DEAD OR WISHED YOU COULD GO TO SLEEP AND NOT WAKE UP?: NO
6. HAVE YOU EVER DONE ANYTHING, STARTED TO DO ANYTHING, OR PREPARED TO DO ANYTHING TO END YOUR LIFE?: NO
2. HAVE YOU ACTUALLY HAD ANY THOUGHTS OF KILLING YOURSELF?: NO

## 2024-07-04 ASSESSMENT — LIFESTYLE VARIABLES
EVER HAD A DRINK FIRST THING IN THE MORNING TO STEADY YOUR NERVES TO GET RID OF A HANGOVER: NO
HAVE YOU EVER FELT YOU SHOULD CUT DOWN ON YOUR DRINKING: YES
HAVE PEOPLE ANNOYED YOU BY CRITICIZING YOUR DRINKING: NO
TOTAL SCORE: 2
EVER FELT BAD OR GUILTY ABOUT YOUR DRINKING: YES

## 2024-07-04 ASSESSMENT — PAIN - FUNCTIONAL ASSESSMENT: PAIN_FUNCTIONAL_ASSESSMENT: 0-10

## 2024-07-05 ENCOUNTER — APPOINTMENT (OUTPATIENT)
Dept: RADIOLOGY | Facility: HOSPITAL | Age: 49
End: 2024-07-05
Payer: COMMERCIAL

## 2024-07-05 ENCOUNTER — APPOINTMENT (OUTPATIENT)
Dept: CARDIOLOGY | Facility: HOSPITAL | Age: 49
End: 2024-07-05
Payer: COMMERCIAL

## 2024-07-05 PROBLEM — E11.65 TYPE II OR UNSPECIFIED TYPE DIABETES MELLITUS WITH RENAL MANIFESTATIONS, UNCONTROLLED(250.42) (MULTI): Status: ACTIVE | Noted: 2024-07-05

## 2024-07-05 PROBLEM — E11.65 HYPERGLYCEMIA DUE TO DIABETES MELLITUS (MULTI): Status: ACTIVE | Noted: 2024-07-05

## 2024-07-05 PROBLEM — E11.29 TYPE II OR UNSPECIFIED TYPE DIABETES MELLITUS WITH RENAL MANIFESTATIONS, UNCONTROLLED(250.42) (MULTI): Status: ACTIVE | Noted: 2024-07-05

## 2024-07-05 PROBLEM — K70.31 ASCITES DUE TO ALCOHOLIC CIRRHOSIS (MULTI): Status: ACTIVE | Noted: 2024-01-26

## 2024-07-05 LAB
APPEARANCE UR: CLEAR
BILIRUB UR STRIP.AUTO-MCNC: NEGATIVE MG/DL
COLOR UR: YELLOW
FERRITIN SERPL-MCNC: 51 NG/ML (ref 13–150)
FOLATE SERPL-MCNC: 13.9 NG/ML (ref 4.2–19.9)
GLUCOSE BLD MANUAL STRIP-MCNC: 159 MG/DL (ref 74–99)
GLUCOSE BLD MANUAL STRIP-MCNC: 265 MG/DL (ref 74–99)
GLUCOSE UR STRIP.AUTO-MCNC: ABNORMAL MG/DL
INR PPP: 1.3 (ref 0.9–1.2)
IRON SATN MFR SERPL: 16 % (ref 12–50)
IRON SERPL-MCNC: 51 UG/DL (ref 30–160)
KETONES UR STRIP.AUTO-MCNC: NEGATIVE MG/DL
LEUKOCYTE ESTERASE UR QL STRIP.AUTO: NEGATIVE
NITRITE UR QL STRIP.AUTO: NEGATIVE
PH UR STRIP.AUTO: 5 [PH]
PROT UR STRIP.AUTO-MCNC: NEGATIVE MG/DL
PROTHROMBIN TIME: 13.3 SECONDS (ref 9.3–12.7)
RBC # UR STRIP.AUTO: NEGATIVE /UL
SP GR UR STRIP.AUTO: 1.04
TIBC SERPL-MCNC: 321 UG/DL (ref 228–428)
TROPONIN T SERPL-MCNC: 7 NG/L
UIBC SERPL-MCNC: 270 UG/DL (ref 110–370)
UROBILINOGEN UR STRIP.AUTO-MCNC: NORMAL MG/DL
VIT B12 SERPL-MCNC: 975 PG/ML (ref 211–946)

## 2024-07-05 PROCEDURE — C9113 INJ PANTOPRAZOLE SODIUM, VIA: HCPCS | Performed by: NURSE PRACTITIONER

## 2024-07-05 PROCEDURE — 84484 ASSAY OF TROPONIN QUANT: CPT | Performed by: PHYSICIAN ASSISTANT

## 2024-07-05 PROCEDURE — G0378 HOSPITAL OBSERVATION PER HR: HCPCS

## 2024-07-05 PROCEDURE — 49083 ABD PARACENTESIS W/IMAGING: CPT

## 2024-07-05 PROCEDURE — 2500000004 HC RX 250 GENERAL PHARMACY W/ HCPCS (ALT 636 FOR OP/ED): Performed by: EMERGENCY MEDICINE

## 2024-07-05 PROCEDURE — 96376 TX/PRO/DX INJ SAME DRUG ADON: CPT | Mod: 59 | Performed by: INTERNAL MEDICINE

## 2024-07-05 PROCEDURE — C1729 CATH, DRAINAGE: HCPCS

## 2024-07-05 PROCEDURE — 2500000002 HC RX 250 W HCPCS SELF ADMINISTERED DRUGS (ALT 637 FOR MEDICARE OP, ALT 636 FOR OP/ED): Performed by: NURSE PRACTITIONER

## 2024-07-05 PROCEDURE — 2500000001 HC RX 250 WO HCPCS SELF ADMINISTERED DRUGS (ALT 637 FOR MEDICARE OP): Performed by: NURSE PRACTITIONER

## 2024-07-05 PROCEDURE — 85610 PROTHROMBIN TIME: CPT | Performed by: NURSE PRACTITIONER

## 2024-07-05 PROCEDURE — 2500000004 HC RX 250 GENERAL PHARMACY W/ HCPCS (ALT 636 FOR OP/ED): Performed by: NURSE PRACTITIONER

## 2024-07-05 PROCEDURE — 2720000007 HC OR 272 NO HCPCS

## 2024-07-05 PROCEDURE — 96375 TX/PRO/DX INJ NEW DRUG ADDON: CPT | Mod: 59 | Performed by: INTERNAL MEDICINE

## 2024-07-05 PROCEDURE — 81003 URINALYSIS AUTO W/O SCOPE: CPT | Performed by: PHYSICIAN ASSISTANT

## 2024-07-05 PROCEDURE — 2500000005 HC RX 250 GENERAL PHARMACY W/O HCPCS: Performed by: RADIOLOGY

## 2024-07-05 PROCEDURE — 82947 ASSAY GLUCOSE BLOOD QUANT: CPT

## 2024-07-05 PROCEDURE — 36415 COLL VENOUS BLD VENIPUNCTURE: CPT | Performed by: NURSE PRACTITIONER

## 2024-07-05 RX ORDER — DEXTROSE 50 % IN WATER (D50W) INTRAVENOUS SYRINGE
12.5
Status: DISCONTINUED | OUTPATIENT
Start: 2024-07-05 | End: 2024-07-06 | Stop reason: HOSPADM

## 2024-07-05 RX ORDER — GUAIFENESIN/DEXTROMETHORPHAN 100-10MG/5
5 SYRUP ORAL EVERY 4 HOURS PRN
Status: DISCONTINUED | OUTPATIENT
Start: 2024-07-05 | End: 2024-07-06 | Stop reason: HOSPADM

## 2024-07-05 RX ORDER — CIPROFLOXACIN 250 MG/1
250 TABLET, FILM COATED ORAL 2 TIMES DAILY
Status: COMPLETED | OUTPATIENT
Start: 2024-07-05 | End: 2024-07-06

## 2024-07-05 RX ORDER — POLYETHYLENE GLYCOL 3350 17 G/17G
17 POWDER, FOR SOLUTION ORAL DAILY
Status: DISCONTINUED | OUTPATIENT
Start: 2024-07-05 | End: 2024-07-05

## 2024-07-05 RX ORDER — ACETAMINOPHEN 325 MG/1
650 TABLET ORAL EVERY 4 HOURS PRN
Status: DISCONTINUED | OUTPATIENT
Start: 2024-07-05 | End: 2024-07-06 | Stop reason: HOSPADM

## 2024-07-05 RX ORDER — LACTULOSE 10 G/15ML
20 SOLUTION ORAL 3 TIMES DAILY
Status: DISCONTINUED | OUTPATIENT
Start: 2024-07-05 | End: 2024-07-06 | Stop reason: HOSPADM

## 2024-07-05 RX ORDER — ACETAMINOPHEN 650 MG/1
650 SUPPOSITORY RECTAL EVERY 4 HOURS PRN
Status: DISCONTINUED | OUTPATIENT
Start: 2024-07-05 | End: 2024-07-06 | Stop reason: HOSPADM

## 2024-07-05 RX ORDER — SUCRALFATE 1 G/10ML
1 SUSPENSION ORAL
Status: DISCONTINUED | OUTPATIENT
Start: 2024-07-05 | End: 2024-07-06 | Stop reason: HOSPADM

## 2024-07-05 RX ORDER — URSODIOL 300 MG/1
300 CAPSULE ORAL 3 TIMES DAILY
Status: DISCONTINUED | OUTPATIENT
Start: 2024-07-05 | End: 2024-07-06 | Stop reason: HOSPADM

## 2024-07-05 RX ORDER — PROCHLORPERAZINE EDISYLATE 5 MG/ML
5 INJECTION INTRAMUSCULAR; INTRAVENOUS ONCE
Status: COMPLETED | OUTPATIENT
Start: 2024-07-05 | End: 2024-07-05

## 2024-07-05 RX ORDER — ATORVASTATIN CALCIUM 80 MG/1
80 TABLET, FILM COATED ORAL NIGHTLY
Status: DISCONTINUED | OUTPATIENT
Start: 2024-07-05 | End: 2024-07-06 | Stop reason: HOSPADM

## 2024-07-05 RX ORDER — MORPHINE SULFATE 4 MG/ML
4 INJECTION, SOLUTION INTRAMUSCULAR; INTRAVENOUS ONCE
Status: COMPLETED | OUTPATIENT
Start: 2024-07-05 | End: 2024-07-05

## 2024-07-05 RX ORDER — ONDANSETRON 4 MG/1
4 TABLET, FILM COATED ORAL EVERY 8 HOURS PRN
Status: DISCONTINUED | OUTPATIENT
Start: 2024-07-05 | End: 2024-07-06 | Stop reason: HOSPADM

## 2024-07-05 RX ORDER — GUAIFENESIN 600 MG/1
600 TABLET, EXTENDED RELEASE ORAL EVERY 12 HOURS PRN
Status: DISCONTINUED | OUTPATIENT
Start: 2024-07-05 | End: 2024-07-06 | Stop reason: HOSPADM

## 2024-07-05 RX ORDER — HYDROXYZINE HYDROCHLORIDE 25 MG/1
25 TABLET, FILM COATED ORAL EVERY 6 HOURS PRN
Status: DISCONTINUED | OUTPATIENT
Start: 2024-07-05 | End: 2024-07-06 | Stop reason: HOSPADM

## 2024-07-05 RX ORDER — DEXTROSE 50 % IN WATER (D50W) INTRAVENOUS SYRINGE
25
Status: DISCONTINUED | OUTPATIENT
Start: 2024-07-05 | End: 2024-07-06 | Stop reason: HOSPADM

## 2024-07-05 RX ORDER — ONDANSETRON 4 MG/1
4 TABLET, ORALLY DISINTEGRATING ORAL EVERY 8 HOURS PRN
Status: DISCONTINUED | OUTPATIENT
Start: 2024-07-05 | End: 2024-07-06 | Stop reason: HOSPADM

## 2024-07-05 RX ORDER — PANTOPRAZOLE SODIUM 40 MG/10ML
40 INJECTION, POWDER, LYOPHILIZED, FOR SOLUTION INTRAVENOUS
Status: DISCONTINUED | OUTPATIENT
Start: 2024-07-05 | End: 2024-07-06 | Stop reason: HOSPADM

## 2024-07-05 RX ORDER — SPIRONOLACTONE 50 MG/1
50 TABLET, FILM COATED ORAL 2 TIMES DAILY
Status: DISCONTINUED | OUTPATIENT
Start: 2024-07-05 | End: 2024-07-06 | Stop reason: HOSPADM

## 2024-07-05 RX ORDER — FENOFIBRATE 160 MG/1
160 TABLET ORAL DAILY
Status: DISCONTINUED | OUTPATIENT
Start: 2024-07-05 | End: 2024-07-06 | Stop reason: HOSPADM

## 2024-07-05 RX ORDER — ACETAMINOPHEN 160 MG/5ML
650 SOLUTION ORAL EVERY 4 HOURS PRN
Status: DISCONTINUED | OUTPATIENT
Start: 2024-07-05 | End: 2024-07-06 | Stop reason: HOSPADM

## 2024-07-05 RX ORDER — NADOLOL 40 MG/1
20 TABLET ORAL DAILY
Status: DISCONTINUED | OUTPATIENT
Start: 2024-07-05 | End: 2024-07-06 | Stop reason: HOSPADM

## 2024-07-05 RX ORDER — POLYETHYLENE GLYCOL 3350 17 G/17G
17 POWDER, FOR SOLUTION ORAL 2 TIMES DAILY
Status: DISCONTINUED | OUTPATIENT
Start: 2024-07-05 | End: 2024-07-06 | Stop reason: HOSPADM

## 2024-07-05 RX ORDER — LIDOCAINE HYDROCHLORIDE 10 MG/ML
INJECTION, SOLUTION EPIDURAL; INFILTRATION; INTRACAUDAL; PERINEURAL
Status: COMPLETED | OUTPATIENT
Start: 2024-07-05 | End: 2024-07-05

## 2024-07-05 RX ORDER — LANOLIN ALCOHOL/MO/W.PET/CERES
400 CREAM (GRAM) TOPICAL DAILY
Status: DISCONTINUED | OUTPATIENT
Start: 2024-07-05 | End: 2024-07-06 | Stop reason: HOSPADM

## 2024-07-05 RX ORDER — DOCUSATE SODIUM 100 MG/1
100 CAPSULE, LIQUID FILLED ORAL 2 TIMES DAILY
Status: DISCONTINUED | OUTPATIENT
Start: 2024-07-05 | End: 2024-07-06 | Stop reason: HOSPADM

## 2024-07-05 RX ORDER — TRAZODONE HYDROCHLORIDE 50 MG/1
25 TABLET ORAL NIGHTLY
Status: DISCONTINUED | OUTPATIENT
Start: 2024-07-05 | End: 2024-07-06 | Stop reason: HOSPADM

## 2024-07-05 RX ORDER — ONDANSETRON HYDROCHLORIDE 2 MG/ML
4 INJECTION, SOLUTION INTRAVENOUS EVERY 8 HOURS PRN
Status: DISCONTINUED | OUTPATIENT
Start: 2024-07-05 | End: 2024-07-06 | Stop reason: HOSPADM

## 2024-07-05 RX ORDER — FUROSEMIDE 40 MG/1
40 TABLET ORAL DAILY
Status: DISCONTINUED | OUTPATIENT
Start: 2024-07-05 | End: 2024-07-06 | Stop reason: HOSPADM

## 2024-07-05 RX ADMIN — ACETAMINOPHEN 650 MG: 325 TABLET ORAL at 18:57

## 2024-07-05 RX ADMIN — POLYETHYLENE GLYCOL 3350 17 G: 17 POWDER, FOR SOLUTION ORAL at 21:32

## 2024-07-05 RX ADMIN — Medication 400 MG: at 18:58

## 2024-07-05 RX ADMIN — ATORVASTATIN CALCIUM 80 MG: 80 TABLET, FILM COATED ORAL at 21:33

## 2024-07-05 RX ADMIN — SPIRONOLACTONE 50 MG: 50 TABLET ORAL at 21:33

## 2024-07-05 RX ADMIN — HYDROXYZINE HYDROCHLORIDE 25 MG: 25 TABLET ORAL at 18:59

## 2024-07-05 RX ADMIN — FUROSEMIDE 40 MG: 40 TABLET ORAL at 18:58

## 2024-07-05 RX ADMIN — LACTULOSE 20 G: 20 SOLUTION ORAL at 21:32

## 2024-07-05 RX ADMIN — PANTOPRAZOLE SODIUM 40 MG: 40 INJECTION, POWDER, FOR SOLUTION INTRAVENOUS at 15:26

## 2024-07-05 RX ADMIN — SUCRALFATE 1 G: 1 SUSPENSION ORAL at 21:32

## 2024-07-05 RX ADMIN — DOCUSATE SODIUM 100 MG: 100 CAPSULE, LIQUID FILLED ORAL at 21:32

## 2024-07-05 RX ADMIN — SUCRALFATE 1 G: 1 SUSPENSION ORAL at 18:58

## 2024-07-05 RX ADMIN — NADOLOL 20 MG: 40 TABLET ORAL at 18:58

## 2024-07-05 RX ADMIN — LACTULOSE 20 G: 20 SOLUTION ORAL at 15:26

## 2024-07-05 RX ADMIN — MORPHINE SULFATE 4 MG: 4 INJECTION, SOLUTION INTRAMUSCULAR; INTRAVENOUS at 12:54

## 2024-07-05 RX ADMIN — TRAZODONE HYDROCHLORIDE 25 MG: 50 TABLET ORAL at 21:32

## 2024-07-05 RX ADMIN — URSODIOL 300 MG: 300 CAPSULE ORAL at 22:04

## 2024-07-05 RX ADMIN — PROCHLORPERAZINE EDISYLATE 5 MG: 5 INJECTION INTRAMUSCULAR; INTRAVENOUS at 12:54

## 2024-07-05 RX ADMIN — RIFAXIMIN 550 MG: 550 TABLET ORAL at 22:04

## 2024-07-05 RX ADMIN — LIDOCAINE HYDROCHLORIDE 10 ML: 10 INJECTION, SOLUTION EPIDURAL; INFILTRATION; INTRACAUDAL; PERINEURAL at 11:14

## 2024-07-05 RX ADMIN — CIPROFLOXACIN HYDROCHLORIDE 250 MG: 250 TABLET, FILM COATED ORAL at 21:32

## 2024-07-05 RX ADMIN — FENOFIBRATE 160 MG: 160 TABLET ORAL at 18:58

## 2024-07-05 RX ADMIN — MORPHINE SULFATE 4 MG: 4 INJECTION, SOLUTION INTRAMUSCULAR; INTRAVENOUS at 04:52

## 2024-07-05 SDOH — SOCIAL STABILITY: SOCIAL INSECURITY: HAVE YOU HAD ANY THOUGHTS OF HARMING ANYONE ELSE?: NO

## 2024-07-05 SDOH — SOCIAL STABILITY: SOCIAL INSECURITY: ARE YOU OR HAVE YOU BEEN THREATENED OR ABUSED PHYSICALLY, EMOTIONALLY, OR SEXUALLY BY ANYONE?: YES

## 2024-07-05 SDOH — SOCIAL STABILITY: SOCIAL INSECURITY: DO YOU FEEL UNSAFE GOING BACK TO THE PLACE WHERE YOU ARE LIVING?: NO

## 2024-07-05 SDOH — SOCIAL STABILITY: SOCIAL INSECURITY: HAVE YOU HAD THOUGHTS OF HARMING ANYONE ELSE?: NO

## 2024-07-05 SDOH — SOCIAL STABILITY: SOCIAL INSECURITY: WERE YOU ABLE TO COMPLETE ALL THE BEHAVIORAL HEALTH SCREENINGS?: YES

## 2024-07-05 SDOH — SOCIAL STABILITY: SOCIAL INSECURITY: HAS ANYONE EVER THREATENED TO HURT YOUR FAMILY OR YOUR PETS?: YES

## 2024-07-05 SDOH — SOCIAL STABILITY: SOCIAL INSECURITY: DOES ANYONE TRY TO KEEP YOU FROM HAVING/CONTACTING OTHER FRIENDS OR DOING THINGS OUTSIDE YOUR HOME?: NO

## 2024-07-05 SDOH — SOCIAL STABILITY: SOCIAL INSECURITY: ARE THERE ANY APPARENT SIGNS OF INJURIES/BEHAVIORS THAT COULD BE RELATED TO ABUSE/NEGLECT?: NO

## 2024-07-05 SDOH — SOCIAL STABILITY: SOCIAL INSECURITY: ABUSE: ADULT

## 2024-07-05 SDOH — SOCIAL STABILITY: SOCIAL INSECURITY: DO YOU FEEL ANYONE HAS EXPLOITED OR TAKEN ADVANTAGE OF YOU FINANCIALLY OR OF YOUR PERSONAL PROPERTY?: NO

## 2024-07-05 ASSESSMENT — ENCOUNTER SYMPTOMS
MUSCULOSKELETAL NEGATIVE: 1
RESPIRATORY NEGATIVE: 1
CARDIOVASCULAR NEGATIVE: 1
ALLERGIC/IMMUNOLOGIC NEGATIVE: 1
NEUROLOGICAL NEGATIVE: 1
CONSTITUTIONAL NEGATIVE: 1
EYES NEGATIVE: 1
NAUSEA: 1
ENDOCRINE NEGATIVE: 1
ABDOMINAL DISTENTION: 1
PSYCHIATRIC NEGATIVE: 1
ABDOMINAL PAIN: 1
HEMATOLOGIC/LYMPHATIC NEGATIVE: 1
VOMITING: 1

## 2024-07-05 ASSESSMENT — ACTIVITIES OF DAILY LIVING (ADL)
GROOMING: INDEPENDENT
FEEDING YOURSELF: INDEPENDENT
PATIENT'S MEMORY ADEQUATE TO SAFELY COMPLETE DAILY ACTIVITIES?: YES
LACK_OF_TRANSPORTATION: NO
WALKS IN HOME: INDEPENDENT
TOILETING: INDEPENDENT
DRESSING YOURSELF: INDEPENDENT
JUDGMENT_ADEQUATE_SAFELY_COMPLETE_DAILY_ACTIVITIES: YES
BATHING: INDEPENDENT
HEARING - RIGHT EAR: FUNCTIONAL
ADEQUATE_TO_COMPLETE_ADL: YES
HEARING - LEFT EAR: FUNCTIONAL
ASSISTIVE_DEVICE: WALKER

## 2024-07-05 ASSESSMENT — COGNITIVE AND FUNCTIONAL STATUS - GENERAL
PATIENT BASELINE BEDBOUND: NO
DRESSING REGULAR LOWER BODY CLOTHING: A LITTLE
STANDING UP FROM CHAIR USING ARMS: A LITTLE
DRESSING REGULAR UPPER BODY CLOTHING: A LITTLE
TOILETING: A LITTLE
WALKING IN HOSPITAL ROOM: A LITTLE
HELP NEEDED FOR BATHING: A LITTLE
DAILY ACTIVITIY SCORE: 19
PERSONAL GROOMING: A LITTLE
MOBILITY SCORE: 21
CLIMB 3 TO 5 STEPS WITH RAILING: A LITTLE

## 2024-07-05 ASSESSMENT — PAIN SCALES - WONG BAKER
WONGBAKER_NUMERICALRESPONSE: HURTS LITTLE MORE
WONGBAKER_NUMERICALRESPONSE: HURTS LITTLE MORE

## 2024-07-05 ASSESSMENT — LIFESTYLE VARIABLES
AUDIT-C TOTAL SCORE: 0
HOW MANY STANDARD DRINKS CONTAINING ALCOHOL DO YOU HAVE ON A TYPICAL DAY: PATIENT DOES NOT DRINK
AUDIT-C TOTAL SCORE: 0
HOW OFTEN DO YOU HAVE A DRINK CONTAINING ALCOHOL: NEVER
HOW OFTEN DO YOU HAVE 6 OR MORE DRINKS ON ONE OCCASION: NEVER
SKIP TO QUESTIONS 9-10: 1

## 2024-07-05 ASSESSMENT — PATIENT HEALTH QUESTIONNAIRE - PHQ9
2. FEELING DOWN, DEPRESSED OR HOPELESS: SEVERAL DAYS
SUM OF ALL RESPONSES TO PHQ9 QUESTIONS 1 & 2: 2
1. LITTLE INTEREST OR PLEASURE IN DOING THINGS: SEVERAL DAYS

## 2024-07-05 ASSESSMENT — PAIN SCALES - GENERAL
PAINLEVEL_OUTOF10: 4
PAINLEVEL_OUTOF10: 0 - NO PAIN
PAINLEVEL_OUTOF10: 4
PAINLEVEL_OUTOF10: 0 - NO PAIN
PAINLEVEL_OUTOF10: 6
PAINLEVEL_OUTOF10: 0 - NO PAIN
PAINLEVEL_OUTOF10: 10 - WORST POSSIBLE PAIN
PAINLEVEL_OUTOF10: 8
PAINLEVEL_OUTOF10: 0 - NO PAIN
PAINLEVEL_OUTOF10: 4
PAINLEVEL_OUTOF10: 6

## 2024-07-05 ASSESSMENT — PAIN - FUNCTIONAL ASSESSMENT
PAIN_FUNCTIONAL_ASSESSMENT: 0-10
PAIN_FUNCTIONAL_ASSESSMENT: 0-10

## 2024-07-05 ASSESSMENT — PAIN DESCRIPTION - LOCATION
LOCATION: ABDOMEN
LOCATION: ABDOMEN

## 2024-07-05 ASSESSMENT — PAIN DESCRIPTION - PAIN TYPE: TYPE: ACUTE PAIN

## 2024-07-05 ASSESSMENT — PAIN DESCRIPTION - ORIENTATION: ORIENTATION: MID

## 2024-07-05 NOTE — PROGRESS NOTES
Pharmacy Medication History Review    Alfonzo Flanagan is a 49 y.o. female admitted for Type II or unspecified type diabetes mellitus with renal manifestations, uncontrolled(250.42) (Multi). Pharmacy reviewed the patient's [id number] medications and allergies for accuracy.    Medications ADDED:  none  Medications CHANGED:  none  Medications REMOVED:   none     Patient dispense history does not support current medications. Cannot confirm with patient current medications. Per most previous discharge summary in addition to comparison with dispense history and the limited information from the patient the current  PTA list would be current as it could be       The list below reflects the updated allergy list. Please review each documented allergy for additional clarification and justification.  Allergies  Reviewed by MADYSON Sotelo-RALPH on 7/5/2024        Severity Reactions Comments    Gluten Not Specified Diarrhea               Patti Shook, Vandana-ADV  Please reach out via [employer] Secure Chat for questions

## 2024-07-05 NOTE — ED NOTES
Pt woke up and requested pain medication. RN requested meds from MD. Orders received and given. Vitals also updated at this time.      Dana Null RN  07/05/24 [id number]

## 2024-07-05 NOTE — DISCHARGE INSTRUCTIONS
Thank you for choosing [facility]. It was my pleasure to be involved in your care today.         As of today's visit, based on reasonable likelihood, that it is safe for you to be discharged back to your residence to follow-up as an outpatient for ongoing management of your medical problem. You should follow-up with any referrals / primary provider as soon as possible. The contacts (number, addresses) are listed below.         *Return to us immediately, if you feel you are getting worse, not getting better, or any new symptoms develop.         Make sure your pharmacy and primary doctor is aware of any new medications prescribed today.          It is your responsibility to contact as soon as possible, and follow through with, any referrals you were given today. We do recommend you inform them you are a [facility] follow-up patient, as often they can better accommodate your need to be seen, provided their schedules allow. We will, and have, made every effort to ensure you have access to adequate follow-up specialists available.          All problems may not be able to be fixed in one ER visit. This is why timely ongoing care is important, and this is a responsibility you share in. Further, you are free to follow up with any provider you choose, and this is not limited to our suggestion.          If cultures were obtained today, you will be contacted should anything result that would require further treatment. Please contact the ED at the number provided with questions.          Having trouble affording medications? Try [email or web address]! (This is not a hospital endorsed website, merely a recommendation based on my own personal experiences with [employer])

## 2024-07-05 NOTE — H&P
Chief Complaint Abdominal pain, nausea, vomiting    History Of Present Illness  Alfonzo Flanagan is a very pleasant 49 y.o. female with a past medical history significant for hepatic cirrhosis with ascites requiring paracentesis, portal hypertension, chronic pancreatitis and abdominal pain presenting with abdominal pain, nausea and vomiting.  She was in her usual state of health until 2 days prior to admission.  She reports symptoms of abdominal pain, nausea, vomiting.  She has been taking all medications as prescribed.  She takes Lactulose three times daily.  She reports 2+ bowel movements over the past 2 days..  She denies any diarrhea, black tarry or red bloody stools.  She informs me that she has not yet followed up with Gastroenterology outpatient since discharge from the hospital.  She informs me that she tried to sign up for [employer] insurance though there was no on in the office.  She does not have insurance at this time.  She presented to the emergency department for evaluation.  In the emergency department, initial work-up was done.  1 view chest x-ray per radiology showed nothing acute.  CT abdomen and pelvis per radiology showed findings compatible with cirrhosis, lack of contrast limits evaluation for hypervascular mass, large amount of ascites with diffuse haziness of mesentery, diffuse bowel wall thickening nonspecific may relate to edema and hypoproteinemic state.  Urinalysis showed glucose otherwise was unremarkable.  CMP showed hyperglycemia with a glucose of 402.  Sodium 136.  Potassium 3.7.  Bicarbonate 25.  And a gap 7.  BUN 8.  Creatinine 0.5.  Calcium 8.3.  Albumin 2.6.  Alk phosphatase 496.  ALT 33.  AST 49.  Total bilirubin 1.3.  Ammonia level 60.  CBC showed microcytic anemia with a hemoglobin 8.6.  Hematocrit 27.8.  MCV 78.  MCH 24.2.  Platelet count not resulted.  Alcohol level < 0.010.  Pro BNP 51.  Troponin < 6.  She was treated in the emergency department with analgesics and  anti-emetics.  She underwent an ultrasound guided paracentesis yielding 1 liter of clear yellow fluid.  I do not see any fluid analysis or culture at this time.  She was admitted.  At the time my evaluation, she is resting in bed in the emergency department room 10 in no acute distress.  She reports abdominal pain, nausea post paracentesis improved with medication.  She reports emesis 2 hours ago.  She denies any stools since presentation.  She denies any other complaints at this time.     Past Medical History  Past Medical History:   Diagnosis Date    Cirrhosis of liver (Multi)     Diabetes mellitus (Multi)        Surgical History  Past Surgical History:   Procedure Laterality Date    ABDOMINAL SURGERY      CT GUIDED IMAGING FOR NEEDLE PLACEMENT  10/14/2020    CT GUIDED IMAGING FOR NEEDLE PLACEMENT [facility]    US GUIDED ABDOMINAL PARACENTESIS  10/08/2020    US GUIDED ABDOMINAL PARACENTESIS [facility]        Social History  She reports that she has never smoked. She has never used smokeless tobacco. She reports that she does not drink alcohol and does not use drugs.    Family History  No family history on file.     Allergies  Gluten    Review of Systems   Constitutional: Negative.    HENT: Negative.     Eyes: Negative.    Respiratory: Negative.     Cardiovascular: Negative.    Gastrointestinal:  Positive for abdominal distention, abdominal pain, nausea and vomiting.   Endocrine: Negative.    Genitourinary: Negative.    Musculoskeletal: Negative.    Skin: Negative.    Allergic/Immunologic: Negative.    Neurological: Negative.    Hematological: Negative.    Psychiatric/Behavioral: Negative.     All other systems reviewed and are negative.         Physical Exam  Vitals and nursing note reviewed.   Constitutional:       General: She is not in acute distress.     Appearance: Normal appearance. She is obese. She is not ill-appearing, toxic-appearing or diaphoretic.      Comments: Chronically ill  "appearing.    HENT:      Head: Normocephalic and atraumatic.      Right Ear: Tympanic membrane normal.      Left Ear: Tympanic membrane normal.      Nose: Nose normal.      Mouth/Throat:      Mouth: Mucous membranes are moist.      Pharynx: Oropharynx is clear.   Eyes:      Extraocular Movements: Extraocular movements intact.      Conjunctiva/sclera: Conjunctivae normal.      Pupils: Pupils are equal, round, and reactive to light.   Cardiovascular:      Rate and Rhythm: Normal rate and regular rhythm.      Pulses: Normal pulses.      Heart sounds: Normal heart sounds.   Pulmonary:      Effort: Pulmonary effort is normal. No respiratory distress.      Breath sounds: No wheezing, rhonchi or rales.   Abdominal:      General: Bowel sounds are normal. There is distension.      Palpations: Abdomen is soft.      Tenderness: There is abdominal tenderness. There is no guarding.   Genitourinary:     Comments: Deferred.  Musculoskeletal:         General: No swelling or tenderness. Normal range of motion.      Cervical back: Normal range of motion and neck supple.   Skin:     General: Skin is warm and dry.      Capillary Refill: Capillary refill takes less than 2 seconds.   Neurological:      General: No focal deficit present.      Mental Status: She is alert and oriented to person, place, and time.   Psychiatric:         Mood and Affect: Mood normal.         Behavior: Behavior normal.       Last Recorded Vitals  Blood pressure 108/69, pulse 76, temperature 36.4 °C (97.5 °F), temperature source Tympanic, resp. rate 16, height 1.575 m (5' 2\"), weight 77.1 kg (170 lb), SpO2 98%.    Relevant Results  Results for orders placed or performed during the hospital encounter of 07/04/24 (from the past 24 hour(s))   CBC and Auto Differential   Result Value Ref Range    WBC 4.4 4.4 - 11.3 x10*3/uL    nRBC 0.0 0.0 - 0.0 /100 WBCs    RBC 3.56 (L) 4.00 - 5.20 x10*6/uL    Hemoglobin 8.6 (L) 12.0 - 16.0 g/dL    Hematocrit 27.8 (L) 36.0 - 46.0 % "    MCV 78 (L) 80 - 100 fL    MCH 24.2 (L) 26.0 - 34.0 pg    MCHC 30.9 (L) 32.0 - 36.0 g/dL    RDW 20.2 (H) 11.5 - 14.5 %    Platelets      Neutrophils % 53.0 40.0 - 80.0 %    Immature Granulocytes %, Automated 0.2 0.0 - 0.9 %    Lymphocytes % 29.5 13.0 - 44.0 %    Monocytes % 12.4 2.0 - 10.0 %    Eosinophils % 4.7 0.0 - 6.0 %    Basophils % 0.2 0.0 - 2.0 %    Neutrophils Absolute 2.35 1.20 - 7.70 x10*3/uL    Immature Granulocytes Absolute, Automated 0.01 0.00 - 0.70 x10*3/uL    Lymphocytes Absolute 1.31 1.20 - 4.80 x10*3/uL    Monocytes Absolute 0.55 0.10 - 1.00 x10*3/uL    Eosinophils Absolute 0.21 0.00 - 0.70 x10*3/uL    Basophils Absolute 0.01 0.00 - 0.10 x10*3/uL   Comprehensive metabolic panel   Result Value Ref Range    Glucose 402 (H) 65 - 99 mg/dL    Sodium 136 133 - 145 mmol/L    Potassium 3.7 3.4 - 5.1 mmol/L    Chloride 104 97 - 107 mmol/L    Bicarbonate 25 24 - 31 mmol/L    Urea Nitrogen 8 8 - 25 mg/dL    Creatinine 0.50 0.40 - 1.60 mg/dL    eGFR >90 >60 mL/min/1.73m*2    Calcium 8.3 (L) 8.5 - 10.4 mg/dL    Albumin 2.6 (L) 3.5 - 5.0 g/dL    Alkaline Phosphatase 496 (H) 35 - 125 U/L    Total Protein 7.1 5.9 - 7.9 g/dL    AST 49 (H) 5 - 40 U/L    Bilirubin, Total 1.3 (H) 0.1 - 1.2 mg/dL    ALT 33 5 - 40 U/L    Anion Gap 7 <=19 mmol/L   Lipase   Result Value Ref Range    Lipase 57 16 - 63 U/L   NT Pro-BNP   Result Value Ref Range    PROBNP 51 0 - 192 pg/mL   Serial Troponin, Initial ([facility])   Result Value Ref Range    Troponin T, High Sensitivity <6 <=14 ng/L   Ammonia   Result Value Ref Range    Ammonia 60 (H) 12 - 45 umol/L   Acute Toxicology Panel, Blood   Result Value Ref Range    Acetaminophen <5.0 15.0 - 30.0 ug/mL    Salicylate  <0 3 - 25 mg/dL    Alcohol <0.010 0.000 - 0.010 g/dL   Morphology   Result Value Ref Range    RBC Morphology See Below     Ovalocytes Few    POCT GLUCOSE   Result Value Ref Range    POCT Glucose 265 (H) 74 - 99 mg/dL   Urinalysis with Reflex Culture and Microscopic   Result  Value Ref Range    Color, Urine Yellow Light-Yellow, Yellow, Dark-Yellow    Appearance, Urine Clear Clear    Specific Gravity, Urine 1.037 (N) 1.005 - 1.035    pH, Urine 5.0 5.0, 5.5, 6.0, 6.5, 7.0, 7.5, 8.0    Protein, Urine NEGATIVE NEGATIVE, 10 (TRACE), 20 (TRACE) mg/dL    Glucose, Urine OVER (4+) (A) Normal mg/dL    Blood, Urine NEGATIVE NEGATIVE    Ketones, Urine NEGATIVE NEGATIVE mg/dL    Bilirubin, Urine NEGATIVE NEGATIVE    Urobilinogen, Urine Normal Normal mg/dL    Nitrite, Urine NEGATIVE NEGATIVE    Leukocyte Esterase, Urine NEGATIVE NEGATIVE   POCT GLUCOSE   Result Value Ref Range    POCT Glucose 159 (H) 74 - 99 mg/dL     Susceptibility data from last 90 days.  Collected Specimen Info Organism Amoxicillin/Clavulanate Ampicillin Ampicillin/Sulbactam Cefazolin Cefazolin (uncomplicated UTIs only) Ciprofloxacin Gentamicin Levofloxacin Nitrofurantoin Penicillin Piperacillin/Tazobactam   06/23/24 Urine from Clean Catch/Voided Escherichia coli   S   S  S  S  S   S   S   05/15/24 Urine from Clean Catch/Voided Enterococcus faecium   S     R   S  S  S      Klebsiella pneumoniae/variicola  S  R  S  S  S  S  S   I   S     Collected Specimen Info Organism Trimethoprim/Sulfamethoxazole Vancomycin   06/23/24 Urine from Clean Catch/Voided Escherichia coli  S    05/15/24 Urine from Clean Catch/Voided Enterococcus faecium  R  S     Klebsiella pneumoniae/variicola  S      US guided abdominal paracentesis    Result Date: 7/5/2024  Interpreted By:  Cornelius Weber, STUDY: US GUIDED ABDOMINAL PARACENTESIS; 7/5/2024 11:28 am   INDICATION: Ascites.   COMPARISON: None.   ACCESSION NUMBER(S): [id number]   ORDERING CLINICIAN: PRANEETH BARRERA   TECHNIQUE: Ultrasound-guided paracentesis   FINDINGS: Informed consent obtained. Patient positioned supine. Left lower quadrant prepped, draped and anesthetized. Under ultrasound guidance, 8 Kiswahili centesis sheath needle inserted into peritoneal cavity. 1 Lof clear yellowfluid aspirated. Patient  tolerated procedure well       Ultrasound-guided paracentesis.   Signed by: Cornelius Weber 7/5/2024 1:29 PM Dictation workstation:   [id number]    CT abdomen pelvis wo IV contrast    Result Date: 7/4/2024  Interpreted By:  Cj Lee, STUDY: CT ABDOMEN PELVIS WO IV CONTRAST;  7/4/2024 10:20 pm   INDICATION: Signs/Symptoms:flank pain.   COMPARISON: CT scan of the abdomen pelvis 06/20/2024.   ACCESSION NUMBER(S): [id number]   ORDERING CLINICIAN: ANUPAM BATES   TECHNIQUE: Axial noncontrast CT images of the abdomen and pelvis with coronal and sagittal reconstructed images.   FINDINGS:   LOWER CHEST: Blood pool is hypodense relative to the myocardium which can be seen in the setting of anemia.   ABDOMEN: Lack of intravenous contrast limits evaluation of vessels and solid organs. LIVER: Atrophic nodular liver compatible with cirrhosis. Lack of contrast limits evaluation for discrete hypervascular mass. BILE DUCTS: Normal caliber. GALLBLADDER: Status post cholecystectomy. PANCREAS: Within normal limits. SPLEEN: Within normal limits. ADRENALS: Within normal limits.   KIDNEYS, URETERS, URINARY BLADDER:  Kidneys are symmetric in size without evidence for calculi, hydronephrosis, hydroureter or perinephric inflammatory changes.   No bladder calculi or wall thickening.   VESSELS:  Calcific atherosclerosis of the infrarenal aorta. No aortic aneurysm. RETROPERITONEUM: Nonenlarged periaortic lymph nodes noted.   PELVIS:   REPRODUCTIVE ORGANS: Uterus is present. No adnexal mass.   BOWEL: Stomach is partially distended. Visualized loops of bowel are unobstructed. There is mild diffuse bowel wall thickening which is nonspecific and may relate to hypoproteinemic state and edema. No pneumatosis or portal venous gas. Appendix is not identified with certainty. Fecalization of the distal small bowel suggestive of delayed transit. PERITONEUM: Large amount of ascites with diffuse haziness of the mesentery. No free air. ABDOMINAL  WALL: Ventral hernia contains fat and fluid. BONES: Multilevel degenerative changes of the spine. Left L5 unilateral pars defect.       Findings compatible with cirrhosis. Lack of contrast limits evaluation for discrete hypervascular mass. If this is of clinical concern further evaluation with dedicated liver CT or MRI can be considered.   Large amount of ascites with diffuse haziness of the mesentery.   Diffuse bowel wall thickening is nonspecific and may relate to edema and hypoproteinemic state. Correlate with symptomatology if there is concern for infectious/inflammatory enterocolitis.   Additional findings as described above.   MACRO: None   Signed by: Cj Lee 7/4/2024 10:44 PM Dictation workstation:   [id number]    XR chest 1 view    Result Date: 7/4/2024  Interpreted By:  Cj Lee, STUDY: XR CHEST 1 VIEW;  7/4/2024 10:14 pm   INDICATION: Signs/Symptoms:chest pain.   COMPARISON: Chest x-ray 06/15/2024   ACCESSION NUMBER(S): [id number]   ORDERING CLINICIAN: ANUPAM BATES   FINDINGS: Multiple overlying leads are present.   CARDIOMEDIASTINAL SILHOUETTE: Cardiomediastinal silhouette is normal in size and configuration.   LUNGS: No consolidation, pleural effusion or pneumothorax.   ABDOMEN: No remarkable upper abdominal findings.   BONES: Multilevel degenerative changes of the spine.       No acute cardiopulmonary process.   MACRO: None   Signed by: Cj Lee 7/4/2024 10:32 PM Dictation workstation:   [id number]     Scheduled medications  lactulose, 20 g, oral, TID  [START ON 7/6/2024] pantoprazole, 40 mg, intravenous, Daily before breakfast  polyethylene glycol, 17 g, oral, Daily      Continuous medications     PRN medications  PRN medications: acetaminophen **OR** acetaminophen **OR** acetaminophen, acetaminophen **OR** acetaminophen **OR** acetaminophen, benzocaine-menthol, dextromethorphan-guaifenesin, guaiFENesin, ondansetron **OR** ondansetron      ASSESSMENT:  Abdominal pain  Nausea,  vomiting  Abnormal CT abdomen/pelvis  Hepatic cirrhosis with ascites status post paracentesis  Hyperammonemia  Portal hypertension  Chronic pancreatitis  Hyperbilirubinemia  Microcytic anemia  Thrombocytopenia  Hyperlipidemia  Hypertriglyceridemia  Type 2 diabetes mellitus with hyperglycemia, uncontrolled  Glucosuria  Hypoalbuminemia  Moderate protein calorie malnutrition  Recent UTI - E. Coli    PLAN:  Gastroenterology consultation.  Status post ultrasound guided abdominal paracentesis yielding 1 liter of clear yellow fluid.  I do not see any fluid analysis or culture.  Examination is stable.  Afebrile.  Non-toxic appearing.  Monitor for now.  Clear liquid diet - advance as tolerated.  IV Protonix PPI.  Symptom control.  As needed analgesics, anti-emetics.  Diuretics.  Low-sodium diet when advanced.  Lactulose three times daily - hold for > 3 bowel movements in 24 hours.  Rifaximin.  Discussed importance of close Gastroenterology follow-up upon discharge.  She verbalizes understanding.  Monitor blood pressure.  H&H noted.  No evidence of acute blood loss.  Obtain platelet count, PT/INR.  Iron studies, ferritin, vitamin b12, folic acid.  Guaiac stools.  Monitor H&H.  Suspect chronic anemia, thrombocytopenia.  Monitor for now.  Follow-up.  Monitor point of care glucose AC/HS.  Monitor while on clear liquid diet with abdominal symptoms - may add sliding scale insulin if needed for glycemic control.  Hypoglycemia protocol.  Diabetes education.  Glucerna protein supplementation.  Continue home medications.  Increase activity.  Activity as tolerated.  Supportive care.  Patient reassured.  Discussed CODE STATUS.  She is a full code.  Case management consultation for discharge planning.  Anticipate discharge home.  We will take DVT, fall, aspiration and decubitus precautions during her stay in the hospital.  The plan has been discussed with the patient.  She is agreeable.  Orders are in fact,.  Any additions or  modifications at his discretion.      Leny Daniels, APRN-CNP

## 2024-07-05 NOTE — ED PROVIDER NOTES
HPI   Chief Complaint   Patient presents with    Abdominal Pain     Abdominal pain, nausea, distended stomach. Started 2 days ago, getting worse. Hx. Liver cirrhosis. Pt also having chest pain. Back of her head hurts as well.    Chest Pain       HPI     Patient is a 49-year-old female with a history significant for liver cirrhosis, portal hypertension, abdominal pain, and recurrent hospital admissions coming to the hospital today for evaluation of abdominal pain, worsening abdominal distention, chest pain and flank pain.  Patient states that her symptoms started 2 days ago.  She also admits to dysuria without hematuria.  No vaginal discharge.  She reports an increase in urinary frequency.  She states the pain radiates from the center of her chest and goes down through her entirety of her abdomen.  She states that she frequently gets chest pain when her abdomen gets too distended.  She denies associated shortness of breath.  He denies radiation of her chest pain.  She admits to nausea without vomiting.  No diarrhea or constipation.  No fever or chills.               Stafford Coma Scale Score: 15                     Patient History   Past Medical History:   Diagnosis Date    Cirrhosis of liver (Multi)     Diabetes mellitus (Multi)      Past Surgical History:   Procedure Laterality Date    ABDOMINAL SURGERY      CT GUIDED IMAGING FOR NEEDLE PLACEMENT  10/14/2020    CT GUIDED IMAGING FOR NEEDLE PLACEMENT [facility]    US GUIDED ABDOMINAL PARACENTESIS  10/08/2020    US GUIDED ABDOMINAL PARACENTESIS [facility]     No family history on file.  Social History     Tobacco Use    Smoking status: Never    Smokeless tobacco: Never   Substance Use Topics    Alcohol use: Never    Drug use: Never       Physical Exam   ED Triage Vitals [07/04/24 2144]   Temperature Heart Rate Respirations BP   36.6 °C (97.9 °F) (!) 101 18 122/75      Pulse Ox Temp Source Heart Rate Source Patient Position   100 % Oral -- Sitting       BP Location FiO2 (%)     Left arm --       Physical Exam  Vitals and nursing note reviewed.   Constitutional:       Appearance: Normal appearance.   HENT:      Head: Normocephalic and atraumatic.   Eyes:      Extraocular Movements: Extraocular movements intact.      Pupils: Pupils are equal, round, and reactive to light.   Cardiovascular:      Rate and Rhythm: Normal rate and regular rhythm.   Pulmonary:      Effort: Pulmonary effort is normal.      Breath sounds: Normal breath sounds.   Abdominal:      General: Abdomen is flat. Bowel sounds are normal.      Palpations: Abdomen is soft.   Musculoskeletal:         General: Normal range of motion.      Cervical back: Normal range of motion and neck supple.   Skin:     General: Skin is warm and dry.   Neurological:      Mental Status: She is alert.         ED Course & MDM   Diagnoses as of 07/04/24 2342   Ascites due to alcoholic cirrhosis (Multi)       Medical Decision Making  Parts of this chart have been completed using voice recognition software. Please excuse any errors of transcription. Despite the medical decision making time stamp above-my medical decision making has taken place during the patient's entire visit. My thought process and reason for plan has been formulated from the time that I saw the patient until the time of disposition and is not specific to one specific moment during their visit and furthermore my MDM encompasses this entire chart and not only this text box.      HPI: Detailed above.    Exam: A medically appropriate exam performed, outlined above, given the known history and presentation.    History obtained from: Patient    Social Determinants of Health considered during this visit: From home    EKG interpreted by my attending physician, reviewed by myself.    Labs Reviewed   URINALYSIS WITH REFLEX CULTURE AND MICROSCOPIC    Narrative:     The following orders were created for panel order Urinalysis with Reflex Culture and  Microscopic.  Procedure                               Abnormality         Status                     ---------                               -----------         ------                     Urinalysis with Reflex C...[[id number]]                                                 Extra Urine Gray Tube[[id number]]                                                         Please view results for these tests on the individual orders.   CBC WITH AUTO DIFFERENTIAL   COMPREHENSIVE METABOLIC PANEL   LIPASE   TROPONIN T SERIES, HIGH SENSITIVITY (0, 2 HR, 6 HR)    Narrative:     The following orders were created for panel order Troponin T Series, High Sensitivity (0, 2HR, 6HR).  Procedure                               Abnormality         Status                     ---------                               -----------         ------                     Serial Troponin, Initial...[[id number]]                                                 Serial Troponin, 2 Hour ...[[id number]]                                                   Please view results for these tests on the individual orders.   N-TERMINAL PROBNP   URINALYSIS WITH REFLEX CULTURE AND MICROSCOPIC   EXTRA URINE GRAY TUBE   SERIAL TROPONIN, INITIAL ([facility])   AMMONIA   SERIAL TROPONIN,  2 HOUR ([facility])     XR chest 1 view    (Results Pending)     Medications   ketorolac (Toradol) injection 15 mg (has no administration in time range)   dicyclomine (Bentyl) capsule 20 mg (has no administration in time range)   ondansetron (Zofran) injection 4 mg (has no administration in time range)     Differential diagnoses considered for this visit include: Urinary tract infection versus hepatic encephalopathy versus ascites versus acute coronary syndrome    Considerations/further MDM:    Patient is a 49-year-old female presenting for evaluation of multiple complaints with her daughter acting as .  Vital signs demonstrate mild tachycardia with a heart rate of 101 per minute.  Physical  examination demonstrated a well-nourished well-developed female in no acute distress.  Patient is tearful and uncomfortable appearing secondary to reports of the pain in her abdomen.  Abdominal workup and cardiac workup ordered given that the patient was complaining of chest pain.  CT abdomen pelvis without contrast was ordered as patient was complaining of flank pain with urinary symptoms.  Toradol, Bentyl and Zofran were ordered for pain relief and antiemetic.    CBC appreciated hyperglycemia with glucose of 402.  Mild elevation in liver enzymes.  Ammonia is 60.  CBC was within normal limits.  Toxicology panel unremarkable.  Initial troponin of 6.  Lipase unremarkable.  proBNP of 51.Chest x-ray showed no acute cardiopulmonary process.  CT abdomen pelvis without IV contrast showed findings compatible with cirrhosis and large amount of ascites with diffuse haziness of the mesentery.     My attending physician spoke to patient's primary care provider Dr. Lawrence.  Patient did require paracentesis remainder of her lab work appeared stable.  Patient would be in the emergency department with paracentesis ordered for the morning and will be discharged home after paracentesis was performed.    Patient was handed off to my attending physician on my departure.  See further documentation for final disposition and planning.    Patient was seen in conjunction with attending physician Dr. Chago Ocampo   Patient's history, physical exam, diagnostic studies, and treatment plan were discussed thoroughly.    Procedure  Procedures     Adrianne Cohen PA-C  07/05/24 0021

## 2024-07-05 NOTE — ED NOTES
Received report and assumed care of patient. Patient is resting comfortably with eyes clothes and hooked up to tele monitor. Patient is nauseated and recently medicated. Does not want anything by mouth. Will continue to monitor.     Elina Aparicio RN  07/05/24 1909

## 2024-07-05 NOTE — ED NOTES
Patient returned from paracentesis, IR RN stated she drained 1000 ml paracentesis fluid.  Bandaid at site, dry and intact.  Notified Dr. Hall that patient is complaining of abdominal pain and has dry heaves.     Katrin Arriaga RN  07/05/24 1305       Katrin Arriaga RN  07/05/24 1202

## 2024-07-05 NOTE — ED NOTES
Jcpoysahu=[id number].  Patient sleeping, awoke and stated she is awaiting paracentesis.     Katrin Arriaga RN  07/05/24 0790

## 2024-07-05 NOTE — ED NOTES
This RN assumed care. Pt medicated for pain and is resting in bed in no distres.      Dana Null RN  07/04/24 8698

## 2024-07-06 ENCOUNTER — PHARMACY VISIT (OUTPATIENT)
Dept: PHARMACY | Facility: CLINIC | Age: 49
End: 2024-07-06
Payer: MEDICARE

## 2024-07-06 VITALS
OXYGEN SATURATION: 97 % | HEART RATE: 96 BPM | SYSTOLIC BLOOD PRESSURE: 95 MMHG | BODY MASS INDEX: 31.28 KG/M2 | DIASTOLIC BLOOD PRESSURE: 47 MMHG | TEMPERATURE: 97.7 F | HEIGHT: 62 IN | WEIGHT: 170 LBS | RESPIRATION RATE: 15 BRPM

## 2024-07-06 LAB
ALBUMIN SERPL-MCNC: 2.2 G/DL (ref 3.5–5)
ALP BLD-CCNC: 257 U/L (ref 35–125)
ALT SERPL-CCNC: 28 U/L (ref 5–40)
AMMONIA PLAS-SCNC: 87 UMOL/L (ref 12–45)
ANION GAP SERPL CALC-SCNC: 7 MMOL/L
AST SERPL-CCNC: 48 U/L (ref 5–40)
BILIRUB SERPL-MCNC: 1.4 MG/DL (ref 0.1–1.2)
BUN SERPL-MCNC: 20 MG/DL (ref 8–25)
CALCIUM SERPL-MCNC: 8 MG/DL (ref 8.5–10.4)
CHLORIDE SERPL-SCNC: 107 MMOL/L (ref 97–107)
CO2 SERPL-SCNC: 24 MMOL/L (ref 24–31)
CREAT SERPL-MCNC: 0.7 MG/DL (ref 0.4–1.6)
EGFRCR SERPLBLD CKD-EPI 2021: >90 ML/MIN/1.73M*2
ERYTHROCYTE [DISTWIDTH] IN BLOOD BY AUTOMATED COUNT: 21.2 % (ref 11.5–14.5)
GLUCOSE BLD MANUAL STRIP-MCNC: 290 MG/DL (ref 74–99)
GLUCOSE SERPL-MCNC: 339 MG/DL (ref 65–99)
HCT VFR BLD AUTO: 25.8 % (ref 36–46)
HGB BLD-MCNC: 8.2 G/DL (ref 12–16)
HOLD SPECIMEN: NORMAL
MCH RBC QN AUTO: 24.4 PG (ref 26–34)
MCHC RBC AUTO-ENTMCNC: 31.8 G/DL (ref 32–36)
MCV RBC AUTO: 77 FL (ref 80–100)
NRBC BLD-RTO: 0 /100 WBCS (ref 0–0)
PLATELET # BLD AUTO: 58 X10*3/UL (ref 150–450)
POTASSIUM SERPL-SCNC: 4.3 MMOL/L (ref 3.4–5.1)
PROT SERPL-MCNC: 5.9 G/DL (ref 5.9–7.9)
RBC # BLD AUTO: 3.36 X10*6/UL (ref 4–5.2)
SODIUM SERPL-SCNC: 138 MMOL/L (ref 133–145)
TROPONIN T SERPL-MCNC: 7 NG/L
WBC # BLD AUTO: 4.6 X10*3/UL (ref 4.4–11.3)

## 2024-07-06 PROCEDURE — 85027 COMPLETE CBC AUTOMATED: CPT | Performed by: NURSE PRACTITIONER

## 2024-07-06 PROCEDURE — 82947 ASSAY GLUCOSE BLOOD QUANT: CPT

## 2024-07-06 PROCEDURE — RXMED WILLOW AMBULATORY MEDICATION CHARGE

## 2024-07-06 PROCEDURE — G0378 HOSPITAL OBSERVATION PER HR: HCPCS

## 2024-07-06 PROCEDURE — 2500000002 HC RX 250 W HCPCS SELF ADMINISTERED DRUGS (ALT 637 FOR MEDICARE OP, ALT 636 FOR OP/ED): Performed by: NURSE PRACTITIONER

## 2024-07-06 PROCEDURE — C9113 INJ PANTOPRAZOLE SODIUM, VIA: HCPCS | Performed by: NURSE PRACTITIONER

## 2024-07-06 PROCEDURE — 82140 ASSAY OF AMMONIA: CPT | Performed by: NURSE PRACTITIONER

## 2024-07-06 PROCEDURE — 84484 ASSAY OF TROPONIN QUANT: CPT | Performed by: PHYSICIAN ASSISTANT

## 2024-07-06 PROCEDURE — 36415 COLL VENOUS BLD VENIPUNCTURE: CPT | Performed by: NURSE PRACTITIONER

## 2024-07-06 PROCEDURE — 2500000001 HC RX 250 WO HCPCS SELF ADMINISTERED DRUGS (ALT 637 FOR MEDICARE OP): Performed by: NURSE PRACTITIONER

## 2024-07-06 PROCEDURE — 96376 TX/PRO/DX INJ SAME DRUG ADON: CPT | Performed by: INTERNAL MEDICINE

## 2024-07-06 PROCEDURE — 2500000004 HC RX 250 GENERAL PHARMACY W/ HCPCS (ALT 636 FOR OP/ED): Performed by: NURSE PRACTITIONER

## 2024-07-06 PROCEDURE — 80053 COMPREHEN METABOLIC PANEL: CPT | Performed by: NURSE PRACTITIONER

## 2024-07-06 RX ORDER — TRAMADOL HYDROCHLORIDE 50 MG/1
50 TABLET ORAL EVERY 6 HOURS PRN
Qty: 20 TABLET | Refills: 0 | Status: SHIPPED | OUTPATIENT
Start: 2024-07-06

## 2024-07-06 RX ADMIN — Medication 400 MG: at 11:57

## 2024-07-06 RX ADMIN — FENOFIBRATE 160 MG: 160 TABLET ORAL at 11:40

## 2024-07-06 RX ADMIN — URSODIOL 300 MG: 300 CAPSULE ORAL at 11:44

## 2024-07-06 RX ADMIN — SUCRALFATE 1 G: 1 SUSPENSION ORAL at 06:31

## 2024-07-06 RX ADMIN — CIPROFLOXACIN HYDROCHLORIDE 250 MG: 250 TABLET, FILM COATED ORAL at 11:57

## 2024-07-06 RX ADMIN — NADOLOL 20 MG: 40 TABLET ORAL at 11:47

## 2024-07-06 RX ADMIN — SUCRALFATE 1 G: 1 SUSPENSION ORAL at 11:57

## 2024-07-06 RX ADMIN — LACTULOSE 20 G: 20 SOLUTION ORAL at 11:57

## 2024-07-06 RX ADMIN — PANTOPRAZOLE SODIUM 40 MG: 40 INJECTION, POWDER, FOR SOLUTION INTRAVENOUS at 05:52

## 2024-07-06 RX ADMIN — RIFAXIMIN 550 MG: 550 TABLET ORAL at 11:42

## 2024-07-06 ASSESSMENT — ENCOUNTER SYMPTOMS
DIARRHEA: 1
WEAKNESS: 1
ABDOMINAL PAIN: 1
FATIGUE: 1
NAUSEA: 1
VOMITING: 1
ABDOMINAL DISTENTION: 1

## 2024-07-06 ASSESSMENT — PAIN SCALES - GENERAL
PAINLEVEL_OUTOF10: 0 - NO PAIN

## 2024-07-06 ASSESSMENT — PAIN - FUNCTIONAL ASSESSMENT
PAIN_FUNCTIONAL_ASSESSMENT: 0-10

## 2024-07-06 NOTE — CONSULTS
Consults    Reason For Consult  Abdominal Pain    History Of Present Illness  Alfonzo Flanagan is a 49 y.o. female presenting with abdominal pain and nausea. Patient is very well known by our group for many admissions related to similar. She has known PBC. CT a/p this admission showed large ascites. No acute GI findings. She does have a chronic diffuse bowel wall thickening. Recent EGD and colonoscopy in 2023 were normal. Neg biopsy for microscopic colitis at that time. She mentions some dark stools but HH consistent with baseline at 8.2. Had paracentesis yesterday. Denies fevers, chills. WBC normal       Past Medical History  She has a past medical history of Cirrhosis of liver (Multi) and Diabetes mellitus (Multi).    Surgical History  She has a past surgical history that includes CT guided imaging for needle placement (10/14/2020); US guided abdominal paracentesis (10/08/2020); and Abdominal surgery.     Social History  She reports that she has never smoked. She has never used smokeless tobacco. She reports that she does not drink alcohol and does not use drugs.    Family History  No family history on file.     Allergies  Gluten    Review of Systems   Constitutional:  Positive for fatigue.   Gastrointestinal:  Positive for abdominal distention, abdominal pain, diarrhea, nausea and vomiting.   Neurological:  Positive for weakness.        Physical Exam  Constitutional:       Appearance: Normal appearance.   HENT:      Head: Normocephalic and atraumatic.      Mouth/Throat:      Mouth: Mucous membranes are moist.   Pulmonary:      Effort: Pulmonary effort is normal.   Abdominal:      General: There is distension.      Palpations: Abdomen is soft.      Tenderness: There is abdominal tenderness. There is no guarding.   Musculoskeletal:         General: Normal range of motion.      Cervical back: Normal range of motion.   Skin:     General: Skin is warm and dry.   Neurological:      General: No focal deficit present.  "     Mental Status: She is alert. Mental status is at baseline.   Psychiatric:         Mood and Affect: Mood normal.          Last Recorded Vitals  Blood pressure (!) 91/48, pulse 81, temperature 36.5 °C (97.7 °F), temperature source Oral, resp. rate 15, height 1.575 m (5' 2\"), weight 77.1 kg (170 lb), SpO2 97%.    Relevant Results  Results for orders placed or performed during the hospital encounter of 07/04/24 (from the past 24 hour(s))   Serial Troponin, 2 Hour ([facility])   Result Value Ref Range    Troponin T, High Sensitivity 7 <=14 ng/L   Protime-INR   Result Value Ref Range    Protime 13.3 (H) 9.3 - 12.7 seconds    INR 1.3 (H) 0.9 - 1.2   Serial Troponin, 6 Hour ([facility])   Result Value Ref Range    Troponin T, High Sensitivity 7 <=14 ng/L   Comprehensive metabolic panel   Result Value Ref Range    Glucose 339 (H) 65 - 99 mg/dL    Sodium 138 133 - 145 mmol/L    Potassium 4.3 3.4 - 5.1 mmol/L    Chloride 107 97 - 107 mmol/L    Bicarbonate 24 24 - 31 mmol/L    Urea Nitrogen 20 8 - 25 mg/dL    Creatinine 0.70 0.40 - 1.60 mg/dL    eGFR >90 >60 mL/min/1.73m*2    Calcium 8.0 (L) 8.5 - 10.4 mg/dL    Albumin 2.2 (L) 3.5 - 5.0 g/dL    Alkaline Phosphatase 257 (H) 35 - 125 U/L    Total Protein 5.9 5.9 - 7.9 g/dL    AST 48 (H) 5 - 40 U/L    Bilirubin, Total 1.4 (H) 0.1 - 1.2 mg/dL    ALT 28 5 - 40 U/L    Anion Gap 7 <=19 mmol/L   CBC   Result Value Ref Range    WBC 4.6 4.4 - 11.3 x10*3/uL    nRBC 0.0 0.0 - 0.0 /100 WBCs    RBC 3.36 (L) 4.00 - 5.20 x10*6/uL    Hemoglobin 8.2 (L) 12.0 - 16.0 g/dL    Hematocrit 25.8 (L) 36.0 - 46.0 %    MCV 77 (L) 80 - 100 fL    MCH 24.4 (L) 26.0 - 34.0 pg    MCHC 31.8 (L) 32.0 - 36.0 g/dL    RDW 21.2 (H) 11.5 - 14.5 %    Platelets 58 (L) 150 - 450 x10*3/uL   Ammonia   Result Value Ref Range    Ammonia 87 (H) 12 - 45 umol/L   SST TOP   Result Value Ref Range    Extra Tube Hold for add-ons.    POCT GLUCOSE   Result Value Ref Range    POCT Glucose 290 (H) 74 - 99 mg/dL     US guided abdominal " paracentesis    Result Date: 7/5/2024  Interpreted By:  Cornelius Weber, STUDY: US GUIDED ABDOMINAL PARACENTESIS; 7/5/2024 11:28 am   INDICATION: Ascites.   COMPARISON: None.   ACCESSION NUMBER(S): [id number]   ORDERING CLINICIAN: PRANEETH BARRERA   TECHNIQUE: Ultrasound-guided paracentesis   FINDINGS: Informed consent obtained. Patient positioned supine. Left lower quadrant prepped, draped and anesthetized. Under ultrasound guidance, 8 Amharic centesis sheath needle inserted into peritoneal cavity. 1 Lof clear yellowfluid aspirated. Patient tolerated procedure well       Ultrasound-guided paracentesis.   Signed by: Cornelius Weber 7/5/2024 1:29 PM Dictation workstation:   [id number]    CT abdomen pelvis wo IV contrast    Result Date: 7/4/2024  Interpreted By:  Cj Lee, STUDY: CT ABDOMEN PELVIS WO IV CONTRAST;  7/4/2024 10:20 pm   INDICATION: Signs/Symptoms:flank pain.   COMPARISON: CT scan of the abdomen pelvis 06/20/2024.   ACCESSION NUMBER(S): [id number]   ORDERING CLINICIAN: ANUPAM BATES   TECHNIQUE: Axial noncontrast CT images of the abdomen and pelvis with coronal and sagittal reconstructed images.   FINDINGS:   LOWER CHEST: Blood pool is hypodense relative to the myocardium which can be seen in the setting of anemia.   ABDOMEN: Lack of intravenous contrast limits evaluation of vessels and solid organs. LIVER: Atrophic nodular liver compatible with cirrhosis. Lack of contrast limits evaluation for discrete hypervascular mass. BILE DUCTS: Normal caliber. GALLBLADDER: Status post cholecystectomy. PANCREAS: Within normal limits. SPLEEN: Within normal limits. ADRENALS: Within normal limits.   KIDNEYS, URETERS, URINARY BLADDER:  Kidneys are symmetric in size without evidence for calculi, hydronephrosis, hydroureter or perinephric inflammatory changes.   No bladder calculi or wall thickening.   VESSELS:  Calcific atherosclerosis of the infrarenal aorta. No aortic aneurysm. RETROPERITONEUM: Nonenlarged  periaortic lymph nodes noted.   PELVIS:   REPRODUCTIVE ORGANS: Uterus is present. No adnexal mass.   BOWEL: Stomach is partially distended. Visualized loops of bowel are unobstructed. There is mild diffuse bowel wall thickening which is nonspecific and may relate to hypoproteinemic state and edema. No pneumatosis or portal venous gas. Appendix is not identified with certainty. Fecalization of the distal small bowel suggestive of delayed transit. PERITONEUM: Large amount of ascites with diffuse haziness of the mesentery. No free air. ABDOMINAL WALL: Ventral hernia contains fat and fluid. BONES: Multilevel degenerative changes of the spine. Left L5 unilateral pars defect.       Findings compatible with cirrhosis. Lack of contrast limits evaluation for discrete hypervascular mass. If this is of clinical concern further evaluation with dedicated liver CT or MRI can be considered.   Large amount of ascites with diffuse haziness of the mesentery.   Diffuse bowel wall thickening is nonspecific and may relate to edema and hypoproteinemic state. Correlate with symptomatology if there is concern for infectious/inflammatory enterocolitis.   Additional findings as described above.   MACRO: None   Signed by: Cj Lee 7/4/2024 10:44 PM Dictation workstation:   [id number]    XR chest 1 view    Result Date: 7/4/2024  Interpreted By:  Cj Lee, STUDY: XR CHEST 1 VIEW;  7/4/2024 10:14 pm   INDICATION: Signs/Symptoms:chest pain.   COMPARISON: Chest x-ray 06/15/2024   ACCESSION NUMBER(S): [id number]   ORDERING CLINICIAN: ANUPAM BATES   FINDINGS: Multiple overlying leads are present.   CARDIOMEDIASTINAL SILHOUETTE: Cardiomediastinal silhouette is normal in size and configuration.   LUNGS: No consolidation, pleural effusion or pneumothorax.   ABDOMEN: No remarkable upper abdominal findings.   BONES: Multilevel degenerative changes of the spine.       No acute cardiopulmonary process.   MACRO: None   Signed by: Cj  Jesus 7/4/2024 10:32 PM Dictation workstation:   [id number]    ECG 12 lead    Result Date: 6/23/2024  Normal sinus rhythm Otherwise normal ECG When compared with ECG of 15-UBALDO-2024 06:52, (unconfirmed) No significant change was found Confirmed by Lawrence Joseph ([id number]) on 6/23/2024 8:41:27 PM    CT abdomen pelvis w IV contrast    Result Date: 6/20/2024  Interpreted By:  Vivek Amezquita, STUDY: CT ABDOMEN PELVIS W IV CONTRAST; 6/20/2024 1:43 pm   INDICATION: Signs/Symptoms:abdominal pain.  weakness..   COMPARISON: CT abdomen pelvis 06/15/2024.   ACCESSION NUMBER(S): [id number]   ORDERING CLINICIAN: SONIA BROWNLEE   TECHNIQUE: Axial CT imaging of the abdomen and pelvis was performed. Sagittal and coronal reconstructions were performed. 100 milliliters of Optiray 350 contrast was utilized on this study.   FINDINGS: LOWER CHEST: The previously described right middle lobe pulmonary nodule is only partially visualized on the superior most images of this examination. The visualized lung bases are otherwise unremarkable. The heart is normal size without pericardial effusion.The distal esophagus is unremarkable.   ABDOMEN:   LIVER: The liver demonstrates a nodular contour consistent with cirrhosis. BILE DUCTS: The intrahepatic and extrahepatic ducts are not dilated. GALLBLADDER: The gallbladder is surgically absent. PANCREAS: The pancreas appears unremarkable without evidence of ductal dilatation or masses. SPLEEN: The spleen is normal in size without focal lesions. ADRENAL GLANDS: Bilateral adrenal glands appear normal. KIDNEYS AND URETERS: The kidneys are normal in size and enhance symmetrically. No hydroureteronephrosis or nephroureterolithiasis is identified.   PELVIS:   BLADDER: The urinary bladder appears normal without abnormal wall thickening. REPRODUCTIVE ORGANS: No pelvic masses. BOWEL: The stomach is unremarkable. There is diffuse circumferential mural thickening and edema of the visualized small bowel as  well as the cecum. The appendix is not definitively visualized. VESSELS: There is no aneurysmal dilatation of the abdominal aorta. The principal arterial vasculature of the abdomen is patent within the limits of this non angiographic exam. The IVC appears normal. The portal vein and superior mesenteric vein are patent. Note is made of numerous small perisplenic and perigastric vascular collaterals. Note is also made of recanalization of the umbilical vein. PERITONEUM/RETROPERITONEUM/LYMPH NODES: Note is made of a moderate volume of ascites. No abdominopelvic lymphadenopathy is present. BONE AND SOFT TISSUE: No suspicious osseous lesions are identified. Degenerative discogenic disease is noted in the lower thoracic and lumbar spine. A small fat containing periumbilical hernia is visualized.         1.  Note is made of cirrhosis with the imaging findings consistent with the sequela of portal venous hypertension including splenomegaly, ascites, and vascular collateralization. 2. There is diffuse small bowel and cecal wall thickening/edema, given the presence of cirrhosis differential diagnosis favors portal venous enteropathy however additional differential diagnosis can include infectious, inflammatory, or vascular enteritis. Correlation with clinical findings is recommended.   Signed by: Vivek Amezquita 6/20/2024 2:33 PM Dictation workstation:   [id number]    CT head wo IV contrast    Result Date: 6/20/2024  Interpreted By:  Vivek Amezquita, STUDY: CT HEAD WO IV CONTRAST;  6/20/2024 1:39 pm   INDICATION: Signs/Symptoms:fall.   COMPARISON: CT head 05/30/2024.   ACCESSION NUMBER(S): [id number]   ORDERING CLINICIAN: SONIA BROWNLEE   TECHNIQUE: Noncontrast axial CT scan of head was performed. Angled reformats in brain and bone windows were generated. The images were reviewed in bone, brain, blood and soft tissue windows.   FINDINGS: CSF Spaces: The ventricles, sulci and basal cisterns are within normal limits. There  is no extraaxial fluid collection.   Parenchyma: There are 2 punctate hyperdensities located within the right putamen which were present on prior exam however appears slightly more prominent on the current exam. These likely represent parenchymal calcifications. The grey-white differentiation is intact. There is no mass effect or midline shift.  There is no intracranial hemorrhage.   Calvarium: The calvarium is unremarkable.   Paranasal sinuses and mastoids: Visualized paranasal sinuses and mastoids are clear.       No evidence of acute intracranial process.   MACRO: None   Signed by: Vivek Amezquita 6/20/2024 2:20 PM Dictation workstation:   [id number]    US abdomen complete    Result Date: 6/17/2024  Interpreted By:  Shane De, STUDY: US ABDOMEN COMPLETE;  6/17/2024 7:58 pm   INDICATION: 48 y/o   F with  Signs/Symptoms:Abdominal pain.   COMPARISON: None.   ACCESSION NUMBER(S): [id number]   ORDERING CLINICIAN: HEENA MCWILLIAMS   TECHNIQUE: Multiple grayscale and color Doppler static and dynamic images of the abdomen were obtained.   FINDINGS: PANCREAS: Visualized portions are unremarkable.   LIVER: Craniocaudal length: 14.4 cm, within normal limits. Echogenicity/Echotexture: Heterogeneous. Morphology: Cirrhotic. Mass: None.   BILE DUCTS: Intrahepatic ducts: Non-dilated. Common bile duct diameter: 4.3 cm   GALLBLADDER: Surgically removed.   SPLEEN: Maximum length: 12.3 cm, upper limits of normal for size.   RIGHT KIDNEY: Craniocaudal length: 10.7 cm, within normal limits. No hydronephrosis.     LEFT KIDNEY: Craniocaudal length: 10.4 cm, within normal limits. No hydronephrosis.     URINARY BLADDER: No significant abnormality. The bilateral ureteral jets are seen.   ABDOMINAL AORTA AND IVC: Visualized portions are unremarkable. The main portal vein is patent.   PLEURAL/PERITONEAL FLUID: Trace amount of right upper quadrant ascites noted.       1. Slightly decreased amount of ascites compared to prior study.  "  2. Redemonstration of cirrhotic liver morphology and borderline splenomegaly.     MACRO: None.   Signed by: Shane Santino 6/17/2024 8:36 PM Dictation workstation:   [id number]    US guided abdominal paracentesis    Result Date: 6/17/2024  Interpreted By:  Sissy Montano, STUDY: US GUIDED ABDOMINAL PARACENTESIS;  6/17/2024 10:33 am   INDICATION: Signs/Symptoms:Ascites.   COMPARISON: None.   ACCESSION NUMBER(S): [id number]   ORDERING CLINICIAN: SADE LARRY   TECHNIQUE: INTERVENTIONALIST(S): Sissy Montano MD   The history and physical exam pertinent to the procedure were reviewed and no updates were made.\"   CONSENT: The patient/patient's POA/next of kin was informed of the nature of the proposed procedure. The purposes, alternatives, risks, and benefits were explained and discussed. All questions were answered and consent was obtained.   RADIATION EXPOSURE: None   SEDATION: None   MEDICATION/CONTRAST: No additional   TIME OUT: A time out was performed immediately prior to procedure start with the interventional team, correctly identifying the patient name, date of birth, MRN, procedure, anatomy (including marking of site and side), patient position, procedure consent form, relevant laboratory and imaging test results, antibiotic administration, safety precautions, and procedure-specific equipment needs.   COMPLICATIONS: No immediate adverse events identified.   FINDINGS: Sonographic evaluation of the abdomen demonstrated small ascites. Left lower quadrant pocket was targeted. Skin prepped and draped in usual sterile fashion. 1% lidocaine for anesthesia. 1.2 L removed after accessing the area with a 5 [place] G[place]h catheter. Sample sent to lab. Fluid clear yellow. Access site covered with sterile bandage after removal of catheter.       Technically successful paracentesis.   I was present for and/or performed the critical portions of the procedure and immediately available throughout the entire procedure.   " I personally reviewed the image(s)/study and interpretation. I agree with the findings as stated.   Performed and dictated at [facility].   MACRO: None   Signed by: Sissy Montano 6/17/2024 3:21 PM Dictation workstation:   [id number]    XR chest 1 view    Result Date: 6/15/2024  Interpreted By:  Finkelstein, Evan, STUDY: XR CHEST 1 VIEW;  6/15/2024 6:50 am   INDICATION: Signs/Symptoms:dyspnea.   COMPARISON: Chest radiograph 05/25/2024   ACCESSION NUMBER(S): [id number]   ORDERING CLINICIAN: DORA ESPINOZA   FINDINGS:     CARDIOMEDIASTINAL SILHOUETTE: Cardiomediastinal silhouette is normal in size and configuration.   LUNGS: No pulmonary consolidation, pleural effusion or pneumothorax.   ABDOMEN: No remarkable upper abdominal findings.   BONES: No acute osseous abnormality.       No radiographic evidence of acute cardiopulmonary pathology.   MACRO: None.   Signed by: Evan Finkelstein 6/15/2024 7:01 AM Dictation workstation:   [id number]    CT abdomen pelvis wo IV contrast    Result Date: 6/15/2024  Interpreted By:  Cj Lee, STUDY: CT ABDOMEN PELVIS WO IV CONTRAST;  6/15/2024 1:41 am   INDICATION: Signs/Symptoms:Abdominal pain.   COMPARISON: CT scan of the abdomen pelvis 06/10/2024   ACCESSION NUMBER(S): [id number]   ORDERING CLINICIAN: DIPTI BROWN   TECHNIQUE: Axial noncontrast CT images of the abdomen and pelvis with coronal and sagittal reconstructed images.   FINDINGS:   LOWER CHEST: There is a 7 mm nodule in the right middle lobe, stable from prior CT.   ABDOMEN: Lack of intravenous contrast limits evaluation of vessels and solid organs. LIVER: Atrophic nodular liver compatible with cirrhosis. Lack of contrast limits evaluation for discrete hypervascular mass. There is recanalization of the paraumbilical veins. BILE DUCTS: Normal caliber. GALLBLADDER: Status post cholecystectomy. PANCREAS: Mild fatty atrophy of the pancreas. SPLEEN: Within normal limits. ADRENALS:  Within normal limits.   KIDNEYS, URETERS, URINARY BLADDER:  Kidneys are symmetric in size without evidence for calculi, hydronephrosis, hydroureter or perinephric inflammatory changes.   No bladder calculi bladder is partially distended with mild wall thickening.   VESSELS:  Calcific atherosclerosis of the aortoiliac vessels. No aortic aneurysm. RETROPERITONEUM: Nonenlarged periaortic lymph nodes noted.   PELVIS:   REPRODUCTIVE ORGANS: Uterus is present. No adnexal mass.   BOWEL: Marked distention of the stomach with ingested contents. Visualized small bowel loops demonstrate mild diffuse wall thickening. Appendix is not identified with certainty. Mild wall thickening involving the ascending and transverse colon. A few scattered colonic diverticula without evidence for acute diverticulitis. No pneumatosis or portal venous gas. PERITONEUM: Moderate ascites with diffuse haziness of the mesentery. Findings are stable from prior CT. ABDOMINAL WALL: Small ventral hernias containing fat and fluid. BONES: Multilevel degenerative changes of the spine.       Findings compatible with cirrhosis. Lack of contrast limits evaluation for discrete hypervascular mass. If of clinical concern this can be further evaluated with nonemergent dedicated liver CT or MRI.   Moderate ascites with diffuse haziness of the mesentery.   There is diffuse bowel wall thickening involving the small and large bowel as described above. These findings may relate to hypoproteinemic state and ascites. Correlate with symptomatology if there is concern for early infectious/inflammatory enterocolitis.   Additional findings as described above.   MACRO: None   Signed by: Cj Lee 6/15/2024 1:49 AM Dictation workstation:   [id number]    CT abdomen pelvis w IV contrast    Result Date: 6/10/2024  Interpreted By:  Christina Edmondson, STUDY: CT ABDOMEN PELVIS W IV CONTRAST;  6/10/2024 2:14 am   INDICATION: Signs/Symptoms:pain, diarrhea.   COMPARISON:  04/18/2024, 05/25/2024   ACCESSION NUMBER(S): [id number]   ORDERING CLINICIAN: MOIZ ORELLANA   TECHNIQUE: Axial CT images of the abdomen and pelvis with coronal and sagittal reconstructed images obtained after intravenous administration of contrast   FINDINGS: LOWER CHEST: No acute abnormality of the lung bases. BONES: No acute osseous abnormality. Degenerative changes present. ABDOMINAL WALL: Mild anasarca. A supraumbilical fluid contained hernia noted. A left lateral paraumbilical hernia with stranding is present.   ABDOMEN:   LIVER: The liver is significantly nodular in morphology. No definite lesion identified. BILE DUCTS: Normal caliber. GALLBLADDER: Cholecystectomy. PANCREAS: Mild specific peripancreatic stranding likely related to mesenteric edema. SPLEEN: Within normal limits. ADRENALS: Within normal limits. KIDNEYS and URETERS: Symmetric renal enhancement. No hydronephrosis or perinephric fluid collection.     VESSELS: Atherosclerotic calcifications without aneurysmal dilatation seen. RETROPERITONEUM: Stranding noted and ascites with mild retroperitoneal lymphadenopathy.   PELVIS:   REPRODUCTIVE ORGANS: No pelvic masses. BLADDER: Within normal limits.   BOWEL: Fluid noted within regions of the colon. Wall thickening throughout the small bowel which may be reactive or related to congestion. The stomach is grossly unremarkable. PERITONEUM: Moderate abdominopelvic ascites and diffuse mesenteric stranding. Numerous mesenteric nonenlarged or mildly enlarged lymph nodes noted. No fluid collection or free air.       Cirrhotic appearance of the liver. Moderate abdominopelvic ascites and mesenteric stranding. Lymphadenopathy, likely reactive.   Small bowel wall thickening is again noted which could be reactive or related to passive congestion. Infectious process considered less likely however clinical correlation is again suggested.   Fluid contents in the colon suggestive of diarrheal process.   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 Zaman without EV  Ascites: CT a/p showed large ascites. S/p paracentesis with 1 L removed. Neg SBP. She is feeling improved today    -Continue Lasix and Aldactone    -Low Na diet     Anemia (Micro 8.2)   -Consistent with baseline. Had normal colonoscopy in 2023. She had EGD that showed esophageal ulcer in Dec 2023. She has had 6 EGDs in the past year. HH stable. No indication for urgent endoscopy      I spent 30 minutes in the professional and overall care of this patient.

## 2024-07-06 NOTE — CARE PLAN
Problem: Fall/Injury  Goal: Not fall by end of shift  Outcome: Progressing  Goal: Be free from injury by end of the shift  Outcome: Progressing  Goal: Verbalize understanding of personal risk factors for fall in the hospital  Outcome: Progressing  Goal: Verbalize understanding of risk factor reduction measures to prevent injury from fall in the home  Outcome: Progressing  Goal: Use assistive devices by end of the shift  Outcome: Progressing  Goal: Pace activities to prevent fatigue by end of the shift  Outcome: Progressing     Problem: Pain  Goal: Takes deep breaths with improved pain control throughout the shift  Outcome: Progressing  Goal: Turns in bed with improved pain control throughout the shift  Outcome: Progressing  Goal: Walks with improved pain control throughout the shift  Outcome: Progressing  Goal: Performs ADL's with improved pain control throughout shift  Outcome: Progressing  Goal: Participates in PT with improved pain control throughout the shift  Outcome: Progressing  Goal: Free from opioid side effects throughout the shift  Outcome: Progressing  Goal: Free from acute confusion related to pain meds throughout the shift  Outcome: Progressing   The patient's goals for the shift include reduce pain    The clinical goals for the shift include pain management

## 2024-07-06 NOTE — PROGRESS NOTES
Alfonzo Flanagan is a 49 y.o. female on day 0 of admission presenting with Ascites due to alcoholic cirrhosis (Multi).    Subjective   Patient was seen and examined.  Lying in the bed.  Still complains of abdominal discomfort.  Also complaining of abdominal distention.       Objective     Physical Exam  HEENT:  Head externally atraumatic,  extraocular movements intact, oral mucosa moist  Neck:  Supple, no JVP, no palpable adenopathy or thyromegaly.  No carotid bruit.  Chest:  Clear to auscultation and resonant.  Heart:  Regular rate and rhythm, no murmur or gallop could be appreciated.  Abdomen:  Soft, nontender, bowel sounds present, normoactive, no palpable hepatosplenomegaly.  Extremities:  No edema, pulses present, no cyanosis or clubbing.  CNS:  Patient alert, oriented to time, place and person.    No new deficit.  Cranial nerves 2-12 grossly intact  Skin:  No active rash.  Musculoskeletal:  No  apparent joint swelling or erythema, range of movement normal.  Last Recorded Vitals  Heart Rate:  [74-99]   Temp:  [36.4 °C (97.5 °F)-36.6 °C (97.9 °F)]   Resp:  [8-20]   BP: ()/(48-69)   SpO2:  [94 %-100 %]     Intake/Output last 3 Shifts:  I/O last 3 completed shifts:  In: - (0 mL/kg)   Out: 1 (0 mL/kg) [Urine:1 (0 mL/kg/hr)]  Weight: 77.1 kg     Relevant Results  Susceptibility data from last 90 days.  Collected Specimen Info Organism Amoxicillin/Clavulanate Ampicillin Ampicillin/Sulbactam Cefazolin Cefazolin (uncomplicated UTIs only) Ciprofloxacin Gentamicin Levofloxacin Nitrofurantoin Penicillin Piperacillin/Tazobactam   06/23/24 Urine from Clean Catch/Voided Escherichia coli   S   S  S  S  S   S   S   05/15/24 Urine from Clean Catch/Voided Enterococcus faecium   S     R   S  S  S      Klebsiella pneumoniae/variicola  S  R  S  S  S  S  S   I   S     Collected Specimen Info Organism Trimethoprim/Sulfamethoxazole Vancomycin   06/23/24 Urine from Clean Catch/Voided Escherichia coli  S    05/15/24 Urine from  Clean Catch/Voided Enterococcus faecium  R  S     Klebsiella pneumoniae/variicola  S      Results for orders placed or performed during the hospital encounter of 07/04/24 (from the past 24 hour(s))   Serial Troponin, 2 Hour ([facility])   Result Value Ref Range    Troponin T, High Sensitivity 7 <=14 ng/L   Protime-INR   Result Value Ref Range    Protime 13.3 (H) 9.3 - 12.7 seconds    INR 1.3 (H) 0.9 - 1.2   Serial Troponin, 6 Hour ([facility])   Result Value Ref Range    Troponin T, High Sensitivity 7 <=14 ng/L   Comprehensive metabolic panel   Result Value Ref Range    Glucose 339 (H) 65 - 99 mg/dL    Sodium 138 133 - 145 mmol/L    Potassium 4.3 3.4 - 5.1 mmol/L    Chloride 107 97 - 107 mmol/L    Bicarbonate 24 24 - 31 mmol/L    Urea Nitrogen 20 8 - 25 mg/dL    Creatinine 0.70 0.40 - 1.60 mg/dL    eGFR >90 >60 mL/min/1.73m*2    Calcium 8.0 (L) 8.5 - 10.4 mg/dL    Albumin 2.2 (L) 3.5 - 5.0 g/dL    Alkaline Phosphatase 257 (H) 35 - 125 U/L    Total Protein 5.9 5.9 - 7.9 g/dL    AST 48 (H) 5 - 40 U/L    Bilirubin, Total 1.4 (H) 0.1 - 1.2 mg/dL    ALT 28 5 - 40 U/L    Anion Gap 7 <=19 mmol/L   CBC   Result Value Ref Range    WBC 4.6 4.4 - 11.3 x10*3/uL    nRBC 0.0 0.0 - 0.0 /100 WBCs    RBC 3.36 (L) 4.00 - 5.20 x10*6/uL    Hemoglobin 8.2 (L) 12.0 - 16.0 g/dL    Hematocrit 25.8 (L) 36.0 - 46.0 %    MCV 77 (L) 80 - 100 fL    MCH 24.4 (L) 26.0 - 34.0 pg    MCHC 31.8 (L) 32.0 - 36.0 g/dL    RDW 21.2 (H) 11.5 - 14.5 %    Platelets 58 (L) 150 - 450 x10*3/uL   Ammonia   Result Value Ref Range    Ammonia 87 (H) 12 - 45 umol/L   SST TOP   Result Value Ref Range    Extra Tube Hold for add-ons.    POCT GLUCOSE   Result Value Ref Range    POCT Glucose 290 (H) 74 - 99 mg/dL        Current Facility-Administered Medications:     acetaminophen (Tylenol) tablet 650 mg, 650 mg, oral, q4h PRN **OR** acetaminophen (Tylenol) oral liquid 650 mg, 650 mg, nasogastric tube, q4h PRN **OR** acetaminophen (Tylenol) suppository 650 mg, 650 mg, rectal, q4h  PRN, MADYSON Sotelo-CNP    acetaminophen (Tylenol) tablet 650 mg, 650 mg, oral, q4h PRN, 650 mg at 07/05/24 1857 **OR** acetaminophen (Tylenol) oral liquid 650 mg, 650 mg, oral, q4h PRN **OR** acetaminophen (Tylenol) suppository 650 mg, 650 mg, rectal, q4h PRN, Leny Daniels APRN-CNP    atorvastatin (Lipitor) tablet 80 mg, 80 mg, oral, Nightly, Leny Daniels APRN-CNP, 80 mg at 07/05/24 2133    benzocaine-menthol (Cepastat Sore Throat) lozenge 1 lozenge, 1 lozenge, Mouth/Throat, q2h PRN, MADYSON Sotelo-CNP    ciprofloxacin (Cipro) tablet 250 mg, 250 mg, oral, BID, MADYSON Sotelo-CNP, 250 mg at 07/05/24 2132    dextromethorphan-guaifenesin (Robitussin DM)  mg/5 mL oral liquid 5 mL, 5 mL, oral, q4h PRN, MADYSON Sotelo-CNP    dextrose 50 % injection 12.5 g, 12.5 g, intravenous, q15 min PRN, Leny Daniels APRN-CNP    dextrose 50 % injection 25 g, 25 g, intravenous, q15 min PRN, Leny Daniels APRN-CNP    docusate sodium (Colace) capsule 100 mg, 100 mg, oral, BID, Leny Daniels APRN-CNP, 100 mg at 07/05/24 2132    fenofibrate (Triglide) tablet 160 mg, 160 mg, oral, Daily, MADYSON Sotelo-CNP, 160 mg at 07/05/24 1858    furosemide (Lasix) tablet 40 mg, 40 mg, oral, Daily, Leny Daniels, APRN-CNP, 40 mg at 07/05/24 1858    glucagon (Glucagen) injection 1 mg, 1 mg, intramuscular, q15 min PRN, Leny Daniels APRN-CNP    glucagon (Glucagen) injection 1 mg, 1 mg, intramuscular, q15 min PRN, ESME Sotelo    guaiFENesin (Mucinex) 12 hr tablet 600 mg, 600 mg, oral, q12h PRN, ESME Sotelo    hydrOXYzine HCL (Atarax) tablet 25 mg, 25 mg, oral, q6h PRN, ESME Sotelo, 25 mg at 07/05/24 1859    lactulose 20 gram/30 mL oral solution 20 g, 20 g, oral, TID, MADYSON Sotelo-CNP, 20 g at 07/05/24 2132    magnesium oxide (Mag-Ox) tablet 400 mg, 400 mg, oral, Daily, ESME Sotelo, 400 mg at 07/05/24 1858     nadolol (Corgard) tablet 20 mg, 20 mg, oral, Daily, Lenyjordana Daniels, APRN-CNP, 20 mg at 07/05/24 1858    ondansetron (Zofran) tablet 4 mg, 4 mg, oral, q8h PRN **OR** ondansetron (Zofran) injection 4 mg, 4 mg, intravenous, q8h PRN, Leny Daniels, APRN-CNP    ondansetron ODT (Zofran-ODT) disintegrating tablet 4 mg, 4 mg, oral, q8h PRN, Leny Daniels, APRN-CNP    pantoprazole (ProtoNix) injection 40 mg, 40 mg, intravenous, Daily before breakfast, Leny Daniels, APRN-CNP, 40 mg at 07/06/24 0552    polyethylene glycol (Glycolax, Miralax) packet 17 g, 17 g, oral, BID, Leny A Frances, APRN-CNP, 17 g at 07/05/24 2132    rifAXIMin (Xifaxan) tablet 550 mg, 550 mg, oral, q12h RIVKA, Leny A Frances, APRN-CNP, 550 mg at 07/05/24 2204    spironolactone (Aldactone) tablet 50 mg, 50 mg, oral, BID, Leny A Frances, APRN-CNP, 50 mg at 07/05/24 2133    sucralfate (Carafate) suspension 1 g, 1 g, oral, Before meals & nightly, Leny Daniels, APRN-CNP, 1 g at 07/06/24 0631    traZODone (Desyrel) tablet 25 mg, 25 mg, oral, Nightly, Leny A Frances, APRN-CNP, 25 mg at 07/05/24 2132    ursodiol (Actigall) capsule 300 mg, 300 mg, oral, TID, Leny A Frances, APRN-CNP, 300 mg at 07/05/24 2204   Assessment/Plan   Principal Problem:    Ascites due to alcoholic cirrhosis (Multi)  Active Problems:    Hyperglycemia due to diabetes mellitus (Multi)    Type II or unspecified type diabetes mellitus with renal manifestations, uncontrolled(250.42) (Multi)  Abnormal CT abdominal/pelvis,  Hepatic cirrhosis with ascites  Hyperammonemia  Chronic pancreatitis  Hyperbilirubinemia  Microcytic anemia  Thrombocytopenia  Hyperlipidemia  Hypertriglyceridemia  Diabetes mellitus type 2  Glaucoma  Hypoalbuminemia  Moderate protein energy malnutrition  Recurrent UTI    Plan: Continue current medication and supportive care.  Continue antibiotic.  Status post paracentesis.  Patient medically stable for discharge when arrangements are made.          Luis Alfredo Lawrence MD

## 2024-07-06 NOTE — DISCHARGE SUMMARY
Discharge Diagnosis  Ascites due to alcoholic cirrhosis (Multi)    Issues Requiring Follow-Up  [facility]  Abdominal pain  Ascites  Diabetes mellitus type 2  Chronic pancreatitis        Discharge Meds     Your medication list        CHANGE how you take these medications        Instructions Last Dose Given Next Dose Due   traMADol 50 mg tablet  Commonly known as: Ultram  What changed: Another medication with the same name was added. Make sure you understand how and when to take each.      Take 1 tablet (50 mg) by mouth every 6 hours if needed for severe pain (7 - 10).       traMADol 50 mg tablet  Commonly known as: Ultram  What changed: You were already taking a medication with the same name, and this prescription was added. Make sure you understand how and when to take each.      Take 1 tablet (50 mg) by mouth every 6 hours if needed for severe pain (7 - 10).              CONTINUE taking these medications        Instructions Last Dose Given Next Dose Due   atorvastatin 80 mg tablet  Commonly known as: Lipitor           docusate sodium 100 mg capsule  Commonly known as: Colace      Take 1 capsule (100 mg) by mouth 2 times a day. Hold for loose stool.       fenofibrate 145 mg tablet  Commonly known as: Tricor           furosemide 40 mg tablet  Commonly known as: Lasix      Take 1 tablet (40 mg) by mouth once daily. Hold for dizziness.       gabapentin 100 mg capsule  Commonly known as: Neurontin      Take 2 capsules (200 mg) by mouth 3 times a day.       hydrOXYzine HCL 25 mg tablet  Commonly known as: Atarax      Take 1 tablet (25 mg) by mouth every 6 hours if needed for itching or allergies.       insulin lispro 100 unit/mL injection  Commonly known as: HumaLOG           lactulose 20 gram/30 mL oral solution      Take 30 mL (20 g) by mouth once daily. Hold for greater than 3 bowel movements in 24 hours.       lactulose 20 gram/30 mL oral solution      Take 30 mL (20 g) by mouth every 4 hours.       Lantus U-100 Insulin  100 unit/mL injection  Generic drug: insulin glargine           magnesium oxide 400 mg (241.3 mg magnesium) tablet  Commonly known as: Mag-Ox      Take 1 tablet (400 mg) by mouth once daily.       nadolol 20 mg tablet  Commonly known as: Corgard      Take 1 tablet (20 mg) by mouth once daily.       ondansetron ODT 4 mg disintegrating tablet  Commonly known as: Zofran-ODT      Take 1 tablet (4 mg) by mouth every 8 hours if needed for nausea or vomiting.       pantoprazole 40 mg EC tablet  Commonly known as: ProtoNix      Take 1 tablet (40 mg) by mouth once daily. Do not crush, chew, or split.       polyethylene glycol 17 gram packet  Commonly known as: Glycolax, Miralax      Take 17 g by mouth 2 times a day. Hold for loose stool.       spironolactone 50 mg tablet  Commonly known as: Aldactone      Take 1 tablet (50 mg) by mouth 2 times a day. Hold for dizziness.       sucralfate 100 mg/mL suspension  Commonly known as: Carafate      Take 10 mL (1 g) by mouth 4 times a day before meals.       traZODone 50 mg tablet  Commonly known as: Desyrel           ursodiol 300 mg capsule  Commonly known as: Actigall           Xifaxan 550 mg tablet  Generic drug: rifAXIMin      Take 1 tablet (550 mg) by mouth every 12 hours.              STOP taking these medications      ciprofloxacin 250 mg tablet  Commonly known as: Cipro                  Where to Get Your Medications        These medications were sent to [facility]  [id number] [id number]      Hours: 9 AM to 6 PM Mon-Fri, 9 AM to 1 PM Sat Phone: [phone]   traMADol 50 mg tablet         Test Results Pending At Discharge  Pending Labs       Order Current Status    Extra Urine Gray Tube Collected (07/05/24 0308)    Urinalysis with Reflex Culture and Microscopic In process            Hospital Course   The patient was admitted to complaint of abdominal pain.  Patient found to have ascites.  Ultrasound-guided paracentesis was done.  1 L fluid was  removed.  After that patient started undershoot.  Abdominal.  Patient was admitted for observation.  Pain is better now.  Patient drinks better.  Patient is being discharged home in a stable condition.    Pertinent Physical Exam At Time of Discharge  Physical Exam    Outpatient Follow-Up  No future appointments.      Luis Alfredo Lawrence MD

## 2024-07-06 NOTE — CARE PLAN
The patient's goals for the shift include reduce pain    The clinical goals for the shift include pain management        Problem: Fall/Injury  Goal: Not fall by end of shift  Outcome: Progressing  Goal: Be free from injury by end of the shift  Outcome: Progressing  Goal: Verbalize understanding of personal risk factors for fall in the hospital  Outcome: Progressing  Goal: Verbalize understanding of risk factor reduction measures to prevent injury from fall in the home  Outcome: Progressing  Goal: Use assistive devices by end of the shift  Outcome: Progressing  Goal: Pace activities to prevent fatigue by end of the shift  Outcome: Progressing     Problem: Pain  Goal: Takes deep breaths with improved pain control throughout the shift  Outcome: Progressing  Goal: Turns in bed with improved pain control throughout the shift  Outcome: Progressing  Goal: Walks with improved pain control throughout the shift  Outcome: Progressing  Goal: Performs ADL's with improved pain control throughout shift  Outcome: Progressing  Goal: Participates in PT with improved pain control throughout the shift  Outcome: Progressing  Goal: Free from opioid side effects throughout the shift  Outcome: Progressing  Goal: Free from acute confusion related to pain meds throughout the shift  Outcome: Progressing

## 2024-07-08 LAB
ATRIAL RATE: 108 BPM
P AXIS: 57 DEGREES
P OFFSET: 186 MS
P ONSET: 136 MS
PR INTERVAL: 166 MS
Q ONSET: 219 MS
QRS COUNT: 18 BEATS
QRS DURATION: 66 MS
QT INTERVAL: 346 MS
QTC CALCULATION(BAZETT): 463 MS
QTC FREDERICIA: 420 MS
R AXIS: 38 DEGREES
T AXIS: 6 DEGREES
T OFFSET: 392 MS
VENTRICULAR RATE: 108 BPM

## 2024-07-10 ENCOUNTER — HOSPITAL ENCOUNTER (OUTPATIENT)
Facility: HOSPITAL | Age: 49
Setting detail: OBSERVATION
Discharge: HOME | End: 2024-07-12
Attending: STUDENT IN AN ORGANIZED HEALTH CARE EDUCATION/TRAINING PROGRAM | Admitting: INTERNAL MEDICINE
Payer: COMMERCIAL

## 2024-07-10 ENCOUNTER — APPOINTMENT (OUTPATIENT)
Dept: RADIOLOGY | Facility: HOSPITAL | Age: 49
End: 2024-07-10
Payer: COMMERCIAL

## 2024-07-10 DIAGNOSIS — E72.20 HYPERAMMONEMIA (MULTI): ICD-10-CM

## 2024-07-10 DIAGNOSIS — N30.00 ACUTE CYSTITIS WITHOUT HEMATURIA: ICD-10-CM

## 2024-07-10 DIAGNOSIS — E46 PROTEIN-CALORIE MALNUTRITION, UNSPECIFIED SEVERITY (MULTI): ICD-10-CM

## 2024-07-10 DIAGNOSIS — K76.82 HEPATIC ENCEPHALOPATHY (MULTI): ICD-10-CM

## 2024-07-10 DIAGNOSIS — R10.9 ABDOMINAL PAIN, UNSPECIFIED ABDOMINAL LOCATION: ICD-10-CM

## 2024-07-10 DIAGNOSIS — K76.6 PORTAL HYPERTENSION (MULTI): ICD-10-CM

## 2024-07-10 DIAGNOSIS — N30.90 CYSTITIS: ICD-10-CM

## 2024-07-10 DIAGNOSIS — R10.84 ABDOMINAL PAIN, GENERALIZED: Primary | ICD-10-CM

## 2024-07-10 DIAGNOSIS — K74.60 CIRRHOSIS OF LIVER WITH ASCITES, UNSPECIFIED HEPATIC CIRRHOSIS TYPE (MULTI): ICD-10-CM

## 2024-07-10 DIAGNOSIS — R18.8 CIRRHOSIS OF LIVER WITH ASCITES, UNSPECIFIED HEPATIC CIRRHOSIS TYPE (MULTI): ICD-10-CM

## 2024-07-10 LAB
ALBUMIN SERPL-MCNC: 2.6 G/DL (ref 3.5–5)
ALP BLD-CCNC: 367 U/L (ref 35–125)
ALT SERPL-CCNC: 47 U/L (ref 5–40)
AMMONIA PLAS-SCNC: 219 UMOL/L (ref 12–45)
ANION GAP SERPL CALC-SCNC: 6 MMOL/L
APPEARANCE UR: CLEAR
AST SERPL-CCNC: 94 U/L (ref 5–40)
BACTERIA #/AREA URNS AUTO: ABNORMAL /HPF
BASOPHILS # BLD MANUAL: 0.04 X10*3/UL (ref 0–0.1)
BASOPHILS NFR BLD MANUAL: 1 %
BILIRUB SERPL-MCNC: 1.4 MG/DL (ref 0.1–1.2)
BILIRUB UR STRIP.AUTO-MCNC: NEGATIVE MG/DL
BUN SERPL-MCNC: 9 MG/DL (ref 8–25)
CALCIUM SERPL-MCNC: 7.9 MG/DL (ref 8.5–10.4)
CHLORIDE SERPL-SCNC: 106 MMOL/L (ref 97–107)
CO2 SERPL-SCNC: 25 MMOL/L (ref 24–31)
COLOR UR: ABNORMAL
CREAT SERPL-MCNC: 0.5 MG/DL (ref 0.4–1.6)
EGFRCR SERPLBLD CKD-EPI 2021: >90 ML/MIN/1.73M*2
EOSINOPHIL # BLD MANUAL: 0.24 X10*3/UL (ref 0–0.7)
EOSINOPHIL NFR BLD MANUAL: 6 %
ERYTHROCYTE [DISTWIDTH] IN BLOOD BY AUTOMATED COUNT: 21.5 % (ref 11.5–14.5)
GLUCOSE BLD MANUAL STRIP-MCNC: 142 MG/DL (ref 74–99)
GLUCOSE BLD MANUAL STRIP-MCNC: 201 MG/DL (ref 74–99)
GLUCOSE BLD MANUAL STRIP-MCNC: 224 MG/DL (ref 74–99)
GLUCOSE SERPL-MCNC: 248 MG/DL (ref 65–99)
GLUCOSE UR STRIP.AUTO-MCNC: NORMAL MG/DL
HCT VFR BLD AUTO: 28.8 % (ref 36–46)
HGB BLD-MCNC: 9.2 G/DL (ref 12–16)
HOLD SPECIMEN: NORMAL
IMM GRANULOCYTES # BLD AUTO: 0.02 X10*3/UL (ref 0–0.7)
IMM GRANULOCYTES NFR BLD AUTO: 0.5 % (ref 0–0.9)
KETONES UR STRIP.AUTO-MCNC: NEGATIVE MG/DL
LEUKOCYTE ESTERASE UR QL STRIP.AUTO: ABNORMAL
LIPASE SERPL-CCNC: 71 U/L (ref 16–63)
LYMPHOCYTES # BLD MANUAL: 0.8 X10*3/UL (ref 1.2–4.8)
LYMPHOCYTES NFR BLD MANUAL: 20 %
MCH RBC QN AUTO: 24.8 PG (ref 26–34)
MCHC RBC AUTO-ENTMCNC: 31.9 G/DL (ref 32–36)
MCV RBC AUTO: 78 FL (ref 80–100)
MONOCYTES # BLD MANUAL: 0.2 X10*3/UL (ref 0.1–1)
MONOCYTES NFR BLD MANUAL: 5 %
NEUTS SEG # BLD MANUAL: 2.72 X10*3/UL (ref 1.2–7)
NEUTS SEG NFR BLD MANUAL: 68 %
NITRITE UR QL STRIP.AUTO: NEGATIVE
NRBC BLD-RTO: 0 /100 WBCS (ref 0–0)
PH UR STRIP.AUTO: 8 [PH]
PLATELET # BLD AUTO: 69 X10*3/UL (ref 150–450)
POLYCHROMASIA BLD QL SMEAR: ABNORMAL
POTASSIUM SERPL-SCNC: 4.1 MMOL/L (ref 3.4–5.1)
PROT SERPL-MCNC: 7.3 G/DL (ref 5.9–7.9)
PROT UR STRIP.AUTO-MCNC: NEGATIVE MG/DL
RBC # BLD AUTO: 3.71 X10*6/UL (ref 4–5.2)
RBC # UR STRIP.AUTO: NEGATIVE /UL
RBC #/AREA URNS AUTO: ABNORMAL /HPF
RBC MORPH BLD: ABNORMAL
SODIUM SERPL-SCNC: 137 MMOL/L (ref 133–145)
SP GR UR STRIP.AUTO: 1.01
SQUAMOUS #/AREA URNS AUTO: ABNORMAL /HPF
TARGETS BLD QL SMEAR: ABNORMAL
TOTAL CELLS COUNTED BLD: 100
UROBILINOGEN UR STRIP.AUTO-MCNC: NORMAL MG/DL
WBC # BLD AUTO: 4 X10*3/UL (ref 4.4–11.3)
WBC #/AREA URNS AUTO: ABNORMAL /HPF

## 2024-07-10 PROCEDURE — 85027 COMPLETE CBC AUTOMATED: CPT

## 2024-07-10 PROCEDURE — G0378 HOSPITAL OBSERVATION PER HR: HCPCS

## 2024-07-10 PROCEDURE — 2500000001 HC RX 250 WO HCPCS SELF ADMINISTERED DRUGS (ALT 637 FOR MEDICARE OP)

## 2024-07-10 PROCEDURE — 36415 COLL VENOUS BLD VENIPUNCTURE: CPT

## 2024-07-10 PROCEDURE — 96365 THER/PROPH/DIAG IV INF INIT: CPT | Mod: 59

## 2024-07-10 PROCEDURE — 82947 ASSAY GLUCOSE BLOOD QUANT: CPT

## 2024-07-10 PROCEDURE — 96361 HYDRATE IV INFUSION ADD-ON: CPT

## 2024-07-10 PROCEDURE — 85007 BL SMEAR W/DIFF WBC COUNT: CPT

## 2024-07-10 PROCEDURE — 84075 ASSAY ALKALINE PHOSPHATASE: CPT

## 2024-07-10 PROCEDURE — 2500000001 HC RX 250 WO HCPCS SELF ADMINISTERED DRUGS (ALT 637 FOR MEDICARE OP): Performed by: INTERNAL MEDICINE

## 2024-07-10 PROCEDURE — 81001 URINALYSIS AUTO W/SCOPE: CPT

## 2024-07-10 PROCEDURE — 99285 EMERGENCY DEPT VISIT HI MDM: CPT | Mod: 25

## 2024-07-10 PROCEDURE — 2500000002 HC RX 250 W HCPCS SELF ADMINISTERED DRUGS (ALT 637 FOR MEDICARE OP, ALT 636 FOR OP/ED): Performed by: NURSE PRACTITIONER

## 2024-07-10 PROCEDURE — 74177 CT ABD & PELVIS W/CONTRAST: CPT | Performed by: RADIOLOGY

## 2024-07-10 PROCEDURE — 2500000004 HC RX 250 GENERAL PHARMACY W/ HCPCS (ALT 636 FOR OP/ED)

## 2024-07-10 PROCEDURE — 2500000001 HC RX 250 WO HCPCS SELF ADMINISTERED DRUGS (ALT 637 FOR MEDICARE OP): Performed by: NURSE PRACTITIONER

## 2024-07-10 PROCEDURE — 96375 TX/PRO/DX INJ NEW DRUG ADDON: CPT

## 2024-07-10 PROCEDURE — 74177 CT ABD & PELVIS W/CONTRAST: CPT

## 2024-07-10 PROCEDURE — 82140 ASSAY OF AMMONIA: CPT

## 2024-07-10 PROCEDURE — 2550000001 HC RX 255 CONTRASTS: Performed by: STUDENT IN AN ORGANIZED HEALTH CARE EDUCATION/TRAINING PROGRAM

## 2024-07-10 PROCEDURE — 87086 URINE CULTURE/COLONY COUNT: CPT | Mod: WESLAB

## 2024-07-10 PROCEDURE — 83690 ASSAY OF LIPASE: CPT

## 2024-07-10 RX ORDER — BISACODYL 5 MG
10 TABLET, DELAYED RELEASE (ENTERIC COATED) ORAL DAILY PRN
Status: DISCONTINUED | OUTPATIENT
Start: 2024-07-10 | End: 2024-07-12 | Stop reason: HOSPADM

## 2024-07-10 RX ORDER — ACETAMINOPHEN 650 MG/1
650 SUPPOSITORY RECTAL EVERY 4 HOURS PRN
Status: DISCONTINUED | OUTPATIENT
Start: 2024-07-10 | End: 2024-07-12 | Stop reason: HOSPADM

## 2024-07-10 RX ORDER — ACETAMINOPHEN 160 MG/5ML
650 SOLUTION ORAL EVERY 4 HOURS PRN
Status: DISCONTINUED | OUTPATIENT
Start: 2024-07-10 | End: 2024-07-12 | Stop reason: HOSPADM

## 2024-07-10 RX ORDER — PANTOPRAZOLE SODIUM 40 MG/10ML
40 INJECTION, POWDER, LYOPHILIZED, FOR SOLUTION INTRAVENOUS
Status: DISCONTINUED | OUTPATIENT
Start: 2024-07-11 | End: 2024-07-12

## 2024-07-10 RX ORDER — CEFTRIAXONE 1 G/50ML
1 INJECTION, SOLUTION INTRAVENOUS ONCE
Status: COMPLETED | OUTPATIENT
Start: 2024-07-10 | End: 2024-07-10

## 2024-07-10 RX ORDER — PANTOPRAZOLE SODIUM 40 MG/1
40 TABLET, DELAYED RELEASE ORAL
Status: DISCONTINUED | OUTPATIENT
Start: 2024-07-11 | End: 2024-07-12

## 2024-07-10 RX ORDER — SUCRALFATE 1 G/10ML
1 SUSPENSION ORAL
Status: DISCONTINUED | OUTPATIENT
Start: 2024-07-10 | End: 2024-07-12 | Stop reason: HOSPADM

## 2024-07-10 RX ORDER — FUROSEMIDE 40 MG/1
40 TABLET ORAL DAILY
Status: DISCONTINUED | OUTPATIENT
Start: 2024-07-10 | End: 2024-07-12 | Stop reason: HOSPADM

## 2024-07-10 RX ORDER — ACETAMINOPHEN 325 MG/1
650 TABLET ORAL EVERY 4 HOURS PRN
Status: DISCONTINUED | OUTPATIENT
Start: 2024-07-10 | End: 2024-07-12 | Stop reason: HOSPADM

## 2024-07-10 RX ORDER — CEFTRIAXONE 1 G/50ML
1 INJECTION, SOLUTION INTRAVENOUS EVERY 24 HOURS
Status: DISCONTINUED | OUTPATIENT
Start: 2024-07-11 | End: 2024-07-12 | Stop reason: HOSPADM

## 2024-07-10 RX ORDER — DEXTROSE 50 % IN WATER (D50W) INTRAVENOUS SYRINGE
12.5
Status: DISCONTINUED | OUTPATIENT
Start: 2024-07-10 | End: 2024-07-12 | Stop reason: HOSPADM

## 2024-07-10 RX ORDER — ONDANSETRON 4 MG/1
4 TABLET, FILM COATED ORAL EVERY 8 HOURS PRN
Status: DISCONTINUED | OUTPATIENT
Start: 2024-07-10 | End: 2024-07-12 | Stop reason: HOSPADM

## 2024-07-10 RX ORDER — SPIRONOLACTONE 50 MG/1
50 TABLET, FILM COATED ORAL 2 TIMES DAILY
Status: DISCONTINUED | OUTPATIENT
Start: 2024-07-10 | End: 2024-07-12 | Stop reason: HOSPADM

## 2024-07-10 RX ORDER — GUAIFENESIN/DEXTROMETHORPHAN 100-10MG/5
5 SYRUP ORAL EVERY 4 HOURS PRN
Status: DISCONTINUED | OUTPATIENT
Start: 2024-07-10 | End: 2024-07-12 | Stop reason: HOSPADM

## 2024-07-10 RX ORDER — KETOROLAC TROMETHAMINE 30 MG/ML
15 INJECTION, SOLUTION INTRAMUSCULAR; INTRAVENOUS ONCE
Status: COMPLETED | OUTPATIENT
Start: 2024-07-10 | End: 2024-07-10

## 2024-07-10 RX ORDER — TRAMADOL HYDROCHLORIDE 50 MG/1
50 TABLET ORAL ONCE
Status: COMPLETED | OUTPATIENT
Start: 2024-07-10 | End: 2024-07-10

## 2024-07-10 RX ORDER — INSULIN LISPRO 100 [IU]/ML
0-15 INJECTION, SOLUTION INTRAVENOUS; SUBCUTANEOUS
Status: DISCONTINUED | OUTPATIENT
Start: 2024-07-11 | End: 2024-07-12 | Stop reason: HOSPADM

## 2024-07-10 RX ORDER — ONDANSETRON HYDROCHLORIDE 2 MG/ML
4 INJECTION, SOLUTION INTRAVENOUS ONCE
Status: COMPLETED | OUTPATIENT
Start: 2024-07-10 | End: 2024-07-10

## 2024-07-10 RX ORDER — LACTULOSE 10 G/15ML
20 SOLUTION ORAL ONCE
Status: COMPLETED | OUTPATIENT
Start: 2024-07-10 | End: 2024-07-10

## 2024-07-10 RX ORDER — LACTULOSE 10 G/15ML
20 SOLUTION ORAL 3 TIMES DAILY
Status: DISCONTINUED | OUTPATIENT
Start: 2024-07-10 | End: 2024-07-12 | Stop reason: HOSPADM

## 2024-07-10 RX ORDER — DICYCLOMINE HYDROCHLORIDE 10 MG/1
10 CAPSULE ORAL ONCE
Status: COMPLETED | OUTPATIENT
Start: 2024-07-10 | End: 2024-07-10

## 2024-07-10 RX ORDER — GUAIFENESIN 600 MG/1
600 TABLET, EXTENDED RELEASE ORAL EVERY 12 HOURS PRN
Status: DISCONTINUED | OUTPATIENT
Start: 2024-07-10 | End: 2024-07-12 | Stop reason: HOSPADM

## 2024-07-10 RX ORDER — TRAMADOL HYDROCHLORIDE 50 MG/1
50 TABLET ORAL EVERY 6 HOURS PRN
Status: DISCONTINUED | OUTPATIENT
Start: 2024-07-10 | End: 2024-07-12 | Stop reason: HOSPADM

## 2024-07-10 RX ORDER — PROCHLORPERAZINE EDISYLATE 5 MG/ML
10 INJECTION INTRAMUSCULAR; INTRAVENOUS ONCE
Status: COMPLETED | OUTPATIENT
Start: 2024-07-10 | End: 2024-07-10

## 2024-07-10 RX ORDER — TALC
3 POWDER (GRAM) TOPICAL NIGHTLY PRN
Status: DISCONTINUED | OUTPATIENT
Start: 2024-07-10 | End: 2024-07-12 | Stop reason: HOSPADM

## 2024-07-10 RX ORDER — INSULIN GLARGINE 100 [IU]/ML
25 INJECTION, SOLUTION SUBCUTANEOUS 2 TIMES DAILY
Status: DISCONTINUED | OUTPATIENT
Start: 2024-07-10 | End: 2024-07-12

## 2024-07-10 RX ORDER — ONDANSETRON HYDROCHLORIDE 2 MG/ML
4 INJECTION, SOLUTION INTRAVENOUS EVERY 8 HOURS PRN
Status: DISCONTINUED | OUTPATIENT
Start: 2024-07-10 | End: 2024-07-12 | Stop reason: HOSPADM

## 2024-07-10 RX ORDER — NADOLOL 20 MG/1
20 TABLET ORAL DAILY
Status: DISCONTINUED | OUTPATIENT
Start: 2024-07-10 | End: 2024-07-12 | Stop reason: HOSPADM

## 2024-07-10 RX ORDER — DEXTROSE 50 % IN WATER (D50W) INTRAVENOUS SYRINGE
25
Status: DISCONTINUED | OUTPATIENT
Start: 2024-07-10 | End: 2024-07-12 | Stop reason: HOSPADM

## 2024-07-10 SDOH — SOCIAL STABILITY: SOCIAL INSECURITY: WERE YOU ABLE TO COMPLETE ALL THE BEHAVIORAL HEALTH SCREENINGS?: YES

## 2024-07-10 SDOH — SOCIAL STABILITY: SOCIAL INSECURITY: ABUSE: ADULT

## 2024-07-10 SDOH — SOCIAL STABILITY: SOCIAL INSECURITY: HAS ANYONE EVER THREATENED TO HURT YOUR FAMILY OR YOUR PETS?: NO

## 2024-07-10 SDOH — SOCIAL STABILITY: SOCIAL INSECURITY: ARE THERE ANY APPARENT SIGNS OF INJURIES/BEHAVIORS THAT COULD BE RELATED TO ABUSE/NEGLECT?: NO

## 2024-07-10 SDOH — SOCIAL STABILITY: SOCIAL INSECURITY: HAVE YOU HAD THOUGHTS OF HARMING ANYONE ELSE?: NO

## 2024-07-10 SDOH — SOCIAL STABILITY: SOCIAL INSECURITY: ARE YOU OR HAVE YOU BEEN THREATENED OR ABUSED PHYSICALLY, EMOTIONALLY, OR SEXUALLY BY ANYONE?: NO

## 2024-07-10 SDOH — SOCIAL STABILITY: SOCIAL INSECURITY: HAVE YOU HAD ANY THOUGHTS OF HARMING ANYONE ELSE?: NO

## 2024-07-10 SDOH — SOCIAL STABILITY: SOCIAL INSECURITY: DOES ANYONE TRY TO KEEP YOU FROM HAVING/CONTACTING OTHER FRIENDS OR DOING THINGS OUTSIDE YOUR HOME?: NO

## 2024-07-10 SDOH — SOCIAL STABILITY: SOCIAL INSECURITY: DO YOU FEEL UNSAFE GOING BACK TO THE PLACE WHERE YOU ARE LIVING?: NO

## 2024-07-10 SDOH — SOCIAL STABILITY: SOCIAL INSECURITY: DO YOU FEEL ANYONE HAS EXPLOITED OR TAKEN ADVANTAGE OF YOU FINANCIALLY OR OF YOUR PERSONAL PROPERTY?: NO

## 2024-07-10 ASSESSMENT — LIFESTYLE VARIABLES
EVER HAD A DRINK FIRST THING IN THE MORNING TO STEADY YOUR NERVES TO GET RID OF A HANGOVER: NO
HOW MANY STANDARD DRINKS CONTAINING ALCOHOL DO YOU HAVE ON A TYPICAL DAY: PATIENT DOES NOT DRINK
TOTAL SCORE: 0
AUDIT-C TOTAL SCORE: 0
AUDIT-C TOTAL SCORE: 0
HOW OFTEN DO YOU HAVE A DRINK CONTAINING ALCOHOL: NEVER
HOW OFTEN DO YOU HAVE 6 OR MORE DRINKS ON ONE OCCASION: NEVER
HAVE YOU EVER FELT YOU SHOULD CUT DOWN ON YOUR DRINKING: NO
HAVE PEOPLE ANNOYED YOU BY CRITICIZING YOUR DRINKING: NO
SKIP TO QUESTIONS 9-10: 1
EVER FELT BAD OR GUILTY ABOUT YOUR DRINKING: NO

## 2024-07-10 ASSESSMENT — PAIN DESCRIPTION - DESCRIPTORS
DESCRIPTORS: ACHING
DESCRIPTORS: ACHING
DESCRIPTORS: SQUEEZING

## 2024-07-10 ASSESSMENT — PAIN - FUNCTIONAL ASSESSMENT
PAIN_FUNCTIONAL_ASSESSMENT: 0-10

## 2024-07-10 ASSESSMENT — ENCOUNTER SYMPTOMS
EYES NEGATIVE: 1
HEMATOLOGIC/LYMPHATIC NEGATIVE: 1
MUSCULOSKELETAL NEGATIVE: 1
NEUROLOGICAL NEGATIVE: 1
ENDOCRINE NEGATIVE: 1
DIARRHEA: 1
CHILLS: 1
ALLERGIC/IMMUNOLOGIC NEGATIVE: 1
RESPIRATORY NEGATIVE: 1
FATIGUE: 1
CONFUSION: 1
NAUSEA: 1
VOMITING: 1
DYSURIA: 1

## 2024-07-10 ASSESSMENT — COGNITIVE AND FUNCTIONAL STATUS - GENERAL
STANDING UP FROM CHAIR USING ARMS: A LITTLE
DRESSING REGULAR LOWER BODY CLOTHING: A LITTLE
MOVING TO AND FROM BED TO CHAIR: A LITTLE
DAILY ACTIVITIY SCORE: 23
CLIMB 3 TO 5 STEPS WITH RAILING: A LITTLE
WALKING IN HOSPITAL ROOM: A LITTLE
PATIENT BASELINE BEDBOUND: NO
MOBILITY SCORE: 20

## 2024-07-10 ASSESSMENT — PAIN DESCRIPTION - ONSET: ONSET: ONGOING

## 2024-07-10 ASSESSMENT — ACTIVITIES OF DAILY LIVING (ADL)
ASSISTIVE_DEVICE: WALKER
GROOMING: INDEPENDENT
JUDGMENT_ADEQUATE_SAFELY_COMPLETE_DAILY_ACTIVITIES: YES
DRESSING YOURSELF: INDEPENDENT
TOILETING: INDEPENDENT
PATIENT'S MEMORY ADEQUATE TO SAFELY COMPLETE DAILY ACTIVITIES?: YES
HEARING - RIGHT EAR: FUNCTIONAL
WALKS IN HOME: NEEDS ASSISTANCE
FEEDING YOURSELF: INDEPENDENT
HEARING - LEFT EAR: FUNCTIONAL
LACK_OF_TRANSPORTATION: NO
BATHING: INDEPENDENT
ADEQUATE_TO_COMPLETE_ADL: YES
LACK_OF_TRANSPORTATION: NO

## 2024-07-10 ASSESSMENT — PAIN SCALES - GENERAL
PAINLEVEL_OUTOF10: 8
PAINLEVEL_OUTOF10: 7
PAINLEVEL_OUTOF10: 8
PAINLEVEL_OUTOF10: 5 - MODERATE PAIN
PAINLEVEL_OUTOF10: 8
PAINLEVEL_OUTOF10: 8
PAINLEVEL_OUTOF10: 0 - NO PAIN

## 2024-07-10 ASSESSMENT — PAIN DESCRIPTION - FREQUENCY: FREQUENCY: CONSTANT/CONTINUOUS

## 2024-07-10 ASSESSMENT — PAIN DESCRIPTION - PROGRESSION: CLINICAL_PROGRESSION: NOT CHANGED

## 2024-07-10 ASSESSMENT — PAIN DESCRIPTION - LOCATION: LOCATION: ABDOMEN

## 2024-07-10 ASSESSMENT — PAIN DESCRIPTION - ORIENTATION: ORIENTATION: MID

## 2024-07-10 ASSESSMENT — PAIN DESCRIPTION - PAIN TYPE: TYPE: ACUTE PAIN

## 2024-07-10 NOTE — ED TRIAGE NOTES
Abdominal discomfort, bloating, nausea and vomiting.  Here last Friday, had a paracentesis, removed 1 liter.  Today presents with sx that are the same if not worse.  Abdominal distention and bloating, nausea, vomiting, fatigue and headache.  Diarrhea 8 times that started last night.  Discharged Saturday, 7/6/2024. Current [id number]

## 2024-07-10 NOTE — H&P
Chief Complaint Abdominal pain, nausea, vomiting, diarrhea, and confusion.    History Of Present Illness  Alfonzo Flanagan is a very pleasant 49 y.o. female presenting with abdominal pain, nausea, vomiting, diarrhea, leg swelling and confusion.  She was in her usual state of health until prior to admission when she developed mid upper abdominal pain, nausea, vomiting 4 x, diarrhea 8 x - dark green stool, leg swelling and confusion.  She has been taking all medications as prescribed.  She is unsure if she is taking Rifaximin.  She presented to the emergency department for evaluation.  In the emergency department, initial work-up was done.  Vital signs were stable.  CMP showed a glucose of 248.  Calcium 7.9.  Albumin 2.6.  Alkaline phosphatase 367.  ALT 47.  AST 94.  Total bilirubin 1.4.  Ammonia 219.  Lipase 71.  CBC showed a WBC 4.0.  Hemoglobin 9.2.  Hematocrit 28.8.  Platelet count 69.  Urinalysis showed 250 leukocytes.  CT abdomen and pelvis per radiology showed cirrhosis with portal hypertension changes including splenomegaly and recanalized paraumbilical vein, generalized mesenteric and omental edema, small-moderate volume ascites most localized in the lower abdomen/pelvis, generalized small bowel wall thickening as well as segmental mild wall thickening of the colon may be related to the generalized edema, nonspecific infectious or inflammatory enterocolitis can not be excluded, no bowel obstruction.  She was treated in the emergency department and was admitted.  At the time of my evaluation, she is resting on the cart in the emergency department room 12 in no acute distress.  She is awake alert oriented x 3.  She complains of abdominal pain, nausea.  She denies any vomiting or diarrhea since presentation.  She reports leg swelling and confusion.  She denies any focal deficits.      Past Medical History  Past Medical History:   Diagnosis Date    Cirrhosis of liver (Multi)     Diabetes mellitus (Multi)         Surgical History  Past Surgical History:   Procedure Laterality Date    ABDOMINAL SURGERY      CT GUIDED IMAGING FOR NEEDLE PLACEMENT  10/14/2020    CT GUIDED IMAGING FOR NEEDLE PLACEMENT [facility]    US GUIDED ABDOMINAL PARACENTESIS  10/08/2020    US GUIDED ABDOMINAL PARACENTESIS [facility]        Social History  She reports that she has never smoked. She has never used smokeless tobacco. She reports that she does not drink alcohol and does not use drugs.    Family History  No family history on file.     Allergies  Gluten    Review of Systems   Constitutional:  Positive for chills and fatigue.   HENT: Negative.     Eyes: Negative.    Respiratory: Negative.     Cardiovascular:  Positive for leg swelling.   Gastrointestinal:  Positive for diarrhea, nausea and vomiting.   Endocrine: Negative.    Genitourinary:  Positive for dysuria.   Musculoskeletal: Negative.    Skin: Negative.    Allergic/Immunologic: Negative.    Neurological: Negative.    Hematological: Negative.    Psychiatric/Behavioral:  Positive for confusion.    All other systems reviewed and are negative.       Physical Exam  Vitals and nursing note reviewed.   Constitutional:       General: She is not in acute distress.     Appearance: Normal appearance. She is normal weight. She is not ill-appearing, toxic-appearing or diaphoretic.   HENT:      Head: Normocephalic and atraumatic.      Right Ear: Tympanic membrane normal.      Left Ear: Tympanic membrane normal.      Nose: Nose normal.      Mouth/Throat:      Mouth: Mucous membranes are moist.      Pharynx: Oropharynx is clear.   Eyes:      Extraocular Movements: Extraocular movements intact.      Conjunctiva/sclera: Conjunctivae normal.      Pupils: Pupils are equal, round, and reactive to light.   Cardiovascular:      Rate and Rhythm: Normal rate and regular rhythm.      Pulses: Normal pulses.      Heart sounds: Normal heart sounds.      Comments: + Bilateral lower extremity,  "bipedal edema.  Pulmonary:      Effort: Pulmonary effort is normal. No respiratory distress.      Breath sounds: Normal breath sounds. No wheezing, rhonchi or rales.   Abdominal:      General: Bowel sounds are normal. There is distension.      Palpations: Abdomen is soft.      Tenderness: There is abdominal tenderness. There is no guarding.   Genitourinary:     Comments: Deferred.  Musculoskeletal:         General: Swelling present. No tenderness. Normal range of motion.      Cervical back: Normal range of motion and neck supple.      Right lower le+ Pitting Edema present.      Left lower le+ Pitting Edema present.   Skin:     General: Skin is warm and dry.      Capillary Refill: Capillary refill takes less than 2 seconds.   Neurological:      General: No focal deficit present.      Mental Status: She is alert and oriented to person, place, and time.   Psychiatric:         Mood and Affect: Mood normal.         Behavior: Behavior normal.       Last Recorded Vitals  Blood pressure 110/61, pulse 77, temperature 36.1 °C (97 °F), temperature source Tympanic, resp. rate 15, height 1.6 m (5' 3\"), weight 79.9 kg (176 lb 1.6 oz), SpO2 98%.    Relevant Results  Results for orders placed or performed during the hospital encounter of 07/10/24 (from the past 24 hour(s))   POCT GLUCOSE   Result Value Ref Range    POCT Glucose 224 (H) 74 - 99 mg/dL   Lipase   Result Value Ref Range    Lipase 71 (H) 16 - 63 U/L   Comprehensive Metabolic Panel   Result Value Ref Range    Glucose 248 (H) 65 - 99 mg/dL    Sodium 137 133 - 145 mmol/L    Potassium 4.1 3.4 - 5.1 mmol/L    Chloride 106 97 - 107 mmol/L    Bicarbonate 25 24 - 31 mmol/L    Urea Nitrogen 9 8 - 25 mg/dL    Creatinine 0.50 0.40 - 1.60 mg/dL    eGFR >90 >60 mL/min/1.73m*2    Calcium 7.9 (L) 8.5 - 10.4 mg/dL    Albumin 2.6 (L) 3.5 - 5.0 g/dL    Alkaline Phosphatase 367 (H) 35 - 125 U/L    Total Protein 7.3 5.9 - 7.9 g/dL    AST 94 (H) 5 - 40 U/L    Bilirubin, Total 1.4 " (H) 0.1 - 1.2 mg/dL    ALT 47 (H) 5 - 40 U/L    Anion Gap 6 <=19 mmol/L   CBC and Auto Differential   Result Value Ref Range    WBC 4.0 (L) 4.4 - 11.3 x10*3/uL    nRBC 0.0 0.0 - 0.0 /100 WBCs    RBC 3.71 (L) 4.00 - 5.20 x10*6/uL    Hemoglobin 9.2 (L) 12.0 - 16.0 g/dL    Hematocrit 28.8 (L) 36.0 - 46.0 %    MCV 78 (L) 80 - 100 fL    MCH 24.8 (L) 26.0 - 34.0 pg    MCHC 31.9 (L) 32.0 - 36.0 g/dL    RDW 21.5 (H) 11.5 - 14.5 %    Platelets 69 (L) 150 - 450 x10*3/uL    Immature Granulocytes %, Automated 0.5 0.0 - 0.9 %    Immature Granulocytes Absolute, Automated 0.02 0.00 - 0.70 x10*3/uL   Ammonia   Result Value Ref Range    Ammonia 219 (H) 12 - 45 umol/L   Manual Differential   Result Value Ref Range    Neutrophils %, Manual 68.0 40.0 - 80.0 %    Lymphocytes %, Manual 20.0 13.0 - 44.0 %    Monocytes %, Manual 5.0 2.0 - 10.0 %    Eosinophils %, Manual 6.0 0.0 - 6.0 %    Basophils %, Manual 1.0 0.0 - 2.0 %    Seg Neutrophils Absolute, Manual 2.72 1.20 - 7.00 x10*3/uL    Lymphocytes Absolute, Manual 0.80 (L) 1.20 - 4.80 x10*3/uL    Monocytes Absolute, Manual 0.20 0.10 - 1.00 x10*3/uL    Eosinophils Absolute, Manual 0.24 0.00 - 0.70 x10*3/uL    Basophils Absolute, Manual 0.04 0.00 - 0.10 x10*3/uL    Total Cells Counted 100     RBC Morphology See Below     Polychromasia Mild     Target Cells Few    Urinalysis with Reflex Culture and Microscopic   Result Value Ref Range    Color, Urine Light-Yellow Light-Yellow, Yellow, Dark-Yellow    Appearance, Urine Clear Clear    Specific Gravity, Urine 1.008 1.005 - 1.035    pH, Urine 8.0 5.0, 5.5, 6.0, 6.5, 7.0, 7.5, 8.0    Protein, Urine NEGATIVE NEGATIVE, 10 (TRACE), 20 (TRACE) mg/dL    Glucose, Urine Normal Normal mg/dL    Blood, Urine NEGATIVE NEGATIVE    Ketones, Urine NEGATIVE NEGATIVE mg/dL    Bilirubin, Urine NEGATIVE NEGATIVE    Urobilinogen, Urine Normal Normal mg/dL    Nitrite, Urine NEGATIVE NEGATIVE    Leukocyte Esterase, Urine 250 Mckenzie/µL (A) NEGATIVE   Microscopic Only,  Urine   Result Value Ref Range    WBC, Urine 6-10 (A) 1-5, NONE /HPF    RBC, Urine 1-2 NONE, 1-2, 3-5 /HPF    Squamous Epithelial Cells, Urine 1-9 (SPARSE) Reference range not established. /HPF    Bacteria, Urine 1+ (A) NONE SEEN /HPF     Susceptibility data from last 90 days.  Collected Specimen Info Organism Amoxicillin/Clavulanate Ampicillin Ampicillin/Sulbactam Cefazolin Cefazolin (uncomplicated UTIs only) Ciprofloxacin Gentamicin Levofloxacin Nitrofurantoin Penicillin Piperacillin/Tazobactam   06/23/24 Urine from Clean Catch/Voided Escherichia coli   S   S  S  S  S   S   S   05/15/24 Urine from Clean Catch/Voided Enterococcus faecium   S     R   S  S  S      Klebsiella pneumoniae/variicola  S  R  S  S  S  S  S   I   S     Collected Specimen Info Organism Trimethoprim/Sulfamethoxazole Vancomycin   06/23/24 Urine from Clean Catch/Voided Escherichia coli  S    05/15/24 Urine from Clean Catch/Voided Enterococcus faecium  R  S     Klebsiella pneumoniae/variicola  S      CT abdomen pelvis w IV contrast    Result Date: 7/10/2024  Interpreted By:  Lane Espinal, STUDY: CT ABDOMEN PELVIS W IV CONTRAST;  7/10/2024 9:24 am   INDICATION: Signs/Symptoms:hx of ascites, liver cirrhosis.   COMPARISON: 07/04/2024.   ACCESSION NUMBER(S): [id number]   ORDERING CLINICIAN: JHONATAN MUKHERJEE   TECHNIQUE: Contiguous axial images were obtained through the abdomen and pelvis after the administration of  75 mL Omnipaque 350 intravenous contrast. Coronal and sagittal reformations were made.   FINDINGS: LOWER CHEST: Lung bases are clear.   ABDOMEN:   LIVER: Atrophic liver with cirrhotic morphology is again seen. No focal hepatic lesion is seen.   BILE DUCTS: Nondilated.   GALLBLADDER: Surgically absent   PANCREAS: No focal pancreatic lesion is seen.   SPLEEN: Spleen is enlarged measuring 14 cm in AP diameter similar to prior. No discrete splenic lesion.   ADRENAL GLANDS: Within normal limits.   KIDNEYS AND URETERS: The kidneys  enhance symmetrically without focal lesion.  No hydroureteronephrosis bilaterally.   Urinary bladder is unremarkable.   VESSELS: Scattered small atherosclerotic calcifications are present in the aorta. No aortic aneurysm. IVC is unremarkable.   Portosystemic collaterals again seen including recanalized paraumbilical vein compatible with portal hypertension.   BOWEL: There is mild generalized wall prominence of small bowel as well as segmental mild wall prominence of the colon which may be related to generalized edema/3rd spacing of fluid. No bowel obstruction. Appendix can not be clearly identified.   No appreciable significant diverticular disease.   PERITONEUM/RETROPERITONEUM/LYMPH NODES: There is generalized mesenteric and omental edema. Small-moderate volume ascites is most localized in the lower abdomen/pelvis. No organized fluid collection is seen. No free intraperitoneal air.   No retroperitoneal fluid collection or lymphadenopathy.   ABDOMINAL WALL: Stable midline ventral hernias near the umbilical region containing fat as well as fluid similar to prior. Generalized subcutaneous tissue edema is also seen.   BONE AND SOFT TISSUE: Left L5 unilateral spondylolysis is seen without spondylolisthesis. Mild anterior endplate spurring is most prominent in the lower thoracic spine.       Cirrhosis with portal hypertension changes including splenomegaly and recanalized paraumbilical vein.   Generalized mesenteric and omental edema. Small-moderate volume ascites most localized in the lower abdomen/pelvis.   Generalized small bowel wall thickening as well as segmental mild wall thickening of the colon may be related to the generalized edema. Nonspecific infectious or inflammatory enterocolitis can not be excluded. No bowel obstruction.     MACRO: None.   Signed by: Lane Espinal 7/10/2024 10:02 AM Dictation workstation:   [id number]     Scheduled medications    Continuous medications    PRN  medications      ASSESSMENT:  Abdominal pain  Nausea, vomiting, diarrhea  Abnormal CT abdomen/pelvis  Hepatic cirrhosis with ascites   Hyperammonemia  Hepatic encephalopathy  Possible toxic encephalopathy secondary to UTI  Portal hypertension  Chronic pancreatitis  Hyperbilirubinemia  Pyuria rule out UTI   Pancytopenia  Hyperlipidemia  Hypertriglyceridemia  Type 2 diabetes mellitus with hyperglycemia, uncontrolled  Glucosuria  Hypoalbuminemia  Moderate protein calorie malnutrition    PLAN:  Consult Gastroenterology.  Management per recommendations.  Clear liquid diet.  Advance as tolerated.  Symptom control.  As needed analgesics, anti-emetics.  Lactulose three times daily.  Hold for > 3 bowel movements in 24 hours.  Rifaximin.  Monitor ammonia level.  Monitor stools.  Gastroenterology follow-up.  Diuretics.  Low sodium diet when advanced.  Monitor blood pressure.  Pancytopenia.  Chronic.  H&H stable.  No evidence of acute blood loss.  Guaiac stools.  PPI.  Monitor H&H.  Discussed importance of close Gastroenterology follow-up.  + Chills, afebrile, no leukocytosis.  Urinalysis noted.  + Dysuria.  IV Ceftriaxone.  Follow-up urine culture.  Monitor temperature and white blood cell count.  Altered mental status.  Hepatic + possible toxic encephalopathy secondary to UTI.  Awake alert oriented x 3.  No focal deficits.  Treat ammonia and UTI.  Monitor mentation.  Monitor point of care glucose AC/HS.  Insulin Lantus + sliding scale.  Adjust insulin as needed for glycemic control.  ADA diet when advanced.  Hypoglycemia protocol.  We will take DVT, fall, aspiration and decubitus precautions during her stay in the hospital.  The plan has discussed with the patient.  She is agreeable.  Orders written per Dr. Lawrence.  Any additions or modifications at his discretion.      Leny Daniels, APRN-CNP

## 2024-07-10 NOTE — TREATMENT PLAN
"The patient was seen in conjunction with the advanced practice provider, and I performed a substantive portion of the encounter. I personally saw the patient and made/approved the management plan and take complete responsibility for the patient management. I agree with the workup, evaluation, MDM, management and diagnosis of the patient stated in the advanced practice provider's note. All medical decision making was performed by me and communicated to the advanced practice provider. All laboratory studies, radiology, and EKGs were reviewed by me.     History:  Patient presents to ED for concern of abdominal discomfort/pain along with concern for reaccumulation of abdominal fluid.  Notes she has received prior paracentesis with most recent few days ago.  Notes decreased p.o. intake due to early satiety and mild nausea with minimal emesis described as food intake but no appreciable hematemesis.  Also notes some nonbloody diarrhea.  Describes her abdomen has been distended.  Does not appreciate any color changes and no infectious symptoms to include fever/chills/diaphoresis.  Does not endorse any significant lower extremity swelling.  Notes no appreciable cardiopulmonary symptoms.  Denies any changes to urinary habits and does not experience any infectious urinary symptoms.  Does note she is both to take lactulose but infrequently forgets.    VS:  /65 (BP Location: Left arm, Patient Position: Lying)   Pulse 69   Temp 36.2 °C (97.2 °F) (Temporal)   Resp 18   Ht 1.6 m (5' 3\")   Wt 79.4 kg (175 lb 0.7 oz)   SpO2 100%   BMI 31.01 kg/m²       Physical exam:  CONST: alert, normal appearance, no acute distress, does not appear ill/toxic  HEAD: normocephalic, atraumatic  NECK: No appreciable JVD  ENT: MMM, no rhinorrhea/congestion, posterior oropharynx clear and oral secretions well controlled  EYES: PEPRL, EOMI, no scleral icterus, no nystagmus  CV: RRR, no murmurs, 2+ equal/symmetrical pulses x4  PULM: CTAB, no " respiratory distress, not requiring supplental O2  ABD: soft, flat/non-tender/non-distended, no mass  MSK: Unremarkable, no appreciable spider angioma of BUE  SKIN: warm/dry, no pallor or jaundice, no rash  NEURO: A&Ox4, no facial asymmetry no focal neuro deficits, gross strength/motor/sensation intact x4, normal gait no significant findings for asterixis  PSYCH: Appropriate affect      I personally reviewed and interpreted the following studies labs, and images with the following interpretation: EKG is N/A, labs are significant for baseline microcytic anemia, baseline RF, baseline hepatic function, mild pancreatitis, mild to moderate hyperglycemia images are notable for mild ascites with mesenteric and bowel wall edema associated with chronic inflammation and portal hypertension.      MDM:  Patient presented to the ED for abdominal discomfort/distention along with associated GI symptoms and fatigue/generalized weakness.    Assessment/evaluation insidious progress of alcohol cirrhosis with reaccumulation of ascites and decreased/intolerable p.o. intake, mesenteric/bowel wall edema associated with portal hypertension, acute mild pancreatitis, bowel wall edema associated with chronic inflammatory changes.patient likely requires potential therapeutic paracentesis. No concerning history, clinical evidence/work-up, or exam findings for the concerning differentials of SBO, hepatic encephalopathy, significant lecture light/embolic derangement or dehydration, acute hepatic fulminant, worsening microcytic anemia, UTI/cystitis, hollow viscus perforation. These conditions have been thoroughly evaluated and determined to be sufficiently unlikely to be the etiology of patient's presenting symptoms. After receiving an appropriate exam, clinical work-up, and necessary interventions/treatment, Patient is appropriate for hospital admission at this time due to pain control/management, further interrogation/management of symptoms, and  unable to care for self or manage presenting symptoms/conditions at home/living situation.  Patient was encouraged to ask any questions or for clarification of today's ED encounter.  Patient is agreeable to plan of care.    Scores: MELD-Na 10    Diagnosis:  1. Abdominal pain, generalized        2. Cirrhosis of liver with ascites, unspecified hepatic cirrhosis type (Multi)        3. Hyperammonemia (Multi)        4. Cystitis        5. Portal hypertension (Multi)

## 2024-07-10 NOTE — ED NOTES
Abdominal discomfort, bloating, nausea and vomiting.  Here last Friday, had a paracentesis, removed 1 liter.  Today presents with sx that are the same if not worse.  Abdominal distention and bloating, nausea, vomiting, fatigue and headache.  Diarrhea 8 times that started last night.  Discharged Saturday, 7/6/2024. Current Oauacdpjh=[id number]         PMHX:  Past Medical History:   Diagnosis Date    Cirrhosis of liver (Multi)     Diabetes mellitus (Multi)         Allergies   Allergen Reactions    Gluten Diarrhea         LABS:   Latest Reference Range & Units 07/10/24 08:05   GLUCOSE 65 - 99 mg/dL 248 (H)   SODIUM 133 - 145 mmol/L 137   POTASSIUM 3.4 - 5.1 mmol/L 4.1   CHLORIDE 97 - 107 mmol/L 106   Bicarbonate 24 - 31 mmol/L 25   Anion Gap <=19 mmol/L 6   Blood Urea Nitrogen 8 - 25 mg/dL 9   Creatinine 0.40 - 1.60 mg/dL 0.50   EGFR >60 mL/min/1.73m*2 >90   Calcium 8.5 - 10.4 mg/dL 7.9 (L)   Albumin 3.5 - 5.0 g/dL 2.6 (L)   Alkaline Phosphatase 35 - 125 U/L 367 (H)   ALT 5 - 40 U/L 47 (H)   AST 5 - 40 U/L 94 (H)   Bilirubin Total 0.1 - 1.2 mg/dL 1.4 (H)   Ammonia 12 - 45 umol/L 219 (H)   Total Protein 5.9 - 7.9 g/dL 7.3   LIPASE 16 - 63 U/L 71 (H)   (H): Data is abnormally high  (L): Data is abnormally low   Latest Reference Range & Units 07/10/24 08:05   LEUKOCYTES (10*3/UL) IN BLOOD BY AUTOMATED COUNT, [place] 4.4 - 11.3 x10*3/uL 4.0 (L)   nRBC 0.0 - 0.0 /100 WBCs 0.0   ERYTHROCYTES (10*6/UL) IN BLOOD BY AUTOMATED COUNT, [place] 4.00 - 5.20 x10*6/uL 3.71 (L)   HEMOGLOBIN 12.0 - 16.0 g/dL 9.2 (L)   HEMATOCRIT 36.0 - 46.0 % 28.8 (L)   MCV 80 - 100 fL 78 (L)   MCH 26.0 - 34.0 pg 24.8 (L)   MCHC 32.0 - 36.0 g/dL 31.9 (L)   RED CELL DISTRIBUTION WIDTH 11.5 - 14.5 % 21.5 (H)   PLATELETS (10*3/UL) IN BLOOD AUTOMATED COUNT, [place] 150 - 450 x10*3/uL 69 (L)   (L): Data is abnormally low  (H): Data is abnormally high   Latest Reference Range & Units 07/10/24 08:06   Color, Urine Light-Yellow, Yellow, Dark-Yellow   Light-Yellow   Appearance, Urine Clear  Clear   Specific Gravity, Urine 1.005 - 1.035  1.008   pH, Urine 5.0, 5.5, 6.0, 6.5, 7.0, 7.5, 8.0  8.0   Protein, Urine NEGATIVE, 10 (TRACE), 20 (TRACE) mg/dL NEGATIVE   Glucose, Urine Normal mg/dL Normal   Blood, Urine NEGATIVE  NEGATIVE   Ketones, Urine NEGATIVE mg/dL NEGATIVE   Bilirubin, Urine NEGATIVE  NEGATIVE   Urobilinogen, Urine Normal mg/dL Normal   Nitrite, Urine NEGATIVE  NEGATIVE   Leukocyte Esterase, Urine NEGATIVE  250 Mckenzie/µL !   !: Data is abnormal      PLAN:   Pending                 Stephany Smith RN  07/10/24 0940

## 2024-07-10 NOTE — ED PROVIDER NOTES
HPI   Chief Complaint   Patient presents with    Abdominal Pain     Abdominal discomfort, bloating, nausea and vomiting.  Here last Friday, had a paracentesis, removed 1 liter.  Today presents with sx that are the same if not worse.  Abdominal distention and bloating, nausea, vomiting, fatigue and headache.       Patient is a 49-year-old female with past medical history of diabetes and cirrhosis of the liver presenting with abdominal pain, abdominal bloating, nausea, vomiting, diarrhea.  She was recently discharged after presenting with similar.  At that time she required paracentesis that drained roughly 1 L.  She states that her paracentesis is very irregular.  It can be multiple times a week, once a week, once every other week.  She states that the symptoms started last night.  Endorsing generalized abdominal pain and bloating and feels as if she may need her abdomen drained again.  Patient denies fevers, chills, cough, sore throat, runny nose, chest pain, shortness of breath, or urinary complaints.                          [place] Coma Scale Score: 15                     Patient History   Past Medical History:   Diagnosis Date    Cirrhosis of liver (Multi)     Diabetes mellitus (Multi)      Past Surgical History:   Procedure Laterality Date    ABDOMINAL SURGERY      CT GUIDED IMAGING FOR NEEDLE PLACEMENT  10/14/2020    CT GUIDED IMAGING FOR NEEDLE PLACEMENT [facility]    US GUIDED ABDOMINAL PARACENTESIS  10/08/2020    US GUIDED ABDOMINAL PARACENTESIS [facility]     No family history on file.  Social History     Tobacco Use    Smoking status: Never    Smokeless tobacco: Never   Substance Use Topics    Alcohol use: Never    Drug use: Never       Physical Exam   ED Triage Vitals [07/10/24 0743]   Temperature Heart Rate Respirations BP   36.1 °C (97 °F) 96 20 116/69      Pulse Ox Temp Source Heart Rate Source Patient Position   100 % Tympanic Monitor Sitting      BP Location FiO2 (%)     Left arm  --       Physical Exam  Vitals and nursing note reviewed.   Constitutional:       Appearance: She is well-developed.      Comments: Awake, laying in examination bed   HENT:      Head: Normocephalic and atraumatic.      Mouth/Throat:      Mouth: Mucous membranes are moist.      Pharynx: Oropharynx is clear.   Eyes:      Extraocular Movements: Extraocular movements intact.      Conjunctiva/sclera: Conjunctivae normal.      Pupils: Pupils are equal, round, and reactive to light.   Cardiovascular:      Rate and Rhythm: Normal rate and regular rhythm.      Heart sounds: Normal heart sounds. No murmur heard.  Pulmonary:      Effort: Pulmonary effort is normal. No respiratory distress.      Breath sounds: Normal breath sounds.   Abdominal:      General: Abdomen is protuberant.      Palpations: Abdomen is soft.      Tenderness: There is generalized abdominal tenderness.   Musculoskeletal:         General: No swelling.      Cervical back: Neck supple.   Skin:     General: Skin is warm and dry.      Capillary Refill: Capillary refill takes less than 2 seconds.   Neurological:      General: No focal deficit present.      Mental Status: She is alert and oriented to person, place, and time.   Psychiatric:         Mood and Affect: Mood normal.         Behavior: Behavior normal.         ED Course & MDM   Diagnoses as of 07/10/24 1126   Abdominal pain, generalized   Cirrhosis of liver with ascites, unspecified hepatic cirrhosis type (Multi)   Hyperammonemia (Multi)   Cystitis       Medical Decision Making  Patient is a 49-year-old female with past medical history of diabetes and cirrhosis of the liver presenting with abdominal pain, nausea, vomiting, abdominal bloating since yesterday.  Lab work, urine, imaging ordered.  IV fluids, Zofran, Toradol ordered.  Conditions considered include but not limited: Worsening ascites, intra-abdominal pathology, viral illness, UTI.    I saw this patient in conjunction with Dr. Peters.  CBC  does show leukopenia with WBCs of 4.0 as well as chronic anemia with hemoglobin of 9.2.  CMP without significant electrolyte abnormality or renal impairment.  Ammonia is elevated at 219.  Lactulose ordered.  UA with micro is positive for infection, IV Rocephin ordered.  CT abdomen pelvis that showed cirrhosis with portal hypertension changes including splenomegaly.  There is a small to moderate volume ascites.  Also generalized small bowel wall thickening.    I did speak with admitting provider, Dr. Lawrence.  He did review the patient's recent admission as well as relevant findings from today.  Patient will be admitted for further management of hyperammonemia likely causing the patient's confusion.  As I deemed necessary from the patient's history, physical, laboratory imaging findings as well as ED course, I considered the above listed diagnoses.    Portions of this note made with Dragon software, please be mindful of potential grammatical errors.        Medications   ondansetron (Zofran) injection 4 mg (4 mg intravenous Given 7/10/24 0817)   ketorolac (Toradol) injection 15 mg (15 mg intravenous Given 7/10/24 0817)   sodium chloride 0.9 % bolus 500 mL (0 mL intravenous Stopped 7/10/24 0916)   cefTRIAXone (Rocephin) IVPB 1 g (0 g intravenous Stopped 7/10/24 1018)   iohexol (OMNIPaque) 350 mg iodine/mL solution 75 mL (75 mL intravenous Given 7/10/24 0920)   lactulose 20 gram/30 mL oral solution 20 g (20 g oral Given 7/10/24 1033)   dicyclomine (Bentyl) capsule 10 mg (10 mg oral Given 7/10/24 1033)   prochlorperazine (Compazine) injection 10 mg (10 mg intravenous Given 7/10/24 1033)   traMADol (Ultram) tablet 50 mg (50 mg oral Given 7/10/24 1033)         Labs Reviewed   LIPASE - Abnormal       Result Value    Lipase 71 (*)    COMPREHENSIVE METABOLIC PANEL - Abnormal    Glucose 248 (*)     Sodium 137      Potassium 4.1      Chloride 106      Bicarbonate 25      Urea Nitrogen 9      Creatinine 0.50      eGFR >90       Calcium 7.9 (*)     Albumin 2.6 (*)     Alkaline Phosphatase 367 (*)     Total Protein 7.3      AST 94 (*)     Bilirubin, Total 1.4 (*)     ALT 47 (*)     Anion Gap 6     CBC WITH AUTO DIFFERENTIAL - Abnormal    WBC 4.0 (*)     nRBC 0.0      RBC 3.71 (*)     Hemoglobin 9.2 (*)     Hematocrit 28.8 (*)     MCV 78 (*)     MCH 24.8 (*)     MCHC 31.9 (*)     RDW 21.5 (*)     Platelets 69 (*)     Immature Granulocytes %, Automated 0.5      Immature Granulocytes Absolute, Automated 0.02     AMMONIA - Abnormal    Ammonia 219 (*)    URINALYSIS WITH REFLEX CULTURE AND MICROSCOPIC - Abnormal    Color, Urine Light-Yellow      Appearance, Urine Clear      Specific Gravity, Urine 1.008      pH, Urine 8.0      Protein, Urine NEGATIVE      Glucose, Urine Normal      Blood, Urine NEGATIVE      Ketones, Urine NEGATIVE      Bilirubin, Urine NEGATIVE      Urobilinogen, Urine Normal      Nitrite, Urine NEGATIVE      Leukocyte Esterase, Urine 250 Mckenzie/µL (*)    MICROSCOPIC ONLY, URINE - Abnormal    WBC, Urine 6-10 (*)     RBC, Urine 1-2      Squamous Epithelial Cells, Urine 1-9 (SPARSE)      Bacteria, Urine 1+ (*)    POCT GLUCOSE - Abnormal    POCT Glucose 224 (*)    MANUAL DIFFERENTIAL - Abnormal    Neutrophils %, Manual 68.0      Lymphocytes %, Manual 20.0      Monocytes %, Manual 5.0      Eosinophils %, Manual 6.0      Basophils %, Manual 1.0      Seg Neutrophils Absolute, Manual 2.72      Lymphocytes Absolute, Manual 0.80 (*)     Monocytes Absolute, Manual 0.20      Eosinophils Absolute, Manual 0.24      Basophils Absolute, Manual 0.04      Total Cells Counted 100      RBC Morphology See Below      Polychromasia Mild      Target Cells Few     URINE CULTURE   URINALYSIS WITH REFLEX CULTURE AND MICROSCOPIC    Narrative:     The following orders were created for panel order Urinalysis with Reflex Culture and Microscopic.  Procedure                               Abnormality         Status                     ---------                                -----------         ------                     Urinalysis with Reflex C...[[id number]]  Abnormal            Final result               Extra Urine Gray Tube[[id number]]                            In process                   Please view results for these tests on the individual orders.   EXTRA URINE GRAY TUBE         CT abdomen pelvis w IV contrast   Final Result   Cirrhosis with portal hypertension changes including splenomegaly and   recanalized paraumbilical vein.        Generalized mesenteric and omental edema. Small-moderate volume   ascites most localized in the lower abdomen/pelvis.        Generalized small bowel wall thickening as well as segmental mild   wall thickening of the colon may be related to the generalized edema.   Nonspecific infectious or inflammatory enterocolitis can not be   excluded. No bowel obstruction.             MACRO:   None.        Signed by: Lane Espinal 7/10/2024 10:02 AM   Dictation workstation:   [id number]            Procedure  Procedures     Lonnie Pan PA-C  07/10/24 5583

## 2024-07-10 NOTE — PROGRESS NOTES
"Alfonzo Flanagan is a 49 y.o. female on day 0 of admission presenting with Abdominal pain, generalized.    Subjective   Currently off the floor for testing. Reviewed with nursing staff. Many bowel movements overnight          Objective     Physical Exam  Abdominal:      Palpations: Abdomen is soft.         Last Recorded Vitals  Blood pressure 104/63, pulse 84, temperature 36.7 °C (98.1 °F), temperature source Oral, resp. rate 18, height 1.6 m (5' 3\"), weight 81.6 kg (180 lb), SpO2 99%.  Intake/Output last 3 Shifts:  I/O last 3 completed shifts:  In: 605 (7.4 mL/kg) [P.O.:605]  Out: - (0 mL/kg)   Weight: 81.6 kg     Relevant Results                Results for orders placed or performed during the hospital encounter of 05/25/24 (from the past 24 hour(s))   POCT GLUCOSE   Result Value Ref Range    POCT Glucose 108 (H) 74 - 99 mg/dL   Comprehensive Metabolic Panel   Result Value Ref Range    Glucose 188 (H) 65 - 99 mg/dL    Sodium 137 133 - 145 mmol/L    Potassium 3.7 3.4 - 5.1 mmol/L    Chloride 102 97 - 107 mmol/L    Bicarbonate 28 24 - 31 mmol/L    Urea Nitrogen 7 (L) 8 - 25 mg/dL    Creatinine 0.60 0.40 - 1.60 mg/dL    eGFR >90 >60 mL/min/1.73m*2    Calcium 8.1 (L) 8.5 - 10.4 mg/dL    Albumin 2.4 (L) 3.5 - 5.0 g/dL    Alkaline Phosphatase 276 (H) 35 - 125 U/L    Total Protein 6.1 5.9 - 7.9 g/dL    AST 39 5 - 40 U/L    Bilirubin, Total 1.1 0.1 - 1.2 mg/dL    ALT 20 5 - 40 U/L    Anion Gap 7 <=19 mmol/L   CBC   Result Value Ref Range    WBC 3.6 (L) 4.4 - 11.3 x10*3/uL    nRBC 0.0 0.0 - 0.0 /100 WBCs    RBC 3.54 (L) 4.00 - 5.20 x10*6/uL    Hemoglobin 8.5 (L) 12.0 - 16.0 g/dL    Hematocrit 27.3 (L) 36.0 - 46.0 %    MCV 77 (L) 80 - 100 fL    MCH 24.0 (L) 26.0 - 34.0 pg    MCHC 31.1 (L) 32.0 - 36.0 g/dL    RDW 19.3 (H) 11.5 - 14.5 %    Platelets 82 (L) 150 - 450 x10*3/uL   POCT GLUCOSE   Result Value Ref Range    POCT Glucose 172 (H) 74 - 99 mg/dL   Lower extremity venous duplex bilateral   Result Value Ref Range    " Clinical Pharmacy Note  Heparin Dosing       Lab Results   Component Value Date/Time    ANTIXAUHEP <0.10 07/10/2024 01:19 PM      Lab Results   Component Value Date/Time    HGB 11.4 07/10/2024 05:06 AM    HCT 33.1 07/10/2024 05:06 AM     07/10/2024 05:06 AM       Current Infusion Rate: 1210 units/hr  RN unsure if Heparin drip turned off in ECHO. Patient back to the room close to draw time of 1319  Plan:  Bolus: 3600 units  Rate: continue same rate of 1210 units/hr  Next anti-Xa level: 1900    Pharmacy will continue to monitor and adjust based on anti-Xa results.    Wally Sood [facility], 7/10/2024 2:37 PM     BSA 1.9 m2     CT abdomen pelvis w IV contrast    Result Date: 5/25/2024  Interpreted By:  Finkelstein, Evan, STUDY: CT ABDOMEN PELVIS W IV CONTRAST;  5/25/2024 8:04 pm   INDICATION: Signs/Symptoms:abdominal pain and distension, hx of cirrhosis, concerned for ascites.   COMPARISON: None.   ACCESSION NUMBER(S): [id number]   ORDERING CLINICIAN: BERTHA SCHILLING   TECHNIQUE: Axial CT images of the abdomen and pelvis with coronal and sagittal reconstructed images obtained after intravenous administration of   FINDINGS: LOWER CHEST: No acute abnormality of the lung bases.   ABDOMEN:   LIVER: Nodular contour of the liver. BILE DUCTS: Normal caliber. GALLBLADDER: Surgically absent. PORTAL VEIN: Patent SPLEEN: Unremarkable. PANCREAS: Unremarkable. ADRENALS: Unremarkable. KIDNEYS, URETERS and URINARY BLADDER: Symmetric renal enhancement. No hydronephrosis or perinephric fluid collection.  Bladder is within normal limits REPRODUCTIVE ORGANS: No pelvic masses.   ABDOMINAL WALL: Diffuse subcutaneous body wall edema. Supraumbilical ventral abdominal wall hernia defect containing fat and fluid. PERITONEUM: No free air. Moderate volume ascites and stranding diffusely throughout the mesentery.   BOWEL: Underdistention versus wall thickening diffusely throughout the small and large bowel as well as the stomach. Nondilated appendix.   VESSELS: No aortic aneurysm. Mild aortoiliac calcifications. RETROPERITONEUM: No pathologically enlarged retroperitoneal lymph nodes.   BONES: No acute osseous abnormality.       Cirrhotic morphology of the liver with moderate volume ascites and stranding diffusely throughout the mesentery.   Supraumbilical ventral abdominal wall hernia containing fat and fluid.   Underdistention versus wall thickening diffusely throughout the small and large bowel as well as the stomach. Findings may be related to passive congestion. Enterocolitis or gastritis, however, is not excluded in the appropriate clinical  setting.     MACRO: None.   Signed by: Evan Finkelstein 5/25/2024 8:48 PM Dictation workstation:   [id number]    XR chest 2 views    Result Date: 5/25/2024  Interpreted By:  Cj Lee, STUDY: XR CHEST 2 VIEWS;  5/25/2024 7:28 pm   INDICATION: Signs/Symptoms:sob.   COMPARISON: Chest x-ray 05/15/2024   ACCESSION NUMBER(S): [id number]   ORDERING CLINICIAN: BERTHA SCHILLING   FINDINGS:     CARDIOMEDIASTINAL SILHOUETTE: Cardiomediastinal silhouette is normal in size and configuration.   LUNGS: No consolidation, pleural effusion or pneumothorax.   ABDOMEN: Surgical clips noted in the right upper quadrant.   BONES: No acute osseous abnormality. Calcifications along bilateral humeral heads likely relate to calcific tendinosis.       No acute cardiopulmonary process.   MACRO: None   Signed by: Cj Lee 5/25/2024 8:00 PM Dictation workstation:   [id number]    US abdomen complete    Result Date: 5/21/2024  * * *Final Report* * * DATE OF EXAM: May 21 2024  9:25AM   [facility]   1016  -  US ASCITES SURVEY  / ACCESSION #  [id number] PROCEDURE REASON: ascites      * * * * Physician Interpretation * * * * RESULT: INDICATION: ascites EXAMINATION: US ASCITES SURVEY Technique: Ultrasound images of the 4 quadrants of the abdomen were performed to check for ascites.  Images were obtained and stored in a permanent archive. RESULT: Very small volume ascites. Therefore therapeutic paracentesis not performed.    IMPRESSION: Very small volume ascites.Therefore therapeutic paracentesis not performed. Transcribed Using Voice Recognition Transcribe Date/Time: May 21 2024 10:00A Dictated by: HANH AUSTIN MD This examination was interpreted and the report reviewed and electronically signed by: HANH AUSTIN MD on May 21 2024 10:01AM  EST    CT angio chest w and wo IV contrast    Result Date: 5/20/2024  * * *Final Report* * * DATE OF EXAM: May 20 2024 11:17PM   [facility]   0540  -  CT CHEST W IVCON PE  / ACCESSION #  [id number] PROCEDURE  REASON: Pulmonary embolism (PE) suspected, high prob      * * * * Physician Interpretation * * * * RESULT: EXAMINATION:  CHEST CT WITH CONTRAST (PULMONARY EMBOLISM PROTOCOL) CLINICAL HISTORY: Pulmonary embolism suspected, high probability Technique:  Spiral CT acquisition of the chest from the thoracic inlet to the upper abdomen following IV contrast.  Axial 1 mm thick slices plus coronal and sagittal reformatted images. Contrast:  71 mL Omnipaque 350 IV CT Radiation dose: Integrated Dose-length product (DLP) for this visit =   376 mGy*cm CT Dose Reduction Employed: Automated exposure control(AEC) and iterative recon Comparison:  CT chest 11/10/2022 RESULT: Evaluation for thromboembolic disease:      - Main pulmonary arteries:  No thromboembolic disease.      - Lobar pulmonary arteries:  No thromboembolic disease.      - Segmental pulmonary arteries:  No thromboembolic disease.      - Subsegmental pulmonary arteries:  No thromboembolic disease. Lungs and pleura: The lungs are clear.  No pleural effusion or pneumothorax. Mediastinum:  The thoracic aorta is normal in caliber. The cardiac chambers are normal in size. No coronary artery atherosclerotic calcifications are noted, although the study is not optimized for coronary assessment. No pericardial effusion or thickening. Bones and soft tissues:  No destructive bone lesion. Chest wall is unremarkable. Upper abdomen:  Cirrhotic liver with nodular surface contour.  Abdominal ascites.    IMPRESSION: 1.  No CT evidence of pulmonary embolism. 2.  Cirrhotic liver and abdominal ascites. Transcribed Using Voice Recognition Transcribe Date/Time: May 20 2024 11:50P Dictated by: NAVEEN COBURN MD This examination was interpreted and the report reviewed and electronically signed by: NAVEEN COBURN MD on May 20 2024 11:57PM  EST    XR chest 1 view    Result Date: 5/20/2024  * * *Final Report* * * DATE OF EXAM: May 20 2024  8:34PM   [facility]   5376  -  XR CHEST 1V FRONTAL PORT  /  ACCESSION #  [id number] PROCEDURE REASON: Shortness of breath      * * * * Physician Interpretation * * * * RESULT: EXAMINATION:  CHEST RADIOGRAPH (PORTABLE SINGLE VIEW AP) Exam Date/Time:  5/20/2024 8:34 PM CLINICAL HISTORY: Shortness of breath MQ:  XCPR_5 Comparison:  04/04/2024 RESULT: Lines, tubes, and devices:  None. Lungs and pleura:  There are no consolidative infiltrates or pleural effusions.  There is no evidence of pneumothorax. Cardiomediastinal silhouette:  Stable cardiomediastinal silhouette. Other:  Bilateral calcific tendinopathy involving the distal rotator cuff.  Mild degenerative changes in the thoracic spine.    IMPRESSION: No evidence of acute cardiopulmonary disease. Transcribed Using Voice Recognition Transcribe Date/Time: May 20 2024  9:25P Dictated by: MICHELLE MORA MD This examination was interpreted and the report reviewed and electronically signed by: MICHELLE MORA MD on May 20 2024  9:26PM  EST    ECG 12 lead    Result Date: 5/16/2024  Normal sinus rhythm Low voltage QRS Abnormal ECG When compared with ECG of 18-APR-2024 02:30, Questionable change in QRS axis T wave inversion no longer evident in Inferior leads Confirmed by Justin Singh ([id number]) on 5/16/2024 12:18:49 PM    XR chest 2 views    Result Date: 5/15/2024  Interpreted By:  Scott Klein, STUDY: XR CHEST 2 VIEWS;  5/15/2024 9:57 am   INDICATION: Signs/Symptoms:chest pain.   COMPARISON: CT abdomen and pelvis with contrast and two views of the chest from 18 April 2024   ACCESSION NUMBER(S): [id number]   ORDERING CLINICIAN: NEDRA BULL   TECHNIQUE: Frontal and lateral views of the chest   FINDINGS: LINES AND TUBES:  n/a   HEART AND MEDIASTINUM: Cardiac silhouette size: Borderline but unchanged; no acute findings Thoracic aorta:  Within expected limits Alyce and nonvascular:  within normal limits Other:  n/a   PULMONARY VESSELS: Within normal limits; no sign of edema   LUNGS AND AIRWAYS: Clear; no acute airspace disease    PLEURA: Effusion:  Both sides negative Pneumothorax:  Both sides negative Other:  n/a   BONES: No acute findings       NO ACUTE DISEASE IN THE CHEST   MACRO: None   Signed by: Scott Collierjuan miguel 5/15/2024 10:02 AM Dictation workstation:   [id number]                Assessment/Plan   Principal Problem:    Abdominal pain, generalized    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 Zaman at [facility] Normal oropharynx. Esophageal ulcer, no bleeding and no stigmata of recent bleeding.  Ascites: CT moderate volume ascites and stranding diffusely throughout the mesentery. Will continue lasix/aldactone.  IR consult placed for para, fluid analysis ordered    -Continue Ursodiol    -Paracentesis with fluid analysis r/o SBP     Constipation  Continue Lactulose and Miralax   Add Dulcolax supp   If no BM, should have enemas. Increased risk of HE with constipation as well    5/29  Many bowel movements overnight. Constipation resolved   S/p paracentesis with 2.1 L removed. Neg SBP   No further acute GI intervention indicated at this time. GI will sign off. Please call us with questions or concerns         I spent 20 minutes in the professional and overall care of this patient.      Alaina Mishra, MADYSON-CNP

## 2024-07-10 NOTE — CARE PLAN
PT IS A READMIT FROM [facility]    Pt lives at home with her . She has a hx of Hepatic cirrhosis with ascites, paracentesis, portal HTN, chronic pancereatitis. Multiple ED visits.  Pt returned to ED for ABD Pain, N/V  No anticipated discharge needs a this time    DISCHARGE PLAN: HOME WITH

## 2024-07-10 NOTE — PROGRESS NOTES
Pharmacy Medication History Review    Alfonzo Flanagan is a 49 y.o. female admitted.Pharmacy reviewed the patient's [id number]-ke-mmvccawun medications and allergies for accuracy.    Medications ADDED:  none  Medications CHANGED:  none  Medications REMOVED:   none     The list below reflects the updated PTA list. Comments regarding how patient may be taking medications differently can be found in the Admit Orders Activity  Prior to Admission Medications   Prescriptions Last Dose Informant   atorvastatin (Lipitor) 80 mg tablet  Other, Self   Sig: Take 1 tablet (80 mg) by mouth once daily.   docusate sodium (Colace) 100 mg capsule  Other, Self   Sig: Take 1 capsule (100 mg) by mouth 2 times a day. Hold for loose stool.   fenofibrate (Tricor) 145 mg tablet  Other, Self   Sig: Take 1 tablet (145 mg) by mouth once daily.   furosemide (Lasix) 40 mg tablet  Other, Self   Sig: Take 1 tablet (40 mg) by mouth once daily. Hold for dizziness.   gabapentin (Neurontin) 100 mg capsule     Sig: Take 2 capsules (200 mg) by mouth 3 times a day.   hydrOXYzine HCL (Atarax) 25 mg tablet  Other, Self   Sig: Take 1 tablet (25 mg) by mouth every 6 hours if needed for itching or allergies.   insulin glargine (Lantus U-100 Insulin) 100 unit/mL injection  Other, Self   Sig: Inject 25 Units under the skin 2 times a day.   insulin lispro (HumaLOG) 100 unit/mL injection  Other, Self   Sig: Inject 0.01-0.4 mL (1-40 Units) under the skin 3 times daily (morning, midday, late afternoon). Take as directed per insulin instructions.   lactulose 20 gram/30 mL oral solution  Other, Self   Sig: Take 30 mL (20 g) by mouth once daily. Hold for greater than 3 bowel movements in 24 hours.   lactulose 20 gram/30 mL oral solution  Other, Self   Sig: Take 30 mL (20 g) by mouth every 4 hours.   magnesium oxide (Mag-Ox) 400 mg (241.3 mg magnesium) tablet  Other, Self   Sig: Take 1 tablet (400 mg) by mouth once daily.   nadolol (Corgard) 20 mg tablet     Sig: Take 1  tablet (20 mg) by mouth once daily.   ondansetron ODT (Zofran-ODT) 4 mg disintegrating tablet  Other, Self   Sig: Take 1 tablet (4 mg) by mouth every 8 hours if needed for nausea or vomiting.   pantoprazole (ProtoNix) 40 mg EC tablet     Sig: Take 1 tablet (40 mg) by mouth once daily. Do not crush, chew, or split.   polyethylene glycol (Glycolax, Miralax) 17 gram packet  Other, Self   Sig: Take 17 g by mouth 2 times a day. Hold for loose stool.   rifAXIMin (Xifaxan) 550 mg tablet  Other, Self   Sig: Take 1 tablet (550 mg) by mouth every 12 hours.   spironolactone (Aldactone) 50 mg tablet  Other, Self   Sig: Take 1 tablet (50 mg) by mouth 2 times a day. Hold for dizziness.   sucralfate (Carafate) 100 mg/mL suspension  Other, Self   Sig: Take 10 mL (1 g) by mouth 4 times a day before meals.   traMADol (Ultram) 50 mg tablet  Other, Self   Sig: Take 1 tablet (50 mg) by mouth every 6 hours if needed for severe pain (7 - 10).   traMADol (Ultram) 50 mg tablet  Other, Self   Sig: Take 1 tablet (50 mg) by mouth every 6 hours if needed for severe pain (7 - 10).   traZODone (Desyrel) 50 mg tablet  Other, Self   Sig: Take 0.5 tablets (25 mg) by mouth once daily at bedtime.   ursodiol (Actigall) 300 mg capsule  Other, Self   Sig: Take 1 capsule (300 mg) by mouth 3 times a day.      Facility-Administered Medications: None        The list below reflects the updated allergy list. Please review each documented allergy for additional clarification and justification.  Allergies  Reviewed by Patti Shook on 7/10/2024        Severity Reactions Comments    Gluten Low Diarrhea             Pharmacy has been updated to [facility].    Sources used to complete the med history include previous interview/notes, dispense history, PTA medication list, historical AVS.    Below are additional concerns with the patient's PTA list.  PTA list is current from most recent discharge on 07/06/24.     Patti Shook, hT-ADV  Please  reach out via [employer] Secure Chat for questions

## 2024-07-11 LAB
ALBUMIN SERPL-MCNC: 2.2 G/DL (ref 3.5–5)
ALP BLD-CCNC: 272 U/L (ref 35–125)
ALT SERPL-CCNC: 42 U/L (ref 5–40)
AMMONIA PLAS-SCNC: 61 UMOL/L (ref 12–45)
ANION GAP SERPL CALC-SCNC: 8 MMOL/L
AST SERPL-CCNC: 85 U/L (ref 5–40)
BACTERIA UR CULT: ABNORMAL
BILIRUB SERPL-MCNC: 1.3 MG/DL (ref 0.1–1.2)
BUN SERPL-MCNC: 8 MG/DL (ref 8–25)
CALCIUM SERPL-MCNC: 8 MG/DL (ref 8.5–10.4)
CHLORIDE SERPL-SCNC: 110 MMOL/L (ref 97–107)
CO2 SERPL-SCNC: 24 MMOL/L (ref 24–31)
CREAT SERPL-MCNC: 0.6 MG/DL (ref 0.4–1.6)
EGFRCR SERPLBLD CKD-EPI 2021: >90 ML/MIN/1.73M*2
ERYTHROCYTE [DISTWIDTH] IN BLOOD BY AUTOMATED COUNT: 21.6 % (ref 11.5–14.5)
GLUCOSE BLD MANUAL STRIP-MCNC: 122 MG/DL (ref 74–99)
GLUCOSE BLD MANUAL STRIP-MCNC: 159 MG/DL (ref 74–99)
GLUCOSE BLD MANUAL STRIP-MCNC: 173 MG/DL (ref 74–99)
GLUCOSE BLD MANUAL STRIP-MCNC: 67 MG/DL (ref 74–99)
GLUCOSE BLD MANUAL STRIP-MCNC: 95 MG/DL (ref 74–99)
GLUCOSE SERPL-MCNC: 95 MG/DL (ref 65–99)
HCT VFR BLD AUTO: 26.2 % (ref 36–46)
HGB BLD-MCNC: 8.4 G/DL (ref 12–16)
MCH RBC QN AUTO: 24.5 PG (ref 26–34)
MCHC RBC AUTO-ENTMCNC: 32.1 G/DL (ref 32–36)
MCV RBC AUTO: 76 FL (ref 80–100)
NRBC BLD-RTO: 0 /100 WBCS (ref 0–0)
PLATELET # BLD AUTO: 68 X10*3/UL (ref 150–450)
POTASSIUM SERPL-SCNC: 3.5 MMOL/L (ref 3.4–5.1)
PROT SERPL-MCNC: 6.1 G/DL (ref 5.9–7.9)
RBC # BLD AUTO: 3.43 X10*6/UL (ref 4–5.2)
SODIUM SERPL-SCNC: 142 MMOL/L (ref 133–145)
WBC # BLD AUTO: 3.6 X10*3/UL (ref 4.4–11.3)

## 2024-07-11 PROCEDURE — G0378 HOSPITAL OBSERVATION PER HR: HCPCS

## 2024-07-11 PROCEDURE — 36415 COLL VENOUS BLD VENIPUNCTURE: CPT | Performed by: NURSE PRACTITIONER

## 2024-07-11 PROCEDURE — 2500000002 HC RX 250 W HCPCS SELF ADMINISTERED DRUGS (ALT 637 FOR MEDICARE OP, ALT 636 FOR OP/ED): Performed by: NURSE PRACTITIONER

## 2024-07-11 PROCEDURE — 82947 ASSAY GLUCOSE BLOOD QUANT: CPT

## 2024-07-11 PROCEDURE — 2500000001 HC RX 250 WO HCPCS SELF ADMINISTERED DRUGS (ALT 637 FOR MEDICARE OP): Performed by: NURSE PRACTITIONER

## 2024-07-11 PROCEDURE — 2500000004 HC RX 250 GENERAL PHARMACY W/ HCPCS (ALT 636 FOR OP/ED): Performed by: NURSE PRACTITIONER

## 2024-07-11 PROCEDURE — 80053 COMPREHEN METABOLIC PANEL: CPT | Performed by: NURSE PRACTITIONER

## 2024-07-11 PROCEDURE — 2500000002 HC RX 250 W HCPCS SELF ADMINISTERED DRUGS (ALT 637 FOR MEDICARE OP, ALT 636 FOR OP/ED): Performed by: EMERGENCY MEDICINE

## 2024-07-11 PROCEDURE — 85027 COMPLETE CBC AUTOMATED: CPT | Performed by: NURSE PRACTITIONER

## 2024-07-11 PROCEDURE — 82140 ASSAY OF AMMONIA: CPT | Performed by: NURSE PRACTITIONER

## 2024-07-11 RX ORDER — DIAZEPAM 5 MG/1
5 TABLET ORAL ONCE
Status: COMPLETED | OUTPATIENT
Start: 2024-07-11 | End: 2024-07-11

## 2024-07-11 ASSESSMENT — PAIN - FUNCTIONAL ASSESSMENT
PAIN_FUNCTIONAL_ASSESSMENT: 0-10
PAIN_FUNCTIONAL_ASSESSMENT: 0-10

## 2024-07-11 ASSESSMENT — PAIN SCALES - GENERAL
PAINLEVEL_OUTOF10: 5 - MODERATE PAIN
PAINLEVEL_OUTOF10: 3

## 2024-07-11 ASSESSMENT — PAIN DESCRIPTION - DESCRIPTORS: DESCRIPTORS: SQUEEZING

## 2024-07-11 NOTE — PROGRESS NOTES
Alfonzo Flanagan is a 49 y.o. female on day 0 of admission presenting with Cirrhosis of liver with ascites, unspecified hepatic cirrhosis type (Multi).    Subjective   Patient seen and examined.  Resting in bed in no acute distress.  Awake alert oriented x 3.  Better.  Abdominal pain improved.  No nausea, vomiting, diarrhea. Tolerating diet.   Confusion improved.  + Dysuria.  No hematuria.  No fevers chills or sweats.    Objective     Physical Exam  Vitals and nursing note reviewed.   Constitutional:       General: She is not in acute distress.     Appearance: Normal appearance. She is normal weight. She is not ill-appearing, toxic-appearing or diaphoretic.   HENT:      Head: Normocephalic and atraumatic.      Right Ear: External ear normal.      Left Ear: External ear normal.      Nose: Nose normal.      Mouth/Throat:      Mouth: Mucous membranes are moist.      Pharynx: Oropharynx is clear.   Eyes:      Extraocular Movements: Extraocular movements intact.      Conjunctiva/sclera: Conjunctivae normal.      Pupils: Pupils are equal, round, and reactive to light.   Cardiovascular:      Rate and Rhythm: Normal rate and regular rhythm.      Pulses: Normal pulses.      Heart sounds: Normal heart sounds. No murmur heard.  Pulmonary:      Effort: Pulmonary effort is normal. No respiratory distress.      Breath sounds: Normal breath sounds. No stridor. No wheezing, rhonchi or rales.   Abdominal:      General: Bowel sounds are normal. There is distension.      Palpations: Abdomen is soft.      Tenderness: There is no abdominal tenderness.   Genitourinary:     Comments: Deferred.  Musculoskeletal:         General: Swelling present. Normal range of motion.      Cervical back: Normal range of motion and neck supple.      Right lower leg: Edema present.      Left lower leg: Edema present.      Comments: Decreased 1+ edema.   Skin:     General: Skin is warm and dry.      Capillary Refill: Capillary refill takes less than 2  "seconds.   Neurological:      General: No focal deficit present.      Mental Status: She is alert and oriented to person, place, and time.   Psychiatric:         Mood and Affect: Mood normal.         Behavior: Behavior normal.       Last Recorded Vitals  Blood pressure 101/51, pulse 76, temperature 36.6 °C (97.9 °F), temperature source Temporal, resp. rate 18, height 1.6 m (5' 3\"), weight 79.4 kg (175 lb 0.7 oz), SpO2 100%.    Intake/Output last 3 Shifts:  I/O last 3 completed shifts:  In: - (0 mL/kg)   Out: 120 (1.5 mL/kg) [Urine:120 (0 mL/kg/hr)]  Weight: 79.4 kg     Telemetry normal sinus rhythm rate 70's    Relevant Results  Results for orders placed or performed during the hospital encounter of 07/10/24 (from the past 24 hour(s))   POCT GLUCOSE   Result Value Ref Range    POCT Glucose 142 (H) 74 - 99 mg/dL   Comprehensive metabolic panel   Result Value Ref Range    Glucose 95 65 - 99 mg/dL    Sodium 142 133 - 145 mmol/L    Potassium 3.5 3.4 - 5.1 mmol/L    Chloride 110 (H) 97 - 107 mmol/L    Bicarbonate 24 24 - 31 mmol/L    Urea Nitrogen 8 8 - 25 mg/dL    Creatinine 0.60 0.40 - 1.60 mg/dL    eGFR >90 >60 mL/min/1.73m*2    Calcium 8.0 (L) 8.5 - 10.4 mg/dL    Albumin 2.2 (L) 3.5 - 5.0 g/dL    Alkaline Phosphatase 272 (H) 35 - 125 U/L    Total Protein 6.1 5.9 - 7.9 g/dL    AST 85 (H) 5 - 40 U/L    Bilirubin, Total 1.3 (H) 0.1 - 1.2 mg/dL    ALT 42 (H) 5 - 40 U/L    Anion Gap 8 <=19 mmol/L   CBC   Result Value Ref Range    WBC 3.6 (L) 4.4 - 11.3 x10*3/uL    nRBC 0.0 0.0 - 0.0 /100 WBCs    RBC 3.43 (L) 4.00 - 5.20 x10*6/uL    Hemoglobin 8.4 (L) 12.0 - 16.0 g/dL    Hematocrit 26.2 (L) 36.0 - 46.0 %    MCV 76 (L) 80 - 100 fL    MCH 24.5 (L) 26.0 - 34.0 pg    MCHC 32.1 32.0 - 36.0 g/dL    RDW 21.6 (H) 11.5 - 14.5 %    Platelets 68 (L) 150 - 450 x10*3/uL   Ammonia   Result Value Ref Range    Ammonia 61 (H) 12 - 45 umol/L   POCT GLUCOSE   Result Value Ref Range    POCT Glucose 67 (L) 74 - 99 mg/dL   POCT GLUCOSE   Result " Value Ref Range    POCT Glucose 159 (H) 74 - 99 mg/dL   POCT GLUCOSE   Result Value Ref Range    POCT Glucose 173 (H) 74 - 99 mg/dL   POCT GLUCOSE   Result Value Ref Range    POCT Glucose 122 (H) 74 - 99 mg/dL     Susceptibility data from last 90 days.  Collected Specimen Info Organism Amoxicillin/Clavulanate Ampicillin Ampicillin/Sulbactam Cefazolin Cefazolin (uncomplicated UTIs only) Ciprofloxacin Gentamicin Levofloxacin Nitrofurantoin Penicillin Piperacillin/Tazobactam   07/10/24 Urine from Clean Catch/Voided Streptococcus agalactiae (Group B Streptococcus)              06/23/24 Urine from Clean Catch/Voided Escherichia coli   S   S  S  S  S   S   S   05/15/24 Urine from Clean Catch/Voided Enterococcus faecium   S     R   S  S  S      Klebsiella pneumoniae/variicola  S  R  S  S  S  S  S   I   S     Collected Specimen Info Organism Trimethoprim/Sulfamethoxazole Vancomycin   07/10/24 Urine from Clean Catch/Voided Streptococcus agalactiae (Group B Streptococcus)     06/23/24 Urine from Clean Catch/Voided Escherichia coli  S    05/15/24 Urine from Clean Catch/Voided Enterococcus faecium  R  S     Klebsiella pneumoniae/variicola  S      CT abdomen pelvis w IV contrast    Result Date: 7/10/2024  Interpreted By:  Lane Espinal, STUDY: CT ABDOMEN PELVIS W IV CONTRAST;  7/10/2024 9:24 am   INDICATION: Signs/Symptoms:hx of ascites, liver cirrhosis.   COMPARISON: 07/04/2024.   ACCESSION NUMBER(S): [id number]   ORDERING CLINICIAN: JHONATAN MUKHERJEE   TECHNIQUE: Contiguous axial images were obtained through the abdomen and pelvis after the administration of  75 mL Omnipaque 350 intravenous contrast. Coronal and sagittal reformations were made.   FINDINGS: LOWER CHEST: Lung bases are clear.   ABDOMEN:   LIVER: Atrophic liver with cirrhotic morphology is again seen. No focal hepatic lesion is seen.   BILE DUCTS: Nondilated.   GALLBLADDER: Surgically absent   PANCREAS: No focal pancreatic lesion is seen.   SPLEEN: Spleen is  enlarged measuring 14 cm in AP diameter similar to prior. No discrete splenic lesion.   ADRENAL GLANDS: Within normal limits.   KIDNEYS AND URETERS: The kidneys enhance symmetrically without focal lesion.  No hydroureteronephrosis bilaterally.   Urinary bladder is unremarkable.   VESSELS: Scattered small atherosclerotic calcifications are present in the aorta. No aortic aneurysm. IVC is unremarkable.   Portosystemic collaterals again seen including recanalized paraumbilical vein compatible with portal hypertension.   BOWEL: There is mild generalized wall prominence of small bowel as well as segmental mild wall prominence of the colon which may be related to generalized edema/3rd spacing of fluid. No bowel obstruction. Appendix can not be clearly identified.   No appreciable significant diverticular disease.   PERITONEUM/RETROPERITONEUM/LYMPH NODES: There is generalized mesenteric and omental edema. Small-moderate volume ascites is most localized in the lower abdomen/pelvis. No organized fluid collection is seen. No free intraperitoneal air.   No retroperitoneal fluid collection or lymphadenopathy.   ABDOMINAL WALL: Stable midline ventral hernias near the umbilical region containing fat as well as fluid similar to prior. Generalized subcutaneous tissue edema is also seen.   BONE AND SOFT TISSUE: Left L5 unilateral spondylolysis is seen without spondylolisthesis. Mild anterior endplate spurring is most prominent in the lower thoracic spine.       Cirrhosis with portal hypertension changes including splenomegaly and recanalized paraumbilical vein.   Generalized mesenteric and omental edema. Small-moderate volume ascites most localized in the lower abdomen/pelvis.   Generalized small bowel wall thickening as well as segmental mild wall thickening of the colon may be related to the generalized edema. Nonspecific infectious or inflammatory enterocolitis can not be excluded. No bowel obstruction.     MACRO: None.   Signed  by: Lane Sfmichelej 7/10/2024 10:02 AM Dictation workstation:   [id number]     Scheduled medications  cefTRIAXone, 1 g, intravenous, q24h  furosemide, 40 mg, oral, Daily  insulin glargine, 25 Units, subcutaneous, BID  insulin lispro, 0-15 Units, subcutaneous, TID  lactulose, 20 g, oral, TID  nadolol, 20 mg, oral, Daily  pantoprazole, 40 mg, oral, Daily before breakfast   Or  pantoprazole, 40 mg, intravenous, Daily before breakfast  rifAXIMin, 550 mg, oral, q12h RIVKA  spironolactone, 50 mg, oral, BID  sucralfate, 1 g, oral, Before meals & nightly      Continuous medications     PRN medications  PRN medications: acetaminophen **OR** acetaminophen **OR** acetaminophen, acetaminophen **OR** acetaminophen **OR** acetaminophen, benzocaine-menthol, bisacodyl, dextromethorphan-guaifenesin, dextrose, dextrose, glucagon, glucagon, guaiFENesin, melatonin, ondansetron **OR** ondansetron, traMADol      ASSESSMENT:  Abdominal pain improved  Nausea, vomiting, diarrhea resolved  Abnormal CT abdomen/pelvis  Hepatic cirrhosis with ascites  Hyperammonemia  Hepatic encephalopathy improved  Possible toxic encephalopathy secondary to UTI improved  Portal hypertension  Chronic pancreatitis  Hyperbilirubinemia  Pyuria rule out UTI  Pancytopenia  Hyperlipidemia  Hypertriglyceridemia  Type 2 diabetes mellitus with hyperglycemia, uncontrolled  Glucosuria  Hypoalbuminemia  Moderate protein calorie malnutrition    PLAN:  Overall, condition improved.  Abdominal pain improved.  No nausea, vomiting or diarrhea.  Tolerating diet.  Gastroenterology consultation input appreciated.  Discussed with NP.  Plan for paracentesis with fluid analysis and culture.  Advance diet as per GI.  As needed analgesics, anti-emetics.  Monitor symptoms.  Ammonia improved.  Mentation improved to baseline. No focal deficits.  Continue Lactulose three times daily hold for > 3 bowel movements in 24 hours.  Continue Rifaximin.  Monitor stools and ammonia.  Diuretics.  Low  sodium diet.  Labs reviewed.  Stable.  Continue to monitor.  Chronic pancytopenia.  Stable.  No evidence of acute blood loss.  No black tarry or bloody stools.  Monitor H&H and platelet.  Urine culture pending no growth.  IV Ceftriaxone.  Follow-up urine culture.  Monitor temperature and white blood cell count.  Altered mental status+ Dysuria.  Resolved.  Mentation baseline.  Hepatic plus possible toxic encephalopathy secondary UTI resolved.  Awake alert oriented x 3.  No focal deficits.  Treat hyperammonemia and UTI.  Point-of-care glucose reviewed.  Monitor point-of-care glucose AC/HS.  Continue Lantus with snack + sliding scale insulin.  Adjust insulin as needed for glycemic control.  Hypoglycemia protocol.  PT/OT.  Fall precautions.  Up with assistance.  Supportive care.  Patient reassured.  Case management following for discharge planning.  Anticipate discharge after paracentesis pending analysis, culture, pending diet tolerance and cleared by Gastroenterology.  Discussed with Dr. Lawrence.      Leny Daniels, APRN-CNP

## 2024-07-11 NOTE — NURSING NOTE
Assuming care of patient. Patient resting in bed. Patient c/o abdominal pain rated 5/10, and verbalized no need of pain medication. Call bell within reach.

## 2024-07-11 NOTE — PROGRESS NOTES
07/11/24 1000   Discharge Planning   Expected Discharge Disposition Home   Does the patient need discharge transport arranged? No

## 2024-07-11 NOTE — CONSULTS
Reason For Consult  Cirrhosis, nausea/vomiting, diarrhea    History Of Present Illness  Alfonzo Flanagan is a 49 y.o. female presenting with with nausea/vomiting, diarrhea for approximately 2 days, she was also having some increased confusion.  She is well-known to us and has a history of [place] cirrhosis.  Her last paracentesis was last week    At home she was taking lactulose every 4 hours along with rifaximin, her ammonia level was in the 200s yesterday on admission.  She denies any abdominal pain, still with some intermittent nausea, diarrhea is also slowly improving.      CT of abdomen/pelvis noted cirrhosis with portal hypertension as well as splenomegaly and recanalized paraumbilical vein.  Also noted small volume ascites.  There is also some generalized small bowel wall thickening and mild segmental wall thickening.     Colonoscopy in 2023 with Dr. Delong that was unremarkable.  No polyps found  EGD in 12/2023 that showed esophageal ulcer along the GE junction    She denies any alcohol, tobacco, marijuana, NSAIDs  Past Medical History  She has a past medical history of Cirrhosis of liver (Multi) and Diabetes mellitus (Multi).    Surgical History  She has a past surgical history that includes CT guided imaging for needle placement (10/14/2020); US guided abdominal paracentesis (10/08/2020); and Abdominal surgery.     Social History  She reports that she has never smoked. She has never used smokeless tobacco. She reports that she does not drink alcohol and does not use drugs.    Family History  No family history on file.     Allergies  Gluten    Review of Systems  See HPI     Physical Exam  Neuro: AOX3, appropriate affect, symmetric facial movements  Cardiac: S1/S2 equal, RRR, no clicks/gallops/murmurs  Resp: Lung sounds clear, no adventigous breath sounds auscultated  GI: abdomen is soft, nontender, no guarding or rebound tenderness  : no flank pain, no hematuria, or dysuria  M/S: normal strength, active  "range of motion  Skin: normal skin tone and color, no ertyhema       Last Recorded Vitals  Blood pressure 111/65, pulse 69, temperature 36.2 °C (97.2 °F), temperature source Temporal, resp. rate 18, height 1.6 m (5' 3\"), weight 79.4 kg (175 lb 0.7 oz), SpO2 100%.    Relevant Results  Results for orders placed or performed during the hospital encounter of 07/10/24 (from the past 24 hour(s))   POCT GLUCOSE   Result Value Ref Range    POCT Glucose 201 (H) 74 - 99 mg/dL   POCT GLUCOSE   Result Value Ref Range    POCT Glucose 142 (H) 74 - 99 mg/dL   Comprehensive metabolic panel   Result Value Ref Range    Glucose 95 65 - 99 mg/dL    Sodium 142 133 - 145 mmol/L    Potassium 3.5 3.4 - 5.1 mmol/L    Chloride 110 (H) 97 - 107 mmol/L    Bicarbonate 24 24 - 31 mmol/L    Urea Nitrogen 8 8 - 25 mg/dL    Creatinine 0.60 0.40 - 1.60 mg/dL    eGFR >90 >60 mL/min/1.73m*2    Calcium 8.0 (L) 8.5 - 10.4 mg/dL    Albumin 2.2 (L) 3.5 - 5.0 g/dL    Alkaline Phosphatase 272 (H) 35 - 125 U/L    Total Protein 6.1 5.9 - 7.9 g/dL    AST 85 (H) 5 - 40 U/L    Bilirubin, Total 1.3 (H) 0.1 - 1.2 mg/dL    ALT 42 (H) 5 - 40 U/L    Anion Gap 8 <=19 mmol/L   CBC   Result Value Ref Range    WBC 3.6 (L) 4.4 - 11.3 x10*3/uL    nRBC 0.0 0.0 - 0.0 /100 WBCs    RBC 3.43 (L) 4.00 - 5.20 x10*6/uL    Hemoglobin 8.4 (L) 12.0 - 16.0 g/dL    Hematocrit 26.2 (L) 36.0 - 46.0 %    MCV 76 (L) 80 - 100 fL    MCH 24.5 (L) 26.0 - 34.0 pg    MCHC 32.1 32.0 - 36.0 g/dL    RDW 21.6 (H) 11.5 - 14.5 %    Platelets 68 (L) 150 - 450 x10*3/uL   Ammonia   Result Value Ref Range    Ammonia 61 (H) 12 - 45 umol/L   POCT GLUCOSE   Result Value Ref Range    POCT Glucose 67 (L) 74 - 99 mg/dL   POCT GLUCOSE   Result Value Ref Range    POCT Glucose 159 (H) 74 - 99 mg/dL   POCT GLUCOSE   Result Value Ref Range    POCT Glucose 173 (H) 74 - 99 mg/dL         Assessment/Plan     Cirrhosis (MELD of)  -HCC: Up-to-date on screening, repeat 4 to 5 months  -HE: ANO x 3, no asterixis.  Was " supposedly more confused yesterday admission.  Continue lactulose twice daily to 3 times daily plus rifaximin  -EV: No evidence of esophageal varices in 2023, should have repeat screening in 2 to 3 years  -Ascites: Belly is slightly distended on exam, mild to moderate ascites seen on CT. continue oral diuretics, will place order for diagnostic/therapeutic paracentesis with fluid analysis    2.  Abnormal CT  -Questionable enteritis versus colitis.  Agree with antibiotics for now, will clinically monitor  -Antiemetics as needed for nausea and vomiting      Rachid Lu, MADYSON-CNP

## 2024-07-12 ENCOUNTER — PHARMACY VISIT (OUTPATIENT)
Dept: PHARMACY | Facility: CLINIC | Age: 49
End: 2024-07-12
Payer: MEDICARE

## 2024-07-12 ENCOUNTER — APPOINTMENT (OUTPATIENT)
Dept: RADIOLOGY | Facility: HOSPITAL | Age: 49
End: 2024-07-12
Payer: COMMERCIAL

## 2024-07-12 VITALS
OXYGEN SATURATION: 100 % | WEIGHT: 174.16 LBS | DIASTOLIC BLOOD PRESSURE: 57 MMHG | HEIGHT: 63 IN | RESPIRATION RATE: 18 BRPM | BODY MASS INDEX: 30.86 KG/M2 | TEMPERATURE: 97.2 F | HEART RATE: 68 BPM | SYSTOLIC BLOOD PRESSURE: 119 MMHG

## 2024-07-12 LAB
AFP SERPL-MCNC: <4 NG/ML (ref 0–9)
ALBUMIN FLD-MCNC: <0.5 G/DL
ALBUMIN SERPL-MCNC: 2.2 G/DL (ref 3.5–5)
ALP BLD-CCNC: 222 U/L (ref 35–125)
ALT SERPL-CCNC: 45 U/L (ref 5–40)
AMMONIA PLAS-SCNC: 70 UMOL/L (ref 12–45)
ANION GAP SERPL CALC-SCNC: 9 MMOL/L
AST SERPL-CCNC: 93 U/L (ref 5–40)
BASOPHILS NFR FLD MANUAL: 0 %
BILIRUB SERPL-MCNC: 1.2 MG/DL (ref 0.1–1.2)
BLASTS NFR FLD MANUAL: 0 %
BUN SERPL-MCNC: 7 MG/DL (ref 8–25)
CALCIUM SERPL-MCNC: 8.1 MG/DL (ref 8.5–10.4)
CHLORIDE SERPL-SCNC: 106 MMOL/L (ref 97–107)
CLARITY FLD: CLEAR
CO2 SERPL-SCNC: 26 MMOL/L (ref 24–31)
COLOR FLD: YELLOW
CREAT SERPL-MCNC: 0.6 MG/DL (ref 0.4–1.6)
EGFRCR SERPLBLD CKD-EPI 2021: >90 ML/MIN/1.73M*2
EOSINOPHIL NFR FLD MANUAL: 1 %
ERYTHROCYTE [DISTWIDTH] IN BLOOD BY AUTOMATED COUNT: 21.5 % (ref 11.5–14.5)
GLUCOSE BLD MANUAL STRIP-MCNC: 108 MG/DL (ref 74–99)
GLUCOSE BLD MANUAL STRIP-MCNC: 198 MG/DL (ref 74–99)
GLUCOSE BLD MANUAL STRIP-MCNC: 59 MG/DL (ref 74–99)
GLUCOSE FLD-MCNC: 152 MG/DL
GLUCOSE SERPL-MCNC: 94 MG/DL (ref 65–99)
HCT VFR BLD AUTO: 27.6 % (ref 36–46)
HGB BLD-MCNC: 8.8 G/DL (ref 12–16)
HOLD SPECIMEN: NORMAL
IMMATURE GRANULOCYTES IN FLUID: 0 %
LDH FLD L TO P-CCNC: 45 U/L
LYMPHOCYTES NFR FLD MANUAL: 36 %
MCH RBC QN AUTO: 24.8 PG (ref 26–34)
MCHC RBC AUTO-ENTMCNC: 31.9 G/DL (ref 32–36)
MCV RBC AUTO: 78 FL (ref 80–100)
MONOS+MACROS NFR FLD MANUAL: 59 %
NEUTROPHILS NFR FLD MANUAL: 4 %
NRBC BLD-RTO: 0 /100 WBCS (ref 0–0)
OTHER CELLS NFR FLD MANUAL: 0 %
PLASMA CELLS NFR FLD MANUAL: 0 %
PLATELET # BLD AUTO: 79 X10*3/UL (ref 150–450)
POTASSIUM SERPL-SCNC: 3.5 MMOL/L (ref 3.4–5.1)
PROT SERPL-MCNC: 6.2 G/DL (ref 5.9–7.9)
RBC # BLD AUTO: 3.55 X10*6/UL (ref 4–5.2)
RBC # FLD AUTO: 442 /UL
SODIUM SERPL-SCNC: 141 MMOL/L (ref 133–145)
TOTAL CELLS COUNTED FLD: 100
WBC # BLD AUTO: 3.7 X10*3/UL (ref 4.4–11.3)
WBC # FLD AUTO: 195 /UL

## 2024-07-12 PROCEDURE — 85027 COMPLETE CBC AUTOMATED: CPT | Performed by: NURSE PRACTITIONER

## 2024-07-12 PROCEDURE — 49083 ABD PARACENTESIS W/IMAGING: CPT

## 2024-07-12 PROCEDURE — 82947 ASSAY GLUCOSE BLOOD QUANT: CPT | Mod: 59

## 2024-07-12 PROCEDURE — 2500000002 HC RX 250 W HCPCS SELF ADMINISTERED DRUGS (ALT 637 FOR MEDICARE OP, ALT 636 FOR OP/ED): Performed by: NURSE PRACTITIONER

## 2024-07-12 PROCEDURE — 36415 COLL VENOUS BLD VENIPUNCTURE: CPT

## 2024-07-12 PROCEDURE — RXMED WILLOW AMBULATORY MEDICATION CHARGE

## 2024-07-12 PROCEDURE — 89051 BODY FLUID CELL COUNT: CPT

## 2024-07-12 PROCEDURE — 82105 ALPHA-FETOPROTEIN SERUM: CPT | Mod: WESLAB

## 2024-07-12 PROCEDURE — 2500000001 HC RX 250 WO HCPCS SELF ADMINISTERED DRUGS (ALT 637 FOR MEDICARE OP): Performed by: NURSE PRACTITIONER

## 2024-07-12 PROCEDURE — 2500000001 HC RX 250 WO HCPCS SELF ADMINISTERED DRUGS (ALT 637 FOR MEDICARE OP): Performed by: INTERNAL MEDICINE

## 2024-07-12 PROCEDURE — 36415 COLL VENOUS BLD VENIPUNCTURE: CPT | Performed by: NURSE PRACTITIONER

## 2024-07-12 PROCEDURE — 82042 OTHER SOURCE ALBUMIN QUAN EA: CPT | Mod: WESLAB

## 2024-07-12 PROCEDURE — 82945 GLUCOSE OTHER FLUID: CPT | Mod: WESLAB

## 2024-07-12 PROCEDURE — C1729 CATH, DRAINAGE: HCPCS

## 2024-07-12 PROCEDURE — 80053 COMPREHEN METABOLIC PANEL: CPT | Performed by: NURSE PRACTITIONER

## 2024-07-12 PROCEDURE — 82140 ASSAY OF AMMONIA: CPT | Performed by: NURSE PRACTITIONER

## 2024-07-12 PROCEDURE — 2500000004 HC RX 250 GENERAL PHARMACY W/ HCPCS (ALT 636 FOR OP/ED): Performed by: NURSE PRACTITIONER

## 2024-07-12 PROCEDURE — 83615 LACTATE (LD) (LDH) ENZYME: CPT | Mod: WESLAB

## 2024-07-12 PROCEDURE — 2500000005 HC RX 250 GENERAL PHARMACY W/O HCPCS: Performed by: RADIOLOGY

## 2024-07-12 PROCEDURE — 2720000007 HC OR 272 NO HCPCS

## 2024-07-12 PROCEDURE — G0378 HOSPITAL OBSERVATION PER HR: HCPCS

## 2024-07-12 RX ORDER — LIDOCAINE HYDROCHLORIDE 10 MG/ML
INJECTION, SOLUTION EPIDURAL; INFILTRATION; INTRACAUDAL; PERINEURAL
Status: COMPLETED | OUTPATIENT
Start: 2024-07-12 | End: 2024-07-12

## 2024-07-12 RX ORDER — LACTULOSE 10 G/15ML
20 SOLUTION ORAL 4 TIMES DAILY
Start: 2024-07-12

## 2024-07-12 RX ORDER — PANTOPRAZOLE SODIUM 40 MG/10ML
40 INJECTION, POWDER, LYOPHILIZED, FOR SOLUTION INTRAVENOUS 2 TIMES DAILY
Status: DISCONTINUED | OUTPATIENT
Start: 2024-07-12 | End: 2024-07-12 | Stop reason: HOSPADM

## 2024-07-12 RX ORDER — DICYCLOMINE HYDROCHLORIDE 10 MG/1
20 CAPSULE ORAL 4 TIMES DAILY
Qty: 112 CAPSULE | Refills: 0 | Status: SHIPPED | OUTPATIENT
Start: 2024-07-12 | End: 2024-07-26

## 2024-07-12 RX ORDER — DICYCLOMINE HYDROCHLORIDE 10 MG/1
20 CAPSULE ORAL 4 TIMES DAILY PRN
Qty: 112 CAPSULE | Refills: 0 | Status: CANCELLED | OUTPATIENT
Start: 2024-07-12 | End: 2024-07-26

## 2024-07-12 RX ORDER — DICYCLOMINE HYDROCHLORIDE 10 MG/1
20 CAPSULE ORAL 4 TIMES DAILY
Status: DISCONTINUED | OUTPATIENT
Start: 2024-07-12 | End: 2024-07-12 | Stop reason: HOSPADM

## 2024-07-12 RX ORDER — LEVOFLOXACIN 250 MG/1
250 TABLET ORAL DAILY
Qty: 3 TABLET | Refills: 0 | Status: SHIPPED | OUTPATIENT
Start: 2024-07-12 | End: 2024-07-15

## 2024-07-12 RX ORDER — DICYCLOMINE HYDROCHLORIDE 10 MG/1
20 CAPSULE ORAL 4 TIMES DAILY
Status: CANCELLED | OUTPATIENT
Start: 2024-07-12

## 2024-07-12 RX ORDER — INSULIN GLARGINE 100 [IU]/ML
12 INJECTION, SOLUTION SUBCUTANEOUS 2 TIMES DAILY
Status: DISCONTINUED | OUTPATIENT
Start: 2024-07-12 | End: 2024-07-12 | Stop reason: HOSPADM

## 2024-07-12 ASSESSMENT — PAIN SCALES - GENERAL
PAINLEVEL_OUTOF10: 0 - NO PAIN
PAINLEVEL_OUTOF10: 8
PAINLEVEL_OUTOF10: 0 - NO PAIN
PAINLEVEL_OUTOF10: 4
PAINLEVEL_OUTOF10: 0 - NO PAIN

## 2024-07-12 ASSESSMENT — COGNITIVE AND FUNCTIONAL STATUS - GENERAL
CLIMB 3 TO 5 STEPS WITH RAILING: A LITTLE
DAILY ACTIVITIY SCORE: 24
MOBILITY SCORE: 23

## 2024-07-12 ASSESSMENT — PAIN - FUNCTIONAL ASSESSMENT
PAIN_FUNCTIONAL_ASSESSMENT: 0-10

## 2024-07-12 ASSESSMENT — PAIN DESCRIPTION - DESCRIPTORS: DESCRIPTORS: ACHING

## 2024-07-12 ASSESSMENT — PAIN DESCRIPTION - LOCATION: LOCATION: ABDOMEN

## 2024-07-12 NOTE — CARE PLAN
The patient's goals for the shift include  improve abdominal pain    The clinical goals for the shift include improve abdominal pain    Over the shift, the patient did not make progress toward the following goals. Barriers to progression include cirrhotic liver. Recommendations to address these barriers include paracentesis and follow ups.

## 2024-07-12 NOTE — DISCHARGE SUMMARY
Discharge Diagnosis  bdominal pain   Nausea, vomiting, diarrhea resolved  Abnormal CT abdomen/pelvis  Hepatic cirrhosis with ascites status post paracentesis  Hyperammonemia improved  Hepatic encephalopathy resolved  Possible toxic encephalopathy secondary to UTI resolved  Portal hypertension  Chronic pancreatitis  Hyperbilirubinemia  Pyuria   UTI group b strep  Pancytopenia  Hyperlipidemia  Hypertriglyceridemia  Type 2 diabetes mellitus with hypoglycemia, resolved - uncontrolled   Glucosuria  Hypoalbuminemia  Moderate protein calorie malnutrition    Issues Requiring Follow-Up  Above    Discharge Meds     Your medication list        START taking these medications        Instructions Last Dose Given Next Dose Due   dicyclomine 10 mg capsule  Commonly known as: Bentyl      Take 2 capsules (20 mg) by mouth 4 times a day for 14 days.       levoFLOXacin 250 mg tablet  Commonly known as: Levaquin      Take 1 tablet (250 mg) by mouth once daily for 3 days.              CHANGE how you take these medications        Instructions Last Dose Given Next Dose Due   lactulose 20 gram/30 mL oral solution  What changed:   when to take this  additional instructions  Another medication with the same name was removed. Continue taking this medication, and follow the directions you see here.      Take 30 mL (20 g) by mouth 4 times a day. Titrate for 3 bowel movements in 24 hours.              CONTINUE taking these medications        Instructions Last Dose Given Next Dose Due   atorvastatin 80 mg tablet  Commonly known as: Lipitor           docusate sodium 100 mg capsule  Commonly known as: Colace      Take 1 capsule (100 mg) by mouth 2 times a day. Hold for loose stool.       fenofibrate 145 mg tablet  Commonly known as: Tricor           furosemide 40 mg tablet  Commonly known as: Lasix      Take 1 tablet (40 mg) by mouth once daily. Hold for dizziness.       gabapentin 100 mg capsule  Commonly known as: Neurontin      Take 2 capsules  (200 mg) by mouth 3 times a day.       hydrOXYzine HCL 25 mg tablet  Commonly known as: Atarax      Take 1 tablet (25 mg) by mouth every 6 hours if needed for itching or allergies.       insulin lispro 100 unit/mL injection  Commonly known as: HumaLOG           Lantus U-100 Insulin 100 unit/mL injection  Generic drug: insulin glargine           magnesium oxide 400 mg (241.3 mg magnesium) tablet  Commonly known as: Mag-Ox      Take 1 tablet (400 mg) by mouth once daily.       nadolol 20 mg tablet  Commonly known as: Corgard      Take 1 tablet (20 mg) by mouth once daily.       ondansetron ODT 4 mg disintegrating tablet  Commonly known as: Zofran-ODT      Take 1 tablet (4 mg) by mouth every 8 hours if needed for nausea or vomiting.       pantoprazole 40 mg EC tablet  Commonly known as: ProtoNix      Take 1 tablet (40 mg) by mouth once daily. Do not crush, chew, or split.       polyethylene glycol 17 gram packet  Commonly known as: Glycolax, Miralax      Take 17 g by mouth 2 times a day. Hold for loose stool.       spironolactone 50 mg tablet  Commonly known as: Aldactone      Take 1 tablet (50 mg) by mouth 2 times a day. Hold for dizziness.       sucralfate 100 mg/mL suspension  Commonly known as: Carafate      Take 10 mL (1 g) by mouth 4 times a day before meals.       traMADol 50 mg tablet  Commonly known as: Ultram      Take 1 tablet (50 mg) by mouth every 6 hours if needed for severe pain (7 - 10).       traMADol 50 mg tablet  Commonly known as: Ultram      Take 1 tablet (50 mg) by mouth every 6 hours if needed for severe pain (7 - 10).       traZODone 50 mg tablet  Commonly known as: Desyrel           ursodiol 300 mg capsule  Commonly known as: Actigall           Xifaxan 550 mg tablet  Generic drug: rifAXIMin      Take 1 tablet (550 mg) by mouth every 12 hours.                 Where to Get Your Medications        These medications [facility]  [address] [address] [facility] [address]       Hours: 9 AM to 6 PM Mon-Fri, 9 AM to 1 PM Sat Phone: [phone]   dicyclomine 10 mg capsule  levoFLOXacin 250 mg tablet       Information about where to get these medications is not yet available    Ask your nurse or doctor about these medications  lactulose 20 gram/30 mL oral solution         Test Results Pending At Discharge  Pending Labs       Order Current Status    Occult Blood, Stool Collected (07/12/24 1022)    Albumin, Fluid In process    Albumin, Fluid In process    Glucose, Fluid In process    Glucose, Fluid In process    Lactate Dehydrogenase, Fluid In process    Lactate Dehydrogenase, Fluid In process            Hospital Course  This is a very pleasant 49-year-old female with a past medical history significant for hepatic cirrhosis with ascites, portal hypertension, and chronic pancreatitis presenting with abdominal pain, nausea, vomiting diarrhea, leg swelling and confusion.  She has been taking all medications as prescribed.  In the emergency department, initial workup was done.  CMP showed a glucose of 248.  Calcium 7.9.  Albumin 2.6.  Alkaline phosphatase 367.  ALT 47.  AST 94.  Total bilirubin 1.4.  Ammonia 219.  Lipase 71.  CBC showed a white blood cell count of 4.0.  Hemoglobin 9.2.  Hematocrit 28.8.  Platelet count 69.  Urinalysis showed 250 leukocytes.  CT abdomen and pelvis showed cirrhosis with portal hypertension changes including splenomegaly and recanalized periumbilical vein, generalized mesenteric and omental edema, small moderate volume ascites most localized in the lower abdomen pelvis, generalized small bowel wall thickening as well as segmental mild wall thickening of the colon may be related to generalized edema, nonspecific infectious or inflammatory enterocolitis cannot be excluded, no bowel obstruction.  She was treated the emergency department and was admitted.  She was treated with Lactulose, analgesics, anti-emetics, PPI and clear liquid diet.  She was treated with IV  Ceftriaxone.  She was evaluated by Gastroenterology.  She underwent an abdominal paracentesis yielding 1+ liters of fluid.  Fluid analysis was negative for spontaneous bacterial peritonitis.  She was treated with Bentyl for abdominal pain per Gastroenterology.  Her diet was advanced.  The urine culture grew Group B Strep.  Her symptoms improved.  Her condition improved.  She is stable for discharge home with close outpatient follow-up with Gastroenterology.     Pertinent Physical Exam At Time of Discharge  See physical examination.    Outpatient Follow-Up  No future appointments.    Follow-up with primary care provider in 1 week.    Follow-up with Gastroenterology.      Leny Daniels, MADYSON-CNP

## 2024-07-12 NOTE — PROGRESS NOTES
Alfonzo Flanagan is a 49 y.o. female on day 0 of admission presenting with Cirrhosis of liver with ascites, unspecified hepatic cirrhosis type (Multi).    Per nursing patient reported red blood per rectum none observed by nursing    Subjective   Patient seen and examined.  Resting in bed in no acute distress.  Awake alert oriented x 3.  + Mid abdominal pain.  No nausea, vomiting, diarrhea, constipation.  Red blood when wiping after bowel movement, no black tarry or red bloody stools.  Dysuria improved.  No fevers chills or sweats.    Per nursing, paracentesis scheduled for 11 am.    Objective     Physical Exam  Vitals and nursing note reviewed.   Constitutional:       General: She is not in acute distress.     Appearance: Normal appearance. She is normal weight. She is not ill-appearing, toxic-appearing or diaphoretic.   HENT:      Head: Normocephalic and atraumatic.      Right Ear: External ear normal.      Left Ear: External ear normal.      Nose: Nose normal.      Mouth/Throat:      Mouth: Mucous membranes are moist.      Pharynx: Oropharynx is clear.   Eyes:      Extraocular Movements: Extraocular movements intact.      Conjunctiva/sclera: Conjunctivae normal.      Pupils: Pupils are equal, round, and reactive to light.   Cardiovascular:      Rate and Rhythm: Normal rate and regular rhythm.      Pulses: Normal pulses.      Heart sounds: Normal heart sounds. No murmur heard.  Pulmonary:      Effort: Pulmonary effort is normal. No respiratory distress.      Breath sounds: Normal breath sounds. No stridor. No wheezing, rhonchi or rales.   Abdominal:      General: Bowel sounds are normal. There is distension.      Palpations: Abdomen is soft.      Tenderness: There is abdominal tenderness.   Genitourinary:     Comments: + External hemorrhoids.  No bleeding.  Pieces of light brown stool noted in toilet.  Musculoskeletal:         General: Swelling present. Normal range of motion.      Cervical back: Normal range of  "motion and neck supple.      Right lower leg: Edema present.      Left lower leg: Edema present.      Comments: Decreased 1+ edema.   Skin:     General: Skin is warm and dry.      Capillary Refill: Capillary refill takes less than 2 seconds.   Neurological:      General: No focal deficit present.      Mental Status: She is alert and oriented to person, place, and time.   Psychiatric:         Mood and Affect: Mood normal.         Behavior: Behavior normal.       Last Recorded Vitals  Blood pressure 118/60, pulse 65, temperature 36.1 °C (97 °F), temperature source Temporal, resp. rate 18, height 1.6 m (5' 3\"), weight 79 kg (174 lb 2.6 oz), SpO2 99%.    Intake/Output last 3 Shifts:  I/O last 3 completed shifts:  In: 1370 (17.3 mL/kg) [P.O.:1320; IV Piggyback:50]  Out: 120 (1.5 mL/kg) [Urine:120 (0 mL/kg/hr)]  Weight: 79 kg     Telemetry normal sinus rhythm rate 60's    Relevant Results  Results for orders placed or performed during the hospital encounter of 07/10/24 (from the past 24 hour(s))   POCT GLUCOSE   Result Value Ref Range    POCT Glucose 122 (H) 74 - 99 mg/dL   POCT GLUCOSE   Result Value Ref Range    POCT Glucose 95 74 - 99 mg/dL   CBC   Result Value Ref Range    WBC 3.7 (L) 4.4 - 11.3 x10*3/uL    nRBC 0.0 0.0 - 0.0 /100 WBCs    RBC 3.55 (L) 4.00 - 5.20 x10*6/uL    Hemoglobin 8.8 (L) 12.0 - 16.0 g/dL    Hematocrit 27.6 (L) 36.0 - 46.0 %    MCV 78 (L) 80 - 100 fL    MCH 24.8 (L) 26.0 - 34.0 pg    MCHC 31.9 (L) 32.0 - 36.0 g/dL    RDW 21.5 (H) 11.5 - 14.5 %    Platelets 79 (L) 150 - 450 x10*3/uL   Ammonia   Result Value Ref Range    Ammonia 70 (H) 12 - 45 umol/L   Comprehensive Metabolic Panel   Result Value Ref Range    Glucose 94 65 - 99 mg/dL    Sodium 141 133 - 145 mmol/L    Potassium 3.5 3.4 - 5.1 mmol/L    Chloride 106 97 - 107 mmol/L    Bicarbonate 26 24 - 31 mmol/L    Urea Nitrogen 7 (L) 8 - 25 mg/dL    Creatinine 0.60 0.40 - 1.60 mg/dL    eGFR >90 >60 mL/min/1.73m*2    Calcium 8.1 (L) 8.5 - 10.4 mg/dL "    Albumin 2.2 (L) 3.5 - 5.0 g/dL    Alkaline Phosphatase 222 (H) 35 - 125 U/L    Total Protein 6.2 5.9 - 7.9 g/dL    AST 93 (H) 5 - 40 U/L    Bilirubin, Total 1.2 0.1 - 1.2 mg/dL    ALT 45 (H) 5 - 40 U/L    Anion Gap 9 <=19 mmol/L   POCT GLUCOSE   Result Value Ref Range    POCT Glucose 59 (L) 74 - 99 mg/dL   POCT GLUCOSE   Result Value Ref Range    POCT Glucose 108 (H) 74 - 99 mg/dL     Susceptibility data from last 90 days.  Collected Specimen Info Organism Amoxicillin/Clavulanate Ampicillin Ampicillin/Sulbactam Cefazolin Cefazolin (uncomplicated UTIs only) Ciprofloxacin Gentamicin Levofloxacin Nitrofurantoin Penicillin Piperacillin/Tazobactam   07/10/24 Urine from Clean Catch/Voided Streptococcus agalactiae (Group B Streptococcus)              06/23/24 Urine from Clean Catch/Voided Escherichia coli   S   S  S  S  S   S   S   05/15/24 Urine from Clean Catch/Voided Enterococcus faecium   S     R   S  S  S      Klebsiella pneumoniae/variicola  S  R  S  S  S  S  S   I   S     Collected Specimen Info Organism Trimethoprim/Sulfamethoxazole Vancomycin   07/10/24 Urine from Clean Catch/Voided Streptococcus agalactiae (Group B Streptococcus)     06/23/24 Urine from Clean Catch/Voided Escherichia coli  S    05/15/24 Urine from Clean Catch/Voided Enterococcus faecium  R  S     Klebsiella pneumoniae/variicola  S      No results found.      Scheduled medications  cefTRIAXone, 1 g, intravenous, q24h  furosemide, 40 mg, oral, Daily  insulin glargine, 12 Units, subcutaneous, BID  insulin lispro, 0-15 Units, subcutaneous, TID  lactulose, 20 g, oral, TID  nadolol, 20 mg, oral, Daily  pantoprazole, 40 mg, intravenous, BID  rifAXIMin, 550 mg, oral, q12h RIVKA  spironolactone, 50 mg, oral, BID  sucralfate, 1 g, oral, Before meals & nightly      Continuous medications     PRN medications  PRN medications: acetaminophen **OR** acetaminophen **OR** acetaminophen, acetaminophen **OR** acetaminophen **OR** acetaminophen, benzocaine-menthol,  bisacodyl, dextromethorphan-guaifenesin, dextrose, dextrose, glucagon, glucagon, guaiFENesin, melatonin, ondansetron **OR** ondansetron, traMADol      ASSESSMENT:  Abdominal pain   Nausea, vomiting, diarrhea resolved  Abnormal CT abdomen/pelvis  Hepatic cirrhosis with ascites  Hyperammonemia  Hepatic encephalopathy improved  Possible toxic encephalopathy secondary to UTI improved  Portal hypertension  Chronic pancreatitis  Hyperbilirubinemia  Pyuria   UTI group b strep  Pancytopenia  Hyperlipidemia  Hypertriglyceridemia  Type 2 diabetes mellitus with hypoglycemia, uncontrolled  Glucosuria  Hypoalbuminemia  Moderate protein calorie malnutrition    PLAN:  Gastroenterology input appreciated.  Plan for paracentesis.  Clear liquid diet advance per Gastroenterology.  Uncontrolled.  H&H stable.  External hemorrhoids on examination.  Management per Gastroenterology.  Mentation stable.  No focal deficits.  Ammonia level stable.  Continue lactulose increased to 4 times daily hold for greater than 3 bowel movements in 24 hours.  Continue Rifaximin.  Monitor stools and ammonia.  Continue diuretics and low-sodium diet.  Urine culture group B strep 20,000-80,000.  Symptoms improving.  Continue IV Ceftriaxone pending paracentesis, transition to Levaquin 250 mg p.o. daily.  Glucose reviewed.  Hypoglycemic.  On clear liquid diet.  Decrease Lantus.  Monitor point of care glucose AC/HS.  Hypoglycemia protocol.  Glucerna protein supplementation three times daily with meals.  PT/OT.  Fall precautions.  Up with assistance.  Supportive care.  Patient reassured.  Case management following for discharge planning.  Discharge plan home with family.  Anticipate discharge after paracentesis pending analysis and Gastroenterology clearance.  Close outpatient follow-up with Gastroenterology.  Discussed with patient nursing and Dr. Lawrence.      Leny Daniels, APRN-CNP

## 2024-07-12 NOTE — PROGRESS NOTES
"Alfonzo Flanagan is a 49 y.o. female on day 0 of admission presenting with Cirrhosis of liver with ascites, unspecified hepatic cirrhosis type (Multi).    Subjective   Feeling better had 1.6 L removed from paracentesis.  Fluid analysis pending.  Patient reports having some small amount of blood with wiping after BM this morning.        Objective     Physical Exam    Last Recorded Vitals  Blood pressure 118/60, pulse 65, temperature 36.1 °C (97 °F), temperature source Temporal, resp. rate 18, height 1.6 m (5' 3\"), weight 79 kg (174 lb 2.6 oz), SpO2 99%.  Intake/Output last 3 Shifts:  I/O last 3 completed shifts:  In: 1370 (17.3 mL/kg) [P.O.:1320; IV Piggyback:50]  Out: 120 (1.5 mL/kg) [Urine:120 (0 mL/kg/hr)]  Weight: 79 kg     Relevant Results  Results for orders placed or performed during the hospital encounter of 07/10/24 (from the past 24 hour(s))   POCT GLUCOSE   Result Value Ref Range    POCT Glucose 122 (H) 74 - 99 mg/dL   POCT GLUCOSE   Result Value Ref Range    POCT Glucose 95 74 - 99 mg/dL   CBC   Result Value Ref Range    WBC 3.7 (L) 4.4 - 11.3 x10*3/uL    nRBC 0.0 0.0 - 0.0 /100 WBCs    RBC 3.55 (L) 4.00 - 5.20 x10*6/uL    Hemoglobin 8.8 (L) 12.0 - 16.0 g/dL    Hematocrit 27.6 (L) 36.0 - 46.0 %    MCV 78 (L) 80 - 100 fL    MCH 24.8 (L) 26.0 - 34.0 pg    MCHC 31.9 (L) 32.0 - 36.0 g/dL    RDW 21.5 (H) 11.5 - 14.5 %    Platelets 79 (L) 150 - 450 x10*3/uL   Ammonia   Result Value Ref Range    Ammonia 70 (H) 12 - 45 umol/L   Comprehensive Metabolic Panel   Result Value Ref Range    Glucose 94 65 - 99 mg/dL    Sodium 141 133 - 145 mmol/L    Potassium 3.5 3.4 - 5.1 mmol/L    Chloride 106 97 - 107 mmol/L    Bicarbonate 26 24 - 31 mmol/L    Urea Nitrogen 7 (L) 8 - 25 mg/dL    Creatinine 0.60 0.40 - 1.60 mg/dL    eGFR >90 >60 mL/min/1.73m*2    Calcium 8.1 (L) 8.5 - 10.4 mg/dL    Albumin 2.2 (L) 3.5 - 5.0 g/dL    Alkaline Phosphatase 222 (H) 35 - 125 U/L    Total Protein 6.2 5.9 - 7.9 g/dL    AST 93 (H) 5 - 40 " U/L    Bilirubin, Total 1.2 0.1 - 1.2 mg/dL    ALT 45 (H) 5 - 40 U/L    Anion Gap 9 <=19 mmol/L   POCT GLUCOSE   Result Value Ref Range    POCT Glucose 59 (L) 74 - 99 mg/dL   POCT GLUCOSE   Result Value Ref Range    POCT Glucose 108 (H) 74 - 99 mg/dL      US guided abdominal paracentesis    Result Date: 7/12/2024  Interpreted By:  Cornelius Weber, STUDY: US GUIDED ABDOMINAL PARACENTESIS; 7/12/2024 1:04 pm   INDICATION: Signs/Symptoms:ascites, diagnostic/therapeutic paracentesis. hx of cirrhosis.   COMPARISON: None.   ACCESSION NUMBER(S): [id number]   ORDERING CLINICIAN: VALERIE MABRY   TECHNIQUE: Ultrasound-guided paracentesis   FINDINGS: Informed consent obtained. Patient positioned supine. Right mid quadrant prepped, draped and anesthetized. Under ultrasound guidance, 8 Indonesian centesis sheath needle inserted into peritoneal cavity. 1.7 Lof clear yellowfluid aspiratedwith sample sent for analysis. Patient tolerated procedure well       Ultrasound-guided paracentesis.   Signed by: Cornelius Weber 7/12/2024 2:19 PM Dictation workstation:   [id number]    CT abdomen pelvis w IV contrast    Result Date: 7/10/2024  Interpreted By:  Lane Espinal, STUDY: CT ABDOMEN PELVIS W IV CONTRAST;  7/10/2024 9:24 am   INDICATION: Signs/Symptoms:hx of ascites, liver cirrhosis.   COMPARISON: 07/04/2024.   ACCESSION NUMBER(S): [id number]   ORDERING CLINICIAN: JHONATAN MUKHERJEE   TECHNIQUE: Contiguous axial images were obtained through the abdomen and pelvis after the administration of  75 mL Omnipaque 350 intravenous contrast. Coronal and sagittal reformations were made.   FINDINGS: LOWER CHEST: Lung bases are clear.   ABDOMEN:   LIVER: Atrophic liver with cirrhotic morphology is again seen. No focal hepatic lesion is seen.   BILE DUCTS: Nondilated.   GALLBLADDER: Surgically absent   PANCREAS: No focal pancreatic lesion is seen.   SPLEEN: Spleen is enlarged measuring 14 cm in AP diameter similar to prior. No discrete splenic  lesion.   ADRENAL GLANDS: Within normal limits.   KIDNEYS AND URETERS: The kidneys enhance symmetrically without focal lesion.  No hydroureteronephrosis bilaterally.   Urinary bladder is unremarkable.   VESSELS: Scattered small atherosclerotic calcifications are present in the aorta. No aortic aneurysm. IVC is unremarkable.   Portosystemic collaterals again seen including recanalized paraumbilical vein compatible with portal hypertension.   BOWEL: There is mild generalized wall prominence of small bowel as well as segmental mild wall prominence of the colon which may be related to generalized edema/3rd spacing of fluid. No bowel obstruction. Appendix can not be clearly identified.   No appreciable significant diverticular disease.   PERITONEUM/RETROPERITONEUM/LYMPH NODES: There is generalized mesenteric and omental edema. Small-moderate volume ascites is most localized in the lower abdomen/pelvis. No organized fluid collection is seen. No free intraperitoneal air.   No retroperitoneal fluid collection or lymphadenopathy.   ABDOMINAL WALL: Stable midline ventral hernias near the umbilical region containing fat as well as fluid similar to prior. Generalized subcutaneous tissue edema is also seen.   BONE AND SOFT TISSUE: Left L5 unilateral spondylolysis is seen without spondylolisthesis. Mild anterior endplate spurring is most prominent in the lower thoracic spine.       Cirrhosis with portal hypertension changes including splenomegaly and recanalized paraumbilical vein.   Generalized mesenteric and omental edema. Small-moderate volume ascites most localized in the lower abdomen/pelvis.   Generalized small bowel wall thickening as well as segmental mild wall thickening of the colon may be related to the generalized edema. Nonspecific infectious or inflammatory enterocolitis can not be excluded. No bowel obstruction.     MACRO: None.   Signed by: Lane Espinal 7/10/2024 10:02 AM Dictation workstation:    [id number]    ECG 12 lead    Result Date: 7/8/2024  Sinus tachycardia Possible Inferior infarct , age undetermined Cannot rule out Anterior infarct (cited on or before 04-JUL-2024) Abnormal ECG Confirmed by Pablo Myrick ([id number]) on 7/8/2024 2:43:50 PM    US guided abdominal paracentesis    Result Date: 7/5/2024  Interpreted By:  Cornelius Weber, STUDY: US GUIDED ABDOMINAL PARACENTESIS; 7/5/2024 11:28 am   INDICATION: Ascites.   COMPARISON: None.   ACCESSION NUMBER(S): [id number]   ORDERING CLINICIAN: PRANEETH BARRERA   TECHNIQUE: Ultrasound-guided paracentesis   FINDINGS: Informed consent obtained. Patient positioned supine. Left lower quadrant prepped, draped and anesthetized. Under ultrasound guidance, 8 Israeli centesis sheath needle inserted into peritoneal cavity. 1 Lof clear yellowfluid aspirated. Patient tolerated procedure well       Ultrasound-guided paracentesis.   Signed by: Cornelius Weber 7/5/2024 1:29 PM Dictation workstation:   [id number]    CT abdomen pelvis wo IV contrast    Result Date: 7/4/2024  Interpreted By:  Cj Lee, STUDY: CT ABDOMEN PELVIS WO IV CONTRAST;  7/4/2024 10:20 pm   INDICATION: Signs/Symptoms:flank pain.   COMPARISON: CT scan of the abdomen pelvis 06/20/2024.   ACCESSION NUMBER(S): [id number]   ORDERING CLINICIAN: ANUPAM BATES   TECHNIQUE: Axial noncontrast CT images of the abdomen and pelvis with coronal and sagittal reconstructed images.   FINDINGS:   LOWER CHEST: Blood pool is hypodense relative to the myocardium which can be seen in the setting of anemia.   ABDOMEN: Lack of intravenous contrast limits evaluation of vessels and solid organs. LIVER: Atrophic nodular liver compatible with cirrhosis. Lack of contrast limits evaluation for discrete hypervascular mass. BILE DUCTS: Normal caliber. GALLBLADDER: Status post cholecystectomy. PANCREAS: Within normal limits. SPLEEN: Within normal limits. ADRENALS: Within normal limits.   KIDNEYS, URETERS, URINARY BLADDER:   Kidneys are symmetric in size without evidence for calculi, hydronephrosis, hydroureter or perinephric inflammatory changes.   No bladder calculi or wall thickening.   VESSELS:  Calcific atherosclerosis of the infrarenal aorta. No aortic aneurysm. RETROPERITONEUM: Nonenlarged periaortic lymph nodes noted.   PELVIS:   REPRODUCTIVE ORGANS: Uterus is present. No adnexal mass.   BOWEL: Stomach is partially distended. Visualized loops of bowel are unobstructed. There is mild diffuse bowel wall thickening which is nonspecific and may relate to hypoproteinemic state and edema. No pneumatosis or portal venous gas. Appendix is not identified with certainty. Fecalization of the distal small bowel suggestive of delayed transit. PERITONEUM: Large amount of ascites with diffuse haziness of the mesentery. No free air. ABDOMINAL WALL: Ventral hernia contains fat and fluid. BONES: Multilevel degenerative changes of the spine. Left L5 unilateral pars defect.       Findings compatible with cirrhosis. Lack of contrast limits evaluation for discrete hypervascular mass. If this is of clinical concern further evaluation with dedicated liver CT or MRI can be considered.   Large amount of ascites with diffuse haziness of the mesentery.   Diffuse bowel wall thickening is nonspecific and may relate to edema and hypoproteinemic state. Correlate with symptomatology if there is concern for infectious/inflammatory enterocolitis.   Additional findings as described above.   MACRO: None   Signed by: Cj Lee 7/4/2024 10:44 PM Dictation workstation:   [id number]    XR chest 1 view    Result Date: 7/4/2024  Interpreted By:  Cj Lee, STUDY: XR CHEST 1 VIEW;  7/4/2024 10:14 pm   INDICATION: Signs/Symptoms:chest pain.   COMPARISON: Chest x-ray 06/15/2024   ACCESSION NUMBER(S): [id number]   ORDERING CLINICIAN: ANUPAM BATES   FINDINGS: Multiple overlying leads are present.   CARDIOMEDIASTINAL SILHOUETTE: Cardiomediastinal silhouette is  normal in size and configuration.   LUNGS: No consolidation, pleural effusion or pneumothorax.   ABDOMEN: No remarkable upper abdominal findings.   BONES: Multilevel degenerative changes of the spine.       No acute cardiopulmonary process.   MACRO: None   Signed by: Cj Lee 7/4/2024 10:32 PM Dictation workstation:   [id number]    ECG 12 lead    Result Date: 6/23/2024  Normal sinus rhythm Otherwise normal ECG When compared with ECG of 15-UBALDO-2024 06:52, (unconfirmed) No significant change was found Confirmed by Lawrence Joseph ([id number]) on 6/23/2024 8:41:27 PM    CT abdomen pelvis w IV contrast    Result Date: 6/20/2024  Interpreted By:  Vivek Amezquita, STUDY: CT ABDOMEN PELVIS W IV CONTRAST; 6/20/2024 1:43 pm   INDICATION: Signs/Symptoms:abdominal pain.  weakness..   COMPARISON: CT abdomen pelvis 06/15/2024.   ACCESSION NUMBER(S): [id number]   ORDERING CLINICIAN: OSNIA BROWNLEE   TECHNIQUE: Axial CT imaging of the abdomen and pelvis was performed. Sagittal and coronal reconstructions were performed. 100 milliliters of Optiray 350 contrast was utilized on this study.   FINDINGS: LOWER CHEST: The previously described right middle lobe pulmonary nodule is only partially visualized on the superior most images of this examination. The visualized lung bases are otherwise unremarkable. The heart is normal size without pericardial effusion.The distal esophagus is unremarkable.   ABDOMEN:   LIVER: The liver demonstrates a nodular contour consistent with cirrhosis. BILE DUCTS: The intrahepatic and extrahepatic ducts are not dilated. GALLBLADDER: The gallbladder is surgically absent. PANCREAS: The pancreas appears unremarkable without evidence of ductal dilatation or masses. SPLEEN: The spleen is normal in size without focal lesions. ADRENAL GLANDS: Bilateral adrenal glands appear normal. KIDNEYS AND URETERS: The kidneys are normal in size and enhance symmetrically. No hydroureteronephrosis or nephroureterolithiasis  is identified.   PELVIS:   BLADDER: The urinary bladder appears normal without abnormal wall thickening. REPRODUCTIVE ORGANS: No pelvic masses. BOWEL: The stomach is unremarkable. There is diffuse circumferential mural thickening and edema of the visualized small bowel as well as the cecum. The appendix is not definitively visualized. VESSELS: There is no aneurysmal dilatation of the abdominal aorta. The principal arterial vasculature of the abdomen is patent within the limits of this non angiographic exam. The IVC appears normal. The portal vein and superior mesenteric vein are patent. Note is made of numerous small perisplenic and perigastric vascular collaterals. Note is also made of recanalization of the umbilical vein. PERITONEUM/RETROPERITONEUM/LYMPH NODES: Note is made of a moderate volume of ascites. No abdominopelvic lymphadenopathy is present. BONE AND SOFT TISSUE: No suspicious osseous lesions are identified. Degenerative discogenic disease is noted in the lower thoracic and lumbar spine. A small fat containing periumbilical hernia is visualized.         1.  Note is made of cirrhosis with the imaging findings consistent with the sequela of portal venous hypertension including splenomegaly, ascites, and vascular collateralization. 2. There is diffuse small bowel and cecal wall thickening/edema, given the presence of cirrhosis differential diagnosis favors portal venous enteropathy however additional differential diagnosis can include infectious, inflammatory, or vascular enteritis. Correlation with clinical findings is recommended.   Signed by: Vivek Amezquita 6/20/2024 2:33 PM Dictation workstation:   [id number]    CT head wo IV contrast    Result Date: 6/20/2024  Interpreted By:  Vivek Amezquita, STUDY: CT HEAD WO IV CONTRAST;  6/20/2024 1:39 pm   INDICATION: Signs/Symptoms:fall.   COMPARISON: CT head 05/30/2024.   ACCESSION NUMBER(S): [id number]   ORDERING CLINICIAN: SONIA BROWNLEE   TECHNIQUE:  Noncontrast axial CT scan of head was performed. Angled reformats in brain and bone windows were generated. The images were reviewed in bone, brain, blood and soft tissue windows.   FINDINGS: CSF Spaces: The ventricles, sulci and basal cisterns are within normal limits. There is no extraaxial fluid collection.   Parenchyma: There are 2 punctate hyperdensities located within the right putamen which were present on prior exam however appears slightly more prominent on the current exam. These likely represent parenchymal calcifications. The grey-white differentiation is intact. There is no mass effect or midline shift.  There is no intracranial hemorrhage.   Calvarium: The calvarium is unremarkable.   Paranasal sinuses and mastoids: Visualized paranasal sinuses and mastoids are clear.       No evidence of acute intracranial process.   MACRO: None   Signed by: Vivek Amezquita 6/20/2024 2:20 PM Dictation workstation:   [id number]    US abdomen complete    Result Date: 6/17/2024  Interpreted By:  Shane De, STUDY: US ABDOMEN COMPLETE;  6/17/2024 7:58 pm   INDICATION: 50 y/o   F with  Signs/Symptoms:Abdominal pain.   COMPARISON: None.   ACCESSION NUMBER(S): [id number]   ORDERING CLINICIAN: HEENA MCWILLIAMS   TECHNIQUE: Multiple grayscale and color Doppler static and dynamic images of the abdomen were obtained.   FINDINGS: PANCREAS: Visualized portions are unremarkable.   LIVER: Craniocaudal length: 14.4 cm, within normal limits. Echogenicity/Echotexture: Heterogeneous. Morphology: Cirrhotic. Mass: None.   BILE DUCTS: Intrahepatic ducts: Non-dilated. Common bile duct diameter: 4.3 cm   GALLBLADDER: Surgically removed.   SPLEEN: Maximum length: 12.3 cm, upper limits of normal for size.   RIGHT KIDNEY: Craniocaudal length: 10.7 cm, within normal limits. No hydronephrosis.     LEFT KIDNEY: Craniocaudal length: 10.4 cm, within normal limits. No hydronephrosis.     URINARY BLADDER: No significant abnormality. The  "bilateral ureteral jets are seen.   ABDOMINAL AORTA AND IVC: Visualized portions are unremarkable. The main portal vein is patent.   PLEURAL/PERITONEAL FLUID: Trace amount of right upper quadrant ascites noted.       1. Slightly decreased amount of ascites compared to prior study.   2. Redemonstration of cirrhotic liver morphology and borderline splenomegaly.     MACRO: None.   Signed by: Shane De 6/17/2024 8:36 PM Dictation workstation:   [id number]    US guided abdominal paracentesis    Result Date: 6/17/2024  Interpreted By:  Sissy Montano, STUDY: US GUIDED ABDOMINAL PARACENTESIS;  6/17/2024 10:33 am   INDICATION: Signs/Symptoms:Ascites.   COMPARISON: None.   ACCESSION NUMBER(S): [id number]   ORDERING CLINICIAN: SADE LARRY   TECHNIQUE: INTERVENTIONALIST(S): Sissy Montano MD   The history and physical exam pertinent to the procedure were reviewed and no updates were made.\"   CONSENT: The patient/patient's POA/next of kin was informed of the nature of the proposed procedure. The purposes, alternatives, risks, and benefits were explained and discussed. All questions were answered and consent was obtained.   RADIATION EXPOSURE: None   SEDATION: None   MEDICATION/CONTRAST: No additional   TIME OUT: A time out was performed immediately prior to procedure start with the interventional team, correctly identifying the patient name, date of birth, MRN, procedure, anatomy (including marking of site and side), patient position, procedure consent form, relevant laboratory and imaging test results, antibiotic administration, safety precautions, and procedure-specific equipment needs.   COMPLICATIONS: No immediate adverse events identified.   FINDINGS: Sonographic evaluation of the abdomen demonstrated small ascites. Left lower quadrant pocket was targeted. Skin prepped and draped in usual sterile fashion. 1% lidocaine for anesthesia. 1.2 L removed after accessing the area with a 5 [place] Mobshopeh catheter. Sample " sent to lab. Fluid clear yellow. Access site covered with sterile bandage after removal of catheter.       Technically successful paracentesis.   I was present for and/or performed the critical portions of the procedure and immediately available throughout the entire procedure.   I personally reviewed the image(s)/study and interpretation. I agree with the findings as stated.   Performed and dictated at [facility].   MACRO: None   Signed by: Sissy Montano 6/17/2024 3:21 PM Dictation workstation:   [id number]    XR chest 1 view    Result Date: 6/15/2024  Interpreted By:  Finkelstein, Evan, STUDY: XR CHEST 1 VIEW;  6/15/2024 6:50 am   INDICATION: Signs/Symptoms:dyspnea.   COMPARISON: Chest radiograph 05/25/2024   ACCESSION NUMBER(S): [id number]   ORDERING CLINICIAN: DORA ESPINOZA   FINDINGS:     CARDIOMEDIASTINAL SILHOUETTE: Cardiomediastinal silhouette is normal in size and configuration.   LUNGS: No pulmonary consolidation, pleural effusion or pneumothorax.   ABDOMEN: No remarkable upper abdominal findings.   BONES: No acute osseous abnormality.       No radiographic evidence of acute cardiopulmonary pathology.   MACRO: None.   Signed by: Evan Finkelstein 6/15/2024 7:01 AM Dictation workstation:   [id number]    CT abdomen pelvis wo IV contrast    Result Date: 6/15/2024  Interpreted By:  Cj Lee, STUDY: CT ABDOMEN PELVIS WO IV CONTRAST;  6/15/2024 1:41 am   INDICATION: Signs/Symptoms:Abdominal pain.   COMPARISON: CT scan of the abdomen pelvis 06/10/2024   ACCESSION NUMBER(S): [id number]   ORDERING CLINICIAN: DIPTI BROWN   TECHNIQUE: Axial noncontrast CT images of the abdomen and pelvis with coronal and sagittal reconstructed images.   FINDINGS:   LOWER CHEST: There is a 7 mm nodule in the right middle lobe, stable from prior CT.   ABDOMEN: Lack of intravenous contrast limits evaluation of vessels and solid organs. LIVER: Atrophic nodular liver compatible with  cirrhosis. Lack of contrast limits evaluation for discrete hypervascular mass. There is recanalization of the paraumbilical veins. BILE DUCTS: Normal caliber. GALLBLADDER: Status post cholecystectomy. PANCREAS: Mild fatty atrophy of the pancreas. SPLEEN: Within normal limits. ADRENALS: Within normal limits.   KIDNEYS, URETERS, URINARY BLADDER:  Kidneys are symmetric in size without evidence for calculi, hydronephrosis, hydroureter or perinephric inflammatory changes.   No bladder calculi bladder is partially distended with mild wall thickening.   VESSELS:  Calcific atherosclerosis of the aortoiliac vessels. No aortic aneurysm. RETROPERITONEUM: Nonenlarged periaortic lymph nodes noted.   PELVIS:   REPRODUCTIVE ORGANS: Uterus is present. No adnexal mass.   BOWEL: Marked distention of the stomach with ingested contents. Visualized small bowel loops demonstrate mild diffuse wall thickening. Appendix is not identified with certainty. Mild wall thickening involving the ascending and transverse colon. A few scattered colonic diverticula without evidence for acute diverticulitis. No pneumatosis or portal venous gas. PERITONEUM: Moderate ascites with diffuse haziness of the mesentery. Findings are stable from prior CT. ABDOMINAL WALL: Small ventral hernias containing fat and fluid. BONES: Multilevel degenerative changes of the spine.       Findings compatible with cirrhosis. Lack of contrast limits evaluation for discrete hypervascular mass. If of clinical concern this can be further evaluated with nonemergent dedicated liver CT or MRI.   Moderate ascites with diffuse haziness of the mesentery.   There is diffuse bowel wall thickening involving the small and large bowel as described above. These findings may relate to hypoproteinemic state and ascites. Correlate with symptomatology if there is concern for early infectious/inflammatory enterocolitis.   Additional findings as described above.   MACRO: None   Signed by: Cj  Jesus 6/15/2024 1:49 AM Dictation workstation:   [id number]    * Cannot find OR log *  Last relevant procedure:                          Assessment/Plan   Principal Problem:    Cirrhosis of liver with ascites, unspecified hepatic cirrhosis type (Multi)    Cirrhosis (MELD of)  -HCC: last AFP was 4 in 1/2024. Will repeat afp.  -HE: ANO x 3, no asterixis.  Was supposedly more confused yesterday admission.  Continue lactulose twice daily to 3 times daily plus rifaximin  -EV: No evidence of esophageal varices in 2023, should have repeat screening in 2 to 3 years  -Ascites: Belly is slightly distended on exam, mild to moderate ascites seen on CT. continue oral diuretics, paracentesis with 1.6 L removed.  Fluid analysis still pending  2.  Abnormal CT  -Questionable enteritis versus colitis.  Agree with antibiotics for now, will clinically monitor  -Antiemetics as needed for nausea and vomiting  -Will add in Bentyl twice daily      MADYSON Polk-CNP

## 2024-07-12 NOTE — NURSING NOTE
Pt returned to floor. Paracentesis fluid sent to lab, 1.7L off given in report. Pt is in bed, content and eating lunch

## 2024-07-12 NOTE — NURSING NOTE
Assumed pt care 0700. Pt was asleep in bed during bedside shift report. Plan of US guided paracentesis today ~1100 (will inform pt). Pt ammonia levels fairly stable. Reassessing blood sugar as needed. Pt belongings and call light within reach. No complaints at this time.

## 2024-07-20 ENCOUNTER — HOSPITAL ENCOUNTER (EMERGENCY)
Facility: HOSPITAL | Age: 49
Discharge: HOME | End: 2024-07-20
Attending: STUDENT IN AN ORGANIZED HEALTH CARE EDUCATION/TRAINING PROGRAM
Payer: COMMERCIAL

## 2024-07-20 ENCOUNTER — APPOINTMENT (OUTPATIENT)
Dept: RADIOLOGY | Facility: HOSPITAL | Age: 49
End: 2024-07-20
Payer: COMMERCIAL

## 2024-07-20 ENCOUNTER — APPOINTMENT (OUTPATIENT)
Dept: CARDIOLOGY | Facility: HOSPITAL | Age: 49
End: 2024-07-20
Payer: COMMERCIAL

## 2024-07-20 VITALS
HEART RATE: 71 BPM | OXYGEN SATURATION: 98 % | RESPIRATION RATE: 16 BRPM | TEMPERATURE: 96.6 F | DIASTOLIC BLOOD PRESSURE: 72 MMHG | SYSTOLIC BLOOD PRESSURE: 111 MMHG

## 2024-07-20 DIAGNOSIS — E11.8 DIABETES MELLITUS TYPE 2 WITH COMPLICATIONS (MULTI): ICD-10-CM

## 2024-07-20 DIAGNOSIS — R11.2 NAUSEA AND VOMITING, UNSPECIFIED VOMITING TYPE: ICD-10-CM

## 2024-07-20 DIAGNOSIS — R10.9 ABDOMINAL PAIN, UNSPECIFIED ABDOMINAL LOCATION: Primary | ICD-10-CM

## 2024-07-20 LAB
ALBUMIN SERPL-MCNC: 2.3 G/DL (ref 3.5–5)
ALP BLD-CCNC: 482 U/L (ref 35–125)
ALT SERPL-CCNC: 24 U/L (ref 5–40)
ANION GAP SERPL CALC-SCNC: 4 MMOL/L
APPEARANCE UR: ABNORMAL
AST SERPL-CCNC: 40 U/L (ref 5–40)
BASOPHILS # BLD AUTO: 0.02 X10*3/UL (ref 0–0.1)
BASOPHILS NFR BLD AUTO: 0.5 %
BILIRUB SERPL-MCNC: 1.2 MG/DL (ref 0.1–1.2)
BILIRUB UR STRIP.AUTO-MCNC: NEGATIVE MG/DL
BUN SERPL-MCNC: 15 MG/DL (ref 8–25)
CALCIUM SERPL-MCNC: 7.7 MG/DL (ref 8.5–10.4)
CHLORIDE SERPL-SCNC: 105 MMOL/L (ref 97–107)
CO2 SERPL-SCNC: 27 MMOL/L (ref 24–31)
COLOR UR: YELLOW
CREAT SERPL-MCNC: 0.8 MG/DL (ref 0.4–1.6)
EGFRCR SERPLBLD CKD-EPI 2021: >90 ML/MIN/1.73M*2
EOSINOPHIL # BLD AUTO: 0.21 X10*3/UL (ref 0–0.7)
EOSINOPHIL NFR BLD AUTO: 5.6 %
ERYTHROCYTE [DISTWIDTH] IN BLOOD BY AUTOMATED COUNT: 21.8 % (ref 11.5–14.5)
GLUCOSE SERPL-MCNC: 375 MG/DL (ref 65–99)
GLUCOSE UR STRIP.AUTO-MCNC: ABNORMAL MG/DL
HCG UR QL IA.RAPID: NEGATIVE
HCT VFR BLD AUTO: 26.6 % (ref 36–46)
HGB BLD-MCNC: 8.3 G/DL (ref 12–16)
HOLD SPECIMEN: NORMAL
HYALINE CASTS #/AREA URNS AUTO: ABNORMAL /LPF
IMM GRANULOCYTES # BLD AUTO: 0.01 X10*3/UL (ref 0–0.7)
IMM GRANULOCYTES NFR BLD AUTO: 0.3 % (ref 0–0.9)
KETONES UR STRIP.AUTO-MCNC: NEGATIVE MG/DL
LEUKOCYTE ESTERASE UR QL STRIP.AUTO: NEGATIVE
LIPASE SERPL-CCNC: 52 U/L (ref 16–63)
LYMPHOCYTES # BLD AUTO: 1.17 X10*3/UL (ref 1.2–4.8)
LYMPHOCYTES NFR BLD AUTO: 31.2 %
MCH RBC QN AUTO: 24.9 PG (ref 26–34)
MCHC RBC AUTO-ENTMCNC: 31.2 G/DL (ref 32–36)
MCV RBC AUTO: 80 FL (ref 80–100)
MONOCYTES # BLD AUTO: 0.48 X10*3/UL (ref 0.1–1)
MONOCYTES NFR BLD AUTO: 12.8 %
MUCOUS THREADS #/AREA URNS AUTO: ABNORMAL /LPF
NEUTROPHILS # BLD AUTO: 1.86 X10*3/UL (ref 1.2–7.7)
NEUTROPHILS NFR BLD AUTO: 49.6 %
NITRITE UR QL STRIP.AUTO: NEGATIVE
NRBC BLD-RTO: 0 /100 WBCS (ref 0–0)
PH UR STRIP.AUTO: 5.5 [PH]
PLATELET # BLD AUTO: 69 X10*3/UL (ref 150–450)
POTASSIUM SERPL-SCNC: 4.1 MMOL/L (ref 3.4–5.1)
PROT SERPL-MCNC: 6.4 G/DL (ref 5.9–7.9)
PROT UR STRIP.AUTO-MCNC: ABNORMAL MG/DL
RBC # BLD AUTO: 3.33 X10*6/UL (ref 4–5.2)
RBC # UR STRIP.AUTO: NEGATIVE /UL
RBC #/AREA URNS AUTO: ABNORMAL /HPF
RBC MORPH BLD: NORMAL
SODIUM SERPL-SCNC: 136 MMOL/L (ref 133–145)
SP GR UR STRIP.AUTO: 1.02
SQUAMOUS #/AREA URNS AUTO: ABNORMAL /HPF
TARGETS BLD QL SMEAR: NORMAL
UROBILINOGEN UR STRIP.AUTO-MCNC: NORMAL MG/DL
WBC # BLD AUTO: 3.8 X10*3/UL (ref 4.4–11.3)
WBC #/AREA URNS AUTO: ABNORMAL /HPF

## 2024-07-20 PROCEDURE — 85025 COMPLETE CBC W/AUTO DIFF WBC: CPT | Performed by: STUDENT IN AN ORGANIZED HEALTH CARE EDUCATION/TRAINING PROGRAM

## 2024-07-20 PROCEDURE — 2500000001 HC RX 250 WO HCPCS SELF ADMINISTERED DRUGS (ALT 637 FOR MEDICARE OP): Performed by: STUDENT IN AN ORGANIZED HEALTH CARE EDUCATION/TRAINING PROGRAM

## 2024-07-20 PROCEDURE — 84075 ASSAY ALKALINE PHOSPHATASE: CPT | Performed by: STUDENT IN AN ORGANIZED HEALTH CARE EDUCATION/TRAINING PROGRAM

## 2024-07-20 PROCEDURE — 76705 ECHO EXAM OF ABDOMEN: CPT | Performed by: RADIOLOGY

## 2024-07-20 PROCEDURE — 36415 COLL VENOUS BLD VENIPUNCTURE: CPT | Performed by: STUDENT IN AN ORGANIZED HEALTH CARE EDUCATION/TRAINING PROGRAM

## 2024-07-20 PROCEDURE — 81025 URINE PREGNANCY TEST: CPT | Performed by: STUDENT IN AN ORGANIZED HEALTH CARE EDUCATION/TRAINING PROGRAM

## 2024-07-20 PROCEDURE — 96375 TX/PRO/DX INJ NEW DRUG ADDON: CPT

## 2024-07-20 PROCEDURE — 76705 ECHO EXAM OF ABDOMEN: CPT

## 2024-07-20 PROCEDURE — 81001 URINALYSIS AUTO W/SCOPE: CPT | Performed by: STUDENT IN AN ORGANIZED HEALTH CARE EDUCATION/TRAINING PROGRAM

## 2024-07-20 PROCEDURE — 87086 URINE CULTURE/COLONY COUNT: CPT | Mod: WESLAB | Performed by: STUDENT IN AN ORGANIZED HEALTH CARE EDUCATION/TRAINING PROGRAM

## 2024-07-20 PROCEDURE — 93005 ELECTROCARDIOGRAM TRACING: CPT

## 2024-07-20 PROCEDURE — 83690 ASSAY OF LIPASE: CPT | Performed by: STUDENT IN AN ORGANIZED HEALTH CARE EDUCATION/TRAINING PROGRAM

## 2024-07-20 PROCEDURE — 2500000002 HC RX 250 W HCPCS SELF ADMINISTERED DRUGS (ALT 637 FOR MEDICARE OP, ALT 636 FOR OP/ED): Performed by: STUDENT IN AN ORGANIZED HEALTH CARE EDUCATION/TRAINING PROGRAM

## 2024-07-20 PROCEDURE — 96374 THER/PROPH/DIAG INJ IV PUSH: CPT

## 2024-07-20 PROCEDURE — 2500000004 HC RX 250 GENERAL PHARMACY W/ HCPCS (ALT 636 FOR OP/ED): Performed by: STUDENT IN AN ORGANIZED HEALTH CARE EDUCATION/TRAINING PROGRAM

## 2024-07-20 PROCEDURE — 99285 EMERGENCY DEPT VISIT HI MDM: CPT | Mod: 25

## 2024-07-20 RX ORDER — FUROSEMIDE 10 MG/ML
20 INJECTION INTRAMUSCULAR; INTRAVENOUS ONCE
Status: COMPLETED | OUTPATIENT
Start: 2024-07-20 | End: 2024-07-20

## 2024-07-20 RX ORDER — INSULIN GLARGINE 100 [IU]/ML
25 INJECTION, SOLUTION SUBCUTANEOUS 2 TIMES DAILY
Qty: 10 ML | Refills: 0 | Status: SHIPPED | OUTPATIENT
Start: 2024-07-20

## 2024-07-20 RX ORDER — KETOROLAC TROMETHAMINE 30 MG/ML
15 INJECTION, SOLUTION INTRAMUSCULAR; INTRAVENOUS ONCE
Status: COMPLETED | OUTPATIENT
Start: 2024-07-20 | End: 2024-07-20

## 2024-07-20 RX ORDER — NADOLOL 20 MG/1
20 TABLET ORAL ONCE
Status: COMPLETED | OUTPATIENT
Start: 2024-07-20 | End: 2024-07-20

## 2024-07-20 RX ORDER — ONDANSETRON HYDROCHLORIDE 2 MG/ML
4 INJECTION, SOLUTION INTRAVENOUS ONCE
Status: COMPLETED | OUTPATIENT
Start: 2024-07-20 | End: 2024-07-20

## 2024-07-20 RX ORDER — OXYCODONE AND ACETAMINOPHEN 5; 325 MG/1; MG/1
1 TABLET ORAL ONCE
Status: COMPLETED | OUTPATIENT
Start: 2024-07-20 | End: 2024-07-20

## 2024-07-20 RX ORDER — INSULIN GLARGINE 100 [IU]/ML
25 INJECTION, SOLUTION SUBCUTANEOUS ONCE
Status: COMPLETED | OUTPATIENT
Start: 2024-07-20 | End: 2024-07-20

## 2024-07-20 RX ADMIN — NADOLOL 20 MG: 20 TABLET ORAL at 12:40

## 2024-07-20 RX ADMIN — INSULIN GLARGINE 25 UNITS: 100 INJECTION, SOLUTION SUBCUTANEOUS at 11:55

## 2024-07-20 RX ADMIN — OXYCODONE HYDROCHLORIDE AND ACETAMINOPHEN 1 TABLET: 5; 325 TABLET ORAL at 09:41

## 2024-07-20 RX ADMIN — KETOROLAC TROMETHAMINE 15 MG: 30 INJECTION, SOLUTION INTRAMUSCULAR at 07:16

## 2024-07-20 RX ADMIN — FUROSEMIDE 20 MG: 10 INJECTION, SOLUTION INTRAMUSCULAR; INTRAVENOUS at 07:16

## 2024-07-20 RX ADMIN — ONDANSETRON 4 MG: 2 INJECTION INTRAMUSCULAR; INTRAVENOUS at 07:16

## 2024-07-20 ASSESSMENT — PAIN - FUNCTIONAL ASSESSMENT: PAIN_FUNCTIONAL_ASSESSMENT: 0-10

## 2024-07-20 ASSESSMENT — PAIN SCALES - GENERAL: PAINLEVEL_OUTOF10: 0 - NO PAIN

## 2024-07-20 NOTE — PROGRESS NOTES
Alfonzo Flanagan is a 49 y.o. female on day 0 of admission presenting with No Principal Problem: There is no principal problem currently on the Problem List. Please update the Problem List and refresh..    [facility] received a call from Dr Mohan who explains patient has no insurance and needs help with her insulin, other medications. She is unable to afford medical follow up without insurance. He requests patient be given any available resources.   [facility] printed Medicaid application, provided contact for [facility], [facility], and  Financial Assistance print out.   Patient states she has applied for [employer], apparently been accepted but is waiting for card. States she called yesterday, no clear answer as to when she may receive it. She already has a good RX card. Dr Mohan is agreeable to write a script for patient's Lantus which she will fill at the [facility] for [facility] cost of $35.00. Strongly encouraged patient to follow up with [facility] for pcp services and possible assistance with insulin. Patient verbalized understanding of all information provided.       Danika Reyes RN

## 2024-07-20 NOTE — ED PROVIDER NOTES
HPI   Chief Complaint   Patient presents with    Vomiting    Abdominal Pain    Leg Swelling       Patient presents to the emergency department with abdominal pain, nausea, and vomiting.  She reports that starting around 4:00 this morning, she began vomiting and has vomited 3 times since then.  She reports that her abdomen and legs have been swollen.  She last had paracentesis about a week ago.  She states that she does commonly have paracentesis once or twice per week but does not have plans for future paracentesis due to insurance problems.  She has history of fatty liver causing cirrhosis.              Patient History   Past Medical History:   Diagnosis Date    Cirrhosis of liver (Multi)     Diabetes mellitus (Multi)      Past Surgical History:   Procedure Laterality Date    ABDOMINAL SURGERY      CT GUIDED IMAGING FOR NEEDLE PLACEMENT  10/14/2020    CT GUIDED IMAGING FOR NEEDLE PLACEMENT [facility]    US GUIDED ABDOMINAL PARACENTESIS  10/08/2020    US GUIDED ABDOMINAL PARACENTESIS [facility]     No family history on file.  Social History     Tobacco Use    Smoking status: Never    Smokeless tobacco: Never   Substance Use Topics    Alcohol use: Never    Drug use: Never       Physical Exam   ED Triage Vitals [07/20/24 0626]   Temperature Heart Rate Respirations BP   35.9 °C (96.6 °F) 83 16 102/52      Pulse Ox Temp Source Heart Rate Source Patient Position   100 % Temporal -- --      BP Location FiO2 (%)     Left arm --       Physical Exam  HENT:      Head: Normocephalic.   Cardiovascular:      Rate and Rhythm: Normal rate.   Abdominal:      Comments: Diffuse tenderness to palpation, worst in epigastric region   Neurological:      Mental Status: She is alert.           ED Course & MDM   ED Course as of 07/20/24 1316   Sat Jul 20, 2024   0703 EKG interpreted by me: Normal sinus rhythm, rate 75.  Normal axis.  No ST or T wave abnormalities. [ML]      ED Course User Index  [ML] Scott Mohan MD          Diagnoses as of 07/20/24 1316   Abdominal pain, unspecified abdominal location   Nausea and vomiting, unspecified vomiting type   Diabetes mellitus type 2 with complications (Multi)                       [place] Coma Scale Score: 15                        Medical Decision Making  Review of chart reveals that patient has had 6 abdominal CTs in the last 2 months.  I feel that the risks of further irradiation from CTs are greater than the risks of missed diagnosis.  Ultrasound reveals small amount of ascitic fluid but no other acute findings.  Laboratory studies unremarkable.  Patient able to tolerate oral intake in the emergency department.  My suspicion for bowel obstruction, ischemia, AAA, aortic dissection, appendicitis as etiology of symptoms is very low.  Patient advised to follow-up with primary care physician.  Return precautions given for any worsening symptoms.  Parts of this chart were completed with dictation software, please excuse any errors in transcription.        Procedure  Procedures     Scott Mohan MD  07/20/24 7384

## 2024-07-20 NOTE — DISCHARGE INSTRUCTIONS
You should follow-up with the free clinic/resources provided by emergency department care coordinator.  Return to the emergency department with any worsening symptoms.    It is important to remember that your care does not end here and you must continue to monitor your condition closely. Please return to the emergency department for any worsening or concerning signs or symptoms as directed by our conversations and the discharge instructions. If you do not have a doctor please contact the referral number on your discharge instructions. Please contact any physician specialists provided in your discharge notes as it is very important to follow up with them regarding your condition. If you are unable to reach the physicians provided, please come back to the Emergency Department at any time.    Return to emergency room without delay for ANY new or worsening pains or for any other symptoms or concerns.  Return with worsening pains, nausea, vomiting, trouble breathing, palpitations, shortness of breath, inability to pass stool or urine, loss of control of stool or urine, any numbness or tingling (that is not normal for you), uncontrolled fevers, the passing of blood or other material in stool or urine, rashes, pains or for any other symptoms or concerns you may have.  You are always welcome to return to the ER at any time for any reason or for any other concerns you may have.

## 2024-07-20 NOTE — ED TRIAGE NOTES
Pt awoke @0400, has had 3 episodes of vomiting and ABD pain that wraps around to her right, states that her belly bis distended and feet are swollen. Has Cirrhosis. A/Ox4 w/o CP SOB. Skin warm dry and itnact pink membranes. Pt recalls all events and follows all commands. Resp are equal and unlabored. MAEx4 w/o neuro defs

## 2024-07-21 ENCOUNTER — HOSPITAL ENCOUNTER (EMERGENCY)
Facility: HOSPITAL | Age: 49
Discharge: HOME | End: 2024-07-21
Attending: STUDENT IN AN ORGANIZED HEALTH CARE EDUCATION/TRAINING PROGRAM
Payer: COMMERCIAL

## 2024-07-21 VITALS
TEMPERATURE: 98.1 F | OXYGEN SATURATION: 100 % | DIASTOLIC BLOOD PRESSURE: 72 MMHG | RESPIRATION RATE: 18 BRPM | HEIGHT: 63 IN | BODY MASS INDEX: 30.86 KG/M2 | WEIGHT: 174.16 LBS | HEART RATE: 80 BPM | SYSTOLIC BLOOD PRESSURE: 122 MMHG

## 2024-07-21 DIAGNOSIS — R10.84 GENERALIZED ABDOMINAL PAIN: ICD-10-CM

## 2024-07-21 DIAGNOSIS — K75.81 LIVER CIRRHOSIS SECONDARY TO NASH (NONALCOHOLIC STEATOHEPATITIS) (MULTI): ICD-10-CM

## 2024-07-21 DIAGNOSIS — K74.60 LIVER CIRRHOSIS SECONDARY TO NASH (NONALCOHOLIC STEATOHEPATITIS) (MULTI): ICD-10-CM

## 2024-07-21 DIAGNOSIS — R11.2 NAUSEA AND VOMITING, UNSPECIFIED VOMITING TYPE: Primary | ICD-10-CM

## 2024-07-21 DIAGNOSIS — E72.20 HYPERAMMONEMIA (MULTI): ICD-10-CM

## 2024-07-21 DIAGNOSIS — E11.69 TYPE 2 DIABETES MELLITUS WITH OTHER SPECIFIED COMPLICATION, WITH LONG-TERM CURRENT USE OF INSULIN (MULTI): ICD-10-CM

## 2024-07-21 DIAGNOSIS — Z79.4 TYPE 2 DIABETES MELLITUS WITH OTHER SPECIFIED COMPLICATION, WITH LONG-TERM CURRENT USE OF INSULIN (MULTI): ICD-10-CM

## 2024-07-21 LAB
ALBUMIN SERPL-MCNC: 2.4 G/DL (ref 3.5–5)
ALP BLD-CCNC: 465 U/L (ref 35–125)
ALT SERPL-CCNC: 26 U/L (ref 5–40)
AMMONIA PLAS-SCNC: 105 UMOL/L (ref 12–45)
ANION GAP SERPL CALC-SCNC: 4 MMOL/L
APAP SERPL-MCNC: <5 UG/ML
APPEARANCE UR: CLEAR
AST SERPL-CCNC: 53 U/L (ref 5–40)
BACTERIA UR CULT: NO GROWTH
BASOPHILS # BLD AUTO: 0.04 X10*3/UL (ref 0–0.1)
BASOPHILS NFR BLD AUTO: 0.8 %
BILIRUB SERPL-MCNC: 1.3 MG/DL (ref 0.1–1.2)
BILIRUB UR STRIP.AUTO-MCNC: NEGATIVE MG/DL
BUN SERPL-MCNC: 15 MG/DL (ref 8–25)
BURR CELLS BLD QL SMEAR: NORMAL
CALCIUM SERPL-MCNC: 7.8 MG/DL (ref 8.5–10.4)
CHLORIDE SERPL-SCNC: 105 MMOL/L (ref 97–107)
CO2 SERPL-SCNC: 24 MMOL/L (ref 24–31)
COLOR UR: ABNORMAL
CREAT SERPL-MCNC: 0.8 MG/DL (ref 0.4–1.6)
EGFRCR SERPLBLD CKD-EPI 2021: >90 ML/MIN/1.73M*2
EOSINOPHIL # BLD AUTO: 0.25 X10*3/UL (ref 0–0.7)
EOSINOPHIL NFR BLD AUTO: 5.1 %
ERYTHROCYTE [DISTWIDTH] IN BLOOD BY AUTOMATED COUNT: 22.1 % (ref 11.5–14.5)
ETHANOL SERPL-MCNC: <0.01 G/DL
ETHANOL SERPL-MCNC: <0.01 G/DL
GLUCOSE SERPL-MCNC: 400 MG/DL (ref 65–99)
GLUCOSE UR STRIP.AUTO-MCNC: ABNORMAL MG/DL
HCT VFR BLD AUTO: 28 % (ref 36–46)
HGB BLD-MCNC: 8.9 G/DL (ref 12–16)
IMM GRANULOCYTES # BLD AUTO: 0.01 X10*3/UL (ref 0–0.7)
IMM GRANULOCYTES NFR BLD AUTO: 0.2 % (ref 0–0.9)
INR PPP: 1.3 (ref 0.9–1.2)
KETONES UR STRIP.AUTO-MCNC: NEGATIVE MG/DL
LEUKOCYTE ESTERASE UR QL STRIP.AUTO: ABNORMAL
LIPASE SERPL-CCNC: 60 U/L (ref 16–63)
LYMPHOCYTES # BLD AUTO: 1.3 X10*3/UL (ref 1.2–4.8)
LYMPHOCYTES NFR BLD AUTO: 26.6 %
MCH RBC QN AUTO: 24.8 PG (ref 26–34)
MCHC RBC AUTO-ENTMCNC: 31.8 G/DL (ref 32–36)
MCV RBC AUTO: 78 FL (ref 80–100)
MONOCYTES # BLD AUTO: 0.52 X10*3/UL (ref 0.1–1)
MONOCYTES NFR BLD AUTO: 10.6 %
MUCOUS THREADS #/AREA URNS AUTO: NORMAL /LPF
NEUTROPHILS # BLD AUTO: 2.77 X10*3/UL (ref 1.2–7.7)
NEUTROPHILS NFR BLD AUTO: 56.7 %
NITRITE UR QL STRIP.AUTO: NEGATIVE
NRBC BLD-RTO: 0 /100 WBCS (ref 0–0)
PH UR STRIP.AUTO: 7 [PH]
PLATELET # BLD AUTO: 65 X10*3/UL (ref 150–450)
POTASSIUM SERPL-SCNC: 5.4 MMOL/L (ref 3.4–5.1)
PROT SERPL-MCNC: 6.6 G/DL (ref 5.9–7.9)
PROT UR STRIP.AUTO-MCNC: NEGATIVE MG/DL
PROTHROMBIN TIME: 13.6 SECONDS (ref 9.3–12.7)
RBC # BLD AUTO: 3.59 X10*6/UL (ref 4–5.2)
RBC # UR STRIP.AUTO: NEGATIVE /UL
RBC #/AREA URNS AUTO: NORMAL /HPF
RBC MORPH BLD: NORMAL
SALICYLATES SERPL-MCNC: <0 MG/DL
SODIUM SERPL-SCNC: 133 MMOL/L (ref 133–145)
SP GR UR STRIP.AUTO: 1.03
SQUAMOUS #/AREA URNS AUTO: NORMAL /HPF
TARGETS BLD QL SMEAR: NORMAL
UROBILINOGEN UR STRIP.AUTO-MCNC: NORMAL MG/DL
WBC # BLD AUTO: 4.9 X10*3/UL (ref 4.4–11.3)
WBC #/AREA URNS AUTO: NORMAL /HPF

## 2024-07-21 PROCEDURE — 36415 COLL VENOUS BLD VENIPUNCTURE: CPT | Performed by: PHYSICIAN ASSISTANT

## 2024-07-21 PROCEDURE — 82077 ASSAY SPEC XCP UR&BREATH IA: CPT | Performed by: PHYSICIAN ASSISTANT

## 2024-07-21 PROCEDURE — 81003 URINALYSIS AUTO W/O SCOPE: CPT | Performed by: PHYSICIAN ASSISTANT

## 2024-07-21 PROCEDURE — 80143 DRUG ASSAY ACETAMINOPHEN: CPT | Performed by: CLINICAL NURSE SPECIALIST

## 2024-07-21 PROCEDURE — 2500000001 HC RX 250 WO HCPCS SELF ADMINISTERED DRUGS (ALT 637 FOR MEDICARE OP): Performed by: CLINICAL NURSE SPECIALIST

## 2024-07-21 PROCEDURE — 2500000004 HC RX 250 GENERAL PHARMACY W/ HCPCS (ALT 636 FOR OP/ED): Performed by: CLINICAL NURSE SPECIALIST

## 2024-07-21 PROCEDURE — 2500000002 HC RX 250 W HCPCS SELF ADMINISTERED DRUGS (ALT 637 FOR MEDICARE OP, ALT 636 FOR OP/ED): Performed by: CLINICAL NURSE SPECIALIST

## 2024-07-21 PROCEDURE — 80053 COMPREHEN METABOLIC PANEL: CPT | Performed by: PHYSICIAN ASSISTANT

## 2024-07-21 PROCEDURE — 87086 URINE CULTURE/COLONY COUNT: CPT | Mod: WESLAB | Performed by: PHYSICIAN ASSISTANT

## 2024-07-21 PROCEDURE — 82140 ASSAY OF AMMONIA: CPT | Performed by: CLINICAL NURSE SPECIALIST

## 2024-07-21 PROCEDURE — 85610 PROTHROMBIN TIME: CPT | Performed by: PHYSICIAN ASSISTANT

## 2024-07-21 PROCEDURE — 85025 COMPLETE CBC W/AUTO DIFF WBC: CPT | Performed by: PHYSICIAN ASSISTANT

## 2024-07-21 PROCEDURE — 83690 ASSAY OF LIPASE: CPT | Performed by: PHYSICIAN ASSISTANT

## 2024-07-21 PROCEDURE — 36415 COLL VENOUS BLD VENIPUNCTURE: CPT | Performed by: CLINICAL NURSE SPECIALIST

## 2024-07-21 PROCEDURE — 99284 EMERGENCY DEPT VISIT MOD MDM: CPT

## 2024-07-21 PROCEDURE — 96372 THER/PROPH/DIAG INJ SC/IM: CPT | Performed by: CLINICAL NURSE SPECIALIST

## 2024-07-21 PROCEDURE — 2500000005 HC RX 250 GENERAL PHARMACY W/O HCPCS: Performed by: CLINICAL NURSE SPECIALIST

## 2024-07-21 RX ORDER — HALOPERIDOL 5 MG/ML
2 INJECTION INTRAMUSCULAR ONCE
Status: COMPLETED | OUTPATIENT
Start: 2024-07-21 | End: 2024-07-21

## 2024-07-21 RX ORDER — LACTULOSE 10 G/15ML
30 SOLUTION ORAL DAILY
Status: DISCONTINUED | OUTPATIENT
Start: 2024-07-21 | End: 2024-07-21 | Stop reason: HOSPADM

## 2024-07-21 RX ORDER — INSULIN GLARGINE 100 [IU]/ML
25 INJECTION, SOLUTION SUBCUTANEOUS ONCE
Status: COMPLETED | OUTPATIENT
Start: 2024-07-21 | End: 2024-07-21

## 2024-07-21 RX ORDER — SUCRALFATE 1 G/10ML
1 SUSPENSION ORAL ONCE
Status: COMPLETED | OUTPATIENT
Start: 2024-07-21 | End: 2024-07-21

## 2024-07-21 RX ORDER — NADOLOL 40 MG/1
40 TABLET ORAL ONCE
Status: COMPLETED | OUTPATIENT
Start: 2024-07-21 | End: 2024-07-21

## 2024-07-21 RX ORDER — HYOSCYAMINE SULFATE 0.12 MG/1
0.12 TABLET, ORALLY DISINTEGRATING ORAL ONCE
Status: COMPLETED | OUTPATIENT
Start: 2024-07-21 | End: 2024-07-21

## 2024-07-21 ASSESSMENT — PAIN DESCRIPTION - LOCATION: LOCATION: ABDOMEN

## 2024-07-21 ASSESSMENT — PAIN DESCRIPTION - PAIN TYPE: TYPE: ACUTE PAIN

## 2024-07-21 ASSESSMENT — PAIN SCALES - GENERAL: PAINLEVEL_OUTOF10: 10 - WORST POSSIBLE PAIN

## 2024-07-21 ASSESSMENT — PAIN - FUNCTIONAL ASSESSMENT: PAIN_FUNCTIONAL_ASSESSMENT: 0-10

## 2024-07-21 NOTE — DISCHARGE INSTRUCTIONS
Follow-up with primary care physician in 2 days for reevaluation  It is important that you get your medications filled.  Take your medication as directed.  Referral to healthy at home has been provided to you to assist with getting her medications  Diet modifications for nausea vomiting continue with your Zofran  Follow-up with your gastroenterologist  Return with any worsening symptoms or concerns    If you do not hear from healthy at home please call them  Today you received your lactulose, your nadolol and your Lantus in the emergency department

## 2024-07-21 NOTE — ED PROVIDER NOTES
Department of Emergency Medicine   ED  Provider Note  Admit Date/RoomTime: 7/21/2024  2:45 PM  ED Room: ST27/ST        History of Present Illness:  Chief Complaint   Patient presents with    Abdominal Pain     Abdominal pain. Pt states everything is still the same as when she was seen yesterday. Still vomiting.         Alfonzo Flanagan is a 49 y.o. female history of cirrhosis of the liver, Naveen seen evaluated yesterday for abdominal pain nausea vomiting.  Onset of symptoms 4:00 yesterday morning.  Laboratory data obtained yesterday.  Discharged home presents today with no improvement of her symptoms.  Patient presents today because she had 2 more episodes of vomiting continues to have abdominal pain swelling in both of her legs reports her stomach feels more swollen than it was yesterday.  She had a paracentesis last week.  Reports her emesis is white to yellow in color.  She has had 8 liquid bowel movements dark in color today.  No fever chills cough congestion runny nose sore throat no chest pain.  No shortness of breath.  Reports swelling in her lower extremities.  When patient was seen evaluated yesterday care coordinator assisted her with getting her medications with good Rx.  Family reports patient has history of high ammonia level.  She has vomited today after her lactulose but did take her lactulose and MiraLAX.  Patient reports she is taking her Ultram her naproxen and ibuprofen with no improvement of her pain  Review of Systems:   Pertinent positives and negatives are stated within HPI, all other systems reviewed and are negative.        --------------------------------------------- PAST HISTORY ---------------------------------------------  Past Medical History:  has a past medical history of Cirrhosis of liver (Multi) and Diabetes mellitus (Multi).  Past Surgical History:  has a past surgical history that includes CT guided imaging for needle placement (10/14/2020); US guided abdominal  paracentesis (10/08/2020); and Abdominal surgery.  Social History:  reports that she has never smoked. She has never used smokeless tobacco. She reports that she does not drink alcohol and does not use drugs.  Family History: family history is not on file.. Unless otherwise noted, family history is non contributory  The patient’s home medications have been reviewed.  Allergies: Gluten        ---------------------------------------------------PHYSICAL EXAM--------------------------------------    GENERAL APPEARANCE: Awake and alert.   VITAL SIGNS: As per the nurses' triage record.   HEENT: Normocephalic, atraumatic. Extraocular muscles are intact. Pupils equal round and reactive to light. Conjunctiva are pink. Negative scleral icterus. Mucous membranes are moist. Tongue in the midline. Pharynx was without erythema or exudates, uvula midline  NECK: Soft Nontender and supple, full gross ROM, no meningeal signs.  CHEST: Nontender to palpation. Clear to auscultation bilaterally. No rales, rhonchi, or wheezing.   HEART: S1, S2. Regular rate and rhythm. No murmurs, gallops or rubs.  Strong and equal pulses in the extremities.   ABDOMEN: Soft, , distended positive bowel sounds, no palpable masses.  Diffuse tenderness back: No pain palpation cervical thoracic or lumbar spine no step-offs crepitus or bruising no CVA tenderness no saddle paresthesias  MUSCULCSKELETAL: The calves are nontender to palpation. Full gross active range of motion.  +1 pitting edema bilateral lower extremities peripheral pulses intact  NEUROLOGICAL: Awake, alert and oriented x 3. Power intact in the upper and lower extremities. Sensation is intact to light touch in the upper and lower extremities.   IMMUNOLOGICAL: No lymphatic streaking noted   DERM: No petechiae, rashes, or ecchymoses.          ------------------------- NURSING NOTES AND VITALS REVIEWED ---------------------------  The nursing notes within the ED encounter and vital signs as below  "have been reviewed by myself  /72 (BP Location: Left arm, Patient Position: Sitting)   Pulse 80   Temp 36.7 °C (98.1 °F) (Tympanic)   Resp 18   Ht 1.6 m (5' 3\")   Wt 79 kg (174 lb 2.6 oz)   SpO2 100%   BMI 30.85 kg/m²     Oxygen Saturation Interpretation: 100% room air      The patient’s available past medical records and past encounters were reviewed.          -----------------------DIAGNOSTIC RESULTS------------------------  LABS:    Labs Reviewed   CBC WITH AUTO DIFFERENTIAL - Abnormal       Result Value    WBC 4.9      nRBC 0.0      RBC 3.59 (*)     Hemoglobin 8.9 (*)     Hematocrit 28.0 (*)     MCV 78 (*)     MCH 24.8 (*)     MCHC 31.8 (*)     RDW 22.1 (*)     Platelets 65 (*)     Neutrophils % 56.7      Immature Granulocytes %, Automated 0.2      Lymphocytes % 26.6      Monocytes % 10.6      Eosinophils % 5.1      Basophils % 0.8      Neutrophils Absolute 2.77      Immature Granulocytes Absolute, Automated 0.01      Lymphocytes Absolute 1.30      Monocytes Absolute 0.52      Eosinophils Absolute 0.25      Basophils Absolute 0.04     COMPREHENSIVE METABOLIC PANEL - Abnormal    Glucose 400 (*)     Sodium 133      Potassium 5.4 (*)     Chloride 105      Bicarbonate 24      Urea Nitrogen 15      Creatinine 0.80      eGFR >90      Calcium 7.8 (*)     Albumin 2.4 (*)     Alkaline Phosphatase 465 (*)     Total Protein 6.6      AST 53 (*)     Bilirubin, Total 1.3 (*)     ALT 26      Anion Gap 4     PROTIME-INR - Abnormal    Protime 13.6 (*)     INR 1.3 (*)     Narrative:     INR Therapeutic Range: 2.0-3.5   URINALYSIS WITH REFLEX CULTURE AND MICROSCOPIC - Abnormal    Color, Urine Light-Yellow      Appearance, Urine Clear      Specific Gravity, Urine 1.027      pH, Urine 7.0      Protein, Urine NEGATIVE      Glucose, Urine OVER (4+) (*)     Blood, Urine NEGATIVE      Ketones, Urine NEGATIVE      Bilirubin, Urine NEGATIVE      Urobilinogen, Urine Normal      Nitrite, Urine NEGATIVE      Leukocyte Esterase, " Urine 250 Mckenzie/µL (*)    AMMONIA - Abnormal    Ammonia 105 (*)    LIPASE - Normal    Lipase 60     ALCOHOL - Normal    Alcohol <0.010     ACUTE TOXICOLOGY PANEL, BLOOD - Normal    Acetaminophen <5.0      Salicylate  <0      Alcohol <0.010     URINE CULTURE   MICROSCOPIC ONLY, URINE    WBC, Urine 1-5      RBC, Urine 1-2      Squamous Epithelial Cells, Urine 1-9 (SPARSE)      Mucus, Urine FEW     URINALYSIS WITH REFLEX CULTURE AND MICROSCOPIC    Narrative:     The following orders were created for panel order Urinalysis with Reflex Culture and Microscopic.  Procedure                               Abnormality         Status                     ---------                               -----------         ------                     Urinalysis with Reflex C...[[id number]]  Abnormal            Final result               Extra Urine Gray Tube[[id number]]                            In process                   Please view results for these tests on the individual orders.   DRUG SCREEN,URINE   EXTRA URINE GRAY TUBE   MORPHOLOGY    RBC Morphology See Below      Target Cells Few      Marcy Cells Few         As interpreted by me, the above displayed labs are abnormal. All other labs obtained during this visit were within normal range or not returned as of this dictation.        ------------------------------ ED COURSE/MEDICAL DECISION MAKING----------------------  Medical Decision Making:   Exam: A medically appropriate exam performed, outlined above, given the known history and presentation.    History obtained from: Review of medical record nursing notes patient      Social Determinants of Health considered during this visit: Takes care of herself at home      PAST MEDICAL HISTORY/Chronic Conditions Affecting Care     has a past medical history of Cirrhosis of liver (Multi) and Diabetes mellitus (Multi).       CC/HPI Summary, Social Determinants of health, Records Reviewed, DDx, testing done/not done, ED Course, Reassessment,  disposition considerations/shared decision making with patient, consults, disposition:   Presents with abdominal pain nausea vomiting  Plan  CBC  CMP  Lipase  PT/INR  Urine  Drug screen  Alcohol  Medical Decision Making/Differential Diagnosis:  Differentials include but not limited to worsening cirrhosis versus electrolyte abnormality versus dehydration versus ascites  Review  Alcohol less than 0.01  Lipase 60  Pro time 13.6  INR 1.3  Glucose 400  Potassium 5.4  Calcium 7.8 corrected calcium 8.7  Albumin 2.4  Alkaline phosphatase 465  AST 53  Total bili 1.3  Ultrasound 7/20 1. Small volume of ascites in all 4 quadrants of the abdomen  2. Liver has a nodular surface probably due to cirrhosis.  3. Pancreas is obscured by bowel gas  4. Gallbladder has been removed    Patient presents emergency department complaints of nausea vomiting since yesterday and had significant laboratory data and ultrasound.  Patient reports medication patient needed for pain.  Care coordinator was consulted yesterday to help with medications.  Patient was given referral to free clinic.  Tox screen negative glucose is 400 however patient does not appear to be in DKA.  Lantus was given potassium slightly 5.4 received lactulose.  Ammonia level Friday as well as history of the same procedure normal lactulose patient takes lactulose 4 times a day at home.  She does have this medication available to her.  Her alcohol level is negative.  Calcium 7.8 corrected calcium reviewed at baseline alkaline phosphatase elevated history of the same AST 53 history of the same bili total 1.3 no signs of jaundice lipase normal no elevation of white blood cell count anemia history of the same at baseline no signs of active bleeding patient was medicated for nausea vomiting in the emergency department reports improvement of pain and symptoms.  Patient was given referral to help you at home to help with her medications and comorbidities of multiple emergency room  visits.  Advised to follow-up with her doctor in 2 days return with any worsening symptoms or concerns she is in no acute distress she is not hypoxic tachypneic febrile tachycardic.  No change in mental status.  Patient is amenable to plan amenable to discharge as well as family.  She has Zofran at home based on patient's clinical presentation history and symptoms consistent with nausea vomiting abdominal pain history of the same diabetes received her insulin here in the emergency department referral to help at home to help with diabetic care and medication.  Generalized abdominal pain history of the same suspect this is secondary to her ascites.  Ultrasound reviewed from yesterday.  Hyperammonemia lactulose given history of the same no change in mental status.  Seen and evaluated with attending physician Dr. Mohan   Patient amenable to plan amenable to discharge CMT for discharge [facility] for discharge planning  PROCEDURES  Unless otherwise noted below, none      CONSULTS:   None      Diagnoses as of 07/21/24 1655   Type 2 diabetes mellitus with other specified complication, with long-term current use of insulin (Multi)   Liver cirrhosis secondary to REED (nonalcoholic steatohepatitis) (Multi)   Nausea and vomiting, unspecified vomiting type   Generalized abdominal pain   Hyperammonemia (Multi)         This patient has remained hemodynamically stable during their ED course.      Critical Care: None      Counseling:  The emergency provider has spoken with the patient family and discussed today’s results, in addition to providing specific details for the plan of care and counseling regarding the diagnosis and prognosis.  Questions are answered at this time and they are agreeable with the plan.         --------------------------------- IMPRESSION AND DISPOSITION ---------------------------------    IMPRESSION  1. Nausea and vomiting, unspecified vomiting type    2. Type 2 diabetes mellitus with other specified  complication, with long-term current use of insulin (Multi)    3. Liver cirrhosis secondary to REED (nonalcoholic steatohepatitis) (Multi)    4. Generalized abdominal pain    5. Hyperammonemia (Multi)        DISPOSITION  Disposition: Discharge home  Patient condition is stable improved        NOTE: This report was transcribed using voice recognition software. Every effort was made to ensure accuracy; however, inadvertent computerized transcription errors may be present      MADYSON Vega-CNP  07/21/24 1243

## 2024-07-21 NOTE — ED PROVIDER NOTES
Supervisory note:  Patient seen in conjunction with Klei Jacome NP.  Patient presents with abdominal pain, nausea, and vomiting.  She was seen in the emergency department yesterday with similar complaints.  She does have cirrhosis secondary to [place].  On examination, patient is awake, alert, answers questions appropriately.  Vital signs unremarkable.  Diffuse tenderness to palpation.    Laboratory studies unremarkable.  My suspicion for surgical emergency is very low.  Patient advised to follow-up with primary care physician.  Return precautions given for any worsening symptoms.  Bowel obstruction, ischemia, AAA, aortic dissection, perforation, appendicitis, cholecystitis felt to be very unlikely.  I personally saw the patient and made/approved the management plan and take responsiblity for the patient management.  Parts of this chart were completed with dictation software, please excuse any errors in transcription.     Scott Mohan MD  07/21/24 9652

## 2024-07-22 LAB
ATRIAL RATE: 75 BPM
HOLD SPECIMEN: NORMAL
P AXIS: 63 DEGREES
P OFFSET: 183 MS
P ONSET: 136 MS
PR INTERVAL: 182 MS
Q ONSET: 227 MS
QRS COUNT: 13 BEATS
QRS DURATION: 78 MS
QT INTERVAL: 426 MS
QTC CALCULATION(BAZETT): 475 MS
QTC FREDERICIA: 458 MS
R AXIS: 3 DEGREES
T AXIS: 27 DEGREES
T OFFSET: 440 MS
VENTRICULAR RATE: 75 BPM

## 2024-07-23 LAB — BACTERIA UR CULT: NORMAL

## 2024-08-03 ENCOUNTER — HOSPITAL ENCOUNTER (INPATIENT)
Facility: HOSPITAL | Age: 49
LOS: 3 days | Discharge: HOME | DRG: 442 | End: 2024-08-06
Attending: STUDENT IN AN ORGANIZED HEALTH CARE EDUCATION/TRAINING PROGRAM | Admitting: INTERNAL MEDICINE
Payer: COMMERCIAL

## 2024-08-03 ENCOUNTER — APPOINTMENT (OUTPATIENT)
Dept: RADIOLOGY | Facility: HOSPITAL | Age: 49
DRG: 442 | End: 2024-08-03
Payer: COMMERCIAL

## 2024-08-03 ENCOUNTER — APPOINTMENT (OUTPATIENT)
Dept: CARDIOLOGY | Facility: HOSPITAL | Age: 49
DRG: 442 | End: 2024-08-03
Payer: COMMERCIAL

## 2024-08-03 DIAGNOSIS — R04.0 EPISTAXIS: ICD-10-CM

## 2024-08-03 DIAGNOSIS — Z79.899 LONG TERM CURRENT USE OF DIURETIC: ICD-10-CM

## 2024-08-03 DIAGNOSIS — E11.9 TYPE 2 DIABETES MELLITUS WITHOUT COMPLICATION, WITH LONG-TERM CURRENT USE OF INSULIN (MULTI): ICD-10-CM

## 2024-08-03 DIAGNOSIS — K70.31 ASCITES DUE TO ALCOHOLIC CIRRHOSIS (MULTI): ICD-10-CM

## 2024-08-03 DIAGNOSIS — D61.818 PANCYTOPENIA (MULTI): ICD-10-CM

## 2024-08-03 DIAGNOSIS — R73.9 HYPERGLYCEMIA: ICD-10-CM

## 2024-08-03 DIAGNOSIS — R10.84 ABDOMINAL PAIN, GENERALIZED: Primary | ICD-10-CM

## 2024-08-03 DIAGNOSIS — R18.8 OTHER ASCITES: ICD-10-CM

## 2024-08-03 DIAGNOSIS — E46 PROTEIN MALNUTRITION (MULTI): ICD-10-CM

## 2024-08-03 DIAGNOSIS — Z79.4 TYPE 2 DIABETES MELLITUS WITHOUT COMPLICATION, WITH LONG-TERM CURRENT USE OF INSULIN (MULTI): ICD-10-CM

## 2024-08-03 LAB
ALBUMIN SERPL-MCNC: 2.4 G/DL (ref 3.5–5)
ALP BLD-CCNC: 496 U/L (ref 35–125)
ALT SERPL-CCNC: 24 U/L (ref 5–40)
AMMONIA PLAS-SCNC: 67 UMOL/L (ref 12–45)
ANION GAP SERPL CALC-SCNC: 3 MMOL/L
APPEARANCE UR: CLEAR
AST SERPL-CCNC: 44 U/L (ref 5–40)
B-OH-BUTYR+ACETOACET BLD-SCNC: 0.1 MMOL/L (ref 0.1–0.3)
BASE EXCESS BLDV CALC-SCNC: 4.5 MMOL/L (ref -2–3)
BASOPHILS # BLD AUTO: 0.02 X10*3/UL (ref 0–0.1)
BASOPHILS NFR BLD AUTO: 0.4 %
BILIRUB SERPL-MCNC: 1.4 MG/DL (ref 0.1–1.2)
BILIRUB UR STRIP.AUTO-MCNC: NEGATIVE MG/DL
BODY TEMPERATURE: 37 DEGREES CELSIUS
BUN SERPL-MCNC: 11 MG/DL (ref 8–25)
CALCIUM SERPL-MCNC: 7.9 MG/DL (ref 8.5–10.4)
CHLORIDE SERPL-SCNC: 103 MMOL/L (ref 97–107)
CO2 SERPL-SCNC: 28 MMOL/L (ref 24–31)
COLOR UR: ABNORMAL
CREAT SERPL-MCNC: 0.6 MG/DL (ref 0.4–1.6)
EGFRCR SERPLBLD CKD-EPI 2021: >90 ML/MIN/1.73M*2
EOSINOPHIL # BLD AUTO: 0.2 X10*3/UL (ref 0–0.7)
EOSINOPHIL NFR BLD AUTO: 4.2 %
ERYTHROCYTE [DISTWIDTH] IN BLOOD BY AUTOMATED COUNT: 21.2 % (ref 11.5–14.5)
GLUCOSE BLD MANUAL STRIP-MCNC: 198 MG/DL (ref 74–99)
GLUCOSE BLD MANUAL STRIP-MCNC: 211 MG/DL (ref 74–99)
GLUCOSE BLD MANUAL STRIP-MCNC: 230 MG/DL (ref 74–99)
GLUCOSE BLD MANUAL STRIP-MCNC: 291 MG/DL (ref 74–99)
GLUCOSE BLD MANUAL STRIP-MCNC: 305 MG/DL (ref 74–99)
GLUCOSE SERPL-MCNC: 319 MG/DL (ref 65–99)
GLUCOSE UR STRIP.AUTO-MCNC: ABNORMAL MG/DL
HCO3 BLDV-SCNC: 29.8 MMOL/L (ref 22–26)
HCT VFR BLD AUTO: 26.4 % (ref 36–46)
HGB BLD-MCNC: 8.4 G/DL (ref 12–16)
HOLD SPECIMEN: NORMAL
IMM GRANULOCYTES # BLD AUTO: 0.01 X10*3/UL (ref 0–0.7)
IMM GRANULOCYTES NFR BLD AUTO: 0.2 % (ref 0–0.9)
INHALED O2 CONCENTRATION: 21 %
KETONES UR STRIP.AUTO-MCNC: NEGATIVE MG/DL
LEUKOCYTE ESTERASE UR QL STRIP.AUTO: ABNORMAL
LIPASE SERPL-CCNC: 49 U/L (ref 16–63)
LYMPHOCYTES # BLD AUTO: 1.23 X10*3/UL (ref 1.2–4.8)
LYMPHOCYTES NFR BLD AUTO: 25.8 %
MAGNESIUM SERPL-MCNC: 1.8 MG/DL (ref 1.6–3.1)
MCH RBC QN AUTO: 25.1 PG (ref 26–34)
MCHC RBC AUTO-ENTMCNC: 31.8 G/DL (ref 32–36)
MCV RBC AUTO: 79 FL (ref 80–100)
MONOCYTES # BLD AUTO: 0.6 X10*3/UL (ref 0.1–1)
MONOCYTES NFR BLD AUTO: 12.6 %
NEUTROPHILS # BLD AUTO: 2.7 X10*3/UL (ref 1.2–7.7)
NEUTROPHILS NFR BLD AUTO: 56.8 %
NITRITE UR QL STRIP.AUTO: NEGATIVE
NRBC BLD-RTO: 0 /100 WBCS (ref 0–0)
OVALOCYTES BLD QL SMEAR: NORMAL
OXYHGB MFR BLDV: 46.3 % (ref 45–75)
PCO2 BLDV: 46 MM HG (ref 41–51)
PH BLDV: 7.42 PH (ref 7.33–7.43)
PH UR STRIP.AUTO: 6 [PH]
PLATELET # BLD AUTO: 87 X10*3/UL (ref 150–450)
PO2 BLDV: 36 MM HG (ref 35–45)
POLYCHROMASIA BLD QL SMEAR: NORMAL
POTASSIUM SERPL-SCNC: 4.1 MMOL/L (ref 3.4–5.1)
PROT SERPL-MCNC: 6.5 G/DL (ref 5.9–7.9)
PROT UR STRIP.AUTO-MCNC: NEGATIVE MG/DL
RBC # BLD AUTO: 3.34 X10*6/UL (ref 4–5.2)
RBC # UR STRIP.AUTO: ABNORMAL /UL
RBC #/AREA URNS AUTO: >20 /HPF
RBC MORPH BLD: NORMAL
SAO2 % BLDV: 47 % (ref 45–75)
SODIUM SERPL-SCNC: 134 MMOL/L (ref 133–145)
SP GR UR STRIP.AUTO: 1.03
SQUAMOUS #/AREA URNS AUTO: ABNORMAL /HPF
TARGETS BLD QL SMEAR: NORMAL
UROBILINOGEN UR STRIP.AUTO-MCNC: NORMAL MG/DL
WBC # BLD AUTO: 4.8 X10*3/UL (ref 4.4–11.3)
WBC #/AREA URNS AUTO: ABNORMAL /HPF

## 2024-08-03 PROCEDURE — 82805 BLOOD GASES W/O2 SATURATION: CPT | Performed by: STUDENT IN AN ORGANIZED HEALTH CARE EDUCATION/TRAINING PROGRAM

## 2024-08-03 PROCEDURE — 96374 THER/PROPH/DIAG INJ IV PUSH: CPT

## 2024-08-03 PROCEDURE — 96375 TX/PRO/DX INJ NEW DRUG ADDON: CPT

## 2024-08-03 PROCEDURE — 83540 ASSAY OF IRON: CPT

## 2024-08-03 PROCEDURE — 82947 ASSAY GLUCOSE BLOOD QUANT: CPT

## 2024-08-03 PROCEDURE — 36415 COLL VENOUS BLD VENIPUNCTURE: CPT | Performed by: STUDENT IN AN ORGANIZED HEALTH CARE EDUCATION/TRAINING PROGRAM

## 2024-08-03 PROCEDURE — 83690 ASSAY OF LIPASE: CPT | Performed by: STUDENT IN AN ORGANIZED HEALTH CARE EDUCATION/TRAINING PROGRAM

## 2024-08-03 PROCEDURE — 2500000004 HC RX 250 GENERAL PHARMACY W/ HCPCS (ALT 636 FOR OP/ED): Performed by: STUDENT IN AN ORGANIZED HEALTH CARE EDUCATION/TRAINING PROGRAM

## 2024-08-03 PROCEDURE — 83735 ASSAY OF MAGNESIUM: CPT | Performed by: STUDENT IN AN ORGANIZED HEALTH CARE EDUCATION/TRAINING PROGRAM

## 2024-08-03 PROCEDURE — 93010 ELECTROCARDIOGRAM REPORT: CPT | Performed by: INTERNAL MEDICINE

## 2024-08-03 PROCEDURE — 2550000001 HC RX 255 CONTRASTS: Performed by: STUDENT IN AN ORGANIZED HEALTH CARE EDUCATION/TRAINING PROGRAM

## 2024-08-03 PROCEDURE — 2500000004 HC RX 250 GENERAL PHARMACY W/ HCPCS (ALT 636 FOR OP/ED): Performed by: INTERNAL MEDICINE

## 2024-08-03 PROCEDURE — 82010 KETONE BODYS QUAN: CPT | Performed by: STUDENT IN AN ORGANIZED HEALTH CARE EDUCATION/TRAINING PROGRAM

## 2024-08-03 PROCEDURE — 85025 COMPLETE CBC W/AUTO DIFF WBC: CPT | Performed by: STUDENT IN AN ORGANIZED HEALTH CARE EDUCATION/TRAINING PROGRAM

## 2024-08-03 PROCEDURE — 80053 COMPREHEN METABOLIC PANEL: CPT | Performed by: STUDENT IN AN ORGANIZED HEALTH CARE EDUCATION/TRAINING PROGRAM

## 2024-08-03 PROCEDURE — 99285 EMERGENCY DEPT VISIT HI MDM: CPT | Mod: 25

## 2024-08-03 PROCEDURE — 96361 HYDRATE IV INFUSION ADD-ON: CPT

## 2024-08-03 PROCEDURE — 2500000001 HC RX 250 WO HCPCS SELF ADMINISTERED DRUGS (ALT 637 FOR MEDICARE OP): Performed by: INTERNAL MEDICINE

## 2024-08-03 PROCEDURE — 1210000001 HC SEMI-PRIVATE ROOM DAILY

## 2024-08-03 PROCEDURE — 93005 ELECTROCARDIOGRAM TRACING: CPT

## 2024-08-03 PROCEDURE — 2500000002 HC RX 250 W HCPCS SELF ADMINISTERED DRUGS (ALT 637 FOR MEDICARE OP, ALT 636 FOR OP/ED): Performed by: INTERNAL MEDICINE

## 2024-08-03 PROCEDURE — 74177 CT ABD & PELVIS W/CONTRAST: CPT | Mod: FOREIGN READ | Performed by: RADIOLOGY

## 2024-08-03 PROCEDURE — 82140 ASSAY OF AMMONIA: CPT | Performed by: STUDENT IN AN ORGANIZED HEALTH CARE EDUCATION/TRAINING PROGRAM

## 2024-08-03 PROCEDURE — 82728 ASSAY OF FERRITIN: CPT

## 2024-08-03 PROCEDURE — 81001 URINALYSIS AUTO W/SCOPE: CPT | Performed by: STUDENT IN AN ORGANIZED HEALTH CARE EDUCATION/TRAINING PROGRAM

## 2024-08-03 PROCEDURE — 87086 URINE CULTURE/COLONY COUNT: CPT | Mod: WESLAB | Performed by: STUDENT IN AN ORGANIZED HEALTH CARE EDUCATION/TRAINING PROGRAM

## 2024-08-03 PROCEDURE — 74177 CT ABD & PELVIS W/CONTRAST: CPT

## 2024-08-03 RX ORDER — ATORVASTATIN CALCIUM 80 MG/1
80 TABLET, FILM COATED ORAL NIGHTLY
Status: DISCONTINUED | OUTPATIENT
Start: 2024-08-03 | End: 2024-08-06 | Stop reason: HOSPADM

## 2024-08-03 RX ORDER — ALUMINUM HYDROXIDE, MAGNESIUM HYDROXIDE, AND SIMETHICONE 1200; 120; 1200 MG/30ML; MG/30ML; MG/30ML
30 SUSPENSION ORAL ONCE
Status: DISCONTINUED | OUTPATIENT
Start: 2024-08-03 | End: 2024-08-03

## 2024-08-03 RX ORDER — SPIRONOLACTONE 50 MG/1
50 TABLET, FILM COATED ORAL 2 TIMES DAILY
Status: DISCONTINUED | OUTPATIENT
Start: 2024-08-03 | End: 2024-08-04

## 2024-08-03 RX ORDER — FENOFIBRATE 160 MG/1
160 TABLET ORAL DAILY
Status: DISCONTINUED | OUTPATIENT
Start: 2024-08-03 | End: 2024-08-06 | Stop reason: HOSPADM

## 2024-08-03 RX ORDER — LACTULOSE 10 G/15ML
20 SOLUTION ORAL 4 TIMES DAILY
Status: DISCONTINUED | OUTPATIENT
Start: 2024-08-03 | End: 2024-08-06 | Stop reason: HOSPADM

## 2024-08-03 RX ORDER — INSULIN LISPRO 100 [IU]/ML
0-10 INJECTION, SOLUTION INTRAVENOUS; SUBCUTANEOUS
Status: DISCONTINUED | OUTPATIENT
Start: 2024-08-03 | End: 2024-08-06 | Stop reason: HOSPADM

## 2024-08-03 RX ORDER — SUCRALFATE 1 G/10ML
1 SUSPENSION ORAL
Status: DISCONTINUED | OUTPATIENT
Start: 2024-08-03 | End: 2024-08-06 | Stop reason: HOSPADM

## 2024-08-03 RX ORDER — PANTOPRAZOLE SODIUM 40 MG/1
40 TABLET, DELAYED RELEASE ORAL DAILY
Status: DISCONTINUED | OUTPATIENT
Start: 2024-08-03 | End: 2024-08-06 | Stop reason: HOSPADM

## 2024-08-03 RX ORDER — MORPHINE SULFATE 2 MG/ML
0.5 INJECTION, SOLUTION INTRAMUSCULAR; INTRAVENOUS EVERY 4 HOURS PRN
Status: DISCONTINUED | OUTPATIENT
Start: 2024-08-03 | End: 2024-08-06 | Stop reason: HOSPADM

## 2024-08-03 RX ORDER — DEXTROSE 50 % IN WATER (D50W) INTRAVENOUS SYRINGE
25
Status: DISCONTINUED | OUTPATIENT
Start: 2024-08-03 | End: 2024-08-06 | Stop reason: HOSPADM

## 2024-08-03 RX ORDER — NADOLOL 20 MG/1
20 TABLET ORAL DAILY
Status: DISCONTINUED | OUTPATIENT
Start: 2024-08-03 | End: 2024-08-04

## 2024-08-03 RX ORDER — FUROSEMIDE 40 MG/1
40 TABLET ORAL DAILY
Status: DISCONTINUED | OUTPATIENT
Start: 2024-08-03 | End: 2024-08-03

## 2024-08-03 RX ORDER — FAMOTIDINE 10 MG/ML
20 INJECTION INTRAVENOUS EVERY 12 HOURS SCHEDULED
Status: COMPLETED | OUTPATIENT
Start: 2024-08-03 | End: 2024-08-03

## 2024-08-03 RX ORDER — HYDROXYZINE HYDROCHLORIDE 25 MG/1
25 TABLET, FILM COATED ORAL EVERY 6 HOURS PRN
Status: DISCONTINUED | OUTPATIENT
Start: 2024-08-03 | End: 2024-08-06 | Stop reason: HOSPADM

## 2024-08-03 RX ORDER — LANOLIN ALCOHOL/MO/W.PET/CERES
400 CREAM (GRAM) TOPICAL DAILY
Status: DISCONTINUED | OUTPATIENT
Start: 2024-08-03 | End: 2024-08-06 | Stop reason: HOSPADM

## 2024-08-03 RX ORDER — GABAPENTIN 100 MG/1
200 CAPSULE ORAL 3 TIMES DAILY
Status: DISCONTINUED | OUTPATIENT
Start: 2024-08-03 | End: 2024-08-06 | Stop reason: HOSPADM

## 2024-08-03 RX ORDER — DOCUSATE SODIUM 100 MG/1
100 CAPSULE, LIQUID FILLED ORAL 2 TIMES DAILY
Status: DISCONTINUED | OUTPATIENT
Start: 2024-08-03 | End: 2024-08-06 | Stop reason: HOSPADM

## 2024-08-03 RX ORDER — TRAMADOL HYDROCHLORIDE 50 MG/1
50 TABLET ORAL EVERY 6 HOURS PRN
Status: DISCONTINUED | OUTPATIENT
Start: 2024-08-03 | End: 2024-08-06 | Stop reason: HOSPADM

## 2024-08-03 RX ORDER — MORPHINE SULFATE 4 MG/ML
4 INJECTION, SOLUTION INTRAMUSCULAR; INTRAVENOUS ONCE
Status: COMPLETED | OUTPATIENT
Start: 2024-08-03 | End: 2024-08-03

## 2024-08-03 RX ORDER — POLYETHYLENE GLYCOL 3350 17 G/17G
17 POWDER, FOR SOLUTION ORAL 2 TIMES DAILY
Status: DISCONTINUED | OUTPATIENT
Start: 2024-08-03 | End: 2024-08-06 | Stop reason: HOSPADM

## 2024-08-03 RX ORDER — URSODIOL 300 MG/1
300 CAPSULE ORAL 3 TIMES DAILY
Status: DISCONTINUED | OUTPATIENT
Start: 2024-08-03 | End: 2024-08-06 | Stop reason: HOSPADM

## 2024-08-03 RX ORDER — INSULIN GLARGINE 100 [IU]/ML
25 INJECTION, SOLUTION SUBCUTANEOUS 2 TIMES DAILY
Status: DISCONTINUED | OUTPATIENT
Start: 2024-08-03 | End: 2024-08-06 | Stop reason: HOSPADM

## 2024-08-03 RX ORDER — FUROSEMIDE 10 MG/ML
40 INJECTION INTRAMUSCULAR; INTRAVENOUS EVERY 12 HOURS
Status: DISCONTINUED | OUTPATIENT
Start: 2024-08-03 | End: 2024-08-04

## 2024-08-03 RX ORDER — FAMOTIDINE 20 MG/1
20 TABLET, FILM COATED ORAL ONCE
Status: DISCONTINUED | OUTPATIENT
Start: 2024-08-03 | End: 2024-08-03

## 2024-08-03 RX ORDER — ONDANSETRON HYDROCHLORIDE 2 MG/ML
4 INJECTION, SOLUTION INTRAVENOUS ONCE
Status: COMPLETED | OUTPATIENT
Start: 2024-08-03 | End: 2024-08-03

## 2024-08-03 RX ORDER — TRAZODONE HYDROCHLORIDE 50 MG/1
25 TABLET ORAL NIGHTLY
Status: DISCONTINUED | OUTPATIENT
Start: 2024-08-03 | End: 2024-08-06 | Stop reason: HOSPADM

## 2024-08-03 RX ORDER — DEXTROSE 50 % IN WATER (D50W) INTRAVENOUS SYRINGE
12.5
Status: DISCONTINUED | OUTPATIENT
Start: 2024-08-03 | End: 2024-08-06 | Stop reason: HOSPADM

## 2024-08-03 RX ORDER — LIDOCAINE HYDROCHLORIDE 20 MG/ML
15 SOLUTION OROPHARYNGEAL ONCE
Status: DISCONTINUED | OUTPATIENT
Start: 2024-08-03 | End: 2024-08-03

## 2024-08-03 SDOH — SOCIAL STABILITY: SOCIAL NETWORK: HOW OFTEN DO YOU ATTEND CHURCH OR RELIGIOUS SERVICES?: 1 TO 4 TIMES PER YEAR

## 2024-08-03 SDOH — SOCIAL STABILITY: SOCIAL NETWORK: HOW OFTEN DO YOU ATTENT MEETINGS OF THE CLUB OR ORGANIZATION YOU BELONG TO?: NEVER

## 2024-08-03 SDOH — ECONOMIC STABILITY: HOUSING INSECURITY: IN THE PAST 12 MONTHS, HOW MANY TIMES HAVE YOU MOVED WHERE YOU WERE LIVING?: 0

## 2024-08-03 SDOH — ECONOMIC STABILITY: FOOD INSECURITY: WITHIN THE PAST 12 MONTHS, YOU WORRIED THAT YOUR FOOD WOULD RUN OUT BEFORE YOU GOT MONEY TO BUY MORE.: NEVER TRUE

## 2024-08-03 SDOH — ECONOMIC STABILITY: FOOD INSECURITY: WITHIN THE PAST 12 MONTHS, THE FOOD YOU BOUGHT JUST DIDN'T LAST AND YOU DIDN'T HAVE MONEY TO GET MORE.: NEVER TRUE

## 2024-08-03 SDOH — SOCIAL STABILITY: SOCIAL INSECURITY: HAVE YOU HAD THOUGHTS OF HARMING ANYONE ELSE?: NO

## 2024-08-03 SDOH — SOCIAL STABILITY: SOCIAL INSECURITY: DO YOU FEEL UNSAFE GOING BACK TO THE PLACE WHERE YOU ARE LIVING?: NO

## 2024-08-03 SDOH — ECONOMIC STABILITY: INCOME INSECURITY: HOW HARD IS IT FOR YOU TO PAY FOR THE VERY BASICS LIKE FOOD, HOUSING, MEDICAL CARE, AND HEATING?: SOMEWHAT HARD

## 2024-08-03 SDOH — ECONOMIC STABILITY: HOUSING INSECURITY: AT ANY TIME IN THE PAST 12 MONTHS, WERE YOU HOMELESS OR LIVING IN A SHELTER (INCLUDING NOW)?: NO

## 2024-08-03 SDOH — SOCIAL STABILITY: SOCIAL NETWORK: HOW OFTEN DO YOU GET TOGETHER WITH FRIENDS OR RELATIVES?: THREE TIMES A WEEK

## 2024-08-03 SDOH — SOCIAL STABILITY: SOCIAL NETWORK: ARE YOU MARRIED, WIDOWED, DIVORCED, SEPARATED, NEVER MARRIED, OR LIVING WITH A PARTNER?: MARRIED

## 2024-08-03 SDOH — HEALTH STABILITY: MENTAL HEALTH: HOW OFTEN DO YOU HAVE 6 OR MORE DRINKS ON ONE OCCASION?: NEVER

## 2024-08-03 SDOH — ECONOMIC STABILITY: INCOME INSECURITY: IN THE LAST 12 MONTHS, WAS THERE A TIME WHEN YOU WERE NOT ABLE TO PAY THE MORTGAGE OR RENT ON TIME?: YES

## 2024-08-03 SDOH — SOCIAL STABILITY: SOCIAL INSECURITY: WITHIN THE LAST YEAR, HAVE YOU BEEN HUMILIATED OR EMOTIONALLY ABUSED IN OTHER WAYS BY YOUR PARTNER OR EX-PARTNER?: NO

## 2024-08-03 SDOH — SOCIAL STABILITY: SOCIAL INSECURITY: ARE YOU OR HAVE YOU BEEN THREATENED OR ABUSED PHYSICALLY, EMOTIONALLY, OR SEXUALLY BY ANYONE?: NO

## 2024-08-03 SDOH — SOCIAL STABILITY: SOCIAL INSECURITY: HAS ANYONE EVER THREATENED TO HURT YOUR FAMILY OR YOUR PETS?: NO

## 2024-08-03 SDOH — SOCIAL STABILITY: SOCIAL INSECURITY: ARE THERE ANY APPARENT SIGNS OF INJURIES/BEHAVIORS THAT COULD BE RELATED TO ABUSE/NEGLECT?: NO

## 2024-08-03 SDOH — HEALTH STABILITY: MENTAL HEALTH: HOW MANY STANDARD DRINKS CONTAINING ALCOHOL DO YOU HAVE ON A TYPICAL DAY?: PATIENT DOES NOT DRINK

## 2024-08-03 SDOH — HEALTH STABILITY: MENTAL HEALTH: HOW OFTEN DO YOU HAVE A DRINK CONTAINING ALCOHOL?: NEVER

## 2024-08-03 SDOH — SOCIAL STABILITY: SOCIAL INSECURITY: WITHIN THE LAST YEAR, HAVE YOU BEEN AFRAID OF YOUR PARTNER OR EX-PARTNER?: NO

## 2024-08-03 SDOH — SOCIAL STABILITY: SOCIAL INSECURITY: ABUSE: ADULT

## 2024-08-03 SDOH — SOCIAL STABILITY: SOCIAL INSECURITY: WERE YOU ABLE TO COMPLETE ALL THE BEHAVIORAL HEALTH SCREENINGS?: YES

## 2024-08-03 SDOH — ECONOMIC STABILITY: INCOME INSECURITY: IN THE PAST 12 MONTHS, HAS THE ELECTRIC, GAS, OIL, OR WATER COMPANY THREATENED TO SHUT OFF SERVICE IN YOUR HOME?: NO

## 2024-08-03 SDOH — HEALTH STABILITY: PHYSICAL HEALTH: ON AVERAGE, HOW MANY MINUTES DO YOU ENGAGE IN EXERCISE AT THIS LEVEL?: 0 MIN

## 2024-08-03 SDOH — SOCIAL STABILITY: SOCIAL INSECURITY: DOES ANYONE TRY TO KEEP YOU FROM HAVING/CONTACTING OTHER FRIENDS OR DOING THINGS OUTSIDE YOUR HOME?: NO

## 2024-08-03 SDOH — SOCIAL STABILITY: SOCIAL INSECURITY: DO YOU FEEL ANYONE HAS EXPLOITED OR TAKEN ADVANTAGE OF YOU FINANCIALLY OR OF YOUR PERSONAL PROPERTY?: NO

## 2024-08-03 SDOH — SOCIAL STABILITY: SOCIAL INSECURITY: HAVE YOU HAD ANY THOUGHTS OF HARMING ANYONE ELSE?: NO

## 2024-08-03 SDOH — HEALTH STABILITY: PHYSICAL HEALTH: ON AVERAGE, HOW MANY DAYS PER WEEK DO YOU ENGAGE IN MODERATE TO STRENUOUS EXERCISE (LIKE A BRISK WALK)?: 0 DAYS

## 2024-08-03 ASSESSMENT — LIFESTYLE VARIABLES
EVER FELT BAD OR GUILTY ABOUT YOUR DRINKING: NO
HOW OFTEN DO YOU HAVE 6 OR MORE DRINKS ON ONE OCCASION: NEVER
AUDIT-C TOTAL SCORE: 0
TOTAL SCORE: 0
HAVE YOU EVER FELT YOU SHOULD CUT DOWN ON YOUR DRINKING: NO
EVER HAD A DRINK FIRST THING IN THE MORNING TO STEADY YOUR NERVES TO GET RID OF A HANGOVER: NO
SKIP TO QUESTIONS 9-10: 1
PRESCIPTION_ABUSE_PAST_12_MONTHS: NO
HAVE PEOPLE ANNOYED YOU BY CRITICIZING YOUR DRINKING: NO
AUDIT-C TOTAL SCORE: 0
HOW OFTEN DO YOU HAVE A DRINK CONTAINING ALCOHOL: NEVER
SUBSTANCE_ABUSE_PAST_12_MONTHS: NO
AUDIT-C TOTAL SCORE: 0
SKIP TO QUESTIONS 9-10: 1
HOW MANY STANDARD DRINKS CONTAINING ALCOHOL DO YOU HAVE ON A TYPICAL DAY: PATIENT DOES NOT DRINK

## 2024-08-03 ASSESSMENT — COGNITIVE AND FUNCTIONAL STATUS - GENERAL
CLIMB 3 TO 5 STEPS WITH RAILING: A LITTLE
PATIENT BASELINE BEDBOUND: NO
MOBILITY SCORE: 23
HELP NEEDED FOR BATHING: A LITTLE
DAILY ACTIVITIY SCORE: 22
DRESSING REGULAR LOWER BODY CLOTHING: A LITTLE

## 2024-08-03 ASSESSMENT — ACTIVITIES OF DAILY LIVING (ADL)
HEARING - RIGHT EAR: FUNCTIONAL
DRESSING YOURSELF: INDEPENDENT
HEARING - LEFT EAR: FUNCTIONAL
BATHING: INDEPENDENT
PATIENT'S MEMORY ADEQUATE TO SAFELY COMPLETE DAILY ACTIVITIES?: YES
JUDGMENT_ADEQUATE_SAFELY_COMPLETE_DAILY_ACTIVITIES: YES
GROOMING: INDEPENDENT
TOILETING: INDEPENDENT
FEEDING YOURSELF: INDEPENDENT
ADEQUATE_TO_COMPLETE_ADL: YES
WALKS IN HOME: INDEPENDENT

## 2024-08-03 ASSESSMENT — PAIN - FUNCTIONAL ASSESSMENT
PAIN_FUNCTIONAL_ASSESSMENT: 0-10

## 2024-08-03 ASSESSMENT — COLUMBIA-SUICIDE SEVERITY RATING SCALE - C-SSRS

## 2024-08-03 ASSESSMENT — PAIN DESCRIPTION - ORIENTATION: ORIENTATION: OTHER (COMMENT)

## 2024-08-03 ASSESSMENT — PAIN DESCRIPTION - PROGRESSION: CLINICAL_PROGRESSION: GRADUALLY WORSENING

## 2024-08-03 ASSESSMENT — ENCOUNTER SYMPTOMS: CONSTITUTIONAL NEGATIVE: 1

## 2024-08-03 ASSESSMENT — PAIN DESCRIPTION - FREQUENCY: FREQUENCY: CONSTANT/CONTINUOUS

## 2024-08-03 ASSESSMENT — PAIN SCALES - GENERAL
PAINLEVEL_OUTOF10: 0 - NO PAIN
PAINLEVEL_OUTOF10: 10 - WORST POSSIBLE PAIN

## 2024-08-03 ASSESSMENT — PAIN DESCRIPTION - LOCATION: LOCATION: ABDOMEN

## 2024-08-03 ASSESSMENT — PAIN DESCRIPTION - PAIN TYPE: TYPE: CHRONIC PAIN

## 2024-08-03 ASSESSMENT — PAIN DESCRIPTION - DESCRIPTORS: DESCRIPTORS: CRAMPING;PRESSURE;SHARP

## 2024-08-03 ASSESSMENT — PAIN DESCRIPTION - ONSET: ONSET: PROGRESSIVE

## 2024-08-03 NOTE — SIGNIFICANT EVENT
Pt arrived to unit, pt oriented to room, call bell, welcome packet, safety/fall prevention measures inn place. Pt admission navigator completed. Kitchen called to order tray. Pt vitals are as follows:      08/03/24 0923   Vital Signs   Temp 36.4 °C (97.6 °F)   Temp Source Axillary   Heart Rate 87   Heart Rate Source Monitor   /66   MAP (mmHg) 77   BP Location Left arm   BP Method Automatic   Patient Position Lying   Medical Gas Therapy   SpO2 100 %   Pulse Oximetry Type Intermittent   Oximetry Probe Site Location Finger   Patient Activity During SpO2 Measurement At rest   Medical Gas Therapy None (Room air)   Pain Assessment   Pain Assessment 0-10   0-10 (Numeric) Pain Score 0 - No pain   Patient's Stated Pain Goal 4

## 2024-08-03 NOTE — PROGRESS NOTES
Alfonzo Flanagan is a 49 y.o. female on day 0 of admission presenting with No Principal Problem: There is no principal problem currently on the Problem List. Please update the Problem List and refresh..       08/03/24 0914   Physical Activity   On average, how many days per week do you engage in moderate to strenuous exercise (like a brisk walk)? 0 days   On average, how many minutes do you engage in exercise at this level? 0 min   Financial Resource Strain   How hard is it for you to pay for the very basics like food, housing, medical care, and heating? Somewhat   Housing Stability   In the last 12 months, was there a time when you were not able to pay the mortgage or rent on time? Y   In the past 12 months, how many times have you moved where you were living? 0   At any time in the past 12 months, were you homeless or living in a shelter (including now)? N   Transportation Needs   In the past 12 months, has lack of transportation kept you from medical appointments or from getting medications? no   In the past 12 months, has lack of transportation kept you from meetings, work, or from getting things needed for daily living? No   Food Insecurity   Within the past 12 months, you worried that your food would run out before you got the money to buy more. Never true   Within the past 12 months, the food you bought just didn't last and you didn't have money to get more. Never true   Stress   Do you feel stress - tense, restless, nervous, or anxious, or unable to sleep at night because your mind is troubled all the time - these days? To some exte   Social Connections   In a typical week, how many times do you talk on the phone with family, friends, or neighbors? More than 3   How often do you get together with friends or relatives? Three times   How often do you attend Che[employer]ism or Gnosticism services? 1 to 4   Do you belong to any clubs or organizations such as [employer]ism groups, unions, fraternal or athletic groups, or  school groups? No   How often do you attend meetings of the clubs or organizations you belong to? Never   Are you , , , , never , or living with a partner?    Intimate Partner Violence   Within the last year, have you been afraid of your partner or ex-partner? No   Within the last year, have you been humiliated or emotionally abused in other ways by your partner or ex-partner? No   Within the last year, have you been kicked, hit, slapped, or otherwise physically hurt by your partner or ex-partner? No   Within the last year, have you been raped or forced to have any kind of sexual activity by your partner or ex-partner? No   Alcohol Use   Q1: How often do you have a drink containing alcohol? Never   Q2: How many drinks containing alcohol do you have on a typical day when you are drinking? None   Q3: How often do you have six or more drinks on one occasion? Never   Utilities   In the past 12 months has the electric, gas, oil, or water company threatened to shut off services in your home? No   Health Literacy   How often do you need to have someone help you when you read instructions, pamphlets, or other written material from your doctor or pharmacy? Sometimes         Danika Reyes RN

## 2024-08-03 NOTE — PROGRESS NOTES
Alfonzo Flanagan is a 49 y.o. female on day 0 of admission presenting with No Principal Problem: There is no principal problem currently on the Problem List. Please update the Problem List and refresh..       08/03/24 0976   Current Planned Discharge Disposition   Current Planned Discharge Disposition Home     Patient here under observation for ongoing abdominal pain with ascites.  She has had multiple hospital admissions with paracentesis. She states she has applied for [facility] but is waiting for word of acceptance. As a result, she is unable to follow up with outpatient providers. She was receiving services through [facility] until insurance lapse.  Patient verbalized distress at her repeated hospital visits. Patient would benefit from case management services, [facility] will follow up with patient to see if she has a CM at [facility]. Will also mention [facility] as they have PCP services, pharmacy and .     Patient lives with her spouse and children in an upper level apartment, 11 steps up. She is independent with ADLs, has family assistance when needed. She verbalizes she does not fully understand her medications. [facility] RN was assisting with this. States for now, her brother manages them for her, packing them for daily use. Lack of insurance is presenting a barrier to obtaining medication, even with [employer] card.   ADOD 08/05/2024  Patient has no skilled needs for discharge. Family will provide transportation.     Danika Reyes RN

## 2024-08-03 NOTE — CONSULTS
Inpatient consult to gastroenterology  Consult performed by: Adrianne Powell, APRN-CNP  Consult ordered by: Landen Freeman MD          Reason For Consult  Abdominal Pain     History Of Present Illness  Alfonzo Flanagan is a 49 y.o. female with a PMH of PBC cirrhosis c/b pHTN with ascites, small EV, HE, celiac disease, chronic pancreatitis, DM, HTN, GERD, presenting with abdominal pain and nausea. Patient is very well known by our group for many admissions related to similar. Was recently admitted at [facility] and evaluated by GI. CT a/p moderate wall thickening of multiple loops of small bowel throughout the abdomen and pelvis. Large volume of pelvic ascites. She does have a chronic diffuse bowel wall thickening. She had EGD with EUS 3/26/2024. Findings were c/f NET however tissue sample was not obtained. At that time, it was recommended to follow up mass in 1 year with EUS. She also had MRI/MRCP on 7/26/2024: No pancreatic lesion identified. Cirrhosis and sequela of portal hypertension. No hepatic lesion. EGD and colonoscopy in 2023 were normal. Neg biopsy for microscopic colitis at that time. She reports she has been constipated recently despite taking her lactulose. No blood noted in stool. She still has some generalized abdominal pain with some nausea.    Past Medical History  She has a past medical history of Cirrhosis of liver (Multi) and Diabetes mellitus (Multi).    Surgical History  She has a past surgical history that includes CT guided imaging for needle placement (10/14/2020); US guided abdominal paracentesis (10/08/2020); and Abdominal surgery.     Social History  She reports that she has never smoked. She has never used smokeless tobacco. She reports that she does not drink alcohol and does not use drugs.    Family History  No family history on file.     Allergies  Gluten    Review of Systems   Constitutional: Negative.    HENT: Negative.          Physical Exam  HENT:      Head: Normocephalic.       "Nose: Nose normal.      Mouth/Throat:      Mouth: Mucous membranes are moist.   Eyes:      Pupils: Pupils are equal, round, and reactive to light.   Cardiovascular:      Rate and Rhythm: Normal rate.   Abdominal:      Palpations: Abdomen is soft.   Musculoskeletal:         General: Normal range of motion.      Cervical back: Normal range of motion.   Skin:     General: Skin is warm.   Neurological:      General: No focal deficit present.      Mental Status: She is alert.          Last Recorded Vitals  Blood pressure 122/66, pulse 87, temperature 36.4 °C (97.5 °F), temperature source Oral, resp. rate 20, height 1.6 m (5' 3\"), weight 90.5 kg (199 lb 8.3 oz), SpO2 100%.    Relevant Results  CT abdomen pelvis w IV contrast    Result Date: 8/3/2024  STUDY: CT Abdomen and Pelvis with IV Contrast; 8/3/2024 at 6:32 AM. INDICATION: Abdominal distention, pain, evaluate for worsening cirrhosis and ascites. COMPARISON: US abdomen 7/20/2024; CT AP 7/10/2024 (imaging only), 7/4/2024 (imaging only). ACCESSION NUMBER(S): [id number] ORDERING CLINICIAN: DELONTE HO TECHNIQUE: CT of the abdomen and pelvis was performed.  Contiguous axial images were obtained at 3 mm slice thickness through the abdomen and pelvis. Coronal and sagittal reconstructions at 3 mm slice thickness were performed.  Omnipaque 350 75 mL was administered intravenously.  FINDINGS: LOWER CHEST: Cardiac size is normal.  No pericardial effusion.  Lung bases are clear.  ABDOMEN:  LIVER: Liver demonstrates an advanced cirrhotic morphology with a markedly nodular hepatic contour.   BILE DUCTS: No intrahepatic or extrahepatic biliary ductal dilatation.  GALLBLADDER: The patient is status post cholecystectomy. STOMACH: Moderate amount of upper abdominal ascites is noted with generalized mesenteric edema throughout the central mesentery.   PANCREAS: No masses or ductal dilatation.  SPLEEN: Spleen is mildly enlarged.   ADRENAL GLANDS: No thickening or nodules.  " KIDNEYS AND URETERS: Kidneys are normal in size and location.  No renal or ureteral calculi.  PELVIS:  BLADDER: No abnormalities identified.  REPRODUCTIVE ORGANS: No acute uterine or adnexal pathology is identified.  BOWEL: Moderate concentric wall thickening of multiple loops of small bowel is noted in the upper abdomen, predominantly jejunum.  Fecal-like material is seen in loops of nondilated small bowel.  Appendix is not definitively identified.  Terminal ileum is unremarkable.  VESSELS: Mild calcified plaque is noted in the abdominal aorta.  Abdominal aorta is normal in caliber.  PERITONEUM/RETROPERITONEUM/LYMPH NODES: There is a large volume of pelvic ascites.  No pneumoperitoneum. No lymphadenopathy.  ABDOMINAL WALL: Mild generalized mesenteric edema is noted in the abdomen and pelvic wall. SOFT TISSUES: There is a fat and fluid containing ventral supraumbilical abdominal wall hernia with the mouth of the hernia measuring approximately 1.8 cm in diameter and the hernia sac measuring up to 5 cm in diameter.  BONES: No acute fracture or aggressive osseous lesion.    Moderate wall thickening of multiple loops of small bowel throughout the abdomen and pelvis which may indicate portal hypertensive enteropathy or an infectious or inflammatory enterocolitis.  No definite bowel obstruction identified. Advanced cirrhotic morphology of the liver with mild splenomegaly and moderate abdominal and large amount of pelvic ascites. Mild generalized mesenteric edema as well as generalized soft tissue edema in the abdominal and pelvic wall which may indicate anasarca. Fat and fluid containing ventral supraumbilical midline abdominal wall hernia. Signed by Klever Holden      Scheduled medications  atorvastatin, 80 mg, oral, Nightly  docusate sodium, 100 mg, oral, BID  fenofibrate, 160 mg, oral, Daily  furosemide, 40 mg, intravenous, q12h  gabapentin, 200 mg, oral, TID  insulin glargine, 25 Units, subcutaneous, BID  insulin  lispro, 0-10 Units, subcutaneous, TID  lactulose, 20 g, oral, 4x daily  magnesium oxide, 400 mg, oral, Daily  nadolol, 20 mg, oral, Daily  pantoprazole, 40 mg, oral, Daily  polyethylene glycol, 17 g, oral, BID  rifAXIMin, 550 mg, oral, q12h RIVKA  spironolactone, 50 mg, oral, BID  sucralfate, 1 g, oral, Before meals & nightly  traZODone, 25 mg, oral, Nightly  ursodiol, 300 mg, oral, TID      Continuous medications     PRN medications  PRN medications: dextrose, dextrose, glucagon, glucagon, hydrOXYzine HCL, morphine, traMADol  Results for orders placed or performed during the hospital encounter of 08/03/24 (from the past 24 hour(s))   CBC and Auto Differential   Result Value Ref Range    WBC 4.8 4.4 - 11.3 x10*3/uL    nRBC 0.0 0.0 - 0.0 /100 WBCs    RBC 3.34 (L) 4.00 - 5.20 x10*6/uL    Hemoglobin 8.4 (L) 12.0 - 16.0 g/dL    Hematocrit 26.4 (L) 36.0 - 46.0 %    MCV 79 (L) 80 - 100 fL    MCH 25.1 (L) 26.0 - 34.0 pg    MCHC 31.8 (L) 32.0 - 36.0 g/dL    RDW 21.2 (H) 11.5 - 14.5 %    Platelets 87 (L) 150 - 450 x10*3/uL    Neutrophils % 56.8 40.0 - 80.0 %    Immature Granulocytes %, Automated 0.2 0.0 - 0.9 %    Lymphocytes % 25.8 13.0 - 44.0 %    Monocytes % 12.6 2.0 - 10.0 %    Eosinophils % 4.2 0.0 - 6.0 %    Basophils % 0.4 0.0 - 2.0 %    Neutrophils Absolute 2.70 1.20 - 7.70 x10*3/uL    Immature Granulocytes Absolute, Automated 0.01 0.00 - 0.70 x10*3/uL    Lymphocytes Absolute 1.23 1.20 - 4.80 x10*3/uL    Monocytes Absolute 0.60 0.10 - 1.00 x10*3/uL    Eosinophils Absolute 0.20 0.00 - 0.70 x10*3/uL    Basophils Absolute 0.02 0.00 - 0.10 x10*3/uL   Magnesium   Result Value Ref Range    Magnesium 1.80 1.60 - 3.10 mg/dL   Comprehensive metabolic panel   Result Value Ref Range    Glucose 319 (H) 65 - 99 mg/dL    Sodium 134 133 - 145 mmol/L    Potassium 4.1 3.4 - 5.1 mmol/L    Chloride 103 97 - 107 mmol/L    Bicarbonate 28 24 - 31 mmol/L    Urea Nitrogen 11 8 - 25 mg/dL    Creatinine 0.60 0.40 - 1.60 mg/dL    eGFR >90 >60  mL/min/1.73m*2    Calcium 7.9 (L) 8.5 - 10.4 mg/dL    Albumin 2.4 (L) 3.5 - 5.0 g/dL    Alkaline Phosphatase 496 (H) 35 - 125 U/L    Total Protein 6.5 5.9 - 7.9 g/dL    AST 44 (H) 5 - 40 U/L    Bilirubin, Total 1.4 (H) 0.1 - 1.2 mg/dL    ALT 24 5 - 40 U/L    Anion Gap 3 <=19 mmol/L   Blood Gas Venous   Result Value Ref Range    POCT pH, Venous 7.42 7.33 - 7.43 pH    POCT pCO2, Venous 46 41 - 51 mm Hg    POCT pO2, Venous 36 35 - 45 mm Hg    POCT SO2, Venous 47 45 - 75 %    POCT Oxy Hemoglobin, Venous 46.3 45.0 - 75.0 %    POCT Base Excess, Venous 4.5 (H) -2.0 - 3.0 mmol/L    POCT HCO3 Calculated, Venous 29.8 (H) 22.0 - 26.0 mmol/L    Patient Temperature 37.0 degrees Celsius    FiO2 21 %   Beta Hydroxybutyrate   Result Value Ref Range    Beta-Hydroxybutyrate 0.10 0.10 - 0.30 mmol/L   Lipase   Result Value Ref Range    Lipase 49 16 - 63 U/L   Morphology   Result Value Ref Range    RBC Morphology See Below     Polychromasia Mild     Target Cells Few     Ovalocytes Few    Ammonia   Result Value Ref Range    Ammonia 67 (H) 12 - 45 umol/L   POCT GLUCOSE   Result Value Ref Range    POCT Glucose 305 (H) 74 - 99 mg/dL   POCT GLUCOSE   Result Value Ref Range    POCT Glucose 230 (H) 74 - 99 mg/dL        Assessment/Plan     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  -HE: Monitor mental status, A/O x 3, Continue lactulose 20 g daily with a goal of 2-3 soft stools a day and rifaximin 550 mg BID.   -EV: monitor for varices, last EGD on 12/27/23 by Dr. Zaman at [facility] Normal oropharynx. Esophageal ulcer, no bleeding and no stigmata of recent bleeding.  -Ascites: CT moderate volume ascites and stranding diffusely throughout the mesentery. Will continue lasix/aldactone. IR consult placed for para, fluid analysis ordered   Continue to monitor Hepatic panel, CBC, INR  Hgb remains stable . No overt bleeding      Epigastric abdominal pain/Nausea, vomiting, constipation  -Patient has multiple reported  admissions this year with similar complaints of abdominal pain that typically resolves with a paracentesis/conservative measures. She has history of acute idiopathic pancreatitis. EGD with EUS 3/26/2024. Findings were c/f NET however tissue sample was not obtained. At that time, it was recommended to follow up mass in 1 year with EUS. MRI/MRCP on 7/26/2024: No pancreatic lesion identified. Cirrhosis and sequela of portal hypertension. No hepatic lesion.   -Continue supportive care with anti-emetics and pain control as needed per primary team.  -Gluten free and low sodium diet as tolerated.     Acute on chronic anemia   -Patient admitted with Hgb 8.4 stable  -No overt bleeding   -Iron labs ordered.   -Monitor frequency and color of stools.  -Continue to monitor CBC and overt signs of bleeding.   -Transfuse when Hgb is less than 7         Adrianne Powell, MADYSON-CNP

## 2024-08-03 NOTE — ED TRIAGE NOTES
Pt presents ambulatory through triage with c/o abd pain/n/v x4 days. Pt has a hx of chirrosis of the liver and get's regular paracentesis. Pt states her last one was approx 4 weeks ago. Pt is visibly distended all throughout her abdomen. Pt's last BM was this morning before arrival.

## 2024-08-03 NOTE — PROGRESS NOTES
Patient was received in signout at 6:03 AM.     IN BRIEF   49 female presents with abdominal pain and distention in the setting of ascites.  She is having nausea and vomiting with limited p.o. intake.  At the time of handoff awaiting CT scan and likely admission for symptomatic relief.       ED COURSE   CT scan returned showing possible enterocolitis infectious versus inflammatory.  Patient case is discussed with the patient's primary care who accept the patient for admission        FINAL IMPRESSION      1. Abdominal pain, generalized    2. Ascites due to alcoholic cirrhosis (Multi)    3. Hyperglycemia    4. Type 2 diabetes mellitus without complication, with long-term current use of insulin (Multi)          DISPOSITION    Admit 08/03/2024 06:14:24 AM

## 2024-08-03 NOTE — ED PROVIDER NOTES
"HPI   Chief Complaint   Patient presents with    Abdominal Pain       Patient presents with worsening abdominal pain and distention.  States she is required paracentesis in the past.  Experiencing decreased p.o. intake, nonbloody/nonbilious emesis, constipation.  Not experiencing fevers but notes some chills.  Abdominal pain is pressure-like, constant, moderate severe, no alleviating factors/medications or aggravating factors.  Notes associated orthopnea associated with obstructive ventilation improved with sitting up.  No experiencing any cardiac chest pain.  Does not endorse any eye or skin color changes.  Does endorse lower extremity swelling for the last 7 to 10 days.  Denies any changes to urinary habits.  Denies any other significant systemic/somatic symptoms or complaints.              Patient History   Past Medical History:   Diagnosis Date    Cirrhosis of liver (Multi)     Diabetes mellitus (Multi)      Past Surgical History:   Procedure Laterality Date    ABDOMINAL SURGERY      CT GUIDED IMAGING FOR NEEDLE PLACEMENT  10/14/2020    CT GUIDED IMAGING FOR NEEDLE PLACEMENT [facility]    US GUIDED ABDOMINAL PARACENTESIS  10/08/2020    US GUIDED ABDOMINAL PARACENTESIS [facility]     No family history on file.  Social History     Tobacco Use    Smoking status: Never    Smokeless tobacco: Never   Substance Use Topics    Alcohol use: Never    Drug use: Never       Physical Exam   /60 (BP Location: Left arm, Patient Position: Sitting)   Pulse 99   Temp 36.4 °C (97.5 °F) (Oral)   Resp 20   Ht 1.6 m (5' 3\")   Wt 83.5 kg (184 lb)   SpO2 97%   BMI 32.59 kg/m²       Physical Exam  Vitals and nursing note reviewed.   Constitutional:       General: She is not in acute distress.     Appearance: Normal appearance. She is not ill-appearing.   HENT:      Mouth/Throat:      Mouth: Mucous membranes are moist.      Pharynx: Oropharynx is clear.   Eyes:      General: No scleral icterus.     " Conjunctiva/sclera: Conjunctivae normal.   Cardiovascular:      Rate and Rhythm: Normal rate.      Pulses:           Radial pulses are 2+ on the right side and 2+ on the left side.        Dorsalis pedis pulses are 2+ on the right side and 2+ on the left side.      Comments: 2-3+ pitting BLE edema extending to mid tip/fib region, BLE equal/symmetrical size/shape  Pulmonary:      Effort: Pulmonary effort is normal.   Abdominal:      General: Abdomen is protuberant. Bowel sounds are decreased. There is distension.      Palpations: Abdomen is soft. There is fluid wave.      Tenderness: There is generalized abdominal tenderness. There is no right CVA tenderness, left CVA tenderness, guarding or rebound.      Comments: Significantly protuberant abdomen soft, diffuse tenderness without appreciable focality, appreciable distention with fluid wave, mildly decreased BS, no appreciable pain out of proportion exam or evidence of SBP, no appreciable abdominal wall rigidity or voluntary guarding and not endorsing any associated rebound tenderness with palpation   Musculoskeletal:      Right lower leg: Edema present.      Left lower leg: Edema present.   Skin:     General: Skin is warm and dry.      Coloration: Skin is not jaundiced or pale.   Neurological:      General: No focal deficit present.      Mental Status: She is alert and oriented to person, place, and time.           ED Course & MDM   ED Course as of 08/03/24 0607   Sat Aug 03, 2024   0453 VSS on presentation of reported abdominal pain [BC]   0523 I personally reviewed and interpreted the EKG @0512: NSR 94, normal axis, no appreciable ischemia, prolonged Qtc 480, non-specific TW findings, and prior EKG reviewed without any appreciable specific/identifiable changes. [BC]   0524 Blood Gas Venous(!)  No appreciable evidence of metabolic acidosis [BC]   0535 POCT GLUCOSE(!)  Mild severely hyperglycemic in setting of known DM and reported abdominal pain with emesis, low  suspicion for DKA given no evidence of metabolic acidosis on VBG [BC]   0541 Lipase  WNL [BC]   0541 Magnesium  WNL [BC]   0541 Beta Hydroxybutyrate  WNL [BC]   0542 Comprehensive metabolic panel(!)  Hyperglycemia without AGMA, baseline alk phos levels in setting abdominal pain and known history of hepatic cirrhosis [BC]   0606 Ammonia(!)  Well below prior limits demonstrating no encephalopathy [BC]   0607 CBC and Auto Differential(!)  Baseline for patient, otherwise unremarkable and noncontributory to Patient condition/symptoms [BC]      ED Course User Index  [BC] Lowell Peters MD         Diagnoses as of 08/03/24 0607   Abdominal pain, generalized   Ascites due to alcoholic cirrhosis (Multi)   Hyperglycemia   Type 2 diabetes mellitus without complication, with long-term current use of insulin (Multi)                       [place] Coma Scale Score: 15                        Medical Decision Making  Patient presented to the ED for abdominal pain with distention, nausea with nonbloody/nonbilious emesis, lower extremity swelling with concerning PMHx of alcoholic cirrhosis, reaccumulation of ascites, hyperammonemia, UTI, DM2.  I personally reviewed and interpreted VS, labs, images, and EKG which are as stated above in the ED course.    Assessment/evaluation likely significant reaccumulation of intra-abdominal ascites associated with known portal hypertension and alcohol cirrhosis pending imaging, likely complicated by omental and bowel wall edema.  No concerning history, clinical evidence/work-up, or exam findings for the concerning differentials of SBP, SBO, hollow viscus perforation, skin electrolyte/metabolic derangement, hepatic encephalopathy, worsening microcytic anemia, DKA.  These conditions have been thoroughly evaluated and determined to be sufficiently unlikely to be the etiology of patient's presenting symptoms.    Patient signed out to oncoming provider, Dr. Sunni Hall, at 6:07 AM in stable  "condition.    /60 (BP Location: Left arm, Patient Position: Sitting)   Pulse 99   Temp 36.4 °C (97.5 °F) (Oral)   Resp 20   Ht 1.6 m (5' 3\")   Wt 83.5 kg (184 lb)   SpO2 97%   BMI 32.59 kg/m²     Remaining workup:  Images CT A/P and Call report to Medicine    Patient disposition Medicine RNF (telemetry) and alternative disposition NONE.      Per Chart Review: ED encounter hospitalization on 7/10/2024 for reported abdominal pain, discharge summary significant for endorsement of abdominal pain with associated nausea/vomiting/diarrhea and BLE edema with confusion, labs obtained demonstrating alk phos 367 and transaminitis with AST 94/ALT 47, ammonia 219, significant thrombocytopenia, CT A/P obtained demonstrating portal hypertensive changes and general mesenteric/omental edema with small moderate volume ascites, received lactulose for hyperammonemia along with analgesics and antiemetics, PPI and clear liquid diet, received IV ceftriaxone for empiric coverage, gastroenterology was consulted and patient received paracentesis with 1+ liter of fluid drain, negative for SBP, started on Bentyl per gastroenterology recs, advance diet as tolerated, symptoms improved during hospital course, discharged home with appropriate follow-up studies with gastroenterology, LOS 2 days.      Parts of this chart have been completed using voice recognition software. Please excuse any errors of transcription.    Amount and/or Complexity of Data Reviewed  External Data Reviewed: notes.  Labs: ordered. Decision-making details documented in ED Course.  ECG/medicine tests: ordered and independent interpretation performed. Decision-making details documented in ED Course.        Procedure  Procedures     Lowell Peters MD  08/03/24 1409    "

## 2024-08-03 NOTE — PROGRESS NOTES
Alfonzo Flanagan is a 49 y.o. female on day 0 of admission presenting with No Principal Problem: There is no principal problem currently on the Problem List. Please update the Problem List and refresh..       08/03/24 0911   Discharge Planning   Living Arrangements Spouse/significant other;Children   Support Systems Spouse/significant other;Children;Family members   Assistance Needed follow through with [employer] application, resume Healthy at Home program   Type of Residence Private residence   Number of Stairs to Enter Residence 11   Number of Stairs Within Residence 0   Do you have animals or pets at home? No   Who is requesting discharge planning? Provider   Home or Post Acute Services None   Expected Discharge Disposition Home   Does the patient need discharge transport arranged? No   Patient Choice   Provider Choice list and CMS website (https://medicare.gov/care-compare#search) for post-acute Quality and Resource Measure Data were provided and reviewed with: Other (Comment)  (no skilled needs)   Patient / Family choosing to utilize agency / facility established prior to hospitalization No         Danika Reyes RN

## 2024-08-03 NOTE — PROGRESS NOTES
Alfonoz Flanagan is a 49 y.o. female on day 0 of admission presenting with No Principal Problem: There is no principal problem currently on the Problem List. Please update the Problem List and refresh..       08/03/24 2789   [facility] Disability Status   Are you deaf or do you have serious difficulty hearing? N   Are you blind or do you have serious difficulty seeing, even when wearing glasses? N   Because of a physical, mental, or emotional condition, do you have serious difficulty concentrating, remembering, or making decisions? (5 years old or older) N   Do you have serious difficulty walking or climbing stairs? N   Do you have serious difficulty dressing or bathing? N   Because of a physical, mental, or emotional condition, do you have serious difficulty doing errands alone such as visiting the doctor? N         Danika Reyes RN

## 2024-08-03 NOTE — CARE PLAN
The patient's goals for the shift include safety, pain control, use IS 10 times every 2 hours, up in chair for meals    The clinical goals for the shift include safety, pain control, use IS 10 times every 2 hours      Problem: Pain - Adult  Goal: Verbalizes/displays adequate comfort level or baseline comfort level  Outcome: Progressing  Flowsheets (Taken 8/3/2024 1144)  Verbalizes/displays adequate comfort level or baseline comfort level:   Encourage patient to monitor pain and request assistance   Assess pain using appropriate pain scale   Administer analgesics based on type and severity of pain and evaluate response   Implement non-pharmacological measures as appropriate and evaluate response   Consider cultural and social influences on pain and pain management   Notify Licensed Independent Practitioner if interventions unsuccessful or patient reports new pain     Problem: Safety - Adult  Goal: Free from fall injury  Outcome: Progressing  Flowsheets (Taken 8/3/2024 1144)  Free from fall injury:   Instruct family/caregiver on patient safety   Based on caregiver fall risk screen, instruct family/caregiver to ask for assistance with transferring infant if caregiver noted to have fall risk factors     Problem: Discharge Planning  Goal: Discharge to home or other facility with appropriate resources  Outcome: Progressing     Problem: Chronic Conditions and Co-morbidities  Goal: Patient's chronic conditions and co-morbidity symptoms are monitored and maintained or improved  Outcome: Progressing  Flowsheets (Taken 8/3/2024 1144)  Care Plan - Patient's Chronic Conditions and Co-Morbidity Symptoms are Monitored and Maintained or Improved:   Monitor and assess patient's chronic conditions and comorbid symptoms for stability, deterioration, or improvement   Collaborate with multidisciplinary team to address chronic and comorbid conditions and prevent exacerbation or deterioration   Update acute care plan with appropriate  goals if chronic or comorbid symptoms are exacerbated and prevent overall improvement and discharge     Problem: Diabetes  Goal: Achieve decreasing blood glucose levels by end of shift  Outcome: Progressing  Flowsheets (Taken 8/3/2024 1144)  Achieve decreasing blood glucose levels by end of shift: Med administration/monitoring of effect  Goal: Increase stability of blood glucose readings by end of shift  Outcome: Progressing  Flowsheets (Taken 8/3/2024 1144)  Increase stability of blood glucose readings by end of shift: Med administration/monitoring of effect  Goal: Decrease in ketones present in urine by end of shift  Outcome: Progressing  Flowsheets (Taken 8/3/2024 1144)  Decrease in ketones present in urine by end of shift: Monitor urine output  Goal: Maintain electrolyte levels within acceptable range throughout shift  Outcome: Progressing  Flowsheets (Taken 8/3/2024 1144)  Maintain electrolyte levels within acceptable range throughout shift: Monitor urine output  Goal: Maintain glucose levels >70mg/dl to <250mg/dl throughout shift  Outcome: Progressing  Flowsheets (Taken 8/3/2024 1144)  Maintain glucose levels >70mg/dl to <250mg/dl throughout shift: Med administration/monitoring of effect  Goal: No changes in neurological exam by end of shift  Outcome: Progressing  Flowsheets (Taken 8/3/2024 1144)  No changes in neurological exam by end of shift: Complete frequent neurological assessments  Goal: Learn about and adhere to nutrition recommendations by end of shift  Outcome: Progressing  Flowsheets (Taken 8/3/2024 1144)  Learn about and adhere to nutrition recommendations by end of shift: Ensure/encourage compliance with appropriate diet  Goal: Vital signs within normal range for age by end of shift  Outcome: Progressing  Goal: Increase self care and/or family involovement by end of shift  Outcome: Progressing  Goal: Receive DSME education by end of shift  Outcome: Progressing     Problem: Pain  Goal: Takes deep  breaths with improved pain control throughout the shift  Outcome: Progressing  Goal: Turns in bed with improved pain control throughout the shift  Outcome: Progressing  Goal: Walks with improved pain control throughout the shift  Outcome: Progressing  Goal: Performs ADL's with improved pain control throughout shift  Outcome: Progressing  Goal: Participates in PT with improved pain control throughout the shift  Outcome: Progressing  Goal: Free from opioid side effects throughout the shift  Outcome: Progressing  Goal: Free from acute confusion related to pain meds throughout the shift  Outcome: Progressing     Problem: Fall/Injury  Goal: Not fall by end of shift  Outcome: Progressing  Goal: Be free from injury by end of the shift  Outcome: Progressing  Goal: Verbalize understanding of personal risk factors for fall in the hospital  Outcome: Progressing  Goal: Verbalize understanding of risk factor reduction measures to prevent injury from fall in the home  Outcome: Progressing  Goal: Use assistive devices by end of the shift  Outcome: Progressing  Goal: Pace activities to prevent fatigue by end of the shift  Outcome: Progressing

## 2024-08-03 NOTE — H&P
HISTORY AND PHYSICAL EXAMINATION - INTERNAL MEDICINE    PATIENT NAME: Alfonzo Flanagan    MRN: [id number]  SERVICE DATE: 8/3/2024       PRIMARY CARE PHYSICIAN:  Landen Freeman MD    ASSESSMENT & PLAN    Active Problems:    Abdominal pain, generalized    Liver cirrhosis secondary to REED (nonalcoholic steatohepatitis) (Multi)    Nausea and vomiting    Ct -Moderate wall thickening of multiple loops of small bowel throughout  the abdomen and pelvis which may indicate portal hypertensive  enteropathy or an infectious or inflammatory enterocolitis    Liquid diet. G I consult.    On diuretics. Might need paracentesis  Continue current medications.  Supportive care.  Physical therapy and Occupational Therapy -as needed.   Continue to monitor labs. Continue DVT, aspiration decubitus precautions.      Dispo - TBD            SUBJECTIVE  CHIEF COMPLAINT:   abd pain     HISTORY OF PRESENT ILLNESS:  Ms. Flanagan is a 49 y.o. female who presents with abd pain. She was recently dc from [facility] for similar issues. Her pain was worsening.     PAST MEDICAL HISTORY:    Past Medical History:   Diagnosis Date    Cirrhosis of liver (Multi)     Diabetes mellitus (Multi)        PAST SURGICAL HISTORY:    Past Surgical History:   Procedure Laterality Date    ABDOMINAL SURGERY      CT GUIDED IMAGING FOR NEEDLE PLACEMENT  10/14/2020    CT GUIDED IMAGING FOR NEEDLE PLACEMENT [facility]    US GUIDED ABDOMINAL PARACENTESIS  10/08/2020    US GUIDED ABDOMINAL PARACENTESIS [facility]       FAMILY HISTORY:  No family history on file.    SOCIAL HISTORY:    Social History     Socioeconomic History    Marital status:      Spouse name: Not on file    Number of children: Not on file    Years of education: Not on file    Highest education level: Not on file   Occupational History    Not on file   Tobacco Use    Smoking status: Never    Smokeless tobacco: Never   Substance and Sexual Activity    Alcohol use: Never     Drug use: Never    Sexual activity: Not on file   Other Topics Concern    Not on file   Social History Narrative    Not on file     Social Determinants of Health     Financial Resource Strain: Medium Risk (7/10/2024)    Overall Financial Resource Strain (CARDIA)     Difficulty of Paying Living Expenses: Somewhat hard   Food Insecurity: No Food Insecurity (7/31/2024)    Received from [facility]    Hunger Vital Sign     Worried About Running Out of Food in the Last Year: Never true     Ran Out of Food in the Last Year: Never true   Transportation Needs: No Transportation Needs (7/31/2024)    Received from [facility]    PRAPARE - Transportation     Lack of Transportation (Medical): No     Lack of Transportation (Non-Medical): No   Physical Activity: Sufficiently Active (6/15/2024)    Exercise Vital Sign     Days of Exercise per Week: 3 days     Minutes of Exercise per Session: 50 min   Recent Concern: Physical Activity - Insufficiently Active (6/2/2024)    Exercise Vital Sign     Days of Exercise per Week: 7 days     Minutes of Exercise per Session: 20 min   Stress: No Stress Concern Present (6/15/2024)    [employer] - Occupational Stress Questionnaire     Feeling of Stress : Not at all   Social Connections: Moderately Integrated (6/15/2024)    Social Connection and Isolation Panel [NHANES]     Frequency of Communication with Friends and Family: More than three times a week     Frequency of Social Gatherings with Friends and Family: Three times a week     Attends [employer] Services: 1 to 4 times per year     Active Member of Clubs or Organizations: No     Attends Club or Organization Meetings: Never     Marital Status:    Recent Concern: Social Connections - Socially Isolated (6/2/2024)    Social Connection and Isolation Panel [NHANES]     Frequency of Communication with Friends and Family: Never     Frequency of Social Gatherings with Friends and Family: Never      "Attends [employer] Services: Never     Active Member of Clubs or Organizations: No     Attends Club or Organization Meetings: Never     Marital Status:    Intimate Partner Violence: Not At Risk (6/15/2024)    Humiliation, Afraid, Rape, and Kick questionnaire     Fear of Current or Ex-Partner: No     Emotionally Abused: No     Physically Abused: No     Sexually Abused: No   Housing Stability: High Risk (7/10/2024)    Housing Stability Vital Sign     Unable to Pay for Housing in the Last Year: Yes     Number of Times Moved in the Last Year: 1     Homeless in the Last Year: No             MEDICATIONS:  (Not in a hospital admission)        ALLERGIES:  Allergies   Allergen Reactions    Gluten Diarrhea       OBJECTIVE:   REVIEW OF SYSTEMS:     All systems reviewed and negative except mentioned in HPI      /60 (BP Location: Left arm, Patient Position: Sitting)   Pulse 99   Temp 36.4 °C (97.5 °F) (Oral)   Resp 20   Ht 1.6 m (5' 3\")   Wt 83.5 kg (184 lb)   SpO2 97%   BMI 32.59 kg/m²       General: Awake and alert, in no distress  Neck : Supple, ROM nl   Cardiac: S1S2 wnl, no MGR  Lungs: Clear to auscultation, no Rales    Abdomen: Soft non-tender, non-distended  Extremities: No edema, Palpable Pulse  bilaterally  Neurological: No Gross focal deficit, Sensations  intact.  Skin: No rashes or lesions.    DATA:   Results for orders placed or performed during the hospital encounter of 08/03/24 (from the past 24 hour(s))   CBC and Auto Differential   Result Value Ref Range    WBC 4.8 4.4 - 11.3 x10*3/uL    nRBC 0.0 0.0 - 0.0 /100 WBCs    RBC 3.34 (L) 4.00 - 5.20 x10*6/uL    Hemoglobin 8.4 (L) 12.0 - 16.0 g/dL    Hematocrit 26.4 (L) 36.0 - 46.0 %    MCV 79 (L) 80 - 100 fL    MCH 25.1 (L) 26.0 - 34.0 pg    MCHC 31.8 (L) 32.0 - 36.0 g/dL    RDW 21.2 (H) 11.5 - 14.5 %    Platelets 87 (L) 150 - 450 x10*3/uL    Neutrophils % 56.8 40.0 - 80.0 %    Immature Granulocytes %, Automated 0.2 0.0 - 0.9 %    Lymphocytes % 25.8 " 13.0 - 44.0 %    Monocytes % 12.6 2.0 - 10.0 %    Eosinophils % 4.2 0.0 - 6.0 %    Basophils % 0.4 0.0 - 2.0 %    Neutrophils Absolute 2.70 1.20 - 7.70 x10*3/uL    Immature Granulocytes Absolute, Automated 0.01 0.00 - 0.70 x10*3/uL    Lymphocytes Absolute 1.23 1.20 - 4.80 x10*3/uL    Monocytes Absolute 0.60 0.10 - 1.00 x10*3/uL    Eosinophils Absolute 0.20 0.00 - 0.70 x10*3/uL    Basophils Absolute 0.02 0.00 - 0.10 x10*3/uL   Magnesium   Result Value Ref Range    Magnesium 1.80 1.60 - 3.10 mg/dL   Comprehensive metabolic panel   Result Value Ref Range    Glucose 319 (H) 65 - 99 mg/dL    Sodium 134 133 - 145 mmol/L    Potassium 4.1 3.4 - 5.1 mmol/L    Chloride 103 97 - 107 mmol/L    Bicarbonate 28 24 - 31 mmol/L    Urea Nitrogen 11 8 - 25 mg/dL    Creatinine 0.60 0.40 - 1.60 mg/dL    eGFR >90 >60 mL/min/1.73m*2    Calcium 7.9 (L) 8.5 - 10.4 mg/dL    Albumin 2.4 (L) 3.5 - 5.0 g/dL    Alkaline Phosphatase 496 (H) 35 - 125 U/L    Total Protein 6.5 5.9 - 7.9 g/dL    AST 44 (H) 5 - 40 U/L    Bilirubin, Total 1.4 (H) 0.1 - 1.2 mg/dL    ALT 24 5 - 40 U/L    Anion Gap 3 <=19 mmol/L   Blood Gas Venous   Result Value Ref Range    POCT pH, Venous 7.42 7.33 - 7.43 pH    POCT pCO2, Venous 46 41 - 51 mm Hg    POCT pO2, Venous 36 35 - 45 mm Hg    POCT SO2, Venous 47 45 - 75 %    POCT Oxy Hemoglobin, Venous 46.3 45.0 - 75.0 %    POCT Base Excess, Venous 4.5 (H) -2.0 - 3.0 mmol/L    POCT HCO3 Calculated, Venous 29.8 (H) 22.0 - 26.0 mmol/L    Patient Temperature 37.0 degrees Celsius    FiO2 21 %   Beta Hydroxybutyrate   Result Value Ref Range    Beta-Hydroxybutyrate 0.10 0.10 - 0.30 mmol/L   Lipase   Result Value Ref Range    Lipase 49 16 - 63 U/L   Morphology   Result Value Ref Range    RBC Morphology See Below     Polychromasia Mild     Target Cells Few     Ovalocytes Few    Ammonia   Result Value Ref Range    Ammonia 67 (H) 12 - 45 umol/L   POCT GLUCOSE   Result Value Ref Range    POCT Glucose 305 (H) 74 - 99 mg/dL            SIGNATURE: Landen Freeman MD  DATE: August 3, 2024  TIME: 9:11 AM

## 2024-08-04 LAB
ALBUMIN SERPL-MCNC: 2.1 G/DL (ref 3.5–5)
ALP BLD-CCNC: 323 U/L (ref 35–125)
ALT SERPL-CCNC: 26 U/L (ref 5–40)
ANION GAP SERPL CALC-SCNC: 7 MMOL/L
AST SERPL-CCNC: 49 U/L (ref 5–40)
BACTERIA UR CULT: NORMAL
BASOPHILS # BLD AUTO: 0.02 X10*3/UL (ref 0–0.1)
BASOPHILS NFR BLD AUTO: 0.5 %
BILIRUB SERPL-MCNC: 2.1 MG/DL (ref 0.1–1.2)
BUN SERPL-MCNC: 9 MG/DL (ref 8–25)
CALCIUM SERPL-MCNC: 7.6 MG/DL (ref 8.5–10.4)
CHLORIDE SERPL-SCNC: 104 MMOL/L (ref 97–107)
CO2 SERPL-SCNC: 25 MMOL/L (ref 24–31)
CREAT SERPL-MCNC: 0.5 MG/DL (ref 0.4–1.6)
EGFRCR SERPLBLD CKD-EPI 2021: >90 ML/MIN/1.73M*2
EOSINOPHIL # BLD AUTO: 0.2 X10*3/UL (ref 0–0.7)
EOSINOPHIL NFR BLD AUTO: 4.8 %
ERYTHROCYTE [DISTWIDTH] IN BLOOD BY AUTOMATED COUNT: 20.8 % (ref 11.5–14.5)
FERRITIN SERPL-MCNC: 49 NG/ML (ref 13–150)
GLUCOSE BLD MANUAL STRIP-MCNC: 139 MG/DL (ref 74–99)
GLUCOSE BLD MANUAL STRIP-MCNC: 228 MG/DL (ref 74–99)
GLUCOSE BLD MANUAL STRIP-MCNC: 232 MG/DL (ref 74–99)
GLUCOSE BLD MANUAL STRIP-MCNC: 296 MG/DL (ref 74–99)
GLUCOSE SERPL-MCNC: 130 MG/DL (ref 65–99)
HCT VFR BLD AUTO: 26.5 % (ref 36–46)
HGB BLD-MCNC: 8.4 G/DL (ref 12–16)
IMM GRANULOCYTES # BLD AUTO: 0.01 X10*3/UL (ref 0–0.7)
IMM GRANULOCYTES NFR BLD AUTO: 0.2 % (ref 0–0.9)
INR PPP: 1.3 (ref 0.9–1.2)
IRON SATN MFR SERPL: 8 % (ref 12–50)
IRON SERPL-MCNC: 24 UG/DL (ref 30–160)
LYMPHOCYTES # BLD AUTO: 1.54 X10*3/UL (ref 1.2–4.8)
LYMPHOCYTES NFR BLD AUTO: 37.3 %
MCH RBC QN AUTO: 24.8 PG (ref 26–34)
MCHC RBC AUTO-ENTMCNC: 31.7 G/DL (ref 32–36)
MCV RBC AUTO: 78 FL (ref 80–100)
MONOCYTES # BLD AUTO: 0.49 X10*3/UL (ref 0.1–1)
MONOCYTES NFR BLD AUTO: 11.9 %
NEUTROPHILS # BLD AUTO: 1.87 X10*3/UL (ref 1.2–7.7)
NEUTROPHILS NFR BLD AUTO: 45.3 %
NRBC BLD-RTO: 0 /100 WBCS (ref 0–0)
OVALOCYTES BLD QL SMEAR: NORMAL
PLATELET # BLD AUTO: 90 X10*3/UL (ref 150–450)
POLYCHROMASIA BLD QL SMEAR: NORMAL
POTASSIUM SERPL-SCNC: 3.6 MMOL/L (ref 3.4–5.1)
PROT SERPL-MCNC: 6 G/DL (ref 5.9–7.9)
PROTHROMBIN TIME: 13.5 SECONDS (ref 9.3–12.7)
RBC # BLD AUTO: 3.39 X10*6/UL (ref 4–5.2)
RBC MORPH BLD: NORMAL
SODIUM SERPL-SCNC: 136 MMOL/L (ref 133–145)
TARGETS BLD QL SMEAR: NORMAL
TIBC SERPL-MCNC: 289 UG/DL (ref 228–428)
UIBC SERPL-MCNC: 265 UG/DL (ref 110–370)
WBC # BLD AUTO: 4.1 X10*3/UL (ref 4.4–11.3)

## 2024-08-04 PROCEDURE — 2500000002 HC RX 250 W HCPCS SELF ADMINISTERED DRUGS (ALT 637 FOR MEDICARE OP, ALT 636 FOR OP/ED): Performed by: INTERNAL MEDICINE

## 2024-08-04 PROCEDURE — 82947 ASSAY GLUCOSE BLOOD QUANT: CPT

## 2024-08-04 PROCEDURE — 85610 PROTHROMBIN TIME: CPT

## 2024-08-04 PROCEDURE — 2500000001 HC RX 250 WO HCPCS SELF ADMINISTERED DRUGS (ALT 637 FOR MEDICARE OP): Performed by: INTERNAL MEDICINE

## 2024-08-04 PROCEDURE — 85025 COMPLETE CBC W/AUTO DIFF WBC: CPT

## 2024-08-04 PROCEDURE — 36415 COLL VENOUS BLD VENIPUNCTURE: CPT

## 2024-08-04 PROCEDURE — 2500000004 HC RX 250 GENERAL PHARMACY W/ HCPCS (ALT 636 FOR OP/ED): Performed by: INTERNAL MEDICINE

## 2024-08-04 PROCEDURE — 84075 ASSAY ALKALINE PHOSPHATASE: CPT

## 2024-08-04 PROCEDURE — 1210000001 HC SEMI-PRIVATE ROOM DAILY

## 2024-08-04 RX ORDER — FUROSEMIDE 40 MG/1
40 TABLET ORAL DAILY
Status: DISCONTINUED | OUTPATIENT
Start: 2024-08-04 | End: 2024-08-06 | Stop reason: HOSPADM

## 2024-08-04 RX ORDER — NADOLOL 20 MG/1
10 TABLET ORAL DAILY
Status: DISCONTINUED | OUTPATIENT
Start: 2024-08-04 | End: 2024-08-06 | Stop reason: HOSPADM

## 2024-08-04 RX ORDER — SPIRONOLACTONE 25 MG/1
25 TABLET ORAL 2 TIMES DAILY
Status: DISCONTINUED | OUTPATIENT
Start: 2024-08-04 | End: 2024-08-06 | Stop reason: HOSPADM

## 2024-08-04 ASSESSMENT — COGNITIVE AND FUNCTIONAL STATUS - GENERAL
PERSONAL GROOMING: A LITTLE
DAILY ACTIVITIY SCORE: 22
CLIMB 3 TO 5 STEPS WITH RAILING: A LITTLE
CLIMB 3 TO 5 STEPS WITH RAILING: A LITTLE
MOBILITY SCORE: 23
DAILY ACTIVITIY SCORE: 21
HELP NEEDED FOR BATHING: A LITTLE
DRESSING REGULAR LOWER BODY CLOTHING: A LITTLE
DRESSING REGULAR LOWER BODY CLOTHING: A LITTLE
MOBILITY SCORE: 23
HELP NEEDED FOR BATHING: A LITTLE

## 2024-08-04 ASSESSMENT — PAIN - FUNCTIONAL ASSESSMENT
PAIN_FUNCTIONAL_ASSESSMENT: 0-10

## 2024-08-04 ASSESSMENT — PAIN SCALES - GENERAL
PAINLEVEL_OUTOF10: 0 - NO PAIN
PAINLEVEL_OUTOF10: 0 - NO PAIN
PAINLEVEL_OUTOF10: 7
PAINLEVEL_OUTOF10: 0 - NO PAIN

## 2024-08-04 ASSESSMENT — PAIN DESCRIPTION - DESCRIPTORS: DESCRIPTORS: CRAMPING

## 2024-08-04 ASSESSMENT — PAIN DESCRIPTION - LOCATION: LOCATION: ABDOMEN

## 2024-08-04 NOTE — H&P
\    INTERNAL MEDICINE  NOTE                                            PATIENT NAME: Alfonzo Flanagan    MRN: [id number]  SERVICE DATE: 8/4/2024      ASSESSMENT and PLAN:    Abdominal pain, generalized    Liver cirrhosis secondary to REED (nonalcoholic steatohepatitis) (Multi)    Nausea and vomiting     Ct -Moderate wall thickening of multiple loops of small bowel throughout  the abdomen and pelvis which may indicate portal hypertensive  enteropathy or an infectious or inflammatory enterocolitis     Liquid diet. G I consult    Bp lower side. Planning for paracentesis    Dm2- monitor sugars  Continue current medications.  Supportive care.  Physical therapy and Occupational Therapy -as needed.  Continue to monitor labs. Continue DVT, aspiration, decubitus precautions.         SUBJECTIVE:    Pt is seen and examined. No new issues.     OBJECTIVE:      Current Facility-Administered Medications:     atorvastatin (Lipitor) tablet 80 mg, 80 mg, oral, Nightly, Landen Freeman MD, 80 mg at 08/03/24 2045    dextrose 50 % injection 12.5 g, 12.5 g, intravenous, q15 min PRN, Landen Freeman MD    dextrose 50 % injection 25 g, 25 g, intravenous, q15 min PRN, Landen Freeman MD    docusate sodium (Colace) capsule 100 mg, 100 mg, oral, BID, Landen Freeman MD    fenofibrate (Triglide) tablet 160 mg, 160 mg, oral, Daily, Landen Freeman MD, 160 mg at 08/04/24 0915    [Held by provider] furosemide (Lasix) injection 40 mg, 40 mg, intravenous, q12h, Landen Freeman MD, 40 mg at 08/03/24 2238    gabapentin (Neurontin) capsule 200 mg, 200 mg, oral, TID, Landen Freeman MD, 200 mg at 08/04/24 0915    glucagon (Glucagen) injection 1 mg, 1 mg, intramuscular, q15 min PRN, Landen Freeman MD    glucagon (Glucagen) injection 1 mg, 1 mg, intramuscular, q15 min PRN, Landen Freeman MD    hydrOXYzine HCL (Atarax) tablet 25 mg, 25 mg, oral, q6h PRN, Landen Freeman MD, 25 mg at 08/03/24 1116    insulin glargine (Lantus)  "injection 25 Units, 25 Units, subcutaneous, BID, Landen Freeman MD, 25 Units at 08/03/24 2200    insulin lispro (HumaLOG) injection 0-10 Units, 0-10 Units, subcutaneous, TID, Landen Freeman MD, 2 Units at 08/03/24 1632    lactulose 20 gram/30 mL oral solution 20 g, 20 g, oral, 4x daily, Landen Freeman MD, 20 g at 08/03/24 2045    magnesium oxide (Mag-Ox) tablet 400 mg, 400 mg, oral, Daily, Landen Freeamn MD, 400 mg at 08/04/24 0915    morphine injection 0.5 mg, 0.5 mg, intravenous, q4h PRN, Landen Freeman MD    nadolol (Corgard) tablet 10 mg, 10 mg, oral, Daily, Landen Freeman MD, 10 mg at 08/04/24 0933    pantoprazole (ProtoNix) EC tablet 40 mg, 40 mg, oral, Daily, Landen Freeman MD, 40 mg at 08/04/24 0916    polyethylene glycol (Glycolax, Miralax) packet 17 g, 17 g, oral, BID, Landen Freeman MD    rifAXIMin (Xifaxan) tablet 550 mg, 550 mg, oral, q12h RIVKA, Landen Freeman MD, 550 mg at 08/04/24 0915    spironolactone (Aldactone) tablet 25 mg, 25 mg, oral, BID, Landen Freeman MD    sucralfate (Carafate) suspension 1 g, 1 g, oral, Before meals & nightly, Landen Freeman MD, 1 g at 08/03/24 2045    traMADol (Ultram) tablet 50 mg, 50 mg, oral, q6h PRN, Landen Freeman MD    traZODone (Desyrel) tablet 25 mg, 25 mg, oral, Nightly, Landen Freeman MD, 25 mg at 08/03/24 2045    ursodiol (Actigall) capsule 300 mg, 300 mg, oral, TID, Landen Freeman MD, 300 mg at 08/04/24 0916        VITAL SIGNS:  /68 (BP Location: Left arm, Patient Position: Sitting)   Pulse 86   Temp 36.9 °C (98.4 °F) (Oral)   Resp 16   Ht 1.6 m (5' 3\")   Wt 90.5 kg (199 lb 8.3 oz)   SpO2 100%   BMI 35.34 kg/m²         PHYSICAL EXAM:  General: Awake and Alert, in no distress  Head - Normocephalic, atraumatic  Cardiac: S1S2 wnl, No MGR  Lungs: Clear to auscultation, no rales  Abdomen: Soft, non-tender  Extremities: No obvious deformity          DATA:   Diagnostic tests reviewed for today's visit:  "         Results for orders placed or performed during the hospital encounter of 08/03/24 (from the past 24 hour(s))   Urinalysis with Reflex Culture and Microscopic   Result Value Ref Range    Color, Urine Light-Yellow Light-Yellow, Yellow, Dark-Yellow    Appearance, Urine Clear Clear    Specific Gravity, Urine 1.026 1.005 - 1.035    pH, Urine 6.0 5.0, 5.5, 6.0, 6.5, 7.0, 7.5, 8.0    Protein, Urine NEGATIVE NEGATIVE, 10 (TRACE), 20 (TRACE) mg/dL    Glucose, Urine 200 (2+) (A) Normal mg/dL    Blood, Urine 0.5 (2+) (A) NEGATIVE    Ketones, Urine NEGATIVE NEGATIVE mg/dL    Bilirubin, Urine NEGATIVE NEGATIVE    Urobilinogen, Urine Normal Normal mg/dL    Nitrite, Urine NEGATIVE NEGATIVE    Leukocyte Esterase, Urine 25 Mckenzie/µL (A) NEGATIVE   Extra Urine Gray Tube   Result Value Ref Range    Extra Tube Hold for add-ons.    Microscopic Only, Urine   Result Value Ref Range    WBC, Urine 1-5 1-5, NONE /HPF    RBC, Urine >20 (A) NONE, 1-2, 3-5 /HPF    Squamous Epithelial Cells, Urine 1-9 (SPARSE) Reference range not established. /HPF   POCT GLUCOSE   Result Value Ref Range    POCT Glucose 211 (H) 74 - 99 mg/dL   POCT GLUCOSE   Result Value Ref Range    POCT Glucose 198 (H) 74 - 99 mg/dL   POCT GLUCOSE   Result Value Ref Range    POCT Glucose 291 (H) 74 - 99 mg/dL   POCT GLUCOSE   Result Value Ref Range    POCT Glucose 139 (H) 74 - 99 mg/dL   CBC and Auto Differential   Result Value Ref Range    WBC 4.1 (L) 4.4 - 11.3 x10*3/uL    nRBC 0.0 0.0 - 0.0 /100 WBCs    RBC 3.39 (L) 4.00 - 5.20 x10*6/uL    Hemoglobin 8.4 (L) 12.0 - 16.0 g/dL    Hematocrit 26.5 (L) 36.0 - 46.0 %    MCV 78 (L) 80 - 100 fL    MCH 24.8 (L) 26.0 - 34.0 pg    MCHC 31.7 (L) 32.0 - 36.0 g/dL    RDW 20.8 (H) 11.5 - 14.5 %    Platelets 90 (L) 150 - 450 x10*3/uL    Neutrophils % 45.3 40.0 - 80.0 %    Immature Granulocytes %, Automated 0.2 0.0 - 0.9 %    Lymphocytes % 37.3 13.0 - 44.0 %    Monocytes % 11.9 2.0 - 10.0 %    Eosinophils % 4.8 0.0 - 6.0 %    Basophils %  0.5 0.0 - 2.0 %    Neutrophils Absolute 1.87 1.20 - 7.70 x10*3/uL    Immature Granulocytes Absolute, Automated 0.01 0.00 - 0.70 x10*3/uL    Lymphocytes Absolute 1.54 1.20 - 4.80 x10*3/uL    Monocytes Absolute 0.49 0.10 - 1.00 x10*3/uL    Eosinophils Absolute 0.20 0.00 - 0.70 x10*3/uL    Basophils Absolute 0.02 0.00 - 0.10 x10*3/uL   Protime-INR   Result Value Ref Range    Protime 13.5 (H) 9.3 - 12.7 seconds    INR 1.3 (H) 0.9 - 1.2   Comprehensive Metabolic Panel   Result Value Ref Range    Glucose 130 (H) 65 - 99 mg/dL    Sodium 136 133 - 145 mmol/L    Potassium 3.6 3.4 - 5.1 mmol/L    Chloride 104 97 - 107 mmol/L    Bicarbonate 25 24 - 31 mmol/L    Urea Nitrogen 9 8 - 25 mg/dL    Creatinine 0.50 0.40 - 1.60 mg/dL    eGFR >90 >60 mL/min/1.73m*2    Calcium 7.6 (L) 8.5 - 10.4 mg/dL    Albumin 2.1 (L) 3.5 - 5.0 g/dL    Alkaline Phosphatase 323 (H) 35 - 125 U/L    Total Protein 6.0 5.9 - 7.9 g/dL    AST 49 (H) 5 - 40 U/L    Bilirubin, Total 2.1 (H) 0.1 - 1.2 mg/dL    ALT 26 5 - 40 U/L    Anion Gap 7 <=19 mmol/L   Morphology   Result Value Ref Range    RBC Morphology See Below     Polychromasia Mild     Target Cells Few     Ovalocytes Few            SIGNATURE    Landen Freeman MD  DATE: August 4, 2024

## 2024-08-04 NOTE — CARE PLAN
The patient's goals for the shift include safety, pain control, use IS 10 times every 2 hours, up in chair for meals    The clinical goals for the shift include Pt will remain free from falls and injury this shift, Pt will tolerate ambulation in room 3 times this shift,Pt will have pain controlled.        Problem: Pain - Adult  Goal: Verbalizes/displays adequate comfort level or baseline comfort level  Outcome: Progressing  Flowsheets (Taken 8/4/2024 1602)  Verbalizes/displays adequate comfort level or baseline comfort level:   Encourage patient to monitor pain and request assistance   Assess pain using appropriate pain scale   Administer analgesics based on type and severity of pain and evaluate response   Implement non-pharmacological measures as appropriate and evaluate response   Consider cultural and social influences on pain and pain management   Notify Licensed Independent Practitioner if interventions unsuccessful or patient reports new pain     Problem: Safety - Adult  Goal: Free from fall injury  Outcome: Progressing  Flowsheets (Taken 8/3/2024 1144)  Free from fall injury:   Instruct family/caregiver on patient safety   Based on caregiver fall risk screen, instruct family/caregiver to ask for assistance with transferring infant if caregiver noted to have fall risk factors     Problem: Discharge Planning  Goal: Discharge to home or other facility with appropriate resources  Outcome: Progressing     Problem: Chronic Conditions and Co-morbidities  Goal: Patient's chronic conditions and co-morbidity symptoms are monitored and maintained or improved  Outcome: Progressing  Flowsheets (Taken 8/4/2024 1602)  Care Plan - Patient's Chronic Conditions and Co-Morbidity Symptoms are Monitored and Maintained or Improved:   Monitor and assess patient's chronic conditions and comorbid symptoms for stability, deterioration, or improvement   Collaborate with multidisciplinary team to address chronic and comorbid conditions  and prevent exacerbation or deterioration   Update acute care plan with appropriate goals if chronic or comorbid symptoms are exacerbated and prevent overall improvement and discharge     Problem: Diabetes  Goal: Achieve decreasing blood glucose levels by end of shift  Outcome: Progressing  Flowsheets (Taken 8/4/2024 1602)  Achieve decreasing blood glucose levels by end of shift: Med administration/monitoring of effect  Goal: Increase stability of blood glucose readings by end of shift  Outcome: Progressing  Flowsheets (Taken 8/4/2024 1602)  Increase stability of blood glucose readings by end of shift: Med administration/monitoring of effect  Goal: Decrease in ketones present in urine by end of shift  Outcome: Progressing  Flowsheets (Taken 8/4/2024 1602)  Decrease in ketones present in urine by end of shift: Monitor urine output  Goal: Maintain electrolyte levels within acceptable range throughout shift  Outcome: Progressing  Flowsheets (Taken 8/4/2024 1602)  Maintain electrolyte levels within acceptable range throughout shift: Monitor urine output  Goal: Maintain glucose levels >70mg/dl to <250mg/dl throughout shift  Outcome: Progressing  Flowsheets (Taken 8/4/2024 1602)  Maintain glucose levels >70mg/dl to <250mg/dl throughout shift: Med administration/monitoring of effect  Goal: No changes in neurological exam by end of shift  Outcome: Progressing  Flowsheets (Taken 8/4/2024 1602)  No changes in neurological exam by end of shift: Complete frequent neurological assessments  Goal: Learn about and adhere to nutrition recommendations by end of shift  Outcome: Progressing  Goal: Vital signs within normal range for age by end of shift  Outcome: Progressing  Goal: Increase self care and/or family involovement by end of shift  Outcome: Progressing  Goal: Receive DSME education by end of shift  Outcome: Progressing     Problem: Pain  Goal: Takes deep breaths with improved pain control throughout the shift  Outcome:  Progressing  Goal: Turns in bed with improved pain control throughout the shift  Outcome: Progressing  Goal: Walks with improved pain control throughout the shift  Outcome: Progressing  Goal: Performs ADL's with improved pain control throughout shift  Outcome: Progressing  Goal: Participates in PT with improved pain control throughout the shift  Outcome: Progressing  Goal: Free from opioid side effects throughout the shift  Outcome: Progressing  Goal: Free from acute confusion related to pain meds throughout the shift  Outcome: Progressing     Problem: Fall/Injury  Goal: Not fall by end of shift  Outcome: Progressing  Goal: Be free from injury by end of the shift  Outcome: Progressing  Goal: Verbalize understanding of personal risk factors for fall in the hospital  Outcome: Progressing  Goal: Verbalize understanding of risk factor reduction measures to prevent injury from fall in the home  Outcome: Progressing  Goal: Use assistive devices by end of the shift  Outcome: Progressing  Goal: Pace activities to prevent fatigue by end of the shift  Outcome: Progressing

## 2024-08-05 ENCOUNTER — APPOINTMENT (OUTPATIENT)
Dept: RADIOLOGY | Facility: HOSPITAL | Age: 49
DRG: 442 | End: 2024-08-05
Payer: COMMERCIAL

## 2024-08-05 LAB
ALBUMIN SERPL-MCNC: 2.2 G/DL (ref 3.5–5)
ALP BLD-CCNC: 329 U/L (ref 35–125)
ALT SERPL-CCNC: 23 U/L (ref 5–40)
ANION GAP SERPL CALC-SCNC: 6 MMOL/L
AST SERPL-CCNC: 41 U/L (ref 5–40)
BILIRUB SERPL-MCNC: 1.2 MG/DL (ref 0.1–1.2)
BUN SERPL-MCNC: 9 MG/DL (ref 8–25)
CALCIUM SERPL-MCNC: 7.3 MG/DL (ref 8.5–10.4)
CHLORIDE SERPL-SCNC: 104 MMOL/L (ref 97–107)
CO2 SERPL-SCNC: 26 MMOL/L (ref 24–31)
CREAT SERPL-MCNC: 0.6 MG/DL (ref 0.4–1.6)
EGFRCR SERPLBLD CKD-EPI 2021: >90 ML/MIN/1.73M*2
ERYTHROCYTE [DISTWIDTH] IN BLOOD BY AUTOMATED COUNT: 21 % (ref 11.5–14.5)
GLUCOSE BLD MANUAL STRIP-MCNC: 165 MG/DL (ref 74–99)
GLUCOSE BLD MANUAL STRIP-MCNC: 248 MG/DL (ref 74–99)
GLUCOSE BLD MANUAL STRIP-MCNC: 280 MG/DL (ref 74–99)
GLUCOSE SERPL-MCNC: 322 MG/DL (ref 65–99)
HCT VFR BLD AUTO: 25.7 % (ref 36–46)
HGB BLD-MCNC: 8.1 G/DL (ref 12–16)
MCH RBC QN AUTO: 24.8 PG (ref 26–34)
MCHC RBC AUTO-ENTMCNC: 31.5 G/DL (ref 32–36)
MCV RBC AUTO: 79 FL (ref 80–100)
NRBC BLD-RTO: 0 /100 WBCS (ref 0–0)
PLATELET # BLD AUTO: 87 X10*3/UL (ref 150–450)
POTASSIUM SERPL-SCNC: 3.7 MMOL/L (ref 3.4–5.1)
PROT SERPL-MCNC: 6 G/DL (ref 5.9–7.9)
RBC # BLD AUTO: 3.27 X10*6/UL (ref 4–5.2)
SODIUM SERPL-SCNC: 136 MMOL/L (ref 133–145)
WBC # BLD AUTO: 3.9 X10*3/UL (ref 4.4–11.3)

## 2024-08-05 PROCEDURE — 36415 COLL VENOUS BLD VENIPUNCTURE: CPT | Performed by: NURSE PRACTITIONER

## 2024-08-05 PROCEDURE — 80053 COMPREHEN METABOLIC PANEL: CPT | Performed by: NURSE PRACTITIONER

## 2024-08-05 PROCEDURE — 0W9G3ZZ DRAINAGE OF PERITONEAL CAVITY, PERCUTANEOUS APPROACH: ICD-10-PCS

## 2024-08-05 PROCEDURE — 85027 COMPLETE CBC AUTOMATED: CPT | Performed by: NURSE PRACTITIONER

## 2024-08-05 PROCEDURE — 1210000001 HC SEMI-PRIVATE ROOM DAILY

## 2024-08-05 PROCEDURE — 2500000002 HC RX 250 W HCPCS SELF ADMINISTERED DRUGS (ALT 637 FOR MEDICARE OP, ALT 636 FOR OP/ED): Performed by: INTERNAL MEDICINE

## 2024-08-05 PROCEDURE — 2500000004 HC RX 250 GENERAL PHARMACY W/ HCPCS (ALT 636 FOR OP/ED): Performed by: NURSE PRACTITIONER

## 2024-08-05 PROCEDURE — 2500000004 HC RX 250 GENERAL PHARMACY W/ HCPCS (ALT 636 FOR OP/ED): Performed by: INTERNAL MEDICINE

## 2024-08-05 PROCEDURE — 82947 ASSAY GLUCOSE BLOOD QUANT: CPT

## 2024-08-05 PROCEDURE — 49083 ABD PARACENTESIS W/IMAGING: CPT

## 2024-08-05 PROCEDURE — 2500000001 HC RX 250 WO HCPCS SELF ADMINISTERED DRUGS (ALT 637 FOR MEDICARE OP): Performed by: INTERNAL MEDICINE

## 2024-08-05 PROCEDURE — 0W9G3ZZ DRAINAGE OF PERITONEAL CAVITY, PERCUTANEOUS APPROACH: ICD-10-PCS | Performed by: INTERNAL MEDICINE

## 2024-08-05 PROCEDURE — 2720000007 HC OR 272 NO HCPCS

## 2024-08-05 PROCEDURE — C1729 CATH, DRAINAGE: HCPCS

## 2024-08-05 RX ORDER — KETOROLAC TROMETHAMINE 30 MG/ML
15 INJECTION, SOLUTION INTRAMUSCULAR; INTRAVENOUS EVERY 6 HOURS PRN
Status: DISCONTINUED | OUTPATIENT
Start: 2024-08-05 | End: 2024-08-06 | Stop reason: HOSPADM

## 2024-08-05 RX ORDER — ONDANSETRON HYDROCHLORIDE 2 MG/ML
4 INJECTION, SOLUTION INTRAVENOUS EVERY 6 HOURS PRN
Status: DISCONTINUED | OUTPATIENT
Start: 2024-08-05 | End: 2024-08-06 | Stop reason: HOSPADM

## 2024-08-05 ASSESSMENT — PAIN DESCRIPTION - ORIENTATION
ORIENTATION: RIGHT
ORIENTATION: OTHER (COMMENT)

## 2024-08-05 ASSESSMENT — COGNITIVE AND FUNCTIONAL STATUS - GENERAL
DAILY ACTIVITIY SCORE: 21
PERSONAL GROOMING: A LITTLE
DRESSING REGULAR LOWER BODY CLOTHING: A LITTLE
PERSONAL GROOMING: A LITTLE
DAILY ACTIVITIY SCORE: 21
DAILY ACTIVITIY SCORE: 21
MOBILITY SCORE: 23
HELP NEEDED FOR BATHING: A LITTLE
CLIMB 3 TO 5 STEPS WITH RAILING: A LITTLE
HELP NEEDED FOR BATHING: A LITTLE
PERSONAL GROOMING: A LITTLE
HELP NEEDED FOR BATHING: A LITTLE
CLIMB 3 TO 5 STEPS WITH RAILING: A LITTLE
MOBILITY SCORE: 23
CLIMB 3 TO 5 STEPS WITH RAILING: A LITTLE
MOBILITY SCORE: 23

## 2024-08-05 ASSESSMENT — PAIN DESCRIPTION - LOCATION
LOCATION: ABDOMEN

## 2024-08-05 ASSESSMENT — PAIN - FUNCTIONAL ASSESSMENT
PAIN_FUNCTIONAL_ASSESSMENT: 0-10

## 2024-08-05 ASSESSMENT — PAIN SCALES - GENERAL
PAINLEVEL_OUTOF10: 7
PAINLEVEL_OUTOF10: 0 - NO PAIN
PAINLEVEL_OUTOF10: 7
PAINLEVEL_OUTOF10: 0 - NO PAIN
PAINLEVEL_OUTOF10: 7
PAINLEVEL_OUTOF10: 0 - NO PAIN
PAINLEVEL_OUTOF10: 8
PAINLEVEL_OUTOF10: 0 - NO PAIN
PAINLEVEL_OUTOF10: 6
PAINLEVEL_OUTOF10: 0 - NO PAIN
PAINLEVEL_OUTOF10: 7
PAINLEVEL_OUTOF10: 7

## 2024-08-05 ASSESSMENT — PAIN DESCRIPTION - DESCRIPTORS: DESCRIPTORS: ACHING

## 2024-08-05 NOTE — CONSULTS
"Nutrition Assessement Note    Nutrition Assessment    Reason for Assessment: Provider consult order    Pt unavailable at time of visit, pt on the phone. Pt has a Hx of celiac disease. Pt is on a full liquid diet. Per chart, pt reported nausea and abdominal pain. NP states the abdominal pain resolves after paracentesis. Plan is for pt to receive paracentesis. Will provide Mighty shakes TID. Can provide diet education if appropriate at follow-up.     Reason for Hospital Admission:  Alfonzo Flanagan is a 49 y.o. female who is admitted for Abdominal pain.     Past Medical History:   Diagnosis Date    Cirrhosis of liver (Multi)     Diabetes mellitus (Multi)       Past Surgical History:   Procedure Laterality Date    ABDOMINAL SURGERY      CT GUIDED IMAGING FOR NEEDLE PLACEMENT  10/14/2020    CT GUIDED IMAGING FOR NEEDLE PLACEMENT [facility]    US GUIDED ABDOMINAL PARACENTESIS  10/08/2020    US GUIDED ABDOMINAL PARACENTESIS [facility]       Nutrition History:     Energy Intake: Poor < 50 %  Food Allergies/Intolerances:   gluten  GI Symptoms: Nausea and Abdominal pain    Anthropometrics:  Ht: 160 cm (5' 3\"), Wt: 93.3 kg (205 lb 11 oz), BMI: 36.45  IBW/kg (Dietitian Calculated): 52.3 kg  Percent of IBW: 178 %  Adjusted Body Weight (kg): 62.7 kg    Weight Change:  Daily Weight  08/05/24 : 93.3 kg (205 lb 11 oz)  07/21/24 : 79 kg (174 lb 2.6 oz)  07/12/24 : 79 kg (174 lb 2.6 oz)  07/04/24 : 77.1 kg (170 lb)  06/22/24 : 77.7 kg (171 lb 4.8 oz)  06/14/24 : 77.6 kg (171 lb)  06/14/24 : 77.6 kg (171 lb)  06/09/24 : 81.6 kg (180 lb)  05/25/24 : 81.6 kg (180 lb)  05/15/24 : 80.3 kg (177 lb)     Weight History / % Weight Change: records show weight gain of 31# (17.8%) over 2 weeks-- fluid related pt has ascites        Significant Weight Gain: Fluid related    Nutrition Focused Physical Exam Findings:             Edema  Edema: ascites         Nutrition Significant Labs:  Lab Results   Component Value Date    WBC " 3.9 (L) 08/05/2024    HGB 8.1 (L) 08/05/2024    HCT 25.7 (L) 08/05/2024    PLT 87 (L) 08/05/2024    CHOL 183 10/12/2022    TRIG 98 10/12/2022    HDL 37 (L) 10/12/2022    ALT 23 08/05/2024    AST 41 (H) 08/05/2024     08/05/2024    K 3.7 08/05/2024     08/05/2024    CREATININE 0.60 08/05/2024    BUN 9 08/05/2024    CO2 26 08/05/2024    TSH 2.25 01/03/2021    INR 1.3 (H) 08/04/2024    HGBA1C 9.9 (H) 07/26/2024     Nutrition Specific Medications:  atorvastatin, 80 mg, oral, Nightly  docusate sodium, 100 mg, oral, BID  fenofibrate, 160 mg, oral, Daily  furosemide, 40 mg, oral, Daily  gabapentin, 200 mg, oral, TID  insulin glargine, 25 Units, subcutaneous, BID  insulin lispro, 0-10 Units, subcutaneous, TID  lactulose, 20 g, oral, 4x daily  magnesium oxide, 400 mg, oral, Daily  nadolol, 10 mg, oral, Daily  pantoprazole, 40 mg, oral, Daily  polyethylene glycol, 17 g, oral, BID  rifAXIMin, 550 mg, oral, q12h RIVKA  spironolactone, 25 mg, oral, BID  sucralfate, 1 g, oral, Before meals & nightly  traZODone, 25 mg, oral, Nightly  ursodiol, 300 mg, oral, TID      Dietary Orders (From admission, onward)       Start     Ordered    08/04/24 1045  Adult diet Full Liquid  Diet effective now        Question:  Diet type  Answer:  Full Liquid    08/04/24 1044    08/03/24 1147  May Participate in Room Service  Once        Question:  .  Answer:  Yes    08/03/24 1146                  Estimated Needs:   Estimated Energy Needs  Total Energy Estimated Needs (kCal): 1568 kCal  Total Estimated Energy Need per Day (kCal/kg): 25 kCal/kg  Method for Estimating Needs: ABW    Estimated Protein Needs  Total Protein Estimated Needs (g): 75 g  Total Protein Estimated Needs (g/kg): 1.2 g/kg  Method for Estimating Needs: ABW    Estimated Fluid Needs  Total Fluid Estimated Needs (mL): 1568 mL  Method for Estimating Needs: 1 mL/kcal        Nutrition Diagnosis   Nutrition Diagnosis:       Nutrition Diagnosis  Patient has Nutrition Diagnosis:  Yes  Diagnosis Status (1): New  Nutrition Diagnosis 1: Altered nutrition related to laboratory values  Related to (1): physiological causes  As Evidenced by (1): elevated a1c @ 9.9%       Nutrition Interventions/Recommendations   Nutrition Interventions and Recommendations:    Nutrition Prescription:  Individualized Nutrition Prescription Provided for : 1568 kcals, 75 g protein via diet    Nutrition Interventions:   Food and/or Nutrient Delivery Interventions  Interventions: Meals and snacks, Medical food supplement  Meals and Snacks: Carbohydrate-modified diet  Goal: recommend a carb-control, low sodium, gluten-free diet once able  Medical Food Supplement: Modified beverage  Goal: mighty shake TID to provide 200 kcals and 7g protein each    Education Documentation  No documentation found.           Nutrition Monitoring and Evaluation   Monitoring/Evaluation:   Food/Nutrient Related History Monitoring  Monitoring and Evaluation Plan: Energy intake  Energy Intake: Estimated energy intake  Criteria: pt to consume >/= 75% estimated needs         Time Spent/Follow-up:   Follow Up  Time Spent (min): 30 minutes  Last Date of Nutrition Visit: 08/05/24  Nutrition Follow-Up Needed?: 3-5 days  Follow up Comment: 8/9/24

## 2024-08-05 NOTE — NURSING NOTE
Assumed patient care. BSSR received from previous Nurse. Seen patient lying on bed awake, no distress or discomfort noted.  and daughter was in the room. Safety measures ensured and call light in reach.   Plan of care ongoing.

## 2024-08-05 NOTE — CARE PLAN
The patient's goals for the shift include safety, pain control, use IS 10 times every 2 hours, up in chair for meals    The clinical goals for the shift include safety

## 2024-08-05 NOTE — PROGRESS NOTES
Spiritual Care Visit    Clinical Encounter Type  Visited With: Patient  Routine Visit: Introduction    Yarsanism Encounters  Yarsanism Needs: Sacred text, Literature, Prayer     Spiritual care Note:     Patient received a visit from the Spiritual care volunteer while admitted.  This patient was triaged for their emotional and spiritual need consistent with their belief system and supported accordingly.

## 2024-08-05 NOTE — PROGRESS NOTES
"Alfonzo Flanagan is a 49 y.o. female on day 2 of admission presenting with Abdominal pain, generalized.    Subjective   Patient remains distended, She reported she has BM yesterday.       Objective     Physical Exam  HENT:      Head: Normocephalic.      Nose: Nose normal.      Mouth/Throat:      Mouth: Mucous membranes are moist.   Cardiovascular:      Rate and Rhythm: Normal rate.   Pulmonary:      Effort: Pulmonary effort is normal.   Abdominal:      General: There is distension.   Musculoskeletal:         General: Normal range of motion.      Cervical back: Normal range of motion.   Skin:     General: Skin is warm.   Neurological:      General: No focal deficit present.      Mental Status: She is alert.         Last Recorded Vitals  Blood pressure 95/61, pulse 83, temperature 36.3 °C (97.3 °F), temperature source Oral, resp. rate 19, height 1.6 m (5' 3\"), weight 93.3 kg (205 lb 11 oz), SpO2 97%.  Intake/Output last 3 Shifts:  I/O last 3 completed shifts:  In: 1180 (12.6 mL/kg) [P.O.:1180]  Out: 0 (0 mL/kg)   Weight: 93.3 kg     Relevant Results      Scheduled medications  atorvastatin, 80 mg, oral, Nightly  docusate sodium, 100 mg, oral, BID  fenofibrate, 160 mg, oral, Daily  furosemide, 40 mg, oral, Daily  gabapentin, 200 mg, oral, TID  insulin glargine, 25 Units, subcutaneous, BID  insulin lispro, 0-10 Units, subcutaneous, TID  lactulose, 20 g, oral, 4x daily  magnesium oxide, 400 mg, oral, Daily  nadolol, 10 mg, oral, Daily  pantoprazole, 40 mg, oral, Daily  polyethylene glycol, 17 g, oral, BID  rifAXIMin, 550 mg, oral, q12h RIVKA  spironolactone, 25 mg, oral, BID  sucralfate, 1 g, oral, Before meals & nightly  traZODone, 25 mg, oral, Nightly  ursodiol, 300 mg, oral, TID      Continuous medications     PRN medications  PRN medications: dextrose, dextrose, glucagon, glucagon, hydrOXYzine HCL, ketorolac, morphine, traMADol  Results for orders placed or performed during the hospital encounter of 08/03/24 (from " the past 24 hour(s))   POCT GLUCOSE   Result Value Ref Range    POCT Glucose 228 (H) 74 - 99 mg/dL   POCT GLUCOSE   Result Value Ref Range    POCT Glucose 296 (H) 74 - 99 mg/dL   Comprehensive Metabolic Panel   Result Value Ref Range    Glucose 322 (H) 65 - 99 mg/dL    Sodium 136 133 - 145 mmol/L    Potassium 3.7 3.4 - 5.1 mmol/L    Chloride 104 97 - 107 mmol/L    Bicarbonate 26 24 - 31 mmol/L    Urea Nitrogen 9 8 - 25 mg/dL    Creatinine 0.60 0.40 - 1.60 mg/dL    eGFR >90 >60 mL/min/1.73m*2    Calcium 7.3 (L) 8.5 - 10.4 mg/dL    Albumin 2.2 (L) 3.5 - 5.0 g/dL    Alkaline Phosphatase 329 (H) 35 - 125 U/L    Total Protein 6.0 5.9 - 7.9 g/dL    AST 41 (H) 5 - 40 U/L    Bilirubin, Total 1.2 0.1 - 1.2 mg/dL    ALT 23 5 - 40 U/L    Anion Gap 6 <=19 mmol/L   CBC   Result Value Ref Range    WBC 3.9 (L) 4.4 - 11.3 x10*3/uL    nRBC 0.0 0.0 - 0.0 /100 WBCs    RBC 3.27 (L) 4.00 - 5.20 x10*6/uL    Hemoglobin 8.1 (L) 12.0 - 16.0 g/dL    Hematocrit 25.7 (L) 36.0 - 46.0 %    MCV 79 (L) 80 - 100 fL    MCH 24.8 (L) 26.0 - 34.0 pg    MCHC 31.5 (L) 32.0 - 36.0 g/dL    RDW 21.0 (H) 11.5 - 14.5 %    Platelets 87 (L) 150 - 450 x10*3/uL   POCT GLUCOSE   Result Value Ref Range    POCT Glucose 280 (H) 74 - 99 mg/dL                            Assessment/Plan   Principal Problem:    Abdominal pain, generalized  Active Problems:    Liver cirrhosis secondary to REED (nonalcoholic steatohepatitis) (Multi)    Nausea and vomiting    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  -HE: Monitor mental status, A/O x 3, Continue lactulose 20 g daily with a goal of 2-3 soft stools a day and rifaximin 550 mg BID.   -EV: monitor for varices, last EGD on 12/27/23 by Dr. Zaman at [facility] Normal oropharynx. Esophageal ulcer, no bleeding and no stigmata of recent bleeding.  -Ascites: CT moderate volume ascites and stranding diffusely throughout the mesentery. Will continue lasix/aldactone. IR consult placed for para, fluid  analysis ordered   Continue to monitor Hepatic panel, CBC, INR  Hgb remains stable . No overt bleeding      Epigastric abdominal pain/Nausea, vomiting, constipation  -Patient has multiple reported admissions this year with similar complaints of abdominal pain that typically resolves with a paracentesis/conservative measures. She has history of acute idiopathic pancreatitis. EGD with EUS 3/26/2024. Findings were c/f NET however tissue sample was not obtained. At that time, it was recommended to follow up mass in 1 year with EUS. MRI/MRCP on 7/26/2024: No pancreatic lesion identified. Cirrhosis and sequela of portal hypertension. No hepatic lesion.   -Had small BM yesterday   -Continue supportive care with anti-emetics and pain control as needed per primary team.  -Gluten free and low sodium diet as tolerated.      Acute on chronic anemia   -Patient admitted with Hgb stable 8.1  -No overt bleeding   -Iron labs ordered.   -Monitor frequency and color of stools.  -Continue to monitor CBC and overt signs of bleeding.   -Transfuse when Hgb is less than 7     Plan for paracentesis this afternoon           Adrianne Powell, MADYSON-CNP

## 2024-08-05 NOTE — PROGRESS NOTES
Patient not medically clear. Plan for paracentesis. Patient concerned about the bills that she has been getting from  and her insurance coverage. [facility] spoke to registration and the patient is active with [employer] but does not have coverage at [facility]. Patient will be informed and she will need to speak to [employer] to find out where she has coverage. At the time of discharge, patient will return home with no skilled services. Will follow.      08/05/24 1037   Discharge Planning   Home or Post Acute Services None   Expected Discharge Disposition Home   Does the patient need discharge transport arranged? No

## 2024-08-06 ENCOUNTER — APPOINTMENT (OUTPATIENT)
Dept: RADIOLOGY | Facility: HOSPITAL | Age: 49
DRG: 442 | End: 2024-08-06
Payer: COMMERCIAL

## 2024-08-06 VITALS
TEMPERATURE: 98.1 F | WEIGHT: 205.69 LBS | HEART RATE: 74 BPM | BODY MASS INDEX: 36.45 KG/M2 | OXYGEN SATURATION: 99 % | HEIGHT: 63 IN | RESPIRATION RATE: 17 BRPM | SYSTOLIC BLOOD PRESSURE: 106 MMHG | DIASTOLIC BLOOD PRESSURE: 61 MMHG

## 2024-08-06 PROBLEM — R11.2 NAUSEA AND VOMITING: Status: RESOLVED | Noted: 2024-07-21 | Resolved: 2024-08-06

## 2024-08-06 LAB
ALBUMIN SERPL-MCNC: 2 G/DL (ref 3.5–5)
ALP BLD-CCNC: 300 U/L (ref 35–125)
ALT SERPL-CCNC: 20 U/L (ref 5–40)
ANION GAP SERPL CALC-SCNC: 5 MMOL/L
AST SERPL-CCNC: 36 U/L (ref 5–40)
BILIRUB SERPL-MCNC: 1.1 MG/DL (ref 0.1–1.2)
BUN SERPL-MCNC: 10 MG/DL (ref 8–25)
CALCIUM SERPL-MCNC: 7.4 MG/DL (ref 8.5–10.4)
CHLORIDE SERPL-SCNC: 105 MMOL/L (ref 97–107)
CO2 SERPL-SCNC: 27 MMOL/L (ref 24–31)
CREAT SERPL-MCNC: 0.6 MG/DL (ref 0.4–1.6)
EGFRCR SERPLBLD CKD-EPI 2021: >90 ML/MIN/1.73M*2
ERYTHROCYTE [DISTWIDTH] IN BLOOD BY AUTOMATED COUNT: 21.2 % (ref 11.5–14.5)
GLUCOSE BLD MANUAL STRIP-MCNC: 142 MG/DL (ref 74–99)
GLUCOSE BLD MANUAL STRIP-MCNC: 223 MG/DL (ref 74–99)
GLUCOSE SERPL-MCNC: 236 MG/DL (ref 65–99)
HCT VFR BLD AUTO: 28.1 % (ref 36–46)
HGB BLD-MCNC: 8.8 G/DL (ref 12–16)
MCH RBC QN AUTO: 25.1 PG (ref 26–34)
MCHC RBC AUTO-ENTMCNC: 31.3 G/DL (ref 32–36)
MCV RBC AUTO: 80 FL (ref 80–100)
NRBC BLD-RTO: 0 /100 WBCS (ref 0–0)
PLATELET # BLD AUTO: 93 X10*3/UL (ref 150–450)
POTASSIUM SERPL-SCNC: 3.7 MMOL/L (ref 3.4–5.1)
PROT SERPL-MCNC: 5.9 G/DL (ref 5.9–7.9)
RBC # BLD AUTO: 3.51 X10*6/UL (ref 4–5.2)
SODIUM SERPL-SCNC: 137 MMOL/L (ref 133–145)
WBC # BLD AUTO: 3.6 X10*3/UL (ref 4.4–11.3)

## 2024-08-06 PROCEDURE — 84075 ASSAY ALKALINE PHOSPHATASE: CPT | Performed by: NURSE PRACTITIONER

## 2024-08-06 PROCEDURE — 36415 COLL VENOUS BLD VENIPUNCTURE: CPT | Performed by: NURSE PRACTITIONER

## 2024-08-06 PROCEDURE — 82947 ASSAY GLUCOSE BLOOD QUANT: CPT

## 2024-08-06 PROCEDURE — 2500000001 HC RX 250 WO HCPCS SELF ADMINISTERED DRUGS (ALT 637 FOR MEDICARE OP)

## 2024-08-06 PROCEDURE — 2500000002 HC RX 250 W HCPCS SELF ADMINISTERED DRUGS (ALT 637 FOR MEDICARE OP, ALT 636 FOR OP/ED): Performed by: INTERNAL MEDICINE

## 2024-08-06 PROCEDURE — 85027 COMPLETE CBC AUTOMATED: CPT | Performed by: NURSE PRACTITIONER

## 2024-08-06 PROCEDURE — 2500000004 HC RX 250 GENERAL PHARMACY W/ HCPCS (ALT 636 FOR OP/ED): Performed by: INTERNAL MEDICINE

## 2024-08-06 PROCEDURE — 2500000001 HC RX 250 WO HCPCS SELF ADMINISTERED DRUGS (ALT 637 FOR MEDICARE OP): Performed by: NURSE PRACTITIONER

## 2024-08-06 PROCEDURE — 2500000001 HC RX 250 WO HCPCS SELF ADMINISTERED DRUGS (ALT 637 FOR MEDICARE OP): Performed by: INTERNAL MEDICINE

## 2024-08-06 RX ORDER — FUROSEMIDE 40 MG/1
40 TABLET ORAL DAILY
Qty: 30 TABLET | Refills: 0 | Status: SHIPPED | OUTPATIENT
Start: 2024-08-06 | End: 2024-08-06

## 2024-08-06 RX ORDER — FUROSEMIDE 40 MG/1
40 TABLET ORAL DAILY
Qty: 30 TABLET | Refills: 0 | Status: SHIPPED | OUTPATIENT
Start: 2024-08-06

## 2024-08-06 RX ORDER — LACTULOSE 10 G/15ML
300 SOLUTION ORAL ONCE
Status: COMPLETED | OUTPATIENT
Start: 2024-08-06 | End: 2024-08-06

## 2024-08-06 RX ORDER — SPIRONOLACTONE 50 MG/1
50 TABLET, FILM COATED ORAL 2 TIMES DAILY
Qty: 60 TABLET | Refills: 0 | Status: SHIPPED | OUTPATIENT
Start: 2024-08-06

## 2024-08-06 RX ORDER — SPIRONOLACTONE 50 MG/1
50 TABLET, FILM COATED ORAL 2 TIMES DAILY
Qty: 60 TABLET | Refills: 0 | Status: SHIPPED | OUTPATIENT
Start: 2024-08-06 | End: 2024-08-06

## 2024-08-06 ASSESSMENT — PAIN SCALES - GENERAL: PAINLEVEL_OUTOF10: 0 - NO PAIN

## 2024-08-06 NOTE — CARE PLAN
The patient's goals for the shift include safety, pain control, use IS 10 times every 2 hours, up in chair for meals    The clinical goals for the shift include Pain control, Adequate rest    Over the shift, the patient did not make progress toward the following goals. Barriers to progression include . Recommendations to address these barriers include .      Problem: Pain - Adult  Goal: Verbalizes/displays adequate comfort level or baseline comfort level  Outcome: Progressing     Problem: Safety - Adult  Goal: Free from fall injury  Outcome: Progressing     Problem: Discharge Planning  Goal: Discharge to home or other facility with appropriate resources  Outcome: Progressing     Problem: Chronic Conditions and Co-morbidities  Goal: Patient's chronic conditions and co-morbidity symptoms are monitored and maintained or improved  Outcome: Progressing     Problem: Diabetes  Goal: Achieve decreasing blood glucose levels by end of shift  Outcome: Progressing  Goal: Increase stability of blood glucose readings by end of shift  Outcome: Progressing  Goal: Decrease in ketones present in urine by end of shift  Outcome: Progressing  Goal: Maintain electrolyte levels within acceptable range throughout shift  Outcome: Progressing  Goal: Maintain glucose levels >70mg/dl to <250mg/dl throughout shift  Outcome: Met  Goal: No changes in neurological exam by end of shift  Outcome: Met  Goal: Learn about and adhere to nutrition recommendations by end of shift  Outcome: Progressing  Goal: Vital signs within normal range for age by end of shift  Outcome: Met  Goal: Increase self care and/or family involovement by end of shift  Outcome: Met  Goal: Receive DSME education by end of shift  Outcome: Progressing     Problem: Pain  Goal: Takes deep breaths with improved pain control throughout the shift  Outcome: Met  Goal: Turns in bed with improved pain control throughout the shift  Outcome: Met  Goal: Walks with improved pain control  throughout the shift  Outcome: Met  Goal: Performs ADL's with improved pain control throughout shift  Outcome: Progressing  Goal: Participates in PT with improved pain control throughout the shift  Outcome: Progressing  Goal: Free from opioid side effects throughout the shift  Outcome: Met  Goal: Free from acute confusion related to pain meds throughout the shift  Outcome: Met     Problem: Fall/Injury  Goal: Not fall by end of shift  Outcome: Met  Goal: Be free from injury by end of the shift  Outcome: Met  Goal: Verbalize understanding of personal risk factors for fall in the hospital  Outcome: Progressing  Goal: Verbalize understanding of risk factor reduction measures to prevent injury from fall in the home  Outcome: Progressing  Goal: Use assistive devices by end of the shift  Outcome: Progressing  Goal: Pace activities to prevent fatigue by end of the shift  Outcome: Met     Problem: Nutrition  Goal: Oral intake greater 75%  Outcome: Progressing  Goal: BG  mg/dL  Outcome: Progressing     Problem: Skin  Goal: Prevent/manage excess moisture  8/6/2024 0651 by Maxwell Cortes RN  Outcome: Progressing  8/6/2024 0100 by Maxwell Cortes RN  Flowsheets (Taken 8/6/2024 0100)  Prevent/manage excess moisture:   Moisturize dry skin   Monitor for/manage infection if present   Cleanse incontinence/protect with barrier cream  Goal: Prevent/minimize sheer/friction injuries  8/6/2024 0651 by Maxwell Cortes RN  Outcome: Progressing  8/6/2024 0100 by Maxwell Cortes RN  Flowsheets (Taken 8/6/2024 0100)  Prevent/minimize sheer/friction injuries:   Utilize specialty bed per algorithm   HOB 30 degrees or less

## 2024-08-06 NOTE — PROGRESS NOTES
Alfonzo Flanagan is a 49 y.o. female on day 3 of admission presenting with Abdominal pain, generalized.    Subjective   Patient seen and examined.  Resting in bed in no acute distress.  Awake alert oriented x 3.  Better.  Decreased abdominal pain and distention.  No bowel movement since .  + Flatus.  Tolerating liquid diet.  + Leg swelling.  + Nosebleed, resolved.    Spoke with nursing, no new issues.    Objective     Physical Exam  Vitals and nursing note reviewed.   Constitutional:       General: She is not in acute distress.     Appearance: Normal appearance. She is normal weight. She is not ill-appearing, toxic-appearing or diaphoretic.   HENT:      Head: Normocephalic and atraumatic.      Right Ear: External ear normal.      Left Ear: External ear normal.      Nose: Nose normal.      Comments: No epistaxis.     Mouth/Throat:      Mouth: Mucous membranes are moist.      Pharynx: Oropharynx is clear.   Eyes:      Extraocular Movements: Extraocular movements intact.      Conjunctiva/sclera: Conjunctivae normal.      Pupils: Pupils are equal, round, and reactive to light.   Cardiovascular:      Rate and Rhythm: Normal rate and regular rhythm.      Pulses: Normal pulses.      Heart sounds: Normal heart sounds. No murmur heard.  Pulmonary:      Effort: Pulmonary effort is normal. No respiratory distress.      Breath sounds: Normal breath sounds. No wheezing, rhonchi or rales.   Abdominal:      General: Bowel sounds are normal. There is distension.      Palpations: Abdomen is soft.      Tenderness: There is no abdominal tenderness.      Comments: Decreased distention + ascites.   Genitourinary:     Comments: Deferred.  Musculoskeletal:         General: Swelling present. No tenderness. Normal range of motion.      Cervical back: Normal range of motion and neck supple.      Right lower le+ Pitting Edema present.      Left lower le+ Pitting Edema present.      Comments: Decreased edema.   Skin:      "General: Skin is warm and dry.      Capillary Refill: Capillary refill takes less than 2 seconds.   Neurological:      General: No focal deficit present.      Mental Status: She is alert and oriented to person, place, and time.   Psychiatric:         Mood and Affect: Mood normal.         Behavior: Behavior normal.       Last Recorded Vitals  Blood pressure 106/61, pulse 74, temperature 36.7 °C (98.1 °F), temperature source Oral, resp. rate 17, height 1.6 m (5' 3\"), weight 93.3 kg (205 lb 11 oz), SpO2 99%.    Intake/Output last 3 Shifts:  No intake/output data recorded.    Relevant Results  Results for orders placed or performed during the hospital encounter of 08/03/24 (from the past 24 hour(s))   POCT GLUCOSE   Result Value Ref Range    POCT Glucose 165 (H) 74 - 99 mg/dL   POCT GLUCOSE   Result Value Ref Range    POCT Glucose 248 (H) 74 - 99 mg/dL   Comprehensive Metabolic Panel   Result Value Ref Range    Glucose 236 (H) 65 - 99 mg/dL    Sodium 137 133 - 145 mmol/L    Potassium 3.7 3.4 - 5.1 mmol/L    Chloride 105 97 - 107 mmol/L    Bicarbonate 27 24 - 31 mmol/L    Urea Nitrogen 10 8 - 25 mg/dL    Creatinine 0.60 0.40 - 1.60 mg/dL    eGFR >90 >60 mL/min/1.73m*2    Calcium 7.4 (L) 8.5 - 10.4 mg/dL    Albumin 2.0 (L) 3.5 - 5.0 g/dL    Alkaline Phosphatase 300 (H) 35 - 125 U/L    Total Protein 5.9 5.9 - 7.9 g/dL    AST 36 5 - 40 U/L    Bilirubin, Total 1.1 0.1 - 1.2 mg/dL    ALT 20 5 - 40 U/L    Anion Gap 5 <=19 mmol/L   CBC   Result Value Ref Range    WBC 3.6 (L) 4.4 - 11.3 x10*3/uL    nRBC 0.0 0.0 - 0.0 /100 WBCs    RBC 3.51 (L) 4.00 - 5.20 x10*6/uL    Hemoglobin 8.8 (L) 12.0 - 16.0 g/dL    Hematocrit 28.1 (L) 36.0 - 46.0 %    MCV 80 80 - 100 fL    MCH 25.1 (L) 26.0 - 34.0 pg    MCHC 31.3 (L) 32.0 - 36.0 g/dL    RDW 21.2 (H) 11.5 - 14.5 %    Platelets 93 (L) 150 - 450 x10*3/uL   POCT GLUCOSE   Result Value Ref Range    POCT Glucose 142 (H) 74 - 99 mg/dL     Susceptibility data from last 90 days.  Collected Specimen " Info Organism Amoxicillin/Clavulanate Ampicillin Ampicillin/Sulbactam Cefazolin Cefazolin (uncomplicated UTIs only) Ciprofloxacin Gentamicin Levofloxacin Nitrofurantoin Penicillin Piperacillin/Tazobactam   07/10/24 Urine from Clean Catch/Voided Streptococcus agalactiae (Group B Streptococcus)              06/23/24 Urine from Clean Catch/Voided Escherichia coli   S   S  S  S  S   S   S   05/15/24 Urine from Clean Catch/Voided Enterococcus faecium   S     R   S  S  S      Klebsiella pneumoniae/variicola  S  R  S  S  S  S  S   I   S     Collected Specimen Info Organism Trimethoprim/Sulfamethoxazole Vancomycin   07/10/24 Urine from Clean Catch/Voided Streptococcus agalactiae (Group B Streptococcus)     06/23/24 Urine from Clean Catch/Voided Escherichia coli  S    05/15/24 Urine from Clean Catch/Voided Enterococcus faecium  R  S     Klebsiella pneumoniae/variicola  S      No results found.    Scheduled medications  atorvastatin, 80 mg, oral, Nightly  docusate sodium, 100 mg, oral, BID  fenofibrate, 160 mg, oral, Daily  furosemide, 40 mg, oral, Daily  gabapentin, 200 mg, oral, TID  insulin glargine, 25 Units, subcutaneous, BID  insulin lispro, 0-10 Units, subcutaneous, TID  lactulose, 20 g, oral, 4x daily  lactulose, 300 mL, rectal, Once  magnesium oxide, 400 mg, oral, Daily  nadolol, 10 mg, oral, Daily  pantoprazole, 40 mg, oral, Daily  polyethylene glycol, 17 g, oral, BID  rifAXIMin, 550 mg, oral, q12h RIVKA  sodium chloride, 1 spray, Each Nostril, 4x daily  spironolactone, 25 mg, oral, BID  sucralfate, 1 g, oral, Before meals & nightly  traZODone, 25 mg, oral, Nightly  ursodiol, 300 mg, oral, TID      Continuous medications     PRN medications  PRN medications: dextrose, dextrose, glucagon, glucagon, hydrOXYzine HCL, ketorolac, morphine, ondansetron, traMADol      ASSESSMENT:  Abdominal pain improved  Nausea, vomiting resolved  Constipation  Abnormal CT abdomen/pelvis  Hepatic cirrhosis with ascites status post  paracentesis  Portal hypertension  Transaminitis  Hyperbilirubinemia - resolved  Pancytopenia  Hypotension  Hyperlipidemia  Hypertriglyceridemia  Type 2 diabetes mellitus  Hypocalcemia  Hypoalbuminemia  Protein calorie malnutrition  Epistaxis - resolved    PLAN:  Patient is doing well this morning.  Status post abdominal paracentesis per Gastroenterology.  Continue diuretics Lasix 40 mg p.o. daily.  Spironolactone 25 mg p.o. twice daily.  Low-sodium diet.  + Constipation.  Continue bowel regimen.  Colace 100 mg p.o. twice daily.  MiraLAX twice daily.  Lactulose.  Management per Gastroenterology.  Full liquid diet.  Advance per Gastroenterology.  Follow-up.  Epistaxis resolved.  Add saline nasal spray.  CBC reviewed.  Stable.  H&H noted.  No evidence of acute blood loss.  Continue to monitor outpatient.  Close Gastroenterology follow-up.  Point-of-care glucose reviewed.  Monitor point-of-care glucose AC/HS.  Continue insulin.  [place].  Sliding scale.  ADA diet.  Hypoglycemia protocol.  Adjust insulin as needed for glycemic control.  Outpatient follow-up with PCP for blood glucose monitoring and management.  Registered dietician input appreciated.  Sugar-free mighty shake.  Increase activity as tolerated.  Ambulate.  DVT prophylaxis.  SCDs.  GI prophylaxis.  PPI Protonix.  Supportive care.  Patient reassured.  Case management following for discharge planning.  Discharge plan home.  Discussed with Dr. Lawrence.  Okay to discharge home after cleared by Gastroenterology.    Discussed with Gastroenterology Nurse Practitioner.  Lactulose enema.  Okay to discharge after bowel movement.        Leny Daniels, APRN-CNP

## 2024-08-06 NOTE — PROGRESS NOTES
"Alfonzo Flanagan is a 49 y.o. female on day 3 of admission presenting with Abdominal pain, generalized.    Subjective   S/p para yesterday. Overall feels better. She reports she is still constipated .        Objective     Physical Exam  HENT:      Head: Normocephalic.      Nose: Nose normal.      Mouth/Throat:      Mouth: Mucous membranes are moist.   Cardiovascular:      Rate and Rhythm: Tachycardia present.   Pulmonary:      Breath sounds: Normal breath sounds.   Abdominal:      Palpations: Abdomen is soft.   Musculoskeletal:         General: Normal range of motion.      Cervical back: Normal range of motion.   Skin:     General: Skin is warm.   Neurological:      General: No focal deficit present.      Mental Status: She is alert.   Psychiatric:         Mood and Affect: Mood normal.         Last Recorded Vitals  Blood pressure 106/61, pulse 74, temperature 36.7 °C (98.1 °F), temperature source Oral, resp. rate 17, height 1.6 m (5' 3\"), weight 93.3 kg (205 lb 11 oz), SpO2 99%.  Intake/Output last 3 Shifts:  No intake/output data recorded.    Relevant Results  No results found.     Scheduled medications  atorvastatin, 80 mg, oral, Nightly  docusate sodium, 100 mg, oral, BID  fenofibrate, 160 mg, oral, Daily  furosemide, 40 mg, oral, Daily  gabapentin, 200 mg, oral, TID  insulin glargine, 25 Units, subcutaneous, BID  insulin lispro, 0-10 Units, subcutaneous, TID  lactulose, 20 g, oral, 4x daily  lactulose, 300 mL, rectal, Once  magnesium oxide, 400 mg, oral, Daily  nadolol, 10 mg, oral, Daily  pantoprazole, 40 mg, oral, Daily  polyethylene glycol, 17 g, oral, BID  rifAXIMin, 550 mg, oral, q12h RIVKA  sodium chloride, 1 spray, Each Nostril, 4x daily  spironolactone, 25 mg, oral, BID  sucralfate, 1 g, oral, Before meals & nightly  traZODone, 25 mg, oral, Nightly  ursodiol, 300 mg, oral, TID      Continuous medications     PRN medications  PRN medications: dextrose, dextrose, glucagon, glucagon, hydrOXYzine HCL, " ketorolac, morphine, ondansetron, traMADol  Results for orders placed or performed during the hospital encounter of 08/03/24 (from the past 24 hour(s))   POCT GLUCOSE   Result Value Ref Range    POCT Glucose 165 (H) 74 - 99 mg/dL   POCT GLUCOSE   Result Value Ref Range    POCT Glucose 248 (H) 74 - 99 mg/dL   Comprehensive Metabolic Panel   Result Value Ref Range    Glucose 236 (H) 65 - 99 mg/dL    Sodium 137 133 - 145 mmol/L    Potassium 3.7 3.4 - 5.1 mmol/L    Chloride 105 97 - 107 mmol/L    Bicarbonate 27 24 - 31 mmol/L    Urea Nitrogen 10 8 - 25 mg/dL    Creatinine 0.60 0.40 - 1.60 mg/dL    eGFR >90 >60 mL/min/1.73m*2    Calcium 7.4 (L) 8.5 - 10.4 mg/dL    Albumin 2.0 (L) 3.5 - 5.0 g/dL    Alkaline Phosphatase 300 (H) 35 - 125 U/L    Total Protein 5.9 5.9 - 7.9 g/dL    AST 36 5 - 40 U/L    Bilirubin, Total 1.1 0.1 - 1.2 mg/dL    ALT 20 5 - 40 U/L    Anion Gap 5 <=19 mmol/L   CBC   Result Value Ref Range    WBC 3.6 (L) 4.4 - 11.3 x10*3/uL    nRBC 0.0 0.0 - 0.0 /100 WBCs    RBC 3.51 (L) 4.00 - 5.20 x10*6/uL    Hemoglobin 8.8 (L) 12.0 - 16.0 g/dL    Hematocrit 28.1 (L) 36.0 - 46.0 %    MCV 80 80 - 100 fL    MCH 25.1 (L) 26.0 - 34.0 pg    MCHC 31.3 (L) 32.0 - 36.0 g/dL    RDW 21.2 (H) 11.5 - 14.5 %    Platelets 93 (L) 150 - 450 x10*3/uL   POCT GLUCOSE   Result Value Ref Range    POCT Glucose 142 (H) 74 - 99 mg/dL   POCT GLUCOSE   Result Value Ref Range    POCT Glucose 223 (H) 74 - 99 mg/dL                            Assessment/Plan   Principal Problem:    Abdominal pain, generalized  Active Problems:    Liver cirrhosis secondary to REED (nonalcoholic steatohepatitis) (Multi)    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  -HE: Monitor mental status, A/O x 3, Continue lactulose 20 g daily with a goal of 2-3 soft stools a day and rifaximin 550 mg BID.   -EV: monitor for varices, last EGD on 12/27/23 by Dr. Zaman at [facility] Normal oropharynx. Esophageal ulcer, no bleeding and no  stigmata of recent bleeding.  -Ascites: CT moderate volume ascites and stranding diffusely throughout the mesentery. Will continue lasix/aldactone. IR consult placed for para, fluid analysis ordered   Continue to monitor Hepatic panel, CBC, INR  Hgb remains stable . No overt bleeding      Epigastric abdominal pain/Nausea, vomiting, constipation  -Patient has multiple reported admissions this year with similar complaints of abdominal pain that typically resolves with a paracentesis/conservative measures. She has history of acute idiopathic pancreatitis. EGD with EUS 3/26/2024. Findings were c/f NET however tissue sample was not obtained. At that time, it was recommended to follow up mass in 1 year with EUS. MRI/MRCP on 7/26/2024: No pancreatic lesion identified. Cirrhosis and sequela of portal hypertension. No hepatic lesion.   -Had small BM yesterday   -Continue supportive care with anti-emetics and pain control as needed per primary team.  -Gluten free and low sodium diet as tolerated.      Acute on chronic anemia   -Patient admitted with Hgb stable 8.1  -No overt bleeding   -Iron labs ordered.   -Monitor frequency and color of stools.  -Continue to monitor CBC and overt signs of bleeding.   -Transfuse when Hgb is less than 7     8/6 S/p para 3.5 liters of clear yellow fluid. No over bleeding. Is still constipated. Will give enema, Will continue diuretics at IN. Follow up with her primary GI. OK to IN from GI standpoint.     Adrianne Powell, MADYSON-CNP

## 2024-08-06 NOTE — PROGRESS NOTES
Anticipate discharge soon. Patient had paracentesis yesterday. At the time of discharge, patient will return home with no skilled services. Will follow as needed.      08/06/24 1025   Discharge Planning   Home or Post Acute Services None   Expected Discharge Disposition Home   Does the patient need discharge transport arranged? No

## 2024-08-06 NOTE — CARE PLAN
The patient's goals for the shift include safety, pain control, use IS 10 times every 2 hours, up in chair for meals    The clinical goals for the shift include Pain control, Adequate rest

## 2024-08-07 LAB
ATRIAL RATE: 94 BPM
P AXIS: 51 DEGREES
P OFFSET: 195 MS
P ONSET: 142 MS
PR INTERVAL: 174 MS
Q ONSET: 229 MS
QRS COUNT: 16 BEATS
QRS DURATION: 76 MS
QT INTERVAL: 384 MS
QTC CALCULATION(BAZETT): 480 MS
QTC FREDERICIA: 446 MS
R AXIS: -8 DEGREES
T AXIS: 13 DEGREES
T OFFSET: 421 MS
VENTRICULAR RATE: 94 BPM

## 2024-08-07 NOTE — DISCHARGE SUMMARY
Discharge Diagnosis  Abdominal pain improved  Nausea, vomiting resolved  Constipation resolved  Abnormal CT abdomen/pelvis  Hepatic cirrhosis with ascites status post paracentesis  Portal hypertension  Transaminitis  Hyperbilirubinemia - resolved  Pancytopenia  Hypotension  Hyperlipidemia  Hypertriglyceridemia  Type 2 diabetes mellitus  Hypocalcemia  Hypoalbuminemia  Protein calorie malnutrition  Epistaxis - resolved    Issues Requiring Follow-Up  Above    Discharge Meds     Your medication list        START taking these medications        Instructions Last Dose Given Next Dose Due   sodium chloride 0.65 % nasal spray  Commonly known as: [place]      Administer 1 spray into each nostril 4 times a day.              CONTINUE taking these medications        Instructions Last Dose Given Next Dose Due   atorvastatin 80 mg tablet  Commonly known as: Lipitor           docusate sodium 100 mg capsule  Commonly known as: Colace      Take 1 capsule (100 mg) by mouth 2 times a day. Hold for loose stool.       fenofibrate 145 mg tablet  Commonly known as: Tricor           furosemide 40 mg tablet  Commonly known as: Lasix      Take 1 tablet (40 mg) by mouth once daily. Hold for dizziness.       gabapentin 100 mg capsule  Commonly known as: Neurontin      Take 2 capsules (200 mg) by mouth 3 times a day.       hydrOXYzine HCL 25 mg tablet  Commonly known as: Atarax      Take 1 tablet (25 mg) by mouth every 6 hours if needed for itching or allergies.       insulin lispro 100 unit/mL injection  Commonly known as: HumaLOG           lactulose 20 gram/30 mL oral solution      Take 30 mL (20 g) by mouth 4 times a day. Titrate for 3 bowel movements in 24 hours.       Lantus U-100 Insulin 100 unit/mL injection  Generic drug: insulin glargine      Inject 25 Units under the skin 2 times a day.       magnesium oxide 400 mg (241.3 mg magnesium) tablet  Commonly known as: Mag-Ox      Take 1 tablet (400 mg) by mouth once daily.       nadolol 20 mg  tablet  Commonly known as: Corgard      Take 1 tablet (20 mg) by mouth once daily.       ondansetron ODT 4 mg disintegrating tablet  Commonly known as: Zofran-ODT      Take 1 tablet (4 mg) by mouth every 8 hours if needed for nausea or vomiting.       pantoprazole 40 mg EC tablet  Commonly known as: ProtoNix      Take 1 tablet (40 mg) by mouth once daily. Do not crush, chew, or split.       polyethylene glycol 17 gram packet  Commonly known as: Glycolax, Miralax      Take 17 g by mouth 2 times a day. Hold for loose stool.       spironolactone 50 mg tablet  Commonly known as: Aldactone      Take 1 tablet (50 mg) by mouth 2 times a day. Hold for dizziness.       sucralfate 100 mg/mL suspension  Commonly known as: Carafate      Take 10 mL (1 g) by mouth 4 times a day before meals.       traMADol 50 mg tablet  Commonly known as: Ultram      Take 1 tablet (50 mg) by mouth every 6 hours if needed for severe pain (7 - 10).       traZODone 50 mg tablet  Commonly known as: Desyrel           ursodiol 300 mg capsule  Commonly known as: Actigall           Xifaxan 550 mg tablet  Generic drug: rifAXIMin      Take 1 tablet (550 mg) by mouth every 12 hours.                 Where to Get Your Medications        These medications were sent to [facility]  [address] [address] [address]      Hours: 9 AM to 6 PM Mon-Fri, 9 AM to 1 PM Sat Phone: [phone]   furosemide 40 mg tablet  spironolactone 50 mg tablet       Information about where to get these medications is not yet available    Ask your nurse or doctor about these medications  sodium chloride 0.65 % nasal spray         Test Results Pending At Discharge  Pending Labs       No current pending labs.            Hospital Course  This is a very pleasant 49-year-old female with a past medical history significant for hepatic cirrhosis with ascites secondary to REED, portal hypertension, chronic pancreatitis, presenting to the emergency department complaints of  abdominal pain, nausea and vomiting.  In the emergency department, initial work-up was done.  CT abdomen and pelvis per radiology showed moderate wall thickening of multiple loops of small bowel throughout the abdomen and pelvis which may indicate portal hypertensive enteropathic or an infectious or inflammatory enterocolitis no definite bowel obstruction identified, advanced cirrhotic morphology of the liver with mild splenomegaly and moderate abdominal and large amount of pelvic ascites, mild generalized mesenteric edema as well as generalized soft tissue edema in the abdominal and pelvic wall which may indicate anasarca, fat and fluid containing ventral supraumbilical midline and abdominal wall hernia.  She was treated in the emergency department and was admitted.  She was evaluated and treated by Gastroenterology.  Interventional radiology was consulted.  She underwent an abdominal ultrasound-guided paracentesis.  She was treated with diuretics Lasix and Spironolactone and a low-sodium diet.  She was treated symptomatically with analgesics and antiemetics.  Her diet was advanced which she tolerated.  She was treated for constipation.  Her symptoms improved.  Her overall condition improved.  She was cleared by Gastroenterology for discharge.  She was discharged home in stable condition.  She was advised outpatient follow-up with her primary care provider in 1 week and gastroenterology as advised.      Pertinent Physical Exam At Time of Discharge  See physical examination    Outpatient Follow-Up  No future appointments.    Follow-up with primary care provider in 1 week.    Follow-up with Gastroenterology - Call for appointment.    MADYSON Sotelo-CNP

## 2024-08-11 ENCOUNTER — APPOINTMENT (OUTPATIENT)
Dept: RADIOLOGY | Facility: HOSPITAL | Age: 49
End: 2024-08-11
Payer: COMMERCIAL

## 2024-08-11 ENCOUNTER — HOSPITAL ENCOUNTER (OUTPATIENT)
Facility: HOSPITAL | Age: 49
Setting detail: OBSERVATION
Discharge: HOME | End: 2024-08-14
Attending: EMERGENCY MEDICINE | Admitting: INTERNAL MEDICINE
Payer: COMMERCIAL

## 2024-08-11 DIAGNOSIS — R91.1 PULMONARY NODULE: ICD-10-CM

## 2024-08-11 DIAGNOSIS — R10.84 GENERALIZED ABDOMINAL PAIN: Primary | ICD-10-CM

## 2024-08-11 DIAGNOSIS — E80.6 HYPERBILIRUBINEMIA: ICD-10-CM

## 2024-08-11 DIAGNOSIS — R18.8 OTHER ASCITES: ICD-10-CM

## 2024-08-11 DIAGNOSIS — D61.818 PANCYTOPENIA (MULTI): ICD-10-CM

## 2024-08-11 DIAGNOSIS — R42 DIZZINESS: ICD-10-CM

## 2024-08-11 DIAGNOSIS — D64.9 ANEMIA, UNSPECIFIED TYPE: ICD-10-CM

## 2024-08-11 DIAGNOSIS — Z87.19 HISTORY OF CIRRHOSIS: ICD-10-CM

## 2024-08-11 DIAGNOSIS — R10.84 ABDOMINAL PAIN, GENERALIZED: ICD-10-CM

## 2024-08-11 DIAGNOSIS — R73.9 HYPERGLYCEMIA: ICD-10-CM

## 2024-08-11 DIAGNOSIS — E46 PROTEIN-CALORIE MALNUTRITION, UNSPECIFIED SEVERITY (MULTI): ICD-10-CM

## 2024-08-11 DIAGNOSIS — R74.01 TRANSAMINASEMIA: ICD-10-CM

## 2024-08-11 LAB
ALBUMIN SERPL-MCNC: 2.4 G/DL (ref 3.5–5)
ALP BLD-CCNC: 350 U/L (ref 35–125)
ALT SERPL-CCNC: 24 U/L (ref 5–40)
AMMONIA PLAS-SCNC: 37 UMOL/L (ref 12–45)
AMPHETAMINES UR QL SCN>1000 NG/ML: NEGATIVE
ANION GAP SERPL CALC-SCNC: 8 MMOL/L
APAP SERPL-MCNC: <5 UG/ML
APPEARANCE UR: CLEAR
AST SERPL-CCNC: 53 U/L (ref 5–40)
BARBITURATES UR QL SCN>300 NG/ML: NEGATIVE
BASOPHILS # BLD AUTO: 0.02 X10*3/UL (ref 0–0.1)
BASOPHILS NFR BLD AUTO: 0.5 %
BENZODIAZ UR QL SCN>300 NG/ML: NEGATIVE
BILIRUB SERPL-MCNC: 1.3 MG/DL (ref 0.1–1.2)
BILIRUB UR STRIP.AUTO-MCNC: NEGATIVE MG/DL
BUN SERPL-MCNC: 7 MG/DL (ref 8–25)
BZE UR QL SCN>300 NG/ML: NEGATIVE
CALCIUM SERPL-MCNC: 7.9 MG/DL (ref 8.5–10.4)
CANNABINOIDS UR QL SCN>50 NG/ML: NEGATIVE
CHLORIDE SERPL-SCNC: 101 MMOL/L (ref 97–107)
CO2 SERPL-SCNC: 25 MMOL/L (ref 24–31)
COLOR UR: ABNORMAL
CREAT SERPL-MCNC: 0.6 MG/DL (ref 0.4–1.6)
EGFRCR SERPLBLD CKD-EPI 2021: >90 ML/MIN/1.73M*2
EOSINOPHIL # BLD AUTO: 0.15 X10*3/UL (ref 0–0.7)
EOSINOPHIL NFR BLD AUTO: 4 %
ERYTHROCYTE [DISTWIDTH] IN BLOOD BY AUTOMATED COUNT: 19.9 % (ref 11.5–14.5)
ETHANOL SERPL-MCNC: <0.01 G/DL
FENTANYL+NORFENTANYL UR QL SCN: NEGATIVE
GLUCOSE SERPL-MCNC: 434 MG/DL (ref 65–99)
GLUCOSE UR STRIP.AUTO-MCNC: ABNORMAL MG/DL
HCG UR QL IA.RAPID: NEGATIVE
HCT VFR BLD AUTO: 24.9 % (ref 36–46)
HGB BLD-MCNC: 7.8 G/DL (ref 12–16)
IMM GRANULOCYTES # BLD AUTO: 0.01 X10*3/UL (ref 0–0.7)
IMM GRANULOCYTES NFR BLD AUTO: 0.3 % (ref 0–0.9)
INR PPP: 1.3 (ref 0.9–1.2)
KETONES UR STRIP.AUTO-MCNC: NEGATIVE MG/DL
LACTATE BLDV-SCNC: 1.4 MMOL/L (ref 0.4–2)
LEUKOCYTE ESTERASE UR QL STRIP.AUTO: ABNORMAL
LIPASE SERPL-CCNC: 59 U/L (ref 16–63)
LYMPHOCYTES # BLD AUTO: 1.25 X10*3/UL (ref 1.2–4.8)
LYMPHOCYTES NFR BLD AUTO: 33.5 %
MCH RBC QN AUTO: 24.8 PG (ref 26–34)
MCHC RBC AUTO-ENTMCNC: 31.3 G/DL (ref 32–36)
MCV RBC AUTO: 79 FL (ref 80–100)
METHADONE UR QL SCN>300 NG/ML: NEGATIVE
MONOCYTES # BLD AUTO: 0.38 X10*3/UL (ref 0.1–1)
MONOCYTES NFR BLD AUTO: 10.2 %
NEUTROPHILS # BLD AUTO: 1.92 X10*3/UL (ref 1.2–7.7)
NEUTROPHILS NFR BLD AUTO: 51.5 %
NITRITE UR QL STRIP.AUTO: NEGATIVE
NRBC BLD-RTO: 0 /100 WBCS (ref 0–0)
NT-PROBNP SERPL-MCNC: 38 PG/ML (ref 0–192)
OPIATES UR QL SCN>300 NG/ML: NEGATIVE
OXYCODONE UR QL: NEGATIVE
PCP UR QL SCN>25 NG/ML: NEGATIVE
PH UR STRIP.AUTO: 5.5 [PH]
PLATELET # BLD AUTO: 90 X10*3/UL (ref 150–450)
POTASSIUM SERPL-SCNC: 4 MMOL/L (ref 3.4–5.1)
PROT SERPL-MCNC: 6.8 G/DL (ref 5.9–7.9)
PROT UR STRIP.AUTO-MCNC: NEGATIVE MG/DL
PROTHROMBIN TIME: 13.2 SECONDS (ref 9.3–12.7)
RBC # BLD AUTO: 3.15 X10*6/UL (ref 4–5.2)
RBC # UR STRIP.AUTO: NEGATIVE /UL
RBC #/AREA URNS AUTO: NORMAL /HPF
SALICYLATES SERPL-MCNC: <0 MG/DL
SODIUM SERPL-SCNC: 134 MMOL/L (ref 133–145)
SP GR UR STRIP.AUTO: 1.04
TROPONIN T SERPL-MCNC: <6 NG/L
TROPONIN T SERPL-MCNC: <6 NG/L
UROBILINOGEN UR STRIP.AUTO-MCNC: NORMAL MG/DL
WBC # BLD AUTO: 3.7 X10*3/UL (ref 4.4–11.3)
WBC #/AREA URNS AUTO: NORMAL /HPF

## 2024-08-11 PROCEDURE — 74177 CT ABD & PELVIS W/CONTRAST: CPT

## 2024-08-11 PROCEDURE — 93010 ELECTROCARDIOGRAM REPORT: CPT | Performed by: INTERNAL MEDICINE

## 2024-08-11 PROCEDURE — 82140 ASSAY OF AMMONIA: CPT | Performed by: CLINICAL NURSE SPECIALIST

## 2024-08-11 PROCEDURE — 84484 ASSAY OF TROPONIN QUANT: CPT | Performed by: CLINICAL NURSE SPECIALIST

## 2024-08-11 PROCEDURE — 81001 URINALYSIS AUTO W/SCOPE: CPT | Mod: 59 | Performed by: CLINICAL NURSE SPECIALIST

## 2024-08-11 PROCEDURE — 87086 URINE CULTURE/COLONY COUNT: CPT | Mod: WESLAB | Performed by: CLINICAL NURSE SPECIALIST

## 2024-08-11 PROCEDURE — 71046 X-RAY EXAM CHEST 2 VIEWS: CPT

## 2024-08-11 PROCEDURE — 99285 EMERGENCY DEPT VISIT HI MDM: CPT

## 2024-08-11 PROCEDURE — 96375 TX/PRO/DX INJ NEW DRUG ADDON: CPT

## 2024-08-11 PROCEDURE — 36415 COLL VENOUS BLD VENIPUNCTURE: CPT | Performed by: CLINICAL NURSE SPECIALIST

## 2024-08-11 PROCEDURE — 87040 BLOOD CULTURE FOR BACTERIA: CPT | Mod: WESLAB | Performed by: CLINICAL NURSE SPECIALIST

## 2024-08-11 PROCEDURE — 85025 COMPLETE CBC W/AUTO DIFF WBC: CPT | Performed by: CLINICAL NURSE SPECIALIST

## 2024-08-11 PROCEDURE — 81025 URINE PREGNANCY TEST: CPT | Performed by: CLINICAL NURSE SPECIALIST

## 2024-08-11 PROCEDURE — 80307 DRUG TEST PRSMV CHEM ANLYZR: CPT | Performed by: CLINICAL NURSE SPECIALIST

## 2024-08-11 PROCEDURE — 71046 X-RAY EXAM CHEST 2 VIEWS: CPT | Performed by: RADIOLOGY

## 2024-08-11 PROCEDURE — 83690 ASSAY OF LIPASE: CPT | Performed by: CLINICAL NURSE SPECIALIST

## 2024-08-11 PROCEDURE — 83605 ASSAY OF LACTIC ACID: CPT | Performed by: CLINICAL NURSE SPECIALIST

## 2024-08-11 PROCEDURE — 80053 COMPREHEN METABOLIC PANEL: CPT | Performed by: CLINICAL NURSE SPECIALIST

## 2024-08-11 PROCEDURE — 80143 DRUG ASSAY ACETAMINOPHEN: CPT | Performed by: CLINICAL NURSE SPECIALIST

## 2024-08-11 PROCEDURE — 2550000001 HC RX 255 CONTRASTS: Performed by: CLINICAL NURSE SPECIALIST

## 2024-08-11 PROCEDURE — 83880 ASSAY OF NATRIURETIC PEPTIDE: CPT | Performed by: CLINICAL NURSE SPECIALIST

## 2024-08-11 PROCEDURE — 2500000004 HC RX 250 GENERAL PHARMACY W/ HCPCS (ALT 636 FOR OP/ED): Performed by: CLINICAL NURSE SPECIALIST

## 2024-08-11 PROCEDURE — 74177 CT ABD & PELVIS W/CONTRAST: CPT | Performed by: STUDENT IN AN ORGANIZED HEALTH CARE EDUCATION/TRAINING PROGRAM

## 2024-08-11 PROCEDURE — 85610 PROTHROMBIN TIME: CPT | Performed by: CLINICAL NURSE SPECIALIST

## 2024-08-11 PROCEDURE — 93005 ELECTROCARDIOGRAM TRACING: CPT

## 2024-08-11 RX ORDER — ONDANSETRON HYDROCHLORIDE 2 MG/ML
4 INJECTION, SOLUTION INTRAVENOUS ONCE
Status: COMPLETED | OUTPATIENT
Start: 2024-08-11 | End: 2024-08-11

## 2024-08-11 RX ORDER — FENTANYL CITRATE 50 UG/ML
50 INJECTION, SOLUTION INTRAMUSCULAR; INTRAVENOUS ONCE
Status: COMPLETED | OUTPATIENT
Start: 2024-08-11 | End: 2024-08-11

## 2024-08-11 ASSESSMENT — PAIN SCALES - GENERAL: PAINLEVEL_OUTOF10: 8

## 2024-08-11 ASSESSMENT — PAIN DESCRIPTION - LOCATION: LOCATION: ABDOMEN

## 2024-08-11 ASSESSMENT — PAIN DESCRIPTION - PROGRESSION: CLINICAL_PROGRESSION: NOT CHANGED

## 2024-08-11 ASSESSMENT — PAIN DESCRIPTION - ORIENTATION: ORIENTATION: LOWER;MID;UPPER

## 2024-08-11 ASSESSMENT — PAIN DESCRIPTION - PAIN TYPE: TYPE: ACUTE PAIN

## 2024-08-11 ASSESSMENT — PAIN DESCRIPTION - DESCRIPTORS
DESCRIPTORS: ACHING
DESCRIPTORS: ACHING

## 2024-08-11 ASSESSMENT — PAIN - FUNCTIONAL ASSESSMENT: PAIN_FUNCTIONAL_ASSESSMENT: 0-10

## 2024-08-11 ASSESSMENT — PAIN DESCRIPTION - FREQUENCY: FREQUENCY: CONSTANT/CONTINUOUS

## 2024-08-11 ASSESSMENT — PAIN DESCRIPTION - ONSET: ONSET: ONGOING

## 2024-08-12 ENCOUNTER — APPOINTMENT (OUTPATIENT)
Dept: RADIOLOGY | Facility: HOSPITAL | Age: 49
End: 2024-08-12
Payer: COMMERCIAL

## 2024-08-12 PROBLEM — R73.9 HYPERGLYCEMIA: Status: ACTIVE | Noted: 2024-07-05

## 2024-08-12 PROBLEM — R91.1 PULMONARY NODULE: Status: ACTIVE | Noted: 2024-08-12

## 2024-08-12 PROBLEM — R74.01 TRANSAMINASEMIA: Status: ACTIVE | Noted: 2024-08-12

## 2024-08-12 LAB
ALBUMIN SERPL-MCNC: 2.3 G/DL (ref 3.5–5)
ALP BLD-CCNC: 424 U/L (ref 35–125)
ALT SERPL-CCNC: 23 U/L (ref 5–40)
ANION GAP SERPL CALC-SCNC: 6 MMOL/L
APPEARANCE UR: CLEAR
AST SERPL-CCNC: 43 U/L (ref 5–40)
ATRIAL RATE: 99 BPM
BILIRUB SERPL-MCNC: 1.1 MG/DL (ref 0.1–1.2)
BILIRUB UR STRIP.AUTO-MCNC: NEGATIVE MG/DL
BUN SERPL-MCNC: 7 MG/DL (ref 8–25)
CALCIUM SERPL-MCNC: 7.5 MG/DL (ref 8.5–10.4)
CHLORIDE SERPL-SCNC: 102 MMOL/L (ref 97–107)
CO2 SERPL-SCNC: 26 MMOL/L (ref 24–31)
COLOR UR: ABNORMAL
CREAT SERPL-MCNC: 0.6 MG/DL (ref 0.4–1.6)
EGFRCR SERPLBLD CKD-EPI 2021: >90 ML/MIN/1.73M*2
ERYTHROCYTE [DISTWIDTH] IN BLOOD BY AUTOMATED COUNT: 20.1 % (ref 11.5–14.5)
FERRITIN SERPL-MCNC: 29 NG/ML (ref 13–150)
FOLATE SERPL-MCNC: 14.3 NG/ML (ref 4.2–19.9)
GLUCOSE BLD MANUAL STRIP-MCNC: 150 MG/DL (ref 74–99)
GLUCOSE BLD MANUAL STRIP-MCNC: 207 MG/DL (ref 74–99)
GLUCOSE BLD MANUAL STRIP-MCNC: 256 MG/DL (ref 74–99)
GLUCOSE BLD MANUAL STRIP-MCNC: 275 MG/DL (ref 74–99)
GLUCOSE SERPL-MCNC: 344 MG/DL (ref 65–99)
GLUCOSE UR STRIP.AUTO-MCNC: ABNORMAL MG/DL
HCT VFR BLD AUTO: 27.9 % (ref 36–46)
HGB BLD-MCNC: 8.7 G/DL (ref 12–16)
HOLD SPECIMEN: NORMAL
IRON SATN MFR SERPL: 7 % (ref 12–50)
IRON SERPL-MCNC: 20 UG/DL (ref 30–160)
KETONES UR STRIP.AUTO-MCNC: NEGATIVE MG/DL
LEUKOCYTE ESTERASE UR QL STRIP.AUTO: NEGATIVE
MCH RBC QN AUTO: 24.3 PG (ref 26–34)
MCHC RBC AUTO-ENTMCNC: 31.2 G/DL (ref 32–36)
MCV RBC AUTO: 78 FL (ref 80–100)
NITRITE UR QL STRIP.AUTO: NEGATIVE
NRBC BLD-RTO: 0 /100 WBCS (ref 0–0)
P AXIS: 55 DEGREES
P OFFSET: 192 MS
P ONSET: 139 MS
PH UR STRIP.AUTO: 7 [PH]
PLATELET # BLD AUTO: 86 X10*3/UL (ref 150–450)
POTASSIUM SERPL-SCNC: 3.7 MMOL/L (ref 3.4–5.1)
PR INTERVAL: 178 MS
PROT SERPL-MCNC: 6.7 G/DL (ref 5.9–7.9)
PROT UR STRIP.AUTO-MCNC: NEGATIVE MG/DL
Q ONSET: 228 MS
QRS COUNT: 16 BEATS
QRS DURATION: 74 MS
QT INTERVAL: 374 MS
QTC CALCULATION(BAZETT): 479 MS
QTC FREDERICIA: 441 MS
R AXIS: 2 DEGREES
RBC # BLD AUTO: 3.58 X10*6/UL (ref 4–5.2)
RBC # UR STRIP.AUTO: NEGATIVE /UL
SODIUM SERPL-SCNC: 134 MMOL/L (ref 133–145)
SP GR UR STRIP.AUTO: 1.01
T AXIS: 15 DEGREES
T OFFSET: 415 MS
TIBC SERPL-MCNC: 281 UG/DL (ref 228–428)
TROPONIN T SERPL-MCNC: <6 NG/L
UIBC SERPL-MCNC: 261 UG/DL (ref 110–370)
UROBILINOGEN UR STRIP.AUTO-MCNC: NORMAL MG/DL
VENTRICULAR RATE: 99 BPM
VIT B12 SERPL-MCNC: 923 PG/ML (ref 211–946)
WBC # BLD AUTO: 3.3 X10*3/UL (ref 4.4–11.3)

## 2024-08-12 PROCEDURE — 96365 THER/PROPH/DIAG IV INF INIT: CPT | Mod: 59

## 2024-08-12 PROCEDURE — 80053 COMPREHEN METABOLIC PANEL: CPT | Performed by: INTERNAL MEDICINE

## 2024-08-12 PROCEDURE — 81003 URINALYSIS AUTO W/O SCOPE: CPT | Performed by: NURSE PRACTITIONER

## 2024-08-12 PROCEDURE — G0378 HOSPITAL OBSERVATION PER HR: HCPCS

## 2024-08-12 PROCEDURE — 2500000002 HC RX 250 W HCPCS SELF ADMINISTERED DRUGS (ALT 637 FOR MEDICARE OP, ALT 636 FOR OP/ED): Performed by: INTERNAL MEDICINE

## 2024-08-12 PROCEDURE — 84484 ASSAY OF TROPONIN QUANT: CPT | Performed by: CLINICAL NURSE SPECIALIST

## 2024-08-12 PROCEDURE — 85027 COMPLETE CBC AUTOMATED: CPT | Performed by: INTERNAL MEDICINE

## 2024-08-12 PROCEDURE — 2500000005 HC RX 250 GENERAL PHARMACY W/O HCPCS: Performed by: INTERNAL MEDICINE

## 2024-08-12 PROCEDURE — 2500000004 HC RX 250 GENERAL PHARMACY W/ HCPCS (ALT 636 FOR OP/ED): Performed by: EMERGENCY MEDICINE

## 2024-08-12 PROCEDURE — 82607 VITAMIN B-12: CPT | Performed by: NURSE PRACTITIONER

## 2024-08-12 PROCEDURE — 49083 ABD PARACENTESIS W/IMAGING: CPT

## 2024-08-12 PROCEDURE — 82947 ASSAY GLUCOSE BLOOD QUANT: CPT

## 2024-08-12 PROCEDURE — 96366 THER/PROPH/DIAG IV INF ADDON: CPT | Mod: 59

## 2024-08-12 PROCEDURE — 97161 PT EVAL LOW COMPLEX 20 MIN: CPT | Mod: GP

## 2024-08-12 PROCEDURE — 97165 OT EVAL LOW COMPLEX 30 MIN: CPT | Mod: GO

## 2024-08-12 PROCEDURE — 2500000001 HC RX 250 WO HCPCS SELF ADMINISTERED DRUGS (ALT 637 FOR MEDICARE OP): Performed by: INTERNAL MEDICINE

## 2024-08-12 PROCEDURE — 82728 ASSAY OF FERRITIN: CPT | Performed by: NURSE PRACTITIONER

## 2024-08-12 PROCEDURE — 36415 COLL VENOUS BLD VENIPUNCTURE: CPT | Performed by: INTERNAL MEDICINE

## 2024-08-12 PROCEDURE — 83540 ASSAY OF IRON: CPT | Performed by: NURSE PRACTITIONER

## 2024-08-12 PROCEDURE — 82746 ASSAY OF FOLIC ACID SERUM: CPT | Performed by: NURSE PRACTITIONER

## 2024-08-12 PROCEDURE — 2500000004 HC RX 250 GENERAL PHARMACY W/ HCPCS (ALT 636 FOR OP/ED): Performed by: INTERNAL MEDICINE

## 2024-08-12 RX ORDER — TRAZODONE HYDROCHLORIDE 50 MG/1
25 TABLET ORAL NIGHTLY
Status: DISCONTINUED | OUTPATIENT
Start: 2024-08-12 | End: 2024-08-14 | Stop reason: HOSPADM

## 2024-08-12 RX ORDER — PANTOPRAZOLE SODIUM 40 MG/1
40 TABLET, DELAYED RELEASE ORAL DAILY
Status: DISCONTINUED | OUTPATIENT
Start: 2024-08-12 | End: 2024-08-14 | Stop reason: HOSPADM

## 2024-08-12 RX ORDER — ALBUMIN HUMAN 250 G/1000ML
25 SOLUTION INTRAVENOUS ONCE
Status: DISCONTINUED | OUTPATIENT
Start: 2024-08-12 | End: 2024-08-14 | Stop reason: HOSPADM

## 2024-08-12 RX ORDER — ONDANSETRON 4 MG/1
4 TABLET, ORALLY DISINTEGRATING ORAL EVERY 8 HOURS PRN
Status: DISCONTINUED | OUTPATIENT
Start: 2024-08-12 | End: 2024-08-14 | Stop reason: HOSPADM

## 2024-08-12 RX ORDER — ATORVASTATIN CALCIUM 80 MG/1
80 TABLET, FILM COATED ORAL NIGHTLY
Status: DISCONTINUED | OUTPATIENT
Start: 2024-08-12 | End: 2024-08-14 | Stop reason: HOSPADM

## 2024-08-12 RX ORDER — ACETAMINOPHEN 325 MG/1
650 TABLET ORAL EVERY 4 HOURS PRN
Status: DISCONTINUED | OUTPATIENT
Start: 2024-08-12 | End: 2024-08-14 | Stop reason: HOSPADM

## 2024-08-12 RX ORDER — FENOFIBRATE 160 MG/1
160 TABLET ORAL DAILY
Status: DISCONTINUED | OUTPATIENT
Start: 2024-08-12 | End: 2024-08-14 | Stop reason: HOSPADM

## 2024-08-12 RX ORDER — FUROSEMIDE 40 MG/1
40 TABLET ORAL DAILY
Status: DISCONTINUED | OUTPATIENT
Start: 2024-08-12 | End: 2024-08-14 | Stop reason: HOSPADM

## 2024-08-12 RX ORDER — LANOLIN ALCOHOL/MO/W.PET/CERES
400 CREAM (GRAM) TOPICAL DAILY
Status: DISCONTINUED | OUTPATIENT
Start: 2024-08-12 | End: 2024-08-14 | Stop reason: HOSPADM

## 2024-08-12 RX ORDER — ACETAMINOPHEN 650 MG/1
650 SUPPOSITORY RECTAL EVERY 4 HOURS PRN
Status: DISCONTINUED | OUTPATIENT
Start: 2024-08-12 | End: 2024-08-14 | Stop reason: HOSPADM

## 2024-08-12 RX ORDER — ACETAMINOPHEN 160 MG/5ML
650 SOLUTION ORAL EVERY 4 HOURS PRN
Status: DISCONTINUED | OUTPATIENT
Start: 2024-08-12 | End: 2024-08-14 | Stop reason: HOSPADM

## 2024-08-12 RX ORDER — DEXTROSE 50 % IN WATER (D50W) INTRAVENOUS SYRINGE
25
Status: DISCONTINUED | OUTPATIENT
Start: 2024-08-12 | End: 2024-08-14 | Stop reason: HOSPADM

## 2024-08-12 RX ORDER — NADOLOL 40 MG/1
20 TABLET ORAL DAILY
Status: DISCONTINUED | OUTPATIENT
Start: 2024-08-12 | End: 2024-08-14 | Stop reason: HOSPADM

## 2024-08-12 RX ORDER — INSULIN GLARGINE 100 [IU]/ML
25 INJECTION, SOLUTION SUBCUTANEOUS 2 TIMES DAILY
Status: DISCONTINUED | OUTPATIENT
Start: 2024-08-12 | End: 2024-08-14 | Stop reason: HOSPADM

## 2024-08-12 RX ORDER — SPIRONOLACTONE 50 MG/1
50 TABLET, FILM COATED ORAL 2 TIMES DAILY
Status: DISCONTINUED | OUTPATIENT
Start: 2024-08-12 | End: 2024-08-14 | Stop reason: HOSPADM

## 2024-08-12 RX ORDER — GUAIFENESIN 600 MG/1
600 TABLET, EXTENDED RELEASE ORAL EVERY 12 HOURS PRN
Status: DISCONTINUED | OUTPATIENT
Start: 2024-08-12 | End: 2024-08-14 | Stop reason: HOSPADM

## 2024-08-12 RX ORDER — DOCUSATE SODIUM 100 MG/1
100 CAPSULE, LIQUID FILLED ORAL 2 TIMES DAILY
Status: DISCONTINUED | OUTPATIENT
Start: 2024-08-12 | End: 2024-08-14 | Stop reason: HOSPADM

## 2024-08-12 RX ORDER — POLYETHYLENE GLYCOL 3350 17 G/17G
17 POWDER, FOR SOLUTION ORAL DAILY PRN
Status: DISCONTINUED | OUTPATIENT
Start: 2024-08-12 | End: 2024-08-14 | Stop reason: HOSPADM

## 2024-08-12 RX ORDER — DEXTROSE 50 % IN WATER (D50W) INTRAVENOUS SYRINGE
12.5
Status: DISCONTINUED | OUTPATIENT
Start: 2024-08-12 | End: 2024-08-14 | Stop reason: HOSPADM

## 2024-08-12 RX ORDER — URSODIOL 300 MG/1
300 CAPSULE ORAL 3 TIMES DAILY
Status: DISCONTINUED | OUTPATIENT
Start: 2024-08-12 | End: 2024-08-14 | Stop reason: HOSPADM

## 2024-08-12 RX ORDER — LACTULOSE 10 G/15ML
20 SOLUTION ORAL 4 TIMES DAILY
Status: DISCONTINUED | OUTPATIENT
Start: 2024-08-12 | End: 2024-08-14 | Stop reason: HOSPADM

## 2024-08-12 RX ORDER — GABAPENTIN 100 MG/1
200 CAPSULE ORAL 3 TIMES DAILY
Status: DISCONTINUED | OUTPATIENT
Start: 2024-08-12 | End: 2024-08-14 | Stop reason: HOSPADM

## 2024-08-12 RX ORDER — HYDROXYZINE HYDROCHLORIDE 25 MG/1
25 TABLET, FILM COATED ORAL EVERY 6 HOURS PRN
Status: DISCONTINUED | OUTPATIENT
Start: 2024-08-12 | End: 2024-08-14

## 2024-08-12 RX ORDER — GUAIFENESIN/DEXTROMETHORPHAN 100-10MG/5
5 SYRUP ORAL EVERY 4 HOURS PRN
Status: DISCONTINUED | OUTPATIENT
Start: 2024-08-12 | End: 2024-08-14 | Stop reason: HOSPADM

## 2024-08-12 RX ORDER — SUCRALFATE 1 G/10ML
1 SUSPENSION ORAL
Status: DISCONTINUED | OUTPATIENT
Start: 2024-08-12 | End: 2024-08-14 | Stop reason: HOSPADM

## 2024-08-12 RX ORDER — POLYETHYLENE GLYCOL 3350 17 G/17G
17 POWDER, FOR SOLUTION ORAL 2 TIMES DAILY
Status: DISCONTINUED | OUTPATIENT
Start: 2024-08-12 | End: 2024-08-14 | Stop reason: HOSPADM

## 2024-08-12 RX ORDER — TRAMADOL HYDROCHLORIDE 50 MG/1
50 TABLET ORAL EVERY 6 HOURS PRN
Status: DISCONTINUED | OUTPATIENT
Start: 2024-08-12 | End: 2024-08-14 | Stop reason: HOSPADM

## 2024-08-12 SDOH — HEALTH STABILITY: MENTAL HEALTH: HOW OFTEN DO YOU HAVE 6 OR MORE DRINKS ON ONE OCCASION?: NEVER

## 2024-08-12 SDOH — SOCIAL STABILITY: SOCIAL INSECURITY: DO YOU FEEL UNSAFE GOING BACK TO THE PLACE WHERE YOU ARE LIVING?: NO

## 2024-08-12 SDOH — HEALTH STABILITY: MENTAL HEALTH: HOW OFTEN DO YOU HAVE A DRINK CONTAINING ALCOHOL?: NEVER

## 2024-08-12 SDOH — SOCIAL STABILITY: SOCIAL INSECURITY: ABUSE: ADULT

## 2024-08-12 SDOH — SOCIAL STABILITY: SOCIAL INSECURITY: ARE THERE ANY APPARENT SIGNS OF INJURIES/BEHAVIORS THAT COULD BE RELATED TO ABUSE/NEGLECT?: NO

## 2024-08-12 SDOH — SOCIAL STABILITY: SOCIAL INSECURITY: ARE YOU OR HAVE YOU BEEN THREATENED OR ABUSED PHYSICALLY, EMOTIONALLY, OR SEXUALLY BY ANYONE?: NO

## 2024-08-12 SDOH — HEALTH STABILITY: MENTAL HEALTH: HOW MANY STANDARD DRINKS CONTAINING ALCOHOL DO YOU HAVE ON A TYPICAL DAY?: PATIENT DOES NOT DRINK

## 2024-08-12 SDOH — SOCIAL STABILITY: SOCIAL INSECURITY: DO YOU FEEL ANYONE HAS EXPLOITED OR TAKEN ADVANTAGE OF YOU FINANCIALLY OR OF YOUR PERSONAL PROPERTY?: NO

## 2024-08-12 SDOH — SOCIAL STABILITY: SOCIAL INSECURITY: DOES ANYONE TRY TO KEEP YOU FROM HAVING/CONTACTING OTHER FRIENDS OR DOING THINGS OUTSIDE YOUR HOME?: NO

## 2024-08-12 SDOH — SOCIAL STABILITY: SOCIAL INSECURITY: HAS ANYONE EVER THREATENED TO HURT YOUR FAMILY OR YOUR PETS?: NO

## 2024-08-12 SDOH — SOCIAL STABILITY: SOCIAL INSECURITY: HAVE YOU HAD THOUGHTS OF HARMING ANYONE ELSE?: NO

## 2024-08-12 SDOH — SOCIAL STABILITY: SOCIAL INSECURITY: WERE YOU ABLE TO COMPLETE ALL THE BEHAVIORAL HEALTH SCREENINGS?: YES

## 2024-08-12 ASSESSMENT — PAIN - FUNCTIONAL ASSESSMENT
PAIN_FUNCTIONAL_ASSESSMENT: 0-10

## 2024-08-12 ASSESSMENT — COGNITIVE AND FUNCTIONAL STATUS - GENERAL
DAILY ACTIVITIY SCORE: 24
DAILY ACTIVITIY SCORE: 24
MOBILITY SCORE: 22
WALKING IN HOSPITAL ROOM: A LITTLE
CLIMB 3 TO 5 STEPS WITH RAILING: A LITTLE
MOBILITY SCORE: 23
MOBILITY SCORE: 24
PATIENT BASELINE BEDBOUND: NO
DAILY ACTIVITIY SCORE: 24
CLIMB 3 TO 5 STEPS WITH RAILING: A LITTLE

## 2024-08-12 ASSESSMENT — ENCOUNTER SYMPTOMS
BLOOD IN STOOL: 0
WEAKNESS: 0
NAUSEA: 0
MUSCULOSKELETAL NEGATIVE: 1
PSYCHIATRIC NEGATIVE: 1
HEMATOLOGIC/LYMPHATIC NEGATIVE: 1
VOMITING: 1
CONSTITUTIONAL NEGATIVE: 1
RESPIRATORY NEGATIVE: 1
DIARRHEA: 0
ABDOMINAL DISTENTION: 1
CHILLS: 0
NEUROLOGICAL NEGATIVE: 1
ALLERGIC/IMMUNOLOGIC NEGATIVE: 1
EYES NEGATIVE: 1
ENDOCRINE NEGATIVE: 1
NAUSEA: 1
CONSTIPATION: 0
FEVER: 0
VOMITING: 0
ABDOMINAL PAIN: 1

## 2024-08-12 ASSESSMENT — ACTIVITIES OF DAILY LIVING (ADL)
ADL_ASSISTANCE: INDEPENDENT
LACK_OF_TRANSPORTATION: NO
ASSISTIVE_DEVICE: WALKER
JUDGMENT_ADEQUATE_SAFELY_COMPLETE_DAILY_ACTIVITIES: YES
FEEDING YOURSELF: INDEPENDENT
BATHING: INDEPENDENT
HEARING - LEFT EAR: FUNCTIONAL
ADEQUATE_TO_COMPLETE_ADL: YES
PATIENT'S MEMORY ADEQUATE TO SAFELY COMPLETE DAILY ACTIVITIES?: YES
DRESSING YOURSELF: INDEPENDENT
WALKS IN HOME: INDEPENDENT
BATHING_ASSISTANCE: MODIFIED INDEPENDENT (DEVICE)
GROOMING: INDEPENDENT
TOILETING: INDEPENDENT
ADL_ASSISTANCE: INDEPENDENT
HEARING - RIGHT EAR: FUNCTIONAL

## 2024-08-12 ASSESSMENT — PAIN SCALES - GENERAL
PAINLEVEL_OUTOF10: 5 - MODERATE PAIN
PAINLEVEL_OUTOF10: 6
PAINLEVEL_OUTOF10: 7
PAINLEVEL_OUTOF10: 7
PAINLEVEL_OUTOF10: 4

## 2024-08-12 ASSESSMENT — LIFESTYLE VARIABLES
SKIP TO QUESTIONS 9-10: 1
AUDIT-C TOTAL SCORE: 0
HAVE PEOPLE ANNOYED YOU BY CRITICIZING YOUR DRINKING: NO
AUDIT-C TOTAL SCORE: 0
HOW OFTEN DO YOU HAVE 6 OR MORE DRINKS ON ONE OCCASION: NEVER
AUDIT-C TOTAL SCORE: 0
SKIP TO QUESTIONS 9-10: 1
EVER HAD A DRINK FIRST THING IN THE MORNING TO STEADY YOUR NERVES TO GET RID OF A HANGOVER: NO
HOW MANY STANDARD DRINKS CONTAINING ALCOHOL DO YOU HAVE ON A TYPICAL DAY: PATIENT DOES NOT DRINK
TOTAL SCORE: 0
HOW OFTEN DO YOU HAVE A DRINK CONTAINING ALCOHOL: NEVER
EVER FELT BAD OR GUILTY ABOUT YOUR DRINKING: NO
HAVE YOU EVER FELT YOU SHOULD CUT DOWN ON YOUR DRINKING: NO

## 2024-08-12 ASSESSMENT — PAIN DESCRIPTION - LOCATION: LOCATION: ABDOMEN

## 2024-08-12 NOTE — PROGRESS NOTES
Pharmacy Medication History Review    Alfonzo Flanagan is a 49 y.o. female admitted for Generalized abdominal pain. Pharmacy reviewed the patient's [id number] medications and allergies for accuracy.    Medications ADDED:  None   Medications CHANGED:  Gabapentin 100mg - 2 BID  Magnesium 400mg - not taking   Nadalol 20mg - not taking   Medications REMOVED:   None      The list below reflects the updated PTA list. Comments regarding how patient may be taking medications differently can be found in the Admit Orders Activity  Prior to Admission Medications   Prescriptions Last Dose Informant   atorvastatin (Lipitor) 80 mg tablet  Self   Sig: Take 1 tablet (80 mg) by mouth once daily.   docusate sodium (Colace) 100 mg capsule  Self   Sig: Take 1 capsule (100 mg) by mouth 2 times a day. Hold for loose stool.   fenofibrate (Tricor) 145 mg tablet  Self   Sig: Take 1 tablet (145 mg) by mouth once daily.   furosemide (Lasix) 40 mg tablet  Self   Sig: Take 1 tablet (40 mg) by mouth once daily. Hold for dizziness.   gabapentin (Neurontin) 100 mg capsule     Sig: Take 2 capsules (200 mg) by mouth 3 times a day.   hydrOXYzine HCL (Atarax) 25 mg tablet  Self   Sig: Take 1 tablet (25 mg) by mouth every 6 hours if needed for itching or allergies.   insulin glargine (Lantus U-100 Insulin) 100 unit/mL injection  Self   Sig: Inject 25 Units under the skin 2 times a day.   insulin lispro (HumaLOG) 100 unit/mL injection  Self   Sig: Inject 1-40 Units under the skin 3 times daily (morning, midday, late afternoon). Take as directed per insulin instructions.   lactulose 20 gram/30 mL oral solution  Self   Sig: Take 30 mL (20 g) by mouth 4 times a day. Titrate for 3 bowel movements in 24 hours.   magnesium oxide (Mag-Ox) 400 mg (241.3 mg magnesium) tablet Not Taking Self   Sig: Take 1 tablet (400 mg) by mouth once daily.   Patient not taking: Reported on 8/12/2024   nadolol (Corgard) 20 mg tablet Not Taking    Sig: Take 1 tablet  (20 mg) by mouth once daily.   Patient not taking: Reported on 8/12/2024   ondansetron ODT (Zofran-ODT) 4 mg disintegrating tablet  Self   Sig: Take 1 tablet (4 mg) by mouth every 8 hours if needed for nausea or vomiting.   pantoprazole (ProtoNix) 40 mg EC tablet     Sig: Take 1 tablet (40 mg) by mouth once daily. Do not crush, chew, or split.   polyethylene glycol (Glycolax, Miralax) 17 gram packet  Self   Sig: Take 17 g by mouth 2 times a day. Hold for loose stool.   rifAXIMin (Xifaxan) 550 mg tablet  Self   Sig: Take 1 tablet (550 mg) by mouth every 12 hours.   sodium chloride ([place]) 0.65 % nasal spray  Self   Sig: Administer 1 spray into each nostril 4 times a day.   spironolactone (Aldactone) 50 mg tablet  Self   Sig: Take 1 tablet (50 mg) by mouth 2 times a day. Hold for dizziness.   sucralfate (Carafate) 100 mg/mL suspension  Self   Sig: Take 10 mL (1 g) by mouth 4 times a day before meals.   traMADol (Ultram) 50 mg tablet  Self   Sig: Take 1 tablet (50 mg) by mouth every 6 hours if needed for severe pain (7 - 10).   traZODone (Desyrel) 50 mg tablet  Self   Sig: Take 0.5 tablets (25 mg) by mouth once daily at bedtime.   ursodiol (Actigall) 300 mg capsule  Self   Sig: Take 1 capsule (300 mg) by mouth 3 times a day.      Facility-Administered Medications: None        The list below reflects the updated allergy list. Please review each documented allergy for additional clarification and justification.  Allergies  Reviewed by Patti Shook on 8/12/2024        Severity Reactions Comments    Gluten Low Diarrhea             Pharmacy has been updated to [facility].    Sources used to complete the med history include PTA medication list, dispense history, previous and current interview. Patient able to confirm more medications with certainty with current interview.     Below are additional concerns with the patient's PTA list.  Patient unable to confirm last doses.    Patti Shook,  CPhT-ADV  Please reach out via [employer] Secure Chat for questions

## 2024-08-12 NOTE — H&P
Chief Complaint Abdominal pain, nausea, vomiting    History Of Present Illness  Alfonzo Flanagan is a very pleasant 49 y.o. female with a past medical history significant for hepatic cirrhosis with ascites requiring paracentesis, portal hypertension, chronic pancreatitis, abdominal pain, nausea, vomiting presenting with abdominal pain, nausea, vomiting.  She has had multiple hospitalizations.  She states she was in her usual state of health until approximately 2 days prior to admission when she developed mid upper abdominal pain radiating to the back, with associated nausea and vomiting.  She reports 1 or no bowel movements daily.  She denies any black tarry or red bloody stools.  She reports increased leg swelling.  She reports chills, she denies any subjective fevers.  She has been taking all medications as prescribed including Lactulose four times daily and MiraLAX twice daily as well as diuretics Lasix and Aldactone.  She presented to the emergency department for evaluation.  In the emergency department, initial workup was done.  Temperature 36.8 °C.  Pulse 102.  Respiratory 20.  Blood pressure 134/79.  Pulse oximetry 100%.  CMP showed an elevated glucose of 434.  Sodium 134.  Potassium 4.0.  BUN 7.  Creatinine 0.6.  Calcium 7.9.  Albumin 2.4.  Alkaline phosphatase 350.  ALT is 24.  AST 53.  Total bilirubin 1.3.  Ammonia 37.  Lipase 59.  INR 1.3.  Pro time 13.2.  CBC showed a white blood cell count of 3.7.  Hemoglobin 7.8.  Hematocrit 24.9.  Platelet count 90.  proBNP 38.  Troponin less than 6.  2 view chest x-ray per radiology showed no acute cardiopulmonary process.  CT abdomen and pelvis with contrast per radiology showed a cirrhotic liver without focal lesion, large abdominopelvic ascites, 6 mm right middle lobe nodule.  Urinalysis showed glucose, 25 leukocytes.  She was treated in the emergency department and was admitted.  At time of my evaluation, she is resting in bed in no acute distress.  She is  awake alert oriented x 3.  She complains of mid upper abdominal pain rating to the back.  She reports nausea, no vomiting since yesterday.  She has not had any bowel movements today.  She met with Gastroenterology, and plans for an abdominal paracentesis.     Past Medical History  Past Medical History:   Diagnosis Date    Cirrhosis of liver (Multi)     Diabetes mellitus (Multi)        Surgical History  Past Surgical History:   Procedure Laterality Date    ABDOMINAL SURGERY      CT GUIDED IMAGING FOR NEEDLE PLACEMENT  10/14/2020    CT GUIDED IMAGING FOR NEEDLE PLACEMENT [facility]    US GUIDED ABDOMINAL PARACENTESIS  10/08/2020    US GUIDED ABDOMINAL PARACENTESIS [facility]        Social History  She reports that she has never smoked. She has never used smokeless tobacco. She reports that she does not drink alcohol and does not use drugs.    Family History  No family history on file.     Allergies  Gluten    Review of Systems   Constitutional: Negative.    HENT: Negative.     Eyes: Negative.    Respiratory: Negative.     Cardiovascular:  Positive for leg swelling.   Gastrointestinal:  Positive for abdominal pain, nausea and vomiting.   Endocrine: Negative.    Genitourinary: Negative.    Musculoskeletal: Negative.    Skin: Negative.    Allergic/Immunologic: Negative.    Neurological: Negative.    Hematological: Negative.    Psychiatric/Behavioral: Negative.     All other systems reviewed and are negative.       Physical Exam  Vitals and nursing note reviewed.   Constitutional:       General: She is not in acute distress.     Appearance: Normal appearance. She is normal weight. She is not ill-appearing, toxic-appearing or diaphoretic.   HENT:      Head: Normocephalic and atraumatic.      Right Ear: Tympanic membrane normal.      Left Ear: Tympanic membrane normal.      Nose: Nose normal.      Mouth/Throat:      Mouth: Mucous membranes are moist.      Pharynx: Oropharynx is clear.   Eyes:       "Extraocular Movements: Extraocular movements intact.      Conjunctiva/sclera: Conjunctivae normal.      Pupils: Pupils are equal, round, and reactive to light.   Cardiovascular:      Rate and Rhythm: Normal rate and regular rhythm.      Pulses: Normal pulses.      Heart sounds: Normal heart sounds. No murmur heard.  Pulmonary:      Effort: Pulmonary effort is normal. No respiratory distress.      Breath sounds: Normal breath sounds. No wheezing, rhonchi or rales.      Comments: Room air.  Abdominal:      General: Bowel sounds are normal. There is distension.      Palpations: Abdomen is soft.      Tenderness: There is abdominal tenderness.      Comments: + Ascites.   Genitourinary:     Comments: Deferred.  Musculoskeletal:         General: Swelling present. No tenderness. Normal range of motion.      Cervical back: Normal range of motion and neck supple.      Right lower le+ Pitting Edema present.      Left lower le+ Pitting Edema present.   Skin:     General: Skin is warm and dry.      Capillary Refill: Capillary refill takes less than 2 seconds.   Neurological:      General: No focal deficit present.      Mental Status: She is alert and oriented to person, place, and time.   Psychiatric:         Mood and Affect: Mood normal.         Behavior: Behavior normal.        Last Recorded Vitals  Blood pressure 137/79, pulse 88, temperature 36.5 °C (97.7 °F), temperature source Oral, resp. rate 18, height 1.6 m (5' 3\"), weight 83.5 kg (184 lb), SpO2 100%.    Relevant Results  Results for orders placed or performed during the hospital encounter of 24 (from the past 24 hour(s))   CBC and Auto Differential   Result Value Ref Range    WBC 3.7 (L) 4.4 - 11.3 x10*3/uL    nRBC 0.0 0.0 - 0.0 /100 WBCs    RBC 3.15 (L) 4.00 - 5.20 x10*6/uL    Hemoglobin 7.8 (L) 12.0 - 16.0 g/dL    Hematocrit 24.9 (L) 36.0 - 46.0 %    MCV 79 (L) 80 - 100 fL    MCH 24.8 (L) 26.0 - 34.0 pg    MCHC 31.3 (L) 32.0 - 36.0 g/dL    RDW 19.9 (H) " 11.5 - 14.5 %    Platelets 90 (L) 150 - 450 x10*3/uL    Neutrophils % 51.5 40.0 - 80.0 %    Immature Granulocytes %, Automated 0.3 0.0 - 0.9 %    Lymphocytes % 33.5 13.0 - 44.0 %    Monocytes % 10.2 2.0 - 10.0 %    Eosinophils % 4.0 0.0 - 6.0 %    Basophils % 0.5 0.0 - 2.0 %    Neutrophils Absolute 1.92 1.20 - 7.70 x10*3/uL    Immature Granulocytes Absolute, Automated 0.01 0.00 - 0.70 x10*3/uL    Lymphocytes Absolute 1.25 1.20 - 4.80 x10*3/uL    Monocytes Absolute 0.38 0.10 - 1.00 x10*3/uL    Eosinophils Absolute 0.15 0.00 - 0.70 x10*3/uL    Basophils Absolute 0.02 0.00 - 0.10 x10*3/uL   Comprehensive metabolic panel   Result Value Ref Range    Glucose 434 (H) 65 - 99 mg/dL    Sodium 134 133 - 145 mmol/L    Potassium 4.0 3.4 - 5.1 mmol/L    Chloride 101 97 - 107 mmol/L    Bicarbonate 25 24 - 31 mmol/L    Urea Nitrogen 7 (L) 8 - 25 mg/dL    Creatinine 0.60 0.40 - 1.60 mg/dL    eGFR >90 >60 mL/min/1.73m*2    Calcium 7.9 (L) 8.5 - 10.4 mg/dL    Albumin 2.4 (L) 3.5 - 5.0 g/dL    Alkaline Phosphatase 350 (H) 35 - 125 U/L    Total Protein 6.8 5.9 - 7.9 g/dL    AST 53 (H) 5 - 40 U/L    Bilirubin, Total 1.3 (H) 0.1 - 1.2 mg/dL    ALT 24 5 - 40 U/L    Anion Gap 8 <=19 mmol/L   NT Pro-BNP   Result Value Ref Range    PROBNP 38 0 - 192 pg/mL   Protime-INR   Result Value Ref Range    Protime 13.2 (H) 9.3 - 12.7 seconds    INR 1.3 (H) 0.9 - 1.2   Lipase   Result Value Ref Range    Lipase 59 16 - 63 U/L   Acute Toxicology Panel, Blood   Result Value Ref Range    Acetaminophen <5.0 15.0 - 30.0 ug/mL    Salicylate  <0 3 - 25 mg/dL    Alcohol <0.010 0.000 - 0.010 g/dL   Ammonia   Result Value Ref Range    Ammonia 37 12 - 45 umol/L   Serial Troponin, Initial ([facility])   Result Value Ref Range    Troponin T, High Sensitivity <6 <=14 ng/L   BLOOD GAS LACTIC ACID, VENOUS   Result Value Ref Range    POCT Lactate, Venous 1.4 0.4 - 2.0 mmol/L   hCG, Urine, Qualitative   Result Value Ref Range    HCG, Urine NEGATIVE NEGATIVE   Drug Screen,  Urine   Result Value Ref Range    Amphetamine Screen, Urine Negative      Barbiturate Screen, Urine Negative      Benzodiazepines Screen, Urine Negative      Cannabinoid Screen, Urine Negative      Cocaine Metabolite Screen, Urine Negative      Fentanyl Screen, Urine Negative       Methadone Screen, Urine Negative      Opiate Screen, Urine Negative      Oxycodone Screen, Urine Negative      PCP Screen, Urine Negative     Urinalysis with Reflex Culture and Microscopic   Result Value Ref Range    Color, Urine Light-Yellow Light-Yellow, Yellow, Dark-Yellow    Appearance, Urine Clear Clear    Specific Gravity, Urine 1.041 (N) 1.005 - 1.035    pH, Urine 5.5 5.0, 5.5, 6.0, 6.5, 7.0, 7.5, 8.0    Protein, Urine NEGATIVE NEGATIVE, 10 (TRACE), 20 (TRACE) mg/dL    Glucose, Urine OVER (4+) (A) Normal mg/dL    Blood, Urine NEGATIVE NEGATIVE    Ketones, Urine NEGATIVE NEGATIVE mg/dL    Bilirubin, Urine NEGATIVE NEGATIVE    Urobilinogen, Urine Normal Normal mg/dL    Nitrite, Urine NEGATIVE NEGATIVE    Leukocyte Esterase, Urine 25 Mckenzie/µL (A) NEGATIVE   Extra Urine Gray Tube   Result Value Ref Range    Extra Tube Hold for add-ons.    Microscopic Only, Urine   Result Value Ref Range    WBC, Urine 1-5 1-5, NONE /HPF    RBC, Urine NONE NONE, 1-2, 3-5 /HPF   Serial Troponin, 2 Hour ([facility])   Result Value Ref Range    Troponin T, High Sensitivity <6 <=14 ng/L   Serial Troponin, 6 Hour ([facility])   Result Value Ref Range    Troponin T, High Sensitivity <6 <=14 ng/L   Comprehensive metabolic panel   Result Value Ref Range    Glucose 344 (H) 65 - 99 mg/dL    Sodium 134 133 - 145 mmol/L    Potassium 3.7 3.4 - 5.1 mmol/L    Chloride 102 97 - 107 mmol/L    Bicarbonate 26 24 - 31 mmol/L    Urea Nitrogen 7 (L) 8 - 25 mg/dL    Creatinine 0.60 0.40 - 1.60 mg/dL    eGFR >90 >60 mL/min/1.73m*2    Calcium 7.5 (L) 8.5 - 10.4 mg/dL    Albumin 2.3 (L) 3.5 - 5.0 g/dL    Alkaline Phosphatase 424 (H) 35 - 125 U/L    Total Protein 6.7 5.9 - 7.9 g/dL    AST 43  (H) 5 - 40 U/L    Bilirubin, Total 1.1 0.1 - 1.2 mg/dL    ALT 23 5 - 40 U/L    Anion Gap 6 <=19 mmol/L   CBC   Result Value Ref Range    WBC 3.3 (L) 4.4 - 11.3 x10*3/uL    nRBC 0.0 0.0 - 0.0 /100 WBCs    RBC 3.58 (L) 4.00 - 5.20 x10*6/uL    Hemoglobin 8.7 (L) 12.0 - 16.0 g/dL    Hematocrit 27.9 (L) 36.0 - 46.0 %    MCV 78 (L) 80 - 100 fL    MCH 24.3 (L) 26.0 - 34.0 pg    MCHC 31.2 (L) 32.0 - 36.0 g/dL    RDW 20.1 (H) 11.5 - 14.5 %    Platelets 86 (L) 150 - 450 x10*3/uL     Susceptibility data from last 90 days.  Collected Specimen Info Organism Amoxicillin/Clavulanate Ampicillin Ampicillin/Sulbactam Cefazolin Cefazolin (uncomplicated UTIs only) Ciprofloxacin Gentamicin Levofloxacin Nitrofurantoin Penicillin Piperacillin/Tazobactam   07/10/24 Urine from Clean Catch/Voided Streptococcus agalactiae (Group B Streptococcus)              06/23/24 Urine from Clean Catch/Voided Escherichia coli   S   S  S  S  S   S   S   05/15/24 Urine from Clean Catch/Voided Enterococcus faecium   S     R   S  S  S      Klebsiella pneumoniae/variicola  S  R  S  S  S  S  S   I   S     Collected Specimen Info Organism Trimethoprim/Sulfamethoxazole Vancomycin   07/10/24 Urine from Clean Catch/Voided Streptococcus agalactiae (Group B Streptococcus)     06/23/24 Urine from Clean Catch/Voided Escherichia coli  S    05/15/24 Urine from Clean Catch/Voided Enterococcus faecium  R  S     Klebsiella pneumoniae/variicola  S      CT abdomen pelvis w IV contrast    Result Date: 8/11/2024  Interpreted By:  Alfredo Guerin, STUDY: CT ABDOMEN PELVIS W IV CONTRAST;  8/11/2024 9:34 pm   INDICATION: Signs/Symptoms:worsening pain.   COMPARISON: 08/03/2024 07/10/2024   ACCESSION NUMBER(S): [id number]   ORDERING CLINICIAN: ABI DUMAS   TECHNIQUE: CT of the abdomen and pelvis was performed.  Standard contiguous axial images were obtained at 3 mm slice thickness through the abdomen and pelvis. Coronal and sagittal reconstructions at 3 mm slice thickness were  performed.   75 ml of contrast Omnipaque 350 were administered intravenously without immediate complication.   FINDINGS: LOWER CHEST: 6 mm right middle lobe nodule (series 7, image 11).   ABDOMEN:   LIVER: Cirrhotic liver morphology. No focal lesion.   BILE DUCTS: The intrahepatic and extrahepatic ducts are not dilated.   GALLBLADDER: The gallbladder is surgically absent.   PANCREAS: The pancreas appears unremarkable.   SPLEEN: The spleen is normal in size without focal lesions.   ADRENAL GLANDS: Bilateral adrenal glands appear normal.   KIDNEYS AND URETERS: The kidneys are normal in size and enhance symmetrically.  No hydroureteronephrosis or nephroureterolithiasis is identified.   PELVIS:   BLADDER: The urinary bladder appears normal without abnormal wall thickening.   REPRODUCTIVE ORGANS: No pelvic masses.   BOWEL: The stomach is unremarkable.   No bowel dilation. Diffuse wall thickening, likely secondary to presence of ascites. Moderate colonic stool. The appendix is not definitely visualized. There is however no pericecal stranding or fluid.   VESSELS: There is no aneurysmal dilatation of the abdominal aorta. The IVC appears normal.   PERITONEUM/RETROPERITONEUM/LYMPH NODES: Large abdominopelvic ascites and diffuse mesenteric soft tissue stranding, stable since prior. No abdominopelvic lymphadenopathy is present.   BONES AND ABDOMINAL WALL: No suspicious osseous lesions are identified. Moderate size fluid containing umbilical hernia. Diffuse subcutaneous soft tissue stranding.       1.  Cirrhotic liver morphology without focal lesion. 2. Stable large abdominopelvic ascites. 3. 6 mm right middle lobe nodule. 4. Other findings as described above.     Signed by: Alfredo Guerin 8/11/2024 10:30 PM Dictation workstation:   [id number]    XR chest 2 views    Result Date: 8/11/2024  Interpreted By:  Savannah Zaman, STUDY: Chest, 2 views.   INDICATION: Signs/Symptoms:swelling.   COMPARISON: 07/04/2024.   ACCESSION  NUMBER(S): [id number]   ORDERING CLINICIAN: ABI DUMAS   FINDINGS: The cardiomediastinal silhouette size is within normal limits.   There is no focal consolidation, edema or pneumothorax. No sizeable pleural effusion.       1. No acute cardiopulmonary process.   MACRO: None.   Signed by: Savannah Zaman 8/11/2024 8:59 PM Dictation workstation:   [id number]     Scheduled medications  albumin human, 25 g, intravenous, Once  atorvastatin, 80 mg, oral, Nightly  docusate sodium, 100 mg, oral, BID  fenofibrate, 160 mg, oral, Daily  furosemide, 40 mg, oral, Daily  gabapentin, 200 mg, oral, TID  insulin glargine, 25 Units, subcutaneous, BID  lactulose, 20 g, oral, 4x daily  magnesium oxide, 400 mg, oral, Daily  nadolol, 20 mg, oral, Daily  pantoprazole, 40 mg, oral, Daily  polyethylene glycol, 17 g, oral, BID  rifAXIMin, 550 mg, oral, q12h RIVKA  sodium chloride, 1 spray, Each Nostril, 4x daily  spironolactone, 50 mg, oral, BID  sucralfate, 1 g, oral, Before meals & nightly  traZODone, 25 mg, oral, Nightly  ursodiol, 300 mg, oral, TID      Continuous medications     PRN medications  PRN medications: acetaminophen **OR** acetaminophen **OR** acetaminophen, benzocaine-menthol, dextromethorphan-guaifenesin, guaiFENesin, hydrOXYzine HCL, ondansetron ODT, polyethylene glycol, traMADol      ASSESSMENT:  Abdominal pain   Nausea, vomiting  Constipation  Abnormal CT abdomen/pelvis  REED Hepatic cirrhosis with ascites  Hyperbilirubinemia  Pancytopenia  Bilateral lower extremity edema  Pyuria rule out UTI  Type 2 diabetes with hyperglycemia  Pulmonary nodule    PLAN:  Gastroenterology input appreciated.  Symptom control.  As needed analgesics, anti-emetics.  Low-sodium, gluten free, ADA diet as tolerated.  Interventional radiology consultation for abdominal paracentesis.  Fluid for analysis, culture.  Post-paracentesis IV albumin.  Monitor blood pressure.  Diuretics Lasix 40 mg p.o. daily, Spironolactone 50 mg p.o. twice daily.   Elevate bilateral lower extremities and apply compression.  CBC noted.  H&H noted.  No evidence of acute blood loss.  Check iron panel, ferritin, vitamin B12, folic acid.  Guaiac stools.  Monitor H&H.  Transfuse as needed.  Point-of-care glucose reviewed.  Hyperglycemic.  ADA diet.  Insulin.  Increase insulin as needed for glycemic control.  Hypoglycemia protocol.  Increase activity as tolerated.  Urinalysis, reflex to culture.  Follow-up.  Outpatient follow-up for pulmonary nodule monitoring.  Close follow-up with PCP and Gastroenterology upon discharge.  Supportive care.  Patient reassured.  Case management consultation for discharge planning.  We will take DVT, fall, aspiration and decubitus precautions during her stay in the hospital.  The plan has been discussed with the patient.  She is agreeable.  Orders written per Dr. Lawrence.  Any additions or modifications at his discretion.    Discussed with Dr. Lawrence.  Okay to discharge home after abdominal paracentesis pending the analysis.       Leny Daniels, APRN-CNP

## 2024-08-12 NOTE — PROGRESS NOTES
Occupational Therapy    Evaluation    Patient Name: Alfonzo Flanagan  MRN: [id number]  Today's Date: 8/12/2024  Time Calculation  Start Time: 0810  Stop Time: 0825  Time Calculation (min): 15 min        Assessment:  OT Assessment: Pt appears to be close to her functional baseline with self-care and functional transfers/mobility regardless of abdominal pain/distension. Family is also available to assist upon returning home. No further acute skilled OT needs.  Prognosis: Good  Evaluation/Treatment Tolerance: Patient tolerated treatment well  End of Session Communication: Bedside nurse  End of Session Patient Position: On cart (in the ED with needs in reach.)    Plan:  No Skilled OT: Safe to return home  OT Frequency: OT eval only  OT Discharge Recommendations: No further acute OT  OT - OK to Discharge: Yes       Subjective     General:  General  Reason for Referral: impaired ADL's/mobility  Referred By: Luis Alfredo Lawrence MD  Past Medical History Relevant to Rehab: ascites, HTN, transaminitis, HLD, DM  Family/Caregiver Present: No  Co-Treatment: PT  Co-Treatment Reason: optimize pt outcomes  Prior to Session Communication: Bedside nurse  Patient Position Received: On cart (in the ED)  General Comment: The patient is a 49 y.o. female admitted to the hospital for abdominal pain. Pt recently DC'd from [facility] approx. 6 days ago s/p paracentesis with 4L of fluid removed.    Precautions:  Hearing/Visual Limitations: WFL  Medical Precautions: Fall precautions  Precautions Comment: mild language barrier    Vital Signs:  Heart Rate: 97  Heart Rate Source: Monitor  SpO2: 100 %  BP: 114/68  MAP (mmHg): 83    Pain:  Pain Assessment  Pain Assessment: 0-10  0-10 (Numeric) Pain Score: 7  Pain Type: Acute pain  Pain Location: Abdomen  Pain Interventions: Ambulation/increased activity, Other (Comment) (Nurse aware.)    Objective     Cognition:  Overall Cognitive Status: Within Functional Limits  Orientation Level: Oriented  X4  Insight: Mild    Home Living:  Type of Home: Apartment  Lives With: Spouse, Dependent children  Home Adaptive Equipment: None  Home Layout: One level  Home Access: Stairs to enter with rails  Entrance Stairs-Rails:  (unilateral)  Entrance Stairs-Number of Steps: 11  Bathroom Shower/Tub: Tub/shower unit  Bathroom Toilet: Standard  Bathroom Equipment: None    Prior Function:  Level of Hall: Independent with ADLs and functional transfers, Needs assistance with homemaking  Receives Help From: Family  ADL Assistance: Independent  Homemaking Assistance: Needs assistance (family assists as needed)  Ambulatory Assistance: Independent  Hand Dominance: Left    ADL:  Eating Assistance: Independent  Grooming Assistance: Independent  Bathing Assistance: Modified independent (Device)  UE Dressing Assistance: Independent  LE Dressing Assistance: Modified independent (Device)  Toileting Assistance with Device: Modified independent  Functional Assistance: Modified independent    Activity Tolerance:  Activity Tolerance Comments: WFL    Bed Mobility/Transfers: Bed Mobility  Bed Mobility:  (Pt completed supine to sit in bed with mod indep.)    Transfers  Transfer:  (mod indep)    Functional Mobility:  Functional Mobility  Functional Mobility Performed:  (Pt tolerated functional mobility for a household distance using no device with mod indep at a slower pace.)    Sensation:  Sensation Comment: BUE's WFL    Strength:  Strength Comments: BUE's WFL    Coordination:  Coordination Comment: BUE's WFL     Hand Function:  Gross Grasp: Functional  Coordination: Functional      Outcome Measures:[facility] Daily Activity  Putting on and taking off regular lower body clothing: None  Bathing (including washing, rinsing, drying): None  Putting on and taking off regular upper body clothing: None  Toileting, which includes using toilet, bedpan or urinal: None  Taking care of personal grooming such as brushing teeth: None  Eating Meals:  None  Daily Activity - Total Score: 24      Education Documentation  No documentation found.  Education Comments  No comments found.

## 2024-08-12 NOTE — ED PROVIDER NOTES
Department of Emergency Medicine   ED  Provider Note  Admit Date/RoomTime: 8/11/2024  7:55 PM  ED Room: [facility]        History of Present Illness:  Chief Complaint   Patient presents with    Leg Swelling     Pt has pitting edema in both legs.    Abdominal Pain     Pt states she is having the same abdominal pain that normally brings her into the ED. Started 2 days ago.         Alfonzo Flanagan is a 49 y.o. female history hepatic cirrhosis with ascites, hypertension, transaminitis, hyperbilirubinemia, pancytopenia, hyperlipidemia, hypertriglyceridemia, diabetes, hypoalbuminemia presents emergency department complaints of worsening abdominal swelling and leg swelling since she was discharged from the hospital 6 days ago.  Complains of abdominal pain.  Difficulty moving her bowels.  Chills no fever.  No cough congestion runny nose sore throat.  No pressure burning or frequency of urination.  No complaints of chest pain.  Patient reports she had a paracentesis 6 days ago but is starting to feel back up with fluid.  She does not go for therapeutic paracentesis.  Reports when she has fluid overload she presents to the emergency department.  Denies nausea or vomiting.  No headache family reports no confusion.  No lethargy patient reports a history of abdominal hernia with no complaints of pain over the hernia    Review of Systems:   Pertinent positives and negatives are stated within HPI, all other systems reviewed and are negative.        --------------------------------------------- PAST HISTORY ---------------------------------------------  Past Medical History:  has a past medical history of Cirrhosis of liver (Multi) and Diabetes mellitus (Multi).  Past Surgical History:  has a past surgical history that includes CT guided imaging for needle placement (10/14/2020); US guided abdominal paracentesis (10/08/2020); and Abdominal surgery.  Social History:  reports that she has never smoked. She has never used smokeless  "tobacco. She reports that she does not drink alcohol and does not use drugs.  Family History: family history is not on file.. Unless otherwise noted, family history is non contributory  The patient’s home medications have been reviewed.  Allergies: Gluten        ---------------------------------------------------PHYSICAL EXAM--------------------------------------    GENERAL APPEARANCE: Awake and alert.   VITAL SIGNS: As per the nurses' triage record.  Heart rate 102  HEENT: Normocephalic, atraumatic. Extraocular muscles are intact. Pupils equal round and reactive to light. Conjunctiva are pink. Negative scleral icterus. Mucous membranes are moist. Tongue in the midline. Pharynx was without erythema or exudates, uvula midline  NECK: Soft Nontender and supple, full gross ROM, no meningeal signs.  CHEST: Nontender to palpation. Clear to auscultation bilaterally. No rales, rhonchi, or wheezing.   HEART: S1, S2. Regular rate and rhythm. No murmurs, gallops or rubs.  Strong and equal pulses in the extremities.   ABDOMEN: Abdomen is distended firm.  Bowel sounds are present tympanic.  Diffuse tenderness on palpation.  Abdominal hernia noted over the umbilicus area soft nontender  MUSCULCSKELETAL: The calves are nontender to palpation. Full gross active range of motion..  Peripheral pulses intact    NEUROLOGICAL: Awake, alert and oriented x 3. Power intact in the upper and lower extremities. Sensation is intact to light touch in the upper and lower extremities.   IMMUNOLOGICAL: No lymphatic streaking noted   DERM: No petechiae, rashes, or ecchymoses.          ------------------------- NURSING NOTES AND VITALS REVIEWED ---------------------------  The nursing notes within the ED encounter and vital signs as below have been reviewed by myself  /79 (BP Location: Left arm, Patient Position: Sitting)   Pulse (!) 102   Temp 36.8 °C (98.2 °F) (Tympanic)   Resp 20   Ht 1.6 m (5' 3\")   Wt 83.5 kg (184 lb)   SpO2 100%   " BMI 32.59 kg/m²     Oxygen Saturation Interpretation: 100% room air    The cardiac monitor revealed sinus tachycardia with a heart rate in the 100s as interpreted by me. The cardiac monitor was ordered secondary to the patient's heart rate and to monitor the patient for dysrhythmia.       The patient’s available past medical records and past encounters were reviewed.          -----------------------DIAGNOSTIC RESULTS------------------------  LABS:    Labs Reviewed   CBC WITH AUTO DIFFERENTIAL - Abnormal       Result Value    WBC 3.7 (*)     nRBC 0.0      RBC 3.15 (*)     Hemoglobin 7.8 (*)     Hematocrit 24.9 (*)     MCV 79 (*)     MCH 24.8 (*)     MCHC 31.3 (*)     RDW 19.9 (*)     Platelets 90 (*)     Neutrophils % 51.5      Immature Granulocytes %, Automated 0.3      Lymphocytes % 33.5      Monocytes % 10.2      Eosinophils % 4.0      Basophils % 0.5      Neutrophils Absolute 1.92      Immature Granulocytes Absolute, Automated 0.01      Lymphocytes Absolute 1.25      Monocytes Absolute 0.38      Eosinophils Absolute 0.15      Basophils Absolute 0.02     COMPREHENSIVE METABOLIC PANEL - Abnormal    Glucose 434 (*)     Sodium 134      Potassium 4.0      Chloride 101      Bicarbonate 25      Urea Nitrogen 7 (*)     Creatinine 0.60      eGFR >90      Calcium 7.9 (*)     Albumin 2.4 (*)     Alkaline Phosphatase 350 (*)     Total Protein 6.8      AST 53 (*)     Bilirubin, Total 1.3 (*)     ALT 24      Anion Gap 8     PROTIME-INR - Abnormal    Protime 13.2 (*)     INR 1.3 (*)     Narrative:     INR Therapeutic Range: 2.0-3.5   URINALYSIS WITH REFLEX CULTURE AND MICROSCOPIC - Abnormal    Color, Urine Light-Yellow      Appearance, Urine Clear      Specific Gravity, Urine 1.041 (*)     pH, Urine 5.5      Protein, Urine NEGATIVE      Glucose, Urine OVER (4+) (*)     Blood, Urine NEGATIVE      Ketones, Urine NEGATIVE      Bilirubin, Urine NEGATIVE      Urobilinogen, Urine Normal      Nitrite, Urine NEGATIVE      Leukocyte  Esterase, Urine 25 Mckenzie/µL (*)    N-TERMINAL PROBNP - Normal    PROBNP 38      Narrative:     Reference ranges are based on clinical submission data. These ranges represent the 95th percentile of normal cut-off points. As NT Pro- BNP values approach 1000 pg/ml, clinical symptoms are more likely associated with CHF.   LIPASE - Normal    Lipase 59     HCG, URINE, QUALITATIVE - Normal    HCG, Urine NEGATIVE     DRUG SCREEN,URINE - Normal    Amphetamine Screen, Urine Negative      Barbiturate Screen, Urine Negative      Benzodiazepines Screen, Urine Negative      Cannabinoid Screen, Urine Negative      Cocaine Metabolite Screen, Urine Negative      Fentanyl Screen, Urine Negative      Methadone Screen, Urine Negative      Opiate Screen, Urine Negative      Oxycodone Screen, Urine Negative      PCP Screen, Urine Negative      Narrative:     These toxicological screening tests provide unconfirmed qualitative measurements to aid in treatment and diagnosis in cases of drug use or overdose. This test is used only for medical purposes. A positive result does not indicate or measure intoxication. For specific test performance or pathologist consultation, please contact the Laboratory.    The following threshold concentrations are used for these analyses.Values at or above the threshold concentration are reported as positive. Values below the threshold are reported as negative.    Drug /Screening Threshold                                                                                                 THC/CANNABINOIDS................50 ng/ml  METHADONE......................300 ng/ml  COCAINE METABOLITES............300 ng/ml  BENZODIAZEPINE.................300 ng/ml  PCP.............................25 ng/ml  OPIATE.........................300 ng/ml  AMPHETAMINE/ECSTASY...........1000 ng/ml  BARBITURATE....................200 ng/ml  OXYCODONE......................100 ng/ml  FENTANYL.........................5 ng/ml       ACUTE  TOXICOLOGY PANEL, BLOOD - Normal    Acetaminophen <5.0      Salicylate  <0      Alcohol <0.010     AMMONIA - Normal    Ammonia 37     SERIAL TROPONIN, INITIAL ([facility]) - Normal    Troponin T, High Sensitivity <6     BLOOD GAS LACTIC ACID, VENOUS - Normal    POCT Lactate, Venous 1.4     SERIAL TROPONIN,  2 HOUR ([facility]) - Normal    Troponin T, High Sensitivity <6     MICROSCOPIC ONLY, URINE - Normal    WBC, Urine 1-5      RBC, Urine NONE     BLOOD CULTURE   BLOOD CULTURE   URINE CULTURE   URINALYSIS WITH REFLEX CULTURE AND MICROSCOPIC    Narrative:     The following orders were created for panel order Urinalysis with Reflex Culture and Microscopic.  Procedure                               Abnormality         Status                     ---------                               -----------         ------                     Urinalysis with Reflex C...[[id number]]  Abnormal            Final result               Extra Urine Gray Tube[[id number]]                            In process                   Please view results for these tests on the individual orders.   TROPONIN T SERIES, HIGH SENSITIVITY (0, 2 HR, 6 HR)    Narrative:     The following orders were created for panel order Troponin T Series, High Sensitivity (0, 2HR, 6HR).  Procedure                               Abnormality         Status                     ---------                               -----------         ------                     Serial Troponin, Initial...[[id number]]  Normal              Final result               Serial Troponin, 2 Hour ...[[id number]]  Normal              Final result               Serial Troponin, 6 Hour ...[[id number]]                                                   Please view results for these tests on the individual orders.   EXTRA URINE GRAY TUBE   SERIAL TROPONIN, 6 HOUR ([facility])       As interpreted by me, the above displayed labs are abnormal. All other labs obtained during this visit were within normal range or not returned as  of this dictation.      EKG Interpretation    2251 ECG 12 lead  ECG performed on August 11, 2024 at 2228 and interpreted by me at 2230 showing a normal sinus rhythm, no axis deviation, intervals within normal limits, no STEMI.  No changes from previous ECG from August 3, 2024. [EG]           CT abdomen pelvis w IV contrast   Final Result   1.  Cirrhotic liver morphology without focal lesion.   2. Stable large abdominopelvic ascites.   3. 6 mm right middle lobe nodule.   4. Other findings as described above.             Signed by: Alfredo Guerin 8/11/2024 10:30 PM   Dictation workstation:   [id number]      XR chest 2 views   Final Result   1. No acute cardiopulmonary process.        MACRO:   None.        Signed by: Savannah Zaman 8/11/2024 8:59 PM   Dictation workstation:   [id number]              CT abdomen pelvis w IV contrast   Final Result   1.  Cirrhotic liver morphology without focal lesion.   2. Stable large abdominopelvic ascites.   3. 6 mm right middle lobe nodule.   4. Other findings as described above.             Signed by: Alfredo Guerin 8/11/2024 10:30 PM   Dictation workstation:   [id number]      XR chest 2 views   Final Result   1. No acute cardiopulmonary process.        MACRO:   None.        Signed by: Savannah Zaman 8/11/2024 8:59 PM   Dictation workstation:   [id number]              ------------------------------ ED COURSE/MEDICAL DECISION MAKING----------------------  Medical Decision Making:   Exam: A medically appropriate exam performed, outlined above, given the known history and presentation.    History obtained from: Review of medical record nursing notes patient patient's family      Social Determinants of Health considered during this visit: Lives at home with family has been helps interpret for her.      PAST MEDICAL HISTORY/Chronic Conditions Affecting Care     has a past medical history of Cirrhosis of liver (Multi) and Diabetes mellitus (Multi).       CC/HPI Summary,  Social Determinants of health, Records Reviewed, DDx, testing done/not done, ED Course, Reassessment, disposition considerations/shared decision making with patient, consults, disposition:   Presents with abdominal distention difficulty moving her bowels lower extremity edema chills  Plan  Fentanyl, ketamine  Zofran   CT abdomen pelvis 1.  Cirrhotic liver morphology without focal lesion.  2. Stable large abdominopelvic ascites.  3. 6 mm right middle lobe nodule.  4. Other findings as described above.  Chest x-ray 1. No acute cardiopulmonary process.   EKG  Tox screen  Ammonia level  Blood cultures  Lactic acid  CBC  CMP  Urine drug screen  Pregnancy  Lipase  proBNP  PT/INR  Troponin  Urine    Medical Decision Making/Differential Diagnosis:  Differentials include but not limited to ascites secondary to cirrhosis versus obstruction versus electrolyte abnormality versus CHF versus   Review  Drug screen negative  Lipase 59  proBNP 38  Ammonia level 37  Lactic acid 1.4  Pregnancy negative  Troponins are flat negative  Coag panel pro time 13.2  INR 1.38  Leukocytes 3.7 at baseline  Hemoglobin 7.8 baseline 8.86 days ago no signs of active bleeding  Urine showed leukocytes glucose otherwise unremarkable  Glucose 434 does not appear to be in DKA  Normal renal function  LFTs elevated  Patient presented with increased swelling of her abdomen history of cirrhosis with ascites history of paracentesis.  Lipase normal.  Drug screen negative.  Ammonia level within normal limits.  proBNP is not elevated cardiac workup negative no complaints of chest pain EKG per attending note no ST elevation or arrhythmia.  Pregnancy negative.  Urine showed trace leukocytes glucose patient is denying any urinary symptoms we will send for culture.  Glucose 434 no signs of DKA.  T is elevated.  Leukopenia with history of the same at baseline.  Anemia with no signs of active bleeding.  Based on patient's clinical presentation history and symptoms  consistent with ascites secondary to cirrhosis.  Chest x-ray no pulmonary nodule with history of the same hyperglycemia with no signs of DKA transaminase with history of the same.  Anemia no signs of active bleeding.  And generalized abdominal pain discussed case with her admitting team is agreed to admit to their service for further evaluation and treatment patient may need repeat paracentesis.  She is in no acute distress at this time.  Mild tachycardia was having chills at home lactic acid is normal.  No signs of obvious infection at this time.  Blood cultures are pending she does not appear to be septic.  Well-nourished well-hydrated.  Patient seen and evaluated attending physician Dr. Tucker   Patient amenable to plan amenable to admission  PROCEDURES  Unless otherwise noted below, none      CONSULTS:   None      ED Course as of 08/12/24 0215   Sun Aug 11, 2024   2251 ECG 12 lead  ECG performed on August 11, 2024 at 2228 and interpreted by me at 2230 showing a normal sinus rhythm, no axis deviation, intervals within normal limits, no STEMI.  No changes from previous ECG from August 3, 2024. [EG]   2330 Dicussed case with Dr. Lawrence patient's primary care team has agreed to admit the patient under his service for further evaluation and treatment observation [TB]      ED Course User Index  [EG] Melida Guthrie MD  [TB] MADYSON Vega-CNP         Diagnoses as of 08/12/24 0215   Generalized abdominal pain   Other ascites   History of cirrhosis   Pulmonary nodule   Hyperglycemia   Transaminasemia   Anemia, unspecified type         This patient has remained hemodynamically stable during their ED course.      Critical Care: none        Counseling:  The emergency provider has spoken with the patient family and discussed today’s results, in addition to providing specific details for the plan of care and counseling regarding the diagnosis and prognosis.  Questions are answered at this time and they are  agreeable with the plan.         --------------------------------- IMPRESSION AND DISPOSITION ---------------------------------    IMPRESSION  1. Generalized abdominal pain    2. Other ascites    3. History of cirrhosis    4. Pulmonary nodule    5. Hyperglycemia    6. Transaminasemia    7. Anemia, unspecified type        DISPOSITION  Disposition: Admit regular nursing floor  Patient condition is stable        NOTE: This report was transcribed using voice recognition software. Every effort was made to ensure accuracy; however, inadvertent computerized transcription errors may be present      Keli Jacome, MADYSON-RALPH  08/12/24 021

## 2024-08-12 NOTE — CONSULTS
"Consults    Reason For Consult  Ascites     History Of Present Illness  Alfonzo Flanagan is a 49 y.o. female presenting with abdominal distention and leg edema. She is very well-known by our group related to chronic abdominal pain and cirrhosis. Hx PBC. Follows with Dr Zaman. CT a/p does show significant abdominal ascites. She mentions needed \"outpatient but insurance wouldn't cover\" She denies any bloody or black stools. HH consistent with baseline      Past Medical History  She has a past medical history of Cirrhosis of liver (Multi) and Diabetes mellitus (Multi).    Surgical History  She has a past surgical history that includes CT guided imaging for needle placement (10/14/2020); US guided abdominal paracentesis (10/08/2020); and Abdominal surgery.     Social History  She reports that she has never smoked. She has never used smokeless tobacco. She reports that she does not drink alcohol and does not use drugs.    Family History  No family history on file.     Allergies  Gluten    Review of Systems   Constitutional:  Negative for chills and fever.   Cardiovascular:  Positive for leg swelling.   Gastrointestinal:  Positive for abdominal distention and abdominal pain. Negative for blood in stool, constipation, diarrhea, nausea and vomiting.   Neurological:  Negative for weakness.        Physical Exam  Constitutional:       Appearance: Normal appearance.   HENT:      Head: Normocephalic and atraumatic.      Mouth/Throat:      Mouth: Mucous membranes are moist.   Pulmonary:      Effort: Pulmonary effort is normal.   Abdominal:      General: There is distension.      Palpations: Abdomen is soft.      Tenderness: There is abdominal tenderness. There is no guarding.   Musculoskeletal:         General: Normal range of motion.      Cervical back: Normal range of motion.      Right lower leg: Edema present.      Left lower leg: Edema present.   Skin:     General: Skin is warm and dry.   Neurological:      General: No " "focal deficit present.      Mental Status: She is alert. Mental status is at baseline.   Psychiatric:         Mood and Affect: Mood normal.          Last Recorded Vitals  Blood pressure 114/68, pulse 97, temperature 36.8 °C (98.3 °F), temperature source Oral, resp. rate 20, height 1.6 m (5' 3\"), weight 83.5 kg (184 lb), SpO2 100%.    Relevant Results  Results for orders placed or performed during the hospital encounter of 08/11/24 (from the past 24 hour(s))   CBC and Auto Differential   Result Value Ref Range    WBC 3.7 (L) 4.4 - 11.3 x10*3/uL    nRBC 0.0 0.0 - 0.0 /100 WBCs    RBC 3.15 (L) 4.00 - 5.20 x10*6/uL    Hemoglobin 7.8 (L) 12.0 - 16.0 g/dL    Hematocrit 24.9 (L) 36.0 - 46.0 %    MCV 79 (L) 80 - 100 fL    MCH 24.8 (L) 26.0 - 34.0 pg    MCHC 31.3 (L) 32.0 - 36.0 g/dL    RDW 19.9 (H) 11.5 - 14.5 %    Platelets 90 (L) 150 - 450 x10*3/uL    Neutrophils % 51.5 40.0 - 80.0 %    Immature Granulocytes %, Automated 0.3 0.0 - 0.9 %    Lymphocytes % 33.5 13.0 - 44.0 %    Monocytes % 10.2 2.0 - 10.0 %    Eosinophils % 4.0 0.0 - 6.0 %    Basophils % 0.5 0.0 - 2.0 %    Neutrophils Absolute 1.92 1.20 - 7.70 x10*3/uL    Immature Granulocytes Absolute, Automated 0.01 0.00 - 0.70 x10*3/uL    Lymphocytes Absolute 1.25 1.20 - 4.80 x10*3/uL    Monocytes Absolute 0.38 0.10 - 1.00 x10*3/uL    Eosinophils Absolute 0.15 0.00 - 0.70 x10*3/uL    Basophils Absolute 0.02 0.00 - 0.10 x10*3/uL   Comprehensive metabolic panel   Result Value Ref Range    Glucose 434 (H) 65 - 99 mg/dL    Sodium 134 133 - 145 mmol/L    Potassium 4.0 3.4 - 5.1 mmol/L    Chloride 101 97 - 107 mmol/L    Bicarbonate 25 24 - 31 mmol/L    Urea Nitrogen 7 (L) 8 - 25 mg/dL    Creatinine 0.60 0.40 - 1.60 mg/dL    eGFR >90 >60 mL/min/1.73m*2    Calcium 7.9 (L) 8.5 - 10.4 mg/dL    Albumin 2.4 (L) 3.5 - 5.0 g/dL    Alkaline Phosphatase 350 (H) 35 - 125 U/L    Total Protein 6.8 5.9 - 7.9 g/dL    AST 53 (H) 5 - 40 U/L    Bilirubin, Total 1.3 (H) 0.1 - 1.2 mg/dL    ALT 24 " 5 - 40 U/L    Anion Gap 8 <=19 mmol/L   NT Pro-BNP   Result Value Ref Range    PROBNP 38 0 - 192 pg/mL   Protime-INR   Result Value Ref Range    Protime 13.2 (H) 9.3 - 12.7 seconds    INR 1.3 (H) 0.9 - 1.2   Lipase   Result Value Ref Range    Lipase 59 16 - 63 U/L   Acute Toxicology Panel, Blood   Result Value Ref Range    Acetaminophen <5.0 15.0 - 30.0 ug/mL    Salicylate  <0 3 - 25 mg/dL    Alcohol <0.010 0.000 - 0.010 g/dL   Ammonia   Result Value Ref Range    Ammonia 37 12 - 45 umol/L   Serial Troponin, Initial ([facility])   Result Value Ref Range    Troponin T, High Sensitivity <6 <=14 ng/L   BLOOD GAS LACTIC ACID, VENOUS   Result Value Ref Range    POCT Lactate, Venous 1.4 0.4 - 2.0 mmol/L   hCG, Urine, Qualitative   Result Value Ref Range    HCG, Urine NEGATIVE NEGATIVE   Drug Screen, Urine   Result Value Ref Range    Amphetamine Screen, Urine Negative      Barbiturate Screen, Urine Negative      Benzodiazepines Screen, Urine Negative      Cannabinoid Screen, Urine Negative      Cocaine Metabolite Screen, Urine Negative      Fentanyl Screen, Urine Negative       Methadone Screen, Urine Negative      Opiate Screen, Urine Negative      Oxycodone Screen, Urine Negative      PCP Screen, Urine Negative     Urinalysis with Reflex Culture and Microscopic   Result Value Ref Range    Color, Urine Light-Yellow Light-Yellow, Yellow, Dark-Yellow    Appearance, Urine Clear Clear    Specific Gravity, Urine 1.041 (N) 1.005 - 1.035    pH, Urine 5.5 5.0, 5.5, 6.0, 6.5, 7.0, 7.5, 8.0    Protein, Urine NEGATIVE NEGATIVE, 10 (TRACE), 20 (TRACE) mg/dL    Glucose, Urine OVER (4+) (A) Normal mg/dL    Blood, Urine NEGATIVE NEGATIVE    Ketones, Urine NEGATIVE NEGATIVE mg/dL    Bilirubin, Urine NEGATIVE NEGATIVE    Urobilinogen, Urine Normal Normal mg/dL    Nitrite, Urine NEGATIVE NEGATIVE    Leukocyte Esterase, Urine 25 Mckenzie/µL (A) NEGATIVE   Extra Urine Gray Tube   Result Value Ref Range    Extra Tube Hold for add-ons.    Microscopic  Only, Urine   Result Value Ref Range    WBC, Urine 1-5 1-5, NONE /HPF    RBC, Urine NONE NONE, 1-2, 3-5 /HPF   Serial Troponin, 2 Hour ([facility])   Result Value Ref Range    Troponin T, High Sensitivity <6 <=14 ng/L   Serial Troponin, 6 Hour ([facility])   Result Value Ref Range    Troponin T, High Sensitivity <6 <=14 ng/L   Comprehensive metabolic panel   Result Value Ref Range    Glucose 344 (H) 65 - 99 mg/dL    Sodium 134 133 - 145 mmol/L    Potassium 3.7 3.4 - 5.1 mmol/L    Chloride 102 97 - 107 mmol/L    Bicarbonate 26 24 - 31 mmol/L    Urea Nitrogen 7 (L) 8 - 25 mg/dL    Creatinine 0.60 0.40 - 1.60 mg/dL    eGFR >90 >60 mL/min/1.73m*2    Calcium 7.5 (L) 8.5 - 10.4 mg/dL    Albumin 2.3 (L) 3.5 - 5.0 g/dL    Alkaline Phosphatase 424 (H) 35 - 125 U/L    Total Protein 6.7 5.9 - 7.9 g/dL    AST 43 (H) 5 - 40 U/L    Bilirubin, Total 1.1 0.1 - 1.2 mg/dL    ALT 23 5 - 40 U/L    Anion Gap 6 <=19 mmol/L   CBC   Result Value Ref Range    WBC 3.3 (L) 4.4 - 11.3 x10*3/uL    nRBC 0.0 0.0 - 0.0 /100 WBCs    RBC 3.58 (L) 4.00 - 5.20 x10*6/uL    Hemoglobin 8.7 (L) 12.0 - 16.0 g/dL    Hematocrit 27.9 (L) 36.0 - 46.0 %    MCV 78 (L) 80 - 100 fL    MCH 24.3 (L) 26.0 - 34.0 pg    MCHC 31.2 (L) 32.0 - 36.0 g/dL    RDW 20.1 (H) 11.5 - 14.5 %    Platelets 86 (L) 150 - 450 x10*3/uL     CT abdomen pelvis w IV contrast    Result Date: 8/11/2024  Interpreted By:  Alfredo Guerin, STUDY: CT ABDOMEN PELVIS W IV CONTRAST;  8/11/2024 9:34 pm   INDICATION: Signs/Symptoms:worsening pain.   COMPARISON: 08/03/2024 07/10/2024   ACCESSION NUMBER(S): [id number]   ORDERING CLINICIAN: ABI DUMAS   TECHNIQUE: CT of the abdomen and pelvis was performed.  Standard contiguous axial images were obtained at 3 mm slice thickness through the abdomen and pelvis. Coronal and sagittal reconstructions at 3 mm slice thickness were performed.   75 ml of contrast Omnipaque 350 were administered intravenously without immediate complication.   FINDINGS: LOWER CHEST: 6 mm  right middle lobe nodule (series 7, image 11).   ABDOMEN:   LIVER: Cirrhotic liver morphology. No focal lesion.   BILE DUCTS: The intrahepatic and extrahepatic ducts are not dilated.   GALLBLADDER: The gallbladder is surgically absent.   PANCREAS: The pancreas appears unremarkable.   SPLEEN: The spleen is normal in size without focal lesions.   ADRENAL GLANDS: Bilateral adrenal glands appear normal.   KIDNEYS AND URETERS: The kidneys are normal in size and enhance symmetrically.  No hydroureteronephrosis or nephroureterolithiasis is identified.   PELVIS:   BLADDER: The urinary bladder appears normal without abnormal wall thickening.   REPRODUCTIVE ORGANS: No pelvic masses.   BOWEL: The stomach is unremarkable.   No bowel dilation. Diffuse wall thickening, likely secondary to presence of ascites. Moderate colonic stool. The appendix is not definitely visualized. There is however no pericecal stranding or fluid.   VESSELS: There is no aneurysmal dilatation of the abdominal aorta. The IVC appears normal.   PERITONEUM/RETROPERITONEUM/LYMPH NODES: Large abdominopelvic ascites and diffuse mesenteric soft tissue stranding, stable since prior. No abdominopelvic lymphadenopathy is present.   BONES AND ABDOMINAL WALL: No suspicious osseous lesions are identified. Moderate size fluid containing umbilical hernia. Diffuse subcutaneous soft tissue stranding.       1.  Cirrhotic liver morphology without focal lesion. 2. Stable large abdominopelvic ascites. 3. 6 mm right middle lobe nodule. 4. Other findings as described above.     Signed by: Alfredo Guerin 8/11/2024 10:30 PM Dictation workstation:   [id number]    XR chest 2 views    Result Date: 8/11/2024  Interpreted By:  Savannah Zaman, STUDY: Chest, 2 views.   INDICATION: Signs/Symptoms:swelling.   COMPARISON: 07/04/2024.   ACCESSION NUMBER(S): [id number]   ORDERING CLINICIAN: ABI DUMAS   FINDINGS: The cardiomediastinal silhouette size is within normal limits.    There is no focal consolidation, edema or pneumothorax. No sizeable pleural effusion.       1. No acute cardiopulmonary process.   MACRO: None.   Signed by: Savannah Zaman 8/11/2024 8:59 PM Dictation workstation:   [id number]    US CHEST (POC) ED USE ONLY  ([place], NO AC)    Result Date: 8/9/2024  Marcus Gautam DO     8/10/2024 11:52 PM Limited Ultrasound of Lung for Dyspnea US CHEST (POC) ED USE ONLY  ([place], NO AC) Date/Time: 8/9/2024 1:49 AM Performed by: Marcus Gautam DO Authorized by: Marcus Gautam DO   Indication: Patient has chest pain and/or shortness of breath. Procedure in detail: The curvilinear probe was used to examine the Bilateral Chest. Lobar Consolidation: N/A. Pleural Effusion was found to be absent. Thickened/Irregular Pleura: N/A B-lines were not performed. Air Bronchograms were not performed. Yes still images or video images were saved for this exam. Conclusion: Based on these images, this study for infection/dyspnea is found to be negative. This limited imaging study was performed by: Attending only. Ascites noted.    ECG 12 lead    Result Date: 8/7/2024  Normal sinus rhythm Prolonged QT Abnormal ECG When compared with ECG of 20-JUL-2024 07:01, No significant change was found Confirmed by Zev Landry ([id number]) on 8/7/2024 1:38:34 PM    CT abdomen pelvis w IV contrast    Result Date: 8/3/2024  STUDY: CT Abdomen and Pelvis with IV Contrast; 8/3/2024 at 6:32 AM. INDICATION: Abdominal distention, pain, evaluate for worsening cirrhosis and ascites. COMPARISON: US abdomen 7/20/2024; CT AP 7/10/2024 (imaging only), 7/4/2024 (imaging only). ACCESSION NUMBER(S): [id number] ORDERING CLINICIAN: DELONTE HO TECHNIQUE: CT of the abdomen and pelvis was performed.  Contiguous axial images were obtained at 3 mm slice thickness through the abdomen and pelvis. Coronal and sagittal reconstructions at 3 mm slice thickness were performed.  Omnipaque 350 75 mL was administered intravenously.  FINDINGS: LOWER  CHEST: Cardiac size is normal.  No pericardial effusion.  Lung bases are clear.  ABDOMEN:  LIVER: Liver demonstrates an advanced cirrhotic morphology with a markedly nodular hepatic contour.   BILE DUCTS: No intrahepatic or extrahepatic biliary ductal dilatation.  GALLBLADDER: The patient is status post cholecystectomy. STOMACH: Moderate amount of upper abdominal ascites is noted with generalized mesenteric edema throughout the central mesentery.   PANCREAS: No masses or ductal dilatation.  SPLEEN: Spleen is mildly enlarged.   ADRENAL GLANDS: No thickening or nodules.  KIDNEYS AND URETERS: Kidneys are normal in size and location.  No renal or ureteral calculi.  PELVIS:  BLADDER: No abnormalities identified.  REPRODUCTIVE ORGANS: No acute uterine or adnexal pathology is identified.  BOWEL: Moderate concentric wall thickening of multiple loops of small bowel is noted in the upper abdomen, predominantly jejunum.  Fecal-like material is seen in loops of nondilated small bowel.  Appendix is not definitively identified.  Terminal ileum is unremarkable.  VESSELS: Mild calcified plaque is noted in the abdominal aorta.  Abdominal aorta is normal in caliber.  PERITONEUM/RETROPERITONEUM/LYMPH NODES: There is a large volume of pelvic ascites.  No pneumoperitoneum. No lymphadenopathy.  ABDOMINAL WALL: Mild generalized mesenteric edema is noted in the abdomen and pelvic wall. SOFT TISSUES: There is a fat and fluid containing ventral supraumbilical abdominal wall hernia with the mouth of the hernia measuring approximately 1.8 cm in diameter and the hernia sac measuring up to 5 cm in diameter.  BONES: No acute fracture or aggressive osseous lesion.    Moderate wall thickening of multiple loops of small bowel throughout the abdomen and pelvis which may indicate portal hypertensive enteropathy or an infectious or inflammatory enterocolitis.  No definite bowel obstruction identified. Advanced cirrhotic morphology of the liver with  mild splenomegaly and moderate abdominal and large amount of pelvic ascites. Mild generalized mesenteric edema as well as generalized soft tissue edema in the abdominal and pelvic wall which may indicate anasarca. Fat and fluid containing ventral supraumbilical midline abdominal wall hernia. Signed by Klever Holden    US abdomen complete    Result Date: 7/30/2024  * * *Final Report* * * DATE OF EXAM: Jul 30 2024  3:05PM   EUU   1016  -  US ASCITES SURVEY  / ACCESSION #  [id number] PROCEDURE REASON: Ascites      * * * * Physician Interpretation * * * * RESULT: INDICATION: Ascites EXAMINATION: US ASCITES SURVEY Technique: Ultrasound images of the 4 quadrants of the abdomen were performed to check for ascites.  Images were obtained and stored in a permanent archive. RESULT: Minimal ascites    IMPRESSION: Minimal ascites Transcribed Using Voice Recognition Transcribe Date/Time: Jul 30 2024  3:55P Dictated by: HANH AUSTIN MD This examination was interpreted and the report reviewed and electronically signed by: HANH AUSTIN MD on Jul 30 2024  3:56PM  EST    XR abdomen 2 views supine and erect or decub    Result Date: 7/29/2024  * * *Final Report* * * DATE OF EXAM: Jul 29 2024 12:27PM   EUX   [id number]  -  XR ABDOMEN 1V SUPINE  / ACCESSION #  [id number] PROCEDURE REASON: Constipation      * * * * Physician Interpretation * * * * RESULT: HISTORY:  Constipation TECHNIQUE:  Supine KUB COMPARISON:  3/21/2024 RESULT: Gas is seen in scattered normal caliber bowel loops with a nonobstructive bowel gas pattern. Surgical clips are in the right upper quadrant. Lung bases appear clear.    IMPRESSION: NONOBSTRUCTIVE BOWEL GAS PATTERN Transcribed Using Voice Recognition Transcribe Date/Time: Jul 29 2024  2:06P Dictated by: CECILIO APPIAH MD This examination was interpreted and the report reviewed and electronically signed by: CECILIO APPIAH MD on Jul 29 2024  2:07PM  EST    MR 3D post processing w report 2nd workstation    Result Date:  7/27/2024  * * *Final Report* * * DATE OF EXAM: Jul 26 2024  5:06PM   REFUGIO   0280  -  MRI 3D POST PROCESSING  / ACCESSION #  [id number] PROCEDURE REASON: Pancreatic cyst      * * * * Physician Interpretation * * * * RESULT: MRI ABDOMEN WITHOUT AND WITH IV CONTRAST , 3D REFORMATTED IMAGES CLINICAL HISTORY: Primary biliary cirrhosis, concern for pancreatic tail mass on endoscopic ultrasound TECHNIQUE: Magnet:  1.5T scanner. Multiplanar MRI of the abdomen with multiple sequences, including both pre- and post-contrast imaging.  Additional MR cholangiopancreatography sequences were performed. Image post-processing 1 images were created at an off-line workstation by the interpreting physician or with concurrent physician supervision, with images created, reviewed and archived. Contrast: Intravenous:  17 ml of Dotarem COMPARISON: CT 04/04/2024 and MRI 08/02/2023 RESULT: Liver: Cirrhotic liver morphology.  No hepatic steatosis.  No mass. Biliary: No intrahepatic or extrahepatic bile duct dilation.  No biliary filling defect.  Gallbladder is absent. Spleen: No mass. No splenomegaly. Pancreas: No mass or duct dilation. Adrenals: No mass. Kidneys:  No solid or cystic mass.  No hydronephrosis. GI: Diffuse bowel wall thickening without dilation, likely due to volume status/third spacing.  No bowel dilation. Lymph nodes: No abdominal lymphadenopathy. Mesentery / Peritoneum / Retroperitoneum: Small volume ascites. Vasculature:  The celiac axis and SMA are patent. The portal vein and branches, splenic vein, SMV, and hepatic veins are patent.  There is a large recannulized paraumbilical vein indicating portal hypertension. Small portosystemic collaterals are present as well. Bones/Soft Tissues: Body wall edema. Lower chest: Unremarkable.    IMPRESSION: No pancreatic lesion identified. Cirrhosis and sequela of portal hypertension.  No hepatic lesion. Transcribed Using Voice Recognition Transcribe Date/Time: Jul 27 2024 11:10A Dictated  by: LAMAR LIRIANO MD This examination was interpreted and the report reviewed and electronically signed by: LAMAR LIRIANO MD on Jul 27 2024 11:28AM  EST    MRI PANC/GUS WO/W IVCON    Result Date: 7/27/2024  * * *Final Report* * * DATE OF EXAM: Jul 26 2024  5:06PM   SILVESTRE   0730  -  MRI PANC/GUS WO/W IVCON  / ACCESSION #  [id number] PROCEDURE REASON: Abn liver function tests (LFTs)      * * * * Physician Interpretation * * * * RESULT: MRI ABDOMEN WITHOUT AND WITH IV CONTRAST , 3D REFORMATTED IMAGES CLINICAL HISTORY: Primary biliary cirrhosis, concern for pancreatic tail mass on endoscopic ultrasound TECHNIQUE: Magnet:  1.5T scanner. Multiplanar MRI of the abdomen with multiple sequences, including both pre- and post-contrast imaging.  Additional MR cholangiopancreatography sequences were performed. Image post-processing 1 images were created at an off-line workstation by the interpreting physician or with concurrent physician supervision, with images created, reviewed and archived. Contrast: Intravenous:  17 ml of Dotarem COMPARISON: CT 04/04/2024 and MRI 08/02/2023 RESULT: Liver: Cirrhotic liver morphology.  No hepatic steatosis.  No mass. Biliary: No intrahepatic or extrahepatic bile duct dilation.  No biliary filling defect.  Gallbladder is absent. Spleen: No mass. No splenomegaly. Pancreas: No mass or duct dilation. Adrenals: No mass. Kidneys:  No solid or cystic mass.  No hydronephrosis. GI: Diffuse bowel wall thickening without dilation, likely due to volume status/third spacing.  No bowel dilation. Lymph nodes: No abdominal lymphadenopathy. Mesentery / Peritoneum / Retroperitoneum: Small volume ascites. Vasculature:  The celiac axis and SMA are patent. The portal vein and branches, splenic vein, SMV, and hepatic veins are patent.  There is a large recannulized paraumbilical vein indicating portal hypertension. Small portosystemic collaterals are present as well. Bones/Soft Tissues: Body wall edema. Lower chest:  Unremarkable.    IMPRESSION: No pancreatic lesion identified. Cirrhosis and sequela of portal hypertension.  No hepatic lesion. Transcribed Using Voice Recognition Transcribe Date/Time: Jul 27 2024 11:10A Dictated by: LAMAR LIRIANO MD This examination was interpreted and the report reviewed and electronically signed by: LAMAR LIRIANO MD on Jul 27 2024 11:28AM  EST    US abdomen complete    Result Date: 7/25/2024  * * *Final Report* * * DATE OF EXAM: Jul 24 2024  9:32PM   EUU   1016  -  US ASCITES SURVEY  / ACCESSION #  [id number] PROCEDURE REASON: Ascites      * * * * Physician Interpretation * * * * RESULT: ULTRASOUND ASCITES SURVEY: 07/24/2024 CLINICAL DATA: Ascites FINDINGS: Four-quadrant ultrasound evaluation of the abdomen identifies no significant fluid in the left lower quadrant, right lower quadrant or left upper quadrant.  There is minimal free fluid in the right upper quadrant.    IMPRESSION: MINIMAL ASCITES AND A SINGLE QUADRANT.  NO SIGNIFICANT ASCITES ELSEWHERE.. Transcribed Using Voice Recognition Transcribe Date/Time: Jul 25 2024 10:31A Dictated by: FEI HAMMOND MD This examination was interpreted and the report reviewed and electronically signed by: FEI HAMMOND MD on Jul 25 2024 10:33AM  EST    XR chest 1 view    Result Date: 7/24/2024  * * *Final Report* * * DATE OF EXAM: Jul 23 2024 10:47PM   EUX   [id number]  -  XR CHEST 1V FRONTAL PORT  / ACCESSION #  [id number] PROCEDURE REASON: Shortness of breath      * * * * Physician Interpretation * * * * RESULT: EXAMINATION:  CHEST RADIOGRAPH (PORTABLE SINGLE VIEW AP) Exam Date/Time:  7/23/2024 10:47 PM CLINICAL HISTORY: Shortness of breath MQ:  XCPR_5 Comparison:  Chest x-ray 05/20/2024 RESULT: Lines, tubes, and devices:  None. Lungs and pleura:  Lungs are clear.  No pleural effusion or pneumothorax. Cardiomediastinal silhouette:  Stable cardiomediastinal silhouette. Other:  No acute bony abnormality.    IMPRESSION: No acute radiographic abnormality.  Transcribed Using Voice Recognition Transcribe Date/Time: Jul 23 2024 11:56P Dictated by: VENKAT CARPENTER MD This examination was interpreted and the report reviewed and electronically signed by: VENKAT CARPENTER MD on Jul 23 2024 11:58PM  EST    ECG 12 Lead    Result Date: 7/22/2024  Normal sinus rhythm Normal ECG When compared with ECG of 04-JUL-2024 21:51, Nonspecific T wave abnormality no longer evident in Anterolateral leads Confirmed by Amadeo Henson ([id number]) on 7/22/2024 10:24:38 AM    US abdomen limited    Result Date: 7/20/2024  Interpreted By:  Bing Guajardo, STUDY: US ABDOMEN LIMITED; 8:57 am   INDICATION: Signs/Symptoms:eval for biliary ductal dilation, ascites, or pancreatitis.   COMPARISON: None.   ACCESSION NUMBER(S): [id number]   ORDERING CLINICIAN: FANI HATFIELD   TECHNIQUE: Limited abdominal ultrasound of the right upper quadrant was performed utilizing gray scale imaging.   FINDINGS: Liver: Liver has a nodular surface with perihepatic ascites. Liver measures 13.3 cm craniocaudal. No focal hepatic lesion is seen. No intrahepatic biliary distention. Gallbladder: Gallbladder has been removed.   Pancreas: Pancreas is predominantly obscured by bowel gas. CBD: 1.8 mm Right kidney shows no hydronephrosis. There is a small volume of ascites in all 4 quadrants of the abdomen       1. Small volume of ascites in all 4 quadrants of the abdomen 2. Liver has a nodular surface probably due to cirrhosis. 3. Pancreas is obscured by bowel gas 4. Gallbladder has been removed     MACRO: None.   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  -HE: Monitor mental status, A/O x 3, Continue lactulose 20 g daily with a goal of 2-3 soft stools a day and rifaximin 550 mg BID.   -EV: monitor for varices, last EGD on 12/27/23 by Dr. Zaman at [facility] Normal oropharynx. Esophageal ulcer, no bleeding and no  "stigmata of recent bleeding.  -Ascites: large ascites on imaging    -Paracentesis with fluid analysis today    -IV Albumin    -Low Na diet    -Lasix 40mg. Will increase Aldactone 100mg before discharge   -Note, she is supposed to have outpatient paracentesis \"had trouble with the insurance\"      Epigastric abdominal pain/Nausea, vomiting, constipation  -Patient has multiple reported admissions this year with similar complaints of abdominal pain that typically resolves with a paracentesis/conservative measures. MANY CT scans. She has history of acute idiopathic pancreatitis. EGD with EUS 3/26/2024. Findings were c/f NET however tissue sample was not obtained. At that time, it was recommended to follow up mass in 1 year with EUS. MRI/MRCP on 7/26/2024: No pancreatic lesion identified. Cirrhosis and sequela of portal hypertension. No hepatic lesion.     Acute on chronic anemia (8.7)    -No overt bleeding. Last EGD in [facility]. No indication for urgent endoscopy at this time     I spent 30 minutes in the professional and overall care of this patient.          "

## 2024-08-12 NOTE — PROGRESS NOTES
Physical Therapy    Physical Therapy Evaluation    Patient Name: Alfonzo Flanagan  MRN: [id number]  Today's Date: 8/12/2024   Time Calculation  Start Time: 0805  Stop Time: 0820  Time Calculation (min): 15 min    Assessment/Plan   PT Assessment  PT Assessment Results: Impaired balance  Rehab Prognosis: Good  Barriers to Discharge: medical status  Evaluation/Treatment Tolerance: Patient tolerated treatment well  Medical Staff Made Aware: Yes  Strengths: Ability to acquire knowledge  Barriers to Participation: Comorbidities  End of Session Communication: Bedside nurse  Assessment Comment: The patient is a 49 y.o. female admitted to the hospital with ascites. The patient currently completes transfers and ambulation with no AD with distant supervision to mod indep level. The patient appears to be functioning near baseline mobility and does not require acute PT services.  End of Session Patient Position: Bed, 1 rail up, Alarm off, not on at start of session (EOB on ED cart)  IP OR SWING BED PT PLAN  Inpatient or Swing Bed: Inpatient  PT Plan  PT Plan: PT Eval only  PT Eval Only Reason: At baseline function  PT Frequency: PT eval only  PT Discharge Recommendations: No further acute PT  PT Recommended Transfer Status: Stand by assist  PT - OK to Discharge: Yes      Subjective   General Visit Information:  General  Reason for Referral: mobility impairment due to abdominal pain  Referred By: Luis Alfredo Lawrence MD  Past Medical History Relevant to Rehab: ascites, HTN, transaminitis, HLD, DM  Co-Treatment: OT  Co-Treatment Reason: optimize pt outcomes  Prior to Session Communication: Bedside nurse  Patient Position Received: Bed, 2 rail up, Alarm off, not on at start of session (ED cart)  Preferred Learning Style: verbal, visual  General Comment: The patient is a 49 y.o. female admitted to the hospital for abdominal pain. Pt recently DC'd from [facility] approx. 6 days ago s/p paracentesis with 4L of fluid removed.  Home  Living:  Home Living  Type of Home: Apartment  Lives With: Spouse, Dependent children  Home Adaptive Equipment: None  Home Layout: One level  Home Access: Stairs to enter with rails  Entrance Stairs-Rails:  (unilateral)  Entrance Stairs-Number of Steps: 11  Bathroom Shower/Tub: Tub/shower unit  Bathroom Toilet: Standard  Bathroom Equipment: None  Prior Level of Function:  Prior Function Per Pt/Caregiver Report  Level of [place]: Independent with ADLs and functional transfers, Needs assistance with homemaking  Receives Help From: Family  ADL Assistance: Independent  Homemaking Assistance: Needs assistance (family assists as needed)  Ambulatory Assistance: Independent  Hand Dominance: Left  Precautions:  Precautions  Medical Precautions: Fall precautions  Precautions Comment: mild language barrier  Vital Signs:  Vital Signs  Heart Rate: 97  Heart Rate Source: Monitor  Pulse Ox: 100 % (94% post-mobility on room air)  BP: 114/68  MAP (mmHg): 83    Objective   Pain:  Pain Assessment  Pain Assessment: 0-10  0-10 (Numeric) Pain Score: 7  Pain Type: Acute pain  Pain Location: Abdomen  Pain Interventions: Repositioned, Ambulation/increased activity  Response to Interventions: nsg aware  Cognition:  Cognition  Overall Cognitive Status: Within Functional Limits  Orientation Level: Oriented X4  Following Commands: Follows all commands and directions without difficulty    General Assessments:  General Observation  General Observation: pleasant; agreeable to mobility; edema to BLE    Activity Tolerance  Endurance: Tolerates 10 - 20 min exercise with multiple rests  Rate of Perceived Exertion (RPE): 6/10    Sensation  Sensation Comment: neuropathy BLE    Strength  Strength Comments: BLE grossly 3+/5  Coordination  Coordination Comment: decreased rate of movements    Postural Control  Posture Comment: mild forward head posture    Static Sitting Balance  Static Sitting-Balance Support: Feet supported  Static Sitting-Level of  Assistance: Modified independent  Static Sitting-Comment/Number of Minutes: EOB on ED cart    Static Standing Balance  Static Standing-Balance Support: No upper extremity supported  Static Standing-Level of Assistance: Distant supervision  Static Standing-Comment/Number of Minutes: Narrow SLADE  Dynamic Standing Balance  Dynamic Standing-Balance Support: No upper extremity supported  Dynamic Standing-Comments: Supervision for ambulation; very mildly unsteady gait.  Functional Assessments:  Bed Mobility  Bed Mobility: Yes  Bed Mobility 1  Bed Mobility 1: Supine to sitting  Level of Assistance 1: Modified independent  Bed Mobility Comments 1: HOB to 25 deg; good completion    Transfers  Transfer: Yes  Transfer 1  Transfer From 1: Bed to, Stand to  Transfer to 1: Stand, Bed  Technique 1: Sit to stand, Stand to sit  Transfer Level of Assistance 1: Modified independent  Trials/Comments 1: intermittent VCs for safe sequencing    Ambulation/Gait Training  Ambulation/Gait Training Performed: Yes  Ambulation/Gait Training 1  Surface 1: Level tile  Device 1: No device  Assistance 1: Distant supervision  Quality of Gait 1: Narrow base of support  Comments/Distance (ft) 1: 50ftx2; decreased prateek with narrow SLADE. 1 minor LOB with pt able to self-correct.  Extremity/Trunk Assessments:  RLE   RLE :  (grossly >3+/5)  LLE   LLE :  (grossly >3+/5)  Outcome Measures:  [facility] Basic Mobility  Turning from your back to your side while in a flat bed without using bedrails: None  Moving from lying on your back to sitting on the side of a flat bed without using bedrails: None  Moving to and from bed to chair (including a wheelchair): None  Standing up from a chair using your arms (e.g. wheelchair or bedside chair): None  To walk in hospital room: A little  Climbing 3-5 steps with railing: A little  Basic Mobility - Total Score: 22        Education Documentation  Precautions, taught by Dana Salinas PT at 8/12/2024  8:46 AM.  Learner:  Patient  Readiness: Acceptance  Method: Explanation  Response: Verbalizes Understanding  Comment: Education provided on purpose of acute therapy eval during hospital stay; education provided on importance of continued mobility and remaining OOB to tolerance to assist with maintaining endurance and strength.    Education Comments  No comments found.

## 2024-08-12 NOTE — PROGRESS NOTES
08/12/24 1254   Discharge Planning   Expected Discharge Disposition Home     PT OT evals completed, no skilled needs identified.      Please consult SW/TCC should discharge needs arise.

## 2024-08-13 LAB
ALBUMIN SERPL-MCNC: 2.1 G/DL (ref 3.5–5)
ALP BLD-CCNC: 328 U/L (ref 35–125)
ALT SERPL-CCNC: 21 U/L (ref 5–40)
ANION GAP SERPL CALC-SCNC: 5 MMOL/L
AST SERPL-CCNC: 40 U/L (ref 5–40)
BACTERIA UR CULT: NO GROWTH
BILIRUB SERPL-MCNC: 1.1 MG/DL (ref 0.1–1.2)
BUN SERPL-MCNC: 6 MG/DL (ref 8–25)
CALCIUM SERPL-MCNC: 7.6 MG/DL (ref 8.5–10.4)
CHLORIDE SERPL-SCNC: 104 MMOL/L (ref 97–107)
CO2 SERPL-SCNC: 26 MMOL/L (ref 24–31)
CREAT SERPL-MCNC: 0.6 MG/DL (ref 0.4–1.6)
EGFRCR SERPLBLD CKD-EPI 2021: >90 ML/MIN/1.73M*2
ERYTHROCYTE [DISTWIDTH] IN BLOOD BY AUTOMATED COUNT: 19.9 % (ref 11.5–14.5)
GLUCOSE BLD MANUAL STRIP-MCNC: 241 MG/DL (ref 74–99)
GLUCOSE BLD MANUAL STRIP-MCNC: 253 MG/DL (ref 74–99)
GLUCOSE BLD MANUAL STRIP-MCNC: 264 MG/DL (ref 74–99)
GLUCOSE BLD MANUAL STRIP-MCNC: 275 MG/DL (ref 74–99)
GLUCOSE SERPL-MCNC: 268 MG/DL (ref 65–99)
HCT VFR BLD AUTO: 25.3 % (ref 36–46)
HEMOCCULT SP1 STL QL: NEGATIVE
HGB BLD-MCNC: 7.9 G/DL (ref 12–16)
HOLD SPECIMEN: NORMAL
MCH RBC QN AUTO: 24.2 PG (ref 26–34)
MCHC RBC AUTO-ENTMCNC: 31.2 G/DL (ref 32–36)
MCV RBC AUTO: 78 FL (ref 80–100)
NRBC BLD-RTO: 0 /100 WBCS (ref 0–0)
PLATELET # BLD AUTO: 85 X10*3/UL (ref 150–450)
POTASSIUM SERPL-SCNC: 4 MMOL/L (ref 3.4–5.1)
PROT SERPL-MCNC: 5.9 G/DL (ref 5.9–7.9)
RBC # BLD AUTO: 3.26 X10*6/UL (ref 4–5.2)
SODIUM SERPL-SCNC: 135 MMOL/L (ref 133–145)
WBC # BLD AUTO: 3.1 X10*3/UL (ref 4.4–11.3)

## 2024-08-13 PROCEDURE — 2500000004 HC RX 250 GENERAL PHARMACY W/ HCPCS (ALT 636 FOR OP/ED): Performed by: INTERNAL MEDICINE

## 2024-08-13 PROCEDURE — G0378 HOSPITAL OBSERVATION PER HR: HCPCS

## 2024-08-13 PROCEDURE — 82947 ASSAY GLUCOSE BLOOD QUANT: CPT

## 2024-08-13 PROCEDURE — 96375 TX/PRO/DX INJ NEW DRUG ADDON: CPT

## 2024-08-13 PROCEDURE — 85027 COMPLETE CBC AUTOMATED: CPT | Performed by: NURSE PRACTITIONER

## 2024-08-13 PROCEDURE — 2500000004 HC RX 250 GENERAL PHARMACY W/ HCPCS (ALT 636 FOR OP/ED): Performed by: NURSE PRACTITIONER

## 2024-08-13 PROCEDURE — 82270 OCCULT BLOOD FECES: CPT | Performed by: NURSE PRACTITIONER

## 2024-08-13 PROCEDURE — 84075 ASSAY ALKALINE PHOSPHATASE: CPT | Performed by: NURSE PRACTITIONER

## 2024-08-13 PROCEDURE — 2500000001 HC RX 250 WO HCPCS SELF ADMINISTERED DRUGS (ALT 637 FOR MEDICARE OP): Performed by: INTERNAL MEDICINE

## 2024-08-13 PROCEDURE — 36415 COLL VENOUS BLD VENIPUNCTURE: CPT | Performed by: NURSE PRACTITIONER

## 2024-08-13 PROCEDURE — 2500000002 HC RX 250 W HCPCS SELF ADMINISTERED DRUGS (ALT 637 FOR MEDICARE OP, ALT 636 FOR OP/ED): Performed by: INTERNAL MEDICINE

## 2024-08-13 PROCEDURE — 96376 TX/PRO/DX INJ SAME DRUG ADON: CPT

## 2024-08-13 RX ORDER — KETOROLAC TROMETHAMINE 30 MG/ML
15 INJECTION, SOLUTION INTRAMUSCULAR; INTRAVENOUS EVERY 6 HOURS PRN
Status: DISCONTINUED | OUTPATIENT
Start: 2024-08-13 | End: 2024-08-14 | Stop reason: HOSPADM

## 2024-08-13 SDOH — ECONOMIC STABILITY: INCOME INSECURITY: IN THE LAST 12 MONTHS, WAS THERE A TIME WHEN YOU WERE NOT ABLE TO PAY THE MORTGAGE OR RENT ON TIME?: NO

## 2024-08-13 SDOH — SOCIAL STABILITY: SOCIAL NETWORK: ARE YOU MARRIED, WIDOWED, DIVORCED, SEPARATED, NEVER MARRIED, OR LIVING WITH A PARTNER?: MARRIED

## 2024-08-13 SDOH — ECONOMIC STABILITY: HOUSING INSECURITY: AT ANY TIME IN THE PAST 12 MONTHS, WERE YOU HOMELESS OR LIVING IN A SHELTER (INCLUDING NOW)?: NO

## 2024-08-13 SDOH — SOCIAL STABILITY: SOCIAL NETWORK: HOW OFTEN DO YOU ATTEND CHURCH OR RELIGIOUS SERVICES?: MORE THAN 4 TIMES PER YEAR

## 2024-08-13 SDOH — SOCIAL STABILITY: SOCIAL NETWORK: HOW OFTEN DO YOU ATTENT MEETINGS OF THE CLUB OR ORGANIZATION YOU BELONG TO?: NEVER

## 2024-08-13 SDOH — SOCIAL STABILITY: SOCIAL INSECURITY: WITHIN THE LAST YEAR, HAVE YOU BEEN HUMILIATED OR EMOTIONALLY ABUSED IN OTHER WAYS BY YOUR PARTNER OR EX-PARTNER?: NO

## 2024-08-13 SDOH — ECONOMIC STABILITY: INCOME INSECURITY: IN THE PAST 12 MONTHS, HAS THE ELECTRIC, GAS, OIL, OR WATER COMPANY THREATENED TO SHUT OFF SERVICE IN YOUR HOME?: NO

## 2024-08-13 SDOH — HEALTH STABILITY: MENTAL HEALTH: HOW OFTEN DO YOU HAVE A DRINK CONTAINING ALCOHOL?: NEVER

## 2024-08-13 SDOH — ECONOMIC STABILITY: FOOD INSECURITY: WITHIN THE PAST 12 MONTHS, THE FOOD YOU BOUGHT JUST DIDN'T LAST AND YOU DIDN'T HAVE MONEY TO GET MORE.: NEVER TRUE

## 2024-08-13 SDOH — SOCIAL STABILITY: SOCIAL INSECURITY: WITHIN THE LAST YEAR, HAVE YOU BEEN AFRAID OF YOUR PARTNER OR EX-PARTNER?: NO

## 2024-08-13 SDOH — ECONOMIC STABILITY: INCOME INSECURITY: HOW HARD IS IT FOR YOU TO PAY FOR THE VERY BASICS LIKE FOOD, HOUSING, MEDICAL CARE, AND HEATING?: NOT HARD AT ALL

## 2024-08-13 SDOH — ECONOMIC STABILITY: FOOD INSECURITY: WITHIN THE PAST 12 MONTHS, YOU WORRIED THAT YOUR FOOD WOULD RUN OUT BEFORE YOU GOT MONEY TO BUY MORE.: NEVER TRUE

## 2024-08-13 SDOH — HEALTH STABILITY: PHYSICAL HEALTH: ON AVERAGE, HOW MANY DAYS PER WEEK DO YOU ENGAGE IN MODERATE TO STRENUOUS EXERCISE (LIKE A BRISK WALK)?: 0 DAYS

## 2024-08-13 SDOH — HEALTH STABILITY: PHYSICAL HEALTH: ON AVERAGE, HOW MANY MINUTES DO YOU ENGAGE IN EXERCISE AT THIS LEVEL?: 0 MIN

## 2024-08-13 SDOH — ECONOMIC STABILITY: HOUSING INSECURITY: IN THE PAST 12 MONTHS, HOW MANY TIMES HAVE YOU MOVED WHERE YOU WERE LIVING?: 0

## 2024-08-13 SDOH — HEALTH STABILITY: MENTAL HEALTH: HOW OFTEN DO YOU HAVE 6 OR MORE DRINKS ON ONE OCCASION?: NEVER

## 2024-08-13 SDOH — SOCIAL STABILITY: SOCIAL NETWORK: IN A TYPICAL WEEK, HOW MANY TIMES DO YOU TALK ON THE PHONE WITH FAMILY, FRIENDS, OR NEIGHBORS?: NEVER

## 2024-08-13 SDOH — HEALTH STABILITY: MENTAL HEALTH: HOW MANY STANDARD DRINKS CONTAINING ALCOHOL DO YOU HAVE ON A TYPICAL DAY?: PATIENT DOES NOT DRINK

## 2024-08-13 SDOH — SOCIAL STABILITY: SOCIAL NETWORK: HOW OFTEN DO YOU GET TOGETHER WITH FRIENDS OR RELATIVES?: MORE THAN THREE TIMES A WEEK

## 2024-08-13 ASSESSMENT — PAIN - FUNCTIONAL ASSESSMENT
PAIN_FUNCTIONAL_ASSESSMENT: 0-10

## 2024-08-13 ASSESSMENT — COGNITIVE AND FUNCTIONAL STATUS - GENERAL
DAILY ACTIVITIY SCORE: 24
DAILY ACTIVITIY SCORE: 24
MOBILITY SCORE: 24
MOBILITY SCORE: 24

## 2024-08-13 ASSESSMENT — PAIN SCALES - GENERAL
PAINLEVEL_OUTOF10: 7
PAINLEVEL_OUTOF10: 0 - NO PAIN
PAINLEVEL_OUTOF10: 4
PAINLEVEL_OUTOF10: 0 - NO PAIN
PAINLEVEL_OUTOF10: 8
PAINLEVEL_OUTOF10: 5 - MODERATE PAIN
PAINLEVEL_OUTOF10: 8
PAINLEVEL_OUTOF10: 6
PAINLEVEL_OUTOF10: 6
PAINLEVEL_OUTOF10: 0 - NO PAIN
PAINLEVEL_OUTOF10: 6
PAINLEVEL_OUTOF10: 0 - NO PAIN

## 2024-08-13 ASSESSMENT — PAIN DESCRIPTION - LOCATION: LOCATION: ABDOMEN

## 2024-08-13 ASSESSMENT — PAIN DESCRIPTION - DESCRIPTORS
DESCRIPTORS: CRAMPING
DESCRIPTORS: CRAMPING;HEAVINESS

## 2024-08-13 ASSESSMENT — ACTIVITIES OF DAILY LIVING (ADL): LACK_OF_TRANSPORTATION: NO

## 2024-08-13 ASSESSMENT — PAIN DESCRIPTION - ORIENTATION: ORIENTATION: LOWER

## 2024-08-13 ASSESSMENT — LIFESTYLE VARIABLES
SKIP TO QUESTIONS 9-10: 1
AUDIT-C TOTAL SCORE: 0

## 2024-08-13 NOTE — CARE PLAN
Pt lives at home with her  in a 2nd floor apt with an elevator. Pt uses a walker; she needs assistance with showering. Her  drives her to her appointments  Her  and dtr do the housework, shopping ect.  She has lost her glasses, no hearing aids. She can speak English but can only read and write in Chinese.  She has a hx of depression and anxiety and is being tx; she denies S.I  Her PCP is Dr. Freeman and her GI is Dr. Lane [facility]  Pt is here with abd pain, ascites; per CT report pt has cirrhosis  of the Liver; pt said that she has never drank ETOH; sts she has a fatty liver and she has had her gall bladder removed  No anticipated discharge needs at this time  12:35 Pericentesis on Wednesday then discharge home    DISCHARGE PLAN: HOME WITH ASSIST FROM FAMILY

## 2024-08-13 NOTE — PROGRESS NOTES
08/13/24 1047   Physical Activity   On average, how many days per week do you engage in moderate to strenuous exercise (like a brisk walk)? 0 days   On average, how many minutes do you engage in exercise at this level? 0 min   Financial Resource Strain   How hard is it for you to pay for the very basics like food, housing, medical care, and heating? Not hard   Housing Stability   In the last 12 months, was there a time when you were not able to pay the mortgage or rent on time? N   In the past 12 months, how many times have you moved where you were living? 0   At any time in the past 12 months, were you homeless or living in a shelter (including now)? N   Transportation Needs   In the past 12 months, has lack of transportation kept you from medical appointments or from getting medications? no   In the past 12 months, has lack of transportation kept you from meetings, work, or from getting things needed for daily living? No   Food Insecurity   Within the past 12 months, you worried that your food would run out before you got the money to buy more. Never true   Within the past 12 months, the food you bought just didn't last and you didn't have money to get more. Never true   Stress   Do you feel stress - tense, restless, nervous, or anxious, or unable to sleep at night because your mind is troubled all the time - these days? To some exte  (Pt said that her  is aware, hx of anxiety)   Social Connections   In a typical week, how many times do you talk on the phone with family, friends, or neighbors? Never   How often do you get together with friends or relatives? More than 3   How often do you attend Jainism or Mandaen services? More than 4   Do you belong to any clubs or organizations such as Jainism groups, unions, fraternal or athletic groups, or school groups? No   How often do you attend meetings of the clubs or organizations you belong to? Never   Are you , , , , never ,  or living with a partner?    Intimate Partner Violence   Within the last year, have you been afraid of your partner or ex-partner? No   Within the last year, have you been humiliated or emotionally abused in other ways by your partner or ex-partner? No   Within the last year, have you been kicked, hit, slapped, or otherwise physically hurt by your partner or ex-partner? No   Within the last year, have you been raped or forced to have any kind of sexual activity by your partner or ex-partner? No   Alcohol Use   Q1: How often do you have a drink containing alcohol? Never   Q2: How many drinks containing alcohol do you have on a typical day when you are drinking? None   Q3: How often do you have six or more drinks on one occasion? Never   Utilities   In the past 12 months has the electric, gas, oil, or water company threatened to shut off services in your home? No   Health Literacy   How often do you need to have someone help you when you read instructions, pamphlets, or other written material from your doctor or pharmacy? Always  (Pt only reads and writes in Trinid[place])

## 2024-08-13 NOTE — NURSING NOTE
Patient seen by Sol Daniels. Made aware patient reports diarrhea x8 since midnight. Very distended and requires paracentesis. Discussion as to whether patient will stay here until tomorrow for procedure or be transferred out. Will test stool occult. Held meds per orders this AM due to new parameters. Patient up ambulating. Call light within reach.

## 2024-08-13 NOTE — PROGRESS NOTES
Alfonzo Flanagan is a 49 y.o. female on day 0 of admission presenting with Generalized abdominal pain.    8/12/2024: Spoke with IR, paracentesis not completed due to schedule, unable to complete today as IR not available, plan for paracentesis Wednesday.    8/13/2024: Secure message with Dr. Kumar and team.  Per Dr. Lozano, patient can transfer to [facility] or [facility] for paracentesis if no one is available at [facility].    Subjective   Patient seen and examined.  Resting bed in no acute distress.  Awake alert oriented x 3.  Complains of mid upper right-sided abdominal pain 5 out of 10 intensity.  Controlled.  8 episodes of liquid stool overnight.  No nausea or vomiting.  Tolerating diet.  Patient notes she took 3 tablets of Cefdinir for recent UTI. Discussed plan of care and above with patient, she is agreeable to transfer for paracentesis.    Spoke with nursing bedside, no other issues.     Objective     Physical Exam  Vitals and nursing note reviewed.   Constitutional:       General: She is not in acute distress.     Appearance: Normal appearance. She is obese. She is not ill-appearing, toxic-appearing or diaphoretic.   HENT:      Head: Normocephalic and atraumatic.      Right Ear: External ear normal.      Left Ear: External ear normal.      Nose: Nose normal.      Mouth/Throat:      Mouth: Mucous membranes are moist.      Pharynx: Oropharynx is clear.   Eyes:      Extraocular Movements: Extraocular movements intact.      Conjunctiva/sclera: Conjunctivae normal.      Pupils: Pupils are equal, round, and reactive to light.   Cardiovascular:      Rate and Rhythm: Normal rate and regular rhythm.      Pulses: Normal pulses.      Heart sounds: Normal heart sounds. No murmur heard.  Pulmonary:      Effort: Pulmonary effort is normal. No respiratory distress.      Breath sounds: Normal breath sounds. No wheezing, rhonchi or rales.      Comments: Room air.  Abdominal:      General: Bowel sounds are  "normal. There is distension.      Palpations: Abdomen is soft.      Tenderness: There is abdominal tenderness.   Genitourinary:     Comments: Deferred.  Musculoskeletal:         General: Swelling present. Normal range of motion.      Cervical back: Normal range of motion and neck supple.      Right lower le+ Pitting Edema present.      Left lower le+ Pitting Edema present.   Skin:     General: Skin is warm and dry.      Capillary Refill: Capillary refill takes less than 2 seconds.   Neurological:      General: No focal deficit present.      Mental Status: She is alert and oriented to person, place, and time.   Psychiatric:         Mood and Affect: Mood normal.         Behavior: Behavior normal.       Last Recorded Vitals  Blood pressure 99/58, pulse 86, temperature 36.7 °C (98.1 °F), temperature source Oral, resp. rate 17, height 1.6 m (5' 3\"), weight 83.5 kg (184 lb), SpO2 97%.    Intake/Output last 3 Shifts:  I/O last 3 completed shifts:  In: 300 (3.6 mL/kg) [P.O.:300]  Out: - (0 mL/kg)   Weight: 83.5 kg     Relevant Results  Results for orders placed or performed during the hospital encounter of 24 (from the past 24 hour(s))   POCT GLUCOSE   Result Value Ref Range    POCT Glucose 275 (H) 74 - 99 mg/dL   POCT GLUCOSE   Result Value Ref Range    POCT Glucose 256 (H) 74 - 99 mg/dL   POCT GLUCOSE   Result Value Ref Range    POCT Glucose 207 (H) 74 - 99 mg/dL   Urinalysis with Reflex Culture and Microscopic   Result Value Ref Range    Color, Urine Light-Yellow Light-Yellow, Yellow, Dark-Yellow    Appearance, Urine Clear Clear    Specific Gravity, Urine 1.012 1.005 - 1.035    pH, Urine 7.0 5.0, 5.5, 6.0, 6.5, 7.0, 7.5, 8.0    Protein, Urine NEGATIVE NEGATIVE, 10 (TRACE), 20 (TRACE) mg/dL    Glucose, Urine 200 (2+) (A) Normal mg/dL    Blood, Urine NEGATIVE NEGATIVE    Ketones, Urine NEGATIVE NEGATIVE mg/dL    Bilirubin, Urine NEGATIVE NEGATIVE    Urobilinogen, Urine Normal Normal mg/dL    Nitrite, Urine " NEGATIVE NEGATIVE    Leukocyte Esterase, Urine NEGATIVE NEGATIVE   Extra Urine Gray Tube   Result Value Ref Range    Extra Tube Hold for add-ons.    POCT GLUCOSE   Result Value Ref Range    POCT Glucose 150 (H) 74 - 99 mg/dL   Comprehensive Metabolic Panel   Result Value Ref Range    Glucose 268 (H) 65 - 99 mg/dL    Sodium 135 133 - 145 mmol/L    Potassium 4.0 3.4 - 5.1 mmol/L    Chloride 104 97 - 107 mmol/L    Bicarbonate 26 24 - 31 mmol/L    Urea Nitrogen 6 (L) 8 - 25 mg/dL    Creatinine 0.60 0.40 - 1.60 mg/dL    eGFR >90 >60 mL/min/1.73m*2    Calcium 7.6 (L) 8.5 - 10.4 mg/dL    Albumin 2.1 (L) 3.5 - 5.0 g/dL    Alkaline Phosphatase 328 (H) 35 - 125 U/L    Total Protein 5.9 5.9 - 7.9 g/dL    AST 40 5 - 40 U/L    Bilirubin, Total 1.1 0.1 - 1.2 mg/dL    ALT 21 5 - 40 U/L    Anion Gap 5 <=19 mmol/L   CBC   Result Value Ref Range    WBC 3.1 (L) 4.4 - 11.3 x10*3/uL    nRBC 0.0 0.0 - 0.0 /100 WBCs    RBC 3.26 (L) 4.00 - 5.20 x10*6/uL    Hemoglobin 7.9 (L) 12.0 - 16.0 g/dL    Hematocrit 25.3 (L) 36.0 - 46.0 %    MCV 78 (L) 80 - 100 fL    MCH 24.2 (L) 26.0 - 34.0 pg    MCHC 31.2 (L) 32.0 - 36.0 g/dL    RDW 19.9 (H) 11.5 - 14.5 %    Platelets 85 (L) 150 - 450 x10*3/uL     Susceptibility data from last 90 days.  Collected Specimen Info Organism Amoxicillin/Clavulanate Ampicillin Ampicillin/Sulbactam Cefazolin Cefazolin (uncomplicated UTIs only) Ciprofloxacin Gentamicin Levofloxacin Nitrofurantoin Penicillin Piperacillin/Tazobactam   07/10/24 Urine from Clean Catch/Voided Streptococcus agalactiae (Group B Streptococcus)              06/23/24 Urine from Clean Catch/Voided Escherichia coli   S   S  S  S  S   S   S   05/15/24 Urine from Clean Catch/Voided Enterococcus faecium   S     R   S  S  S      Klebsiella pneumoniae/variicola  S  R  S  S  S  S  S   I   S     Collected Specimen Info Organism Trimethoprim/Sulfamethoxazole Vancomycin   07/10/24 Urine from Clean Catch/Voided Streptococcus agalactiae (Group B Streptococcus)      06/23/24 Urine from Clean Catch/Voided Escherichia coli  S    05/15/24 Urine from Clean Catch/Voided Enterococcus faecium  R  S     Klebsiella pneumoniae/variicola  S      ECG 12 lead    Result Date: 8/12/2024  Normal sinus rhythm Normal ECG When compared with ECG of 03-AUG-2024 05:11, No significant change was found Confirmed by Geronimo William ([id number]) on 8/12/2024 9:58:20 PM    CT abdomen pelvis w IV contrast    Result Date: 8/11/2024  Interpreted By:  Alfredo Guerin, STUDY: CT ABDOMEN PELVIS W IV CONTRAST;  8/11/2024 9:34 pm   INDICATION: Signs/Symptoms:worsening pain.   COMPARISON: 08/03/2024 07/10/2024   ACCESSION NUMBER(S): [id number]   ORDERING CLINICIAN: ABI DUMAS   TECHNIQUE: CT of the abdomen and pelvis was performed.  Standard contiguous axial images were obtained at 3 mm slice thickness through the abdomen and pelvis. Coronal and sagittal reconstructions at 3 mm slice thickness were performed.   75 ml of contrast Omnipaque 350 were administered intravenously without immediate complication.   FINDINGS: LOWER CHEST: 6 mm right middle lobe nodule (series 7, image 11).   ABDOMEN:   LIVER: Cirrhotic liver morphology. No focal lesion.   BILE DUCTS: The intrahepatic and extrahepatic ducts are not dilated.   GALLBLADDER: The gallbladder is surgically absent.   PANCREAS: The pancreas appears unremarkable.   SPLEEN: The spleen is normal in size without focal lesions.   ADRENAL GLANDS: Bilateral adrenal glands appear normal.   KIDNEYS AND URETERS: The kidneys are normal in size and enhance symmetrically.  No hydroureteronephrosis or nephroureterolithiasis is identified.   PELVIS:   BLADDER: The urinary bladder appears normal without abnormal wall thickening.   REPRODUCTIVE ORGANS: No pelvic masses.   BOWEL: The stomach is unremarkable.   No bowel dilation. Diffuse wall thickening, likely secondary to presence of ascites. Moderate colonic stool. The appendix is not definitely visualized. There is however no  pericecal stranding or fluid.   VESSELS: There is no aneurysmal dilatation of the abdominal aorta. The IVC appears normal.   PERITONEUM/RETROPERITONEUM/LYMPH NODES: Large abdominopelvic ascites and diffuse mesenteric soft tissue stranding, stable since prior. No abdominopelvic lymphadenopathy is present.   BONES AND ABDOMINAL WALL: No suspicious osseous lesions are identified. Moderate size fluid containing umbilical hernia. Diffuse subcutaneous soft tissue stranding.       1.  Cirrhotic liver morphology without focal lesion. 2. Stable large abdominopelvic ascites. 3. 6 mm right middle lobe nodule. 4. Other findings as described above.     Signed by: Alfredo Guerin 8/11/2024 10:30 PM Dictation workstation:   [id number]    XR chest 2 views    Result Date: 8/11/2024  Interpreted By:  Savannah Zaman, STUDY: Chest, 2 views.   INDICATION: Signs/Symptoms:swelling.   COMPARISON: 07/04/2024.   ACCESSION NUMBER(S): [id number]   ORDERING CLINICIAN: ABI DUMAS   FINDINGS: The cardiomediastinal silhouette size is within normal limits.   There is no focal consolidation, edema or pneumothorax. No sizeable pleural effusion.       1. No acute cardiopulmonary process.   MACRO: None.   Signed by: Savannah Zaman 8/11/2024 8:59 PM Dictation workstation:   [id number]     Scheduled medications  albumin human, 25 g, intravenous, Once  atorvastatin, 80 mg, oral, Nightly  docusate sodium, 100 mg, oral, BID  fenofibrate, 160 mg, oral, Daily  furosemide, 40 mg, oral, Daily  gabapentin, 200 mg, oral, TID  insulin glargine, 25 Units, subcutaneous, BID  lactulose, 20 g, oral, 4x daily  magnesium oxide, 400 mg, oral, Daily  nadolol, 20 mg, oral, Daily  pantoprazole, 40 mg, oral, Daily  polyethylene glycol, 17 g, oral, BID  rifAXIMin, 550 mg, oral, q12h RIVKA  sodium chloride, 1 spray, Each Nostril, 4x daily  spironolactone, 50 mg, oral, BID  sucralfate, 1 g, oral, Before meals & nightly  traZODone, 25 mg, oral, Nightly  ursodiol, 300  mg, oral, TID      Continuous medications     PRN medications  PRN medications: acetaminophen **OR** acetaminophen **OR** acetaminophen, benzocaine-menthol, dextromethorphan-guaifenesin, dextrose, dextrose, glucagon, glucagon, guaiFENesin, hydrOXYzine HCL, ondansetron ODT, polyethylene glycol, traMADol      ASSESSMENT:  Abdominal pain  Nausea, vomiting resolved  Constipation - resolved  Diarrhea  Abnormal CT abdomen/pelvis  REED Hepatic cirrhosis with ascites  Hyperbilirubinemia  Pancytopenia  Bilateral lower extremity edema  Pyuria  Type 2 diabetes mellitus with hyperglycemia  Pulmonary nodule  Protein calorie malnutrition    PLAN:  Gastroenterology following.  Input appreciated.  Interventional radiology consultation for abdominal paracentesis.  Interventional radiology is not available at [facility].  Per Dr. Lozano, patient may transfer to [facility] or [facility] for abdominal paracentesis.  Discussed with patient and she is agreeable.  I will discuss with Gastroenterology.  Patient continues to report mid upper right-sided abdominal pain, also reports 8 episodes of liquid stool overnight.  *Note patient took Cefdinir took 3 tablets prior to admission for recent UTI.  Urinalysis noted no nitrates, leukocytes, WBC.  Diarrhea management per Gastroenterology.  Monitor stools.  Pain control.  Symptom control.  As needed analgesics, anti-emetics.  Low-sodium, gluten free, ADA diet as tolerated.  Labs reviewed.  H&H noted.  No evidence of acute blood loss.  Guaiac stools.  Monitor H&H.  Transfuse as needed.  Vital signs reviewed.  Blood pressures noted.  Hypotension may be secondary to volume loss secondary to diarrhea.  Prematures added to diuretics Lasix, Spironolactone and Nadolol.  Monitor blood pressure.  Monitor BMP, CBC.  Point-of-care glucose reviewed.  Monitor point-of-care glucose ACHS.  Continue insulin.  Adjust insulin as needed for glycemic control.  Hypoglycemia protocol.  ADA diet.  Protein  calorie malnutrition.  Add Glucerna protein supplementation three times daily with meals.  Increase activity as tolerated.  Ambulate three times daily.  Supportive care.  Patient reassured.  Case management following for discharge planning.  Discussed with patient, nursing and Dr. Lawrence.    ADDENDUM:  Spoke with team on 12 pm call, patient does not have insurance/ benefits, plan for abdominal paracentesis at [facility] tomorrow.  Per Interventional Radiology, scheduled 9:30 am.  Updated Dr. Lawrence.      Leny Daniels, APRN-CNP

## 2024-08-13 NOTE — PROGRESS NOTES
"Alfonzo Flanagan is a 49 y.o. female on day 0 of admission presenting with Generalized abdominal pain.    Subjective   8 episodes of liquid stool overnight, however has seem to slow down this am. No nausea or vomiting. Tolerating diet.     Objective     Physical Exam  HENT:      Head: Normocephalic.      Nose: Nose normal.   Cardiovascular:      Rate and Rhythm: Normal rate.   Pulmonary:      Effort: Pulmonary effort is normal.   Abdominal:      General: There is distension.   Musculoskeletal:         General: Normal range of motion.      Cervical back: Normal range of motion.   Skin:     General: Skin is warm.   Neurological:      General: No focal deficit present.      Mental Status: She is alert.   Psychiatric:         Mood and Affect: Mood normal.         Last Recorded Vitals  Blood pressure 100/58, pulse 89, temperature 36.6 °C (97.9 °F), temperature source Oral, resp. rate 17, height 1.6 m (5' 3\"), weight 83.5 kg (184 lb), SpO2 100%.  Intake/Output last 3 Shifts:  I/O last 3 completed shifts:  In: 300 (3.6 mL/kg) [P.O.:300]  Out: - (0 mL/kg)   Weight: 83.5 kg     Relevant Results  ECG 12 lead    Result Date: 8/12/2024  Normal sinus rhythm Normal ECG When compared with ECG of 03-AUG-2024 05:11, No significant change was found Confirmed by Geronimo William ([id number]) on 8/12/2024 9:58:20 PM    CT abdomen pelvis w IV contrast    Result Date: 8/11/2024  Interpreted By:  Alfredo Guerin, STUDY: CT ABDOMEN PELVIS W IV CONTRAST;  8/11/2024 9:34 pm   INDICATION: Signs/Symptoms:worsening pain.   COMPARISON: 08/03/2024 07/10/2024   ACCESSION NUMBER(S): [id number]   ORDERING CLINICIAN: ABI DUMAS   TECHNIQUE: CT of the abdomen and pelvis was performed.  Standard contiguous axial images were obtained at 3 mm slice thickness through the abdomen and pelvis. Coronal and sagittal reconstructions at 3 mm slice thickness were performed.   75 ml of contrast Omnipaque 350 were administered intravenously without immediate " complication.   FINDINGS: LOWER CHEST: 6 mm right middle lobe nodule (series 7, image 11).   ABDOMEN:   LIVER: Cirrhotic liver morphology. No focal lesion.   BILE DUCTS: The intrahepatic and extrahepatic ducts are not dilated.   GALLBLADDER: The gallbladder is surgically absent.   PANCREAS: The pancreas appears unremarkable.   SPLEEN: The spleen is normal in size without focal lesions.   ADRENAL GLANDS: Bilateral adrenal glands appear normal.   KIDNEYS AND URETERS: The kidneys are normal in size and enhance symmetrically.  No hydroureteronephrosis or nephroureterolithiasis is identified.   PELVIS:   BLADDER: The urinary bladder appears normal without abnormal wall thickening.   REPRODUCTIVE ORGANS: No pelvic masses.   BOWEL: The stomach is unremarkable.   No bowel dilation. Diffuse wall thickening, likely secondary to presence of ascites. Moderate colonic stool. The appendix is not definitely visualized. There is however no pericecal stranding or fluid.   VESSELS: There is no aneurysmal dilatation of the abdominal aorta. The IVC appears normal.   PERITONEUM/RETROPERITONEUM/LYMPH NODES: Large abdominopelvic ascites and diffuse mesenteric soft tissue stranding, stable since prior. No abdominopelvic lymphadenopathy is present.   BONES AND ABDOMINAL WALL: No suspicious osseous lesions are identified. Moderate size fluid containing umbilical hernia. Diffuse subcutaneous soft tissue stranding.       1.  Cirrhotic liver morphology without focal lesion. 2. Stable large abdominopelvic ascites. 3. 6 mm right middle lobe nodule. 4. Other findings as described above.     Signed by: Alfredo Guerin 8/11/2024 10:30 PM Dictation workstation:   [id number]    XR chest 2 views    Result Date: 8/11/2024  Interpreted By:  Savannah Zaman, STUDY: Chest, 2 views.   INDICATION: Signs/Symptoms:swelling.   COMPARISON: 07/04/2024.   ACCESSION NUMBER(S): [id number]   ORDERING CLINICIAN: ABI DUMAS   FINDINGS: The cardiomediastinal  silhouette size is within normal limits.   There is no focal consolidation, edema or pneumothorax. No sizeable pleural effusion.       1. No acute cardiopulmonary process.   MACRO: None.   Signed by: Savannah Zaman 8/11/2024 8:59 PM Dictation workstation:   [id number]      Scheduled medications  albumin human, 25 g, intravenous, Once  atorvastatin, 80 mg, oral, Nightly  docusate sodium, 100 mg, oral, BID  fenofibrate, 160 mg, oral, Daily  furosemide, 40 mg, oral, Daily  gabapentin, 200 mg, oral, TID  insulin glargine, 25 Units, subcutaneous, BID  lactulose, 20 g, oral, 4x daily  magnesium oxide, 400 mg, oral, Daily  nadolol, 20 mg, oral, Daily  pantoprazole, 40 mg, oral, Daily  polyethylene glycol, 17 g, oral, BID  rifAXIMin, 550 mg, oral, q12h RIVKA  sodium chloride, 1 spray, Each Nostril, 4x daily  spironolactone, 50 mg, oral, BID  sucralfate, 1 g, oral, Before meals & nightly  traZODone, 25 mg, oral, Nightly  ursodiol, 300 mg, oral, TID      Continuous medications     PRN medications  PRN medications: acetaminophen **OR** acetaminophen **OR** acetaminophen, benzocaine-menthol, dextromethorphan-guaifenesin, dextrose, dextrose, glucagon, glucagon, guaiFENesin, hydrOXYzine HCL, ketorolac, ondansetron ODT, polyethylene glycol, traMADol  Results for orders placed or performed during the hospital encounter of 08/11/24 (from the past 24 hour(s))   POCT GLUCOSE   Result Value Ref Range    POCT Glucose 207 (H) 74 - 99 mg/dL   Urinalysis with Reflex Culture and Microscopic   Result Value Ref Range    Color, Urine Light-Yellow Light-Yellow, Yellow, Dark-Yellow    Appearance, Urine Clear Clear    Specific Gravity, Urine 1.012 1.005 - 1.035    pH, Urine 7.0 5.0, 5.5, 6.0, 6.5, 7.0, 7.5, 8.0    Protein, Urine NEGATIVE NEGATIVE, 10 (TRACE), 20 (TRACE) mg/dL    Glucose, Urine 200 (2+) (A) Normal mg/dL    Blood, Urine NEGATIVE NEGATIVE    Ketones, Urine NEGATIVE NEGATIVE mg/dL    Bilirubin, Urine NEGATIVE NEGATIVE     Urobilinogen, Urine Normal Normal mg/dL    Nitrite, Urine NEGATIVE NEGATIVE    Leukocyte Esterase, Urine NEGATIVE NEGATIVE   Extra Urine Gray Tube   Result Value Ref Range    Extra Tube Hold for add-ons.    POCT GLUCOSE   Result Value Ref Range    POCT Glucose 150 (H) 74 - 99 mg/dL   Comprehensive Metabolic Panel   Result Value Ref Range    Glucose 268 (H) 65 - 99 mg/dL    Sodium 135 133 - 145 mmol/L    Potassium 4.0 3.4 - 5.1 mmol/L    Chloride 104 97 - 107 mmol/L    Bicarbonate 26 24 - 31 mmol/L    Urea Nitrogen 6 (L) 8 - 25 mg/dL    Creatinine 0.60 0.40 - 1.60 mg/dL    eGFR >90 >60 mL/min/1.73m*2    Calcium 7.6 (L) 8.5 - 10.4 mg/dL    Albumin 2.1 (L) 3.5 - 5.0 g/dL    Alkaline Phosphatase 328 (H) 35 - 125 U/L    Total Protein 5.9 5.9 - 7.9 g/dL    AST 40 5 - 40 U/L    Bilirubin, Total 1.1 0.1 - 1.2 mg/dL    ALT 21 5 - 40 U/L    Anion Gap 5 <=19 mmol/L   CBC   Result Value Ref Range    WBC 3.1 (L) 4.4 - 11.3 x10*3/uL    nRBC 0.0 0.0 - 0.0 /100 WBCs    RBC 3.26 (L) 4.00 - 5.20 x10*6/uL    Hemoglobin 7.9 (L) 12.0 - 16.0 g/dL    Hematocrit 25.3 (L) 36.0 - 46.0 %    MCV 78 (L) 80 - 100 fL    MCH 24.2 (L) 26.0 - 34.0 pg    MCHC 31.2 (L) 32.0 - 36.0 g/dL    RDW 19.9 (H) 11.5 - 14.5 %    Platelets 85 (L) 150 - 450 x10*3/uL   POCT GLUCOSE   Result Value Ref Range    POCT Glucose 253 (H) 74 - 99 mg/dL   POCT GLUCOSE   Result Value Ref Range    POCT Glucose 275 (H) 74 - 99 mg/dL   Occult Blood, Stool    Specimen: Stool   Result Value Ref Range    Occult Blood, Stool X1 Negative Negative                            Assessment/Plan   Principal Problem:    Generalized abdominal pain  Active Problems:    Other ascites    Hyperglycemia    History of cirrhosis    Pulmonary nodule    Transaminasemia    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  -HE: Monitor mental status, A/O x 3, Continue lactulose 20 g daily with a goal of 2-3 soft stools a day and rifaximin 550 mg BID.   -EV: monitor for  "varices, last EGD on 12/27/23 by Dr. Zaman at [facility] Normal oropharynx. Esophageal ulcer, no bleeding and no stigmata of recent bleeding.  -Ascites: large ascites on imaging               -Paracentesis with fluid analysis today               -IV Albumin               -Low Na diet               -Lasix 40mg. Will increase Aldactone 100mg before discharge              -Note, she is supposed to have outpatient paracentesis \"had trouble with the insurance\"      Epigastric abdominal pain/Nausea, vomiting, constipation  -Patient has multiple reported admissions this year with similar complaints of abdominal pain that typically resolves with a paracentesis/conservative measures. MANY CT scans. She has history of acute idiopathic pancreatitis. EGD with EUS 3/26/2024. Findings were c/f NET however tissue sample was not obtained. At that time, it was recommended to follow up mass in 1 year with EUS. MRI/MRCP on 7/26/2024: No pancreatic lesion identified. Cirrhosis and sequela of portal hypertension. No hepatic lesion.      Acute on chronic anemia (8.7)               -No overt bleeding. Last EGD in [facility]. No indication for urgent endoscopy at this time      8/13   Several loose stools reported overnight, seems to have slowed down. If continues, will get stool studies. Was scheduled for para today, however IR unavailable. Per primary team, patient can transfer to [facility] or [facility] for paracentesis if no one is available at [facility]. Patient will ultimately need set up with GI who accepts her insurance. She states she has followed with Dr. Zaman, but her insurance was not accepted.    Adrianne Powell, APRN-CNP      "

## 2024-08-13 NOTE — PROGRESS NOTES
08/13/24 1050   Discharge Planning   Living Arrangements Spouse/significant other   Support Systems Spouse/significant other;Children;Family members   Type of Residence Private residence   Number of Stairs to Enter Residence 0  (Pt lives in a 2nd floor apt with an elevator)   Number of Stairs Within Residence 0   Do you have animals or pets at home? No   Who is requesting discharge planning? Provider   Home or Post Acute Services None   Expected Discharge Disposition Home   Does the patient need discharge transport arranged? No   Financial Resource Strain   How hard is it for you to pay for the very basics like food, housing, medical care, and heating? Not hard   Housing Stability   In the last 12 months, was there a time when you were not able to pay the mortgage or rent on time? N   In the past 12 months, how many times have you moved where you were living? 0   At any time in the past 12 months, were you homeless or living in a shelter (including now)? N   Transportation Needs   In the past 12 months, has lack of transportation kept you from medical appointments or from getting medications? no   In the past 12 months, has lack of transportation kept you from meetings, work, or from getting things needed for daily living? No   Patient Choice   Patient / Family choosing to utilize agency / facility established prior to hospitalization No

## 2024-08-13 NOTE — NURSING NOTE
Patient asleep in bed. Breathing equal and unlabored. No needs verbalized. Call light within reach.

## 2024-08-13 NOTE — PROGRESS NOTES
08/13/24 1051   Current Planned Discharge Disposition   Current Planned Discharge Disposition Home

## 2024-08-13 NOTE — NURSING NOTE
Patient up ambulating  in room. Stool sample sent for testing. Awaiting paracentesis tomorrow. Call light within reach.

## 2024-08-13 NOTE — NURSING NOTE
Patient visiting with family and eating dinner. No needs verbalized. Medicated for pain. Call light within reach.

## 2024-08-13 NOTE — PROGRESS NOTES
08/13/24 1051   [facility] Disability Status   Are you deaf or do you have serious difficulty hearing? N   Are you blind or do you have serious difficulty seeing, even when wearing glasses? N   Because of a physical, mental, or emotional condition, do you have serious difficulty concentrating, remembering, or making decisions? (5 years old or older) N   Do you have serious difficulty walking or climbing stairs? Y   Do you have serious difficulty dressing or bathing? Y   Because of a physical, mental, or emotional condition, do you have serious difficulty doing errands alone such as visiting the doctor? N

## 2024-08-14 ENCOUNTER — APPOINTMENT (OUTPATIENT)
Dept: RADIOLOGY | Facility: HOSPITAL | Age: 49
End: 2024-08-14
Payer: COMMERCIAL

## 2024-08-14 ENCOUNTER — PHARMACY VISIT (OUTPATIENT)
Dept: PHARMACY | Facility: CLINIC | Age: 49
End: 2024-08-14
Payer: MEDICARE

## 2024-08-14 VITALS
RESPIRATION RATE: 18 BRPM | SYSTOLIC BLOOD PRESSURE: 101 MMHG | TEMPERATURE: 97.9 F | BODY MASS INDEX: 32.6 KG/M2 | HEIGHT: 63 IN | WEIGHT: 184 LBS | DIASTOLIC BLOOD PRESSURE: 62 MMHG | HEART RATE: 88 BPM | OXYGEN SATURATION: 100 %

## 2024-08-14 LAB
ALBUMIN SERPL-MCNC: 2.1 G/DL (ref 3.5–5)
ALP BLD-CCNC: 334 U/L (ref 35–125)
ALT SERPL-CCNC: 20 U/L (ref 5–40)
AMMONIA PLAS-SCNC: 122 UMOL/L (ref 12–45)
ANION GAP SERPL CALC-SCNC: 7 MMOL/L
AST SERPL-CCNC: 36 U/L (ref 5–40)
BILIRUB SERPL-MCNC: 1 MG/DL (ref 0.1–1.2)
BUN SERPL-MCNC: 9 MG/DL (ref 8–25)
CALCIUM SERPL-MCNC: 7.5 MG/DL (ref 8.5–10.4)
CHLORIDE SERPL-SCNC: 105 MMOL/L (ref 97–107)
CO2 SERPL-SCNC: 25 MMOL/L (ref 24–31)
CREAT SERPL-MCNC: 0.9 MG/DL (ref 0.4–1.6)
EGFRCR SERPLBLD CKD-EPI 2021: 80 ML/MIN/1.73M*2
ERYTHROCYTE [DISTWIDTH] IN BLOOD BY AUTOMATED COUNT: 19.9 % (ref 11.5–14.5)
GLUCOSE BLD MANUAL STRIP-MCNC: 157 MG/DL (ref 74–99)
GLUCOSE BLD MANUAL STRIP-MCNC: 162 MG/DL (ref 74–99)
GLUCOSE BLD MANUAL STRIP-MCNC: 99 MG/DL (ref 74–99)
GLUCOSE SERPL-MCNC: 185 MG/DL (ref 65–99)
HCT VFR BLD AUTO: 27 % (ref 36–46)
HGB BLD-MCNC: 8.2 G/DL (ref 12–16)
MCH RBC QN AUTO: 23.8 PG (ref 26–34)
MCHC RBC AUTO-ENTMCNC: 30.4 G/DL (ref 32–36)
MCV RBC AUTO: 79 FL (ref 80–100)
NRBC BLD-RTO: 0 /100 WBCS (ref 0–0)
PLATELET # BLD AUTO: 74 X10*3/UL (ref 150–450)
POTASSIUM SERPL-SCNC: 3.8 MMOL/L (ref 3.4–5.1)
PROT SERPL-MCNC: 5.8 G/DL (ref 5.9–7.9)
RBC # BLD AUTO: 3.44 X10*6/UL (ref 4–5.2)
SODIUM SERPL-SCNC: 137 MMOL/L (ref 133–145)
WBC # BLD AUTO: 2.8 X10*3/UL (ref 4.4–11.3)

## 2024-08-14 PROCEDURE — 70450 CT HEAD/BRAIN W/O DYE: CPT

## 2024-08-14 PROCEDURE — 2500000002 HC RX 250 W HCPCS SELF ADMINISTERED DRUGS (ALT 637 FOR MEDICARE OP, ALT 636 FOR OP/ED): Performed by: INTERNAL MEDICINE

## 2024-08-14 PROCEDURE — 49083 ABD PARACENTESIS W/IMAGING: CPT | Mod: 52

## 2024-08-14 PROCEDURE — RXMED WILLOW AMBULATORY MEDICATION CHARGE

## 2024-08-14 PROCEDURE — 82947 ASSAY GLUCOSE BLOOD QUANT: CPT | Mod: 59

## 2024-08-14 PROCEDURE — 82140 ASSAY OF AMMONIA: CPT | Performed by: NURSE PRACTITIONER

## 2024-08-14 PROCEDURE — 2500000001 HC RX 250 WO HCPCS SELF ADMINISTERED DRUGS (ALT 637 FOR MEDICARE OP): Performed by: INTERNAL MEDICINE

## 2024-08-14 PROCEDURE — 2500000005 HC RX 250 GENERAL PHARMACY W/O HCPCS: Performed by: INTERNAL MEDICINE

## 2024-08-14 PROCEDURE — 36415 COLL VENOUS BLD VENIPUNCTURE: CPT | Performed by: NURSE PRACTITIONER

## 2024-08-14 PROCEDURE — 2500000001 HC RX 250 WO HCPCS SELF ADMINISTERED DRUGS (ALT 637 FOR MEDICARE OP): Performed by: NURSE PRACTITIONER

## 2024-08-14 PROCEDURE — G0378 HOSPITAL OBSERVATION PER HR: HCPCS

## 2024-08-14 PROCEDURE — 70450 CT HEAD/BRAIN W/O DYE: CPT | Performed by: RADIOLOGY

## 2024-08-14 PROCEDURE — 85027 COMPLETE CBC AUTOMATED: CPT | Performed by: NURSE PRACTITIONER

## 2024-08-14 PROCEDURE — 2500000004 HC RX 250 GENERAL PHARMACY W/ HCPCS (ALT 636 FOR OP/ED): Performed by: INTERNAL MEDICINE

## 2024-08-14 PROCEDURE — 84075 ASSAY ALKALINE PHOSPHATASE: CPT | Performed by: NURSE PRACTITIONER

## 2024-08-14 RX ORDER — MECLIZINE HYDROCHLORIDE 25 MG/1
25 TABLET ORAL 3 TIMES DAILY PRN
Qty: 30 TABLET | Refills: 0 | Status: SHIPPED | OUTPATIENT
Start: 2024-08-14

## 2024-08-14 RX ORDER — MECLIZINE HYDROCHLORIDE 25 MG/1
25 TABLET ORAL 3 TIMES DAILY PRN
Status: DISCONTINUED | OUTPATIENT
Start: 2024-08-14 | End: 2024-08-14 | Stop reason: HOSPADM

## 2024-08-14 ASSESSMENT — COGNITIVE AND FUNCTIONAL STATUS - GENERAL
MOBILITY SCORE: 24
DAILY ACTIVITIY SCORE: 24

## 2024-08-14 ASSESSMENT — PAIN DESCRIPTION - DESCRIPTORS: DESCRIPTORS: CRAMPING

## 2024-08-14 ASSESSMENT — PAIN - FUNCTIONAL ASSESSMENT
PAIN_FUNCTIONAL_ASSESSMENT: 0-10

## 2024-08-14 ASSESSMENT — PAIN SCALES - GENERAL
PAINLEVEL_OUTOF10: 0 - NO PAIN
PAINLEVEL_OUTOF10: 0 - NO PAIN
PAINLEVEL_OUTOF10: 6
PAINLEVEL_OUTOF10: 0 - NO PAIN

## 2024-08-14 ASSESSMENT — PAIN DESCRIPTION - LOCATION: LOCATION: ABDOMEN

## 2024-08-14 NOTE — PROGRESS NOTES
Alfonzo Flanagan is a 49 y.o. female on day 0 of admission presenting with Generalized abdominal pain.    Per Interventional Radiology, no fluid for abdominal paracentesis.    Subjective   Patient seen and examined.  Resting in bed in no acute distress, talking on the phone.  Awake alert oriented x 3.  Complains of blurred vision.  Unsteady, dizzy when up.  No focal weakness.      Spoke with nursing, blood pressures reviewed, blood glucose 99.    Objective     Physical Exam  Vitals and nursing note reviewed.   Constitutional:       General: She is not in acute distress.     Appearance: Normal appearance. She is obese. She is not ill-appearing, toxic-appearing or diaphoretic.   HENT:      Head: Normocephalic and atraumatic.      Right Ear: External ear normal.      Left Ear: External ear normal.      Nose: Nose normal.      Mouth/Throat:      Mouth: Mucous membranes are moist.      Pharynx: Oropharynx is clear.   Eyes:      Extraocular Movements: Extraocular movements intact.      Conjunctiva/sclera: Conjunctivae normal.      Pupils: Pupils are equal, round, and reactive to light.   Cardiovascular:      Rate and Rhythm: Normal rate and regular rhythm.      Pulses: Normal pulses.      Heart sounds: Normal heart sounds. No murmur heard.  Pulmonary:      Effort: Pulmonary effort is normal. No respiratory distress.      Breath sounds: Normal breath sounds. No wheezing, rhonchi or rales.      Comments: Room air.  Abdominal:      General: Bowel sounds are normal. There is distension.      Palpations: Abdomen is soft.      Tenderness: There is abdominal tenderness.   Genitourinary:     Comments: Deferred.  Musculoskeletal:         General: Swelling present. Normal range of motion.      Cervical back: Normal range of motion and neck supple.      Right lower le+ Pitting Edema present.      Left lower le+ Pitting Edema present.   Skin:     General: Skin is warm and dry.      Capillary Refill: Capillary refill takes  "less than 2 seconds.   Neurological:      General: No focal deficit present.      Mental Status: She is alert and oriented to person, place, and time.   Psychiatric:         Mood and Affect: Mood normal.         Behavior: Behavior normal.       Last Recorded Vitals  Blood pressure 111/68, pulse 77, temperature 36.4 °C (97.5 °F), temperature source Oral, resp. rate 16, height 1.6 m (5' 3\"), weight 83.5 kg (184 lb), SpO2 100%.    Intake/Output last 3 Shifts:  I/O last 3 completed shifts:  In: 720 (8.6 mL/kg) [P.O.:720]  Out: 600 (7.2 mL/kg) [Urine:200 (0.1 mL/kg/hr); Stool:400]  Weight: 83.5 kg     Relevant Results  Results for orders placed or performed during the hospital encounter of 08/11/24 (from the past 24 hour(s))   POCT GLUCOSE   Result Value Ref Range    POCT Glucose 275 (H) 74 - 99 mg/dL   Occult Blood, Stool    Specimen: Stool   Result Value Ref Range    Occult Blood, Stool X1 Negative Negative   POCT GLUCOSE   Result Value Ref Range    POCT Glucose 264 (H) 74 - 99 mg/dL   POCT GLUCOSE   Result Value Ref Range    POCT Glucose 241 (H) 74 - 99 mg/dL   Comprehensive Metabolic Panel   Result Value Ref Range    Glucose 185 (H) 65 - 99 mg/dL    Sodium 137 133 - 145 mmol/L    Potassium 3.8 3.4 - 5.1 mmol/L    Chloride 105 97 - 107 mmol/L    Bicarbonate 25 24 - 31 mmol/L    Urea Nitrogen 9 8 - 25 mg/dL    Creatinine 0.90 0.40 - 1.60 mg/dL    eGFR 80 >60 mL/min/1.73m*2    Calcium 7.5 (L) 8.5 - 10.4 mg/dL    Albumin 2.1 (L) 3.5 - 5.0 g/dL    Alkaline Phosphatase 334 (H) 35 - 125 U/L    Total Protein 5.8 (L) 5.9 - 7.9 g/dL    AST 36 5 - 40 U/L    Bilirubin, Total 1.0 0.1 - 1.2 mg/dL    ALT 20 5 - 40 U/L    Anion Gap 7 <=19 mmol/L   CBC   Result Value Ref Range    WBC 2.8 (L) 4.4 - 11.3 x10*3/uL    nRBC 0.0 0.0 - 0.0 /100 WBCs    RBC 3.44 (L) 4.00 - 5.20 x10*6/uL    Hemoglobin 8.2 (L) 12.0 - 16.0 g/dL    Hematocrit 27.0 (L) 36.0 - 46.0 %    MCV 79 (L) 80 - 100 fL    MCH 23.8 (L) 26.0 - 34.0 pg    MCHC 30.4 (L) 32.0 - " 36.0 g/dL    RDW 19.9 (H) 11.5 - 14.5 %    Platelets 74 (L) 150 - 450 x10*3/uL   POCT GLUCOSE   Result Value Ref Range    POCT Glucose 99 74 - 99 mg/dL     Susceptibility data from last 90 days.  Collected Specimen Info Organism Ampicillin Cefazolin Cefazolin (uncomplicated UTIs only) Ciprofloxacin Gentamicin Nitrofurantoin Piperacillin/Tazobactam Trimethoprim/Sulfamethoxazole   07/10/24 Urine from Clean Catch/Voided Streptococcus agalactiae (Group B Streptococcus)           06/23/24 Urine from Clean Catch/Voided Escherichia coli  S  S  S  S  S  S  S  S     US guided abdominal paracentesis    Result Date: 8/14/2024  Interpreted By:  Klaudia Delcid, STUDY: US GUIDED ABDOMINAL PARACENTESIS; ;  8/14/2024 10:17 am   INDICATION: Signs/Symptoms:Paracentesis for ascities.   COMPARISON: None.   ACCESSION NUMBER(S): [id number]   ORDERING CLINICIAN: SADE LARRY   TECHNIQUE: Multiple grayscale sonographic images of the abdomen were obtained in an attempt to perform paracentesis.   FINDINGS: Minimal ascites is noted. The risks of performing paracentesis outweighs the benefits. Procedure aborted.       Minimal ascites. The risks of performing paracentesis outweighs the benefits.     MACRO: None   Signed by: Klaudia Delcid 8/14/2024 10:22 AM Dictation workstation:   [id number]     Susceptibility data from last 90 days.  Collected Specimen Info Organism Ampicillin Cefazolin Cefazolin (uncomplicated UTIs only) Ciprofloxacin Gentamicin Nitrofurantoin Piperacillin/Tazobactam Trimethoprim/Sulfamethoxazole   07/10/24 Urine from Clean Catch/Voided Streptococcus agalactiae (Group B Streptococcus)           06/23/24 Urine from Clean Catch/Voided Escherichia coli  S  S  S  S  S  S  S  S     Scheduled medications  albumin human, 25 g, intravenous, Once  atorvastatin, 80 mg, oral, Nightly  docusate sodium, 100 mg, oral, BID  fenofibrate, 160 mg, oral, Daily  ferumoxytol, 510 mg, intravenous, Once  furosemide, 40 mg, oral,  Daily  gabapentin, 200 mg, oral, TID  insulin glargine, 25 Units, subcutaneous, BID  lactulose, 20 g, oral, 4x daily  magnesium oxide, 400 mg, oral, Daily  nadolol, 20 mg, oral, Daily  pantoprazole, 40 mg, oral, Daily  polyethylene glycol, 17 g, oral, BID  rifAXIMin, 550 mg, oral, q12h RIVKA  sodium chloride, 1 spray, Each Nostril, 4x daily  spironolactone, 50 mg, oral, BID  sucralfate, 1 g, oral, Before meals & nightly  traZODone, 25 mg, oral, Nightly  ursodiol, 300 mg, oral, TID      Continuous medications     PRN medications  PRN medications: acetaminophen **OR** acetaminophen **OR** acetaminophen, benzocaine-menthol, dextromethorphan-guaifenesin, dextrose, dextrose, glucagon, glucagon, guaiFENesin, hydrOXYzine HCL, ketorolac, ondansetron ODT, polyethylene glycol, traMADol      ASSESSMENT:  Abdominal pain  Nausea, vomiting resolved  Constipation - resolved  Diarrhea - resolved  Abnormal CT abdomen/pelvis  REED Hepatic cirrhosis with ascites  Hyperbilirubinemia  Pancytopenia  Bilateral lower extremity edema  Pyuria  Type 2 diabetes mellitus with hyperglycemia  Pulmonary nodule  Protein calorie malnutrition    PLAN:  No focal deficits.  Symptoms may be secondary to hyperammonemia, blood glucose, rule out orthostatic hypotension.  Check ammonia level.  Repeat blood glucose.  Check orthostatic vital signs. Follow-up.  Patient continues to report abdominal pain, tenderness on examination.  Per Interventional Radiology, no fluid for abdominal paracentesis.  Gastroenterology following.  Input appreciated.  Discussed with NP.  Plan for outpatient follow-up with routine paracentesis.  Office to set up appointment.  Management per recommendations.  Continue home medications as prescribed.  Lactulose.  Diuretics Lasix 40 mg p.o. daily, Spironolactone 50 mg twice daily with parameters.  Nadolol.  Pain control.  Symptom control.  As needed analgesics, antiemetics.  Low-sodium, gluten-free, ADA diet as tolerated.  Labs reviewed.   Stable.  Fecal occult negative.  Outpatient follow-up labs.  Point-of-care glucose reviewed.  Glucose low normal.  Repeat and monitor point-of-care glucose AC/HS.  Encourage intake.  Hypoglycemia protocol.  ADA diet.  Protein calorie malnutrition.  Glucerna protein supplementation 3 times daily with meals.  Increase activity as tolerated.  Ambulate.  Supportive care.  Patient reassured.  Case management following for discharge planning.  Anticipate discharge home.  Discussed with patient, nursing, Dr. Lawrence.    ADDENDUM:  Blood glucose 157.  Vital signs reviewed.  Orthostatic vital signs negative.  Labs reviewed.  Ammonia 122.  Administer Lactulose.  Met with patient.   Continues to report symptoms.  CT head without contrast.  Start Meclizine.  Discussed with Dr. Lawrence.  Updated nursing.  Discharge if CT head negative.       Leny Daniels, MADYSON-CNP

## 2024-08-14 NOTE — CARE PLAN
Problem: Pain - Adult  Goal: Verbalizes/displays adequate comfort level or baseline comfort level  Outcome: Progressing     Problem: Safety - Adult  Goal: Free from fall injury  Outcome: Progressing     Problem: Discharge Planning  Goal: Discharge to home or other facility with appropriate resources  Outcome: Progressing     Problem: Chronic Conditions and Co-morbidities  Goal: Patient's chronic conditions and co-morbidity symptoms are monitored and maintained or improved  Outcome: Progressing     Problem: Pain  Goal: Takes deep breaths with improved pain control throughout the shift  Outcome: Progressing  Goal: Turns in bed with improved pain control throughout the shift  Outcome: Progressing  Goal: Walks with improved pain control throughout the shift  Outcome: Progressing  Goal: Performs ADL's with improved pain control throughout shift  Outcome: Progressing  Goal: Participates in PT with improved pain control throughout the shift  Outcome: Progressing  Goal: Free from opioid side effects throughout the shift  Outcome: Progressing  Goal: Free from acute confusion related to pain meds throughout the shift  Outcome: Progressing     Problem: Fall/Injury  Goal: Not fall by end of shift  Outcome: Progressing  Goal: Be free from injury by end of the shift  Outcome: Progressing  Goal: Verbalize understanding of personal risk factors for fall in the hospital  Outcome: Progressing  Goal: Verbalize understanding of risk factor reduction measures to prevent injury from fall in the home  Outcome: Progressing  Goal: Use assistive devices by end of the shift  Outcome: Progressing  Goal: Pace activities to prevent fatigue by end of the shift  Outcome: Progressing     Problem: Diabetes  Goal: Achieve decreasing blood glucose levels by end of shift  Outcome: Progressing  Goal: Increase stability of blood glucose readings by end of shift  Outcome: Progressing  Goal: Decrease in ketones present in urine by end of shift  Outcome:  Progressing  Goal: Maintain electrolyte levels within acceptable range throughout shift  Outcome: Progressing  Goal: Maintain glucose levels >70mg/dl to <250mg/dl throughout shift  Outcome: Progressing  Goal: No changes in neurological exam by end of shift  Outcome: Progressing  Goal: Learn about and adhere to nutrition recommendations by end of shift  Outcome: Progressing  Goal: Vital signs within normal range for age by end of shift  Outcome: Progressing  Goal: Increase self care and/or family involovement by end of shift  Outcome: Progressing  Goal: Receive DSME education by end of shift  Outcome: Progressing

## 2024-08-14 NOTE — PROGRESS NOTES
"Alfonzo Flanagan is a 49 y.o. female on day 0 of admission presenting with Generalized abdominal pain.    Subjective   She reports she is having nausea this am. No reported vomiting. Transport here to take patient to IR for para       Objective     Physical Exam  HENT:      Head: Normocephalic.      Nose: Nose normal.      Mouth/Throat:      Mouth: Mucous membranes are moist.   Eyes:      Pupils: Pupils are equal, round, and reactive to light.   Cardiovascular:      Rate and Rhythm: Normal rate.   Pulmonary:      Breath sounds: Normal breath sounds.   Abdominal:      General: There is distension.   Musculoskeletal:         General: Normal range of motion.      Cervical back: Normal range of motion.   Skin:     General: Skin is warm.   Neurological:      General: No focal deficit present.      Mental Status: She is alert.   Psychiatric:         Mood and Affect: Mood normal.         Last Recorded Vitals  Blood pressure 99/55, pulse 74, temperature 36.4 °C (97.5 °F), temperature source Oral, resp. rate 17, height 1.6 m (5' 3\"), weight 83.5 kg (184 lb), SpO2 100%.  Intake/Output last 3 Shifts:  I/O last 3 completed shifts:  In: 720 (8.6 mL/kg) [P.O.:720]  Out: 600 (7.2 mL/kg) [Urine:200 (0.1 mL/kg/hr); Stool:400]  Weight: 83.5 kg     Relevant Results      Scheduled medications  albumin human, 25 g, intravenous, Once  atorvastatin, 80 mg, oral, Nightly  docusate sodium, 100 mg, oral, BID  fenofibrate, 160 mg, oral, Daily  ferumoxytol, 510 mg, intravenous, Once  furosemide, 40 mg, oral, Daily  gabapentin, 200 mg, oral, TID  insulin glargine, 25 Units, subcutaneous, BID  lactulose, 20 g, oral, 4x daily  magnesium oxide, 400 mg, oral, Daily  nadolol, 20 mg, oral, Daily  pantoprazole, 40 mg, oral, Daily  polyethylene glycol, 17 g, oral, BID  rifAXIMin, 550 mg, oral, q12h RIVKA  sodium chloride, 1 spray, Each Nostril, 4x daily  spironolactone, 50 mg, oral, BID  sucralfate, 1 g, oral, Before meals & nightly  traZODone, 25 mg, " oral, Nightly  ursodiol, 300 mg, oral, TID      Continuous medications     PRN medications  PRN medications: acetaminophen **OR** acetaminophen **OR** acetaminophen, benzocaine-menthol, dextromethorphan-guaifenesin, dextrose, dextrose, glucagon, glucagon, guaiFENesin, hydrOXYzine HCL, ketorolac, ondansetron ODT, polyethylene glycol, traMADol  Results for orders placed or performed during the hospital encounter of 08/11/24 (from the past 24 hour(s))   POCT GLUCOSE   Result Value Ref Range    POCT Glucose 275 (H) 74 - 99 mg/dL   Occult Blood, Stool    Specimen: Stool   Result Value Ref Range    Occult Blood, Stool X1 Negative Negative   POCT GLUCOSE   Result Value Ref Range    POCT Glucose 264 (H) 74 - 99 mg/dL   POCT GLUCOSE   Result Value Ref Range    POCT Glucose 241 (H) 74 - 99 mg/dL   Comprehensive Metabolic Panel   Result Value Ref Range    Glucose 185 (H) 65 - 99 mg/dL    Sodium 137 133 - 145 mmol/L    Potassium 3.8 3.4 - 5.1 mmol/L    Chloride 105 97 - 107 mmol/L    Bicarbonate 25 24 - 31 mmol/L    Urea Nitrogen 9 8 - 25 mg/dL    Creatinine 0.90 0.40 - 1.60 mg/dL    eGFR 80 >60 mL/min/1.73m*2    Calcium 7.5 (L) 8.5 - 10.4 mg/dL    Albumin 2.1 (L) 3.5 - 5.0 g/dL    Alkaline Phosphatase 334 (H) 35 - 125 U/L    Total Protein 5.8 (L) 5.9 - 7.9 g/dL    AST 36 5 - 40 U/L    Bilirubin, Total 1.0 0.1 - 1.2 mg/dL    ALT 20 5 - 40 U/L    Anion Gap 7 <=19 mmol/L   CBC   Result Value Ref Range    WBC 2.8 (L) 4.4 - 11.3 x10*3/uL    nRBC 0.0 0.0 - 0.0 /100 WBCs    RBC 3.44 (L) 4.00 - 5.20 x10*6/uL    Hemoglobin 8.2 (L) 12.0 - 16.0 g/dL    Hematocrit 27.0 (L) 36.0 - 46.0 %    MCV 79 (L) 80 - 100 fL    MCH 23.8 (L) 26.0 - 34.0 pg    MCHC 30.4 (L) 32.0 - 36.0 g/dL    RDW 19.9 (H) 11.5 - 14.5 %    Platelets 74 (L) 150 - 450 x10*3/uL   POCT GLUCOSE   Result Value Ref Range    POCT Glucose 99 74 - 99 mg/dL                            Assessment/Plan   Assessment & Plan    Epigastric abdominal pain/Nausea, vomiting,  constipation  -Patient has multiple reported admissions this year with similar complaints of abdominal pain that typically resolves with a paracentesis/conservative measures. MANY CT scans. She has history of acute idiopathic pancreatitis. EGD with EUS 3/26/2024. Findings were c/f NET however tissue sample was not obtained. At that time, it was recommended to follow up mass in 1 year with EUS. MRI/MRCP on 7/26/2024: No pancreatic lesion identified. Cirrhosis and sequela of portal hypertension. No hepatic lesion.      Acute on chronic anemia (8.7)               -No overt bleeding. Last EGD in [facility]. No indication for urgent endoscopy at this time      8/13     Several loose stools reported overnight, seems to have slowed down. If continues, will get stool studies. Was scheduled for para today, however IR unavailable. Per primary team, patient can transfer to [facility] or [facility] for paracentesis if no one is available at [facility]. Patient will ultimately need set up with GI who accepts her insurance. She states she has followed with Dr. Zaman, but her insurance was not accepted.    8/14    Going for para this am. She reports no diarrhea this am. Patient will set up with our office for follow up with a standing order for paracentesis. Office will call patient and set up appointment. Lasix 40 mg po daily and increase Aldactone 100mg before discharge.     Adrianne Powell, APRN-CNP

## 2024-08-14 NOTE — PROGRESS NOTES
Head CT resulted. Spoke with ELISEO Daniels. She is comfortable discharging patient if ambulation goes well and patient is steady.   Patient ambulating well, states that she is ready to go home. Family at the bed side.

## 2024-08-15 ENCOUNTER — DOCUMENTATION (OUTPATIENT)
Dept: INPATIENT UNIT | Facility: HOSPITAL | Age: 49
End: 2024-08-15
Payer: COMMERCIAL

## 2024-08-15 NOTE — DISCHARGE SUMMARY
Discharge Diagnosis  Abdominal pain  Nausea, vomiting - resolved  Constipation - resolved  Diarrhea - resolved  Abnormal CT abdomen/pelvis  REED Hepatic cirrhosis with ascites  Hyperbilirubinemia  Pancytopenia  Bilateral lower extremity edema  Pyuria  Type 2 diabetes mellitus with hyperglycemia  Pulmonary nodule  Protein calorie malnutrition    Issues Requiring Follow-Up  Above    Discharge Meds     Your medication list        START taking these medications        Instructions Last Dose Given Next Dose Due   magnesium oxide 400 mg (241.3 mg magnesium) tablet  Commonly known as: Mag-Ox      Take 1 tablet (400 mg) by mouth once daily.       meclizine 25 mg tablet  Commonly known as: Antivert      Take 1 tablet (25 mg) by mouth 3 times a day as needed for dizziness.              CHANGE how you take these medications        Instructions Last Dose Given Next Dose Due   gabapentin 100 mg capsule  Commonly known as: Neurontin  What changed: when to take this      Take 2 capsules (200 mg) by mouth 3 times a day.              CONTINUE taking these medications        Instructions Last Dose Given Next Dose Due   atorvastatin 80 mg tablet  Commonly known as: Lipitor           docusate sodium 100 mg capsule  Commonly known as: Colace      Take 1 capsule (100 mg) by mouth 2 times a day. Hold for loose stool.       fenofibrate 145 mg tablet  Commonly known as: Tricor           furosemide 40 mg tablet  Commonly known as: Lasix      Take 1 tablet (40 mg) by mouth once daily. Hold for dizziness.       insulin lispro 100 unit/mL injection  Commonly known as: HumaLOG           lactulose 20 gram/30 mL oral solution      Take 30 mL (20 g) by mouth 4 times a day. Titrate for 3 bowel movements in 24 hours.       Lantus U-100 Insulin 100 unit/mL injection  Generic drug: insulin glargine      Inject 25 Units under the skin 2 times a day.       nadolol 20 mg tablet  Commonly known as: Corgard      Take 1 tablet (20 mg) by mouth once daily.        ondansetron ODT 4 mg disintegrating tablet  Commonly known as: Zofran-ODT      Take 1 tablet (4 mg) by mouth every 8 hours if needed for nausea or vomiting.       pantoprazole 40 mg EC tablet  Commonly known as: ProtoNix      Take 1 tablet (40 mg) by mouth once daily. Do not crush, chew, or split.       polyethylene glycol 17 gram packet  Commonly known as: Glycolax, Miralax      Take 17 g by mouth 2 times a day. Hold for loose stool.       sodium chloride 0.65 % nasal spray  Commonly known as: Miller      Administer 1 spray into each nostril 4 times a day.       spironolactone 50 mg tablet  Commonly known as: Aldactone      Take 1 tablet (50 mg) by mouth 2 times a day. Hold for dizziness.       sucralfate 100 mg/mL suspension  Commonly known as: Carafate      Take 10 mL (1 g) by mouth 4 times a day before meals.       traMADol 50 mg tablet  Commonly known as: Ultram      Take 1 tablet (50 mg) by mouth every 6 hours if needed for severe pain (7 - 10).       traZODone 50 mg tablet  Commonly known as: Desyrel           ursodiol 300 mg capsule  Commonly known as: Actigall           Xifaxan 550 mg tablet  Generic drug: rifAXIMin      Take 1 tablet (550 mg) by mouth every 12 hours.              STOP taking these medications      hydrOXYzine HCL 25 mg tablet  Commonly known as: Atarax                  Where to Get Your Medications        These medications were sent to [facility]  [address] [facility] [address]      Hours: 9 AM to 6 PM Mon-Fri, 9 AM to 1 PM Sat Phone: [phone]   meclizine 25 mg tablet         Test Results Pending At Discharge  Pending Labs       Order Current Status    Blood Culture Preliminary result    Blood Culture Preliminary result            Hospital Course  This is a very pleasant 49-year-old female past medical history significant for hepatic cirrhosis with ascites requiring paracentesis, portal hypertension, chronic pancreatitis, abdominal pain, nausea, vomiting  presenting with abdominal pain, nausea and vomiting.  Please refer to the H&P, HPI for details.  She presented to the emergency department.  In the emergency department initial workup was done.  Ammonia level 37.  Lipase 59.  CT abdomen and pelvis per radiology showed a cirrhotic liver without focal lesion, large abdominal pelvic ascites, 6 mm right middle lobe nodule.  Urinalysis showed glucose, 25 leukocytes.  She was treated in emergency department and was admitted.  She was evaluated treated by Gastroenterology.  Interventional radiology was consulted for paracentesis.  Per Interventional Radiology, there was not enough fluid to drain for abdominal paracentesis.  The procedure was cancelled.  She diarrhea which resolved.  She was treated with Lactulose.  She encouraged compliance with medications including lactulose, MiraLAX, Lasix and Spironolactone.  She reported blurry vision and dizziness.  Ammonia level was increased, 122.  She was not taking Lactulose.  She was treated with Lactulose.  CT head without contrast showed nothing acute, sinusitis.  She was treated with Meclizine.  Her mentation was stable.  Her symptoms improved.  She ambulated the hallways prior to discharge.  Her condition improved.  She was advised to follow-up with her primary care provider in 1 week.  She was cleared by Gastroenterology for discharge advised close outpatient follow-up - Gastroenterology office to contact for follow-up appointment and routine paracentesis.     Pertinent Physical Exam At Time of Discharge  See physical examination    Outpatient Follow-Up  No future appointments.    Follow-up with Gastroenterology - routine paracentesis --> office to call to schedule appointment.     Follow-up with primary care provider in 1 week.    MADYSON Sotelo-CNP

## 2024-08-16 LAB
BACTERIA BLD CULT: NORMAL
BACTERIA BLD CULT: NORMAL

## 2024-08-17 ENCOUNTER — APPOINTMENT (OUTPATIENT)
Dept: CARDIOLOGY | Facility: HOSPITAL | Age: 49
End: 2024-08-17
Payer: COMMERCIAL

## 2024-08-17 ENCOUNTER — APPOINTMENT (OUTPATIENT)
Dept: RADIOLOGY | Facility: HOSPITAL | Age: 49
End: 2024-08-17
Payer: COMMERCIAL

## 2024-08-17 ENCOUNTER — HOSPITAL ENCOUNTER (EMERGENCY)
Facility: HOSPITAL | Age: 49
Discharge: HOME | End: 2024-08-17
Attending: EMERGENCY MEDICINE
Payer: COMMERCIAL

## 2024-08-17 VITALS
SYSTOLIC BLOOD PRESSURE: 133 MMHG | BODY MASS INDEX: 35 KG/M2 | TEMPERATURE: 98.4 F | HEIGHT: 62 IN | OXYGEN SATURATION: 98 % | DIASTOLIC BLOOD PRESSURE: 88 MMHG | HEART RATE: 77 BPM | RESPIRATION RATE: 16 BRPM | WEIGHT: 190.2 LBS

## 2024-08-17 DIAGNOSIS — N39.0 ACUTE UTI (URINARY TRACT INFECTION): ICD-10-CM

## 2024-08-17 DIAGNOSIS — K29.00 ACUTE GASTRITIS WITHOUT HEMORRHAGE, UNSPECIFIED GASTRITIS TYPE: ICD-10-CM

## 2024-08-17 DIAGNOSIS — K74.60 CIRRHOSIS OF LIVER WITH ASCITES, UNSPECIFIED HEPATIC CIRRHOSIS TYPE (MULTI): ICD-10-CM

## 2024-08-17 DIAGNOSIS — R10.84 ABDOMINAL PAIN, GENERALIZED: Primary | ICD-10-CM

## 2024-08-17 DIAGNOSIS — R18.8 CIRRHOSIS OF LIVER WITH ASCITES, UNSPECIFIED HEPATIC CIRRHOSIS TYPE (MULTI): ICD-10-CM

## 2024-08-17 LAB
ALBUMIN SERPL-MCNC: 2.3 G/DL (ref 3.5–5)
ALP BLD-CCNC: 365 U/L (ref 35–125)
ALT SERPL-CCNC: 19 U/L (ref 5–40)
ANION GAP SERPL CALC-SCNC: 6 MMOL/L
APPEARANCE UR: ABNORMAL
AST SERPL-CCNC: 34 U/L (ref 5–40)
BASOPHILS # BLD AUTO: 0.02 X10*3/UL (ref 0–0.1)
BASOPHILS NFR BLD AUTO: 0.5 %
BILIRUB SERPL-MCNC: 1.1 MG/DL (ref 0.1–1.2)
BILIRUB UR STRIP.AUTO-MCNC: NEGATIVE MG/DL
BUN SERPL-MCNC: 14 MG/DL (ref 8–25)
CALCIUM SERPL-MCNC: 7.8 MG/DL (ref 8.5–10.4)
CHLORIDE SERPL-SCNC: 104 MMOL/L (ref 97–107)
CO2 SERPL-SCNC: 28 MMOL/L (ref 24–31)
COLOR UR: YELLOW
CREAT SERPL-MCNC: 0.7 MG/DL (ref 0.4–1.6)
EGFRCR SERPLBLD CKD-EPI 2021: >90 ML/MIN/1.73M*2
EOSINOPHIL # BLD AUTO: 0.16 X10*3/UL (ref 0–0.7)
EOSINOPHIL NFR BLD AUTO: 4.2 %
ERYTHROCYTE [DISTWIDTH] IN BLOOD BY AUTOMATED COUNT: 19.7 % (ref 11.5–14.5)
ETHANOL SERPL-MCNC: <0.01 G/DL
GLUCOSE SERPL-MCNC: 315 MG/DL (ref 65–99)
GLUCOSE UR STRIP.AUTO-MCNC: ABNORMAL MG/DL
HCG UR QL IA.RAPID: NEGATIVE
HCT VFR BLD AUTO: 27.8 % (ref 36–46)
HGB BLD-MCNC: 8.7 G/DL (ref 12–16)
IMM GRANULOCYTES # BLD AUTO: 0.01 X10*3/UL (ref 0–0.7)
IMM GRANULOCYTES NFR BLD AUTO: 0.3 % (ref 0–0.9)
KETONES UR STRIP.AUTO-MCNC: NEGATIVE MG/DL
LACTATE BLDV-SCNC: 1.3 MMOL/L (ref 0.4–2)
LEUKOCYTE ESTERASE UR QL STRIP.AUTO: ABNORMAL
LIPASE SERPL-CCNC: 122 U/L (ref 16–63)
LYMPHOCYTES # BLD AUTO: 1.19 X10*3/UL (ref 1.2–4.8)
LYMPHOCYTES NFR BLD AUTO: 31.6 %
MCH RBC QN AUTO: 24.5 PG (ref 26–34)
MCHC RBC AUTO-ENTMCNC: 31.3 G/DL (ref 32–36)
MCV RBC AUTO: 78 FL (ref 80–100)
MONOCYTES # BLD AUTO: 0.43 X10*3/UL (ref 0.1–1)
MONOCYTES NFR BLD AUTO: 11.4 %
MUCOUS THREADS #/AREA URNS AUTO: ABNORMAL /LPF
NEUTROPHILS # BLD AUTO: 1.96 X10*3/UL (ref 1.2–7.7)
NEUTROPHILS NFR BLD AUTO: 52 %
NITRITE UR QL STRIP.AUTO: NEGATIVE
NRBC BLD-RTO: 0 /100 WBCS (ref 0–0)
NT-PROBNP SERPL-MCNC: 61 PG/ML (ref 0–192)
PH UR STRIP.AUTO: 6.5 [PH]
PLATELET # BLD AUTO: 80 X10*3/UL (ref 150–450)
POTASSIUM SERPL-SCNC: 3.8 MMOL/L (ref 3.4–5.1)
PROT SERPL-MCNC: 6.7 G/DL (ref 5.9–7.9)
PROT UR STRIP.AUTO-MCNC: ABNORMAL MG/DL
RBC # BLD AUTO: 3.55 X10*6/UL (ref 4–5.2)
RBC # UR STRIP.AUTO: NEGATIVE /UL
RBC #/AREA URNS AUTO: ABNORMAL /HPF
SODIUM SERPL-SCNC: 138 MMOL/L (ref 133–145)
SP GR UR STRIP.AUTO: 1.03
SQUAMOUS #/AREA URNS AUTO: ABNORMAL /HPF
TROPONIN T SERPL-MCNC: <6 NG/L
TROPONIN T SERPL-MCNC: <6 NG/L
UROBILINOGEN UR STRIP.AUTO-MCNC: ABNORMAL MG/DL
WBC # BLD AUTO: 3.8 X10*3/UL (ref 4.4–11.3)
WBC #/AREA URNS AUTO: >50 /HPF

## 2024-08-17 PROCEDURE — 84484 ASSAY OF TROPONIN QUANT: CPT | Performed by: EMERGENCY MEDICINE

## 2024-08-17 PROCEDURE — 36415 COLL VENOUS BLD VENIPUNCTURE: CPT | Performed by: EMERGENCY MEDICINE

## 2024-08-17 PROCEDURE — 87086 URINE CULTURE/COLONY COUNT: CPT | Mod: WESLAB | Performed by: EMERGENCY MEDICINE

## 2024-08-17 PROCEDURE — 2500000004 HC RX 250 GENERAL PHARMACY W/ HCPCS (ALT 636 FOR OP/ED): Performed by: EMERGENCY MEDICINE

## 2024-08-17 PROCEDURE — 71045 X-RAY EXAM CHEST 1 VIEW: CPT | Performed by: RADIOLOGY

## 2024-08-17 PROCEDURE — 85025 COMPLETE CBC W/AUTO DIFF WBC: CPT | Performed by: EMERGENCY MEDICINE

## 2024-08-17 PROCEDURE — 71045 X-RAY EXAM CHEST 1 VIEW: CPT

## 2024-08-17 PROCEDURE — 93005 ELECTROCARDIOGRAM TRACING: CPT

## 2024-08-17 PROCEDURE — 2550000001 HC RX 255 CONTRASTS: Performed by: EMERGENCY MEDICINE

## 2024-08-17 PROCEDURE — 81001 URINALYSIS AUTO W/SCOPE: CPT | Performed by: EMERGENCY MEDICINE

## 2024-08-17 PROCEDURE — 83690 ASSAY OF LIPASE: CPT | Performed by: EMERGENCY MEDICINE

## 2024-08-17 PROCEDURE — 80053 COMPREHEN METABOLIC PANEL: CPT | Performed by: EMERGENCY MEDICINE

## 2024-08-17 PROCEDURE — 83880 ASSAY OF NATRIURETIC PEPTIDE: CPT | Performed by: EMERGENCY MEDICINE

## 2024-08-17 PROCEDURE — 74177 CT ABD & PELVIS W/CONTRAST: CPT | Performed by: STUDENT IN AN ORGANIZED HEALTH CARE EDUCATION/TRAINING PROGRAM

## 2024-08-17 PROCEDURE — 96375 TX/PRO/DX INJ NEW DRUG ADDON: CPT

## 2024-08-17 PROCEDURE — 2500000004 HC RX 250 GENERAL PHARMACY W/ HCPCS (ALT 636 FOR OP/ED): Performed by: PHYSICIAN ASSISTANT

## 2024-08-17 PROCEDURE — 74177 CT ABD & PELVIS W/CONTRAST: CPT

## 2024-08-17 PROCEDURE — 83605 ASSAY OF LACTIC ACID: CPT | Performed by: EMERGENCY MEDICINE

## 2024-08-17 PROCEDURE — 99285 EMERGENCY DEPT VISIT HI MDM: CPT

## 2024-08-17 PROCEDURE — 96365 THER/PROPH/DIAG IV INF INIT: CPT

## 2024-08-17 PROCEDURE — 81025 URINE PREGNANCY TEST: CPT | Performed by: EMERGENCY MEDICINE

## 2024-08-17 PROCEDURE — 82077 ASSAY SPEC XCP UR&BREATH IA: CPT | Performed by: EMERGENCY MEDICINE

## 2024-08-17 RX ORDER — CEPHALEXIN 500 MG/1
500 CAPSULE ORAL 3 TIMES DAILY
Qty: 21 CAPSULE | Refills: 0 | Status: SHIPPED | OUTPATIENT
Start: 2024-08-17 | End: 2024-08-24

## 2024-08-17 RX ORDER — ONDANSETRON HYDROCHLORIDE 2 MG/ML
4 INJECTION, SOLUTION INTRAVENOUS ONCE
Status: COMPLETED | OUTPATIENT
Start: 2024-08-17 | End: 2024-08-17

## 2024-08-17 RX ORDER — CEFTRIAXONE 1 G/50ML
1 INJECTION, SOLUTION INTRAVENOUS ONCE
Status: COMPLETED | OUTPATIENT
Start: 2024-08-17 | End: 2024-08-17

## 2024-08-17 RX ORDER — FAMOTIDINE 10 MG/ML
20 INJECTION INTRAVENOUS ONCE
Status: COMPLETED | OUTPATIENT
Start: 2024-08-17 | End: 2024-08-17

## 2024-08-17 RX ORDER — MORPHINE SULFATE 4 MG/ML
4 INJECTION, SOLUTION INTRAMUSCULAR; INTRAVENOUS ONCE
Status: COMPLETED | OUTPATIENT
Start: 2024-08-17 | End: 2024-08-17

## 2024-08-17 RX ORDER — MORPHINE SULFATE 4 MG/ML
4 INJECTION, SOLUTION INTRAMUSCULAR; INTRAVENOUS ONCE
Status: DISCONTINUED | OUTPATIENT
Start: 2024-08-17 | End: 2024-08-17 | Stop reason: HOSPADM

## 2024-08-17 RX ADMIN — FAMOTIDINE 20 MG: 10 INJECTION, SOLUTION INTRAVENOUS at 13:18

## 2024-08-17 RX ADMIN — IOHEXOL 75 ML: 350 INJECTION, SOLUTION INTRAVENOUS at 14:33

## 2024-08-17 RX ADMIN — MORPHINE SULFATE 4 MG: 4 INJECTION, SOLUTION INTRAMUSCULAR; INTRAVENOUS at 13:18

## 2024-08-17 RX ADMIN — CEFTRIAXONE SODIUM 1 G: 1 INJECTION, SOLUTION INTRAVENOUS at 15:00

## 2024-08-17 RX ADMIN — ONDANSETRON 4 MG: 2 INJECTION INTRAMUSCULAR; INTRAVENOUS at 13:18

## 2024-08-17 ASSESSMENT — PAIN DESCRIPTION - PAIN TYPE: TYPE: ACUTE PAIN

## 2024-08-17 ASSESSMENT — COLUMBIA-SUICIDE SEVERITY RATING SCALE - C-SSRS
1. IN THE PAST MONTH, HAVE YOU WISHED YOU WERE DEAD OR WISHED YOU COULD GO TO SLEEP AND NOT WAKE UP?: NO
2. HAVE YOU ACTUALLY HAD ANY THOUGHTS OF KILLING YOURSELF?: NO
6. HAVE YOU EVER DONE ANYTHING, STARTED TO DO ANYTHING, OR PREPARED TO DO ANYTHING TO END YOUR LIFE?: NO

## 2024-08-17 ASSESSMENT — PAIN DESCRIPTION - ORIENTATION: ORIENTATION: RIGHT;MID

## 2024-08-17 ASSESSMENT — PAIN DESCRIPTION - FREQUENCY: FREQUENCY: CONSTANT/CONTINUOUS

## 2024-08-17 ASSESSMENT — PAIN SCALES - GENERAL: PAINLEVEL_OUTOF10: 8

## 2024-08-17 ASSESSMENT — PAIN DESCRIPTION - DESCRIPTORS: DESCRIPTORS: SQUEEZING

## 2024-08-17 ASSESSMENT — PAIN - FUNCTIONAL ASSESSMENT: PAIN_FUNCTIONAL_ASSESSMENT: 0-10

## 2024-08-17 ASSESSMENT — PAIN DESCRIPTION - LOCATION: LOCATION: ABDOMEN

## 2024-08-17 NOTE — DISCHARGE INSTRUCTIONS
Please continue tramadol for abdominal pain.  Please take cephalexin for UTI.     Please follow-up with your primary care doctor for your ascites secondary to your cirrhosis.  Please return to the emergency department new or worsening symptoms with the onset of new symptoms.

## 2024-08-17 NOTE — ED TRIAGE NOTES
Pt to ED for abdominal pain that is constant in the mid upper abdomen and the right side that is described as squeezing for the past 2 days. Pt with nausea and bilateral feet swelling, headache, diarrhea and vomit x1 yesterday. Pt is unable to sleep due to the pain. Abdomen is distended with history of cirrhosis for about a year not due to ETOH. No home medications are helping.

## 2024-08-17 NOTE — ED PROVIDER NOTES
HPI   Chief Complaint   Patient presents with    Abdominal Pain     Pt to ED for abdominal pain that is constant in the mid upper abdomen and the right side that is described as squeezing for the past 2 days. Pt with nausea and bilateral feet swelling, headache, diarrhea and vomit x1 yesterday. Pt is unable to sleep due to the pain. Abdomen is distended with history of cirrhosis for about a year not due to ETOH. No home medications are helping.        This is a 49-year-old female presenting to the emergency department complaints of abdominal pain.  She was recently hospitalized for abdominal pain.  She does have a history of cirrhosis of the liver.  She does have ascites.  States that the pain has been bothering her and is causing her difficulty with sleeping. Patient did have issues with insurance that was limiting her from being able to follow-up with her primary care doctor or gastroenterology.  She is getting care source and is in the progress of getting this established.  Patient states the pain is gradually worsened over the last few weeks.  There is no alleviating or exacerbating factors.  There is no radiation.  She does have occasional nausea she has not been vomiting.  She denies chest pain or shortness of breath.  No urinary complaints.  No back pain.      Please see HPI for pertinent positive and negative ROS.         Patient History   Past Medical History:   Diagnosis Date    Cirrhosis of liver (Multi)     Diabetes mellitus (Multi)      Past Surgical History:   Procedure Laterality Date    ABDOMINAL SURGERY      CT GUIDED IMAGING FOR NEEDLE PLACEMENT  10/14/2020    CT GUIDED IMAGING FOR NEEDLE PLACEMENT [facility]    US GUIDED ABDOMINAL PARACENTESIS  10/08/2020    US GUIDED ABDOMINAL PARACENTESIS [facility]     No family history on file.  Social History     Tobacco Use    Smoking status: Never    Smokeless tobacco: Never   Substance Use Topics    Alcohol use: Never    Drug use: Never        Physical Exam   ED Triage Vitals [08/17/24 1243]   Temperature Heart Rate Respirations BP   36.9 °C (98.4 °F) 90 18 112/63      Pulse Ox Temp Source Heart Rate Source Patient Position   98 % Temporal -- Sitting      BP Location FiO2 (%)     Left arm --       Physical Exam  GENERAL APPEARANCE: Awake and alert. No acute respiratory distress.   VITAL SIGNS: As per the nurses' triage record.  HEENT: Normocephalic, atraumatic. Extraocular muscles are intact. NECK: Soft, nontender and supple   CHEST: Nontender to palpation. Clear to auscultation bilaterally. Symmetric rise and fall of chest wall.   HEART: Clear S1 and S2. Regular rate and rhythm. Strong and equal pulses in the extremities.  ABDOMEN: Firm distended abdomen.  No overlying skin changes.  Tenderness to palpation in the epigastric region.  MUSCULOSKELETAL:  Full gross active range of motion. Ambulating on own with no acute difficulties  NEUROLOGICAL: Awake, alert and oriented x 3. Patient answering questions appropriately.   IMMUNOLOGICAL: No lymphatic streaking noted  DERMATOLOGIC: Warm and dry   PYSCH: Cooperative with appropriate mood and affect.    ED Course & MDM   Diagnoses as of 08/17/24 1747   Abdominal pain, generalized   Cirrhosis of liver with ascites, unspecified hepatic cirrhosis type (Multi)   Acute UTI (urinary tract infection)   Acute gastritis without hemorrhage, unspecified gastritis type                 No data recorded     [place] Coma Scale Score: 15 (08/17/24 1245 : Karen Trivedi RN)                           Medical Decision Making  Parts of this chart have been completed using voice recognition software. Please excuse any errors of transcription.  My thought process and reason for plan has been formulated from the time that I saw the patient until the time of disposition and is not specific to one specific moment during their visit and furthermore my MDM encompasses this entire chart and not only this text box.      HPI:  Detailed above.    Exam: A medically appropriate exam performed, outlined above, given the known history and presentation.    History obtained from: Patient    EKG: See my supervising physician's EKG interpretation    Medications given during visit:  Medications   famotidine PF (Pepcid) injection 20 mg (20 mg intravenous Given 8/17/24 1318)   ondansetron (Zofran) injection 4 mg (4 mg intravenous Given 8/17/24 1318)   morphine injection 4 mg (4 mg intravenous Given 8/17/24 1318)   iohexol (OMNIPaque) 350 mg iodine/mL solution 75 mL (75 mL intravenous Given 8/17/24 1433)   cefTRIAXone (Rocephin) 1 g in dextrose (iso) IV 50 mL (0 g intravenous Stopped 8/17/24 1530)        Diagnostic/tests  Labs Reviewed   CBC WITH AUTO DIFFERENTIAL - Abnormal       Result Value    WBC 3.8 (*)     nRBC 0.0      RBC 3.55 (*)     Hemoglobin 8.7 (*)     Hematocrit 27.8 (*)     MCV 78 (*)     MCH 24.5 (*)     MCHC 31.3 (*)     RDW 19.7 (*)     Platelets 80 (*)     Neutrophils % 52.0      Immature Granulocytes %, Automated 0.3      Lymphocytes % 31.6      Monocytes % 11.4      Eosinophils % 4.2      Basophils % 0.5      Neutrophils Absolute 1.96      Immature Granulocytes Absolute, Automated 0.01      Lymphocytes Absolute 1.19 (*)     Monocytes Absolute 0.43      Eosinophils Absolute 0.16      Basophils Absolute 0.02     COMPREHENSIVE METABOLIC PANEL - Abnormal    Glucose 315 (*)     Sodium 138      Potassium 3.8      Chloride 104      Bicarbonate 28      Urea Nitrogen 14      Creatinine 0.70      eGFR >90      Calcium 7.8 (*)     Albumin 2.3 (*)     Alkaline Phosphatase 365 (*)     Total Protein 6.7      AST 34      Bilirubin, Total 1.1      ALT 19      Anion Gap 6     LIPASE - Abnormal    Lipase 122 (*)    URINALYSIS WITH REFLEX CULTURE AND MICROSCOPIC - Abnormal    Color, Urine Yellow      Appearance, Urine Turbid (*)     Specific Gravity, Urine 1.029      pH, Urine 6.5      Protein, Urine 50 (1+) (*)     Glucose, Urine 300 (3+) (*)      Blood, Urine NEGATIVE      Ketones, Urine NEGATIVE      Bilirubin, Urine NEGATIVE      Urobilinogen, Urine 6 (2+) (*)     Nitrite, Urine NEGATIVE      Leukocyte Esterase, Urine 500 Mckenzie/µL (*)    MICROSCOPIC ONLY, URINE - Abnormal    WBC, Urine >50 (*)     RBC, Urine 6-10 (*)     Squamous Epithelial Cells, Urine 26-50 (1+)      Mucus, Urine 4+     N-TERMINAL PROBNP - Normal    PROBNP 61      Narrative:     Reference ranges are based on clinical submission data. These ranges represent the 95th percentile of normal cut-off points. As NT Pro- BNP values approach 1000 pg/ml, clinical symptoms are more likely associated with CHF.   HCG, URINE, QUALITATIVE - Normal    HCG, Urine NEGATIVE     ALCOHOL - Normal    Alcohol <0.010     SERIAL TROPONIN, INITIAL ([facility]) - Normal    Troponin T, High Sensitivity <6     BLOOD GAS LACTIC ACID, VENOUS - Normal    POCT Lactate, Venous 1.3     SERIAL TROPONIN,  2 HOUR ([facility]) - Normal    Troponin T, High Sensitivity <6     URINE CULTURE   TROPONIN T SERIES, HIGH SENSITIVITY (0, 2 HR, 6 HR)    Narrative:     The following orders were created for panel order Troponin T Series, High Sensitivity (0, 2HR, 6HR).  Procedure                               Abnormality         Status                     ---------                               -----------         ------                     Serial Troponin, Initial...[[id number]]  Normal              Final result               Serial Troponin, 2 Hour ...[[id number]]  Normal              Final result               Serial Troponin, 6 Hour ...[[id number]]                                                   Please view results for these tests on the individual orders.   URINALYSIS WITH REFLEX CULTURE AND MICROSCOPIC    Narrative:     The following orders were created for panel order Urinalysis with Reflex Culture and Microscopic.  Procedure                               Abnormality         Status                     ---------                                -----------         ------                     Urinalysis with Reflex C...[[id number]]  Abnormal            Final result               Extra Urine Gray Tube[[id number]]                                                         Please view results for these tests on the individual orders.   EXTRA URINE GRAY TUBE   SERIAL TROPONIN, 6 HOUR ([facility])      CT abdomen pelvis w IV contrast   Final Result   1.  Similar and persistent moderate to large amount of abdominopelvic   ascites. No evidence of free air or fluid collection. Cirrhotic liver   morphology and recanalization of the paraumbilical vein similar to   prior.   2. Gastric wall thickening and surrounding mesenteric edema may be   due to gastritis or may be reactive due to ascites. Thickening of   proximal small bowel loops and of the cecal region. This may also be   due to portal colopathy or may represent an infectious/inflammatory   process.             Signed by: Andrew Shrestha 8/17/2024 3:15 PM   Dictation workstation:   [id number]      XR chest 1 view   Final Result   1. No acute cardiopulmonary process.        MACRO:   None.        Signed by: Savannah Zaman 8/17/2024 2:11 PM   Dictation workstation:   [id number]           Considerations/further MDM:  Patient was seen in conjucntion with my supervising physician,  Dr. Shin. Please refer to her note.    Differential diagnosis includes was not limited to ascites versus UTI versus gastritis versus peptic ulcer disease versus pancreatitis versus ACS    Urinalysis does show evidence of urinary tract infection.  Patient was given IV ceftriaxone for this.  She was given IV famotidine, morphine and Zofran for abdominal pain with improvement.  Troponin T x 2 less than 6.  CMP shows elevated alk phos that has been elevated on previous CMP is consistent with liver cirrhosis.  No elevation in total bilirubin.  proBNP 61.  Lactic 1.3.  Patient does have a microcytic anemia that remains fairly unchanged from previous  CBCs without any acute worsening.  There is leukopenia which also has been present on previous CBCs.  There is chronic ascites seen on CT of the abdomen pelvis with IV contrast that does not look acutely worse from previous CT.  There is possible evidence of gastritis secondary to the ascites.  There is thickening of the proximal small bowel.  I did speak with patient's admitting PCP, Dr. Lawrence, thoroughly went over labs and imaging.  He does not feel the patient needs to be admitted to the hospital at this time since she was discharged recently and feels outpatient follow-up is the next best step.  Patient was discharged with a prescription for cephalexin for UTI.  She does have a prescription for tramadol for her abdominal pain/ascites that she was told to continue.    The patient was evaluated in the emergency department and an etiology requiring emergent treatment or admission to hospital was not identified.  All labs, imaging, and diagnostic studies were reviewed by me and the patient was counseled on clinical impression, expectations, and plan.  Patient was educated to follow-up with PCP in the following 1 to 2 days.  All questions from patient were answered.  They elicited understanding and were agreeable to course of treatment.  Patient was discharged in stable condition and given strict return precautions including new or worsening symptoms.    Procedure  Procedures     Amada Gerber PA-C  08/18/24 [id number]

## 2024-08-17 NOTE — PROGRESS NOTES
Attestation/Supervisory note for EDEN Gerber      The patient is a 49-year-old female presenting to the emergency department for evaluation of worsening abdominal pain.  The patient states that she does have a history of chronic abdominal pain and does have ascites.  She states that she has not been able to follow-up with her outpatient doctors including her primary care physician or gastroenterology due to insurance issues.  She states that she is in the process of getting care source but cannot be seen as an outpatient until it is established.  She states that she has had to be admitted before because of similar symptoms.  She states that the abdominal pain is diffuse.  It is gradually worsening over the past several weeks.  No specific better or worse.  No radiation.  She reports occasional nausea but no vomiting.  She is not nauseated at this time.  She denies any headache or visual changes.  No chest pain or shortness of breath.  No back pain.  No urinary complaints.  No vaginal discharge.  No diarrhea or constipation.  No focal weakness or numbness.  No fever or chills.  All pertinent positives and negatives are recorded above.  All other systems reviewed and otherwise negative.  Vital signs within normal limits.  Physical exam with a well-nourished well-developed female in no acute distress.  HEENT exam with dry mucous membranes but otherwise unremarkable.  She has no evidence of airway compromise or respiratory distress.  Abdominal exam with diffuse tenderness to palpation and distention.  No palpable masses.  No flank pain with percussion or palpation.  She does not have any gross motor, neurologic or vascular deficits on exam.  Pulses are equal bilaterally.      EKG with normal sinus rhythm at 90 bpm, normal axis, normal voltage, normal ST segment, and normal T waves      IV fluids, IV morphine, IV Zofran and IV Pepcid ordered.      Diagnostic labs with evidence of urinary tract infection, mild  hyperglycemia, elevated lipase and pancytopenia      Lactic acid 1.3      Initial troponin T < 6.  Repeat trop T < 6      IV Rocephin was ordered for treatment of her urinary tract infection      CT abdomen pelvis w IV contrast   Final Result   1.  Similar and persistent moderate to large amount of abdominopelvic   ascites. No evidence of free air or fluid collection. Cirrhotic liver   morphology and recanalization of the paraumbilical vein similar to   prior.   2. Gastric wall thickening and surrounding mesenteric edema may be   due to gastritis or may be reactive due to ascites. Thickening of   proximal small bowel loops and of the cecal region. This may also be   due to portal colopathy or may represent an infectious/inflammatory   process.             Signed by: Andrew Shrestha 8/17/2024 3:15 PM   Dictation workstation:   [id number]      XR chest 1 view   Final Result   1. No acute cardiopulmonary process.        MACRO:   None.        Signed by: Savannah Zaman 8/17/2024 2:11 PM   Dictation workstation:   [id number]         Patient does not have any evidence of ischemia on EKG or cardiac enzymes.  No events on telemetry.  Chest x-ray without acute process.  No evidence of pneumonia or pneumothorax.  No evidence of CHF.  No widening of the mediastinum.  The patient does have equal pulses bilaterally.  The patient does have evidence of urinary tract infection on diagnostic labs and an elevated lipase.  She does not have any evidence of hemodynamic instability.  CT abdomen and pelvis shows evidence of persistent ascites consistent with previous imaging.  She does not have any evidence of acute pancreatitis.  There is some gastric wall thickening and surrounding mesenteric edema due thought to be possible gastritis by the radiologist.  The case was discussed with the patient's primary care physician as she did request inpatient admission for her symptoms due to her inability to follow-up as an outpatient due to  insurance issues.  Dr. Lawrence declined to admit the patient at this time given that she did not have any indication for admission.  He did recommend outpatient follow-up with her gastroenterologist within the next several days.      The patient was released in good condition.  She was given a prescription for Keflex to treat for her urinary tract infection.  She will follow-up with her primary care physician within 1 to 2 days for further management of her current symptoms and review of the urine culture results.  She was also given a referral to gastroenterology and instructed to follow-up within 2 to 3 days for further management of her current symptoms.    Impression/diagnosis:  Abdominal pain, acute on chronic  Ascites  Acute lower urinary tract infection      I personally saw the patient and made/approve the management plan and take responsibility for the patient management.      I independently interpreted the following study (S) EKG and diagnostic labs      I personally discussed the patient's management with the patient      I reviewed the results of the diagnostic labs and diagnostic imaging.  Formal radiology read was completed by the radiologist.      Delphine Shin MD

## 2024-08-19 LAB — BACTERIA UR CULT: NORMAL

## 2024-08-22 LAB
ATRIAL RATE: 90 BPM
P AXIS: 49 DEGREES
P OFFSET: 192 MS
P ONSET: 137 MS
PR INTERVAL: 184 MS
Q ONSET: 229 MS
QRS COUNT: 14 BEATS
QRS DURATION: 80 MS
QT INTERVAL: 380 MS
QTC CALCULATION(BAZETT): 464 MS
QTC FREDERICIA: 435 MS
R AXIS: 0 DEGREES
T AXIS: 10 DEGREES
T OFFSET: 419 MS
VENTRICULAR RATE: 90 BPM

## 2024-09-13 ENCOUNTER — APPOINTMENT (OUTPATIENT)
Dept: RADIOLOGY | Facility: HOSPITAL | Age: 49
End: 2024-09-13
Payer: COMMERCIAL

## 2024-09-19 ENCOUNTER — HOSPITAL ENCOUNTER (EMERGENCY)
Facility: HOSPITAL | Age: 49
Discharge: HOME | End: 2024-09-19
Attending: STUDENT IN AN ORGANIZED HEALTH CARE EDUCATION/TRAINING PROGRAM
Payer: COMMERCIAL

## 2024-09-19 ENCOUNTER — APPOINTMENT (OUTPATIENT)
Dept: RADIOLOGY | Facility: HOSPITAL | Age: 49
End: 2024-09-19
Payer: COMMERCIAL

## 2024-09-19 VITALS
DIASTOLIC BLOOD PRESSURE: 71 MMHG | TEMPERATURE: 97.9 F | RESPIRATION RATE: 18 BRPM | HEART RATE: 85 BPM | WEIGHT: 200 LBS | SYSTOLIC BLOOD PRESSURE: 119 MMHG | HEIGHT: 62 IN | OXYGEN SATURATION: 99 % | BODY MASS INDEX: 36.8 KG/M2

## 2024-09-19 DIAGNOSIS — R10.13 ABDOMINAL PAIN, EPIGASTRIC: Primary | ICD-10-CM

## 2024-09-19 LAB
ALBUMIN SERPL BCP-MCNC: 2.5 G/DL (ref 3.4–5)
ALP SERPL-CCNC: 314 U/L (ref 33–110)
ALT SERPL W P-5'-P-CCNC: 23 U/L (ref 7–45)
AMMONIA PLAS-SCNC: 64 UMOL/L (ref 16–53)
ANION GAP SERPL CALCULATED.3IONS-SCNC: 9 MMOL/L (ref 10–20)
ANION GAP SERPL CALCULATED.3IONS-SCNC: <7 MMOL/L (ref 10–20)
APPEARANCE UR: CLEAR
AST SERPL W P-5'-P-CCNC: 72 U/L (ref 9–39)
BASOPHILS # BLD AUTO: 0.03 X10*3/UL (ref 0–0.1)
BASOPHILS NFR BLD AUTO: 0.7 %
BILIRUB SERPL-MCNC: 1.4 MG/DL (ref 0–1.2)
BILIRUB UR STRIP.AUTO-MCNC: NEGATIVE MG/DL
BUN SERPL-MCNC: 11 MG/DL (ref 6–23)
BUN SERPL-MCNC: 12 MG/DL (ref 6–23)
CALCIUM SERPL-MCNC: 7.9 MG/DL (ref 8.6–10.3)
CALCIUM SERPL-MCNC: 8.1 MG/DL (ref 8.6–10.3)
CHLORIDE SERPL-SCNC: 102 MMOL/L (ref 98–107)
CHLORIDE SERPL-SCNC: 104 MMOL/L (ref 98–107)
CO2 SERPL-SCNC: 27 MMOL/L (ref 21–32)
CO2 SERPL-SCNC: 30 MMOL/L (ref 21–32)
COLOR UR: COLORLESS
CREAT SERPL-MCNC: 0.63 MG/DL (ref 0.5–1.05)
CREAT SERPL-MCNC: 0.71 MG/DL (ref 0.5–1.05)
EGFRCR SERPLBLD CKD-EPI 2021: >90 ML/MIN/1.73M*2
EGFRCR SERPLBLD CKD-EPI 2021: >90 ML/MIN/1.73M*2
EOSINOPHIL # BLD AUTO: 0.47 X10*3/UL (ref 0–0.7)
EOSINOPHIL NFR BLD AUTO: 10.5 %
ERYTHROCYTE [DISTWIDTH] IN BLOOD BY AUTOMATED COUNT: 21.4 % (ref 11.5–14.5)
GLUCOSE SERPL-MCNC: 156 MG/DL (ref 74–99)
GLUCOSE SERPL-MCNC: 316 MG/DL (ref 74–99)
GLUCOSE UR STRIP.AUTO-MCNC: ABNORMAL MG/DL
HCG UR QL IA.RAPID: NEGATIVE
HCT VFR BLD AUTO: 27.4 % (ref 36–46)
HGB BLD-MCNC: 8.4 G/DL (ref 12–16)
HYPOCHROMIA BLD QL SMEAR: NORMAL
IMM GRANULOCYTES # BLD AUTO: 0.01 X10*3/UL (ref 0–0.7)
IMM GRANULOCYTES NFR BLD AUTO: 0.2 % (ref 0–0.9)
INR PPP: 1.3 (ref 0.9–1.2)
KETONES UR STRIP.AUTO-MCNC: NEGATIVE MG/DL
LEUKOCYTE ESTERASE UR QL STRIP.AUTO: ABNORMAL
LIPASE SERPL-CCNC: 222 U/L (ref 9–82)
LYMPHOCYTES # BLD AUTO: 1.35 X10*3/UL (ref 1.2–4.8)
LYMPHOCYTES NFR BLD AUTO: 30.1 %
MCH RBC QN AUTO: 23.9 PG (ref 26–34)
MCHC RBC AUTO-ENTMCNC: 30.7 G/DL (ref 32–36)
MCV RBC AUTO: 78 FL (ref 80–100)
MONOCYTES # BLD AUTO: 0.47 X10*3/UL (ref 0.1–1)
MONOCYTES NFR BLD AUTO: 10.5 %
MUCOUS THREADS #/AREA URNS AUTO: NORMAL /LPF
NEUTROPHILS # BLD AUTO: 2.16 X10*3/UL (ref 1.2–7.7)
NEUTROPHILS NFR BLD AUTO: 48 %
NITRITE UR QL STRIP.AUTO: NEGATIVE
NRBC BLD-RTO: 0 /100 WBCS (ref 0–0)
PH UR STRIP.AUTO: 5 [PH]
PLATELET # BLD AUTO: 91 X10*3/UL (ref 150–450)
POTASSIUM SERPL-SCNC: 3.4 MMOL/L (ref 3.5–5.3)
POTASSIUM SERPL-SCNC: 5.9 MMOL/L (ref 3.5–5.3)
PROT SERPL-MCNC: 7.6 G/DL (ref 6.4–8.2)
PROT UR STRIP.AUTO-MCNC: NEGATIVE MG/DL
PROTHROMBIN TIME: 13.5 SECONDS (ref 9.3–12.7)
RBC # BLD AUTO: 3.51 X10*6/UL (ref 4–5.2)
RBC # UR STRIP.AUTO: NEGATIVE /UL
RBC #/AREA URNS AUTO: NORMAL /HPF
RBC MORPH BLD: NORMAL
SODIUM SERPL-SCNC: 132 MMOL/L (ref 136–145)
SODIUM SERPL-SCNC: 137 MMOL/L (ref 136–145)
SP GR UR STRIP.AUTO: 1.01
SQUAMOUS #/AREA URNS AUTO: NORMAL /HPF
TARGETS BLD QL SMEAR: NORMAL
UROBILINOGEN UR STRIP.AUTO-MCNC: NORMAL MG/DL
WBC # BLD AUTO: 4.5 X10*3/UL (ref 4.4–11.3)
WBC #/AREA URNS AUTO: NORMAL /HPF

## 2024-09-19 PROCEDURE — 74177 CT ABD & PELVIS W/CONTRAST: CPT

## 2024-09-19 PROCEDURE — 36415 COLL VENOUS BLD VENIPUNCTURE: CPT | Performed by: STUDENT IN AN ORGANIZED HEALTH CARE EDUCATION/TRAINING PROGRAM

## 2024-09-19 PROCEDURE — 85610 PROTHROMBIN TIME: CPT | Performed by: STUDENT IN AN ORGANIZED HEALTH CARE EDUCATION/TRAINING PROGRAM

## 2024-09-19 PROCEDURE — 87086 URINE CULTURE/COLONY COUNT: CPT | Mod: WESLAB | Performed by: PHYSICIAN ASSISTANT

## 2024-09-19 PROCEDURE — 99284 EMERGENCY DEPT VISIT MOD MDM: CPT | Mod: 25

## 2024-09-19 PROCEDURE — 2500000004 HC RX 250 GENERAL PHARMACY W/ HCPCS (ALT 636 FOR OP/ED): Mod: JZ | Performed by: STUDENT IN AN ORGANIZED HEALTH CARE EDUCATION/TRAINING PROGRAM

## 2024-09-19 PROCEDURE — 74177 CT ABD & PELVIS W/CONTRAST: CPT | Performed by: RADIOLOGY

## 2024-09-19 PROCEDURE — 96366 THER/PROPH/DIAG IV INF ADDON: CPT

## 2024-09-19 PROCEDURE — 84075 ASSAY ALKALINE PHOSPHATASE: CPT | Performed by: PHYSICIAN ASSISTANT

## 2024-09-19 PROCEDURE — 81001 URINALYSIS AUTO W/SCOPE: CPT | Performed by: PHYSICIAN ASSISTANT

## 2024-09-19 PROCEDURE — 85025 COMPLETE CBC W/AUTO DIFF WBC: CPT | Performed by: PHYSICIAN ASSISTANT

## 2024-09-19 PROCEDURE — 96365 THER/PROPH/DIAG IV INF INIT: CPT | Mod: 59

## 2024-09-19 PROCEDURE — 2500000001 HC RX 250 WO HCPCS SELF ADMINISTERED DRUGS (ALT 637 FOR MEDICARE OP): Performed by: STUDENT IN AN ORGANIZED HEALTH CARE EDUCATION/TRAINING PROGRAM

## 2024-09-19 PROCEDURE — 83690 ASSAY OF LIPASE: CPT | Performed by: PHYSICIAN ASSISTANT

## 2024-09-19 PROCEDURE — 2500000002 HC RX 250 W HCPCS SELF ADMINISTERED DRUGS (ALT 637 FOR MEDICARE OP, ALT 636 FOR OP/ED): Performed by: STUDENT IN AN ORGANIZED HEALTH CARE EDUCATION/TRAINING PROGRAM

## 2024-09-19 PROCEDURE — 2500000005 HC RX 250 GENERAL PHARMACY W/O HCPCS: Performed by: STUDENT IN AN ORGANIZED HEALTH CARE EDUCATION/TRAINING PROGRAM

## 2024-09-19 PROCEDURE — 81025 URINE PREGNANCY TEST: CPT | Performed by: PHYSICIAN ASSISTANT

## 2024-09-19 PROCEDURE — 82374 ASSAY BLOOD CARBON DIOXIDE: CPT | Performed by: STUDENT IN AN ORGANIZED HEALTH CARE EDUCATION/TRAINING PROGRAM

## 2024-09-19 PROCEDURE — 96375 TX/PRO/DX INJ NEW DRUG ADDON: CPT

## 2024-09-19 PROCEDURE — 2550000001 HC RX 255 CONTRASTS: Performed by: STUDENT IN AN ORGANIZED HEALTH CARE EDUCATION/TRAINING PROGRAM

## 2024-09-19 PROCEDURE — 82140 ASSAY OF AMMONIA: CPT | Performed by: STUDENT IN AN ORGANIZED HEALTH CARE EDUCATION/TRAINING PROGRAM

## 2024-09-19 RX ORDER — OMEPRAZOLE 20 MG/1
20 CAPSULE, DELAYED RELEASE ORAL DAILY
Qty: 30 CAPSULE | Refills: 0 | Status: SHIPPED | OUTPATIENT
Start: 2024-09-19 | End: 2024-10-19

## 2024-09-19 RX ORDER — ALUMINUM HYDROXIDE, MAGNESIUM HYDROXIDE, AND SIMETHICONE 1200; 120; 1200 MG/30ML; MG/30ML; MG/30ML
10 SUSPENSION ORAL ONCE
Status: COMPLETED | OUTPATIENT
Start: 2024-09-19 | End: 2024-09-19

## 2024-09-19 RX ORDER — ONDANSETRON HYDROCHLORIDE 2 MG/ML
4 INJECTION, SOLUTION INTRAVENOUS ONCE
Status: COMPLETED | OUTPATIENT
Start: 2024-09-19 | End: 2024-09-19

## 2024-09-19 RX ORDER — FAMOTIDINE 10 MG/ML
20 INJECTION INTRAVENOUS ONCE
Status: COMPLETED | OUTPATIENT
Start: 2024-09-19 | End: 2024-09-19

## 2024-09-19 RX ORDER — DEXTROSE 50 % IN WATER (D50W) INTRAVENOUS SYRINGE
25 ONCE
Status: COMPLETED | OUTPATIENT
Start: 2024-09-19 | End: 2024-09-19

## 2024-09-19 RX ORDER — SUCRALFATE 1 G/10ML
1 SUSPENSION ORAL ONCE
Status: COMPLETED | OUTPATIENT
Start: 2024-09-19 | End: 2024-09-19

## 2024-09-19 RX ORDER — KETOROLAC TROMETHAMINE 30 MG/ML
15 INJECTION, SOLUTION INTRAMUSCULAR; INTRAVENOUS ONCE
Status: COMPLETED | OUTPATIENT
Start: 2024-09-19 | End: 2024-09-19

## 2024-09-19 RX ORDER — CALCIUM GLUCONATE 20 MG/ML
2 INJECTION, SOLUTION INTRAVENOUS ONCE
Status: COMPLETED | OUTPATIENT
Start: 2024-09-19 | End: 2024-09-19

## 2024-09-19 RX ADMIN — CALCIUM GLUCONATE 2 G: 20 INJECTION, SOLUTION INTRAVENOUS at 19:24

## 2024-09-19 RX ADMIN — IOHEXOL 75 ML: 350 INJECTION, SOLUTION INTRAVENOUS at 19:09

## 2024-09-19 RX ADMIN — FAMOTIDINE 20 MG: 10 INJECTION, SOLUTION INTRAVENOUS at 16:35

## 2024-09-19 RX ADMIN — ONDANSETRON 4 MG: 2 INJECTION INTRAMUSCULAR; INTRAVENOUS at 16:35

## 2024-09-19 RX ADMIN — ALUMINUM HYDROXIDE, MAGNESIUM HYDROXIDE, AND SIMETHICONE 10 ML: 200; 200; 20 SUSPENSION ORAL at 20:27

## 2024-09-19 RX ADMIN — DEXTROSE MONOHYDRATE 25 G: 25 INJECTION, SOLUTION INTRAVENOUS at 19:24

## 2024-09-19 RX ADMIN — INSULIN HUMAN 5 UNITS: 100 INJECTION, SOLUTION PARENTERAL at 19:25

## 2024-09-19 RX ADMIN — SUCRALFATE ORAL 1 G: 1 SUSPENSION ORAL at 20:27

## 2024-09-19 RX ADMIN — KETOROLAC TROMETHAMINE 15 MG: 30 INJECTION, SOLUTION INTRAMUSCULAR at 16:35

## 2024-09-19 ASSESSMENT — PAIN DESCRIPTION - LOCATION: LOCATION: ABDOMEN

## 2024-09-19 ASSESSMENT — LIFESTYLE VARIABLES
EVER FELT BAD OR GUILTY ABOUT YOUR DRINKING: NO
TOTAL SCORE: 0
HAVE PEOPLE ANNOYED YOU BY CRITICIZING YOUR DRINKING: NO
EVER HAD A DRINK FIRST THING IN THE MORNING TO STEADY YOUR NERVES TO GET RID OF A HANGOVER: NO
HAVE YOU EVER FELT YOU SHOULD CUT DOWN ON YOUR DRINKING: NO

## 2024-09-19 ASSESSMENT — PAIN - FUNCTIONAL ASSESSMENT: PAIN_FUNCTIONAL_ASSESSMENT: 0-10

## 2024-09-19 ASSESSMENT — PAIN SCALES - GENERAL: PAINLEVEL_OUTOF10: 8

## 2024-09-19 NOTE — ED PROVIDER NOTES
HPI   Chief Complaint   Patient presents with    Abdominal Pain     Patient with hx of cirrhosis coming in for increased abdominal and leg swelling.       HPI  See [facility]      Patient History   Past Medical History:   Diagnosis Date    Cirrhosis of liver (Multi)     Diabetes mellitus (Multi)      Past Surgical History:   Procedure Laterality Date    ABDOMINAL SURGERY      CT GUIDED IMAGING FOR NEEDLE PLACEMENT  10/14/2020    CT GUIDED IMAGING FOR NEEDLE PLACEMENT [facility]    US GUIDED ABDOMINAL PARACENTESIS  10/08/2020    US GUIDED ABDOMINAL PARACENTESIS [facility]     No family history on file.  Social History     Tobacco Use    Smoking status: Never    Smokeless tobacco: Never   Substance Use Topics    Alcohol use: Never    Drug use: Never       Physical Exam   ED Triage Vitals [09/19/24 1536]   Temperature Heart Rate Respirations BP   36.6 °C (97.9 °F) 92 18 121/61      Pulse Ox Temp Source Heart Rate Source Patient Position   100 % Temporal Monitor Sitting      BP Location FiO2 (%)     Left arm --       Physical Exam  See [facility]    ED Course & [facility]   Diagnoses as of 09/19/24 2228   Abdominal pain, epigastric                 No data recorded     [place] Coma Scale Score: 15 (09/19/24 1938 : Obdulio Dotson RN)                           Medical Decision Making  49-year-old female with past medical history of chronic ascites and cirrhosis who presents to the emergency room with generalized abdominal pain.  Patient complains of epigastric and side pain with episodes of nausea and vomiting.  Patient notes has been going on for the last several days and also complains of lower extremity swelling.  She denies any fevers but does note chills, denying any urinary frequency, pain on urination, chest pain or shortness of breath.    ED Triage Vitals [09/19/24 1536]   Temperature Heart Rate Respirations BP   36.6 °C (97.9 °F) 92 18 121/61      Pulse Ox Temp Source Heart Rate Source Patient Position   100 % Temporal  Monitor Sitting      BP Location FiO2 (%)     Left arm --       Vital signs reviewed: Afebrile, nontachycardic, normotensive, 100% on room air    Exam     Constitutional: No acute distress. Resting comfortably.   Head: Normocephalic, atraumatic.   Eyes: Pupils equal bilaterally, EOM grossly intact, conjunctiva normal.  Mouth/Throat: Oropharynx is clear, moist mucus membranes.   Neck: Supple. No lymphadenopathy.  Cardiovascular: Regular rate and regular rhythm. Extremities are well-perfused.   Pulmonary/Chest: No respiratory distress, breathing comfortably on room air.    Abdominal: Soft, generalized tenderness, mildly distended. No rebound or guarding.   Musculoskeletal: Bilateral pitting lower extremity edema.       Skin: Warm, dry, and intact.   Neurological: Patient is oriented to person, place, time, and situation. Face symmetric, hearing intact to voice, speech normal. Moves all extremities.       Differential includes but is not limited to:  Pancreatitis versus ascites versus SBP versus UTI      Labs: ordered.  Labs Reviewed   CBC WITH AUTO DIFFERENTIAL - Abnormal       Result Value    WBC 4.5      nRBC 0.0      RBC 3.51 (*)     Hemoglobin 8.4 (*)     Hematocrit 27.4 (*)     MCV 78 (*)     MCH 23.9 (*)     MCHC 30.7 (*)     RDW 21.4 (*)     Platelets 91 (*)     Neutrophils % 48.0      Immature Granulocytes %, Automated 0.2      Lymphocytes % 30.1      Monocytes % 10.5      Eosinophils % 10.5      Basophils % 0.7      Neutrophils Absolute 2.16      Immature Granulocytes Absolute, Automated 0.01      Lymphocytes Absolute 1.35      Monocytes Absolute 0.47      Eosinophils Absolute 0.47      Basophils Absolute 0.03     COMPREHENSIVE METABOLIC PANEL - Abnormal    Glucose 316 (*)     Sodium 132 (*)     Potassium 5.9 (*)     Chloride 102      Bicarbonate 27      Anion Gap 9 (*)     Urea Nitrogen 12      Creatinine 0.71      eGFR >90      Calcium 7.9 (*)     Albumin 2.5 (*)     Alkaline Phosphatase 314 (*)     Total  Protein 7.6      AST 72 (*)     Bilirubin, Total 1.4 (*)     ALT 23     LIPASE - Abnormal    Lipase 222 (*)     Narrative:     Venipuncture immediately after or during the administration of Metamizole may lead to falsely low results. Testing should be performed immediately prior to Metamizole dosing.   URINALYSIS WITH REFLEX CULTURE AND MICROSCOPIC - Abnormal    Color, Urine Colorless (*)     Appearance, Urine Clear      Specific Gravity, Urine 1.009      pH, Urine 5.0      Protein, Urine NEGATIVE      Glucose, Urine 50 (TRACE) (*)     Blood, Urine NEGATIVE      Ketones, Urine NEGATIVE      Bilirubin, Urine NEGATIVE      Urobilinogen, Urine Normal      Nitrite, Urine NEGATIVE      Leukocyte Esterase, Urine 25 Mckenzie/µL (*)    AMMONIA - Abnormal    Ammonia 64 (*)    PROTIME-INR - Abnormal    Protime 13.5 (*)     INR 1.3 (*)     Narrative:     INR Therapeutic Range: 2.0-3.5   BASIC METABOLIC PANEL - Abnormal    Glucose 156 (*)     Sodium 137      Potassium 3.4 (*)     Chloride 104      Bicarbonate 30      Anion Gap <7 (*)     Urea Nitrogen 11      Creatinine 0.63      eGFR >90      Calcium 8.1 (*)    HCG, URINE, QUALITATIVE - Normal    HCG, Urine NEGATIVE     URINE CULTURE   MICROSCOPIC ONLY, URINE    WBC, Urine 1-5      RBC, Urine 1-2      Squamous Epithelial Cells, Urine 1-9 (SPARSE)      Mucus, Urine FEW     URINALYSIS WITH REFLEX CULTURE AND MICROSCOPIC    Narrative:     The following orders were created for panel order Urinalysis with Reflex Culture and Microscopic.  Procedure                               Abnormality         Status                     ---------                               -----------         ------                     Urinalysis with Reflex C...[[id number]]  Abnormal            Final result               Extra Urine Gray Tube[[id number]]                            In process                   Please view results for these tests on the individual orders.   EXTRA URINE GRAY TUBE   POCT GLUCOSE METER    MORPHOLOGY    RBC Morphology See Below      Hypochromia Mild      Target Cells Few         Radiology: Bedside ultrasound performed by myself.  Ultrasound shows no evidence of drainable ascites pocket at this time.    ECG/medicine tests: ordered and independent interpretation performed.  Diagnoses as of 09/19/24 2228   Abdominal pain, epigastric     Patient otherwise hemodynamically stable.  Patient has had multiple visits to outside hospital in the past several weeks.  Of note patient has been seen at outside ER 9/15, 9/13, 9/8, 9/7, 9/3 all with similar complaints of abdominal pain.  Patient has underwent ultrasounds during these visits and lab work evaluations and discharge.  Most recently on 9/15 patient was discharged with antibiotics for UTI, patient denies use of antibiotics or prescription.    Review of urine culture from 9/15 reveals normal urogenital kassidy.    Lab work as interpreted by me shows no significant leukocytosis or anemia on CBC.  CMP shows hyperkalemia but suspect hemolysis.  Patient is on furosemide and I imagine she would be hypokalemic.  Will give K cocktail at this time.  Repeat BMP does show appropriate potassium of 3.4.  Patient does note improvement of her symptoms after medication administration.  Lipase was mildly elevated at this time, urinalysis shows no evidence of UTI.  CT abdomen pelvis pursued and shows no acute intra-abdominal pathology requiring intervention at this time.  Will discharge patient with omeprazole and instructions to follow-up with gastroenterology.    PLAN AND FOLLOW-UP: Patient counseled on all findings, diagnosis and treatment plan. Patient's questions and concerns addressed. Patient stable, discharged with instructions to follow up with PMD, and to return to ED at any time for worsening symptoms or any other concerns. Patient demonstrates understanding of the findings and the importance of appropriate follow up care.    ED Medications managed:    Medications    ondansetron (Zofran) injection 4 mg (4 mg intravenous Given 9/19/24 1635)   famotidine PF (Pepcid) injection 20 mg (20 mg intravenous Given 9/19/24 1635)   ketorolac (Toradol) injection 15 mg (15 mg intravenous Given 9/19/24 1635)   calcium gluconate 2 g in sodium chloride (iso)  mL (0 g intravenous Stopped 9/19/24 2130)   dextrose 50 % injection 25 g (25 g intravenous Given 9/19/24 1924)   insulin regular (HumuLIN R,NovoLIN R) injection 5 Units (5 Units intravenous Given 9/19/24 1925)   iohexol (OMNIPaque) 350 mg iodine/mL solution 75 mL (75 mL intravenous Given 9/19/24 1909)   alum-mag hydroxide-simeth (Mylanta) 200-200-20 mg/5 mL oral suspension 10 mL (10 mL oral Given 9/19/24 2027)   sucralfate (Carafate) suspension 1 g (1 g oral Given 9/19/24 2027)       PATIENT REFERRED TO:  Landen Freeman MD  [id number] [id number] [phone]            DISCHARGE MEDICATIONS:  New Prescriptions    OMEPRAZOLE (PRILOSEC) 20 MG DR CAPSULE    Take 1 capsule (20 mg) by mouth once daily. Do not crush or chew.       Venu Kaiser MD  10:28 PM    Attending Emergency Physician  [facility] EMERGENCY MEDICINE            Procedure  Procedures     Venu Kaiser MD  09/19/24 [id number]

## 2024-09-20 LAB
BACTERIA UR CULT: NORMAL
HOLD SPECIMEN: NORMAL

## 2024-09-24 ENCOUNTER — APPOINTMENT (OUTPATIENT)
Dept: RADIOLOGY | Facility: HOSPITAL | Age: 49
End: 2024-09-24
Payer: COMMERCIAL

## 2024-09-24 ENCOUNTER — HOSPITAL ENCOUNTER (EMERGENCY)
Facility: HOSPITAL | Age: 49
Discharge: HOME | End: 2024-09-24
Attending: STUDENT IN AN ORGANIZED HEALTH CARE EDUCATION/TRAINING PROGRAM
Payer: COMMERCIAL

## 2024-09-24 VITALS
DIASTOLIC BLOOD PRESSURE: 64 MMHG | WEIGHT: 200 LBS | SYSTOLIC BLOOD PRESSURE: 128 MMHG | TEMPERATURE: 97.9 F | RESPIRATION RATE: 18 BRPM | HEART RATE: 86 BPM | HEIGHT: 62 IN | OXYGEN SATURATION: 100 % | BODY MASS INDEX: 36.8 KG/M2

## 2024-09-24 DIAGNOSIS — N39.0 ACUTE URINARY TRACT INFECTION: ICD-10-CM

## 2024-09-24 DIAGNOSIS — K74.69 OTHER CIRRHOSIS OF LIVER: ICD-10-CM

## 2024-09-24 DIAGNOSIS — R10.11 RIGHT UPPER QUADRANT ABDOMINAL PAIN: Primary | ICD-10-CM

## 2024-09-24 LAB
ALBUMIN SERPL BCP-MCNC: 2.4 G/DL (ref 3.4–5)
ALP SERPL-CCNC: 326 U/L (ref 33–110)
ALT SERPL W P-5'-P-CCNC: 23 U/L (ref 7–45)
ANION GAP SERPL CALCULATED.3IONS-SCNC: 7 MMOL/L (ref 10–20)
APPEARANCE UR: ABNORMAL
AST SERPL W P-5'-P-CCNC: 48 U/L (ref 9–39)
BACTERIA #/AREA URNS AUTO: ABNORMAL /HPF
BASOPHILS # BLD AUTO: 0.04 X10*3/UL (ref 0–0.1)
BASOPHILS NFR BLD AUTO: 0.7 %
BILIRUB SERPL-MCNC: 1.6 MG/DL (ref 0–1.2)
BILIRUB UR STRIP.AUTO-MCNC: ABNORMAL MG/DL
BUN SERPL-MCNC: 10 MG/DL (ref 6–23)
CALCIUM SERPL-MCNC: 7.8 MG/DL (ref 8.6–10.3)
CHLORIDE SERPL-SCNC: 104 MMOL/L (ref 98–107)
CO2 SERPL-SCNC: 27 MMOL/L (ref 21–32)
COLOR UR: ABNORMAL
CREAT SERPL-MCNC: 0.51 MG/DL (ref 0.5–1.05)
EGFRCR SERPLBLD CKD-EPI 2021: >90 ML/MIN/1.73M*2
EOSINOPHIL # BLD AUTO: 1.08 X10*3/UL (ref 0–0.7)
EOSINOPHIL NFR BLD AUTO: 19.3 %
ERYTHROCYTE [DISTWIDTH] IN BLOOD BY AUTOMATED COUNT: 21.2 % (ref 11.5–14.5)
GLUCOSE SERPL-MCNC: 190 MG/DL (ref 74–99)
GLUCOSE UR STRIP.AUTO-MCNC: ABNORMAL MG/DL
HCG UR QL IA.RAPID: NEGATIVE
HCT VFR BLD AUTO: 27.8 % (ref 36–46)
HGB BLD-MCNC: 8.5 G/DL (ref 12–16)
HOLD SPECIMEN: NORMAL
HYPOCHROMIA BLD QL SMEAR: NORMAL
IMM GRANULOCYTES # BLD AUTO: 0.01 X10*3/UL (ref 0–0.7)
IMM GRANULOCYTES NFR BLD AUTO: 0.2 % (ref 0–0.9)
KETONES UR STRIP.AUTO-MCNC: NEGATIVE MG/DL
LEUKOCYTE ESTERASE UR QL STRIP.AUTO: ABNORMAL
LIPASE SERPL-CCNC: 79 U/L (ref 9–82)
LYMPHOCYTES # BLD AUTO: 1.41 X10*3/UL (ref 1.2–4.8)
LYMPHOCYTES NFR BLD AUTO: 25.2 %
MCH RBC QN AUTO: 23.9 PG (ref 26–34)
MCHC RBC AUTO-ENTMCNC: 30.6 G/DL (ref 32–36)
MCV RBC AUTO: 78 FL (ref 80–100)
MONOCYTES # BLD AUTO: 0.44 X10*3/UL (ref 0.1–1)
MONOCYTES NFR BLD AUTO: 7.9 %
MUCOUS THREADS #/AREA URNS AUTO: ABNORMAL /LPF
NEUTROPHILS # BLD AUTO: 2.61 X10*3/UL (ref 1.2–7.7)
NEUTROPHILS NFR BLD AUTO: 46.7 %
NITRITE UR QL STRIP.AUTO: NEGATIVE
NRBC BLD-RTO: 0 /100 WBCS (ref 0–0)
OVALOCYTES BLD QL SMEAR: NORMAL
PH UR STRIP.AUTO: 6.5 [PH]
PLATELET # BLD AUTO: 101 X10*3/UL (ref 150–450)
POTASSIUM SERPL-SCNC: 4.6 MMOL/L (ref 3.5–5.3)
PROT SERPL-MCNC: 7.4 G/DL (ref 6.4–8.2)
PROT UR STRIP.AUTO-MCNC: ABNORMAL MG/DL
RBC # BLD AUTO: 3.55 X10*6/UL (ref 4–5.2)
RBC # UR STRIP.AUTO: ABNORMAL /UL
RBC #/AREA URNS AUTO: ABNORMAL /HPF
RBC MORPH BLD: NORMAL
SODIUM SERPL-SCNC: 133 MMOL/L (ref 136–145)
SP GR UR STRIP.AUTO: 1.03
SQUAMOUS #/AREA URNS AUTO: ABNORMAL /HPF
TARGETS BLD QL SMEAR: NORMAL
UROBILINOGEN UR STRIP.AUTO-MCNC: ABNORMAL MG/DL
WBC # BLD AUTO: 5.6 X10*3/UL (ref 4.4–11.3)
WBC #/AREA URNS AUTO: >50 /HPF
WBC CLUMPS #/AREA URNS AUTO: ABNORMAL /HPF

## 2024-09-24 PROCEDURE — 2500000004 HC RX 250 GENERAL PHARMACY W/ HCPCS (ALT 636 FOR OP/ED): Performed by: PHYSICIAN ASSISTANT

## 2024-09-24 PROCEDURE — 2550000001 HC RX 255 CONTRASTS: Performed by: PHYSICIAN ASSISTANT

## 2024-09-24 PROCEDURE — 99284 EMERGENCY DEPT VISIT MOD MDM: CPT

## 2024-09-24 PROCEDURE — 96365 THER/PROPH/DIAG IV INF INIT: CPT | Mod: 59

## 2024-09-24 PROCEDURE — 83690 ASSAY OF LIPASE: CPT | Performed by: PHYSICIAN ASSISTANT

## 2024-09-24 PROCEDURE — 81025 URINE PREGNANCY TEST: CPT | Performed by: PHYSICIAN ASSISTANT

## 2024-09-24 PROCEDURE — 85025 COMPLETE CBC W/AUTO DIFF WBC: CPT | Performed by: PHYSICIAN ASSISTANT

## 2024-09-24 PROCEDURE — 81003 URINALYSIS AUTO W/O SCOPE: CPT | Performed by: PHYSICIAN ASSISTANT

## 2024-09-24 PROCEDURE — 74177 CT ABD & PELVIS W/CONTRAST: CPT

## 2024-09-24 PROCEDURE — 80053 COMPREHEN METABOLIC PANEL: CPT | Mod: 59 | Performed by: PHYSICIAN ASSISTANT

## 2024-09-24 PROCEDURE — 74177 CT ABD & PELVIS W/CONTRAST: CPT | Performed by: RADIOLOGY

## 2024-09-24 PROCEDURE — 36415 COLL VENOUS BLD VENIPUNCTURE: CPT | Performed by: PHYSICIAN ASSISTANT

## 2024-09-24 PROCEDURE — 96375 TX/PRO/DX INJ NEW DRUG ADDON: CPT | Mod: 59

## 2024-09-24 PROCEDURE — 87086 URINE CULTURE/COLONY COUNT: CPT | Mod: WESLAB | Performed by: PHYSICIAN ASSISTANT

## 2024-09-24 RX ORDER — ONDANSETRON HYDROCHLORIDE 2 MG/ML
4 INJECTION, SOLUTION INTRAVENOUS ONCE
Status: COMPLETED | OUTPATIENT
Start: 2024-09-24 | End: 2024-09-24

## 2024-09-24 RX ORDER — KETOROLAC TROMETHAMINE 30 MG/ML
15 INJECTION, SOLUTION INTRAMUSCULAR; INTRAVENOUS ONCE
Status: COMPLETED | OUTPATIENT
Start: 2024-09-24 | End: 2024-09-24

## 2024-09-24 RX ORDER — CEPHALEXIN 500 MG/1
500 CAPSULE ORAL 2 TIMES DAILY
Qty: 14 CAPSULE | Refills: 0 | Status: SHIPPED | OUTPATIENT
Start: 2024-09-24 | End: 2024-10-01

## 2024-09-24 RX ORDER — ACETAMINOPHEN 325 MG/1
650 TABLET ORAL ONCE
Status: COMPLETED | OUTPATIENT
Start: 2024-09-24 | End: 2024-09-24

## 2024-09-24 RX ORDER — MORPHINE SULFATE 2 MG/ML
2 INJECTION, SOLUTION INTRAMUSCULAR; INTRAVENOUS ONCE
Status: COMPLETED | OUTPATIENT
Start: 2024-09-24 | End: 2024-09-24

## 2024-09-24 RX ORDER — DICYCLOMINE HYDROCHLORIDE 20 MG/1
20 TABLET ORAL 2 TIMES DAILY
Qty: 20 TABLET | Refills: 0 | Status: SHIPPED | OUTPATIENT
Start: 2024-09-24 | End: 2024-10-04

## 2024-09-24 RX ORDER — CEFTRIAXONE 1 G/50ML
1 INJECTION, SOLUTION INTRAVENOUS ONCE
Status: COMPLETED | OUTPATIENT
Start: 2024-09-24 | End: 2024-09-24

## 2024-09-24 ASSESSMENT — PAIN DESCRIPTION - PAIN TYPE: TYPE: ACUTE PAIN

## 2024-09-24 ASSESSMENT — PAIN - FUNCTIONAL ASSESSMENT
PAIN_FUNCTIONAL_ASSESSMENT: 0-10

## 2024-09-24 ASSESSMENT — PAIN SCALES - GENERAL
PAINLEVEL_OUTOF10: 8
PAINLEVEL_OUTOF10: 9
PAINLEVEL_OUTOF10: 9

## 2024-09-24 ASSESSMENT — PAIN DESCRIPTION - DESCRIPTORS: DESCRIPTORS: STABBING;BURNING

## 2024-09-24 ASSESSMENT — PAIN DESCRIPTION - PROGRESSION: CLINICAL_PROGRESSION: NOT CHANGED

## 2024-09-24 ASSESSMENT — PAIN DESCRIPTION - LOCATION: LOCATION: ABDOMEN

## 2024-09-24 ASSESSMENT — PAIN DESCRIPTION - FREQUENCY: FREQUENCY: CONSTANT/CONTINUOUS

## 2024-09-24 ASSESSMENT — PAIN DESCRIPTION - ONSET: ONSET: AWAKENED FROM SLEEP

## 2024-09-24 ASSESSMENT — PAIN DESCRIPTION - ORIENTATION: ORIENTATION: RIGHT;UPPER

## 2024-09-24 NOTE — DISCHARGE INSTRUCTIONS
Thank you for choosing [facility] for your healthcare needs today!  There were no acute findings on your workup today warranting hospital admission or inpatient management.  You have been sent dicyclomine for abdominal pain relief and Keflex for treatment of your urinary tract infection.  Please take these medications as prescribed and when directed.  Return to the emergency department should symptoms worsen or new symptoms develop.  Follow-up with gastroenterology after today's visit.  A referral has been provided for you.  Please call and schedule an appointment with them as soon as you are able to.

## 2024-09-24 NOTE — ED PROVIDER NOTES
HPI   Chief Complaint   Patient presents with    Flank Pain     Pt sts pain in the upper right abd and  right shoulder/arm pain. Pt sts this has been going on since last night. Pt has no history of kidney stones but does have cirosis of the liver.        HPI    Patient is a 49-year-old female with a history of chronic ascites, diabetes and liver cirrhosis presenting to the emergency room for evaluation of right upper quadrant abdominal pain and bilateral side pain.  She associates this with some mild nausea.  She states her symptoms started yesterday.  Patient is also complaining of right shoulder and right arm pain.  She denies this pain is an aching, throbbing sensation.  She states that the right upper quadrant pain is also dull, aching and throbbing with intermittent sharp pains.  She denies chest pain or shortness of breath.  No vomiting only nausea.  No diarrhea or constipation.  No dysuria or hematuria.  Denies fevers or chills.  Patient has had her gallbladder removed.    Patient History   Past Medical History:   Diagnosis Date    Cirrhosis of liver (Multi)     Diabetes mellitus (Multi)      Past Surgical History:   Procedure Laterality Date    ABDOMINAL SURGERY      CT GUIDED IMAGING FOR NEEDLE PLACEMENT  10/14/2020    CT GUIDED IMAGING FOR NEEDLE PLACEMENT [facility]    US GUIDED ABDOMINAL PARACENTESIS  10/08/2020    US GUIDED ABDOMINAL PARACENTESIS [facility]     No family history on file.  Social History     Tobacco Use    Smoking status: Never    Smokeless tobacco: Never   Substance Use Topics    Alcohol use: Never    Drug use: Never       Physical Exam   ED Triage Vitals [09/24/24 0802]   Temperature Heart Rate Respirations BP   36.6 °C (97.9 °F) 92 20 122/74      Pulse Ox Temp Source Heart Rate Source Patient Position   100 % Temporal Monitor Sitting      BP Location FiO2 (%)     Left arm --       Physical Exam  Vitals and nursing note reviewed.   Constitutional:       Appearance:  Normal appearance.   HENT:      Head: Normocephalic and atraumatic.   Eyes:      Extraocular Movements: Extraocular movements intact.      Conjunctiva/sclera: Conjunctivae normal.      Pupils: Pupils are equal, round, and reactive to light.   Cardiovascular:      Rate and Rhythm: Normal rate and regular rhythm.   Pulmonary:      Effort: Pulmonary effort is normal.      Breath sounds: Normal breath sounds.   Abdominal:      General: Abdomen is flat. Bowel sounds are normal.      Palpations: Abdomen is soft.      Comments: Mildly distended and protuberant abdomen.  Tenderness to palpation of the right upper quadrant and diffusely across the abdomen.   Musculoskeletal:         General: Normal range of motion.   Skin:     General: Skin is warm and dry.   Neurological:      Mental Status: She is alert.           ED Course & MDM   Diagnoses as of 09/24/24 1337   Right upper quadrant abdominal pain   Acute urinary tract infection   Other cirrhosis of liver (Multi)                 No data recorded     [place] Coma Scale Score: 15 (09/24/24 0804 : Clarissa William RN)                           Medical Decision Making  Parts of this chart have been completed using voice recognition software. Please excuse any errors of transcription. Despite the medical decision making time stamp above-my medical decision making has taken place during the patient's entire visit. My thought process and reason for plan has been formulated from the time that I saw the patient until the time of disposition and is not specific to one specific moment during their visit and furthermore my MDM encompasses this entire chart and not only this text box.      HPI: Detailed above.    Exam: A medically appropriate exam performed, outlined above, given the known history and presentation.    History obtained from: Patient    Social Determinants of Health considered during this visit: From home    EKG interpreted by my attending physician, reviewed by  myself.    Labs Reviewed   CBC WITH AUTO DIFFERENTIAL - Abnormal       Result Value    WBC 5.6      nRBC 0.0      RBC 3.55 (*)     Hemoglobin 8.5 (*)     Hematocrit 27.8 (*)     MCV 78 (*)     MCH 23.9 (*)     MCHC 30.6 (*)     RDW 21.2 (*)     Platelets 101 (*)     Neutrophils % 46.7      Immature Granulocytes %, Automated 0.2      Lymphocytes % 25.2      Monocytes % 7.9      Eosinophils % 19.3      Basophils % 0.7      Neutrophils Absolute 2.61      Immature Granulocytes Absolute, Automated 0.01      Lymphocytes Absolute 1.41      Monocytes Absolute 0.44      Eosinophils Absolute 1.08 (*)     Basophils Absolute 0.04     COMPREHENSIVE METABOLIC PANEL - Abnormal    Glucose 190 (*)     Sodium 133 (*)     Potassium 4.6      Chloride 104      Bicarbonate 27      Anion Gap 7 (*)     Urea Nitrogen 10      Creatinine 0.51      eGFR >90      Calcium 7.8 (*)     Albumin 2.4 (*)     Alkaline Phosphatase 326 (*)     Total Protein 7.4      AST 48 (*)     Bilirubin, Total 1.6 (*)     ALT 23     URINALYSIS WITH REFLEX CULTURE AND MICROSCOPIC - Abnormal    Color, Urine Dark-Yellow      Appearance, Urine Turbid (*)     Specific Gravity, Urine 1.030      pH, Urine 6.5      Protein, Urine 70 (1+) (*)     Glucose, Urine 70 (1+) (*)     Blood, Urine OVER (3+) (*)     Ketones, Urine NEGATIVE      Bilirubin, Urine 0.5 (1+) (*)     Urobilinogen, Urine 12 (3+) (*)     Nitrite, Urine NEGATIVE      Leukocyte Esterase, Urine 250 Mckenzie/µL (*)     Narrative:     OVER is reported when the result is greater than the clinically reportable range.   MICROSCOPIC ONLY, URINE - Abnormal    WBC, Urine >50 (*)     WBC Clumps, Urine OCCASIONAL      RBC, Urine 11-20 (*)     Squamous Epithelial Cells, Urine 51-75 (2+)      Bacteria, Urine 1+ (*)     Mucus, Urine 4+     LIPASE - Normal    Lipase 79      Narrative:     Venipuncture immediately after or during the administration of Metamizole may lead to falsely low results. Testing should be performed  immediately prior to Metamizole dosing.   HCG, URINE, QUALITATIVE - Normal    HCG, Urine NEGATIVE     URINE CULTURE   URINALYSIS WITH REFLEX CULTURE AND MICROSCOPIC    Narrative:     The following orders were created for panel order Urinalysis with Reflex Culture and Microscopic.  Procedure                               Abnormality         Status                     ---------                               -----------         ------                     Urinalysis with Reflex C...[[id number]]  Abnormal            Final result               Extra Urine Gray Tube[[id number]]                            In process                   Please view results for these tests on the individual orders.   EXTRA URINE GRAY TUBE   MORPHOLOGY    RBC Morphology See Below      Hypochromia Mild      Target Cells Few      Ovalocytes Few       CT abdomen pelvis w IV contrast   Final Result   1. Cirrhotic morphology of the liver with evidence of portal   hypertension. Slightly increased amount of moderate to large volume   abdominopelvic ascites.   2. Mild small bowel wall edema with thickening in the left upper   quadrant and the right hemicolon, which may reflect portal   hypertensive enteropathy or mild enteritis.   3. Additional similar/chronic findings as detailed above.        I personally reviewed the images/study and I agree with the findings   as stated by Radiology resident Dr. Fowler.        MACRO:   None        Signed by: Radha Mao 9/24/2024 11:30 AM   Dictation workstation:   [id number]        Medications   morphine injection 2 mg (has no administration in time range)   ondansetron (Zofran) injection 4 mg (4 mg intravenous Given 9/24/24 0918)   acetaminophen (Tylenol) tablet 650 mg (650 mg oral Given 9/24/24 0918)   ketorolac (Toradol) injection 15 mg (15 mg intravenous Given 9/24/24 1054)   iohexol (OMNIPaque) 350 mg iodine/mL solution 75 mL (75 mL intravenous Given 9/24/24 1005)   cefTRIAXone (Rocephin) 1 g in dextrose  (iso) IV 50 mL (0 g intravenous Stopped 9/24/24 1215)       Differential diagnoses considered for this visit include: Pancreatitis versus ascites versus SBP versus UTI    Considerations/further MDM:    Patient is a 49-year-old female presenting for evaluation of diffuse abdominal pain that is worse to the right upper quadrant with nausea.  Vital signs are hemodynamically stable.  Physical exam demonstrated a well nourished well-developed female with a mildly distended abdomen and tenderness to palpation of the abdomen.  On review of EMR, patient has had multiple visit to outside hospitals over the past several weeks.  The most recent was here at [facility] on 9/19 for diffuse abdominal pain.  Abdominal labs ordered with CT abdomen pelvis for diagnostic imaging.  Zofran, Tylenol and Toradol were ordered for nausea relief and pain relief.    CBC was trending along patient's baseline.  CMP was also trending along patient's baseline.  Lipase was unremarkable.  Urinalysis was positive for infection.CT abdomen pelvis identifies cirrhotic morphology of the liver with evidence of portal hypertension.  There is slightly increased amount of moderate to large volume abdominal pelvic ascites.  There is mild small bowel wall edema with thickening in the left upper quadrant and the right hemicolon which may reflect portal hypertensive enteropathy or mild enteritis.  Results discussed with the patient by my attending physician.  Patient still complaining of some minor pain and morphine was ordered.  Patient will be discharged home with dicyclomine for abdominal pain and Keflex to treat her urinary tract infection.  She will also be given a GI referral to follow-up as she is looking for a new physician.  Patient was understanding of her results and felt comfortable to follow-up outpatient.  Return precautions were discussed.  She was released home in good condition.    I estimate there is low risk for acute abdomen. I have  considered the following: acute appendicitis, bowel obstruction, cholecystitis, diverticulitis, incarcerated hernia, mesenteric ischemia, pancreatitis, acute liver failure, renal failure, UTI/Pyelonephritis to an extent that requires admission and IV abx, perforated bowel, or ulcers. Thus, I consider the discharge disposition reasonable. Also, there is no evidence or peritonitis, sepsis, or toxicity. We have discussed the diagnosis and risks, and we agree with discharging home to follow-up with appropriate physician as directed. We also discussed returning to the Emergency Department immediately if new or worsening symptoms occur. We have discussed the symptoms which are most concerning (e.g., bloody stool, fever, changing or worsening pain, nausea, vomiting) that necessitate immediate return. Patient symptoms have been well controlled here with medications and the patient is lower risk for acute/surgical abdomen and is safe for discharge with appropriate outpatient follow up. The patient has verbalized understanding to return to ER without delay for new or worsening pains or for any other symptoms or concerns.    Patient was seen in conjunction with attending physician Dr. Kev Lacy.   Patient's history, physical exam, diagnostic studies, and treatment plan were discussed thoroughly.    Procedure  Procedures     Adrianne Cohen PA-C  09/24/24 9155

## 2024-09-27 LAB — BACTERIA UR CULT: NO GROWTH

## 2024-10-27 ENCOUNTER — APPOINTMENT (OUTPATIENT)
Dept: RADIOLOGY | Facility: HOSPITAL | Age: 49
DRG: 432 | End: 2024-10-27
Payer: COMMERCIAL

## 2024-10-27 ENCOUNTER — HOSPITAL ENCOUNTER (INPATIENT)
Facility: HOSPITAL | Age: 49
LOS: 6 days | Discharge: HOME | End: 2024-11-02
Attending: EMERGENCY MEDICINE | Admitting: INTERNAL MEDICINE
Payer: COMMERCIAL

## 2024-10-27 DIAGNOSIS — E11.69 TYPE 2 DIABETES MELLITUS WITH OTHER SPECIFIED COMPLICATION, WITH LONG-TERM CURRENT USE OF INSULIN: ICD-10-CM

## 2024-10-27 DIAGNOSIS — R42 DIZZINESS: ICD-10-CM

## 2024-10-27 DIAGNOSIS — R07.89 CHEST WALL PAIN: ICD-10-CM

## 2024-10-27 DIAGNOSIS — R18.8 OTHER ASCITES: ICD-10-CM

## 2024-10-27 DIAGNOSIS — Z79.4 TYPE 2 DIABETES MELLITUS WITHOUT COMPLICATION, WITH LONG-TERM CURRENT USE OF INSULIN (MULTI): ICD-10-CM

## 2024-10-27 DIAGNOSIS — Z79.4 TYPE 2 DIABETES MELLITUS WITH OTHER SPECIFIED COMPLICATION, WITH LONG-TERM CURRENT USE OF INSULIN: ICD-10-CM

## 2024-10-27 DIAGNOSIS — R07.9 CHEST PAIN, UNSPECIFIED TYPE: ICD-10-CM

## 2024-10-27 DIAGNOSIS — R04.0 EPISTAXIS: ICD-10-CM

## 2024-10-27 DIAGNOSIS — K59.00 CONSTIPATION, UNSPECIFIED CONSTIPATION TYPE: ICD-10-CM

## 2024-10-27 DIAGNOSIS — I95.9 HYPOTENSION, UNSPECIFIED HYPOTENSION TYPE: ICD-10-CM

## 2024-10-27 DIAGNOSIS — K75.81 LIVER CIRRHOSIS SECONDARY TO NASH (NONALCOHOLIC STEATOHEPATITIS) (MULTI): ICD-10-CM

## 2024-10-27 DIAGNOSIS — R60.0 BILATERAL EDEMA OF LOWER EXTREMITY: ICD-10-CM

## 2024-10-27 DIAGNOSIS — K74.60 LIVER CIRRHOSIS SECONDARY TO NASH (NONALCOHOLIC STEATOHEPATITIS) (MULTI): ICD-10-CM

## 2024-10-27 DIAGNOSIS — R18.8 CIRRHOSIS OF LIVER WITH ASCITES, UNSPECIFIED HEPATIC CIRRHOSIS TYPE (MULTI): ICD-10-CM

## 2024-10-27 DIAGNOSIS — K76.82 HEPATIC ENCEPHALOPATHY: ICD-10-CM

## 2024-10-27 DIAGNOSIS — K74.60 CIRRHOSIS OF LIVER WITH ASCITES, UNSPECIFIED HEPATIC CIRRHOSIS TYPE (MULTI): ICD-10-CM

## 2024-10-27 DIAGNOSIS — D63.8 ANEMIA OF CHRONIC DISEASE: Primary | ICD-10-CM

## 2024-10-27 DIAGNOSIS — G47.00 INSOMNIA, UNSPECIFIED TYPE: ICD-10-CM

## 2024-10-27 DIAGNOSIS — N30.00 ACUTE CYSTITIS WITHOUT HEMATURIA: ICD-10-CM

## 2024-10-27 DIAGNOSIS — K76.6 PORTAL HYPERTENSION (MULTI): ICD-10-CM

## 2024-10-27 DIAGNOSIS — K70.31 ALCOHOLIC CIRRHOSIS OF LIVER WITH ASCITES (MULTI): ICD-10-CM

## 2024-10-27 DIAGNOSIS — E11.8 DIABETES MELLITUS TYPE 2 WITH COMPLICATIONS (MULTI): ICD-10-CM

## 2024-10-27 DIAGNOSIS — R10.13 ABDOMINAL PAIN, EPIGASTRIC: ICD-10-CM

## 2024-10-27 DIAGNOSIS — E11.9 TYPE 2 DIABETES MELLITUS WITHOUT COMPLICATION, WITH LONG-TERM CURRENT USE OF INSULIN (MULTI): ICD-10-CM

## 2024-10-27 DIAGNOSIS — R10.9 ABDOMINAL PAIN, UNSPECIFIED ABDOMINAL LOCATION: ICD-10-CM

## 2024-10-27 DIAGNOSIS — E78.5 HYPERLIPIDEMIA, UNSPECIFIED HYPERLIPIDEMIA TYPE: ICD-10-CM

## 2024-10-27 DIAGNOSIS — R11.2 NAUSEA AND VOMITING, UNSPECIFIED VOMITING TYPE: ICD-10-CM

## 2024-10-27 DIAGNOSIS — Z79.4 TYPE 2 DIABETES MELLITUS WITH HYPEROSMOLARITY WITHOUT COMA, WITH LONG-TERM CURRENT USE OF INSULIN (MULTI): ICD-10-CM

## 2024-10-27 DIAGNOSIS — Z87.19 HISTORY OF CIRRHOSIS: ICD-10-CM

## 2024-10-27 DIAGNOSIS — E11.00 TYPE 2 DIABETES MELLITUS WITH HYPEROSMOLARITY WITHOUT COMA, WITH LONG-TERM CURRENT USE OF INSULIN (MULTI): ICD-10-CM

## 2024-10-27 DIAGNOSIS — E83.42 HYPOMAGNESEMIA: ICD-10-CM

## 2024-10-27 LAB
ALBUMIN SERPL BCP-MCNC: 2.3 G/DL (ref 3.4–5)
ALP SERPL-CCNC: 410 U/L (ref 33–110)
ALT SERPL W P-5'-P-CCNC: 27 U/L (ref 7–45)
ANION GAP SERPL CALCULATED.3IONS-SCNC: 8 MMOL/L (ref 10–20)
AST SERPL W P-5'-P-CCNC: 72 U/L (ref 9–39)
BASOPHILS # BLD AUTO: 0.02 X10*3/UL (ref 0–0.1)
BASOPHILS NFR BLD AUTO: 0.4 %
BILIRUB SERPL-MCNC: 1.8 MG/DL (ref 0–1.2)
BNP SERPL-MCNC: 73 PG/ML (ref 0–99)
BUN SERPL-MCNC: 14 MG/DL (ref 6–23)
CALCIUM SERPL-MCNC: 7.9 MG/DL (ref 8.6–10.3)
CARDIAC TROPONIN I PNL SERPL HS: 3 NG/L (ref 0–13)
CARDIAC TROPONIN I PNL SERPL HS: 4 NG/L (ref 0–13)
CHLORIDE SERPL-SCNC: 108 MMOL/L (ref 98–107)
CO2 SERPL-SCNC: 24 MMOL/L (ref 21–32)
CREAT SERPL-MCNC: 0.51 MG/DL (ref 0.5–1.05)
DACRYOCYTES BLD QL SMEAR: NORMAL
EGFRCR SERPLBLD CKD-EPI 2021: >90 ML/MIN/1.73M*2
EOSINOPHIL # BLD AUTO: 0.5 X10*3/UL (ref 0–0.7)
EOSINOPHIL NFR BLD AUTO: 10.6 %
ERYTHROCYTE [DISTWIDTH] IN BLOOD BY AUTOMATED COUNT: 22.1 % (ref 11.5–14.5)
GLUCOSE SERPL-MCNC: 302 MG/DL (ref 74–99)
HCT VFR BLD AUTO: 23.2 % (ref 36–46)
HGB BLD-MCNC: 7 G/DL (ref 12–16)
IMM GRANULOCYTES # BLD AUTO: 0.01 X10*3/UL (ref 0–0.7)
IMM GRANULOCYTES NFR BLD AUTO: 0.2 % (ref 0–0.9)
LACTATE SERPL-SCNC: 1.4 MMOL/L (ref 0.4–2)
LIPASE SERPL-CCNC: 181 U/L (ref 9–82)
LYMPHOCYTES # BLD AUTO: 1.27 X10*3/UL (ref 1.2–4.8)
LYMPHOCYTES NFR BLD AUTO: 27 %
MAGNESIUM SERPL-MCNC: 1.92 MG/DL (ref 1.6–2.4)
MCH RBC QN AUTO: 23.3 PG (ref 26–34)
MCHC RBC AUTO-ENTMCNC: 30.2 G/DL (ref 32–36)
MCV RBC AUTO: 77 FL (ref 80–100)
MONOCYTES # BLD AUTO: 0.57 X10*3/UL (ref 0.1–1)
MONOCYTES NFR BLD AUTO: 12.1 %
NEUTROPHILS # BLD AUTO: 2.34 X10*3/UL (ref 1.2–7.7)
NEUTROPHILS NFR BLD AUTO: 49.7 %
NRBC BLD-RTO: 0 /100 WBCS (ref 0–0)
OVALOCYTES BLD QL SMEAR: NORMAL
PLATELET # BLD AUTO: 79 X10*3/UL (ref 150–450)
POLYCHROMASIA BLD QL SMEAR: NORMAL
POTASSIUM SERPL-SCNC: 4.9 MMOL/L (ref 3.5–5.3)
PROT SERPL-MCNC: 7.4 G/DL (ref 6.4–8.2)
RBC # BLD AUTO: 3 X10*6/UL (ref 4–5.2)
RBC MORPH BLD: NORMAL
SODIUM SERPL-SCNC: 135 MMOL/L (ref 136–145)
TARGETS BLD QL SMEAR: NORMAL
WBC # BLD AUTO: 4.7 X10*3/UL (ref 4.4–11.3)

## 2024-10-27 PROCEDURE — 71045 X-RAY EXAM CHEST 1 VIEW: CPT | Performed by: RADIOLOGY

## 2024-10-27 PROCEDURE — 1210000001 HC SEMI-PRIVATE ROOM DAILY

## 2024-10-27 PROCEDURE — 85025 COMPLETE CBC W/AUTO DIFF WBC: CPT

## 2024-10-27 PROCEDURE — 1100000001 HC PRIVATE ROOM DAILY

## 2024-10-27 PROCEDURE — 74177 CT ABD & PELVIS W/CONTRAST: CPT | Performed by: RADIOLOGY

## 2024-10-27 PROCEDURE — 2550000001 HC RX 255 CONTRASTS

## 2024-10-27 PROCEDURE — 83605 ASSAY OF LACTIC ACID: CPT

## 2024-10-27 PROCEDURE — 2500000004 HC RX 250 GENERAL PHARMACY W/ HCPCS (ALT 636 FOR OP/ED)

## 2024-10-27 PROCEDURE — 74177 CT ABD & PELVIS W/CONTRAST: CPT

## 2024-10-27 PROCEDURE — 83690 ASSAY OF LIPASE: CPT

## 2024-10-27 PROCEDURE — 36415 COLL VENOUS BLD VENIPUNCTURE: CPT

## 2024-10-27 PROCEDURE — 96375 TX/PRO/DX INJ NEW DRUG ADDON: CPT

## 2024-10-27 PROCEDURE — 93010 ELECTROCARDIOGRAM REPORT: CPT | Performed by: INTERNAL MEDICINE

## 2024-10-27 PROCEDURE — 84484 ASSAY OF TROPONIN QUANT: CPT

## 2024-10-27 PROCEDURE — 96374 THER/PROPH/DIAG INJ IV PUSH: CPT

## 2024-10-27 PROCEDURE — 83880 ASSAY OF NATRIURETIC PEPTIDE: CPT

## 2024-10-27 PROCEDURE — 71045 X-RAY EXAM CHEST 1 VIEW: CPT

## 2024-10-27 PROCEDURE — 80053 COMPREHEN METABOLIC PANEL: CPT

## 2024-10-27 PROCEDURE — 83735 ASSAY OF MAGNESIUM: CPT

## 2024-10-27 PROCEDURE — 99285 EMERGENCY DEPT VISIT HI MDM: CPT | Mod: 25

## 2024-10-27 RX ORDER — INSULIN LISPRO 100 [IU]/ML
0-15 INJECTION, SOLUTION INTRAVENOUS; SUBCUTANEOUS
Status: DISCONTINUED | OUTPATIENT
Start: 2024-10-28 | End: 2024-11-02 | Stop reason: HOSPADM

## 2024-10-27 RX ORDER — ONDANSETRON HYDROCHLORIDE 2 MG/ML
4 INJECTION, SOLUTION INTRAVENOUS ONCE
Status: COMPLETED | OUTPATIENT
Start: 2024-10-27 | End: 2024-10-27

## 2024-10-27 RX ORDER — DEXTROSE 50 % IN WATER (D50W) INTRAVENOUS SYRINGE
25
Status: DISCONTINUED | OUTPATIENT
Start: 2024-10-27 | End: 2024-11-02 | Stop reason: HOSPADM

## 2024-10-27 RX ORDER — DEXTROSE 50 % IN WATER (D50W) INTRAVENOUS SYRINGE
12.5
Status: DISCONTINUED | OUTPATIENT
Start: 2024-10-27 | End: 2024-11-02 | Stop reason: HOSPADM

## 2024-10-27 RX ADMIN — IOHEXOL 75 ML: 350 INJECTION, SOLUTION INTRAVENOUS at 18:22

## 2024-10-27 RX ADMIN — HYDROMORPHONE HYDROCHLORIDE 0.5 MG: 1 INJECTION, SOLUTION INTRAMUSCULAR; INTRAVENOUS; SUBCUTANEOUS at 19:11

## 2024-10-27 RX ADMIN — ONDANSETRON 4 MG: 2 INJECTION INTRAMUSCULAR; INTRAVENOUS at 19:25

## 2024-10-27 ASSESSMENT — LIFESTYLE VARIABLES
TOTAL SCORE: 0
HAVE YOU EVER FELT YOU SHOULD CUT DOWN ON YOUR DRINKING: NO
EVER HAD A DRINK FIRST THING IN THE MORNING TO STEADY YOUR NERVES TO GET RID OF A HANGOVER: NO
EVER FELT BAD OR GUILTY ABOUT YOUR DRINKING: NO
HAVE PEOPLE ANNOYED YOU BY CRITICIZING YOUR DRINKING: NO

## 2024-10-27 ASSESSMENT — PAIN DESCRIPTION - LOCATION: LOCATION: CHEST

## 2024-10-27 ASSESSMENT — PAIN SCALES - GENERAL: PAINLEVEL_OUTOF10: 8

## 2024-10-27 ASSESSMENT — PAIN - FUNCTIONAL ASSESSMENT: PAIN_FUNCTIONAL_ASSESSMENT: 0-10

## 2024-10-28 ENCOUNTER — APPOINTMENT (OUTPATIENT)
Dept: RADIOLOGY | Facility: HOSPITAL | Age: 49
DRG: 432 | End: 2024-10-28
Payer: COMMERCIAL

## 2024-10-28 LAB
ALBUMIN SERPL BCP-MCNC: 2 G/DL (ref 3.4–5)
ALP SERPL-CCNC: 336 U/L (ref 33–110)
ALT SERPL W P-5'-P-CCNC: 26 U/L (ref 7–45)
ANION GAP SERPL CALCULATED.3IONS-SCNC: <7 MMOL/L (ref 10–20)
APPEARANCE UR: CLEAR
AST SERPL W P-5'-P-CCNC: 45 U/L (ref 9–39)
BACTERIA #/AREA URNS AUTO: ABNORMAL /HPF
BILIRUB SERPL-MCNC: 1.8 MG/DL (ref 0–1.2)
BILIRUB UR STRIP.AUTO-MCNC: NEGATIVE MG/DL
BUN SERPL-MCNC: 14 MG/DL (ref 6–23)
CALCIUM SERPL-MCNC: 7.4 MG/DL (ref 8.6–10.3)
CHLORIDE SERPL-SCNC: 110 MMOL/L (ref 98–107)
CO2 SERPL-SCNC: 27 MMOL/L (ref 21–32)
COLOR UR: YELLOW
CREAT SERPL-MCNC: 0.49 MG/DL (ref 0.5–1.05)
EGFRCR SERPLBLD CKD-EPI 2021: >90 ML/MIN/1.73M*2
ERYTHROCYTE [DISTWIDTH] IN BLOOD BY AUTOMATED COUNT: 22.3 % (ref 11.5–14.5)
GLUCOSE BLD MANUAL STRIP-MCNC: 141 MG/DL (ref 74–99)
GLUCOSE BLD MANUAL STRIP-MCNC: 149 MG/DL (ref 74–99)
GLUCOSE BLD MANUAL STRIP-MCNC: 158 MG/DL (ref 74–99)
GLUCOSE BLD MANUAL STRIP-MCNC: 209 MG/DL (ref 74–99)
GLUCOSE BLD MANUAL STRIP-MCNC: 243 MG/DL (ref 74–99)
GLUCOSE SERPL-MCNC: 167 MG/DL (ref 74–99)
GLUCOSE UR STRIP.AUTO-MCNC: ABNORMAL MG/DL
HCT VFR BLD AUTO: 23.7 % (ref 36–46)
HGB BLD-MCNC: 7.5 G/DL (ref 12–16)
KETONES UR STRIP.AUTO-MCNC: NEGATIVE MG/DL
LEUKOCYTE ESTERASE UR QL STRIP.AUTO: ABNORMAL
MCH RBC QN AUTO: 23.5 PG (ref 26–34)
MCHC RBC AUTO-ENTMCNC: 31.6 G/DL (ref 32–36)
MCV RBC AUTO: 74 FL (ref 80–100)
MUCOUS THREADS #/AREA URNS AUTO: ABNORMAL /LPF
NITRITE UR QL STRIP.AUTO: NEGATIVE
NRBC BLD-RTO: 0 /100 WBCS (ref 0–0)
PH UR STRIP.AUTO: 6 [PH]
PLATELET # BLD AUTO: 85 X10*3/UL (ref 150–450)
POTASSIUM SERPL-SCNC: 3.5 MMOL/L (ref 3.5–5.3)
PROT SERPL-MCNC: 6.4 G/DL (ref 6.4–8.2)
PROT UR STRIP.AUTO-MCNC: ABNORMAL MG/DL
RBC # BLD AUTO: 3.19 X10*6/UL (ref 4–5.2)
RBC # UR STRIP.AUTO: NEGATIVE /UL
RBC #/AREA URNS AUTO: ABNORMAL /HPF
SODIUM SERPL-SCNC: 138 MMOL/L (ref 136–145)
SP GR UR STRIP.AUTO: >1.05
SQUAMOUS #/AREA URNS AUTO: ABNORMAL /HPF
UROBILINOGEN UR STRIP.AUTO-MCNC: ABNORMAL MG/DL
WBC # BLD AUTO: 4.9 X10*3/UL (ref 4.4–11.3)
WBC #/AREA URNS AUTO: >50 /HPF

## 2024-10-28 PROCEDURE — 1200000002 HC GENERAL ROOM WITH TELEMETRY DAILY

## 2024-10-28 PROCEDURE — 81001 URINALYSIS AUTO W/SCOPE: CPT | Performed by: INTERNAL MEDICINE

## 2024-10-28 PROCEDURE — 82947 ASSAY GLUCOSE BLOOD QUANT: CPT

## 2024-10-28 PROCEDURE — 2500000002 HC RX 250 W HCPCS SELF ADMINISTERED DRUGS (ALT 637 FOR MEDICARE OP, ALT 636 FOR OP/ED): Performed by: INTERNAL MEDICINE

## 2024-10-28 PROCEDURE — 2500000005 HC RX 250 GENERAL PHARMACY W/O HCPCS: Performed by: INTERNAL MEDICINE

## 2024-10-28 PROCEDURE — 99222 1ST HOSP IP/OBS MODERATE 55: CPT | Performed by: NURSE PRACTITIONER

## 2024-10-28 PROCEDURE — 49083 ABD PARACENTESIS W/IMAGING: CPT

## 2024-10-28 PROCEDURE — C1729 CATH, DRAINAGE: HCPCS

## 2024-10-28 PROCEDURE — 2500000004 HC RX 250 GENERAL PHARMACY W/ HCPCS (ALT 636 FOR OP/ED): Performed by: RADIOLOGY

## 2024-10-28 PROCEDURE — 49083 ABD PARACENTESIS W/IMAGING: CPT | Performed by: RADIOLOGY

## 2024-10-28 PROCEDURE — P9047 ALBUMIN (HUMAN), 25%, 50ML: HCPCS | Performed by: NURSE PRACTITIONER

## 2024-10-28 PROCEDURE — 0W9G3ZZ DRAINAGE OF PERITONEAL CAVITY, PERCUTANEOUS APPROACH: ICD-10-PCS | Performed by: RADIOLOGY

## 2024-10-28 PROCEDURE — 2500000004 HC RX 250 GENERAL PHARMACY W/ HCPCS (ALT 636 FOR OP/ED): Performed by: INTERNAL MEDICINE

## 2024-10-28 PROCEDURE — 2500000004 HC RX 250 GENERAL PHARMACY W/ HCPCS (ALT 636 FOR OP/ED): Performed by: NURSE PRACTITIONER

## 2024-10-28 PROCEDURE — 87086 URINE CULTURE/COLONY COUNT: CPT | Mod: WESLAB | Performed by: NURSE PRACTITIONER

## 2024-10-28 PROCEDURE — 2500000001 HC RX 250 WO HCPCS SELF ADMINISTERED DRUGS (ALT 637 FOR MEDICARE OP): Performed by: INTERNAL MEDICINE

## 2024-10-28 PROCEDURE — 36415 COLL VENOUS BLD VENIPUNCTURE: CPT | Performed by: INTERNAL MEDICINE

## 2024-10-28 PROCEDURE — 85027 COMPLETE CBC AUTOMATED: CPT | Performed by: INTERNAL MEDICINE

## 2024-10-28 PROCEDURE — 80053 COMPREHEN METABOLIC PANEL: CPT | Performed by: INTERNAL MEDICINE

## 2024-10-28 PROCEDURE — 2720000007 HC OR 272 NO HCPCS

## 2024-10-28 RX ORDER — FUROSEMIDE 40 MG/1
40 TABLET ORAL DAILY
Status: DISCONTINUED | OUTPATIENT
Start: 2024-10-28 | End: 2024-10-28

## 2024-10-28 RX ORDER — MECLIZINE HYDROCHLORIDE 25 MG/1
25 TABLET ORAL 3 TIMES DAILY PRN
Status: DISCONTINUED | OUTPATIENT
Start: 2024-10-28 | End: 2024-11-02 | Stop reason: HOSPADM

## 2024-10-28 RX ORDER — TRAMADOL HYDROCHLORIDE 50 MG/1
50 TABLET ORAL EVERY 6 HOURS PRN
Status: DISCONTINUED | OUTPATIENT
Start: 2024-10-28 | End: 2024-11-02 | Stop reason: HOSPADM

## 2024-10-28 RX ORDER — GABAPENTIN 100 MG/1
200 CAPSULE ORAL 3 TIMES DAILY
Status: DISCONTINUED | OUTPATIENT
Start: 2024-10-28 | End: 2024-11-02 | Stop reason: HOSPADM

## 2024-10-28 RX ORDER — ONDANSETRON 4 MG/1
4 TABLET, ORALLY DISINTEGRATING ORAL EVERY 8 HOURS PRN
Status: DISCONTINUED | OUTPATIENT
Start: 2024-10-28 | End: 2024-11-02 | Stop reason: HOSPADM

## 2024-10-28 RX ORDER — ACETAMINOPHEN 160 MG/5ML
650 SOLUTION ORAL EVERY 4 HOURS PRN
Status: DISCONTINUED | OUTPATIENT
Start: 2024-10-28 | End: 2024-11-02 | Stop reason: HOSPADM

## 2024-10-28 RX ORDER — LANOLIN ALCOHOL/MO/W.PET/CERES
400 CREAM (GRAM) TOPICAL DAILY
Status: DISCONTINUED | OUTPATIENT
Start: 2024-10-28 | End: 2024-11-02 | Stop reason: HOSPADM

## 2024-10-28 RX ORDER — POLYETHYLENE GLYCOL 3350 17 G/17G
17 POWDER, FOR SOLUTION ORAL DAILY PRN
Status: DISCONTINUED | OUTPATIENT
Start: 2024-10-28 | End: 2024-10-28

## 2024-10-28 RX ORDER — ALBUMIN HUMAN 250 G/1000ML
25 SOLUTION INTRAVENOUS ONCE
Status: COMPLETED | OUTPATIENT
Start: 2024-10-28 | End: 2024-10-28

## 2024-10-28 RX ORDER — POLYETHYLENE GLYCOL 3350 17 G/17G
17 POWDER, FOR SOLUTION ORAL 2 TIMES DAILY
Status: DISCONTINUED | OUTPATIENT
Start: 2024-10-28 | End: 2024-11-02 | Stop reason: HOSPADM

## 2024-10-28 RX ORDER — GUAIFENESIN/DEXTROMETHORPHAN 100-10MG/5
5 SYRUP ORAL EVERY 4 HOURS PRN
Status: DISCONTINUED | OUTPATIENT
Start: 2024-10-28 | End: 2024-11-02 | Stop reason: HOSPADM

## 2024-10-28 RX ORDER — ATORVASTATIN CALCIUM 80 MG/1
80 TABLET, FILM COATED ORAL NIGHTLY
Status: DISCONTINUED | OUTPATIENT
Start: 2024-10-28 | End: 2024-11-01

## 2024-10-28 RX ORDER — NADOLOL 20 MG/1
20 TABLET ORAL DAILY
Status: DISCONTINUED | OUTPATIENT
Start: 2024-10-28 | End: 2024-11-02 | Stop reason: HOSPADM

## 2024-10-28 RX ORDER — ONDANSETRON HYDROCHLORIDE 2 MG/ML
4 INJECTION, SOLUTION INTRAVENOUS EVERY 6 HOURS PRN
Status: DISCONTINUED | OUTPATIENT
Start: 2024-10-28 | End: 2024-11-02 | Stop reason: HOSPADM

## 2024-10-28 RX ORDER — ACETAMINOPHEN 325 MG/1
650 TABLET ORAL EVERY 4 HOURS PRN
Status: DISCONTINUED | OUTPATIENT
Start: 2024-10-28 | End: 2024-11-02 | Stop reason: HOSPADM

## 2024-10-28 RX ORDER — GUAIFENESIN 600 MG/1
600 TABLET, EXTENDED RELEASE ORAL EVERY 12 HOURS PRN
Status: DISCONTINUED | OUTPATIENT
Start: 2024-10-28 | End: 2024-11-02 | Stop reason: HOSPADM

## 2024-10-28 RX ORDER — HEPARIN SODIUM 5000 [USP'U]/ML
5000 INJECTION, SOLUTION INTRAVENOUS; SUBCUTANEOUS EVERY 8 HOURS SCHEDULED
Status: DISCONTINUED | OUTPATIENT
Start: 2024-10-28 | End: 2024-11-02 | Stop reason: HOSPADM

## 2024-10-28 RX ORDER — SUCRALFATE 1 G/10ML
1 SUSPENSION ORAL
Status: DISCONTINUED | OUTPATIENT
Start: 2024-10-28 | End: 2024-11-02 | Stop reason: HOSPADM

## 2024-10-28 RX ORDER — FENOFIBRATE 160 MG/1
160 TABLET ORAL DAILY
Status: DISCONTINUED | OUTPATIENT
Start: 2024-10-28 | End: 2024-11-02 | Stop reason: HOSPADM

## 2024-10-28 RX ORDER — METFORMIN HYDROCHLORIDE 1000 MG/1
1000 TABLET, FILM COATED, EXTENDED RELEASE ORAL
Status: ON HOLD | COMMUNITY
End: 2024-10-31

## 2024-10-28 RX ORDER — LACTULOSE 10 G/15ML
20 SOLUTION ORAL 4 TIMES DAILY
Status: DISCONTINUED | OUTPATIENT
Start: 2024-10-28 | End: 2024-10-29

## 2024-10-28 RX ORDER — INSULIN GLARGINE 100 [IU]/ML
25 INJECTION, SOLUTION SUBCUTANEOUS 2 TIMES DAILY
Status: DISCONTINUED | OUTPATIENT
Start: 2024-10-28 | End: 2024-11-01

## 2024-10-28 RX ORDER — PANTOPRAZOLE SODIUM 40 MG/1
40 TABLET, DELAYED RELEASE ORAL DAILY
Status: DISCONTINUED | OUTPATIENT
Start: 2024-10-28 | End: 2024-10-30

## 2024-10-28 RX ORDER — LIDOCAINE HYDROCHLORIDE 10 MG/ML
INJECTION, SOLUTION EPIDURAL; INFILTRATION; INTRACAUDAL; PERINEURAL
Status: COMPLETED | OUTPATIENT
Start: 2024-10-28 | End: 2024-10-28

## 2024-10-28 RX ORDER — TRAZODONE HYDROCHLORIDE 50 MG/1
25 TABLET ORAL NIGHTLY
Status: DISCONTINUED | OUTPATIENT
Start: 2024-10-28 | End: 2024-11-02 | Stop reason: HOSPADM

## 2024-10-28 RX ORDER — ACETAMINOPHEN 650 MG/1
650 SUPPOSITORY RECTAL EVERY 4 HOURS PRN
Status: DISCONTINUED | OUTPATIENT
Start: 2024-10-28 | End: 2024-11-02 | Stop reason: HOSPADM

## 2024-10-28 RX ORDER — URSODIOL 300 MG/1
300 CAPSULE ORAL 3 TIMES DAILY
Status: DISCONTINUED | OUTPATIENT
Start: 2024-10-28 | End: 2024-11-02 | Stop reason: HOSPADM

## 2024-10-28 RX ORDER — SPIRONOLACTONE 50 MG/1
50 TABLET, FILM COATED ORAL 2 TIMES DAILY
Status: DISCONTINUED | OUTPATIENT
Start: 2024-10-28 | End: 2024-11-02 | Stop reason: HOSPADM

## 2024-10-28 RX ORDER — DOCUSATE SODIUM 100 MG/1
100 CAPSULE, LIQUID FILLED ORAL 2 TIMES DAILY
Status: DISCONTINUED | OUTPATIENT
Start: 2024-10-28 | End: 2024-11-02 | Stop reason: HOSPADM

## 2024-10-28 RX ORDER — FUROSEMIDE 10 MG/ML
40 INJECTION INTRAMUSCULAR; INTRAVENOUS EVERY 12 HOURS
Status: DISCONTINUED | OUTPATIENT
Start: 2024-10-28 | End: 2024-10-31

## 2024-10-28 RX ADMIN — INSULIN GLARGINE 25 UNITS: 100 INJECTION, SOLUTION SUBCUTANEOUS at 02:31

## 2024-10-28 RX ADMIN — FUROSEMIDE 40 MG: 10 INJECTION, SOLUTION INTRAMUSCULAR; INTRAVENOUS at 21:26

## 2024-10-28 RX ADMIN — LACTULOSE 20 G: 10 SOLUTION ORAL at 06:08

## 2024-10-28 RX ADMIN — LACTULOSE 20 G: 10 SOLUTION ORAL at 16:22

## 2024-10-28 RX ADMIN — RIFAXIMIN 550 MG: 550 TABLET ORAL at 21:25

## 2024-10-28 RX ADMIN — SUCRALFATE 1 G: 1 SUSPENSION ORAL at 21:25

## 2024-10-28 RX ADMIN — ALBUMIN HUMAN 25 G: 0.25 SOLUTION INTRAVENOUS at 13:21

## 2024-10-28 RX ADMIN — ONDANSETRON 4 MG: 4 TABLET, ORALLY DISINTEGRATING ORAL at 11:05

## 2024-10-28 RX ADMIN — TRAZODONE HYDROCHLORIDE 25 MG: 50 TABLET ORAL at 01:35

## 2024-10-28 RX ADMIN — ATORVASTATIN CALCIUM 80 MG: 80 TABLET, FILM COATED ORAL at 01:28

## 2024-10-28 RX ADMIN — INSULIN GLARGINE 25 UNITS: 100 INJECTION, SOLUTION SUBCUTANEOUS at 21:27

## 2024-10-28 RX ADMIN — LACTULOSE 20 G: 10 SOLUTION ORAL at 13:35

## 2024-10-28 RX ADMIN — RIFAXIMIN 550 MG: 550 TABLET ORAL at 02:29

## 2024-10-28 RX ADMIN — SPIRONOLACTONE 50 MG: 50 TABLET ORAL at 10:10

## 2024-10-28 RX ADMIN — GABAPENTIN 200 MG: 100 CAPSULE ORAL at 21:25

## 2024-10-28 RX ADMIN — FUROSEMIDE 40 MG: 40 TABLET ORAL at 10:10

## 2024-10-28 RX ADMIN — INSULIN LISPRO 6 UNITS: 100 INJECTION, SOLUTION INTRAVENOUS; SUBCUTANEOUS at 10:30

## 2024-10-28 RX ADMIN — HEPARIN SODIUM 5000 UNITS: 5000 INJECTION, SOLUTION INTRAVENOUS; SUBCUTANEOUS at 21:28

## 2024-10-28 RX ADMIN — TRAZODONE HYDROCHLORIDE 25 MG: 50 TABLET ORAL at 21:26

## 2024-10-28 RX ADMIN — RIFAXIMIN 550 MG: 550 TABLET ORAL at 10:09

## 2024-10-28 RX ADMIN — ACETAMINOPHEN 650 MG: 325 TABLET ORAL at 01:26

## 2024-10-28 RX ADMIN — SUCRALFATE 1 G: 1 SUSPENSION ORAL at 16:22

## 2024-10-28 RX ADMIN — URSODIOL 300 MG: 300 CAPSULE ORAL at 10:13

## 2024-10-28 RX ADMIN — FENOFIBRATE 160 MG: 160 TABLET ORAL at 10:13

## 2024-10-28 RX ADMIN — ONDANSETRON 4 MG: 4 TABLET, ORALLY DISINTEGRATING ORAL at 21:25

## 2024-10-28 RX ADMIN — SPIRONOLACTONE 50 MG: 50 TABLET ORAL at 21:25

## 2024-10-28 RX ADMIN — URSODIOL 300 MG: 300 CAPSULE ORAL at 15:07

## 2024-10-28 RX ADMIN — Medication 400 MG: at 10:09

## 2024-10-28 RX ADMIN — TRAMADOL HYDROCHLORIDE 50 MG: 50 TABLET ORAL at 11:03

## 2024-10-28 RX ADMIN — INSULIN GLARGINE 25 UNITS: 100 INJECTION, SOLUTION SUBCUTANEOUS at 11:03

## 2024-10-28 RX ADMIN — SUCRALFATE 1 G: 1 SUSPENSION ORAL at 11:04

## 2024-10-28 RX ADMIN — LIDOCAINE HYDROCHLORIDE 10 ML: 10 INJECTION, SOLUTION EPIDURAL; INFILTRATION; INTRACAUDAL; PERINEURAL at 12:59

## 2024-10-28 RX ADMIN — GABAPENTIN 200 MG: 100 CAPSULE ORAL at 10:09

## 2024-10-28 RX ADMIN — URSODIOL 300 MG: 300 CAPSULE ORAL at 21:26

## 2024-10-28 RX ADMIN — PANTOPRAZOLE SODIUM 40 MG: 40 TABLET, DELAYED RELEASE ORAL at 10:10

## 2024-10-28 RX ADMIN — GABAPENTIN 200 MG: 100 CAPSULE ORAL at 15:07

## 2024-10-28 RX ADMIN — NADOLOL 20 MG: 20 TABLET ORAL at 10:09

## 2024-10-28 RX ADMIN — HEPARIN SODIUM 5000 UNITS: 5000 INJECTION, SOLUTION INTRAVENOUS; SUBCUTANEOUS at 01:30

## 2024-10-28 RX ADMIN — ATORVASTATIN CALCIUM 80 MG: 80 TABLET, FILM COATED ORAL at 21:25

## 2024-10-28 RX ADMIN — LACTULOSE 20 G: 10 SOLUTION ORAL at 21:25

## 2024-10-28 RX ADMIN — SUCRALFATE 1 G: 1 SUSPENSION ORAL at 06:08

## 2024-10-28 SDOH — SOCIAL STABILITY: SOCIAL INSECURITY: HAVE YOU HAD ANY THOUGHTS OF HARMING ANYONE ELSE?: NO

## 2024-10-28 SDOH — ECONOMIC STABILITY: HOUSING INSECURITY: AT ANY TIME IN THE PAST 12 MONTHS, WERE YOU HOMELESS OR LIVING IN A SHELTER (INCLUDING NOW)?: NO

## 2024-10-28 SDOH — HEALTH STABILITY: MENTAL HEALTH: HOW OFTEN DO YOU HAVE SIX OR MORE DRINKS ON ONE OCCASION?: NEVER

## 2024-10-28 SDOH — SOCIAL STABILITY: SOCIAL INSECURITY: DO YOU FEEL ANYONE HAS EXPLOITED OR TAKEN ADVANTAGE OF YOU FINANCIALLY OR OF YOUR PERSONAL PROPERTY?: NO

## 2024-10-28 SDOH — SOCIAL STABILITY: SOCIAL INSECURITY: WITHIN THE LAST YEAR, HAVE YOU BEEN AFRAID OF YOUR PARTNER OR EX-PARTNER?: NO

## 2024-10-28 SDOH — SOCIAL STABILITY: SOCIAL NETWORK: IN A TYPICAL WEEK, HOW MANY TIMES DO YOU TALK ON THE PHONE WITH FAMILY, FRIENDS, OR NEIGHBORS?: THREE TIMES A WEEK

## 2024-10-28 SDOH — ECONOMIC STABILITY: HOUSING INSECURITY: IN THE LAST 12 MONTHS, WAS THERE A TIME WHEN YOU WERE NOT ABLE TO PAY THE MORTGAGE OR RENT ON TIME?: NO

## 2024-10-28 SDOH — SOCIAL STABILITY: SOCIAL INSECURITY
WITHIN THE LAST YEAR, HAVE YOU BEEN RAPED OR FORCED TO HAVE ANY KIND OF SEXUAL ACTIVITY BY YOUR PARTNER OR EX-PARTNER?: NO

## 2024-10-28 SDOH — SOCIAL STABILITY: SOCIAL INSECURITY: WITHIN THE LAST YEAR, HAVE YOU BEEN HUMILIATED OR EMOTIONALLY ABUSED IN OTHER WAYS BY YOUR PARTNER OR EX-PARTNER?: NO

## 2024-10-28 SDOH — ECONOMIC STABILITY: FOOD INSECURITY: WITHIN THE PAST 12 MONTHS, THE FOOD YOU BOUGHT JUST DIDN'T LAST AND YOU DIDN'T HAVE MONEY TO GET MORE.: NEVER TRUE

## 2024-10-28 SDOH — SOCIAL STABILITY: SOCIAL NETWORK: HOW OFTEN DO YOU ATTEND CHURCH OR RELIGIOUS SERVICES?: 1 TO 4 TIMES PER YEAR

## 2024-10-28 SDOH — SOCIAL STABILITY: SOCIAL INSECURITY: HAS ANYONE EVER THREATENED TO HURT YOUR FAMILY OR YOUR PETS?: NO

## 2024-10-28 SDOH — ECONOMIC STABILITY: HOUSING INSECURITY: IN THE PAST 12 MONTHS, HOW MANY TIMES HAVE YOU MOVED WHERE YOU WERE LIVING?: 0

## 2024-10-28 SDOH — ECONOMIC STABILITY: FOOD INSECURITY: WITHIN THE PAST 12 MONTHS, YOU WORRIED THAT YOUR FOOD WOULD RUN OUT BEFORE YOU GOT THE MONEY TO BUY MORE.: NEVER TRUE

## 2024-10-28 SDOH — ECONOMIC STABILITY: INCOME INSECURITY: IN THE PAST 12 MONTHS HAS THE ELECTRIC, GAS, OIL, OR WATER COMPANY THREATENED TO SHUT OFF SERVICES IN YOUR HOME?: NO

## 2024-10-28 SDOH — SOCIAL STABILITY: SOCIAL INSECURITY: ARE YOU MARRIED, WIDOWED, DIVORCED, SEPARATED, NEVER MARRIED, OR LIVING WITH A PARTNER?: MARRIED

## 2024-10-28 SDOH — HEALTH STABILITY: MENTAL HEALTH: HOW MANY DRINKS CONTAINING ALCOHOL DO YOU HAVE ON A TYPICAL DAY WHEN YOU ARE DRINKING?: PATIENT DOES NOT DRINK

## 2024-10-28 SDOH — SOCIAL STABILITY: SOCIAL NETWORK: HOW OFTEN DO YOU GET TOGETHER WITH FRIENDS OR RELATIVES?: THREE TIMES A WEEK

## 2024-10-28 SDOH — SOCIAL STABILITY: SOCIAL NETWORK
DO YOU BELONG TO ANY CLUBS OR ORGANIZATIONS SUCH AS CHURCH GROUPS, UNIONS, FRATERNAL OR ATHLETIC GROUPS, OR SCHOOL GROUPS?: NO

## 2024-10-28 SDOH — ECONOMIC STABILITY: FOOD INSECURITY: HOW HARD IS IT FOR YOU TO PAY FOR THE VERY BASICS LIKE FOOD, HOUSING, MEDICAL CARE, AND HEATING?: SOMEWHAT HARD

## 2024-10-28 SDOH — HEALTH STABILITY: MENTAL HEALTH
DO YOU FEEL STRESS - TENSE, RESTLESS, NERVOUS, OR ANXIOUS, OR UNABLE TO SLEEP AT NIGHT BECAUSE YOUR MIND IS TROUBLED ALL THE TIME - THESE DAYS?: ONLY A LITTLE

## 2024-10-28 SDOH — HEALTH STABILITY: MENTAL HEALTH: HOW OFTEN DO YOU HAVE A DRINK CONTAINING ALCOHOL?: NEVER

## 2024-10-28 SDOH — HEALTH STABILITY: PHYSICAL HEALTH: ON AVERAGE, HOW MANY MINUTES DO YOU ENGAGE IN EXERCISE AT THIS LEVEL?: 0 MIN

## 2024-10-28 SDOH — SOCIAL STABILITY: SOCIAL INSECURITY: ARE YOU OR HAVE YOU BEEN THREATENED OR ABUSED PHYSICALLY, EMOTIONALLY, OR SEXUALLY BY ANYONE?: NO

## 2024-10-28 SDOH — SOCIAL STABILITY: SOCIAL INSECURITY: ABUSE: ADULT

## 2024-10-28 SDOH — HEALTH STABILITY: PHYSICAL HEALTH: ON AVERAGE, HOW MANY DAYS PER WEEK DO YOU ENGAGE IN MODERATE TO STRENUOUS EXERCISE (LIKE A BRISK WALK)?: 0 DAYS

## 2024-10-28 SDOH — SOCIAL STABILITY: SOCIAL INSECURITY: HAVE YOU HAD THOUGHTS OF HARMING ANYONE ELSE?: NO

## 2024-10-28 SDOH — SOCIAL STABILITY: SOCIAL NETWORK: HOW OFTEN DO YOU ATTEND MEETINGS OF THE CLUBS OR ORGANIZATIONS YOU BELONG TO?: NEVER

## 2024-10-28 SDOH — SOCIAL STABILITY: SOCIAL INSECURITY: ARE THERE ANY APPARENT SIGNS OF INJURIES/BEHAVIORS THAT COULD BE RELATED TO ABUSE/NEGLECT?: NO

## 2024-10-28 SDOH — ECONOMIC STABILITY: TRANSPORTATION INSECURITY: IN THE PAST 12 MONTHS, HAS LACK OF TRANSPORTATION KEPT YOU FROM MEDICAL APPOINTMENTS OR FROM GETTING MEDICATIONS?: NO

## 2024-10-28 SDOH — SOCIAL STABILITY: SOCIAL INSECURITY: DO YOU FEEL UNSAFE GOING BACK TO THE PLACE WHERE YOU ARE LIVING?: NO

## 2024-10-28 SDOH — SOCIAL STABILITY: SOCIAL INSECURITY: WERE YOU ABLE TO COMPLETE ALL THE BEHAVIORAL HEALTH SCREENINGS?: YES

## 2024-10-28 SDOH — SOCIAL STABILITY: SOCIAL INSECURITY: DOES ANYONE TRY TO KEEP YOU FROM HAVING/CONTACTING OTHER FRIENDS OR DOING THINGS OUTSIDE YOUR HOME?: NO

## 2024-10-28 ASSESSMENT — COGNITIVE AND FUNCTIONAL STATUS - GENERAL
MOBILITY SCORE: 23
DAILY ACTIVITIY SCORE: 24
CLIMB 3 TO 5 STEPS WITH RAILING: A LITTLE
PATIENT BASELINE BEDBOUND: NO
CLIMB 3 TO 5 STEPS WITH RAILING: A LITTLE
DAILY ACTIVITIY SCORE: 24
MOBILITY SCORE: 23

## 2024-10-28 ASSESSMENT — PAIN SCALES - GENERAL
PAINLEVEL_OUTOF10: 0 - NO PAIN
PAINLEVEL_OUTOF10: 0 - NO PAIN
PAINLEVEL_OUTOF10: 5 - MODERATE PAIN
PAINLEVEL_OUTOF10: 0 - NO PAIN
PAINLEVEL_OUTOF10: 7
PAINLEVEL_OUTOF10: 4
PAINLEVEL_OUTOF10: 7
PAINLEVEL_OUTOF10: 7
PAINLEVEL_OUTOF10: 0 - NO PAIN

## 2024-10-28 ASSESSMENT — ACTIVITIES OF DAILY LIVING (ADL)
HEARING - LEFT EAR: FUNCTIONAL
LACK_OF_TRANSPORTATION: NO
HEARING - RIGHT EAR: FUNCTIONAL
JUDGMENT_ADEQUATE_SAFELY_COMPLETE_DAILY_ACTIVITIES: YES
DRESSING YOURSELF: INDEPENDENT
GROOMING: INDEPENDENT
TOILETING: INDEPENDENT
LACK_OF_TRANSPORTATION: NO
ASSISTIVE_DEVICE: WALKER
LACK_OF_TRANSPORTATION: NO
BATHING: INDEPENDENT
FEEDING YOURSELF: INDEPENDENT
WALKS IN HOME: INDEPENDENT
PATIENT'S MEMORY ADEQUATE TO SAFELY COMPLETE DAILY ACTIVITIES?: YES
ADEQUATE_TO_COMPLETE_ADL: YES

## 2024-10-28 ASSESSMENT — PAIN - FUNCTIONAL ASSESSMENT
PAIN_FUNCTIONAL_ASSESSMENT: 0-10

## 2024-10-28 ASSESSMENT — LIFESTYLE VARIABLES
SUBSTANCE_ABUSE_PAST_12_MONTHS: NO
AUDIT-C TOTAL SCORE: 0
HOW OFTEN DO YOU HAVE 6 OR MORE DRINKS ON ONE OCCASION: NEVER
SKIP TO QUESTIONS 9-10: 1
SKIP TO QUESTIONS 9-10: 1
PRESCIPTION_ABUSE_PAST_12_MONTHS: NO
HOW MANY STANDARD DRINKS CONTAINING ALCOHOL DO YOU HAVE ON A TYPICAL DAY: PATIENT DOES NOT DRINK
AUDIT-C TOTAL SCORE: 0
HOW OFTEN DO YOU HAVE A DRINK CONTAINING ALCOHOL: NEVER
AUDIT-C TOTAL SCORE: 0

## 2024-10-28 ASSESSMENT — PAIN SCALES - WONG BAKER
WONGBAKER_NUMERICALRESPONSE: HURTS LITTLE MORE
WONGBAKER_NUMERICALRESPONSE: HURTS LITTLE MORE

## 2024-10-28 ASSESSMENT — ENCOUNTER SYMPTOMS
DIARRHEA: 1
NAUSEA: 1
RESPIRATORY NEGATIVE: 1
ABDOMINAL PAIN: 1
ABDOMINAL DISTENTION: 1
MUSCULOSKELETAL NEGATIVE: 1
HEMATOLOGIC/LYMPHATIC NEGATIVE: 1
FEVER: 0
PALPITATIONS: 0
ENDOCRINE NEGATIVE: 1
WEAKNESS: 1
ALLERGIC/IMMUNOLOGIC NEGATIVE: 1
CHILLS: 0
VOMITING: 1
ACTIVITY CHANGE: 1
PSYCHIATRIC NEGATIVE: 1
BLOOD IN STOOL: 0
NEUROLOGICAL NEGATIVE: 1
EYES NEGATIVE: 1
FATIGUE: 1
CONSTITUTIONAL NEGATIVE: 1

## 2024-10-28 ASSESSMENT — PAIN DESCRIPTION - ORIENTATION: ORIENTATION: UPPER

## 2024-10-28 ASSESSMENT — PAIN DESCRIPTION - DESCRIPTORS
DESCRIPTORS: ACHING
DESCRIPTORS: ACHING

## 2024-10-28 ASSESSMENT — PAIN DESCRIPTION - LOCATION
LOCATION: ABDOMEN
LOCATION: ABDOMEN

## 2024-10-29 ENCOUNTER — APPOINTMENT (OUTPATIENT)
Dept: CARDIOLOGY | Facility: HOSPITAL | Age: 49
End: 2024-10-29
Payer: COMMERCIAL

## 2024-10-29 ENCOUNTER — APPOINTMENT (OUTPATIENT)
Dept: CARDIOLOGY | Facility: HOSPITAL | Age: 49
DRG: 432 | End: 2024-10-29
Payer: COMMERCIAL

## 2024-10-29 LAB
ANION GAP SERPL CALCULATED.3IONS-SCNC: <7 MMOL/L (ref 10–20)
AORTIC VALVE PEAK VELOCITY: 1.45 M/S
AV PEAK GRADIENT: 8.4 MMHG
AVA (PEAK VEL): 2.12 CM2
BUN SERPL-MCNC: 12 MG/DL (ref 6–23)
CALCIUM SERPL-MCNC: 7.6 MG/DL (ref 8.6–10.3)
CHLORIDE SERPL-SCNC: 109 MMOL/L (ref 98–107)
CO2 SERPL-SCNC: 28 MMOL/L (ref 21–32)
CREAT SERPL-MCNC: 0.61 MG/DL (ref 0.5–1.05)
EGFRCR SERPLBLD CKD-EPI 2021: >90 ML/MIN/1.73M*2
EJECTION FRACTION APICAL 4 CHAMBER: 65.8
EJECTION FRACTION: 63 %
ERYTHROCYTE [DISTWIDTH] IN BLOOD BY AUTOMATED COUNT: 22.3 % (ref 11.5–14.5)
GLUCOSE BLD MANUAL STRIP-MCNC: 117 MG/DL (ref 74–99)
GLUCOSE BLD MANUAL STRIP-MCNC: 128 MG/DL (ref 74–99)
GLUCOSE BLD MANUAL STRIP-MCNC: 142 MG/DL (ref 74–99)
GLUCOSE BLD MANUAL STRIP-MCNC: 182 MG/DL (ref 74–99)
GLUCOSE SERPL-MCNC: 188 MG/DL (ref 74–99)
HCT VFR BLD AUTO: 23.9 % (ref 36–46)
HGB BLD-MCNC: 7.3 G/DL (ref 12–16)
IRON SATN MFR SERPL: 8 % (ref 25–45)
IRON SERPL-MCNC: 20 UG/DL (ref 35–150)
LEFT ATRIUM VOLUME AREA LENGTH INDEX BSA: 31 ML/M2
LEFT VENTRICLE INTERNAL DIMENSION DIASTOLE: 4.54 CM (ref 3.5–6)
LEFT VENTRICULAR OUTFLOW TRACT DIAMETER: 1.99 CM
LV EJECTION FRACTION BIPLANE: 61 %
MCH RBC QN AUTO: 23.2 PG (ref 26–34)
MCHC RBC AUTO-ENTMCNC: 30.5 G/DL (ref 32–36)
MCV RBC AUTO: 76 FL (ref 80–100)
MITRAL VALVE E/A RATIO: 0.97
MITRAL VALVE E/E' RATIO: 8.21
NRBC BLD-RTO: 0 /100 WBCS (ref 0–0)
PLATELET # BLD AUTO: 84 X10*3/UL (ref 150–450)
POTASSIUM SERPL-SCNC: 3.7 MMOL/L (ref 3.5–5.3)
RBC # BLD AUTO: 3.14 X10*6/UL (ref 4–5.2)
RIGHT VENTRICLE FREE WALL PEAK S': 15.12 CM/S
RIGHT VENTRICLE PEAK SYSTOLIC PRESSURE: 19.5 MMHG
SODIUM SERPL-SCNC: 139 MMOL/L (ref 136–145)
TIBC SERPL-MCNC: 247 UG/DL (ref 240–445)
TRICUSPID ANNULAR PLANE SYSTOLIC EXCURSION: 3.2 CM
UIBC SERPL-MCNC: 227 UG/DL (ref 110–370)
WBC # BLD AUTO: 3.8 X10*3/UL (ref 4.4–11.3)

## 2024-10-29 PROCEDURE — 99232 SBSQ HOSP IP/OBS MODERATE 35: CPT | Performed by: NURSE PRACTITIONER

## 2024-10-29 PROCEDURE — 2500000001 HC RX 250 WO HCPCS SELF ADMINISTERED DRUGS (ALT 637 FOR MEDICARE OP): Performed by: INTERNAL MEDICINE

## 2024-10-29 PROCEDURE — 85027 COMPLETE CBC AUTOMATED: CPT | Performed by: NURSE PRACTITIONER

## 2024-10-29 PROCEDURE — 93306 TTE W/DOPPLER COMPLETE: CPT | Performed by: INTERNAL MEDICINE

## 2024-10-29 PROCEDURE — 82947 ASSAY GLUCOSE BLOOD QUANT: CPT

## 2024-10-29 PROCEDURE — 2500000004 HC RX 250 GENERAL PHARMACY W/ HCPCS (ALT 636 FOR OP/ED): Performed by: INTERNAL MEDICINE

## 2024-10-29 PROCEDURE — 2500000002 HC RX 250 W HCPCS SELF ADMINISTERED DRUGS (ALT 637 FOR MEDICARE OP, ALT 636 FOR OP/ED): Performed by: INTERNAL MEDICINE

## 2024-10-29 PROCEDURE — 36415 COLL VENOUS BLD VENIPUNCTURE: CPT | Performed by: NURSE PRACTITIONER

## 2024-10-29 PROCEDURE — 93306 TTE W/DOPPLER COMPLETE: CPT

## 2024-10-29 PROCEDURE — 83540 ASSAY OF IRON: CPT | Performed by: NURSE PRACTITIONER

## 2024-10-29 PROCEDURE — 1200000002 HC GENERAL ROOM WITH TELEMETRY DAILY

## 2024-10-29 PROCEDURE — 80048 BASIC METABOLIC PNL TOTAL CA: CPT | Performed by: NURSE PRACTITIONER

## 2024-10-29 PROCEDURE — 93005 ELECTROCARDIOGRAM TRACING: CPT

## 2024-10-29 PROCEDURE — 83550 IRON BINDING TEST: CPT | Performed by: NURSE PRACTITIONER

## 2024-10-29 RX ORDER — LACTULOSE 10 G/15ML
20 SOLUTION ORAL DAILY
Status: DISCONTINUED | OUTPATIENT
Start: 2024-10-30 | End: 2024-10-31

## 2024-10-29 RX ADMIN — GABAPENTIN 200 MG: 100 CAPSULE ORAL at 21:18

## 2024-10-29 RX ADMIN — SPIRONOLACTONE 50 MG: 50 TABLET ORAL at 09:04

## 2024-10-29 RX ADMIN — SUCRALFATE 1 G: 1 SUSPENSION ORAL at 06:55

## 2024-10-29 RX ADMIN — ATORVASTATIN CALCIUM 80 MG: 80 TABLET, FILM COATED ORAL at 21:18

## 2024-10-29 RX ADMIN — FENOFIBRATE 160 MG: 160 TABLET ORAL at 09:05

## 2024-10-29 RX ADMIN — TRAMADOL HYDROCHLORIDE 50 MG: 50 TABLET ORAL at 10:53

## 2024-10-29 RX ADMIN — SUCRALFATE 1 G: 1 SUSPENSION ORAL at 17:06

## 2024-10-29 RX ADMIN — HEPARIN SODIUM 5000 UNITS: 5000 INJECTION, SOLUTION INTRAVENOUS; SUBCUTANEOUS at 21:17

## 2024-10-29 RX ADMIN — HEPARIN SODIUM 5000 UNITS: 5000 INJECTION, SOLUTION INTRAVENOUS; SUBCUTANEOUS at 06:55

## 2024-10-29 RX ADMIN — SUCRALFATE 1 G: 1 SUSPENSION ORAL at 21:18

## 2024-10-29 RX ADMIN — NADOLOL 20 MG: 20 TABLET ORAL at 09:04

## 2024-10-29 RX ADMIN — INSULIN GLARGINE 25 UNITS: 100 INJECTION, SOLUTION SUBCUTANEOUS at 21:17

## 2024-10-29 RX ADMIN — SPIRONOLACTONE 50 MG: 50 TABLET ORAL at 21:18

## 2024-10-29 RX ADMIN — INSULIN GLARGINE 25 UNITS: 100 INJECTION, SOLUTION SUBCUTANEOUS at 09:09

## 2024-10-29 RX ADMIN — URSODIOL 300 MG: 300 CAPSULE ORAL at 09:05

## 2024-10-29 RX ADMIN — TRAZODONE HYDROCHLORIDE 25 MG: 50 TABLET ORAL at 21:18

## 2024-10-29 RX ADMIN — URSODIOL 300 MG: 300 CAPSULE ORAL at 21:18

## 2024-10-29 RX ADMIN — GABAPENTIN 200 MG: 100 CAPSULE ORAL at 14:17

## 2024-10-29 RX ADMIN — HEPARIN SODIUM 5000 UNITS: 5000 INJECTION, SOLUTION INTRAVENOUS; SUBCUTANEOUS at 14:17

## 2024-10-29 RX ADMIN — PANTOPRAZOLE SODIUM 40 MG: 40 TABLET, DELAYED RELEASE ORAL at 09:05

## 2024-10-29 RX ADMIN — URSODIOL 300 MG: 300 CAPSULE ORAL at 14:17

## 2024-10-29 RX ADMIN — Medication 400 MG: at 09:05

## 2024-10-29 RX ADMIN — GABAPENTIN 200 MG: 100 CAPSULE ORAL at 09:04

## 2024-10-29 RX ADMIN — DOCUSATE SODIUM 100 MG: 100 CAPSULE, LIQUID FILLED ORAL at 09:05

## 2024-10-29 RX ADMIN — RIFAXIMIN 550 MG: 550 TABLET ORAL at 09:05

## 2024-10-29 RX ADMIN — LACTULOSE 20 G: 10 SOLUTION ORAL at 06:55

## 2024-10-29 RX ADMIN — INSULIN LISPRO 3 UNITS: 100 INJECTION, SOLUTION INTRAVENOUS; SUBCUTANEOUS at 09:09

## 2024-10-29 RX ADMIN — RIFAXIMIN 550 MG: 550 TABLET ORAL at 21:18

## 2024-10-29 ASSESSMENT — COGNITIVE AND FUNCTIONAL STATUS - GENERAL
DAILY ACTIVITIY SCORE: 19
HELP NEEDED FOR BATHING: A LITTLE
MOVING TO AND FROM BED TO CHAIR: A LITTLE
DRESSING REGULAR LOWER BODY CLOTHING: A LITTLE
PERSONAL GROOMING: A LITTLE
MOVING FROM LYING ON BACK TO SITTING ON SIDE OF FLAT BED WITH BEDRAILS: A LITTLE
CLIMB 3 TO 5 STEPS WITH RAILING: A LOT
MOVING FROM LYING ON BACK TO SITTING ON SIDE OF FLAT BED WITH BEDRAILS: A LITTLE
DRESSING REGULAR UPPER BODY CLOTHING: A LITTLE
WALKING IN HOSPITAL ROOM: A LITTLE
CLIMB 3 TO 5 STEPS WITH RAILING: A LOT
TOILETING: A LITTLE
DAILY ACTIVITIY SCORE: 19
MOBILITY SCORE: 17
TURNING FROM BACK TO SIDE WHILE IN FLAT BAD: A LITTLE
TOILETING: A LITTLE
HELP NEEDED FOR BATHING: A LITTLE
PERSONAL GROOMING: A LITTLE
TURNING FROM BACK TO SIDE WHILE IN FLAT BAD: A LITTLE
DRESSING REGULAR LOWER BODY CLOTHING: A LITTLE
DRESSING REGULAR UPPER BODY CLOTHING: A LITTLE
MOBILITY SCORE: 17
MOVING TO AND FROM BED TO CHAIR: A LITTLE
STANDING UP FROM CHAIR USING ARMS: A LITTLE
WALKING IN HOSPITAL ROOM: A LITTLE
STANDING UP FROM CHAIR USING ARMS: A LITTLE

## 2024-10-29 ASSESSMENT — PAIN SCALES - GENERAL
PAINLEVEL_OUTOF10: 3
PAINLEVEL_OUTOF10: 4
PAINLEVEL_OUTOF10: 0 - NO PAIN
PAINLEVEL_OUTOF10: 5 - MODERATE PAIN

## 2024-10-29 ASSESSMENT — PAIN DESCRIPTION - DESCRIPTORS: DESCRIPTORS: DISCOMFORT

## 2024-10-29 ASSESSMENT — PAIN - FUNCTIONAL ASSESSMENT
PAIN_FUNCTIONAL_ASSESSMENT: 0-10

## 2024-10-30 LAB
ABO GROUP (TYPE) IN BLOOD: NORMAL
ABO GROUP (TYPE) IN BLOOD: NORMAL
AMMONIA PLAS-SCNC: 83 UMOL/L (ref 16–53)
ANION GAP SERPL CALCULATED.3IONS-SCNC: <7 MMOL/L (ref 10–20)
ANTIBODY SCREEN: NORMAL
BB ANTIBODY IDENTIFICATION: NORMAL
BUN SERPL-MCNC: 12 MG/DL (ref 6–23)
CALCIUM SERPL-MCNC: 7.3 MG/DL (ref 8.6–10.3)
CASE #: NORMAL
CHLORIDE SERPL-SCNC: 109 MMOL/L (ref 98–107)
CO2 SERPL-SCNC: 27 MMOL/L (ref 21–32)
CREAT SERPL-MCNC: 0.61 MG/DL (ref 0.5–1.05)
EGFRCR SERPLBLD CKD-EPI 2021: >90 ML/MIN/1.73M*2
ERYTHROCYTE [DISTWIDTH] IN BLOOD BY AUTOMATED COUNT: 22.5 % (ref 11.5–14.5)
FERRITIN SERPL-MCNC: 20 NG/ML (ref 8–150)
GLUCOSE BLD MANUAL STRIP-MCNC: 118 MG/DL (ref 74–99)
GLUCOSE BLD MANUAL STRIP-MCNC: 121 MG/DL (ref 74–99)
GLUCOSE BLD MANUAL STRIP-MCNC: 82 MG/DL (ref 74–99)
GLUCOSE BLD MANUAL STRIP-MCNC: 83 MG/DL (ref 74–99)
GLUCOSE SERPL-MCNC: 105 MG/DL (ref 74–99)
HCT VFR BLD AUTO: 21.8 % (ref 36–46)
HEMOCCULT SP1 STL QL: NEGATIVE
HGB BLD-MCNC: 6.8 G/DL (ref 12–16)
MCH RBC QN AUTO: 23.2 PG (ref 26–34)
MCHC RBC AUTO-ENTMCNC: 31.2 G/DL (ref 32–36)
MCV RBC AUTO: 74 FL (ref 80–100)
NRBC BLD-RTO: 0 /100 WBCS (ref 0–0)
PLATELET # BLD AUTO: 77 X10*3/UL (ref 150–450)
POTASSIUM SERPL-SCNC: 3.6 MMOL/L (ref 3.5–5.3)
RBC # BLD AUTO: 2.93 X10*6/UL (ref 4–5.2)
RH FACTOR (ANTIGEN D): NORMAL
RH FACTOR (ANTIGEN D): NORMAL
SODIUM SERPL-SCNC: 137 MMOL/L (ref 136–145)
WBC # BLD AUTO: 4.1 X10*3/UL (ref 4.4–11.3)

## 2024-10-30 PROCEDURE — 86901 BLOOD TYPING SEROLOGIC RH(D): CPT | Performed by: NURSE PRACTITIONER

## 2024-10-30 PROCEDURE — 82374 ASSAY BLOOD CARBON DIOXIDE: CPT | Performed by: NURSE PRACTITIONER

## 2024-10-30 PROCEDURE — 1200000002 HC GENERAL ROOM WITH TELEMETRY DAILY

## 2024-10-30 PROCEDURE — 2500000005 HC RX 250 GENERAL PHARMACY W/O HCPCS: Performed by: INTERNAL MEDICINE

## 2024-10-30 PROCEDURE — 82947 ASSAY GLUCOSE BLOOD QUANT: CPT

## 2024-10-30 PROCEDURE — 86922 COMPATIBILITY TEST ANTIGLOB: CPT

## 2024-10-30 PROCEDURE — 2500000001 HC RX 250 WO HCPCS SELF ADMINISTERED DRUGS (ALT 637 FOR MEDICARE OP): Performed by: INTERNAL MEDICINE

## 2024-10-30 PROCEDURE — 82140 ASSAY OF AMMONIA: CPT | Performed by: NURSE PRACTITIONER

## 2024-10-30 PROCEDURE — 2500000004 HC RX 250 GENERAL PHARMACY W/ HCPCS (ALT 636 FOR OP/ED): Performed by: INTERNAL MEDICINE

## 2024-10-30 PROCEDURE — 36415 COLL VENOUS BLD VENIPUNCTURE: CPT | Performed by: NURSE PRACTITIONER

## 2024-10-30 PROCEDURE — P9016 RBC LEUKOCYTES REDUCED: HCPCS

## 2024-10-30 PROCEDURE — 2500000001 HC RX 250 WO HCPCS SELF ADMINISTERED DRUGS (ALT 637 FOR MEDICARE OP): Performed by: NURSE PRACTITIONER

## 2024-10-30 PROCEDURE — 2500000002 HC RX 250 W HCPCS SELF ADMINISTERED DRUGS (ALT 637 FOR MEDICARE OP, ALT 636 FOR OP/ED): Performed by: INTERNAL MEDICINE

## 2024-10-30 PROCEDURE — 86870 RBC ANTIBODY IDENTIFICATION: CPT

## 2024-10-30 PROCEDURE — 99232 SBSQ HOSP IP/OBS MODERATE 35: CPT | Performed by: NURSE PRACTITIONER

## 2024-10-30 PROCEDURE — 2500000004 HC RX 250 GENERAL PHARMACY W/ HCPCS (ALT 636 FOR OP/ED): Performed by: NURSE PRACTITIONER

## 2024-10-30 PROCEDURE — 36430 TRANSFUSION BLD/BLD COMPNT: CPT

## 2024-10-30 PROCEDURE — 82728 ASSAY OF FERRITIN: CPT | Performed by: NURSE PRACTITIONER

## 2024-10-30 PROCEDURE — 80048 BASIC METABOLIC PNL TOTAL CA: CPT | Performed by: NURSE PRACTITIONER

## 2024-10-30 PROCEDURE — 85027 COMPLETE CBC AUTOMATED: CPT | Performed by: NURSE PRACTITIONER

## 2024-10-30 PROCEDURE — 82270 OCCULT BLOOD FECES: CPT | Performed by: NURSE PRACTITIONER

## 2024-10-30 RX ORDER — NITROGLYCERIN 20 MG/1
1 PATCH TRANSDERMAL DAILY
Status: DISCONTINUED | OUTPATIENT
Start: 2024-10-30 | End: 2024-11-02 | Stop reason: HOSPADM

## 2024-10-30 RX ORDER — MIDODRINE HYDROCHLORIDE 5 MG/1
5 TABLET ORAL
Status: DISCONTINUED | OUTPATIENT
Start: 2024-10-30 | End: 2024-11-02 | Stop reason: HOSPADM

## 2024-10-30 RX ORDER — CEFTRIAXONE 1 G/50ML
1 INJECTION, SOLUTION INTRAVENOUS EVERY 24 HOURS
Status: DISCONTINUED | OUTPATIENT
Start: 2024-10-30 | End: 2024-11-01

## 2024-10-30 RX ORDER — PANTOPRAZOLE SODIUM 40 MG/10ML
40 INJECTION, POWDER, LYOPHILIZED, FOR SOLUTION INTRAVENOUS 2 TIMES DAILY
Status: DISCONTINUED | OUTPATIENT
Start: 2024-10-30 | End: 2024-10-31

## 2024-10-30 RX ADMIN — URSODIOL 300 MG: 300 CAPSULE ORAL at 08:40

## 2024-10-30 RX ADMIN — GABAPENTIN 200 MG: 100 CAPSULE ORAL at 08:40

## 2024-10-30 RX ADMIN — HEPARIN SODIUM 5000 UNITS: 5000 INJECTION, SOLUTION INTRAVENOUS; SUBCUTANEOUS at 06:12

## 2024-10-30 RX ADMIN — INSULIN GLARGINE 25 UNITS: 100 INJECTION, SOLUTION SUBCUTANEOUS at 08:40

## 2024-10-30 RX ADMIN — DOCUSATE SODIUM 100 MG: 100 CAPSULE, LIQUID FILLED ORAL at 08:45

## 2024-10-30 RX ADMIN — SUCRALFATE 1 G: 1 SUSPENSION ORAL at 20:27

## 2024-10-30 RX ADMIN — FENOFIBRATE 160 MG: 160 TABLET ORAL at 08:40

## 2024-10-30 RX ADMIN — GABAPENTIN 200 MG: 100 CAPSULE ORAL at 16:17

## 2024-10-30 RX ADMIN — SUCRALFATE 1 G: 1 SUSPENSION ORAL at 06:12

## 2024-10-30 RX ADMIN — URSODIOL 300 MG: 300 CAPSULE ORAL at 16:17

## 2024-10-30 RX ADMIN — SPIRONOLACTONE 50 MG: 50 TABLET ORAL at 08:40

## 2024-10-30 RX ADMIN — SUCRALFATE 1 G: 1 SUSPENSION ORAL at 16:17

## 2024-10-30 RX ADMIN — NADOLOL 20 MG: 20 TABLET ORAL at 08:40

## 2024-10-30 RX ADMIN — MIDODRINE HYDROCHLORIDE 5 MG: 5 TABLET ORAL at 08:40

## 2024-10-30 RX ADMIN — RIFAXIMIN 550 MG: 550 TABLET ORAL at 20:29

## 2024-10-30 RX ADMIN — RIFAXIMIN 550 MG: 550 TABLET ORAL at 08:40

## 2024-10-30 RX ADMIN — ATORVASTATIN CALCIUM 80 MG: 80 TABLET, FILM COATED ORAL at 20:28

## 2024-10-30 RX ADMIN — PANTOPRAZOLE SODIUM 40 MG: 40 INJECTION, POWDER, FOR SOLUTION INTRAVENOUS at 12:58

## 2024-10-30 RX ADMIN — ONDANSETRON 4 MG: 4 TABLET, ORALLY DISINTEGRATING ORAL at 20:27

## 2024-10-30 RX ADMIN — HEPARIN SODIUM 5000 UNITS: 5000 INJECTION, SOLUTION INTRAVENOUS; SUBCUTANEOUS at 21:39

## 2024-10-30 RX ADMIN — PANTOPRAZOLE SODIUM 40 MG: 40 TABLET, DELAYED RELEASE ORAL at 08:40

## 2024-10-30 RX ADMIN — FUROSEMIDE 40 MG: 10 INJECTION, SOLUTION INTRAMUSCULAR; INTRAVENOUS at 20:27

## 2024-10-30 RX ADMIN — PANTOPRAZOLE SODIUM 40 MG: 40 INJECTION, POWDER, FOR SOLUTION INTRAVENOUS at 20:27

## 2024-10-30 RX ADMIN — SPIRONOLACTONE 50 MG: 50 TABLET ORAL at 20:27

## 2024-10-30 RX ADMIN — TRAZODONE HYDROCHLORIDE 25 MG: 50 TABLET ORAL at 20:28

## 2024-10-30 RX ADMIN — NITROGLYCERIN 1 PATCH: 0.1 PATCH TRANSDERMAL at 10:26

## 2024-10-30 RX ADMIN — LACTULOSE 20 G: 10 SOLUTION ORAL at 08:40

## 2024-10-30 RX ADMIN — URSODIOL 300 MG: 300 CAPSULE ORAL at 20:28

## 2024-10-30 RX ADMIN — TRAMADOL HYDROCHLORIDE 50 MG: 50 TABLET ORAL at 20:27

## 2024-10-30 RX ADMIN — Medication 400 MG: at 08:40

## 2024-10-30 RX ADMIN — IRON SUCROSE 200 MG: 20 INJECTION, SOLUTION INTRAVENOUS at 12:58

## 2024-10-30 RX ADMIN — CEFTRIAXONE SODIUM 1 G: 1 INJECTION, SOLUTION INTRAVENOUS at 23:01

## 2024-10-30 RX ADMIN — GABAPENTIN 200 MG: 100 CAPSULE ORAL at 20:27

## 2024-10-30 ASSESSMENT — COGNITIVE AND FUNCTIONAL STATUS - GENERAL
TOILETING: A LITTLE
HELP NEEDED FOR BATHING: A LITTLE
MOBILITY SCORE: 23
EATING MEALS: A LITTLE
WALKING IN HOSPITAL ROOM: A LITTLE
PERSONAL GROOMING: A LITTLE
DAILY ACTIVITIY SCORE: 18
TURNING FROM BACK TO SIDE WHILE IN FLAT BAD: A LITTLE
STANDING UP FROM CHAIR USING ARMS: A LITTLE
MOBILITY SCORE: 17
MOVING FROM LYING ON BACK TO SITTING ON SIDE OF FLAT BED WITH BEDRAILS: A LITTLE
MOVING TO AND FROM BED TO CHAIR: A LITTLE
DRESSING REGULAR UPPER BODY CLOTHING: A LITTLE
DAILY ACTIVITIY SCORE: 24
DRESSING REGULAR LOWER BODY CLOTHING: A LITTLE
CLIMB 3 TO 5 STEPS WITH RAILING: A LITTLE
CLIMB 3 TO 5 STEPS WITH RAILING: A LOT

## 2024-10-30 ASSESSMENT — PAIN DESCRIPTION - DESCRIPTORS: DESCRIPTORS: TIGHTNESS

## 2024-10-30 ASSESSMENT — PAIN - FUNCTIONAL ASSESSMENT: PAIN_FUNCTIONAL_ASSESSMENT: 0-10

## 2024-10-30 ASSESSMENT — PAIN SCALES - GENERAL
PAINLEVEL_OUTOF10: 7
PAINLEVEL_OUTOF10: 0 - NO PAIN
PAINLEVEL_OUTOF10: 5 - MODERATE PAIN

## 2024-10-31 LAB
ANION GAP SERPL CALCULATED.3IONS-SCNC: <7 MMOL/L (ref 10–20)
BACTERIA UR CULT: ABNORMAL
BACTERIA UR CULT: ABNORMAL
BLOOD EXPIRATION DATE: NORMAL
BUN SERPL-MCNC: 12 MG/DL (ref 6–23)
CALCIUM SERPL-MCNC: 7.5 MG/DL (ref 8.6–10.3)
CHLORIDE SERPL-SCNC: 107 MMOL/L (ref 98–107)
CO2 SERPL-SCNC: 28 MMOL/L (ref 21–32)
CREAT SERPL-MCNC: 0.67 MG/DL (ref 0.5–1.05)
DISPENSE STATUS: NORMAL
EGFRCR SERPLBLD CKD-EPI 2021: >90 ML/MIN/1.73M*2
ERYTHROCYTE [DISTWIDTH] IN BLOOD BY AUTOMATED COUNT: 21.6 % (ref 11.5–14.5)
GLUCOSE BLD MANUAL STRIP-MCNC: 106 MG/DL (ref 74–99)
GLUCOSE BLD MANUAL STRIP-MCNC: 113 MG/DL (ref 74–99)
GLUCOSE BLD MANUAL STRIP-MCNC: 121 MG/DL (ref 74–99)
GLUCOSE BLD MANUAL STRIP-MCNC: 83 MG/DL (ref 74–99)
GLUCOSE BLD MANUAL STRIP-MCNC: 98 MG/DL (ref 74–99)
GLUCOSE SERPL-MCNC: 107 MG/DL (ref 74–99)
HCT VFR BLD AUTO: 25.4 % (ref 36–46)
HGB BLD-MCNC: 7.9 G/DL (ref 12–16)
MCH RBC QN AUTO: 23.8 PG (ref 26–34)
MCHC RBC AUTO-ENTMCNC: 31.1 G/DL (ref 32–36)
MCV RBC AUTO: 77 FL (ref 80–100)
NRBC BLD-RTO: 0 /100 WBCS (ref 0–0)
PLATELET # BLD AUTO: 78 X10*3/UL (ref 150–450)
POTASSIUM SERPL-SCNC: 3.8 MMOL/L (ref 3.5–5.3)
PRODUCT BLOOD TYPE: 5100
PRODUCT CODE: NORMAL
RBC # BLD AUTO: 3.32 X10*6/UL (ref 4–5.2)
SODIUM SERPL-SCNC: 137 MMOL/L (ref 136–145)
UNIT ABO: NORMAL
UNIT NUMBER: NORMAL
UNIT RH: NORMAL
UNIT VOLUME: 277
WBC # BLD AUTO: 4.1 X10*3/UL (ref 4.4–11.3)
XM INTEP: NORMAL

## 2024-10-31 PROCEDURE — 36415 COLL VENOUS BLD VENIPUNCTURE: CPT | Performed by: NURSE PRACTITIONER

## 2024-10-31 PROCEDURE — 2500000002 HC RX 250 W HCPCS SELF ADMINISTERED DRUGS (ALT 637 FOR MEDICARE OP, ALT 636 FOR OP/ED): Performed by: INTERNAL MEDICINE

## 2024-10-31 PROCEDURE — RXMED WILLOW AMBULATORY MEDICATION CHARGE

## 2024-10-31 PROCEDURE — 2500000001 HC RX 250 WO HCPCS SELF ADMINISTERED DRUGS (ALT 637 FOR MEDICARE OP): Performed by: NURSE PRACTITIONER

## 2024-10-31 PROCEDURE — 82947 ASSAY GLUCOSE BLOOD QUANT: CPT

## 2024-10-31 PROCEDURE — 2500000005 HC RX 250 GENERAL PHARMACY W/O HCPCS: Performed by: INTERNAL MEDICINE

## 2024-10-31 PROCEDURE — 2500000001 HC RX 250 WO HCPCS SELF ADMINISTERED DRUGS (ALT 637 FOR MEDICARE OP): Performed by: INTERNAL MEDICINE

## 2024-10-31 PROCEDURE — 1200000002 HC GENERAL ROOM WITH TELEMETRY DAILY

## 2024-10-31 PROCEDURE — 80048 BASIC METABOLIC PNL TOTAL CA: CPT | Performed by: NURSE PRACTITIONER

## 2024-10-31 PROCEDURE — 2500000004 HC RX 250 GENERAL PHARMACY W/ HCPCS (ALT 636 FOR OP/ED): Performed by: NURSE PRACTITIONER

## 2024-10-31 PROCEDURE — 2500000004 HC RX 250 GENERAL PHARMACY W/ HCPCS (ALT 636 FOR OP/ED): Performed by: INTERNAL MEDICINE

## 2024-10-31 PROCEDURE — 85027 COMPLETE CBC AUTOMATED: CPT | Performed by: NURSE PRACTITIONER

## 2024-10-31 PROCEDURE — 2500000001 HC RX 250 WO HCPCS SELF ADMINISTERED DRUGS (ALT 637 FOR MEDICARE OP): Performed by: PHARMACIST

## 2024-10-31 RX ORDER — INSULIN GLARGINE 100 [IU]/ML
25 INJECTION, SOLUTION SUBCUTANEOUS 2 TIMES DAILY
Qty: 15 ML | Refills: 0 | Status: SHIPPED | OUTPATIENT
Start: 2024-10-31

## 2024-10-31 RX ORDER — LANOLIN ALCOHOL/MO/W.PET/CERES
400 CREAM (GRAM) TOPICAL DAILY
Qty: 30 TABLET | Refills: 0 | Status: SHIPPED | OUTPATIENT
Start: 2024-10-31

## 2024-10-31 RX ORDER — SUCRALFATE 1 G/1
1 TABLET ORAL
Qty: 120 TABLET | Refills: 0 | Status: SHIPPED | OUTPATIENT
Start: 2024-10-31

## 2024-10-31 RX ORDER — NADOLOL 20 MG/1
20 TABLET ORAL DAILY
Qty: 30 TABLET | Refills: 0 | Status: SHIPPED | OUTPATIENT
Start: 2024-10-31

## 2024-10-31 RX ORDER — METFORMIN HYDROCHLORIDE 500 MG/1
1000 TABLET, EXTENDED RELEASE ORAL
Qty: 60 TABLET | Refills: 0 | Status: SHIPPED | OUTPATIENT
Start: 2024-10-31

## 2024-10-31 RX ORDER — MIDODRINE HYDROCHLORIDE 10 MG/1
5 TABLET ORAL
Qty: 45 TABLET | Refills: 0 | Status: SHIPPED | OUTPATIENT
Start: 2024-10-31

## 2024-10-31 RX ORDER — INSULIN LISPRO 100 [IU]/ML
0-15 INJECTION, SOLUTION INTRAVENOUS; SUBCUTANEOUS
Qty: 15 ML | Refills: 0 | Status: SHIPPED | OUTPATIENT
Start: 2024-10-31

## 2024-10-31 RX ORDER — LACTULOSE 10 G/15ML
20 SOLUTION ORAL 2 TIMES DAILY
Qty: 1892 ML | Refills: 0 | Status: SHIPPED | OUTPATIENT
Start: 2024-10-31 | End: 2024-12-03

## 2024-10-31 RX ORDER — CIPROFLOXACIN 250 MG/1
250 TABLET, FILM COATED ORAL 2 TIMES DAILY
Qty: 6 TABLET | Refills: 0 | Status: SHIPPED | OUTPATIENT
Start: 2024-10-31 | End: 2024-11-04

## 2024-10-31 RX ORDER — TRAZODONE HYDROCHLORIDE 50 MG/1
25 TABLET ORAL NIGHTLY
Qty: 15 TABLET | Refills: 0 | Status: SHIPPED | OUTPATIENT
Start: 2024-10-31 | End: 2024-12-01

## 2024-10-31 RX ORDER — POLYETHYLENE GLYCOL 3350 17 G/17G
17 POWDER, FOR SOLUTION ORAL 2 TIMES DAILY
Qty: 510 G | Refills: 0 | Status: SHIPPED | OUTPATIENT
Start: 2024-10-31

## 2024-10-31 RX ORDER — PANTOPRAZOLE SODIUM 40 MG/1
40 TABLET, DELAYED RELEASE ORAL
Qty: 60 TABLET | Refills: 0 | Status: SHIPPED | OUTPATIENT
Start: 2024-10-31

## 2024-10-31 RX ORDER — MECLIZINE HYDROCHLORIDE 25 MG/1
25 TABLET ORAL 3 TIMES DAILY PRN
Qty: 30 TABLET | Refills: 0 | Status: SHIPPED | OUTPATIENT
Start: 2024-10-31 | End: 2024-11-30

## 2024-10-31 RX ORDER — FENOFIBRATE 145 MG/1
145 TABLET, FILM COATED ORAL DAILY
Qty: 30 TABLET | Refills: 0 | Status: SHIPPED | OUTPATIENT
Start: 2024-10-31

## 2024-10-31 RX ORDER — PANTOPRAZOLE SODIUM 40 MG/1
40 TABLET, DELAYED RELEASE ORAL
Status: DISCONTINUED | OUTPATIENT
Start: 2024-10-31 | End: 2024-11-02 | Stop reason: HOSPADM

## 2024-10-31 RX ORDER — ONDANSETRON 4 MG/1
4 TABLET, ORALLY DISINTEGRATING ORAL EVERY 8 HOURS PRN
Qty: 30 TABLET | Refills: 0 | Status: SHIPPED | OUTPATIENT
Start: 2024-10-31 | End: 2024-11-30

## 2024-10-31 RX ORDER — FUROSEMIDE 40 MG/1
40 TABLET ORAL DAILY
Qty: 30 TABLET | Refills: 0 | Status: SHIPPED | OUTPATIENT
Start: 2024-10-31

## 2024-10-31 RX ORDER — SPIRONOLACTONE 50 MG/1
50 TABLET, FILM COATED ORAL 2 TIMES DAILY
Qty: 60 TABLET | Refills: 0 | Status: SHIPPED | OUTPATIENT
Start: 2024-10-31

## 2024-10-31 RX ORDER — ATORVASTATIN CALCIUM 80 MG/1
80 TABLET, FILM COATED ORAL NIGHTLY
Qty: 30 TABLET | Refills: 0 | Status: SHIPPED | OUTPATIENT
Start: 2024-10-31

## 2024-10-31 RX ORDER — DOCUSATE SODIUM 100 MG/1
100 CAPSULE, LIQUID FILLED ORAL 2 TIMES DAILY
Qty: 60 CAPSULE | Refills: 0 | Status: SHIPPED | OUTPATIENT
Start: 2024-10-31

## 2024-10-31 RX ORDER — LACTULOSE 10 G/15ML
20 SOLUTION ORAL 2 TIMES DAILY
Status: DISCONTINUED | OUTPATIENT
Start: 2024-10-31 | End: 2024-11-02 | Stop reason: HOSPADM

## 2024-10-31 RX ORDER — URSODIOL 300 MG/1
300 CAPSULE ORAL 3 TIMES DAILY
Qty: 90 CAPSULE | Refills: 0 | Status: SHIPPED | OUTPATIENT
Start: 2024-10-31

## 2024-10-31 RX ADMIN — TRAZODONE HYDROCHLORIDE 25 MG: 50 TABLET ORAL at 21:06

## 2024-10-31 RX ADMIN — HEPARIN SODIUM 5000 UNITS: 5000 INJECTION, SOLUTION INTRAVENOUS; SUBCUTANEOUS at 15:54

## 2024-10-31 RX ADMIN — ONDANSETRON 4 MG: 4 TABLET, ORALLY DISINTEGRATING ORAL at 06:10

## 2024-10-31 RX ADMIN — URSODIOL 300 MG: 300 CAPSULE ORAL at 15:54

## 2024-10-31 RX ADMIN — SUCRALFATE 1 G: 1 SUSPENSION ORAL at 15:54

## 2024-10-31 RX ADMIN — SPIRONOLACTONE 50 MG: 50 TABLET ORAL at 21:07

## 2024-10-31 RX ADMIN — MIDODRINE HYDROCHLORIDE 5 MG: 5 TABLET ORAL at 08:29

## 2024-10-31 RX ADMIN — CEFTRIAXONE SODIUM 1 G: 1 INJECTION, SOLUTION INTRAVENOUS at 22:40

## 2024-10-31 RX ADMIN — SPIRONOLACTONE 50 MG: 50 TABLET ORAL at 08:29

## 2024-10-31 RX ADMIN — NADOLOL 20 MG: 20 TABLET ORAL at 08:29

## 2024-10-31 RX ADMIN — PANTOPRAZOLE SODIUM 40 MG: 40 INJECTION, POWDER, FOR SOLUTION INTRAVENOUS at 09:25

## 2024-10-31 RX ADMIN — SUCRALFATE 1 G: 1 SUSPENSION ORAL at 06:00

## 2024-10-31 RX ADMIN — GABAPENTIN 200 MG: 100 CAPSULE ORAL at 21:07

## 2024-10-31 RX ADMIN — HEPARIN SODIUM 5000 UNITS: 5000 INJECTION, SOLUTION INTRAVENOUS; SUBCUTANEOUS at 06:00

## 2024-10-31 RX ADMIN — URSODIOL 300 MG: 300 CAPSULE ORAL at 21:20

## 2024-10-31 RX ADMIN — INSULIN GLARGINE 25 UNITS: 100 INJECTION, SOLUTION SUBCUTANEOUS at 21:21

## 2024-10-31 RX ADMIN — HEPARIN SODIUM 5000 UNITS: 5000 INJECTION, SOLUTION INTRAVENOUS; SUBCUTANEOUS at 21:21

## 2024-10-31 RX ADMIN — ATORVASTATIN CALCIUM 80 MG: 80 TABLET, FILM COATED ORAL at 21:08

## 2024-10-31 RX ADMIN — SUCRALFATE 1 G: 1 SUSPENSION ORAL at 21:07

## 2024-10-31 RX ADMIN — URSODIOL 300 MG: 300 CAPSULE ORAL at 08:29

## 2024-10-31 RX ADMIN — TRAMADOL HYDROCHLORIDE 50 MG: 50 TABLET ORAL at 21:04

## 2024-10-31 RX ADMIN — FENOFIBRATE 160 MG: 160 TABLET ORAL at 08:29

## 2024-10-31 RX ADMIN — IRON SUCROSE 200 MG: 20 INJECTION, SOLUTION INTRAVENOUS at 06:01

## 2024-10-31 RX ADMIN — RIFAXIMIN 550 MG: 550 TABLET ORAL at 21:19

## 2024-10-31 RX ADMIN — RIFAXIMIN 550 MG: 550 TABLET ORAL at 08:28

## 2024-10-31 RX ADMIN — TRAMADOL HYDROCHLORIDE 50 MG: 50 TABLET ORAL at 08:29

## 2024-10-31 RX ADMIN — PANTOPRAZOLE SODIUM 40 MG: 40 TABLET, DELAYED RELEASE ORAL at 15:54

## 2024-10-31 RX ADMIN — NITROGLYCERIN 1 PATCH: 0.1 PATCH TRANSDERMAL at 08:28

## 2024-10-31 RX ADMIN — GABAPENTIN 200 MG: 100 CAPSULE ORAL at 15:54

## 2024-10-31 RX ADMIN — GABAPENTIN 200 MG: 100 CAPSULE ORAL at 08:29

## 2024-10-31 RX ADMIN — Medication 400 MG: at 08:29

## 2024-10-31 RX ADMIN — MIDODRINE HYDROCHLORIDE 5 MG: 5 TABLET ORAL at 16:48

## 2024-10-31 RX ADMIN — LACTULOSE 20 G: 10 SOLUTION ORAL at 08:29

## 2024-10-31 ASSESSMENT — COGNITIVE AND FUNCTIONAL STATUS - GENERAL
DAILY ACTIVITIY SCORE: 24
CLIMB 3 TO 5 STEPS WITH RAILING: A LITTLE
WALKING IN HOSPITAL ROOM: A LITTLE
MOBILITY SCORE: 22

## 2024-10-31 ASSESSMENT — PAIN DESCRIPTION - ORIENTATION: ORIENTATION: UPPER

## 2024-10-31 ASSESSMENT — PAIN - FUNCTIONAL ASSESSMENT
PAIN_FUNCTIONAL_ASSESSMENT: 0-10
PAIN_FUNCTIONAL_ASSESSMENT: 0-10

## 2024-10-31 ASSESSMENT — PAIN SCALES - GENERAL
PAINLEVEL_OUTOF10: 5 - MODERATE PAIN
PAINLEVEL_OUTOF10: 7
PAINLEVEL_OUTOF10: 4
PAINLEVEL_OUTOF10: 7

## 2024-10-31 ASSESSMENT — PAIN DESCRIPTION - DESCRIPTORS
DESCRIPTORS: CRAMPING
DESCRIPTORS: CRAMPING

## 2024-10-31 ASSESSMENT — PAIN SCALES - WONG BAKER: WONGBAKER_NUMERICALRESPONSE: HURTS LITTLE MORE

## 2024-10-31 ASSESSMENT — PAIN DESCRIPTION - LOCATION: LOCATION: ABDOMEN

## 2024-11-01 ENCOUNTER — APPOINTMENT (OUTPATIENT)
Dept: RADIOLOGY | Facility: HOSPITAL | Age: 49
DRG: 432 | End: 2024-11-01
Payer: COMMERCIAL

## 2024-11-01 ENCOUNTER — PHARMACY VISIT (OUTPATIENT)
Dept: PHARMACY | Facility: CLINIC | Age: 49
End: 2024-11-01
Payer: COMMERCIAL

## 2024-11-01 LAB
ANION GAP SERPL CALCULATED.3IONS-SCNC: <7 MMOL/L (ref 10–20)
BUN SERPL-MCNC: 13 MG/DL (ref 6–23)
CALCIUM SERPL-MCNC: 7.5 MG/DL (ref 8.6–10.3)
CHLORIDE SERPL-SCNC: 106 MMOL/L (ref 98–107)
CO2 SERPL-SCNC: 30 MMOL/L (ref 21–32)
CREAT SERPL-MCNC: 0.74 MG/DL (ref 0.5–1.05)
EGFRCR SERPLBLD CKD-EPI 2021: >90 ML/MIN/1.73M*2
ERYTHROCYTE [DISTWIDTH] IN BLOOD BY AUTOMATED COUNT: 22.4 % (ref 11.5–14.5)
GLUCOSE BLD MANUAL STRIP-MCNC: 136 MG/DL (ref 74–99)
GLUCOSE BLD MANUAL STRIP-MCNC: 171 MG/DL (ref 74–99)
GLUCOSE BLD MANUAL STRIP-MCNC: 60 MG/DL (ref 74–99)
GLUCOSE BLD MANUAL STRIP-MCNC: 71 MG/DL (ref 74–99)
GLUCOSE BLD MANUAL STRIP-MCNC: 75 MG/DL (ref 74–99)
GLUCOSE BLD MANUAL STRIP-MCNC: 95 MG/DL (ref 74–99)
GLUCOSE BLD MANUAL STRIP-MCNC: 99 MG/DL (ref 74–99)
GLUCOSE SERPL-MCNC: 100 MG/DL (ref 74–99)
HCT VFR BLD AUTO: 25.4 % (ref 36–46)
HGB BLD-MCNC: 8.1 G/DL (ref 12–16)
MCH RBC QN AUTO: 24.1 PG (ref 26–34)
MCHC RBC AUTO-ENTMCNC: 31.9 G/DL (ref 32–36)
MCV RBC AUTO: 76 FL (ref 80–100)
NRBC BLD-RTO: 0 /100 WBCS (ref 0–0)
PLATELET # BLD AUTO: 85 X10*3/UL (ref 150–450)
POTASSIUM SERPL-SCNC: 3.8 MMOL/L (ref 3.5–5.3)
RBC # BLD AUTO: 3.36 X10*6/UL (ref 4–5.2)
SODIUM SERPL-SCNC: 136 MMOL/L (ref 136–145)
WBC # BLD AUTO: 3.9 X10*3/UL (ref 4.4–11.3)

## 2024-11-01 PROCEDURE — 2720000007 HC OR 272 NO HCPCS

## 2024-11-01 PROCEDURE — 2500000004 HC RX 250 GENERAL PHARMACY W/ HCPCS (ALT 636 FOR OP/ED): Performed by: INTERNAL MEDICINE

## 2024-11-01 PROCEDURE — 82374 ASSAY BLOOD CARBON DIOXIDE: CPT | Performed by: NURSE PRACTITIONER

## 2024-11-01 PROCEDURE — 2500000002 HC RX 250 W HCPCS SELF ADMINISTERED DRUGS (ALT 637 FOR MEDICARE OP, ALT 636 FOR OP/ED): Performed by: NURSE PRACTITIONER

## 2024-11-01 PROCEDURE — 2500000002 HC RX 250 W HCPCS SELF ADMINISTERED DRUGS (ALT 637 FOR MEDICARE OP, ALT 636 FOR OP/ED): Performed by: INTERNAL MEDICINE

## 2024-11-01 PROCEDURE — 49083 ABD PARACENTESIS W/IMAGING: CPT

## 2024-11-01 PROCEDURE — RXMED WILLOW AMBULATORY MEDICATION CHARGE

## 2024-11-01 PROCEDURE — 2500000004 HC RX 250 GENERAL PHARMACY W/ HCPCS (ALT 636 FOR OP/ED): Performed by: NURSE PRACTITIONER

## 2024-11-01 PROCEDURE — 36415 COLL VENOUS BLD VENIPUNCTURE: CPT | Performed by: NURSE PRACTITIONER

## 2024-11-01 PROCEDURE — 1200000002 HC GENERAL ROOM WITH TELEMETRY DAILY

## 2024-11-01 PROCEDURE — 2500000001 HC RX 250 WO HCPCS SELF ADMINISTERED DRUGS (ALT 637 FOR MEDICARE OP): Performed by: NURSE PRACTITIONER

## 2024-11-01 PROCEDURE — 49083 ABD PARACENTESIS W/IMAGING: CPT | Performed by: RADIOLOGY

## 2024-11-01 PROCEDURE — 82947 ASSAY GLUCOSE BLOOD QUANT: CPT

## 2024-11-01 PROCEDURE — 2500000001 HC RX 250 WO HCPCS SELF ADMINISTERED DRUGS (ALT 637 FOR MEDICARE OP): Performed by: INTERNAL MEDICINE

## 2024-11-01 PROCEDURE — 85027 COMPLETE CBC AUTOMATED: CPT | Performed by: NURSE PRACTITIONER

## 2024-11-01 PROCEDURE — C1729 CATH, DRAINAGE: HCPCS

## 2024-11-01 PROCEDURE — 0W9G3ZZ DRAINAGE OF PERITONEAL CAVITY, PERCUTANEOUS APPROACH: ICD-10-PCS | Performed by: RADIOLOGY

## 2024-11-01 PROCEDURE — 2500000001 HC RX 250 WO HCPCS SELF ADMINISTERED DRUGS (ALT 637 FOR MEDICARE OP): Performed by: PHARMACIST

## 2024-11-01 PROCEDURE — 2500000004 HC RX 250 GENERAL PHARMACY W/ HCPCS (ALT 636 FOR OP/ED): Performed by: RADIOLOGY

## 2024-11-01 PROCEDURE — 80048 BASIC METABOLIC PNL TOTAL CA: CPT | Performed by: NURSE PRACTITIONER

## 2024-11-01 RX ORDER — CIPROFLOXACIN 250 MG/1
250 TABLET, FILM COATED ORAL EVERY 12 HOURS SCHEDULED
Status: DISCONTINUED | OUTPATIENT
Start: 2024-11-01 | End: 2024-11-02 | Stop reason: HOSPADM

## 2024-11-01 RX ORDER — INSULIN GLARGINE 100 [IU]/ML
25 INJECTION, SOLUTION SUBCUTANEOUS EVERY 24 HOURS
Status: DISCONTINUED | OUTPATIENT
Start: 2024-11-02 | End: 2024-11-02 | Stop reason: HOSPADM

## 2024-11-01 RX ORDER — PEN NEEDLE, DIABETIC 30 GX3/16"
NEEDLE, DISPOSABLE MISCELLANEOUS 4 TIMES DAILY
Qty: 100 EACH | Refills: 0 | OUTPATIENT
Start: 2024-11-01

## 2024-11-01 RX ORDER — LIDOCAINE HYDROCHLORIDE 10 MG/ML
INJECTION, SOLUTION EPIDURAL; INFILTRATION; INTRACAUDAL; PERINEURAL
Status: COMPLETED | OUTPATIENT
Start: 2024-11-01 | End: 2024-11-01

## 2024-11-01 RX ORDER — NITROGLYCERIN 20 MG/1
1 PATCH TRANSDERMAL DAILY
Qty: 30 PATCH | Refills: 0 | Status: SHIPPED | OUTPATIENT
Start: 2024-11-01

## 2024-11-01 RX ADMIN — TRAMADOL HYDROCHLORIDE 50 MG: 50 TABLET ORAL at 12:08

## 2024-11-01 RX ADMIN — IRON SUCROSE 200 MG: 20 INJECTION, SOLUTION INTRAVENOUS at 05:40

## 2024-11-01 RX ADMIN — LIDOCAINE HYDROCHLORIDE 5 ML: 10 INJECTION, SOLUTION EPIDURAL; INFILTRATION; INTRACAUDAL; PERINEURAL at 09:45

## 2024-11-01 RX ADMIN — PANTOPRAZOLE SODIUM 40 MG: 40 TABLET, DELAYED RELEASE ORAL at 16:39

## 2024-11-01 RX ADMIN — ONDANSETRON 4 MG: 2 INJECTION INTRAMUSCULAR; INTRAVENOUS at 05:56

## 2024-11-01 RX ADMIN — DOCUSATE SODIUM 100 MG: 100 CAPSULE, LIQUID FILLED ORAL at 22:25

## 2024-11-01 RX ADMIN — CIPROFLOXACIN HYDROCHLORIDE 250 MG: 250 TABLET, FILM COATED ORAL at 22:25

## 2024-11-01 RX ADMIN — TRAZODONE HYDROCHLORIDE 25 MG: 50 TABLET ORAL at 22:25

## 2024-11-01 RX ADMIN — RIFAXIMIN 550 MG: 550 TABLET ORAL at 22:25

## 2024-11-01 RX ADMIN — RIFAXIMIN 550 MG: 550 TABLET ORAL at 08:25

## 2024-11-01 RX ADMIN — ONDANSETRON 4 MG: 2 INJECTION INTRAMUSCULAR; INTRAVENOUS at 17:40

## 2024-11-01 RX ADMIN — HEPARIN SODIUM 5000 UNITS: 5000 INJECTION, SOLUTION INTRAVENOUS; SUBCUTANEOUS at 05:40

## 2024-11-01 RX ADMIN — URSODIOL 300 MG: 300 CAPSULE ORAL at 08:25

## 2024-11-01 RX ADMIN — SUCRALFATE 1 G: 1 SUSPENSION ORAL at 10:37

## 2024-11-01 RX ADMIN — HEPARIN SODIUM 5000 UNITS: 5000 INJECTION, SOLUTION INTRAVENOUS; SUBCUTANEOUS at 22:25

## 2024-11-01 RX ADMIN — URSODIOL 300 MG: 300 CAPSULE ORAL at 14:16

## 2024-11-01 RX ADMIN — Medication 400 MG: at 08:24

## 2024-11-01 RX ADMIN — GABAPENTIN 200 MG: 100 CAPSULE ORAL at 08:24

## 2024-11-01 RX ADMIN — SUCRALFATE 1 G: 1 SUSPENSION ORAL at 16:39

## 2024-11-01 RX ADMIN — MIDODRINE HYDROCHLORIDE 5 MG: 5 TABLET ORAL at 16:39

## 2024-11-01 RX ADMIN — POLYETHYLENE GLYCOL 3350 17 G: 17 POWDER, FOR SOLUTION ORAL at 22:25

## 2024-11-01 RX ADMIN — GABAPENTIN 200 MG: 100 CAPSULE ORAL at 22:25

## 2024-11-01 RX ADMIN — CIPROFLOXACIN HYDROCHLORIDE 250 MG: 250 TABLET, FILM COATED ORAL at 10:37

## 2024-11-01 RX ADMIN — FENOFIBRATE 160 MG: 160 TABLET ORAL at 08:24

## 2024-11-01 RX ADMIN — URSODIOL 300 MG: 300 CAPSULE ORAL at 22:25

## 2024-11-01 RX ADMIN — GABAPENTIN 200 MG: 100 CAPSULE ORAL at 14:16

## 2024-11-01 RX ADMIN — SUCRALFATE 1 G: 1 SUSPENSION ORAL at 22:25

## 2024-11-01 RX ADMIN — DOCUSATE SODIUM 100 MG: 100 CAPSULE, LIQUID FILLED ORAL at 08:24

## 2024-11-01 RX ADMIN — MIDODRINE HYDROCHLORIDE 5 MG: 5 TABLET ORAL at 12:08

## 2024-11-01 RX ADMIN — MECLIZINE HYDROCHLORIDE 25 MG: 25 TABLET ORAL at 22:25

## 2024-11-01 RX ADMIN — MIDODRINE HYDROCHLORIDE 5 MG: 5 TABLET ORAL at 08:15

## 2024-11-01 RX ADMIN — LACTULOSE 20 G: 10 SOLUTION ORAL at 22:25

## 2024-11-01 RX ADMIN — LACTULOSE 20 G: 10 SOLUTION ORAL at 08:24

## 2024-11-01 ASSESSMENT — COGNITIVE AND FUNCTIONAL STATUS - GENERAL
MOBILITY SCORE: 22
CLIMB 3 TO 5 STEPS WITH RAILING: A LITTLE
WALKING IN HOSPITAL ROOM: A LITTLE

## 2024-11-01 ASSESSMENT — PAIN SCALES - GENERAL
PAINLEVEL_OUTOF10: 0 - NO PAIN
PAINLEVEL_OUTOF10: 0 - NO PAIN
PAINLEVEL_OUTOF10: 7
PAINLEVEL_OUTOF10: 0 - NO PAIN
PAINLEVEL_OUTOF10: 4
PAINLEVEL_OUTOF10: 0 - NO PAIN
PAINLEVEL_OUTOF10: 0 - NO PAIN
PAINLEVEL_OUTOF10: 3
PAINLEVEL_OUTOF10: 0 - NO PAIN
PAINLEVEL_OUTOF10: 0 - NO PAIN
PAINLEVEL_OUTOF10: 4

## 2024-11-01 ASSESSMENT — PAIN - FUNCTIONAL ASSESSMENT
PAIN_FUNCTIONAL_ASSESSMENT: 0-10
PAIN_FUNCTIONAL_ASSESSMENT: 0-10

## 2024-11-01 ASSESSMENT — PAIN DESCRIPTION - DESCRIPTORS
DESCRIPTORS: ACHING;CRAMPING;DISCOMFORT
DESCRIPTORS: ACHING;CRAMPING

## 2024-11-02 VITALS
HEART RATE: 74 BPM | BODY MASS INDEX: 37.97 KG/M2 | TEMPERATURE: 98.6 F | DIASTOLIC BLOOD PRESSURE: 58 MMHG | OXYGEN SATURATION: 93 % | RESPIRATION RATE: 16 BRPM | WEIGHT: 214.29 LBS | HEIGHT: 63 IN | SYSTOLIC BLOOD PRESSURE: 108 MMHG

## 2024-11-02 LAB
GLUCOSE BLD MANUAL STRIP-MCNC: 124 MG/DL (ref 74–99)
GLUCOSE BLD MANUAL STRIP-MCNC: 143 MG/DL (ref 74–99)
GLUCOSE BLD MANUAL STRIP-MCNC: 61 MG/DL (ref 74–99)

## 2024-11-02 PROCEDURE — 2500000002 HC RX 250 W HCPCS SELF ADMINISTERED DRUGS (ALT 637 FOR MEDICARE OP, ALT 636 FOR OP/ED): Performed by: NURSE PRACTITIONER

## 2024-11-02 PROCEDURE — 2500000001 HC RX 250 WO HCPCS SELF ADMINISTERED DRUGS (ALT 637 FOR MEDICARE OP): Performed by: NURSE PRACTITIONER

## 2024-11-02 PROCEDURE — 2500000002 HC RX 250 W HCPCS SELF ADMINISTERED DRUGS (ALT 637 FOR MEDICARE OP, ALT 636 FOR OP/ED): Performed by: INTERNAL MEDICINE

## 2024-11-02 PROCEDURE — 2500000004 HC RX 250 GENERAL PHARMACY W/ HCPCS (ALT 636 FOR OP/ED): Performed by: INTERNAL MEDICINE

## 2024-11-02 PROCEDURE — 82947 ASSAY GLUCOSE BLOOD QUANT: CPT

## 2024-11-02 PROCEDURE — 2500000001 HC RX 250 WO HCPCS SELF ADMINISTERED DRUGS (ALT 637 FOR MEDICARE OP): Performed by: PHARMACIST

## 2024-11-02 PROCEDURE — 2500000001 HC RX 250 WO HCPCS SELF ADMINISTERED DRUGS (ALT 637 FOR MEDICARE OP): Performed by: INTERNAL MEDICINE

## 2024-11-02 PROCEDURE — 2500000004 HC RX 250 GENERAL PHARMACY W/ HCPCS (ALT 636 FOR OP/ED): Performed by: NURSE PRACTITIONER

## 2024-11-02 RX ORDER — INSULIN GLARGINE 100 [IU]/ML
25 INJECTION, SOLUTION SUBCUTANEOUS EVERY 24 HOURS
Qty: 9 ML | Refills: 2 | Status: SHIPPED | OUTPATIENT
Start: 2024-11-03

## 2024-11-02 RX ADMIN — CIPROFLOXACIN HYDROCHLORIDE 250 MG: 250 TABLET, FILM COATED ORAL at 09:19

## 2024-11-02 RX ADMIN — URSODIOL 300 MG: 300 CAPSULE ORAL at 15:34

## 2024-11-02 RX ADMIN — MIDODRINE HYDROCHLORIDE 5 MG: 5 TABLET ORAL at 09:19

## 2024-11-02 RX ADMIN — SUCRALFATE 1 G: 1 SUSPENSION ORAL at 06:17

## 2024-11-02 RX ADMIN — SPIRONOLACTONE 50 MG: 50 TABLET ORAL at 09:19

## 2024-11-02 RX ADMIN — GABAPENTIN 200 MG: 100 CAPSULE ORAL at 15:35

## 2024-11-02 RX ADMIN — DOCUSATE SODIUM 100 MG: 100 CAPSULE, LIQUID FILLED ORAL at 09:19

## 2024-11-02 RX ADMIN — Medication 1 SPRAY: at 13:24

## 2024-11-02 RX ADMIN — SUCRALFATE 1 G: 1 SUSPENSION ORAL at 15:34

## 2024-11-02 RX ADMIN — HEPARIN SODIUM 5000 UNITS: 5000 INJECTION, SOLUTION INTRAVENOUS; SUBCUTANEOUS at 13:24

## 2024-11-02 RX ADMIN — GABAPENTIN 200 MG: 100 CAPSULE ORAL at 09:19

## 2024-11-02 RX ADMIN — SUCRALFATE 1 G: 1 SUSPENSION ORAL at 11:12

## 2024-11-02 RX ADMIN — URSODIOL 300 MG: 300 CAPSULE ORAL at 09:19

## 2024-11-02 RX ADMIN — MIDODRINE HYDROCHLORIDE 5 MG: 5 TABLET ORAL at 11:12

## 2024-11-02 RX ADMIN — NITROGLYCERIN 1 PATCH: 0.1 PATCH TRANSDERMAL at 09:18

## 2024-11-02 RX ADMIN — PANTOPRAZOLE SODIUM 40 MG: 40 TABLET, DELAYED RELEASE ORAL at 15:34

## 2024-11-02 RX ADMIN — FENOFIBRATE 160 MG: 160 TABLET ORAL at 09:19

## 2024-11-02 RX ADMIN — RIFAXIMIN 550 MG: 550 TABLET ORAL at 09:19

## 2024-11-02 RX ADMIN — HEPARIN SODIUM 5000 UNITS: 5000 INJECTION, SOLUTION INTRAVENOUS; SUBCUTANEOUS at 06:17

## 2024-11-02 RX ADMIN — Medication 1 SPRAY: at 09:19

## 2024-11-02 RX ADMIN — IRON SUCROSE 200 MG: 20 INJECTION, SOLUTION INTRAVENOUS at 06:17

## 2024-11-02 RX ADMIN — PANTOPRAZOLE SODIUM 40 MG: 40 TABLET, DELAYED RELEASE ORAL at 06:17

## 2024-11-02 RX ADMIN — Medication 400 MG: at 09:19

## 2024-11-02 ASSESSMENT — COGNITIVE AND FUNCTIONAL STATUS - GENERAL
CLIMB 3 TO 5 STEPS WITH RAILING: A LITTLE
DAILY ACTIVITIY SCORE: 24
MOBILITY SCORE: 23

## 2024-11-02 ASSESSMENT — PAIN SCALES - GENERAL: PAINLEVEL_OUTOF10: 0 - NO PAIN

## 2024-11-04 ENCOUNTER — TELEPHONE (OUTPATIENT)
Dept: POSTPARTUM | Facility: HOSPITAL | Age: 49
End: 2024-11-04
Payer: COMMERCIAL

## 2024-11-05 ENCOUNTER — DOCUMENTATION (OUTPATIENT)
Dept: POSTPARTUM | Facility: HOSPITAL | Age: 49
End: 2024-11-05
Payer: COMMERCIAL

## 2024-11-06 ENCOUNTER — DOCUMENTATION (OUTPATIENT)
Dept: POSTPARTUM | Facility: HOSPITAL | Age: 49
End: 2024-11-06
Payer: COMMERCIAL

## 2024-11-06 LAB
ATRIAL RATE: 90 BPM
P AXIS: 54 DEGREES
P OFFSET: 188 MS
P ONSET: 140 MS
PR INTERVAL: 170 MS
Q ONSET: 225 MS
QRS COUNT: 15 BEATS
QRS DURATION: 74 MS
QT INTERVAL: 372 MS
QTC CALCULATION(BAZETT): 455 MS
QTC FREDERICIA: 425 MS
R AXIS: 16 DEGREES
T AXIS: 10 DEGREES
T OFFSET: 411 MS
VENTRICULAR RATE: 90 BPM

## 2024-11-07 ENCOUNTER — DOCUMENTATION (OUTPATIENT)
Dept: POSTPARTUM | Facility: HOSPITAL | Age: 49
End: 2024-11-07
Payer: COMMERCIAL

## 2024-11-08 ENCOUNTER — DOCUMENTATION (OUTPATIENT)
Dept: POSTPARTUM | Facility: HOSPITAL | Age: 49
End: 2024-11-08
Payer: COMMERCIAL

## 2024-11-11 ENCOUNTER — DOCUMENTATION (OUTPATIENT)
Dept: POSTPARTUM | Facility: HOSPITAL | Age: 49
End: 2024-11-11
Payer: COMMERCIAL

## 2024-11-20 ENCOUNTER — HOSPITAL ENCOUNTER (EMERGENCY)
Facility: HOSPITAL | Age: 49
Discharge: HOME | End: 2024-11-20
Attending: EMERGENCY MEDICINE
Payer: COMMERCIAL

## 2024-11-20 ENCOUNTER — APPOINTMENT (OUTPATIENT)
Dept: CARDIOLOGY | Facility: HOSPITAL | Age: 49
End: 2024-11-20
Payer: COMMERCIAL

## 2024-11-20 ENCOUNTER — APPOINTMENT (OUTPATIENT)
Dept: RADIOLOGY | Facility: HOSPITAL | Age: 49
End: 2024-11-20
Payer: COMMERCIAL

## 2024-11-20 VITALS
WEIGHT: 174 LBS | TEMPERATURE: 98.6 F | HEIGHT: 63 IN | DIASTOLIC BLOOD PRESSURE: 77 MMHG | SYSTOLIC BLOOD PRESSURE: 133 MMHG | HEART RATE: 90 BPM | RESPIRATION RATE: 15 BRPM | OXYGEN SATURATION: 100 % | BODY MASS INDEX: 30.83 KG/M2

## 2024-11-20 DIAGNOSIS — N30.00 ACUTE CYSTITIS WITHOUT HEMATURIA: Primary | ICD-10-CM

## 2024-11-20 LAB
ALBUMIN SERPL BCP-MCNC: 2.5 G/DL (ref 3.4–5)
ALP SERPL-CCNC: 474 U/L (ref 33–110)
ALT SERPL W P-5'-P-CCNC: 39 U/L (ref 7–45)
ANION GAP SERPL CALCULATED.3IONS-SCNC: <7 MMOL/L (ref 10–20)
APPEARANCE UR: CLEAR
AST SERPL W P-5'-P-CCNC: 58 U/L (ref 9–39)
ATRIAL RATE: 80 BPM
B-HCG SERPL-ACNC: <2 MIU/ML
BACTERIA #/AREA URNS AUTO: ABNORMAL /HPF
BASOPHILS # BLD AUTO: 0.02 X10*3/UL (ref 0–0.1)
BASOPHILS NFR BLD AUTO: 0.6 %
BILIRUB SERPL-MCNC: 2.3 MG/DL (ref 0–1.2)
BILIRUB UR STRIP.AUTO-MCNC: NEGATIVE MG/DL
BUN SERPL-MCNC: 10 MG/DL (ref 6–23)
CALCIUM SERPL-MCNC: 8.2 MG/DL (ref 8.6–10.3)
CHLORIDE SERPL-SCNC: 107 MMOL/L (ref 98–107)
CO2 SERPL-SCNC: 26 MMOL/L (ref 21–32)
COLOR UR: YELLOW
CREAT SERPL-MCNC: 0.55 MG/DL (ref 0.5–1.05)
EGFRCR SERPLBLD CKD-EPI 2021: >90 ML/MIN/1.73M*2
EOSINOPHIL # BLD AUTO: 0.29 X10*3/UL (ref 0–0.7)
EOSINOPHIL NFR BLD AUTO: 8.5 %
ERYTHROCYTE [DISTWIDTH] IN BLOOD BY AUTOMATED COUNT: 28.3 % (ref 11.5–14.5)
GLUCOSE SERPL-MCNC: 254 MG/DL (ref 74–99)
GLUCOSE UR STRIP.AUTO-MCNC: ABNORMAL MG/DL
HCT VFR BLD AUTO: 34.6 % (ref 36–46)
HGB BLD-MCNC: 11.1 G/DL (ref 12–16)
IMM GRANULOCYTES # BLD AUTO: 0.01 X10*3/UL (ref 0–0.7)
IMM GRANULOCYTES NFR BLD AUTO: 0.3 % (ref 0–0.9)
KETONES UR STRIP.AUTO-MCNC: NEGATIVE MG/DL
LEUKOCYTE ESTERASE UR QL STRIP.AUTO: ABNORMAL
LIPASE SERPL-CCNC: 204 U/L (ref 9–82)
LYMPHOCYTES # BLD AUTO: 1.1 X10*3/UL (ref 1.2–4.8)
LYMPHOCYTES NFR BLD AUTO: 32.3 %
MCH RBC QN AUTO: 26.9 PG (ref 26–34)
MCHC RBC AUTO-ENTMCNC: 32.1 G/DL (ref 32–36)
MCV RBC AUTO: 84 FL (ref 80–100)
MONOCYTES # BLD AUTO: 0.3 X10*3/UL (ref 0.1–1)
MONOCYTES NFR BLD AUTO: 8.8 %
NEUTROPHILS # BLD AUTO: 1.69 X10*3/UL (ref 1.2–7.7)
NEUTROPHILS NFR BLD AUTO: 49.5 %
NITRITE UR QL STRIP.AUTO: NEGATIVE
NRBC BLD-RTO: 0 /100 WBCS (ref 0–0)
OVALOCYTES BLD QL SMEAR: NORMAL
P AXIS: 41 DEGREES
P OFFSET: 193 MS
P ONSET: 143 MS
PH UR STRIP.AUTO: 5.5 [PH]
PLATELET # BLD AUTO: 63 X10*3/UL (ref 150–450)
POTASSIUM SERPL-SCNC: 3.7 MMOL/L (ref 3.5–5.3)
PR INTERVAL: 168 MS
PROT SERPL-MCNC: 8.1 G/DL (ref 6.4–8.2)
PROT UR STRIP.AUTO-MCNC: NEGATIVE MG/DL
Q ONSET: 227 MS
QRS COUNT: 13 BEATS
QRS DURATION: 70 MS
QT INTERVAL: 412 MS
QTC CALCULATION(BAZETT): 475 MS
QTC FREDERICIA: 453 MS
R AXIS: 61 DEGREES
RBC # BLD AUTO: 4.13 X10*6/UL (ref 4–5.2)
RBC # UR STRIP.AUTO: ABNORMAL /UL
RBC #/AREA URNS AUTO: ABNORMAL /HPF
RBC MORPH BLD: NORMAL
SODIUM SERPL-SCNC: 135 MMOL/L (ref 136–145)
SP GR UR STRIP.AUTO: 1.02
SQUAMOUS #/AREA URNS AUTO: ABNORMAL /HPF
T AXIS: -3 DEGREES
T OFFSET: 433 MS
TARGETS BLD QL SMEAR: NORMAL
UROBILINOGEN UR STRIP.AUTO-MCNC: NORMAL MG/DL
VENTRICULAR RATE: 80 BPM
WBC # BLD AUTO: 3.4 X10*3/UL (ref 4.4–11.3)
WBC #/AREA URNS AUTO: ABNORMAL /HPF

## 2024-11-20 PROCEDURE — 93005 ELECTROCARDIOGRAM TRACING: CPT

## 2024-11-20 PROCEDURE — 74177 CT ABD & PELVIS W/CONTRAST: CPT

## 2024-11-20 PROCEDURE — 81001 URINALYSIS AUTO W/SCOPE: CPT | Performed by: EMERGENCY MEDICINE

## 2024-11-20 PROCEDURE — 2550000001 HC RX 255 CONTRASTS: Performed by: EMERGENCY MEDICINE

## 2024-11-20 PROCEDURE — 83690 ASSAY OF LIPASE: CPT | Performed by: EMERGENCY MEDICINE

## 2024-11-20 PROCEDURE — 96375 TX/PRO/DX INJ NEW DRUG ADDON: CPT

## 2024-11-20 PROCEDURE — 96365 THER/PROPH/DIAG IV INF INIT: CPT | Mod: 59

## 2024-11-20 PROCEDURE — 84075 ASSAY ALKALINE PHOSPHATASE: CPT | Performed by: EMERGENCY MEDICINE

## 2024-11-20 PROCEDURE — 84702 CHORIONIC GONADOTROPIN TEST: CPT | Performed by: EMERGENCY MEDICINE

## 2024-11-20 PROCEDURE — 2500000004 HC RX 250 GENERAL PHARMACY W/ HCPCS (ALT 636 FOR OP/ED)

## 2024-11-20 PROCEDURE — 2500000004 HC RX 250 GENERAL PHARMACY W/ HCPCS (ALT 636 FOR OP/ED): Performed by: EMERGENCY MEDICINE

## 2024-11-20 PROCEDURE — 81003 URINALYSIS AUTO W/O SCOPE: CPT | Performed by: EMERGENCY MEDICINE

## 2024-11-20 PROCEDURE — 85025 COMPLETE CBC W/AUTO DIFF WBC: CPT | Performed by: EMERGENCY MEDICINE

## 2024-11-20 PROCEDURE — 36415 COLL VENOUS BLD VENIPUNCTURE: CPT | Performed by: EMERGENCY MEDICINE

## 2024-11-20 PROCEDURE — 99285 EMERGENCY DEPT VISIT HI MDM: CPT | Mod: 25

## 2024-11-20 PROCEDURE — 74177 CT ABD & PELVIS W/CONTRAST: CPT | Performed by: RADIOLOGY

## 2024-11-20 RX ORDER — DROPERIDOL 2.5 MG/ML
0.62 INJECTION, SOLUTION INTRAMUSCULAR; INTRAVENOUS ONCE
Status: DISCONTINUED | OUTPATIENT
Start: 2024-11-20 | End: 2024-11-20

## 2024-11-20 RX ORDER — CEPHALEXIN 500 MG/1
500 CAPSULE ORAL 3 TIMES DAILY
Qty: 15 CAPSULE | Refills: 0 | Status: SHIPPED | OUTPATIENT
Start: 2024-11-20 | End: 2024-11-25

## 2024-11-20 RX ORDER — HALOPERIDOL 5 MG/ML
2 INJECTION INTRAMUSCULAR ONCE
Status: COMPLETED | OUTPATIENT
Start: 2024-11-20 | End: 2024-11-20

## 2024-11-20 RX ORDER — DIPHENHYDRAMINE HYDROCHLORIDE 50 MG/ML
25 INJECTION INTRAMUSCULAR; INTRAVENOUS ONCE
Status: COMPLETED | OUTPATIENT
Start: 2024-11-20 | End: 2024-11-20

## 2024-11-20 RX ORDER — CEFTRIAXONE 1 G/50ML
1 INJECTION, SOLUTION INTRAVENOUS ONCE
Status: COMPLETED | OUTPATIENT
Start: 2024-11-20 | End: 2024-11-20

## 2024-11-20 RX ADMIN — DIPHENHYDRAMINE HYDROCHLORIDE 25 MG: 50 INJECTION, SOLUTION INTRAMUSCULAR; INTRAVENOUS at 09:53

## 2024-11-20 RX ADMIN — CEFTRIAXONE SODIUM 1 G: 1 INJECTION, SOLUTION INTRAVENOUS at 11:21

## 2024-11-20 RX ADMIN — IOHEXOL 75 ML: 350 INJECTION, SOLUTION INTRAVENOUS at 14:51

## 2024-11-20 RX ADMIN — HALOPERIDOL LACTATE 2 MG: 5 INJECTION, SOLUTION INTRAMUSCULAR at 09:53

## 2024-11-20 ASSESSMENT — LIFESTYLE VARIABLES
EVER FELT BAD OR GUILTY ABOUT YOUR DRINKING: NO
HAVE YOU EVER FELT YOU SHOULD CUT DOWN ON YOUR DRINKING: NO
EVER HAD A DRINK FIRST THING IN THE MORNING TO STEADY YOUR NERVES TO GET RID OF A HANGOVER: NO
TOTAL SCORE: 0
HAVE PEOPLE ANNOYED YOU BY CRITICIZING YOUR DRINKING: NO

## 2024-11-20 ASSESSMENT — PAIN SCALES - GENERAL
PAINLEVEL_OUTOF10: 4
PAINLEVEL_OUTOF10: 8

## 2024-11-20 NOTE — ED PROVIDER NOTES
HPI   Chief Complaint   Patient presents with    Abdominal Pain       Patient is a 49-year-old female with a history of cirrhosis and diabetes presenting to the emergency department for evaluation of abdominal pain.  Patient states she has had pain in the right upper quadrant that started last night.  She also admits to some diarrhea.  She states she took antinausea medications which did help with the symptoms.  She states that her diarrhea has also improved this morning.  She states in the past she has had to have a paracentesis performed however does not feel like she needs it at this time.  She denies any chest pain, shortness of breath, vomiting, cough, congestion, headaches.  She denies any hematuria or dysuria.              Patient History   Past Medical History:   Diagnosis Date    Cirrhosis of liver (Multi)     Diabetes mellitus (Multi)      Past Surgical History:   Procedure Laterality Date    ABDOMINAL SURGERY      CT GUIDED IMAGING FOR NEEDLE PLACEMENT  10/14/2020    CT GUIDED IMAGING FOR NEEDLE PLACEMENT [facility]    US GUIDED ABDOMINAL PARACENTESIS  10/08/2020    US GUIDED ABDOMINAL PARACENTESIS [facility]     No family history on file.  Social History     Tobacco Use    Smoking status: Never    Smokeless tobacco: Never   Substance Use Topics    Alcohol use: Never    Drug use: Never       Physical Exam   ED Triage Vitals [11/20/24 0911]   Temperature Heart Rate Respirations BP   36.1 °C (97 °F) 79 18 133/70      Pulse Ox Temp Source Heart Rate Source Patient Position   100 % Temporal Monitor;Brachial Sitting      BP Location FiO2 (%)     Left arm --       Physical Exam  Vitals and nursing note reviewed.   Constitutional:       Appearance: She is well-developed.   HENT:      Head: Normocephalic and atraumatic.   Cardiovascular:      Rate and Rhythm: Normal rate and regular rhythm.   Pulmonary:      Effort: Pulmonary effort is normal.      Breath sounds: Normal breath sounds.    Abdominal:      Tenderness: There is abdominal tenderness in the right upper quadrant. There is no guarding or rebound.      Comments: Positive fluid wave   Skin:     General: Skin is warm and dry.   Neurological:      General: No focal deficit present.      Mental Status: She is alert and oriented to person, place, and time.   Psychiatric:         Mood and Affect: Mood normal.         Behavior: Behavior normal.           ED Course & MDM   ED Course as of 11/20/24 1539   Wed Nov 20, 2024   0916 EKG on my independent interpretation: Normal sinus rhythm 80 bpm, normal axis, normal intervals, no acute ST or T wave abnormalities [MARIBELL]      ED Course User Index  [MARIBELL] Tamiko Mmcullen MD         Diagnoses as of 11/20/24 1539   Acute cystitis without hematuria                 No data recorded     [place] Coma Scale Score: 15 (11/20/24 0924 : Stephany Smith RN)                           Medical Decision Making  **Disclaimer parts of this chart have been completed using voice recognition software. Please excuse any errors of transcription.     Patient seen in conjunction with attending physician .     HPI: Detailed above.    Exam: A medically appropriate exam performed, outlined above, given the known history and presentation.    History obtained from: Patient    EKG: Reviewed and interpreted by my attending physician    Labs/Diagnostics:  Labs Reviewed   CBC WITH AUTO DIFFERENTIAL - Abnormal       Result Value    WBC 3.4 (*)     nRBC 0.0      RBC 4.13      Hemoglobin 11.1 (*)     Hematocrit 34.6 (*)     MCV 84      MCH 26.9      MCHC 32.1      RDW 28.3 (*)     Platelets 63 (*)     Neutrophils % 49.5      Immature Granulocytes %, Automated 0.3      Lymphocytes % 32.3      Monocytes % 8.8      Eosinophils % 8.5      Basophils % 0.6      Neutrophils Absolute 1.69      Immature Granulocytes Absolute, Automated 0.01      Lymphocytes Absolute 1.10 (*)     Monocytes Absolute 0.30      Eosinophils Absolute 0.29       Basophils Absolute 0.02     COMPREHENSIVE METABOLIC PANEL - Abnormal    Glucose 254 (*)     Sodium 135 (*)     Potassium 3.7      Chloride 107      Bicarbonate 26      Anion Gap <7 (*)     Urea Nitrogen 10      Creatinine 0.55      eGFR >90      Calcium 8.2 (*)     Albumin 2.5 (*)     Alkaline Phosphatase 474 (*)     Total Protein 8.1      AST 58 (*)     Bilirubin, Total 2.3 (*)     ALT 39     URINALYSIS WITH REFLEX MICROSCOPIC - Abnormal    Color, Urine Yellow      Appearance, Urine Clear      Specific Gravity, Urine 1.024      pH, Urine 5.5      Protein, Urine NEGATIVE      Glucose, Urine OVER (4+) (*)     Blood, Urine 0.06 (1+) (*)     Ketones, Urine NEGATIVE      Bilirubin, Urine NEGATIVE      Urobilinogen, Urine Normal      Nitrite, Urine NEGATIVE      Leukocyte Esterase, Urine 500 Mckenzie/µL (*)     Narrative:     OVER is reported when the result is greater than the clinically reportable range.   MICROSCOPIC ONLY, URINE - Abnormal    WBC, Urine 21-50 (*)     RBC, Urine 1-2      Squamous Epithelial Cells, Urine 1-9 (SPARSE)      Bacteria, Urine 1+ (*)    LIPASE - Abnormal    Lipase 204 (*)     Narrative:     Venipuncture immediately after or during the administration of Metamizole may lead to falsely low results. Testing should be performed immediately prior to Metamizole dosing.   HUMAN CHORIONIC GONADOTROPIN, SERUM QUANTITATIVE - Normal    HCG, Beta-Quantitative <2      Narrative:      Total HCG measurement is performed using the [employer]   Immunoassay which detects intact HCG and free beta HCG subunit.    This test is not indicated for use as a tumor marker.   HCG testing is performed using a different test methodology at [facility] than other [facility]. Direct result comparison   should only be made within the same method.       MORPHOLOGY    RBC Morphology See Below      Target Cells Few      Ovalocytes Few       EMERGENCY DEPARTMENT COURSE and DIFFERENTIAL  DIAGNOSIS/MDM:  Patient is a 49-year-old female presenting to the emergency department for evaluation of right upper quadrant abdominal pain, nausea, diarrhea.  On physical exam vital signs remarkable for mild systolic hypertension but otherwise stable and patient is in no acute distress.  Patient is some tenderness to palpation of the right upper quadrant with no rebound or guarding.  Positive fluid wave also noted consistent with patient's history of cirrhosis.  Quantitative hCG negative.  CMP remarkable for sodium of 135 as well as hyperglycemia and transaminitis as well as a calcium of 8.2.  Urine showed possible evidence of infection with 500 leukocyte esterase and 1+ bacteria.  CBC remarkable for leukopenia and anemia however no blood transfusion needed.  CT of the abdomen and pelvis showed no interval change since 10/27/2024.  Patient feeling better after pain medications.  I suspect that patient likely has symptoms due to UTI versus viral illness.  Patient was discharged in stable condition with a prescription for Keflex.  She was advised to follow-up with primary care physician outpatient within the next 1 to 2 days.  She will return to the emergency department with any new or worsening symptoms.    Discussed with patient that more than 50% of abdominal pain that comes to the Emergency Department goes undiagnosed and that there were no emergent findings in workup today. Discussed that certain diagnoese such as appendicitis, colitis, diverticulitis, cholelithiasis or other illnesses are undetectable early on in their course and may not be seen on the first visit.  I recommended abdominal re-examination in 12-24 hours if  symptoms are not significantly improved, sooner if worsening.    Vitals:    Vitals:    11/20/24 0945 11/20/24 1053 11/20/24 1300 11/20/24 1430   BP: 126/67 128/71 130/72 122/77   BP Location:       Patient Position:       Pulse: 76 95 80 79   Resp: 19 15 17 13   Temp:       TempSrc:        SpO2: 100% 99% 99% 98%   Weight:       Height:         History Limited by:    None    Independent history obtained from:    None    External records reviewed:    Inpatient Notes/Discharge Summary from previous hospitalization discharge summary from 11/2/2024  Hospital Course   The patient was admitted to complaint of abdominal pain.  Patient had ascites.  Paracentesis was done.  It was repeated again.  Patient had almost 6 L fluid removed.  Patient doing better now.  Patient is noncompliant with her medications due to financial reasons.  Medication has been given to the patient from the hospital.  The patient has been advised to comply to the medication instructions.  The patient being discharged home in a stable condition.    Diagnostics interpreted by me:    CT Scan(s) see MDM    Discussions with other clinicians:    None    Chronic conditions impacting care:    None    Social determinants of health affecting care:    None    Diagnostic tests considered but not performed: None    ED Medications managed:    Medications   haloperidol lactate (Haldol) injection 2 mg (2 mg intravenous Given 11/20/24 0953)   diphenhydrAMINE (BENADryl) injection 25 mg (25 mg intravenous Given 11/20/24 0953)   cefTRIAXone (Rocephin) 1 g in dextrose (iso) IV 50 mL (0 g intravenous Stopped 11/20/24 1207)   iohexol (OMNIPaque) 350 mg iodine/mL solution 75 mL (75 mL intravenous Given 11/20/24 1451)       Prescription drugs considered:    Antibiotics Keflex    Screenings:              Procedure  Procedures     Meena Thomas PA-C  11/20/24 2235

## 2024-11-20 NOTE — Clinical Note
Alfonzo Flanagan was seen and treated in our emergency department on 11/20/2024.  She may return to work on 11/22/2024.       If you have any questions or concerns, please don't hesitate to call.      Meena Thomas PA-C

## 2024-11-20 NOTE — ED PROVIDER NOTES
HPI   Chief Complaint   Patient presents with    Abdominal Pain       49-year-old female with history of nonalcoholic cirrhosis presents for evaluation of right upper quadrant abdominal pain with nausea and diarrhea that started last night.  No blood in her stool.  States she took some medication for the nausea slightly improved and diarrhea seems to be improving this morning.  States she has had to have paracentesis in the past but does not feel like her abdomen is as full as it has been and other times.  No fever.      History provided by:  Patient and medical records          Patient History   Past Medical History:   Diagnosis Date    Cirrhosis of liver (Multi)     Diabetes mellitus (Multi)      Past Surgical History:   Procedure Laterality Date    ABDOMINAL SURGERY      CT GUIDED IMAGING FOR NEEDLE PLACEMENT  10/14/2020    CT GUIDED IMAGING FOR NEEDLE PLACEMENT [facility]    US GUIDED ABDOMINAL PARACENTESIS  10/08/2020    US GUIDED ABDOMINAL PARACENTESIS [facility]     No family history on file.  Social History     Tobacco Use    Smoking status: Never    Smokeless tobacco: Never   Substance Use Topics    Alcohol use: Never    Drug use: Never       Physical Exam   ED Triage Vitals [11/20/24 0911]   Temperature Heart Rate Respirations BP   36.1 °C (97 °F) 79 18 133/70      Pulse Ox Temp Source Heart Rate Source Patient Position   100 % Temporal Monitor;Brachial Sitting      BP Location FiO2 (%)     Left arm --       Physical Exam  Vitals and nursing note reviewed.     General: Vitals reviewed. Awake, alert, chronically ill-appearing, NAD  HEENT: NC/AT, PERRL, MMM  Neck: Supple, trachea midline  Respiratory: No respiratory distress, lungs clear to auscultation bilaterally, no wheezes, rhonchi, or rales  CV: Regular rate and regular rhythm, no murmur/gallop/rubs  Abdomen/GI: Soft, tender palpation right upper quadrant and mid abdomen, non-distended, positive fluid wave noted, no rebound, guarding,  or rigidity, normal bowel sounds  Extremities: Moving all extremities, no deformities  Neuro: A/O, normal speech  Skin: Warm, dry. No rashes identified      ED Course & MDM                  No data recorded     [place] Coma Scale Score: 15 (11/20/24 0924 : Stephany Smith RN)                           Medical Decision Making      Procedure  Procedures

## 2024-11-20 NOTE — DISCHARGE INSTRUCTIONS
Thank you for visiting [facility] emergency department.  It was a pleasure caring for you.    Be sure to take all medications, over the counter medications or prescription medications only as directed.     Be sure to follow up as directed in 1-2 days.  All of the details of your follow up instructions are detailed in the follow up section of this packet.    If you are being discharged with any pains medications or muscle relaxers (norco, Vicodin, hydrocodone products, Percocet, oxycodone products, flexeril, cyclobenzaprine, robaxin, norflex, brand or generic, or any other pain controlling medications with the exception of Ibuprofen and regular Tylenol, do not drive or operate machinery, climb ladders or participate in any activity that could potentially put yourself or others at risk should you get dizzy, or be/feel impaired at all.        It is important to remember that your care does not end here and you must continue to monitor your condition closely. Please return to the emergency department for any worsening or concerning signs or symptoms as directed by our conversations and the discharge instructions. Otherwise please follow up with your doctor in 2 days if no better or worse. If you do not have a doctor please contact the referral number on your discharge instructions. Please contact any physician specialists provided in your discharge notes as it is very important to follow up with them regarding your condition. If you are unable to reach the physicians provided, please come back to the Emergency Department at any time.     As always, please take medications as directed. If you have any questions at all regarding your medications, please contact the pharmacist, the emergency department, or your doctor. Before taking any medication prescribed in the Emergency Department, please review the medication side effects and drug interactions (<http://www.rxlist.com/script/main/hp.asp>) as they may interact with your  home medications.     Having trouble affording medications? Try "[employer] <http://[employer]/>! (This is not a hospital endorsed website, merely a recommendation based on my own personal experiences with "[employer]      Return to emergency room without delay for ANY new or worsening pains or for any other symptoms or concerns.      Return with worsening pains, nausea, vomiting, trouble breathing, palpitations, shortness of breath, inability to pass stool or urine, loss of control of stool or urine, any numbness or tingling (that is not normal for you), uncontrolled fevers, the passing of blood or other material in stool or urine, rashes, pains or for any other symptoms or concerns you may have.  You are always welcome to return to the ER at any time for any reason or for any other concerns you may have.

## 2024-11-20 NOTE — ED NOTES
Patient presents to the ER for complaints of     Pain in epigastric region and nausea.  HISTORY OF CIRRHOSIS REQUIRING PARACENTESIS      .      PMHX:  Past Medical History:   Diagnosis Date    Cirrhosis of liver (Multi)     Diabetes mellitus (Multi)         Allergies   Allergen Reactions    Gluten Diarrhea         LABS:   Latest Reference Range & Units 11/20/24 09:23   GLUCOSE 74 - 99 mg/dL 254 (H)   SODIUM 136 - 145 mmol/L 135 (L)   POTASSIUM 3.5 - 5.3 mmol/L 3.7   CHLORIDE 98 - 107 mmol/L 107   Bicarbonate 21 - 32 mmol/L 26   Anion Gap 10 - 20 mmol/L <7 (L)   Blood Urea Nitrogen 6 - 23 mg/dL 10   Creatinine 0.50 - 1.05 mg/dL 0.55   EGFR >60 mL/min/1.73m*2 >90   Calcium 8.6 - 10.3 mg/dL 8.2 (L)   Albumin 3.4 - 5.0 g/dL 2.5 (L)   Alkaline Phosphatase 33 - 110 U/L 474 (H)   ALT 7 - 45 U/L 39   AST 9 - 39 U/L 58 (H)   Bilirubin Total 0.0 - 1.2 mg/dL 2.3 (H)   (H): Data is abnormally high  (L): Data is abnormally low   Latest Reference Range & Units 11/20/24 09:23   WBC 4.4 - 11.3 x10*3/uL 3.4 (L)   nRBC 0.0 - 0.0 /100 WBCs 0.0   RBC 4.00 - 5.20 x10*6/uL 4.13   HEMOGLOBIN 12.0 - 16.0 g/dL 11.1 (L)   HEMATOCRIT 36.0 - 46.0 % 34.6 (L)   MCV 80 - 100 fL 84   MCH 26.0 - 34.0 pg 26.9   MCHC 32.0 - 36.0 g/dL 32.1   RED CELL DISTRIBUTION WIDTH 11.5 - 14.5 % 28.3 (H)   Platelets 150 - 450 x10*3/uL 63 (L)   (L): Data is abnormally low  (H): Data is abnormally high   Latest Reference Range & Units 11/20/24 09:24   Color, Urine Light-Yellow, Yellow, Dark-Yellow  Yellow   Appearance, Urine Clear  Clear   Specific Gravity, Urine 1.005 - 1.035  1.024   pH, Urine 5.0, 5.5, 6.0, 6.5, 7.0, 7.5, 8.0  5.5   Protein, Urine NEGATIVE, 10 (TRACE), 20 (TRACE) mg/dL NEGATIVE   Glucose, Urine Normal mg/dL OVER (4+) !   Blood, Urine NEGATIVE  0.06 (1+) !   Ketones, Urine NEGATIVE mg/dL NEGATIVE   Bilirubin, Urine NEGATIVE  NEGATIVE   Urobilinogen, Urine Normal mg/dL Normal   Nitrite, Urine NEGATIVE  NEGATIVE   Leukocyte Esterase, Urine  NEGATIVE  500 Mckenzie/µL !   !: Data is abnormal        PLAN: PENDING                   Stephany Smith RN  11/20/24 0323

## 2024-12-08 ENCOUNTER — APPOINTMENT (OUTPATIENT)
Dept: CARDIOLOGY | Facility: HOSPITAL | Age: 49
End: 2024-12-08
Payer: COMMERCIAL

## 2024-12-08 ENCOUNTER — HOSPITAL ENCOUNTER (EMERGENCY)
Facility: HOSPITAL | Age: 49
Discharge: HOME | End: 2024-12-08
Attending: STUDENT IN AN ORGANIZED HEALTH CARE EDUCATION/TRAINING PROGRAM
Payer: COMMERCIAL

## 2024-12-08 ENCOUNTER — APPOINTMENT (OUTPATIENT)
Dept: RADIOLOGY | Facility: HOSPITAL | Age: 49
End: 2024-12-08
Payer: COMMERCIAL

## 2024-12-08 VITALS
HEIGHT: 62 IN | SYSTOLIC BLOOD PRESSURE: 143 MMHG | DIASTOLIC BLOOD PRESSURE: 84 MMHG | HEART RATE: 96 BPM | RESPIRATION RATE: 17 BRPM | TEMPERATURE: 97.9 F | WEIGHT: 174 LBS | OXYGEN SATURATION: 96 % | BODY MASS INDEX: 32.02 KG/M2

## 2024-12-08 DIAGNOSIS — R74.8 ELEVATED LIPASE: ICD-10-CM

## 2024-12-08 DIAGNOSIS — R19.7 DIARRHEA, UNSPECIFIED TYPE: ICD-10-CM

## 2024-12-08 DIAGNOSIS — R11.0 NAUSEA: ICD-10-CM

## 2024-12-08 DIAGNOSIS — R10.9 ACUTE ABDOMINAL PAIN: Primary | ICD-10-CM

## 2024-12-08 DIAGNOSIS — E83.51 HYPOCALCEMIA: ICD-10-CM

## 2024-12-08 DIAGNOSIS — R18.8 OTHER ASCITES: ICD-10-CM

## 2024-12-08 LAB
ALBUMIN SERPL BCP-MCNC: 2.2 G/DL (ref 3.4–5)
ALP SERPL-CCNC: 368 U/L (ref 33–110)
ALT SERPL W P-5'-P-CCNC: 35 U/L (ref 7–45)
AMMONIA PLAS-SCNC: 30 UMOL/L (ref 16–53)
ANION GAP SERPL CALCULATED.3IONS-SCNC: 7 MMOL/L (ref 10–20)
AST SERPL W P-5'-P-CCNC: 60 U/L (ref 9–39)
BASOPHILS # BLD AUTO: 0.02 X10*3/UL (ref 0–0.1)
BASOPHILS NFR BLD AUTO: 0.5 %
BILIRUB SERPL-MCNC: 2.4 MG/DL (ref 0–1.2)
BUN SERPL-MCNC: 9 MG/DL (ref 6–23)
CA-I BLD-SCNC: 1.01 MMOL/L (ref 1.1–1.33)
CALCIUM SERPL-MCNC: 7.6 MG/DL (ref 8.6–10.3)
CHLORIDE SERPL-SCNC: 105 MMOL/L (ref 98–107)
CO2 SERPL-SCNC: 27 MMOL/L (ref 21–32)
CREAT SERPL-MCNC: 0.53 MG/DL (ref 0.5–1.05)
EGFRCR SERPLBLD CKD-EPI 2021: >90 ML/MIN/1.73M*2
EOSINOPHIL # BLD AUTO: 0.28 X10*3/UL (ref 0–0.7)
EOSINOPHIL NFR BLD AUTO: 6.5 %
ERYTHROCYTE [DISTWIDTH] IN BLOOD BY AUTOMATED COUNT: 26.9 % (ref 11.5–14.5)
FLUAV RNA RESP QL NAA+PROBE: NOT DETECTED
FLUBV RNA RESP QL NAA+PROBE: NOT DETECTED
GLUCOSE BLD MANUAL STRIP-MCNC: 244 MG/DL (ref 74–99)
GLUCOSE SERPL-MCNC: 280 MG/DL (ref 74–99)
HCT VFR BLD AUTO: 32.4 % (ref 36–46)
HGB BLD-MCNC: 10.6 G/DL (ref 12–16)
HYPOCHROMIA BLD QL SMEAR: NORMAL
IMM GRANULOCYTES # BLD AUTO: 0.01 X10*3/UL (ref 0–0.7)
IMM GRANULOCYTES NFR BLD AUTO: 0.2 % (ref 0–0.9)
LACTATE SERPL-SCNC: 1.6 MMOL/L (ref 0.4–2)
LIPASE SERPL-CCNC: 110 U/L (ref 9–82)
LYMPHOCYTES # BLD AUTO: 1.4 X10*3/UL (ref 1.2–4.8)
LYMPHOCYTES NFR BLD AUTO: 32.5 %
MAGNESIUM SERPL-MCNC: 1.56 MG/DL (ref 1.6–2.4)
MCH RBC QN AUTO: 28.7 PG (ref 26–34)
MCHC RBC AUTO-ENTMCNC: 32.7 G/DL (ref 32–36)
MCV RBC AUTO: 88 FL (ref 80–100)
MONOCYTES # BLD AUTO: 0.43 X10*3/UL (ref 0.1–1)
MONOCYTES NFR BLD AUTO: 10 %
NEUTROPHILS # BLD AUTO: 2.17 X10*3/UL (ref 1.2–7.7)
NEUTROPHILS NFR BLD AUTO: 50.3 %
NRBC BLD-RTO: 0 /100 WBCS (ref 0–0)
PLATELET # BLD AUTO: 69 X10*3/UL (ref 150–450)
POLYCHROMASIA BLD QL SMEAR: NORMAL
POTASSIUM SERPL-SCNC: 3.6 MMOL/L (ref 3.5–5.3)
PROT SERPL-MCNC: 6.8 G/DL (ref 6.4–8.2)
RBC # BLD AUTO: 3.69 X10*6/UL (ref 4–5.2)
RBC MORPH BLD: NORMAL
SARS-COV-2 RNA RESP QL NAA+PROBE: NOT DETECTED
SODIUM SERPL-SCNC: 135 MMOL/L (ref 136–145)
TARGETS BLD QL SMEAR: NORMAL
WBC # BLD AUTO: 4.3 X10*3/UL (ref 4.4–11.3)

## 2024-12-08 PROCEDURE — 96366 THER/PROPH/DIAG IV INF ADDON: CPT

## 2024-12-08 PROCEDURE — 2550000001 HC RX 255 CONTRASTS: Performed by: STUDENT IN AN ORGANIZED HEALTH CARE EDUCATION/TRAINING PROGRAM

## 2024-12-08 PROCEDURE — 36415 COLL VENOUS BLD VENIPUNCTURE: CPT | Performed by: STUDENT IN AN ORGANIZED HEALTH CARE EDUCATION/TRAINING PROGRAM

## 2024-12-08 PROCEDURE — 87636 SARSCOV2 & INF A&B AMP PRB: CPT | Performed by: STUDENT IN AN ORGANIZED HEALTH CARE EDUCATION/TRAINING PROGRAM

## 2024-12-08 PROCEDURE — 2500000004 HC RX 250 GENERAL PHARMACY W/ HCPCS (ALT 636 FOR OP/ED): Performed by: STUDENT IN AN ORGANIZED HEALTH CARE EDUCATION/TRAINING PROGRAM

## 2024-12-08 PROCEDURE — 71045 X-RAY EXAM CHEST 1 VIEW: CPT | Mod: FOREIGN READ | Performed by: RADIOLOGY

## 2024-12-08 PROCEDURE — 82330 ASSAY OF CALCIUM: CPT | Performed by: STUDENT IN AN ORGANIZED HEALTH CARE EDUCATION/TRAINING PROGRAM

## 2024-12-08 PROCEDURE — 83690 ASSAY OF LIPASE: CPT | Performed by: STUDENT IN AN ORGANIZED HEALTH CARE EDUCATION/TRAINING PROGRAM

## 2024-12-08 PROCEDURE — 83735 ASSAY OF MAGNESIUM: CPT | Performed by: STUDENT IN AN ORGANIZED HEALTH CARE EDUCATION/TRAINING PROGRAM

## 2024-12-08 PROCEDURE — 85025 COMPLETE CBC W/AUTO DIFF WBC: CPT | Performed by: STUDENT IN AN ORGANIZED HEALTH CARE EDUCATION/TRAINING PROGRAM

## 2024-12-08 PROCEDURE — 2500000001 HC RX 250 WO HCPCS SELF ADMINISTERED DRUGS (ALT 637 FOR MEDICARE OP): Performed by: STUDENT IN AN ORGANIZED HEALTH CARE EDUCATION/TRAINING PROGRAM

## 2024-12-08 PROCEDURE — 80053 COMPREHEN METABOLIC PANEL: CPT | Performed by: STUDENT IN AN ORGANIZED HEALTH CARE EDUCATION/TRAINING PROGRAM

## 2024-12-08 PROCEDURE — 96376 TX/PRO/DX INJ SAME DRUG ADON: CPT

## 2024-12-08 PROCEDURE — 74177 CT ABD & PELVIS W/CONTRAST: CPT

## 2024-12-08 PROCEDURE — 96365 THER/PROPH/DIAG IV INF INIT: CPT | Mod: 59

## 2024-12-08 PROCEDURE — 71045 X-RAY EXAM CHEST 1 VIEW: CPT

## 2024-12-08 PROCEDURE — 96372 THER/PROPH/DIAG INJ SC/IM: CPT | Mod: 59 | Performed by: STUDENT IN AN ORGANIZED HEALTH CARE EDUCATION/TRAINING PROGRAM

## 2024-12-08 PROCEDURE — 93005 ELECTROCARDIOGRAM TRACING: CPT

## 2024-12-08 PROCEDURE — 2500000005 HC RX 250 GENERAL PHARMACY W/O HCPCS: Performed by: STUDENT IN AN ORGANIZED HEALTH CARE EDUCATION/TRAINING PROGRAM

## 2024-12-08 PROCEDURE — 82947 ASSAY GLUCOSE BLOOD QUANT: CPT | Mod: 59

## 2024-12-08 PROCEDURE — 82140 ASSAY OF AMMONIA: CPT | Performed by: STUDENT IN AN ORGANIZED HEALTH CARE EDUCATION/TRAINING PROGRAM

## 2024-12-08 PROCEDURE — 99285 EMERGENCY DEPT VISIT HI MDM: CPT | Mod: 25 | Performed by: STUDENT IN AN ORGANIZED HEALTH CARE EDUCATION/TRAINING PROGRAM

## 2024-12-08 PROCEDURE — 74177 CT ABD & PELVIS W/CONTRAST: CPT | Mod: FOREIGN READ | Performed by: RADIOLOGY

## 2024-12-08 PROCEDURE — 96375 TX/PRO/DX INJ NEW DRUG ADDON: CPT | Mod: 59

## 2024-12-08 PROCEDURE — 83605 ASSAY OF LACTIC ACID: CPT | Performed by: STUDENT IN AN ORGANIZED HEALTH CARE EDUCATION/TRAINING PROGRAM

## 2024-12-08 RX ORDER — MORPHINE SULFATE 4 MG/ML
4 INJECTION, SOLUTION INTRAMUSCULAR; INTRAVENOUS ONCE
Status: COMPLETED | OUTPATIENT
Start: 2024-12-08 | End: 2024-12-08

## 2024-12-08 RX ORDER — MORPHINE SULFATE 2 MG/ML
2 INJECTION, SOLUTION INTRAMUSCULAR; INTRAVENOUS ONCE
Status: COMPLETED | OUTPATIENT
Start: 2024-12-08 | End: 2024-12-08

## 2024-12-08 RX ORDER — ONDANSETRON HYDROCHLORIDE 2 MG/ML
4 INJECTION, SOLUTION INTRAVENOUS ONCE
Status: COMPLETED | OUTPATIENT
Start: 2024-12-08 | End: 2024-12-08

## 2024-12-08 RX ORDER — FAMOTIDINE 20 MG/1
20 TABLET, FILM COATED ORAL ONCE
Status: COMPLETED | OUTPATIENT
Start: 2024-12-08 | End: 2024-12-08

## 2024-12-08 RX ORDER — DICYCLOMINE HYDROCHLORIDE 20 MG/1
20 TABLET ORAL 3 TIMES DAILY
Qty: 15 TABLET | Refills: 0 | Status: SHIPPED | OUTPATIENT
Start: 2024-12-08 | End: 2024-12-13

## 2024-12-08 RX ORDER — DICYCLOMINE HYDROCHLORIDE 10 MG/ML
10 INJECTION INTRAMUSCULAR ONCE
Status: COMPLETED | OUTPATIENT
Start: 2024-12-08 | End: 2024-12-08

## 2024-12-08 RX ORDER — ONDANSETRON 4 MG/1
4 TABLET, ORALLY DISINTEGRATING ORAL EVERY 8 HOURS PRN
Qty: 20 TABLET | Refills: 0 | Status: SHIPPED | OUTPATIENT
Start: 2024-12-08 | End: 2024-12-15

## 2024-12-08 RX ORDER — LIDOCAINE HYDROCHLORIDE 20 MG/ML
15 SOLUTION OROPHARYNGEAL ONCE
Status: COMPLETED | OUTPATIENT
Start: 2024-12-08 | End: 2024-12-08

## 2024-12-08 RX ORDER — CALCIUM CARBONATE 200(500)MG
1250 TABLET,CHEWABLE ORAL ONCE
Status: COMPLETED | OUTPATIENT
Start: 2024-12-08 | End: 2024-12-08

## 2024-12-08 RX ORDER — ALUMINUM HYDROXIDE, MAGNESIUM HYDROXIDE, AND SIMETHICONE 1200; 120; 1200 MG/30ML; MG/30ML; MG/30ML
30 SUSPENSION ORAL ONCE
Status: COMPLETED | OUTPATIENT
Start: 2024-12-08 | End: 2024-12-08

## 2024-12-08 RX ORDER — MAGNESIUM SULFATE HEPTAHYDRATE 40 MG/ML
2 INJECTION, SOLUTION INTRAVENOUS ONCE
Status: COMPLETED | OUTPATIENT
Start: 2024-12-08 | End: 2024-12-08

## 2024-12-08 RX ADMIN — FAMOTIDINE 20 MG: 20 TABLET, FILM COATED ORAL at 11:25

## 2024-12-08 RX ADMIN — ALUMINUM HYDROXIDE, MAGNESIUM HYDROXIDE, AND SIMETHICONE 30 ML: 200; 200; 20 SUSPENSION ORAL at 11:25

## 2024-12-08 RX ADMIN — ONDANSETRON 4 MG: 2 INJECTION INTRAMUSCULAR; INTRAVENOUS at 12:08

## 2024-12-08 RX ADMIN — MORPHINE SULFATE 2 MG: 2 INJECTION, SOLUTION INTRAMUSCULAR; INTRAVENOUS at 15:51

## 2024-12-08 RX ADMIN — MORPHINE SULFATE 4 MG: 4 INJECTION, SOLUTION INTRAMUSCULAR; INTRAVENOUS at 12:08

## 2024-12-08 RX ADMIN — ANTACID TABLETS 1250 MG: 500 TABLET, CHEWABLE ORAL at 15:09

## 2024-12-08 RX ADMIN — MAGNESIUM SULFATE 2 G: 2 INJECTION INTRAVENOUS at 14:01

## 2024-12-08 RX ADMIN — DICYCLOMINE HYDROCHLORIDE 10 MG: 20 INJECTION, SOLUTION INTRAMUSCULAR at 15:10

## 2024-12-08 RX ADMIN — LIDOCAINE HYDROCHLORIDE 15 ML: 20 SOLUTION ORAL at 11:25

## 2024-12-08 RX ADMIN — IOHEXOL 75 ML: 350 INJECTION, SOLUTION INTRAVENOUS at 12:39

## 2024-12-08 ASSESSMENT — PAIN DESCRIPTION - LOCATION
LOCATION: ABDOMEN
LOCATION: ABDOMEN

## 2024-12-08 ASSESSMENT — PAIN SCALES - GENERAL
PAINLEVEL_OUTOF10: 8
PAINLEVEL_OUTOF10: 4
PAINLEVEL_OUTOF10: 6

## 2024-12-08 ASSESSMENT — LIFESTYLE VARIABLES
TOTAL SCORE: 0
EVER FELT BAD OR GUILTY ABOUT YOUR DRINKING: NO
EVER HAD A DRINK FIRST THING IN THE MORNING TO STEADY YOUR NERVES TO GET RID OF A HANGOVER: NO
HAVE YOU EVER FELT YOU SHOULD CUT DOWN ON YOUR DRINKING: NO
HAVE PEOPLE ANNOYED YOU BY CRITICIZING YOUR DRINKING: NO

## 2024-12-08 ASSESSMENT — PAIN DESCRIPTION - PAIN TYPE
TYPE: CHRONIC PAIN;ACUTE PAIN
TYPE: ACUTE PAIN;CHRONIC PAIN

## 2024-12-08 ASSESSMENT — PAIN DESCRIPTION - ORIENTATION: ORIENTATION: UPPER

## 2024-12-08 ASSESSMENT — PAIN DESCRIPTION - DESCRIPTORS: DESCRIPTORS: CRAMPING

## 2024-12-08 ASSESSMENT — PAIN - FUNCTIONAL ASSESSMENT: PAIN_FUNCTIONAL_ASSESSMENT: 0-10

## 2024-12-08 NOTE — ED PROVIDER NOTES
Patient was initially seen by my colleague and endorsed to me on signout.    Briefly, patient is a 49-year-old female with a history of cirrhosis of the liver that presented to the emergency room for evaluation increasing abdominal swelling, abdominal pain, nausea and diarrhea.  She has noticed for the last several days she has had increasing cramping twisting pain throughout the abdomen as well as significant swelling of her abdomen with distention and pressure.  Nothing seems to make it better.  Last time something like this happen she needed to follow-up and have a paracentesis done and ended up being mated to the hospital for several days.  She did end up getting roughly 6 L of fluid off at that time.  Patient was signed out to me pending reevaluation after patient receives her magnesium and calcium.  Nausea has significantly improved at this time.    Exam  General: Awake, alert, uncomfortable appearing but in no obvious distress  HEENT: Head is normocephalic, atraumatic, moist mucous membranes, extraocular eye movements intact, no scleral icterus  Pulmonary: Respirations easy, nonlabored  Cardiac: Regular rate and rhythm  Abdomen: Moderately distended, positive fluid wave however no rigidity and no evidence of peritonitis  Skin: No jaundice of the skin    Patient does state nausea has significantly improved at this time however still having some mild abdominal pain.  Patient was given IM Bentyl with minimal improvement.  She was given another small dose of morphine however patient not peritonitic on exam and is hemodynamically stable.  She is tolerating p.o. intake.  I have no suspicion for spontaneous bacterial peritonitis at this time.  There is no evidence of bowel obstruction, perforation or abscess on CT scan.  Patient will be given close follow-up with gastroenterology as an outpatient for outpatient paracentesis.  Patient agreeable to plan at this time.  Return precautions were discussed with patient.      Kev Lacy, DO  12/08/24 1604

## 2024-12-08 NOTE — ED PROVIDER NOTES
"HPI   Chief Complaint   Patient presents with    Abdominal Pain       Patient presents to ED with family for report of abdominal pain with associated nausea and diarrhea.  No symptoms present for last 2 days.  States her abdomen has insidiously increased in size.  Notes twisting and cramping pain in nature, moderate severity, constant, no alleviating/aggravating factors, radiates across to her RUQ/right flank region.  Denies any rashes.  Since last time she had a paracentesis that drained +6 L about a month ago.  Notes chills with mild productive cough of yellowish phlegm and feels that she is experiencing some wheezing consistent with her asthma but has improved over the last couple days with her own home pulmonary regiment and nebulizer machine.  Notes subjective fevers and chills.  Notes diarrhea is normal and does not appreciate any hematochezia or melena.  Also endorses some dysuria with associated increased urinary frequency/urgency but does not appreciate any hematuria.  Has not appreciated any change in her skin color or eye color.  Denies any other significant systemic symptoms or complaints.              Patient History   Past Medical History:   Diagnosis Date    Cirrhosis of liver (Multi)     Diabetes mellitus (Multi)      Past Surgical History:   Procedure Laterality Date    ABDOMINAL SURGERY      CT GUIDED IMAGING FOR NEEDLE PLACEMENT  10/14/2020    CT GUIDED IMAGING FOR NEEDLE PLACEMENT [facility]    US GUIDED ABDOMINAL PARACENTESIS  10/08/2020    US GUIDED ABDOMINAL PARACENTESIS [facility]     No family history on file.  Social History     Tobacco Use    Smoking status: Never    Smokeless tobacco: Never   Substance Use Topics    Alcohol use: Never    Drug use: Never       Physical Exam   /84   Pulse 96   Temp 36.6 °C (97.9 °F) (Oral)   Resp 17   Ht 1.575 m (5' 2\")   Wt 78.9 kg (174 lb)   SpO2 96%   BMI 31.83 kg/m²       Physical Exam  Vitals and nursing note reviewed. "   Constitutional:       General: She is not in acute distress.     Appearance: Normal appearance. She is well-developed. She is not ill-appearing.   HENT:      Mouth/Throat:      Mouth: Mucous membranes are moist.      Pharynx: Oropharynx is clear.   Eyes:      General: No scleral icterus.     Conjunctiva/sclera: Conjunctivae normal.   Cardiovascular:      Rate and Rhythm: Normal rate and regular rhythm.      Pulses:           Radial pulses are 2+ on the right side and 2+ on the left side.        Dorsalis pedis pulses are 2+ on the right side and 2+ on the left side.      Heart sounds: Normal heart sounds. Heart sounds not distant. No murmur heard.     Comments: Equal and symmetrical distal pulses  Pulmonary:      Effort: Pulmonary effort is normal. No respiratory distress.      Breath sounds: Normal breath sounds and air entry. No decreased air movement. No decreased breath sounds or wheezing.      Comments: Speaking complete sentences, no appreciable conversational dyspnea, not requiring supplemental O2  Abdominal:      General: Abdomen is flat. There is distension.      Palpations: Abdomen is soft. There is fluid wave.      Tenderness: There is generalized abdominal tenderness and tenderness in the epigastric area and periumbilical area. There is no right CVA tenderness, left CVA tenderness, guarding or rebound.      Hernia: No hernia is present.      Comments: Moderate to significant distention with fluid wave, abdominal is taut without any appreciable rigidity, moderate diffuse tenderness with periumbilical region, no appreciable abdominal wall rigidity or voluntary guarding about versus   Musculoskeletal:      Right lower leg: No edema.      Left lower leg: No edema.   Skin:     General: Skin is warm and dry.      Coloration: Skin is not jaundiced or pale.   Neurological:      General: No focal deficit present.      Mental Status: She is alert and oriented to person, place, and time.           ED Course & MDM    ED Course as of 12/09/24 0623   Sun Dec 08, 2024   1114 VSS on presentation in setting reported abdominal pain with associated diarrhea for the last 4 days [BC]   1122 I personally reviewed and interpreted the EKG @ 1122: NSR 90, normal axis/intervals and no appreciable ischemia, non-specific TW findings, and prior EKG on 11/20/2024 reviewed without any appreciable specific/identifiable changes [BC]   1126 POCT GLUCOSE(!)  Significant hyperglycemia in setting of known DM pending remaining labs [BC]   1219 CBC and Auto Differential(!)  Near baseline cell lines for patient, otherwise unremarkable and noncontributory to Patient condition/symptoms [BC]   1245 Ammonia  WNL [BC]   1245 Lactate  WNL [BC]   1247 Lipase(!)  Mildly elevated in the setting of epigastric pain, near baseline low sufficient for acute pancreatitis [BC]   1247 Magnesium(!)  Mild hypomagnesemia in setting of GI symptoms, likely associated with poor nutritional intake [BC]   1250 Influenza A, and B PCR  Undetectable in the setting of pulmonary symptoms [BC]   1251 Sars-CoV-2 PCR  Undetectable in setting of pulmonary symptoms [BC]   1326 CT abdomen pelvis w IV contrast  IMPRESSION:  Hepatic cirrhosis. Recannulization of the umbilical vein. Splenic  enlargement. Abdominal ascites.  RIGHT middle lobe 8 mm nodule.  This is unchanged dating back to  4/18/24.  No complete evaluation of the chest is available for  comparison.  Consider complete CT examination of the chest on a  nonemergent basis for evaluation of any additional potential pulmonary  nodules in appropriate follow up following Fleischner criteria and  risk stratification as detailed below.  Small periumbilical hernia containing fluid. No herniated bowel.  Fleischner Society 2017 Guidelines for Management of Incidentally  Detected Pulmonary Nodules in Adults:    I have personally reviewed and interpreted the images, moderate ascites with evidence of bowel edema, no appreciable SBO, no  "evidence of acute pancreatitis, agree with radiology final read   [BC]   1341 Comprehensive metabolic panel(!)  Hyperglycemia without AGMA, mild hypocalcemia, near baseline alk phos and transaminitis with elevated T. bili, otherwise unremarkable and noncontributory to Patient condition/symptoms [BC]      ED Course User Index  [BC] Lowell Peters MD         Diagnoses as of 12/09/24 0623   Acute abdominal pain   Other ascites   Nausea   Diarrhea, unspecified type   Elevated lipase   Hypocalcemia                 No data recorded     [place] Coma Scale Score: 15 (12/08/24 1112 : Judy Paredes, MYNOR)                           Medical Decision Making  Patient presented to the ED for dental pain with distention in the last few weeks, nausea, diarrhea, subjective fevers and chills, mild pulmonary symptoms with concerning PMHx of REED, cirrhosis, hepatic encephalopathy, hyperammonemia, hepatic encephalopathy, pain to his generalized UTI.  I personally reviewed and interpreted VS, labs, images, and EKG which are as stated above in the ED course.    Assessment/evaluation consistent with reaccumulation of ascites associated with REED complicated by bowel wall edema resulting in inability to tolerate p.o and continued abdominal pain secondary to cutaneous nerve irritation.  No concerning history, clinical evidence/work-up, or exam findings for the considered differentials of SBP, acute pancreatitis, hepatic fulminant, COVID/influenza viral syndrome, hepatic encephalopathy, significant electrolyte/metabolic derangement.  These conditions have been thoroughly evaluated and determined to be sufficiently unlikely to be the etiology of patient's presenting symptoms.    Patient signed out to oncoming provider, Dr. Kev Lacy, at 2:08 PM in stable condition.    /76   Pulse 88   Temp 36.6 °C (97.9 °F) (Oral)   Resp 14   Ht 1.575 m (5' 2\")   Wt 78.9 kg (174 lb)   SpO2 97%   BMI 31.83 kg/m²     Remaining workup:  Meds " Mag and Calcium, Reassessment, and Discharge pending tolerate PO and Pain    Patient disposition discharge home and alternative disposition medicine admission.        Per Chart Review: ED encounter and hospitalization on 10/27/2024, discharge summary significant for admission for for somatic support in the setting of abdominal pain, patient had significant ascites with 2 paracentesis completed with a total of 6L removed, significant proved and symptoms, tolerating p.o., patient noncompliant medication secondary to financial circumstances, discharged home appropriate follow-up instructions, LOS 6 days.      Parts of this chart have been completed using voice-to-tect recognition software. Please excuse any errors of transcription that were missed for editing/correcting.    Problems Addressed:  Acute abdominal pain: acute illness or injury  Elevated lipase: self-limited or minor problem  Hypocalcemia: self-limited or minor problem  Nausea: acute illness or injury  Other ascites: complicated acute illness or injury    Amount and/or Complexity of Data Reviewed  Labs: ordered. Decision-making details documented in ED Course.  Radiology: ordered and independent interpretation performed. Decision-making details documented in ED Course.  ECG/medicine tests: ordered and independent interpretation performed. Decision-making details documented in ED Course.        Procedure  Procedures     Lowell Peters MD  12/09/24 8310

## 2024-12-09 LAB
ATRIAL RATE: 90 BPM
P AXIS: 53 DEGREES
P OFFSET: 191 MS
P ONSET: 145 MS
PR INTERVAL: 164 MS
Q ONSET: 227 MS
QRS COUNT: 15 BEATS
QRS DURATION: 78 MS
QT INTERVAL: 386 MS
QTC CALCULATION(BAZETT): 472 MS
QTC FREDERICIA: 441 MS
R AXIS: 15 DEGREES
T AXIS: 18 DEGREES
T OFFSET: 420 MS
VENTRICULAR RATE: 90 BPM

## 2024-12-11 ENCOUNTER — HOSPITAL ENCOUNTER (INPATIENT)
Facility: HOSPITAL | Age: 49
LOS: 2 days | Discharge: HOME | End: 2024-12-13
Attending: EMERGENCY MEDICINE | Admitting: INTERNAL MEDICINE
Payer: COMMERCIAL

## 2024-12-11 ENCOUNTER — APPOINTMENT (OUTPATIENT)
Dept: RADIOLOGY | Facility: HOSPITAL | Age: 49
End: 2024-12-11
Payer: COMMERCIAL

## 2024-12-11 DIAGNOSIS — E11.8 DIABETES MELLITUS TYPE 2 WITH COMPLICATIONS (MULTI): ICD-10-CM

## 2024-12-11 DIAGNOSIS — E11.29 TYPE II OR UNSPECIFIED TYPE DIABETES MELLITUS WITH RENAL MANIFESTATIONS, UNCONTROLLED(250.42) (MULTI): ICD-10-CM

## 2024-12-11 DIAGNOSIS — Z79.4 TYPE 2 DIABETES MELLITUS WITH OTHER SPECIFIED COMPLICATION, WITH LONG-TERM CURRENT USE OF INSULIN: ICD-10-CM

## 2024-12-11 DIAGNOSIS — R10.9 ABDOMINAL PAIN, UNSPECIFIED ABDOMINAL LOCATION: ICD-10-CM

## 2024-12-11 DIAGNOSIS — E11.65 TYPE II OR UNSPECIFIED TYPE DIABETES MELLITUS WITH RENAL MANIFESTATIONS, UNCONTROLLED(250.42) (MULTI): ICD-10-CM

## 2024-12-11 DIAGNOSIS — E11.69 TYPE 2 DIABETES MELLITUS WITH OTHER SPECIFIED COMPLICATION, WITH LONG-TERM CURRENT USE OF INSULIN: ICD-10-CM

## 2024-12-11 DIAGNOSIS — K65.2 SBP (SPONTANEOUS BACTERIAL PERITONITIS) (MULTI): Primary | ICD-10-CM

## 2024-12-11 DIAGNOSIS — R18.8 OTHER ASCITES: ICD-10-CM

## 2024-12-11 LAB
ALBUMIN SERPL BCP-MCNC: 2.6 G/DL (ref 3.4–5)
ALP SERPL-CCNC: 409 U/L (ref 33–110)
ALT SERPL W P-5'-P-CCNC: 39 U/L (ref 7–45)
ANION GAP SERPL CALCULATED.3IONS-SCNC: <7 MMOL/L (ref 10–20)
AST SERPL W P-5'-P-CCNC: 58 U/L (ref 9–39)
B-HCG SERPL-ACNC: <2 MIU/ML
BASOPHILS # BLD AUTO: 0.01 X10*3/UL (ref 0–0.1)
BASOPHILS NFR BLD AUTO: 0.2 %
BILIRUB SERPL-MCNC: 2.4 MG/DL (ref 0–1.2)
BUN SERPL-MCNC: 14 MG/DL (ref 6–23)
CALCIUM SERPL-MCNC: 8 MG/DL (ref 8.6–10.3)
CHLORIDE SERPL-SCNC: 105 MMOL/L (ref 98–107)
CO2 SERPL-SCNC: 30 MMOL/L (ref 21–32)
CREAT SERPL-MCNC: 0.68 MG/DL (ref 0.5–1.05)
EGFRCR SERPLBLD CKD-EPI 2021: >90 ML/MIN/1.73M*2
EOSINOPHIL # BLD AUTO: 0.18 X10*3/UL (ref 0–0.7)
EOSINOPHIL NFR BLD AUTO: 4.2 %
ERYTHROCYTE [DISTWIDTH] IN BLOOD BY AUTOMATED COUNT: 25.9 % (ref 11.5–14.5)
GLUCOSE SERPL-MCNC: 296 MG/DL (ref 74–99)
HCT VFR BLD AUTO: 36.1 % (ref 36–46)
HGB BLD-MCNC: 11.8 G/DL (ref 12–16)
IMM GRANULOCYTES # BLD AUTO: 0.01 X10*3/UL (ref 0–0.7)
IMM GRANULOCYTES NFR BLD AUTO: 0.2 % (ref 0–0.9)
LACTATE SERPL-SCNC: 0.7 MMOL/L (ref 0.4–2)
LIPASE SERPL-CCNC: 168 U/L (ref 9–82)
LYMPHOCYTES # BLD AUTO: 1.13 X10*3/UL (ref 1.2–4.8)
LYMPHOCYTES NFR BLD AUTO: 26.5 %
MAGNESIUM SERPL-MCNC: 1.74 MG/DL (ref 1.6–2.4)
MCH RBC QN AUTO: 28.6 PG (ref 26–34)
MCHC RBC AUTO-ENTMCNC: 32.7 G/DL (ref 32–36)
MCV RBC AUTO: 87 FL (ref 80–100)
MONOCYTES # BLD AUTO: 0.32 X10*3/UL (ref 0.1–1)
MONOCYTES NFR BLD AUTO: 7.5 %
NEUTROPHILS # BLD AUTO: 2.61 X10*3/UL (ref 1.2–7.7)
NEUTROPHILS NFR BLD AUTO: 61.4 %
NRBC BLD-RTO: 0 /100 WBCS (ref 0–0)
PLATELET # BLD AUTO: 77 X10*3/UL (ref 150–450)
POTASSIUM SERPL-SCNC: 3.9 MMOL/L (ref 3.5–5.3)
PROT SERPL-MCNC: 7.9 G/DL (ref 6.4–8.2)
RBC # BLD AUTO: 4.13 X10*6/UL (ref 4–5.2)
RBC MORPH BLD: NORMAL
SODIUM SERPL-SCNC: 137 MMOL/L (ref 136–145)
TARGETS BLD QL SMEAR: NORMAL
WBC # BLD AUTO: 4.3 X10*3/UL (ref 4.4–11.3)

## 2024-12-11 PROCEDURE — 96374 THER/PROPH/DIAG INJ IV PUSH: CPT

## 2024-12-11 PROCEDURE — 83690 ASSAY OF LIPASE: CPT | Performed by: EMERGENCY MEDICINE

## 2024-12-11 PROCEDURE — 99285 EMERGENCY DEPT VISIT HI MDM: CPT | Mod: 25 | Performed by: EMERGENCY MEDICINE

## 2024-12-11 PROCEDURE — 2500000004 HC RX 250 GENERAL PHARMACY W/ HCPCS (ALT 636 FOR OP/ED): Performed by: EMERGENCY MEDICINE

## 2024-12-11 PROCEDURE — 36415 COLL VENOUS BLD VENIPUNCTURE: CPT | Performed by: EMERGENCY MEDICINE

## 2024-12-11 PROCEDURE — 82140 ASSAY OF AMMONIA: CPT | Performed by: STUDENT IN AN ORGANIZED HEALTH CARE EDUCATION/TRAINING PROGRAM

## 2024-12-11 PROCEDURE — 83735 ASSAY OF MAGNESIUM: CPT | Performed by: EMERGENCY MEDICINE

## 2024-12-11 PROCEDURE — 85025 COMPLETE CBC W/AUTO DIFF WBC: CPT | Performed by: EMERGENCY MEDICINE

## 2024-12-11 PROCEDURE — 74019 RADEX ABDOMEN 2 VIEWS: CPT | Performed by: RADIOLOGY

## 2024-12-11 PROCEDURE — 74019 RADEX ABDOMEN 2 VIEWS: CPT

## 2024-12-11 PROCEDURE — 74176 CT ABD & PELVIS W/O CONTRAST: CPT

## 2024-12-11 PROCEDURE — 74176 CT ABD & PELVIS W/O CONTRAST: CPT | Performed by: RADIOLOGY

## 2024-12-11 PROCEDURE — 96361 HYDRATE IV INFUSION ADD-ON: CPT

## 2024-12-11 PROCEDURE — 96375 TX/PRO/DX INJ NEW DRUG ADDON: CPT

## 2024-12-11 PROCEDURE — 36415 COLL VENOUS BLD VENIPUNCTURE: CPT | Performed by: STUDENT IN AN ORGANIZED HEALTH CARE EDUCATION/TRAINING PROGRAM

## 2024-12-11 PROCEDURE — 83605 ASSAY OF LACTIC ACID: CPT | Performed by: EMERGENCY MEDICINE

## 2024-12-11 PROCEDURE — 80053 COMPREHEN METABOLIC PANEL: CPT | Performed by: EMERGENCY MEDICINE

## 2024-12-11 PROCEDURE — 84702 CHORIONIC GONADOTROPIN TEST: CPT | Performed by: EMERGENCY MEDICINE

## 2024-12-11 PROCEDURE — 1200000002 HC GENERAL ROOM WITH TELEMETRY DAILY

## 2024-12-11 RX ORDER — LIDOCAINE HYDROCHLORIDE AND EPINEPHRINE 10; 10 UG/ML; MG/ML
5 INJECTION, SOLUTION INFILTRATION; PERINEURAL ONCE
Status: COMPLETED | OUTPATIENT
Start: 2024-12-11 | End: 2024-12-11

## 2024-12-11 RX ORDER — MORPHINE SULFATE 4 MG/ML
6 INJECTION, SOLUTION INTRAMUSCULAR; INTRAVENOUS ONCE
Status: COMPLETED | OUTPATIENT
Start: 2024-12-11 | End: 2024-12-11

## 2024-12-11 RX ORDER — ONDANSETRON HYDROCHLORIDE 2 MG/ML
4 INJECTION, SOLUTION INTRAVENOUS ONCE
Status: COMPLETED | OUTPATIENT
Start: 2024-12-11 | End: 2024-12-11

## 2024-12-11 RX ORDER — CEFTRIAXONE 2 G/50ML
2 INJECTION, SOLUTION INTRAVENOUS ONCE
Status: COMPLETED | OUTPATIENT
Start: 2024-12-11 | End: 2024-12-11

## 2024-12-11 RX ADMIN — ONDANSETRON 4 MG: 2 INJECTION INTRAMUSCULAR; INTRAVENOUS at 18:56

## 2024-12-11 RX ADMIN — LIDOCAINE HYDROCHLORIDE,EPINEPHRINE BITARTRATE 5 ML: 10; .01 INJECTION, SOLUTION INFILTRATION; PERINEURAL at 19:13

## 2024-12-11 RX ADMIN — SODIUM CHLORIDE 1000 ML: 900 INJECTION, SOLUTION INTRAVENOUS at 18:56

## 2024-12-11 RX ADMIN — MORPHINE SULFATE 6 MG: 4 INJECTION, SOLUTION INTRAMUSCULAR; INTRAVENOUS at 19:13

## 2024-12-11 RX ADMIN — CEFTRIAXONE SODIUM 2 G: 2 INJECTION, SOLUTION INTRAVENOUS at 22:14

## 2024-12-11 ASSESSMENT — PAIN SCALES - GENERAL
PAINLEVEL_OUTOF10: 4
PAINLEVEL_OUTOF10: 2

## 2024-12-11 ASSESSMENT — LIFESTYLE VARIABLES
HAVE PEOPLE ANNOYED YOU BY CRITICIZING YOUR DRINKING: NO
EVER FELT BAD OR GUILTY ABOUT YOUR DRINKING: NO
EVER HAD A DRINK FIRST THING IN THE MORNING TO STEADY YOUR NERVES TO GET RID OF A HANGOVER: NO
HAVE YOU EVER FELT YOU SHOULD CUT DOWN ON YOUR DRINKING: NO
TOTAL SCORE: 0

## 2024-12-11 ASSESSMENT — PAIN - FUNCTIONAL ASSESSMENT: PAIN_FUNCTIONAL_ASSESSMENT: 0-10

## 2024-12-11 ASSESSMENT — PAIN DESCRIPTION - LOCATION: LOCATION: ABDOMEN

## 2024-12-11 NOTE — Clinical Note
1.5L of clear yellow fluid drained from ruq with samples obtained, patient tolerated well   Band aide applied to site

## 2024-12-12 ENCOUNTER — APPOINTMENT (OUTPATIENT)
Dept: CARDIOLOGY | Facility: HOSPITAL | Age: 49
End: 2024-12-12
Payer: COMMERCIAL

## 2024-12-12 LAB
ALBUMIN SERPL BCP-MCNC: 2.2 G/DL (ref 3.4–5)
ALP SERPL-CCNC: 330 U/L (ref 33–110)
ALT SERPL W P-5'-P-CCNC: 33 U/L (ref 7–45)
AMMONIA PLAS-SCNC: 71 UMOL/L (ref 16–53)
ANION GAP SERPL CALCULATED.3IONS-SCNC: <7 MMOL/L (ref 10–20)
APPEARANCE UR: ABNORMAL
AST SERPL W P-5'-P-CCNC: 55 U/L (ref 9–39)
BACTERIA #/AREA URNS AUTO: ABNORMAL /HPF
BILIRUB SERPL-MCNC: 2.2 MG/DL (ref 0–1.2)
BILIRUB UR STRIP.AUTO-MCNC: NEGATIVE MG/DL
BUN SERPL-MCNC: 16 MG/DL (ref 6–23)
CALCIUM SERPL-MCNC: 7.4 MG/DL (ref 8.6–10.3)
CHLORIDE SERPL-SCNC: 108 MMOL/L (ref 98–107)
CO2 SERPL-SCNC: 27 MMOL/L (ref 21–32)
COLOR UR: YELLOW
CREAT SERPL-MCNC: 0.57 MG/DL (ref 0.5–1.05)
EGFRCR SERPLBLD CKD-EPI 2021: >90 ML/MIN/1.73M*2
ERYTHROCYTE [DISTWIDTH] IN BLOOD BY AUTOMATED COUNT: 25.6 % (ref 11.5–14.5)
EST. AVERAGE GLUCOSE BLD GHB EST-MCNC: 171 MG/DL
GLUCOSE BLD MANUAL STRIP-MCNC: 135 MG/DL (ref 74–99)
GLUCOSE BLD MANUAL STRIP-MCNC: 146 MG/DL (ref 74–99)
GLUCOSE BLD MANUAL STRIP-MCNC: 211 MG/DL (ref 74–99)
GLUCOSE SERPL-MCNC: 258 MG/DL (ref 74–99)
GLUCOSE UR STRIP.AUTO-MCNC: ABNORMAL MG/DL
HBA1C MFR BLD: 7.6 %
HCT VFR BLD AUTO: 32.8 % (ref 36–46)
HGB BLD-MCNC: 10.7 G/DL (ref 12–16)
HOLD SPECIMEN: NORMAL
KETONES UR STRIP.AUTO-MCNC: NEGATIVE MG/DL
LEUKOCYTE ESTERASE UR QL STRIP.AUTO: ABNORMAL
MCH RBC QN AUTO: 28.5 PG (ref 26–34)
MCHC RBC AUTO-ENTMCNC: 32.6 G/DL (ref 32–36)
MCV RBC AUTO: 87 FL (ref 80–100)
MUCOUS THREADS #/AREA URNS AUTO: ABNORMAL /LPF
NITRITE UR QL STRIP.AUTO: NEGATIVE
NRBC BLD-RTO: 0 /100 WBCS (ref 0–0)
PH UR STRIP.AUTO: 5.5 [PH]
PLATELET # BLD AUTO: 69 X10*3/UL (ref 150–450)
POTASSIUM SERPL-SCNC: 4 MMOL/L (ref 3.5–5.3)
PROT SERPL-MCNC: 6.8 G/DL (ref 6.4–8.2)
PROT UR STRIP.AUTO-MCNC: ABNORMAL MG/DL
RBC # BLD AUTO: 3.76 X10*6/UL (ref 4–5.2)
RBC # UR STRIP.AUTO: ABNORMAL /UL
RBC #/AREA URNS AUTO: ABNORMAL /HPF
SODIUM SERPL-SCNC: 137 MMOL/L (ref 136–145)
SP GR UR STRIP.AUTO: 1.03
SQUAMOUS #/AREA URNS AUTO: ABNORMAL /HPF
UROBILINOGEN UR STRIP.AUTO-MCNC: ABNORMAL MG/DL
WBC # BLD AUTO: 4.3 X10*3/UL (ref 4.4–11.3)
WBC #/AREA URNS AUTO: >50 /HPF

## 2024-12-12 PROCEDURE — 85027 COMPLETE CBC AUTOMATED: CPT | Performed by: INTERNAL MEDICINE

## 2024-12-12 PROCEDURE — 2500000004 HC RX 250 GENERAL PHARMACY W/ HCPCS (ALT 636 FOR OP/ED): Performed by: NURSE PRACTITIONER

## 2024-12-12 PROCEDURE — 80053 COMPREHEN METABOLIC PANEL: CPT | Performed by: INTERNAL MEDICINE

## 2024-12-12 PROCEDURE — 81001 URINALYSIS AUTO W/SCOPE: CPT | Performed by: NURSE PRACTITIONER

## 2024-12-12 PROCEDURE — 83036 HEMOGLOBIN GLYCOSYLATED A1C: CPT | Mod: WESLAB | Performed by: NURSE PRACTITIONER

## 2024-12-12 PROCEDURE — 97165 OT EVAL LOW COMPLEX 30 MIN: CPT | Mod: GO

## 2024-12-12 PROCEDURE — 93005 ELECTROCARDIOGRAM TRACING: CPT

## 2024-12-12 PROCEDURE — 82947 ASSAY GLUCOSE BLOOD QUANT: CPT

## 2024-12-12 PROCEDURE — 0W9G3ZZ DRAINAGE OF PERITONEAL CAVITY, PERCUTANEOUS APPROACH: ICD-10-PCS

## 2024-12-12 PROCEDURE — 2500000001 HC RX 250 WO HCPCS SELF ADMINISTERED DRUGS (ALT 637 FOR MEDICARE OP): Performed by: INTERNAL MEDICINE

## 2024-12-12 PROCEDURE — 36415 COLL VENOUS BLD VENIPUNCTURE: CPT | Performed by: INTERNAL MEDICINE

## 2024-12-12 PROCEDURE — 2500000004 HC RX 250 GENERAL PHARMACY W/ HCPCS (ALT 636 FOR OP/ED)

## 2024-12-12 PROCEDURE — 87086 URINE CULTURE/COLONY COUNT: CPT | Mod: WESLAB | Performed by: NURSE PRACTITIONER

## 2024-12-12 PROCEDURE — 2500000002 HC RX 250 W HCPCS SELF ADMINISTERED DRUGS (ALT 637 FOR MEDICARE OP, ALT 636 FOR OP/ED): Performed by: INTERNAL MEDICINE

## 2024-12-12 PROCEDURE — 2500000004 HC RX 250 GENERAL PHARMACY W/ HCPCS (ALT 636 FOR OP/ED): Performed by: INTERNAL MEDICINE

## 2024-12-12 PROCEDURE — 2500000005 HC RX 250 GENERAL PHARMACY W/O HCPCS: Performed by: INTERNAL MEDICINE

## 2024-12-12 PROCEDURE — 1210000001 HC SEMI-PRIVATE ROOM DAILY

## 2024-12-12 RX ORDER — ACETAMINOPHEN 325 MG/1
650 TABLET ORAL EVERY 4 HOURS PRN
Status: DISCONTINUED | OUTPATIENT
Start: 2024-12-12 | End: 2024-12-12

## 2024-12-12 RX ORDER — SPIRONOLACTONE 50 MG/1
50 TABLET, FILM COATED ORAL 2 TIMES DAILY
Status: DISCONTINUED | OUTPATIENT
Start: 2024-12-12 | End: 2024-12-13 | Stop reason: HOSPADM

## 2024-12-12 RX ORDER — TRAMADOL HYDROCHLORIDE 50 MG/1
25 TABLET ORAL EVERY 6 HOURS PRN
Status: DISCONTINUED | OUTPATIENT
Start: 2024-12-12 | End: 2024-12-13 | Stop reason: HOSPADM

## 2024-12-12 RX ORDER — MIDODRINE HYDROCHLORIDE 5 MG/1
5 TABLET ORAL
Status: DISCONTINUED | OUTPATIENT
Start: 2024-12-12 | End: 2024-12-13 | Stop reason: HOSPADM

## 2024-12-12 RX ORDER — SUCRALFATE 1 G/1
1 TABLET ORAL
Status: DISCONTINUED | OUTPATIENT
Start: 2024-12-12 | End: 2024-12-13 | Stop reason: HOSPADM

## 2024-12-12 RX ORDER — PANTOPRAZOLE SODIUM 40 MG/1
40 TABLET, DELAYED RELEASE ORAL
Status: DISCONTINUED | OUTPATIENT
Start: 2024-12-12 | End: 2024-12-13 | Stop reason: HOSPADM

## 2024-12-12 RX ORDER — DOCUSATE SODIUM 100 MG/1
100 CAPSULE, LIQUID FILLED ORAL 2 TIMES DAILY
Status: DISCONTINUED | OUTPATIENT
Start: 2024-12-12 | End: 2024-12-13 | Stop reason: HOSPADM

## 2024-12-12 RX ORDER — CEFTRIAXONE 2 G/50ML
2 INJECTION, SOLUTION INTRAVENOUS ONCE
Status: COMPLETED | OUTPATIENT
Start: 2024-12-12 | End: 2024-12-12

## 2024-12-12 RX ORDER — MORPHINE SULFATE 2 MG/ML
2 INJECTION, SOLUTION INTRAMUSCULAR; INTRAVENOUS EVERY 4 HOURS PRN
Status: DISCONTINUED | OUTPATIENT
Start: 2024-12-12 | End: 2024-12-13 | Stop reason: HOSPADM

## 2024-12-12 RX ORDER — POLYETHYLENE GLYCOL 3350 17 G/17G
17 POWDER, FOR SOLUTION ORAL DAILY PRN
Status: DISCONTINUED | OUTPATIENT
Start: 2024-12-12 | End: 2024-12-12 | Stop reason: ALTCHOICE

## 2024-12-12 RX ORDER — INSULIN GLARGINE 100 [IU]/ML
25 INJECTION, SOLUTION SUBCUTANEOUS EVERY 24 HOURS
Status: DISCONTINUED | OUTPATIENT
Start: 2024-12-12 | End: 2024-12-13 | Stop reason: HOSPADM

## 2024-12-12 RX ORDER — NADOLOL 40 MG/1
20 TABLET ORAL DAILY
Status: DISCONTINUED | OUTPATIENT
Start: 2024-12-12 | End: 2024-12-13 | Stop reason: HOSPADM

## 2024-12-12 RX ORDER — LANOLIN ALCOHOL/MO/W.PET/CERES
400 CREAM (GRAM) TOPICAL DAILY
Status: DISCONTINUED | OUTPATIENT
Start: 2024-12-12 | End: 2024-12-13 | Stop reason: HOSPADM

## 2024-12-12 RX ORDER — ATORVASTATIN CALCIUM 80 MG/1
80 TABLET, FILM COATED ORAL NIGHTLY
Status: DISCONTINUED | OUTPATIENT
Start: 2024-12-12 | End: 2024-12-13 | Stop reason: HOSPADM

## 2024-12-12 RX ORDER — ONDANSETRON 4 MG/1
4 TABLET, ORALLY DISINTEGRATING ORAL EVERY 8 HOURS PRN
Status: DISCONTINUED | OUTPATIENT
Start: 2024-12-12 | End: 2024-12-13 | Stop reason: HOSPADM

## 2024-12-12 RX ORDER — NITROGLYCERIN 20 MG/1
1 PATCH TRANSDERMAL DAILY
Status: DISCONTINUED | OUTPATIENT
Start: 2024-12-12 | End: 2024-12-13 | Stop reason: HOSPADM

## 2024-12-12 RX ORDER — URSODIOL 300 MG/1
300 CAPSULE ORAL 3 TIMES DAILY
Status: DISCONTINUED | OUTPATIENT
Start: 2024-12-12 | End: 2024-12-13 | Stop reason: HOSPADM

## 2024-12-12 RX ORDER — GUAIFENESIN 600 MG/1
600 TABLET, EXTENDED RELEASE ORAL EVERY 12 HOURS PRN
Status: DISCONTINUED | OUTPATIENT
Start: 2024-12-12 | End: 2024-12-13 | Stop reason: HOSPADM

## 2024-12-12 RX ORDER — MECLIZINE HYDROCHLORIDE 25 MG/1
25 TABLET ORAL 3 TIMES DAILY PRN
COMMUNITY

## 2024-12-12 RX ORDER — GUAIFENESIN/DEXTROMETHORPHAN 100-10MG/5
5 SYRUP ORAL EVERY 4 HOURS PRN
Status: DISCONTINUED | OUTPATIENT
Start: 2024-12-12 | End: 2024-12-13 | Stop reason: HOSPADM

## 2024-12-12 RX ORDER — DEXTROSE 50 % IN WATER (D50W) INTRAVENOUS SYRINGE
12.5
Status: DISCONTINUED | OUTPATIENT
Start: 2024-12-12 | End: 2024-12-13 | Stop reason: HOSPADM

## 2024-12-12 RX ORDER — FUROSEMIDE 40 MG/1
40 TABLET ORAL DAILY
Status: DISCONTINUED | OUTPATIENT
Start: 2024-12-12 | End: 2024-12-13 | Stop reason: HOSPADM

## 2024-12-12 RX ORDER — INSULIN LISPRO 100 [IU]/ML
0-15 INJECTION, SOLUTION INTRAVENOUS; SUBCUTANEOUS
Status: DISCONTINUED | OUTPATIENT
Start: 2024-12-12 | End: 2024-12-13 | Stop reason: HOSPADM

## 2024-12-12 RX ORDER — DEXTROSE 50 % IN WATER (D50W) INTRAVENOUS SYRINGE
25
Status: DISCONTINUED | OUTPATIENT
Start: 2024-12-12 | End: 2024-12-13 | Stop reason: HOSPADM

## 2024-12-12 RX ORDER — ACETAMINOPHEN 650 MG/1
650 SUPPOSITORY RECTAL EVERY 4 HOURS PRN
Status: DISCONTINUED | OUTPATIENT
Start: 2024-12-12 | End: 2024-12-12

## 2024-12-12 RX ORDER — ACETAMINOPHEN 500 MG
500-1000 TABLET ORAL EVERY 6 HOURS PRN
COMMUNITY

## 2024-12-12 RX ORDER — ACETAMINOPHEN 160 MG/5ML
650 SOLUTION ORAL EVERY 4 HOURS PRN
Status: DISCONTINUED | OUTPATIENT
Start: 2024-12-12 | End: 2024-12-12

## 2024-12-12 RX ORDER — ALBUMIN HUMAN 250 G/1000ML
25 SOLUTION INTRAVENOUS ONCE
Status: COMPLETED | OUTPATIENT
Start: 2024-12-13 | End: 2024-12-13

## 2024-12-12 RX ORDER — METFORMIN HYDROCHLORIDE 500 MG/1
1000 TABLET, EXTENDED RELEASE ORAL
Status: DISCONTINUED | OUTPATIENT
Start: 2024-12-12 | End: 2024-12-13 | Stop reason: HOSPADM

## 2024-12-12 RX ORDER — DICYCLOMINE HYDROCHLORIDE 10 MG/1
20 CAPSULE ORAL 3 TIMES DAILY
Status: DISCONTINUED | OUTPATIENT
Start: 2024-12-12 | End: 2024-12-13 | Stop reason: HOSPADM

## 2024-12-12 RX ORDER — POLYETHYLENE GLYCOL 3350 17 G/17G
17 POWDER, FOR SOLUTION ORAL 2 TIMES DAILY
Status: DISCONTINUED | OUTPATIENT
Start: 2024-12-12 | End: 2024-12-13 | Stop reason: HOSPADM

## 2024-12-12 RX ORDER — TRAZODONE HYDROCHLORIDE 50 MG/1
25 TABLET ORAL NIGHTLY
Status: DISCONTINUED | OUTPATIENT
Start: 2024-12-12 | End: 2024-12-13 | Stop reason: HOSPADM

## 2024-12-12 RX ORDER — LACTULOSE 10 G/15ML
20 SOLUTION ORAL 2 TIMES DAILY
COMMUNITY

## 2024-12-12 RX ORDER — FENOFIBRATE 160 MG/1
160 TABLET ORAL DAILY
Status: DISCONTINUED | OUTPATIENT
Start: 2024-12-12 | End: 2024-12-13 | Stop reason: HOSPADM

## 2024-12-12 RX ORDER — ALBUMIN HUMAN 250 G/1000ML
25 SOLUTION INTRAVENOUS ONCE
Status: DISCONTINUED | OUTPATIENT
Start: 2024-12-12 | End: 2024-12-12

## 2024-12-12 RX ADMIN — URSODIOL 300 MG: 300 CAPSULE ORAL at 21:29

## 2024-12-12 RX ADMIN — FUROSEMIDE 40 MG: 40 TABLET ORAL at 08:17

## 2024-12-12 RX ADMIN — CEFTRIAXONE SODIUM 2 G: 2 INJECTION, SOLUTION INTRAVENOUS at 21:33

## 2024-12-12 RX ADMIN — URSODIOL 300 MG: 300 CAPSULE ORAL at 11:11

## 2024-12-12 RX ADMIN — TRAZODONE HYDROCHLORIDE 25 MG: 50 TABLET ORAL at 21:28

## 2024-12-12 RX ADMIN — SPIRONOLACTONE 50 MG: 50 TABLET ORAL at 21:29

## 2024-12-12 RX ADMIN — NADOLOL 20 MG: 40 TABLET ORAL at 11:12

## 2024-12-12 RX ADMIN — SPIRONOLACTONE 50 MG: 50 TABLET ORAL at 08:17

## 2024-12-12 RX ADMIN — NITROGLYCERIN 1 PATCH: 0.1 PATCH TRANSDERMAL at 11:13

## 2024-12-12 RX ADMIN — MIDODRINE HYDROCHLORIDE 5 MG: 5 TABLET ORAL at 17:02

## 2024-12-12 RX ADMIN — DICYCLOMINE HYDROCHLORIDE 20 MG: 20 TABLET ORAL at 21:28

## 2024-12-12 RX ADMIN — DICYCLOMINE HYDROCHLORIDE 20 MG: 20 TABLET ORAL at 08:18

## 2024-12-12 RX ADMIN — SUCRALFATE 1 G: 1 TABLET ORAL at 11:11

## 2024-12-12 RX ADMIN — DOCUSATE SODIUM 100 MG: 100 CAPSULE, LIQUID FILLED ORAL at 21:29

## 2024-12-12 RX ADMIN — ATORVASTATIN CALCIUM 80 MG: 80 TABLET, FILM COATED ORAL at 21:29

## 2024-12-12 RX ADMIN — MORPHINE SULFATE 2 MG: 2 INJECTION, SOLUTION INTRAMUSCULAR; INTRAVENOUS at 15:15

## 2024-12-12 RX ADMIN — MIDODRINE HYDROCHLORIDE 5 MG: 5 TABLET ORAL at 08:17

## 2024-12-12 RX ADMIN — SUCRALFATE 1 G: 1 TABLET ORAL at 08:18

## 2024-12-12 RX ADMIN — RIFAXIMIN 550 MG: 550 TABLET ORAL at 11:12

## 2024-12-12 RX ADMIN — SUCRALFATE 1 G: 1 TABLET ORAL at 21:29

## 2024-12-12 RX ADMIN — TRAMADOL HYDROCHLORIDE 25 MG: 50 TABLET, COATED ORAL at 11:11

## 2024-12-12 RX ADMIN — POLYETHYLENE GLYCOL 3350 17 G: 17 POWDER, FOR SOLUTION ORAL at 21:29

## 2024-12-12 RX ADMIN — Medication 400 MG: at 08:18

## 2024-12-12 RX ADMIN — DICYCLOMINE HYDROCHLORIDE 20 MG: 20 TABLET ORAL at 15:15

## 2024-12-12 RX ADMIN — FENOFIBRATE 160 MG: 160 TABLET ORAL at 11:12

## 2024-12-12 RX ADMIN — METFORMIN HYDROCHLORIDE 1000 MG: 500 TABLET, EXTENDED RELEASE ORAL at 17:02

## 2024-12-12 RX ADMIN — RIFAXIMIN 550 MG: 550 TABLET ORAL at 21:31

## 2024-12-12 RX ADMIN — INSULIN LISPRO 6 UNITS: 100 INJECTION, SOLUTION INTRAVENOUS; SUBCUTANEOUS at 08:29

## 2024-12-12 RX ADMIN — ONDANSETRON 4 MG: 4 TABLET, ORALLY DISINTEGRATING ORAL at 03:49

## 2024-12-12 RX ADMIN — MIDODRINE HYDROCHLORIDE 5 MG: 5 TABLET ORAL at 11:11

## 2024-12-12 RX ADMIN — ONDANSETRON 4 MG: 4 TABLET, ORALLY DISINTEGRATING ORAL at 11:11

## 2024-12-12 RX ADMIN — TRAMADOL HYDROCHLORIDE 25 MG: 50 TABLET, COATED ORAL at 03:49

## 2024-12-12 RX ADMIN — INSULIN GLARGINE 25 UNITS: 100 INJECTION, SOLUTION SUBCUTANEOUS at 08:30

## 2024-12-12 RX ADMIN — SUCRALFATE 1 G: 1 TABLET ORAL at 15:15

## 2024-12-12 RX ADMIN — PANTOPRAZOLE SODIUM 40 MG: 40 TABLET, DELAYED RELEASE ORAL at 08:18

## 2024-12-12 RX ADMIN — URSODIOL 300 MG: 300 CAPSULE ORAL at 15:15

## 2024-12-12 SDOH — ECONOMIC STABILITY: FOOD INSECURITY: WITHIN THE PAST 12 MONTHS, YOU WORRIED THAT YOUR FOOD WOULD RUN OUT BEFORE YOU GOT THE MONEY TO BUY MORE.: NEVER TRUE

## 2024-12-12 SDOH — SOCIAL STABILITY: SOCIAL INSECURITY: ARE THERE ANY APPARENT SIGNS OF INJURIES/BEHAVIORS THAT COULD BE RELATED TO ABUSE/NEGLECT?: NO

## 2024-12-12 SDOH — ECONOMIC STABILITY: INCOME INSECURITY: IN THE PAST 12 MONTHS HAS THE ELECTRIC, GAS, OIL, OR WATER COMPANY THREATENED TO SHUT OFF SERVICES IN YOUR HOME?: NO

## 2024-12-12 SDOH — HEALTH STABILITY: MENTAL HEALTH: HOW OFTEN DO YOU HAVE SIX OR MORE DRINKS ON ONE OCCASION?: NEVER

## 2024-12-12 SDOH — ECONOMIC STABILITY: HOUSING INSECURITY: AT ANY TIME IN THE PAST 12 MONTHS, WERE YOU HOMELESS OR LIVING IN A SHELTER (INCLUDING NOW)?: NO

## 2024-12-12 SDOH — SOCIAL STABILITY: SOCIAL INSECURITY: DOES ANYONE TRY TO KEEP YOU FROM HAVING/CONTACTING OTHER FRIENDS OR DOING THINGS OUTSIDE YOUR HOME?: NO

## 2024-12-12 SDOH — SOCIAL STABILITY: SOCIAL INSECURITY: HAS ANYONE EVER THREATENED TO HURT YOUR FAMILY OR YOUR PETS?: NO

## 2024-12-12 SDOH — HEALTH STABILITY: MENTAL HEALTH: HOW OFTEN DO YOU HAVE A DRINK CONTAINING ALCOHOL?: NEVER

## 2024-12-12 SDOH — SOCIAL STABILITY: SOCIAL INSECURITY: ARE YOU MARRIED, WIDOWED, DIVORCED, SEPARATED, NEVER MARRIED, OR LIVING WITH A PARTNER?: MARRIED

## 2024-12-12 SDOH — SOCIAL STABILITY: SOCIAL INSECURITY: WERE YOU ABLE TO COMPLETE ALL THE BEHAVIORAL HEALTH SCREENINGS?: YES

## 2024-12-12 SDOH — SOCIAL STABILITY: SOCIAL INSECURITY: WITHIN THE LAST YEAR, HAVE YOU BEEN AFRAID OF YOUR PARTNER OR EX-PARTNER?: NO

## 2024-12-12 SDOH — SOCIAL STABILITY: SOCIAL INSECURITY: HAVE YOU HAD ANY THOUGHTS OF HARMING ANYONE ELSE?: NO

## 2024-12-12 SDOH — SOCIAL STABILITY: SOCIAL INSECURITY: WITHIN THE LAST YEAR, HAVE YOU BEEN HUMILIATED OR EMOTIONALLY ABUSED IN OTHER WAYS BY YOUR PARTNER OR EX-PARTNER?: NO

## 2024-12-12 SDOH — ECONOMIC STABILITY: FOOD INSECURITY: WITHIN THE PAST 12 MONTHS, THE FOOD YOU BOUGHT JUST DIDN'T LAST AND YOU DIDN'T HAVE MONEY TO GET MORE.: NEVER TRUE

## 2024-12-12 SDOH — ECONOMIC STABILITY: HOUSING INSECURITY: IN THE LAST 12 MONTHS, WAS THERE A TIME WHEN YOU WERE NOT ABLE TO PAY THE MORTGAGE OR RENT ON TIME?: NO

## 2024-12-12 SDOH — SOCIAL STABILITY: SOCIAL NETWORK: HOW OFTEN DO YOU ATTEND CHURCH OR RELIGIOUS SERVICES?: MORE THAN 4 TIMES PER YEAR

## 2024-12-12 SDOH — HEALTH STABILITY: MENTAL HEALTH: HOW MANY DRINKS CONTAINING ALCOHOL DO YOU HAVE ON A TYPICAL DAY WHEN YOU ARE DRINKING?: PATIENT DOES NOT DRINK

## 2024-12-12 SDOH — SOCIAL STABILITY: SOCIAL INSECURITY: DO YOU FEEL ANYONE HAS EXPLOITED OR TAKEN ADVANTAGE OF YOU FINANCIALLY OR OF YOUR PERSONAL PROPERTY?: NO

## 2024-12-12 SDOH — HEALTH STABILITY: PHYSICAL HEALTH: ON AVERAGE, HOW MANY DAYS PER WEEK DO YOU ENGAGE IN MODERATE TO STRENUOUS EXERCISE (LIKE A BRISK WALK)?: 0 DAYS

## 2024-12-12 SDOH — SOCIAL STABILITY: SOCIAL INSECURITY: DO YOU FEEL UNSAFE GOING BACK TO THE PLACE WHERE YOU ARE LIVING?: NO

## 2024-12-12 SDOH — HEALTH STABILITY: PHYSICAL HEALTH: ON AVERAGE, HOW MANY MINUTES DO YOU ENGAGE IN EXERCISE AT THIS LEVEL?: 0 MIN

## 2024-12-12 SDOH — ECONOMIC STABILITY: TRANSPORTATION INSECURITY: IN THE PAST 12 MONTHS, HAS LACK OF TRANSPORTATION KEPT YOU FROM MEDICAL APPOINTMENTS OR FROM GETTING MEDICATIONS?: YES

## 2024-12-12 SDOH — SOCIAL STABILITY: SOCIAL INSECURITY: ARE YOU OR HAVE YOU BEEN THREATENED OR ABUSED PHYSICALLY, EMOTIONALLY, OR SEXUALLY BY ANYONE?: YES

## 2024-12-12 SDOH — ECONOMIC STABILITY: FOOD INSECURITY: HOW HARD IS IT FOR YOU TO PAY FOR THE VERY BASICS LIKE FOOD, HOUSING, MEDICAL CARE, AND HEATING?: NOT HARD AT ALL

## 2024-12-12 SDOH — SOCIAL STABILITY: SOCIAL NETWORK: HOW OFTEN DO YOU ATTEND MEETINGS OF THE CLUBS OR ORGANIZATIONS YOU BELONG TO?: NEVER

## 2024-12-12 SDOH — SOCIAL STABILITY: SOCIAL INSECURITY: HAVE YOU HAD THOUGHTS OF HARMING ANYONE ELSE?: NO

## 2024-12-12 SDOH — SOCIAL STABILITY: SOCIAL NETWORK: HOW OFTEN DO YOU GET TOGETHER WITH FRIENDS OR RELATIVES?: MORE THAN THREE TIMES A WEEK

## 2024-12-12 SDOH — ECONOMIC STABILITY: HOUSING INSECURITY: IN THE PAST 12 MONTHS, HOW MANY TIMES HAVE YOU MOVED WHERE YOU WERE LIVING?: 0

## 2024-12-12 SDOH — SOCIAL STABILITY: SOCIAL INSECURITY: ABUSE: ADULT

## 2024-12-12 ASSESSMENT — COGNITIVE AND FUNCTIONAL STATUS - GENERAL
PATIENT BASELINE BEDBOUND: NO
TOILETING: A LITTLE
MOBILITY SCORE: 22
CLIMB 3 TO 5 STEPS WITH RAILING: A LITTLE
HELP NEEDED FOR BATHING: A LITTLE
WALKING IN HOSPITAL ROOM: A LITTLE
DRESSING REGULAR LOWER BODY CLOTHING: A LITTLE
DRESSING REGULAR UPPER BODY CLOTHING: A LITTLE
DAILY ACTIVITIY SCORE: 24
DAILY ACTIVITIY SCORE: 20

## 2024-12-12 ASSESSMENT — ACTIVITIES OF DAILY LIVING (ADL)
ADEQUATE_TO_COMPLETE_ADL: YES
ADL_ASSISTANCE: NEEDS ASSISTANCE
LACK_OF_TRANSPORTATION: NO
HEARING - LEFT EAR: FUNCTIONAL
PATIENT'S MEMORY ADEQUATE TO SAFELY COMPLETE DAILY ACTIVITIES?: YES
JUDGMENT_ADEQUATE_SAFELY_COMPLETE_DAILY_ACTIVITIES: YES
FEEDING YOURSELF: INDEPENDENT
GROOMING: INDEPENDENT
TOILETING: INDEPENDENT
BATHING_ASSISTANCE: STAND BY
HEARING - RIGHT EAR: FUNCTIONAL
DRESSING YOURSELF: INDEPENDENT
LACK_OF_TRANSPORTATION: NO
WALKS IN HOME: INDEPENDENT
ASSISTIVE_DEVICE: WALKER
BATHING: INDEPENDENT

## 2024-12-12 ASSESSMENT — PAIN - FUNCTIONAL ASSESSMENT
PAIN_FUNCTIONAL_ASSESSMENT: 0-10
PAIN_FUNCTIONAL_ASSESSMENT: 0-10

## 2024-12-12 ASSESSMENT — ENCOUNTER SYMPTOMS
DIARRHEA: 1
HEMATOLOGIC/LYMPHATIC NEGATIVE: 1
ABDOMINAL PAIN: 1
CARDIOVASCULAR NEGATIVE: 1
RESPIRATORY NEGATIVE: 1
ABDOMINAL DISTENTION: 1
CONSTITUTIONAL NEGATIVE: 1
PSYCHIATRIC NEGATIVE: 1
DYSURIA: 1
ALLERGIC/IMMUNOLOGIC NEGATIVE: 1
NAUSEA: 1
MUSCULOSKELETAL NEGATIVE: 1
EYES NEGATIVE: 1
NEUROLOGICAL NEGATIVE: 1
ENDOCRINE NEGATIVE: 1

## 2024-12-12 ASSESSMENT — PATIENT HEALTH QUESTIONNAIRE - PHQ9
1. LITTLE INTEREST OR PLEASURE IN DOING THINGS: SEVERAL DAYS
2. FEELING DOWN, DEPRESSED OR HOPELESS: NOT AT ALL
SUM OF ALL RESPONSES TO PHQ9 QUESTIONS 1 & 2: 1

## 2024-12-12 ASSESSMENT — LIFESTYLE VARIABLES
AUDIT-C TOTAL SCORE: 0
SKIP TO QUESTIONS 9-10: 1
AUDIT-C TOTAL SCORE: 0
SKIP TO QUESTIONS 9-10: 1
HOW OFTEN DO YOU HAVE 6 OR MORE DRINKS ON ONE OCCASION: NEVER
AUDIT-C TOTAL SCORE: 0
HOW MANY STANDARD DRINKS CONTAINING ALCOHOL DO YOU HAVE ON A TYPICAL DAY: PATIENT DOES NOT DRINK
HOW OFTEN DO YOU HAVE A DRINK CONTAINING ALCOHOL: NEVER

## 2024-12-12 ASSESSMENT — PAIN SCALES - GENERAL: PAINLEVEL_OUTOF10: 7

## 2024-12-12 NOTE — CONSULTS
Inpatient consult to Gastroenterology  Consult performed by: MADYSON Knutson-CNP  Consult ordered by: ESME Sotelo          Reason For Consult  Liver Cirrhosis    History Of Present Illness  Alfonzo Flanagan is a 49 y.o. female presenting with abdominal pain, NV. Very well known by our group, many admissions related to similar. She has history of PBC cirrhosis. Follows with Dr Ross and Dr Zaman. She is very distended this admission. She complains of generalized abdominal pain, intermittent nausea and vomiting. CT shows hepatic cirrhosis with sequela of portal hypertension and moderate amount of abdominopelvic ascites. H/H stable at 10.7/32.8. Denies black or bloody stools.Patient reports she has been compliant with her medications at home.          Past Medical History  She has a past medical history of Cirrhosis of liver (Multi) and Diabetes mellitus (Multi).    Surgical History  She has a past surgical history that includes CT guided imaging for needle placement (10/14/2020); US guided abdominal paracentesis (10/08/2020); and Abdominal surgery.     Social History  She reports that she has never smoked. She has never used smokeless tobacco. She reports that she does not drink alcohol and does not use drugs.    Family History  No family history on file.     Allergies  Gluten    Review of Systems   Constitutional: Negative.    HENT: Negative.     Eyes: Negative.    Respiratory: Negative.     Cardiovascular: Negative.    Gastrointestinal:  Positive for abdominal distention and abdominal pain.   Endocrine: Negative.    Genitourinary: Negative.    Musculoskeletal: Negative.    Skin: Negative.    Allergic/Immunologic: Negative.    Neurological: Negative.    Hematological: Negative.    Psychiatric/Behavioral: Negative.          Physical Exam  HENT:      Head: Normocephalic.      Right Ear: Tympanic membrane normal.      Nose: Nose normal.      Mouth/Throat:      Mouth: Mucous membranes are  "moist.   Pulmonary:      Effort: Pulmonary effort is normal.   Abdominal:      General: There is distension.      Tenderness: There is abdominal tenderness.   Musculoskeletal:         General: Normal range of motion.   Skin:     General: Skin is warm.   Neurological:      General: No focal deficit present.      Mental Status: She is alert.   Psychiatric:         Mood and Affect: Mood normal.          Last Recorded Vitals  Blood pressure 127/67, pulse 84, temperature 36.6 °C (97.9 °F), temperature source Temporal, resp. rate 18, height 1.575 m (5' 2\"), weight 78.9 kg (173 lb 15.1 oz), SpO2 97%.    Relevant Results      Scheduled medications  atorvastatin, 80 mg, oral, Nightly  cefTRIAXone, 2 g, intravenous, Once  dicyclomine, 20 mg, oral, TID  docusate sodium, 100 mg, oral, BID  fenofibrate, 160 mg, oral, Daily  furosemide, 40 mg, oral, Daily  insulin glargine, 25 Units, subcutaneous, q24h  insulin lispro, 0-15 Units, subcutaneous, TID AC  magnesium oxide, 400 mg, oral, Daily  metFORMIN XR, 1,000 mg, oral, Daily with evening meal  midodrine, 5 mg, oral, TID  nadolol, 20 mg, oral, Daily  nitroglycerin, 1 patch, transdermal, Daily  pantoprazole, 40 mg, oral, Daily before breakfast  polyethylene glycol, 17 g, oral, BID  rifAXIMin, 550 mg, oral, q12h RIVKA  spironolactone, 50 mg, oral, BID  sucralfate, 1 g, oral, Before meals & nightly  traZODone, 25 mg, oral, Nightly  ursodiol, 300 mg, oral, TID      Continuous medications     PRN medications  PRN medications: benzocaine-menthol, dextromethorphan-guaifenesin, dextrose, dextrose, glucagon, glucagon, guaiFENesin, ondansetron ODT, traMADol  Results for orders placed or performed during the hospital encounter of 12/11/24 (from the past 24 hours)   CBC and Auto Differential   Result Value Ref Range    WBC 4.3 (L) 4.4 - 11.3 x10*3/uL    nRBC 0.0 0.0 - 0.0 /100 WBCs    RBC 4.13 4.00 - 5.20 x10*6/uL    Hemoglobin 11.8 (L) 12.0 - 16.0 g/dL    Hematocrit 36.1 36.0 - 46.0 %    MCV " 87 80 - 100 fL    MCH 28.6 26.0 - 34.0 pg    MCHC 32.7 32.0 - 36.0 g/dL    RDW 25.9 (H) 11.5 - 14.5 %    Platelets 77 (L) 150 - 450 x10*3/uL    Neutrophils % 61.4 40.0 - 80.0 %    Immature Granulocytes %, Automated 0.2 0.0 - 0.9 %    Lymphocytes % 26.5 13.0 - 44.0 %    Monocytes % 7.5 2.0 - 10.0 %    Eosinophils % 4.2 0.0 - 6.0 %    Basophils % 0.2 0.0 - 2.0 %    Neutrophils Absolute 2.61 1.20 - 7.70 x10*3/uL    Immature Granulocytes Absolute, Automated 0.01 0.00 - 0.70 x10*3/uL    Lymphocytes Absolute 1.13 (L) 1.20 - 4.80 x10*3/uL    Monocytes Absolute 0.32 0.10 - 1.00 x10*3/uL    Eosinophils Absolute 0.18 0.00 - 0.70 x10*3/uL    Basophils Absolute 0.01 0.00 - 0.10 x10*3/uL   Magnesium   Result Value Ref Range    Magnesium 1.74 1.60 - 2.40 mg/dL   Comprehensive metabolic panel   Result Value Ref Range    Glucose 296 (H) 74 - 99 mg/dL    Sodium 137 136 - 145 mmol/L    Potassium 3.9 3.5 - 5.3 mmol/L    Chloride 105 98 - 107 mmol/L    Bicarbonate 30 21 - 32 mmol/L    Anion Gap <7 (L) 10 - 20 mmol/L    Urea Nitrogen 14 6 - 23 mg/dL    Creatinine 0.68 0.50 - 1.05 mg/dL    eGFR >90 >60 mL/min/1.73m*2    Calcium 8.0 (L) 8.6 - 10.3 mg/dL    Albumin 2.6 (L) 3.4 - 5.0 g/dL    Alkaline Phosphatase 409 (H) 33 - 110 U/L    Total Protein 7.9 6.4 - 8.2 g/dL    AST 58 (H) 9 - 39 U/L    Bilirubin, Total 2.4 (H) 0.0 - 1.2 mg/dL    ALT 39 7 - 45 U/L   Lipase   Result Value Ref Range    Lipase 168 (H) 9 - 82 U/L   Lactate   Result Value Ref Range    Lactate 0.7 0.4 - 2.0 mmol/L   Human Chorionic Gonadotropin, Serum Quantitative   Result Value Ref Range    HCG, Beta-Quantitative <2 <5 mIU/mL   Morphology   Result Value Ref Range    RBC Morphology See Below     Target Cells Few    Ammonia   Result Value Ref Range    Ammonia 71 (H) 16 - 53 umol/L   Comprehensive metabolic panel   Result Value Ref Range    Glucose 258 (H) 74 - 99 mg/dL    Sodium 137 136 - 145 mmol/L    Potassium 4.0 3.5 - 5.3 mmol/L    Chloride 108 (H) 98 - 107 mmol/L     Bicarbonate 27 21 - 32 mmol/L    Anion Gap <7 (L) 10 - 20 mmol/L    Urea Nitrogen 16 6 - 23 mg/dL    Creatinine 0.57 0.50 - 1.05 mg/dL    eGFR >90 >60 mL/min/1.73m*2    Calcium 7.4 (L) 8.6 - 10.3 mg/dL    Albumin 2.2 (L) 3.4 - 5.0 g/dL    Alkaline Phosphatase 330 (H) 33 - 110 U/L    Total Protein 6.8 6.4 - 8.2 g/dL    AST 55 (H) 9 - 39 U/L    Bilirubin, Total 2.2 (H) 0.0 - 1.2 mg/dL    ALT 33 7 - 45 U/L   CBC   Result Value Ref Range    WBC 4.3 (L) 4.4 - 11.3 x10*3/uL    nRBC 0.0 0.0 - 0.0 /100 WBCs    RBC 3.76 (L) 4.00 - 5.20 x10*6/uL    Hemoglobin 10.7 (L) 12.0 - 16.0 g/dL    Hematocrit 32.8 (L) 36.0 - 46.0 %    MCV 87 80 - 100 fL    MCH 28.5 26.0 - 34.0 pg    MCHC 32.6 32.0 - 36.0 g/dL    RDW 25.6 (H) 11.5 - 14.5 %    Platelets 69 (L) 150 - 450 x10*3/uL        Assessment/Plan   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   -EV: monitor for varices, last EGD on 12/27/23 by Dr. Zaman at [facility] Normal oropharynx. Esophageal ulcer, no bleeding and no stigmata of recent bleeding.  -Ascites: moderate ascites on CT               -Paracentesis with fluid analysis ordered              -IV Albumin               -Low Na diet               -Lasix 40mg. [id number] before discharge     Anemia (Baseline, no overt bleeding)      Epigastric abdominal pain/Nausea, vomiting  -Patient has multiple reported admissions this year with similar complaints of abdominal pain that typically resolves with a paracentesis/conservative measures. Multiple CT scans. She has history of acute idiopathic pancreatitis. EGD with EUS 3/26/2024. Findings were c/f NET however tissue sample was not obtained. At that time, it was recommended to follow up mass in 1 year with EUS. MRI/MRCP on 7/26/2024: No pancreatic lesion identified. Cirrhosis and sequela of portal hypertension. No hepatic lesion      Paracentesis. No other acute GI intervention indicated at this time       Adrianne Powell, APRN-CNP

## 2024-12-12 NOTE — CARE PLAN
Pt said that she has a living Will --not on file  ADOD: 2 days    Pt lives in a second floor apt with her  and dtr; no elevator and pt said that she has difficulty climbing the stairs.  Hx of REED with ascites; she uses a walker. Two recent falls in the bathroom  Pt uses a nebulizer for her asthma; no home 02.cpap/bipap  Hx of diabetes, she does not check her BS daily  She wears glasses, no hearing aids. Her  and dtr do the shopping, cooking, cleaning. Her  drives her to her appointments.  She is being tx for both depression and anxiety, she denies S.I.  English is her second language and she is unable to read English  PCP is Dr. Freeman, she uses [facility] in [place] for her meds and she can afford them. She has received her Lantus from [facility] which is more affordable for her.  Pt is here for bacterial peritonitis.  No anticipated discharge needs    DISCHARGE PLAN: AT THIS TIME THE PLAN IS HOME WITH  AND DTR--FAMILY TO ASSIST

## 2024-12-12 NOTE — PROGRESS NOTES
Pharmacy Medication History Review    Alfonzo Flanagan is a 49 y.o. female admitted for SBP (spontaneous bacterial peritonitis) (Multi). Pharmacy reviewed the patient's [id number] medications and allergies for accuracy.    Medications ADDED:  Acetaminophen 500mg  Lactulose 20g/30mL  Meclizine 25mg   Saline nasal spray   Medications CHANGED:  Lantus - 25unit BID  Humalog - not taking - almost a month financial....  Fifaxamin 550mg   Medications REMOVED:   None      The list below reflects the updated PTA list. Comments regarding how patient may be taking medications differently can be found in the Admit Orders Activity  Prior to Admission Medications   Prescriptions Last Dose Informant   acetaminophen (Tylenol) 500 mg tablet Unknown Self   Sig: Take 1-2 tablets (500-1,000 mg) by mouth every 6 hours if needed for mild pain (1 - 3).   atorvastatin (Lipitor) 80 mg tablet 12/10/2024 Self   Sig: Take 1 tablet (80 mg) by mouth once daily at bedtime.   dicyclomine (Bentyl) 20 mg tablet 12/10/2024 Self   Sig: Take 1 tablet (20 mg) by mouth 3 times a day for 5 days. As needed for abdominal pain   docusate sodium (Colace) 100 mg capsule Past Week Self   Sig: Take 1 capsule (100 mg) by mouth 2 times a day. Hold for loose stool.   fenofibrate (Tricor) 145 mg tablet 12/10/2024 Self   Sig: Take 1 tablet (145 mg) by mouth once daily.   furosemide (Lasix) 40 mg tablet 12/10/2024 Self   Sig: Take 1 tablet (40 mg) by mouth once daily. Hold for dizziness.   insulin glargine (Lantus Solostar U-100 Insulin) 100 unit/mL (3 mL) pen 12/10/2024 Self   Sig: Inject 25 Units under the skin 2 times a day.   lactulose 20 gram/30 mL oral solution Past Week Self   Sig: Take 30 mL (20 g) by mouth 2 times a day.   magnesium oxide (Mag-Ox) 400 mg (241.3 mg magnesium) tablet 12/10/2024 Self   Sig: Take 1 tablet (400 mg) by mouth once daily.   meclizine (Antivert) 25 mg tablet Past Week Self   Sig: Take 1 tablet (25 mg) by mouth 3 times a day  "as needed for dizziness.   metFORMIN XR (Glucophage-XR) 500 mg 24 hr tablet Past Week Self   Sig: Take 2 tablets (1,000 mg) by mouth once daily in the evening. Take with meals. Do not crush, chew, or split.   midodrine (Proamatine) 10 mg tablet Unknown Self   Sig: Take 0.5 tablets (5 mg) by mouth 3 times daily (morning, midday, late afternoon).   nadolol (Corgard) 20 mg tablet Past Week Self   Sig: Take 1 tablet (20 mg) by mouth once daily.   nitroglycerin (Nitrodur) 0.1 mg/hr patch Unknown Self   Sig: Place 1 patch over 12 hours on the skin once daily. Hold for dizziness.   ondansetron ODT (Zofran-ODT) 4 mg disintegrating tablet Past Week Self   Sig: Dissolve 1 tablet (4 mg) in the mouth every 8 hours if needed for nausea or vomiting for up to 7 days.   pantoprazole (ProtoNix) 40 mg EC tablet 12/10/2024 Self   Sig: Take 1 tablet (40 mg) by mouth once daily in the morning. Take before meals. Do not crush, chew, or split.   pen needle, diabetic (Sure Comfort Pen Needle) 32 gauge x 5/32\" needle Unknown Self   Sig: Use 4 times a day   polyethylene glycol (Glycolax, Miralax) 17 gram/dose powder Unknown Self   Sig: Mix 17 grams of powder in 8 ounces of water and drink 2 times a day.   sodium chloride (Ocean) 0.65 % nasal spray Unknown Self   Sig: Administer 1 spray into each nostril if needed for congestion.   spironolactone (Aldactone) 50 mg tablet 12/10/2024 Self   Sig: Take 1 tablet (50 mg) by mouth 2 times a day. Hold for dizziness.   sucralfate (Carafate) 1 gram tablet 12/10/2024 Self   Sig: Take 1 tablet (1 g) by mouth 4 times a day before meals.   traZODone (Desyrel) 50 mg tablet 12/10/2024 self   Sig: Take 0.5 tablets (25 mg) by mouth once daily at bedtime.   ursodiol (Actigall) 300 mg capsule 12/10/2024 Self   Sig: Take 1 capsule (300 mg) by mouth 3 times a day.      Facility-Administered Medications: None        The list below reflects the updated allergy list. Please review each documented allergy for " additional clarification and justification.  Allergies  Reviewed by Patti Shook CPhT on 12/12/2024        Severity Reactions Comments    Gluten Low Diarrhea             Pharmacy has been updated to [facility].    Sources used to complete the med history include dispense history, PTA medication list, patient interview. Patient is a fair historian.    Below are additional concerns with the patient's PTA list.  Patient reports not taking humalog recently. This is due to financial difficulty.     Patti Shook CPhT-Adv  Please reach out via [phone] Secure Chat for questions

## 2024-12-12 NOTE — H&P
Chief Complaint Abdominal pain, nausea    History Of Present Illness  Alfnozo Flanagan is a very pleasant 49 y.o. female with a past medical history significant for hepatic cirrhosis with ascites requiring paracentesis, portal hypertension, chronic pancreatitis, type 2 diabetes mellitus presenting with abdominal pain, nausea and diarrhea.  She states that she underwent an abdominal paracentesis 1 month ago, 2 x in 1 week yielding 3 liters of fluid each time.  She has been taking all medications as prescribed.  She has daily bowel movements.  She was in her usual state health until approximately 4 days ago when she developed abdominal pain, nausea and diarrhea.  She also reports dark urine, burning on urination and vaginal itching similar to prior urinary tract infections.  She denies any other complaints.  She presented to the emergency department for evaluation.    In the emergency department, initial work-up was done.  Temperature 36.6 °C.  Pulse 80.  Respiratory rate 18.  Blood pressure 142/69.  Pulse oximetry 100%.  CMP showed a glucose 296.  Sodium 137.  Potassium 3.9.  Chloride 105.  Bicarbonate 30.  Anion gap 7.  BUN 14.  Creatinine 0.68.  Calcium 8.0.  Albumin 2.6.  Alkaline phosphatase 409.  ALT 39.  AST 58.  Total bilirubin 2.4.  Magnesium 1.74.  Lactate 0.7.  Lipase 168.  CBC showed a white blood cell count 4.3.  Hemoglobin 11.8.  Hematocrit 36.1.  Platelet count 77.  Abdominal x-ray per radiology showed a nonobstructive bowel gas pattern, no acute osseous abnormality.  CT abdomen abdomen pelvis per radiology showed hepatic cirrhosis with sequela of portal hypertension and abdominal pelvic ascites, 6 mm right middle lobe nodule, follow-up non-emergent chest CT may be considered in 3 months to assess stability.  She was treated in the emergency department and was admitted.  At the time of my evaluation, she is resting on the cart in the emergency department room 17 in no acute distress.  She is awake  alert oriented x 3.  She complains of diffuse abdominal pain and is asking for additional pain medication.  She reports nausea, no vomiting.  She has had no bowel movements today.     Past Medical History  Past Medical History:   Diagnosis Date    Cirrhosis of liver (Multi)     Diabetes mellitus (Multi)        Surgical History  Past Surgical History:   Procedure Laterality Date    ABDOMINAL SURGERY      CT GUIDED IMAGING FOR NEEDLE PLACEMENT  10/14/2020    CT GUIDED IMAGING FOR NEEDLE PLACEMENT [facility]    US GUIDED ABDOMINAL PARACENTESIS  10/08/2020    US GUIDED ABDOMINAL PARACENTESIS [facility]        Social History  She reports that she has never smoked. She has never used smokeless tobacco. She reports that she does not drink alcohol and does not use drugs.    Family History  No family history on file.     Allergies  Gluten    Review of Systems   Constitutional: Negative.    HENT: Negative.     Eyes: Negative.    Respiratory: Negative.     Cardiovascular: Negative.    Gastrointestinal:  Positive for abdominal distention, abdominal pain, diarrhea and nausea.   Endocrine: Negative.    Genitourinary:  Positive for dysuria.        Vaginal itching.   Musculoskeletal: Negative.    Skin: Negative.    Allergic/Immunologic: Negative.    Neurological: Negative.    Hematological: Negative.    Psychiatric/Behavioral: Negative.     All other systems reviewed and are negative.         Physical Exam  Vitals and nursing note reviewed.   Constitutional:       General: She is not in acute distress.     Appearance: Normal appearance. She is obese. She is not ill-appearing, toxic-appearing or diaphoretic.   HENT:      Head: Normocephalic and atraumatic.      Right Ear: External ear normal.      Left Ear: External ear normal.      Nose: Nose normal.      Mouth/Throat:      Mouth: Mucous membranes are moist.      Pharynx: Oropharynx is clear.   Eyes:      Extraocular Movements: Extraocular movements intact.       "Conjunctiva/sclera: Conjunctivae normal.      Pupils: Pupils are equal, round, and reactive to light.   Cardiovascular:      Rate and Rhythm: Normal rate and regular rhythm.      Pulses: Normal pulses.      Heart sounds: Normal heart sounds. No murmur heard.  Pulmonary:      Effort: Pulmonary effort is normal. No respiratory distress.      Breath sounds: Normal breath sounds. No wheezing, rhonchi or rales.   Abdominal:      General: Bowel sounds are normal. There is distension.      Palpations: Abdomen is soft.      Tenderness: There is abdominal tenderness.   Genitourinary:     Comments: Deferred.  Musculoskeletal:         General: Swelling present. No tenderness. Normal range of motion.      Cervical back: Normal range of motion and neck supple.      Right lower leg: Edema present.      Left lower leg: Edema present.      Comments: Trace bilateral lower extremity edema.   Skin:     General: Skin is warm and dry.      Capillary Refill: Capillary refill takes less than 2 seconds.   Neurological:      General: No focal deficit present.      Mental Status: She is alert and oriented to person, place, and time.      Comments: Gait deferred.   Psychiatric:         Mood and Affect: Mood normal.         Behavior: Behavior normal.       Last Recorded Vitals  Blood pressure 129/72, pulse 99, temperature 36.6 °C (97.9 °F), temperature source Temporal, resp. rate 18, height 1.575 m (5' 2\"), weight 78.9 kg (173 lb 15.1 oz), SpO2 99%.    Telemetry    Relevant Results  Results for orders placed or performed during the hospital encounter of 12/11/24 (from the past 24 hours)   CBC and Auto Differential   Result Value Ref Range    WBC 4.3 (L) 4.4 - 11.3 x10*3/uL    nRBC 0.0 0.0 - 0.0 /100 WBCs    RBC 4.13 4.00 - 5.20 x10*6/uL    Hemoglobin 11.8 (L) 12.0 - 16.0 g/dL    Hematocrit 36.1 36.0 - 46.0 %    MCV 87 80 - 100 fL    MCH 28.6 26.0 - 34.0 pg    MCHC 32.7 32.0 - 36.0 g/dL    RDW 25.9 (H) 11.5 - 14.5 %    Platelets 77 (L) 150 - " 450 x10*3/uL    Neutrophils % 61.4 40.0 - 80.0 %    Immature Granulocytes %, Automated 0.2 0.0 - 0.9 %    Lymphocytes % 26.5 13.0 - 44.0 %    Monocytes % 7.5 2.0 - 10.0 %    Eosinophils % 4.2 0.0 - 6.0 %    Basophils % 0.2 0.0 - 2.0 %    Neutrophils Absolute 2.61 1.20 - 7.70 x10*3/uL    Immature Granulocytes Absolute, Automated 0.01 0.00 - 0.70 x10*3/uL    Lymphocytes Absolute 1.13 (L) 1.20 - 4.80 x10*3/uL    Monocytes Absolute 0.32 0.10 - 1.00 x10*3/uL    Eosinophils Absolute 0.18 0.00 - 0.70 x10*3/uL    Basophils Absolute 0.01 0.00 - 0.10 x10*3/uL   Magnesium   Result Value Ref Range    Magnesium 1.74 1.60 - 2.40 mg/dL   Comprehensive metabolic panel   Result Value Ref Range    Glucose 296 (H) 74 - 99 mg/dL    Sodium 137 136 - 145 mmol/L    Potassium 3.9 3.5 - 5.3 mmol/L    Chloride 105 98 - 107 mmol/L    Bicarbonate 30 21 - 32 mmol/L    Anion Gap <7 (L) 10 - 20 mmol/L    Urea Nitrogen 14 6 - 23 mg/dL    Creatinine 0.68 0.50 - 1.05 mg/dL    eGFR >90 >60 mL/min/1.73m*2    Calcium 8.0 (L) 8.6 - 10.3 mg/dL    Albumin 2.6 (L) 3.4 - 5.0 g/dL    Alkaline Phosphatase 409 (H) 33 - 110 U/L    Total Protein 7.9 6.4 - 8.2 g/dL    AST 58 (H) 9 - 39 U/L    Bilirubin, Total 2.4 (H) 0.0 - 1.2 mg/dL    ALT 39 7 - 45 U/L   Lipase   Result Value Ref Range    Lipase 168 (H) 9 - 82 U/L   Lactate   Result Value Ref Range    Lactate 0.7 0.4 - 2.0 mmol/L   Human Chorionic Gonadotropin, Serum Quantitative   Result Value Ref Range    HCG, Beta-Quantitative <2 <5 mIU/mL   Morphology   Result Value Ref Range    RBC Morphology See Below     Target Cells Few    Ammonia   Result Value Ref Range    Ammonia 71 (H) 16 - 53 umol/L   Comprehensive metabolic panel   Result Value Ref Range    Glucose 258 (H) 74 - 99 mg/dL    Sodium 137 136 - 145 mmol/L    Potassium 4.0 3.5 - 5.3 mmol/L    Chloride 108 (H) 98 - 107 mmol/L    Bicarbonate 27 21 - 32 mmol/L    Anion Gap <7 (L) 10 - 20 mmol/L    Urea Nitrogen 16 6 - 23 mg/dL    Creatinine 0.57 0.50 - 1.05  mg/dL    eGFR >90 >60 mL/min/1.73m*2    Calcium 7.4 (L) 8.6 - 10.3 mg/dL    Albumin 2.2 (L) 3.4 - 5.0 g/dL    Alkaline Phosphatase 330 (H) 33 - 110 U/L    Total Protein 6.8 6.4 - 8.2 g/dL    AST 55 (H) 9 - 39 U/L    Bilirubin, Total 2.2 (H) 0.0 - 1.2 mg/dL    ALT 33 7 - 45 U/L   CBC   Result Value Ref Range    WBC 4.3 (L) 4.4 - 11.3 x10*3/uL    nRBC 0.0 0.0 - 0.0 /100 WBCs    RBC 3.76 (L) 4.00 - 5.20 x10*6/uL    Hemoglobin 10.7 (L) 12.0 - 16.0 g/dL    Hematocrit 32.8 (L) 36.0 - 46.0 %    MCV 87 80 - 100 fL    MCH 28.5 26.0 - 34.0 pg    MCHC 32.6 32.0 - 36.0 g/dL    RDW 25.6 (H) 11.5 - 14.5 %    Platelets 69 (L) 150 - 450 x10*3/uL   POCT GLUCOSE   Result Value Ref Range    POCT Glucose 211 (H) 74 - 99 mg/dL     Susceptibility data from last 90 days.  Collected Specimen Info Organism Amoxicillin/Clavulanate Ampicillin Ampicillin/Sulbactam Cefazolin Cefazolin (uncomplicated UTIs only) Ciprofloxacin Gentamicin Levofloxacin Nitrofurantoin Penicillin Piperacillin/Tazobactam   10/28/24 Urine from Clean Catch/Voided Klebsiella pneumoniae/variicola  S  R  S  S  S  S  S   S   S     Enterococcus faecium   S     S   S  I  S      Collected Specimen Info Organism Trimethoprim/Sulfamethoxazole Vancomycin   10/28/24 Urine from Clean Catch/Voided Klebsiella pneumoniae/variicola  S      Enterococcus faecium  R  S     CT abdomen pelvis wo IV contrast    Result Date: 12/11/2024  Interpreted By:  Christina Edmondson, STUDY: CT abdomen pelvis   INDICATION: Signs/Symptoms:abdominal pain.   COMPARISON: 12/08/2024, 04/18/2024   ACCESSION NUMBER(S): [id number]   ORDERING CLINICIAN: MARTY LEJEUNE   TECHNIQUE: Axial noncontrast CT images of the abdomen and pelvis with coronal and sagittal reconstructed images.   FINDINGS: LOWER CHEST: No acute abnormality of the lung bases. Partially imaged 6 mm right middle lobe nodule. BONES: No acute osseous abnormality. ABDOMINAL WALL: Diffuse fat stranding and anasarca. Fluid containing supraumbilical  hernia. Small fat containing umbilical hernia.   ABDOMEN: Lack of intravenous contrast limits evaluation of vessels and solid organs. LIVER: Nodular atrophic appearance of the liver. No mass identified. Recanalization of the umbilical vein. BILE DUCTS: Normal caliber. GALLBLADDER: Status post cholecystectomy. PANCREAS: No peripancreatic inflammatory stranding or duct dilatation. SPLEEN: 12.4 cm. ADRENALS: Within normal limits.   KIDNEYS, URETERS, URINARY BLADDER: No hydronephrosis or urinary tract calculus.  The urinary bladder is unremarkable.   VESSELS: Atherosclerotic calcifications without aneurysmal dilatation seen. RETROPERITONEUM: Shotty retroperitoneal lymph nodes.   PELVIS:   REPRODUCTIVE ORGANS: No abnormality, given limitations of the noncontrast CT.   BOWEL: No dilated bowel. Diffuse wall thickening of small bowel loops likely related to adjacent fluid and possible congestion. PERITONEUM: Moderate to advanced abdominopelvic ascites and stranding.       Hepatic cirrhosis with sequela of portal hypertension and abdominopelvic ascites.   6 mm right middle lobe nodule. Follow-up non emergent chest CT may be considered in 3 months to assess stability.     MACRO: Incidental Finding:  A solid non-calcified pulmonary nodule measuring 6-8 mm .  (**-YCF-**)   Instructions:  Consider follow up non contrast chest CT at 6-12 months, then consider CT chest at 18-24 months. (Vikas Long et al., Guidelines for management of incidental pulmonary nodules detected on CT images: From the Fleischner Society 2017, Radiology. 2017 Jul;284 (1):228-243.) [email or web address]   Signed by: Christina Edmondson 12/11/2024 9:11 PM Dictation workstation:   [id number]    XR abdomen 2 views supine and erect or decub    Result Date: 12/11/2024  Interpreted By:  Aysha Norton, STUDY: XR ABDOMEN 2 VIEWS SUPINE AND ERECT OR DECUB;  12/11/2024 6:53 pm   INDICATION: Signs/Symptoms:ab pain.     COMPARISON: None.   ACCESSION NUMBER(S):  [id number]   ORDERING CLINICIAN: MARTY LEJEUNE   TECHNIQUE: Abdomen supine and upright views   FINDINGS:     ABDOMEN: Nonobstructive bowel gas pattern.   No evidence of pneumoperitoneum.   Osseous structures demonstrate no acute bony abnormalities.   Surgical clips in the right upper quadrant of the abdomen.       1.  Nonobstructive bowel gas pattern. 2.  No acute osseous abnormality     MACRO: None   Signed by: Aysha Norton 12/11/2024 6:58 PM Dictation workstation:   [id number]     Scheduled medications  atorvastatin, 80 mg, oral, Nightly  cefTRIAXone, 2 g, intravenous, Once  dicyclomine, 20 mg, oral, TID  docusate sodium, 100 mg, oral, BID  fenofibrate, 160 mg, oral, Daily  furosemide, 40 mg, oral, Daily  insulin glargine, 25 Units, subcutaneous, q24h  insulin lispro, 0-15 Units, subcutaneous, TID AC  magnesium oxide, 400 mg, oral, Daily  metFORMIN XR, 1,000 mg, oral, Daily with evening meal  midodrine, 5 mg, oral, TID  nadolol, 20 mg, oral, Daily  nitroglycerin, 1 patch, transdermal, Daily  pantoprazole, 40 mg, oral, Daily before breakfast  polyethylene glycol, 17 g, oral, BID  rifAXIMin, 550 mg, oral, q12h RIVKA  spironolactone, 50 mg, oral, BID  sucralfate, 1 g, oral, Before meals & nightly  traZODone, 25 mg, oral, Nightly  ursodiol, 300 mg, oral, TID      Continuous medications     PRN medications  PRN medications: benzocaine-menthol, dextromethorphan-guaifenesin, dextrose, dextrose, glucagon, glucagon, guaiFENesin, ondansetron ODT, traMADol    ASSESSMENT:  Abdominal pain, distention, nausea, diarrhea  Hepatic cirrhosis with ascites  Transaminitis  Hyperammoniemia  Pancytopenia  Type 2 diabetes mellitus  Hypoalbuminemia  Dysuria, rule out urinary tract infection    PLAN:  Gastroenterology consultation.  Abdominal paracentesis.  Cytology.  Follow-up.  IV Ceftriaxone for spontaneous bacterial peritonitis prophylaxis.  Symptom control.  Bentyl.  As needed analgesics, antiemetics.  PPI Protonix.  Carafate.   Diet as tolerated.  Diuretics Lasix, Spironolactone.  Nadolol.  Midodrine.  Low sodium diet.  Monitor daily weights, electrolytes, renal function, blood pressure.  Lactulose.  Titrate for 3 bowel movements in 24 hours.  Rifaximin.  H&H noted.  No evidence of acute blood loss.  Guaiac  stools.  Avoid NSAIDS.  PPI.  Monitor CBC.  Continue home medications as prescribed as appropriate.  Outpatient follow-up with Gastroenterology, Hepatology.  Hemoglobin A1c 9.9 4 months ago.  Repeat hemoglobin A1c level.  ADA diet.  Monitor point-of-care glucose AC/HS.  Insulin.  Adjust insulin as needed for glycemic control.  Hypoglycemia protocol.  PT/OT evaluation.  Fall precautions.  Up with assistance only.  Bed and chair alarm at all times.  Pressure ulcer prevention measures.  Turn and reposition every 2 hours and as needed.  Heels off bed.  Offloading.  DVT prophylaxis.  GI prophylaxis.  Supportive care.  Patient reassured.  Case management consultation for discharge planning.  Anticipate discharge home when medically cleared.  We will take DVT, fall, aspiration and decubitus precautions during her stay in the hospital.  The plan has been discussed with the patient in detail.  She is agreeable.  Orders written per Dr. Lawrence.  Any additions or modifications at his discretion.      Leny Daniels, APRN-CNP

## 2024-12-12 NOTE — ED PROVIDER NOTES
Emergency Department Provider Note        MEDICAL DECISION MAKING:  Medical Decision Making  Amount and/or Complexity of Data Reviewed  External Data Reviewed: labs, radiology and notes.  Labs: ordered. Decision-making details documented in ED Course.  Radiology: ordered.    Risk  Prescription drug management.  Decision regarding hospitalization.         12/11/24     Chief Complaint   Patient presents with    Abdominal Distention    Nausea      History/Exam limitations: none.   Additional history was obtained from patient.    HPI: Alfonzo Flanagan  is a 49 y.o. presents with abdominal pain.  History of cirrhosis from fatty liver.  Symptoms been constant.  Notes pain is diffuse, localized to where it was a few days ago.  States she is retaining fluid.  Increased distention noted.  Some nausea.  Denies any chest pain, shortness of breath.  Some malaise but no fevers or chills.  Past medical records, Past medical history and surgical history reviewed and as documented.  History reviewed and as noted. past medical records reviewed.    Active Ambulatory Problems     Diagnosis Date Noted    Other ascites 01/26/2024    Acute UTI 01/26/2024    UTI (urinary tract infection) 01/26/2024    Abdominal pain 02/07/2024    Abdominal pain, epigastric 02/09/2024    Abdominal pain, generalized 05/25/2024    Type 2 diabetes mellitus, with long-term current use of insulin 02/20/2023    Generalized abdominal pain 06/15/2024    Hepatic encephalopathy 06/20/2024    Hyperglycemia 07/05/2024    Type II or unspecified type diabetes mellitus with renal manifestations, uncontrolled(250.42) (Multi) 07/05/2024    Liver cirrhosis secondary to REED (nonalcoholic steatohepatitis) (Multi) 07/10/2024    Hyperammonemia (Multi) 07/21/2024    History of cirrhosis 08/11/2024    Pulmonary nodule 08/12/2024    Transaminasemia 08/12/2024    Anemia of chronic disease 10/27/2024     Resolved Ambulatory Problems     Diagnosis Date Noted    Nausea and  "vomiting 07/21/2024     Past Medical History:   Diagnosis Date    Cirrhosis of liver (Multi)     Diabetes mellitus (Multi)         Past Surgical History:   Procedure Laterality Date    ABDOMINAL SURGERY      CT GUIDED IMAGING FOR NEEDLE PLACEMENT  10/14/2020    CT GUIDED IMAGING FOR NEEDLE PLACEMENT [facility]    US GUIDED ABDOMINAL PARACENTESIS  10/08/2020    US GUIDED ABDOMINAL PARACENTESIS [facility]        No family history on file.     Social History     Tobacco Use    Smoking status: Never    Smokeless tobacco: Never   Substance Use Topics    Alcohol use: Never    Drug use: Never        Allergies   Allergen Reactions    Gluten Diarrhea        Unless otherwise stated in this report the patient's positive and negative responses for review of systems for constitutional, eyes, ENT, cardiovascular, respiratory, gastrointestinal, neurological, genitourinary, musculoskeletal, and integument systems and related systems to the presenting problem are either as stated in the HPI or were not pertinent or were negative for the symptoms and/or complaints related to the presenting medical problem.    PHYSICAL EXAM  Triage/nursing notes and vital signs reviewed as available and as noted    Vitals:    12/11/24 1810   BP: 142/69   BP Location: Left arm   Patient Position: Sitting   Pulse: 80   Resp: 18   Temp: 36.6 °C (97.9 °F)   TempSrc: Temporal   SpO2: 100%   Weight: 78.9 kg (173 lb 15.1 oz)   Height: 1.575 m (5' 2\")           Constitutional:       Appearance: Patient not ill-appearing or toxic-appearing.   HENT:      Head: Atraumatic.      Mouth/Throat:      Mouth: Mucous membranes are moist.      Pharynx: Oropharynx is clear. No pharyngeal swelling.      Neck: No Obvious JVD. Trachea midline. No neck swelling.  Eyes:      Normal Ocular tracking  Cardiovascular:      Rate and Rhythm: Normal rate and regular rhythm.      Pulses: Normal pulses.      Heart sounds: No murmur heard.  Pulmonary:      Effort: " Pulmonary effort is normal.      Breath sounds: Normal breath sounds.   Abdominal:      General: Bowel sounds are normal.      Palpations: Abdomen is soft.      Tenderness: There is diffuse abdominal tenderness with ascites present.  No guarding or rebound tenderness.  Musculoskeletal:      Cervical back: Neck supple.      Right lower leg: No tenderness. No edema.      Left lower leg: No tenderness. No edema.   Skin:     General: Skin is warm and dry.      Capillary Refill: Capillary refill takes less than 2 seconds.   Neurological:      General: No focal deficit present.      Mental Status: Patient is alert.  Appropriate conversant     Sensory: Gross Sensation is intact.      Motor: Gross Motor function is intact symmetrically  Psychiatric:         Mood and Affect: Mood normal.      Cooperative, no apparent risk to self or others    ED COURSE        Work-up performed to evaluate for differential diagnosis as clinically indicated   Orders Placed This Encounter   Procedures    Paracentesis    XR abdomen 2 views supine and erect or decub    CT abdomen pelvis wo IV contrast    CBC and Auto Differential    Magnesium    Comprehensive metabolic panel    Lipase    Lactate    Human Chorionic Gonadotropin, Serum Quantitative    NPO Diet; Effective midnight    Paracentesis tray to bedside    Notify provider (specify parameters)    Pain Assessment    Weight on admission    Height on admission    No Isolation Required    Activity (specify) Out of Bed with Assistance    Vital Signs    Notify provider    Full code    Electrocardiogram, 12-lead PRN ACS symptoms    Insert and maintain peripheral IV    Admit to inpatient    ED to floor bed request          Labs and imaging reviewed by me  and note         Labs Reviewed   CBC WITH AUTO DIFFERENTIAL - Abnormal       Result Value    WBC 4.3 (*)     nRBC 0.0      RBC 4.13      Hemoglobin 11.8 (*)     Hematocrit 36.1      MCV 87      MCH 28.6      MCHC 32.7      RDW 25.9 (*)      Platelets 77 (*)     Neutrophils % 61.4      Immature Granulocytes %, Automated 0.2      Lymphocytes % 26.5      Monocytes % 7.5      Eosinophils % 4.2      Basophils % 0.2      Neutrophils Absolute 2.61      Immature Granulocytes Absolute, Automated 0.01      Lymphocytes Absolute 1.13 (*)     Monocytes Absolute 0.32      Eosinophils Absolute 0.18      Basophils Absolute 0.01     COMPREHENSIVE METABOLIC PANEL - Abnormal    Glucose 296 (*)     Sodium 137      Potassium 3.9      Chloride 105      Bicarbonate 30      Anion Gap <7 (*)     Urea Nitrogen 14      Creatinine 0.68      eGFR >90      Calcium 8.0 (*)     Albumin 2.6 (*)     Alkaline Phosphatase 409 (*)     Total Protein 7.9      AST 58 (*)     Bilirubin, Total 2.4 (*)     ALT 39     LIPASE - Abnormal    Lipase 168 (*)     Narrative:     Venipuncture immediately after or during the administration of Metamizole may lead to falsely low results. Testing should be performed immediately prior to Metamizole dosing.   MAGNESIUM - Normal    Magnesium 1.74     LACTATE - Normal    Lactate 0.7      Narrative:     Venipuncture immediately after or during the administration of Metamizole may lead to falsely low results. Testing should be performed immediately prior to Metamizole dosing.   HUMAN CHORIONIC GONADOTROPIN, SERUM QUANTITATIVE - Normal    HCG, Beta-Quantitative <2      Narrative:      Total HCG measurement is performed using the [employer]   Immunoassay which detects intact HCG and free beta HCG subunit.    This test is not indicated for use as a tumor marker.   HCG testing is performed using a different test methodology at [facility] than other [facility]. Direct result comparison   should only be made within the same method.       MORPHOLOGY    RBC Morphology See Below      Target Cells Few          CT abdomen pelvis wo IV contrast   Final Result   Hepatic cirrhosis with sequela of portal hypertension and   abdominopelvic  ascites.        6 mm right middle lobe nodule. Follow-up non emergent chest CT may be   considered in 3 months to assess stability.             MACRO:   Incidental Finding:  A solid non-calcified pulmonary nodule measuring   6-8 mm .  (**-YCF-**)        Instructions:  Consider follow up non contrast chest CT at 6-12   months, then consider CT chest at 18-24 months. (Vikas Long et   al., Guidelines for management of incidental pulmonary nodules   detected on CT images: From the Fleischner Society 2017, Radiology.   2017 Jul;284 (1):228-243.) [email or web address]        Signed by: Christina Edmondson 12/11/2024 9:11 PM   Dictation workstation:   [id number]      XR abdomen 2 views supine and erect or decub   Final Result   1.  Nonobstructive bowel gas pattern.   2.  No acute osseous abnormality             MACRO:   None        Signed by: Aysha Norton 12/11/2024 6:58 PM   Dictation workstation:   [id number]               Pt course which Intervention and treatment included :    Procedure  Procedures    Medications   cefTRIAXone (Rocephin) 2 g in dextrose (iso) IV 50 mL (has no administration in time range)   sodium chloride 0.9 % bolus 1,000 mL (0 mL intravenous Stopped 12/11/24 2210)   ondansetron (Zofran) injection 4 mg (4 mg intravenous Given 12/11/24 1856)   morphine injection 6 mg (6 mg intravenous Given 12/11/24 1913)   lidocaine-epinephrine (Xylocaine W/EPI) 1 %-1:100,000 injection 5 mL (5 mL infiltration Given 12/11/24 1913)        ED Course as of 12/11/24 2211   Wed Dec 11, 2024   2011 Bedside abdominal ultrasound performed shows no significant ascites.  Patient did have CT done recently, will reperform CT due to worsening distention.  X-ray was unremarkable.  Labs unremarkable. [ML]   2011 Patient given Zofran for nausea and morphine for pain. [ML]      ED Course User Index  [ML] Marty R Lejeune, DO         Diagnoses as of 12/11/24 2211   SBP (spontaneous bacterial peritonitis) (Multi)   Abdominal pain,  unspecified abdominal location          DISPOSITION: Admit to hospital  All imaging and laboratory results were discussed with the patient in detail including acute as well as incidental findings.  I discussed the differential, results and treatment plan with the patient and/or family/friend/caregiver if present.  I discussed the reason and need for admission to the hospital as well as risk and benefits.  Patient/family/caregiver/friend is in agreement with transfer as well as choice of facility to be transferred to.  Education and reassurance provided regards to presumed diagnosis was given.  Patient/family/caregiver understand and all questions answered.      1. SBP (spontaneous bacterial peritonitis) (Multi)        2. Abdominal pain, unspecified abdominal location           -------------------------------------------------------------------    12/11/24 at 7:07 PM - Marty R LeJeune, DO   Internal & Emergency Medicine          Marty R Lejeune, DO  Resident  12/11/24 8845

## 2024-12-12 NOTE — PROGRESS NOTES
Occupational Therapy    Evaluation    Patient Name: Alfonzo Flanagan  MRN: [id number]  Department: [facility] ED  Room: [place] [address]  Today's Date: 12/12/2024  Time Calculation  Start Time: 1106  Stop Time: 1118  Time Calculation (min): 12 min    Assessment  IP OT Assessment  OT Assessment: Patient is a 49 year old female admitted with cirrhosis and spontaneous bacterial peritonitis. Patient is presenting at her baseline level. She has 24/7 available care at home. Reports supervision level at baseline with assistance from her family when needed. Patient is declining any skilled OT needs. Recommend patient return home with 24/7 available care as needed.  Prognosis: Good  Barriers to Discharge Home: No anticipated barriers  Evaluation/Treatment Tolerance: Patient tolerated treatment well, Patient limited by fatigue  End of Session Communication: Bedside nurse  End of Session Patient Position: On cart, Alarm off, not on at start of session (on ED cart)  Plan:  No Skilled OT: At baseline function  OT Frequency: OT eval only  OT Discharge Recommendations: No further acute OT  Equipment Recommended upon Discharge: Wheeled walker  OT Recommended Transfer Status: Stand by assist  OT - OK to Discharge: Yes    Subjective   Current Problem:  1. SBP (spontaneous bacterial peritonitis) (Multi)        2. Abdominal pain, unspecified abdominal location          General:  General  Reason for Referral: decline in ADLs, spontaneous bacterial peritonitis  Referred By: Dr. Lawrence  Past Medical History Relevant to Rehab: UTI, hepatic encephalopathy, hyperglycemia, DM type 2, hyperammonemia, anemia of chronic disease  Family/Caregiver Present: No  Prior to Session Communication: Bedside nurse  Patient Position Received: On cart, Alarm off, not on at start of session (ED cart)  Preferred Learning Style: verbal, visual  General Comment: Patient is a 49 year old female who presented to the ED with c/o abdominal pain. CT of the abdomen indicating a  solid non-calcified pulmonary nodule measuring 6-8 mm. CT abdomen indicating non-obstructive bowel gas pattern. Patient admitted with cirrhosis and spontaneous bacterial peritonitis. Patient is cleared for therapy. Patient in bed upon arrival and agreeable to participate  Precautions:  Medical Precautions: Fall precautions    Pain:  Pain Assessment  Pain Assessment: 0-10  0-10 (Numeric) Pain Score: 7  Pain Type: Acute pain  Pain Location: Abdomen  Pain Interventions: Repositioned, Ambulation/increased activity (RN notified)  Response to Interventions: No change in pain    Objective   Cognition:  Cognition Comments: able to follow commands throughout. primarily Romanian speaking     Home Living:  Type of Home: Apartment (2nd floor apartment, no elevator)  Lives With: Spouse, Adult children (daughter)  Home Adaptive Equipment: Walker rolling or standard (2WW vs rollator)  Home Layout: One level, Stairs to alternate level with rails  Alternate Level Stairs-Rails: Left  Alternate Level Stairs-Number of Steps: 11  Bathroom Shower/Tub: Tub/shower unit  Bathroom Toilet: Standard  Home Living Comments: spouse assists in/out of shower, daughter assists with LB ADLs. patient reports she is only alone for at most 1 hour, otherwise her family is always with her   Prior Function:  Level of [place]: Needs assistance with ADLs, Needs assistance with homemaking  Receives Help From: Family  ADL Assistance: Needs assistance (family assists)  Homemaking Assistance: Needs assistance (family manages)  Ambulatory Assistance: Independent (uses rollator)  Vocational: Retired  Prior Function Comments: patient reports 2 falls in the bathroom  IADL History:  Homemaking Responsibilities: No  IADL Comments: family manages  ADL:  Eating Assistance: Stand by  Eating Deficit: Setup  Grooming Assistance: Stand by  Grooming Deficit: Supervision/safety  Bathing Assistance: Stand by  Bathing Deficit: Supervision/safety (per clinical judgement)  UE  Dressing Assistance: Stand by  UE Dressing Deficit: Setup  LE Dressing Assistance: Stand by  LE Dressing Deficit: Setup (don/doff slip on shoes)  Toileting Assistance with Device: Stand by  Toileting Deficit: Steadying, Supervison/safety (per clinical judgement)  Activity Tolerance:  Endurance: Endurance does not limit participation in activity  Activity Tolerance Comments: baseline, limited by pain only  Bed Mobility/Transfers: Bed Mobility  Bed Mobility: Yes  Bed Mobility 1  Bed Mobility 1: Supine to sitting  Level of Assistance 1: Modified independent  Bed Mobility Comments 1: head of bed elevated  Bed Mobility 2  Bed Mobility  2: Sitting to supine  Level of Assistance 2: Modified independent  Bed Mobility Comments 2: head of bed elevated    Transfers  Transfer: Yes  Transfer 1  Transfer From 1: Bed to  Transfer to 1: Stand  Technique 1: Sit to stand  Transfer Device 1: Walker  Transfer Level of Assistance 1: Close supervision  Trials/Comments 1: x2 trials    Functional Mobility:  Functional Mobility  Functional Mobility Performed: Yes  Functional Mobility 1  Surface 1: Level tile  Device 1: Rolling walker  Assistance 1: Close supervision  Comments 1: > household distances  Sitting Balance:  Static Sitting Balance  Static Sitting-Balance Support: Feet supported, No upper extremity supported  Static Sitting-Level of Assistance: Close supervision  Standing Balance:  Static Standing Balance  Static Standing-Balance Support: Bilateral upper extremity supported  Static Standing-Level of Assistance: Close supervision    IADL's:   Homemaking Responsibilities: No  IADL Comments: family manages    Sensation:  Sensation Comment: patient reports numbness/tingling in hands and feet  Strength:  Strength Comments: B UE 4/5 grossly  Coordination:  Movements are Fluid and Coordinated: Yes   Hand Function:  Hand Function  Gross Grasp: Functional  Coordination: Functional  Extremities: RUE   RUE : Within Functional Limits and LUE    LUE: Within Functional Limits    Outcome Measures: [facility] Daily Activity  Putting on and taking off regular lower body clothing: A little  Bathing (including washing, rinsing, drying): A little  Putting on and taking off regular upper body clothing: A little  Toileting, which includes using toilet, bedpan or urinal: A little  Taking care of personal grooming such as brushing teeth: None  Eating Meals: None  Daily Activity - Total Score: 20    Education Documentation  Precautions, taught by Lesa Lamar OT at 12/12/2024 11:56 AM.  Learner: Patient  Readiness: Acceptance  Method: Explanation, Demonstration  Response: Verbalizes Understanding    Body Mechanics, taught by Lesa Lamar OT at 12/12/2024 11:56 AM.  Learner: Patient  Readiness: Acceptance  Method: Explanation, Demonstration  Response: Verbalizes Understanding    ADL Training, taught by Lesa Lamar OT at 12/12/2024 11:56 AM.  Learner: Patient  Readiness: Acceptance  Method: Explanation, Demonstration  Response: Verbalizes Understanding    Education Comments  No comments found.      Goals: No acute OT needs identified

## 2024-12-12 NOTE — SIGNIFICANT EVENT
Patient's primary is Dr. Parmer, ED initially contacted myself to admit this patient, after seeing admitting order and reviewing chart noticed Dr. Parmer had discharge this patient last month from the hospital.  I contacted Dr. Parmer 11:30 PM on 12/11/2024 by phone and he agreed to take this patient.  I placed basic orders, canceled my admitting order.  I later took myself off as the attending and admitting, switched to Dr. Parmer since he agreed to be the admitting physician.    Simon Rene MD

## 2024-12-12 NOTE — PROGRESS NOTES
12/12/24 0835   Discharge Planning   Living Arrangements Spouse/significant other;Children   Support Systems Spouse/significant other;Children   Type of Residence Private residence   Number of Stairs to Enter Residence 0   Number of Stairs Within Residence 12  (pt lives in a second floor apt with no elevator)   Do you have animals or pets at home? No   Who is requesting discharge planning? Provider   Home or Post Acute Services None   Expected Discharge Disposition Home   Does the patient need discharge transport arranged? No   Financial Resource Strain   How hard is it for you to pay for the very basics like food, housing, medical care, and heating? Not hard   Housing Stability   In the last 12 months, was there a time when you were not able to pay the mortgage or rent on time? N   In the past 12 months, how many times have you moved where you were living? 0   At any time in the past 12 months, were you homeless or living in a shelter (including now)? N   Transportation Needs   In the past 12 months, has lack of transportation kept you from medical appointments or from getting medications? yes   In the past 12 months, has lack of transportation kept you from meetings, work, or from getting things needed for daily living? No   Patient Choice   Patient / Family choosing to utilize agency / facility established prior to hospitalization No   Stroke Family Assessment   Stroke Family Assessment Needed No

## 2024-12-12 NOTE — PROGRESS NOTES
12/12/24 0832   Physical Activity   On average, how many days per week do you engage in moderate to strenuous exercise (like a brisk walk)? 0 days   On average, how many minutes do you engage in exercise at this level? 0 min   Financial Resource Strain   How hard is it for you to pay for the very basics like food, housing, medical care, and heating? Not hard   Housing Stability   In the last 12 months, was there a time when you were not able to pay the mortgage or rent on time? N   In the past 12 months, how many times have you moved where you were living? 0   At any time in the past 12 months, were you homeless or living in a shelter (including now)? N   Transportation Needs   In the past 12 months, has lack of transportation kept you from medical appointments or from getting medications? yes  (re-scheduled)   In the past 12 months, has lack of transportation kept you from meetings, work, or from getting things needed for daily living? No   Food Insecurity   Within the past 12 months, you worried that your food would run out before you got the money to buy more. Never true   Within the past 12 months, the food you bought just didn't last and you didn't have money to get more. Never true   Stress   Do you feel stress - tense, restless, nervous, or anxious, or unable to sleep at night because your mind is troubled all the time - these days? Only a littl   Social Connections   In a typical week, how many times do you talk on the phone with family, friends, or neighbors? More than 3  (pt lives with her dtr and  and she talks to her brother daily)   How often do you get together with friends or relatives? More than 3  (pt lives with her dtr and )   How often do you attend Taoist or Orthodox services? More than 4   Do you belong to any clubs or organizations such as Taoist groups, unions, fraternal or athletic groups, or school groups? No   How often do you attend meetings of the clubs or organizations  you belong to? Never   Are you , , , , never , or living with a partner?    Intimate Partner Violence   Within the last year, have you been afraid of your partner or ex-partner? No   Within the last year, have you been humiliated or emotionally abused in other ways by your partner or ex-partner? No   Within the last year, have you been kicked, hit, slapped, or otherwise physically hurt by your partner or ex-partner? No   Within the last year, have you been raped or forced to have any kind of sexual activity by your partner or ex-partner? No   Alcohol Use   Q1: How often do you have a drink containing alcohol? Never   Q2: How many drinks containing alcohol do you have on a typical day when you are drinking? None   Q3: How often do you have six or more drinks on one occasion? Never   Utilities   In the past 12 months has the electric, gas, oil, or water company threatened to shut off services in your home? No   Health Literacy   How often do you need to have someone help you when you read instructions, pamphlets, or other written material from your doctor or pharmacy? Always  (pts primary language is Macedonian, she cannot read English)

## 2024-12-12 NOTE — PROGRESS NOTES
12/12/24 0836   [facility] Disability Status   Are you deaf or do you have serious difficulty hearing? N   Are you blind or do you have serious difficulty seeing, even when wearing glasses? N   Because of a physical, mental, or emotional condition, do you have serious difficulty concentrating, remembering, or making decisions? (5 years old or older) N   Do you have serious difficulty walking or climbing stairs? Y   Do you have serious difficulty dressing or bathing? Y  (dtr assiat with showering)   Because of a physical, mental, or emotional condition, do you have serious difficulty doing errands alone such as visiting the doctor? Y  ( and dtr do the shopping, cooking and cleaning)

## 2024-12-13 ENCOUNTER — PHARMACY VISIT (OUTPATIENT)
Dept: PHARMACY | Facility: CLINIC | Age: 49
End: 2024-12-13
Payer: COMMERCIAL

## 2024-12-13 ENCOUNTER — APPOINTMENT (OUTPATIENT)
Dept: RADIOLOGY | Facility: HOSPITAL | Age: 49
End: 2024-12-13
Payer: COMMERCIAL

## 2024-12-13 VITALS
SYSTOLIC BLOOD PRESSURE: 111 MMHG | RESPIRATION RATE: 18 BRPM | TEMPERATURE: 98.1 F | DIASTOLIC BLOOD PRESSURE: 48 MMHG | BODY MASS INDEX: 32.01 KG/M2 | WEIGHT: 173.94 LBS | OXYGEN SATURATION: 98 % | HEART RATE: 74 BPM | HEIGHT: 62 IN

## 2024-12-13 LAB
AFP SERPL-MCNC: 4 NG/ML (ref 0–9)
ALBUMIN FLD-MCNC: 0.5 G/DL
ALBUMIN SERPL BCP-MCNC: 2 G/DL (ref 3.4–5)
ALP SERPL-CCNC: 236 U/L (ref 33–110)
ALT SERPL W P-5'-P-CCNC: 26 U/L (ref 7–45)
AMMONIA PLAS-SCNC: 87 UMOL/L (ref 16–53)
ANION GAP SERPL CALCULATED.3IONS-SCNC: 7 MMOL/L (ref 10–20)
AST SERPL W P-5'-P-CCNC: 46 U/L (ref 9–39)
BACTERIA UR CULT: NORMAL
BASOPHILS NFR FLD MANUAL: 0 %
BILIRUB SERPL-MCNC: 2.2 MG/DL (ref 0–1.2)
BLASTS NFR FLD MANUAL: 0 %
BUN SERPL-MCNC: 17 MG/DL (ref 6–23)
CALCIUM SERPL-MCNC: 7.3 MG/DL (ref 8.6–10.3)
CHLORIDE SERPL-SCNC: 107 MMOL/L (ref 98–107)
CLARITY FLD: CLEAR
CO2 SERPL-SCNC: 28 MMOL/L (ref 21–32)
COLOR FLD: YELLOW
CREAT SERPL-MCNC: 0.59 MG/DL (ref 0.5–1.05)
EGFRCR SERPLBLD CKD-EPI 2021: >90 ML/MIN/1.73M*2
EOSINOPHIL NFR FLD MANUAL: 0 %
ERYTHROCYTE [DISTWIDTH] IN BLOOD BY AUTOMATED COUNT: 25.6 % (ref 11.5–14.5)
GLUCOSE BLD MANUAL STRIP-MCNC: 160 MG/DL (ref 74–99)
GLUCOSE BLD MANUAL STRIP-MCNC: 180 MG/DL (ref 74–99)
GLUCOSE BLD MANUAL STRIP-MCNC: 182 MG/DL (ref 74–99)
GLUCOSE FLD-MCNC: 187 MG/DL
GLUCOSE SERPL-MCNC: 73 MG/DL (ref 74–99)
HCT VFR BLD AUTO: 31.9 % (ref 36–46)
HGB BLD-MCNC: 10.6 G/DL (ref 12–16)
HOLD SPECIMEN: NORMAL
IMMATURE GRANULOCYTES IN FLUID: 0 %
LDH FLD L TO P-CCNC: 46 U/L
LYMPHOCYTES NFR FLD MANUAL: 35 %
MCH RBC QN AUTO: 28.9 PG (ref 26–34)
MCHC RBC AUTO-ENTMCNC: 33.2 G/DL (ref 32–36)
MCV RBC AUTO: 87 FL (ref 80–100)
MONOS+MACROS NFR FLD MANUAL: 63 %
NEUTROPHILS NFR FLD MANUAL: 2 %
NRBC BLD-RTO: 0 /100 WBCS (ref 0–0)
OTHER CELLS NFR FLD MANUAL: 0 %
PLASMA CELLS NFR FLD MANUAL: 0 %
PLATELET # BLD AUTO: 82 X10*3/UL (ref 150–450)
POTASSIUM SERPL-SCNC: 3.6 MMOL/L (ref 3.5–5.3)
PROT FLD-MCNC: 1.3 G/DL
PROT SERPL-MCNC: 6.1 G/DL (ref 6.4–8.2)
RBC # BLD AUTO: 3.67 X10*6/UL (ref 4–5.2)
RBC # FLD AUTO: 1000 /UL
SODIUM SERPL-SCNC: 138 MMOL/L (ref 136–145)
TOTAL CELLS COUNTED FLD: 100
WBC # BLD AUTO: 4.9 X10*3/UL (ref 4.4–11.3)
WBC # FLD AUTO: 152 /UL

## 2024-12-13 PROCEDURE — 82945 GLUCOSE OTHER FLUID: CPT | Mod: WESLAB | Performed by: NURSE PRACTITIONER

## 2024-12-13 PROCEDURE — RXMED WILLOW AMBULATORY MEDICATION CHARGE

## 2024-12-13 PROCEDURE — 82947 ASSAY GLUCOSE BLOOD QUANT: CPT

## 2024-12-13 PROCEDURE — 2500000004 HC RX 250 GENERAL PHARMACY W/ HCPCS (ALT 636 FOR OP/ED): Performed by: INTERNAL MEDICINE

## 2024-12-13 PROCEDURE — 2720000007 HC OR 272 NO HCPCS

## 2024-12-13 PROCEDURE — 80053 COMPREHEN METABOLIC PANEL: CPT | Performed by: NURSE PRACTITIONER

## 2024-12-13 PROCEDURE — 2500000004 HC RX 250 GENERAL PHARMACY W/ HCPCS (ALT 636 FOR OP/ED): Mod: JZ

## 2024-12-13 PROCEDURE — 49083 ABD PARACENTESIS W/IMAGING: CPT

## 2024-12-13 PROCEDURE — C1729 CATH, DRAINAGE: HCPCS

## 2024-12-13 PROCEDURE — 36415 COLL VENOUS BLD VENIPUNCTURE: CPT

## 2024-12-13 PROCEDURE — 36415 COLL VENOUS BLD VENIPUNCTURE: CPT | Performed by: NURSE PRACTITIONER

## 2024-12-13 PROCEDURE — 87116 MYCOBACTERIA CULTURE: CPT | Mod: WESLAB | Performed by: NURSE PRACTITIONER

## 2024-12-13 PROCEDURE — 84157 ASSAY OF PROTEIN OTHER: CPT | Mod: WESLAB | Performed by: NURSE PRACTITIONER

## 2024-12-13 PROCEDURE — 83615 LACTATE (LD) (LDH) ENZYME: CPT | Mod: WESLAB | Performed by: NURSE PRACTITIONER

## 2024-12-13 PROCEDURE — 89051 BODY FLUID CELL COUNT: CPT | Performed by: NURSE PRACTITIONER

## 2024-12-13 PROCEDURE — P9047 ALBUMIN (HUMAN), 25%, 50ML: HCPCS | Mod: JZ

## 2024-12-13 PROCEDURE — 2500000001 HC RX 250 WO HCPCS SELF ADMINISTERED DRUGS (ALT 637 FOR MEDICARE OP): Performed by: INTERNAL MEDICINE

## 2024-12-13 PROCEDURE — 82140 ASSAY OF AMMONIA: CPT | Performed by: NURSE PRACTITIONER

## 2024-12-13 PROCEDURE — 85027 COMPLETE CBC AUTOMATED: CPT | Performed by: NURSE PRACTITIONER

## 2024-12-13 PROCEDURE — 82105 ALPHA-FETOPROTEIN SERUM: CPT | Mod: WESLAB

## 2024-12-13 PROCEDURE — 87116 MYCOBACTERIA CULTURE: CPT | Performed by: NURSE PRACTITIONER

## 2024-12-13 PROCEDURE — 82042 OTHER SOURCE ALBUMIN QUAN EA: CPT | Mod: WESLAB | Performed by: NURSE PRACTITIONER

## 2024-12-13 PROCEDURE — 97161 PT EVAL LOW COMPLEX 20 MIN: CPT | Mod: GP

## 2024-12-13 PROCEDURE — 2500000002 HC RX 250 W HCPCS SELF ADMINISTERED DRUGS (ALT 637 FOR MEDICARE OP, ALT 636 FOR OP/ED): Performed by: INTERNAL MEDICINE

## 2024-12-13 PROCEDURE — 87075 CULTR BACTERIA EXCEPT BLOOD: CPT | Mod: WESLAB | Performed by: NURSE PRACTITIONER

## 2024-12-13 RX ORDER — INSULIN LISPRO 100 [IU]/ML
0-15 INJECTION, SOLUTION INTRAVENOUS; SUBCUTANEOUS
Start: 2024-12-13

## 2024-12-13 RX ORDER — INSULIN GLARGINE 100 [IU]/ML
25 INJECTION, SOLUTION SUBCUTANEOUS 2 TIMES DAILY
Qty: 15 ML | Refills: 0 | Status: SHIPPED | OUTPATIENT
Start: 2024-12-13

## 2024-12-13 RX ORDER — MECLIZINE HYDROCHLORIDE 25 MG/1
25 TABLET ORAL 3 TIMES DAILY PRN
Status: CANCELLED | OUTPATIENT
Start: 2024-12-13

## 2024-12-13 RX ORDER — METFORMIN HYDROCHLORIDE 500 MG/1
1000 TABLET, EXTENDED RELEASE ORAL
Qty: 60 TABLET | Refills: 0 | Status: SHIPPED | OUTPATIENT
Start: 2024-12-13

## 2024-12-13 RX ORDER — FUROSEMIDE 40 MG/1
40 TABLET ORAL DAILY
Qty: 30 TABLET | Refills: 0 | Status: SHIPPED | OUTPATIENT
Start: 2024-12-13

## 2024-12-13 RX ORDER — LACTULOSE 10 G/15ML
20 SOLUTION ORAL 2 TIMES DAILY
Status: CANCELLED | OUTPATIENT
Start: 2024-12-13

## 2024-12-13 RX ORDER — SPIRONOLACTONE 50 MG/1
50 TABLET, FILM COATED ORAL 2 TIMES DAILY
Qty: 60 TABLET | Refills: 0 | Status: SHIPPED | OUTPATIENT
Start: 2024-12-13

## 2024-12-13 RX ADMIN — SUCRALFATE 1 G: 1 TABLET ORAL at 16:46

## 2024-12-13 RX ADMIN — METFORMIN HYDROCHLORIDE 1000 MG: 500 TABLET, EXTENDED RELEASE ORAL at 16:46

## 2024-12-13 RX ADMIN — DOCUSATE SODIUM 100 MG: 100 CAPSULE, LIQUID FILLED ORAL at 09:13

## 2024-12-13 RX ADMIN — INSULIN GLARGINE 25 UNITS: 100 INJECTION, SOLUTION SUBCUTANEOUS at 09:13

## 2024-12-13 RX ADMIN — ALBUMIN (HUMAN) 25 G: 12.5 SOLUTION INTRAVENOUS at 09:14

## 2024-12-13 RX ADMIN — FUROSEMIDE 40 MG: 40 TABLET ORAL at 09:13

## 2024-12-13 RX ADMIN — DICYCLOMINE HYDROCHLORIDE 20 MG: 20 TABLET ORAL at 09:13

## 2024-12-13 RX ADMIN — MIDODRINE HYDROCHLORIDE 5 MG: 5 TABLET ORAL at 09:13

## 2024-12-13 RX ADMIN — NADOLOL 20 MG: 40 TABLET ORAL at 09:12

## 2024-12-13 RX ADMIN — INSULIN LISPRO 3 UNITS: 100 INJECTION, SOLUTION INTRAVENOUS; SUBCUTANEOUS at 09:14

## 2024-12-13 RX ADMIN — INSULIN LISPRO 3 UNITS: 100 INJECTION, SOLUTION INTRAVENOUS; SUBCUTANEOUS at 16:46

## 2024-12-13 RX ADMIN — URSODIOL 300 MG: 300 CAPSULE ORAL at 09:13

## 2024-12-13 RX ADMIN — Medication 400 MG: at 09:12

## 2024-12-13 RX ADMIN — FENOFIBRATE 160 MG: 160 TABLET ORAL at 09:12

## 2024-12-13 RX ADMIN — MIDODRINE HYDROCHLORIDE 5 MG: 5 TABLET ORAL at 16:46

## 2024-12-13 RX ADMIN — URSODIOL 300 MG: 300 CAPSULE ORAL at 14:54

## 2024-12-13 RX ADMIN — SPIRONOLACTONE 50 MG: 50 TABLET ORAL at 09:13

## 2024-12-13 RX ADMIN — INSULIN LISPRO 3 UNITS: 100 INJECTION, SOLUTION INTRAVENOUS; SUBCUTANEOUS at 12:21

## 2024-12-13 RX ADMIN — POLYETHYLENE GLYCOL 3350 17 G: 17 POWDER, FOR SOLUTION ORAL at 09:12

## 2024-12-13 RX ADMIN — SUCRALFATE 1 G: 1 TABLET ORAL at 06:12

## 2024-12-13 RX ADMIN — PANTOPRAZOLE SODIUM 40 MG: 40 TABLET, DELAYED RELEASE ORAL at 06:12

## 2024-12-13 RX ADMIN — MIDODRINE HYDROCHLORIDE 5 MG: 5 TABLET ORAL at 12:21

## 2024-12-13 RX ADMIN — SUCRALFATE 1 G: 1 TABLET ORAL at 11:04

## 2024-12-13 RX ADMIN — RIFAXIMIN 550 MG: 550 TABLET ORAL at 09:13

## 2024-12-13 RX ADMIN — DICYCLOMINE HYDROCHLORIDE 20 MG: 20 TABLET ORAL at 14:54

## 2024-12-13 ASSESSMENT — PAIN SCALES - GENERAL
PAINLEVEL_OUTOF10: 0 - NO PAIN
PAINLEVEL_OUTOF10: 5 - MODERATE PAIN
PAINLEVEL_OUTOF10: 0 - NO PAIN

## 2024-12-13 ASSESSMENT — COGNITIVE AND FUNCTIONAL STATUS - GENERAL
MOVING TO AND FROM BED TO CHAIR: A LITTLE
MOBILITY SCORE: 24
DAILY ACTIVITIY SCORE: 24
MOBILITY SCORE: 19
CLIMB 3 TO 5 STEPS WITH RAILING: A LOT
STANDING UP FROM CHAIR USING ARMS: A LITTLE
WALKING IN HOSPITAL ROOM: A LITTLE

## 2024-12-13 ASSESSMENT — PAIN - FUNCTIONAL ASSESSMENT: PAIN_FUNCTIONAL_ASSESSMENT: 0-10

## 2024-12-13 ASSESSMENT — ACTIVITIES OF DAILY LIVING (ADL): ADL_ASSISTANCE: NEEDS ASSISTANCE

## 2024-12-13 NOTE — H&P (VIEW-ONLY)
Alfonzo Flanagan is a 49 y.o. female on day 2 of admission presenting with SBP (spontaneous bacterial peritonitis) (Multi).    Per nursing, patient reported no void, straight catheterization 250 mL urine.  Status post paracentesis.  No new issues.    Subjective   Patient seen and examined.  Resting in bed in no acute distress.  Talking on the phone with her daughter.  Awake alert oriented x 3.  Much better.  Decreased abdominal pain.  No nausea, vomiting or diarrhea.  Voiding.  Urinary symptoms improved.  Ambulated with therapy.  Asking about discharge.     Objective     Physical Exam  Vitals and nursing note reviewed.   Constitutional:       General: She is not in acute distress.     Appearance: She is obese. She is not ill-appearing, toxic-appearing or diaphoretic.   HENT:      Head: Normocephalic and atraumatic.      Right Ear: External ear normal.      Left Ear: External ear normal.      Nose: Nose normal.      Mouth/Throat:      Mouth: Mucous membranes are moist.      Pharynx: Oropharynx is clear.   Eyes:      Extraocular Movements: Extraocular movements intact.      Conjunctiva/sclera: Conjunctivae normal.      Pupils: Pupils are equal, round, and reactive to light.   Cardiovascular:      Rate and Rhythm: Normal rate and regular rhythm.      Pulses: Normal pulses.      Heart sounds: Normal heart sounds. No murmur heard.  Pulmonary:      Effort: Pulmonary effort is normal. No respiratory distress.      Breath sounds: Normal breath sounds. No wheezing, rhonchi or rales.   Abdominal:      General: Bowel sounds are normal. There is no distension.      Palpations: Abdomen is soft.      Tenderness: There is no abdominal tenderness.   Genitourinary:     Comments: Deferred.  Musculoskeletal:         General: No swelling or tenderness. Normal range of motion.      Cervical back: Normal range of motion and neck supple.   Skin:     General: Skin is warm and dry.      Capillary Refill: Capillary refill takes less than  "2 seconds.   Neurological:      General: No focal deficit present.      Mental Status: She is alert and oriented to person, place, and time.   Psychiatric:         Mood and Affect: Mood normal.         Behavior: Behavior normal.       Last Recorded Vitals  Blood pressure 113/60, pulse 69, temperature 36.7 °C (98.1 °F), temperature source Oral, resp. rate 18, height 1.575 m (5' 2\"), weight 78.9 kg (173 lb 15.1 oz), SpO2 100%.  Intake/Output last 3 Shifts:  I/O last 3 completed shifts:  In: 50 (0.6 mL/kg) [IV Piggyback:50]  Out: 0 (0 mL/kg)   Weight: 78.9 kg     Relevant Results        Malnutrition Diagnosis Status: New  Malnutrition Diagnosis: Moderate malnutrition related to chronic disease or condition  As Evidenced by: weight loss of >5% in one month, poor intake <75% of energy requirement > 7 days  I agree with the dietitian's malnutrition diagnosis.    Results for orders placed or performed during the hospital encounter of 12/11/24 (from the past 24 hours)   POCT GLUCOSE   Result Value Ref Range    POCT Glucose 146 (H) 74 - 99 mg/dL   Comprehensive Metabolic Panel   Result Value Ref Range    Glucose 73 (L) 74 - 99 mg/dL    Sodium 138 136 - 145 mmol/L    Potassium 3.6 3.5 - 5.3 mmol/L    Chloride 107 98 - 107 mmol/L    Bicarbonate 28 21 - 32 mmol/L    Anion Gap 7 (L) 10 - 20 mmol/L    Urea Nitrogen 17 6 - 23 mg/dL    Creatinine 0.59 0.50 - 1.05 mg/dL    eGFR >90 >60 mL/min/1.73m*2    Calcium 7.3 (L) 8.6 - 10.3 mg/dL    Albumin 2.0 (L) 3.4 - 5.0 g/dL    Alkaline Phosphatase 236 (H) 33 - 110 U/L    Total Protein 6.1 (L) 6.4 - 8.2 g/dL    AST 46 (H) 9 - 39 U/L    Bilirubin, Total 2.2 (H) 0.0 - 1.2 mg/dL    ALT 26 7 - 45 U/L   CBC   Result Value Ref Range    WBC 4.9 4.4 - 11.3 x10*3/uL    nRBC 0.0 0.0 - 0.0 /100 WBCs    RBC 3.67 (L) 4.00 - 5.20 x10*6/uL    Hemoglobin 10.6 (L) 12.0 - 16.0 g/dL    Hematocrit 31.9 (L) 36.0 - 46.0 %    MCV 87 80 - 100 fL    MCH 28.9 26.0 - 34.0 pg    MCHC 33.2 32.0 - 36.0 g/dL    RDW " 25.6 (H) 11.5 - 14.5 %    Platelets 82 (L) 150 - 450 x10*3/uL   Ammonia   Result Value Ref Range    Ammonia 87 (H) 16 - 53 umol/L   POCT GLUCOSE   Result Value Ref Range    POCT Glucose 160 (H) 74 - 99 mg/dL   Lavender Top   Result Value Ref Range    Extra Tube Hold for add-ons.    Sterile Cup   Result Value Ref Range    Extra Tube Hold for add-ons.    Body Fluid Cell Count   Result Value Ref Range    Color, Fluid Yellow Colorless, Straw, Yellow    Clarity, Fluid Clear Clear    WBC, Fluid 152 See Comment /uL    RBC, Fluid 1,000 0  /uL /uL   Body Fluid Differential   Result Value Ref Range    Neutrophils %, Manual, Fluid 2 <25 % %    Lymphocytes %, Manual, Fluid 35 <75 % %    Mono/Macrophages %, Manual, Fluid 63 <70 % %    Eosinophils %, Manual, Fluid 0 0 % %    Basophils %, Manual, Fluid 0 0 % %    Immature Granulocytes %, Manual, Fluid 0 0 % %    Blasts %, Manual, Fluid 0 0 % %    Unclassified Cells %, Manual, Fluid 0 0 % %    Plasma Cells %, Manual, Fluid 0 0 % %    Total Cells Counted, Fluid 100    POCT GLUCOSE   Result Value Ref Range    POCT Glucose 180 (H) 74 - 99 mg/dL   POCT GLUCOSE   Result Value Ref Range    POCT Glucose 182 (H) 74 - 99 mg/dL     Susceptibility data from last 90 days.  Collected Specimen Info Organism Amoxicillin/Clavulanate Ampicillin Ampicillin/Sulbactam Cefazolin Cefazolin (uncomplicated UTIs only) Ciprofloxacin Gentamicin Levofloxacin Nitrofurantoin Penicillin Piperacillin/Tazobactam   10/28/24 Urine from Clean Catch/Voided Klebsiella pneumoniae/variicola  S  R  S  S  S  S  S   S   S     Enterococcus faecium   S     S   S  I  S      Collected Specimen Info Organism Trimethoprim/Sulfamethoxazole Vancomycin   10/28/24 Urine from Clean Catch/Voided Klebsiella pneumoniae/variicola  S      Enterococcus faecium  R  S     CT abdomen pelvis wo IV contrast    Result Date: 12/11/2024  Interpreted By:  Christina Edmondson, STUDY: CT abdomen pelvis   INDICATION: Signs/Symptoms:abdominal pain.    COMPARISON: 12/08/2024, 04/18/2024   ACCESSION NUMBER(S): [id number]   ORDERING CLINICIAN: MARTY LEJEUNE   TECHNIQUE: Axial noncontrast CT images of the abdomen and pelvis with coronal and sagittal reconstructed images.   FINDINGS: LOWER CHEST: No acute abnormality of the lung bases. Partially imaged 6 mm right middle lobe nodule. BONES: No acute osseous abnormality. ABDOMINAL WALL: Diffuse fat stranding and anasarca. Fluid containing supraumbilical hernia. Small fat containing umbilical hernia.   ABDOMEN: Lack of intravenous contrast limits evaluation of vessels and solid organs. LIVER: Nodular atrophic appearance of the liver. No mass identified. Recanalization of the umbilical vein. BILE DUCTS: Normal caliber. GALLBLADDER: Status post cholecystectomy. PANCREAS: No peripancreatic inflammatory stranding or duct dilatation. SPLEEN: 12.4 cm. ADRENALS: Within normal limits.   KIDNEYS, URETERS, URINARY BLADDER: No hydronephrosis or urinary tract calculus.  The urinary bladder is unremarkable.   VESSELS: Atherosclerotic calcifications without aneurysmal dilatation seen. RETROPERITONEUM: Shotty retroperitoneal lymph nodes.   PELVIS:   REPRODUCTIVE ORGANS: No abnormality, given limitations of the noncontrast CT.   BOWEL: No dilated bowel. Diffuse wall thickening of small bowel loops likely related to adjacent fluid and possible congestion. PERITONEUM: Moderate to advanced abdominopelvic ascites and stranding.       Hepatic cirrhosis with sequela of portal hypertension and abdominopelvic ascites.   6 mm right middle lobe nodule. Follow-up non emergent chest CT may be considered in 3 months to assess stability.     MACRO: Incidental Finding:  A solid non-calcified pulmonary nodule measuring 6-8 mm .  (**-YCF-**)   Instructions:  Consider follow up non contrast chest CT at 6-12 months, then consider CT chest at 18-24 months. (Vikas Long et al., Guidelines for management of incidental pulmonary nodules detected on CT  images: From the Fleischner Society 2017, Radiology. 2017 Jul;284 (1):228-243.) [id number]   Signed by: Christina Edmondson 12/11/2024 9:11 PM Dictation workstation:   [id number]    XR abdomen 2 views supine and erect or decub    Result Date: 12/11/2024  Interpreted By:  Aysha Norton, STUDY: XR ABDOMEN 2 VIEWS SUPINE AND ERECT OR DECUB;  12/11/2024 6:53 pm   INDICATION: Signs/Symptoms:ab pain.     COMPARISON: None.   ACCESSION NUMBER(S): [id number]   ORDERING CLINICIAN: MARTY LEJEUNE   TECHNIQUE: Abdomen supine and upright views   FINDINGS:     ABDOMEN: Nonobstructive bowel gas pattern.   No evidence of pneumoperitoneum.   Osseous structures demonstrate no acute bony abnormalities.   Surgical clips in the right upper quadrant of the abdomen.       1.  Nonobstructive bowel gas pattern. 2.  No acute osseous abnormality     MACRO: None   Signed by: Aysha Norton 12/11/2024 6:58 PM Dictation workstation:   [id number]     Scheduled medications  atorvastatin, 80 mg, oral, Nightly  dicyclomine, 20 mg, oral, TID  docusate sodium, 100 mg, oral, BID  fenofibrate, 160 mg, oral, Daily  influenza, 0.5 mL, intramuscular, During hospitalization  furosemide, 40 mg, oral, Daily  insulin glargine, 25 Units, subcutaneous, q24h  insulin lispro, 0-15 Units, subcutaneous, TID AC  magnesium oxide, 400 mg, oral, Daily  metFORMIN XR, 1,000 mg, oral, Daily with evening meal  midodrine, 5 mg, oral, TID  nadolol, 20 mg, oral, Daily  nitroglycerin, 1 patch, transdermal, Daily  pantoprazole, 40 mg, oral, Daily before breakfast  polyethylene glycol, 17 g, oral, BID  rifAXIMin, 550 mg, oral, BID  spironolactone, 50 mg, oral, BID  sucralfate, 1 g, oral, Before meals & nightly  traZODone, 25 mg, oral, Nightly  ursodiol, 300 mg, oral, TID      Continuous medications     PRN medications  PRN medications: benzocaine-menthol, dextromethorphan-guaifenesin, dextrose, dextrose, glucagon, glucagon, guaiFENesin, morphine, ondansetron ODT,  traMADol    ASSESSMENT:  Abdominal pain, distention, nausea, diarrhea resolved  Hepatic cirrhosis with ascites status post paracentesis  Transaminitis  Hyperammoniemia  Pancytopenia  Type 2 diabetes mellitus  Hypoalbuminemia  Dysuria, improved  Pyuria    PLAN:  Patient is doing well this afternoon.  No new issues.  Gastroenterology input appreciated.  Status post abdominal paracentesis.  IV Albumin.  Symptoms abdominal pain significantly improved.  Cytology reviewed.  No spontaneous bacterial peritonitis.  Cultures pending.  Patient is afebrile and nontoxic-appearing.  Abdominal pain has significantly improved status post paracentesis.  Continue current medications.  Bentyl.  PPI Protonix.  Carafate.  Diet as tolerated.  Diuretics Lasix, Spironolactone.  Low-sodium diet.  Monitor daily weights, electrolytes, renal function, blood pressure.  Nadolol.  Midodrine.  Monitor blood pressure.  Lactulose.  Titrate for 3 bowel movements in 24 hours.  Rifaximin.  CBC reviewed.  H&H and plate count noted.  No evidence of acute blood loss.  Avoid NSAIDs.  Continue PPI.  Secure message to CNP.  Okay to discharge per Gastroenterology.  Outpatient follow-up with Gastroenterology, Hepatology.  Urine culture no growth.  No indication for antibiotics.  Repeat post-void residual bladder scan.  Follow-up.  Hemoglobin A1c 7.6 decreased from 9.9 4 months ago.  Discussed with patient.  ADA diet.  Monitor point-of-care glucose AC/HS.  Insulin Lantus, sliding scale insulin.  Hypoglycemia protocol.  Outpatient follow-up with PCP for blood glucose monitoring and management.  Outpatient diabetes education.  PT/OT.  Fall precautions.  Up with assistance only.  Bed and chair alarm at all times.  Pressure ulcer prevention measures.  Turn and reposition every 2 hours and as needed.  Heels off bed.  Offloading.  DVT prophylaxis.  SCDs.  GI prophylaxis.  PPI Protonix.  Supportive care.  Patient reassured.  Case management following for discharge  planning.  Discharge plan home.  Discussed with Dr. Lawrence.  Okay to discharge home when arrangements are made by case management, pending post-void residual bladder scan.  Discussed with patient.  She is in agreement.        MADYSON Sotelo-CNP

## 2024-12-13 NOTE — DISCHARGE INSTRUCTIONS
If you have any questions, please contact Dr. Lawrence's office at [phone].    Follow-up with Gastroenterology Hepatology as advised.    Routine paracentesis.

## 2024-12-13 NOTE — PROGRESS NOTES
Physical Therapy    Physical Therapy Evaluation    Patient Name: Alfonzo Flanagan  MRN: [id number]  Department: [address]  Room: [address] [place] [address]  Today's Date: 12/13/2024   Time Calculation  Start Time: 1105  Stop Time: 1124  Time Calculation (min): 19 min    Assessment/Plan   PT Assessment  PT Assessment Results: Decreased strength, Impaired balance, Decreased mobility, Pain  Rehab Prognosis: Good  Barriers to Discharge Home: No anticipated barriers  Evaluation/Treatment Tolerance: Patient limited by fatigue  Strengths: Attitude of self, Ability to acquire knowledge, Support of extended family/friends  Barriers to Participation: Comorbidities  End of Session Communication: Bedside nurse  Assessment Comment: Pt. would benefit from continued physical therapy at low intensity level.  End of Session Patient Position: On cart, Alarm off, not on at start of session (leaving for u/s-guided paracentesis, with transporter)  IP OR SWING BED PT PLAN  Inpatient or Swing Bed: Inpatient  PT Plan  Treatment/Interventions: Transfer training, Gait training, Stair training, Balance training, Therapeutic exercise, Therapeutic activity, Strengthening  PT Plan: Ongoing PT  PT Frequency: 4 times per week  PT Discharge Recommendations: Low intensity level of continued care  Equipment Recommended upon Discharge: Wheeled walker    Subjective   General Visit Information:  General  Reason for Referral: impaired functional mobility; PT eval and treat  Referred By: Dr. Lawrence  Past Medical History Relevant to Rehab: UTI, hepatic encephalopathy, hyperglycemia, DM type 2, hyperammonemia, anemia of chronic disease, ascites, depresson, anxiety, asthma, REED  Prior to Session Communication: Bedside nurse (Jazmin)  Patient Position Received: Bed, 2 rail up, Alarm off, not on at start of session  General Comment: Pt. is a 48 y/o female with c/o abdominal pain. CT of the abdomen indicating a solid non-calcified pulmonary nodule measuring 6-8 mm. CT abdomen  indicating non-obstructive bowel gas pattern. DX: Bacterial peritonitis.  Scheduled for paracentesis this date.  Home Living:  Home Living  Type of Home: Apartment  Lives With: Spouse, Adult children  Home Adaptive Equipment: Walker rolling or standard  Home Layout: One level, Stairs to alternate level with rails  Alternate Level Stairs-Number of Steps: 11  Home Living Comments:  (family is usually at home with her and assist prn.)  Prior Level of Function:  Prior Function Per Pt/Caregiver Report  Level of [place]: Needs assistance with ADLs, Needs assistance with homemaking  Receives Help From: Family  ADL Assistance: Needs assistance  Homemaking Assistance:  ( and dtr. manage most homechores)  Ambulatory Assistance: Independent (uses rollator mostly; does some short distances without ad)  Precautions:  Precautions  Medical Precautions: Fall precautions    Objective   Pain:  Pain Assessment  Pain Assessment: 0-10  0-10 (Numeric) Pain Score: 5 - Moderate pain  Pain Type: Acute pain  Pain Location: Abdomen  Pain Interventions: Repositioned  Cognition:  Cognition  Overall Cognitive Status: Within Functional Limits    General Assessments:  General Observation  General Observation:  (Pt. is pleasant and cooperative for therapy.)    Activity Tolerance  Endurance: Tolerates 10 - 20 min exercise with multiple rests    Sensation  Light Touch: Partial deficits in the RLE, Partial deficits in the LLE (reports n/t tico hands and feet)    Strength  Strength Comments: grossly tico ues wfl; grossly tico hips 4-/5, knees/ankles 4/5  Perception  Inattention/Neglect: Appears intact    Static Sitting Balance  Static Sitting-Balance Support: No upper extremity supported  Static Sitting-Level of Assistance: Modified independent  Static Sitting-Comment/Number of Minutes: 5    Static Standing Balance  Static Standing-Balance Support: No upper extremity supported  Static Standing-Level of Assistance: Close supervision  Static  Standing-Comment/Number of Minutes: 3  Dynamic Standing Balance  Dynamic Standing-Balance Support: No upper extremity supported  Dynamic Standing-Level of Assistance: Contact guard  Dynamic Standing-Comments: F  Functional Assessments:  Bed Mobility  Bed Mobility:  (supine<>sit independently)    Transfers  Transfer:  (sit<>stand from eob req. supervision)    Ambulation/Gait Training  Ambulation/Gait Training Performed:  (Pt. ambulated approx. 40ft. without ad, req. CGA.  Pt.is overall weak and deconditioned, slightly unsteady.  Ambulated approx. 60ft.with WW and progressed to SBA.  Cues for posture and stride length, and overall safety.)  Extremity/Trunk Assessments:  RUE   RUE : Within Functional Limits  LUE   LUE: Within Functional Limits  RLE   RLE : Within Functional Limits  LLE   LLE : Within Functional Limits  Outcome Measures:  [facility] Basic Mobility  Turning from your back to your side while in a flat bed without using bedrails: None  Moving from lying on your back to sitting on the side of a flat bed without using bedrails: None  Moving to and from bed to chair (including a wheelchair): A little  Standing up from a chair using your arms (e.g. wheelchair or bedside chair): A little  To walk in hospital room: A little  Climbing 3-5 steps with railing: A lot  Basic Mobility - Total Score: 19    Encounter Problems       Encounter Problems (Active)       PT Problem       STG - Pt will transfer sit<>stand independently (Progressing)       Start:  12/13/24    Expected End:  12/27/24            STG - Pt will amb 150' using WW/rollator for distances, or no ad shorter distances, independently  (Progressing)       Start:  12/13/24    Expected End:  12/27/24            STG -  Pt will navigate 11 stairs using handrail and lrd prn, independently  (Progressing)       Start:  12/13/24    Expected End:  12/27/24            Pt. will demonstrate >=G dyn stand balance (Progressing)       Start:  12/13/24    Expected End:   12/27/24            Pt.will perform BLE therapeutic exercises x 20reps x 2 sets for increased functional strength/endurance  (Progressing)       Start:  12/13/24    Expected End:  12/27/24                   Education Documentation  Mobility Training, taught by Kayleigh Kidd, PT at 12/13/2024  4:13 PM.  Learner: Patient  Readiness: Acceptance  Method: Explanation  Response: Verbalizes Understanding, Needs Reinforcement    Education Comments  No comments found.

## 2024-12-13 NOTE — PROGRESS NOTES
12/13/24 1123   Discharge Planning   Expected Discharge Disposition Home   Does the patient need discharge transport arranged? No

## 2024-12-13 NOTE — PROGRESS NOTES
Alfonzo Flanagan is a 49 y.o. female on day 2 of admission presenting with SBP (spontaneous bacterial peritonitis) (Multi).    Per nursing, patient reported no void, straight catheterization 250 mL urine.  Status post paracentesis.  No new issues.    Subjective   Patient seen and examined.  Resting in bed in no acute distress.  Talking on the phone with her daughter.  Awake alert oriented x 3.  Much better.  Decreased abdominal pain.  No nausea, vomiting or diarrhea.  Voiding.  Urinary symptoms improved.  Ambulated with therapy.  Asking about discharge.     Objective     Physical Exam  Vitals and nursing note reviewed.   Constitutional:       General: She is not in acute distress.     Appearance: She is obese. She is not ill-appearing, toxic-appearing or diaphoretic.   HENT:      Head: Normocephalic and atraumatic.      Right Ear: External ear normal.      Left Ear: External ear normal.      Nose: Nose normal.      Mouth/Throat:      Mouth: Mucous membranes are moist.      Pharynx: Oropharynx is clear.   Eyes:      Extraocular Movements: Extraocular movements intact.      Conjunctiva/sclera: Conjunctivae normal.      Pupils: Pupils are equal, round, and reactive to light.   Cardiovascular:      Rate and Rhythm: Normal rate and regular rhythm.      Pulses: Normal pulses.      Heart sounds: Normal heart sounds. No murmur heard.  Pulmonary:      Effort: Pulmonary effort is normal. No respiratory distress.      Breath sounds: Normal breath sounds. No wheezing, rhonchi or rales.   Abdominal:      General: Bowel sounds are normal. There is no distension.      Palpations: Abdomen is soft.      Tenderness: There is no abdominal tenderness.   Genitourinary:     Comments: Deferred.  Musculoskeletal:         General: No swelling or tenderness. Normal range of motion.      Cervical back: Normal range of motion and neck supple.   Skin:     General: Skin is warm and dry.      Capillary Refill: Capillary refill takes less than  "2 seconds.   Neurological:      General: No focal deficit present.      Mental Status: She is alert and oriented to person, place, and time.   Psychiatric:         Mood and Affect: Mood normal.         Behavior: Behavior normal.       Last Recorded Vitals  Blood pressure 113/60, pulse 69, temperature 36.7 °C (98.1 °F), temperature source Oral, resp. rate 18, height 1.575 m (5' 2\"), weight 78.9 kg (173 lb 15.1 oz), SpO2 100%.  Intake/Output last 3 Shifts:  I/O last 3 completed shifts:  In: 50 (0.6 mL/kg) [IV Piggyback:50]  Out: 0 (0 mL/kg)   Weight: 78.9 kg     Relevant Results        Malnutrition Diagnosis Status: New  Malnutrition Diagnosis: Moderate malnutrition related to chronic disease or condition  As Evidenced by: weight loss of >5% in one month, poor intake <75% of energy requirement > 7 days  I agree with the dietitian's malnutrition diagnosis.    Results for orders placed or performed during the hospital encounter of 12/11/24 (from the past 24 hours)   POCT GLUCOSE   Result Value Ref Range    POCT Glucose 146 (H) 74 - 99 mg/dL   Comprehensive Metabolic Panel   Result Value Ref Range    Glucose 73 (L) 74 - 99 mg/dL    Sodium 138 136 - 145 mmol/L    Potassium 3.6 3.5 - 5.3 mmol/L    Chloride 107 98 - 107 mmol/L    Bicarbonate 28 21 - 32 mmol/L    Anion Gap 7 (L) 10 - 20 mmol/L    Urea Nitrogen 17 6 - 23 mg/dL    Creatinine 0.59 0.50 - 1.05 mg/dL    eGFR >90 >60 mL/min/1.73m*2    Calcium 7.3 (L) 8.6 - 10.3 mg/dL    Albumin 2.0 (L) 3.4 - 5.0 g/dL    Alkaline Phosphatase 236 (H) 33 - 110 U/L    Total Protein 6.1 (L) 6.4 - 8.2 g/dL    AST 46 (H) 9 - 39 U/L    Bilirubin, Total 2.2 (H) 0.0 - 1.2 mg/dL    ALT 26 7 - 45 U/L   CBC   Result Value Ref Range    WBC 4.9 4.4 - 11.3 x10*3/uL    nRBC 0.0 0.0 - 0.0 /100 WBCs    RBC 3.67 (L) 4.00 - 5.20 x10*6/uL    Hemoglobin 10.6 (L) 12.0 - 16.0 g/dL    Hematocrit 31.9 (L) 36.0 - 46.0 %    MCV 87 80 - 100 fL    MCH 28.9 26.0 - 34.0 pg    MCHC 33.2 32.0 - 36.0 g/dL    RDW " 25.6 (H) 11.5 - 14.5 %    Platelets 82 (L) 150 - 450 x10*3/uL   Ammonia   Result Value Ref Range    Ammonia 87 (H) 16 - 53 umol/L   POCT GLUCOSE   Result Value Ref Range    POCT Glucose 160 (H) 74 - 99 mg/dL   Lavender Top   Result Value Ref Range    Extra Tube Hold for add-ons.    Sterile Cup   Result Value Ref Range    Extra Tube Hold for add-ons.    Body Fluid Cell Count   Result Value Ref Range    Color, Fluid Yellow Colorless, Straw, Yellow    Clarity, Fluid Clear Clear    WBC, Fluid 152 See Comment /uL    RBC, Fluid 1,000 0  /uL /uL   Body Fluid Differential   Result Value Ref Range    Neutrophils %, Manual, Fluid 2 <25 % %    Lymphocytes %, Manual, Fluid 35 <75 % %    Mono/Macrophages %, Manual, Fluid 63 <70 % %    Eosinophils %, Manual, Fluid 0 0 % %    Basophils %, Manual, Fluid 0 0 % %    Immature Granulocytes %, Manual, Fluid 0 0 % %    Blasts %, Manual, Fluid 0 0 % %    Unclassified Cells %, Manual, Fluid 0 0 % %    Plasma Cells %, Manual, Fluid 0 0 % %    Total Cells Counted, Fluid 100    POCT GLUCOSE   Result Value Ref Range    POCT Glucose 180 (H) 74 - 99 mg/dL   POCT GLUCOSE   Result Value Ref Range    POCT Glucose 182 (H) 74 - 99 mg/dL     Susceptibility data from last 90 days.  Collected Specimen Info Organism Amoxicillin/Clavulanate Ampicillin Ampicillin/Sulbactam Cefazolin Cefazolin (uncomplicated UTIs only) Ciprofloxacin Gentamicin Levofloxacin Nitrofurantoin Penicillin Piperacillin/Tazobactam   10/28/24 Urine from Clean Catch/Voided Klebsiella pneumoniae/variicola  S  R  S  S  S  S  S   S   S     Enterococcus faecium   S     S   S  I  S      Collected Specimen Info Organism Trimethoprim/Sulfamethoxazole Vancomycin   10/28/24 Urine from Clean Catch/Voided Klebsiella pneumoniae/variicola  S      Enterococcus faecium  R  S     CT abdomen pelvis wo IV contrast    Result Date: 12/11/2024  Interpreted By:  Christina Edmondson, STUDY: CT abdomen pelvis   INDICATION: Signs/Symptoms:abdominal pain.    COMPARISON: 12/08/2024, 04/18/2024   ACCESSION NUMBER(S): [id number]   ORDERING CLINICIAN: MARTY LEJEUNE   TECHNIQUE: Axial noncontrast CT images of the abdomen and pelvis with coronal and sagittal reconstructed images.   FINDINGS: LOWER CHEST: No acute abnormality of the lung bases. Partially imaged 6 mm right middle lobe nodule. BONES: No acute osseous abnormality. ABDOMINAL WALL: Diffuse fat stranding and anasarca. Fluid containing supraumbilical hernia. Small fat containing umbilical hernia.   ABDOMEN: Lack of intravenous contrast limits evaluation of vessels and solid organs. LIVER: Nodular atrophic appearance of the liver. No mass identified. Recanalization of the umbilical vein. BILE DUCTS: Normal caliber. GALLBLADDER: Status post cholecystectomy. PANCREAS: No peripancreatic inflammatory stranding or duct dilatation. SPLEEN: 12.4 cm. ADRENALS: Within normal limits.   KIDNEYS, URETERS, URINARY BLADDER: No hydronephrosis or urinary tract calculus.  The urinary bladder is unremarkable.   VESSELS: Atherosclerotic calcifications without aneurysmal dilatation seen. RETROPERITONEUM: Shotty retroperitoneal lymph nodes.   PELVIS:   REPRODUCTIVE ORGANS: No abnormality, given limitations of the noncontrast CT.   BOWEL: No dilated bowel. Diffuse wall thickening of small bowel loops likely related to adjacent fluid and possible congestion. PERITONEUM: Moderate to advanced abdominopelvic ascites and stranding.       Hepatic cirrhosis with sequela of portal hypertension and abdominopelvic ascites.   6 mm right middle lobe nodule. Follow-up non emergent chest CT may be considered in 3 months to assess stability.     MACRO: Incidental Finding:  A solid non-calcified pulmonary nodule measuring 6-8 mm .  (**-YCF-**)   Instructions:  Consider follow up non contrast chest CT at 6-12 months, then consider CT chest at 18-24 months. (Vikas Long et al., Guidelines for management of incidental pulmonary nodules detected on CT  images: From the Fleischner Society 2017, Radiology. 2017 Jul;284 (1):228-243.) [email or web address].ACR.IF.[id number]   Signed by: Christina Edmondson 12/11/2024 9:11 PM Dictation workstation:   [id number]    XR abdomen 2 views supine and erect or decub    Result Date: 12/11/2024  Interpreted By:  Aysha Norton, STUDY: XR ABDOMEN 2 VIEWS SUPINE AND ERECT OR DECUB;  12/11/2024 6:53 pm   INDICATION: Signs/Symptoms:ab pain.     COMPARISON: None.   ACCESSION NUMBER(S): [id number]   ORDERING CLINICIAN: MARTY LEJEUNE   TECHNIQUE: Abdomen supine and upright views   FINDINGS:     ABDOMEN: Nonobstructive bowel gas pattern.   No evidence of pneumoperitoneum.   Osseous structures demonstrate no acute bony abnormalities.   Surgical clips in the right upper quadrant of the abdomen.       1.  Nonobstructive bowel gas pattern. 2.  No acute osseous abnormality     MACRO: None   Signed by: Aysha Norton 12/11/2024 6:58 PM Dictation workstation:   [id number]     Scheduled medications  atorvastatin, 80 mg, oral, Nightly  dicyclomine, 20 mg, oral, TID  docusate sodium, 100 mg, oral, BID  fenofibrate, 160 mg, oral, Daily  influenza, 0.5 mL, intramuscular, During hospitalization  furosemide, 40 mg, oral, Daily  insulin glargine, 25 Units, subcutaneous, q24h  insulin lispro, 0-15 Units, subcutaneous, TID AC  magnesium oxide, 400 mg, oral, Daily  metFORMIN XR, 1,000 mg, oral, Daily with evening meal  midodrine, 5 mg, oral, TID  nadolol, 20 mg, oral, Daily  nitroglycerin, 1 patch, transdermal, Daily  pantoprazole, 40 mg, oral, Daily before breakfast  polyethylene glycol, 17 g, oral, BID  rifAXIMin, 550 mg, oral, BID  spironolactone, 50 mg, oral, BID  sucralfate, 1 g, oral, Before meals & nightly  traZODone, 25 mg, oral, Nightly  ursodiol, 300 mg, oral, TID      Continuous medications     PRN medications  PRN medications: benzocaine-menthol, dextromethorphan-guaifenesin, dextrose, dextrose, glucagon, glucagon, guaiFENesin, morphine, ondansetron ODT,  traMADol    ASSESSMENT:  Abdominal pain, distention, nausea, diarrhea resolved  Hepatic cirrhosis with ascites status post paracentesis  Transaminitis  Hyperammoniemia  Pancytopenia  Type 2 diabetes mellitus  Hypoalbuminemia  Dysuria, improved  Pyuria    PLAN:  Patient is doing well this afternoon.  No new issues.  Gastroenterology input appreciated.  Status post abdominal paracentesis.  IV Albumin.  Symptoms abdominal pain significantly improved.  Cytology reviewed.  No spontaneous bacterial peritonitis.  Cultures pending.  Patient is afebrile and nontoxic-appearing.  Abdominal pain has significantly improved status post paracentesis.  Continue current medications.  Bentyl.  PPI Protonix.  Carafate.  Diet as tolerated.  Diuretics Lasix, Spironolactone.  Low-sodium diet.  Monitor daily weights, electrolytes, renal function, blood pressure.  Nadolol.  Midodrine.  Monitor blood pressure.  Lactulose.  Titrate for 3 bowel movements in 24 hours.  Rifaximin.  CBC reviewed.  H&H and plate count noted.  No evidence of acute blood loss.  Avoid NSAIDs.  Continue PPI.  Secure message to CNP.  Okay to discharge per Gastroenterology.  Outpatient follow-up with Gastroenterology, Hepatology.  Urine culture no growth.  No indication for antibiotics.  Repeat post-void residual bladder scan.  Follow-up.  Hemoglobin A1c 7.6 decreased from 9.9 4 months ago.  Discussed with patient.  ADA diet.  Monitor point-of-care glucose AC/HS.  Insulin Lantus, sliding scale insulin.  Hypoglycemia protocol.  Outpatient follow-up with PCP for blood glucose monitoring and management.  Outpatient diabetes education.  PT/OT.  Fall precautions.  Up with assistance only.  Bed and chair alarm at all times.  Pressure ulcer prevention measures.  Turn and reposition every 2 hours and as needed.  Heels off bed.  Offloading.  DVT prophylaxis.  SCDs.  GI prophylaxis.  PPI Protonix.  Supportive care.  Patient reassured.  Case management following for discharge  planning.  Discharge plan home.  Discussed with Dr. Lawrence.  Okay to discharge home when arrangements are made by case management, pending post-void residual bladder scan.  Discussed with patient.  She is in agreement.        MADYSON Sotelo-CNP

## 2024-12-13 NOTE — CARE PLAN
The patient's goals for the shift include Feel better    The clinical goals for the shift include No pain  Problem: Fall/Injury  Goal: Not fall by end of shift  Outcome: Progressing  Goal: Be free from injury by end of the shift  Outcome: Progressing  Goal: Verbalize understanding of personal risk factors for fall in the hospital  Outcome: Progressing  Goal: Verbalize understanding of risk factor reduction measures to prevent injury from fall in the home  Outcome: Progressing  Goal: Use assistive devices by end of the shift  Outcome: Progressing  Goal: Pace activities to prevent fatigue by end of the shift  Outcome: Progressing     Problem: Pain  Goal: Takes deep breaths with improved pain control throughout the shift  Outcome: Progressing  Goal: Turns in bed with improved pain control throughout the shift  Outcome: Progressing  Goal: Walks with improved pain control throughout the shift  Outcome: Progressing  Goal: Performs ADL's with improved pain control throughout shift  Outcome: Progressing  Goal: Participates in PT with improved pain control throughout the shift  Outcome: Progressing  Goal: Free from opioid side effects throughout the shift  Outcome: Progressing  Goal: Free from acute confusion related to pain meds throughout the shift  Outcome: Progressing

## 2024-12-13 NOTE — PROGRESS NOTES
"Alfonzo Flanagan is a 49 y.o. female on day 2 of admission presenting with SBP (spontaneous bacterial peritonitis) (Multi).    Subjective   Patient reports abdominal pain has improved, denies any current nausea vomiting.  He is awaiting paracentesis       Objective     Physical Exam  HENT:      Head: Normocephalic.      Nose: Nose normal.      Mouth/Throat:      Mouth: Mucous membranes are moist.   Eyes:      Pupils: Pupils are equal, round, and reactive to light.   Cardiovascular:      Rate and Rhythm: Normal rate.      Pulses: Normal pulses.   Pulmonary:      Effort: Pulmonary effort is normal.   Abdominal:      Palpations: Abdomen is soft.   Musculoskeletal:         General: Normal range of motion.      Cervical back: Normal range of motion.   Skin:     General: Skin is warm.   Neurological:      General: No focal deficit present.      Mental Status: She is alert.         Last Recorded Vitals  Blood pressure (!) 96/46, pulse 70, temperature 36.6 °C (97.9 °F), temperature source Oral, resp. rate 17, height 1.575 m (5' 2\"), weight 78.9 kg (173 lb 15.1 oz), SpO2 97%.  Intake/Output last 3 Shifts:  I/O last 3 completed shifts:  In: 50 (0.6 mL/kg) [IV Piggyback:50]  Out: 0 (0 mL/kg)   Weight: 78.9 kg     Relevant Results  CT abdomen pelvis wo IV contrast    Result Date: 12/11/2024  Interpreted By:  Christina Edmondson, STUDY: CT abdomen pelvis   INDICATION: Signs/Symptoms:abdominal pain.   COMPARISON: 12/08/2024, 04/18/2024   ACCESSION NUMBER(S): [id number]   ORDERING CLINICIAN: MARTY LEJEUNE   TECHNIQUE: Axial noncontrast CT images of the abdomen and pelvis with coronal and sagittal reconstructed images.   FINDINGS: LOWER CHEST: No acute abnormality of the lung bases. Partially imaged 6 mm right middle lobe nodule. BONES: No acute osseous abnormality. ABDOMINAL WALL: Diffuse fat stranding and anasarca. Fluid containing supraumbilical hernia. Small fat containing umbilical hernia.   ABDOMEN: Lack of intravenous " contrast limits evaluation of vessels and solid organs. LIVER: Nodular atrophic appearance of the liver. No mass identified. Recanalization of the umbilical vein. BILE DUCTS: Normal caliber. GALLBLADDER: Status post cholecystectomy. PANCREAS: No peripancreatic inflammatory stranding or duct dilatation. SPLEEN: 12.4 cm. ADRENALS: Within normal limits.   KIDNEYS, URETERS, URINARY BLADDER: No hydronephrosis or urinary tract calculus.  The urinary bladder is unremarkable.   VESSELS: Atherosclerotic calcifications without aneurysmal dilatation seen. RETROPERITONEUM: Shotty retroperitoneal lymph nodes.   PELVIS:   REPRODUCTIVE ORGANS: No abnormality, given limitations of the noncontrast CT.   BOWEL: No dilated bowel. Diffuse wall thickening of small bowel loops likely related to adjacent fluid and possible congestion. PERITONEUM: Moderate to advanced abdominopelvic ascites and stranding.       Hepatic cirrhosis with sequela of portal hypertension and abdominopelvic ascites.   6 mm right middle lobe nodule. Follow-up non emergent chest CT may be considered in 3 months to assess stability.     MACRO: Incidental Finding:  A solid non-calcified pulmonary nodule measuring 6-8 mm .  (**-YCF-**)   Instructions:  Consider follow up non contrast chest CT at 6-12 months, then consider CT chest at 18-24 months. (Vikas Long et al., Guidelines for management of incidental pulmonary nodules detected on CT images: From the Fleischner Society 2017, Radiology. 2017 Jul;284 (1):228-243.) [email or web address]   Signed by: Christina Edmondson 12/11/2024 9:11 PM Dictation workstation:   [id number]    XR abdomen 2 views supine and erect or decub    Result Date: 12/11/2024  Interpreted By:  Aysha Norton, STUDY: XR ABDOMEN 2 VIEWS SUPINE AND ERECT OR DECUB;  12/11/2024 6:53 pm   INDICATION: Signs/Symptoms:ab pain.     COMPARISON: None.   ACCESSION NUMBER(S): [id number]   ORDERING CLINICIAN: MARTY LEJEUNE   TECHNIQUE: Abdomen supine and  upright views   FINDINGS:     ABDOMEN: Nonobstructive bowel gas pattern.   No evidence of pneumoperitoneum.   Osseous structures demonstrate no acute bony abnormalities.   Surgical clips in the right upper quadrant of the abdomen.       1.  Nonobstructive bowel gas pattern. 2.  No acute osseous abnormality     MACRO: None   Signed by: Aysha Norton 12/11/2024 6:58 PM Dictation workstation:   [id number]      Scheduled medications  atorvastatin, 80 mg, oral, Nightly  dicyclomine, 20 mg, oral, TID  docusate sodium, 100 mg, oral, BID  fenofibrate, 160 mg, oral, Daily  influenza, 0.5 mL, intramuscular, During hospitalization  furosemide, 40 mg, oral, Daily  insulin glargine, 25 Units, subcutaneous, q24h  insulin lispro, 0-15 Units, subcutaneous, TID AC  magnesium oxide, 400 mg, oral, Daily  metFORMIN XR, 1,000 mg, oral, Daily with evening meal  midodrine, 5 mg, oral, TID  nadolol, 20 mg, oral, Daily  nitroglycerin, 1 patch, transdermal, Daily  pantoprazole, 40 mg, oral, Daily before breakfast  polyethylene glycol, 17 g, oral, BID  rifAXIMin, 550 mg, oral, BID  spironolactone, 50 mg, oral, BID  sucralfate, 1 g, oral, Before meals & nightly  traZODone, 25 mg, oral, Nightly  ursodiol, 300 mg, oral, TID      Continuous medications     PRN medications  PRN medications: benzocaine-menthol, dextromethorphan-guaifenesin, dextrose, dextrose, glucagon, glucagon, guaiFENesin, morphine, ondansetron ODT, traMADol  Results for orders placed or performed during the hospital encounter of 12/11/24 (from the past 24 hours)   Urinalysis with Reflex Culture and Microscopic   Result Value Ref Range    Color, Urine Yellow Light-Yellow, Yellow, Dark-Yellow    Appearance, Urine Ex.Turbid (N) Clear    Specific Gravity, Urine 1.027 1.005 - 1.035    pH, Urine 5.5 5.0, 5.5, 6.0, 6.5, 7.0, 7.5, 8.0    Protein, Urine 30 (1+) (A) NEGATIVE, 10 (TRACE), 20 (TRACE) mg/dL    Glucose, Urine 500 (3+) (A) Normal mg/dL    Blood, Urine 0.03 (TRACE) (A)  NEGATIVE    Ketones, Urine NEGATIVE NEGATIVE mg/dL    Bilirubin, Urine NEGATIVE NEGATIVE    Urobilinogen, Urine 2 (1+) (A) Normal mg/dL    Nitrite, Urine NEGATIVE NEGATIVE    Leukocyte Esterase, Urine 500 Mckenzie/µL (A) NEGATIVE   Extra Urine Gray Tube   Result Value Ref Range    Extra Tube Hold for add-ons.    Microscopic Only, Urine   Result Value Ref Range    WBC, Urine >50 (A) 1-5, NONE /HPF    RBC, Urine 3-5 NONE, 1-2, 3-5 /HPF    Squamous Epithelial Cells, Urine 26-50 (1+) Reference range not established. /HPF    Bacteria, Urine 3+ (A) NONE SEEN /HPF    Mucus, Urine 4+ Reference range not established. /LPF   POCT GLUCOSE   Result Value Ref Range    POCT Glucose 146 (H) 74 - 99 mg/dL   Comprehensive Metabolic Panel   Result Value Ref Range    Glucose 73 (L) 74 - 99 mg/dL    Sodium 138 136 - 145 mmol/L    Potassium 3.6 3.5 - 5.3 mmol/L    Chloride 107 98 - 107 mmol/L    Bicarbonate 28 21 - 32 mmol/L    Anion Gap 7 (L) 10 - 20 mmol/L    Urea Nitrogen 17 6 - 23 mg/dL    Creatinine 0.59 0.50 - 1.05 mg/dL    eGFR >90 >60 mL/min/1.73m*2    Calcium 7.3 (L) 8.6 - 10.3 mg/dL    Albumin 2.0 (L) 3.4 - 5.0 g/dL    Alkaline Phosphatase 236 (H) 33 - 110 U/L    Total Protein 6.1 (L) 6.4 - 8.2 g/dL    AST 46 (H) 9 - 39 U/L    Bilirubin, Total 2.2 (H) 0.0 - 1.2 mg/dL    ALT 26 7 - 45 U/L   CBC   Result Value Ref Range    WBC 4.9 4.4 - 11.3 x10*3/uL    nRBC 0.0 0.0 - 0.0 /100 WBCs    RBC 3.67 (L) 4.00 - 5.20 x10*6/uL    Hemoglobin 10.6 (L) 12.0 - 16.0 g/dL    Hematocrit 31.9 (L) 36.0 - 46.0 %    MCV 87 80 - 100 fL    MCH 28.9 26.0 - 34.0 pg    MCHC 33.2 32.0 - 36.0 g/dL    RDW 25.6 (H) 11.5 - 14.5 %    Platelets 82 (L) 150 - 450 x10*3/uL   Ammonia   Result Value Ref Range    Ammonia 87 (H) 16 - 53 umol/L   POCT GLUCOSE   Result Value Ref Range    POCT Glucose 160 (H) 74 - 99 mg/dL           Assessment/Plan   Assessment & Plan  SBP (spontaneous bacterial peritonitis) (Multi)    Other ascites    Type 2 diabetes mellitus, with  long-term current use of insulin    Liver cirrhosis secondary to REED (nonalcoholic steatohepatitis) (Multi)    History of cirrhosis    Pulmonary nodule    Transaminasemia    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   -EV: monitor for varices, last EGD on 12/27/23 by Dr. Zaman at [facility] Normal oropharynx. Esophageal ulcer, no bleeding and no stigmata of recent bleeding.  -Ascites: moderate ascites on CT               -Paracentesis with fluid analysis ordered              -IV Albumin               -Low Na diet               -Lasix 40mg. [id number]      Anemia (Baseline, no overt bleeding)      Epigastric abdominal pain/Nausea, vomiting  -Patient has multiple reported admissions this year with similar complaints of abdominal pain that typically resolves with a paracentesis/conservative measures. Multiple CT scans. She has history of acute idiopathic pancreatitis. EGD with EUS 3/26/2024. Findings were c/f NET however tissue sample was not obtained. At that time, it was recommended to follow up mass in 1 year with EUS. MRI/MRCP on 7/26/2024: No pancreatic lesion identified. Cirrhosis and sequela of portal hypertension. No hepatic lesion     Paracentesis. No other acute GI intervention indicated at this time     12/13  Awaiting paracentesis.  H&H stable at 10.6/31.9.  No overt bleeding    Adrianne Powell, APRN-CNP

## 2024-12-13 NOTE — CONSULTS
"Nutrition Initial Assessment:   Nutrition Assessment    Reason for Assessment: Admission nursing screening    Patient is a 49 y.o. female presenting with abdominal pain, nausea and diarrhea. Hx of hepatic cirrhosis with ascites requiring paracentesis, HTN, chronic pancreatitis, T2DM. Pt also with hx of celiac disease noted. Abdominal pain/Nausea started 5 days ago. Denies N/V at this time. Consulted for wt loss and poor PO intake from [facility]. Pt reports poor PO lately due to feeling sick, usually eats \"fine\". Has taken mighty shakes during prior stay and is ok to take them again. Rec; might shake TID providing 200 kcals and 7g of protein each. Pt with significant wt loss of -19.19% in under 2 months. Pt unsure how she lost all that weight.     Nutrition History:  Energy Intake: Poor < 50 %  Food and Nutrient History: Pt reports having poor appetite due to feeling sick, reports that she feels better after paracentesis.  Food Allergies/Intolerances:   gluten  GI Symptoms: Diarrhea, Nausea, and Abdominal pain  Oral Problems: None     Anthropometrics:  Height: 157.5 cm (5' 2\")   Weight: 78.9 kg (173 lb 15.1 oz)   BMI (Calculated): 31.81    Weight Change %:  Weight History / % Weight Change: Wt loss of -41 lbs since 10/27/24, -19% body weight in 2 months. significant wt loss.  Significant Weight Loss: Yes  Interpretation of Weight Loss: >5% in 1 month    Nutrition Focused Physical Exam Findings:  Subcutaneous Fat Loss:   Orbital Fat Pads: Mild-Moderate (slight dark circles and slight hollowing)  Buccal Fat Pads: Mild-Moderate (flat cheeks, minimal bounce)  Muscle Wasting:  Temporalis: Mild-Moderate (slight depression)  Pectoralis (Clavicular Region): Mild-Moderate (some protrusion of clavicle)  Edema:  Edema: +1 trace, +2 mild  Physical Findings:  Hair: Negative  Eyes: Negative  Mouth: Negative    Nutrition Significant Labs:  CBC Trend:   Results from last 7 days   Lab Units 12/13/24  0545 12/12/24  0537 12/11/24  1842 " 12/08/24  1145   WBC AUTO x10*3/uL 4.9 4.3* 4.3* 4.3*   RBC AUTO x10*6/uL 3.67* 3.76* 4.13 3.69*   HEMOGLOBIN g/dL 10.6* 10.7* 11.8* 10.6*   HEMATOCRIT % 31.9* 32.8* 36.1 32.4*   MCV fL 87 87 87 88   PLATELETS AUTO x10*3/uL 82* 69* 77* 69*    , BMP Trend:   Results from last 7 days   Lab Units 12/13/24  0545 12/12/24  0537 12/11/24  1842 12/08/24  1145   GLUCOSE mg/dL 73* 258* 296* 280*   CALCIUM mg/dL 7.3* 7.4* 8.0* 7.6*   SODIUM mmol/L 138 137 137 135*   POTASSIUM mmol/L 3.6 4.0 3.9 3.6   CO2 mmol/L 28 27 30 27   CHLORIDE mmol/L 107 108* 105 105   BUN mg/dL 17 16 14 9   CREATININE mg/dL 0.59 0.57 0.68 0.53    , A1C:  Lab Results   Component Value Date    HGBA1C 7.6 (H) 12/12/2024       Nutrition Specific Medications:  albumin human, 25 g, intravenous, Once  atorvastatin, 80 mg, oral, Nightly  dicyclomine, 20 mg, oral, TID  docusate sodium, 100 mg, oral, BID  fenofibrate, 160 mg, oral, Daily  influenza, 0.5 mL, intramuscular, During hospitalization  furosemide, 40 mg, oral, Daily  insulin glargine, 25 Units, subcutaneous, q24h  insulin lispro, 0-15 Units, subcutaneous, TID AC  magnesium oxide, 400 mg, oral, Daily  metFORMIN XR, 1,000 mg, oral, Daily with evening meal  midodrine, 5 mg, oral, TID  nadolol, 20 mg, oral, Daily  nitroglycerin, 1 patch, transdermal, Daily  pantoprazole, 40 mg, oral, Daily before breakfast  polyethylene glycol, 17 g, oral, BID  rifAXIMin, 550 mg, oral, BID  spironolactone, 50 mg, oral, BID  sucralfate, 1 g, oral, Before meals & nightly  traZODone, 25 mg, oral, Nightly  ursodiol, 300 mg, oral, TID      Dietary Orders (From admission, onward)       Start     Ordered    12/13/24 1021  Oral nutritional supplements  Until discontinued        Question Answer Comment   Deliver with Lunch    Deliver with Dinner    Deliver with Breakfast    Select supplement: Sugar Free Mighty Shake        12/13/24 1021    12/12/24 2026  May Participate in Room Service  ( ROOM SERVICE MAY PARTICIPATE)  Once         Question:  .  Answer:  Yes    12/12/24 2025 12/12/24 1529  Adult diet Regular, Consistent Carb, 2-3 grams sodium; CCD 75 gm/meal  Diet effective now        Question Answer Comment   Diet type Regular    Diet type Consistent Carb    Diet type 2-3 grams sodium    Carb diet selection: CCD 75 gm/meal        12/12/24 1528                     Estimated Needs:   Total Energy Estimated Needs (kCal): 1568 kCal  Method for Estimating Needs: 25kcals/kg ABW  Total Protein Estimated Needs (g): 75 g  Method for Estimating Needs: 1.2g/kg ABW  Total Fluid Estimated Needs (mL): 1568 mL  Method for Estimating Needs: 1ml/kcal        Nutrition Diagnosis   Malnutrition Diagnosis  Patient has Malnutrition Diagnosis: Yes  Diagnosis Status: New  Malnutrition Diagnosis: Moderate malnutrition related to chronic disease or condition  As Evidenced by: weight loss of >5% in one month, poor intake <75% of energy requirement > 7 days      Nutrition Interventions/Recommendations         Nutrition Prescription:  Individualized Nutrition Prescription Provided for : Individualized nutrition prescription of 1568 kcals and 75g of protein to be provided with diet order. Current diet Cons CHO 75gm/meal, 2-3g Na diet.        Nutrition Interventions:   Interventions: Meals and snacks, Medical food supplement  Meals and Snacks: Carbohydrate-modified diet, Mineral-modified diet  Goal: goal of >50% of all meals  Medical Food Supplement: Commercial beverage  Goal: goal of >75% of all shakes, providing 200 kcals and 7g of protein each.    Nutrition Monitoring and Evaluation   Food/Nutrient Related History Monitoring  Monitoring and Evaluation Plan: Energy intake  Energy Intake: Estimated energy intake  Criteria: Continue to monitor PO intakes, goal of >50% of all meals.  Additional Plans: mighty shakes TID, providing 200 kcals and 7g of protein each.    Body Composition/Growth/Weight History  Monitoring and Evaluation Plan: Weight, Weight change  Weight:  Weight change  Criteria: Continue to monitor wt status and wt changes.  Weight Change: Weight change percentage  Criteria: -19.19% in under 2 month, significant weight loss.    Biochemical Data, Medical Tests and Procedures  Monitoring and Evaluation Plan: Electrolyte/renal panel    Time Spent (min): 60 minutes

## 2024-12-14 NOTE — NURSING NOTE
Patient discharged home in stable condition, no sign of acute distress. Patient is voiding with no difficulty despite post void bladder scan. Discharge instructions reviewed with patient and family at bedside. Prescriptions filled at bedside.IV and telemetry removed.

## 2024-12-15 LAB
BACTERIA FLD CULT: NORMAL
GRAM STN SPEC: NORMAL
GRAM STN SPEC: NORMAL

## 2024-12-15 NOTE — DISCHARGE SUMMARY
Discharge Diagnosis  Abdominal pain, distention, nausea, diarrhea resolved  Hepatic cirrhosis with ascites status post paracentesis  Transaminitis  Hyperammoniemia  Pancytopenia  Type 2 diabetes mellitus  Hypoalbuminemia  Dysuria, improved  Pyuria    Issues Requiring Follow-Up  Above    Discharge Meds     Medication List      START taking these medications     Xifaxan 550 mg tablet; Generic drug: rifAXIMin; Take 1 tablet (550 mg)   by mouth every 12 hours.; Notes to patient: EVERY 12 HOURS (2 TIMES DAILY)     CHANGE how you take these medications     insulin lispro 100 unit/mL injection; Inject 0-15 Units under the skin 3   times a day before meals. Take as directed per insulin instructions.Do not   hold when patient is not eating, continue order as scheduled for   hyperglycemia management. Insulin Lispro Corrective Scale #3     Hypoglycemia protocol Call LIP unit(s) if Blood Glucose is between 0 - 70   mg/dL   0 unit(s) if Blood glucose is between   3 unit(s) if Blood   glucose is between 151-200  6 unit(s) if Blood glucose is between 201-250    9 unit(s) if Blood glucose is between 251-300  12 unit(s) if Blood   glucose is between 301-350  15 unit(s) if Blood glucose is between 351-400    If blood glucose is greater than 400 mg/dL, give max insulin per sliding   scale AND then contact provider.; What changed: when to take this,   additional instructions; Notes to patient: 3 TIMES DAILY, BEFORE MEALS   Lantus Solostar U-100 Insulin 100 unit/mL (3 mL) pen; Generic drug:   insulin glargine; [place] 25 unidades 2 veces cada día; (Inject 25 Units   under the skin 2 times a day.); What changed: Another medication with the   same name was removed. Continue taking this medication, and follow the   directions you see here.; Notes to patient: 2 TIMES DAILY, MORNING AND   BEDTIME     CONTINUE taking these medications     acetaminophen 500 mg tablet; Commonly known as: Tylenol; Notes to   patient: IF NEEDED    atorvastatin 80 mg tablet; Commonly known as: Lipitor; [place] 1 tableta 1   vez cada día antes de domirse; (Take 1 tablet (80 mg) by mouth once daily   at bedtime.); Notes to patient: ONCE DAILY, AT BEDTIME   docusate sodium 100 mg capsule; Commonly known as: Colace; [place] 1   cápsula 2 veces cada día. No tome si tiene diarrea.; (Take 1 capsule (100   mg) by mouth 2 times a day. Hold for loose stool.); Notes to patient: 2   TIMES DAILY DO NOT TAKE IF HAVING LOOSE STOOL   fenofibrate 145 mg tablet; Commonly known as: Tricor; [place] 1 tableta 1   vez cada día.; (Take 1 tablet (145 mg) by mouth once daily.); Notes to   patient: ONCE DAILY   furosemide 40 mg tablet; Commonly known as: Lasix; [place] 1 tableta 1 vez   cada día.; (Take 1 tablet (40 mg) by mouth once daily. Hold for   dizziness.); Notes to patient: ONCE DAILY DO NOT TAKE IF DIZZY   lactulose 20 gram/30 mL oral solution; Notes to patient: 2 TIMES DAILY   magnesium oxide 400 mg (241.3 mg magnesium) tablet; Commonly known as:   Mag-Ox; [place] 1 tableta 1 vez cada día.; (Take 1 tablet (400 mg) by mouth   once daily.); Notes to patient: ONCE DAILY   meclizine 25 mg tablet; Commonly known as: Antivert; Notes to patient:   IF NEEDED, 3 TIMES DAILY   metFORMIN  mg 24 hr tablet; Commonly known as: Glucophage-XR; [place]   2 tabletas 1 vez cada día por la tarde. [place] con comida.; (Take 2 tablets   (1,000 mg) by mouth once daily in the evening. Take with meals. Do not   crush, chew, or split.); Notes to patient: ONCE DAILY, IN THE EVENING   midodrine 10 mg tablet; Commonly known as: Proamatine; [place] 1/2 tableta   3 veces cada día.; (Take 0.5 tablets (5 mg) by mouth 3 times daily   (morning, midday, late afternoon).); Notes to patient: 3 TIMES DAILY   nadolol 20 mg tablet; Commonly known as: Corgard; [place] 1 tableta 1 vez   cada día.; (Take 1 tablet (20 mg) by mouth once daily.); Notes to patient:   ONCE DAILY   nitroglycerin 0.1 mg/hr patch; Commonly known as: Nitrodur;  "[place] 1   parte en el piel 1 vez cada día. Clara después de 12 horas cada día.;   (Place 1 patch over 12 hours on the skin once daily. Hold for dizziness.);   Notes to patient: ONCE DAILY   ondansetron ODT 4 mg disintegrating tablet; Commonly known as:   Zofran-ODT; Dissolve 1 tablet (4 mg) in the mouth every 8 hours if needed   for nausea or vomiting for up to 7 days.; Notes to patient: IF NEEDED   pantoprazole 40 mg EC tablet; Commonly known as: ProtoNix; [place] 1   tableta 1 vez cada mañana.; (Take 1 tablet (40 mg) by mouth once daily in   the morning. Take before meals. Do not crush, chew, or split.); Notes to   patient: ONCE DAILY, BEFORE BREAKFAST DO NOT CRUSH, CHEW OR SPLIT   polyethylene glycol 17 gram/dose powder; Commonly known as: Glycolax,   Miralax; [place] 1 tapa en un vaso de agua y sindhu 2 veces cada día.; (Mix   17 grams of powder in 8 ounces of water and drink 2 times a day.); Notes   to patient: 2 TIMES DAILY   sodium chloride 0.65 % nasal spray; Commonly known as: [place]; Notes to   patient: IF NEEDED   spironolactone 50 mg tablet; Commonly known as: Aldactone; [place] 1   tableta 2 veces cada día.; (Take 1 tablet (50 mg) by mouth 2 times a day.   Hold for dizziness.); Notes to patient: 2 TIMES DAILY DO NOT TAKE IF DIZZY   sucralfate 1 gram tablet; Commonly known as: Carafate; [place] 1 tableta 4   veces cada día antes de comer; (Take 1 tablet (1 g) by mouth 4 times a day   before meals.); Notes to patient: 4 TIMES DAILY   Sure Comfort Pen Needle 32 gauge x 5/32\" needle; Generic drug: pen   needle, diabetic; 4 veces cada día; (Use 4 times a day); Notes to patient:   4 TIMES DAILY   traZODone 50 mg tablet; Commonly known as: Desyrel; [place] 1/2 tableta 1   vez cada noche; (Take 0.5 tablets (25 mg) by mouth once daily at   bedtime.); Notes to patient: ONCE DAILY, AT BEDTIME   ursodiol 300 mg capsule; Commonly known as: Actigall; [place] 1 cápsula 3   veces cada día.; (Take 1 capsule (300 mg) by mouth 3 times a " day.); Notes   to patient: 3 TIMES DAILY     ASK your doctor about these medications     dicyclomine 20 mg tablet; Commonly known as: Bentyl; Take 1 tablet (20   mg) by mouth 3 times a day for 5 days. As needed for abdominal pain; Ask   about: Should I take this medication?       Test Results Pending At Discharge  Pending Labs       Order Current Status    AFB CULTURE & STAIN; [facility]; [id number] - Miscellaneous Test In process    AFB Culture/Smear In process    Sterile Fluid Culture/Smear Preliminary result          Hospital Course  This is a very pleasant 49 year old female with a past medical history significant for hepatic cirrhosis with ascites requiring paracentesis, portal hypertension, chronic pancreatitis, type 2 diabetes mellitus presenting with abdominal pain, nausea, and diarrhea.  She also reported urinary dysuria.  She reported the most recent paracentesis was 1 month ago, 2 x in 1 week yielding 3 liters of fluid each time.  She has been taking all medications as prescribed.  She presented to the emergency department for evaluation.  In the emergency department, initial work-up was done.  CT abdomen, pelvis per radiology showed hepatic cirrhosis with sequela portal hypertension and abdominal pelvic ascites, 6 mm right middle lobe nodule, follow-up non-emergent chest CT considered in 3 months to assess stability.  She was treated with empiric IV Ceftriaxone for spontaneous bacterial peritonitis and was admitted.  She was treated with analgesics.  She was evaluated and treated by Gastroenterology.  She was treated with medications, diuretics.  She underwent an abdominal paracentesis yielding 1.5 liters of clear yellow fluid.  Her symptoms improved.  She was cleared by Gastroenterology for discharge.  Urine culture showed insignificant growth.  She was voiding without difficulty.  Her overall condition improved.  She was cleared for discharge.  She was evaluated by physical and occupational therapy.  Case  management was consulted for discharge planning.  She was discharged home in stable condition.  She was advised outpatient follow-up with primary care provider in 1 week.  She was advised outpatient follow-up with Gastroenterology, Hepatology - routine abdominal paracentesis.     Pertinent Physical Exam At Time of Discharge  See physical examination.    Outpatient Follow-Up  Future Appointments   Date Time Provider Department Center   2/13/2025  8:30 AM MADYSON Luke-CNP [id number]     Follow-up with primary care provider in 1 week.    Follow-up with Gastroenterology, Hepatology.    Routine outpatient paracentesis.      Leny Daniels, MADYSON-CNP

## 2024-12-16 LAB
BACTERIA FLD CULT: NORMAL
GRAM STN SPEC: NORMAL
GRAM STN SPEC: NORMAL

## 2024-12-21 LAB — SCAN RESULT: NORMAL

## 2024-12-24 ENCOUNTER — HOSPITAL ENCOUNTER (EMERGENCY)
Facility: HOSPITAL | Age: 49
Discharge: HOME | End: 2024-12-25
Attending: EMERGENCY MEDICINE
Payer: COMMERCIAL

## 2024-12-24 ENCOUNTER — APPOINTMENT (OUTPATIENT)
Dept: RADIOLOGY | Facility: HOSPITAL | Age: 49
End: 2024-12-24
Payer: COMMERCIAL

## 2024-12-24 DIAGNOSIS — R10.84 ABDOMINAL PAIN, GENERALIZED: Primary | ICD-10-CM

## 2024-12-24 LAB
ALBUMIN SERPL BCP-MCNC: 2.6 G/DL (ref 3.4–5)
ALP SERPL-CCNC: 470 U/L (ref 33–110)
ALT SERPL W P-5'-P-CCNC: 29 U/L (ref 7–45)
AMMONIA PLAS-SCNC: 63 UMOL/L (ref 16–53)
ANION GAP SERPL CALCULATED.3IONS-SCNC: 7 MMOL/L (ref 10–20)
APPEARANCE UR: CLEAR
APTT PPP: 29.4 SECONDS (ref 22–32.5)
AST SERPL W P-5'-P-CCNC: 46 U/L (ref 9–39)
BASOPHILS # BLD AUTO: 0.01 X10*3/UL (ref 0–0.1)
BASOPHILS NFR BLD AUTO: 0.2 %
BILIRUB SERPL-MCNC: 2.5 MG/DL (ref 0–1.2)
BILIRUB UR STRIP.AUTO-MCNC: NEGATIVE MG/DL
BNP SERPL-MCNC: 47 PG/ML (ref 0–99)
BUN SERPL-MCNC: 12 MG/DL (ref 6–23)
CALCIUM SERPL-MCNC: 8 MG/DL (ref 8.6–10.3)
CHLORIDE SERPL-SCNC: 104 MMOL/L (ref 98–107)
CO2 SERPL-SCNC: 29 MMOL/L (ref 21–32)
COLOR UR: ABNORMAL
CREAT SERPL-MCNC: 0.67 MG/DL (ref 0.5–1.05)
EGFRCR SERPLBLD CKD-EPI 2021: >90 ML/MIN/1.73M*2
EOSINOPHIL # BLD AUTO: 0.42 X10*3/UL (ref 0–0.7)
EOSINOPHIL NFR BLD AUTO: 8.9 %
ERYTHROCYTE [DISTWIDTH] IN BLOOD BY AUTOMATED COUNT: 21.8 % (ref 11.5–14.5)
GLUCOSE SERPL-MCNC: 427 MG/DL (ref 74–99)
GLUCOSE UR STRIP.AUTO-MCNC: ABNORMAL MG/DL
HCT VFR BLD AUTO: 34.9 % (ref 36–46)
HGB BLD-MCNC: 11.5 G/DL (ref 12–16)
IMM GRANULOCYTES # BLD AUTO: 0.01 X10*3/UL (ref 0–0.7)
IMM GRANULOCYTES NFR BLD AUTO: 0.2 % (ref 0–0.9)
INR PPP: 1.3 (ref 0.9–1.2)
KETONES UR STRIP.AUTO-MCNC: NEGATIVE MG/DL
LACTATE SERPL-SCNC: 1.9 MMOL/L (ref 0.4–2)
LEUKOCYTE ESTERASE UR QL STRIP.AUTO: NEGATIVE
LIPASE SERPL-CCNC: 243 U/L (ref 9–82)
LYMPHOCYTES # BLD AUTO: 1.21 X10*3/UL (ref 1.2–4.8)
LYMPHOCYTES NFR BLD AUTO: 25.7 %
MAGNESIUM SERPL-MCNC: 1.9 MG/DL (ref 1.6–2.4)
MCH RBC QN AUTO: 30 PG (ref 26–34)
MCHC RBC AUTO-ENTMCNC: 33 G/DL (ref 32–36)
MCV RBC AUTO: 91 FL (ref 80–100)
MONOCYTES # BLD AUTO: 0.45 X10*3/UL (ref 0.1–1)
MONOCYTES NFR BLD AUTO: 9.6 %
NEUTROPHILS # BLD AUTO: 2.61 X10*3/UL (ref 1.2–7.7)
NEUTROPHILS NFR BLD AUTO: 55.4 %
NITRITE UR QL STRIP.AUTO: NEGATIVE
NRBC BLD-RTO: 0 /100 WBCS (ref 0–0)
PH UR STRIP.AUTO: 7 [PH]
PLATELET # BLD AUTO: 74 X10*3/UL (ref 150–450)
POTASSIUM SERPL-SCNC: 3.9 MMOL/L (ref 3.5–5.3)
PROT SERPL-MCNC: 7.5 G/DL (ref 6.4–8.2)
PROT UR STRIP.AUTO-MCNC: NEGATIVE MG/DL
PROTHROMBIN TIME: 13.7 SECONDS (ref 9.3–12.7)
RBC # BLD AUTO: 3.83 X10*6/UL (ref 4–5.2)
RBC # UR STRIP.AUTO: NEGATIVE /UL
RBC MORPH BLD: NORMAL
SODIUM SERPL-SCNC: 136 MMOL/L (ref 136–145)
SP GR UR STRIP.AUTO: 1.04
UROBILINOGEN UR STRIP.AUTO-MCNC: ABNORMAL MG/DL
WBC # BLD AUTO: 4.7 X10*3/UL (ref 4.4–11.3)

## 2024-12-24 PROCEDURE — 2500000002 HC RX 250 W HCPCS SELF ADMINISTERED DRUGS (ALT 637 FOR MEDICARE OP, ALT 636 FOR OP/ED)

## 2024-12-24 PROCEDURE — 96374 THER/PROPH/DIAG INJ IV PUSH: CPT

## 2024-12-24 PROCEDURE — 83735 ASSAY OF MAGNESIUM: CPT

## 2024-12-24 PROCEDURE — 83605 ASSAY OF LACTIC ACID: CPT

## 2024-12-24 PROCEDURE — 83880 ASSAY OF NATRIURETIC PEPTIDE: CPT

## 2024-12-24 PROCEDURE — 36415 COLL VENOUS BLD VENIPUNCTURE: CPT

## 2024-12-24 PROCEDURE — 83690 ASSAY OF LIPASE: CPT

## 2024-12-24 PROCEDURE — 2550000001 HC RX 255 CONTRASTS

## 2024-12-24 PROCEDURE — 85610 PROTHROMBIN TIME: CPT

## 2024-12-24 PROCEDURE — 96376 TX/PRO/DX INJ SAME DRUG ADON: CPT

## 2024-12-24 PROCEDURE — 74177 CT ABD & PELVIS W/CONTRAST: CPT | Mod: FOREIGN READ | Performed by: RADIOLOGY

## 2024-12-24 PROCEDURE — 74177 CT ABD & PELVIS W/CONTRAST: CPT

## 2024-12-24 PROCEDURE — 2500000004 HC RX 250 GENERAL PHARMACY W/ HCPCS (ALT 636 FOR OP/ED)

## 2024-12-24 PROCEDURE — 85025 COMPLETE CBC W/AUTO DIFF WBC: CPT

## 2024-12-24 PROCEDURE — 80053 COMPREHEN METABOLIC PANEL: CPT

## 2024-12-24 PROCEDURE — 81003 URINALYSIS AUTO W/O SCOPE: CPT

## 2024-12-24 PROCEDURE — 96375 TX/PRO/DX INJ NEW DRUG ADDON: CPT

## 2024-12-24 PROCEDURE — 82140 ASSAY OF AMMONIA: CPT

## 2024-12-24 PROCEDURE — 99285 EMERGENCY DEPT VISIT HI MDM: CPT | Mod: 25 | Performed by: EMERGENCY MEDICINE

## 2024-12-24 RX ORDER — HYDROMORPHONE HYDROCHLORIDE 1 MG/ML
1 INJECTION, SOLUTION INTRAMUSCULAR; INTRAVENOUS; SUBCUTANEOUS ONCE
Status: COMPLETED | OUTPATIENT
Start: 2024-12-24 | End: 2024-12-24

## 2024-12-24 RX ORDER — ONDANSETRON HYDROCHLORIDE 2 MG/ML
4 INJECTION, SOLUTION INTRAVENOUS ONCE
Status: COMPLETED | OUTPATIENT
Start: 2024-12-24 | End: 2024-12-24

## 2024-12-24 RX ORDER — INSULIN LISPRO 100 [IU]/ML
11 INJECTION, SOLUTION INTRAVENOUS; SUBCUTANEOUS ONCE
Status: COMPLETED | OUTPATIENT
Start: 2024-12-24 | End: 2024-12-24

## 2024-12-24 RX ORDER — OXYCODONE HYDROCHLORIDE 5 MG/1
5 TABLET ORAL EVERY 6 HOURS PRN
Qty: 12 TABLET | Refills: 0 | Status: ON HOLD | OUTPATIENT
Start: 2024-12-24 | End: 2024-12-27

## 2024-12-24 RX ORDER — DEXTROSE 50 % IN WATER (D50W) INTRAVENOUS SYRINGE
25
Status: DISCONTINUED | OUTPATIENT
Start: 2024-12-24 | End: 2024-12-25 | Stop reason: HOSPADM

## 2024-12-24 RX ORDER — DEXTROSE 50 % IN WATER (D50W) INTRAVENOUS SYRINGE
12.5
Status: DISCONTINUED | OUTPATIENT
Start: 2024-12-24 | End: 2024-12-25 | Stop reason: HOSPADM

## 2024-12-24 RX ORDER — ONDANSETRON 4 MG/1
4 TABLET, FILM COATED ORAL EVERY 6 HOURS
Qty: 12 TABLET | Refills: 0 | Status: ON HOLD | OUTPATIENT
Start: 2024-12-24 | End: 2024-12-27

## 2024-12-25 VITALS
HEART RATE: 87 BPM | OXYGEN SATURATION: 98 % | BODY MASS INDEX: 33.49 KG/M2 | DIASTOLIC BLOOD PRESSURE: 68 MMHG | RESPIRATION RATE: 18 BRPM | TEMPERATURE: 98.7 F | WEIGHT: 182 LBS | SYSTOLIC BLOOD PRESSURE: 119 MMHG | HEIGHT: 62 IN

## 2024-12-25 LAB — HOLD SPECIMEN: NORMAL

## 2024-12-25 NOTE — ED PROVIDER NOTES
HPI   Chief Complaint   Patient presents with    Abdominal Pain     Pt stated she has a stent in her abd, and her abd has been hurting for the past 3 days. Pt stated she vomited once yesterday. Abd looks distended and pt stated fluid was drained out of her abd 3 weeks ago.       HPI  Patient is a 49-year-old female who presents to ED for chief complaint of abdominal pain.  Patient has a history of alcoholic cirrhosis, transaminitis, SBP, hyperammonemia, type 2 diabetes, hepatic encephalopathy.  Patient was discharged from [facility] on 12/13 where she was admitted for all the issues stated above.  Today patient complains of severe abdominal pain with associated nausea and vomiting.  She denies any fever or chills.  Denies any chest pain or shortness of breath.  She states her abdomen has become more distended since she has been discharged from the hospital.  She is accompanied by her family members who are supportive.      Patient History   Past Medical History:   Diagnosis Date    Cirrhosis of liver (Multi)     Diabetes mellitus (Multi)      Past Surgical History:   Procedure Laterality Date    ABDOMINAL SURGERY      CT GUIDED IMAGING FOR NEEDLE PLACEMENT  10/14/2020    CT GUIDED IMAGING FOR NEEDLE PLACEMENT [facility]    US GUIDED ABDOMINAL PARACENTESIS  10/08/2020    US GUIDED ABDOMINAL PARACENTESIS [facility]     No family history on file.  Social History     Tobacco Use    Smoking status: Never    Smokeless tobacco: Never   Substance Use Topics    Alcohol use: Never    Drug use: Never       Physical Exam   ED Triage Vitals [12/24/24 2046]   Temperature Heart Rate Respirations BP   37.1 °C (98.7 °F) 95 18 134/78      Pulse Ox Temp src Heart Rate Source Patient Position   100 % -- -- --      BP Location FiO2 (%)     -- --       Physical Exam  Vitals reviewed.   Constitutional:       General: She is not in acute distress.     Appearance: Normal appearance. She is ill-appearing.    HENT:      Head: Normocephalic and atraumatic.   Eyes:      Extraocular Movements: Extraocular movements intact.   Cardiovascular:      Rate and Rhythm: Normal rate and regular rhythm.      Heart sounds: Normal heart sounds.   Pulmonary:      Effort: Pulmonary effort is normal.      Breath sounds: Normal breath sounds.   Abdominal:      General: There is distension.      Tenderness: There is abdominal tenderness.   Musculoskeletal:         General: Normal range of motion.      Cervical back: Normal range of motion and neck supple.   Skin:     General: Skin is warm and dry.   Neurological:      General: No focal deficit present.      Mental Status: She is alert and oriented to person, place, and time.   Psychiatric:         Mood and Affect: Mood normal.         Behavior: Behavior normal.           ED Course & MDM   Diagnoses as of 12/25/24 1224   Abdominal pain, generalized                 No data recorded     [place] Coma Scale Score: 15 (12/24/24 2047 : Pablo Wellington, EMT)                           Medical Decision Making  Parts of this chart have been completed using voice recognition software. Please excuse any errors of transcription.  My thought process and reason for plan has been formulated from the time that I saw the patient until the time of disposition and is not specific to one specific moment during their visit and furthermore my MDM encompasses this entire chart and not only this text box.    HPI:   A medically appropriate HPI was obtained, outlined above.    Alfonzo Flanagan is a  49 y.o. female    Chief Complaint   Patient presents with    Abdominal Pain     Pt stated she has a stent in her abd, and her abd has been hurting for the past 3 days. Pt stated she vomited once yesterday. Abd looks distended and pt stated fluid was drained out of her abd 3 weeks ago.       Past Medical History:   Diagnosis Date    Cirrhosis of liver (Multi)     Diabetes mellitus (Multi)        Past Surgical  History:   Procedure Laterality Date    ABDOMINAL SURGERY      CT GUIDED IMAGING FOR NEEDLE PLACEMENT  10/14/2020    CT GUIDED IMAGING FOR NEEDLE PLACEMENT [facility]    US GUIDED ABDOMINAL PARACENTESIS  10/08/2020    US GUIDED ABDOMINAL PARACENTESIS [facility]       Social History     Tobacco Use    Smoking status: Never    Smokeless tobacco: Never   Substance Use Topics    Alcohol use: Never    Drug use: Never       No family history on file.    Allergies   Allergen Reactions    Gluten Diarrhea       Current Outpatient Medications   Medication Instructions    acetaminophen (TYLENOL) 500-1,000 mg, oral, Every 6 hours PRN    atorvastatin (LIPITOR) 80 mg, oral, Nightly    docusate sodium (COLACE) 100 mg, oral, 2 times daily, Hold for loose stool.    fenofibrate (TRICOR) 145 mg, oral, Daily    furosemide (LASIX) 40 mg, oral, Daily, Hold for dizziness.    insulin lispro 0-15 Units, subcutaneous, 3 times daily before meals, Take as directed per insulin instructions.Do not hold when patient is not eating, continue order as scheduled for hyperglycemia management.  Insulin Lispro Corrective Scale #3     Hypoglycemia protocol Call LIP unit(s) if Blood Glucose is between 0 - 70 mg/dL     0 unit(s) if Blood glucose is between    3 unit(s) if Blood glucose is between 151-200   6 unit(s) if Blood glucose is between 201-250   9 unit(s) if Blood glucose is between 251-300   12 unit(s) if Blood glucose is between 301-350   15 unit(s) if Blood glucose is between 351-400    If blood glucose is greater than 400 mg/dL, give max insulin per sliding scale AND then contact provider.    lactulose 20 g, 2 times daily    Lantus Solostar U-100 Insulin 25 Units, subcutaneous, 2 times daily    magnesium oxide (MAG-OX) 400 mg, oral, Daily    meclizine (ANTIVERT) 25 mg, 3 times daily PRN    metFORMIN XR (GLUCOPHAGE-XR) 1,000 mg, oral, Daily with evening meal, Do not crush, chew, or split.    midodrine (PROAMATINE) 5 mg,  "oral, 3 times daily (morning, midday, late afternoon)    nadolol (CORGARD) 20 mg, oral, Daily    nitroglycerin (Nitrodur) 0.1 mg/hr patch 1 patch, transdermal, Daily, Hold for dizziness.    ondansetron (ZOFRAN) 4 mg, oral, Every 6 hours    oxyCODONE (ROXICODONE) 5 mg, oral, Every 6 hours PRN    pantoprazole (PROTONIX) 40 mg, oral, Daily before breakfast, Do not crush, chew, or split.    pen needle, diabetic (Sure Comfort Pen Needle) 32 gauge x 5/32\" needle Use 4 times a day    polyethylene glycol (Glycolax, Miralax) 17 gram/dose powder Mix 17 grams of powder in 8 ounces of water and drink 2 times a day.    sodium chloride (Ocean) 0.65 % nasal spray 1 spray, Each Nostril, As needed    spironolactone (ALDACTONE) 50 mg, oral, 2 times daily, Hold for dizziness.    sucralfate (CARAFATE) 1 g, oral, 4 times daily before meals and nightly    traZODone (DESYREL) 25 mg, oral, Nightly    ursodiol (ACTIGALL) 300 mg, oral, 3 times daily    Xifaxan 550 mg, oral, Every 12 hours scheduled       History obtained from:   Patient    Exam:   Patient Vitals for the past 24 hrs:   BP Temp Pulse Resp SpO2 Height Weight   12/25/24 0027 119/68 -- 87 18 98 % -- --   12/24/24 2330 123/51 -- 91 19 98 % -- --   12/24/24 2315 114/55 -- -- -- -- -- --   12/24/24 2300 115/63 -- -- -- -- -- --   12/24/24 2245 128/66 -- -- -- -- -- --   12/24/24 2230 127/58 -- -- -- -- -- --   12/24/24 2215 126/65 -- -- -- -- -- --   12/24/24 2046 134/78 37.1 °C (98.7 °F) 95 18 100 % 1.575 m (5' 2\") 82.6 kg (182 lb)       A medically appropriate exam performed, outlined above, given the known history and presentation.    EKG/Cardiac monitor:     Social Determinants of Health considered during this visit:     Medications given during visit:  Medications   iohexol (OMNIPaque) 350 mg iodine/mL solution 75 mL (75 mL intravenous Given 12/24/24 2156)   HYDROmorphone (Dilaudid) injection 1 mg (1 mg intravenous Given 12/24/24 2209)   ondansetron (Zofran) injection 4 mg (4 " mg intravenous Given 12/24/24 2209)   HYDROmorphone (Dilaudid) injection 1 mg (1 mg intravenous Given 12/24/24 2353)   insulin lispro injection 11 Units (11 Units subcutaneous Given 12/24/24 2353)   ondansetron (Zofran) injection 4 mg (4 mg intravenous Given 12/24/24 2353)        Diagnostic/tests:  Labs Reviewed   CBC WITH AUTO DIFFERENTIAL - Abnormal       Result Value    WBC 4.7      nRBC 0.0      RBC 3.83 (*)     Hemoglobin 11.5 (*)     Hematocrit 34.9 (*)     MCV 91      MCH 30.0      MCHC 33.0      RDW 21.8 (*)     Platelets 74 (*)     Neutrophils % 55.4      Immature Granulocytes %, Automated 0.2      Lymphocytes % 25.7      Monocytes % 9.6      Eosinophils % 8.9      Basophils % 0.2      Neutrophils Absolute 2.61      Immature Granulocytes Absolute, Automated 0.01      Lymphocytes Absolute 1.21      Monocytes Absolute 0.45      Eosinophils Absolute 0.42      Basophils Absolute 0.01     COMPREHENSIVE METABOLIC PANEL - Abnormal    Glucose 427 (*)     Sodium 136      Potassium 3.9      Chloride 104      Bicarbonate 29      Anion Gap 7 (*)     Urea Nitrogen 12      Creatinine 0.67      eGFR >90      Calcium 8.0 (*)     Albumin 2.6 (*)     Alkaline Phosphatase 470 (*)     Total Protein 7.5      AST 46 (*)     Bilirubin, Total 2.5 (*)     ALT 29     AMMONIA - Abnormal    Ammonia 63 (*)    LIPASE - Abnormal    Lipase 243 (*)     Narrative:     Venipuncture immediately after or during the administration of Metamizole may lead to falsely low results. Testing should be performed immediately prior to Metamizole dosing.   URINALYSIS WITH REFLEX CULTURE AND MICROSCOPIC - Abnormal    Color, Urine Light-Yellow      Appearance, Urine Clear      Specific Gravity, Urine 1.038 (*)     pH, Urine 7.0      Protein, Urine NEGATIVE      Glucose, Urine OVER (4+) (*)     Blood, Urine NEGATIVE      Ketones, Urine NEGATIVE      Bilirubin, Urine NEGATIVE      Urobilinogen, Urine 4 (2+) (*)     Nitrite, Urine NEGATIVE      Leukocyte  Esterase, Urine NEGATIVE      Narrative:     OVER is reported when the result is greater than the clinically reportable range.   COAGULATION SCREEN - Abnormal    Protime 13.7 (*)     INR 1.3 (*)     aPTT 29.4      Narrative:     INR Therapeutic Range: 2.0-3.5   MAGNESIUM - Normal    Magnesium 1.90     LACTATE - Normal    Lactate 1.9      Narrative:     Venipuncture immediately after or during the administration of Metamizole may lead to falsely low results. Testing should be performed immediately prior to Metamizole dosing.   B-TYPE NATRIURETIC PEPTIDE - Normal    BNP 49      Narrative:        <100 pg/mL - Heart failure unlikely  100-299 pg/mL - Intermediate probability of acute heart                  failure exacerbation. Correlate with clinical                  context and patient history.    >=300 pg/mL - Heart Failure likely. Correlate with clinical                  context and patient history.    BNP testing is performed using different testing methodology at [facility] ([facility] than at other [facility]. Direct result comparisons should only be made within the same method.      URINALYSIS WITH REFLEX CULTURE AND MICROSCOPIC    Narrative:     The following orders were created for panel order Urinalysis with Reflex Culture and Microscopic.  Procedure                               Abnormality         Status                     ---------                               -----------         ------                     Urinalysis with Reflex C...[[id number]]  Abnormal            Final result               Extra Urine Gray Tube[[id number]]                            Final result                 Please view results for these tests on the individual orders.   EXTRA URINE GRAY TUBE    Extra Tube Hold for add-ons.     MORPHOLOGY    RBC Morphology No significant RBC morphology present          CT abdomen pelvis w IV contrast   Final Result   Small 7 x 6 mm nodule in the right middle lobe, unchanged from recent   prior  study; nodules should be followed according to the Fleischner   thor[employer] Society guidelines.   Small cirrhotic liver with nodular contour, unchanged.   Moderate splenomegaly.   Moderate ascites involving all 4 abdominal quadrants, similar to   recent prior study.   Umbilical fluid containing hernia 5.5 x 3.0 cm also again noted.   No bowel obstruction.   No significant interval change from prior study.   .   Signed by Vasile Grimaldo MD           MDM Summary:  CT shows moderate ascites.  Patient's pain is moderately well-controlled with opiates.  Lab work is remarkable for elevated ammonia of 63, elevated glucose of 427, transaminitis.  Otherwise unremarkable lab work today.  Patient states she would like to leave AGAINST MEDICAL ADVICE so she can spend [place] with her family instead of being transferred to [facility] which was the recommendation of myself and my attending physician.    PATIENT LEFT AGAINST MEDICAL ADVICE:  We discussed the nature and purpose, risks and benefits, as well as, the alternatives of the given diagnosis and concerns. Time was given to allow the opportunity to ask questions and consider their options, and after the discussion, the patient decided to refuse the offered treatment plan. The patient was informed that refusal could lead to, but was not limited to, death, permanent disability, or severe pain, loss of current lifestyles and abilities. If present, I asked the relatives or significant others to dissuade them without success. Prior to refusing, their nurse and I determined and agreed that the patient had the capacity to make their decision and understood the consequences of that decision. They signed the refusal of treatment form and their nurse signed the form agreeing that the patient/guardian had received informed consent. After refusal, I made every reasonable opportunity to treat them to the best of my ability, including offering medication, while still trying to accommodate the  patient´s wishes and desires. I offered the option to return to the ED at any time.    Disposition:  ED Prescriptions       Medication Sig Dispense Start Date End Date Auth. Provider    oxyCODONE (Roxicodone) 5 mg immediate release tablet Take 1 tablet (5 mg) by mouth every 6 hours if needed for severe pain (7 - 10) for up to 3 days. 12 tablet 12/24/2024 12/27/2024 Cheko Hugo PA-C    ondansetron (Zofran) 4 mg tablet Take 1 tablet (4 mg) by mouth every 6 hours for 3 days. 12 tablet 12/24/2024 12/27/2024 Cheko Hugo PA-C              Procedure  Procedures     Cheko Hugo PA-C  12/25/24 1239

## 2024-12-27 ENCOUNTER — APPOINTMENT (OUTPATIENT)
Dept: RADIOLOGY | Facility: HOSPITAL | Age: 49
End: 2024-12-27
Payer: COMMERCIAL

## 2024-12-27 ENCOUNTER — HOSPITAL ENCOUNTER (INPATIENT)
Facility: HOSPITAL | Age: 49
End: 2024-12-27
Attending: EMERGENCY MEDICINE | Admitting: STUDENT IN AN ORGANIZED HEALTH CARE EDUCATION/TRAINING PROGRAM
Payer: COMMERCIAL

## 2024-12-27 ENCOUNTER — APPOINTMENT (OUTPATIENT)
Dept: CARDIOLOGY | Facility: HOSPITAL | Age: 49
End: 2024-12-27
Payer: COMMERCIAL

## 2024-12-27 DIAGNOSIS — K75.81 LIVER CIRRHOSIS SECONDARY TO NASH (NONALCOHOLIC STEATOHEPATITIS) (MULTI): ICD-10-CM

## 2024-12-27 DIAGNOSIS — R18.8 OTHER ASCITES: Primary | ICD-10-CM

## 2024-12-27 DIAGNOSIS — R06.09 DYSPNEA ON EXERTION: ICD-10-CM

## 2024-12-27 DIAGNOSIS — K74.60 LIVER CIRRHOSIS SECONDARY TO NASH (NONALCOHOLIC STEATOHEPATITIS) (MULTI): ICD-10-CM

## 2024-12-27 DIAGNOSIS — N39.0 UTI (URINARY TRACT INFECTION), UNCOMPLICATED: ICD-10-CM

## 2024-12-27 DIAGNOSIS — I25.9 CHEST PAIN DUE TO MYOCARDIAL ISCHEMIA, UNSPECIFIED ISCHEMIC CHEST PAIN TYPE: ICD-10-CM

## 2024-12-27 DIAGNOSIS — R10.84 ABDOMINAL PAIN, GENERALIZED: ICD-10-CM

## 2024-12-27 DIAGNOSIS — K72.90 LIVER FAILURE WITHOUT HEPATIC COMA, UNSPECIFIED CHRONICITY (MULTI): ICD-10-CM

## 2024-12-27 LAB
ALBUMIN SERPL BCP-MCNC: 2.4 G/DL (ref 3.4–5)
ALP SERPL-CCNC: 299 U/L (ref 33–110)
ALT SERPL W P-5'-P-CCNC: 32 U/L (ref 7–45)
ANION GAP SERPL CALC-SCNC: <7 MMOL/L (ref 10–20)
APPEARANCE UR: ABNORMAL
AST SERPL W P-5'-P-CCNC: 88 U/L (ref 9–39)
BACTERIA #/AREA URNS AUTO: ABNORMAL /HPF
BASOPHILS # BLD AUTO: 0.02 X10*3/UL (ref 0–0.1)
BASOPHILS NFR BLD AUTO: 0.5 %
BASOPHILS NFR FLD MANUAL: 0 %
BILIRUB SERPL-MCNC: 1.9 MG/DL (ref 0–1.2)
BILIRUB UR STRIP.AUTO-MCNC: ABNORMAL MG/DL
BLASTS NFR FLD MANUAL: 0 %
BUN SERPL-MCNC: 13 MG/DL (ref 6–23)
CALCIUM SERPL-MCNC: 7.3 MG/DL (ref 8.6–10.3)
CARDIAC TROPONIN I PNL SERPL HS: 4 NG/L (ref 0–13)
CHLORIDE SERPL-SCNC: 105 MMOL/L (ref 98–107)
CLARITY FLD: CLEAR
CO2 SERPL-SCNC: 29 MMOL/L (ref 21–32)
COLOR FLD: NORMAL
COLOR UR: ABNORMAL
CREAT SERPL-MCNC: 0.56 MG/DL (ref 0.5–1.05)
EGFRCR SERPLBLD CKD-EPI 2021: >90 ML/MIN/1.73M*2
EOSINOPHIL # BLD AUTO: 0.15 X10*3/UL (ref 0–0.7)
EOSINOPHIL NFR BLD AUTO: 3.5 %
EOSINOPHIL NFR FLD MANUAL: 0 %
ERYTHROCYTE [DISTWIDTH] IN BLOOD BY AUTOMATED COUNT: 20.8 % (ref 11.5–14.5)
FLUAV RNA RESP QL NAA+PROBE: NOT DETECTED
FLUBV RNA RESP QL NAA+PROBE: NOT DETECTED
GLUCOSE BLD MANUAL STRIP-MCNC: 111 MG/DL (ref 74–99)
GLUCOSE BLD MANUAL STRIP-MCNC: 132 MG/DL (ref 74–99)
GLUCOSE SERPL-MCNC: 205 MG/DL (ref 74–99)
GLUCOSE UR STRIP.AUTO-MCNC: ABNORMAL MG/DL
HCT VFR BLD AUTO: 32 % (ref 36–46)
HGB BLD-MCNC: 10.4 G/DL (ref 12–16)
IMM GRANULOCYTES # BLD AUTO: 0.01 X10*3/UL (ref 0–0.7)
IMM GRANULOCYTES NFR BLD AUTO: 0.2 % (ref 0–0.9)
IMMATURE GRANULOCYTES IN FLUID: 0 %
INR PPP: 1.5 (ref 0.9–1.1)
KETONES UR STRIP.AUTO-MCNC: NEGATIVE MG/DL
LACTATE SERPL-SCNC: 1.2 MMOL/L (ref 0.4–2)
LEUKOCYTE ESTERASE UR QL STRIP.AUTO: ABNORMAL
LIPASE SERPL-CCNC: 130 U/L (ref 9–82)
LYMPHOCYTES # BLD AUTO: 1.05 X10*3/UL (ref 1.2–4.8)
LYMPHOCYTES NFR BLD AUTO: 24.3 %
LYMPHOCYTES NFR FLD MANUAL: 28 %
MAGNESIUM SERPL-MCNC: 1.72 MG/DL (ref 1.6–2.4)
MCH RBC QN AUTO: 29.3 PG (ref 26–34)
MCHC RBC AUTO-ENTMCNC: 32.5 G/DL (ref 32–36)
MCV RBC AUTO: 90 FL (ref 80–100)
MONOCYTES # BLD AUTO: 0.59 X10*3/UL (ref 0.1–1)
MONOCYTES NFR BLD AUTO: 13.7 %
MONOS+MACROS NFR FLD MANUAL: 72 %
MUCOUS THREADS #/AREA URNS AUTO: ABNORMAL /LPF
NEUTROPHILS # BLD AUTO: 2.5 X10*3/UL (ref 1.2–7.7)
NEUTROPHILS NFR BLD AUTO: 57.8 %
NEUTROPHILS NFR FLD MANUAL: 0 %
NITRITE UR QL STRIP.AUTO: NEGATIVE
NRBC BLD-RTO: 0 /100 WBCS (ref 0–0)
OTHER CELLS NFR FLD MANUAL: 0 %
PH UR STRIP.AUTO: 7.5 [PH]
PLASMA CELLS NFR FLD MANUAL: 0 %
PLATELET # BLD AUTO: 65 X10*3/UL (ref 150–450)
POLYCHROMASIA BLD QL SMEAR: NORMAL
POTASSIUM SERPL-SCNC: 3.7 MMOL/L (ref 3.5–5.3)
POTASSIUM SERPL-SCNC: 5.5 MMOL/L (ref 3.5–5.3)
PROT SERPL-MCNC: 6.8 G/DL (ref 6.4–8.2)
PROT UR STRIP.AUTO-MCNC: ABNORMAL MG/DL
PROTHROMBIN TIME: 17 SECONDS (ref 9.8–12.8)
RBC # BLD AUTO: 3.55 X10*6/UL (ref 4–5.2)
RBC # FLD MANUAL: 257 /UL
RBC # UR STRIP.AUTO: ABNORMAL /UL
RBC #/AREA URNS AUTO: ABNORMAL /HPF
RBC MORPH BLD: NORMAL
SARS-COV-2 RNA RESP QL NAA+PROBE: DETECTED
SODIUM SERPL-SCNC: 133 MMOL/L (ref 136–145)
SP GR UR STRIP.AUTO: 1.03
SQUAMOUS #/AREA URNS AUTO: ABNORMAL /HPF
TARGETS BLD QL SMEAR: NORMAL
TOTAL CELLS COUNTED FLD: 100
UROBILINOGEN UR STRIP.AUTO-MCNC: ABNORMAL MG/DL
WBC # BLD AUTO: 4.3 X10*3/UL (ref 4.4–11.3)
WBC # FLD AUTO: 121 /UL
WBC #/AREA URNS AUTO: ABNORMAL /HPF

## 2024-12-27 PROCEDURE — 96375 TX/PRO/DX INJ NEW DRUG ADDON: CPT

## 2024-12-27 PROCEDURE — 99221 1ST HOSP IP/OBS SF/LOW 40: CPT | Performed by: INTERNAL MEDICINE

## 2024-12-27 PROCEDURE — 87070 CULTURE OTHR SPECIMN AEROBIC: CPT | Mod: AHULAB | Performed by: STUDENT IN AN ORGANIZED HEALTH CARE EDUCATION/TRAINING PROGRAM

## 2024-12-27 PROCEDURE — 2500000004 HC RX 250 GENERAL PHARMACY W/ HCPCS (ALT 636 FOR OP/ED)

## 2024-12-27 PROCEDURE — 2500000001 HC RX 250 WO HCPCS SELF ADMINISTERED DRUGS (ALT 637 FOR MEDICARE OP)

## 2024-12-27 PROCEDURE — 88112 CYTOPATH CELL ENHANCE TECH: CPT | Performed by: PATHOLOGY

## 2024-12-27 PROCEDURE — 84132 ASSAY OF SERUM POTASSIUM: CPT | Performed by: NURSE PRACTITIONER

## 2024-12-27 PROCEDURE — 84157 ASSAY OF PROTEIN OTHER: CPT | Mod: AHULAB | Performed by: NURSE PRACTITIONER

## 2024-12-27 PROCEDURE — 83986 ASSAY PH BODY FLUID NOS: CPT | Mod: AHULAB | Performed by: STUDENT IN AN ORGANIZED HEALTH CARE EDUCATION/TRAINING PROGRAM

## 2024-12-27 PROCEDURE — 89051 BODY FLUID CELL COUNT: CPT | Performed by: NURSE PRACTITIONER

## 2024-12-27 PROCEDURE — G0378 HOSPITAL OBSERVATION PER HR: HCPCS

## 2024-12-27 PROCEDURE — 49083 ABD PARACENTESIS W/IMAGING: CPT

## 2024-12-27 PROCEDURE — 83735 ASSAY OF MAGNESIUM: CPT | Performed by: NURSE PRACTITIONER

## 2024-12-27 PROCEDURE — 82150 ASSAY OF AMYLASE: CPT | Mod: AHULAB | Performed by: NURSE PRACTITIONER

## 2024-12-27 PROCEDURE — 2500000004 HC RX 250 GENERAL PHARMACY W/ HCPCS (ALT 636 FOR OP/ED): Performed by: NURSE PRACTITIONER

## 2024-12-27 PROCEDURE — 0W9G3ZZ DRAINAGE OF PERITONEAL CAVITY, PERCUTANEOUS APPROACH: ICD-10-PCS

## 2024-12-27 PROCEDURE — 2500000002 HC RX 250 W HCPCS SELF ADMINISTERED DRUGS (ALT 637 FOR MEDICARE OP, ALT 636 FOR OP/ED): Performed by: STUDENT IN AN ORGANIZED HEALTH CARE EDUCATION/TRAINING PROGRAM

## 2024-12-27 PROCEDURE — 83605 ASSAY OF LACTIC ACID: CPT | Performed by: NURSE PRACTITIONER

## 2024-12-27 PROCEDURE — 87636 SARSCOV2 & INF A&B AMP PRB: CPT | Performed by: NURSE PRACTITIONER

## 2024-12-27 PROCEDURE — 99222 1ST HOSP IP/OBS MODERATE 55: CPT

## 2024-12-27 PROCEDURE — 96366 THER/PROPH/DIAG IV INF ADDON: CPT

## 2024-12-27 PROCEDURE — 82945 GLUCOSE OTHER FLUID: CPT | Mod: AHULAB | Performed by: NURSE PRACTITIONER

## 2024-12-27 PROCEDURE — 96372 THER/PROPH/DIAG INJ SC/IM: CPT | Performed by: STUDENT IN AN ORGANIZED HEALTH CARE EDUCATION/TRAINING PROGRAM

## 2024-12-27 PROCEDURE — 83690 ASSAY OF LIPASE: CPT | Performed by: NURSE PRACTITIONER

## 2024-12-27 PROCEDURE — 84484 ASSAY OF TROPONIN QUANT: CPT | Performed by: NURSE PRACTITIONER

## 2024-12-27 PROCEDURE — 36415 COLL VENOUS BLD VENIPUNCTURE: CPT | Performed by: NURSE PRACTITIONER

## 2024-12-27 PROCEDURE — 87086 URINE CULTURE/COLONY COUNT: CPT | Mod: AHULAB | Performed by: NURSE PRACTITIONER

## 2024-12-27 PROCEDURE — 83615 LACTATE (LD) (LDH) ENZYME: CPT | Mod: AHULAB | Performed by: NURSE PRACTITIONER

## 2024-12-27 PROCEDURE — 85610 PROTHROMBIN TIME: CPT | Performed by: NURSE PRACTITIONER

## 2024-12-27 PROCEDURE — 99285 EMERGENCY DEPT VISIT HI MDM: CPT | Mod: 25 | Performed by: EMERGENCY MEDICINE

## 2024-12-27 PROCEDURE — 2500000004 HC RX 250 GENERAL PHARMACY W/ HCPCS (ALT 636 FOR OP/ED): Performed by: STUDENT IN AN ORGANIZED HEALTH CARE EDUCATION/TRAINING PROGRAM

## 2024-12-27 PROCEDURE — 82947 ASSAY GLUCOSE BLOOD QUANT: CPT

## 2024-12-27 PROCEDURE — 88112 CYTOPATH CELL ENHANCE TECH: CPT | Mod: TC | Performed by: NURSE PRACTITIONER

## 2024-12-27 PROCEDURE — 93005 ELECTROCARDIOGRAM TRACING: CPT

## 2024-12-27 PROCEDURE — 96365 THER/PROPH/DIAG IV INF INIT: CPT | Mod: 59

## 2024-12-27 PROCEDURE — 2500000001 HC RX 250 WO HCPCS SELF ADMINISTERED DRUGS (ALT 637 FOR MEDICARE OP): Performed by: STUDENT IN AN ORGANIZED HEALTH CARE EDUCATION/TRAINING PROGRAM

## 2024-12-27 PROCEDURE — 85025 COMPLETE CBC W/AUTO DIFF WBC: CPT | Performed by: NURSE PRACTITIONER

## 2024-12-27 PROCEDURE — 82042 OTHER SOURCE ALBUMIN QUAN EA: CPT | Mod: AHULAB | Performed by: NURSE PRACTITIONER

## 2024-12-27 PROCEDURE — 80053 COMPREHEN METABOLIC PANEL: CPT | Performed by: NURSE PRACTITIONER

## 2024-12-27 PROCEDURE — 81001 URINALYSIS AUTO W/SCOPE: CPT | Performed by: NURSE PRACTITIONER

## 2024-12-27 RX ORDER — ONDANSETRON HYDROCHLORIDE 2 MG/ML
4 INJECTION, SOLUTION INTRAVENOUS EVERY 8 HOURS PRN
Status: DISCONTINUED | OUTPATIENT
Start: 2024-12-27 | End: 2024-12-31 | Stop reason: HOSPADM

## 2024-12-27 RX ORDER — INSULIN LISPRO 100 [IU]/ML
0-5 INJECTION, SOLUTION INTRAVENOUS; SUBCUTANEOUS
Status: DISCONTINUED | OUTPATIENT
Start: 2024-12-27 | End: 2024-12-31 | Stop reason: HOSPADM

## 2024-12-27 RX ORDER — INSULIN GLARGINE 100 [IU]/ML
12 INJECTION, SOLUTION SUBCUTANEOUS 2 TIMES DAILY
Status: DISCONTINUED | OUTPATIENT
Start: 2024-12-27 | End: 2024-12-31 | Stop reason: HOSPADM

## 2024-12-27 RX ORDER — MIDODRINE HYDROCHLORIDE 5 MG/1
5 TABLET ORAL
Status: DISCONTINUED | OUTPATIENT
Start: 2024-12-27 | End: 2024-12-27

## 2024-12-27 RX ORDER — DEXTROSE 50 % IN WATER (D50W) INTRAVENOUS SYRINGE
12.5
Status: DISCONTINUED | OUTPATIENT
Start: 2024-12-27 | End: 2024-12-31 | Stop reason: HOSPADM

## 2024-12-27 RX ORDER — MORPHINE SULFATE 4 MG/ML
4 INJECTION, SOLUTION INTRAMUSCULAR; INTRAVENOUS ONCE
Status: COMPLETED | OUTPATIENT
Start: 2024-12-27 | End: 2024-12-27

## 2024-12-27 RX ORDER — ACETAMINOPHEN 325 MG/1
1000 TABLET ORAL EVERY 8 HOURS PRN
Status: DISCONTINUED | OUTPATIENT
Start: 2024-12-27 | End: 2024-12-27

## 2024-12-27 RX ORDER — NADOLOL 20 MG/1
20 TABLET ORAL DAILY
Status: DISCONTINUED | OUTPATIENT
Start: 2024-12-27 | End: 2024-12-31 | Stop reason: HOSPADM

## 2024-12-27 RX ORDER — POLYETHYLENE GLYCOL 3350 17 G/17G
17 POWDER, FOR SOLUTION ORAL 2 TIMES DAILY
Status: DISCONTINUED | OUTPATIENT
Start: 2024-12-27 | End: 2024-12-31 | Stop reason: HOSPADM

## 2024-12-27 RX ORDER — FENOFIBRATE 54 MG/1
54 TABLET ORAL DAILY
Status: DISCONTINUED | OUTPATIENT
Start: 2024-12-27 | End: 2024-12-31 | Stop reason: HOSPADM

## 2024-12-27 RX ORDER — PANTOPRAZOLE SODIUM 40 MG/1
40 TABLET, DELAYED RELEASE ORAL
Status: DISCONTINUED | OUTPATIENT
Start: 2024-12-28 | End: 2024-12-31 | Stop reason: HOSPADM

## 2024-12-27 RX ORDER — ONDANSETRON 4 MG/1
4 TABLET, ORALLY DISINTEGRATING ORAL EVERY 8 HOURS PRN
Status: DISCONTINUED | OUTPATIENT
Start: 2024-12-27 | End: 2024-12-31 | Stop reason: HOSPADM

## 2024-12-27 RX ORDER — ENOXAPARIN SODIUM 100 MG/ML
40 INJECTION SUBCUTANEOUS EVERY 24 HOURS
Status: DISCONTINUED | OUTPATIENT
Start: 2024-12-27 | End: 2024-12-31 | Stop reason: HOSPADM

## 2024-12-27 RX ORDER — DEXTROSE 50 % IN WATER (D50W) INTRAVENOUS SYRINGE
25
Status: DISCONTINUED | OUTPATIENT
Start: 2024-12-27 | End: 2024-12-31 | Stop reason: HOSPADM

## 2024-12-27 RX ORDER — OXYCODONE HYDROCHLORIDE 5 MG/1
5 TABLET ORAL EVERY 6 HOURS PRN
Status: DISCONTINUED | OUTPATIENT
Start: 2024-12-27 | End: 2024-12-31 | Stop reason: HOSPADM

## 2024-12-27 RX ORDER — LACTULOSE 10 G/15ML
20 SOLUTION ORAL 2 TIMES DAILY
Status: DISCONTINUED | OUTPATIENT
Start: 2024-12-27 | End: 2024-12-31 | Stop reason: HOSPADM

## 2024-12-27 RX ORDER — SPIRONOLACTONE 25 MG/1
50 TABLET ORAL 2 TIMES DAILY
Status: DISCONTINUED | OUTPATIENT
Start: 2024-12-27 | End: 2024-12-31 | Stop reason: HOSPADM

## 2024-12-27 RX ORDER — URSODIOL 300 MG/1
300 CAPSULE ORAL 3 TIMES DAILY
Status: DISCONTINUED | OUTPATIENT
Start: 2024-12-27 | End: 2024-12-31 | Stop reason: HOSPADM

## 2024-12-27 RX ORDER — CEFTRIAXONE 1 G/50ML
1 INJECTION, SOLUTION INTRAVENOUS EVERY 24 HOURS
Status: DISCONTINUED | OUTPATIENT
Start: 2024-12-28 | End: 2024-12-28

## 2024-12-27 RX ORDER — SUCRALFATE 1 G/1
1 TABLET ORAL
Status: DISCONTINUED | OUTPATIENT
Start: 2024-12-27 | End: 2024-12-31 | Stop reason: HOSPADM

## 2024-12-27 RX ORDER — LIDOCAINE HYDROCHLORIDE 10 MG/ML
INJECTION, SOLUTION EPIDURAL; INFILTRATION; INTRACAUDAL; PERINEURAL
Status: COMPLETED | OUTPATIENT
Start: 2024-12-27 | End: 2024-12-27

## 2024-12-27 RX ORDER — FUROSEMIDE 40 MG/1
40 TABLET ORAL DAILY
Status: DISCONTINUED | OUTPATIENT
Start: 2024-12-27 | End: 2024-12-31 | Stop reason: HOSPADM

## 2024-12-27 RX ORDER — MECLIZINE HYDROCHLORIDE 25 MG/1
25 TABLET ORAL 3 TIMES DAILY PRN
Status: DISCONTINUED | OUTPATIENT
Start: 2024-12-27 | End: 2024-12-31 | Stop reason: HOSPADM

## 2024-12-27 RX ORDER — ONDANSETRON HYDROCHLORIDE 2 MG/ML
4 INJECTION, SOLUTION INTRAVENOUS ONCE
Status: COMPLETED | OUTPATIENT
Start: 2024-12-27 | End: 2024-12-27

## 2024-12-27 RX ORDER — ACETAMINOPHEN 325 MG/1
1000 TABLET ORAL EVERY 8 HOURS
Status: DISCONTINUED | OUTPATIENT
Start: 2024-12-27 | End: 2024-12-31 | Stop reason: HOSPADM

## 2024-12-27 RX ADMIN — URSODIOL 300 MG: 300 CAPSULE ORAL at 14:55

## 2024-12-27 RX ADMIN — FENOFIBRATE 54 MG: 54 TABLET ORAL at 14:55

## 2024-12-27 RX ADMIN — RIFAXIMIN 550 MG: 550 TABLET ORAL at 20:48

## 2024-12-27 RX ADMIN — URSODIOL 300 MG: 300 CAPSULE ORAL at 20:48

## 2024-12-27 RX ADMIN — ACETAMINOPHEN 975 MG: 325 TABLET, FILM COATED ORAL at 17:14

## 2024-12-27 RX ADMIN — ONDANSETRON 4 MG: 2 INJECTION, SOLUTION INTRAMUSCULAR; INTRAVENOUS at 17:23

## 2024-12-27 RX ADMIN — LACTULOSE 20 G: 20 SOLUTION ORAL at 14:59

## 2024-12-27 RX ADMIN — SUCRALFATE 1 G: 1 TABLET ORAL at 16:57

## 2024-12-27 RX ADMIN — OXYCODONE HYDROCHLORIDE 5 MG: 5 TABLET ORAL at 17:23

## 2024-12-27 RX ADMIN — DEXTROSE MONOHYDRATE 2 G: 5 INJECTION INTRAVENOUS at 10:36

## 2024-12-27 RX ADMIN — POLYETHYLENE GLYCOL 3350 17 G: 17 POWDER, FOR SOLUTION ORAL at 14:30

## 2024-12-27 RX ADMIN — MORPHINE SULFATE 4 MG: 4 INJECTION, SOLUTION INTRAMUSCULAR; INTRAVENOUS at 09:37

## 2024-12-27 RX ADMIN — INSULIN GLARGINE 12 UNITS: 100 INJECTION, SOLUTION SUBCUTANEOUS at 20:48

## 2024-12-27 RX ADMIN — SUCRALFATE 1 G: 1 TABLET ORAL at 20:48

## 2024-12-27 RX ADMIN — FUROSEMIDE 40 MG: 40 TABLET ORAL at 14:55

## 2024-12-27 RX ADMIN — NADOLOL 20 MG: 20 TABLET ORAL at 14:55

## 2024-12-27 RX ADMIN — LIDOCAINE HYDROCHLORIDE 10 ML: 10 INJECTION, SOLUTION EPIDURAL; INFILTRATION; INTRACAUDAL; PERINEURAL at 15:09

## 2024-12-27 RX ADMIN — ONDANSETRON 4 MG: 2 INJECTION, SOLUTION INTRAMUSCULAR; INTRAVENOUS at 09:36

## 2024-12-27 RX ADMIN — ENOXAPARIN SODIUM 40 MG: 40 INJECTION SUBCUTANEOUS at 14:55

## 2024-12-27 SDOH — SOCIAL STABILITY: SOCIAL INSECURITY: WITHIN THE LAST YEAR, HAVE YOU BEEN HUMILIATED OR EMOTIONALLY ABUSED IN OTHER WAYS BY YOUR PARTNER OR EX-PARTNER?: NO

## 2024-12-27 SDOH — ECONOMIC STABILITY: FOOD INSECURITY: WITHIN THE PAST 12 MONTHS, THE FOOD YOU BOUGHT JUST DIDN'T LAST AND YOU DIDN'T HAVE MONEY TO GET MORE.: NEVER TRUE

## 2024-12-27 SDOH — SOCIAL STABILITY: SOCIAL INSECURITY: DOES ANYONE TRY TO KEEP YOU FROM HAVING/CONTACTING OTHER FRIENDS OR DOING THINGS OUTSIDE YOUR HOME?: NO

## 2024-12-27 SDOH — ECONOMIC STABILITY: FOOD INSECURITY: WITHIN THE PAST 12 MONTHS, YOU WORRIED THAT YOUR FOOD WOULD RUN OUT BEFORE YOU GOT THE MONEY TO BUY MORE.: NEVER TRUE

## 2024-12-27 SDOH — SOCIAL STABILITY: SOCIAL INSECURITY: ARE YOU OR HAVE YOU BEEN THREATENED OR ABUSED PHYSICALLY, EMOTIONALLY, OR SEXUALLY BY ANYONE?: NO

## 2024-12-27 SDOH — SOCIAL STABILITY: SOCIAL INSECURITY: HAS ANYONE EVER THREATENED TO HURT YOUR FAMILY OR YOUR PETS?: NO

## 2024-12-27 SDOH — SOCIAL STABILITY: SOCIAL INSECURITY: ARE THERE ANY APPARENT SIGNS OF INJURIES/BEHAVIORS THAT COULD BE RELATED TO ABUSE/NEGLECT?: NO

## 2024-12-27 SDOH — SOCIAL STABILITY: SOCIAL INSECURITY: HAVE YOU HAD THOUGHTS OF HARMING ANYONE ELSE?: NO

## 2024-12-27 SDOH — SOCIAL STABILITY: SOCIAL INSECURITY: DO YOU FEEL UNSAFE GOING BACK TO THE PLACE WHERE YOU ARE LIVING?: NO

## 2024-12-27 SDOH — SOCIAL STABILITY: SOCIAL INSECURITY: ABUSE: ADULT

## 2024-12-27 SDOH — ECONOMIC STABILITY: INCOME INSECURITY: IN THE PAST 12 MONTHS HAS THE ELECTRIC, GAS, OIL, OR WATER COMPANY THREATENED TO SHUT OFF SERVICES IN YOUR HOME?: NO

## 2024-12-27 SDOH — SOCIAL STABILITY: SOCIAL INSECURITY: HAVE YOU HAD ANY THOUGHTS OF HARMING ANYONE ELSE?: NO

## 2024-12-27 SDOH — SOCIAL STABILITY: SOCIAL INSECURITY: WITHIN THE LAST YEAR, HAVE YOU BEEN AFRAID OF YOUR PARTNER OR EX-PARTNER?: NO

## 2024-12-27 SDOH — SOCIAL STABILITY: SOCIAL INSECURITY: DO YOU FEEL ANYONE HAS EXPLOITED OR TAKEN ADVANTAGE OF YOU FINANCIALLY OR OF YOUR PERSONAL PROPERTY?: NO

## 2024-12-27 SDOH — SOCIAL STABILITY: SOCIAL INSECURITY: WERE YOU ABLE TO COMPLETE ALL THE BEHAVIORAL HEALTH SCREENINGS?: YES

## 2024-12-27 ASSESSMENT — ENCOUNTER SYMPTOMS
ACTIVITY CHANGE: 1
FLANK PAIN: 1
CHEST TIGHTNESS: 0
NAUSEA: 1
PALPITATIONS: 0
FEVER: 1
VOMITING: 0
WHEEZING: 0
COUGH: 0
HEADACHES: 1
SHORTNESS OF BREATH: 0
WOUND: 1
APPETITE CHANGE: 1
CHILLS: 1

## 2024-12-27 ASSESSMENT — COGNITIVE AND FUNCTIONAL STATUS - GENERAL
PATIENT BASELINE BEDBOUND: NO
MOBILITY SCORE: 24
DAILY ACTIVITIY SCORE: 24

## 2024-12-27 ASSESSMENT — PAIN - FUNCTIONAL ASSESSMENT
PAIN_FUNCTIONAL_ASSESSMENT: 0-10

## 2024-12-27 ASSESSMENT — PAIN DESCRIPTION - ORIENTATION
ORIENTATION: RIGHT;LEFT;MID
ORIENTATION: RIGHT;LEFT;MID
ORIENTATION: RIGHT;LEFT;LOWER

## 2024-12-27 ASSESSMENT — PAIN DESCRIPTION - LOCATION
LOCATION: ABDOMEN

## 2024-12-27 ASSESSMENT — ACTIVITIES OF DAILY LIVING (ADL)
BATHING: INDEPENDENT
ADEQUATE_TO_COMPLETE_ADL: YES
HEARING - LEFT EAR: FUNCTIONAL
FEEDING YOURSELF: INDEPENDENT
PATIENT'S MEMORY ADEQUATE TO SAFELY COMPLETE DAILY ACTIVITIES?: YES
GROOMING: INDEPENDENT
HEARING - RIGHT EAR: FUNCTIONAL
TOILETING: INDEPENDENT
DRESSING YOURSELF: INDEPENDENT
LACK_OF_TRANSPORTATION: NO
WALKS IN HOME: INDEPENDENT
JUDGMENT_ADEQUATE_SAFELY_COMPLETE_DAILY_ACTIVITIES: YES
LACK_OF_TRANSPORTATION: NO

## 2024-12-27 ASSESSMENT — PAIN SCALES - GENERAL
PAINLEVEL_OUTOF10: 8
PAINLEVEL_OUTOF10: 7
PAINLEVEL_OUTOF10: 8
PAINLEVEL_OUTOF10: 0 - NO PAIN
PAINLEVEL_OUTOF10: 0 - NO PAIN
PAINLEVEL_OUTOF10: 6
PAINLEVEL_OUTOF10: 4
PAINLEVEL_OUTOF10: 7
PAINLEVEL_OUTOF10: 0 - NO PAIN
PAINLEVEL_OUTOF10: 7

## 2024-12-27 ASSESSMENT — LIFESTYLE VARIABLES
AUDIT-C TOTAL SCORE: 0
HOW OFTEN DO YOU HAVE 6 OR MORE DRINKS ON ONE OCCASION: NEVER
HOW OFTEN DO YOU HAVE A DRINK CONTAINING ALCOHOL: NEVER
HOW MANY STANDARD DRINKS CONTAINING ALCOHOL DO YOU HAVE ON A TYPICAL DAY: PATIENT DOES NOT DRINK
SKIP TO QUESTIONS 9-10: 1
AUDIT-C TOTAL SCORE: 0

## 2024-12-27 ASSESSMENT — PATIENT HEALTH QUESTIONNAIRE - PHQ9
1. LITTLE INTEREST OR PLEASURE IN DOING THINGS: NOT AT ALL
SUM OF ALL RESPONSES TO PHQ9 QUESTIONS 1 & 2: 0
2. FEELING DOWN, DEPRESSED OR HOPELESS: NOT AT ALL

## 2024-12-27 ASSESSMENT — PAIN SCALES - WONG BAKER
WONGBAKER_NUMERICALRESPONSE: HURTS WHOLE LOT
WONGBAKER_NUMERICALRESPONSE: HURTS WHOLE LOT

## 2024-12-27 ASSESSMENT — PAIN DESCRIPTION - DESCRIPTORS: DESCRIPTORS: ACHING

## 2024-12-27 ASSESSMENT — PAIN DESCRIPTION - PAIN TYPE: TYPE: ACUTE PAIN

## 2024-12-27 ASSESSMENT — PAIN DESCRIPTION - FREQUENCY: FREQUENCY: CONSTANT/CONTINUOUS

## 2024-12-27 NOTE — ED PROVIDER NOTES
HPI   Chief Complaint   Patient presents with    Abdominal Pain    Back Pain     Pt has distended abdomen and has hx of liver problems. Hx of paracentisis.       49-year-old female with a history of ascites and a recent paracentesis on December 8 presents  with severe abdominal pain, nausea, fever and chills, and chest pain.  She rates the chest pain 5 out of 10.  It is intermittent.  She endorses dyspnea on exertion.  She endorses acid reflux.  She has a history of iron deficiency anemia.  She has a history of vitamin D deficiency.  In addition to fever and chills she endorses chronic ascites.  A CT of the abdomen pelvis was completed just on the 24th and it showed a 7 x 6 mm nodule in the lung base middle lobe, cirrhotic liver with a nodule and moderate ascites.  She had a hernia that was 5.5 x 3 cm and there was no obstruction.  She denies any recent trauma or fall.  She denies any sick contacts.  Her main language is Hebrew      History provided by:  Patient and relative   used: Yes            Patient History   Past Medical History:   Diagnosis Date    Cirrhosis of liver (Multi)     Diabetes mellitus (Multi)      Past Surgical History:   Procedure Laterality Date    ABDOMINAL SURGERY      CT GUIDED IMAGING FOR NEEDLE PLACEMENT  10/14/2020    CT GUIDED IMAGING FOR NEEDLE PLACEMENT [facility]    US GUIDED ABDOMINAL PARACENTESIS  10/08/2020    US GUIDED ABDOMINAL PARACENTESIS [facility]     No family history on file.  Social History     Tobacco Use    Smoking status: Never    Smokeless tobacco: Never   Substance Use Topics    Alcohol use: Never    Drug use: Never       Physical Exam   ED Triage Vitals [12/27/24 0710]   Temperature Heart Rate Respirations BP   37.7 °C (99.8 °F) 89 16 123/69      Pulse Ox Temp Source Heart Rate Source Patient Position   98 % Temporal Monitor Sitting      BP Location FiO2 (%)     Right arm --       Physical Exam  Constitutional:       Appearance:  She is well-developed.   HENT:      Head: Normocephalic and atraumatic.      Mouth/Throat:      Mouth: Mucous membranes are moist.   Eyes:      Extraocular Movements: Extraocular movements intact.   Cardiovascular:      Rate and Rhythm: Normal rate and regular rhythm.   Abdominal:      General: Bowel sounds are normal. There is distension.      Palpations: There is hepatomegaly.      Tenderness: There is generalized abdominal tenderness.      Comments: Severe ascites   Skin:     General: Skin is warm.      Capillary Refill: Capillary refill takes less than 2 seconds.   Neurological:      General: No focal deficit present.      Mental Status: She is alert and oriented to person, place, and time.   Psychiatric:         Mood and Affect: Mood normal.         Behavior: Behavior normal.           ED Course & MDM   ED Course as of 12/27/24 1406   Fri Dec 27, 2024   1329 POTASSIUM(!): 5.5 [WS]      ED Course User Index  [WS] Keven Krishnamurthy, APRN-CNP         Diagnoses as of 12/27/24 1406   Liver failure without hepatic coma, unspecified chronicity (Multi)   Other ascites   Chest pain due to myocardial ischemia, unspecified ischemic chest pain type   Dyspnea on exertion   UTI (urinary tract infection), uncomplicated                 No data recorded     [place] Coma Scale Score: 15 (12/27/24 1040 : Nancy Finch, MYNOR)                           Medical Decision Making  Entered consult for interventional radiology.  Staffed with attending.  Basic labs were ordered.  Spoke with interventional radiology and his and his labs are completed they may come down and complete a paracentesis.  Interventional radiology will put her in as an add-on. Patient's also diagnosed with a UTI.  She received 2 g of ceftriaxone.  She had proteinuria.  Her blood glucose was 205.  Her potassium was 5.5 but it had marked hemolysis and isomer redraw potassium.  Calcium was 7.3 and this is baseline for patient.  Her albumin was 2.4.  Her alk phos  was actually better than normal at 299.  Her alk phos usually runs in the 400s.  Patient remains alert and oriented her vital signs were stable.  She was positive for COVID which is why the interventional radiology did her as a late add-on.  I spoke with attending who recommended we put her in observation and observation accepted patient was same findings.    Amount and/or Complexity of Data Reviewed  Labs: ordered.  Radiology: ordered and independent interpretation performed.        Procedure  Procedures     Keven Krishnamurthy, APRN-CNP  12/27/24 4457

## 2024-12-27 NOTE — POST-PROCEDURE NOTE
Interventional Radiology Brief Postprocedure Note    Attending: Brady Bhandari CNP    Assistant: none    Diagnosis: ascites    Description of procedure: Ultrasound guided paracentesis was preformed in the RUQ. A total of 3.6 L of straw-yellow fluid was drained.       Anesthesia:  Local    Complications: None    Estimated Blood Loss: none    Medications  As of 12/27/24 1543      morphine injection 4 mg (mg) Total dose:  4 mg Dosing weight:  83      Date/Time Rate/Dose/Volume Action       12/27/24  0937 4 mg Given               ondansetron (Zofran) injection 4 mg (mg) Total dose:  4 mg Dosing weight:  83      Date/Time Rate/Dose/Volume Action       12/27/24  0936 4 mg Given               cefTRIAXone (Rocephin) 2 g in dextrose 5% IV 50 mL (mL/hr) Total volume:  Not documented* Dosing weight:  83   *Total volume has not been documented. View each administration to see the amount administered.     Date/Time Rate/Dose/Volume Action       12/27/24  1036 2 g - 100 mL/hr (over 30 min) New Bag      1227  (over 30 min) Stopped               spironolactone (Aldactone) tablet 50 mg (mg) Total dose:  Cannot be calculated* Dosing weight:  83   *Administration dose not documented     Date/Time Rate/Dose/Volume Action       12/27/24  1409 *Not included in total Held by provider      1410 *Not included in total Automatically Held               lidocaine PF (Xylocaine) 10 mg/mL (1 %) injection (mL) Total volume:  10 mL      Date/Time Rate/Dose/Volume Action       12/27/24  1509 10 mL Given                   Specimens collected      See detailed result report with images in PACS.    The patient tolerated the procedure well without incident or complication and is in stable condition.

## 2024-12-27 NOTE — H&P
History Of Present Illness  Alfonzo Flanagan is a 49 y.o. female with past medical history of ascites, iron deficiency anemia, DM II, liver cirrhosis, pulmonary nodule, GERD, Vitamin D defficiency, transaminasemia, SBP, chronic ascites, and a recent paracentesis on December 13, presented to [facility] ED with severe abdominal pain, nausea, fever and chills, and chest pain. She states she was feeling very cold, and covered with few blankets. She endorses her pain started a week ago. Her pain is diffused and in her abdomen area. She endorses headache as well. She endorses back pain is not that bad as her abdomen.  She states that she lost her appetite. She feels nauseated and her pain increased with eating. She denies dizziness, or lightheadedness. She denies any recent trauma or fall. She denies any sick contacts or travelling. She is able to communicate, her main language is [place].   She underwent multiple US guided abdominal paracentesis and yielding 3 liters of fluid each time. She went about 2-3 times every month. US guided abdominal paracentesis was done on 10/28/2024, 11/01/24, and 12/13/2024. She has also prior multiple paracentesis were done at [facility].     US Guided abdominal paracentesis ordered  CT A / P on 12/24/2024   Small 7 x 6 mm nodule in the right middle lobe, unchanged from recent  prior study; nodules should be followed according to the Fleischner  thoracic Society guidelines.  Small cirrhotic liver with nodular contour, unchanged.  Moderate splenomegaly.  Moderate ascites involving all 4 abdominal quadrants, similar to  recent prior study.  Umbilical fluid containing hernia 5.5 x 3.0 cm also again noted.  No bowel obstruction.  No significant interval change from prior study.  Transthoracic Echo (TTE)   1. The left ventricular systolic function is normal, with a visually estimated ejection fraction of 60-65%.   2. There is normal right ventricular global systolic  function.   3. Trace mitral valve regurgitation.   4. Trace tricuspid regurgitation is visualized.  CMP remarkable for Na 133, K 5.5, Alk phos 299, ALT 32, AST 88, Total Bilirubin 1.9  LIPASE 130  INR 1.5, PT 17.0   CBC revealed mild anemia and thrombocytopenia     ED Medications:  Ceftriaxone 2 gm x 1  Morphine sulfate 4 mg IV x 1   Ondansetron 4 mg IV x 1     Past Medical History  Past Medical History:   Diagnosis Date    Cirrhosis of liver (Multi)     Diabetes mellitus (Multi)        Surgical History  Past Surgical History:   Procedure Laterality Date    ABDOMINAL SURGERY      CT GUIDED IMAGING FOR NEEDLE PLACEMENT  10/14/2020    CT GUIDED IMAGING FOR NEEDLE PLACEMENT [facility]    US GUIDED ABDOMINAL PARACENTESIS  10/08/2020    US GUIDED ABDOMINAL PARACENTESIS [facility]        Social History  She reports that she has never smoked. She has never used smokeless tobacco. She reports that she does not drink alcohol and does not use drugs.    Family History  No family history on file.     Allergies  Gluten    Review of Systems   Constitutional:  Positive for activity change, appetite change, chills and fever.   Eyes:  Negative for visual disturbance.   Respiratory:  Negative for cough, chest tightness, shortness of breath and wheezing.    Cardiovascular:  Positive for leg swelling. Negative for chest pain and palpitations.   Gastrointestinal:  Positive for nausea. Negative for vomiting.   Genitourinary:  Positive for flank pain.   Skin:  Positive for wound (right inner thigh).   Neurological:  Positive for headaches.   All other systems reviewed and are negative.       Physical Exam  Vitals and nursing note reviewed.   Constitutional:       General: She is not in acute distress.     Appearance: She is obese. She is not diaphoretic.   Cardiovascular:      Rate and Rhythm: Normal rate and regular rhythm.      Pulses: Normal pulses.      Heart sounds: Normal heart sounds.   Pulmonary:      Effort:  "Pulmonary effort is normal. No respiratory distress.      Breath sounds: Normal breath sounds.   Chest:      Chest wall: No tenderness.   Abdominal:      General: There is distension.      Palpations: Abdomen is soft.      Tenderness: There is abdominal tenderness in the right upper quadrant, right lower quadrant, left upper quadrant and left lower quadrant.   Musculoskeletal:      Right lower le+ Pitting Edema present.      Left lower le+ Pitting Edema present.   Skin:     General: Skin is dry.      Capillary Refill: Capillary refill takes less than 2 seconds.      Findings: Wound present.          Neurological:      Mental Status: She is alert and oriented to person, place, and time.   Psychiatric:         Mood and Affect: Mood normal.          Last Recorded Vitals  Blood pressure 122/68, pulse 88, temperature 37.7 °C (99.8 °F), temperature source Temporal, resp. rate 16, height 1.575 m (5' 2\"), weight 83 kg (183 lb), SpO2 98%.    Relevant Results    Results for orders placed or performed during the hospital encounter of 24 (from the past 24 hours)  CBC and Auto Differential  Result Value Ref Range   WBC 4.3 (L) 4.4 - 11.3 x10*3/uL   nRBC 0.0 0.0 - 0.0 /100 WBCs   RBC 3.55 (L) 4.00 - 5.20 x10*6/uL   Hemoglobin 10.4 (L) 12.0 - 16.0 g/dL   Hematocrit 32.0 (L) 36.0 - 46.0 %   MCV 90 80 - 100 fL   MCH 29.3 26.0 - 34.0 pg   MCHC 32.5 32.0 - 36.0 g/dL   RDW 20.8 (H) 11.5 - 14.5 %   Platelets 65 (L) 150 - 450 x10*3/uL   Neutrophils % 57.8 40.0 - 80.0 %   Immature Granulocytes %, Automated 0.2 0.0 - 0.9 %   Lymphocytes % 24.3 13.0 - 44.0 %   Monocytes % 13.7 2.0 - 10.0 %   Eosinophils % 3.5 0.0 - 6.0 %   Basophils % 0.5 0.0 - 2.0 %   Neutrophils Absolute 2.50 1.20 - 7.70 x10*3/uL   Immature Granulocytes Absolute, Automated 0.01 0.00 - 0.70 x10*3/uL   Lymphocytes Absolute 1.05 (L) 1.20 - 4.80 x10*3/uL   Monocytes Absolute 0.59 0.10 - 1.00 x10*3/uL   Eosinophils Absolute 0.15 0.00 - 0.70 x10*3/uL   Basophils " Absolute 0.02 0.00 - 0.10 x10*3/uL  Magnesium  Result Value Ref Range   Magnesium 1.72 1.60 - 2.40 mg/dL  Comprehensive metabolic panel  Result Value Ref Range   Glucose 205 (H) 74 - 99 mg/dL   Sodium 133 (L) 136 - 145 mmol/L   Potassium 5.5 (H) 3.5 - 5.3 mmol/L   Chloride 105 98 - 107 mmol/L   Bicarbonate 29 21 - 32 mmol/L   Anion Gap <7 (L) 10 - 20 mmol/L   Urea Nitrogen 13 6 - 23 mg/dL   Creatinine 0.56 0.50 - 1.05 mg/dL   eGFR >90 >60 mL/min/1.73m*2   Calcium 7.3 (L) 8.6 - 10.3 mg/dL   Albumin 2.4 (L) 3.4 - 5.0 g/dL   Alkaline Phosphatase 299 (H) 33 - 110 U/L   Total Protein 6.8 6.4 - 8.2 g/dL   AST 88 (H) 9 - 39 U/L   Bilirubin, Total 1.9 (H) 0.0 - 1.2 mg/dL   ALT 32 7 - 45 U/L  Lipase  Result Value Ref Range   Lipase 130 (H) 9 - 82 U/L  Lactate  Result Value Ref Range   Lactate 1.2 0.4 - 2.0 mmol/L  Protime-INR  Result Value Ref Range   Protime 17.0 (H) 9.8 - 12.8 seconds   INR 1.5 (H) 0.9 - 1.1  Troponin I, High Sensitivity  Result Value Ref Range   Troponin I, High Sensitivity 4 0 - 13 ng/L  Morphology  Result Value Ref Range   RBC Morphology See Below    Polychromasia Mild    Target Cells Few   Sars-CoV-2 and Influenza A/B PCR  Result Value Ref Range   Flu A Result Not Detected Not Detected   Flu B Result Not Detected Not Detected   Coronavirus 2019, PCR Detected (A) Not Detected  Urinalysis with Reflex Culture and Microscopic  Result Value Ref Range   Color, Urine Dark-Yellow Light-Yellow, Yellow, Dark-Yellow   Appearance, Urine Turbid (N) Clear   Specific Gravity, Urine 1.035 1.005 - 1.035   pH, Urine 7.5 5.0, 5.5, 6.0, 6.5, 7.0, 7.5, 8.0   Protein, Urine 50 (1+) (A) NEGATIVE, 10 (TRACE), 20 (TRACE) mg/dL   Glucose, Urine 200 (2+) (A) Normal mg/dL   Blood, Urine 0.06 (1+) (A) NEGATIVE   Ketones, Urine NEGATIVE NEGATIVE mg/dL   Bilirubin, Urine 0.5 (1+) (A) NEGATIVE   Urobilinogen, Urine 12 (3+) (A) Normal mg/dL   Nitrite, Urine NEGATIVE NEGATIVE   Leukocyte Esterase, Urine 500 Mckenzie/µL  (A) NEGATIVE  Microscopic Only, Urine  Result Value Ref Range   WBC, Urine 21-50 (A) 1-5, NONE /HPF   RBC, Urine 11-20 (A) NONE, 1-2, 3-5 /HPF   Squamous Epithelial Cells, Urine 10-25 (FEW) Reference range not established. /HPF   Bacteria, Urine 1+ (A) NONE SEEN /HPF   Mucus, Urine 4+ Reference range not established. /LPF  ECG 12 lead  Result Value Ref Range   Ventricular Rate 86 BPM   Atrial Rate 86 BPM   MI Interval 284 ms   QRS Duration 74 ms   QT Interval 390 ms   QTC Calculation(Bazett) 466 ms   P Axis 2 degrees   R Axis -9 degrees   T Axis -8 degrees   QRS Count 14 beats   Q Onset 213 ms   P Onset 138 ms   P Offset 177 ms   T Offset 408 ms   QTC Fredericia 440 ms  POCT GLUCOSE  Result Value Ref Range   POCT Glucose 111 (H) 74 - 99 mg/dL    Assessment/Plan   Alfonzo Flanagan is a 49 y.o. female with past medical history of ascites, iron deficiency anemia, DM II, liver cirrhosis, pulmonary nodule, GERD, Vitamin D defficiency, transaminasemia, SBP, chronic ascites, and a recent paracentesis on December 13, presented to [facility] ED with severe abdominal pain, nausea, fever and chills, and chest pain. She endorses her pain started a week ago. Her pain is diffused and in her abdomen area. She endorses headache as well. She endorses back pain is not that bad as her abdomen. She states that she lost her appetite. She feels nauseated and her pain increased with eating. She denies dizziness, or lightheadedness. She denies any recent trauma or fall. She denies any sick contacts or travelling. She is able to communicate, her main language is [place]uvian.      US Guided abdominal paracentesis completed per IR and drained 3.6 L of straw-yellow fluid.   CT A / P on 12/24/2024   Small 7 x 6 mm nodule in the right middle lobe, unchanged from recent  prior study; nodules should be followed according to the Fleischner  thoracic Society guidelines. Small cirrhotic liver with nodular contour, unchanged. Moderate  splenomegaly. Moderate ascites involving all 4 abdominal quadrants, similar to recent prior study. Umbilical fluid containing hernia 5.5 x 3.0 cm also again noted. No bowel obstruction.  No significant interval change from prior study.    Transthoracic Echo (TTE)   1. The left ventricular systolic function is normal, with a visually estimated ejection fraction of 60-65%.   2. There is normal right ventricular global systolic function.   3. Trace mitral valve regurgitation.   4. Trace tricuspid regurgitation is visualized.  CMP remarkable for Na 133, K 5.5, Alk phos 299, ALT 32, AST 88, Total Bilirubin 1.9  LIPASE 130  INR 1.5, PT 17.0   CBC revealed mild anemia and thrombocytopenia     ED Medications:  Ceftriaxone 2 gm x 1  Morphine sulfate 4 mg IV x 1   Ondansetron 4 mg IV x 1   Assessment & Plan  Other ascites      # Cirrhosis of liver with ascites   # Transaminitis    # Abdominal pain / distention / nausea  # Possible SBP (Spontaneous bacterial peritonitis )  - Alk phos 299, ALT 32, AST 88, Total Bilirubin 1.9, LIPASE 130  INR 1.5, PT 17.0   - US Guided abdominal paracentesis completed per IR and drained 3.6 L of straw-yellow fluid. Pending fluid analysis.  - Ceftriaxone 2 gm IV given in ED  - Will continue Ceftriaxone 1 gm IV Q 24 hours for possible SBP empirically   - Continue Rifaximin 550 mg q 12 hours, patient was not taking as outpatient   - Continue Lactulose 20 mg BID   - Continue Furosemide 40 mg daily     # Pancytopenia  - H&H 10.4/32.0, WBC 4.3, PLT 65  - 2/2 cirrhosis     # Hypoalbuminemia   - Albumin 2.4    # Pyuria   - Urine positive for Leukocyte esterase, urobilinogen , bilirubin, and protein   - Denies urinary symptoms  - Pending urine culture   - Continue ceftriaxone 1 gm IV empirically     # Hypertension  - Continue Nadolol   - Monitor blood pressure   - Monitor labs    # Hyperlipidemia   - Continue home dose Tricor 54 mg    # Electrolyte imbalance   - Hyperkalemia - K 5.5 (Hemolysis); repeated  K 3.7   - Hyponatremia Na - 133   - Monitor     # DM II  - Accu-Cheks ACHS  - Humalog correctional sliding scale  - Hypoglycemia protocol    # Hydradenitis suppurativa  - Right inner thigh open wound  - Wound consult appreciate rec  - Keep clean and dry     # DVT / GI prophylaxis   - Lovenox, SCD  - PPI  - Bowel regimen    # Discharge planing  - Discharge home when medically and hemodynamically stable    Labs/Testing reviewed    Interdisciplinary team rounding completed with hospitalist, nurse, TCC    NP discussed plan and lab/testing results with Dr. Lara     I spent 45 minutes in the professional and overall care of this patient.    Gurmeet Mosqueda, APRN-CNP

## 2024-12-27 NOTE — PROGRESS NOTES
12/27/24 1428   [facility] Disability Status   Are you deaf or do you have serious difficulty hearing? N   Are you blind or do you have serious difficulty seeing, even when wearing glasses? N   Because of a physical, mental, or emotional condition, do you have serious difficulty concentrating, remembering, or making decisions? (5 years old or older) N   Do you have serious difficulty walking or climbing stairs? Y   Do you have serious difficulty dressing or bathing? Y   Because of a physical, mental, or emotional condition, do you have serious difficulty doing errands alone such as visiting the doctor? Y

## 2024-12-27 NOTE — PROGRESS NOTES
Pharmacy Medication History     Source of Information: patient    Additional concerns with the patient's PTA list.   N/a  The following updates were made to the Prior to Admission medication list:     Medications ADDED:   N/a  Medications CHANGED:  N/a  Medications REMOVED:   N/a  Medications NOT TAKING:   N/a    Allergy reviewed : Yes    Meds 2 Beds : No    Outpatient pharmacy confirmed and updated in chart : Yes    Pharmacy name: [facility]    The list below reflectives the updated PTA list. Please review each medication in order reconciliation for additional clarification and justification.    Prior to Admission Medications   Prescriptions Last Dose Informant   acetaminophen (Tylenol) 500 mg tablet Unknown Self   Sig: Take 1-2 tablets (500-1,000 mg) by mouth every 6 hours if needed for mild pain (1 - 3).   atorvastatin (Lipitor) 80 mg tablet Unknown Self   Sig: Take 1 tablet (80 mg) by mouth once daily at bedtime.   docusate sodium (Colace) 100 mg capsule Unknown Self   Sig: Take 1 capsule (100 mg) by mouth 2 times a day. Hold for loose stool.   fenofibrate (Tricor) 145 mg tablet Unknown Self   Sig: Take 1 tablet (145 mg) by mouth once daily.   furosemide (Lasix) 40 mg tablet Unknown Self   Sig: Take 1 tablet (40 mg) by mouth once daily. Hold for dizziness.   insulin glargine (Lantus Solostar U-100 Insulin) 100 unit/mL (3 mL) pen  Self   Sig: Inject 25 Units under the skin 2 times a day.   insulin lispro 100 unit/mL injection Unknown Self   Sig: Inject 0-15 Units under the skin 3 times a day before meals. Take as directed per insulin instructions.Do not hold when patient is not eating, continue order as scheduled for hyperglycemia management.  Insulin Lispro Corrective Scale #3     Hypoglycemia protocol Call LIP unit(s) if Blood Glucose is between 0 - 70 mg/dL     0 unit(s) if Blood glucose is between    3 unit(s) if Blood glucose is between 151-200   6 unit(s) if Blood glucose is between 201-250   9  "unit(s) if Blood glucose is between 251-300   12 unit(s) if Blood glucose is between 301-350   15 unit(s) if Blood glucose is between 351-400    If blood glucose is greater than 400 mg/dL, give max insulin per sliding scale AND then contact provider.   lactulose 20 gram/30 mL oral solution Unknown Self   Sig: Take 30 mL (20 g) by mouth 2 times a day.   magnesium oxide (Mag-Ox) 400 mg (241.3 mg magnesium) tablet Unknown Self   Sig: Take 1 tablet (400 mg) by mouth once daily.   meclizine (Antivert) 25 mg tablet Unknown Self   Sig: Take 1 tablet (25 mg) by mouth 3 times a day as needed for dizziness.   metFORMIN XR (Glucophage-XR) 500 mg 24 hr tablet Unknown Self   Sig: Take 2 tablets (1,000 mg) by mouth once daily in the evening. Take with meals. Do not crush, chew, or split.   midodrine (Proamatine) 10 mg tablet Unknown Self   Sig: Take 0.5 tablets (5 mg) by mouth 3 times daily (morning, midday, late afternoon).   nadolol (Corgard) 20 mg tablet Unknown Self   Sig: Take 1 tablet (20 mg) by mouth once daily.   nitroglycerin (Nitrodur) 0.1 mg/hr patch Unknown Self   Sig: Place 1 patch over 12 hours on the skin once daily. Hold for dizziness.   ondansetron (Zofran) 4 mg tablet Unknown Self   Sig: Take 1 tablet (4 mg) by mouth every 6 hours for 3 days.   oxyCODONE (Roxicodone) 5 mg immediate release tablet Unknown Self   Sig: Take 1 tablet (5 mg) by mouth every 6 hours if needed for severe pain (7 - 10) for up to 3 days.   pantoprazole (ProtoNix) 40 mg EC tablet Unknown Self   Sig: Take 1 tablet (40 mg) by mouth once daily in the morning. Take before meals. Do not crush, chew, or split.   pen needle, diabetic (Sure Comfort Pen Needle) 32 gauge x 5/32\" needle Unknown Self   Sig: Use 4 times a day   polyethylene glycol (Glycolax, Miralax) 17 gram/dose powder Unknown Self   Sig: Mix 17 grams of powder in 8 ounces of water and drink 2 times a day.   spironolactone (Aldactone) 50 mg tablet Unknown Self   Sig: Take 1 tablet (50 " mg) by mouth 2 times a day. Hold for dizziness.   sucralfate (Carafate) 1 gram tablet Unknown Self   Sig: Take 1 tablet (1 g) by mouth 4 times a day before meals.   traZODone (Desyrel) 50 mg tablet Unknown    Sig: Take 0.5 tablets (25 mg) by mouth once daily at bedtime.   ursodiol (Actigall) 300 mg capsule Unknown Self   Sig: Take 1 capsule (300 mg) by mouth 3 times a day.      Facility-Administered Medications: None       The list below reflectives the updated allergy list. Please review each documented allergy for additional clarification and justification.    Allergies   Allergen Reactions    Gluten Diarrhea          12/27/24 at 3:50 PM - Manan Casas

## 2024-12-27 NOTE — CONSULTS
Reason For Consult    concern for decompensated cirrhosis triggered by COVID    History Of Present Illness  Alfonzo Flanagan is a 49 y.o. female PBC cirrhosis ( Liver biopsy 3/18/2021 with periportal to bridging fibrosis c/w PBC), pHTN with ascites, small EV, HE, celiac disease, chronic pancreatitis, DM, HTN, GERD, presenting with severe abdominal pain, nausea, fever and chills, and chest pain. Covid positive in ED. CT showed moderate ascites, cirrhosis, moderate splenomegaly. IR consulted for paracentesis, status post 3.6 L paracentesis, fluid studies ordered.  Patient appears awake and oriented, complains of epigastric pain, stated her abdomen is feeling much better.  Stated her abdominal pain is close to her baseline.  She takes furosemide and spironolactone at home, watches low-sodium diet.  She follows with [facility] [place]. She has h/o hepatic encephalopathy, on Xifaxin. She reports being forgetful.    EGD 10/10/24 at [facility]  Impression:       - Normal oropharynx.                          - Z-line regular, 35 cm from the incisors.                          - Erythematous mucosa in the stomach.                          - Erythematous mucosa in the stomach.                          - Congested duodenal mucosa.                          - No specimens collected.     EGD 7/26/2024-Erythematous mucosa in the stomach. Erythematous mucosa in the stomach. Congested duodenal mucosa     EGD with EUS 3/26/2024. Findings were c/f NET however tissue sample was not obtained. At that time, it was recommended to follow up mass in 1 year with EUS. MRI/MRCP on 7/26/2024: No pancreatic lesion identified. Cirrhosis and sequela of portal hypertension. No hepatic lesion       Past Medical History  As above    Surgical History  She has a past surgical history that includes CT guided imaging for needle placement (10/14/2020); US guided abdominal paracentesis (10/08/2020); and Abdominal surgery.     Social History  She reports that she  "has never smoked. She has never used smokeless tobacco. She reports that she does not drink alcohol and does not use drugs.    Family History  No family history on file.     Allergies  Gluten    Review of Systems  10 systems reviewed and negative other than HPI      Physical Exam  Physical Exam  Constitutional:       General: She is not in acute distress.     Appearance: Normal appearance.   HENT:      Head: Atraumatic.   Cardiovascular:      Rate and Rhythm: Normal rate and regular rhythm.      Heart sounds: Normal heart sounds.   Pulmonary:      Breath sounds: Normal breath sounds.   Abdominal:      General: Bowel sounds are normal. There is no distension.      Palpations: Abdomen is soft.      Tenderness: There is abdominal tenderness. There is guarding.   Neurological:      General: No focal deficit present.      Mental Status: She is alert and oriented to person, place, and time.      Comments: No asterixis   Psychiatric:         Mood and Affect: Mood normal.         Behavior: Behavior normal.            Last Recorded Vitals  Blood pressure 121/57, pulse 83, temperature 37.7 °C (99.8 °F), temperature source Temporal, resp. rate 12, height 1.575 m (5' 2\"), weight 83 kg (183 lb), SpO2 100%.    Relevant Results      Scheduled medications  [START ON 12/28/2024] cefTRIAXone, 1 g, intravenous, q24h  enoxaparin, 40 mg, subcutaneous, q24h  fenofibrate, 54 mg, oral, Daily  furosemide, 40 mg, oral, Daily  insulin glargine, 12 Units, subcutaneous, BID  insulin lispro, 0-5 Units, subcutaneous, TID AC  lactulose, 20 g, oral, BID  midodrine, 5 mg, oral, TID  nadolol, 20 mg, oral, Daily  [START ON 12/28/2024] pantoprazole, 40 mg, oral, Daily before breakfast  polyethylene glycol, 17 g, oral, BID  rifAXIMin, 550 mg, oral, q12h RIVKA  [Held by provider] spironolactone, 50 mg, oral, BID  sucralfate, 1 g, oral, Before meals & nightly  ursodiol, 300 mg, oral, TID      Continuous medications     PRN medications  PRN medications: " acetaminophen, dextrose, dextrose, glucagon, glucagon, meclizine  US guided abdominal paracentesis    Result Date: 12/27/2024  Interpreted By:  Brady Bhandari, STUDY: US GUIDED ABDOMINAL PARACENTESIS; 12/27/20244:32 pm   INDICATION: Signs/Symptoms:severe ascites.   COMPARISON: US guided paracentesis 12/13/2024   ACCESSION NUMBER(S): [id number]   ORDERING CLINICIAN: LETITIA KIRKLAND   TECHNIQUE: INTERVENTIONALIST(S): Brady Bhandari   CONSENT: The patient/patient's POA/next of kin was informed of the nature of the proposed procedure. The purposes, alternatives, risks, and benefits were explained and discussed. All questions were answered and consent was obtained.   SEDATION: None   MEDICATION/CONTRAST: 1% lidocaine was used to anesthetize subcutaneously.   TIME OUT: A time out was performed immediately prior to procedure start with the interventional team, correctly identifying the patient name, date of birth, MRN, procedure, anatomy (including marking of site and side), patient position, procedure consent form, relevant laboratory and imaging test results, antibiotic administration, safety precautions, and procedure-specific equipment needs.   FINDINGS: The patient was placed in the supine position.   The abdominal space was examined with grey scale ultrasound, and the most accessible fluid identified and marked for paracentesis.   The skin was prepped and draped in usual manner. Local anesthesia with Lidocaine was administered and a right-sided paracentesis was performed.  A 5 Swedish One-Step paracentesis needle/catheter was then placed where marked. Approximately 3.6 L of yellowish colored fluid was removed. The needle/catheter was then withdrawn.   The patient tolerated the procedure well and there were no immediate complications.       Uneventful paracentesis, as detailed above. Right Hemiabdomen, 3.6 L   I personally performed and/or directly supervised this study and was present for the entire procedure.    Performed and dictated at [facility].   Signed by: Brady Bhandari 12/27/2024 4:33 PM Dictation workstation:   [id number]    ECG 12 lead    Result Date: 12/27/2024  AV dual-paced rhythm with prolonged AV conduction Abnormal ECG When compared with ECG of 08-DEC-2024 11:17, Electronic ventricular pacemaker has replaced Sinus rhythm    CT abdomen pelvis w IV contrast    Result Date: 12/24/2024  STUDY: CT Abdomen and Pelvis with IV Contrast; 12/24/2024 10:02 PM. INDICATION: Ascites.  Abdominal pain. COMPARISON: CT AP 12/8/2024, 11/20/2024, 10/27/2024, 9/24/2024 . ACCESSION NUMBER(S): [id number] ORDERING CLINICIAN: GREGORY NICHOLAS TECHNIQUE: CT of the abdomen and pelvis was performed.  Contiguous axial images were obtained at 3 mm slice thickness through the abdomen and pelvis. Coronal and sagittal reconstructions at 3 mm slice thickness were performed.  Omnipaque 350 75 mL was administered intravenously.  FINDINGS: LOWER CHEST: No cardiomegaly.  No pericardial effusion. Small 7 x 6 mm nodule in the distribution of the right middle lobe, noted recently. Otherwise clear lung bases.  ABDOMEN:  LIVER: Small cirrhotic liver, with lobular contour, decreased density and prior cholecystectomy. Mild splenomegaly. Moderate ascites in all 4 quadrants.  BILE DUCTS: No intrahepatic or extrahepatic biliary ductal dilatation.  GALLBLADDER: The gallbladder is surgically absent.. STOMACH: No abnormalities identified.  PANCREAS: No masses or ductal dilatation.  SPLEEN: No splenomegaly or focal splenic lesion.  ADRENAL GLANDS: No thickening or nodules.  KIDNEYS AND URETERS: Kidneys are normal in size and location.  No renal or ureteral calculi.  PELVIS:  BLADDER: No abnormalities identified.  REPRODUCTIVE ORGANS: No abnormalities identified.  BOWEL: No abnormalities identified. Moderate fluid containing umbilical hernia, 5.5 x 3.0 cm.  VESSELS: No abnormalities identified.  Abdominal aorta is normal in  caliber.  PERITONEUM/RETROPERITONEUM/LYMPH NODES: No free fluid.  No pneumoperitoneum. No lymphadenopathy.  ABDOMINAL WALL: No abnormalities identified. SOFT TISSUES: No abnormalities identified.  BONES: No acute fracture or aggressive osseous lesion.    Small 7 x 6 mm nodule in the right middle lobe, unchanged from recent prior study; nodules should be followed according to the Fleischner thoracic Society guidelines. Small cirrhotic liver with nodular contour, unchanged. Moderate splenomegaly. Moderate ascites involving all 4 abdominal quadrants, similar to recent prior study. Umbilical fluid containing hernia 5.5 x 3.0 cm also again noted. No bowel obstruction. No significant interval change from prior study. . Signed by Vasile Grimaldo MD    US guided abdominal paracentesis    Result Date: 12/16/2024  Interpreted By:  Cornelius Weber, STUDY: US GUIDED ABDOMINAL PARACENTESIS; 12/13/2024 12:02 pm   INDICATION: Signs/Symptoms:ascites.   COMPARISON: None.   ACCESSION NUMBER(S): [id number]   ORDERING CLINICIAN: ANUPAM FRANCIS   TECHNIQUE: Ultrasound-guided paracentesis   FINDINGS: Informed consent obtained. Patient positioned supine. Right lower quadrant prepped, draped and anesthetized. Under ultrasound guidance, 8 Indonesian centesis sheath needle inserted into peritoneal cavity. 1.5 Lof clear yellowfluid aspiratedwith sample sent for analysis. Patient tolerated procedure well       Ultrasound-guided paracentesis.   Signed by: Cornelius Weber 12/16/2024 9:16 AM Dictation workstation:   [id number]    CT abdomen pelvis wo IV contrast    Result Date: 12/11/2024  Interpreted By:  Christina Edmondson, STUDY: CT abdomen pelvis   INDICATION: Signs/Symptoms:abdominal pain.   COMPARISON: 12/08/2024, 04/18/2024   ACCESSION NUMBER(S): [id number]   ORDERING CLINICIAN: MARTY LEJEUNE   TECHNIQUE: Axial noncontrast CT images of the abdomen and pelvis with coronal and sagittal reconstructed images.   FINDINGS: LOWER CHEST: No acute  abnormality of the lung bases. Partially imaged 6 mm right middle lobe nodule. BONES: No acute osseous abnormality. ABDOMINAL WALL: Diffuse fat stranding and anasarca. Fluid containing supraumbilical hernia. Small fat containing umbilical hernia.   ABDOMEN: Lack of intravenous contrast limits evaluation of vessels and solid organs. LIVER: Nodular atrophic appearance of the liver. No mass identified. Recanalization of the umbilical vein. BILE DUCTS: Normal caliber. GALLBLADDER: Status post cholecystectomy. PANCREAS: No peripancreatic inflammatory stranding or duct dilatation. SPLEEN: 12.4 cm. ADRENALS: Within normal limits.   KIDNEYS, URETERS, URINARY BLADDER: No hydronephrosis or urinary tract calculus.  The urinary bladder is unremarkable.   VESSELS: Atherosclerotic calcifications without aneurysmal dilatation seen. RETROPERITONEUM: Shotty retroperitoneal lymph nodes.   PELVIS:   REPRODUCTIVE ORGANS: No abnormality, given limitations of the noncontrast CT.   BOWEL: No dilated bowel. Diffuse wall thickening of small bowel loops likely related to adjacent fluid and possible congestion. PERITONEUM: Moderate to advanced abdominopelvic ascites and stranding.       Hepatic cirrhosis with sequela of portal hypertension and abdominopelvic ascites.   6 mm right middle lobe nodule. Follow-up non emergent chest CT may be considered in 3 months to assess stability.     MACRO: Incidental Finding:  A solid non-calcified pulmonary nodule measuring 6-8 mm .  (**-YCF-**)   Instructions:  Consider follow up non contrast chest CT at 6-12 months, then consider CT chest at 18-24 months. (Vikas Long et al., Guidelines for management of incidental pulmonary nodules detected on CT images: From the Fleischner Society 2017, Radiology. 2017 Jul;284 (1):228-243.) [id number]   Signed by: Christina Edmondson 12/11/2024 9:11 PM Dictation workstation:   [id number]    XR abdomen 2 views supine and erect or decub    Result Date:  12/11/2024  Interpreted By:  Aysha Norton, STUDY: XR ABDOMEN 2 VIEWS SUPINE AND ERECT OR DECUB;  12/11/2024 6:53 pm   INDICATION: Signs/Symptoms:ab pain.     COMPARISON: None.   ACCESSION NUMBER(S): [id number]   ORDERING CLINICIAN: MARTY LEJEUNE   TECHNIQUE: Abdomen supine and upright views   FINDINGS:     ABDOMEN: Nonobstructive bowel gas pattern.   No evidence of pneumoperitoneum.   Osseous structures demonstrate no acute bony abnormalities.   Surgical clips in the right upper quadrant of the abdomen.       1.  Nonobstructive bowel gas pattern. 2.  No acute osseous abnormality     MACRO: None   Signed by: Aysha Norton 12/11/2024 6:58 PM Dictation workstation:   [id number]    ECG 12 lead    Result Date: 12/9/2024  Normal sinus rhythm Low voltage QRS Cannot rule out Anterior infarct , age undetermined Abnormal ECG When compared with ECG of 20-NOV-2024 09:15, Criteria for Inferior infarct are no longer Present Confirmed by Amadeo Henson ([id number]) on 12/9/2024 10:10:38 AM    CT abdomen pelvis w IV contrast    Result Date: 12/8/2024  STUDY: CT Abdomen and Pelvis with IV Contrast; 12/08/2024 12:47 PM INDICATION: Abdominal pain with distention and likely significant ascites. COMPARISON: CXR 12/8/2024.  CT AP 8/3/2024. ACCESSION NUMBER(S): [id number] ORDERING CLINICIAN: DELONTE HO TECHNIQUE: CT of the abdomen and pelvis was performed.  Contiguous axial images were obtained at 3 mm slice thickness through the abdomen and pelvis. Coronal and sagittal reconstructions at 3 mm slice thickness were performed.  Omnipaque 350 75 mL was administered intravenously.  FINDINGS: Partially visualized chest showing the heart to be of normal size. No pericardial effusion.  RIGHT middle lobe 8 mm nodule.  Lung bases are otherwise clear. Abdomen: No acute abnormality of the stomach is identified. Hepatic cirrhosis.. No intrahepatic ductal dilatation is identified. Recannulization of the umbilical vein.  Portal vein is  patent. Mesenteric vein is patent.  Gallbladder surgically absent.  No acute abnormality of the pancreas.  Splenic enlargement. Adrenal glands of normal appearance. No acute renal process. No retroperitoneal adenopathy. No findings of abdominal aortic aneurysm. Abdominal ascites.  No bowel obstruction.  No free air.  Small periumbilical hernia containing fluid.  No herniated bowel. The appendix is not identified.  No imaging findings however to suggest appendicitis. Pelvis: No pelvic free fluid.  No inflammatory change or findings of free air. No findings of pelvic adenopathy. Skeleton: No acute bony process is identified.    Hepatic cirrhosis. Recannulization of the umbilical vein. Splenic enlargement. Abdominal ascites. RIGHT middle lobe 8 mm nodule.  This is unchanged dating back to 4/18/24.  No complete evaluation of the chest is available for comparison.  Consider complete CT examination of the chest on a nonemergent basis for evaluation of any additional potential pulmonary nodules in appropriate follow up following Fleischner criteria and risk stratification as detailed below. Small periumbilical hernia containing fluid. No herniated bowel. Fleischner Society 2017 Guidelines for Management of Incidentally Detected Pulmonary Nodules in Adults: A.  SOLID NODULES* Nodule Type and Size: Single: *Low Risk** < 6 mm - No routine follow-up 6-8 mm - CT at 6-12 months, then consider CT at 18-24 months > 8 mm - Consider CT at 3 months, PET/CT, or tissue sampling *High Risk** < 6 mm - Optional CT at 12 months 6-8 mm - CT at 6-12 months, then CT at 18-24 months > 8 mm - Consider CT at 3 months, PET/CT, or tissue sampling Multiple: *Low Risk** < 6 mm - No routine follow-up 6-8 mm - CT at 3-6 months, then consider CT at 18-24 months > 8 mm - CT at 3-6 months, then consider CT at 18-24 months *High Risk** < 6 mm - Optional CT at 12 months 6-8 mm - CT at 3-6 months, then at 18-24 months > 8 mm - CT at 3-6 months, then at  18-24 months B. SUBSOLID NODULES* Nodule Type and Size: Single:  *Ground glass < 6 mm - No routine follow-up > 6 mm - CT at 6-12 months to confirm persistence, then CT every 2 years until 5 years *Part Solid < 6 mm - No routine follow-up > 6 mm - CT at 3-6 months to confirm persistence.  If unchanged and solid component remains < 6 mm, annual CT should be performed for 5 years. Multiple: < 6 mm - CT at 3-6 months.  If stable, consider CT at 2 and 4 years. > 6 mm - CT at 3-6 months.  Subsequent management based on the most suspicious nodule(s). Note - These recommendations do not apply to lung cancer screening, patients with immunosuppression, or patients with known primary cancer. * Dimensions are average of long and short axes, rounded to the nearest millimeter. ** Consider all relevant risk factors. Signed by Bebeto Loredo MD    XR chest 1 view    Result Date: 12/8/2024  STUDY: Chest Radiograph;  12/8/2024 12:18PM INDICATION: Cough, wheezing. COMPARISON: None available. ACCESSION NUMBER(S): [id number] ORDERING CLINICIAN: EDLONTE HO TECHNIQUE:  Frontal chest was obtained at 1218 hours. FINDINGS: CARDIOMEDIASTINAL SILHOUETTE: Cardiomediastinal silhouette is normal in size and configuration.  LUNGS: Lungs are clear.  ABDOMEN: No remarkable upper abdominal findings.  BONES: No acute osseous changes.    No acute pulmonary pathology. Signed by Keven Finn MD   Results for orders placed or performed during the hospital encounter of 12/27/24 (from the past 24 hours)   CBC and Auto Differential   Result Value Ref Range    WBC 4.3 (L) 4.4 - 11.3 x10*3/uL    nRBC 0.0 0.0 - 0.0 /100 WBCs    RBC 3.55 (L) 4.00 - 5.20 x10*6/uL    Hemoglobin 10.4 (L) 12.0 - 16.0 g/dL    Hematocrit 32.0 (L) 36.0 - 46.0 %    MCV 90 80 - 100 fL    MCH 29.3 26.0 - 34.0 pg    MCHC 32.5 32.0 - 36.0 g/dL    RDW 20.8 (H) 11.5 - 14.5 %    Platelets 65 (L) 150 - 450 x10*3/uL    Neutrophils % 57.8 40.0 - 80.0 %    Immature Granulocytes %,  Automated 0.2 0.0 - 0.9 %    Lymphocytes % 24.3 13.0 - 44.0 %    Monocytes % 13.7 2.0 - 10.0 %    Eosinophils % 3.5 0.0 - 6.0 %    Basophils % 0.5 0.0 - 2.0 %    Neutrophils Absolute 2.50 1.20 - 7.70 x10*3/uL    Immature Granulocytes Absolute, Automated 0.01 0.00 - 0.70 x10*3/uL    Lymphocytes Absolute 1.05 (L) 1.20 - 4.80 x10*3/uL    Monocytes Absolute 0.59 0.10 - 1.00 x10*3/uL    Eosinophils Absolute 0.15 0.00 - 0.70 x10*3/uL    Basophils Absolute 0.02 0.00 - 0.10 x10*3/uL   Magnesium   Result Value Ref Range    Magnesium 1.72 1.60 - 2.40 mg/dL   Comprehensive metabolic panel   Result Value Ref Range    Glucose 205 (H) 74 - 99 mg/dL    Sodium 133 (L) 136 - 145 mmol/L    Potassium 5.5 (H) 3.5 - 5.3 mmol/L    Chloride 105 98 - 107 mmol/L    Bicarbonate 29 21 - 32 mmol/L    Anion Gap <7 (L) 10 - 20 mmol/L    Urea Nitrogen 13 6 - 23 mg/dL    Creatinine 0.56 0.50 - 1.05 mg/dL    eGFR >90 >60 mL/min/1.73m*2    Calcium 7.3 (L) 8.6 - 10.3 mg/dL    Albumin 2.4 (L) 3.4 - 5.0 g/dL    Alkaline Phosphatase 299 (H) 33 - 110 U/L    Total Protein 6.8 6.4 - 8.2 g/dL    AST 88 (H) 9 - 39 U/L    Bilirubin, Total 1.9 (H) 0.0 - 1.2 mg/dL    ALT 32 7 - 45 U/L   Lipase   Result Value Ref Range    Lipase 130 (H) 9 - 82 U/L   Lactate   Result Value Ref Range    Lactate 1.2 0.4 - 2.0 mmol/L   Protime-INR   Result Value Ref Range    Protime 17.0 (H) 9.8 - 12.8 seconds    INR 1.5 (H) 0.9 - 1.1   Troponin I, High Sensitivity   Result Value Ref Range    Troponin I, High Sensitivity 4 0 - 13 ng/L   Morphology   Result Value Ref Range    RBC Morphology See Below     Polychromasia Mild     Target Cells Few    Sars-CoV-2 and Influenza A/B PCR   Result Value Ref Range    Flu A Result Not Detected Not Detected    Flu B Result Not Detected Not Detected    Coronavirus 2019, PCR Detected (A) Not Detected   Urinalysis with Reflex Culture and Microscopic   Result Value Ref Range    Color, Urine Dark-Yellow Light-Yellow, Yellow, Dark-Yellow    Appearance,  Urine Turbid (N) Clear    Specific Gravity, Urine 1.035 1.005 - 1.035    pH, Urine 7.5 5.0, 5.5, 6.0, 6.5, 7.0, 7.5, 8.0    Protein, Urine 50 (1+) (A) NEGATIVE, 10 (TRACE), 20 (TRACE) mg/dL    Glucose, Urine 200 (2+) (A) Normal mg/dL    Blood, Urine 0.06 (1+) (A) NEGATIVE    Ketones, Urine NEGATIVE NEGATIVE mg/dL    Bilirubin, Urine 0.5 (1+) (A) NEGATIVE    Urobilinogen, Urine 12 (3+) (A) Normal mg/dL    Nitrite, Urine NEGATIVE NEGATIVE    Leukocyte Esterase, Urine 500 Mckenzie/µL (A) NEGATIVE   Microscopic Only, Urine   Result Value Ref Range    WBC, Urine 21-50 (A) 1-5, NONE /HPF    RBC, Urine 11-20 (A) NONE, 1-2, 3-5 /HPF    Squamous Epithelial Cells, Urine 10-25 (FEW) Reference range not established. /HPF    Bacteria, Urine 1+ (A) NONE SEEN /HPF    Mucus, Urine 4+ Reference range not established. /LPF   ECG 12 lead   Result Value Ref Range    Ventricular Rate 86 BPM    Atrial Rate 86 BPM    GA Interval 284 ms    QRS Duration 74 ms    QT Interval 390 ms    QTC Calculation(Bazett) 466 ms    P Axis 2 degrees    R Axis -9 degrees    T Axis -8 degrees    QRS Count 14 beats    Q Onset 213 ms    P Onset 138 ms    P Offset 177 ms    T Offset 408 ms    QTC Fredericia 440 ms   POCT GLUCOSE   Result Value Ref Range    POCT Glucose 111 (H) 74 - 99 mg/dL          Assessment/Plan     49 y.o. F with h/o PBC cirrhosis ( Liver biopsy 3/18/2021 with periportal to bridging fibrosis c/w PBC), pHTN with ascites, small EV, HE, celiac disease, chronic pancreatitis, DM, HTN, GERD, presenting with severe abdominal pain, nausea, fever and chills, and chest pain. Covid positive in ED. S/p 3.6 L paracentesis    Liver cirrhosis with ascites  Abdominal pain, suspect acute on chronic pain secondary to paracentesis, other differentials include , gastritis, could be related to COVID.    -Await results of ascites fluid status  -Recommend daily PPI  -Supportive care with antiemetics and analgesia as needed  -Low-sodium gluten-free diet  -Continue  Xifaxan, furosemide and Aldactone  -Outpatient follow-up with GI at [facility]          MADYSON Morris-CNP  I saw and evaluated the patient. I personally obtained the key and critical portions of the history and physical exam or was physically present for key and critical portions performed by the NP. I reviewed the NP's documentation and discussed the patient with the NP. I agree with the NP's medical decision making as documented in the note.

## 2024-12-27 NOTE — PROGRESS NOTES
"Transitional Care Coordination Progress Note:  Plan per Medical/Surgical team: treatment of ascites with IV ATB, morphine, Covid isolation, IR for paracentesis, GI consult for decompenstating liver disease   Status: Observation  Payor source: \"generic commercial\"  Discharge disposition: Home with   ([facility] in past)  Potential Barriers: ?need outpatient paracentesis ordered routinely  ADOD: 12/28/2024  DARIN Kohli RN, BSN Transitional Care Coordinator ED# [phone]     When patient is medically ready for discharge  They are being discharged to: home   will pick patient up  No SNF  No HHC  No new DME  No O2  No Wounds     12/27/24 1428   Discharge Planning   Living Arrangements Spouse/significant other   Support Systems Spouse/significant other;Children   Assistance Needed Covid isolation, IR for paracentesis, GI consult for decompenstating liver disease   Type of Residence Private residence   Number of Stairs to Enter Residence 0   Number of Stairs Within Residence 0   Do you have animals or pets at home? No   Home or Post Acute Services None   Expected Discharge Disposition Home   Does the patient need discharge transport arranged? No   Financial Resource Strain   How hard is it for you to pay for the very basics like food, housing, medical care, and heating? Not hard   Housing Stability   In the last 12 months, was there a time when you were not able to pay the mortgage or rent on time? N   In the past 12 months, how many times have you moved where you were living? 1   At any time in the past 12 months, were you homeless or living in a shelter (including now)? N   Transportation Needs   In the past 12 months, has lack of transportation kept you from medical appointments or from getting medications? no   In the past 12 months, has lack of transportation kept you from meetings, work, or from getting things needed for daily living? No   Patient Choice   Provider Choice list and CMS website " (https://medicare.gov/care-compare#search) for post-acute Quality and Resource Measure Data were provided and reviewed with: Family;Patient   Patient / Family choosing to utilize agency / facility established prior to hospitalization Yes   Stroke Family Assessment   Stroke Family Assessment Needed No   Intensity of Service   Intensity of Service 0-30 min

## 2024-12-27 NOTE — CARE PLAN
The patient's goals for the shift include  pain relief    The clinical goals for the shift include To remain HDS

## 2024-12-28 LAB
ALBUMIN FLD-MCNC: <0.5 G/DL
ALBUMIN SERPL BCP-MCNC: 1.9 G/DL (ref 3.4–5)
ALP SERPL-CCNC: 229 U/L (ref 33–110)
ALT SERPL W P-5'-P-CCNC: 34 U/L (ref 7–45)
AMYLASE FLD-CCNC: 20 U/L
ANION GAP SERPL CALC-SCNC: <7 MMOL/L (ref 10–20)
AST SERPL W P-5'-P-CCNC: 74 U/L (ref 9–39)
ATRIAL RATE: 86 BPM
BACTERIA UR CULT: NORMAL
BILIRUB SERPL-MCNC: 1.9 MG/DL (ref 0–1.2)
BUN SERPL-MCNC: 14 MG/DL (ref 6–23)
CALCIUM SERPL-MCNC: 7.1 MG/DL (ref 8.6–10.3)
CHLORIDE SERPL-SCNC: 108 MMOL/L (ref 98–107)
CO2 SERPL-SCNC: 28 MMOL/L (ref 21–32)
CREAT SERPL-MCNC: 0.5 MG/DL (ref 0.5–1.05)
EGFRCR SERPLBLD CKD-EPI 2021: >90 ML/MIN/1.73M*2
ERYTHROCYTE [DISTWIDTH] IN BLOOD BY AUTOMATED COUNT: 20.4 % (ref 11.5–14.5)
GLUCOSE BLD MANUAL STRIP-MCNC: 117 MG/DL (ref 74–99)
GLUCOSE BLD MANUAL STRIP-MCNC: 156 MG/DL (ref 74–99)
GLUCOSE BLD MANUAL STRIP-MCNC: 91 MG/DL (ref 74–99)
GLUCOSE FLD-MCNC: 182 MG/DL
GLUCOSE SERPL-MCNC: 95 MG/DL (ref 74–99)
HCT VFR BLD AUTO: 32.2 % (ref 36–46)
HGB BLD-MCNC: 10.7 G/DL (ref 12–16)
LDH FLD L TO P-CCNC: 39 U/L
MCH RBC QN AUTO: 30 PG (ref 26–34)
MCHC RBC AUTO-ENTMCNC: 33.2 G/DL (ref 32–36)
MCV RBC AUTO: 90 FL (ref 80–100)
NRBC BLD-RTO: 0 /100 WBCS (ref 0–0)
P AXIS: 2 DEGREES
P OFFSET: 177 MS
P ONSET: 138 MS
PH FLD: 8.45 [PH]
PLATELET # BLD AUTO: 60 X10*3/UL (ref 150–450)
POTASSIUM SERPL-SCNC: 3.8 MMOL/L (ref 3.5–5.3)
PR INTERVAL: 284 MS
PROT FLD-MCNC: 1 G/DL
PROT SERPL-MCNC: 5.7 G/DL (ref 6.4–8.2)
Q ONSET: 213 MS
QRS COUNT: 14 BEATS
QRS DURATION: 74 MS
QT INTERVAL: 390 MS
QTC CALCULATION(BAZETT): 466 MS
QTC FREDERICIA: 440 MS
R AXIS: -9 DEGREES
RBC # BLD AUTO: 3.57 X10*6/UL (ref 4–5.2)
SODIUM SERPL-SCNC: 137 MMOL/L (ref 136–145)
T AXIS: -8 DEGREES
T OFFSET: 408 MS
VENTRICULAR RATE: 86 BPM
WBC # BLD AUTO: 2.8 X10*3/UL (ref 4.4–11.3)

## 2024-12-28 PROCEDURE — 99232 SBSQ HOSP IP/OBS MODERATE 35: CPT | Performed by: STUDENT IN AN ORGANIZED HEALTH CARE EDUCATION/TRAINING PROGRAM

## 2024-12-28 PROCEDURE — 2500000004 HC RX 250 GENERAL PHARMACY W/ HCPCS (ALT 636 FOR OP/ED): Performed by: STUDENT IN AN ORGANIZED HEALTH CARE EDUCATION/TRAINING PROGRAM

## 2024-12-28 PROCEDURE — 85027 COMPLETE CBC AUTOMATED: CPT | Performed by: STUDENT IN AN ORGANIZED HEALTH CARE EDUCATION/TRAINING PROGRAM

## 2024-12-28 PROCEDURE — 1100000001 HC PRIVATE ROOM DAILY

## 2024-12-28 PROCEDURE — 80053 COMPREHEN METABOLIC PANEL: CPT | Performed by: STUDENT IN AN ORGANIZED HEALTH CARE EDUCATION/TRAINING PROGRAM

## 2024-12-28 PROCEDURE — 82947 ASSAY GLUCOSE BLOOD QUANT: CPT

## 2024-12-28 PROCEDURE — 2500000002 HC RX 250 W HCPCS SELF ADMINISTERED DRUGS (ALT 637 FOR MEDICARE OP, ALT 636 FOR OP/ED): Performed by: STUDENT IN AN ORGANIZED HEALTH CARE EDUCATION/TRAINING PROGRAM

## 2024-12-28 PROCEDURE — 36415 COLL VENOUS BLD VENIPUNCTURE: CPT | Performed by: STUDENT IN AN ORGANIZED HEALTH CARE EDUCATION/TRAINING PROGRAM

## 2024-12-28 PROCEDURE — 99232 SBSQ HOSP IP/OBS MODERATE 35: CPT | Performed by: NURSE PRACTITIONER

## 2024-12-28 PROCEDURE — 2500000001 HC RX 250 WO HCPCS SELF ADMINISTERED DRUGS (ALT 637 FOR MEDICARE OP): Performed by: STUDENT IN AN ORGANIZED HEALTH CARE EDUCATION/TRAINING PROGRAM

## 2024-12-28 PROCEDURE — 2500000001 HC RX 250 WO HCPCS SELF ADMINISTERED DRUGS (ALT 637 FOR MEDICARE OP)

## 2024-12-28 PROCEDURE — 2500000004 HC RX 250 GENERAL PHARMACY W/ HCPCS (ALT 636 FOR OP/ED)

## 2024-12-28 RX ORDER — ONDANSETRON 4 MG/1
4 TABLET, FILM COATED ORAL EVERY 6 HOURS
Start: 2024-12-28

## 2024-12-28 RX ORDER — FLUCONAZOLE 100 MG/1
100 TABLET ORAL ONCE
Status: COMPLETED | OUTPATIENT
Start: 2024-12-28 | End: 2024-12-28

## 2024-12-28 RX ADMIN — SUCRALFATE 1 G: 1 TABLET ORAL at 06:13

## 2024-12-28 RX ADMIN — FENOFIBRATE 54 MG: 54 TABLET ORAL at 08:43

## 2024-12-28 RX ADMIN — OXYCODONE HYDROCHLORIDE 5 MG: 5 TABLET ORAL at 09:05

## 2024-12-28 RX ADMIN — PANTOPRAZOLE SODIUM 40 MG: 40 TABLET, DELAYED RELEASE ORAL at 06:13

## 2024-12-28 RX ADMIN — ACETAMINOPHEN 975 MG: 325 TABLET, FILM COATED ORAL at 08:43

## 2024-12-28 RX ADMIN — LACTULOSE 20 G: 20 SOLUTION ORAL at 08:43

## 2024-12-28 RX ADMIN — REMDESIVIR 200 MG: 100 INJECTION, POWDER, LYOPHILIZED, FOR SOLUTION INTRAVENOUS at 13:10

## 2024-12-28 RX ADMIN — SODIUM CHLORIDE, POTASSIUM CHLORIDE, SODIUM LACTATE AND CALCIUM CHLORIDE 1000 ML: 600; 310; 30; 20 INJECTION, SOLUTION INTRAVENOUS at 12:19

## 2024-12-28 RX ADMIN — POLYETHYLENE GLYCOL 3350 17 G: 17 POWDER, FOR SOLUTION ORAL at 08:42

## 2024-12-28 RX ADMIN — CEFTRIAXONE SODIUM 1 G: 1 INJECTION, SOLUTION INTRAVENOUS at 09:55

## 2024-12-28 RX ADMIN — URSODIOL 300 MG: 300 CAPSULE ORAL at 08:43

## 2024-12-28 RX ADMIN — ONDANSETRON 4 MG: 4 TABLET, ORALLY DISINTEGRATING ORAL at 09:05

## 2024-12-28 RX ADMIN — RIFAXIMIN 550 MG: 550 TABLET ORAL at 08:43

## 2024-12-28 RX ADMIN — ENOXAPARIN SODIUM 40 MG: 40 INJECTION SUBCUTANEOUS at 16:49

## 2024-12-28 RX ADMIN — ACETAMINOPHEN 975 MG: 325 TABLET, FILM COATED ORAL at 16:43

## 2024-12-28 RX ADMIN — INSULIN LISPRO 1 UNITS: 100 INJECTION, SOLUTION INTRAVENOUS; SUBCUTANEOUS at 16:43

## 2024-12-28 RX ADMIN — INSULIN GLARGINE 12 UNITS: 100 INJECTION, SOLUTION SUBCUTANEOUS at 08:54

## 2024-12-28 RX ADMIN — FUROSEMIDE 40 MG: 40 TABLET ORAL at 08:43

## 2024-12-28 RX ADMIN — FLUCONAZOLE 100 MG: 100 TABLET ORAL at 12:19

## 2024-12-28 RX ADMIN — NADOLOL 20 MG: 20 TABLET ORAL at 08:43

## 2024-12-28 RX ADMIN — SUCRALFATE 1 G: 1 TABLET ORAL at 16:43

## 2024-12-28 RX ADMIN — SUCRALFATE 1 G: 1 TABLET ORAL at 12:25

## 2024-12-28 RX ADMIN — URSODIOL 300 MG: 300 CAPSULE ORAL at 16:43

## 2024-12-28 RX ADMIN — OXYCODONE HYDROCHLORIDE 5 MG: 5 TABLET ORAL at 16:47

## 2024-12-28 ASSESSMENT — PAIN SCALES - GENERAL
PAINLEVEL_OUTOF10: 7
PAINLEVEL_OUTOF10: 5 - MODERATE PAIN
PAINLEVEL_OUTOF10: 5 - MODERATE PAIN

## 2024-12-28 NOTE — ASSESSMENT & PLAN NOTE
49 y.o. F with h/o PBC cirrhosis ( Liver biopsy 3/18/2021 with periportal to bridging fibrosis c/w PBC), pHTN with ascites, small EV, HE, celiac disease, chronic pancreatitis, DM, HTN, GERD, presenting with severe abdominal pain, nausea, fever and chills, and chest pain. Covid positive in ED. S/p 3.6 L paracentesis     Liver cirrhosis with ascites  Abdominal pain, suspect acute on chronic pain secondary to paracentesis, other differentials include, gastritis, could be related to COVID, h/o chronic pancreatitis     -Continue daily PPI  -Supportive care with antiemetics and analgesia as needed  -Low-sodium gluten-free diet  -Continue Xifaxan, furosemide and Aldactone  -Outpatient follow-up with GI at [facility].  GI will sign off, please call if any questions or further assistance needed.

## 2024-12-28 NOTE — PROGRESS NOTES
GI Daily Progress Note    Assessment/Plan:    Assessment & Plan  Other ascites  49 y.o. F with h/o PBC cirrhosis ( Liver biopsy 3/18/2021 with periportal to bridging fibrosis c/w PBC), pHTN with ascites, small EV, HE, celiac disease, chronic pancreatitis, DM, HTN, GERD, presenting with severe abdominal pain, nausea, fever and chills, and chest pain. Covid positive in ED. S/p 3.6 L paracentesis     Liver cirrhosis with ascites  Abdominal pain, suspect acute on chronic pain secondary to paracentesis, other differentials include, gastritis, could be related to COVID, h/o chronic pancreatitis     -Continue daily PPI  -Supportive care with antiemetics and analgesia as needed  -Low-sodium gluten-free diet  -Continue Xifaxan, furosemide and Aldactone  -Outpatient follow-up with GI at [facility].  GI will sign off, please call if any questions or further assistance needed.  D/w DR Blackburn     LOS: 0 days     Alfonzo Flanagan is a 49 y.o. female who was admitted with Other ascites. She reports she is feeling tired, complains of diaphoresis, no fever, complaints of shortness of breath.  Abdominal pain at the baseline.  LFTs are at baseline.  Ascites fluid status and negative for SBP.    Subjective:    Patient expresses she is feeling weak and tired, does not want to eat, denies nausea or vomiting      Objective:    Vital signs in last 24 hours:  Temp:  [36.1 °C (97 °F)-36.8 °C (98.2 °F)] 36.6 °C (97.9 °F)  Heart Rate:  [61-85] 68  Resp:  [12-18] 17  BP: (105-138)/(48-78) 122/67    Intake/Output last 3 shifts:  No intake/output data recorded.  Intake/Output this shift:  I/O this shift:  In: 50 [IV Piggyback:50]  Out: -     Physical Exam  Vitals reviewed.   Constitutional:       General: She is not in acute distress.     Appearance: Normal appearance. She is ill-appearing.   Cardiovascular:      Rate and Rhythm: Regular rhythm.      Heart sounds: Normal heart sounds.   Abdominal:      General: Bowel sounds are normal. There  is no distension.      Palpations: Abdomen is soft.      Tenderness: There is abdominal tenderness. There is no guarding.   Skin:     General: Skin is warm and dry.   Neurological:      General: No focal deficit present.      Mental Status: She is alert. Mental status is at baseline.          Results for orders placed or performed during the hospital encounter of 12/27/24 (from the past 24 hours)   Sterile Fluid Culture/Smear    Specimen: Ascites Fluid   Result Value Ref Range    Gram Stain No polymorphonuclear leukocytes seen     Gram Stain No organisms seen    Albumin, Fluid   Result Value Ref Range    Albumin, Fluid <0.5 Not established g/dL   Amylase, Fluid   Result Value Ref Range    Amylase, Fluid 20 Not established. U/L   Glucose, Fluid   Result Value Ref Range    Glucose, Fluid 182 Not established mg/dL   Lactate Dehydrogenase, Fluid   Result Value Ref Range    LD, Fluid 39 Not established. U/L   Protein, Total Fluid   Result Value Ref Range    Protein, Total Fluid 1.0 Not established g/dL   pH, body fluid   Result Value Ref Range    pH, Fluid 8.45 See Below   Body Fluid Cell Count   Result Value Ref Range    Color, Fluid Straw Colorless, Straw, Yellow    Clarity, Fluid Clear Clear    WBC, Fluid 121 See Comment /uL    RBC, Fluid 257 0  /uL /uL   Body Fluid Differential   Result Value Ref Range    Neutrophils %, Manual, Fluid 0 <25 % %    Lymphocytes %, Manual, Fluid 28 <75 % %    Mono/Macrophages %, Manual, Fluid 72 <70 % %    Eosinophils %, Manual, Fluid 0 0 % %    Basophils %, Manual, Fluid 0 0 % %    Immature Granulocytes %, Manual, Fluid 0 0 % %    Blasts %, Manual, Fluid 0 0 % %    Unclassified Cells %, Manual, Fluid 0 0 % %    Plasma Cells %, Manual, Fluid 0 0 % %    Total Cells Counted, Fluid 100    POCT GLUCOSE   Result Value Ref Range    POCT Glucose 111 (H) 74 - 99 mg/dL   Potassium   Result Value Ref Range    Potassium 3.7 3.5 - 5.3 mmol/L   POCT GLUCOSE   Result Value Ref Range    POCT Glucose  132 (H) 74 - 99 mg/dL   CBC   Result Value Ref Range    WBC 2.8 (L) 4.4 - 11.3 x10*3/uL    nRBC 0.0 0.0 - 0.0 /100 WBCs    RBC 3.57 (L) 4.00 - 5.20 x10*6/uL    Hemoglobin 10.7 (L) 12.0 - 16.0 g/dL    Hematocrit 32.2 (L) 36.0 - 46.0 %    MCV 90 80 - 100 fL    MCH 30.0 26.0 - 34.0 pg    MCHC 33.2 32.0 - 36.0 g/dL    RDW 20.4 (H) 11.5 - 14.5 %    Platelets 60 (L) 150 - 450 x10*3/uL   Comprehensive metabolic panel   Result Value Ref Range    Glucose 95 74 - 99 mg/dL    Sodium 137 136 - 145 mmol/L    Potassium 3.8 3.5 - 5.3 mmol/L    Chloride 108 (H) 98 - 107 mmol/L    Bicarbonate 28 21 - 32 mmol/L    Anion Gap <7 (L) 10 - 20 mmol/L    Urea Nitrogen 14 6 - 23 mg/dL    Creatinine 0.50 0.50 - 1.05 mg/dL    eGFR >90 >60 mL/min/1.73m*2    Calcium 7.1 (L) 8.6 - 10.3 mg/dL    Albumin 1.9 (L) 3.4 - 5.0 g/dL    Alkaline Phosphatase 229 (H) 33 - 110 U/L    Total Protein 5.7 (L) 6.4 - 8.2 g/dL    AST 74 (H) 9 - 39 U/L    Bilirubin, Total 1.9 (H) 0.0 - 1.2 mg/dL    ALT 34 7 - 45 U/L   POCT GLUCOSE   Result Value Ref Range    POCT Glucose 91 74 - 99 mg/dL   POCT GLUCOSE   Result Value Ref Range    POCT Glucose 117 (H) 74 - 99 mg/dL

## 2024-12-28 NOTE — CARE PLAN
The clinical goals for the shift include remain hemodynamically stable    Problem: Pain - Adult  Goal: Verbalizes/displays adequate comfort level or baseline comfort level  Outcome: Progressing     Problem: Safety - Adult  Goal: Free from fall injury  Outcome: Progressing     Problem: Discharge Planning  Goal: Discharge to home or other facility with appropriate resources  Outcome: Progressing     Problem: Chronic Conditions and Co-morbidities  Goal: Patient's chronic conditions and co-morbidity symptoms are monitored and maintained or improved  Outcome: Progressing     Problem: Pain  Goal: Takes deep breaths with improved pain control throughout the shift  Outcome: Progressing  Goal: Turns in bed with improved pain control throughout the shift  Outcome: Progressing  Goal: Walks with improved pain control throughout the shift  Outcome: Progressing  Goal: Performs ADL's with improved pain control throughout shift  Outcome: Progressing  Goal: Participates in PT with improved pain control throughout the shift  Outcome: Progressing  Goal: Free from opioid side effects throughout the shift  Outcome: Progressing  Goal: Free from acute confusion related to pain meds throughout the shift  Outcome: Progressing

## 2024-12-28 NOTE — PROGRESS NOTES
12/28/24 1003   Discharge Planning   Expected Discharge Disposition Home        Patient continues with isolation for + covid. She had paracentesis done yesterday by IR and had 3.6 L out. Waiting for ascitic  fluid results. GI following for liver disease. Requested from doctor to place [facility] referral. When medically ready will go home with her .

## 2024-12-28 NOTE — CARE PLAN
The patient's goals for the shift include      The clinical goals for the shift include remain hemodynamically stable throughout shift.  Problem: Pain - Adult  Goal: Verbalizes/displays adequate comfort level or baseline comfort level  Outcome: Progressing     Problem: Safety - Adult  Goal: Free from fall injury  Outcome: Progressing     Problem: Discharge Planning  Goal: Discharge to home or other facility with appropriate resources  Outcome: Progressing     Problem: Chronic Conditions and Co-morbidities  Goal: Patient's chronic conditions and co-morbidity symptoms are monitored and maintained or improved  Outcome: Progressing     Problem: Pain  Goal: Takes deep breaths with improved pain control throughout the shift  Outcome: Progressing  Goal: Turns in bed with improved pain control throughout the shift  Outcome: Progressing  Goal: Walks with improved pain control throughout the shift  Outcome: Progressing  Goal: Performs ADL's with improved pain control throughout shift  Outcome: Progressing  Goal: Participates in PT with improved pain control throughout the shift  Outcome: Progressing  Goal: Free from opioid side effects throughout the shift  Outcome: Progressing  Goal: Free from acute confusion related to pain meds throughout the shift  Outcome: Progressing

## 2024-12-29 VITALS
RESPIRATION RATE: 17 BRPM | HEART RATE: 67 BPM | TEMPERATURE: 96.8 F | DIASTOLIC BLOOD PRESSURE: 60 MMHG | OXYGEN SATURATION: 99 % | BODY MASS INDEX: 33.68 KG/M2 | WEIGHT: 183 LBS | SYSTOLIC BLOOD PRESSURE: 103 MMHG | HEIGHT: 62 IN

## 2024-12-29 LAB
ALBUMIN SERPL BCP-MCNC: 1.9 G/DL (ref 3.4–5)
ALP SERPL-CCNC: 215 U/L (ref 33–110)
ALT SERPL W P-5'-P-CCNC: 26 U/L (ref 7–45)
ANION GAP SERPL CALC-SCNC: <7 MMOL/L (ref 10–20)
AST SERPL W P-5'-P-CCNC: 56 U/L (ref 9–39)
BACTERIA FLD CULT: NORMAL
BASOPHILS # BLD AUTO: 0.02 X10*3/UL (ref 0–0.1)
BASOPHILS NFR BLD AUTO: 0.8 %
BILIRUB SERPL-MCNC: 1.5 MG/DL (ref 0–1.2)
BUN SERPL-MCNC: 11 MG/DL (ref 6–23)
CALCIUM SERPL-MCNC: 7.4 MG/DL (ref 8.6–10.3)
CHLORIDE SERPL-SCNC: 109 MMOL/L (ref 98–107)
CO2 SERPL-SCNC: 28 MMOL/L (ref 21–32)
CREAT SERPL-MCNC: 0.53 MG/DL (ref 0.5–1.05)
EGFRCR SERPLBLD CKD-EPI 2021: >90 ML/MIN/1.73M*2
EOSINOPHIL # BLD AUTO: 0.25 X10*3/UL (ref 0–0.7)
EOSINOPHIL NFR BLD AUTO: 9.5 %
ERYTHROCYTE [DISTWIDTH] IN BLOOD BY AUTOMATED COUNT: 19.9 % (ref 11.5–14.5)
GLUCOSE BLD MANUAL STRIP-MCNC: 141 MG/DL (ref 74–99)
GLUCOSE BLD MANUAL STRIP-MCNC: 169 MG/DL (ref 74–99)
GLUCOSE BLD MANUAL STRIP-MCNC: 193 MG/DL (ref 74–99)
GLUCOSE BLD MANUAL STRIP-MCNC: 95 MG/DL (ref 74–99)
GLUCOSE SERPL-MCNC: 122 MG/DL (ref 74–99)
GRAM STN SPEC: NORMAL
GRAM STN SPEC: NORMAL
HCT VFR BLD AUTO: 33.5 % (ref 36–46)
HGB BLD-MCNC: 11.1 G/DL (ref 12–16)
IMM GRANULOCYTES # BLD AUTO: 0 X10*3/UL (ref 0–0.7)
IMM GRANULOCYTES NFR BLD AUTO: 0 % (ref 0–0.9)
LYMPHOCYTES # BLD AUTO: 1.15 X10*3/UL (ref 1.2–4.8)
LYMPHOCYTES NFR BLD AUTO: 43.7 %
MCH RBC QN AUTO: 29.3 PG (ref 26–34)
MCHC RBC AUTO-ENTMCNC: 33.1 G/DL (ref 32–36)
MCV RBC AUTO: 88 FL (ref 80–100)
MONOCYTES # BLD AUTO: 0.32 X10*3/UL (ref 0.1–1)
MONOCYTES NFR BLD AUTO: 12.2 %
NEUTROPHILS # BLD AUTO: 0.89 X10*3/UL (ref 1.2–7.7)
NEUTROPHILS NFR BLD AUTO: 33.8 %
NRBC BLD-RTO: 0 /100 WBCS (ref 0–0)
PLATELET # BLD AUTO: 60 X10*3/UL (ref 150–450)
POLYCHROMASIA BLD QL SMEAR: NORMAL
POTASSIUM SERPL-SCNC: 3.5 MMOL/L (ref 3.5–5.3)
PROT SERPL-MCNC: 5.7 G/DL (ref 6.4–8.2)
RBC # BLD AUTO: 3.79 X10*6/UL (ref 4–5.2)
RBC MORPH BLD: NORMAL
SODIUM SERPL-SCNC: 137 MMOL/L (ref 136–145)
TARGETS BLD QL SMEAR: NORMAL
WBC # BLD AUTO: 2.6 X10*3/UL (ref 4.4–11.3)

## 2024-12-29 PROCEDURE — 2500000004 HC RX 250 GENERAL PHARMACY W/ HCPCS (ALT 636 FOR OP/ED): Performed by: STUDENT IN AN ORGANIZED HEALTH CARE EDUCATION/TRAINING PROGRAM

## 2024-12-29 PROCEDURE — 82947 ASSAY GLUCOSE BLOOD QUANT: CPT

## 2024-12-29 PROCEDURE — 2500000001 HC RX 250 WO HCPCS SELF ADMINISTERED DRUGS (ALT 637 FOR MEDICARE OP): Performed by: INTERNAL MEDICINE

## 2024-12-29 PROCEDURE — 85025 COMPLETE CBC W/AUTO DIFF WBC: CPT | Performed by: STUDENT IN AN ORGANIZED HEALTH CARE EDUCATION/TRAINING PROGRAM

## 2024-12-29 PROCEDURE — 1100000001 HC PRIVATE ROOM DAILY

## 2024-12-29 PROCEDURE — 84075 ASSAY ALKALINE PHOSPHATASE: CPT | Performed by: STUDENT IN AN ORGANIZED HEALTH CARE EDUCATION/TRAINING PROGRAM

## 2024-12-29 PROCEDURE — 2500000001 HC RX 250 WO HCPCS SELF ADMINISTERED DRUGS (ALT 637 FOR MEDICARE OP)

## 2024-12-29 PROCEDURE — 2500000002 HC RX 250 W HCPCS SELF ADMINISTERED DRUGS (ALT 637 FOR MEDICARE OP, ALT 636 FOR OP/ED): Performed by: STUDENT IN AN ORGANIZED HEALTH CARE EDUCATION/TRAINING PROGRAM

## 2024-12-29 PROCEDURE — 2500000001 HC RX 250 WO HCPCS SELF ADMINISTERED DRUGS (ALT 637 FOR MEDICARE OP): Performed by: STUDENT IN AN ORGANIZED HEALTH CARE EDUCATION/TRAINING PROGRAM

## 2024-12-29 PROCEDURE — 36415 COLL VENOUS BLD VENIPUNCTURE: CPT | Performed by: STUDENT IN AN ORGANIZED HEALTH CARE EDUCATION/TRAINING PROGRAM

## 2024-12-29 PROCEDURE — XW033E5 INTRODUCTION OF REMDESIVIR ANTI-INFECTIVE INTO PERIPHERAL VEIN, PERCUTANEOUS APPROACH, NEW TECHNOLOGY GROUP 5: ICD-10-PCS | Performed by: INTERNAL MEDICINE

## 2024-12-29 RX ORDER — HYDROXYZINE HYDROCHLORIDE 25 MG/1
25 TABLET, FILM COATED ORAL EVERY 4 HOURS PRN
Status: DISCONTINUED | OUTPATIENT
Start: 2024-12-29 | End: 2024-12-31 | Stop reason: HOSPADM

## 2024-12-29 RX ADMIN — INSULIN LISPRO 1 UNITS: 100 INJECTION, SOLUTION INTRAVENOUS; SUBCUTANEOUS at 17:03

## 2024-12-29 RX ADMIN — INSULIN GLARGINE 12 UNITS: 100 INJECTION, SOLUTION SUBCUTANEOUS at 09:22

## 2024-12-29 RX ADMIN — INSULIN GLARGINE 12 UNITS: 100 INJECTION, SOLUTION SUBCUTANEOUS at 21:18

## 2024-12-29 RX ADMIN — SUCRALFATE 1 G: 1 TABLET ORAL at 17:04

## 2024-12-29 RX ADMIN — URSODIOL 300 MG: 300 CAPSULE ORAL at 21:18

## 2024-12-29 RX ADMIN — ACETAMINOPHEN 975 MG: 325 TABLET, FILM COATED ORAL at 09:22

## 2024-12-29 RX ADMIN — ACETAMINOPHEN 975 MG: 325 TABLET, FILM COATED ORAL at 00:47

## 2024-12-29 RX ADMIN — SUCRALFATE 1 G: 1 TABLET ORAL at 12:03

## 2024-12-29 RX ADMIN — FENOFIBRATE 54 MG: 54 TABLET ORAL at 09:52

## 2024-12-29 RX ADMIN — HYDROXYZINE HYDROCHLORIDE 25 MG: 25 TABLET ORAL at 12:54

## 2024-12-29 RX ADMIN — SUCRALFATE 1 G: 1 TABLET ORAL at 21:18

## 2024-12-29 RX ADMIN — FUROSEMIDE 40 MG: 40 TABLET ORAL at 09:22

## 2024-12-29 RX ADMIN — SUCRALFATE 1 G: 1 TABLET ORAL at 00:47

## 2024-12-29 RX ADMIN — URSODIOL 300 MG: 300 CAPSULE ORAL at 09:22

## 2024-12-29 RX ADMIN — LACTULOSE 20 G: 20 SOLUTION ORAL at 09:22

## 2024-12-29 RX ADMIN — REMDESIVIR 100 MG: 100 INJECTION, POWDER, LYOPHILIZED, FOR SOLUTION INTRAVENOUS at 12:49

## 2024-12-29 RX ADMIN — URSODIOL 300 MG: 300 CAPSULE ORAL at 00:47

## 2024-12-29 RX ADMIN — INSULIN GLARGINE 12 UNITS: 100 INJECTION, SOLUTION SUBCUTANEOUS at 00:47

## 2024-12-29 RX ADMIN — ACETAMINOPHEN 975 MG: 325 TABLET, FILM COATED ORAL at 17:04

## 2024-12-29 RX ADMIN — OXYCODONE HYDROCHLORIDE 5 MG: 5 TABLET ORAL at 12:54

## 2024-12-29 RX ADMIN — URSODIOL 300 MG: 300 CAPSULE ORAL at 14:27

## 2024-12-29 RX ADMIN — ENOXAPARIN SODIUM 40 MG: 40 INJECTION SUBCUTANEOUS at 14:27

## 2024-12-29 RX ADMIN — RIFAXIMIN 550 MG: 550 TABLET ORAL at 21:18

## 2024-12-29 RX ADMIN — NADOLOL 20 MG: 20 TABLET ORAL at 09:22

## 2024-12-29 RX ADMIN — PANTOPRAZOLE SODIUM 40 MG: 40 TABLET, DELAYED RELEASE ORAL at 06:57

## 2024-12-29 RX ADMIN — RIFAXIMIN 550 MG: 550 TABLET ORAL at 09:22

## 2024-12-29 ASSESSMENT — COGNITIVE AND FUNCTIONAL STATUS - GENERAL
MOBILITY SCORE: 24
DAILY ACTIVITIY SCORE: 24
DAILY ACTIVITIY SCORE: 24
MOBILITY SCORE: 24

## 2024-12-29 ASSESSMENT — PAIN SCALES - PAIN ASSESSMENT IN ADVANCED DEMENTIA (PAINAD)
BREATHING: NORMAL
TOTALSCORE: MEDICATION (SEE MAR)
BODYLANGUAGE: RELAXED

## 2024-12-29 ASSESSMENT — PAIN SCALES - GENERAL
PAINLEVEL_OUTOF10: 0 - NO PAIN
PAINLEVEL_OUTOF10: 5 - MODERATE PAIN
PAINLEVEL_OUTOF10: 0 - NO PAIN
PAINLEVEL_OUTOF10: 1
PAINLEVEL_OUTOF10: 0 - NO PAIN

## 2024-12-29 ASSESSMENT — PAIN - FUNCTIONAL ASSESSMENT
PAIN_FUNCTIONAL_ASSESSMENT: 0-10

## 2024-12-29 ASSESSMENT — PAIN SCALES - WONG BAKER
WONGBAKER_NUMERICALRESPONSE: NO HURT
WONGBAKER_NUMERICALRESPONSE: NO HURT
WONGBAKER_NUMERICALRESPONSE: HURTS LITTLE MORE

## 2024-12-29 ASSESSMENT — PAIN DESCRIPTION - DESCRIPTORS
DESCRIPTORS: ACHING
DESCRIPTORS: ACHING

## 2024-12-29 ASSESSMENT — PAIN DESCRIPTION - LOCATION: LOCATION: CHEST

## 2024-12-29 NOTE — CARE PLAN
The patient's goals for the shift include pt will remain safe and free of falls    The clinical goals for the shift include relief from chest pain    Problem: Pain - Adult  Goal: Verbalizes/displays adequate comfort level or baseline comfort level  Outcome: Progressing     Problem: Safety - Adult  Goal: Free from fall injury  Outcome: Progressing     Problem: Discharge Planning  Goal: Discharge to home or other facility with appropriate resources  Outcome: Progressing     Problem: Chronic Conditions and Co-morbidities  Goal: Patient's chronic conditions and co-morbidity symptoms are monitored and maintained or improved  Outcome: Progressing     Problem: Pain  Goal: Takes deep breaths with improved pain control throughout the shift  Outcome: Progressing  Goal: Turns in bed with improved pain control throughout the shift  Outcome: Progressing  Goal: Walks with improved pain control throughout the shift  Outcome: Progressing  Goal: Performs ADL's with improved pain control throughout shift  Outcome: Progressing  Goal: Participates in PT with improved pain control throughout the shift  Outcome: Progressing  Goal: Free from opioid side effects throughout the shift  Outcome: Progressing  Goal: Free from acute confusion related to pain meds throughout the shift  Outcome: Progressing

## 2024-12-29 NOTE — PROGRESS NOTES
INTERNAL MEDICINE PROGRESS NOTE      HPI:    Patient complains of some chest discomfort as well as upper abdominal discomfort.  She also admits to significant anxiety.  She is on room air.    Vital signs in last 24 hours:  Temp:  [36.4 °C (97.5 °F)-36.6 °C (97.9 °F)] 36.5 °C (97.7 °F)  Heart Rate:  [63-68] 65  Resp:  [17-18] 18  BP: ()/(62-71) 107/67    Physical Examination:  Physical Exam      Constitutional:       Appearance: Middle-aged, well-built, appears anxious.  HENT:      Head: Normocephalic and atraumatic.   Eyes:      Extraocular Movements: Extraocular movements intact.      Pupils: Pupils are equal, round, and reactive to light.   Cardiovascular:      Rate and Rhythm: Normal rate and regular rhythm.      Pulses: Normal pulses.      Heart sounds: Normal heart sounds.   Pulmonary:      Effort: Pulmonary effort is normal.      Breath sounds: Normal breath sounds.   Abdominal:      General: Abdomen is mildly distended. Bowel sounds are normal.      Palpations: Abdomen is soft.   Musculoskeletal:         General: Normal range of motion.      Cervical back: Normal range of motion and neck supple.   Skin:     General: Skin is warm and dry.   Neurological:      General: No focal deficit present.      Mental Status: Alert and oriented x 3, speech fluent.    Medications:    Current Facility-Administered Medications:     acetaminophen (Tylenol) tablet 975 mg, 975 mg, oral, q8h, Kaela Lara MD, 975 mg at 12/29/24 0922    dextrose 50 % injection 12.5 g, 12.5 g, intravenous, q15 min PRN, Kaela Lara MD    dextrose 50 % injection 25 g, 25 g, intravenous, q15 min PRN, Kaela Lara MD    enoxaparin (Lovenox) syringe 40 mg, 40 mg, subcutaneous, q24h, Kaela Lara MD, 40 mg at 12/28/24 1649    fenofibrate (Tricor) tablet 54 mg, 54 mg, oral, Daily, Kaela Lara MD, 54 mg at 12/29/24 0952    furosemide (Lasix) tablet 40 mg, 40 mg, oral, Daily, Kaela Lara MD, 40 mg at 12/29/24  0922    glucagon (Glucagen) injection 1 mg, 1 mg, intramuscular, q15 min PRN, Kaela Lara MD    glucagon (Glucagen) injection 1 mg, 1 mg, intramuscular, q15 min PRN, Kaela Lara MD    insulin glargine (Lantus) injection 12 Units, 12 Units, subcutaneous, BID, Kaela Lara MD, 12 Units at 12/29/24 0922    insulin lispro injection 0-5 Units, 0-5 Units, subcutaneous, TID AC, Kaela Lara MD, 1 Units at 12/28/24 1643    lactulose 20 gram/30 mL oral solution 20 g, 20 g, oral, BID, Kaela Lara MD, 20 g at 12/29/24 0922    meclizine (Antivert) tablet 25 mg, 25 mg, oral, TID PRN, Kaela Lara MD    nadolol (Corgard) tablet 20 mg, 20 mg, oral, Daily, Kaela Lara MD, 20 mg at 12/29/24 0922    ondansetron ODT (Zofran-ODT) disintegrating tablet 4 mg, 4 mg, oral, q8h PRN, 4 mg at 12/28/24 0905 **OR** ondansetron (Zofran) injection 4 mg, 4 mg, intravenous, q8h PRN, ESME Sams, 4 mg at 12/27/24 1723    oxyCODONE (Roxicodone) immediate release tablet 5 mg, 5 mg, oral, q6h PRN, ESME Sams, 5 mg at 12/28/24 1647    pantoprazole (ProtoNix) EC tablet 40 mg, 40 mg, oral, Daily before breakfast, Kaela Lara MD, 40 mg at 12/29/24 0657    polyethylene glycol (Glycolax, Miralax) packet 17 g, 17 g, oral, BID, Kaela Lara MD, 17 g at 12/28/24 0842    [COMPLETED] remdesivir (Veklury) 200 mg in sodium chloride 0.9% 250 mL IV, 200 mg, intravenous, Once, Stopped at 12/28/24 1340 **FOLLOWED BY** remdesivir (Veklury) 100 mg in sodium chloride 0.9% 100 mL IV, 100 mg, intravenous, q24h, Kaela Lara MD    rifAXIMin (Xifaxan) tablet 550 mg, 550 mg, oral, q12h RIVKA, Kaela Lara MD, 550 mg at 12/29/24 0922    [Held by provider] spironolactone (Aldactone) tablet 50 mg, 50 mg, oral, BID, Kaela Lara MD    sucralfate (Carafate) tablet 1 g, 1 g, oral, Before meals & nightly, Kaela Lara MD, 1 g at 12/29/24 0047    ursodiol (Actigall) capsule 300 mg, 300 mg,  oral, TID, Kaela Lara MD, 300 mg at 12/29/24 0922    Laboratory Findings:  Lab Results   Component Value Date    WBC 2.6 (L) 12/29/2024    HGB 11.1 (L) 12/29/2024    HCT 33.5 (L) 12/29/2024    MCV 88 12/29/2024    PLT 60 (L) 12/29/2024     Lab Results   Component Value Date    INR 1.5 (H) 12/27/2024    INR 1.3 (H) 12/24/2024    INR 1.3 (H) 09/19/2024    PROTIME 17.0 (H) 12/27/2024    PROTIME 13.7 (H) 12/24/2024    PROTIME 13.5 (H) 09/19/2024     Lab Results   Component Value Date    GLUCOSE 122 (H) 12/29/2024    CALCIUM 7.4 (L) 12/29/2024     12/29/2024    K 3.5 12/29/2024    CO2 28 12/29/2024     (H) 12/29/2024    BUN 11 12/29/2024    CREATININE 0.53 12/29/2024       Assessment and Plan:    COVID infection -continue with remdesivir, no fever, not hypoxic.  Chest pain -atypical, likely secondary to the above.  Will obtain chest x-ray.  Anxiety -hydroxyzine as needed.  Cirrhosis -underwent paracentesis, continue with oral medications.      Shane Antonio MD  12/29/24  11:23 AM

## 2024-12-30 LAB
ALBUMIN SERPL BCP-MCNC: 1.9 G/DL (ref 3.4–5)
ALP SERPL-CCNC: 240 U/L (ref 33–110)
ALT SERPL W P-5'-P-CCNC: 24 U/L (ref 7–45)
ANION GAP SERPL CALC-SCNC: <7 MMOL/L (ref 10–20)
AST SERPL W P-5'-P-CCNC: 47 U/L (ref 9–39)
BASOPHILS # BLD AUTO: 0.02 X10*3/UL (ref 0–0.1)
BASOPHILS NFR BLD AUTO: 0.6 %
BILIRUB SERPL-MCNC: 1.4 MG/DL (ref 0–1.2)
BUN SERPL-MCNC: 12 MG/DL (ref 6–23)
CALCIUM SERPL-MCNC: 7.3 MG/DL (ref 8.6–10.3)
CHLORIDE SERPL-SCNC: 108 MMOL/L (ref 98–107)
CO2 SERPL-SCNC: 29 MMOL/L (ref 21–32)
CREAT SERPL-MCNC: 0.56 MG/DL (ref 0.5–1.05)
EGFRCR SERPLBLD CKD-EPI 2021: >90 ML/MIN/1.73M*2
EOSINOPHIL # BLD AUTO: 0.29 X10*3/UL (ref 0–0.7)
EOSINOPHIL NFR BLD AUTO: 9.2 %
ERYTHROCYTE [DISTWIDTH] IN BLOOD BY AUTOMATED COUNT: 20.3 % (ref 11.5–14.5)
GLUCOSE BLD MANUAL STRIP-MCNC: 118 MG/DL (ref 74–99)
GLUCOSE BLD MANUAL STRIP-MCNC: 122 MG/DL (ref 74–99)
GLUCOSE BLD MANUAL STRIP-MCNC: 220 MG/DL (ref 74–99)
GLUCOSE BLD MANUAL STRIP-MCNC: 89 MG/DL (ref 74–99)
GLUCOSE SERPL-MCNC: 98 MG/DL (ref 74–99)
HCT VFR BLD AUTO: 34.6 % (ref 36–46)
HGB BLD-MCNC: 11.4 G/DL (ref 12–16)
IMM GRANULOCYTES # BLD AUTO: 0 X10*3/UL (ref 0–0.7)
IMM GRANULOCYTES NFR BLD AUTO: 0 % (ref 0–0.9)
LYMPHOCYTES # BLD AUTO: 1.43 X10*3/UL (ref 1.2–4.8)
LYMPHOCYTES NFR BLD AUTO: 45.4 %
MCH RBC QN AUTO: 29.7 PG (ref 26–34)
MCHC RBC AUTO-ENTMCNC: 32.9 G/DL (ref 32–36)
MCV RBC AUTO: 90 FL (ref 80–100)
MONOCYTES # BLD AUTO: 0.31 X10*3/UL (ref 0.1–1)
MONOCYTES NFR BLD AUTO: 9.8 %
NEUTROPHILS # BLD AUTO: 1.1 X10*3/UL (ref 1.2–7.7)
NEUTROPHILS NFR BLD AUTO: 35 %
NRBC BLD-RTO: 0 /100 WBCS (ref 0–0)
PATH REVIEW-CELL CT,FLUID: NORMAL
PLATELET # BLD AUTO: 61 X10*3/UL (ref 150–450)
POLYCHROMASIA BLD QL SMEAR: NORMAL
POTASSIUM SERPL-SCNC: 3.5 MMOL/L (ref 3.5–5.3)
PROT SERPL-MCNC: 5.7 G/DL (ref 6.4–8.2)
RBC # BLD AUTO: 3.84 X10*6/UL (ref 4–5.2)
RBC MORPH BLD: NORMAL
SODIUM SERPL-SCNC: 139 MMOL/L (ref 136–145)
WBC # BLD AUTO: 3.2 X10*3/UL (ref 4.4–11.3)

## 2024-12-30 PROCEDURE — 36415 COLL VENOUS BLD VENIPUNCTURE: CPT | Performed by: INTERNAL MEDICINE

## 2024-12-30 PROCEDURE — 2500000004 HC RX 250 GENERAL PHARMACY W/ HCPCS (ALT 636 FOR OP/ED): Performed by: STUDENT IN AN ORGANIZED HEALTH CARE EDUCATION/TRAINING PROGRAM

## 2024-12-30 PROCEDURE — 2500000001 HC RX 250 WO HCPCS SELF ADMINISTERED DRUGS (ALT 637 FOR MEDICARE OP): Performed by: STUDENT IN AN ORGANIZED HEALTH CARE EDUCATION/TRAINING PROGRAM

## 2024-12-30 PROCEDURE — 2500000001 HC RX 250 WO HCPCS SELF ADMINISTERED DRUGS (ALT 637 FOR MEDICARE OP)

## 2024-12-30 PROCEDURE — 84075 ASSAY ALKALINE PHOSPHATASE: CPT | Performed by: INTERNAL MEDICINE

## 2024-12-30 PROCEDURE — 1100000001 HC PRIVATE ROOM DAILY

## 2024-12-30 PROCEDURE — 2500000002 HC RX 250 W HCPCS SELF ADMINISTERED DRUGS (ALT 637 FOR MEDICARE OP, ALT 636 FOR OP/ED): Performed by: STUDENT IN AN ORGANIZED HEALTH CARE EDUCATION/TRAINING PROGRAM

## 2024-12-30 PROCEDURE — 85025 COMPLETE CBC W/AUTO DIFF WBC: CPT | Performed by: INTERNAL MEDICINE

## 2024-12-30 PROCEDURE — 82947 ASSAY GLUCOSE BLOOD QUANT: CPT

## 2024-12-30 RX ADMIN — SUCRALFATE 1 G: 1 TABLET ORAL at 10:41

## 2024-12-30 RX ADMIN — RIFAXIMIN 550 MG: 550 TABLET ORAL at 10:41

## 2024-12-30 RX ADMIN — ACETAMINOPHEN 975 MG: 325 TABLET, FILM COATED ORAL at 10:41

## 2024-12-30 RX ADMIN — URSODIOL 300 MG: 300 CAPSULE ORAL at 20:53

## 2024-12-30 RX ADMIN — INSULIN GLARGINE 12 UNITS: 100 INJECTION, SOLUTION SUBCUTANEOUS at 20:52

## 2024-12-30 RX ADMIN — FENOFIBRATE 54 MG: 54 TABLET ORAL at 12:37

## 2024-12-30 RX ADMIN — PANTOPRAZOLE SODIUM 40 MG: 40 TABLET, DELAYED RELEASE ORAL at 10:41

## 2024-12-30 RX ADMIN — OXYCODONE HYDROCHLORIDE 5 MG: 5 TABLET ORAL at 20:58

## 2024-12-30 RX ADMIN — URSODIOL 300 MG: 300 CAPSULE ORAL at 10:40

## 2024-12-30 RX ADMIN — POLYETHYLENE GLYCOL 3350 17 G: 17 POWDER, FOR SOLUTION ORAL at 20:53

## 2024-12-30 RX ADMIN — ENOXAPARIN SODIUM 40 MG: 40 INJECTION SUBCUTANEOUS at 17:39

## 2024-12-30 RX ADMIN — LACTULOSE 20 G: 20 SOLUTION ORAL at 10:40

## 2024-12-30 RX ADMIN — LACTULOSE 20 G: 20 SOLUTION ORAL at 20:53

## 2024-12-30 RX ADMIN — INSULIN GLARGINE 12 UNITS: 100 INJECTION, SOLUTION SUBCUTANEOUS at 10:50

## 2024-12-30 RX ADMIN — RIFAXIMIN 550 MG: 550 TABLET ORAL at 20:53

## 2024-12-30 RX ADMIN — ACETAMINOPHEN 975 MG: 325 TABLET, FILM COATED ORAL at 17:39

## 2024-12-30 RX ADMIN — REMDESIVIR 100 MG: 100 INJECTION, POWDER, LYOPHILIZED, FOR SOLUTION INTRAVENOUS at 12:37

## 2024-12-30 RX ADMIN — URSODIOL 300 MG: 300 CAPSULE ORAL at 17:39

## 2024-12-30 RX ADMIN — FUROSEMIDE 40 MG: 40 TABLET ORAL at 10:41

## 2024-12-30 RX ADMIN — INSULIN LISPRO 2 UNITS: 100 INJECTION, SOLUTION INTRAVENOUS; SUBCUTANEOUS at 17:45

## 2024-12-30 RX ADMIN — SUCRALFATE 1 G: 1 TABLET ORAL at 20:53

## 2024-12-30 RX ADMIN — SUCRALFATE 1 G: 1 TABLET ORAL at 17:39

## 2024-12-30 RX ADMIN — NADOLOL 20 MG: 20 TABLET ORAL at 10:41

## 2024-12-30 ASSESSMENT — COGNITIVE AND FUNCTIONAL STATUS - GENERAL
DAILY ACTIVITIY SCORE: 24
MOBILITY SCORE: 24

## 2024-12-30 ASSESSMENT — PAIN SCALES - GENERAL
PAINLEVEL_OUTOF10: 7
PAINLEVEL_OUTOF10: 2
PAINLEVEL_OUTOF10: 4

## 2024-12-30 ASSESSMENT — PAIN - FUNCTIONAL ASSESSMENT
PAIN_FUNCTIONAL_ASSESSMENT: 0-10
PAIN_FUNCTIONAL_ASSESSMENT: 0-10

## 2024-12-30 NOTE — PROGRESS NOTES
12/30/24 1252   Discharge Planning   Expected Discharge Disposition Home     Pt day 2 Covid. Poss dc tomorrow.

## 2024-12-30 NOTE — PROGRESS NOTES
INTERNAL MEDICINE PROGRESS NOTE      HPI:    Received second dose of remdesivir.  Appears less uncomfortable.  Admits to decreased anxiety.    Vital signs in last 24 hours:  Temp:  [36 °C (96.8 °F)-36.7 °C (98 °F)] 36.7 °C (98 °F)  Heart Rate:  [64-67] 65  Resp:  [16-17] 16  BP: ()/(60-70) 99/64    Physical Examination:  Physical Exam      Constitutional:       Appearance: Middle-aged, well-built, appears anxious.  HENT:      Head: Normocephalic and atraumatic.   Eyes:      Extraocular Movements: Extraocular movements intact.      Pupils: Pupils are equal, round, and reactive to light.   Cardiovascular:      Rate and Rhythm: Normal rate and regular rhythm.      Pulses: Normal pulses.      Heart sounds: Normal heart sounds.   Pulmonary:      Effort: Pulmonary effort is normal.      Breath sounds: Normal breath sounds.   Abdominal:      General: Abdomen is mildly distended. Bowel sounds are normal.      Palpations: Abdomen is soft.   Musculoskeletal:         General: Normal range of motion.      Cervical back: Normal range of motion and neck supple.   Skin:     General: Skin is warm and dry.   Neurological:      General: No focal deficit present.      Mental Status: Alert and oriented x 3, speech fluent.    Medications:    Current Facility-Administered Medications:     acetaminophen (Tylenol) tablet 975 mg, 975 mg, oral, q8h, Kaela Lara MD, 975 mg at 12/29/24 1704    dextrose 50 % injection 12.5 g, 12.5 g, intravenous, q15 min PRN, Kaela Lara MD    dextrose 50 % injection 25 g, 25 g, intravenous, q15 min PRN, Kaela Lara MD    enoxaparin (Lovenox) syringe 40 mg, 40 mg, subcutaneous, q24h, Kaela Lara MD, 40 mg at 12/29/24 1427    fenofibrate (Tricor) tablet 54 mg, 54 mg, oral, Daily, Kaela Lara MD, 54 mg at 12/29/24 0952    furosemide (Lasix) tablet 40 mg, 40 mg, oral, Daily, Kaela Lara MD, 40 mg at 12/29/24 0922    glucagon (Glucagen) injection 1 mg, 1 mg,  intramuscular, q15 min PRN, Kaela Lara MD    glucagon (Glucagen) injection 1 mg, 1 mg, intramuscular, q15 min PRN, Kaela Lara MD    hydrOXYzine HCL (Atarax) tablet 25 mg, 25 mg, oral, q4h PRN, Shane Antonio MD, 25 mg at 12/29/24 1254    insulin glargine (Lantus) injection 12 Units, 12 Units, subcutaneous, BID, Kaela Lara MD, 12 Units at 12/29/24 2118    insulin lispro injection 0-5 Units, 0-5 Units, subcutaneous, TID AC, Kaela Lara MD, 1 Units at 12/29/24 1703    lactulose 20 gram/30 mL oral solution 20 g, 20 g, oral, BID, Kaela Lara MD, 20 g at 12/29/24 0922    meclizine (Antivert) tablet 25 mg, 25 mg, oral, TID PRN, Kaela Lara MD    nadolol (Corgard) tablet 20 mg, 20 mg, oral, Daily, Kaela Lara MD, 20 mg at 12/29/24 0922    ondansetron ODT (Zofran-ODT) disintegrating tablet 4 mg, 4 mg, oral, q8h PRN, 4 mg at 12/28/24 0905 **OR** ondansetron (Zofran) injection 4 mg, 4 mg, intravenous, q8h PRN, MADYSON Sams-CNP, 4 mg at 12/27/24 1723    oxyCODONE (Roxicodone) immediate release tablet 5 mg, 5 mg, oral, q6h PRN, ALBAN SamsCNP, 5 mg at 12/29/24 1254    pantoprazole (ProtoNix) EC tablet 40 mg, 40 mg, oral, Daily before breakfast, Kaela Lara MD, 40 mg at 12/29/24 0657    polyethylene glycol (Glycolax, Miralax) packet 17 g, 17 g, oral, BID, Kaela Lara MD, 17 g at 12/28/24 0842    [COMPLETED] remdesivir (Veklury) 200 mg in sodium chloride 0.9% 250 mL IV, 200 mg, intravenous, Once, Stopped at 12/28/24 1340 **FOLLOWED BY** remdesivir (Veklury) 100 mg in sodium chloride 0.9% 100 mL IV, 100 mg, intravenous, q24h, Kaela Lara MD, 100 mg at 12/29/24 1249    rifAXIMin (Xifaxan) tablet 550 mg, 550 mg, oral, q12h RIVKA, Kaela Lara MD, 550 mg at 12/29/24 2118    [Held by provider] spironolactone (Aldactone) tablet 50 mg, 50 mg, oral, BID, Kaela Lara MD    sucralfate (Carafate) tablet 1 g, 1 g, oral, Before meals & nightly,  Kaela Lara MD, 1 g at 12/29/24 2118    ursodiol (Actigall) capsule 300 mg, 300 mg, oral, TID, Kaela Lara MD, 300 mg at 12/29/24 2118    Laboratory Findings:  Lab Results   Component Value Date    WBC 3.2 (L) 12/30/2024    HGB 11.4 (L) 12/30/2024    HCT 34.6 (L) 12/30/2024    MCV 90 12/30/2024    PLT 61 (L) 12/30/2024     Lab Results   Component Value Date    INR 1.5 (H) 12/27/2024    INR 1.3 (H) 12/24/2024    INR 1.3 (H) 09/19/2024    PROTIME 17.0 (H) 12/27/2024    PROTIME 13.7 (H) 12/24/2024    PROTIME 13.5 (H) 09/19/2024     Lab Results   Component Value Date    GLUCOSE 98 12/30/2024    CALCIUM 7.3 (L) 12/30/2024     12/30/2024    K 3.5 12/30/2024    CO2 29 12/30/2024     (H) 12/30/2024    BUN 12 12/30/2024    CREATININE 0.56 12/30/2024       Assessment and Plan:    COVID infection -improving slowly, on room air.  Continue with remdesivir.  Chest pain -likely secondary to the above, analgesics as needed.  Anxiety -hydroxyzine as needed.  Cirrhosis -stable following paracentesis.    Likely discharge in 24 hours.      Shane Atnonio MD  12/30/24  10:29 AM

## 2024-12-30 NOTE — CARE PLAN
The patient's goals for the shift include pt will remain safe and free of falls    The clinical goals for the shift include Pt to remain safe    Over the shift, the patient did make progress toward the following goals.     Problem: Pain - Adult  Goal: Verbalizes/displays adequate comfort level or baseline comfort level  Outcome: Progressing     Problem: Safety - Adult  Goal: Free from fall injury  Outcome: Progressing     Problem: Discharge Planning  Goal: Discharge to home or other facility with appropriate resources  Outcome: Progressing     Problem: Chronic Conditions and Co-morbidities  Goal: Patient's chronic conditions and co-morbidity symptoms are monitored and maintained or improved  Outcome: Progressing     Problem: Pain  Goal: Turns in bed with improved pain control throughout the shift  Outcome: Progressing     Problem: Pain  Goal: Performs ADL's with improved pain control throughout shift  Outcome: Progressing     Problem: Pain  Goal: Free from acute confusion related to pain meds throughout the shift  Outcome: Progressing

## 2024-12-30 NOTE — CARE PLAN
The clinical goals for the shift include safety and comfort maintained throughout the shift.        Problem: Pain - Adult  Goal: Verbalizes/displays adequate comfort level or baseline comfort level  Outcome: Progressing     Problem: Safety - Adult  Goal: Free from fall injury  Outcome: Progressing     Problem: Discharge Planning  Goal: Discharge to home or other facility with appropriate resources  Outcome: Progressing     Problem: Chronic Conditions and Co-morbidities  Goal: Patient's chronic conditions and co-morbidity symptoms are monitored and maintained or improved  Outcome: Progressing

## 2024-12-31 VITALS
WEIGHT: 183 LBS | OXYGEN SATURATION: 95 % | RESPIRATION RATE: 18 BRPM | SYSTOLIC BLOOD PRESSURE: 100 MMHG | BODY MASS INDEX: 33.68 KG/M2 | HEIGHT: 62 IN | HEART RATE: 71 BPM | DIASTOLIC BLOOD PRESSURE: 61 MMHG | TEMPERATURE: 98.3 F

## 2024-12-31 LAB
ANION GAP SERPL CALC-SCNC: <7 MMOL/L (ref 10–20)
BACTERIA FLD CULT: NORMAL
BASOPHILS # BLD AUTO: 0.02 X10*3/UL (ref 0–0.1)
BASOPHILS NFR BLD AUTO: 0.7 %
BUN SERPL-MCNC: 15 MG/DL (ref 6–23)
BURR CELLS BLD QL SMEAR: NORMAL
CALCIUM SERPL-MCNC: 7.2 MG/DL (ref 8.6–10.3)
CHLORIDE SERPL-SCNC: 109 MMOL/L (ref 98–107)
CO2 SERPL-SCNC: 30 MMOL/L (ref 21–32)
CREAT SERPL-MCNC: 0.65 MG/DL (ref 0.5–1.05)
EGFRCR SERPLBLD CKD-EPI 2021: >90 ML/MIN/1.73M*2
EOSINOPHIL # BLD AUTO: 0.22 X10*3/UL (ref 0–0.7)
EOSINOPHIL NFR BLD AUTO: 7.4 %
ERYTHROCYTE [DISTWIDTH] IN BLOOD BY AUTOMATED COUNT: 20.3 % (ref 11.5–14.5)
GLUCOSE BLD MANUAL STRIP-MCNC: 124 MG/DL (ref 74–99)
GLUCOSE BLD MANUAL STRIP-MCNC: 153 MG/DL (ref 74–99)
GLUCOSE SERPL-MCNC: 208 MG/DL (ref 74–99)
GRAM STN SPEC: NORMAL
GRAM STN SPEC: NORMAL
HCT VFR BLD AUTO: 32.3 % (ref 36–46)
HGB BLD-MCNC: 10.9 G/DL (ref 12–16)
IMM GRANULOCYTES # BLD AUTO: 0 X10*3/UL (ref 0–0.7)
IMM GRANULOCYTES NFR BLD AUTO: 0 % (ref 0–0.9)
LABORATORY COMMENT REPORT: NORMAL
LABORATORY COMMENT REPORT: NORMAL
LYMPHOCYTES # BLD AUTO: 1.45 X10*3/UL (ref 1.2–4.8)
LYMPHOCYTES NFR BLD AUTO: 48.8 %
MCH RBC QN AUTO: 30.1 PG (ref 26–34)
MCHC RBC AUTO-ENTMCNC: 33.7 G/DL (ref 32–36)
MCV RBC AUTO: 89 FL (ref 80–100)
MONOCYTES # BLD AUTO: 0.3 X10*3/UL (ref 0.1–1)
MONOCYTES NFR BLD AUTO: 10.1 %
NEUTROPHILS # BLD AUTO: 0.98 X10*3/UL (ref 1.2–7.7)
NEUTROPHILS NFR BLD AUTO: 33 %
NRBC BLD-RTO: 0 /100 WBCS (ref 0–0)
OVALOCYTES BLD QL SMEAR: NORMAL
PATH REPORT.FINAL DX SPEC: NORMAL
PATH REPORT.GROSS SPEC: NORMAL
PATH REPORT.RELEVANT HX SPEC: NORMAL
PATH REPORT.TOTAL CANCER: NORMAL
PLATELET # BLD AUTO: 54 X10*3/UL (ref 150–450)
POLYCHROMASIA BLD QL SMEAR: NORMAL
POTASSIUM SERPL-SCNC: 3.7 MMOL/L (ref 3.5–5.3)
RBC # BLD AUTO: 3.62 X10*6/UL (ref 4–5.2)
RBC MORPH BLD: NORMAL
SODIUM SERPL-SCNC: 141 MMOL/L (ref 136–145)
WBC # BLD AUTO: 3 X10*3/UL (ref 4.4–11.3)

## 2024-12-31 PROCEDURE — 80048 BASIC METABOLIC PNL TOTAL CA: CPT | Performed by: INTERNAL MEDICINE

## 2024-12-31 PROCEDURE — 2500000002 HC RX 250 W HCPCS SELF ADMINISTERED DRUGS (ALT 637 FOR MEDICARE OP, ALT 636 FOR OP/ED): Performed by: STUDENT IN AN ORGANIZED HEALTH CARE EDUCATION/TRAINING PROGRAM

## 2024-12-31 PROCEDURE — 2500000004 HC RX 250 GENERAL PHARMACY W/ HCPCS (ALT 636 FOR OP/ED): Performed by: STUDENT IN AN ORGANIZED HEALTH CARE EDUCATION/TRAINING PROGRAM

## 2024-12-31 PROCEDURE — 85025 COMPLETE CBC W/AUTO DIFF WBC: CPT | Performed by: INTERNAL MEDICINE

## 2024-12-31 PROCEDURE — 82947 ASSAY GLUCOSE BLOOD QUANT: CPT

## 2024-12-31 PROCEDURE — 36415 COLL VENOUS BLD VENIPUNCTURE: CPT | Performed by: INTERNAL MEDICINE

## 2024-12-31 PROCEDURE — 2500000001 HC RX 250 WO HCPCS SELF ADMINISTERED DRUGS (ALT 637 FOR MEDICARE OP): Performed by: STUDENT IN AN ORGANIZED HEALTH CARE EDUCATION/TRAINING PROGRAM

## 2024-12-31 RX ORDER — HYDROXYZINE HYDROCHLORIDE 25 MG/1
25 TABLET, FILM COATED ORAL EVERY 4 HOURS PRN
Qty: 30 TABLET | Refills: 0 | Status: ON HOLD | OUTPATIENT
Start: 2024-12-31

## 2024-12-31 RX ADMIN — FUROSEMIDE 40 MG: 40 TABLET ORAL at 08:00

## 2024-12-31 RX ADMIN — ACETAMINOPHEN 975 MG: 325 TABLET, FILM COATED ORAL at 09:47

## 2024-12-31 RX ADMIN — SUCRALFATE 1 G: 1 TABLET ORAL at 12:03

## 2024-12-31 RX ADMIN — NADOLOL 20 MG: 20 TABLET ORAL at 08:00

## 2024-12-31 RX ADMIN — SUCRALFATE 1 G: 1 TABLET ORAL at 06:32

## 2024-12-31 RX ADMIN — FENOFIBRATE 54 MG: 54 TABLET ORAL at 10:04

## 2024-12-31 RX ADMIN — URSODIOL 300 MG: 300 CAPSULE ORAL at 08:00

## 2024-12-31 RX ADMIN — PANTOPRAZOLE SODIUM 40 MG: 40 TABLET, DELAYED RELEASE ORAL at 06:32

## 2024-12-31 RX ADMIN — ACETAMINOPHEN 975 MG: 325 TABLET, FILM COATED ORAL at 01:30

## 2024-12-31 RX ADMIN — POLYETHYLENE GLYCOL 3350 17 G: 17 POWDER, FOR SOLUTION ORAL at 08:00

## 2024-12-31 RX ADMIN — INSULIN GLARGINE 12 UNITS: 100 INJECTION, SOLUTION SUBCUTANEOUS at 08:00

## 2024-12-31 RX ADMIN — INSULIN LISPRO 1 UNITS: 100 INJECTION, SOLUTION INTRAVENOUS; SUBCUTANEOUS at 07:59

## 2024-12-31 RX ADMIN — LACTULOSE 20 G: 20 SOLUTION ORAL at 08:00

## 2024-12-31 RX ADMIN — RIFAXIMIN 550 MG: 550 TABLET ORAL at 08:00

## 2024-12-31 ASSESSMENT — PAIN SCALES - GENERAL: PAINLEVEL_OUTOF10: 4

## 2024-12-31 NOTE — CARE PLAN
The patient's goals for the shift include pt will remain safe and free of falls    The clinical goals for the shift include Pt to remain safe    Over the shift, the patient did not make progress toward the following goals. Barriers to progression include . Recommendations to address these barriers include .

## 2024-12-31 NOTE — PROGRESS NOTES
12/31/24 1154   Discharge Planning   Expected Discharge Disposition Home     Patient is medically ready for discharge  They are being discharged to: home   will pick patient up    Patient denies any other needs

## 2024-12-31 NOTE — PROGRESS NOTES
INTERNAL MEDICINE PROGRESS NOTE      HPI:    Doing much better.  Less shortness of breath.  Remains on room air.    Vital signs in last 24 hours:  Temp:  [36.3 °C (97.3 °F)-36.8 °C (98.3 °F)] 36.8 °C (98.3 °F)  Heart Rate:  [] 71  Resp:  [16-18] 18  BP: (100-114)/(44-69) 100/61    Physical Examination:  Physical Exam      Constitutional:       Appearance: Middle-aged, well-built, appears anxious.  HENT:      Head: Normocephalic and atraumatic.   Eyes:      Extraocular Movements: Extraocular movements intact.      Pupils: Pupils are equal, round, and reactive to light.   Cardiovascular:      Rate and Rhythm: Normal rate and regular rhythm.      Pulses: Normal pulses.      Heart sounds: Normal heart sounds.   Pulmonary:      Effort: Pulmonary effort is normal.      Breath sounds: Normal breath sounds.   Abdominal:      General: Abdomen is mildly distended. Bowel sounds are normal.      Palpations: Abdomen is soft.   Musculoskeletal:         General: Normal range of motion.      Cervical back: Normal range of motion and neck supple.   Skin:     General: Skin is warm and dry.   Neurological:      General: No focal deficit present.      Mental Status: Alert and oriented x 3, speech fluent.    Medications:    Current Facility-Administered Medications:     acetaminophen (Tylenol) tablet 975 mg, 975 mg, oral, q8h, Kaela Lara MD, 975 mg at 12/31/24 0947    dextrose 50 % injection 12.5 g, 12.5 g, intravenous, q15 min PRN, Kaela Lara MD    dextrose 50 % injection 25 g, 25 g, intravenous, q15 min PRN, Kaela Lara MD    enoxaparin (Lovenox) syringe 40 mg, 40 mg, subcutaneous, q24h, Kaela Lara MD, 40 mg at 12/30/24 1739    fenofibrate (Tricor) tablet 54 mg, 54 mg, oral, Daily, Kaela Lara MD, 54 mg at 12/31/24 1004    furosemide (Lasix) tablet 40 mg, 40 mg, oral, Daily, Kaela Lara MD, 40 mg at 12/31/24 0800    glucagon (Glucagen) injection 1 mg, 1 mg, intramuscular, q15 min  PRN, Kaela Lara MD    glucagon (Glucagen) injection 1 mg, 1 mg, intramuscular, q15 min PRN, Kaela Lara MD    hydrOXYzine HCL (Atarax) tablet 25 mg, 25 mg, oral, q4h PRN, Shane Antonio MD, 25 mg at 12/29/24 1254    insulin glargine (Lantus) injection 12 Units, 12 Units, subcutaneous, BID, Kaela Lara MD, 12 Units at 12/31/24 0800    insulin lispro injection 0-5 Units, 0-5 Units, subcutaneous, TID AC, Kaela Lara MD, 1 Units at 12/31/24 0759    lactulose 20 gram/30 mL oral solution 20 g, 20 g, oral, BID, Kaela Lara MD, 20 g at 12/31/24 0800    meclizine (Antivert) tablet 25 mg, 25 mg, oral, TID PRN, Kaela Lara MD    nadolol (Corgard) tablet 20 mg, 20 mg, oral, Daily, Kaela Lara MD, 20 mg at 12/31/24 0800    ondansetron ODT (Zofran-ODT) disintegrating tablet 4 mg, 4 mg, oral, q8h PRN, 4 mg at 12/28/24 0905 **OR** ondansetron (Zofran) injection 4 mg, 4 mg, intravenous, q8h PRN, MADYSON Sams-CNP, 4 mg at 12/27/24 1723    oxyCODONE (Roxicodone) immediate release tablet 5 mg, 5 mg, oral, q6h PRN, ESME Sams, 5 mg at 12/30/24 2058    pantoprazole (ProtoNix) EC tablet 40 mg, 40 mg, oral, Daily before breakfast, Kaela Lara MD, 40 mg at 12/31/24 0632    polyethylene glycol (Glycolax, Miralax) packet 17 g, 17 g, oral, BID, Kaela Lara MD, 17 g at 12/31/24 0800    rifAXIMin (Xifaxan) tablet 550 mg, 550 mg, oral, q12h RIVKA, Kaela Lara MD, 550 mg at 12/31/24 0800    [Held by provider] spironolactone (Aldactone) tablet 50 mg, 50 mg, oral, BID, Kaela Lara MD    sucralfate (Carafate) tablet 1 g, 1 g, oral, Before meals & nightly, Kaela Lara MD, 1 g at 12/31/24 0632    ursodiol (Actigall) capsule 300 mg, 300 mg, oral, TID, Kaela Lara MD, 300 mg at 12/31/24 0800    Laboratory Findings:  Lab Results   Component Value Date    WBC 3.0 (L) 12/31/2024    HGB 10.9 (L) 12/31/2024    HCT 32.3 (L) 12/31/2024    MCV 89 12/31/2024     PLT 54 (L) 12/31/2024     Lab Results   Component Value Date    INR 1.5 (H) 12/27/2024    INR 1.3 (H) 12/24/2024    INR 1.3 (H) 09/19/2024    PROTIME 17.0 (H) 12/27/2024    PROTIME 13.7 (H) 12/24/2024    PROTIME 13.5 (H) 09/19/2024     Lab Results   Component Value Date    GLUCOSE 208 (H) 12/31/2024    CALCIUM 7.2 (L) 12/31/2024     12/31/2024    K 3.7 12/31/2024    CO2 30 12/31/2024     (H) 12/31/2024    BUN 15 12/31/2024    CREATININE 0.65 12/31/2024       Assessment and Plan:    COVID infection -received remdesivir, on room air, no further interventions.  Chest pain -likely secondary to the above, analgesics as needed.  Anxiety -hydroxyzine as needed.  Cirrhosis -stable following paracentesis.    Discharge home today.      Shane Antonio MD  12/31/24  11:22 AM

## 2025-01-01 NOTE — DISCHARGE SUMMARY
Discharge Diagnosis  Other ascites    Issues Requiring Follow-Up      Discharge Meds     Medication List      START taking these medications     hydrOXYzine HCL 25 mg tablet; Commonly known as: Atarax; Take 1 tablet   (25 mg) by mouth every 4 hours if needed for anxiety.   rifAXIMin 550 mg tablet; Commonly known as: Xifaxan; Take 1 tablet (550   mg) by mouth every 12 hours.     CONTINUE taking these medications     acetaminophen 500 mg tablet; Commonly known as: Tylenol   atorvastatin 80 mg tablet; Commonly known as: Lipitor; [place] 1 tableta 1   vez cada día antes de domirse; (Take 1 tablet (80 mg) by mouth once daily   at bedtime.)   docusate sodium 100 mg capsule; Commonly known as: Colace; [place] 1   cápsula 2 veces cada día. No tome si tiene diarrea.; (Take 1 capsule (100   mg) by mouth 2 times a day. Hold for loose stool.)   fenofibrate 145 mg tablet; Commonly known as: Tricor; [place] 1 tableta 1   vez cada día.; (Take 1 tablet (145 mg) by mouth once daily.)   furosemide 40 mg tablet; Commonly known as: Lasix; [place] 1 tableta 1 vez   cada día.; (Take 1 tablet (40 mg) by mouth once daily. Hold for   dizziness.)   insulin lispro 100 unit/mL injection; Inject 0-15 Units under the skin 3   times a day before meals. Take as directed per insulin instructions.Do not   hold when patient is not eating, continue order as scheduled for   hyperglycemia management. Insulin Lispro Corrective Scale #3     Hypoglycemia protocol Call LIP unit(s) if Blood Glucose is between 0 - 70   mg/dL   0 unit(s) if Blood glucose is between   3 unit(s) if Blood   glucose is between 151-200  6 unit(s) if Blood glucose is between 201-250    9 unit(s) if Blood glucose is between 251-300  12 unit(s) if Blood   glucose is between 301-350  15 unit(s) if Blood glucose is between 351-400    If blood glucose is greater than 400 mg/dL, give max insulin per sliding   scale AND then contact provider.   lactulose 20 gram/30 mL oral solution   Lantus  Solostar U-100 Insulin 100 unit/mL (3 mL) pen; Generic drug:   insulin glargine; Inyecte 25 unidades 2 veces cada día; (Inject 25 Units   under the skin 2 times a day.)   magnesium oxide 400 mg (241.3 mg magnesium) tablet; Commonly known as:   Mag-Ox; Lucky 1 tableta 1 vez cada día.; (Take 1 tablet (400 mg) by mouth   once daily.)   meclizine 25 mg tablet; Commonly known as: Antivert   metFORMIN  mg 24 hr tablet; Commonly known as: Glucophage-XR; Lucky   2 tabletas 1 vez cada día por la tarde. Lucky con comida.; (Take 2 tablets   (1,000 mg) by mouth once daily in the evening. Take with meals. Do not   crush, chew, or split.)   midodrine 10 mg tablet; Commonly known as: Proamatine; Lucky 1/2 tableta   3 veces cada día.; (Take 0.5 tablets (5 mg) by mouth 3 times daily   (morning, midday, late afternoon).)   nadolol 20 mg tablet; Commonly known as: Corgard; Lucky 1 tableta 1 vez   cada día.; (Take 1 tablet (20 mg) by mouth once daily.)   nitroglycerin 0.1 mg/hr patch; Commonly known as: Nitrodur; Aplica 1   parte en el piel 1 vez cada día. Clara después de 12 horas cada día.;   (Place 1 patch over 12 hours on the skin once daily. Hold for dizziness.)   pantoprazole 40 mg EC tablet; Commonly known as: ProtoNix; Lucky 1   tableta 1 vez cada mañana.; (Take 1 tablet (40 mg) by mouth once daily in   the morning. Take before meals. Do not crush, chew, or split.)   polyethylene glycol 17 gram/dose powder; Commonly known as: Glycolax,   Miralax; Mezcle 1 tapa en un vaso de agua y sindhu 2 veces cada día.; (Mix   17 grams of powder in 8 ounces of water and drink 2 times a day.)   spironolactone 50 mg tablet; Commonly known as: Aldactone; Lucky 1   tableta 2 veces cada día.; (Take 1 tablet (50 mg) by mouth 2 times a day.   Hold for dizziness.)   sucralfate 1 gram tablet; Commonly known as: Carafate; Lucky 1 tableta 4   veces cada día antes de comer; (Take 1 tablet (1 g) by mouth 4 times a day   before meals.)   Sure Comfort Pen  "Needle 32 gauge x 5/32\" needle; Generic drug: pen   needle, diabetic; 4 veces cada día; (Use 4 times a day)   traZODone 50 mg tablet; Commonly known as: Desyrel; [place] 1/2 tableta 1   vez cada noche; (Take 0.5 tablets (25 mg) by mouth once daily at bedtime.)   ursodiol 300 mg capsule; Commonly known as: Actigall; [place] 1 cápsula 3   veces cada día.; (Take 1 capsule (300 mg) by mouth 3 times a day.)     STOP taking these medications     ondansetron 4 mg tablet; Commonly known as: Zofran   oxyCODONE 5 mg immediate release tablet; Commonly known as: Roxicodone       Test Results Pending At Discharge  Pending Labs       Order Current Status    Extra Urine Gray Tube Collected (12/27/24 0912)    Urinalysis with Reflex Culture and Microscopic In process            Hospital Course   Alfonzo Flnaagan is a 49 y.o. female with past medical history of ascites, iron deficiency anemia, DM II, liver cirrhosis, pulmonary nodule, GERD, Vitamin D defficiency, transaminasemia, SBP, chronic ascites, and a recent paracentesis on December 13, presented to [facility] ED with severe abdominal pain, nausea, fever and chills, and chest pain. She states she was feeling very cold, and covered with few blankets. She endorses her pain started a week ago. Her pain is diffused and in her abdomen area. She endorses headache as well. She endorses back pain is not that bad as her abdomen.  She states that she lost her appetite. She feels nauseated and her pain increased with eating. She denies dizziness, or lightheadedness. She denies any recent trauma or fall. She denies any sick contacts or travelling. She is able to communicate, her main language is [place]se.   She underwent multiple US guided abdominal paracentesis and yielding 3 liters of fluid each time. She went about 2-3 times every month. US guided abdominal paracentesis was done on 10/28/2024, 11/01/24, and 12/13/2024. She has also prior multiple paracentesis were done at " [facility].        The patient was given remdesivir, remained stable on RA. Her cp was related to COVID19, her s/s improved, she was stable for dc home with outpatient follow up.     Pertinent Physical Exam At Time of Discharge      Outpatient Follow-Up  Future Appointments   Date Time Provider Department Center   2/13/2025  8:30 AM Negra Boyd, MADYSON-CNP [id number]       Time and care for discharge management > 30 minutes.     Shane Antonio MD

## 2025-01-08 ENCOUNTER — HOSPITAL ENCOUNTER (EMERGENCY)
Facility: HOSPITAL | Age: 50
Discharge: HOME | End: 2025-01-09
Attending: EMERGENCY MEDICINE
Payer: COMMERCIAL

## 2025-01-08 ENCOUNTER — APPOINTMENT (OUTPATIENT)
Dept: RADIOLOGY | Facility: HOSPITAL | Age: 50
End: 2025-01-08
Payer: COMMERCIAL

## 2025-01-08 DIAGNOSIS — R73.9 HYPERGLYCEMIA: ICD-10-CM

## 2025-01-08 DIAGNOSIS — R18.8 OTHER ASCITES: Primary | ICD-10-CM

## 2025-01-08 LAB
ALBUMIN SERPL BCP-MCNC: 2.5 G/DL (ref 3.4–5)
ALP SERPL-CCNC: 422 U/L (ref 33–110)
ALT SERPL W P-5'-P-CCNC: 27 U/L (ref 7–45)
AMMONIA PLAS-SCNC: 67 UMOL/L (ref 16–53)
ANION GAP SERPL CALCULATED.3IONS-SCNC: 8 MMOL/L (ref 10–20)
AST SERPL W P-5'-P-CCNC: 66 U/L (ref 9–39)
BASOPHILS # BLD AUTO: 0.02 X10*3/UL (ref 0–0.1)
BASOPHILS NFR BLD AUTO: 0.5 %
BILIRUB SERPL-MCNC: 2.2 MG/DL (ref 0–1.2)
BUN SERPL-MCNC: 16 MG/DL (ref 6–23)
CALCIUM SERPL-MCNC: 7.9 MG/DL (ref 8.6–10.3)
CHLORIDE SERPL-SCNC: 107 MMOL/L (ref 98–107)
CO2 SERPL-SCNC: 25 MMOL/L (ref 21–32)
CREAT SERPL-MCNC: 0.66 MG/DL (ref 0.5–1.05)
EGFRCR SERPLBLD CKD-EPI 2021: >90 ML/MIN/1.73M*2
EOSINOPHIL # BLD AUTO: 0.28 X10*3/UL (ref 0–0.7)
EOSINOPHIL NFR BLD AUTO: 6.9 %
ERYTHROCYTE [DISTWIDTH] IN BLOOD BY AUTOMATED COUNT: 18.6 % (ref 11.5–14.5)
GLUCOSE SERPL-MCNC: 510 MG/DL (ref 74–99)
HCT VFR BLD AUTO: 33.8 % (ref 36–46)
HGB BLD-MCNC: 10.9 G/DL (ref 12–16)
IMM GRANULOCYTES # BLD AUTO: 0.01 X10*3/UL (ref 0–0.7)
IMM GRANULOCYTES NFR BLD AUTO: 0.2 % (ref 0–0.9)
LYMPHOCYTES # BLD AUTO: 1.2 X10*3/UL (ref 1.2–4.8)
LYMPHOCYTES NFR BLD AUTO: 29.4 %
MAGNESIUM SERPL-MCNC: 1.84 MG/DL (ref 1.6–2.4)
MCH RBC QN AUTO: 30.9 PG (ref 26–34)
MCHC RBC AUTO-ENTMCNC: 32.2 G/DL (ref 32–36)
MCV RBC AUTO: 96 FL (ref 80–100)
MONOCYTES # BLD AUTO: 0.48 X10*3/UL (ref 0.1–1)
MONOCYTES NFR BLD AUTO: 11.8 %
NEUTROPHILS # BLD AUTO: 2.09 X10*3/UL (ref 1.2–7.7)
NEUTROPHILS NFR BLD AUTO: 51.2 %
NRBC BLD-RTO: 0 /100 WBCS (ref 0–0)
PLATELET # BLD AUTO: 67 X10*3/UL (ref 150–450)
POTASSIUM SERPL-SCNC: 4.6 MMOL/L (ref 3.5–5.3)
PROT SERPL-MCNC: 7.1 G/DL (ref 6.4–8.2)
RBC # BLD AUTO: 3.53 X10*6/UL (ref 4–5.2)
RBC MORPH BLD: NORMAL
SODIUM SERPL-SCNC: 135 MMOL/L (ref 136–145)
WBC # BLD AUTO: 4.1 X10*3/UL (ref 4.4–11.3)

## 2025-01-08 PROCEDURE — 83735 ASSAY OF MAGNESIUM: CPT | Performed by: EMERGENCY MEDICINE

## 2025-01-08 PROCEDURE — 36415 COLL VENOUS BLD VENIPUNCTURE: CPT | Performed by: EMERGENCY MEDICINE

## 2025-01-08 PROCEDURE — 85025 COMPLETE CBC W/AUTO DIFF WBC: CPT | Performed by: EMERGENCY MEDICINE

## 2025-01-08 PROCEDURE — 80053 COMPREHEN METABOLIC PANEL: CPT | Performed by: EMERGENCY MEDICINE

## 2025-01-08 PROCEDURE — 99284 EMERGENCY DEPT VISIT MOD MDM: CPT | Mod: 25 | Performed by: EMERGENCY MEDICINE

## 2025-01-08 PROCEDURE — 82140 ASSAY OF AMMONIA: CPT | Performed by: EMERGENCY MEDICINE

## 2025-01-08 PROCEDURE — 74176 CT ABD & PELVIS W/O CONTRAST: CPT | Performed by: STUDENT IN AN ORGANIZED HEALTH CARE EDUCATION/TRAINING PROGRAM

## 2025-01-08 PROCEDURE — 74176 CT ABD & PELVIS W/O CONTRAST: CPT

## 2025-01-08 RX ORDER — MORPHINE SULFATE 4 MG/ML
4 INJECTION, SOLUTION INTRAMUSCULAR; INTRAVENOUS ONCE
Status: COMPLETED | OUTPATIENT
Start: 2025-01-08 | End: 2025-01-09

## 2025-01-08 RX ORDER — ONDANSETRON HYDROCHLORIDE 2 MG/ML
4 INJECTION, SOLUTION INTRAVENOUS ONCE
Status: COMPLETED | OUTPATIENT
Start: 2025-01-08 | End: 2025-01-09

## 2025-01-08 ASSESSMENT — PAIN DESCRIPTION - PAIN TYPE: TYPE: ACUTE PAIN

## 2025-01-08 ASSESSMENT — PAIN DESCRIPTION - LOCATION: LOCATION: ABDOMEN

## 2025-01-08 ASSESSMENT — LIFESTYLE VARIABLES
EVER HAD A DRINK FIRST THING IN THE MORNING TO STEADY YOUR NERVES TO GET RID OF A HANGOVER: NO
TOTAL SCORE: 0
EVER FELT BAD OR GUILTY ABOUT YOUR DRINKING: NO
HAVE YOU EVER FELT YOU SHOULD CUT DOWN ON YOUR DRINKING: NO
HAVE PEOPLE ANNOYED YOU BY CRITICIZING YOUR DRINKING: NO

## 2025-01-08 ASSESSMENT — PAIN - FUNCTIONAL ASSESSMENT: PAIN_FUNCTIONAL_ASSESSMENT: 0-10

## 2025-01-08 ASSESSMENT — PAIN SCALES - GENERAL: PAINLEVEL_OUTOF10: 8

## 2025-01-09 VITALS
HEART RATE: 82 BPM | OXYGEN SATURATION: 100 % | TEMPERATURE: 98.8 F | BODY MASS INDEX: 33.68 KG/M2 | HEIGHT: 62 IN | RESPIRATION RATE: 16 BRPM | SYSTOLIC BLOOD PRESSURE: 128 MMHG | WEIGHT: 183 LBS | DIASTOLIC BLOOD PRESSURE: 78 MMHG

## 2025-01-09 PROCEDURE — 2500000004 HC RX 250 GENERAL PHARMACY W/ HCPCS (ALT 636 FOR OP/ED): Performed by: EMERGENCY MEDICINE

## 2025-01-09 PROCEDURE — 96375 TX/PRO/DX INJ NEW DRUG ADDON: CPT

## 2025-01-09 PROCEDURE — 2500000002 HC RX 250 W HCPCS SELF ADMINISTERED DRUGS (ALT 637 FOR MEDICARE OP, ALT 636 FOR OP/ED): Performed by: EMERGENCY MEDICINE

## 2025-01-09 PROCEDURE — 96374 THER/PROPH/DIAG INJ IV PUSH: CPT

## 2025-01-09 RX ADMIN — MORPHINE SULFATE 4 MG: 4 INJECTION, SOLUTION INTRAMUSCULAR; INTRAVENOUS at 00:36

## 2025-01-09 RX ADMIN — INSULIN HUMAN 15 UNITS: 100 INJECTION, SOLUTION PARENTERAL at 00:34

## 2025-01-09 RX ADMIN — ONDANSETRON 4 MG: 2 INJECTION, SOLUTION INTRAMUSCULAR; INTRAVENOUS at 00:37

## 2025-01-09 NOTE — DISCHARGE INSTRUCTIONS
Thank you for choosing [facility]. It was my pleasure to be involved in your care today.         As of today's visit, based on reasonable likelihood, that it is safe for you to be discharged back to your residence to follow-up as an outpatient for ongoing management of your medical problem. You should follow-up with any referrals / primary provider as soon as possible. The contacts (number, addresses) are listed below.         Important:  Even though we think it is safe for you to go home, there is always a small chance that we are missing something that could require hospitalization.  Therefore it is very important that if you get worse or develops any new symptoms that you return here as soon as possible to be re-evaluated.  This includes return of symptoms that have resolved such as fainting, chest pain, or symptoms that could be warning signs for stroke important:  Even though we think it is safe for you to go home, there is always a small chance that we are missing something that could require hospitalization.  Therefore it is very important that if you get worse or develops any new symptoms that you return here as soon as possible to be re-evaluated.  This includes return of symptoms that have resolved such as fainting, chest pain, or symptoms that could be warning signs for stroke         Make sure your pharmacy and primary doctor is aware of any new medications prescribed today.          It is your responsibility to contact as soon as possible, and follow through with, any referrals you were given today. We do recommend you inform them you are a [facility] follow-up patient, as often they can better accommodate your need to be seen, provided their schedules allow. We will, and have, made every effort to ensure you have access to adequate follow-up specialists available.          All problems may not be able to be fixed in one ER visit. This is why timely ongoing care is important, and this  is a responsibility you share in. Further, you are free to follow up with any provider you choose, and this is not limited to our suggestion.          If cultures were obtained today, you will be contacted should anything result that would require further treatment. Please contact the ED at the number provided with questions.          Having trouble affording medications? Try [email or web address]! (This is not a hospital endorsed website, merely a recommendation based on my own personal experiences with [facility])

## 2025-01-09 NOTE — ED PROVIDER NOTES
HPI   Chief Complaint   Patient presents with    Abdominal Pain     Pt presents to the ED with c/o abdominal pain and distention. Pt states she has a hx of cirrhosis of the liver as well as hx of a paracentesis. Pt a/o x4. VSS.       HPI  50-year-old female with chronic liver disease requiring frequent paracentesis presents with abdominal swelling and pain.  Apparently she was unable to get her paracentesis.  No fevers or chills.  No chest pain or shortness of breath.  Nothing makes it better or worse.  No other complaints.      Patient History   Past Medical History:   Diagnosis Date    Cirrhosis of liver (Multi)     Diabetes mellitus (Multi)      Past Surgical History:   Procedure Laterality Date    ABDOMINAL SURGERY      CT GUIDED IMAGING FOR NEEDLE PLACEMENT  10/14/2020    CT GUIDED IMAGING FOR NEEDLE PLACEMENT [facility]    US GUIDED ABDOMINAL PARACENTESIS  10/08/2020    US GUIDED ABDOMINAL PARACENTESIS [facility]     No family history on file.  Social History     Tobacco Use    Smoking status: Never    Smokeless tobacco: Never   Substance Use Topics    Alcohol use: Never    Drug use: Never       Physical Exam   ED Triage Vitals [01/08/25 2144]   Temperature Heart Rate Respirations BP   37.1 °C (98.8 °F) 84 16 132/78      Pulse Ox Temp Source Heart Rate Source Patient Position   100 % Tympanic Monitor Sitting      BP Location FiO2 (%)     Right arm --       Physical Exam  General:  Awake, alert, no acute distress.  Head: Normocephalic, Atraumatic  Neck: Supple, trachea midline, no stridor  Skin: Warm and dry, no rashes   Lungs: Clear to auscultation bilaterally no acute respiratory distress, speaking in full sentences without difficulty  CV: Regular Rate Rhythm with no obvious murmurs gallops rubs noted, no jugular venous distention, no pedal edema   Abdomen: diffusely tender, distended, positive bowel sounds, no peritoneal signs  Neuro:  No gross focal neurologic deficits, NIH is  0  Musculoskeletal:  Full range of motion in all 4 extremities  Psychiatric:  Alert oriented x 3, Good insight into condition.    ED Course & MDM   Diagnoses as of 01/09/25 0018   Other ascites   Hyperglycemia                 No data recorded     [place] Coma Scale Score: 15 (01/08/25 2146 : Shaila Riggs, MYNOR)                           Medical Decision Making  Patient abdominal CT does show moderate ascites.  She was hyperglycemic with a blood sugar of 500 but no evidence of DKA.  She was treated with 15 units of regular insulin IV.  She was treated for pain with morphine and Zofran.  I contacted Dr. Lawrence who is very familiar with her.  He requested that she be given an outpatient order for paracentesis tomorrow.  This was done.  The rest of her workup is unremarkable.  I discussed all this with her at bedside.  She is familiar with how to schedule her paracentesis.  She is stable for discharge.    Procedure  Procedures     Chago Ocampo,   01/09/25 0035

## 2025-01-10 ENCOUNTER — HOSPITAL ENCOUNTER (EMERGENCY)
Facility: HOSPITAL | Age: 50
Discharge: HOME | End: 2025-01-10
Attending: EMERGENCY MEDICINE
Payer: COMMERCIAL

## 2025-01-10 VITALS
HEIGHT: 62 IN | SYSTOLIC BLOOD PRESSURE: 135 MMHG | BODY MASS INDEX: 33.13 KG/M2 | WEIGHT: 180 LBS | OXYGEN SATURATION: 99 % | HEART RATE: 91 BPM | DIASTOLIC BLOOD PRESSURE: 66 MMHG | TEMPERATURE: 97.2 F | RESPIRATION RATE: 18 BRPM

## 2025-01-10 DIAGNOSIS — R18.8 ASCITES OF LIVER: ICD-10-CM

## 2025-01-10 DIAGNOSIS — R10.9 ABDOMINAL PAIN, UNSPECIFIED ABDOMINAL LOCATION: Primary | ICD-10-CM

## 2025-01-10 DIAGNOSIS — R11.0 NAUSEA: ICD-10-CM

## 2025-01-10 PROCEDURE — 99283 EMERGENCY DEPT VISIT LOW MDM: CPT | Performed by: EMERGENCY MEDICINE

## 2025-01-10 PROCEDURE — 2500000001 HC RX 250 WO HCPCS SELF ADMINISTERED DRUGS (ALT 637 FOR MEDICARE OP): Performed by: EMERGENCY MEDICINE

## 2025-01-10 PROCEDURE — 2500000002 HC RX 250 W HCPCS SELF ADMINISTERED DRUGS (ALT 637 FOR MEDICARE OP, ALT 636 FOR OP/ED): Performed by: EMERGENCY MEDICINE

## 2025-01-10 RX ORDER — OXYCODONE HYDROCHLORIDE 5 MG/1
5 TABLET ORAL EVERY 6 HOURS PRN
Qty: 6 TABLET | Refills: 0 | Status: SHIPPED | OUTPATIENT
Start: 2025-01-10 | End: 2025-01-10

## 2025-01-10 RX ORDER — SUCRALFATE 1 G/1
1 TABLET ORAL
Qty: 20 TABLET | Refills: 0 | Status: ON HOLD | OUTPATIENT
Start: 2025-01-10 | End: 2025-01-15

## 2025-01-10 RX ORDER — OXYCODONE HYDROCHLORIDE 5 MG/1
5 TABLET ORAL EVERY 6 HOURS PRN
Qty: 6 TABLET | Refills: 0 | Status: ON HOLD | OUTPATIENT
Start: 2025-01-10

## 2025-01-10 RX ORDER — OXYCODONE HYDROCHLORIDE 5 MG/1
5 TABLET ORAL ONCE
Status: COMPLETED | OUTPATIENT
Start: 2025-01-10 | End: 2025-01-10

## 2025-01-10 RX ORDER — SUCRALFATE 1 G/10ML
1 SUSPENSION ORAL ONCE
Status: COMPLETED | OUTPATIENT
Start: 2025-01-10 | End: 2025-01-10

## 2025-01-10 RX ADMIN — OXYCODONE 5 MG: 5 TABLET ORAL at 14:31

## 2025-01-10 RX ADMIN — SUCRALFATE ORAL SUSPENSION 1 G: 1 SUSPENSION ORAL at 14:31

## 2025-01-10 NOTE — DISCHARGE INSTRUCTIONS
Thank you for choosing [facility] and allowing me to take care of you today.     If your symptoms are not improving, begin to worsen, new symptoms develop/additional concerns arise, please return to the Emergency Department at any time for further evaluation and  treatment.    And most importantly you must maintain your appointment for paracentesis on Monday January 13 as discussed at emergency department check-in at same-day surgery on the second floor no later than 7:10 AM    As your previous appointment has been moved to this and you will lose your chance for procedure if you do not stay compliant with plan     Dr. Forest Romano Jr., D.O.     Follow-up with your primary care doctor or any emergency department as needed

## 2025-01-10 NOTE — ED PROVIDER NOTES
EMERGENCY DEPARTMENT ENCOUNTER      Pt Name: Alfonzo Flanagan  MRN: [id number]  Birthdate 1975  Date of evaluation: 1/10/2025  ED Provider: Forest Romano DO     CHIEF COMPLAINT       Chief Complaint   Patient presents with    Abdominal Pain     Patient states abdominal pain with n/v that is not getting any better since the last time she was seen here. Has a GI appt for 1/17 but can not feel like she can wait.         HISTORY OF PRESENT ILLNESS   (Location/Symptom, Timing/Onset, Context/Setting, Quality, Duration, Modifying Factors, Severity) Note limiting factors.   I wore appropriate PPE for the entirety of this encounter.      HPI    Alfonzo Flanagan is a 50 y.o. who presents to the emergency department for increasing abdominal pain and distention history of chronic ascites requiring frequent therapeutic paracentesis.  Seen 2 days prior contact information provided to schedule paracentesis but cannot be seen till January 17 distention is getting worse which is increasing pain leading nausea and vomiting.    Nursing Notes were reviewed.    History obtained from :  patient    Outside records reviewed: N/A      History/Collateral information obtained from: N/A     Chronic conditions affecting care:     Social determinants of health:    Patient's PDMP reviewed personally    ED Course as of 01/13/25 1539   Fri Davi 10, 2025   1402 Vital signs reassuring patient hypoxic no respiratory complaint will discuss options with interventional radiology [SL]   1419 Patient understands the importance of follow-up as discussed agreeable to pain pills understands he must check in by 710 same-day surgery on second floor [SL]      ED Course User Index  [SL] Forest Romano DO         Diagnoses as of 01/13/25 1539   Abdominal pain, unspecified abdominal location   Ascites of liver   Nausea        Discussion/ MDM:              REVIEW OF SYSTEMS     Review of Systems  Pertinent positives and negatives as per HPI    PAST  MEDICAL HISTORY     Past Medical History:   Diagnosis Date    Ascites     Asthma     Cirrhosis of liver (Multi)     Diabetes mellitus (Multi)     GERD (gastroesophageal reflux disease)     CORBY (obstructive sleep apnea)     Portal hypertension (Multi)     Thrombocytopenia (CMS-HCC)        SURGICAL HISTORY       Past Surgical History:   Procedure Laterality Date     SECTION, LOW TRANSVERSE      CHOLECYSTECTOMY      COLONOSCOPY      CT GUIDED IMAGING FOR NEEDLE PLACEMENT  10/14/2020    CT GUIDED IMAGING FOR NEEDLE PLACEMENT [facility]    ESOPHAGOGASTRODUODENOSCOPY      INGUINAL HIDRADENITIS EXCISION      TUBAL LIGATION      US GUIDED ABDOMINAL PARACENTESIS  10/08/2020    US GUIDED ABDOMINAL PARACENTESIS [facility]       CURRENT MEDICATIONS       Discharge Medication List as of 1/10/2025  2:25 PM        CONTINUE these medications which have NOT CHANGED    Details   acetaminophen (Tylenol) 500 mg tablet Take 1-2 tablets (500-1,000 mg) by mouth every 6 hours if needed for mild pain (1 - 3)., Historical Med      atorvastatin (Lipitor) 80 mg tablet Take 1 tablet (80 mg) by mouth once daily at bedtime., Starting Thu 10/31/2024, Normal      docusate sodium (Colace) 100 mg capsule Take 1 capsule (100 mg) by mouth 2 times a day. Hold for loose stool., Starting Thu 10/31/2024, Normal      fenofibrate (Tricor) 145 mg tablet Take 1 tablet (145 mg) by mouth once daily., Starting Thu 10/31/2024, Normal      furosemide (Lasix) 40 mg tablet Take 1 tablet (40 mg) by mouth once daily. Hold for dizziness., Starting 2024, Normal      hydrOXYzine HCL (Atarax) 25 mg tablet Take 1 tablet (25 mg) by mouth every 4 hours if needed for anxiety., Starting 2024, Normal      insulin glargine (Lantus Solostar U-100 Insulin) 100 unit/mL (3 mL) pen Inject 25 Units under the skin 2 times a day., Starting 2024, Normal      insulin lispro 100 unit/mL injection Inject 0-15 Units under the skin 3  times a day before meals. Take as directed per insulin instructions.Do not hold when patient is not eating, continue order as scheduled for hyperglycemia management.  Insulin Lispro Corrective Scale #3     Hypoglycemia  protocol Call LIP unit(s) if Blood Glucose is between 0 - 70 mg/dL     0 unit(s) if Blood glucose is between    3 unit(s) if Blood glucose is between 151-200   6 unit(s) if Blood glucose is between 201-250   9 unit(s) if Blood glucose is between 25 1-300   12 unit(s) if Blood glucose is between 301-350   15 unit(s) if Blood glucose is between 351-400    If blood glucose is greater than 400 mg/dL, give max insulin per sliding scale AND then contact provider., Starting Fri 12/13/2024, No Print      lactulose 20 gram/30 mL oral solution Take 30 mL (20 g) by mouth 2 times a day., Historical Med      magnesium oxide (Mag-Ox) 400 mg (241.3 mg magnesium) tablet Take 1 tablet (400 mg) by mouth once daily., Starting Thu 10/31/2024, Normal      meclizine (Antivert) 25 mg tablet Take 1 tablet (25 mg) by mouth 3 times a day as needed for dizziness., Historical Med      metFORMIN XR (Glucophage-XR) 500 mg 24 hr tablet Take 2 tablets (1,000 mg) by mouth once daily in the evening. Take with meals. Do not crush, chew, or split., Starting Fri 12/13/2024, Normal      midodrine (Proamatine) 10 mg tablet Take 0.5 tablets (5 mg) by mouth 3 times daily (morning, midday, late afternoon)., Starting Thu 10/31/2024, Normal      nadolol (Corgard) 20 mg tablet Take 1 tablet (20 mg) by mouth once daily., Starting Thu 10/31/2024, Normal      nitroglycerin (Nitrodur) 0.1 mg/hr patch Place 1 patch over 12 hours on the skin once daily. Hold for dizziness., Starting Fri 11/1/2024, Normal      pantoprazole (ProtoNix) 40 mg EC tablet Take 1 tablet (40 mg) by mouth once daily in the morning. Take before meals. Do not crush, chew, or split., Starting Thu 10/31/2024, Normal      pen needle, diabetic (Sure Comfort Pen Needle) 32  "gauge x 5/32\" needle Use 4 times a day, Starting Fri 11/1/2024, Normal      polyethylene glycol (Glycolax, Miralax) 17 gram/dose powder Mix 17 grams of powder in 8 ounces of water and drink 2 times a day., Starting Thu 10/31/2024, Normal      rifAXIMin (Xifaxan) 550 mg tablet Take 1 tablet (550 mg) by mouth every 12 hours., Starting Thu 10/31/2024, Normal      spironolactone (Aldactone) 50 mg tablet Take 1 tablet (50 mg) by mouth 2 times a day. Hold for dizziness., Starting Fri 12/13/2024, Normal      traZODone (Desyrel) 50 mg tablet Take 0.5 tablets (25 mg) by mouth once daily at bedtime., Starting Thu 10/31/2024, Until Thu 12/12/2024, Normal      ursodiol (Actigall) 300 mg capsule Take 1 capsule (300 mg) by mouth 3 times a day., Starting Thu 10/31/2024, Normal             ALLERGIES     Gluten    FAMILY HISTORY     No family history on file.     SOCIAL HISTORY       Social History     Socioeconomic History    Marital status:    Tobacco Use    Smoking status: Never    Smokeless tobacco: Never   Substance and Sexual Activity    Alcohol use: Never    Drug use: Never    Sexual activity: Yes     Partners: Male     Birth control/protection: None     Social Drivers of Health     Financial Resource Strain: Low Risk  (1/13/2025)    Overall Financial Resource Strain (CARDIA)     Difficulty of Paying Living Expenses: Not hard at all   Recent Concern: Financial Resource Strain - Medium Risk (10/28/2024)    Overall Financial Resource Strain (CARDIA)     Difficulty of Paying Living Expenses: Somewhat hard   Food Insecurity: No Food Insecurity (1/12/2025)    Hunger Vital Sign     Worried About Running Out of Food in the Last Year: Never true     Ran Out of Food in the Last Year: Never true   Transportation Needs: No Transportation Needs (1/13/2025)    PRAPARE - Transportation     Lack of Transportation (Medical): No     Lack of Transportation (Non-Medical): No   Recent Concern: Transportation Needs - Unmet Transportation " Needs (12/12/2024)    PRAPARE - Transportation     Lack of Transportation (Medical): Yes     Lack of Transportation (Non-Medical): No   Physical Activity: Inactive (12/12/2024)    Exercise Vital Sign     Days of Exercise per Week: 0 days     Minutes of Exercise per Session: 0 min   Stress: No Stress Concern Present (12/12/2024)    [employer] [place] [employer] - Occupational Stress Questionnaire     Feeling of Stress : Only a little   Social Connections: Moderately Integrated (12/12/2024)    Social Connection and Isolation Panel [NHANES]     Frequency of Communication with Friends and Family: More than three times a week     Frequency of Social Gatherings with Friends and Family: More than three times a week     Attends Evangelical Services: More than 4 times per year     Active Member of Clubs or Organizations: No     Attends Club or Organization Meetings: Never     Marital Status:    Intimate Partner Violence: Not At Risk (1/12/2025)    Humiliation, Afraid, Rape, and Kick questionnaire     Fear of Current or Ex-Partner: No     Emotionally Abused: No     Physically Abused: No     Sexually Abused: No   Housing Stability: Low Risk  (1/13/2025)    Housing Stability Vital Sign     Unable to Pay for Housing in the Last Year: No     Number of Times Moved in the Last Year: 0     Homeless in the Last Year: No       PHYSICAL EXAM       ED Triage Vitals [01/10/25 1310]   Temperature Heart Rate Respirations BP   36.2 °C (97.2 °F) 91 18 135/66      Pulse Ox Temp src Heart Rate Source Patient Position   99 % -- -- --      BP Location FiO2 (%)     -- --         Physical Exam  PHYSICIAL EXAM: Vitals reviewed  GENERAL: Awake and alert in room, nondiaphoretic, nontoxic appearing the patient appears nourished and normally developed. Vital signs as documented.     EYES: Head exam is unremarkable. No scleral icterus     HEENT: Mucous membranes moist. Nares patent without copious rhinorrhea.     LUNGS: Lungs are clear  "to auscultation, -r/r/w without any respiratory distress.  Maintaining adequate SpO2 on room air     CARDIAC: Rhythm is regular. No dysrhythmias or murmurs.     ABDOMEN: Moderately distended, positive ascites wave,, no rebound/guarding, with no obvious masses, no pulsatile mass     EXTREMITIES: No peripheral edema, with no obvious deformities.     SKIN: Good color, with no significant rashes. No pallor.     NEURO: No obvious neurological deficits, normal sensation and strength bilaterally. Patientable to ambulate with steady gait under own strength.     PSYCH: Mood and affect normal. Appropriate for age.  DIAGNOSTIC RESULTS     RADIOLOGY (Per Emergency Physician):     Interpretation per the Radiologist below, if available at the time of this note:  No orders to display         LABS:  Labs Reviewed - No data to display    All other labs were within normal range or not returned as of this dictation.    EMERGENCY DEPARTMENT COURSE :   Vitals:    Vitals:    01/10/25 1310   BP: 135/66   Pulse: 91   Resp: 18   Temp: 36.2 °C (97.2 °F)   SpO2: 99%   Weight: 81.6 kg (180 lb)   Height: 1.575 m (5' 2\")       Medications   oxyCODONE (Roxicodone) immediate release tablet 5 mg (5 mg oral Given 1/10/25 1431)   sucralfate (Carafate) suspension 1 g (1 g oral Given 1/10/25 1431)             PROCEDURES:  Unless otherwise noted below, none     Procedures    CRITICAL CARE TIME       FINAL IMPRESSION      1. Abdominal pain, unspecified abdominal location    2. Ascites of liver    3. Nausea          DISPOSITION    Discharge 01/10/2025 02:20:02 PM    PATIENT REFERRED TO:  Landen Freeman MD  [id number] [address] [address]  [phone]            DISCHARGE MEDICATIONS:  Discharge Medication List as of 1/10/2025  2:25 PM        START taking these medications    Details   sucralfate (Carafate) 1 gram tablet Take 1 tablet (1 g) by mouth 4 times a day before meals for 5 days., Starting Fri 1/10/2025, Until Wed 1/15/2025, " Normal      oxyCODONE (Roxicodone) 5 mg immediate release tablet Take 1 tablet (5 mg) by mouth every 6 hours if needed for severe pain (7 - 10) for up to 6 doses., Starting Fri 1/10/2025, Print                (Comment: Please note this report has been produced using speech recognition software and may contain errors related to that system including errors in grammar, punctuation, and spelling, as well as words and phrases that may be inappropriate.  If there are any questions or concerns please feel free to contact the dictating provider for clarification.)    Forest Romano DO (electronically signed)  Emergency Medicine Provider     Forest Romano DO  01/13/25 1530

## 2025-01-11 ENCOUNTER — APPOINTMENT (OUTPATIENT)
Dept: RADIOLOGY | Facility: HOSPITAL | Age: 50
End: 2025-01-11
Payer: COMMERCIAL

## 2025-01-11 ENCOUNTER — APPOINTMENT (OUTPATIENT)
Dept: CARDIOLOGY | Facility: HOSPITAL | Age: 50
End: 2025-01-11
Payer: COMMERCIAL

## 2025-01-11 ENCOUNTER — HOSPITAL ENCOUNTER (INPATIENT)
Facility: HOSPITAL | Age: 50
End: 2025-01-11
Attending: INTERNAL MEDICINE | Admitting: INTERNAL MEDICINE
Payer: COMMERCIAL

## 2025-01-11 DIAGNOSIS — K92.0 HEMATEMESIS WITH NAUSEA: ICD-10-CM

## 2025-01-11 DIAGNOSIS — K74.60 CIRRHOSIS OF LIVER WITH ASCITES, UNSPECIFIED HEPATIC CIRRHOSIS TYPE (MULTI): ICD-10-CM

## 2025-01-11 DIAGNOSIS — K72.90 DECOMPENSATED CIRRHOSIS: ICD-10-CM

## 2025-01-11 DIAGNOSIS — K74.60 DECOMPENSATED CIRRHOSIS: ICD-10-CM

## 2025-01-11 DIAGNOSIS — R18.8 CIRRHOSIS OF LIVER WITH ASCITES, UNSPECIFIED HEPATIC CIRRHOSIS TYPE (MULTI): ICD-10-CM

## 2025-01-11 DIAGNOSIS — N12 PYELONEPHRITIS: Primary | ICD-10-CM

## 2025-01-11 DIAGNOSIS — K92.2 GASTROINTESTINAL HEMORRHAGE, UNSPECIFIED GASTROINTESTINAL HEMORRHAGE TYPE: ICD-10-CM

## 2025-01-11 LAB
ALBUMIN SERPL BCP-MCNC: 2.4 G/DL (ref 3.4–5)
ALP SERPL-CCNC: 457 U/L (ref 33–110)
ALT SERPL W P-5'-P-CCNC: 37 U/L (ref 7–45)
ANION GAP SERPL CALC-SCNC: <7 MMOL/L (ref 10–20)
APPEARANCE UR: ABNORMAL
APTT PPP: 32 SECONDS (ref 27–38)
AST SERPL W P-5'-P-CCNC: 71 U/L (ref 9–39)
BACTERIA #/AREA URNS AUTO: ABNORMAL /HPF
BASOPHILS # BLD AUTO: 0.02 X10*3/UL (ref 0–0.1)
BASOPHILS NFR BLD AUTO: 0.5 %
BILIRUB SERPL-MCNC: 2.3 MG/DL (ref 0–1.2)
BILIRUB UR STRIP.AUTO-MCNC: NEGATIVE MG/DL
BNP SERPL-MCNC: 80 PG/ML (ref 0–99)
BUN SERPL-MCNC: 13 MG/DL (ref 6–23)
CALCIUM SERPL-MCNC: 8 MG/DL (ref 8.6–10.3)
CARDIAC TROPONIN I PNL SERPL HS: 3 NG/L (ref 0–13)
CARDIAC TROPONIN I PNL SERPL HS: <3 NG/L (ref 0–13)
CHLORIDE SERPL-SCNC: 105 MMOL/L (ref 98–107)
CO2 SERPL-SCNC: 30 MMOL/L (ref 21–32)
COLOR UR: YELLOW
CREAT SERPL-MCNC: 0.59 MG/DL (ref 0.5–1.05)
EGFRCR SERPLBLD CKD-EPI 2021: >90 ML/MIN/1.73M*2
EOSINOPHIL # BLD AUTO: 0.29 X10*3/UL (ref 0–0.7)
EOSINOPHIL NFR BLD AUTO: 7 %
ERYTHROCYTE [DISTWIDTH] IN BLOOD BY AUTOMATED COUNT: 17.9 % (ref 11.5–14.5)
GLUCOSE SERPL-MCNC: 315 MG/DL (ref 74–99)
GLUCOSE UR STRIP.AUTO-MCNC: ABNORMAL MG/DL
HCT VFR BLD AUTO: 32.6 % (ref 36–46)
HGB BLD-MCNC: 10.6 G/DL (ref 12–16)
IMM GRANULOCYTES # BLD AUTO: 0 X10*3/UL (ref 0–0.7)
IMM GRANULOCYTES NFR BLD AUTO: 0 % (ref 0–0.9)
INR PPP: 1.3 (ref 0.9–1.1)
KETONES UR STRIP.AUTO-MCNC: NEGATIVE MG/DL
LACTATE SERPL-SCNC: 1.3 MMOL/L (ref 0.4–2)
LEUKOCYTE ESTERASE UR QL STRIP.AUTO: ABNORMAL
LIPASE SERPL-CCNC: 244 U/L (ref 9–82)
LYMPHOCYTES # BLD AUTO: 1.2 X10*3/UL (ref 1.2–4.8)
LYMPHOCYTES NFR BLD AUTO: 28.8 %
MCH RBC QN AUTO: 30.3 PG (ref 26–34)
MCHC RBC AUTO-ENTMCNC: 32.5 G/DL (ref 32–36)
MCV RBC AUTO: 93 FL (ref 80–100)
MONOCYTES # BLD AUTO: 0.44 X10*3/UL (ref 0.1–1)
MONOCYTES NFR BLD AUTO: 10.6 %
NEUTROPHILS # BLD AUTO: 2.21 X10*3/UL (ref 1.2–7.7)
NEUTROPHILS NFR BLD AUTO: 53.1 %
NITRITE UR QL STRIP.AUTO: NEGATIVE
NRBC BLD-RTO: 0 /100 WBCS (ref 0–0)
PH UR STRIP.AUTO: 6 [PH]
PLATELET # BLD AUTO: 69 X10*3/UL (ref 150–450)
POTASSIUM SERPL-SCNC: 3.7 MMOL/L (ref 3.5–5.3)
PROT SERPL-MCNC: 6.8 G/DL (ref 6.4–8.2)
PROT UR STRIP.AUTO-MCNC: ABNORMAL MG/DL
PROTHROMBIN TIME: 14.6 SECONDS (ref 9.8–12.8)
RBC # BLD AUTO: 3.5 X10*6/UL (ref 4–5.2)
RBC # UR STRIP.AUTO: ABNORMAL /UL
RBC #/AREA URNS AUTO: ABNORMAL /HPF
SODIUM SERPL-SCNC: 137 MMOL/L (ref 136–145)
SP GR UR STRIP.AUTO: 1.05
SQUAMOUS #/AREA URNS AUTO: ABNORMAL /HPF
UROBILINOGEN UR STRIP.AUTO-MCNC: ABNORMAL MG/DL
WBC # BLD AUTO: 4.2 X10*3/UL (ref 4.4–11.3)
WBC #/AREA URNS AUTO: ABNORMAL /HPF

## 2025-01-11 PROCEDURE — 84075 ASSAY ALKALINE PHOSPHATASE: CPT | Performed by: INTERNAL MEDICINE

## 2025-01-11 PROCEDURE — 83880 ASSAY OF NATRIURETIC PEPTIDE: CPT | Performed by: INTERNAL MEDICINE

## 2025-01-11 PROCEDURE — 81001 URINALYSIS AUTO W/SCOPE: CPT | Performed by: INTERNAL MEDICINE

## 2025-01-11 PROCEDURE — 87086 URINE CULTURE/COLONY COUNT: CPT | Mod: AHULAB | Performed by: INTERNAL MEDICINE

## 2025-01-11 PROCEDURE — 99285 EMERGENCY DEPT VISIT HI MDM: CPT | Mod: 25 | Performed by: INTERNAL MEDICINE

## 2025-01-11 PROCEDURE — 74177 CT ABD & PELVIS W/CONTRAST: CPT | Mod: FOREIGN READ | Performed by: RADIOLOGY

## 2025-01-11 PROCEDURE — 83605 ASSAY OF LACTIC ACID: CPT | Performed by: INTERNAL MEDICINE

## 2025-01-11 PROCEDURE — 36415 COLL VENOUS BLD VENIPUNCTURE: CPT | Performed by: INTERNAL MEDICINE

## 2025-01-11 PROCEDURE — 85025 COMPLETE CBC W/AUTO DIFF WBC: CPT | Performed by: INTERNAL MEDICINE

## 2025-01-11 PROCEDURE — 87040 BLOOD CULTURE FOR BACTERIA: CPT | Mod: AHULAB | Performed by: INTERNAL MEDICINE

## 2025-01-11 PROCEDURE — 81003 URINALYSIS AUTO W/O SCOPE: CPT | Performed by: INTERNAL MEDICINE

## 2025-01-11 PROCEDURE — 71275 CT ANGIOGRAPHY CHEST: CPT | Mod: FOREIGN READ | Performed by: RADIOLOGY

## 2025-01-11 PROCEDURE — 84484 ASSAY OF TROPONIN QUANT: CPT | Performed by: INTERNAL MEDICINE

## 2025-01-11 PROCEDURE — 2550000001 HC RX 255 CONTRASTS: Performed by: INTERNAL MEDICINE

## 2025-01-11 PROCEDURE — 71275 CT ANGIOGRAPHY CHEST: CPT

## 2025-01-11 PROCEDURE — 85610 PROTHROMBIN TIME: CPT | Performed by: INTERNAL MEDICINE

## 2025-01-11 PROCEDURE — 2500000004 HC RX 250 GENERAL PHARMACY W/ HCPCS (ALT 636 FOR OP/ED): Performed by: INTERNAL MEDICINE

## 2025-01-11 PROCEDURE — 93005 ELECTROCARDIOGRAM TRACING: CPT

## 2025-01-11 PROCEDURE — 83690 ASSAY OF LIPASE: CPT | Performed by: INTERNAL MEDICINE

## 2025-01-11 PROCEDURE — 74177 CT ABD & PELVIS W/CONTRAST: CPT

## 2025-01-11 PROCEDURE — 96375 TX/PRO/DX INJ NEW DRUG ADDON: CPT

## 2025-01-11 PROCEDURE — 1210000001 HC SEMI-PRIVATE ROOM DAILY

## 2025-01-11 PROCEDURE — 96365 THER/PROPH/DIAG IV INF INIT: CPT

## 2025-01-11 PROCEDURE — 80053 COMPREHEN METABOLIC PANEL: CPT | Performed by: INTERNAL MEDICINE

## 2025-01-11 RX ORDER — PANTOPRAZOLE SODIUM 40 MG/10ML
80 INJECTION, POWDER, LYOPHILIZED, FOR SOLUTION INTRAVENOUS ONCE
Status: COMPLETED | OUTPATIENT
Start: 2025-01-11 | End: 2025-01-11

## 2025-01-11 RX ORDER — ONDANSETRON HYDROCHLORIDE 2 MG/ML
4 INJECTION, SOLUTION INTRAVENOUS ONCE
Status: COMPLETED | OUTPATIENT
Start: 2025-01-11 | End: 2025-01-11

## 2025-01-11 RX ADMIN — ONDANSETRON 4 MG: 2 INJECTION, SOLUTION INTRAMUSCULAR; INTRAVENOUS at 18:58

## 2025-01-11 RX ADMIN — PANTOPRAZOLE SODIUM 80 MG: 40 INJECTION, POWDER, FOR SOLUTION INTRAVENOUS at 18:59

## 2025-01-11 RX ADMIN — IOHEXOL 75 ML: 350 INJECTION, SOLUTION INTRAVENOUS at 19:43

## 2025-01-11 RX ADMIN — HYDROMORPHONE HYDROCHLORIDE 0.5 MG: 1 INJECTION, SOLUTION INTRAMUSCULAR; INTRAVENOUS; SUBCUTANEOUS at 18:59

## 2025-01-11 RX ADMIN — CEFTRIAXONE 2 G: 2 INJECTION, POWDER, FOR SOLUTION INTRAMUSCULAR; INTRAVENOUS at 19:57

## 2025-01-11 ASSESSMENT — PAIN DESCRIPTION - LOCATION
LOCATION: ABDOMEN
LOCATION: BACK

## 2025-01-11 ASSESSMENT — PAIN SCALES - GENERAL
PAINLEVEL_OUTOF10: 8
PAINLEVEL_OUTOF10: 9
PAINLEVEL_OUTOF10: 8

## 2025-01-11 ASSESSMENT — PAIN DESCRIPTION - DESCRIPTORS
DESCRIPTORS: SQUEEZING
DESCRIPTORS: SQUEEZING

## 2025-01-11 ASSESSMENT — PAIN DESCRIPTION - PROGRESSION: CLINICAL_PROGRESSION: GRADUALLY WORSENING

## 2025-01-11 ASSESSMENT — PAIN DESCRIPTION - ORIENTATION: ORIENTATION: RIGHT

## 2025-01-11 ASSESSMENT — PAIN DESCRIPTION - DIRECTION: RADIATING_TOWARDS: CHEST AND BACK

## 2025-01-11 ASSESSMENT — PAIN DESCRIPTION - ONSET: ONSET: GRADUAL

## 2025-01-11 ASSESSMENT — PAIN DESCRIPTION - PAIN TYPE: TYPE: ACUTE PAIN

## 2025-01-11 ASSESSMENT — PAIN - FUNCTIONAL ASSESSMENT: PAIN_FUNCTIONAL_ASSESSMENT: 0-10

## 2025-01-11 ASSESSMENT — PAIN DESCRIPTION - FREQUENCY: FREQUENCY: CONSTANT/CONTINUOUS

## 2025-01-11 NOTE — ED TRIAGE NOTES
Pt came in with daughter as  of Chinese. Pt stated that she was having CP and ABD pain. The CP started 2 hours ago, and the ABD pain started 1 week ago. Pt has failure of the liver. Pt took naprosyn for the pain and described it as a squeezing pain.

## 2025-01-12 VITALS
SYSTOLIC BLOOD PRESSURE: 112 MMHG | TEMPERATURE: 99 F | HEIGHT: 62 IN | RESPIRATION RATE: 16 BRPM | BODY MASS INDEX: 33.13 KG/M2 | WEIGHT: 180 LBS | OXYGEN SATURATION: 96 % | DIASTOLIC BLOOD PRESSURE: 52 MMHG | HEART RATE: 86 BPM

## 2025-01-12 LAB
ALBUMIN SERPL BCP-MCNC: 2 G/DL (ref 3.4–5)
ALP SERPL-CCNC: 352 U/L (ref 33–110)
ALT SERPL W P-5'-P-CCNC: 31 U/L (ref 7–45)
ANION GAP SERPL CALC-SCNC: <7 MMOL/L (ref 10–20)
AST SERPL W P-5'-P-CCNC: 61 U/L (ref 9–39)
BACTERIA BLD CULT: NORMAL
BACTERIA BLD CULT: NORMAL
BILIRUB SERPL-MCNC: 2.2 MG/DL (ref 0–1.2)
BUN SERPL-MCNC: 15 MG/DL (ref 6–23)
CALCIUM SERPL-MCNC: 7.3 MG/DL (ref 8.6–10.3)
CHLORIDE SERPL-SCNC: 109 MMOL/L (ref 98–107)
CO2 SERPL-SCNC: 28 MMOL/L (ref 21–32)
CREAT SERPL-MCNC: 0.45 MG/DL (ref 0.5–1.05)
EGFRCR SERPLBLD CKD-EPI 2021: >90 ML/MIN/1.73M*2
ERYTHROCYTE [DISTWIDTH] IN BLOOD BY AUTOMATED COUNT: 17.7 % (ref 11.5–14.5)
GLUCOSE BLD MANUAL STRIP-MCNC: 233 MG/DL (ref 74–99)
GLUCOSE BLD MANUAL STRIP-MCNC: 293 MG/DL (ref 74–99)
GLUCOSE SERPL-MCNC: 157 MG/DL (ref 74–99)
HCT VFR BLD AUTO: 28.4 % (ref 36–46)
HGB BLD-MCNC: 9.7 G/DL (ref 12–16)
MCH RBC QN AUTO: 30.9 PG (ref 26–34)
MCHC RBC AUTO-ENTMCNC: 34.2 G/DL (ref 32–36)
MCV RBC AUTO: 90 FL (ref 80–100)
NRBC BLD-RTO: 0 /100 WBCS (ref 0–0)
PLATELET # BLD AUTO: 64 X10*3/UL (ref 150–450)
POTASSIUM SERPL-SCNC: 3.6 MMOL/L (ref 3.5–5.3)
PROT SERPL-MCNC: 5.5 G/DL (ref 6.4–8.2)
RBC # BLD AUTO: 3.14 X10*6/UL (ref 4–5.2)
SODIUM SERPL-SCNC: 138 MMOL/L (ref 136–145)
WBC # BLD AUTO: 3.9 X10*3/UL (ref 4.4–11.3)

## 2025-01-12 PROCEDURE — 36415 COLL VENOUS BLD VENIPUNCTURE: CPT | Performed by: INTERNAL MEDICINE

## 2025-01-12 PROCEDURE — 80053 COMPREHEN METABOLIC PANEL: CPT | Performed by: INTERNAL MEDICINE

## 2025-01-12 PROCEDURE — 85027 COMPLETE CBC AUTOMATED: CPT | Performed by: INTERNAL MEDICINE

## 2025-01-12 PROCEDURE — 2500000002 HC RX 250 W HCPCS SELF ADMINISTERED DRUGS (ALT 637 FOR MEDICARE OP, ALT 636 FOR OP/ED): Performed by: INTERNAL MEDICINE

## 2025-01-12 PROCEDURE — 2500000001 HC RX 250 WO HCPCS SELF ADMINISTERED DRUGS (ALT 637 FOR MEDICARE OP): Performed by: INTERNAL MEDICINE

## 2025-01-12 PROCEDURE — 1100000001 HC PRIVATE ROOM DAILY

## 2025-01-12 PROCEDURE — 2500000004 HC RX 250 GENERAL PHARMACY W/ HCPCS (ALT 636 FOR OP/ED): Performed by: INTERNAL MEDICINE

## 2025-01-12 PROCEDURE — 2500000001 HC RX 250 WO HCPCS SELF ADMINISTERED DRUGS (ALT 637 FOR MEDICARE OP): Performed by: NURSE PRACTITIONER

## 2025-01-12 PROCEDURE — 99222 1ST HOSP IP/OBS MODERATE 55: CPT | Performed by: INTERNAL MEDICINE

## 2025-01-12 PROCEDURE — 82947 ASSAY GLUCOSE BLOOD QUANT: CPT

## 2025-01-12 RX ORDER — INSULIN LISPRO 100 [IU]/ML
0-5 INJECTION, SOLUTION INTRAVENOUS; SUBCUTANEOUS
Status: DISPENSED | OUTPATIENT
Start: 2025-01-12

## 2025-01-12 RX ORDER — TRAMADOL HYDROCHLORIDE 50 MG/1
50 TABLET ORAL EVERY 6 HOURS PRN
Status: COMPLETED | OUTPATIENT
Start: 2025-01-12 | End: 2025-01-12

## 2025-01-12 RX ORDER — FENOFIBRATE 160 MG/1
160 TABLET ORAL DAILY
Status: DISPENSED | OUTPATIENT
Start: 2025-01-12

## 2025-01-12 RX ORDER — ACETAMINOPHEN 325 MG/1
650 TABLET ORAL EVERY 4 HOURS PRN
Status: ACTIVE | OUTPATIENT
Start: 2025-01-12

## 2025-01-12 RX ORDER — MIDODRINE HYDROCHLORIDE 5 MG/1
5 TABLET ORAL
Status: DISPENSED | OUTPATIENT
Start: 2025-01-12

## 2025-01-12 RX ORDER — DEXTROSE 50 % IN WATER (D50W) INTRAVENOUS SYRINGE
12.5
Status: ACTIVE | OUTPATIENT
Start: 2025-01-12

## 2025-01-12 RX ORDER — LANOLIN ALCOHOL/MO/W.PET/CERES
400 CREAM (GRAM) TOPICAL DAILY
Status: DISPENSED | OUTPATIENT
Start: 2025-01-12

## 2025-01-12 RX ORDER — DOCUSATE SODIUM 100 MG/1
100 CAPSULE, LIQUID FILLED ORAL 2 TIMES DAILY
Status: DISPENSED | OUTPATIENT
Start: 2025-01-12

## 2025-01-12 RX ORDER — ONDANSETRON 4 MG/1
4 TABLET, FILM COATED ORAL EVERY 8 HOURS PRN
Status: ACTIVE | OUTPATIENT
Start: 2025-01-12

## 2025-01-12 RX ORDER — ACETAMINOPHEN 160 MG/5ML
650 SOLUTION ORAL EVERY 4 HOURS PRN
Status: ACTIVE | OUTPATIENT
Start: 2025-01-12

## 2025-01-12 RX ORDER — URSODIOL 300 MG/1
300 CAPSULE ORAL 3 TIMES DAILY
Status: DISPENSED | OUTPATIENT
Start: 2025-01-12

## 2025-01-12 RX ORDER — LACTULOSE 10 G/15ML
20 SOLUTION ORAL 2 TIMES DAILY
Status: DISPENSED | OUTPATIENT
Start: 2025-01-12

## 2025-01-12 RX ORDER — ACETAMINOPHEN 650 MG/1
650 SUPPOSITORY RECTAL EVERY 4 HOURS PRN
Status: ACTIVE | OUTPATIENT
Start: 2025-01-12

## 2025-01-12 RX ORDER — TRAZODONE HYDROCHLORIDE 50 MG/1
25 TABLET ORAL NIGHTLY
Status: DISPENSED | OUTPATIENT
Start: 2025-01-12

## 2025-01-12 RX ORDER — NADOLOL 40 MG/1
20 TABLET ORAL DAILY
Status: DISPENSED | OUTPATIENT
Start: 2025-01-12

## 2025-01-12 RX ORDER — PHYTONADIONE 10 MG/ML
10 INJECTION, EMULSION INTRAMUSCULAR; INTRAVENOUS; SUBCUTANEOUS ONCE
Status: DISCONTINUED | OUTPATIENT
Start: 2025-01-12 | End: 2025-01-12 | Stop reason: CLARIF

## 2025-01-12 RX ORDER — SPIRONOLACTONE 25 MG/1
50 TABLET ORAL 2 TIMES DAILY
Status: DISPENSED | OUTPATIENT
Start: 2025-01-12

## 2025-01-12 RX ORDER — PANTOPRAZOLE SODIUM 40 MG/10ML
40 INJECTION, POWDER, LYOPHILIZED, FOR SOLUTION INTRAVENOUS 2 TIMES DAILY
Status: DISPENSED | OUTPATIENT
Start: 2025-01-12

## 2025-01-12 RX ORDER — DEXTROSE 50 % IN WATER (D50W) INTRAVENOUS SYRINGE
25
Status: ACTIVE | OUTPATIENT
Start: 2025-01-12

## 2025-01-12 RX ORDER — ATORVASTATIN CALCIUM 80 MG/1
80 TABLET, FILM COATED ORAL NIGHTLY
Status: DISPENSED | OUTPATIENT
Start: 2025-01-12

## 2025-01-12 RX ORDER — POLYETHYLENE GLYCOL 3350 17 G/17G
17 POWDER, FOR SOLUTION ORAL 2 TIMES DAILY
Status: DISPENSED | OUTPATIENT
Start: 2025-01-12

## 2025-01-12 RX ORDER — HYDROXYZINE HYDROCHLORIDE 25 MG/1
25 TABLET, FILM COATED ORAL EVERY 6 HOURS PRN
Status: DISPENSED | OUTPATIENT
Start: 2025-01-12

## 2025-01-12 RX ORDER — NITROGLYCERIN 20 MG/1
1 PATCH TRANSDERMAL DAILY
Status: ACTIVE | OUTPATIENT
Start: 2025-01-12

## 2025-01-12 RX ORDER — FUROSEMIDE 40 MG/1
40 TABLET ORAL DAILY
Status: DISPENSED | OUTPATIENT
Start: 2025-01-12

## 2025-01-12 RX ORDER — ONDANSETRON HYDROCHLORIDE 2 MG/ML
4 INJECTION, SOLUTION INTRAVENOUS EVERY 8 HOURS PRN
Status: DISPENSED | OUTPATIENT
Start: 2025-01-12

## 2025-01-12 RX ORDER — SUCRALFATE 1 G/1
1 TABLET ORAL
Status: DISPENSED | OUTPATIENT
Start: 2025-01-12

## 2025-01-12 RX ADMIN — PANTOPRAZOLE SODIUM 40 MG: 40 INJECTION, POWDER, FOR SOLUTION INTRAVENOUS at 20:22

## 2025-01-12 RX ADMIN — FENOFIBRATE 160 MG: 160 TABLET ORAL at 08:32

## 2025-01-12 RX ADMIN — HYDROXYZINE HYDROCHLORIDE 25 MG: 25 TABLET ORAL at 08:30

## 2025-01-12 RX ADMIN — SPIRONOLACTONE 50 MG: 25 TABLET ORAL at 03:09

## 2025-01-12 RX ADMIN — CEFTRIAXONE 2 G: 2 INJECTION, POWDER, FOR SOLUTION INTRAMUSCULAR; INTRAVENOUS at 20:22

## 2025-01-12 RX ADMIN — DOCUSATE SODIUM 100 MG: 100 CAPSULE, LIQUID FILLED ORAL at 08:33

## 2025-01-12 RX ADMIN — SUCRALFATE 1 G: 1 TABLET ORAL at 15:53

## 2025-01-12 RX ADMIN — POLYETHYLENE GLYCOL 3350 17 G: 17 POWDER, FOR SOLUTION ORAL at 08:31

## 2025-01-12 RX ADMIN — SPIRONOLACTONE 50 MG: 25 TABLET ORAL at 20:22

## 2025-01-12 RX ADMIN — PANTOPRAZOLE SODIUM 40 MG: 40 INJECTION, POWDER, FOR SOLUTION INTRAVENOUS at 08:31

## 2025-01-12 RX ADMIN — MIDODRINE HYDROCHLORIDE 5 MG: 5 TABLET ORAL at 11:47

## 2025-01-12 RX ADMIN — PHYTONADIONE 10 MG: 10 INJECTION, EMULSION INTRAMUSCULAR; INTRAVENOUS; SUBCUTANEOUS at 13:30

## 2025-01-12 RX ADMIN — MIDODRINE HYDROCHLORIDE 5 MG: 5 TABLET ORAL at 08:31

## 2025-01-12 RX ADMIN — TRAMADOL HYDROCHLORIDE 50 MG: 50 TABLET, COATED ORAL at 03:09

## 2025-01-12 RX ADMIN — RIFAXIMIN 550 MG: 550 TABLET ORAL at 03:09

## 2025-01-12 RX ADMIN — PANTOPRAZOLE SODIUM 40 MG: 40 INJECTION, POWDER, FOR SOLUTION INTRAVENOUS at 03:09

## 2025-01-12 RX ADMIN — TRAZODONE HYDROCHLORIDE 25 MG: 50 TABLET ORAL at 03:12

## 2025-01-12 RX ADMIN — SUCRALFATE 1 G: 1 TABLET ORAL at 06:07

## 2025-01-12 RX ADMIN — TRAZODONE HYDROCHLORIDE 25 MG: 50 TABLET ORAL at 20:23

## 2025-01-12 RX ADMIN — INSULIN LISPRO 2 UNITS: 100 INJECTION, SOLUTION INTRAVENOUS; SUBCUTANEOUS at 16:41

## 2025-01-12 RX ADMIN — RIFAXIMIN 550 MG: 550 TABLET ORAL at 08:33

## 2025-01-12 RX ADMIN — URSODIOL 300 MG: 300 CAPSULE ORAL at 08:31

## 2025-01-12 RX ADMIN — SUCRALFATE 1 G: 1 TABLET ORAL at 11:47

## 2025-01-12 RX ADMIN — ONDANSETRON 4 MG: 2 INJECTION, SOLUTION INTRAMUSCULAR; INTRAVENOUS at 03:24

## 2025-01-12 RX ADMIN — LACTULOSE 20 G: 20 SOLUTION ORAL at 08:31

## 2025-01-12 RX ADMIN — FUROSEMIDE 40 MG: 40 TABLET ORAL at 08:31

## 2025-01-12 RX ADMIN — RIFAXIMIN 550 MG: 550 TABLET ORAL at 20:23

## 2025-01-12 RX ADMIN — ATORVASTATIN CALCIUM 80 MG: 80 TABLET, FILM COATED ORAL at 20:23

## 2025-01-12 RX ADMIN — URSODIOL 300 MG: 300 CAPSULE ORAL at 15:53

## 2025-01-12 RX ADMIN — SUCRALFATE 1 G: 1 TABLET ORAL at 20:23

## 2025-01-12 RX ADMIN — Medication 400 MG: at 08:33

## 2025-01-12 RX ADMIN — SPIRONOLACTONE 50 MG: 25 TABLET ORAL at 08:32

## 2025-01-12 RX ADMIN — TRAMADOL HYDROCHLORIDE 50 MG: 50 TABLET, COATED ORAL at 20:23

## 2025-01-12 RX ADMIN — URSODIOL 300 MG: 300 CAPSULE ORAL at 20:23

## 2025-01-12 RX ADMIN — ONDANSETRON 4 MG: 2 INJECTION, SOLUTION INTRAMUSCULAR; INTRAVENOUS at 20:24

## 2025-01-12 SDOH — ECONOMIC STABILITY: FOOD INSECURITY: WITHIN THE PAST 12 MONTHS, YOU WORRIED THAT YOUR FOOD WOULD RUN OUT BEFORE YOU GOT THE MONEY TO BUY MORE.: NEVER TRUE

## 2025-01-12 SDOH — ECONOMIC STABILITY: FOOD INSECURITY: HOW HARD IS IT FOR YOU TO PAY FOR THE VERY BASICS LIKE FOOD, HOUSING, MEDICAL CARE, AND HEATING?: NOT HARD AT ALL

## 2025-01-12 SDOH — SOCIAL STABILITY: SOCIAL INSECURITY: HAVE YOU HAD ANY THOUGHTS OF HARMING ANYONE ELSE?: NO

## 2025-01-12 SDOH — ECONOMIC STABILITY: HOUSING INSECURITY: IN THE LAST 12 MONTHS, WAS THERE A TIME WHEN YOU WERE NOT ABLE TO PAY THE MORTGAGE OR RENT ON TIME?: NO

## 2025-01-12 SDOH — ECONOMIC STABILITY: HOUSING INSECURITY: AT ANY TIME IN THE PAST 12 MONTHS, WERE YOU HOMELESS OR LIVING IN A SHELTER (INCLUDING NOW)?: NO

## 2025-01-12 SDOH — ECONOMIC STABILITY: INCOME INSECURITY: IN THE PAST 12 MONTHS HAS THE ELECTRIC, GAS, OIL, OR WATER COMPANY THREATENED TO SHUT OFF SERVICES IN YOUR HOME?: NO

## 2025-01-12 SDOH — SOCIAL STABILITY: SOCIAL INSECURITY: WITHIN THE LAST YEAR, HAVE YOU BEEN AFRAID OF YOUR PARTNER OR EX-PARTNER?: NO

## 2025-01-12 SDOH — ECONOMIC STABILITY: FOOD INSECURITY: WITHIN THE PAST 12 MONTHS, THE FOOD YOU BOUGHT JUST DIDN'T LAST AND YOU DIDN'T HAVE MONEY TO GET MORE.: NEVER TRUE

## 2025-01-12 SDOH — SOCIAL STABILITY: SOCIAL INSECURITY: ARE THERE ANY APPARENT SIGNS OF INJURIES/BEHAVIORS THAT COULD BE RELATED TO ABUSE/NEGLECT?: NO

## 2025-01-12 SDOH — SOCIAL STABILITY: SOCIAL INSECURITY: DOES ANYONE TRY TO KEEP YOU FROM HAVING/CONTACTING OTHER FRIENDS OR DOING THINGS OUTSIDE YOUR HOME?: NO

## 2025-01-12 SDOH — ECONOMIC STABILITY: TRANSPORTATION INSECURITY: IN THE PAST 12 MONTHS, HAS LACK OF TRANSPORTATION KEPT YOU FROM MEDICAL APPOINTMENTS OR FROM GETTING MEDICATIONS?: NO

## 2025-01-12 SDOH — SOCIAL STABILITY: SOCIAL INSECURITY: WITHIN THE LAST YEAR, HAVE YOU BEEN HUMILIATED OR EMOTIONALLY ABUSED IN OTHER WAYS BY YOUR PARTNER OR EX-PARTNER?: NO

## 2025-01-12 SDOH — ECONOMIC STABILITY: HOUSING INSECURITY: IN THE PAST 12 MONTHS, HOW MANY TIMES HAVE YOU MOVED WHERE YOU WERE LIVING?: 0

## 2025-01-12 SDOH — SOCIAL STABILITY: SOCIAL INSECURITY: DO YOU FEEL ANYONE HAS EXPLOITED OR TAKEN ADVANTAGE OF YOU FINANCIALLY OR OF YOUR PERSONAL PROPERTY?: NO

## 2025-01-12 SDOH — SOCIAL STABILITY: SOCIAL INSECURITY: ARE YOU OR HAVE YOU BEEN THREATENED OR ABUSED PHYSICALLY, EMOTIONALLY, OR SEXUALLY BY ANYONE?: NO

## 2025-01-12 SDOH — SOCIAL STABILITY: SOCIAL INSECURITY: ABUSE: ADULT

## 2025-01-12 SDOH — SOCIAL STABILITY: SOCIAL INSECURITY: DO YOU FEEL UNSAFE GOING BACK TO THE PLACE WHERE YOU ARE LIVING?: NO

## 2025-01-12 SDOH — SOCIAL STABILITY: SOCIAL INSECURITY: HAS ANYONE EVER THREATENED TO HURT YOUR FAMILY OR YOUR PETS?: NO

## 2025-01-12 SDOH — SOCIAL STABILITY: SOCIAL INSECURITY: HAVE YOU HAD THOUGHTS OF HARMING ANYONE ELSE?: NO

## 2025-01-12 SDOH — SOCIAL STABILITY: SOCIAL INSECURITY: WERE YOU ABLE TO COMPLETE ALL THE BEHAVIORAL HEALTH SCREENINGS?: YES

## 2025-01-12 ASSESSMENT — COGNITIVE AND FUNCTIONAL STATUS - GENERAL
DRESSING REGULAR LOWER BODY CLOTHING: A LITTLE
PATIENT BASELINE BEDBOUND: NO
HELP NEEDED FOR BATHING: A LITTLE
MOVING TO AND FROM BED TO CHAIR: A LITTLE
DRESSING REGULAR LOWER BODY CLOTHING: A LITTLE
DRESSING REGULAR LOWER BODY CLOTHING: A LITTLE
MOVING TO AND FROM BED TO CHAIR: A LITTLE
WALKING IN HOSPITAL ROOM: A LITTLE
HELP NEEDED FOR BATHING: A LITTLE
DAILY ACTIVITIY SCORE: 22
MOBILITY SCORE: 20
DAILY ACTIVITIY SCORE: 22
HELP NEEDED FOR BATHING: A LITTLE
MOVING TO AND FROM BED TO CHAIR: A LITTLE
MOBILITY SCORE: 20
MOBILITY SCORE: 20
STANDING UP FROM CHAIR USING ARMS: A LITTLE
WALKING IN HOSPITAL ROOM: A LITTLE
STANDING UP FROM CHAIR USING ARMS: A LITTLE
WALKING IN HOSPITAL ROOM: A LITTLE
CLIMB 3 TO 5 STEPS WITH RAILING: A LITTLE
STANDING UP FROM CHAIR USING ARMS: A LITTLE
CLIMB 3 TO 5 STEPS WITH RAILING: A LITTLE
DAILY ACTIVITIY SCORE: 22
CLIMB 3 TO 5 STEPS WITH RAILING: A LITTLE

## 2025-01-12 ASSESSMENT — ACTIVITIES OF DAILY LIVING (ADL)
WALKS IN HOME: INDEPENDENT
GROOMING: INDEPENDENT
HEARING - RIGHT EAR: FUNCTIONAL
JUDGMENT_ADEQUATE_SAFELY_COMPLETE_DAILY_ACTIVITIES: YES
FEEDING YOURSELF: INDEPENDENT
HEARING - LEFT EAR: FUNCTIONAL
ADEQUATE_TO_COMPLETE_ADL: YES
GROOMING: INDEPENDENT
JUDGMENT_ADEQUATE_SAFELY_COMPLETE_DAILY_ACTIVITIES: YES
ADEQUATE_TO_COMPLETE_ADL: YES
PATIENT'S MEMORY ADEQUATE TO SAFELY COMPLETE DAILY ACTIVITIES?: YES
ASSISTIVE_DEVICE: WALKER
TOILETING: INDEPENDENT
FEEDING YOURSELF: INDEPENDENT
PATIENT'S MEMORY ADEQUATE TO SAFELY COMPLETE DAILY ACTIVITIES?: YES
WALKS IN HOME: INDEPENDENT
DRESSING YOURSELF: NEEDS ASSISTANCE
HEARING - LEFT EAR: FUNCTIONAL
HEARING - RIGHT EAR: FUNCTIONAL
LACK_OF_TRANSPORTATION: NO
DRESSING YOURSELF: INDEPENDENT
LACK_OF_TRANSPORTATION: NO
BATHING: INDEPENDENT
BATHING: NEEDS ASSISTANCE
TOILETING: INDEPENDENT

## 2025-01-12 ASSESSMENT — LIFESTYLE VARIABLES
HOW MANY STANDARD DRINKS CONTAINING ALCOHOL DO YOU HAVE ON A TYPICAL DAY: PATIENT DOES NOT DRINK
HOW OFTEN DO YOU HAVE 6 OR MORE DRINKS ON ONE OCCASION: NEVER
AUDIT-C TOTAL SCORE: 0
HOW OFTEN DO YOU HAVE A DRINK CONTAINING ALCOHOL: NEVER
SKIP TO QUESTIONS 9-10: 1
AUDIT-C TOTAL SCORE: 0

## 2025-01-12 ASSESSMENT — PAIN - FUNCTIONAL ASSESSMENT
PAIN_FUNCTIONAL_ASSESSMENT: 0-10

## 2025-01-12 ASSESSMENT — PAIN SCALES - GENERAL
PAINLEVEL_OUTOF10: 3
PAINLEVEL_OUTOF10: 0 - NO PAIN
PAINLEVEL_OUTOF10: 7
PAINLEVEL_OUTOF10: 0 - NO PAIN
PAINLEVEL_OUTOF10: 7
PAINLEVEL_OUTOF10: 0 - NO PAIN
PAINLEVEL_OUTOF10: 0 - NO PAIN

## 2025-01-12 NOTE — CONSULTS
Reason For Consult  GI bleed. cirrhosis with ascites     History Of Present Illness  Alfonzo Flanagan is a 50 y.o. female with h/o PBC cirrhosis ( Liver biopsy 3/18/2021 with periportal to bridging fibrosis c/w PBC), pHTN with ascites, small EV, HE, celiac disease, chronic pancreatitis, DM, HTN, GERD, presenting with bilateral flank pain, dysuria, worsening ascites with abdominal discomfort, as well as nausea/vomiting with hematemesis. She describes emesis as dark, brown, with blood clots. She reports increase abdominal distention. She is scheduled to have op paracentesis tomorrow. She stated she take all her medications, she is on furosemide and Aldactone.  She stated she follows low-sodium diet.  Patient was seen by GI in December for abdominal pain, ascites, fluid analysis was negative for SBP at that time.  No history of esophageal varices in the past on recent EGD from 10/10/2024.    EGD 10/10/24 at [facility]  Impression:       - Normal oropharynx.                          - Z-line regular, 35 cm from the incisors.                          - Erythematous mucosa in the stomach.                          - Erythematous mucosa in the stomach.                          - Congested duodenal mucosa.                          - No specimens collected.      EGD 7/26/2024-Erythematous mucosa in the stomach. Erythematous mucosa in the stomach. Congested duodenal mucosa      EGD with EUS 3/26/2024. Findings were c/f NET however tissue sample was not obtained. At that time, it was recommended to follow up mass in 1 year with EUS. MRI/MRCP on 7/26/2024: No pancreatic lesion identified. Cirrhosis and sequela of portal hypertension. No hepatic lesion    Past Medical History  She has a past medical history of Cirrhosis of liver (Multi) and Diabetes mellitus (Multi).    Surgical History  She has a past surgical history that includes CT guided imaging for needle placement (10/14/2020); US guided abdominal paracentesis (10/08/2020);  "and Abdominal surgery.     Social History  She reports that she has never smoked. She has never used smokeless tobacco. She reports that she does not drink alcohol and does not use drugs.    Family History  No family history on file.     Allergies  Gluten    Review of Systems  10 systems reviewed and negative other than HPI     Physical Exam  Physical Exam  Vitals reviewed.   Constitutional:       Appearance: Normal appearance.   HENT:      Head: Normocephalic and atraumatic.   Cardiovascular:      Rate and Rhythm: Normal rate and regular rhythm.      Pulses: Normal pulses.      Heart sounds: Normal heart sounds.   Pulmonary:      Breath sounds: Normal breath sounds.   Abdominal:      General: Bowel sounds are normal. There is distension.      Palpations: Abdomen is soft.      Tenderness: There is abdominal tenderness. There is no guarding.   Skin:     General: Skin is warm and dry.   Neurological:      General: No focal deficit present.      Mental Status: She is alert and oriented to person, place, and time.   Psychiatric:         Mood and Affect: Mood normal.         Behavior: Behavior normal.           Last Recorded Vitals  Blood pressure 110/63, pulse 86, temperature 36.7 °C (98 °F), temperature source Temporal, resp. rate 17, height 1.575 m (5' 2\"), weight 81.6 kg (180 lb), SpO2 95%.    Relevant Results      Scheduled medications  atorvastatin, 80 mg, oral, Nightly  cefTRIAXone, 2 g, intravenous, q24h  docusate sodium, 100 mg, oral, BID  fenofibrate, 160 mg, oral, Daily  furosemide, 40 mg, oral, Daily  lactulose, 20 g, oral, BID  magnesium oxide, 400 mg, oral, Daily  midodrine, 5 mg, oral, TID  nadolol, 20 mg, oral, Daily  nitroglycerin, 1 patch, transdermal, Daily  pantoprazole, 40 mg, intravenous, BID  polyethylene glycol, 17 g, oral, BID  rifAXIMin, 550 mg, oral, q12h RIVKA  spironolactone, 50 mg, oral, BID  sucralfate, 1 g, oral, Before meals & nightly  traZODone, 25 mg, oral, Nightly  ursodiol, 300 mg, " oral, TID      Continuous medications     PRN medications  PRN medications: acetaminophen **OR** acetaminophen **OR** acetaminophen, hydrOXYzine HCL, ondansetron **OR** ondansetron, traMADol  CT angio chest for pulmonary embolism    Result Date: 1/11/2025  STUDY: CT Angiogram of the Chest, CT Abdomen and Pelvis with IV Contrast; 01/11/2025, 7:54 PM INDICATION: Abdominal Pain, vomiting and hematemesis. Pleurisy. COMPARISON: CT AP 01/08/2025, 12/24/2024, 12/11/2024. ACCESSION NUMBER(S): [id number], [id number] ORDERING CLINICIAN: SANIYA FLORES TECHNIQUE:  CTA of the chest was performed following rapid injection of intravenous contrast.  Images are reviewed and processed at a workstation according to the CT angiogram protocol with 3-D and/or MIP post processing imaging generated.  CT of the abdomen and pelvis was performed with intravenous contrast.  Omnipaque 350, 75 mL was administered intravenously; positive oral contrast was given. Automated mA/kV exposure control was utilized and patient examination was performed in strict accordance with principles of ALARA. FINDINGS:  CTA CHEST: Pulmonary arteries are adequately opacified without acute or chronic filling defects.  The thoracic aorta is normal in course and caliber without dissection or aneurysm. The heart is normal in size without pericardial effusion.  Thoracic lymph nodes are not enlarged. There is no pleural effusion, pleural thickening, or pneumothorax. The airways are patent. No consolidative airspace disease or pulmonary edema.  There is a 9 x 7 mm solid nodule in the right middle lobe.  This is stable since at least a CT abdomen/pelvis of 6/20/2024. ABDOMEN:  LIVER: Cirrhotic morphology.  No short-term changes.  No masses.  BILE DUCTS: No intrahepatic or extrahepatic biliary ductal dilatation.  GALLBLADDER: The gallbladder is surgically absent.  STOMACH: Unremarkable.  PANCREAS: Unremarkable  SPLEEN: Enlarged (maximum dimension 14.5 cm) without  significant change.  No acute findings.  ADRENAL GLANDS: Unremarkable  KIDNEYS AND URETERS: Unremarkable  PELVIS:  BLADDER: Unremarkable  REPRODUCTIVE ORGANS: Unremarkable  BOWEL: No bowel obstruction or ileus.  No pneumatosis.  There is mild edematous thickening of the jejunum likely due to third spacing considering the other findings.  VESSELS: Recanalized paraumbilical vein.  Abdominal aorta is normal in caliber.  PERITONEUM/RETROPERITONEUM/LYMPH NODES: Moderate abdominal and pelvic ascites mildly increased from CT 3 days prior.  No pneumoperitoneum. Edematous changes within the omentum and mesenteries.  No lymphadenopathy.  ABDOMINAL WALL: Umbilical hernia containing fluid again seen. SOFT TISSUES: Subcutaneous edema of the abdominal wall again seen.  No soft tissue emphysema.  No abscess.  BONES: No acute fracture or aggressive osseous lesion.  Left unilateral pars defect at L5.  No listhesis.    Cirrhosis with stigmata of portal hypertension again seen.  Mildly increased volume of ascites compared to recent CT, moderate. No acute pathology in the chest. Signed by Arpit Sandy, DO    CT abdomen pelvis w IV contrast    Result Date: 1/11/2025  STUDY: CT Angiogram of the Chest, CT Abdomen and Pelvis with IV Contrast; 01/11/2025, 7:54 PM INDICATION: Abdominal Pain, vomiting and hematemesis. Pleurisy. COMPARISON: CT AP 01/08/2025, 12/24/2024, 12/11/2024. ACCESSION NUMBER(S): [id number], [id number] ORDERING CLINICIAN: SANIYA FLORES TECHNIQUE:  CTA of the chest was performed following rapid injection of intravenous contrast.  Images are reviewed and processed at a workstation according to the CT angiogram protocol with 3-D and/or MIP post processing imaging generated.  CT of the abdomen and pelvis was performed with intravenous contrast.  Omnipaque 350, 75 mL was administered intravenously; positive oral contrast was given. Automated mA/kV exposure control was utilized and patient examination was performed  in strict accordance with principles of ALARA. FINDINGS:  CTA CHEST: Pulmonary arteries are adequately opacified without acute or chronic filling defects.  The thoracic aorta is normal in course and caliber without dissection or aneurysm. The heart is normal in size without pericardial effusion.  Thoracic lymph nodes are not enlarged. There is no pleural effusion, pleural thickening, or pneumothorax. The airways are patent. No consolidative airspace disease or pulmonary edema.  There is a 9 x 7 mm solid nodule in the right middle lobe.  This is stable since at least a CT abdomen/pelvis of 6/20/2024. ABDOMEN:  LIVER: Cirrhotic morphology.  No short-term changes.  No masses.  BILE DUCTS: No intrahepatic or extrahepatic biliary ductal dilatation.  GALLBLADDER: The gallbladder is surgically absent.  STOMACH: Unremarkable.  PANCREAS: Unremarkable  SPLEEN: Enlarged (maximum dimension 14.5 cm) without significant change.  No acute findings.  ADRENAL GLANDS: Unremarkable  KIDNEYS AND URETERS: Unremarkable  PELVIS:  BLADDER: Unremarkable  REPRODUCTIVE ORGANS: Unremarkable  BOWEL: No bowel obstruction or ileus.  No pneumatosis.  There is mild edematous thickening of the jejunum likely due to third spacing considering the other findings.  VESSELS: Recanalized paraumbilical vein.  Abdominal aorta is normal in caliber.  PERITONEUM/RETROPERITONEUM/LYMPH NODES: Moderate abdominal and pelvic ascites mildly increased from CT 3 days prior.  No pneumoperitoneum. Edematous changes within the omentum and mesenteries.  No lymphadenopathy.  ABDOMINAL WALL: Umbilical hernia containing fluid again seen. SOFT TISSUES: Subcutaneous edema of the abdominal wall again seen.  No soft tissue emphysema.  No abscess.  BONES: No acute fracture or aggressive osseous lesion.  Left unilateral pars defect at L5.  No listhesis.    Cirrhosis with stigmata of portal hypertension again seen.  Mildly increased volume of ascites compared to recent CT,  moderate. No acute pathology in the chest. Signed by Arpit Sandy, DO    CT abdomen pelvis wo IV contrast    Result Date: 1/8/2025  Interpreted By:  Kwame Saul, STUDY: CT ABDOMEN PELVIS WO IV CONTRAST;  1/8/2025 11:02 pm   INDICATION: Signs/Symptoms:Abdominal pain, liver failure.     COMPARISON: CT abdomen and pelvis dated 12/24/2024.   ACCESSION NUMBER(S): [id number]   ORDERING CLINICIAN: DIPTI BROWN   TECHNIQUE: CT of the abdomen and pelvis was performed. Contiguous axial images were obtained at 3 mm slice thickness through the abdomen and pelvis. Coronal and sagittal reconstructions at 3 mm slice thickness were performed.  No intravenous or oral contrast agents were administered.   FINDINGS: Please note that the evaluation of vessels, lymph nodes and organs is limited without intravenous contrast.   LOWER CHEST: Included lung bases are clear without evidence of new consolidation or pleural effusion. Right lung nodule is unchanged in appearance to recent exams.   Heart is normal in size without pericardial effusion.   Distal esophagus does not demonstrate any acute abnormality.   ABDOMEN:   LIVER: Liver is nodular in contour suggestive of underlying cirrhosis. No acute hepatic parenchymal abnormality is evident within limits of noncontrast exam.   BILE DUCTS: No new intrahepatic or extrahepatic biliary dilatation is present.   GALLBLADDER: Gallbladder is surgically absent.   PANCREAS: No pancreatic ductal dilatation or peripancreatic stranding is present.   SPLEEN: No acute splenic abnormality is present.   ADRENAL GLANDS: Bilateral adrenal glands are unremarkable in appearance.   KIDNEYS AND URETERS: Kidneys are symmetric in size without evidence of hydronephrosis or radiopaque nephrolithiasis. Visualized upper ureters are unremarkable in appearance.   PELVIS:   BLADDER: No bladder wall thickening is present.   REPRODUCTIVE ORGANS: Uterus is present. No new adnexal masses or collections are  identified.   BOWEL: Stomach is distended with food debris without evidence of gastric wall thickening. Proximal small bowel is relatively decompressed. There is unchanged mild wall thickening in the more distal jejunum and ileum, likely due to underlying hepatic dysfunction and fluid overload. No abnormal small bowel dilatation is present. No inflammatory large bowel wall thickening is identified.   Appendix is not visualized.   VESSELS: No acute vascular abnormality is identified within limits of noncontrast exam.   PERITONEUM/RETROPERITONEUM/LYMPH NODES: Moderate to large volume of abdominal ascites is present, similar in appearance to prior study on 12/24/2024. There is no evidence of free air or thick-walled collections in the abdomen or pelvis. No enlarged lymphadenopathy is identified.   ABDOMINAL WALL: There is some fluid present in the periumbilical hernia, similar to prior study. Mild edema and stranding is present in the cutaneous tissues suggestive of 3rd spacing. No acute abnormalities are otherwise identified in the cutaneous soft tissues.   BONES: No acute osseous abnormality is identified in the visualized spine or pelvis.       1.  Cirrhotic liver, with sequela of portal hypertension, including moderate volume of abdominal ascites, similar in appearance to prior exam on 12/24/2024. No acute abnormalities are identified in the abdomen and pelvis. 2. Persistent distention of the stomach with food debris with relatively decompressed appearance of the proximal duodenum and jejunum. Correlate with any gastroparesis. 3. Persistent mild wall thickening in the small bowel, likely secondary to underlying hepatic dysfunction. 4. Solid nodule in the right middle lobe is unchanged in appearance to recent exam. Continued attention to on follow-up is recommended.     MACRO: None   Signed by: Kwame Saul 1/8/2025 11:26 PM Dictation workstation:   [id number]    ECG 12 lead    Result Date: 12/28/2024  AV  dual-paced rhythm with prolonged AV conduction Abnormal ECG When compared with ECG of 08-DEC-2024 11:17, Electronic ventricular pacemaker has replaced Sinus rhythm See ED provider note for full interpretation and clinical correlation Confirmed by Breanna Kruger ([id number]) on 12/28/2024 10:09:57 AM    US guided abdominal paracentesis    Result Date: 12/27/2024  Interpreted By:  Brady Bhandari, STUDY: US GUIDED ABDOMINAL PARACENTESIS; 12/27/20244:32 pm   INDICATION: Signs/Symptoms:severe ascites.   COMPARISON: US guided paracentesis 12/13/2024   ACCESSION NUMBER(S): [id number]   ORDERING CLINICIAN: LETITIA KIRKLAND   TECHNIQUE: INTERVENTIONALIST(S): Brady Bhandari   CONSENT: The patient/patient's POA/next of kin was informed of the nature of the proposed procedure. The purposes, alternatives, risks, and benefits were explained and discussed. All questions were answered and consent was obtained.   SEDATION: None   MEDICATION/CONTRAST: 1% lidocaine was used to anesthetize subcutaneously.   TIME OUT: A time out was performed immediately prior to procedure start with the interventional team, correctly identifying the patient name, date of birth, MRN, procedure, anatomy (including marking of site and side), patient position, procedure consent form, relevant laboratory and imaging test results, antibiotic administration, safety precautions, and procedure-specific equipment needs.   FINDINGS: The patient was placed in the supine position.   The abdominal space was examined with grey scale ultrasound, and the most accessible fluid identified and marked for paracentesis.   The skin was prepped and draped in usual manner. Local anesthesia with Lidocaine was administered and a right-sided paracentesis was performed.  A 5 Maori One-Step paracentesis needle/catheter was then placed where marked. Approximately 3.6 L of yellowish colored fluid was removed. The needle/catheter was then withdrawn.   The patient tolerated the procedure  well and there were no immediate complications.       Uneventful paracentesis, as detailed above. Right Hemiabdomen, 3.6 L   I personally performed and/or directly supervised this study and was present for the entire procedure.   Performed and dictated at [place].   Signed by: Brady Bhandari 12/27/2024 4:33 PM Dictation workstation:   [id number]    CT abdomen pelvis w IV contrast    Result Date: 12/24/2024  STUDY: CT Abdomen and Pelvis with IV Contrast; 12/24/2024 10:02 PM. INDICATION: Ascites.  Abdominal pain. COMPARISON: CT AP 12/8/2024, 11/20/2024, 10/27/2024, 9/24/2024 . ACCESSION NUMBER(S): [id number] ORDERING CLINICIAN: GREGORY NICHOLAS TECHNIQUE: CT of the abdomen and pelvis was performed.  Contiguous axial images were obtained at 3 mm slice thickness through the abdomen and pelvis. Coronal and sagittal reconstructions at 3 mm slice thickness were performed.  Omnipaque 350 75 mL was administered intravenously.  FINDINGS: LOWER CHEST: No cardiomegaly.  No pericardial effusion. Small 7 x 6 mm nodule in the distribution of the right middle lobe, noted recently. Otherwise clear lung bases.  ABDOMEN:  LIVER: Small cirrhotic liver, with lobular contour, decreased density and prior cholecystectomy. Mild splenomegaly. Moderate ascites in all 4 quadrants.  BILE DUCTS: No intrahepatic or extrahepatic biliary ductal dilatation.  GALLBLADDER: The gallbladder is surgically absent.. STOMACH: No abnormalities identified.  PANCREAS: No masses or ductal dilatation.  SPLEEN: No splenomegaly or focal splenic lesion.  ADRENAL GLANDS: No thickening or nodules.  KIDNEYS AND URETERS: Kidneys are normal in size and location.  No renal or ureteral calculi.  PELVIS:  BLADDER: No abnormalities identified.  REPRODUCTIVE ORGANS: No abnormalities identified.  BOWEL: No abnormalities identified. Moderate fluid containing umbilical hernia, 5.5 x 3.0 cm.  VESSELS: No abnormalities identified.  Abdominal  aorta is normal in caliber.  PERITONEUM/RETROPERITONEUM/LYMPH NODES: No free fluid.  No pneumoperitoneum. No lymphadenopathy.  ABDOMINAL WALL: No abnormalities identified. SOFT TISSUES: No abnormalities identified.  BONES: No acute fracture or aggressive osseous lesion.    Small 7 x 6 mm nodule in the right middle lobe, unchanged from recent prior study; nodules should be followed according to the Fleischner thoracic Society guidelines. Small cirrhotic liver with nodular contour, unchanged. Moderate splenomegaly. Moderate ascites involving all 4 abdominal quadrants, similar to recent prior study. Umbilical fluid containing hernia 5.5 x 3.0 cm also again noted. No bowel obstruction. No significant interval change from prior study. . Signed by Vasile Grimaldo MD    US guided abdominal paracentesis    Result Date: 12/16/2024  Interpreted By:  Cornelius Weber, STUDY: US GUIDED ABDOMINAL PARACENTESIS; 12/13/2024 12:02 pm   INDICATION: Signs/Symptoms:ascites.   COMPARISON: None.   ACCESSION NUMBER(S): [id number]   ORDERING CLINICIAN: ANUPAM FRANCIS   TECHNIQUE: Ultrasound-guided paracentesis   FINDINGS: Informed consent obtained. Patient positioned supine. Right lower quadrant prepped, draped and anesthetized. Under ultrasound guidance, 8 Frisian centesis sheath needle inserted into peritoneal cavity. 1.5 Lof clear yellowfluid aspiratedwith sample sent for analysis. Patient tolerated procedure well       Ultrasound-guided paracentesis.   Signed by: Cornelius Weber 12/16/2024 9:16 AM Dictation workstation:   [id number]   Results for orders placed or performed during the hospital encounter of 01/11/25 (from the past 24 hours)   CBC and Auto Differential   Result Value Ref Range    WBC 4.2 (L) 4.4 - 11.3 x10*3/uL    nRBC 0.0 0.0 - 0.0 /100 WBCs    RBC 3.50 (L) 4.00 - 5.20 x10*6/uL    Hemoglobin 10.6 (L) 12.0 - 16.0 g/dL    Hematocrit 32.6 (L) 36.0 - 46.0 %    MCV 93 80 - 100 fL    MCH 30.3 26.0 - 34.0 pg    MCHC 32.5 32.0 -  36.0 g/dL    RDW 17.9 (H) 11.5 - 14.5 %    Platelets 69 (L) 150 - 450 x10*3/uL    Neutrophils % 53.1 40.0 - 80.0 %    Immature Granulocytes %, Automated 0.0 0.0 - 0.9 %    Lymphocytes % 28.8 13.0 - 44.0 %    Monocytes % 10.6 2.0 - 10.0 %    Eosinophils % 7.0 0.0 - 6.0 %    Basophils % 0.5 0.0 - 2.0 %    Neutrophils Absolute 2.21 1.20 - 7.70 x10*3/uL    Immature Granulocytes Absolute, Automated 0.00 0.00 - 0.70 x10*3/uL    Lymphocytes Absolute 1.20 1.20 - 4.80 x10*3/uL    Monocytes Absolute 0.44 0.10 - 1.00 x10*3/uL    Eosinophils Absolute 0.29 0.00 - 0.70 x10*3/uL    Basophils Absolute 0.02 0.00 - 0.10 x10*3/uL   Comprehensive metabolic panel   Result Value Ref Range    Glucose 315 (H) 74 - 99 mg/dL    Sodium 137 136 - 145 mmol/L    Potassium 3.7 3.5 - 5.3 mmol/L    Chloride 105 98 - 107 mmol/L    Bicarbonate 30 21 - 32 mmol/L    Anion Gap <7 (L) 10 - 20 mmol/L    Urea Nitrogen 13 6 - 23 mg/dL    Creatinine 0.59 0.50 - 1.05 mg/dL    eGFR >90 >60 mL/min/1.73m*2    Calcium 8.0 (L) 8.6 - 10.3 mg/dL    Albumin 2.4 (L) 3.4 - 5.0 g/dL    Alkaline Phosphatase 457 (H) 33 - 110 U/L    Total Protein 6.8 6.4 - 8.2 g/dL    AST 71 (H) 9 - 39 U/L    Bilirubin, Total 2.3 (H) 0.0 - 1.2 mg/dL    ALT 37 7 - 45 U/L   Lipase   Result Value Ref Range    Lipase 244 (H) 9 - 82 U/L   Lactate   Result Value Ref Range    Lactate 1.3 0.4 - 2.0 mmol/L   B-Type Natriuretic Peptide   Result Value Ref Range    BNP 80 0 - 99 pg/mL   Coagulation Screen   Result Value Ref Range    Protime 14.6 (H) 9.8 - 12.8 seconds    INR 1.3 (H) 0.9 - 1.1    aPTT 32 27 - 38 seconds   Troponin I, High Sensitivity, Initial   Result Value Ref Range    Troponin I, High Sensitivity <3 0 - 13 ng/L   Urinalysis with Reflex Culture and Microscopic   Result Value Ref Range    Color, Urine Yellow Light-Yellow, Yellow, Dark-Yellow    Appearance, Urine Turbid (N) Clear    Specific Gravity, Urine 1.046 (N) 1.005 - 1.035    pH, Urine 6.0 5.0, 5.5, 6.0, 6.5, 7.0, 7.5, 8.0     Protein, Urine 10 (TRACE) NEGATIVE, 10 (TRACE), 20 (TRACE) mg/dL    Glucose, Urine OVER (4+) (A) Normal mg/dL    Blood, Urine 0.06 (1+) (A) NEGATIVE    Ketones, Urine NEGATIVE NEGATIVE mg/dL    Bilirubin, Urine NEGATIVE NEGATIVE    Urobilinogen, Urine 6 (2+) (A) Normal mg/dL    Nitrite, Urine NEGATIVE NEGATIVE    Leukocyte Esterase, Urine 500 Mckenzie/uL (A) NEGATIVE   Microscopic Only, Urine   Result Value Ref Range    WBC, Urine 11-20 (A) 1-5, NONE /HPF    RBC, Urine 3-5 NONE, 1-2, 3-5 /HPF    Squamous Epithelial Cells, Urine 26-50 (1+) Reference range not established. /HPF    Bacteria, Urine 1+ (A) NONE SEEN /HPF   Troponin, High Sensitivity, 1 Hour   Result Value Ref Range    Troponin I, High Sensitivity 3 0 - 13 ng/L   CBC   Result Value Ref Range    WBC 3.9 (L) 4.4 - 11.3 x10*3/uL    nRBC 0.0 0.0 - 0.0 /100 WBCs    RBC 3.14 (L) 4.00 - 5.20 x10*6/uL    Hemoglobin 9.7 (L) 12.0 - 16.0 g/dL    Hematocrit 28.4 (L) 36.0 - 46.0 %    MCV 90 80 - 100 fL    MCH 30.9 26.0 - 34.0 pg    MCHC 34.2 32.0 - 36.0 g/dL    RDW 17.7 (H) 11.5 - 14.5 %    Platelets 64 (L) 150 - 450 x10*3/uL   Comprehensive metabolic panel   Result Value Ref Range    Glucose 157 (H) 74 - 99 mg/dL    Sodium 138 136 - 145 mmol/L    Potassium 3.6 3.5 - 5.3 mmol/L    Chloride 109 (H) 98 - 107 mmol/L    Bicarbonate 28 21 - 32 mmol/L    Anion Gap <7 (L) 10 - 20 mmol/L    Urea Nitrogen 15 6 - 23 mg/dL    Creatinine 0.45 (L) 0.50 - 1.05 mg/dL    eGFR >90 >60 mL/min/1.73m*2    Calcium 7.3 (L) 8.6 - 10.3 mg/dL    Albumin 2.0 (L) 3.4 - 5.0 g/dL    Alkaline Phosphatase 352 (H) 33 - 110 U/L    Total Protein 5.5 (L) 6.4 - 8.2 g/dL    AST 61 (H) 9 - 39 U/L    Bilirubin, Total 2.2 (H) 0.0 - 1.2 mg/dL    ALT 31 7 - 45 U/L          Assessment/Plan     50 y.o. F with h/o PBC cirrhosis ( Liver biopsy 3/18/2021 with periportal to bridging fibrosis c/w PBC), pHTN with ascites, small EV, HE, celiac disease, chronic pancreatitis, DM, HTN, GERD, presenting with bilateral flank  pain, dysuria, worsening ascites with abdominal discomfort, nausea/vomiting with hematemesis.    Liver cirrhosis with ascites and portal hypertension,  Hematemesis, likely portal hypertensive gastropathy, less likely bleeding from varices as she had no history of varices on the prior EGDs.    -Okay to advance to low-sodium diet, gluten-free  -Monitor H&H, transfuse as needed to keep hemoglobin above 7, hemoglobin goal is 8  -Twice daily PPI  -Continue nadolol, furosemide and Aldactone as ordered  -Plan for upper endoscopy tomorrow, n.p.o. after midnight  -Recommend paracentesis, can order, I will send fluid to rule out SBP      Ning Perea, APRN-CNP

## 2025-01-12 NOTE — CARE PLAN
The clinical goals for the shift include Pain pain will remain controlled this shift    Over the shift, the patient did make progress toward the following goals. Pt pain remained HDS this shift. PT showing in no s.s of pain,distress,or discomfort. Pt safety measures in place.

## 2025-01-12 NOTE — CARE PLAN
The patient's goals for the shift include  pain control.     The clinical goals for the shift include Pain pain will remain controlled this shift    Over the shift, the patient did not make progress toward the following goals. Barriers to progression include abdominal and back pain. Recommendations to address these barriers include medication and repositioning as needed.

## 2025-01-12 NOTE — ED PROVIDER NOTES
HPI     CC: Abdominal Pain and Chest Pain     HPI: Alfonzo Flanagan is a 50 y.o. 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  She states that she has pain skilled nursing up her back on both sides but worse on the right.  It hurts to breathe and she has associated epigastric and chest pain.  She has associated chills but no overt fever.  She has associated nausea and has vomited 2 or 3 times, none today.  Yesterday she had some associated hematemesis with blood in her mouth and coming out her nose.  Stools have been dark intermittently but never black or bloody.  She has associated decreased urine output.  Her feet are swollen.  Of note patient was admitted at the end of December for worsening abdominal pain and ascites, underwent ultrasound-guided abdominal paracentesis with 3.6 L of fluid removed on 12/27.  She is scheduled to have a paracentesis on Monday.  Of note this is her third ED visit in the past 3 days for similar symptoms.    ROS: 10-point review of systems was performed and is otherwise negative except as noted in HPI.    Limitations to history: N/A    Independent Historians: Daughter and  at bedside    External Records Reviewed: Outpatient notes in EMR, outside hospital ED notes, recent DC summary    Past Medical History: Noncontributory except per HPI     Past Surgical History: Noncontributory except per HPI     Family History: Reviewed and noncontributory     Social History:  Denies tobacco. Denies ETOH. Denies illicit drugs.    Social Determinants Affecting Care: N/A    Allergies   Allergen Reactions    Gluten Diarrhea       Home Meds:   Current Outpatient Medications   Medication Instructions    acetaminophen (TYLENOL) 500-1,000 mg,  "oral, Every 6 hours PRN    atorvastatin (LIPITOR) 80 mg, oral, Nightly    docusate sodium (COLACE) 100 mg, oral, 2 times daily, Hold for loose stool.    fenofibrate (TRICOR) 145 mg, oral, Daily    furosemide (LASIX) 40 mg, oral, Daily, Hold for dizziness.    hydrOXYzine HCL (ATARAX) 25 mg, oral, Every 4 hours PRN    insulin lispro 0-15 Units, subcutaneous, 3 times daily before meals, Take as directed per insulin instructions.Do not hold when patient is not eating, continue order as scheduled for hyperglycemia management.  Insulin Lispro Corrective Scale #3     Hypoglycemia protocol Call LIP unit(s) if Blood Glucose is between 0 - 70 mg/dL     0 unit(s) if Blood glucose is between    3 unit(s) if Blood glucose is between 151-200   6 unit(s) if Blood glucose is between 201-250   9 unit(s) if Blood glucose is between 251-300   12 unit(s) if Blood glucose is between 301-350   15 unit(s) if Blood glucose is between 351-400    If blood glucose is greater than 400 mg/dL, give max insulin per sliding scale AND then contact provider.    lactulose 20 g, 2 times daily    Lantus Solostar U-100 Insulin 25 Units, subcutaneous, 2 times daily    magnesium oxide (MAG-OX) 400 mg, oral, Daily    meclizine (ANTIVERT) 25 mg, 3 times daily PRN    metFORMIN XR (GLUCOPHAGE-XR) 1,000 mg, oral, Daily with evening meal, Do not crush, chew, or split.    midodrine (PROAMATINE) 5 mg, oral, 3 times daily (morning, midday, late afternoon)    nadolol (CORGARD) 20 mg, oral, Daily    nitroglycerin (Nitrodur) 0.1 mg/hr patch 1 patch, transdermal, Daily, Hold for dizziness.    oxyCODONE (ROXICODONE) 5 mg, oral, Every 6 hours PRN    pantoprazole (PROTONIX) 40 mg, oral, Daily before breakfast, Do not crush, chew, or split.    pen needle, diabetic (Sure Comfort Pen Needle) 32 gauge x 5/32\" needle Use 4 times a day    polyethylene glycol (Glycolax, Miralax) 17 gram/dose powder Mix 17 grams of powder in 8 ounces of water and drink 2 times a day.    " "rifAXIMin (XIFAXAN) 550 mg, oral, Every 12 hours scheduled    spironolactone (ALDACTONE) 50 mg, oral, 2 times daily, Hold for dizziness.    sucralfate (CARAFATE) 1 g, oral, 4 times daily before meals and nightly    traZODone (DESYREL) 25 mg, oral, Nightly    ursodiol (ACTIGALL) 300 mg, oral, 3 times daily        Physical Exam     ED Triage Vitals [01/11/25 1651]   Temperature Heart Rate Respirations BP   36.9 °C (98.5 °F) 90 16 124/69      Pulse Ox Temp Source Heart Rate Source Patient Position   99 % Temporal Monitor Sitting      BP Location FiO2 (%)     Left arm --         Heart Rate:  [80-90]   Temperature:  [36.9 °C (98.5 °F)]   Respirations:  [16-18]   BP: (106-141)/(67-72)   Height:  [157.5 cm (5' 2\")]   Weight:  [81.6 kg (180 lb)]   Pulse Ox:  [98 %-100 %]      Physical Exam  Vitals and nursing note reviewed.     CONSTITUTIONAL: Chronically ill-appearing elderly female in no acute distress.  HENMT: Head atraumatic. Airway patent. Nasal mucosa clear. Mouth with normal mucosa, clear oropharynx. Uvula midline. Neck supple.    EYES: Clear bilaterally, pupils equally round and reactive to light.   CARDIOVASCULAR: Normal rate, regular rhythm.  Heart sounds S1, S2.  No murmurs, rubs or gallops. Normal pulses. Capillary refill < 2 sec.   RESPIRATORY: No increased work of breathing. Breath sounds clear and equal bilaterally.  GASTROINTESTINAL: Abdomen tense, distended, mildly tender in epigastrium. No rebound, no guarding. Normal bowel sounds. No palpable masses.  GENITOURINARY: Right sided CVA tenderness.  MUSCULOSKELETAL: Spine appears normal, range of motion is not limited, no muscle or joint tenderness. No edema.   NEUROLOGICAL: Alert and oriented, no asymmetry, moving all extremities equally.  SKIN: Warm, dry and intact. No rash or notable lesions.  PSYCHIATRIC: Normal mood and affect.  HEME/LYMPH: No adenopathy or splenomegaly.    Diagnostic Results      ECG: ECGs read and interpreted by me. See ED Course, " below, for interpretation.    Labs Reviewed   CBC WITH AUTO DIFFERENTIAL - Abnormal       Result Value    WBC 4.2 (*)     nRBC 0.0      RBC 3.50 (*)     Hemoglobin 10.6 (*)     Hematocrit 32.6 (*)     MCV 93      MCH 30.3      MCHC 32.5      RDW 17.9 (*)     Platelets 69 (*)     Neutrophils % 53.1      Immature Granulocytes %, Automated 0.0      Lymphocytes % 28.8      Monocytes % 10.6      Eosinophils % 7.0      Basophils % 0.5      Neutrophils Absolute 2.21      Immature Granulocytes Absolute, Automated 0.00      Lymphocytes Absolute 1.20      Monocytes Absolute 0.44      Eosinophils Absolute 0.29      Basophils Absolute 0.02     COMPREHENSIVE METABOLIC PANEL - Abnormal    Glucose 315 (*)     Sodium 137      Potassium 3.7      Chloride 105      Bicarbonate 30      Anion Gap <7 (*)     Urea Nitrogen 13      Creatinine 0.59      eGFR >90      Calcium 8.0 (*)     Albumin 2.4 (*)     Alkaline Phosphatase 457 (*)     Total Protein 6.8      AST 71 (*)     Bilirubin, Total 2.3 (*)     ALT 37     LIPASE - Abnormal    Lipase 244 (*)     Narrative:     Venipuncture immediately after or during the administration of Metamizole may lead to falsely low results. Testing should be performed immediately prior to Metamizole dosing.   COAGULATION SCREEN - Abnormal    Protime 14.6 (*)     INR 1.3 (*)     aPTT 32      Narrative:     The APTT is no longer used for monitoring Unfractionated Heparin Therapy. For monitoring Heparin Therapy, use the Heparin Assay.   URINALYSIS WITH REFLEX CULTURE AND MICROSCOPIC - Abnormal    Color, Urine Yellow      Appearance, Urine Turbid (*)     Specific Gravity, Urine 1.046 (*)     pH, Urine 6.0      Protein, Urine 10 (TRACE)      Glucose, Urine OVER (4+) (*)     Blood, Urine 0.06 (1+) (*)     Ketones, Urine NEGATIVE      Bilirubin, Urine NEGATIVE      Urobilinogen, Urine 6 (2+) (*)     Nitrite, Urine NEGATIVE      Leukocyte Esterase, Urine 500 Mckenzie/uL (*)     Narrative:     OVER is reported when the  result is greater than the clinically reportable range.   MICROSCOPIC ONLY, URINE - Abnormal    WBC, Urine 11-20 (*)     RBC, Urine 3-5      Squamous Epithelial Cells, Urine 26-50 (1+)      Bacteria, Urine 1+ (*)    LACTATE - Normal    Lactate 1.3      Narrative:     Venipuncture immediately after or during the administration of Metamizole may lead to falsely low results. Testing should be performed immediately prior to Metamizole dosing.   B-TYPE NATRIURETIC PEPTIDE - Normal    BNP 80      Narrative:        <100 pg/mL - Heart failure unlikely  100-299 pg/mL - Intermediate probability of acute heart                  failure exacerbation. Correlate with clinical                  context and patient history.    >=300 pg/mL - Heart Failure likely. Correlate with clinical                  context and patient history.    BNP testing is performed using different testing methodology at [facility] than at other [facility]. Direct result comparisons should only be made within the same method.      SERIAL TROPONIN-INITIAL - Normal    Troponin I, High Sensitivity <3      Narrative:     Less than 99th percentile of normal range cutoff-  Female and children under 18 years old <14 ng/L; Male <21 ng/L: Negative  Repeat testing should be performed if clinically indicated.     Female and children under 18 years old 14-50 ng/L; Male 21-50 ng/L:  Consistent with possible cardiac damage and possible increased clinical   risk. Serial measurements may help to assess extent of myocardial damage.     >50 ng/L: Consistent with cardiac damage, increased clinical risk and  myocardial infarction. Serial measurements may help assess extent of   myocardial damage.      NOTE: Children less than 1 year old may have higher baseline troponin   levels and results should be interpreted in conjunction with the overall   clinical context.     NOTE: Troponin I testing is performed using a different   testing methodology at [facility] than at other   [facility]. Direct result comparisons should only   be made within the same method.   SERIAL TROPONIN, 1 HOUR - Normal    Troponin I, High Sensitivity 3      Narrative:     Less than 99th percentile of normal range cutoff-  Female and children under 18 years old <14 ng/L; Male <21 ng/L: Negative  Repeat testing should be performed if clinically indicated.     Female and children under 18 years old 14-50 ng/L; Male 21-50 ng/L:  Consistent with possible cardiac damage and possible increased clinical   risk. Serial measurements may help to assess extent of myocardial damage.     >50 ng/L: Consistent with cardiac damage, increased clinical risk and  myocardial infarction. Serial measurements may help assess extent of   myocardial damage.      NOTE: Children less than 1 year old may have higher baseline troponin   levels and results should be interpreted in conjunction with the overall   clinical context.     NOTE: Troponin I testing is performed using a different   testing methodology at [facility] than at [facility]. Direct result comparisons should only   be made within the same method.   URINE CULTURE   BLOOD CULTURE   BLOOD CULTURE   TROPONIN SERIES- (INITIAL, 1 HR)    Narrative:     The following orders were created for panel order Troponin I Series, High Sensitivity (0, 1 HR).  Procedure                               Abnormality         Status                     ---------                               -----------         ------                     Troponin I, High Sensiti...[[id number]]  Normal              Final result               Troponin, High Sensitivi...[[id number]]  Normal              Final result                 Please view results for these tests on the individual orders.   URINALYSIS WITH REFLEX CULTURE AND MICROSCOPIC    Narrative:     The following orders were created for panel order Urinalysis with Reflex Culture and  Microscopic.  Procedure                               Abnormality         Status                     ---------                               -----------         ------                     Urinalysis with Reflex C...[[id number]]  Abnormal            Final result               Extra Urine Gray Tube[[id number]]                                                         Please view results for these tests on the individual orders.         CT angio chest for pulmonary embolism   Final Result   Cirrhosis with stigmata of portal hypertension again seen.  Mildly   increased volume of ascites compared to recent CT, moderate.   No acute pathology in the chest.   Signed by Arpit Sandy DO      CT abdomen pelvis w IV contrast   Final Result   Cirrhosis with stigmata of portal hypertension again seen.  Mildly   increased volume of ascites compared to recent CT, moderate.   No acute pathology in the chest.   Signed by Arpit Sandy DO                    [place] Coma Scale Score: 15                  Procedure  Procedures    ED Course & MDM   Assessment/Plan:   Alfonzo Flanagan is a 50 y.o. 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  Presentation is concerning for UTI with possible right-sided pyelonephritis, decompensated cirrhosis, SBP, or other occult intra-abdominal process.  PE is on the differential given the right sided back pain notes and chest pain with associated pleurisy, leg swelling.  Workup was initiated with ECG, labs, CT PE and abdomen pelvis.  She was given Dilaudid and Zofran for her symptoms, pantoprazole 80 mg for the possibility of an upper GI bleed.  She has no history of varices as far as I know. See below for details of ED course and  ultimate disposition.    Medications   pantoprazole (ProtoNix) injection 80 mg (80 mg intravenous Given 1/11/25 1859)   ondansetron (Zofran) injection 4 mg (4 mg intravenous Given 1/11/25 1858)   HYDROmorphone (Dilaudid) injection 0.5 mg (0.5 mg intravenous Given 1/11/25 1859)   cefTRIAXone (Rocephin) 2 g in dextrose 5% IV 50 mL (0 g intravenous Stopped 1/11/25 2039)   iohexol (OMNIPaque) 350 mg iodine/mL solution 75 mL (75 mL intravenous Given 1/11/25 1943)        ED Course as of 01/11/25 2314   Sat Jan 11, 2025 1903 ECG read interpreted by me.  Normal sinus rhythm, rate 93.  Normal axis.  Normal intervals.  No significant ST or T wave derangements. [CG]   1938 Labs are notable for CBC with mild leukopenia, mild anemia 10.6, thrombocytopenia 69, CMP with rising alkaline phosphatase 457, elevated AST 71, elevated total serum bilirubin 2.3, elevated lipase 244, negative troponin, normal BNP, urinalysis concerning for possible UTI with 500 leukocyte esterase, 11-20 WBCs and 1+ bacteria, normal lactate, mildly prolonged coags with INR 1.3.  Patient is at risk for SBP but will defer paracentesis given her thrombocytopenia and prolonged INR.  Will treat with ceftriaxone 2 g for possible SBP and UTI/pyelonephritis. [CG]   2114 CTCAP cirrhosis with stigmata of portal hypertension again seen.  Mildly  increased volume of ascites compared to recent CT, moderate. No acute pathology in the chest.   [CG]   2224 Patient accepted under Dr. Antonio by Dr. Pagan who is covering [CG]      ED Course User Index  [CG] Brittany Monroy MD         Diagnoses as of 01/11/25 2314   Pyelonephritis   Decompensated cirrhosis   Hematemesis with nausea       Disposition:   Admitted to [facility], discussed differential and findings with patient as well as any family members at bedside.      ED Prescriptions    None         Brittany Monroy MD  EM/IM/Peds    This note was dictated by speech recognition. Minor errors in transcription may be  present.     Brittany Monroy MD  01/11/25 3175

## 2025-01-12 NOTE — H&P (VIEW-ONLY)
Reason For Consult  GI bleed. cirrhosis with ascites     History Of Present Illness  Alfonzo Flanagan is a 50 y.o. female with h/o PBC cirrhosis ( Liver biopsy 3/18/2021 with periportal to bridging fibrosis c/w PBC), pHTN with ascites, small EV, HE, celiac disease, chronic pancreatitis, DM, HTN, GERD, presenting with bilateral flank pain, dysuria, worsening ascites with abdominal discomfort, as well as nausea/vomiting with hematemesis. She describes emesis as dark, brown, with blood clots. She reports increase abdominal distention. She is scheduled to have op paracentesis tomorrow. She stated she take all her medications, she is on furosemide and Aldactone.  She stated she follows low-sodium diet.  Patient was seen by GI in December for abdominal pain, ascites, fluid analysis was negative for SBP at that time.  No history of esophageal varices in the past on recent EGD from 10/10/2024.    EGD 10/10/24 at [facility]  Impression:       - Normal oropharynx.                          - Z-line regular, 35 cm from the incisors.                          - Erythematous mucosa in the stomach.                          - Erythematous mucosa in the stomach.                          - Congested duodenal mucosa.                          - No specimens collected.      EGD 7/26/2024-Erythematous mucosa in the stomach. Erythematous mucosa in the stomach. Congested duodenal mucosa      EGD with EUS 3/26/2024. Findings were c/f NET however tissue sample was not obtained. At that time, it was recommended to follow up mass in 1 year with EUS. MRI/MRCP on 7/26/2024: No pancreatic lesion identified. Cirrhosis and sequela of portal hypertension. No hepatic lesion    Past Medical History  She has a past medical history of Cirrhosis of liver (Multi) and Diabetes mellitus (Multi).    Surgical History  She has a past surgical history that includes CT guided imaging for needle placement (10/14/2020); US guided abdominal paracentesis (10/08/2020);  "and Abdominal surgery.     Social History  She reports that she has never smoked. She has never used smokeless tobacco. She reports that she does not drink alcohol and does not use drugs.    Family History  No family history on file.     Allergies  Gluten    Review of Systems  10 systems reviewed and negative other than HPI     Physical Exam  Physical Exam  Vitals reviewed.   Constitutional:       Appearance: Normal appearance.   HENT:      Head: Normocephalic and atraumatic.   Cardiovascular:      Rate and Rhythm: Normal rate and regular rhythm.      Pulses: Normal pulses.      Heart sounds: Normal heart sounds.   Pulmonary:      Breath sounds: Normal breath sounds.   Abdominal:      General: Bowel sounds are normal. There is distension.      Palpations: Abdomen is soft.      Tenderness: There is abdominal tenderness. There is no guarding.   Skin:     General: Skin is warm and dry.   Neurological:      General: No focal deficit present.      Mental Status: She is alert and oriented to person, place, and time.   Psychiatric:         Mood and Affect: Mood normal.         Behavior: Behavior normal.           Last Recorded Vitals  Blood pressure 110/63, pulse 86, temperature 36.7 °C (98 °F), temperature source Temporal, resp. rate 17, height 1.575 m (5' 2\"), weight 81.6 kg (180 lb), SpO2 95%.    Relevant Results      Scheduled medications  atorvastatin, 80 mg, oral, Nightly  cefTRIAXone, 2 g, intravenous, q24h  docusate sodium, 100 mg, oral, BID  fenofibrate, 160 mg, oral, Daily  furosemide, 40 mg, oral, Daily  lactulose, 20 g, oral, BID  magnesium oxide, 400 mg, oral, Daily  midodrine, 5 mg, oral, TID  nadolol, 20 mg, oral, Daily  nitroglycerin, 1 patch, transdermal, Daily  pantoprazole, 40 mg, intravenous, BID  polyethylene glycol, 17 g, oral, BID  rifAXIMin, 550 mg, oral, q12h RIVKA  spironolactone, 50 mg, oral, BID  sucralfate, 1 g, oral, Before meals & nightly  traZODone, 25 mg, oral, Nightly  ursodiol, 300 mg, " oral, TID      Continuous medications     PRN medications  PRN medications: acetaminophen **OR** acetaminophen **OR** acetaminophen, hydrOXYzine HCL, ondansetron **OR** ondansetron, traMADol  CT angio chest for pulmonary embolism    Result Date: 1/11/2025  STUDY: CT Angiogram of the Chest, CT Abdomen and Pelvis with IV Contrast; 01/11/2025, 7:54 PM INDICATION: Abdominal Pain, vomiting and hematemesis. Pleurisy. COMPARISON: CT AP 01/08/2025, 12/24/2024, 12/11/2024. ACCESSION NUMBER(S): [id number], [id number] ORDERING CLINICIAN: SANIYA FLORES TECHNIQUE:  CTA of the chest was performed following rapid injection of intravenous contrast.  Images are reviewed and processed at a workstation according to the CT angiogram protocol with 3-D and/or MIP post processing imaging generated.  CT of the abdomen and pelvis was performed with intravenous contrast.  Omnipaque 350, 75 mL was administered intravenously; positive oral contrast was given. Automated mA/kV exposure control was utilized and patient examination was performed in strict accordance with principles of ALARA. FINDINGS:  CTA CHEST: Pulmonary arteries are adequately opacified without acute or chronic filling defects.  The thoracic aorta is normal in course and caliber without dissection or aneurysm. The heart is normal in size without pericardial effusion.  Thoracic lymph nodes are not enlarged. There is no pleural effusion, pleural thickening, or pneumothorax. The airways are patent. No consolidative airspace disease or pulmonary edema.  There is a 9 x 7 mm solid nodule in the right middle lobe.  This is stable since at least a CT abdomen/pelvis of 6/20/2024. ABDOMEN:  LIVER: Cirrhotic morphology.  No short-term changes.  No masses.  BILE DUCTS: No intrahepatic or extrahepatic biliary ductal dilatation.  GALLBLADDER: The gallbladder is surgically absent.  STOMACH: Unremarkable.  PANCREAS: Unremarkable  SPLEEN: Enlarged (maximum dimension 14.5 cm) without  significant change.  No acute findings.  ADRENAL GLANDS: Unremarkable  KIDNEYS AND URETERS: Unremarkable  PELVIS:  BLADDER: Unremarkable  REPRODUCTIVE ORGANS: Unremarkable  BOWEL: No bowel obstruction or ileus.  No pneumatosis.  There is mild edematous thickening of the jejunum likely due to third spacing considering the other findings.  VESSELS: Recanalized paraumbilical vein.  Abdominal aorta is normal in caliber.  PERITONEUM/RETROPERITONEUM/LYMPH NODES: Moderate abdominal and pelvic ascites mildly increased from CT 3 days prior.  No pneumoperitoneum. Edematous changes within the omentum and mesenteries.  No lymphadenopathy.  ABDOMINAL WALL: Umbilical hernia containing fluid again seen. SOFT TISSUES: Subcutaneous edema of the abdominal wall again seen.  No soft tissue emphysema.  No abscess.  BONES: No acute fracture or aggressive osseous lesion.  Left unilateral pars defect at L5.  No listhesis.    Cirrhosis with stigmata of portal hypertension again seen.  Mildly increased volume of ascites compared to recent CT, moderate. No acute pathology in the chest. Signed by Arpit Sandy, DO    CT abdomen pelvis w IV contrast    Result Date: 1/11/2025  STUDY: CT Angiogram of the Chest, CT Abdomen and Pelvis with IV Contrast; 01/11/2025, 7:54 PM INDICATION: Abdominal Pain, vomiting and hematemesis. Pleurisy. COMPARISON: CT AP 01/08/2025, 12/24/2024, 12/11/2024. ACCESSION NUMBER(S): [id number], [id number] ORDERING CLINICIAN: SANIYA FLORES TECHNIQUE:  CTA of the chest was performed following rapid injection of intravenous contrast.  Images are reviewed and processed at a workstation according to the CT angiogram protocol with 3-D and/or MIP post processing imaging generated.  CT of the abdomen and pelvis was performed with intravenous contrast.  Omnipaque 350, 75 mL was administered intravenously; positive oral contrast was given. Automated mA/kV exposure control was utilized and patient examination was performed  in strict accordance with principles of ALARA. FINDINGS:  CTA CHEST: Pulmonary arteries are adequately opacified without acute or chronic filling defects.  The thoracic aorta is normal in course and caliber without dissection or aneurysm. The heart is normal in size without pericardial effusion.  Thoracic lymph nodes are not enlarged. There is no pleural effusion, pleural thickening, or pneumothorax. The airways are patent. No consolidative airspace disease or pulmonary edema.  There is a 9 x 7 mm solid nodule in the right middle lobe.  This is stable since at least a CT abdomen/pelvis of 6/20/2024. ABDOMEN:  LIVER: Cirrhotic morphology.  No short-term changes.  No masses.  BILE DUCTS: No intrahepatic or extrahepatic biliary ductal dilatation.  GALLBLADDER: The gallbladder is surgically absent.  STOMACH: Unremarkable.  PANCREAS: Unremarkable  SPLEEN: Enlarged (maximum dimension 14.5 cm) without significant change.  No acute findings.  ADRENAL GLANDS: Unremarkable  KIDNEYS AND URETERS: Unremarkable  PELVIS:  BLADDER: Unremarkable  REPRODUCTIVE ORGANS: Unremarkable  BOWEL: No bowel obstruction or ileus.  No pneumatosis.  There is mild edematous thickening of the jejunum likely due to third spacing considering the other findings.  VESSELS: Recanalized paraumbilical vein.  Abdominal aorta is normal in caliber.  PERITONEUM/RETROPERITONEUM/LYMPH NODES: Moderate abdominal and pelvic ascites mildly increased from CT 3 days prior.  No pneumoperitoneum. Edematous changes within the omentum and mesenteries.  No lymphadenopathy.  ABDOMINAL WALL: Umbilical hernia containing fluid again seen. SOFT TISSUES: Subcutaneous edema of the abdominal wall again seen.  No soft tissue emphysema.  No abscess.  BONES: No acute fracture or aggressive osseous lesion.  Left unilateral pars defect at L5.  No listhesis.    Cirrhosis with stigmata of portal hypertension again seen.  Mildly increased volume of ascites compared to recent CT,  moderate. No acute pathology in the chest. Signed by Arpit Sandy, DO    CT abdomen pelvis wo IV contrast    Result Date: 1/8/2025  Interpreted By:  Kwame Saul, STUDY: CT ABDOMEN PELVIS WO IV CONTRAST;  1/8/2025 11:02 pm   INDICATION: Signs/Symptoms:Abdominal pain, liver failure.     COMPARISON: CT abdomen and pelvis dated 12/24/2024.   ACCESSION NUMBER(S): [id number]   ORDERING CLINICIAN: DIPTI BROWN   TECHNIQUE: CT of the abdomen and pelvis was performed. Contiguous axial images were obtained at 3 mm slice thickness through the abdomen and pelvis. Coronal and sagittal reconstructions at 3 mm slice thickness were performed.  No intravenous or oral contrast agents were administered.   FINDINGS: Please note that the evaluation of vessels, lymph nodes and organs is limited without intravenous contrast.   LOWER CHEST: Included lung bases are clear without evidence of new consolidation or pleural effusion. Right lung nodule is unchanged in appearance to recent exams.   Heart is normal in size without pericardial effusion.   Distal esophagus does not demonstrate any acute abnormality.   ABDOMEN:   LIVER: Liver is nodular in contour suggestive of underlying cirrhosis. No acute hepatic parenchymal abnormality is evident within limits of noncontrast exam.   BILE DUCTS: No new intrahepatic or extrahepatic biliary dilatation is present.   GALLBLADDER: Gallbladder is surgically absent.   PANCREAS: No pancreatic ductal dilatation or peripancreatic stranding is present.   SPLEEN: No acute splenic abnormality is present.   ADRENAL GLANDS: Bilateral adrenal glands are unremarkable in appearance.   KIDNEYS AND URETERS: Kidneys are symmetric in size without evidence of hydronephrosis or radiopaque nephrolithiasis. Visualized upper ureters are unremarkable in appearance.   PELVIS:   BLADDER: No bladder wall thickening is present.   REPRODUCTIVE ORGANS: Uterus is present. No new adnexal masses or collections are  identified.   BOWEL: Stomach is distended with food debris without evidence of gastric wall thickening. Proximal small bowel is relatively decompressed. There is unchanged mild wall thickening in the more distal jejunum and ileum, likely due to underlying hepatic dysfunction and fluid overload. No abnormal small bowel dilatation is present. No inflammatory large bowel wall thickening is identified.   Appendix is not visualized.   VESSELS: No acute vascular abnormality is identified within limits of noncontrast exam.   PERITONEUM/RETROPERITONEUM/LYMPH NODES: Moderate to large volume of abdominal ascites is present, similar in appearance to prior study on 12/24/2024. There is no evidence of free air or thick-walled collections in the abdomen or pelvis. No enlarged lymphadenopathy is identified.   ABDOMINAL WALL: There is some fluid present in the periumbilical hernia, similar to prior study. Mild edema and stranding is present in the cutaneous tissues suggestive of 3rd spacing. No acute abnormalities are otherwise identified in the cutaneous soft tissues.   BONES: No acute osseous abnormality is identified in the visualized spine or pelvis.       1.  Cirrhotic liver, with sequela of portal hypertension, including moderate volume of abdominal ascites, similar in appearance to prior exam on 12/24/2024. No acute abnormalities are identified in the abdomen and pelvis. 2. Persistent distention of the stomach with food debris with relatively decompressed appearance of the proximal duodenum and jejunum. Correlate with any gastroparesis. 3. Persistent mild wall thickening in the small bowel, likely secondary to underlying hepatic dysfunction. 4. Solid nodule in the right middle lobe is unchanged in appearance to recent exam. Continued attention to on follow-up is recommended.     MACRO: None   Signed by: Kwame Saul 1/8/2025 11:26 PM Dictation workstation:   [id number]    ECG 12 lead    Result Date: 12/28/2024  AV  dual-paced rhythm with prolonged AV conduction Abnormal ECG When compared with ECG of 08-DEC-2024 11:17, Electronic ventricular pacemaker has replaced Sinus rhythm See ED provider note for full interpretation and clinical correlation Confirmed by Breanna Kruger ([id number]) on 12/28/2024 10:09:57 AM    US guided abdominal paracentesis    Result Date: 12/27/2024  Interpreted By:  Brady Bhandari, STUDY: US GUIDED ABDOMINAL PARACENTESIS; 12/27/20244:32 pm   INDICATION: Signs/Symptoms:severe ascites.   COMPARISON: US guided paracentesis 12/13/2024   ACCESSION NUMBER(S): [id number]   ORDERING CLINICIAN: LETITIA KIRKLAND   TECHNIQUE: INTERVENTIONALIST(S): Brady Bhandari   CONSENT: The patient/patient's POA/next of kin was informed of the nature of the proposed procedure. The purposes, alternatives, risks, and benefits were explained and discussed. All questions were answered and consent was obtained.   SEDATION: None   MEDICATION/CONTRAST: 1% lidocaine was used to anesthetize subcutaneously.   TIME OUT: A time out was performed immediately prior to procedure start with the interventional team, correctly identifying the patient name, date of birth, MRN, procedure, anatomy (including marking of site and side), patient position, procedure consent form, relevant laboratory and imaging test results, antibiotic administration, safety precautions, and procedure-specific equipment needs.   FINDINGS: The patient was placed in the supine position.   The abdominal space was examined with grey scale ultrasound, and the most accessible fluid identified and marked for paracentesis.   The skin was prepped and draped in usual manner. Local anesthesia with Lidocaine was administered and a right-sided paracentesis was performed.  A 5 Georgian One-Step paracentesis needle/catheter was then placed where marked. Approximately 3.6 L of yellowish colored fluid was removed. The needle/catheter was then withdrawn.   The patient tolerated the procedure  well and there were no immediate complications.       Uneventful paracentesis, as detailed above. Right Hemiabdomen, 3.6 L   I personally performed and/or directly supervised this study and was present for the entire procedure.   Performed and dictated at [facility].   Signed by: Brady Bhandari 12/27/2024 4:33 PM Dictation workstation:   [id number]    CT abdomen pelvis w IV contrast    Result Date: 12/24/2024  STUDY: CT Abdomen and Pelvis with IV Contrast; 12/24/2024 10:02 PM. INDICATION: Ascites.  Abdominal pain. COMPARISON: CT AP 12/8/2024, 11/20/2024, 10/27/2024, 9/24/2024 . ACCESSION NUMBER(S): [id number] ORDERING CLINICIAN: GREGORY NICHOLAS TECHNIQUE: CT of the abdomen and pelvis was performed.  Contiguous axial images were obtained at 3 mm slice thickness through the abdomen and pelvis. Coronal and sagittal reconstructions at 3 mm slice thickness were performed.  Omnipaque 350 75 mL was administered intravenously.  FINDINGS: LOWER CHEST: No cardiomegaly.  No pericardial effusion. Small 7 x 6 mm nodule in the distribution of the right middle lobe, noted recently. Otherwise clear lung bases.  ABDOMEN:  LIVER: Small cirrhotic liver, with lobular contour, decreased density and prior cholecystectomy. Mild splenomegaly. Moderate ascites in all 4 quadrants.  BILE DUCTS: No intrahepatic or extrahepatic biliary ductal dilatation.  GALLBLADDER: The gallbladder is surgically absent.. STOMACH: No abnormalities identified.  PANCREAS: No masses or ductal dilatation.  SPLEEN: No splenomegaly or focal splenic lesion.  ADRENAL GLANDS: No thickening or nodules.  KIDNEYS AND URETERS: Kidneys are normal in size and location.  No renal or ureteral calculi.  PELVIS:  BLADDER: No abnormalities identified.  REPRODUCTIVE ORGANS: No abnormalities identified.  BOWEL: No abnormalities identified. Moderate fluid containing umbilical hernia, 5.5 x 3.0 cm.  VESSELS: No abnormalities identified.  Abdominal  aorta is normal in caliber.  PERITONEUM/RETROPERITONEUM/LYMPH NODES: No free fluid.  No pneumoperitoneum. No lymphadenopathy.  ABDOMINAL WALL: No abnormalities identified. SOFT TISSUES: No abnormalities identified.  BONES: No acute fracture or aggressive osseous lesion.    Small 7 x 6 mm nodule in the right middle lobe, unchanged from recent prior study; nodules should be followed according to the Fleischner thoracic Society guidelines. Small cirrhotic liver with nodular contour, unchanged. Moderate splenomegaly. Moderate ascites involving all 4 abdominal quadrants, similar to recent prior study. Umbilical fluid containing hernia 5.5 x 3.0 cm also again noted. No bowel obstruction. No significant interval change from prior study. . Signed by Vasile Grimaldo MD    US guided abdominal paracentesis    Result Date: 12/16/2024  Interpreted By:  Cornelius Weber, STUDY: US GUIDED ABDOMINAL PARACENTESIS; 12/13/2024 12:02 pm   INDICATION: Signs/Symptoms:ascites.   COMPARISON: None.   ACCESSION NUMBER(S): [id number]   ORDERING CLINICIAN: ANUPAM FRANCIS   TECHNIQUE: Ultrasound-guided paracentesis   FINDINGS: Informed consent obtained. Patient positioned supine. Right lower quadrant prepped, draped and anesthetized. Under ultrasound guidance, 8 Estonian centesis sheath needle inserted into peritoneal cavity. 1.5 Lof clear yellowfluid aspiratedwith sample sent for analysis. Patient tolerated procedure well       Ultrasound-guided paracentesis.   Signed by: Cornelius Weber 12/16/2024 9:16 AM Dictation workstation:   [id number]   Results for orders placed or performed during the hospital encounter of 01/11/25 (from the past 24 hours)   CBC and Auto Differential   Result Value Ref Range    WBC 4.2 (L) 4.4 - 11.3 x10*3/uL    nRBC 0.0 0.0 - 0.0 /100 WBCs    RBC 3.50 (L) 4.00 - 5.20 x10*6/uL    Hemoglobin 10.6 (L) 12.0 - 16.0 g/dL    Hematocrit 32.6 (L) 36.0 - 46.0 %    MCV 93 80 - 100 fL    MCH 30.3 26.0 - 34.0 pg    MCHC 32.5 32.0 -  36.0 g/dL    RDW 17.9 (H) 11.5 - 14.5 %    Platelets 69 (L) 150 - 450 x10*3/uL    Neutrophils % 53.1 40.0 - 80.0 %    Immature Granulocytes %, Automated 0.0 0.0 - 0.9 %    Lymphocytes % 28.8 13.0 - 44.0 %    Monocytes % 10.6 2.0 - 10.0 %    Eosinophils % 7.0 0.0 - 6.0 %    Basophils % 0.5 0.0 - 2.0 %    Neutrophils Absolute 2.21 1.20 - 7.70 x10*3/uL    Immature Granulocytes Absolute, Automated 0.00 0.00 - 0.70 x10*3/uL    Lymphocytes Absolute 1.20 1.20 - 4.80 x10*3/uL    Monocytes Absolute 0.44 0.10 - 1.00 x10*3/uL    Eosinophils Absolute 0.29 0.00 - 0.70 x10*3/uL    Basophils Absolute 0.02 0.00 - 0.10 x10*3/uL   Comprehensive metabolic panel   Result Value Ref Range    Glucose 315 (H) 74 - 99 mg/dL    Sodium 137 136 - 145 mmol/L    Potassium 3.7 3.5 - 5.3 mmol/L    Chloride 105 98 - 107 mmol/L    Bicarbonate 30 21 - 32 mmol/L    Anion Gap <7 (L) 10 - 20 mmol/L    Urea Nitrogen 13 6 - 23 mg/dL    Creatinine 0.59 0.50 - 1.05 mg/dL    eGFR >90 >60 mL/min/1.73m*2    Calcium 8.0 (L) 8.6 - 10.3 mg/dL    Albumin 2.4 (L) 3.4 - 5.0 g/dL    Alkaline Phosphatase 457 (H) 33 - 110 U/L    Total Protein 6.8 6.4 - 8.2 g/dL    AST 71 (H) 9 - 39 U/L    Bilirubin, Total 2.3 (H) 0.0 - 1.2 mg/dL    ALT 37 7 - 45 U/L   Lipase   Result Value Ref Range    Lipase 244 (H) 9 - 82 U/L   Lactate   Result Value Ref Range    Lactate 1.3 0.4 - 2.0 mmol/L   B-Type Natriuretic Peptide   Result Value Ref Range    BNP 80 0 - 99 pg/mL   Coagulation Screen   Result Value Ref Range    Protime 14.6 (H) 9.8 - 12.8 seconds    INR 1.3 (H) 0.9 - 1.1    aPTT 32 27 - 38 seconds   Troponin I, High Sensitivity, Initial   Result Value Ref Range    Troponin I, High Sensitivity <3 0 - 13 ng/L   Urinalysis with Reflex Culture and Microscopic   Result Value Ref Range    Color, Urine Yellow Light-Yellow, Yellow, Dark-Yellow    Appearance, Urine Turbid (N) Clear    Specific Gravity, Urine 1.046 (N) 1.005 - 1.035    pH, Urine 6.0 5.0, 5.5, 6.0, 6.5, 7.0, 7.5, 8.0     Protein, Urine 10 (TRACE) NEGATIVE, 10 (TRACE), 20 (TRACE) mg/dL    Glucose, Urine OVER (4+) (A) Normal mg/dL    Blood, Urine 0.06 (1+) (A) NEGATIVE    Ketones, Urine NEGATIVE NEGATIVE mg/dL    Bilirubin, Urine NEGATIVE NEGATIVE    Urobilinogen, Urine 6 (2+) (A) Normal mg/dL    Nitrite, Urine NEGATIVE NEGATIVE    Leukocyte Esterase, Urine 500 Mckenzie/uL (A) NEGATIVE   Microscopic Only, Urine   Result Value Ref Range    WBC, Urine 11-20 (A) 1-5, NONE /HPF    RBC, Urine 3-5 NONE, 1-2, 3-5 /HPF    Squamous Epithelial Cells, Urine 26-50 (1+) Reference range not established. /HPF    Bacteria, Urine 1+ (A) NONE SEEN /HPF   Troponin, High Sensitivity, 1 Hour   Result Value Ref Range    Troponin I, High Sensitivity 3 0 - 13 ng/L   CBC   Result Value Ref Range    WBC 3.9 (L) 4.4 - 11.3 x10*3/uL    nRBC 0.0 0.0 - 0.0 /100 WBCs    RBC 3.14 (L) 4.00 - 5.20 x10*6/uL    Hemoglobin 9.7 (L) 12.0 - 16.0 g/dL    Hematocrit 28.4 (L) 36.0 - 46.0 %    MCV 90 80 - 100 fL    MCH 30.9 26.0 - 34.0 pg    MCHC 34.2 32.0 - 36.0 g/dL    RDW 17.7 (H) 11.5 - 14.5 %    Platelets 64 (L) 150 - 450 x10*3/uL   Comprehensive metabolic panel   Result Value Ref Range    Glucose 157 (H) 74 - 99 mg/dL    Sodium 138 136 - 145 mmol/L    Potassium 3.6 3.5 - 5.3 mmol/L    Chloride 109 (H) 98 - 107 mmol/L    Bicarbonate 28 21 - 32 mmol/L    Anion Gap <7 (L) 10 - 20 mmol/L    Urea Nitrogen 15 6 - 23 mg/dL    Creatinine 0.45 (L) 0.50 - 1.05 mg/dL    eGFR >90 >60 mL/min/1.73m*2    Calcium 7.3 (L) 8.6 - 10.3 mg/dL    Albumin 2.0 (L) 3.4 - 5.0 g/dL    Alkaline Phosphatase 352 (H) 33 - 110 U/L    Total Protein 5.5 (L) 6.4 - 8.2 g/dL    AST 61 (H) 9 - 39 U/L    Bilirubin, Total 2.2 (H) 0.0 - 1.2 mg/dL    ALT 31 7 - 45 U/L          Assessment/Plan     50 y.o. F with h/o PBC cirrhosis ( Liver biopsy 3/18/2021 with periportal to bridging fibrosis c/w PBC), pHTN with ascites, small EV, HE, celiac disease, chronic pancreatitis, DM, HTN, GERD, presenting with bilateral flank  pain, dysuria, worsening ascites with abdominal discomfort, nausea/vomiting with hematemesis.    Liver cirrhosis with ascites and portal hypertension,  Hematemesis, likely portal hypertensive gastropathy, less likely bleeding from varices as she had no history of varices on the prior EGDs.    -Okay to advance to low-sodium diet, gluten-free  -Monitor H&H, transfuse as needed to keep hemoglobin above 7, hemoglobin goal is 8  -Twice daily PPI  -Continue nadolol, furosemide and Aldactone as ordered  -Plan for upper endoscopy tomorrow, n.p.o. after midnight  -Recommend paracentesis, can order, I will send fluid to rule out SBP      Ning Perea, APRN-CNP

## 2025-01-12 NOTE — H&P
HISTORY AND PHYSICAL EXAMINATION    PATIENT NAME: Alfonzo Flanagan    MRN: [id number]  SERVICE DATE: 1/12/2025       PRIMARY CARE PHYSICIAN: Landen Freeman MD          ASSESSMENT AND PLAN     Hematemesis with acute blood loss anemia  Probable UTI.  Low suspicion for pyelonephritis clinically.  Flank pain likely from ascites  Chronic thrombocytopenia, with pancytopenia likely from cirrhosis  Cirrhosis of liver, with ascites, requiring frequent therapeutic paracentesis, moderate splenomegaly.  At risk for SBP  GERD  Pulmonary nodule  DM2, uncontrolled, A1c 7.8, 12/24  Iron deficiency anemia  Prior Hx SBP  Hx hidradenitis suppurativa  Obesity, class I    Plan:  IV PPI.  GI consult.  Clear liquid diet.  N.p.o. postmidnight for EGD tomorrow.  IV Rocephin, to cover for probable UTI/SBP  Monitor for bleeding.  Platelet transfusion if needed.  Vitamin K 10mg IV x 1.,  Given multiple invasive procedures planned for tomorrow  Accu-Cheks, SSI, hypoglycemia protocol  SCDs for DVT prophylaxis.  Defer chemical prophylaxis due to GI bleed.  D/W patient, spouse, daughter at bedside.  Answered all questions  D/W LILIA Lemus      Discussed with nurses/case management team and the specialists involved in this patient's care. Reviewed the EMR and documentation from other care-givers.    SUBJECTIVE  CHIEF COMPLAINT: Hematemesis x3 at home, upper abdominal pain x1wk    HPI: This is a 50 y.o. female who came to ER last night with 3 episodes of hematemesis.  She also reported having upper abdominal discomfort for about a week.  She also reported having chills but did not check her temperature.  She is known to have cirrhosis of liver, from steatohepatitis, no history of alcohol abuse.  Workup in the ER showed abnormal UA.  Given that she also had upper abdominal discomfort, pyelonephritis was clinically suspected, though CT did not show perinephric stranding.  She was hemodynamically stable.  She was admitted to medical floor  for further monitoring and management.  She was also given Protonix 80 mg IV in the ER.    She continues to have abdominal discomfort today.  She has not had any further hematemesis since admission.  Patient reports dysuria and itching in the vaginal area.    PAST MEDICAL HISTORY:   Past Medical History:   Diagnosis Date    Cirrhosis of liver (Multi)     Diabetes mellitus (Multi)      PAST SURGICAL HISTORY:   Past Surgical History:   Procedure Laterality Date    ABDOMINAL SURGERY      CT GUIDED IMAGING FOR NEEDLE PLACEMENT  10/14/2020    CT GUIDED IMAGING FOR NEEDLE PLACEMENT [facility]    US GUIDED ABDOMINAL PARACENTESIS  10/08/2020    US GUIDED ABDOMINAL PARACENTESIS [facility]     FAMILY HISTORY: M - DM and  of CAD at age 54  SOCIAL HISTORY:   Social History     Tobacco Use    Smoking status: Never    Smokeless tobacco: Never   Substance Use Topics    Alcohol use: Never    Drug use: Never       MEDICATIONS: Prior to Admission Medications  Medications Prior to Admission   Medication Sig Dispense Refill Last Dose/Taking    acetaminophen (Tylenol) 500 mg tablet Take 1-2 tablets (500-1,000 mg) by mouth every 6 hours if needed for mild pain (1 - 3).   Past Month    atorvastatin (Lipitor) 80 mg tablet Take 1 tablet (80 mg) by mouth once daily at bedtime. 30 tablet 0 Past Week    docusate sodium (Colace) 100 mg capsule Take 1 capsule (100 mg) by mouth 2 times a day. Hold for loose stool. 60 capsule 0 Past Week    fenofibrate (Tricor) 145 mg tablet Take 1 tablet (145 mg) by mouth once daily. 30 tablet 0 2025    furosemide (Lasix) 40 mg tablet Take 1 tablet (40 mg) by mouth once daily. Hold for dizziness. 30 tablet 0 2025    hydrOXYzine HCL (Atarax) 25 mg tablet Take 1 tablet (25 mg) by mouth every 4 hours if needed for anxiety. 30 tablet 0 Past Month    insulin glargine (Lantus Solostar U-100 Insulin) 100 unit/mL (3 mL) pen Inject 25 Units under the skin 2 times a day. 15 mL 0 Past Week     insulin lispro 100 unit/mL injection Inject 0-15 Units under the skin 3 times a day before meals. Take as directed per insulin instructions.Do not hold when patient is not eating, continue order as scheduled for hyperglycemia management.  Insulin Lispro Corrective Scale #3     Hypoglycemia protocol Call LIP unit(s) if Blood Glucose is between 0 - 70 mg/dL     0 unit(s) if Blood glucose is between    3 unit(s) if Blood glucose is between 151-200   6 unit(s) if Blood glucose is between 201-250   9 unit(s) if Blood glucose is between 251-300   12 unit(s) if Blood glucose is between 301-350   15 unit(s) if Blood glucose is between 351-400    If blood glucose is greater than 400 mg/dL, give max insulin per sliding scale AND then contact provider.   Past Week    lactulose 20 gram/30 mL oral solution Take 30 mL (20 g) by mouth 2 times a day.   1/11/2025    midodrine (Proamatine) 10 mg tablet Take 0.5 tablets (5 mg) by mouth 3 times daily (morning, midday, late afternoon). 45 tablet 0 Past Week    oxyCODONE (Roxicodone) 5 mg immediate release tablet Take 1 tablet (5 mg) by mouth every 6 hours if needed for severe pain (7 - 10) for up to 6 doses. 6 tablet 0 Past Month    pantoprazole (ProtoNix) 40 mg EC tablet Take 1 tablet (40 mg) by mouth once daily in the morning. Take before meals. Do not crush, chew, or split. 60 tablet 0 1/11/2025    polyethylene glycol (Glycolax, Miralax) 17 gram/dose powder Mix 17 grams of powder in 8 ounces of water and drink 2 times a day. 510 g 0 Past Week    rifAXIMin (Xifaxan) 550 mg tablet Take 1 tablet (550 mg) by mouth every 12 hours. 60 tablet 0 Past Week    sucralfate (Carafate) 1 gram tablet Take 1 tablet (1 g) by mouth 4 times a day before meals for 5 days. 20 tablet 0 1/11/2025    ursodiol (Actigall) 300 mg capsule Take 1 capsule (300 mg) by mouth 3 times a day. 90 capsule 0 1/11/2025    magnesium oxide (Mag-Ox) 400 mg (241.3 mg magnesium) tablet Take 1 tablet (400 mg) by mouth  "once daily. 30 tablet 0 Unknown    meclizine (Antivert) 25 mg tablet Take 1 tablet (25 mg) by mouth 3 times a day as needed for dizziness.   Unknown    metFORMIN XR (Glucophage-XR) 500 mg 24 hr tablet Take 2 tablets (1,000 mg) by mouth once daily in the evening. Take with meals. Do not crush, chew, or split. 60 tablet 0     nadolol (Corgard) 20 mg tablet Take 1 tablet (20 mg) by mouth once daily. 30 tablet 0 Unknown    nitroglycerin (Nitrodur) 0.1 mg/hr patch Place 1 patch over 12 hours on the skin once daily. Hold for dizziness. 30 patch 0 Unknown    pen needle, diabetic (Sure Comfort Pen Needle) 32 gauge x 5/32\" needle Use 4 times a day 100 each 0     spironolactone (Aldactone) 50 mg tablet Take 1 tablet (50 mg) by mouth 2 times a day. Hold for dizziness. 60 tablet 0 Unknown    traZODone (Desyrel) 50 mg tablet Take 0.5 tablets (25 mg) by mouth once daily at bedtime. 15 tablet 0       CURRENT ALLERGIES:   Allergies   Allergen Reactions    Gluten Diarrhea       COMPLETE REVIEW OF SYSTEMS:      GENERAL: No documented fever, appetite stable.  HEENT: No epistaxis, no mouth ulcers  NECK: no neck pain  RESPIRATORY: No new resp symptoms.  CARDIOVASCULAR: No cp, no leg edema, No orthopnea  GI: see HPI. No diarrhea  : No hematuria, +dysuria  MUSCULOSKELETAL: No new jt pains or swelling  SKIN: No rashes, no ulcers  PSYCH: Denies feeling anxious or depressed.   HEMATOLOGY/LYMPHOLOGY: No hx of VTE  ENDOCRINE: No hx of DM  NEURO: No hx of seizures, No hx of CVA      OBJECTIVE  PHYSICAL EXAM:       1/11/2025     9:30 PM 1/11/2025    11:00 PM 1/12/2025    12:00 AM 1/12/2025    12:30 AM 1/12/2025     1:00 AM 1/12/2025     2:03 AM 1/12/2025     8:38 AM   Vitals   Systolic 106 117 123 112 127 110 117   Diastolic 71 77 72 71 94 63 58   Heart Rate 82 82 89 88 90 86    Temp      36.7 °C (98 °F) 36.6 °C (97.9 °F)   Resp 16 16 16 16 16 17 16     Body mass index is 32.92 kg/m².    GENERAL: awake, alert, Ox3, cooperative resting " comfortably, obese  SKIN: Skin turgor normal. No rashes  HEENT: EOMI, no epistaxis, Moist mucosa.  LUNGS: Bilat breath sounds, with no added sounds  CARDIAC: REGULAR. no rubs, no murmur  ABDOMEN: soft, +tenderness upper abdomen; protruberant with ascites. +BS.  EXTREMITIES: No edema, Good capillary refill.   NEURO: Insight GOOD. No invol movements.   MUSCULOSKELETAL: No acute inflammation       .  Current Facility-Administered Medications:     acetaminophen (Tylenol) tablet 650 mg, 650 mg, oral, q4h PRN **OR** acetaminophen (Tylenol) oral liquid 650 mg, 650 mg, oral, q4h PRN **OR** acetaminophen (Tylenol) suppository 650 mg, 650 mg, rectal, q4h PRN, Anna Pagan MD    atorvastatin (Lipitor) tablet 80 mg, 80 mg, oral, Nightly, Anna Pagan MD    cefTRIAXone (Rocephin) 2 g in dextrose 5% IV 50 mL, 2 g, intravenous, q24h, Anna Pagan MD    docusate sodium (Colace) capsule 100 mg, 100 mg, oral, BID, Anna Pagan MD, 100 mg at 01/12/25 0833    fenofibrate (Triglide) tablet 160 mg, 160 mg, oral, Daily, Anna Pagan MD, 160 mg at 01/12/25 0832    furosemide (Lasix) tablet 40 mg, 40 mg, oral, Daily, Anna Pagan MD, 40 mg at 01/12/25 0831    hydrOXYzine HCL (Atarax) tablet 25 mg, 25 mg, oral, q6h PRN, Anna Pagan MD, 25 mg at 01/12/25 0830    lactulose 20 gram/30 mL oral solution 20 g, 20 g, oral, BID, Anna Pagan MD, 20 g at 01/12/25 0831    magnesium oxide (Mag-Ox) tablet 400 mg, 400 mg, oral, Daily, Anna Pagan MD, 400 mg at 01/12/25 0833    midodrine (Proamatine) tablet 5 mg, 5 mg, oral, TID, Anna Pagan MD, 5 mg at 01/12/25 0831    nadolol (Corgard) tablet 20 mg, 20 mg, oral, Daily, Anna Pagan MD    nitroglycerin (Nitrodur) 0.1 mg/hr patch 1 patch - PATIENT OWN MEDICATION, 1 patch, transdermal, Daily, Anna Pagan MD    ondansetron (Zofran) tablet 4 mg, 4 mg, oral, q8h PRN **OR** ondansetron (Zofran) injection 4 mg, 4 mg,  intravenous, q8h PRN, Anna Pagan MD, 4 mg at 01/12/25 0324    pantoprazole (ProtoNix) injection 40 mg, 40 mg, intravenous, BID, Anna Pagan MD, 40 mg at 01/12/25 0831    polyethylene glycol (Glycolax, Miralax) packet 17 g, 17 g, oral, BID, Anna Pagan MD, 17 g at 01/12/25 0831    rifAXIMin (Xifaxan) tablet 550 mg, 550 mg, oral, q12h RIVKA, Anna Pagan MD, 550 mg at 01/12/25 0833    spironolactone (Aldactone) tablet 50 mg, 50 mg, oral, BID, Anna Pagan MD, 50 mg at 01/12/25 0832    sucralfate (Carafate) tablet 1 g, 1 g, oral, Before meals & nightly, Anna Pagan MD, 1 g at 01/12/25 0607    traMADol (Ultram) tablet 50 mg, 50 mg, oral, q6h PRN, MADYSON Lopez-CNP, 50 mg at 01/12/25 0309    traZODone (Desyrel) tablet 25 mg, 25 mg, oral, Nightly, Anna Pagan MD, 25 mg at 01/12/25 0312    ursodiol (Actigall) capsule 300 mg, 300 mg, oral, TID, Anna Pagan MD, 300 mg at 01/12/25 0831    DATA:   Diagnostic tests reviewed for today's visit:    Most recent labs  Results from last 7 days   Lab Units 01/12/25  0621 01/11/25 1843 01/08/25 2237   WBC AUTO x10*3/uL 3.9* 4.2* 4.1*   HEMOGLOBIN g/dL 9.7* 10.6* 10.9*   HEMATOCRIT % 28.4* 32.6* 33.8*   PLATELETS AUTO x10*3/uL 64* 69* 67*     Results from last 7 days   Lab Units 01/12/25  0621 01/11/25 1843 01/08/25  2237   SODIUM mmol/L 138 137 135*   POTASSIUM mmol/L 3.6 3.7 4.6   CHLORIDE mmol/L 109* 105 107   CO2 mmol/L 28 30 25   BUN mg/dL 15 13 16   CREATININE mg/dL 0.45* 0.59 0.66   CALCIUM mg/dL 7.3* 8.0* 7.9*   PROTEIN TOTAL g/dL 5.5* 6.8 7.1   BILIRUBIN TOTAL mg/dL 2.2* 2.3* 2.2*   ALK PHOS U/L 352* 457* 422*   ALT U/L 31 37 27   AST U/L 61* 71* 66*   GLUCOSE mg/dL 157* 315* 510*             0   Lab Value Date/Time    TROPONINT <6 08/17/2024 1532    TROPONINT <6 08/17/2024 1312    TROPONINT <6 08/12/2024 0801    TROPONINT <6 08/11/2024 2232    TROPONINT <6 08/11/2024 2040    TROPONINT 7 07/06/2024 0601     TROPONINT 7 07/05/2024 1425    TROPONINT <6 07/04/2024 2204    TROPONINT 7 06/20/2024 1224    TROPONINT <6 06/15/2024 0644    TROPONINT <6 06/15/2024 0109    TROPONINT <6 05/25/2024 2255    TROPONINT 6 05/25/2024 1857    TROPONINT <6 04/18/2024 0541    TROPONINT <6 04/18/2024 0135    TROPONINT <6 04/17/2024 2336                CT angio chest for pulmonary embolism   Final Result   Cirrhosis with stigmata of portal hypertension again seen.  Mildly   increased volume of ascites compared to recent CT, moderate.   No acute pathology in the chest.   Signed by Arpit Sandy DO      CT abdomen pelvis w IV contrast   Final Result   Cirrhosis with stigmata of portal hypertension again seen.  Mildly   increased volume of ascites compared to recent CT, moderate.   No acute pathology in the chest.   Signed by Arpit Sandy DO        EKG: Sinus tachycardia      SIGNATURE: Anna Pagan MD  DATE: January 12, 2025  TIME: 9:54 AM

## 2025-01-13 ENCOUNTER — APPOINTMENT (OUTPATIENT)
Dept: RADIOLOGY | Facility: HOSPITAL | Age: 50
End: 2025-01-13
Payer: COMMERCIAL

## 2025-01-13 ENCOUNTER — ANESTHESIA (OUTPATIENT)
Dept: GASTROENTEROLOGY | Facility: HOSPITAL | Age: 50
End: 2025-01-13
Payer: COMMERCIAL

## 2025-01-13 ENCOUNTER — ANESTHESIA EVENT (OUTPATIENT)
Dept: GASTROENTEROLOGY | Facility: HOSPITAL | Age: 50
End: 2025-01-13
Payer: COMMERCIAL

## 2025-01-13 ENCOUNTER — APPOINTMENT (OUTPATIENT)
Dept: GASTROENTEROLOGY | Facility: HOSPITAL | Age: 50
End: 2025-01-13
Payer: COMMERCIAL

## 2025-01-13 LAB
ALBUMIN FLD-MCNC: <0.5 G/DL
ALBUMIN SERPL BCP-MCNC: 2 G/DL (ref 3.4–5)
ALP SERPL-CCNC: 276 U/L (ref 33–110)
ALT SERPL W P-5'-P-CCNC: 28 U/L (ref 7–45)
ANION GAP SERPL CALC-SCNC: <7 MMOL/L (ref 10–20)
AST SERPL W P-5'-P-CCNC: 57 U/L (ref 9–39)
ATRIAL RATE: 93 BPM
BACTERIA UR CULT: NORMAL
BASOPHILS NFR FLD MANUAL: 0 %
BILIRUB SERPL-MCNC: 2.5 MG/DL (ref 0–1.2)
BLASTS NFR FLD MANUAL: 0 %
BUN SERPL-MCNC: 16 MG/DL (ref 6–23)
CALCIUM SERPL-MCNC: 7.4 MG/DL (ref 8.6–10.3)
CHLORIDE SERPL-SCNC: 106 MMOL/L (ref 98–107)
CLARITY FLD: CLEAR
CO2 SERPL-SCNC: 29 MMOL/L (ref 21–32)
COLOR FLD: YELLOW
CREAT SERPL-MCNC: 0.52 MG/DL (ref 0.5–1.05)
EGFRCR SERPLBLD CKD-EPI 2021: >90 ML/MIN/1.73M*2
EOSINOPHIL NFR FLD MANUAL: 0 %
ERYTHROCYTE [DISTWIDTH] IN BLOOD BY AUTOMATED COUNT: 17.5 % (ref 11.5–14.5)
GLUCOSE BLD MANUAL STRIP-MCNC: 111 MG/DL (ref 74–99)
GLUCOSE BLD MANUAL STRIP-MCNC: 138 MG/DL (ref 74–99)
GLUCOSE BLD MANUAL STRIP-MCNC: 184 MG/DL (ref 74–99)
GLUCOSE BLD MANUAL STRIP-MCNC: 334 MG/DL (ref 74–99)
GLUCOSE SERPL-MCNC: 128 MG/DL (ref 74–99)
HCT VFR BLD AUTO: 30.3 % (ref 36–46)
HGB BLD-MCNC: 9.9 G/DL (ref 12–16)
IMMATURE GRANULOCYTES IN FLUID: 0 %
LYMPHOCYTES NFR FLD MANUAL: 22 %
MCH RBC QN AUTO: 30.6 PG (ref 26–34)
MCHC RBC AUTO-ENTMCNC: 32.7 G/DL (ref 32–36)
MCV RBC AUTO: 94 FL (ref 80–100)
MONOS+MACROS NFR FLD MANUAL: 74 %
NEUTROPHILS NFR FLD MANUAL: 4 %
NRBC BLD-RTO: 0 /100 WBCS (ref 0–0)
OTHER CELLS NFR FLD MANUAL: 0 %
P AXIS: 19 DEGREES
P OFFSET: 180 MS
P ONSET: 131 MS
PLASMA CELLS NFR FLD MANUAL: 0 %
PLATELET # BLD AUTO: 67 X10*3/UL (ref 150–450)
POTASSIUM SERPL-SCNC: 3.6 MMOL/L (ref 3.5–5.3)
PR INTERVAL: 164 MS
PROT FLD-MCNC: 1 G/DL
PROT SERPL-MCNC: 5.9 G/DL (ref 6.4–8.2)
Q ONSET: 213 MS
QRS COUNT: 16 BEATS
QRS DURATION: 76 MS
QT INTERVAL: 350 MS
QTC CALCULATION(BAZETT): 435 MS
QTC FREDERICIA: 405 MS
R AXIS: -6 DEGREES
RBC # BLD AUTO: 3.24 X10*6/UL (ref 4–5.2)
RBC # FLD AUTO: 104 /UL
SODIUM SERPL-SCNC: 137 MMOL/L (ref 136–145)
T AXIS: -6 DEGREES
T OFFSET: 388 MS
TOTAL CELLS COUNTED PRT: 100
VENTRICULAR RATE: 93 BPM
WBC # BLD AUTO: 4 X10*3/UL (ref 4.4–11.3)
WBC # FLD AUTO: 116 /UL

## 2025-01-13 PROCEDURE — 3700000002 HC GENERAL ANESTHESIA TIME - EACH INCREMENTAL 1 MINUTE

## 2025-01-13 PROCEDURE — A43235 PR ESOPHAGOGASTRODUODENOSCOPY TRANSORAL DIAGNOSTIC: Performed by: ANESTHESIOLOGIST ASSISTANT

## 2025-01-13 PROCEDURE — 89051 BODY FLUID CELL COUNT: CPT | Performed by: NURSE PRACTITIONER

## 2025-01-13 PROCEDURE — 82947 ASSAY GLUCOSE BLOOD QUANT: CPT

## 2025-01-13 PROCEDURE — 2500000004 HC RX 250 GENERAL PHARMACY W/ HCPCS (ALT 636 FOR OP/ED): Performed by: ANESTHESIOLOGIST ASSISTANT

## 2025-01-13 PROCEDURE — 80053 COMPREHEN METABOLIC PANEL: CPT | Performed by: INTERNAL MEDICINE

## 2025-01-13 PROCEDURE — 2720000007 HC OR 272 NO HCPCS

## 2025-01-13 PROCEDURE — 0W9G3ZZ DRAINAGE OF PERITONEAL CAVITY, PERCUTANEOUS APPROACH: ICD-10-PCS

## 2025-01-13 PROCEDURE — 0DJ08ZZ INSPECTION OF UPPER INTESTINAL TRACT, VIA NATURAL OR ARTIFICIAL OPENING ENDOSCOPIC: ICD-10-PCS | Performed by: INTERNAL MEDICINE

## 2025-01-13 PROCEDURE — 2500000001 HC RX 250 WO HCPCS SELF ADMINISTERED DRUGS (ALT 637 FOR MEDICARE OP): Performed by: INTERNAL MEDICINE

## 2025-01-13 PROCEDURE — 87205 SMEAR GRAM STAIN: CPT | Mod: AHULAB | Performed by: NURSE PRACTITIONER

## 2025-01-13 PROCEDURE — 2500000004 HC RX 250 GENERAL PHARMACY W/ HCPCS (ALT 636 FOR OP/ED): Performed by: INTERNAL MEDICINE

## 2025-01-13 PROCEDURE — 7100000009 HC PHASE TWO TIME - INITIAL BASE CHARGE

## 2025-01-13 PROCEDURE — 82042 OTHER SOURCE ALBUMIN QUAN EA: CPT | Mod: AHULAB | Performed by: NURSE PRACTITIONER

## 2025-01-13 PROCEDURE — 2500000002 HC RX 250 W HCPCS SELF ADMINISTERED DRUGS (ALT 637 FOR MEDICARE OP, ALT 636 FOR OP/ED): Performed by: INTERNAL MEDICINE

## 2025-01-13 PROCEDURE — 36415 COLL VENOUS BLD VENIPUNCTURE: CPT | Performed by: INTERNAL MEDICINE

## 2025-01-13 PROCEDURE — 49083 ABD PARACENTESIS W/IMAGING: CPT

## 2025-01-13 PROCEDURE — 85027 COMPLETE CBC AUTOMATED: CPT | Performed by: INTERNAL MEDICINE

## 2025-01-13 PROCEDURE — 3700000001 HC GENERAL ANESTHESIA TIME - INITIAL BASE CHARGE

## 2025-01-13 PROCEDURE — C1729 CATH, DRAINAGE: HCPCS

## 2025-01-13 PROCEDURE — 84157 ASSAY OF PROTEIN OTHER: CPT | Mod: AHULAB | Performed by: NURSE PRACTITIONER

## 2025-01-13 PROCEDURE — A43235 PR ESOPHAGOGASTRODUODENOSCOPY TRANSORAL DIAGNOSTIC: Performed by: ANESTHESIOLOGY

## 2025-01-13 PROCEDURE — 2500000004 HC RX 250 GENERAL PHARMACY W/ HCPCS (ALT 636 FOR OP/ED)

## 2025-01-13 PROCEDURE — 7100000010 HC PHASE TWO TIME - EACH INCREMENTAL 1 MINUTE

## 2025-01-13 PROCEDURE — 1100000001 HC PRIVATE ROOM DAILY

## 2025-01-13 PROCEDURE — 43235 EGD DIAGNOSTIC BRUSH WASH: CPT | Performed by: INTERNAL MEDICINE

## 2025-01-13 RX ORDER — FENTANYL CITRATE 50 UG/ML
INJECTION, SOLUTION INTRAMUSCULAR; INTRAVENOUS AS NEEDED
Status: DISCONTINUED | OUTPATIENT
Start: 2025-01-13 | End: 2025-01-13

## 2025-01-13 RX ORDER — LIDOCAINE HYDROCHLORIDE 20 MG/ML
INJECTION, SOLUTION EPIDURAL; INFILTRATION; INTRACAUDAL; PERINEURAL AS NEEDED
Status: DISCONTINUED | OUTPATIENT
Start: 2025-01-13 | End: 2025-01-13

## 2025-01-13 RX ORDER — LIDOCAINE HYDROCHLORIDE 10 MG/ML
INJECTION, SOLUTION EPIDURAL; INFILTRATION; INTRACAUDAL; PERINEURAL
Status: COMPLETED | OUTPATIENT
Start: 2025-01-13 | End: 2025-01-13

## 2025-01-13 RX ORDER — MIDAZOLAM HYDROCHLORIDE 1 MG/ML
INJECTION INTRAMUSCULAR; INTRAVENOUS AS NEEDED
Status: DISCONTINUED | OUTPATIENT
Start: 2025-01-13 | End: 2025-01-13

## 2025-01-13 RX ORDER — PROPOFOL 10 MG/ML
INJECTION, EMULSION INTRAVENOUS AS NEEDED
Status: DISCONTINUED | OUTPATIENT
Start: 2025-01-13 | End: 2025-01-13

## 2025-01-13 RX ADMIN — INSULIN LISPRO 1 UNITS: 100 INJECTION, SOLUTION INTRAVENOUS; SUBCUTANEOUS at 18:27

## 2025-01-13 RX ADMIN — RIFAXIMIN 550 MG: 550 TABLET ORAL at 20:42

## 2025-01-13 RX ADMIN — FENOFIBRATE 160 MG: 160 TABLET ORAL at 18:29

## 2025-01-13 RX ADMIN — PANTOPRAZOLE SODIUM 40 MG: 40 INJECTION, POWDER, FOR SOLUTION INTRAVENOUS at 20:42

## 2025-01-13 RX ADMIN — PROPOFOL 40 MG: 10 INJECTION, EMULSION INTRAVENOUS at 13:44

## 2025-01-13 RX ADMIN — MIDODRINE HYDROCHLORIDE 5 MG: 5 TABLET ORAL at 18:31

## 2025-01-13 RX ADMIN — Medication 400 MG: at 18:28

## 2025-01-13 RX ADMIN — SODIUM CHLORIDE: 9 INJECTION, SOLUTION INTRAVENOUS at 13:38

## 2025-01-13 RX ADMIN — URSODIOL 300 MG: 300 CAPSULE ORAL at 20:42

## 2025-01-13 RX ADMIN — DOCUSATE SODIUM 100 MG: 100 CAPSULE, LIQUID FILLED ORAL at 18:29

## 2025-01-13 RX ADMIN — LIDOCAINE HYDROCHLORIDE 10 ML: 10 INJECTION, SOLUTION EPIDURAL; INFILTRATION; INTRACAUDAL; PERINEURAL at 09:33

## 2025-01-13 RX ADMIN — TRAZODONE HYDROCHLORIDE 25 MG: 50 TABLET ORAL at 20:42

## 2025-01-13 RX ADMIN — SUCRALFATE 1 G: 1 TABLET ORAL at 18:28

## 2025-01-13 RX ADMIN — LIDOCAINE HYDROCHLORIDE 50 MG: 20 INJECTION, SOLUTION EPIDURAL; INFILTRATION; INTRACAUDAL; PERINEURAL at 13:42

## 2025-01-13 RX ADMIN — ATORVASTATIN CALCIUM 80 MG: 80 TABLET, FILM COATED ORAL at 20:48

## 2025-01-13 RX ADMIN — MIDAZOLAM HYDROCHLORIDE 2 MG: 1 INJECTION, SOLUTION INTRAMUSCULAR; INTRAVENOUS at 13:38

## 2025-01-13 RX ADMIN — POLYETHYLENE GLYCOL 3350 17 G: 17 POWDER, FOR SOLUTION ORAL at 20:48

## 2025-01-13 RX ADMIN — CEFTRIAXONE 2 G: 2 INJECTION, POWDER, FOR SOLUTION INTRAMUSCULAR; INTRAVENOUS at 18:53

## 2025-01-13 RX ADMIN — SPIRONOLACTONE 50 MG: 25 TABLET ORAL at 20:42

## 2025-01-13 RX ADMIN — SUCRALFATE 1 G: 1 TABLET ORAL at 06:13

## 2025-01-13 RX ADMIN — SPIRONOLACTONE 50 MG: 25 TABLET ORAL at 18:35

## 2025-01-13 RX ADMIN — LACTULOSE 20 G: 20 SOLUTION ORAL at 20:42

## 2025-01-13 RX ADMIN — FENTANYL CITRATE 50 MCG: 50 INJECTION, SOLUTION INTRAMUSCULAR; INTRAVENOUS at 13:42

## 2025-01-13 RX ADMIN — FUROSEMIDE 40 MG: 40 TABLET ORAL at 18:29

## 2025-01-13 RX ADMIN — DOCUSATE SODIUM 100 MG: 100 CAPSULE, LIQUID FILLED ORAL at 20:42

## 2025-01-13 RX ADMIN — PROPOFOL 50 MG: 10 INJECTION, EMULSION INTRAVENOUS at 13:42

## 2025-01-13 RX ADMIN — SUCRALFATE 1 G: 1 TABLET ORAL at 20:42

## 2025-01-13 RX ADMIN — RIFAXIMIN 550 MG: 550 TABLET ORAL at 18:35

## 2025-01-13 RX ADMIN — PROPOFOL 200 MCG/KG/MIN: 10 INJECTION, EMULSION INTRAVENOUS at 13:43

## 2025-01-13 ASSESSMENT — COGNITIVE AND FUNCTIONAL STATUS - GENERAL
DAILY ACTIVITIY SCORE: 22
HELP NEEDED FOR BATHING: A LITTLE
STANDING UP FROM CHAIR USING ARMS: A LITTLE
MOBILITY SCORE: 20
MOVING TO AND FROM BED TO CHAIR: A LITTLE
CLIMB 3 TO 5 STEPS WITH RAILING: A LITTLE
WALKING IN HOSPITAL ROOM: A LITTLE
DRESSING REGULAR LOWER BODY CLOTHING: A LITTLE

## 2025-01-13 ASSESSMENT — PAIN SCALES - GENERAL
PAINLEVEL_OUTOF10: 0 - NO PAIN
PAINLEVEL_OUTOF10: 4
PAINLEVEL_OUTOF10: 0 - NO PAIN
PAINLEVEL_OUTOF10: 0 - NO PAIN
PAINLEVEL_OUTOF10: 4

## 2025-01-13 ASSESSMENT — PAIN - FUNCTIONAL ASSESSMENT
PAIN_FUNCTIONAL_ASSESSMENT: UNABLE TO SELF-REPORT
PAIN_FUNCTIONAL_ASSESSMENT: 0-10

## 2025-01-13 ASSESSMENT — ACTIVITIES OF DAILY LIVING (ADL): LACK_OF_TRANSPORTATION: NO

## 2025-01-13 NOTE — ANESTHESIA POSTPROCEDURE EVALUATION
Patient: Alfonzo Flanagan    Procedure Summary       Date: 01/13/25 Room / Location: [facility] [place]    Anesthesia Start: 1338 Anesthesia Stop: 1356    Procedure: EGD Diagnosis:       Hematemesis with nausea      Cirrhosis of liver with ascites, unspecified hepatic cirrhosis type (Multi)    Scheduled Providers: Agus Fowler MD; Eric Flores MD; ALTA Martinez Responsible Provider: Eric Flores MD    Anesthesia Type: MAC ASA Status: 3            Anesthesia Type: MAC    Vitals Value Taken Time   BP 90/49 01/13/25 1426   Temp 36.6 °C (97.9 °F) 01/13/25 1354   Pulse 86 01/13/25 1428   Resp 15 01/13/25 1424   SpO2 95 % 01/13/25 1428   Vitals shown include unfiled device data.    Anesthesia Post Evaluation    Patient location during evaluation: PACU  Patient participation: complete - patient participated  Level of consciousness: awake and alert  Pain management: adequate  Airway patency: patent  Cardiovascular status: acceptable and hemodynamically stable  Respiratory status: acceptable, spontaneous ventilation and nonlabored ventilation  Hydration status: acceptable  Postoperative Nausea and Vomiting: none        There were no known notable events for this encounter.

## 2025-01-13 NOTE — POST-PROCEDURE NOTE
Interventional Radiology Brief Postprocedure Note    Attending: Brady Bhandari CNP    Assistant: none    Diagnosis: ascites    Description of procedure: Ultrasound guided paracentesis was preformed in the RUQ. A total of 4.6 L of straw-yellow fluid was drained.       Anesthesia:  Local    Complications: None    Estimated Blood Loss: none    Medications  As of 01/13/25 0954      pantoprazole (ProtoNix) injection 80 mg (mg) Total dose:  80 mg Dosing weight:  81.6      Date/Time Rate/Dose/Volume Action       01/11/25  1859 80 mg Given               pantoprazole (ProtoNix) injection 40 mg (mg) Total dose:  120 mg Dosing weight:  81.6      Date/Time Rate/Dose/Volume Action       01/12/25  0309 40 mg Given      0831 40 mg Given      2022 40 mg Given               ondansetron (Zofran) injection 4 mg (mg) Total dose:  12 mg Dosing weight:  81.6      Date/Time Rate/Dose/Volume Action       01/11/25  1858 4 mg Given     01/12/25  0324 4 mg Given      2024 4 mg Given               HYDROmorphone (Dilaudid) injection 0.5 mg (mg) Total dose:  0.5 mg Dosing weight:  81.6      Date/Time Rate/Dose/Volume Action       01/11/25 1859 0.5 mg Given               cefTRIAXone (Rocephin) 2 g in dextrose 5% IV 50 mL (mL/hr) Total dose:  4 g* Dosing weight:  81.6   *From user-documented volume     Date/Time Rate/Dose/Volume Action       01/11/25 1957 2 g - 100 mL/hr (over 30 min) New Bag      2039 50 mL Stopped     01/12/25 2022 2 g - 100 mL/hr (over 30 min) New Bag      2340 50 mL Stopped               iohexol (OMNIPaque) 350 mg iodine/mL solution 75 mL (mL) Total volume:  75 mL Dosing weight:  81.6      Date/Time Rate/Dose/Volume Action       01/11/25  1943 75 mL Given               atorvastatin (Lipitor) tablet 80 mg (mg) Total dose:  80 mg* Dosing weight:  81.6   *Administration not included in total     Date/Time Rate/Dose/Volume Action       01/12/25 0307 *80 mg Missed      2023 80 mg Given               docusate sodium (Colace)  capsule 100 mg (mg) Total dose:  100 mg* Dosing weight:  81.6   *Administration not included in total     Date/Time Rate/Dose/Volume Action       01/12/25  0307 *100 mg Missed      0833 100 mg Given      2019 *100 mg Missed               fenofibrate (Triglide) tablet 160 mg (mg) Total dose:  160 mg Dosing weight:  81.6      Date/Time Rate/Dose/Volume Action       01/12/25  0832 160 mg Given               furosemide (Lasix) tablet 40 mg (mg) Total dose:  40 mg Dosing weight:  81.6      Date/Time Rate/Dose/Volume Action       01/12/25  0831 40 mg Given               hydrOXYzine HCL (Atarax) tablet 25 mg (mg) Total dose:  25 mg Dosing weight:  81.6      Date/Time Rate/Dose/Volume Action       01/12/25  0830 25 mg Given               lactulose 20 gram/30 mL oral solution 20 g (g) Total dose:  20 g* Dosing weight:  81.6   *Administration not included in total     Date/Time Rate/Dose/Volume Action       01/12/25 0307 *20 g Missed      0831 20 g Given      2019 *20 g Missed               magnesium oxide (Mag-Ox) tablet 400 mg (mg) Total dose:  400 mg Dosing weight:  81.6      Date/Time Rate/Dose/Volume Action       01/12/25 0833 400 mg Given               midodrine (Proamatine) tablet 5 mg (mg) Total dose:  10 mg* Dosing weight:  81.6   *Administration not included in total     Date/Time Rate/Dose/Volume Action       01/12/25 0831 5 mg Given      1147 5 mg Given      1558 *5 mg Missed               nadolol (Corgard) tablet 20 mg (mg) Total dose:  0 mg* Dosing weight:  81.6   *Administration not included in total     Date/Time Rate/Dose/Volume Action       01/12/25  0846 *20 mg Missed               nitroglycerin (Nitrodur) 0.1 mg/hr patch 1 patch - PATIENT OWN MEDICATION (patch) Total dose:  0 patch* Dosing weight:  81.6   *Administration not included in total     Date/Time Rate/Dose/Volume Action       01/12/25 0833 *1 patch (over 720 min) Missed               polyethylene glycol (Glycolax, Miralax) packet 17 g (g)  Total dose:  17 g* Dosing weight:  81.6   *Administration not included in total     Date/Time Rate/Dose/Volume Action       01/12/25  0310 *17 g Missed      0831 17 g Given      2019 *17 g Missed               rifAXIMin (Xifaxan) tablet 550 mg (mg) Total dose:  1,650 mg Dosing weight:  81.6      Date/Time Rate/Dose/Volume Action       01/12/25  0309 550 mg Given      0833 550 mg Given      2023 550 mg Given               spironolactone (Aldactone) tablet 50 mg (mg) Total dose:  150 mg Dosing weight:  81.6      Date/Time Rate/Dose/Volume Action       01/12/25  0309 50 mg Given      0832 50 mg Given      2022 50 mg Given               sucralfate (Carafate) tablet 1 g (g) Total dose:  5 g Dosing weight:  81.6      Date/Time Rate/Dose/Volume Action       01/12/25  0607 1 g Given      1147 1 g Given      1553 1 g Given      2023 1 g Given     01/13/25  0613 1 g Given               traZODone (Desyrel) tablet 25 mg (mg) Total dose:  50 mg Dosing weight:  81.6      Date/Time Rate/Dose/Volume Action       01/12/25  0312 25 mg Given      2023 25 mg Given               ursodiol (Actigall) capsule 300 mg (mg) Total dose:  900 mg Dosing weight:  81.6      Date/Time Rate/Dose/Volume Action       01/12/25  0831 300 mg Given      1553 300 mg Given      2023 300 mg Given               ondansetron (Zofran) tablet 4 mg (mg) Total dose:  Cannot be calculated* Dosing weight:  81.6   *Administration dose not documented     Date/Time Rate/Dose/Volume Action       01/12/25  0324 *Not included in total See Alternative      2024 *Not included in total See Alternative               traMADol (Ultram) tablet 50 mg (mg) Total dose:  100 mg Dosing weight:  81.6      Date/Time Rate/Dose/Volume Action       01/12/25  0309 50 mg Given      2023 50 mg Given               insulin lispro injection 0-5 Units (Units) Total dose:  2 Units Dosing weight:  81.6      Date/Time Rate/Dose/Volume Action       01/12/25  1109 *Not included in total Missed       1641 2 Units Given     01/13/25  0833 *Not included in total Missed               phytonadione (Vitamin K) injection 10 mg (mg) Total dose:  0 mg* Dosing weight:  81.6   *Administration not included in total     Date/Time Rate/Dose/Volume Action       01/12/25  1150 *10 mg Missed               phytonadione (Vitamin K) 10 mg in dextrose 5% 50 mL IV (mL/hr) Total dose:  19.61 mg* Dosing weight:  81.6   *From user-documented volume     Date/Time Rate/Dose/Volume Action       01/12/25  1330 10 mg - 102 mL/hr (over 30 min) New Bag      1340 50 mL [vol]       1350 50 mL [vol] Stopped               lidocaine PF (Xylocaine) 10 mg/mL (1 %) injection (mL) Total volume:  10 mL      Date/Time Rate/Dose/Volume Action       01/13/25  0933 10 mL Given                   Specimens collected      See detailed result report with images in PACS.    The patient tolerated the procedure well without incident or complication and is in stable condition.

## 2025-01-13 NOTE — PROGRESS NOTES
01/13/25 0836   Discharge Planning   Living Arrangements Spouse/significant other   Support Systems Spouse/significant other   Assistance Needed Independent prior to admission   Type of Residence Private residence   Number of Stairs to Enter Residence 0   Number of Stairs Within Residence 0   Do you have animals or pets at home? No   Who is requesting discharge planning? Other (Comment)  (TCC assessment)   Home or Post Acute Services None   Expected Discharge Disposition Home   Does the patient need discharge transport arranged? No   Financial Resource Strain   How hard is it for you to pay for the very basics like food, housing, medical care, and heating? Not hard   Housing Stability   In the last 12 months, was there a time when you were not able to pay the mortgage or rent on time? N   In the past 12 months, how many times have you moved where you were living? 0   At any time in the past 12 months, were you homeless or living in a shelter (including now)? N   Transportation Needs   In the past 12 months, has lack of transportation kept you from medical appointments or from getting medications? no   In the past 12 months, has lack of transportation kept you from meetings, work, or from getting things needed for daily living? No   Stroke Family Assessment   Stroke Family Assessment Needed No   Intensity of Service   Intensity of Service 0-30 min     PLAN/BARRIER: EGD today  DISP: home with spouse  ADOD: 1-2 days  HHC: none  DME: none  O2: none  Patient has no homecare needs.  Marnie Farias RN

## 2025-01-13 NOTE — CARE PLAN
The patient's goals for the shift include      The clinical goals for the shift include Patient will be HDS throughout shift      Problem: Diabetes  Goal: Achieve decreasing blood glucose levels by end of shift  Outcome: Progressing  Goal: Increase stability of blood glucose readings by end of shift  Outcome: Progressing  Goal: Decrease in ketones present in urine by end of shift  Outcome: Progressing  Goal: Maintain electrolyte levels within acceptable range throughout shift  Outcome: Progressing  Goal: Maintain glucose levels >70mg/dl to <250mg/dl throughout shift  Outcome: Progressing  Goal: No changes in neurological exam by end of shift  Outcome: Progressing  Goal: Learn about and adhere to nutrition recommendations by end of shift  Outcome: Progressing  Goal: Vital signs within normal range for age by end of shift  Outcome: Progressing  Goal: Increase self care and/or family involovement by end of shift  Outcome: Progressing  Goal: Receive DSME education by end of shift  Outcome: Progressing

## 2025-01-13 NOTE — PROGRESS NOTES
INTERNAL MEDICINE PROGRESS NOTE      HPI:    Patient reports persistent abdominal distention.  She also complains of some flank pain.  She has had no emesis in the past 24 hours.    Vital signs in last 24 hours:  Temp:  [36.4 °C (97.6 °F)-37.2 °C (99 °F)] 36.8 °C (98.3 °F)  Heart Rate:  [87-95] 93  Resp:  [17-18] 18  BP: (100-125)/(46-68) 114/50    Physical Examination:  Physical Exam      Constitutional:       Appearance: Middle-aged, well-built, in no distress  HENT:      Head: Normocephalic and atraumatic.   Eyes:      Extraocular Movements: Extraocular movements intact.      Pupils: Pupils are equal, round, and reactive to light.   Cardiovascular:      Rate and Rhythm: Normal rate and regular rhythm.      Pulses: Normal pulses.      Heart sounds: Normal heart sounds.   Pulmonary:      Effort: Pulmonary effort is normal.      Breath sounds: Normal breath sounds.   Abdominal:      General: Abdomen is distended. Bowel sounds are normal.      Palpations: Abdomen is soft.  Mild generalized tenderness, no masses or rebound.  Musculoskeletal:         General: Normal range of motion.      Cervical back: Normal range of motion and neck supple.   Skin:     General: Skin is warm and dry.   Neurological:      General: No focal deficit present.      Mental Status: Alert and oriented x 3, speech fluent.    Medications:    Current Facility-Administered Medications:     acetaminophen (Tylenol) tablet 650 mg, 650 mg, oral, q4h PRN **OR** acetaminophen (Tylenol) oral liquid 650 mg, 650 mg, oral, q4h PRN **OR** acetaminophen (Tylenol) suppository 650 mg, 650 mg, rectal, q4h PRN, Anna Pagan MD    atorvastatin (Lipitor) tablet 80 mg, 80 mg, oral, Nightly, Anna Pagan MD, 80 mg at 01/12/25 2023    cefTRIAXone (Rocephin) 2 g in dextrose 5% IV 50 mL, 2 g, intravenous, q24h, Anna Pagan MD, Stopped at 01/12/25 2340    dextrose 50 % injection 12.5 g, 12.5 g, intravenous, q15 min PRN, Anna Pagan  MD    dextrose 50 % injection 25 g, 25 g, intravenous, q15 min PRN, Anna Pagan MD    docusate sodium (Colace) capsule 100 mg, 100 mg, oral, BID, Anna Pagan MD, 100 mg at 01/12/25 0833    fenofibrate (Triglide) tablet 160 mg, 160 mg, oral, Daily, Anna Pagan MD, 160 mg at 01/12/25 0832    furosemide (Lasix) tablet 40 mg, 40 mg, oral, Daily, Anna Pagan MD, 40 mg at 01/12/25 0831    glucagon (Glucagen) injection 1 mg, 1 mg, intramuscular, q15 min PRN, Anna Pagan MD    glucagon (Glucagen) injection 1 mg, 1 mg, intramuscular, q15 min PRN, Anna Pagan MD    hydrOXYzine HCL (Atarax) tablet 25 mg, 25 mg, oral, q6h PRN, Anna Pagan MD, 25 mg at 01/12/25 0830    insulin lispro injection 0-5 Units, 0-5 Units, subcutaneous, TID AC, Anna Pagan MD, 2 Units at 01/12/25 1641    lactulose 20 gram/30 mL oral solution 20 g, 20 g, oral, BID, Anna Pagan MD, 20 g at 01/12/25 0831    magnesium oxide (Mag-Ox) tablet 400 mg, 400 mg, oral, Daily, Anna Pagan MD, 400 mg at 01/12/25 0833    midodrine (Proamatine) tablet 5 mg, 5 mg, oral, TID, Anna Pagan MD, 5 mg at 01/12/25 1147    nadolol (Corgard) tablet 20 mg, 20 mg, oral, Daily, Anna Pagan MD    nitroglycerin (Nitrodur) 0.1 mg/hr patch 1 patch - PATIENT OWN MEDICATION, 1 patch, transdermal, Daily, Anna Pagan MD    ondansetron (Zofran) tablet 4 mg, 4 mg, oral, q8h PRN **OR** ondansetron (Zofran) injection 4 mg, 4 mg, intravenous, q8h PRN, Anna Pagan MD, 4 mg at 01/12/25 2024    pantoprazole (ProtoNix) injection 40 mg, 40 mg, intravenous, BID, Anna Pagan MD, 40 mg at 01/12/25 2022    polyethylene glycol (Glycolax, Miralax) packet 17 g, 17 g, oral, BID, Anna Pagan MD, 17 g at 01/12/25 0831    rifAXIMin (Xifaxan) tablet 550 mg, 550 mg, oral, q12h RIVKA, Anna Pagan MD, 550 mg at 01/12/25 2023    spironolactone (Aldactone) tablet 50 mg, 50 mg, oral,  BID, Anna Pagan MD, 50 mg at 01/12/25 2022    sucralfate (Carafate) tablet 1 g, 1 g, oral, Before meals & nightly, Anna Pagan MD, 1 g at 01/13/25 0613    traZODone (Desyrel) tablet 25 mg, 25 mg, oral, Nightly, Anna Pagan MD, 25 mg at 01/12/25 2023    ursodiol (Actigall) capsule 300 mg, 300 mg, oral, TID, Anna Pagan MD, 300 mg at 01/12/25 2023    Laboratory Findings:  Lab Results   Component Value Date    WBC 4.0 (L) 01/13/2025    HGB 9.9 (L) 01/13/2025    HCT 30.3 (L) 01/13/2025    MCV 94 01/13/2025    PLT 67 (L) 01/13/2025     Lab Results   Component Value Date    INR 1.3 (H) 01/11/2025    INR 1.5 (H) 12/27/2024    INR 1.3 (H) 12/24/2024    PROTIME 14.6 (H) 01/11/2025    PROTIME 17.0 (H) 12/27/2024    PROTIME 13.7 (H) 12/24/2024     Lab Results   Component Value Date    GLUCOSE 128 (H) 01/13/2025    CALCIUM 7.4 (L) 01/13/2025     01/13/2025    K 3.6 01/13/2025    CO2 29 01/13/2025     01/13/2025    BUN 16 01/13/2025    CREATININE 0.52 01/13/2025       Assessment and Plan:    Upper GI bleed -EGD planned for today.  Ascites -paracentesis today.  UTI -doubt pyelonephritis, continue with antibiotics.  Culture pending.  Anemia -secondary to above, monitor CBC.      Shane Antonio MD  01/13/25  10:56 AM

## 2025-01-13 NOTE — CARE PLAN
The patient's goals for the shift include      The clinical goals for the shift include pain control    Problem: Diabetes  Goal: Achieve decreasing blood glucose levels by end of shift  Outcome: Progressing  Goal: Increase stability of blood glucose readings by end of shift  Outcome: Progressing  Goal: Decrease in ketones present in urine by end of shift  Outcome: Progressing  Goal: Maintain electrolyte levels within acceptable range throughout shift  Outcome: Progressing  Goal: Maintain glucose levels >70mg/dl to <250mg/dl throughout shift  Outcome: Progressing  Goal: No changes in neurological exam by end of shift  Outcome: Progressing  Goal: Learn about and adhere to nutrition recommendations by end of shift  Outcome: Progressing  Goal: Vital signs within normal range for age by end of shift  Outcome: Progressing  Goal: Increase self care and/or family involovement by end of shift  Outcome: Progressing  Goal: Receive DSME education by end of shift  Outcome: Progressing

## 2025-01-13 NOTE — PROGRESS NOTES
Pharmacy Medication History     Source of Information: Per Patient confused if she take this medication at home Sucralfate 1 gram but she has it since her stay in the hospital.    Additional concerns with the patient's PTA list.   N/A  The following updates were made to the Prior to Admission medication list:     Medications ADDED:   N/A  Medications CHANGED:  N/A  Medications REMOVED:   N/A  Medications NOT TAKING:   Rifaximin 550 mg tab    Allergy reviewed : Yes    Meds 2 Beds : Yes    Outpatient pharmacy confirmed and updated in chart : Yes    Pharmacy name: [facility]     The list below reflectives the updated PTA list. Please review each medication in order reconciliation for additional clarification and justification.    Prior to Admission Medications   Prescriptions Last Dose Informant   acetaminophen (Tylenol) 500 mg tablet 1/10/2025 Self   Sig: Take 1-2 tablets (500-1,000 mg) by mouth every 6 hours if needed for mild pain (1 - 3).   atorvastatin (Lipitor) 80 mg tablet 1/10/2025 Self   Sig: Take 1 tablet (80 mg) by mouth once daily at bedtime.   docusate sodium (Colace) 100 mg capsule Past Week Self   Sig: Take 1 capsule (100 mg) by mouth 2 times a day. Hold for loose stool.   fenofibrate (Tricor) 145 mg tablet 1/10/2025 Self   Sig: Take 1 tablet (145 mg) by mouth once daily.   furosemide (Lasix) 40 mg tablet 1/10/2025 Self   Sig: Take 1 tablet (40 mg) by mouth once daily. Hold for dizziness.   hydrOXYzine HCL (Atarax) 25 mg tablet Past Month    Sig: Take 1 tablet (25 mg) by mouth every 4 hours if needed for anxiety.   insulin glargine (Lantus Solostar U-100 Insulin) 100 unit/mL (3 mL) pen 1/10/2025 Self   Sig: Inject 25 Units under the skin 2 times a day.   insulin lispro 100 unit/mL injection 1/10/2025 Self   Sig: Inject 0-15 Units under the skin 3 times a day before meals. Take as directed per insulin instructions.Do not hold when patient is not eating, continue order as scheduled for  "hyperglycemia management.  Insulin Lispro Corrective Scale #3     Hypoglycemia protocol Call LIP unit(s) if Blood Glucose is between 0 - 70 mg/dL     0 unit(s) if Blood glucose is between    3 unit(s) if Blood glucose is between 151-200   6 unit(s) if Blood glucose is between 201-250   9 unit(s) if Blood glucose is between 251-300   12 unit(s) if Blood glucose is between 301-350   15 unit(s) if Blood glucose is between 351-400    If blood glucose is greater than 400 mg/dL, give max insulin per sliding scale AND then contact provider.   lactulose 20 gram/30 mL oral solution 1/11/2025 Self   Sig: Take 30 mL (20 g) by mouth 2 times a day.   magnesium oxide (Mag-Ox) 400 mg (241.3 mg magnesium) tablet 1/10/2025 Self   Sig: Take 1 tablet (400 mg) by mouth once daily.   meclizine (Antivert) 25 mg tablet Unknown Self   Sig: Take 1 tablet (25 mg) by mouth 3 times a day as needed for dizziness.   metFORMIN XR (Glucophage-XR) 500 mg 24 hr tablet 1/10/2025 Self   Sig: Take 2 tablets (1,000 mg) by mouth once daily in the evening. Take with meals. Do not crush, chew, or split.   midodrine (Proamatine) 10 mg tablet Past Week Self   Sig: Take 0.5 tablets (5 mg) by mouth 3 times daily (morning, midday, late afternoon).   nadolol (Corgard) 20 mg tablet 1/10/2025 Self   Sig: Take 1 tablet (20 mg) by mouth once daily.   nitroglycerin (Nitrodur) 0.1 mg/hr patch Unknown Self   Sig: Place 1 patch over 12 hours on the skin once daily. Hold for dizziness.   oxyCODONE (Roxicodone) 5 mg immediate release tablet Past Month    Sig: Take 1 tablet (5 mg) by mouth every 6 hours if needed for severe pain (7 - 10) for up to 6 doses.   pantoprazole (ProtoNix) 40 mg EC tablet 1/10/2025 Self   Sig: Take 1 tablet (40 mg) by mouth once daily in the morning. Take before meals. Do not crush, chew, or split.   pen needle, diabetic (Sure Comfort Pen Needle) 32 gauge x 5/32\" needle  Self   Sig: Use 4 times a day   polyethylene glycol (Glycolax, Miralax) " 17 gram/dose powder Past Week Self   Sig: Mix 17 grams of powder in 8 ounces of water and drink 2 times a day.   rifAXIMin (Xifaxan) 550 mg tablet Past Week Self   Sig: Take 1 tablet (550 mg) by mouth every 12 hours.   spironolactone (Aldactone) 50 mg tablet Past Week Self   Sig: Take 1 tablet (50 mg) by mouth 2 times a day. Hold for dizziness.   sucralfate (Carafate) 1 gram tablet 1/11/2025    Sig: Take 1 tablet (1 g) by mouth 4 times a day before meals for 5 days.   traZODone (Desyrel) 50 mg tablet     Sig: Take 0.5 tablets (25 mg) by mouth once daily at bedtime.   ursodiol (Actigall) 300 mg capsule 1/11/2025 Self   Sig: Take 1 capsule (300 mg) by mouth 3 times a day.      Facility-Administered Medications: None       The list below reflectives the updated allergy list. Please review each documented allergy for additional clarification and justification.    Allergies   Allergen Reactions    Gluten Diarrhea          01/13/25 at 4:11 PM - Cassidy العلي

## 2025-01-13 NOTE — ANESTHESIA PREPROCEDURE EVALUATION
Patient: Alfonzo Flanagan    Procedure Information       Date/Time: 25 1025    Scheduled providers: Agus Fowler MD; Eric Flores MD; ALTA Martinez    Procedure: EGD    Location: [facility]            Relevant Problems   GI   (+) GI bleed      /Renal   (+) Acute UTI   (+) UTI (urinary tract infection)      Liver   (+) Liver cirrhosis secondary to REED (nonalcoholic steatohepatitis) (Multi)      Endocrine   (+) Type 2 diabetes mellitus, with long-term current use of insulin      Hematology   (+) Anemia of chronic disease      ID   (+) Acute UTI   (+) SBP (spontaneous bacterial peritonitis) (Multi)   (+) UTI (urinary tract infection)       Clinical information reviewed:   Tobacco  Allergies  Meds   Med Hx  Surg Hx  OB Status  Fam Hx  Soc   Hx         Past Medical History:   Diagnosis Date    Ascites     Asthma     Cirrhosis of liver (Multi)     Diabetes mellitus (Multi)     GERD (gastroesophageal reflux disease)     CORBY (obstructive sleep apnea)     Portal hypertension (Multi)     Thrombocytopenia (CMS-HCC)       Past Surgical History:   Procedure Laterality Date     SECTION, LOW TRANSVERSE      CHOLECYSTECTOMY      COLONOSCOPY      CT GUIDED IMAGING FOR NEEDLE PLACEMENT  10/14/2020    CT GUIDED IMAGING FOR NEEDLE PLACEMENT [facility]    ESOPHAGOGASTRODUODENOSCOPY      INGUINAL HIDRADENITIS EXCISION      TUBAL LIGATION      US GUIDED ABDOMINAL PARACENTESIS  10/08/2020    US GUIDED ABDOMINAL PARACENTESIS [facility]     Social History     Tobacco Use    Smoking status: Never    Smokeless tobacco: Never   Substance Use Topics    Alcohol use: Never    Drug use: Never      Current Outpatient Medications   Medication Instructions    acetaminophen (TYLENOL) 500-1,000 mg, Every 6 hours PRN    atorvastatin (LIPITOR) 80 mg, oral, Nightly    docusate sodium (COLACE) 100 mg, oral, 2 times daily, Hold for loose stool.    fenofibrate (TRICOR) 145 mg, oral, Daily     "furosemide (LASIX) 40 mg, oral, Daily, Hold for dizziness.    hydrOXYzine HCL (ATARAX) 25 mg, oral, Every 4 hours PRN    insulin lispro 0-15 Units, subcutaneous, 3 times daily before meals, Take as directed per insulin instructions.Do not hold when patient is not eating, continue order as scheduled for hyperglycemia management.  Insulin Lispro Corrective Scale #3     Hypoglycemia protocol Call LIP unit(s) if Blood Glucose is between 0 - 70 mg/dL     0 unit(s) if Blood glucose is between    3 unit(s) if Blood glucose is between 151-200   6 unit(s) if Blood glucose is between 201-250   9 unit(s) if Blood glucose is between 251-300   12 unit(s) if Blood glucose is between 301-350   15 unit(s) if Blood glucose is between 351-400    If blood glucose is greater than 400 mg/dL, give max insulin per sliding scale AND then contact provider.    lactulose 20 g, 2 times daily    Lantus Solostar U-100 Insulin 25 Units, subcutaneous, 2 times daily    magnesium oxide (MAG-OX) 400 mg, oral, Daily    meclizine (ANTIVERT) 25 mg, 3 times daily PRN    metFORMIN XR (GLUCOPHAGE-XR) 1,000 mg, oral, Daily with evening meal, Do not crush, chew, or split.    midodrine (PROAMATINE) 5 mg, oral, 3 times daily (morning, midday, late afternoon)    nadolol (CORGARD) 20 mg, oral, Daily    nitroglycerin (Nitrodur) 0.1 mg/hr patch 1 patch, transdermal, Daily, Hold for dizziness.    oxyCODONE (ROXICODONE) 5 mg, oral, Every 6 hours PRN    pantoprazole (PROTONIX) 40 mg, oral, Daily before breakfast, Do not crush, chew, or split.    pen needle, diabetic (Sure Comfort Pen Needle) 32 gauge x 5/32\" needle Use 4 times a day    polyethylene glycol (Glycolax, Miralax) 17 gram/dose powder Mix 17 grams of powder in 8 ounces of water and drink 2 times a day.    rifAXIMin (XIFAXAN) 550 mg, oral, Every 12 hours scheduled    spironolactone (ALDACTONE) 50 mg, oral, 2 times daily, Hold for dizziness.    sucralfate (CARAFATE) 1 g, oral, 4 times daily before meals " "and nightly    traZODone (DESYREL) 25 mg, oral, Nightly    ursodiol (ACTIGALL) 300 mg, oral, 3 times daily      Allergies   Allergen Reactions    Gluten Diarrhea        Chemistry    Lab Results   Component Value Date/Time     01/13/2025 0718    K 3.6 01/13/2025 0718     01/13/2025 0718    CO2 29 01/13/2025 0718    BUN 16 01/13/2025 0718    CREATININE 0.52 01/13/2025 0718    Lab Results   Component Value Date/Time    CALCIUM 7.4 (L) 01/13/2025 0718    ALKPHOS 276 (H) 01/13/2025 0718    AST 57 (H) 01/13/2025 0718    ALT 28 01/13/2025 0718    BILITOT 2.5 (H) 01/13/2025 0718          Lab Results   Component Value Date    HGBA1C 7.6 (H) 12/12/2024     Lab Results   Component Value Date/Time    WBC 4.0 (L) 01/13/2025 0718    HGB 9.9 (L) 01/13/2025 0718    HCT 30.3 (L) 01/13/2025 0718    PLT 67 (L) 01/13/2025 0718     Lab Results   Component Value Date/Time    PROTIME 14.6 (H) 01/11/2025 1843    INR 1.3 (H) 01/11/2025 1843     No results found for: \"ABORH\"  Encounter Date: 01/11/25   ECG 12 lead   Result Value    Ventricular Rate 93    Atrial Rate 93    SD Interval 164    QRS Duration 76    QT Interval 350    QTC Calculation(Bazett) 435    P Axis 19    R Axis -6    T Axis -6    QRS Count 16    Q Onset 213    P Onset 131    P Offset 180    T Offset 388    QTC Fredericia 405    Narrative    Normal sinus rhythm  Nonspecific T wave abnormality  Abnormal ECG  When compared with ECG of 27-DEC-2024 09:27,  Sinus rhythm has replaced Electronic ventricular pacemaker     No results found for this or any previous visit from the past 1095 days.    Echo 10/29/2024:  Left Ventricle: The left ventricular systolic function is normal, with a visually estimated ejection fraction of 60-65%. There are no regional wall motion abnormalities. The left ventricular cavity size is normal. Spectral Doppler shows a normal pattern of left ventricular diastolic filling.  Left Atrium: The left atrium is normal in size.  Right Ventricle: The " "right ventricle is normal in size. There is normal right ventricular global systolic function.  Right Atrium: The right atrium is normal in size.  Aortic Valve: The aortic valve is trileaflet. There is no evidence of aortic valve regurgitation. The peak instantaneous gradient of the aortic valve is 8.4 mmHg.  Mitral Valve: The mitral valve is normal in structure. There is trace mitral valve regurgitation.  Tricuspid Valve: The tricuspid valve is structurally normal. There is trace tricuspid regurgitation.  Pulmonic Valve: The pulmonic valve is structurally normal. There is physiologic pulmonic valve regurgitation.  Pericardium: Trivial pericardial effusion.  Aorta: The aortic root is normal.  Systemic Veins: The inferior vena cava appears normal in size, with IVC inspiratory collapse greater than 50%.     CONCLUSIONS:   1. The left ventricular systolic function is normal, with a visually estimated ejection fraction of 60-65%.   2. There is normal right ventricular global systolic function.   3. Trace mitral valve regurgitation.   4. Trace tricuspid regurgitation is visualized.     Visit Vitals  /56   Pulse 89   Temp 36.5 °C (97.7 °F) (Temporal)   Resp 21   Ht 1.575 m (5' 2\")   Wt 81.6 kg (179 lb 14.3 oz)   SpO2 97%   BMI 32.90 kg/m²   OB Status Hysterectomy   Smoking Status Never   BSA 1.89 m²     NPO/Void Status  Carbohydrate Drink Given Prior to Surgery? : N  Date of Last Liquid: 01/13/25  Time of Last Liquid: 0000  Date of Last Solid: 01/13/25  Time of Last Solid: 0000  Last Intake Type: Clear fluids  Time of Last Void: 1300        Physical Exam    Airway  Mallampati: III  TM distance: >3 FB  Neck ROM: full     Cardiovascular - normal exam  Rhythm: regular  Rate: normal     Dental    Pulmonary - normal exam     Abdominal - normal exam             Anesthesia Plan    History of general anesthesia?: yes  History of complications of general anesthesia?: no    ASA 3     MAC   (Standard ASA " monitoring.)  intravenous induction   Anesthetic plan and risks discussed with patient.    Plan discussed with CRNA and CAA.

## 2025-01-13 NOTE — PROGRESS NOTES
01/13/25 0830   [facility] Disability Status   Are you deaf or do you have serious difficulty hearing? N   Are you blind or do you have serious difficulty seeing, even when wearing glasses? N   Because of a physical, mental, or emotional condition, do you have serious difficulty concentrating, remembering, or making decisions? (5 years old or older) N   Do you have serious difficulty walking or climbing stairs? N   Do you have serious difficulty dressing or bathing? N   Because of a physical, mental, or emotional condition, do you have serious difficulty doing errands alone such as visiting the doctor? N

## 2025-01-13 NOTE — SIGNIFICANT EVENT
GI Update    EGD showed moderate portal hypertensive gastropathy. No varices or significant bleeding. Recommend continue PPI 40 mg daily and home Nadolol. Paracentesis of 4.5 L today and we will follow-up fluid studies. Ok for low sodium diet. Patient will ultimately need to establish care with Hepatology as outpatient.    GI will follow.

## 2025-01-14 ENCOUNTER — PHARMACY VISIT (OUTPATIENT)
Dept: PHARMACY | Facility: CLINIC | Age: 50
End: 2025-01-14
Payer: COMMERCIAL

## 2025-01-14 VITALS
HEART RATE: 91 BPM | DIASTOLIC BLOOD PRESSURE: 73 MMHG | OXYGEN SATURATION: 94 % | SYSTOLIC BLOOD PRESSURE: 114 MMHG | TEMPERATURE: 98.5 F | BODY MASS INDEX: 33.1 KG/M2 | RESPIRATION RATE: 18 BRPM | HEIGHT: 62 IN | WEIGHT: 179.9 LBS

## 2025-01-14 LAB
ALBUMIN SERPL BCP-MCNC: 1.9 G/DL (ref 3.4–5)
ALP SERPL-CCNC: 255 U/L (ref 33–110)
ALT SERPL W P-5'-P-CCNC: 24 U/L (ref 7–45)
ANION GAP SERPL CALC-SCNC: <7 MMOL/L (ref 10–20)
AST SERPL W P-5'-P-CCNC: 47 U/L (ref 9–39)
BILIRUB SERPL-MCNC: 1.7 MG/DL (ref 0–1.2)
BUN SERPL-MCNC: 12 MG/DL (ref 6–23)
CALCIUM SERPL-MCNC: 7.2 MG/DL (ref 8.6–10.3)
CHLORIDE SERPL-SCNC: 107 MMOL/L (ref 98–107)
CO2 SERPL-SCNC: 29 MMOL/L (ref 21–32)
CREAT SERPL-MCNC: 0.59 MG/DL (ref 0.5–1.05)
EGFRCR SERPLBLD CKD-EPI 2021: >90 ML/MIN/1.73M*2
ERYTHROCYTE [DISTWIDTH] IN BLOOD BY AUTOMATED COUNT: 17.3 % (ref 11.5–14.5)
GLUCOSE BLD MANUAL STRIP-MCNC: 259 MG/DL (ref 74–99)
GLUCOSE BLD MANUAL STRIP-MCNC: 304 MG/DL (ref 74–99)
GLUCOSE SERPL-MCNC: 343 MG/DL (ref 74–99)
HCT VFR BLD AUTO: 31.5 % (ref 36–46)
HGB BLD-MCNC: 10.4 G/DL (ref 12–16)
MCH RBC QN AUTO: 31.2 PG (ref 26–34)
MCHC RBC AUTO-ENTMCNC: 33 G/DL (ref 32–36)
MCV RBC AUTO: 95 FL (ref 80–100)
NRBC BLD-RTO: 0 /100 WBCS (ref 0–0)
PLATELET # BLD AUTO: 64 X10*3/UL (ref 150–450)
POTASSIUM SERPL-SCNC: 3.9 MMOL/L (ref 3.5–5.3)
PROT SERPL-MCNC: 5.6 G/DL (ref 6.4–8.2)
RBC # BLD AUTO: 3.33 X10*6/UL (ref 4–5.2)
SODIUM SERPL-SCNC: 136 MMOL/L (ref 136–145)
WBC # BLD AUTO: 3 X10*3/UL (ref 4.4–11.3)

## 2025-01-14 PROCEDURE — RXMED WILLOW AMBULATORY MEDICATION CHARGE

## 2025-01-14 PROCEDURE — 2500000004 HC RX 250 GENERAL PHARMACY W/ HCPCS (ALT 636 FOR OP/ED): Performed by: INTERNAL MEDICINE

## 2025-01-14 PROCEDURE — 82947 ASSAY GLUCOSE BLOOD QUANT: CPT

## 2025-01-14 PROCEDURE — 80053 COMPREHEN METABOLIC PANEL: CPT | Performed by: INTERNAL MEDICINE

## 2025-01-14 PROCEDURE — 2500000002 HC RX 250 W HCPCS SELF ADMINISTERED DRUGS (ALT 637 FOR MEDICARE OP, ALT 636 FOR OP/ED): Performed by: INTERNAL MEDICINE

## 2025-01-14 PROCEDURE — 85027 COMPLETE CBC AUTOMATED: CPT | Performed by: INTERNAL MEDICINE

## 2025-01-14 PROCEDURE — 99232 SBSQ HOSP IP/OBS MODERATE 35: CPT | Performed by: INTERNAL MEDICINE

## 2025-01-14 PROCEDURE — 2500000001 HC RX 250 WO HCPCS SELF ADMINISTERED DRUGS (ALT 637 FOR MEDICARE OP): Performed by: INTERNAL MEDICINE

## 2025-01-14 PROCEDURE — 36415 COLL VENOUS BLD VENIPUNCTURE: CPT | Performed by: INTERNAL MEDICINE

## 2025-01-14 RX ORDER — CEPHALEXIN 500 MG/1
500 CAPSULE ORAL EVERY 6 HOURS SCHEDULED
Qty: 12 CAPSULE | Refills: 0 | Status: SHIPPED | OUTPATIENT
Start: 2025-01-14 | End: 2025-01-17

## 2025-01-14 RX ORDER — CEPHALEXIN 500 MG/1
500 CAPSULE ORAL EVERY 6 HOURS SCHEDULED
Status: DISCONTINUED | OUTPATIENT
Start: 2025-01-14 | End: 2025-01-14 | Stop reason: HOSPADM

## 2025-01-14 RX ADMIN — URSODIOL 300 MG: 300 CAPSULE ORAL at 09:48

## 2025-01-14 RX ADMIN — PANTOPRAZOLE SODIUM 40 MG: 40 INJECTION, POWDER, FOR SOLUTION INTRAVENOUS at 09:48

## 2025-01-14 RX ADMIN — RIFAXIMIN 550 MG: 550 TABLET ORAL at 09:50

## 2025-01-14 RX ADMIN — Medication 400 MG: at 09:50

## 2025-01-14 RX ADMIN — SPIRONOLACTONE 50 MG: 25 TABLET ORAL at 09:50

## 2025-01-14 RX ADMIN — FENOFIBRATE 160 MG: 160 TABLET ORAL at 09:50

## 2025-01-14 RX ADMIN — SUCRALFATE 1 G: 1 TABLET ORAL at 05:59

## 2025-01-14 RX ADMIN — INSULIN LISPRO 4 UNITS: 100 INJECTION, SOLUTION INTRAVENOUS; SUBCUTANEOUS at 10:00

## 2025-01-14 RX ADMIN — FUROSEMIDE 40 MG: 40 TABLET ORAL at 09:49

## 2025-01-14 ASSESSMENT — COGNITIVE AND FUNCTIONAL STATUS - GENERAL
MOBILITY SCORE: 24
DAILY ACTIVITIY SCORE: 24

## 2025-01-14 ASSESSMENT — PAIN SCALES - GENERAL: PAINLEVEL_OUTOF10: 0 - NO PAIN

## 2025-01-14 NOTE — PROGRESS NOTES
Alfonzo Flanagan is a 50 y.o. female on day 3 of admission presenting with GI bleed.      Subjective   No acute events overnight.  Patient seen examined bedside.  Feels well today.  Was able to tolerate diet.  Denies nausea/vomiting/abdominal pain.       Objective     Last Recorded Vitals  /73 (BP Location: Left arm, Patient Position: Sitting)   Pulse 91   Temp 36.9 °C (98.5 °F) (Temporal)   Resp 18   Wt 81.6 kg (179 lb 14.3 oz)   SpO2 94%   Intake/Output last 3 Shifts:    Intake/Output Summary (Last 24 hours) at 1/14/2025 1123  Last data filed at 1/13/2025 2043  Gross per 24 hour   Intake 250 ml   Output --   Net 250 ml       Admission Weight  Weight: 81.6 kg (180 lb) (01/11/25 1651)    Daily Weight  01/13/25 : 81.6 kg (179 lb 14.3 oz)    Image Results  Esophagogastroduodenoscopy (EGD)  Table formatting from the original result was not included.  Impression  The esophagus appeared normal.  Irregular Z-line  Moderate portal hypertensive gastropathy in the cardia, fundus of the   stomach, body of the stomach, incisura, antrum, prepyloric region and   pylorus  The stomach was otherwise normal on direct and retroflexion views.   Additional information: No gastric varices were visualized.  The duodenal bulb and 1st part of the duodenum appeared normal.    Findings  The esophagus appeared normal. Additional information: No esophageal   varices were visualized.  Irregular Z-line 39 cm from the incisors  Moderate and diffuse portal hypertensive gastropathy in the cardia, fundus   of the stomach, body of the stomach, incisura, antrum, prepyloric region   and pylorus  The stomach was otherwise normal on direct and retroflexion views.   Additional information: No gastric varices were visualized.  The duodenal bulb and 1st part of the duodenum appeared normal.    Recommendation  See below.     - Return the patient to the hospital burr or ICU (if applicable).  -The patient has a contact number available for   emergencies.  The signs   and symptoms of potential delayed complications were discussed with the   patient.  Return to normal activities tomorrow.  Written discharge   instructions were provided to the patient.  - Resume previous diet.  - Continue present medications.  - Follow-up with the inpatient GI consult service for further   recommendations.    Indication  Hematemesis with nausea, Cirrhosis of liver with ascites, unspecified   hepatic cirrhosis type (Multi)    Staff  Staff Role   Agus Fowler MD Proceduralist     Medications  See Anesthesia Record.     Preprocedure  A history and physical has been performed, and patient medication   allergies have been reviewed. The patient's tolerance of previous   anesthesia has been reviewed. The risks and benefits of the procedure and   the sedation options and risks were discussed with the patient. All   questions were answered and informed consent obtained.    Details of the Procedure  The patient underwent monitored anesthesia care, which was administered by   an anesthesia professional. The patient's blood pressure, ECG, ETCO2,   heart rate, level of consciousness, oxygen and respirations were monitored   throughout the procedure. The scope was introduced through the mouth and   advanced to the second part of the duodenum. Retroflexion was performed in   the cardia, fundus and incisura. The patient experienced no blood loss.   The procedure was not difficult. The patient tolerated the procedure well.   There were no apparent adverse events.     Events  Procedure Events   Event Event Time   ENDO SCOPE IN TIME 1/13/2025  1:45 PM   ENDO SCOPE OUT TIME 1/13/2025  1:49 PM     Specimens  No specimens collected    Procedure Location  [facility], [place]  [address] [address]  [phone]    Referring Provider  Ning Perea APRN-CNP    Procedure Provider  Agus Fowler MD  US guided abdominal paracentesis  Narrative:  Interpreted By:  Brady Bhandari,   STUDY:  US GUIDED ABDOMINAL PARACENTESIS; 1/13/202512:59 pm      INDICATION:  Signs/Symptoms:cirrhosis, ascites.      COMPARISON:  US guided paracentesis 12/27/2024      ACCESSION NUMBER(S):  [id number]      ORDERING CLINICIAN:  ASIA HICKMAN      TECHNIQUE:  INTERVENTIONALIST(S):  Brady Bhandari      CONSENT:  The patient/patient's POA/next of kin was informed of the nature of  the proposed procedure. The purposes, alternatives, risks, and  benefits were explained and discussed. All questions were answered  and consent was obtained.      SEDATION:  None      MEDICATION/CONTRAST:  1% lidocaine was used to anesthetize subcutaneously.      TIME OUT:  A time out was performed immediately prior to procedure start with  the interventional team, correctly identifying the patient name, date  of birth, MRN, procedure, anatomy (including marking of site and  side), patient position, procedure consent form, relevant laboratory  and imaging test results, antibiotic administration, safety  precautions, and procedure-specific equipment needs.      FINDINGS:  The patient was placed in the supine position.      The abdominal space was examined with grey scale ultrasound, and the  most accessible fluid identified and marked for paracentesis.      The skin was prepped and draped in usual manner. Local anesthesia  with Lidocaine was administered and a right-sided paracentesis was  performed.  A 5 Ukrainian One-Step paracentesis needle/catheter was then  placed where marked. Approximately 4.6 L of yellowish colored fluid  was removed.  The needle/catheter was then withdrawn.      The patient tolerated the procedure well and there were no immediate  complications. Specimen(s) sent to the laboratory for further  evaluation, per the requesting team.      Impression: Uneventful paracentesis, as detailed above. Right Hemiabdomen, 4.6 L      I personally performed and/or directly supervised this study and  was  present for the entire procedure.      Performed and dictated at [facility].      Signed by: Brady Moeen 1/13/2025 1:00 PM  Dictation workstation:   [id number]  ECG 12 lead  Normal sinus rhythm  Nonspecific T wave abnormality  Abnormal ECG  When compared with ECG of 27-DEC-2024 09:27,  Sinus rhythm has replaced Electronic ventricular pacemaker      Physical Exam  Vitals reviewed.   Constitutional:       Appearance: She is obese.   HENT:      Head: Atraumatic.   Cardiovascular:      Rate and Rhythm: Regular rhythm. Tachycardia present.      Pulses: Normal pulses.      Heart sounds: Normal heart sounds.   Pulmonary:      Effort: Pulmonary effort is normal.      Breath sounds: Normal breath sounds.   Abdominal:      General: Bowel sounds are normal. There is distension.      Palpations: Abdomen is soft.      Tenderness: There is no abdominal tenderness. There is no guarding or rebound.   Musculoskeletal:         General: Normal range of motion.      Cervical back: Normal range of motion and neck supple.   Skin:     General: Skin is warm and dry.   Neurological:      General: No focal deficit present.      Mental Status: She is alert and oriented to person, place, and time.   Psychiatric:         Mood and Affect: Mood normal.         Behavior: Behavior normal.         Relevant Results           Scheduled medications  atorvastatin, 80 mg, oral, Nightly  cephalexin, 500 mg, oral, q6h RIVKA  docusate sodium, 100 mg, oral, BID  fenofibrate, 160 mg, oral, Daily  furosemide, 40 mg, oral, Daily  insulin lispro, 0-5 Units, subcutaneous, TID AC  lactulose, 20 g, oral, BID  magnesium oxide, 400 mg, oral, Daily  midodrine, 5 mg, oral, TID  nadolol, 20 mg, oral, Daily  nitroglycerin, 1 patch, transdermal, Daily  pantoprazole, 40 mg, intravenous, BID  polyethylene glycol, 17 g, oral, BID  rifAXIMin, 550 mg, oral, q12h RIVKA  spironolactone, 50 mg, oral, BID  sucralfate, 1 g, oral, Before meals &  nightly  traZODone, 25 mg, oral, Nightly  ursodiol, 300 mg, oral, TID      Continuous medications     PRN medications  PRN medications: acetaminophen **OR** acetaminophen **OR** acetaminophen, dextrose, dextrose, glucagon, glucagon, hydrOXYzine HCL, ondansetron **OR** ondansetron   Results for orders placed or performed during the hospital encounter of 01/11/25 (from the past 24 hours)   POCT GLUCOSE   Result Value Ref Range    POCT Glucose 111 (H) 74 - 99 mg/dL   POCT GLUCOSE   Result Value Ref Range    POCT Glucose 184 (H) 74 - 99 mg/dL   POCT GLUCOSE   Result Value Ref Range    POCT Glucose 334 (H) 74 - 99 mg/dL   CBC   Result Value Ref Range    WBC 3.0 (L) 4.4 - 11.3 x10*3/uL    nRBC 0.0 0.0 - 0.0 /100 WBCs    RBC 3.33 (L) 4.00 - 5.20 x10*6/uL    Hemoglobin 10.4 (L) 12.0 - 16.0 g/dL    Hematocrit 31.5 (L) 36.0 - 46.0 %    MCV 95 80 - 100 fL    MCH 31.2 26.0 - 34.0 pg    MCHC 33.0 32.0 - 36.0 g/dL    RDW 17.3 (H) 11.5 - 14.5 %    Platelets 64 (L) 150 - 450 x10*3/uL   Comprehensive metabolic panel   Result Value Ref Range    Glucose 343 (H) 74 - 99 mg/dL    Sodium 136 136 - 145 mmol/L    Potassium 3.9 3.5 - 5.3 mmol/L    Chloride 107 98 - 107 mmol/L    Bicarbonate 29 21 - 32 mmol/L    Anion Gap <7 (L) 10 - 20 mmol/L    Urea Nitrogen 12 6 - 23 mg/dL    Creatinine 0.59 0.50 - 1.05 mg/dL    eGFR >90 >60 mL/min/1.73m*2    Calcium 7.2 (L) 8.6 - 10.3 mg/dL    Albumin 1.9 (L) 3.4 - 5.0 g/dL    Alkaline Phosphatase 255 (H) 33 - 110 U/L    Total Protein 5.6 (L) 6.4 - 8.2 g/dL    AST 47 (H) 9 - 39 U/L    Bilirubin, Total 1.7 (H) 0.0 - 1.2 mg/dL    ALT 24 7 - 45 U/L   POCT GLUCOSE   Result Value Ref Range    POCT Glucose 304 (H) 74 - 99 mg/dL     *Note: Due to a large number of results and/or encounters for the requested time period, some results have not been displayed. A complete set of results can be found in Results Review.    Esophagogastroduodenoscopy (EGD)    Result Date: 1/13/2025  Table formatting from the original  result was not included. Impression The esophagus appeared normal. Irregular Z-line Moderate portal hypertensive gastropathy in the cardia, fundus of the stomach, body of the stomach, incisura, antrum, prepyloric region and pylorus The stomach was otherwise normal on direct and retroflexion views. Additional information: No gastric varices were visualized. The duodenal bulb and 1st part of the duodenum appeared normal. Findings The esophagus appeared normal. Additional information: No esophageal varices were visualized. Irregular Z-line 39 cm from the incisors Moderate and diffuse portal hypertensive gastropathy in the cardia, fundus of the stomach, body of the stomach, incisura, antrum, prepyloric region and pylorus The stomach was otherwise normal on direct and retroflexion views. Additional information: No gastric varices were visualized. The duodenal bulb and 1st part of the duodenum appeared normal. Recommendation See below.  - Return the patient to the hospital burr or ICU (if applicable). -The patient has a contact number available for  emergencies.  The signs and symptoms of potential delayed complications were discussed with the patient.  Return to normal activities tomorrow.  Written discharge instructions were provided to the patient. - Resume previous diet. - Continue present medications. - Follow-up with the inpatient GI consult service for further recommendations. Indication Hematemesis with nausea, Cirrhosis of liver with ascites, unspecified hepatic cirrhosis type (Multi) Staff Staff Role Agus Fowler MD Proceduralist Medications See Anesthesia Record. Preprocedure A history and physical has been performed, and patient medication allergies have been reviewed. The patient's tolerance of previous anesthesia has been reviewed. The risks and benefits of the procedure and the sedation options and risks were discussed with the patient. All questions were answered and informed consent obtained. Details  of the Procedure The patient underwent monitored anesthesia care, which was administered by an anesthesia professional. The patient's blood pressure, ECG, ETCO2, heart rate, level of consciousness, oxygen and respirations were monitored throughout the procedure. The scope was introduced through the mouth and advanced to the second part of the duodenum. Retroflexion was performed in the cardia, fundus and incisura. The patient experienced no blood loss. The procedure was not difficult. The patient tolerated the procedure well. There were no apparent adverse events. Events Procedure Events Event Event Time ENDO SCOPE IN TIME 1/13/2025  1:45 PM ENDO SCOPE OUT TIME 1/13/2025  1:49 PM Specimens No specimens collected Procedure Location [facility] [address] [facility] [id number] [phone] Referring Provider Asia Perea, APRN-CNP Procedure Provider Agus Fowler MD     US guided abdominal paracentesis    Result Date: 1/13/2025  Interpreted By:  Brady Bhandari, STUDY: US GUIDED ABDOMINAL PARACENTESIS; 1/13/202512:59 pm   INDICATION: Signs/Symptoms:cirrhosis, ascites.   COMPARISON: US guided paracentesis 12/27/2024   ACCESSION NUMBER(S): [id number]   ORDERING CLINICIAN: ASIA PEREA   TECHNIQUE: INTERVENTIONALIST(S): Brady Bhandari   CONSENT: The patient/patient's POA/next of kin was informed of the nature of the proposed procedure. The purposes, alternatives, risks, and benefits were explained and discussed. All questions were answered and consent was obtained.   SEDATION: None   MEDICATION/CONTRAST: 1% lidocaine was used to anesthetize subcutaneously.   TIME OUT: A time out was performed immediately prior to procedure start with the interventional team, correctly identifying the patient name, date of birth, MRN, procedure, anatomy (including marking of site and side), patient position, procedure consent form, relevant laboratory and imaging test results, antibiotic  administration, safety precautions, and procedure-specific equipment needs.   FINDINGS: The patient was placed in the supine position.   The abdominal space was examined with grey scale ultrasound, and the most accessible fluid identified and marked for paracentesis.   The skin was prepped and draped in usual manner. Local anesthesia with Lidocaine was administered and a right-sided paracentesis was performed.  A 5 Uzbek One-Step paracentesis needle/catheter was then placed where marked. Approximately 4.6 L of yellowish colored fluid was removed.  The needle/catheter was then withdrawn.   The patient tolerated the procedure well and there were no immediate complications. Specimen(s) sent to the laboratory for further evaluation, per the requesting team.       Uneventful paracentesis, as detailed above. Right Hemiabdomen, 4.6 L   I personally performed and/or directly supervised this study and was present for the entire procedure.   Performed and dictated at [facility].   Signed by: Brady Bhandari 1/13/2025 1:00 PM Dictation workstation:   [id number]    ECG 12 lead    Result Date: 1/13/2025  Normal sinus rhythm Nonspecific T wave abnormality Abnormal ECG When compared with ECG of 27-DEC-2024 09:27, Sinus rhythm has replaced Electronic ventricular pacemaker    CT angio chest for pulmonary embolism    Result Date: 1/11/2025  STUDY: CT Angiogram of the Chest, CT Abdomen and Pelvis with IV Contrast; 01/11/2025, 7:54 PM INDICATION: Abdominal Pain, vomiting and hematemesis. Pleurisy. COMPARISON: CT AP 01/08/2025, 12/24/2024, 12/11/2024. ACCESSION NUMBER(S): [id number], [id number] ORDERING CLINICIAN: SANIYA FLORES TECHNIQUE:  CTA of the chest was performed following rapid injection of intravenous contrast.  Images are reviewed and processed at a workstation according to the CT angiogram protocol with 3-D and/or MIP post processing imaging generated.  CT of the abdomen and pelvis  was performed with intravenous contrast.  Omnipaque 350, 75 mL was administered intravenously; positive oral contrast was given. Automated mA/kV exposure control was utilized and patient examination was performed in strict accordance with principles of ALARA. FINDINGS:  CTA CHEST: Pulmonary arteries are adequately opacified without acute or chronic filling defects.  The thoracic aorta is normal in course and caliber without dissection or aneurysm. The heart is normal in size without pericardial effusion.  Thoracic lymph nodes are not enlarged. There is no pleural effusion, pleural thickening, or pneumothorax. The airways are patent. No consolidative airspace disease or pulmonary edema.  There is a 9 x 7 mm solid nodule in the right middle lobe.  This is stable since at least a CT abdomen/pelvis of 6/20/2024. ABDOMEN:  LIVER: Cirrhotic morphology.  No short-term changes.  No masses.  BILE DUCTS: No intrahepatic or extrahepatic biliary ductal dilatation.  GALLBLADDER: The gallbladder is surgically absent.  STOMACH: Unremarkable.  PANCREAS: Unremarkable  SPLEEN: Enlarged (maximum dimension 14.5 cm) without significant change.  No acute findings.  ADRENAL GLANDS: Unremarkable  KIDNEYS AND URETERS: Unremarkable  PELVIS:  BLADDER: Unremarkable  REPRODUCTIVE ORGANS: Unremarkable  BOWEL: No bowel obstruction or ileus.  No pneumatosis.  There is mild edematous thickening of the jejunum likely due to third spacing considering the other findings.  VESSELS: Recanalized paraumbilical vein.  Abdominal aorta is normal in caliber.  PERITONEUM/RETROPERITONEUM/LYMPH NODES: Moderate abdominal and pelvic ascites mildly increased from CT 3 days prior.  No pneumoperitoneum. Edematous changes within the omentum and mesenteries.  No lymphadenopathy.  ABDOMINAL WALL: Umbilical hernia containing fluid again seen. SOFT TISSUES: Subcutaneous edema of the abdominal wall again seen.  No soft tissue emphysema.  No abscess.  BONES: No acute  fracture or aggressive osseous lesion.  Left unilateral pars defect at L5.  No listhesis.    Cirrhosis with stigmata of portal hypertension again seen.  Mildly increased volume of ascites compared to recent CT, moderate. No acute pathology in the chest. Signed by Arpit Sandy, DO    CT abdomen pelvis w IV contrast    Result Date: 1/11/2025  STUDY: CT Angiogram of the Chest, CT Abdomen and Pelvis with IV Contrast; 01/11/2025, 7:54 PM INDICATION: Abdominal Pain, vomiting and hematemesis. Pleurisy. COMPARISON: CT AP 01/08/2025, 12/24/2024, 12/11/2024. ACCESSION NUMBER(S): [id number], [id number] ORDERING CLINICIAN: SANIYA FLORES TECHNIQUE:  CTA of the chest was performed following rapid injection of intravenous contrast.  Images are reviewed and processed at a workstation according to the CT angiogram protocol with 3-D and/or MIP post processing imaging generated.  CT of the abdomen and pelvis was performed with intravenous contrast.  Omnipaque 350, 75 mL was administered intravenously; positive oral contrast was given. Automated mA/kV exposure control was utilized and patient examination was performed in strict accordance with principles of ALARA. FINDINGS:  CTA CHEST: Pulmonary arteries are adequately opacified without acute or chronic filling defects.  The thoracic aorta is normal in course and caliber without dissection or aneurysm. The heart is normal in size without pericardial effusion.  Thoracic lymph nodes are not enlarged. There is no pleural effusion, pleural thickening, or pneumothorax. The airways are patent. No consolidative airspace disease or pulmonary edema.  There is a 9 x 7 mm solid nodule in the right middle lobe.  This is stable since at least a CT abdomen/pelvis of 6/20/2024. ABDOMEN:  LIVER: Cirrhotic morphology.  No short-term changes.  No masses.  BILE DUCTS: No intrahepatic or extrahepatic biliary ductal dilatation.  GALLBLADDER: The gallbladder is surgically absent.  STOMACH:  Unremarkable.  PANCREAS: Unremarkable  SPLEEN: Enlarged (maximum dimension 14.5 cm) without significant change.  No acute findings.  ADRENAL GLANDS: Unremarkable  KIDNEYS AND URETERS: Unremarkable  PELVIS:  BLADDER: Unremarkable  REPRODUCTIVE ORGANS: Unremarkable  BOWEL: No bowel obstruction or ileus.  No pneumatosis.  There is mild edematous thickening of the jejunum likely due to third spacing considering the other findings.  VESSELS: Recanalized paraumbilical vein.  Abdominal aorta is normal in caliber.  PERITONEUM/RETROPERITONEUM/LYMPH NODES: Moderate abdominal and pelvic ascites mildly increased from CT 3 days prior.  No pneumoperitoneum. Edematous changes within the omentum and mesenteries.  No lymphadenopathy.  ABDOMINAL WALL: Umbilical hernia containing fluid again seen. SOFT TISSUES: Subcutaneous edema of the abdominal wall again seen.  No soft tissue emphysema.  No abscess.  BONES: No acute fracture or aggressive osseous lesion.  Left unilateral pars defect at L5.  No listhesis.    Cirrhosis with stigmata of portal hypertension again seen.  Mildly increased volume of ascites compared to recent CT, moderate. No acute pathology in the chest. Signed by Arpit Sandy, DO    CT abdomen pelvis wo IV contrast    Result Date: 1/8/2025  Interpreted By:  Kwame Saul, STUDY: CT ABDOMEN PELVIS WO IV CONTRAST;  1/8/2025 11:02 pm   INDICATION: Signs/Symptoms:Abdominal pain, liver failure.     COMPARISON: CT abdomen and pelvis dated 12/24/2024.   ACCESSION NUMBER(S): [id number]   ORDERING CLINICIAN: DIPTI BROWN   TECHNIQUE: CT of the abdomen and pelvis was performed. Contiguous axial images were obtained at 3 mm slice thickness through the abdomen and pelvis. Coronal and sagittal reconstructions at 3 mm slice thickness were performed.  No intravenous or oral contrast agents were administered.   FINDINGS: Please note that the evaluation of vessels, lymph nodes and organs is limited without intravenous  contrast.   LOWER CHEST: Included lung bases are clear without evidence of new consolidation or pleural effusion. Right lung nodule is unchanged in appearance to recent exams.   Heart is normal in size without pericardial effusion.   Distal esophagus does not demonstrate any acute abnormality.   ABDOMEN:   LIVER: Liver is nodular in contour suggestive of underlying cirrhosis. No acute hepatic parenchymal abnormality is evident within limits of noncontrast exam.   BILE DUCTS: No new intrahepatic or extrahepatic biliary dilatation is present.   GALLBLADDER: Gallbladder is surgically absent.   PANCREAS: No pancreatic ductal dilatation or peripancreatic stranding is present.   SPLEEN: No acute splenic abnormality is present.   ADRENAL GLANDS: Bilateral adrenal glands are unremarkable in appearance.   KIDNEYS AND URETERS: Kidneys are symmetric in size without evidence of hydronephrosis or radiopaque nephrolithiasis. Visualized upper ureters are unremarkable in appearance.   PELVIS:   BLADDER: No bladder wall thickening is present.   REPRODUCTIVE ORGANS: Uterus is present. No new adnexal masses or collections are identified.   BOWEL: Stomach is distended with food debris without evidence of gastric wall thickening. Proximal small bowel is relatively decompressed. There is unchanged mild wall thickening in the more distal jejunum and ileum, likely due to underlying hepatic dysfunction and fluid overload. No abnormal small bowel dilatation is present. No inflammatory large bowel wall thickening is identified.   Appendix is not visualized.   VESSELS: No acute vascular abnormality is identified within limits of noncontrast exam.   PERITONEUM/RETROPERITONEUM/LYMPH NODES: Moderate to large volume of abdominal ascites is present, similar in appearance to prior study on 12/24/2024. There is no evidence of free air or thick-walled collections in the abdomen or pelvis. No enlarged lymphadenopathy is identified.   ABDOMINAL WALL:  There is some fluid present in the periumbilical hernia, similar to prior study. Mild edema and stranding is present in the cutaneous tissues suggestive of 3rd spacing. No acute abnormalities are otherwise identified in the cutaneous soft tissues.   BONES: No acute osseous abnormality is identified in the visualized spine or pelvis.       1.  Cirrhotic liver, with sequela of portal hypertension, including moderate volume of abdominal ascites, similar in appearance to prior exam on 12/24/2024. No acute abnormalities are identified in the abdomen and pelvis. 2. Persistent distention of the stomach with food debris with relatively decompressed appearance of the proximal duodenum and jejunum. Correlate with any gastroparesis. 3. Persistent mild wall thickening in the small bowel, likely secondary to underlying hepatic dysfunction. 4. Solid nodule in the right middle lobe is unchanged in appearance to recent exam. Continued attention to on follow-up is recommended.     MACRO: None   Signed by: Kwame Saul 1/8/2025 11:26 PM Dictation workstation:   [id number]    ECG 12 lead    Result Date: 12/28/2024  AV dual-paced rhythm with prolonged AV conduction Abnormal ECG When compared with ECG of 08-DEC-2024 11:17, Electronic ventricular pacemaker has replaced Sinus rhythm See ED provider note for full interpretation and clinical correlation Confirmed by Breanna Kruger ([id number]) on 12/28/2024 10:09:57 AM    US guided abdominal paracentesis    Result Date: 12/27/2024  Interpreted By:  Brady Bhandari, STUDY: US GUIDED ABDOMINAL PARACENTESIS; 12/27/20244:32 pm   INDICATION: Signs/Symptoms:severe ascites.   COMPARISON: US guided paracentesis 12/13/2024   ACCESSION NUMBER(S): [id number]   ORDERING CLINICIAN: LETITIA KIRKLAND   TECHNIQUE: INTERVENTIONALIST(S): Brady Bhandari   CONSENT: The patient/patient's POA/next of kin was informed of the nature of the proposed procedure. The purposes, alternatives, risks, and benefits  were explained and discussed. All questions were answered and consent was obtained.   SEDATION: None   MEDICATION/CONTRAST: 1% lidocaine was used to anesthetize subcutaneously.   TIME OUT: A time out was performed immediately prior to procedure start with the interventional team, correctly identifying the patient name, date of birth, MRN, procedure, anatomy (including marking of site and side), patient position, procedure consent form, relevant laboratory and imaging test results, antibiotic administration, safety precautions, and procedure-specific equipment needs.   FINDINGS: The patient was placed in the supine position.   The abdominal space was examined with grey scale ultrasound, and the most accessible fluid identified and marked for paracentesis.   The skin was prepped and draped in usual manner. Local anesthesia with Lidocaine was administered and a right-sided paracentesis was performed.  A 5 Citizen of [place] One-Step paracentesis needle/catheter was then placed where marked. Approximately 3.6 L of yellowish colored fluid was removed. The needle/catheter was then withdrawn.   The patient tolerated the procedure well and there were no immediate complications.       Uneventful paracentesis, as detailed above. Right Hemiabdomen, 3.6 L   I personally performed and/or directly supervised this study and was present for the entire procedure.   Performed and dictated at [facility].   Signed by: Brady Bhandari 12/27/2024 4:33 PM Dictation workstation:   [id number]    CT abdomen pelvis w IV contrast    Result Date: 12/24/2024  STUDY: CT Abdomen and Pelvis with IV Contrast; 12/24/2024 10:02 PM. INDICATION: Ascites.  Abdominal pain. COMPARISON: CT AP 12/8/2024, 11/20/2024, 10/27/2024, 9/24/2024 . ACCESSION NUMBER(S): [id number] ORDERING CLINICIAN: GREGORY NICHOLAS TECHNIQUE: CT of the abdomen and pelvis was performed.  Contiguous axial images were obtained at 3 mm slice thickness through the  abdomen and pelvis. Coronal and sagittal reconstructions at 3 mm slice thickness were performed.  Omnipaque 350 75 mL was administered intravenously.  FINDINGS: LOWER CHEST: No cardiomegaly.  No pericardial effusion. Small 7 x 6 mm nodule in the distribution of the right middle lobe, noted recently. Otherwise clear lung bases.  ABDOMEN:  LIVER: Small cirrhotic liver, with lobular contour, decreased density and prior cholecystectomy. Mild splenomegaly. Moderate ascites in all 4 quadrants.  BILE DUCTS: No intrahepatic or extrahepatic biliary ductal dilatation.  GALLBLADDER: The gallbladder is surgically absent.. STOMACH: No abnormalities identified.  PANCREAS: No masses or ductal dilatation.  SPLEEN: No splenomegaly or focal splenic lesion.  ADRENAL GLANDS: No thickening or nodules.  KIDNEYS AND URETERS: Kidneys are normal in size and location.  No renal or ureteral calculi.  PELVIS:  BLADDER: No abnormalities identified.  REPRODUCTIVE ORGANS: No abnormalities identified.  BOWEL: No abnormalities identified. Moderate fluid containing umbilical hernia, 5.5 x 3.0 cm.  VESSELS: No abnormalities identified.  Abdominal aorta is normal in caliber.  PERITONEUM/RETROPERITONEUM/LYMPH NODES: No free fluid.  No pneumoperitoneum. No lymphadenopathy.  ABDOMINAL WALL: No abnormalities identified. SOFT TISSUES: No abnormalities identified.  BONES: No acute fracture or aggressive osseous lesion.    Small 7 x 6 mm nodule in the right middle lobe, unchanged from recent prior study; nodules should be followed according to the Fleischner thoracic Society guidelines. Small cirrhotic liver with nodular contour, unchanged. Moderate splenomegaly. Moderate ascites involving all 4 abdominal quadrants, similar to recent prior study. Umbilical fluid containing hernia 5.5 x 3.0 cm also again noted. No bowel obstruction. No significant interval change from prior study. . Signed by Vasile Grimaldo MD        Assessment/Plan                   Assessment & Plan  GI bleed    Alfonzo Flanagan is a 50 y.o. female with h/o PBC cirrhosis ( Liver biopsy 3/18/2021 with periportal to bridging fibrosis c/w PBC), pHTN with ascites, small EV, HE, celiac disease, chronic pancreatitis, DM, HTN, GERD, presenting with bilateral flank pain, dysuria, worsening ascites with abdominal discomfort, as well as nausea/vomiting with hematemesis.   EGD yesterday showed moderate portal hypertensive gastropathy. No varices or significant bleeding.   Paracentesis of 4.5 L yesterday. Negative for SBP.  SAAG 1.5 and TFP 1.0 consistent with PHTN. H/H uptrending. No further overt bleeding.    -Recommend continue PPI 40 mg daily and home Nadolol  -Low sodium diet  -Patient will ultimately need to establish care with Hepatology as outpatient  -No barriers to discharge from GI standpoint  -GI will sign off.  Please reach out if any questions or further assistance needed      Case discussed with Dr. Malcolm Mathias, APRN-CNP

## 2025-01-14 NOTE — CARE PLAN
The patient's goals for the shift include      The clinical goals for the shift include pt pain to be controlled      Problem: Diabetes  Goal: Achieve decreasing blood glucose levels by end of shift  Outcome: Progressing  Goal: Increase stability of blood glucose readings by end of shift  Outcome: Progressing  Goal: Decrease in ketones present in urine by end of shift  Outcome: Progressing  Goal: Maintain electrolyte levels within acceptable range throughout shift  Outcome: Progressing  Goal: Maintain glucose levels >70mg/dl to <250mg/dl throughout shift  Outcome: Progressing  Goal: No changes in neurological exam by end of shift  Outcome: Progressing  Goal: Learn about and adhere to nutrition recommendations by end of shift  Outcome: Progressing  Goal: Vital signs within normal range for age by end of shift  Outcome: Progressing  Goal: Increase self care and/or family involovement by end of shift  Outcome: Progressing  Goal: Receive DSME education by end of shift  Outcome: Progressing

## 2025-01-14 NOTE — DISCHARGE INSTRUCTIONS
Follow-up with hepatology within 30 days.    Follow-up PCP within 2 weeks    Low-sodium diet    Activity as tolerated

## 2025-01-14 NOTE — PROGRESS NOTES
INTERNAL MEDICINE PROGRESS NOTE      HPI:    Underwent paracentesis and EGD yesterday.  No shortness of breath at rest.  Mild flank discomfort.    Vital signs in last 24 hours:  Temp:  [36.4 °C (97.6 °F)-36.9 °C (98.5 °F)] 36.9 °C (98.5 °F)  Heart Rate:  [80-99] 91  Resp:  [14-21] 18  BP: ()/(50-73) 114/73    Physical Examination:  Physical Exam    Constitutional:       Appearance: Middle-aged, well-built, in no distress  HENT:      Head: Normocephalic and atraumatic.   Eyes:      Extraocular Movements: Extraocular movements intact.      Pupils: Pupils are equal, round, and reactive to light.   Cardiovascular:      Rate and Rhythm: Normal rate and regular rhythm.      Pulses: Normal pulses.      Heart sounds: Normal heart sounds.   Pulmonary:      Effort: Pulmonary effort is normal.      Breath sounds: Normal breath sounds.   Abdominal:      General: Abdomen is distended. Bowel sounds are normal.      Palpations: Abdomen is soft.  Mild generalized tenderness, no masses or rebound.  Musculoskeletal:         General: Normal range of motion.      Cervical back: Normal range of motion and neck supple.   Skin:     General: Skin is warm and dry.   Neurological:      General: No focal deficit present.      Mental Status: Alert and oriented x 3, speech fluent.    Medications:    Current Facility-Administered Medications:     acetaminophen (Tylenol) tablet 650 mg, 650 mg, oral, q4h PRN **OR** acetaminophen (Tylenol) oral liquid 650 mg, 650 mg, oral, q4h PRN **OR** acetaminophen (Tylenol) suppository 650 mg, 650 mg, rectal, q4h PRN, Anna Pagan MD    atorvastatin (Lipitor) tablet 80 mg, 80 mg, oral, Nightly, Anna Pagan MD, 80 mg at 01/13/25 2048    cefTRIAXone (Rocephin) 2 g in dextrose 5% IV 50 mL, 2 g, intravenous, q24h, Anna Pagan MD, Stopped at 01/13/25 2043    dextrose 50 % injection 12.5 g, 12.5 g, intravenous, q15 min PRN, Anna Pagan MD    dextrose 50 % injection 25 g,  25 g, intravenous, q15 min PRN, Anna Paagn MD    docusate sodium (Colace) capsule 100 mg, 100 mg, oral, BID, Anna Pagan MD, 100 mg at 01/13/25 2042    fenofibrate (Triglide) tablet 160 mg, 160 mg, oral, Daily, Anna Pagan MD, 160 mg at 01/14/25 0950    furosemide (Lasix) tablet 40 mg, 40 mg, oral, Daily, Anna Pagan MD, 40 mg at 01/14/25 0949    glucagon (Glucagen) injection 1 mg, 1 mg, intramuscular, q15 min PRN, Anna Pagan MD    glucagon (Glucagen) injection 1 mg, 1 mg, intramuscular, q15 min PRN, Anna Pagan MD    hydrOXYzine HCL (Atarax) tablet 25 mg, 25 mg, oral, q6h PRN, Anna Pagan MD, 25 mg at 01/12/25 0830    insulin lispro injection 0-5 Units, 0-5 Units, subcutaneous, TID AC, Anna Pagan MD, 4 Units at 01/14/25 1000    lactulose 20 gram/30 mL oral solution 20 g, 20 g, oral, BID, Anna Pagan MD, 20 g at 01/13/25 2042    magnesium oxide (Mag-Ox) tablet 400 mg, 400 mg, oral, Daily, Anna Pagan MD, 400 mg at 01/14/25 0950    midodrine (Proamatine) tablet 5 mg, 5 mg, oral, TID, Anna Pagan MD, 5 mg at 01/13/25 1831    nadolol (Corgard) tablet 20 mg, 20 mg, oral, Daily, Anna Pagan MD    nitroglycerin (Nitrodur) 0.1 mg/hr patch 1 patch - PATIENT OWN MEDICATION, 1 patch, transdermal, Daily, Anna Pagan MD    ondansetron (Zofran) tablet 4 mg, 4 mg, oral, q8h PRN **OR** ondansetron (Zofran) injection 4 mg, 4 mg, intravenous, q8h PRN, Anna Pagan MD, 4 mg at 01/12/25 2024    pantoprazole (ProtoNix) injection 40 mg, 40 mg, intravenous, BID, Anna Pagan MD, 40 mg at 01/14/25 0948    polyethylene glycol (Glycolax, Miralax) packet 17 g, 17 g, oral, BID, Anna Pagan MD, 17 g at 01/13/25 2048    rifAXIMin (Xifaxan) tablet 550 mg, 550 mg, oral, q12h RIVKA, Anna Pagan MD, 550 mg at 01/14/25 0950    spironolactone (Aldactone) tablet 50 mg, 50 mg, oral, BID, Anna Pagan MD, 50 mg at  01/14/25 0950    sucralfate (Carafate) tablet 1 g, 1 g, oral, Before meals & nightly, Anna Pagan MD, 1 g at 01/14/25 0559    traZODone (Desyrel) tablet 25 mg, 25 mg, oral, Nightly, Anna Pagan MD, 25 mg at 01/13/25 2042    ursodiol (Actigall) capsule 300 mg, 300 mg, oral, TID, Anna Pagan MD, 300 mg at 01/14/25 0948    Laboratory Findings:  Lab Results   Component Value Date    WBC 3.0 (L) 01/14/2025    HGB 10.4 (L) 01/14/2025    HCT 31.5 (L) 01/14/2025    MCV 95 01/14/2025    PLT 64 (L) 01/14/2025     Lab Results   Component Value Date    INR 1.3 (H) 01/11/2025    INR 1.5 (H) 12/27/2024    INR 1.3 (H) 12/24/2024    PROTIME 14.6 (H) 01/11/2025    PROTIME 17.0 (H) 12/27/2024    PROTIME 13.7 (H) 12/24/2024     Lab Results   Component Value Date    GLUCOSE 343 (H) 01/14/2025    CALCIUM 7.2 (L) 01/14/2025     01/14/2025    K 3.9 01/14/2025    CO2 29 01/14/2025     01/14/2025    BUN 12 01/14/2025    CREATININE 0.59 01/14/2025       Assessment and Plan:    Upper GI bleed -EGD results noted, no active bleeding.  Hemoglobin stable.  UTI -doubt pyelonephritis, transition to oral antibiotics.  Anemia -outpatient CBC in 1 week.    Discharge home today.      Shane Antonio MD  01/14/25  10:55 AM

## 2025-01-14 NOTE — NURSING NOTE
Discharge instructions provided using teach back method. Pt's health related  risk factors discussed with pt. pt educated to look for any worsening sign and symptoms. Pt educated to seek medical attention if experience any medical emergency. Pt aware to follow up with outpatient clinics as scheduled. Home going meds reviewed with pt. Pt verbalized understanding of disposition and discharge instructions. All questions answered to patient's satisfaction and within nursing scope of practice. Pt waiting for MTB, will need WC to lobby when ready. Bedside RN made aware

## 2025-01-16 LAB
BACTERIA BLD CULT: NORMAL
BACTERIA BLD CULT: NORMAL
BACTERIA FLD CULT: NORMAL
GLUCOSE BLD MANUAL STRIP-MCNC: 90 MG/DL (ref 74–99)
GLUCOSE BLD MANUAL STRIP-MCNC: 91 MG/DL (ref 74–99)
GRAM STN SPEC: NORMAL
GRAM STN SPEC: NORMAL

## 2025-01-17 ENCOUNTER — APPOINTMENT (OUTPATIENT)
Dept: RADIOLOGY | Facility: HOSPITAL | Age: 50
End: 2025-01-17
Payer: COMMERCIAL

## 2025-01-21 NOTE — DISCHARGE SUMMARY
Discharge Diagnosis  GI bleed    Issues Requiring Follow-Up      Discharge Meds     Medication List      START taking these medications     rifAXIMin 550 mg tablet; Commonly known as: Xifaxan; Take 1 tablet (550   mg) by mouth every 12 hours.     CONTINUE taking these medications     acetaminophen 500 mg tablet; Commonly known as: Tylenol   atorvastatin 80 mg tablet; Commonly known as: Lipitor; [place] 1 tableta 1   vez cada día antes de domirse; (Take 1 tablet (80 mg) by mouth once daily   at bedtime.)   docusate sodium 100 mg capsule; Commonly known as: Colace; [place] 1   cápsula 2 veces cada día. No tome si tiene diarrea.; (Take 1 capsule (100   mg) by mouth 2 times a day. Hold for loose stool.)   fenofibrate 145 mg tablet; Commonly known as: Tricor; [place] 1 tableta 1   vez cada día.; (Take 1 tablet (145 mg) by mouth once daily.)   furosemide 40 mg tablet; Commonly known as: Lasix; [place] 1 tableta 1 vez   cada día.; (Take 1 tablet (40 mg) by mouth once daily. Hold for   dizziness.)   hydrOXYzine HCL 25 mg tablet; Commonly known as: Atarax; Take 1 tablet   (25 mg) by mouth every 4 hours if needed for anxiety.   insulin lispro 100 unit/mL injection; Inject 0-15 Units under the skin 3   times a day before meals. Take as directed per insulin instructions.Do not   hold when patient is not eating, continue order as scheduled for   hyperglycemia management. Insulin Lispro Corrective Scale #3     Hypoglycemia protocol Call LIP unit(s) if Blood Glucose is between 0 - 70   mg/dL   0 unit(s) if Blood glucose is between   3 unit(s) if Blood   glucose is between 151-200  6 unit(s) if Blood glucose is between 201-250    9 unit(s) if Blood glucose is between 251-300  12 unit(s) if Blood   glucose is between 301-350  15 unit(s) if Blood glucose is between 351-400    If blood glucose is greater than 400 mg/dL, give max insulin per sliding   scale AND then contact provider.   lactulose 20 gram/30 mL oral solution   Lantus Solostar  U-100 Insulin 100 unit/mL (3 mL) pen; Generic drug:   insulin glargine; [place] 25 unidades 2 veces cada día; (Inject 25 Units   under the skin 2 times a day.)   magnesium oxide 400 mg (241.3 mg magnesium) tablet; Commonly known as:   Mag-Ox; [place] 1 tableta 1 vez cada día.; (Take 1 tablet (400 mg) by mouth   once daily.)   meclizine 25 mg tablet; Commonly known as: Antivert   metFORMIN  mg 24 hr tablet; Commonly known as: Glucophage-XR; [place]   2 tabletas 1 vez cada día por la tarde. [place] con comida.; (Take 2 tablets   (1,000 mg) by mouth once daily in the evening. Take with meals. Do not   crush, chew, or split.)   midodrine 10 mg tablet; Commonly known as: Proamatine; [place] 1/2 tableta   3 veces cada día.; (Take 0.5 tablets (5 mg) by mouth 3 times daily   (morning, midday, late afternoon).)   nadolol 20 mg tablet; Commonly known as: Corgard; [place] 1 tableta 1 vez   cada día.; (Take 1 tablet (20 mg) by mouth once daily.)   nitroglycerin 0.1 mg/hr patch; Commonly known as: Nitrodur; [place] 1   parte en el piel 1 vez cada día. [place] después de 12 horas cada día.;   (Place 1 patch over 12 hours on the skin once daily. Hold for dizziness.)   oxyCODONE 5 mg immediate release tablet; Commonly known as: Roxicodone;   Take 1 tablet (5 mg) by mouth every 6 hours if needed for severe pain (7 -   10) for up to 6 doses.   pantoprazole 40 mg EC tablet; Commonly known as: ProtoNix; [place] 1   tableta 1 vez cada mañana.; (Take 1 tablet (40 mg) by mouth once daily in   the morning. Take before meals. Do not crush, chew, or split.)   polyethylene glycol 17 gram/dose powder; Commonly known as: Glycolax,   Miralax; [place] 1 tapa en un vaso de agua y sindhu 2 veces cada día.; (Mix   17 grams of powder in 8 ounces of water and drink 2 times a day.)   spironolactone 50 mg tablet; Commonly known as: Aldactone; [place] 1   tableta 2 veces cada día.; (Take 1 tablet (50 mg) by mouth 2 times a day.   Hold for dizziness.)   Sure Comfort Pen  "Needle 32 gauge x 5/32\" needle; Generic drug: pen   needle, diabetic; 4 veces cada día; (Use 4 times a day)   traZODone 50 mg tablet; Commonly known as: Desyrel; [place] 1/2 tableta 1   vez cada noche; (Take 0.5 tablets (25 mg) by mouth once daily at bedtime.)   ursodiol 300 mg capsule; Commonly known as: Actigall; [place] 1 cápsula 3   veces cada día.; (Take 1 capsule (300 mg) by mouth 3 times a day.)     ASK your doctor about these medications     cephalexin 500 mg capsule; Commonly known as: Keflex; Take 1 capsule   (500 mg) by mouth every 6 hours for 3 days.; Ask about: Should I take this   medication?   sucralfate 1 gram tablet; Commonly known as: Carafate; Take 1 tablet (1   g) by mouth 4 times a day before meals for 5 days.; Ask about: Should I   take this medication?       Test Results Pending At Discharge  Pending Labs       No current pending labs.            Hospital Course   Alfonzo Flanagan is a 50 y.o. female with h/o PBC cirrhosis ( Liver biopsy 3/18/2021 with periportal to bridging fibrosis c/w PBC), pHTN with ascites, small EV, HE, celiac disease, chronic pancreatitis, DM, HTN, GERD, presenting with bilateral flank pain, dysuria, worsening ascites with abdominal discomfort, as well as nausea/vomiting with hematemesis. She describes emesis as dark, brown, with blood clots. She reports increase abdominal distention. She is scheduled to have op paracentesis tomorrow. She stated she take all her medications, she is on furosemide and Aldactone.  She stated she follows low-sodium diet.  Patient was seen by GI in December for abdominal pain, ascites, fluid analysis was negative for SBP at that time.  No history of esophageal varices in the past on recent EGD from 10/10/2024.     The patient had an EGD, H/H remained stable. He was given atbs for a UTI, stable for dc home with outpatient follow up.     Pertinent Physical Exam At Time of Discharge      Outpatient Follow-Up  Future Appointments   Date Time " Provider Department [place]   2/13/2025  8:30 AM Negra Boyd, APRN-CNP [id number] [facility]       Time and care for discharge management > 30 minutes.     Shane Antonio MD

## 2025-01-22 ENCOUNTER — APPOINTMENT (OUTPATIENT)
Dept: RADIOLOGY | Facility: HOSPITAL | Age: 50
End: 2025-01-22
Payer: COMMERCIAL

## 2025-01-22 ENCOUNTER — HOSPITAL ENCOUNTER (EMERGENCY)
Facility: HOSPITAL | Age: 50
Discharge: HOME | End: 2025-01-22
Attending: EMERGENCY MEDICINE
Payer: COMMERCIAL

## 2025-01-22 ENCOUNTER — APPOINTMENT (OUTPATIENT)
Dept: CARDIOLOGY | Facility: HOSPITAL | Age: 50
End: 2025-01-22
Payer: COMMERCIAL

## 2025-01-22 VITALS
HEART RATE: 77 BPM | BODY MASS INDEX: 34.96 KG/M2 | DIASTOLIC BLOOD PRESSURE: 69 MMHG | TEMPERATURE: 97.5 F | SYSTOLIC BLOOD PRESSURE: 123 MMHG | WEIGHT: 190 LBS | OXYGEN SATURATION: 100 % | RESPIRATION RATE: 17 BRPM | HEIGHT: 62 IN

## 2025-01-22 DIAGNOSIS — R18.8 OTHER ASCITES: Primary | ICD-10-CM

## 2025-01-22 DIAGNOSIS — N39.0 ACUTE UTI (URINARY TRACT INFECTION): ICD-10-CM

## 2025-01-22 LAB
ALBUMIN SERPL BCP-MCNC: 2.4 G/DL (ref 3.4–5)
ALP SERPL-CCNC: 518 U/L (ref 33–110)
ALT SERPL W P-5'-P-CCNC: 44 U/L (ref 7–45)
ANION GAP SERPL CALCULATED.3IONS-SCNC: 7 MMOL/L (ref 10–20)
APPEARANCE UR: ABNORMAL
AST SERPL W P-5'-P-CCNC: 71 U/L (ref 9–39)
BACTERIA #/AREA URNS AUTO: ABNORMAL /HPF
BASOPHILS # BLD MANUAL: 0.05 X10*3/UL (ref 0–0.1)
BASOPHILS NFR BLD MANUAL: 1 %
BILIRUB SERPL-MCNC: 3.3 MG/DL (ref 0–1.2)
BILIRUB UR STRIP.AUTO-MCNC: ABNORMAL MG/DL
BNP SERPL-MCNC: 62 PG/ML (ref 0–99)
BUN SERPL-MCNC: 17 MG/DL (ref 6–23)
CALCIUM SERPL-MCNC: 7.8 MG/DL (ref 8.6–10.3)
CARDIAC TROPONIN I PNL SERPL HS: 3 NG/L (ref 0–13)
CARDIAC TROPONIN I PNL SERPL HS: 3 NG/L (ref 0–13)
CHLORIDE SERPL-SCNC: 104 MMOL/L (ref 98–107)
CO2 SERPL-SCNC: 28 MMOL/L (ref 21–32)
COLOR UR: YELLOW
CREAT SERPL-MCNC: 0.73 MG/DL (ref 0.5–1.05)
EGFRCR SERPLBLD CKD-EPI 2021: >90 ML/MIN/1.73M*2
EOSINOPHIL # BLD MANUAL: 0.19 X10*3/UL (ref 0–0.7)
EOSINOPHIL NFR BLD MANUAL: 4 %
ERYTHROCYTE [DISTWIDTH] IN BLOOD BY AUTOMATED COUNT: 16.2 % (ref 11.5–14.5)
FLUAV RNA RESP QL NAA+PROBE: NOT DETECTED
FLUBV RNA RESP QL NAA+PROBE: NOT DETECTED
GLUCOSE SERPL-MCNC: 289 MG/DL (ref 74–99)
GLUCOSE UR STRIP.AUTO-MCNC: ABNORMAL MG/DL
HCT VFR BLD AUTO: 36.2 % (ref 36–46)
HGB BLD-MCNC: 11.9 G/DL (ref 12–16)
HYALINE CASTS #/AREA URNS AUTO: ABNORMAL /LPF
IMM GRANULOCYTES # BLD AUTO: 0.01 X10*3/UL (ref 0–0.7)
IMM GRANULOCYTES NFR BLD AUTO: 0.2 % (ref 0–0.9)
KETONES UR STRIP.AUTO-MCNC: NEGATIVE MG/DL
LEUKOCYTE ESTERASE UR QL STRIP.AUTO: ABNORMAL
LYMPHOCYTES # BLD MANUAL: 1.01 X10*3/UL (ref 1.2–4.8)
LYMPHOCYTES NFR BLD MANUAL: 21 %
MCH RBC QN AUTO: 31.3 PG (ref 26–34)
MCHC RBC AUTO-ENTMCNC: 32.9 G/DL (ref 32–36)
MCV RBC AUTO: 95 FL (ref 80–100)
MONOCYTES # BLD MANUAL: 0.29 X10*3/UL (ref 0.1–1)
MONOCYTES NFR BLD MANUAL: 6 %
MUCOUS THREADS #/AREA URNS AUTO: ABNORMAL /LPF
NEUTS SEG # BLD MANUAL: 3.26 X10*3/UL (ref 1.2–7)
NEUTS SEG NFR BLD MANUAL: 68 %
NITRITE UR QL STRIP.AUTO: NEGATIVE
NRBC BLD-RTO: 0 /100 WBCS (ref 0–0)
PH UR STRIP.AUTO: 5.5 [PH]
PLATELET # BLD AUTO: 66 X10*3/UL (ref 150–450)
POTASSIUM SERPL-SCNC: 3.8 MMOL/L (ref 3.5–5.3)
PROT SERPL-MCNC: 7.3 G/DL (ref 6.4–8.2)
PROT UR STRIP.AUTO-MCNC: ABNORMAL MG/DL
RBC # BLD AUTO: 3.8 X10*6/UL (ref 4–5.2)
RBC # UR STRIP.AUTO: ABNORMAL /UL
RBC #/AREA URNS AUTO: >20 /HPF
RBC MORPH BLD: ABNORMAL
SARS-COV-2 RNA RESP QL NAA+PROBE: NOT DETECTED
SODIUM SERPL-SCNC: 135 MMOL/L (ref 136–145)
SP GR UR STRIP.AUTO: 1.02
SQUAMOUS #/AREA URNS AUTO: ABNORMAL /HPF
TOTAL CELLS COUNTED BLD: 100
UROBILINOGEN UR STRIP.AUTO-MCNC: NORMAL MG/DL
WBC # BLD AUTO: 4.8 X10*3/UL (ref 4.4–11.3)
WBC #/AREA URNS AUTO: >50 /HPF
WBC CLUMPS #/AREA URNS AUTO: ABNORMAL /HPF
YEAST BUDDING #/AREA UR COMP ASSIST: PRESENT /HPF

## 2025-01-22 PROCEDURE — 87086 URINE CULTURE/COLONY COUNT: CPT | Mod: WESLAB | Performed by: PHYSICIAN ASSISTANT

## 2025-01-22 PROCEDURE — 36415 COLL VENOUS BLD VENIPUNCTURE: CPT | Performed by: PHYSICIAN ASSISTANT

## 2025-01-22 PROCEDURE — 93005 ELECTROCARDIOGRAM TRACING: CPT

## 2025-01-22 PROCEDURE — 74177 CT ABD & PELVIS W/CONTRAST: CPT

## 2025-01-22 PROCEDURE — 96375 TX/PRO/DX INJ NEW DRUG ADDON: CPT

## 2025-01-22 PROCEDURE — 74177 CT ABD & PELVIS W/CONTRAST: CPT | Performed by: STUDENT IN AN ORGANIZED HEALTH CARE EDUCATION/TRAINING PROGRAM

## 2025-01-22 PROCEDURE — 80053 COMPREHEN METABOLIC PANEL: CPT | Performed by: PHYSICIAN ASSISTANT

## 2025-01-22 PROCEDURE — 83880 ASSAY OF NATRIURETIC PEPTIDE: CPT | Performed by: PHYSICIAN ASSISTANT

## 2025-01-22 PROCEDURE — 99285 EMERGENCY DEPT VISIT HI MDM: CPT | Mod: 25 | Performed by: EMERGENCY MEDICINE

## 2025-01-22 PROCEDURE — 71046 X-RAY EXAM CHEST 2 VIEWS: CPT

## 2025-01-22 PROCEDURE — 2550000001 HC RX 255 CONTRASTS: Performed by: PHYSICIAN ASSISTANT

## 2025-01-22 PROCEDURE — 85027 COMPLETE CBC AUTOMATED: CPT | Performed by: PHYSICIAN ASSISTANT

## 2025-01-22 PROCEDURE — 71046 X-RAY EXAM CHEST 2 VIEWS: CPT | Performed by: RADIOLOGY

## 2025-01-22 PROCEDURE — 2500000004 HC RX 250 GENERAL PHARMACY W/ HCPCS (ALT 636 FOR OP/ED): Performed by: PHYSICIAN ASSISTANT

## 2025-01-22 PROCEDURE — 81001 URINALYSIS AUTO W/SCOPE: CPT | Performed by: PHYSICIAN ASSISTANT

## 2025-01-22 PROCEDURE — 87636 SARSCOV2 & INF A&B AMP PRB: CPT | Performed by: PHYSICIAN ASSISTANT

## 2025-01-22 PROCEDURE — 84484 ASSAY OF TROPONIN QUANT: CPT | Performed by: PHYSICIAN ASSISTANT

## 2025-01-22 PROCEDURE — 85007 BL SMEAR W/DIFF WBC COUNT: CPT | Performed by: PHYSICIAN ASSISTANT

## 2025-01-22 PROCEDURE — 96365 THER/PROPH/DIAG IV INF INIT: CPT | Mod: 59

## 2025-01-22 RX ORDER — MORPHINE SULFATE 4 MG/ML
4 INJECTION, SOLUTION INTRAMUSCULAR; INTRAVENOUS ONCE
Status: COMPLETED | OUTPATIENT
Start: 2025-01-22 | End: 2025-01-22

## 2025-01-22 RX ORDER — CEPHALEXIN 500 MG/1
500 CAPSULE ORAL 3 TIMES DAILY
Qty: 21 CAPSULE | Refills: 0 | Status: SHIPPED | OUTPATIENT
Start: 2025-01-22 | End: 2025-01-29

## 2025-01-22 RX ORDER — ONDANSETRON HYDROCHLORIDE 2 MG/ML
4 INJECTION, SOLUTION INTRAVENOUS ONCE
Status: COMPLETED | OUTPATIENT
Start: 2025-01-22 | End: 2025-01-22

## 2025-01-22 RX ORDER — CEFTRIAXONE 1 G/50ML
1 INJECTION, SOLUTION INTRAVENOUS ONCE
Status: COMPLETED | OUTPATIENT
Start: 2025-01-22 | End: 2025-01-22

## 2025-01-22 RX ADMIN — MORPHINE SULFATE 4 MG: 4 INJECTION, SOLUTION INTRAMUSCULAR; INTRAVENOUS at 18:50

## 2025-01-22 RX ADMIN — IOHEXOL 75 ML: 350 INJECTION, SOLUTION INTRAVENOUS at 16:37

## 2025-01-22 RX ADMIN — ONDANSETRON 4 MG: 2 INJECTION INTRAMUSCULAR; INTRAVENOUS at 17:43

## 2025-01-22 RX ADMIN — CEFTRIAXONE SODIUM 1 G: 1 INJECTION, SOLUTION INTRAVENOUS at 16:50

## 2025-01-22 ASSESSMENT — PAIN SCALES - GENERAL
PAINLEVEL_OUTOF10: 10 - WORST POSSIBLE PAIN
PAINLEVEL_OUTOF10: 0 - NO PAIN

## 2025-01-22 ASSESSMENT — PAIN - FUNCTIONAL ASSESSMENT
PAIN_FUNCTIONAL_ASSESSMENT: 0-10
PAIN_FUNCTIONAL_ASSESSMENT: 0-10

## 2025-01-22 ASSESSMENT — PAIN DESCRIPTION - PAIN TYPE: TYPE: ACUTE PAIN

## 2025-01-22 ASSESSMENT — PAIN DESCRIPTION - FREQUENCY: FREQUENCY: CONSTANT/CONTINUOUS

## 2025-01-22 ASSESSMENT — PAIN DESCRIPTION - LOCATION: LOCATION: ABDOMEN

## 2025-01-22 ASSESSMENT — PAIN DESCRIPTION - DESCRIPTORS: DESCRIPTORS: DISCOMFORT;CRAMPING;PRESSURE

## 2025-01-22 NOTE — ED TRIAGE NOTES
Abd Pain/Abd Distention. Pt had a Paracentesis done last week. In ED with Increase in Pain & Distention

## 2025-01-22 NOTE — DISCHARGE INSTRUCTIONS
I put in a stat referral for you to interventional radiology.   Also call your primary care doctor.  Please take cephalexin for urinary tract infection.

## 2025-01-23 LAB
ATRIAL RATE: 85 BPM
P AXIS: 40 DEGREES
P OFFSET: 178 MS
P ONSET: 134 MS
PR INTERVAL: 166 MS
Q ONSET: 217 MS
QRS COUNT: 14 BEATS
QRS DURATION: 78 MS
QT INTERVAL: 394 MS
QTC CALCULATION(BAZETT): 468 MS
QTC FREDERICIA: 442 MS
R AXIS: -8 DEGREES
T AXIS: 10 DEGREES
T OFFSET: 414 MS
VENTRICULAR RATE: 85 BPM

## 2025-01-23 NOTE — ED PROVIDER NOTES
HPI   Chief Complaint   Patient presents with    Abdominal Pain       This is a 50-year-old female the past medical history of cirrhosis secondary to hepatic steatosis presenting to the emergency department for abdominal ascites.  Patient states that she last had her abdomen drained approxi-1 week ago.  She states that over the last week her abdomen has filled with fluid and is causing pain in her abdomen and also causing her to feel short of breath when her abdomen pushes up onto her chest.  She denies any vomiting or diarrhea.  No urinary complaints.  No chest pain.      Please see HPI for pertinent positive and negative ROS.         Patient History   Past Medical History:   Diagnosis Date    Ascites     Asthma     Cirrhosis of liver (Multi)     Diabetes mellitus (Multi)     GERD (gastroesophageal reflux disease)     CORBY (obstructive sleep apnea)     Portal hypertension (Multi)     Thrombocytopenia (CMS-HCC)      Past Surgical History:   Procedure Laterality Date     SECTION, LOW TRANSVERSE      CHOLECYSTECTOMY      COLONOSCOPY      CT GUIDED IMAGING FOR NEEDLE PLACEMENT  10/14/2020    CT GUIDED IMAGING FOR NEEDLE PLACEMENT [facility]    ESOPHAGOGASTRODUODENOSCOPY      INGUINAL HIDRADENITIS EXCISION      TUBAL LIGATION      US GUIDED ABDOMINAL PARACENTESIS  10/08/2020    US GUIDED ABDOMINAL PARACENTESIS [facility]     No family history on file.  Social History     Tobacco Use    Smoking status: Never    Smokeless tobacco: Never   Substance Use Topics    Alcohol use: Never    Drug use: Never       Physical Exam   ED Triage Vitals [25 1443]   Temperature Heart Rate Respirations BP   36.4 °C (97.5 °F) 81 18 130/73      Pulse Ox Temp src Heart Rate Source Patient Position   100 % -- Monitor Sitting      BP Location FiO2 (%)     Left arm --       Physical Exam  GENERAL APPEARANCE: Awake and alert. No acute respiratory distress.   VITAL SIGNS: As per the nurses' triage record.  HEENT:  Normocephalic, atraumatic.   NECK: Soft, nontender and supple  CHEST: Nontender to palpation. Clear to auscultation bilaterally. Symmetric rise and fall of chest wall.   HEART: Clear S1 and S2. Regular rate and rhythm. Strong and equal pulses in the extremities.  ABDOMEN: Distended abdomen consistent with fluid retention, diffuse tenderness to palpation  MUSCULOSKELETAL: The calves are nontender to palpation.  Minimal pitting pedal edema.  Full gross active range of motion. Ambulating on own with no acute difficulties  NEUROLOGICAL: Awake, alert and oriented x 3. Motor power intact in the upper and lower extremities. Sensation is intact to light touch in the upper and lower extremities. Patient answering questions appropriately.   IMMUNOLOGICAL: No lymphatic streaking noted  DERMATOLOGIC: Warm and dry   PYSCH: Cooperative with appropriate mood and affect.    ED Course & MDM   ED Course as of 01/22/25 2134 Wed Jan 22, 2025 1528 EKG personally interpreted by me performed at 1527  Normal sinus rhythm with ventricular rate 85 normal axis and intervals no acute ischemic changes [EF]      ED Course User Index  [EF] Sunni Hall,          Diagnoses as of 01/22/25 2134   Other ascites   Acute UTI (urinary tract infection)                 No data recorded     Niya Coma Scale Score: 15 (01/22/25 1652 : Naman Smith RN)                           Medical Decision Making  Parts of this chart have been completed using voice recognition software. Please excuse any errors of transcription.  My thought process and reason for plan has been formulated from the time that I saw the patient until the time of disposition and is not specific to one specific moment during their visit and furthermore my MDM encompasses this entire chart and not only this text box.      HPI: Detailed above.    Exam: A medically appropriate exam performed, outlined above, given the known history and presentation.    History obtained from:  Patient    EKG: See my supervising physician's EKG interpretation    Medications given during visit:  Medications   cefTRIAXone (Rocephin) 1 g in dextrose (iso) IV 50 mL (0 g intravenous Stopped 1/22/25 1714)   iohexol (OMNIPaque) 350 mg iodine/mL solution 75 mL (75 mL intravenous Given 1/22/25 1637)   ondansetron (Zofran) injection 4 mg (4 mg intravenous Given 1/22/25 1743)   morphine injection 4 mg (4 mg intravenous Given 1/22/25 1850)        Diagnostic/tests  Labs Reviewed   CBC WITH AUTO DIFFERENTIAL - Abnormal       Result Value    WBC 4.8      nRBC 0.0      RBC 3.80 (*)     Hemoglobin 11.9 (*)     Hematocrit 36.2      MCV 95      MCH 31.3      MCHC 32.9      RDW 16.2 (*)     Platelets 66 (*)     Immature Granulocytes %, Automated 0.2      Immature Granulocytes Absolute, Automated 0.01      Narrative:     The previously reported component Neutrophils % is no longer being reported.  The previously reported component Lymphocytes % is no longer being reported.  The previously reported component Monocytes % is no longer being reported.  The previously   reported component Eosinophils % is no longer being reported.  The previously reported component Basophils % is no longer being reported.  The previously reported component Absolute Neutrophils is no longer being reported.  The previously reported   component Absolute Lymphocytes is no longer being reported.  The previously reported component Absolute Monocytes is no longer being reported.  The previously reported component Absolute Eosinophils is no longer being reported.  The previously reported   component Absolute Basophils is no longer being reported.   COMPREHENSIVE METABOLIC PANEL - Abnormal    Glucose 289 (*)     Sodium 135 (*)     Potassium 3.8      Chloride 104      Bicarbonate 28      Anion Gap 7 (*)     Urea Nitrogen 17      Creatinine 0.73      eGFR >90      Calcium 7.8 (*)     Albumin 2.4 (*)     Alkaline Phosphatase 518 (*)     Total Protein 7.3       AST 71 (*)     Bilirubin, Total 3.3 (*)     ALT 44     URINALYSIS WITH REFLEX CULTURE AND MICROSCOPIC - Abnormal    Color, Urine Yellow      Appearance, Urine Ex.Turbid (*)     Specific Gravity, Urine 1.021      pH, Urine 5.5      Protein, Urine 20 (TRACE)      Glucose, Urine 30 (TRACE) (*)     Blood, Urine OVER (3+) (*)     Ketones, Urine NEGATIVE      Bilirubin, Urine 0.5 (1+) (*)     Urobilinogen, Urine Normal      Nitrite, Urine NEGATIVE      Leukocyte Esterase, Urine 500 Mckenzie/uL (*)     Narrative:     OVER is reported when the result is greater than the clinically reportable range.   MICROSCOPIC ONLY, URINE - Abnormal    WBC, Urine >50 (*)     WBC Clumps, Urine RARE      RBC, Urine >20 (*)     Squamous Epithelial Cells, Urine 26-50 (1+)      Bacteria, Urine 1+ (*)     Budding Yeast, Urine PRESENT (*)     Mucus, Urine 2+      Hyaline Casts, Urine 4+ (*)    MANUAL DIFFERENTIAL - Abnormal    Neutrophils %, Manual 68.0      Lymphocytes %, Manual 21.0      Monocytes %, Manual 6.0      Eosinophils %, Manual 4.0      Basophils %, Manual 1.0      Seg Neutrophils Absolute, Manual 3.26      Lymphocytes Absolute, Manual 1.01 (*)     Monocytes Absolute, Manual 0.29      Eosinophils Absolute, Manual 0.19      Basophils Absolute, Manual 0.05      Total Cells Counted 100      RBC Morphology No significant RBC morphology present     B-TYPE NATRIURETIC PEPTIDE - Normal    BNP 62      Narrative:        <100 pg/mL - Heart failure unlikely  100-299 pg/mL - Intermediate probability of acute heart                  failure exacerbation. Correlate with clinical                  context and patient history.    >=300 pg/mL - Heart Failure likely. Correlate with clinical                  context and patient history.    BNP testing is performed using different testing methodology at [facility] than at other [facility] hospitals. Direct result comparisons should only be made within the same method.      SERIAL TROPONIN-INITIAL -  Normal    Troponin I, High Sensitivity 3      Narrative:     Less than 99th percentile of normal range cutoff-  Female and children under 18 years old <14 ng/L; Male <21 ng/L: Negative  Repeat testing should be performed if clinically indicated.     Female and children under 18 years old 14-50 ng/L; Male 21-50 ng/L:  Consistent with possible cardiac damage and possible increased clinical   risk. Serial measurements may help to assess extent of myocardial damage.     >50 ng/L: Consistent with cardiac damage, increased clinical risk and  myocardial infarction. Serial measurements may help assess extent of   myocardial damage.      NOTE: Children less than 1 year old may have higher baseline troponin   levels and results should be interpreted in conjunction with the overall   clinical context.     NOTE: Troponin I testing is performed using a different   testing methodology at [facility] than at other   [facility]. Direct result comparisons should only   be made within the same method.   SARS-COV-2 AND INFLUENZA A/B PCR - Normal    Flu A Result Not Detected      Flu B Result Not Detected      Coronavirus 2019, PCR Not Detected      Narrative:     This assay is an FDA-cleared, in vitro diagnostic nucleic acid amplification test for the qualitative detection and differentiation of SARS CoV-2/ Influenza A/B from nasopharyngeal specimens collected from individuals with signs and symptoms of respiratory tract infections, and has been validated for use at [facility]. Negative results do not preclude COVID-19/ Influenza A/B infections and should not be used as the sole basis for diagnosis, treatment, or other management decisions. Testing for SARS CoV-2 is recommended only for patients who meet current clinical and/or epidemiological criteria defined by federal, state, or local public health directives.   SERIAL TROPONIN, 1 HOUR - Normal    Troponin I, High Sensitivity 3       Narrative:     Less than 99th percentile of normal range cutoff-  Female and children under 18 years old <14 ng/L; Male <21 ng/L: Negative  Repeat testing should be performed if clinically indicated.     Female and children under 18 years old 14-50 ng/L; Male 21-50 ng/L:  Consistent with possible cardiac damage and possible increased clinical   risk. Serial measurements may help to assess extent of myocardial damage.     >50 ng/L: Consistent with cardiac damage, increased clinical risk and  myocardial infarction. Serial measurements may help assess extent of   myocardial damage.      NOTE: Children less than 1 year old may have higher baseline troponin   levels and results should be interpreted in conjunction with the overall   clinical context.     NOTE: Troponin I testing is performed using a different   testing methodology at [facility] than at other   [facility]. Direct result comparisons should only   be made within the same method.   URINE CULTURE   TROPONIN SERIES- (INITIAL, 1 HR)    Narrative:     The following orders were created for panel order Troponin I Series, High Sensitivity (0, 1 HR).  Procedure                               Abnormality         Status                     ---------                               -----------         ------                     Troponin I, High Sensiti...[[id number]]  Normal              Final result               Troponin, High Sensitivi...[[id number]]  Normal              Final result                 Please view results for these tests on the individual orders.   URINALYSIS WITH REFLEX CULTURE AND MICROSCOPIC    Narrative:     The following orders were created for panel order Urinalysis with Reflex Culture and Microscopic.  Procedure                               Abnormality         Status                     ---------                               -----------         ------                     Urinalysis with Reflex C...[[id number]]  Abnormal             Final result               Extra Urine Gray Tube[[id number]]                                                         Please view results for these tests on the individual orders.   EXTRA URINE GRAY TUBE      CT abdomen pelvis w IV contrast   Final Result   1.  Hepatic cirrhosis with evidence of portal hypertension.             MACRO:   None        Signed by: Ciaran Singer 1/22/2025 4:55 PM   Dictation workstation:   [id number]      XR chest 2 views   Final Result   No acute cardiopulmonary abnormality.        Signed by: Aysha Norton 1/22/2025 3:38 PM   Dictation workstation:   [id number]      Consult to Interventional Radiology    (Results Pending)        Considerations/further MDM:  Patient was seen in conjucntion with my supervising physician,  Dr. Hall. Please refer to her note.    Patient presents for abdominal distention and shortness of breath.  She does have a history of cirrhosis and has had to have paracentesis in the past.  Differential diagnosis includes was not limited to ascites versus CHF versus pulmonary congestion versus pneumonia versus electrolyte disturbance    CBC shows no leukocytosis and a mild normocytic anemia.  CMP shows hyperglycemia secondary to her history of diabetes, stable renal function, elevation in alk phos, AST and total bilirubin which have been present on previous CMP's.  BNP not elevated at 62, troponin I x 2 both 3.  Urinalysis does show evidence of urinary tract infection.  Patient was given IV morphine, IV Zofran, and IV ceftriaxone with improvement of her symptoms.  I did speak with patient's admitting physician, Dr. Lawrence who does not feel patient needs to come in for paracentesis and she can have this done on an outpatient basis.  I did put in a referral to interventional radiology for her.  Patient felt comfortable with this plan home.  She was also given a prescription for cephalexin for urinary tract infection.  She was discharged in stable condition in the  company of her family.    Procedure  Procedures     Amada Wilkins PA-C  01/22/25 2001

## 2025-01-23 NOTE — ED PROVIDER NOTES
IN BRIEF    History: 50-year-old female with a history of ascites and liver failure presents with abdominal pain.  She presents frequently to the hospital for similar.  She was recently admitted to [facility] and discharged on 1/14 with pyelonephritis.  She feels she is due for paracentesis but has not scheduled this to have performed.    Exam: Patient appears chronically ill but in no acute distress.  She is resting comfortably however when the provider approaches the bed she starts appearing uncomfortable stating that she is not pain no significant scleral icterus or jaundice       ED COURSE   MDM: Patient presents with ongoing abdominal pain.  She has been seen frequently in the emergency department for this, in fact this is her fourth visit this month alone.  Workup is initiated demonstrating evidence of a urinary tract infection otherwise stable compared to prior.  Case is discussed with patient's admitting physician when she is admitted to the hospital who states to give her antibiotics and have her follow-up for outpatient paracentesis.  Patient and family are agreeable.  Discharged in stable condition.      I personally saw the patient and made/approved the management plan and take responsibility for the patient management.  Parts of this chart were completed with dictation software, please excuse any errors in transcription.     Sunni Hall, DO  7:05 PM

## 2025-01-25 ENCOUNTER — HOSPITAL ENCOUNTER (EMERGENCY)
Facility: HOSPITAL | Age: 50
Discharge: HOME | End: 2025-01-25
Attending: STUDENT IN AN ORGANIZED HEALTH CARE EDUCATION/TRAINING PROGRAM
Payer: COMMERCIAL

## 2025-01-25 ENCOUNTER — APPOINTMENT (OUTPATIENT)
Dept: CARDIOLOGY | Facility: HOSPITAL | Age: 50
End: 2025-01-25
Payer: COMMERCIAL

## 2025-01-25 ENCOUNTER — APPOINTMENT (OUTPATIENT)
Dept: RADIOLOGY | Facility: HOSPITAL | Age: 50
End: 2025-01-25
Payer: COMMERCIAL

## 2025-01-25 VITALS
WEIGHT: 205 LBS | RESPIRATION RATE: 16 BRPM | HEART RATE: 76 BPM | SYSTOLIC BLOOD PRESSURE: 125 MMHG | OXYGEN SATURATION: 100 % | BODY MASS INDEX: 37.73 KG/M2 | HEIGHT: 62 IN | TEMPERATURE: 97.5 F | DIASTOLIC BLOOD PRESSURE: 71 MMHG

## 2025-01-25 DIAGNOSIS — R10.84 ABDOMINAL PAIN, GENERALIZED: Primary | ICD-10-CM

## 2025-01-25 DIAGNOSIS — R18.8 OTHER ASCITES: ICD-10-CM

## 2025-01-25 LAB
ALBUMIN SERPL BCP-MCNC: 2.4 G/DL (ref 3.4–5)
ALP SERPL-CCNC: 388 U/L (ref 33–110)
ALT SERPL W P-5'-P-CCNC: 40 U/L (ref 7–45)
ANION GAP SERPL CALCULATED.3IONS-SCNC: 7 MMOL/L (ref 10–20)
AST SERPL W P-5'-P-CCNC: 75 U/L (ref 9–39)
ATRIAL RATE: 93 BPM
BACTERIA UR CULT: ABNORMAL
BASOPHILS # BLD AUTO: 0.01 X10*3/UL (ref 0–0.1)
BASOPHILS NFR BLD AUTO: 0.2 %
BILIRUB SERPL-MCNC: 2.4 MG/DL (ref 0–1.2)
BUN SERPL-MCNC: 19 MG/DL (ref 6–23)
CALCIUM SERPL-MCNC: 7.6 MG/DL (ref 8.6–10.3)
CARDIAC TROPONIN I PNL SERPL HS: 3 NG/L (ref 0–13)
CARDIAC TROPONIN I PNL SERPL HS: 3 NG/L (ref 0–13)
CHLORIDE SERPL-SCNC: 105 MMOL/L (ref 98–107)
CO2 SERPL-SCNC: 27 MMOL/L (ref 21–32)
CREAT SERPL-MCNC: 0.7 MG/DL (ref 0.5–1.05)
EGFRCR SERPLBLD CKD-EPI 2021: >90 ML/MIN/1.73M*2
EOSINOPHIL # BLD AUTO: 0.26 X10*3/UL (ref 0–0.7)
EOSINOPHIL NFR BLD AUTO: 6.4 %
ERYTHROCYTE [DISTWIDTH] IN BLOOD BY AUTOMATED COUNT: 16.1 % (ref 11.5–14.5)
GLUCOSE SERPL-MCNC: 198 MG/DL (ref 74–99)
HCT VFR BLD AUTO: 37.4 % (ref 36–46)
HGB BLD-MCNC: 12.2 G/DL (ref 12–16)
IMM GRANULOCYTES # BLD AUTO: 0.01 X10*3/UL (ref 0–0.7)
IMM GRANULOCYTES NFR BLD AUTO: 0.2 % (ref 0–0.9)
LYMPHOCYTES # BLD AUTO: 1.19 X10*3/UL (ref 1.2–4.8)
LYMPHOCYTES NFR BLD AUTO: 29.3 %
MCH RBC QN AUTO: 31.1 PG (ref 26–34)
MCHC RBC AUTO-ENTMCNC: 32.6 G/DL (ref 32–36)
MCV RBC AUTO: 95 FL (ref 80–100)
MONOCYTES # BLD AUTO: 0.42 X10*3/UL (ref 0.1–1)
MONOCYTES NFR BLD AUTO: 10.3 %
NEUTROPHILS # BLD AUTO: 2.17 X10*3/UL (ref 1.2–7.7)
NEUTROPHILS NFR BLD AUTO: 53.6 %
NRBC BLD-RTO: 0 /100 WBCS (ref 0–0)
P AXIS: 19 DEGREES
P OFFSET: 180 MS
P ONSET: 131 MS
PLATELET # BLD AUTO: 69 X10*3/UL (ref 150–450)
POTASSIUM SERPL-SCNC: 4.2 MMOL/L (ref 3.5–5.3)
PR INTERVAL: 164 MS
PROT SERPL-MCNC: 7.5 G/DL (ref 6.4–8.2)
Q ONSET: 213 MS
QRS COUNT: 16 BEATS
QRS DURATION: 76 MS
QT INTERVAL: 350 MS
QTC CALCULATION(BAZETT): 435 MS
QTC FREDERICIA: 405 MS
R AXIS: -6 DEGREES
RBC # BLD AUTO: 3.92 X10*6/UL (ref 4–5.2)
SODIUM SERPL-SCNC: 135 MMOL/L (ref 136–145)
T AXIS: -6 DEGREES
T OFFSET: 388 MS
VENTRICULAR RATE: 93 BPM
WBC # BLD AUTO: 4.1 X10*3/UL (ref 4.4–11.3)

## 2025-01-25 PROCEDURE — 71045 X-RAY EXAM CHEST 1 VIEW: CPT | Performed by: RADIOLOGY

## 2025-01-25 PROCEDURE — 80053 COMPREHEN METABOLIC PANEL: CPT | Performed by: STUDENT IN AN ORGANIZED HEALTH CARE EDUCATION/TRAINING PROGRAM

## 2025-01-25 PROCEDURE — 85025 COMPLETE CBC W/AUTO DIFF WBC: CPT | Performed by: STUDENT IN AN ORGANIZED HEALTH CARE EDUCATION/TRAINING PROGRAM

## 2025-01-25 PROCEDURE — 36415 COLL VENOUS BLD VENIPUNCTURE: CPT | Performed by: STUDENT IN AN ORGANIZED HEALTH CARE EDUCATION/TRAINING PROGRAM

## 2025-01-25 PROCEDURE — 84484 ASSAY OF TROPONIN QUANT: CPT | Performed by: STUDENT IN AN ORGANIZED HEALTH CARE EDUCATION/TRAINING PROGRAM

## 2025-01-25 PROCEDURE — 96374 THER/PROPH/DIAG INJ IV PUSH: CPT

## 2025-01-25 PROCEDURE — 2500000004 HC RX 250 GENERAL PHARMACY W/ HCPCS (ALT 636 FOR OP/ED): Performed by: STUDENT IN AN ORGANIZED HEALTH CARE EDUCATION/TRAINING PROGRAM

## 2025-01-25 PROCEDURE — 93971 EXTREMITY STUDY: CPT

## 2025-01-25 PROCEDURE — 71045 X-RAY EXAM CHEST 1 VIEW: CPT

## 2025-01-25 PROCEDURE — 93005 ELECTROCARDIOGRAM TRACING: CPT

## 2025-01-25 PROCEDURE — 93971 EXTREMITY STUDY: CPT | Performed by: STUDENT IN AN ORGANIZED HEALTH CARE EDUCATION/TRAINING PROGRAM

## 2025-01-25 PROCEDURE — 99285 EMERGENCY DEPT VISIT HI MDM: CPT | Mod: 25 | Performed by: STUDENT IN AN ORGANIZED HEALTH CARE EDUCATION/TRAINING PROGRAM

## 2025-01-25 PROCEDURE — 96375 TX/PRO/DX INJ NEW DRUG ADDON: CPT

## 2025-01-25 RX ORDER — ONDANSETRON HYDROCHLORIDE 2 MG/ML
4 INJECTION, SOLUTION INTRAVENOUS ONCE
Status: COMPLETED | OUTPATIENT
Start: 2025-01-25 | End: 2025-01-25

## 2025-01-25 RX ORDER — KETOROLAC TROMETHAMINE 10 MG/1
10 TABLET, FILM COATED ORAL EVERY 6 HOURS PRN
Qty: 20 TABLET | Refills: 0 | Status: SHIPPED | OUTPATIENT
Start: 2025-01-25 | End: 2025-01-30

## 2025-01-25 RX ORDER — KETOROLAC TROMETHAMINE 15 MG/ML
15 INJECTION, SOLUTION INTRAMUSCULAR; INTRAVENOUS ONCE
Status: COMPLETED | OUTPATIENT
Start: 2025-01-25 | End: 2025-01-25

## 2025-01-25 RX ADMIN — KETOROLAC TROMETHAMINE 15 MG: 15 INJECTION, SOLUTION INTRAMUSCULAR; INTRAVENOUS at 07:44

## 2025-01-25 RX ADMIN — ONDANSETRON 4 MG: 2 INJECTION INTRAMUSCULAR; INTRAVENOUS at 07:45

## 2025-01-25 ASSESSMENT — PAIN DESCRIPTION - LOCATION: LOCATION: ABDOMEN

## 2025-01-25 ASSESSMENT — PAIN DESCRIPTION - PAIN TYPE: TYPE: ACUTE PAIN;CHRONIC PAIN

## 2025-01-25 ASSESSMENT — PAIN - FUNCTIONAL ASSESSMENT: PAIN_FUNCTIONAL_ASSESSMENT: 0-10

## 2025-01-25 ASSESSMENT — PAIN SCALES - GENERAL: PAINLEVEL_OUTOF10: 8

## 2025-01-25 NOTE — DISCHARGE INSTRUCTIONS
Follow-up as scheduled on the 30th for paracentesis to remove the fluid from the abdomen. Return to the ED for any new or concerning symptoms including but not limited to severe worsening of your pain, inability to tolrate oral solids or fluids due to worsening pain or severe nausea and vomiting, fevers over 100.4 Fahrenheit, jaundice or yellowing of eyes and skin all of which could be signs of developing infection or worsening pathology in the abdomen requiring reassessment by physician.  Otherwise you can continue taking pain medication and Zofran for nausea at home for treatment of your symptoms.  You should not combine Toradol and naproxen or ibuprofen, but you can take Toradol along with your tramadol and with Tylenol which should not interact with each other for maximum pain relief.  Continue to use Zofran as needed for nausea.

## 2025-01-25 NOTE — ED PROVIDER NOTES
HPI   Chief Complaint   Patient presents with    Bloated    Abdominal Pain     Pt c/o abd pain, nausea and vomiting and shob due to the fluid on her belly.  Pt states she usually gets her belly drained 2x a month.       This is a 50-year-old female presenting to the ED for evaluation of abdominal bloating and discomfort, nausea, reported abdominal pain.  She has a history of nonalcoholic liver cirrhosis, and ascites.  She last had this drained about a week and a half ago, family states that usually she has this drained when she was admitted to the hospital.  She denies any fevers or chills, vomiting, chest pain, associated with her symptoms but she feels like her ascites pushes up on the diaphragm and makes it hard to breathe.  She feels that this is gotten worse over the past 24 hours or so, and she states she only slept about 2 or 3 hours last night because she is uncomfortable from the ascites.  She did see her PCP yesterday, she does have an appointment in 5 days for paracentesis as an outpatient but she does not feel like she can make it that long because of her symptoms.      History provided by:  Patient   used: No            Patient History   Past Medical History:   Diagnosis Date    Ascites     Asthma     Cirrhosis of liver (Multi)     Diabetes mellitus (Multi)     GERD (gastroesophageal reflux disease)     CORBY (obstructive sleep apnea)     Portal hypertension (Multi)     Thrombocytopenia (CMS-HCC)      Past Surgical History:   Procedure Laterality Date     SECTION, LOW TRANSVERSE      CHOLECYSTECTOMY      COLONOSCOPY      CT GUIDED IMAGING FOR NEEDLE PLACEMENT  10/14/2020    CT GUIDED IMAGING FOR NEEDLE PLACEMENT [facility]    ESOPHAGOGASTRODUODENOSCOPY      INGUINAL HIDRADENITIS EXCISION      TUBAL LIGATION      US GUIDED ABDOMINAL PARACENTESIS  10/08/2020    US GUIDED ABDOMINAL PARACENTESIS [facility]     No family history on file.  Social History      Tobacco Use    Smoking status: Never    Smokeless tobacco: Never   Vaping Use    Vaping status: Never Used   Substance Use Topics    Alcohol use: Never    Drug use: Never       Physical Exam   ED Triage Vitals [01/25/25 0629]   Temperature Heart Rate Respirations BP   36.4 °C (97.5 °F) 80 18 118/66      Pulse Ox Temp Source Heart Rate Source Patient Position   100 % Temporal Monitor --      BP Location FiO2 (%)     -- --       Physical Exam  General: well developed, obese adult female who is awake and alert, in no apparent distress.  Family at bedside.  Eyes: sclera clear bilaterally  HENT: normocephalic, atraumatic. .  CV: regular rate and rhythm, no murmur, no gallops, or rubs. radial and dorsalis pedis pulses +2/4 bilaterally  Resp: clear to ascultation bilaterally, no wheezes, rales, or rhonchi  GI: abdomen soft, no focal tenderness to palpation, exam is without rigidity or guarding on palpation, no peritoneal signs, abdomen is distended consistent with her known ascites, no masses palpated  MSK: 1+ edema to lower legs bilaterally.  Psych: appropriate mood and affect, cooperative with exam  Skin: warm, dry, without evidence of rash or abrasions    ED Course & MDM   ED Course as of 01/25/25 0911   Sat Jan 25, 2025   0738 EKG on my interpretation shows normal sinus rhythm with rate of 78 beats minute.  Normal axis.  QTc 485 ms, TX interval 182.  No ST elevation or depression, no acute ischemic pattern.  No STEMI. [NT]      ED Course User Index  [NT] Dustin Serna DO         Diagnoses as of 01/25/25 0911   Abdominal pain, generalized   Other ascites                 No data recorded     Niya Coma Scale Score: 15 (01/25/25 0630 : Tasia Beckwith, MYNOR)                           Medical Decision Making  Patient is primarily Bengali-speaking, she did speak some English.  I did recommend Martti , patient prefers that her family at bedside can interpret for her and does not want to use the Martti  .    The patient's no acute distress here, she is awake and alert, oriented x 4.  Vital signs are normal.  She is not hypoxic.  There is no respiratory distress.  She is uncomfortable from her ascites, has been seen multiple times in the emergency department for this.  I reviewed documentation from multiple recent hospital visits for the same complaints including from 3 days ago where she was found to have moderate ascites, and was advised to follow-up for an outpatient paracentesis by her PCP, he did not want to admit her to the hospital at that time given her presentation was otherwise stable compared with her baseline..  She does have a paracentesis scheduled already 5 days from now.  She reports mild worsening of symptoms and is feeling like she is too uncomfortable to wait for paracentesis due to the sensation of pressure in her abdomen from her ascites.  She has no infectious signs or symptoms.    On exam she has no peritoneal signs.  She has no infectious signs or symptoms including fevers, vomiting, no peritoneal signs to suggest SBP, no right upper quadrant tenderness or jaundice to suggest developing infectious cholangitis.    LFTs and bilirubin stable compared to prior testing, no evidence of acute biliary pathology or obstruction, no leukocytosis or signs of sepsis.  I am not concerned for SBP or acute intra-abdominal infection.  Troponin negative, EKG is unremarkable with no evidence of ischemia.  No evidence of ACS.  Due to complaints of some right sided leg swelling, duplex ultrasound was obtained showing No evidence of DVT.  On reassessment the patient's pain has improved to about a 6 out of 10.  She was prescribed Toradol, she already has Zofran at home to use as needed.  She is to follow-up on Thursday as scheduled for paracentesis.  Return precautions discussed including signs of developing infection which require earlier reassessment and possible hospital admission.    I did review her  abdominal CT from earlier this week showing moderate ascites and documentation from multiple previous ED visits for similar presentations. She is otherwise stable compared with her baseline today.  No indication for hospital admission for emergent paracentesis.  There is no IR available today for this to be arranged from the emergency department on an outpatient basis.  She is to follow-up in 5 days for her scheduled paracentesis.    Labs Reviewed   CBC WITH AUTO DIFFERENTIAL - Abnormal       Result Value    WBC 4.1 (*)     nRBC 0.0      RBC 3.92 (*)     Hemoglobin 12.2      Hematocrit 37.4      MCV 95      MCH 31.1      MCHC 32.6      RDW 16.1 (*)     Platelets 69 (*)     Neutrophils % 53.6      Immature Granulocytes %, Automated 0.2      Lymphocytes % 29.3      Monocytes % 10.3      Eosinophils % 6.4      Basophils % 0.2      Neutrophils Absolute 2.17      Immature Granulocytes Absolute, Automated 0.01      Lymphocytes Absolute 1.19 (*)     Monocytes Absolute 0.42      Eosinophils Absolute 0.26      Basophils Absolute 0.01     COMPREHENSIVE METABOLIC PANEL - Abnormal    Glucose 198 (*)     Sodium 135 (*)     Potassium 4.2      Chloride 105      Bicarbonate 27      Anion Gap 7 (*)     Urea Nitrogen 19      Creatinine 0.70      eGFR >90      Calcium 7.6 (*)     Albumin 2.4 (*)     Alkaline Phosphatase 388 (*)     Total Protein 7.5      AST 75 (*)     Bilirubin, Total 2.4 (*)     ALT 40     SERIAL TROPONIN-INITIAL - Normal    Troponin I, High Sensitivity 3      Narrative:     Less than 99th percentile of normal range cutoff-  Female and children under 18 years old <14 ng/L; Male <21 ng/L: Negative  Repeat testing should be performed if clinically indicated.     Female and children under 18 years old 14-50 ng/L; Male 21-50 ng/L:  Consistent with possible cardiac damage and possible increased clinical   risk. Serial measurements may help to assess extent of myocardial damage.     >50 ng/L: Consistent with cardiac  damage, increased clinical risk and  myocardial infarction. Serial measurements may help assess extent of   myocardial damage.      NOTE: Children less than 1 year old may have higher baseline troponin   levels and results should be interpreted in conjunction with the overall   clinical context.     NOTE: Troponin I testing is performed using a different   testing methodology at [facility] than at other   [facility]. Direct result comparisons should only   be made within the same method.   SERIAL TROPONIN, 1 HOUR - Normal    Troponin I, High Sensitivity 3      Narrative:     Less than 99th percentile of normal range cutoff-  Female and children under 18 years old <14 ng/L; Male <21 ng/L: Negative  Repeat testing should be performed if clinically indicated.     Female and children under 18 years old 14-50 ng/L; Male 21-50 ng/L:  Consistent with possible cardiac damage and possible increased clinical   risk. Serial measurements may help to assess extent of myocardial damage.     >50 ng/L: Consistent with cardiac damage, increased clinical risk and  myocardial infarction. Serial measurements may help assess extent of   myocardial damage.      NOTE: Children less than 1 year old may have higher baseline troponin   levels and results should be interpreted in conjunction with the overall   clinical context.     NOTE: Troponin I testing is performed using a different   testing methodology at [facility] than at other   [facility]. Direct result comparisons should only   be made within the same method.   TROPONIN SERIES- (INITIAL, 1 HR)    Narrative:     The following orders were created for panel order Troponin Series, (0, 1 HR).  Procedure                               Abnormality         Status                     ---------                               -----------         ------                     Troponin I, High Sensiti...[[id number]]  Normal              Final result                Troponin, High Sensitivi...[[id number]]  Normal              Final result                 Please view results for these tests on the individual orders.         Procedure  Procedures     Dustin Serna DO  01/25/25 0913

## 2025-01-28 ENCOUNTER — TELEPHONE (OUTPATIENT)
Dept: PHARMACY | Facility: HOSPITAL | Age: 50
End: 2025-01-28
Payer: COMMERCIAL

## 2025-01-28 LAB
ATRIAL RATE: 78 BPM
P AXIS: 58 DEGREES
P OFFSET: 177 MS
P ONSET: 122 MS
PR INTERVAL: 182 MS
Q ONSET: 213 MS
QRS COUNT: 13 BEATS
QRS DURATION: 78 MS
QT INTERVAL: 426 MS
QTC CALCULATION(BAZETT): 485 MS
QTC FREDERICIA: 465 MS
R AXIS: -6 DEGREES
T AXIS: 9 DEGREES
T OFFSET: 426 MS
VENTRICULAR RATE: 78 BPM

## 2025-01-28 NOTE — PROGRESS NOTES
EDPD Note: Lab/Chart Reviewed    Reviewed Mr./Mrs./Ms. Alfonzo Flanagan 's chart regarding a positive urine culture/result that was taken during their recent emergency room visit. The patient was transferred to a non- facility .Therefore, I have faxed this information to Landen Freeman MD of [facility] at fax number [phone] .    Susceptibility data from last 90 days.  Collected Specimen Info Organism Ampicillin Ciprofloxacin Levofloxacin Nitrofurantoin Penicillin Trimethoprim/Sulfamethoxazole Vancomycin   01/22/25 Urine from Clean Catch/Voided Enterococcus faecium  R  R  R  R  R  R  S       No further follow up needed from EDPD Team.     Can DavisD

## 2025-01-30 ENCOUNTER — APPOINTMENT (OUTPATIENT)
Dept: RADIOLOGY | Facility: HOSPITAL | Age: 50
End: 2025-01-30
Payer: COMMERCIAL

## 2025-02-05 ENCOUNTER — HOSPITAL ENCOUNTER (EMERGENCY)
Facility: HOSPITAL | Age: 50
Discharge: HOME | End: 2025-02-05
Attending: STUDENT IN AN ORGANIZED HEALTH CARE EDUCATION/TRAINING PROGRAM
Payer: COMMERCIAL

## 2025-02-05 ENCOUNTER — APPOINTMENT (OUTPATIENT)
Dept: CARDIOLOGY | Facility: HOSPITAL | Age: 50
End: 2025-02-05
Payer: COMMERCIAL

## 2025-02-05 ENCOUNTER — APPOINTMENT (OUTPATIENT)
Dept: RADIOLOGY | Facility: HOSPITAL | Age: 50
End: 2025-02-05
Payer: COMMERCIAL

## 2025-02-05 VITALS
SYSTOLIC BLOOD PRESSURE: 140 MMHG | HEART RATE: 79 BPM | OXYGEN SATURATION: 100 % | RESPIRATION RATE: 18 BRPM | WEIGHT: 210 LBS | DIASTOLIC BLOOD PRESSURE: 67 MMHG | TEMPERATURE: 98.6 F | BODY MASS INDEX: 38.64 KG/M2 | HEIGHT: 62 IN

## 2025-02-05 DIAGNOSIS — R18.8 OTHER ASCITES: Primary | ICD-10-CM

## 2025-02-05 DIAGNOSIS — J81.0 ACUTE PULMONARY EDEMA: ICD-10-CM

## 2025-02-05 DIAGNOSIS — N30.01 ACUTE CYSTITIS WITH HEMATURIA: ICD-10-CM

## 2025-02-05 DIAGNOSIS — K74.60 CIRRHOSIS, NONALCOHOLIC (MULTI): ICD-10-CM

## 2025-02-05 DIAGNOSIS — B37.9 YEAST INFECTION: ICD-10-CM

## 2025-02-05 LAB
ALBUMIN SERPL BCP-MCNC: 2.5 G/DL (ref 3.4–5)
ALP SERPL-CCNC: 381 U/L (ref 33–110)
ALT SERPL W P-5'-P-CCNC: 42 U/L (ref 7–45)
AMMONIA PLAS-SCNC: 47 UMOL/L (ref 16–53)
ANION GAP SERPL CALCULATED.3IONS-SCNC: <7 MMOL/L (ref 10–20)
APPEARANCE UR: ABNORMAL
APTT PPP: 27.8 SECONDS (ref 22–32.5)
AST SERPL W P-5'-P-CCNC: 84 U/L (ref 9–39)
BASOPHILS # BLD AUTO: 0.02 X10*3/UL (ref 0–0.1)
BASOPHILS NFR BLD AUTO: 0.4 %
BILIRUB SERPL-MCNC: 3.2 MG/DL (ref 0–1.2)
BILIRUB UR STRIP.AUTO-MCNC: NEGATIVE MG/DL
BNP SERPL-MCNC: 44 PG/ML (ref 0–99)
BUN SERPL-MCNC: 16 MG/DL (ref 6–23)
CALCIUM SERPL-MCNC: 7.9 MG/DL (ref 8.6–10.3)
CARDIAC TROPONIN I PNL SERPL HS: 3 NG/L (ref 0–13)
CARDIAC TROPONIN I PNL SERPL HS: 3 NG/L (ref 0–13)
CHLORIDE SERPL-SCNC: 105 MMOL/L (ref 98–107)
CO2 SERPL-SCNC: 31 MMOL/L (ref 21–32)
COLOR UR: YELLOW
CREAT SERPL-MCNC: 0.79 MG/DL (ref 0.5–1.05)
EGFRCR SERPLBLD CKD-EPI 2021: >90 ML/MIN/1.73M*2
EOSINOPHIL # BLD AUTO: 0.4 X10*3/UL (ref 0–0.7)
EOSINOPHIL NFR BLD AUTO: 8.8 %
ERYTHROCYTE [DISTWIDTH] IN BLOOD BY AUTOMATED COUNT: 15.9 % (ref 11.5–14.5)
GLUCOSE SERPL-MCNC: 197 MG/DL (ref 74–99)
GLUCOSE UR STRIP.AUTO-MCNC: NORMAL MG/DL
HCG UR QL IA.RAPID: NEGATIVE
HCT VFR BLD AUTO: 35.5 % (ref 36–46)
HGB BLD-MCNC: 11.4 G/DL (ref 12–16)
HYALINE CASTS #/AREA URNS AUTO: ABNORMAL /LPF
IMM GRANULOCYTES # BLD AUTO: 0.01 X10*3/UL (ref 0–0.7)
IMM GRANULOCYTES NFR BLD AUTO: 0.2 % (ref 0–0.9)
INR PPP: 1.2 (ref 0.9–1.2)
KETONES UR STRIP.AUTO-MCNC: NEGATIVE MG/DL
LEUKOCYTE ESTERASE UR QL STRIP.AUTO: ABNORMAL
LIPASE SERPL-CCNC: 206 U/L (ref 9–82)
LYMPHOCYTES # BLD AUTO: 1.21 X10*3/UL (ref 1.2–4.8)
LYMPHOCYTES NFR BLD AUTO: 26.7 %
MCH RBC QN AUTO: 31.4 PG (ref 26–34)
MCHC RBC AUTO-ENTMCNC: 32.1 G/DL (ref 32–36)
MCV RBC AUTO: 98 FL (ref 80–100)
MONOCYTES # BLD AUTO: 0.3 X10*3/UL (ref 0.1–1)
MONOCYTES NFR BLD AUTO: 6.6 %
MUCOUS THREADS #/AREA URNS AUTO: ABNORMAL /LPF
NEUTROPHILS # BLD AUTO: 2.59 X10*3/UL (ref 1.2–7.7)
NEUTROPHILS NFR BLD AUTO: 57.3 %
NITRITE UR QL STRIP.AUTO: NEGATIVE
NRBC BLD-RTO: 0 /100 WBCS (ref 0–0)
PH UR STRIP.AUTO: 6 [PH]
PLATELET # BLD AUTO: 77 X10*3/UL (ref 150–450)
POTASSIUM SERPL-SCNC: 3.9 MMOL/L (ref 3.5–5.3)
PROT SERPL-MCNC: 7.7 G/DL (ref 6.4–8.2)
PROT UR STRIP.AUTO-MCNC: NEGATIVE MG/DL
PROTHROMBIN TIME: 13.2 SECONDS (ref 9.3–12.7)
RBC # BLD AUTO: 3.63 X10*6/UL (ref 4–5.2)
RBC # UR STRIP.AUTO: ABNORMAL MG/DL
RBC #/AREA URNS AUTO: ABNORMAL /HPF
SODIUM SERPL-SCNC: 137 MMOL/L (ref 136–145)
SP GR UR STRIP.AUTO: 1.02
SQUAMOUS #/AREA URNS AUTO: ABNORMAL /HPF
UROBILINOGEN UR STRIP.AUTO-MCNC: NORMAL MG/DL
WBC # BLD AUTO: 4.5 X10*3/UL (ref 4.4–11.3)
WBC #/AREA URNS AUTO: ABNORMAL /HPF
YEAST BUDDING #/AREA UR COMP ASSIST: PRESENT /HPF

## 2025-02-05 PROCEDURE — 85025 COMPLETE CBC W/AUTO DIFF WBC: CPT | Performed by: PHYSICIAN ASSISTANT

## 2025-02-05 PROCEDURE — 74177 CT ABD & PELVIS W/CONTRAST: CPT

## 2025-02-05 PROCEDURE — 81001 URINALYSIS AUTO W/SCOPE: CPT | Performed by: PHYSICIAN ASSISTANT

## 2025-02-05 PROCEDURE — 85730 THROMBOPLASTIN TIME PARTIAL: CPT

## 2025-02-05 PROCEDURE — 84484 ASSAY OF TROPONIN QUANT: CPT | Performed by: PHYSICIAN ASSISTANT

## 2025-02-05 PROCEDURE — 2550000001 HC RX 255 CONTRASTS

## 2025-02-05 PROCEDURE — 99285 EMERGENCY DEPT VISIT HI MDM: CPT | Mod: 25 | Performed by: STUDENT IN AN ORGANIZED HEALTH CARE EDUCATION/TRAINING PROGRAM

## 2025-02-05 PROCEDURE — 93005 ELECTROCARDIOGRAM TRACING: CPT

## 2025-02-05 PROCEDURE — 36415 COLL VENOUS BLD VENIPUNCTURE: CPT | Performed by: PHYSICIAN ASSISTANT

## 2025-02-05 PROCEDURE — 85610 PROTHROMBIN TIME: CPT

## 2025-02-05 PROCEDURE — 83880 ASSAY OF NATRIURETIC PEPTIDE: CPT | Performed by: PHYSICIAN ASSISTANT

## 2025-02-05 PROCEDURE — 2500000004 HC RX 250 GENERAL PHARMACY W/ HCPCS (ALT 636 FOR OP/ED)

## 2025-02-05 PROCEDURE — 96365 THER/PROPH/DIAG IV INF INIT: CPT | Mod: 59

## 2025-02-05 PROCEDURE — 96375 TX/PRO/DX INJ NEW DRUG ADDON: CPT

## 2025-02-05 PROCEDURE — 83690 ASSAY OF LIPASE: CPT | Performed by: PHYSICIAN ASSISTANT

## 2025-02-05 PROCEDURE — 87086 URINE CULTURE/COLONY COUNT: CPT | Mod: WESLAB | Performed by: PHYSICIAN ASSISTANT

## 2025-02-05 PROCEDURE — 71046 X-RAY EXAM CHEST 2 VIEWS: CPT

## 2025-02-05 PROCEDURE — 74177 CT ABD & PELVIS W/CONTRAST: CPT | Performed by: STUDENT IN AN ORGANIZED HEALTH CARE EDUCATION/TRAINING PROGRAM

## 2025-02-05 PROCEDURE — 80053 COMPREHEN METABOLIC PANEL: CPT | Performed by: PHYSICIAN ASSISTANT

## 2025-02-05 PROCEDURE — 82140 ASSAY OF AMMONIA: CPT

## 2025-02-05 PROCEDURE — 81025 URINE PREGNANCY TEST: CPT

## 2025-02-05 PROCEDURE — 71046 X-RAY EXAM CHEST 2 VIEWS: CPT | Performed by: RADIOLOGY

## 2025-02-05 RX ORDER — SUCRALFATE 1 G/1
1 TABLET ORAL
Status: ON HOLD | COMMUNITY

## 2025-02-05 RX ORDER — CEFTRIAXONE 1 G/50ML
1 INJECTION, SOLUTION INTRAVENOUS ONCE
Status: COMPLETED | OUTPATIENT
Start: 2025-02-05 | End: 2025-02-05

## 2025-02-05 RX ORDER — MORPHINE SULFATE 4 MG/ML
4 INJECTION, SOLUTION INTRAMUSCULAR; INTRAVENOUS ONCE
Status: COMPLETED | OUTPATIENT
Start: 2025-02-05 | End: 2025-02-05

## 2025-02-05 RX ORDER — ASPIRIN 325 MG
1 TABLET, DELAYED RELEASE (ENTERIC COATED) ORAL NIGHTLY
Qty: 45 G | Refills: 0 | Status: ON HOLD | OUTPATIENT
Start: 2025-02-05 | End: 2025-02-15

## 2025-02-05 RX ORDER — ONDANSETRON HYDROCHLORIDE 2 MG/ML
4 INJECTION, SOLUTION INTRAVENOUS ONCE
Status: COMPLETED | OUTPATIENT
Start: 2025-02-05 | End: 2025-02-05

## 2025-02-05 RX ORDER — KETOROLAC TROMETHAMINE 15 MG/ML
15 INJECTION, SOLUTION INTRAMUSCULAR; INTRAVENOUS ONCE
Status: COMPLETED | OUTPATIENT
Start: 2025-02-05 | End: 2025-02-05

## 2025-02-05 RX ORDER — CEPHALEXIN 500 MG/1
500 CAPSULE ORAL 2 TIMES DAILY
Qty: 14 CAPSULE | Refills: 0 | Status: ON HOLD | OUTPATIENT
Start: 2025-02-05 | End: 2025-02-12

## 2025-02-05 RX ADMIN — MORPHINE SULFATE 4 MG: 4 INJECTION, SOLUTION INTRAMUSCULAR; INTRAVENOUS at 18:31

## 2025-02-05 RX ADMIN — IOHEXOL 75 ML: 350 INJECTION, SOLUTION INTRAVENOUS at 17:13

## 2025-02-05 RX ADMIN — ONDANSETRON HYDROCHLORIDE 4 MG: 2 INJECTION INTRAMUSCULAR; INTRAVENOUS at 17:50

## 2025-02-05 RX ADMIN — CEFTRIAXONE SODIUM 1 G: 1 INJECTION, SOLUTION INTRAVENOUS at 18:36

## 2025-02-05 RX ADMIN — KETOROLAC TROMETHAMINE 15 MG: 15 INJECTION, SOLUTION INTRAMUSCULAR; INTRAVENOUS at 17:49

## 2025-02-05 ASSESSMENT — LIFESTYLE VARIABLES
TOTAL SCORE: 0
EVER FELT BAD OR GUILTY ABOUT YOUR DRINKING: NO
HAVE YOU EVER FELT YOU SHOULD CUT DOWN ON YOUR DRINKING: NO
HAVE PEOPLE ANNOYED YOU BY CRITICIZING YOUR DRINKING: NO
EVER HAD A DRINK FIRST THING IN THE MORNING TO STEADY YOUR NERVES TO GET RID OF A HANGOVER: NO

## 2025-02-05 ASSESSMENT — PAIN SCALES - GENERAL
PAINLEVEL_OUTOF10: 8
PAINLEVEL_OUTOF10: 9

## 2025-02-05 ASSESSMENT — PAIN DESCRIPTION - LOCATION: LOCATION: ABDOMEN

## 2025-02-05 ASSESSMENT — PAIN - FUNCTIONAL ASSESSMENT: PAIN_FUNCTIONAL_ASSESSMENT: 0-10

## 2025-02-05 ASSESSMENT — PAIN DESCRIPTION - PAIN TYPE: TYPE: ACUTE PAIN

## 2025-02-05 NOTE — ED PROVIDER NOTES
HPI   Chief Complaint   Patient presents with    Abdominal Pain     Pt complains of increasing abdominal pain. Increased swelling in abdomen and legs       Patient is a 50-year-old female presents emergency department for evaluation of generalized abdominal pain and distention with history of nonalcoholic cirrhosis.  Patient states that she has a history of requiring paracentesis in the past and states that her last paracentesis was 3 weeks ago.  She states over the last several weeks she has had increasing nausea abdominal pain and significant distention especially over the last several days.  She is noted swelling in her feet and because of the distention in her abdomen feels like she cannot take deep breaths.  She notes a history of diabetes, but denies any other history of any chronic medical problems.  She states she is has been having normal bowel movements she states that she was supposed to have an appointment today with a.  Gastroenterologist, but then they referred her to a liver specialist.  She states that today was her appointment, but they canceled it stating that they did not have availability and therefore rescheduled her for next week.  She feels she cannot wait that long and therefore presents here.  She states she feels generally rundown and fatigued with chills.  She denies any cough, congestion, vomiting, lightheadedness, dizziness.      History provided by:  Patient   used: No            Patient History   Past Medical History:   Diagnosis Date    Ascites     Asthma     Cirrhosis of liver (Multi)     Diabetes mellitus (Multi)     GERD (gastroesophageal reflux disease)     CORBY (obstructive sleep apnea)     Portal hypertension (Multi)     Thrombocytopenia (CMS-HCC)      Past Surgical History:   Procedure Laterality Date     SECTION, LOW TRANSVERSE      CHOLECYSTECTOMY      COLONOSCOPY      CT GUIDED IMAGING FOR NEEDLE PLACEMENT  10/14/2020    CT GUIDED IMAGING FOR  NEEDLE PLACEMENT [facility]    ESOPHAGOGASTRODUODENOSCOPY      INGUINAL HIDRADENITIS EXCISION      TUBAL LIGATION      US GUIDED ABDOMINAL PARACENTESIS  10/08/2020    US GUIDED ABDOMINAL PARACENTESIS [facility]     No family history on file.  Social History     Tobacco Use    Smoking status: Never    Smokeless tobacco: Never   Vaping Use    Vaping status: Never Used   Substance Use Topics    Alcohol use: Never    Drug use: Never       Physical Exam   ED Triage Vitals [02/05/25 1555]   Temperature Heart Rate Respirations BP   37 °C (98.6 °F) 79 18 140/67      Pulse Ox Temp Source Heart Rate Source Patient Position   100 % Temporal Monitor Sitting      BP Location FiO2 (%)     Left arm --       Physical Exam  Constitutional:       Appearance: She is well-developed.   Cardiovascular:      Rate and Rhythm: Normal rate and regular rhythm.   Pulmonary:      Effort: Pulmonary effort is normal.      Breath sounds: Normal breath sounds.   Abdominal:      General: Abdomen is protuberant. Bowel sounds are normal. There is distension.      Palpations: Abdomen is rigid. There is fluid wave.      Tenderness: There is no abdominal tenderness.   Skin:     General: Skin is warm and dry.   Neurological:      General: No focal deficit present.      Mental Status: She is alert and oriented to person, place, and time.           ED Course & MDM   ED Course as of 02/06/25 0026   Wed Feb 05, 2025   1815 Spoke with admitting physician for patient's primary care provider Dr. Lawrence who was able to review case and is familiar with patient.  Does not feel that patient requires admission at this time and can follow-up outpatient to get paracentesis. [AF]   1844 I personally reviewed and interpreted the EKG @ 1750: NSR 90, normal axis/intervals and no appreciable ischemia, non-specific TW findings, prior EKG on 1/22/2025 reviewed without any appreciable specific/identifiable changes, and artifact and poor lead read and lead II  [BC]      ED Course User Index  [AF] Lynn Madison PA-C  [BC] Lowell Peters MD         Diagnoses as of 02/06/25 0026   Other ascites   Cirrhosis, nonalcoholic (Multi)   Acute pulmonary edema   Acute cystitis with hematuria   Yeast infection                 No data recorded     Niya Coma Scale Score: 15 (02/05/25 1922 : Sheba Kovacs RN)                           Medical Decision Making  Patient is a 50-year-old female presents emergency department for evaluation of abdominal distention and pain.    EKG was interpreted by attending physician and reviewed by me.    Lab work done today included CMP, CBC, troponins, proBNP, ammonia, lipase, PT/INR, APTT, urinalysis, urine pregnancy.  Lab work with transaminitis and hyperbilirubinemia baseline, mild electrolyte abnormalities, anemia at baseline, elevation of lipase, and evidence of urinary tract infection.    Scans done today were interpreted/confirmed by radiologist and also interpreted by me which included chest x-ray and CT abdomen/pelvis with contrast.  Chest x-ray shows increased perihilar markings suggestive of vascular congestion with CT scan of abdomen and pelvis showing hepatic cirrhosis with evidence of portal hypertension and moderate volume ascites with 6 mm right middle lobe pulmonary nodule.    Medications given at today's visit include IV Toradol, IV Zofran, IV morphine and IV ceftriaxone    I saw this patient in conjunction with Dr. Peters.  Patient remains hemodynamically stable in the emergency department.  She is oxygenating well on room air with chest x-ray showing mild vascular congestion.  She does have significant ascites on CT imaging with no evidence for acute pathology otherwise with hepatic portal hypertension.  This is a chronic problem for patient and has had multiple admissions for paracentesis in the past.  She is otherwise well-appearing with no concern for spontaneous bacterial peritonitis given no significant abdominal  tenderness with large distention most consistent with ascites due to nonalcoholic cirrhosis.  I did speak with Dr. Lawrence who is very familiar with patient and covers admissions for patient's primary care provider Dr. Freeman who feels that patient does not meet any criteria for admission at this time and recommends she follow-up outpatient for outpatient paracentesis.  He does not feel that patient requires admission and recommends discharge home.  Patient is comfortable with this plan given referral for interventional radiology to schedule outpatient paracentesis.  She has appointment scheduled with hepatologist next week and is agreeable to plan and discharge at this time.  She was given dose of antibiotics for coverage of urinary tract infection and also given course of topical clotrimazole for her vagina as does have yeast present in urinary sample.  Patient educated on close return precautions and agreeable to plan of discharge at this time.  Rest the patient's lab work all at baseline and no indication for admission at this time.  Emergent pathologies were considered for this patient, although I have low suspicion for anything acutely emergent given patient's clinical presentation, history, physical exam, stable vital signs, and relatively unremarkable workup.  Discharging patient home is reasonable plan of care for outpatient management.    All labs, imaging, and diagnostic studies were reviewed by me and patient was counseled on clinical impression, expectations, and plan.  Patient was educated to follow-up with PCP in the following 1-2 days.  All questions from patient were answered. They elicited understanding and were agreeable to course of treatment.  Patient was discharged in stable condition and given strict return precautions.  Prescriptions given on discharge: PO Keflex and vaginal clotrimazole    ** Disclaimer:  Parts of this document were written utilizing a voice to text dictation software.  Note  may contain minor transcription or typographical errors that were inadvertently transcribed by the computer software.         Procedure  Procedures     Lynn Madison PA-C  02/06/25 0026

## 2025-02-05 NOTE — PROGRESS NOTES
Alfonzo Flanaagn is a 50 y.o. female admitted for No Principal Problem currently listed. Pharmacy reviewed the patient's [id number] medications and allergies for accuracy.    Medications ADDED:  sucralfate (Carafate) 1 gram tablet  Medications CHANGED:  docusate sodium (Colace) 100 mg capsule  insulin glargine (Lantus Solostar U-100 Insulin) 100 unit/mL (3 mL) pen  actulose 20 gram/30 mL oral solution  meclizine (Antivert) 25 mg tablet  polyethylene glycol (Glycolax, Miralax) 17 gram/dose powder  traZODone (Desyrel) 50 mg tablet  Medications REMOVED:   rifAXIMin (Xifaxan) 550 mg tablet   cephalexin (Keflex) 500 mg capsule  insulin lispro 100 unit/mL injection  ketorolac (Toradol) 10 mg tablet  magnesium oxide (Mag-Ox) 400 mg (241.3 mg magnesium) tablet  nadolol (Corgard) 20 mg tablet  oxyCODONE (Roxicodone) 5 mg immediate release tablet    The list below reflects the updated PTA list. Comments regarding how patient may be taking medications differently can be found in the Admit Orders Activity  Prior to Admission Medications   Prescriptions Last Dose Informant   acetaminophen (Tylenol) 500 mg tablet PRN Self   Sig: Take 1-2 tablets (500-1,000 mg) by mouth every 6   hours if needed (pain).   atorvastatin (Lipitor) 80 mg tablet 2/4/2025 Bedtime Self   Sig: Take 1 tablet (80 mg) by mouth once daily at bedtime.      docusate sodium (Colace) 100 mg capsule PRN Self   Sig: Take 1 capsule (100 mg) by mouth if needed.   fenofibrate (Tricor) 145 mg tablet 2/4/2025 Morning Self   Sig: Take 1 tablet (145 mg) by mouth once daily.   furosemide (Lasix) 40 mg tablet 2/5/2025 Morning Self   Sig: Take 1 tablet (40 mg) by mouth once daily. Hold for dizziness.   hydrOXYzine HCL (Atarax) 25 mg tablet PRN Self   Sig: Take 1 tablet (25 mg) by mouth every 4 hours if   needed for anxiety.   insulin glargine (Lantus Solostar U-100 Insulin) 100 unit/mL (3 mL) pen 2/4/2025 Bedtime Self   Sig: Inject 50 Units under the skin 2 times  a day.   lactulose 20 gram/30 mL oral solution PRN Self   Sig: Take 30 mL (20 g) by mouth if needed.      meclizine (Antivert) 25 mg tablet PRN Self   Sig: Take 1 tablet (25 mg) by mouth if needed for dizziness.   metFORMIN XR (Glucophage-XR) 500 mg 24 hr tablet 2/4/2025 Evening Self   Sig: Take 2 tablets (1,000 mg) by mouth once daily in the  evening. Take with meals. Do not crush, chew, or split.   midodrine (Proamatine) 10 mg tablet 2/4/2025 Evening Self   Sig: Take 0.5 tablets (5 mg) by mouth 3 times daily   (morning, midday, late afternoon).      nitroglycerin (Nitrodur) 0.1 mg/hr patch 2/4/2025 Morning Self   Sig: Place 1 patch over 12 hours on the skin once daily.   Hold for dizziness.      pantoprazole (ProtoNix) 40 mg EC tablet 2/4/2025 Morning Self   Sig: Take 1 tablet (40 mg) by mouth once daily in the morning.   Take before meals. Do not crush, chew, or split.      polyethylene glycol (Glycolax, Miralax) 17 gram/dose powder PRN Self   Sig: Mix 17 grams of powder in 8 ounces of water and   drink if needed.      spironolactone (Aldactone) 50 mg tablet 2/4/2025 Evening Self   Sig: Take 1 tablet (50 mg) by mouth 2 times a day.   Hold for dizziness.   sucralfate (Carafate) 1 gram tablet 2/4/2025 Evening Self   Sig: Take 1 tablet (1 g) by mouth 4 times a day before meals.   traZODone (Desyrel) 50 mg tablet 2/4/2025 Bedtime Self   Sig: Take 0.5 tablets (25 mg) by mouth once daily at   bedtime as needed   ursodiol (Actigall) 300 mg capsule 2/4/2025 Bedtime Self   Sig: Take 1 capsule (300 mg) by mouth 3 times a day.      Facility-Administered Medications: None        The list below reflects the updated allergy list. Please review each documented allergy for additional clarification and justification.  Allergies  Reviewed by Donte Harrington on 2/5/2025        Severity Reactions Comments    Gluten Low Diarrhea             Pharmacy has been updated to [email or web address].    Sources used to complete the med history  include:   Patient interview with  at bedside, good historian, dispense report, care everywhere, chart review history    Below are additional concerns with the patient's PTA list.  None    Donte Harrington CPhT  Please reach out via [employer] Secure Chat for questions

## 2025-02-06 LAB
BACTERIA UR CULT: NORMAL
HOLD SPECIMEN: NORMAL

## 2025-02-06 NOTE — ED PROVIDER NOTES
"The patient was seen in conjunction with the advanced practice provider, and I performed a substantive portion of the encounter. The following is my supervisory note.    History:  Patient presents ED for worsening abdominal pain with progressive distention over the last 2 to 3 weeks.  Notes history of cirrhosis and has had ascites in the past with drainage/paracentesis.  Notes generalized decreased p.o. nutritional intake secondary symptoms and intermittent nausea but no bouts of emesis.  Does not endorse any changes in bowel/bladder habits.  Denies any peripheral fevers but notes chills.  States abdominal pain is constant, cramping and dull ache, mild in severity, intermittently radiates to her back.  Denies any other significant systemic symptoms or complaints.    VS:  /67 (BP Location: Left arm, Patient Position: Sitting)   Pulse 79   Temp 37 °C (98.6 °F) (Temporal)   Resp 18   Ht 1.575 m (5' 2\")   Wt 95.3 kg (210 lb)   SpO2 100%   BMI 38.41 kg/m²      Physical exam:  CONST: alert, normal appearance, no acute distress, does not appear ill/toxic  ENT: MMM, no rhinorrhea/congestion, posterior oropharynx clear and oral secretions well controlled  EYES: PEPRL, EOMI, mild scleral icterus, no nystagmus  CV: RRR, no murmurs, 2+ equal/symmetrical pulses x4  PULM: CTAB, no respiratory distress, not requiring supplemental O2  ABD: soft, significantly distended with fluid wave, diffusely tender without appreciable focality, no appreciable pain out of proportion to exam or with exam, no abdominal wall rigidity or voluntary guarding not Dors any associated rebound tenderness, no mass  SKIN: warm/dry, no pallor, mild jaundice, no rash  NEURO: A&Ox4, no focal neuro deficits      I personally reviewed and interpreted the following studies: EKG is NSR 90, normal axis/intervals, no appreciable ischemia, nonspecific TW findings, labs are significant for baseline normocytic anemia, baseline LFTs, near baseline elevated " lipase, images are notable for CXR no evidence of cardiomegaly, pleural effusions, or significant pulmonary vascular congestion and CT A/P moderate to significant intra-abdominal ascites with evidence of mild bowel wall edema .      MDM:  Patient presented to the ED for abdominal pain and distention for the last 3 weeks.  Concerning PMHx of liver cirrhosis complicated by ascites, DM2, hepatic encephalopathy, SBP, GIB, hyperammonemia.    Per Chart Review: Hospital admission at [facility] on 1/26/2025, discharge summary significant for admission for hematemesis with epigastric pain, found to have hepatic encephalopathy secondary to hyperammonia EMEA, GI was consulted, no evidence of active hemorrhage, GI was consulted and advance diet as tolerated with PPI, add antiemetics, otherwise VSS, exam grossly unremarkable other than distended abdomen, discharged home with appropriate follow-up instructions, LOS 3 days.    Assessment/evaluation consistent with symptoms associated with reaccumulation of ascites secondary to known cirrhosis. No concerning history, clinical evidence/work-up, or exam findings for the concerning differentials of SBP, SBO, pleural effusions, significant electrolyte/metabolic derangement, significantly worsening liver function, worsening normocytic anemia, acute pancreatitis. These conditions have been thoroughly evaluated and determined to be sufficiently unlikely to be the etiology of patient's presenting symptoms.     Scores: MELD-Na 15 (<2% 90-day mortality)    ED Course/Diagnosis:  ED Course as of 02/07/25 1428   Wed Feb 05, 2025 1815 Spoke with admitting physician for patient's primary care provider Dr. Lawrence who was able to review case and is familiar with patient.  Does not feel that patient requires admission at this time and can follow-up outpatient to get paracentesis. [AF]   1844 I personally reviewed and interpreted the EKG @ 1750: NSR 90, normal axis/intervals  and no appreciable ischemia, non-specific TW findings, prior EKG on 1/22/2025 reviewed without any appreciable specific/identifiable changes, and artifact and poor lead read and lead II [BC]      ED Course User Index  [AF] Lynn Madison PA-C  [BC] Lowell Peters MD         Diagnoses as of 02/07/25 1428   Other ascites   Cirrhosis, nonalcoholic (Multi)   Acute pulmonary edema   Acute cystitis with hematuria   Yeast infection       1. Other ascites  US guided abdominal paracentesis    CANCELED: Consult to Interventional Radiology      2. Cirrhosis, nonalcoholic (Multi)  US guided abdominal paracentesis    CANCELED: Consult to Interventional Radiology      3. Acute pulmonary edema  US guided abdominal paracentesis    CANCELED: Consult to Interventional Radiology      4. Acute cystitis with hematuria  cephalexin (Keflex) 500 mg capsule      5. Yeast infection  clotrimazole (Lotrimin) 1 % vaginal cream               Lowell Peters MD  02/07/25 9987

## 2025-02-06 NOTE — DISCHARGE INSTRUCTIONS
Please follow-up closely with interventional radiology outpatient for outpatient paracentesis to drain fluid out of the belly.  Call to schedule appointment as soon as possible.  Referral given.  Take and complete course of antibiotics for urinary tract infection and additionally given coverage for yeast infection as well.

## 2025-02-07 ENCOUNTER — HOSPITAL ENCOUNTER (OUTPATIENT)
Dept: RADIOLOGY | Facility: HOSPITAL | Age: 50
Discharge: HOME | End: 2025-02-07
Payer: COMMERCIAL

## 2025-02-07 VITALS
HEART RATE: 87 BPM | OXYGEN SATURATION: 98 % | TEMPERATURE: 36.4 F | SYSTOLIC BLOOD PRESSURE: 126 MMHG | DIASTOLIC BLOOD PRESSURE: 61 MMHG | RESPIRATION RATE: 16 BRPM

## 2025-02-07 DIAGNOSIS — R18.8 OTHER ASCITES: ICD-10-CM

## 2025-02-07 DIAGNOSIS — J81.0 ACUTE PULMONARY EDEMA: ICD-10-CM

## 2025-02-07 DIAGNOSIS — K74.60 CIRRHOSIS, NONALCOHOLIC (MULTI): ICD-10-CM

## 2025-02-07 PROCEDURE — 49083 ABD PARACENTESIS W/IMAGING: CPT

## 2025-02-07 PROCEDURE — 2720000007 HC OR 272 NO HCPCS

## 2025-02-07 PROCEDURE — C1729 CATH, DRAINAGE: HCPCS

## 2025-02-07 PROCEDURE — 2500000004 HC RX 250 GENERAL PHARMACY W/ HCPCS (ALT 636 FOR OP/ED): Performed by: RADIOLOGY

## 2025-02-07 RX ORDER — LIDOCAINE HYDROCHLORIDE 10 MG/ML
INJECTION, SOLUTION EPIDURAL; INFILTRATION; INTRACAUDAL; PERINEURAL
Status: COMPLETED | OUTPATIENT
Start: 2025-02-07 | End: 2025-02-07

## 2025-02-07 RX ADMIN — LIDOCAINE HYDROCHLORIDE 4 ML: 10 INJECTION, SOLUTION EPIDURAL; INFILTRATION; INTRACAUDAL; PERINEURAL at 13:18

## 2025-02-07 ASSESSMENT — PAIN SCALES - GENERAL
PAINLEVEL_OUTOF10: 0 - NO PAIN

## 2025-02-07 ASSESSMENT — PAIN - FUNCTIONAL ASSESSMENT: PAIN_FUNCTIONAL_ASSESSMENT: 0-10

## 2025-02-09 ENCOUNTER — APPOINTMENT (OUTPATIENT)
Dept: CARDIOLOGY | Facility: HOSPITAL | Age: 50
End: 2025-02-09

## 2025-02-09 ENCOUNTER — APPOINTMENT (OUTPATIENT)
Dept: RADIOLOGY | Facility: HOSPITAL | Age: 50
End: 2025-02-09

## 2025-02-09 ENCOUNTER — HOSPITAL ENCOUNTER (INPATIENT)
Facility: HOSPITAL | Age: 50
LOS: 6 days | Discharge: HOME | End: 2025-02-15
Attending: INTERNAL MEDICINE | Admitting: INTERNAL MEDICINE
Payer: COMMERCIAL

## 2025-02-09 ENCOUNTER — HOSPITAL ENCOUNTER (EMERGENCY)
Facility: HOSPITAL | Age: 50
Discharge: ANOTHER HOSPITAL | End: 2025-02-09
Attending: STUDENT IN AN ORGANIZED HEALTH CARE EDUCATION/TRAINING PROGRAM

## 2025-02-09 VITALS
BODY MASS INDEX: 38.64 KG/M2 | DIASTOLIC BLOOD PRESSURE: 76 MMHG | HEIGHT: 62 IN | HEART RATE: 87 BPM | RESPIRATION RATE: 24 BRPM | WEIGHT: 210 LBS | SYSTOLIC BLOOD PRESSURE: 123 MMHG | OXYGEN SATURATION: 100 % | TEMPERATURE: 98.1 F

## 2025-02-09 DIAGNOSIS — R10.13 ABDOMINAL PAIN, EPIGASTRIC: ICD-10-CM

## 2025-02-09 DIAGNOSIS — K74.60 CIRRHOSIS OF LIVER WITH ASCITES, UNSPECIFIED HEPATIC CIRRHOSIS TYPE (MULTI): Primary | ICD-10-CM

## 2025-02-09 DIAGNOSIS — R18.8 CIRRHOSIS OF LIVER WITH ASCITES, UNSPECIFIED HEPATIC CIRRHOSIS TYPE (MULTI): Primary | ICD-10-CM

## 2025-02-09 DIAGNOSIS — Z87.19 HISTORY OF CIRRHOSIS: ICD-10-CM

## 2025-02-09 DIAGNOSIS — Z79.4 TYPE 2 DIABETES MELLITUS WITHOUT COMPLICATION, WITH LONG-TERM CURRENT USE OF INSULIN (MULTI): ICD-10-CM

## 2025-02-09 DIAGNOSIS — K74.60 LIVER CIRRHOSIS SECONDARY TO NASH (NONALCOHOLIC STEATOHEPATITIS) (MULTI): ICD-10-CM

## 2025-02-09 DIAGNOSIS — Z01.818 ENCOUNTER FOR OTHER PREPROCEDURAL EXAMINATION: ICD-10-CM

## 2025-02-09 DIAGNOSIS — K65.2 SBP (SPONTANEOUS BACTERIAL PERITONITIS) (MULTI): ICD-10-CM

## 2025-02-09 DIAGNOSIS — E11.9 TYPE 2 DIABETES MELLITUS WITHOUT COMPLICATION, WITH LONG-TERM CURRENT USE OF INSULIN (MULTI): ICD-10-CM

## 2025-02-09 DIAGNOSIS — K74.60 CIRRHOSIS (MULTI): Primary | ICD-10-CM

## 2025-02-09 DIAGNOSIS — K75.81 LIVER CIRRHOSIS SECONDARY TO NASH (NONALCOHOLIC STEATOHEPATITIS) (MULTI): ICD-10-CM

## 2025-02-09 DIAGNOSIS — K86.89 PANCREATIC MASS (HHS-HCC): ICD-10-CM

## 2025-02-09 DIAGNOSIS — R73.9 HYPERGLYCEMIA: ICD-10-CM

## 2025-02-09 LAB
ABO GROUP (TYPE) IN BLOOD: NORMAL
ALBUMIN SERPL BCP-MCNC: 2.1 G/DL (ref 3.4–5)
ALP SERPL-CCNC: 340 U/L (ref 33–110)
ALT SERPL W P-5'-P-CCNC: 34 U/L (ref 7–45)
AMMONIA PLAS-SCNC: 71 UMOL/L (ref 16–53)
ANION GAP SERPL CALCULATED.3IONS-SCNC: <7 MMOL/L (ref 10–20)
ANTIBODY SCREEN: NORMAL
APPEARANCE UR: CLEAR
AST SERPL W P-5'-P-CCNC: 61 U/L (ref 9–39)
BASOPHILS # BLD AUTO: 0.02 X10*3/UL (ref 0–0.1)
BASOPHILS NFR BLD AUTO: 0.4 %
BILIRUB SERPL-MCNC: 2.5 MG/DL (ref 0–1.2)
BILIRUB UR STRIP.AUTO-MCNC: NEGATIVE MG/DL
BUN SERPL-MCNC: 17 MG/DL (ref 6–23)
CALCIUM SERPL-MCNC: 7.5 MG/DL (ref 8.6–10.3)
CHLORIDE SERPL-SCNC: 108 MMOL/L (ref 98–107)
CO2 SERPL-SCNC: 25 MMOL/L (ref 21–32)
COLOR UR: YELLOW
CREAT SERPL-MCNC: 0.81 MG/DL (ref 0.5–1.05)
DAT-POLYSPECIFIC: NORMAL
EGFRCR SERPLBLD CKD-EPI 2021: 90 ML/MIN/1.73M*2
EOSINOPHIL # BLD AUTO: 0.49 X10*3/UL (ref 0–0.7)
EOSINOPHIL NFR BLD AUTO: 9.4 %
ERYTHROCYTE [DISTWIDTH] IN BLOOD BY AUTOMATED COUNT: 15.5 % (ref 11.5–14.5)
GLUCOSE BLD MANUAL STRIP-MCNC: 159 MG/DL (ref 74–99)
GLUCOSE BLD MANUAL STRIP-MCNC: 188 MG/DL (ref 74–99)
GLUCOSE SERPL-MCNC: 269 MG/DL (ref 74–99)
GLUCOSE UR STRIP.AUTO-MCNC: ABNORMAL MG/DL
HCT VFR BLD AUTO: 35.9 % (ref 36–46)
HGB BLD-MCNC: 11.7 G/DL (ref 12–16)
HOLD SPECIMEN: NORMAL
IMM GRANULOCYTES # BLD AUTO: 0.02 X10*3/UL (ref 0–0.7)
IMM GRANULOCYTES NFR BLD AUTO: 0.4 % (ref 0–0.9)
INR PPP: 1.3 (ref 0.9–1.2)
KETONES UR STRIP.AUTO-MCNC: NEGATIVE MG/DL
LEUKOCYTE ESTERASE UR QL STRIP.AUTO: ABNORMAL
LIPASE SERPL-CCNC: 200 U/L (ref 9–82)
LYMPHOCYTES # BLD AUTO: 1.15 X10*3/UL (ref 1.2–4.8)
LYMPHOCYTES NFR BLD AUTO: 22.1 %
MCH RBC QN AUTO: 31.6 PG (ref 26–34)
MCHC RBC AUTO-ENTMCNC: 32.6 G/DL (ref 32–36)
MCV RBC AUTO: 97 FL (ref 80–100)
MONOCYTES # BLD AUTO: 0.61 X10*3/UL (ref 0.1–1)
MONOCYTES NFR BLD AUTO: 11.7 %
MUCOUS THREADS #/AREA URNS AUTO: ABNORMAL /LPF
NEUTROPHILS # BLD AUTO: 2.92 X10*3/UL (ref 1.2–7.7)
NEUTROPHILS NFR BLD AUTO: 56 %
NITRITE UR QL STRIP.AUTO: NEGATIVE
NRBC BLD-RTO: 0 /100 WBCS (ref 0–0)
PH UR STRIP.AUTO: 6 [PH]
PLATELET # BLD AUTO: 85 X10*3/UL (ref 150–450)
POTASSIUM SERPL-SCNC: 3.9 MMOL/L (ref 3.5–5.3)
PROT SERPL-MCNC: 7 G/DL (ref 6.4–8.2)
PROT UR STRIP.AUTO-MCNC: ABNORMAL MG/DL
PROTHROMBIN TIME: 13.8 SECONDS (ref 9.3–12.7)
RBC # BLD AUTO: 3.7 X10*6/UL (ref 4–5.2)
RBC # UR STRIP.AUTO: NEGATIVE MG/DL
RBC #/AREA URNS AUTO: >20 /HPF
RH FACTOR (ANTIGEN D): NORMAL
SODIUM SERPL-SCNC: 135 MMOL/L (ref 136–145)
SP GR UR STRIP.AUTO: >1.05
SQUAMOUS #/AREA URNS AUTO: ABNORMAL /HPF
UROBILINOGEN UR STRIP.AUTO-MCNC: NORMAL MG/DL
WBC # BLD AUTO: 5.2 X10*3/UL (ref 4.4–11.3)
WBC #/AREA URNS AUTO: ABNORMAL /HPF

## 2025-02-09 PROCEDURE — 2500000004 HC RX 250 GENERAL PHARMACY W/ HCPCS (ALT 636 FOR OP/ED): Mod: JZ | Performed by: STUDENT IN AN ORGANIZED HEALTH CARE EDUCATION/TRAINING PROGRAM

## 2025-02-09 PROCEDURE — 82947 ASSAY GLUCOSE BLOOD QUANT: CPT

## 2025-02-09 PROCEDURE — 86850 RBC ANTIBODY SCREEN: CPT

## 2025-02-09 PROCEDURE — 86870 RBC ANTIBODY IDENTIFICATION: CPT

## 2025-02-09 PROCEDURE — 2500000001 HC RX 250 WO HCPCS SELF ADMINISTERED DRUGS (ALT 637 FOR MEDICARE OP)

## 2025-02-09 PROCEDURE — 36415 COLL VENOUS BLD VENIPUNCTURE: CPT | Performed by: STUDENT IN AN ORGANIZED HEALTH CARE EDUCATION/TRAINING PROGRAM

## 2025-02-09 PROCEDURE — 93005 ELECTROCARDIOGRAM TRACING: CPT

## 2025-02-09 PROCEDURE — 74177 CT ABD & PELVIS W/CONTRAST: CPT

## 2025-02-09 PROCEDURE — 96375 TX/PRO/DX INJ NEW DRUG ADDON: CPT

## 2025-02-09 PROCEDURE — 86885 COOMBS TEST INDIRECT QUAL: CPT

## 2025-02-09 PROCEDURE — 83690 ASSAY OF LIPASE: CPT | Performed by: STUDENT IN AN ORGANIZED HEALTH CARE EDUCATION/TRAINING PROGRAM

## 2025-02-09 PROCEDURE — 71045 X-RAY EXAM CHEST 1 VIEW: CPT

## 2025-02-09 PROCEDURE — 85610 PROTHROMBIN TIME: CPT | Performed by: STUDENT IN AN ORGANIZED HEALTH CARE EDUCATION/TRAINING PROGRAM

## 2025-02-09 PROCEDURE — 99285 EMERGENCY DEPT VISIT HI MDM: CPT | Mod: 25 | Performed by: STUDENT IN AN ORGANIZED HEALTH CARE EDUCATION/TRAINING PROGRAM

## 2025-02-09 PROCEDURE — 96376 TX/PRO/DX INJ SAME DRUG ADON: CPT

## 2025-02-09 PROCEDURE — 80053 COMPREHEN METABOLIC PANEL: CPT | Performed by: STUDENT IN AN ORGANIZED HEALTH CARE EDUCATION/TRAINING PROGRAM

## 2025-02-09 PROCEDURE — 81001 URINALYSIS AUTO W/SCOPE: CPT | Performed by: STUDENT IN AN ORGANIZED HEALTH CARE EDUCATION/TRAINING PROGRAM

## 2025-02-09 PROCEDURE — 74177 CT ABD & PELVIS W/CONTRAST: CPT | Performed by: STUDENT IN AN ORGANIZED HEALTH CARE EDUCATION/TRAINING PROGRAM

## 2025-02-09 PROCEDURE — 2500000004 HC RX 250 GENERAL PHARMACY W/ HCPCS (ALT 636 FOR OP/ED)

## 2025-02-09 PROCEDURE — 96374 THER/PROPH/DIAG INJ IV PUSH: CPT | Mod: 59

## 2025-02-09 PROCEDURE — 85025 COMPLETE CBC W/AUTO DIFF WBC: CPT | Performed by: STUDENT IN AN ORGANIZED HEALTH CARE EDUCATION/TRAINING PROGRAM

## 2025-02-09 PROCEDURE — 86880 COOMBS TEST DIRECT: CPT

## 2025-02-09 PROCEDURE — 2550000001 HC RX 255 CONTRASTS: Performed by: STUDENT IN AN ORGANIZED HEALTH CARE EDUCATION/TRAINING PROGRAM

## 2025-02-09 PROCEDURE — 99223 1ST HOSP IP/OBS HIGH 75: CPT | Performed by: INTERNAL MEDICINE

## 2025-02-09 PROCEDURE — 1100000001 HC PRIVATE ROOM DAILY

## 2025-02-09 PROCEDURE — 86901 BLOOD TYPING SEROLOGIC RH(D): CPT

## 2025-02-09 PROCEDURE — 87086 URINE CULTURE/COLONY COUNT: CPT | Mod: WESLAB | Performed by: STUDENT IN AN ORGANIZED HEALTH CARE EDUCATION/TRAINING PROGRAM

## 2025-02-09 PROCEDURE — 82140 ASSAY OF AMMONIA: CPT | Performed by: STUDENT IN AN ORGANIZED HEALTH CARE EDUCATION/TRAINING PROGRAM

## 2025-02-09 PROCEDURE — 2500000002 HC RX 250 W HCPCS SELF ADMINISTERED DRUGS (ALT 637 FOR MEDICARE OP, ALT 636 FOR OP/ED)

## 2025-02-09 PROCEDURE — 87040 BLOOD CULTURE FOR BACTERIA: CPT

## 2025-02-09 RX ORDER — OXYCODONE HYDROCHLORIDE 5 MG/1
5 TABLET ORAL EVERY 6 HOURS PRN
Status: DISCONTINUED | OUTPATIENT
Start: 2025-02-09 | End: 2025-02-09

## 2025-02-09 RX ORDER — PRAMOXINE HYDROCHLORIDE 10 MG/ML
LOTION TOPICAL EVERY 4 HOURS PRN
Status: DISCONTINUED | OUTPATIENT
Start: 2025-02-09 | End: 2025-02-15 | Stop reason: HOSPADM

## 2025-02-09 RX ORDER — ONDANSETRON HYDROCHLORIDE 2 MG/ML
4 INJECTION, SOLUTION INTRAVENOUS ONCE
Status: COMPLETED | OUTPATIENT
Start: 2025-02-09 | End: 2025-02-09

## 2025-02-09 RX ORDER — ONDANSETRON 4 MG/1
4 TABLET, FILM COATED ORAL EVERY 8 HOURS PRN
Status: DISCONTINUED | OUTPATIENT
Start: 2025-02-09 | End: 2025-02-15 | Stop reason: HOSPADM

## 2025-02-09 RX ORDER — INSULIN GLARGINE 100 [IU]/ML
20 INJECTION, SOLUTION SUBCUTANEOUS 2 TIMES DAILY
Status: DISCONTINUED | OUTPATIENT
Start: 2025-02-09 | End: 2025-02-11

## 2025-02-09 RX ORDER — FUROSEMIDE 40 MG/1
40 TABLET ORAL DAILY
Status: DISCONTINUED | OUTPATIENT
Start: 2025-02-09 | End: 2025-02-11

## 2025-02-09 RX ORDER — CEFTRIAXONE 1 G/50ML
1 INJECTION, SOLUTION INTRAVENOUS EVERY 24 HOURS
Status: DISCONTINUED | OUTPATIENT
Start: 2025-02-09 | End: 2025-02-11

## 2025-02-09 RX ORDER — TRAZODONE HYDROCHLORIDE 50 MG/1
25 TABLET ORAL NIGHTLY
Status: DISCONTINUED | OUTPATIENT
Start: 2025-02-09 | End: 2025-02-15 | Stop reason: HOSPADM

## 2025-02-09 RX ORDER — ENOXAPARIN SODIUM 100 MG/ML
40 INJECTION SUBCUTANEOUS EVERY 24 HOURS
Status: DISCONTINUED | OUTPATIENT
Start: 2025-02-09 | End: 2025-02-15 | Stop reason: HOSPADM

## 2025-02-09 RX ORDER — ACETAMINOPHEN 325 MG/1
650 TABLET ORAL 3 TIMES DAILY PRN
Status: DISCONTINUED | OUTPATIENT
Start: 2025-02-09 | End: 2025-02-15 | Stop reason: HOSPADM

## 2025-02-09 RX ORDER — NADOLOL 40 MG/1
40 TABLET ORAL DAILY
Status: DISCONTINUED | OUTPATIENT
Start: 2025-02-10 | End: 2025-02-14

## 2025-02-09 RX ORDER — SUCRALFATE 1 G/1
1 TABLET ORAL
Status: DISCONTINUED | OUTPATIENT
Start: 2025-02-09 | End: 2025-02-15 | Stop reason: HOSPADM

## 2025-02-09 RX ORDER — PANTOPRAZOLE SODIUM 40 MG/1
40 TABLET, DELAYED RELEASE ORAL
Status: DISCONTINUED | OUTPATIENT
Start: 2025-02-10 | End: 2025-02-15 | Stop reason: HOSPADM

## 2025-02-09 RX ORDER — ONDANSETRON HYDROCHLORIDE 2 MG/ML
4 INJECTION, SOLUTION INTRAVENOUS EVERY 8 HOURS PRN
Status: DISCONTINUED | OUTPATIENT
Start: 2025-02-09 | End: 2025-02-15 | Stop reason: HOSPADM

## 2025-02-09 RX ORDER — MORPHINE SULFATE 4 MG/ML
4 INJECTION, SOLUTION INTRAMUSCULAR; INTRAVENOUS ONCE
Status: COMPLETED | OUTPATIENT
Start: 2025-02-09 | End: 2025-02-09

## 2025-02-09 RX ORDER — URSODIOL 300 MG/1
300 CAPSULE ORAL 3 TIMES DAILY
Status: DISCONTINUED | OUTPATIENT
Start: 2025-02-09 | End: 2025-02-14

## 2025-02-09 RX ORDER — POLYETHYLENE GLYCOL 3350 17 G/17G
17 POWDER, FOR SOLUTION ORAL DAILY
Status: DISCONTINUED | OUTPATIENT
Start: 2025-02-09 | End: 2025-02-09

## 2025-02-09 RX ORDER — CEFAZOLIN SODIUM 1 G/50ML
SOLUTION INTRAVENOUS
Status: DISPENSED
Start: 2025-02-09 | End: 2025-02-10

## 2025-02-09 RX ORDER — INSULIN LISPRO 100 [IU]/ML
0-5 INJECTION, SOLUTION INTRAVENOUS; SUBCUTANEOUS
Status: DISCONTINUED | OUTPATIENT
Start: 2025-02-10 | End: 2025-02-15 | Stop reason: HOSPADM

## 2025-02-09 RX ORDER — TRAZODONE HYDROCHLORIDE 50 MG/1
25 TABLET ORAL NIGHTLY PRN
Status: DISCONTINUED | OUTPATIENT
Start: 2025-02-09 | End: 2025-02-09

## 2025-02-09 RX ORDER — LACTULOSE 10 G/15ML
20 SOLUTION ORAL 2 TIMES DAILY
Status: DISCONTINUED | OUTPATIENT
Start: 2025-02-09 | End: 2025-02-12

## 2025-02-09 RX ORDER — FENOFIBRATE 160 MG/1
160 TABLET ORAL DAILY
Status: DISCONTINUED | OUTPATIENT
Start: 2025-02-10 | End: 2025-02-14

## 2025-02-09 RX ORDER — ATORVASTATIN CALCIUM 80 MG/1
80 TABLET, FILM COATED ORAL NIGHTLY
Status: DISCONTINUED | OUTPATIENT
Start: 2025-02-09 | End: 2025-02-14

## 2025-02-09 RX ORDER — HYDROXYZINE HYDROCHLORIDE 25 MG/1
25 TABLET, FILM COATED ORAL EVERY 6 HOURS PRN
Status: DISCONTINUED | OUTPATIENT
Start: 2025-02-09 | End: 2025-02-15 | Stop reason: HOSPADM

## 2025-02-09 RX ORDER — SPIRONOLACTONE 25 MG/1
50 TABLET ORAL 2 TIMES DAILY
Status: DISCONTINUED | OUTPATIENT
Start: 2025-02-09 | End: 2025-02-11

## 2025-02-09 RX ADMIN — TRAZODONE HYDROCHLORIDE 25 MG: 50 TABLET ORAL at 21:25

## 2025-02-09 RX ADMIN — ONDANSETRON 4 MG: 2 INJECTION INTRAMUSCULAR; INTRAVENOUS at 09:09

## 2025-02-09 RX ADMIN — HYDROXYZINE HYDROCHLORIDE 25 MG: 25 TABLET ORAL at 18:09

## 2025-02-09 RX ADMIN — ONDANSETRON 4 MG: 2 INJECTION INTRAMUSCULAR; INTRAVENOUS at 18:09

## 2025-02-09 RX ADMIN — SUCRALFATE 1 G: 1 TABLET ORAL at 22:24

## 2025-02-09 RX ADMIN — MORPHINE SULFATE 4 MG: 4 INJECTION, SOLUTION INTRAMUSCULAR; INTRAVENOUS at 06:48

## 2025-02-09 RX ADMIN — FUROSEMIDE 40 MG: 40 TABLET ORAL at 21:25

## 2025-02-09 RX ADMIN — URSODIOL 300 MG: 300 CAPSULE ORAL at 21:26

## 2025-02-09 RX ADMIN — IOHEXOL 75 ML: 350 INJECTION, SOLUTION INTRAVENOUS at 06:58

## 2025-02-09 RX ADMIN — ONDANSETRON 4 MG: 2 INJECTION INTRAMUSCULAR; INTRAVENOUS at 06:48

## 2025-02-09 RX ADMIN — INSULIN GLARGINE 20 UNITS: 100 INJECTION, SOLUTION SUBCUTANEOUS at 21:26

## 2025-02-09 RX ADMIN — CEFTRIAXONE SODIUM 1 G: 1 INJECTION, SOLUTION INTRAVENOUS at 21:22

## 2025-02-09 RX ADMIN — SPIRONOLACTONE 50 MG: 25 TABLET, FILM COATED ORAL at 21:23

## 2025-02-09 RX ADMIN — ATORVASTATIN CALCIUM 80 MG: 80 TABLET, FILM COATED ORAL at 21:25

## 2025-02-09 RX ADMIN — HYDROMORPHONE HYDROCHLORIDE 0.5 MG: 1 INJECTION, SOLUTION INTRAMUSCULAR; INTRAVENOUS; SUBCUTANEOUS at 09:09

## 2025-02-09 SDOH — SOCIAL STABILITY: SOCIAL INSECURITY: ARE THERE ANY APPARENT SIGNS OF INJURIES/BEHAVIORS THAT COULD BE RELATED TO ABUSE/NEGLECT?: NO

## 2025-02-09 SDOH — ECONOMIC STABILITY: HOUSING INSECURITY: AT ANY TIME IN THE PAST 12 MONTHS, WERE YOU HOMELESS OR LIVING IN A SHELTER (INCLUDING NOW)?: NO

## 2025-02-09 SDOH — ECONOMIC STABILITY: INCOME INSECURITY: IN THE PAST 12 MONTHS HAS THE ELECTRIC, GAS, OIL, OR WATER COMPANY THREATENED TO SHUT OFF SERVICES IN YOUR HOME?: NO

## 2025-02-09 SDOH — SOCIAL STABILITY: SOCIAL INSECURITY: WITHIN THE LAST YEAR, HAVE YOU BEEN HUMILIATED OR EMOTIONALLY ABUSED IN OTHER WAYS BY YOUR PARTNER OR EX-PARTNER?: NO

## 2025-02-09 SDOH — SOCIAL STABILITY: SOCIAL INSECURITY: DO YOU FEEL ANYONE HAS EXPLOITED OR TAKEN ADVANTAGE OF YOU FINANCIALLY OR OF YOUR PERSONAL PROPERTY?: NO

## 2025-02-09 SDOH — ECONOMIC STABILITY: FOOD INSECURITY: WITHIN THE PAST 12 MONTHS, YOU WORRIED THAT YOUR FOOD WOULD RUN OUT BEFORE YOU GOT THE MONEY TO BUY MORE.: NEVER TRUE

## 2025-02-09 SDOH — ECONOMIC STABILITY: HOUSING INSECURITY: IN THE LAST 12 MONTHS, WAS THERE A TIME WHEN YOU WERE NOT ABLE TO PAY THE MORTGAGE OR RENT ON TIME?: NO

## 2025-02-09 SDOH — SOCIAL STABILITY: SOCIAL INSECURITY: HAS ANYONE EVER THREATENED TO HURT YOUR FAMILY OR YOUR PETS?: NO

## 2025-02-09 SDOH — ECONOMIC STABILITY: FOOD INSECURITY: HOW HARD IS IT FOR YOU TO PAY FOR THE VERY BASICS LIKE FOOD, HOUSING, MEDICAL CARE, AND HEATING?: NOT VERY HARD

## 2025-02-09 SDOH — SOCIAL STABILITY: SOCIAL INSECURITY: HAVE YOU HAD THOUGHTS OF HARMING ANYONE ELSE?: NO

## 2025-02-09 SDOH — SOCIAL STABILITY: SOCIAL INSECURITY: HAVE YOU HAD ANY THOUGHTS OF HARMING ANYONE ELSE?: NO

## 2025-02-09 SDOH — SOCIAL STABILITY: SOCIAL INSECURITY: WITHIN THE LAST YEAR, HAVE YOU BEEN AFRAID OF YOUR PARTNER OR EX-PARTNER?: NO

## 2025-02-09 SDOH — HEALTH STABILITY: PHYSICAL HEALTH

## 2025-02-09 SDOH — ECONOMIC STABILITY: FOOD INSECURITY: WITHIN THE PAST 12 MONTHS, THE FOOD YOU BOUGHT JUST DIDN'T LAST AND YOU DIDN'T HAVE MONEY TO GET MORE.: NEVER TRUE

## 2025-02-09 SDOH — ECONOMIC STABILITY: TRANSPORTATION INSECURITY: IN THE PAST 12 MONTHS, HAS LACK OF TRANSPORTATION KEPT YOU FROM MEDICAL APPOINTMENTS OR FROM GETTING MEDICATIONS?: NO

## 2025-02-09 SDOH — SOCIAL STABILITY: SOCIAL INSECURITY: WERE YOU ABLE TO COMPLETE ALL THE BEHAVIORAL HEALTH SCREENINGS?: YES

## 2025-02-09 SDOH — SOCIAL STABILITY: SOCIAL INSECURITY: DOES ANYONE TRY TO KEEP YOU FROM HAVING/CONTACTING OTHER FRIENDS OR DOING THINGS OUTSIDE YOUR HOME?: NO

## 2025-02-09 SDOH — HEALTH STABILITY: PHYSICAL HEALTH: ON AVERAGE, HOW MANY DAYS PER WEEK DO YOU ENGAGE IN MODERATE TO STRENUOUS EXERCISE (LIKE A BRISK WALK)?: 0 DAYS

## 2025-02-09 SDOH — SOCIAL STABILITY: SOCIAL INSECURITY: ABUSE: ADULT

## 2025-02-09 SDOH — ECONOMIC STABILITY: HOUSING INSECURITY: IN THE PAST 12 MONTHS, HOW MANY TIMES HAVE YOU MOVED WHERE YOU WERE LIVING?: 0

## 2025-02-09 SDOH — SOCIAL STABILITY: SOCIAL INSECURITY: DO YOU FEEL UNSAFE GOING BACK TO THE PLACE WHERE YOU ARE LIVING?: NO

## 2025-02-09 SDOH — SOCIAL STABILITY: SOCIAL INSECURITY: ARE YOU OR HAVE YOU BEEN THREATENED OR ABUSED PHYSICALLY, EMOTIONALLY, OR SEXUALLY BY ANYONE?: NO

## 2025-02-09 SDOH — HEALTH STABILITY: PHYSICAL HEALTH: ON AVERAGE, HOW MANY MINUTES DO YOU ENGAGE IN EXERCISE AT THIS LEVEL?: 0 MIN

## 2025-02-09 ASSESSMENT — COGNITIVE AND FUNCTIONAL STATUS - GENERAL
PATIENT BASELINE BEDBOUND: NO
DAILY ACTIVITIY SCORE: 24
DAILY ACTIVITIY SCORE: 24
MOBILITY SCORE: 24
MOBILITY SCORE: 24

## 2025-02-09 ASSESSMENT — ACTIVITIES OF DAILY LIVING (ADL)
FEEDING YOURSELF: INDEPENDENT
WALKS IN HOME: INDEPENDENT
DRESSING YOURSELF: INDEPENDENT
LACK_OF_TRANSPORTATION: NO
TOILETING: INDEPENDENT
JUDGMENT_ADEQUATE_SAFELY_COMPLETE_DAILY_ACTIVITIES: YES
ADEQUATE_TO_COMPLETE_ADL: YES
LACK_OF_TRANSPORTATION: NO
PATIENT'S MEMORY ADEQUATE TO SAFELY COMPLETE DAILY ACTIVITIES?: YES
BATHING: INDEPENDENT
HEARING - RIGHT EAR: FUNCTIONAL
GROOMING: INDEPENDENT
HEARING - LEFT EAR: FUNCTIONAL

## 2025-02-09 ASSESSMENT — ENCOUNTER SYMPTOMS
EYES NEGATIVE: 1
DIARRHEA: 1
RESPIRATORY NEGATIVE: 1
ABDOMINAL DISTENTION: 1
NAUSEA: 1
BACK PAIN: 1
CONSTITUTIONAL NEGATIVE: 1
CARDIOVASCULAR NEGATIVE: 1

## 2025-02-09 ASSESSMENT — PAIN - FUNCTIONAL ASSESSMENT
PAIN_FUNCTIONAL_ASSESSMENT: 0-10
PAIN_FUNCTIONAL_ASSESSMENT: 0-10

## 2025-02-09 ASSESSMENT — PAIN DESCRIPTION - LOCATION: LOCATION: ABDOMEN

## 2025-02-09 ASSESSMENT — PAIN DESCRIPTION - PROGRESSION: CLINICAL_PROGRESSION: GRADUALLY WORSENING

## 2025-02-09 ASSESSMENT — PAIN SCALES - GENERAL
PAINLEVEL_OUTOF10: 9
PAINLEVEL_OUTOF10: 0 - NO PAIN
PAINLEVEL_OUTOF10: 4
PAINLEVEL_OUTOF10: 7

## 2025-02-09 ASSESSMENT — LIFESTYLE VARIABLES
HAVE PEOPLE ANNOYED YOU BY CRITICIZING YOUR DRINKING: NO
EVER HAD A DRINK FIRST THING IN THE MORNING TO STEADY YOUR NERVES TO GET RID OF A HANGOVER: NO
SKIP TO QUESTIONS 9-10: 1
HOW MANY STANDARD DRINKS CONTAINING ALCOHOL DO YOU HAVE ON A TYPICAL DAY: PATIENT DOES NOT DRINK
HOW OFTEN DO YOU HAVE 6 OR MORE DRINKS ON ONE OCCASION: NEVER
AUDIT-C TOTAL SCORE: 0
HOW OFTEN DO YOU HAVE A DRINK CONTAINING ALCOHOL: NEVER
AUDIT-C TOTAL SCORE: 0
HAVE YOU EVER FELT YOU SHOULD CUT DOWN ON YOUR DRINKING: NO
TOTAL SCORE: 0
EVER FELT BAD OR GUILTY ABOUT YOUR DRINKING: NO

## 2025-02-09 ASSESSMENT — PAIN DESCRIPTION - PAIN TYPE: TYPE: ACUTE PAIN;CHRONIC PAIN

## 2025-02-09 ASSESSMENT — PAIN DESCRIPTION - ORIENTATION: ORIENTATION: RIGHT;LEFT;UPPER

## 2025-02-09 NOTE — CARE PLAN
The patient's goals for the shift include Patient to start treatment after transfer from [facility] and orient to [place] [id number] by end of shift    Problem: Pain  Goal: Performs ADL's with improved pain control throughout shift  Outcome: Progressing     Problem: Pain - Adult  Goal: Verbalizes/displays adequate comfort level or baseline comfort level  Outcome: Progressing     Problem: Safety - Adult  Goal: Free from fall injury  Outcome: Progressing

## 2025-02-09 NOTE — ED PROVIDER NOTES
History of Present Illness     History provided by: Patient  Limitations to History: None  External Records Reviewed with Brief Summary:  Prior ED notes    HPI:  Alfonzo Flanagan is a 50 y.o. female who presents with abdominal pain.  Patient has a history of nonalcoholic cirrhosis with ascites, recently underwent paracentesis 2 days ago.  Patient states that after paracentesis she developed mid upper abdominal discomfort radiating around to her back.  She notes that her abdominal distention worsened again.  She states that she is still taking her diuretics but does not urinate very much.  No recent changes to medications.  Has never seen a hepatologist or discussed possible liver transplant but does have an upcoming appointment with someone on 2/21    Physical Exam   Triage vitals:  T 36.7 °C (98.1 °F)  HR 87  /76  RR (!) 24  O2 100 % None (Room air)    Physical Exam  Vitals and nursing note reviewed.   Constitutional:       General: She is not in acute distress.     Appearance: She is obese.   HENT:      Head: Normocephalic and atraumatic.      Mouth/Throat:      Mouth: Mucous membranes are moist.      Pharynx: Oropharynx is clear.   Eyes:      Extraocular Movements: Extraocular movements intact.      Conjunctiva/sclera: Conjunctivae normal.      Pupils: Pupils are equal, round, and reactive to light.   Cardiovascular:      Rate and Rhythm: Normal rate and regular rhythm.   Pulmonary:      Effort: Pulmonary effort is normal. No respiratory distress.      Breath sounds: Normal breath sounds.   Abdominal:      General: There is distension.      Palpations: Abdomen is soft. There is fluid wave.      Tenderness: There is abdominal tenderness in the epigastric area and periumbilical area. There is no guarding or rebound.      Hernia: A hernia is present. Hernia is present in the umbilical area.   Musculoskeletal:         General: No swelling or deformity. Normal range of motion.      Cervical back: Normal  range of motion and neck supple.   Skin:     General: Skin is warm and dry.      Capillary Refill: Capillary refill takes less than 2 seconds.   Neurological:      General: No focal deficit present.      Mental Status: She is alert and oriented to person, place, and time. Mental status is at baseline.   Psychiatric:         Mood and Affect: Mood normal.         Behavior: Behavior normal.          Medical Decision Making & ED Course   Medical Decision Makin y.o. female presents with upper abdominal pain.  Considered cholecystitis, choledocholithiasis, pancreatitis, PUD, perforated bowel, mesenteric ischemia, colitis, IBD, small bowel obstruction, AAA, ACS.  Known history of cirrhosis with a recent paracentesis.  Patient labs drawn, no evidence of AMY, normal electrolytes.  INR is stable.  Ammonia mildly elevated but the patient does not have any altered mentation.  Urinalysis sent, likely contaminated as there are 10-25 squamous cells and 21-50 whites.  Patient has a history of chronic pancreatitis, lipase is flat at 200.  CT abdomen obtained, it appears the patient has reaccumulated the fluid that was previously drained.  No evidence of chronic pancreatitis flare.  Patient also does have edematous bowel likely secondary to complications from her cirrhosis.  The patient is uncomfortable, does not feel comfortable returning home for outpatient paracentesis and outpatient workup.  I spoke with the patient's private hospitalist who normally admits her here who at this time states that he does not feel comfortable keeping her here at [facility] as there is no hepatology support and the patient has been primarily managed by an internist for the past year.  Spoke with hepatology at [facility] who agrees to evaluate the patient at their facility.  Patient agreeable to transfer.  Patient transferred in stable condition.  ----      Social Determinants of Health which Significantly Impact Care: None identified     EKG  Independent Interpretation: EKG not obtained    Independent Result Review and Interpretation: Relevant laboratory and radiographic results were reviewed and independently interpreted by myself.  As necessary, they are commented on in the ED Course.    Chronic conditions affecting the patient's care: As documented above in MDM    The patient was discussed with the following consultants/services:  Hepatology on-call and private hospitalist    Care Considerations: As documented above in MDM    ED Course:  ED Course as of 02/09/25 1009   Sun Feb 09, 2025   0647 LIPASE(!): 200  Chronically elevated []   0713 INR(!): 1.3  At baseline []   0713 Ammonia(!): 71  Slightly more elevated than prior []   0901 Spoke with Dr. Khanna with hepatology.  Recommending against sending home with narcotics.  Recommending having a conversation with the patient regarding presentation and outpatient planning if possible vs admission.  []      ED Course User Index  [] Renae Hernández MD         Diagnoses as of 02/09/25 1009   Cirrhosis of liver with ascites, unspecified hepatic cirrhosis type (Multi)       Procedures   Procedures    Renae Hernández MD  Emergency Medicine     Renae Hernández MD  02/09/25 1014

## 2025-02-09 NOTE — CARE PLAN
The patient's goals for the shift include Patient to start treatment after transfer from [facility] and orient to [place] [id number] by end of shift

## 2025-02-09 NOTE — H&P
History Of Present Illness  Alfonzo Flanagan is a 50 y.o. 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    She has been having every 2-3 week paracentesis with 4.2 liters out on 2/7, 4.6L on 1/13/25, 3.6L out on 12/27/24.    She was admitted from 1/26-1/29 for hematemesis and EGD was notable for normal esophagus no varices moderate diffuse portal hypertensive gastropathy and no signs of bleeding. Restarted on diuretics with spironolactone 100mg and lasix 40mg.    She was seen again in the ED on 2/5 at OSH for intermittent nausea, abdominal pain, they discharged her with plan for outpatient paracentesis. Her labs and vitals were reassuring, she was discharged with cephalexin for UTI and clotrimazole for candida vulvovaginitis. After discharge her urine culture was negative.    She presented to OSH ED today 2/9 for mid abdominal discomfort radiating to her back and worsening abdominal distension. She was transferred to [facility] for hepatology evaluation.    On interview via [facility] today she reports that she didn't actually start the cephalexin course from the ED on 2/5, she didn't realize there was an antibiotic in her discharge instructions. She had a paracentesis on 2/7 during which they removed 4 liters of fluid which normally flattens out her abdomen for at least some amount of time but this time she felt it was only minimally improved afterward and that by later that day her abdomen was as distended as it had been prior to the paracentesis. She says that she takes her spironolactone 50mg BID and lasix 40mg daily  "as prescribed. Around the same time she developed nausea, diarrhea (7x liquid stool that day, 5x the next day, 3x today), and epigastric abdominal pain radiating to her back. She has been taking naproxen for pain at home since then but presented again to the ED today when she was waking from sleep due to pain last night. She felt that she might be having pancreatitis pain because of where the pain was located and it felt like how it did when she had pancreatitis in the past. She does report dysuria and urinary frequency. She has had shaking chills. No worsening confusion.    OSH ED course:   Vitals: T 36.7 HR 87 /76 RR 24 100% on RA  Labs:   -UA with spec grav >1,050 500 leuk esterase, 21-50 WBC, >20 RBC  -Ammonia 71  -INR 1.3  -Lipase 200  -CMP: 135/3.9108/2517/0.81 calcium 7.5 albumin 2.1 alk phos 340 AST 61 ALT 34 t bili 2.5  -CBC: 5.2>11.7/35.9<85 56% neutrophils  Imaging: CT AP with cirrhosis, portal hypertension, mod to large volume abdominopelvic ascites, edematous wall thickening throughout the small bowel loops \"the pancreas appears unremarkable without evidence of ductal dilation or masses\"  Based on OSH labs: meld NA 15 child class C    GI history:   -Liver biopsy 3/18/2021 with periportal to bridging fibrosis c/w PBC.         Liver biopsy 2020:   LIVER, CORE BIOPSY:  CHRONIC HEPATITIS WITH PORTAL AND SEPTAL FIBROSIS, SEE COMMENT.    Comment: The biopsy is adequate. The portal tracts are expanded by a chronic inflammatory infiltrate consisting predominantly of lymphocytes and macrophages. Interface activity is focally present. Some of the bile ductules contain intraepithelial lymphocytes. Granulomas are not seen.  The hepatocytes are remarkable for minimal macrovesicular steatosis (5%), cytoplasmic swelling and Bharti's hyaline. Focal hepatocytes dropout and nucleomegaly are noted. A trichrome stain highlights portal and septal fibrosis. Well-developed cirrhosis is not seen. An iron stain does " not show increased iron stores. Recent laboratory testing is  remarkable for AST 82, ALT 71 and alkaline phosphatase 434. ALFREDA is positive in a homogeneous pattern to a titer of 1:80. Anti-smooth muscle antibody is also positive to a titer of 1:20. Viral antibody testing to hepatitis A, B and C is negative. Although the findings are non-specific as to etiology, autoimmune hepatitis is a diagnostic consideration.  Clinical correlation is required.     EUS 3/26/24  Small esophagus varices.   here was no sign of significant pathology in the   pancreatic head, pancreatic body, pancreatic tail and main pancreatic duct.   A mass was identified in the pancreatic tail. Tissue has not been obtained. However, the endosonographic appearance is suggestive of a neuroendocrine tumor.   No specimens collected.   Recommendation: - Repeat EUS in 1 yr for surveillance of pancreatic tail lesion (and esophagus varices).     EGD 1/13/2025  The esophagus appeared normal. Additional information: No esophageal varices were visualized.  Irregular Z-line 39 cm from the incisors  Moderate and diffuse portal hypertensive gastropathy in the cardia, fundus of the stomach, body of the stomach, incisura, antrum, prepyloric region and pylorus  The stomach was otherwise normal on direct and retroflexion views. Additional information: No gastric varices were visualized.  The duodenal bulb and 1st part of the duodenum appeared normal.    Colonoscopy: 6/5/2023     - Hemorrhoids found on perianal exam.      - The examination was otherwise normal on direct and retroflexion views.      - Biopsies were taken with a cold forceps from the entire colon for evaluation of microscopic colitis.   *biopsies normal    10/2022:   A. Duodenum, biopsy:  - Duodenal mucosa with partial villous blunting and increased intraepithelial lymphocytes, consistent with celiac sprue in the appropriate clinical context.     Other liver workup: HIV neg, HepB, hepC, HepA serologies  negative , ALFREDA positive 1:320 in , RNP positive 3.0, DNA ab + 30, anti smooth muscle negative , per notes IgG4 selectively high in plasma 2022, alpha 1 anti trypsin level and ceruloplasmin wnl , serum IgG 1692 with subclass 1 965 and subclass 4 elevated at 112 , anti mitochondrial ab negative     PMH: as above  PSH: cholecystectomy and tubal ligation  Meds: acetaminophen atorva 80 cephalexin? Clotrimazole cream doculsate fenofibrate 145mg daily furosemide 40mg hydroxysine prn, lantus 25 units BID lactulose prn mag oxide meclizine, metformin 1000mg daily xr. Midodrine 5mg TID, nadolol 20mg nitroglycerine patch, oxycodone 5mg q6h pantoprazole 40mg daily, miralaxrifaximin, spironolactone 50mg BID, carafate, trazodone 50mg nightly prn urosodiol 300mg TID  Allergies:   SH: lives with daughter and , she had to stop working due to illness, not driving anymore either, uses a walker to get around, limited mostly by pain but is able to ambulate fairly easily with walker, daughter helps with household chores, she does her own meds. Former smoker smoked 17 years 0.5ppd quit in .   FH: no family history of liver disease      Past Medical History  She has a past medical history of Ascites, Asthma, Cirrhosis of liver (Multi), Diabetes mellitus (Multi), GERD (gastroesophageal reflux disease), CORBY (obstructive sleep apnea), Portal hypertension (Multi), and Thrombocytopenia (CMS-HCC).    Surgical History  She has a past surgical history that includes CT guided imaging for needle placement (10/14/2020); US guided abdominal paracentesis (10/08/2020); Cholecystectomy ();  section, low transverse; Tubal ligation; Esophagogastroduodenoscopy; Colonoscopy; and Inguinal hidradenitis excision.     Social History  She reports that she has never smoked. She has never used smokeless tobacco. She reports that she does not drink alcohol and does not use drugs.    Family History  No family history  on file.     Allergies  Gluten    Review of Systems   Constitutional: Negative.    HENT: Negative.     Eyes: Negative.    Respiratory: Negative.     Cardiovascular: Negative.    Gastrointestinal:  Positive for abdominal distention, diarrhea and nausea.   Musculoskeletal:  Positive for back pain.        Physical Exam  Constitutional:       Appearance: Normal appearance.   HENT:      Head: Normocephalic.      Nose: Nose normal.      Mouth/Throat:      Mouth: Mucous membranes are moist.   Cardiovascular:      Rate and Rhythm: Normal rate and regular rhythm.      Pulses: Normal pulses.   Pulmonary:      Effort: Pulmonary effort is normal.      Breath sounds: Normal breath sounds.   Abdominal:      Comments: Abdomen distended with +fluid wave, tender in epigastric region, no flank tenderness, no suprapubic pain. No asterixis.   Musculoskeletal:      Right lower leg: Edema present.      Left lower leg: Edema present.   Skin:     Findings: No rash.   Neurological:      General: No focal deficit present.      Mental Status: She is alert.          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 She has rapidly re-accumulating ascites than usual despite diuretic therapy concerning for refractory ascites. She says she has been compliant with diuretics, can try to optimize diet, but otherwise she may need eval for  TIPS as well as for transplant. She also has diarrhea and nausea for the past 4 days as well as chills, and CT evidence of edematous small bowel and colon which potentially could represent an infectious gastroenteritis (c diff possibly given recent hospitalization and spotty antibiotic use, vs norovirus given its recent prevalence in the area) so will send stool studies. Also given her dysuria and frequency could consider UTI and will follow up urine culture from OSH. Always possible that she has SBP with more rapid accumulation of her ascites. For now will cover empirically with ceftriaxone for SBP vs UTI until able to perform diagnostic and therapeutic para and pending urine cultures to rule out infection. She does have epigastric pain but no evidence on CT of pancreatitis, and mildly elevated lipase to 200 not 3x ULN so less consistent with pancreatitis.    Plan:  #PBC cirrhosis cb portal hypertension   #concern for refractory ascites  - Based on OSH labs: meld NA 15 child class C  - continue home furosemide 40mg daily  - continue home spironolactone 50mg BID  - 2g sodium diet (no fluid restriction for now given no hyponatremia)  - nutrition consult for low sodium diet  - diagnostic and therapeutic para tomorrow   - continue home ursodiol 300mg TID  - daily meld labs     #ruleout SBP  #ruleout UTI   - fu urine culture from OSH   - fu bcx sent pre-abx 2/9  - ceftriaxone until negative urine culture and SBP ruled out  - recent cultures notable for a drug resistant enterococcus faecalis susceptible only to vanc on cultures, thought contaminant as this was not treated, if decompensating would add vanc to cover this    #diarrhea, nausea, vomiting, small intestine and colon edema  - stool studies, cdiff pending  - zofran PRN  - repeat EKG to evaluate QTC  - clear liquid diet for now   - advance in am if improving nausea/vomiting    #Hx hepatic encephalopathy  - continue home lactulose 20mg (will increase to BID from  daily PRN)    #recent GI bleed (non variceal, no clear etiology)  #hx varices now sp eradication 2021  - counseled against NSAID use  - continue home pantoprazole 40mg   - continue home nadalol 40mg for ppx    #celiac disease:   - gluten free diet    #DM2 (A1c 7.6 most recently)  - home lantus is 50 units BID which she doubled on her own given high blood sugars, but she sometimes has low sugars with this regimen  - lantus 20units BID for now  - mild SSI   - would benefit from optimization of her diabetes regimen  - holding home metformin 1000mg daily XR    #anxiety/depression   - continue home trazodone 25mg nightly    #pain:  - acetaminophen PRN (max daily 2g)  - continue home carafate    #itching  - continue home prn atarax 25mg   - sarna cream    #HLD  -  2 years ago, will order repeat lipid panel for AM   - continue atorvastatin 80mg daily  - continue home fenofibrate (145mg at home 160mg inpatient due to therapeutic interchange)    #other:   - holding home nitroglycerine patch - using it prn for chest pain?  - holding home meclizine prn     F: none  E: PRN  N: clears for now due to nausea/vomiting  GI: home PPI  A: PIV  C: full code confirmed on admission   Surrogate decision maker:  Rah Modi [phone]                 Elina Jansen MD

## 2025-02-09 NOTE — ED TRIAGE NOTES
Pt had a paracentesis 2 days ago which 4L was removed. Pt states she started feeling pain the next day. Pt states this feels different than her chronic pancreatitis pain. Pt states this is more upper abd pain and it is tender to the touch.

## 2025-02-10 LAB
ALBUMIN FLD-MCNC: <0.5 G/DL
ALBUMIN SERPL BCP-MCNC: 2 G/DL (ref 3.4–5)
ALP SERPL-CCNC: 257 U/L (ref 33–110)
ALT SERPL W P-5'-P-CCNC: 27 U/L (ref 7–45)
AMYLASE FLD-CCNC: 18 U/L
APTT PPP: 30 SECONDS (ref 27–38)
AST SERPL W P-5'-P-CCNC: 61 U/L (ref 9–39)
BACTERIA UR CULT: NORMAL
BASOPHILS # BLD AUTO: 0.02 X10*3/UL (ref 0–0.1)
BASOPHILS NFR BLD AUTO: 0.3 %
BASOPHILS NFR FLD MANUAL: 0 %
BB ANTIBODY IDENTIFICATION: NORMAL
BILIRUB DIRECT SERPL-MCNC: 0.7 MG/DL (ref 0–0.3)
BILIRUB SERPL-MCNC: 2.4 MG/DL (ref 0–1.2)
BLASTS NFR FLD MANUAL: 0 %
CASE #: NORMAL
CHOLEST SERPL-MCNC: 94 MG/DL (ref 0–199)
CHOLESTEROL/HDL RATIO: 4.5
CLARITY FLD: CLEAR
CLARITY FLD: CLEAR
COLOR FLD: YELLOW
COLOR FLD: YELLOW
EOSINOPHIL # BLD AUTO: 0.5 X10*3/UL (ref 0–0.7)
EOSINOPHIL NFR BLD AUTO: 8.6 %
EOSINOPHIL NFR FLD MANUAL: 0 %
ERYTHROCYTE [DISTWIDTH] IN BLOOD BY AUTOMATED COUNT: 15.6 % (ref 11.5–14.5)
GLUCOSE BLD MANUAL STRIP-MCNC: 117 MG/DL (ref 74–99)
GLUCOSE BLD MANUAL STRIP-MCNC: 166 MG/DL (ref 74–99)
GLUCOSE BLD MANUAL STRIP-MCNC: 204 MG/DL (ref 74–99)
GLUCOSE BLD MANUAL STRIP-MCNC: 83 MG/DL (ref 74–99)
GLUCOSE BLD MANUAL STRIP-MCNC: 95 MG/DL (ref 74–99)
GLUCOSE FLD-MCNC: 142 MG/DL
HCT VFR BLD AUTO: 35.6 % (ref 36–46)
HDLC SERPL-MCNC: 21 MG/DL
HGB BLD-MCNC: 11 G/DL (ref 12–16)
HOLD SPECIMEN: NORMAL
IMM GRANULOCYTES # BLD AUTO: 0.02 X10*3/UL (ref 0–0.7)
IMM GRANULOCYTES NFR BLD AUTO: 0.3 % (ref 0–0.9)
IMMATURE GRANULOCYTES IN FLUID: 0 %
INR PPP: 1.3 (ref 0.9–1.1)
LDH FLD L TO P-CCNC: 49 U/L
LDLC SERPL CALC-MCNC: 62 MG/DL
LYMPHOCYTES # BLD AUTO: 1.53 X10*3/UL (ref 1.2–4.8)
LYMPHOCYTES NFR BLD AUTO: 26.2 %
LYMPHOCYTES NFR FLD MANUAL: 27 %
MAGNESIUM SERPL-MCNC: 1.73 MG/DL (ref 1.6–2.4)
MCH RBC QN AUTO: 30.4 PG (ref 26–34)
MCHC RBC AUTO-ENTMCNC: 30.9 G/DL (ref 32–36)
MCV RBC AUTO: 98 FL (ref 80–100)
MONOCYTES # BLD AUTO: 0.68 X10*3/UL (ref 0.1–1)
MONOCYTES NFR BLD AUTO: 11.7 %
MONOS+MACROS NFR FLD MANUAL: 63 %
NEUTROPHILS # BLD AUTO: 3.08 X10*3/UL (ref 1.2–7.7)
NEUTROPHILS NFR BLD AUTO: 52.9 %
NEUTROPHILS NFR FLD MANUAL: 10 %
NON HDL CHOLESTEROL: 73 MG/DL (ref 0–149)
NRBC BLD-RTO: 0 /100 WBCS (ref 0–0)
OTHER CELLS NFR FLD MANUAL: 0 %
PATH REV-IMMUNOHEMATOLOGY-PR30: NORMAL
PLASMA CELLS NFR FLD MANUAL: 0 %
PLATELET # BLD AUTO: 82 X10*3/UL (ref 150–450)
PROT FLD-MCNC: 1.1 G/DL
PROT SERPL-MCNC: 6.7 G/DL (ref 6.4–8.2)
PROTHROMBIN TIME: 14.3 SECONDS (ref 9.8–12.8)
RBC # BLD AUTO: 3.62 X10*6/UL (ref 4–5.2)
RBC # FLD MANUAL: 29 /UL
RBC # FLD MANUAL: 30 /UL
SCAN RESULT: NORMAL
TOTAL CELLS COUNTED FLD: 100
TRIGL SERPL-MCNC: 55 MG/DL (ref 0–149)
VLDL: 11 MG/DL (ref 0–40)
WBC # BLD AUTO: 5.8 X10*3/UL (ref 4.4–11.3)
WBC # FLD MANUAL: 67 /UL
WBC # FLD MANUAL: 72 /UL

## 2025-02-10 PROCEDURE — 85610 PROTHROMBIN TIME: CPT

## 2025-02-10 PROCEDURE — 80061 LIPID PANEL: CPT

## 2025-02-10 PROCEDURE — 82150 ASSAY OF AMYLASE: CPT

## 2025-02-10 PROCEDURE — 82947 ASSAY GLUCOSE BLOOD QUANT: CPT

## 2025-02-10 PROCEDURE — 87493 C DIFF AMPLIFIED PROBE: CPT

## 2025-02-10 PROCEDURE — 1100000001 HC PRIVATE ROOM DAILY

## 2025-02-10 PROCEDURE — 36415 COLL VENOUS BLD VENIPUNCTURE: CPT

## 2025-02-10 PROCEDURE — 2500000001 HC RX 250 WO HCPCS SELF ADMINISTERED DRUGS (ALT 637 FOR MEDICARE OP)

## 2025-02-10 PROCEDURE — 87506 IADNA-DNA/RNA PROBE TQ 6-11: CPT

## 2025-02-10 PROCEDURE — 49082 ABD PARACENTESIS: CPT | Performed by: INTERNAL MEDICINE

## 2025-02-10 PROCEDURE — 80076 HEPATIC FUNCTION PANEL: CPT

## 2025-02-10 PROCEDURE — 83615 LACTATE (LD) (LDH) ENZYME: CPT

## 2025-02-10 PROCEDURE — 87070 CULTURE OTHR SPECIMN AEROBIC: CPT

## 2025-02-10 PROCEDURE — 84157 ASSAY OF PROTEIN OTHER: CPT

## 2025-02-10 PROCEDURE — 87116 MYCOBACTERIA CULTURE: CPT

## 2025-02-10 PROCEDURE — 49083 ABD PARACENTESIS W/IMAGING: CPT | Mod: GC

## 2025-02-10 PROCEDURE — 89051 BODY FLUID CELL COUNT: CPT

## 2025-02-10 PROCEDURE — 83735 ASSAY OF MAGNESIUM: CPT

## 2025-02-10 PROCEDURE — 82042 OTHER SOURCE ALBUMIN QUAN EA: CPT

## 2025-02-10 PROCEDURE — 88112 CYTOPATH CELL ENHANCE TECH: CPT | Mod: TC,MCY

## 2025-02-10 PROCEDURE — 2500000004 HC RX 250 GENERAL PHARMACY W/ HCPCS (ALT 636 FOR OP/ED)

## 2025-02-10 PROCEDURE — 85025 COMPLETE CBC W/AUTO DIFF WBC: CPT

## 2025-02-10 PROCEDURE — 0W9G3ZZ DRAINAGE OF PERITONEAL CAVITY, PERCUTANEOUS APPROACH: ICD-10-PCS | Performed by: INTERNAL MEDICINE

## 2025-02-10 PROCEDURE — 99233 SBSQ HOSP IP/OBS HIGH 50: CPT | Performed by: INTERNAL MEDICINE

## 2025-02-10 PROCEDURE — 2500000002 HC RX 250 W HCPCS SELF ADMINISTERED DRUGS (ALT 637 FOR MEDICARE OP, ALT 636 FOR OP/ED)

## 2025-02-10 PROCEDURE — P9047 ALBUMIN (HUMAN), 25%, 50ML: HCPCS

## 2025-02-10 PROCEDURE — 82945 GLUCOSE OTHER FLUID: CPT

## 2025-02-10 PROCEDURE — 49083 ABD PARACENTESIS W/IMAGING: CPT

## 2025-02-10 RX ORDER — OXYCODONE HYDROCHLORIDE 5 MG/1
5 TABLET ORAL EVERY 6 HOURS PRN
Status: DISCONTINUED | OUTPATIENT
Start: 2025-02-10 | End: 2025-02-15 | Stop reason: HOSPADM

## 2025-02-10 RX ORDER — OXYCODONE HYDROCHLORIDE 5 MG/1
5 TABLET ORAL ONCE
Status: COMPLETED | OUTPATIENT
Start: 2025-02-10 | End: 2025-02-10

## 2025-02-10 RX ORDER — ALBUMIN HUMAN 250 G/1000ML
40 SOLUTION INTRAVENOUS ONCE
Status: COMPLETED | OUTPATIENT
Start: 2025-02-10 | End: 2025-02-10

## 2025-02-10 RX ADMIN — INSULIN GLARGINE 20 UNITS: 100 INJECTION, SOLUTION SUBCUTANEOUS at 08:48

## 2025-02-10 RX ADMIN — PANTOPRAZOLE SODIUM 40 MG: 40 TABLET, DELAYED RELEASE ORAL at 06:06

## 2025-02-10 RX ADMIN — SUCRALFATE 1 G: 1 TABLET ORAL at 11:28

## 2025-02-10 RX ADMIN — CEFTRIAXONE SODIUM 1 G: 1 INJECTION, SOLUTION INTRAVENOUS at 20:06

## 2025-02-10 RX ADMIN — SPIRONOLACTONE 50 MG: 25 TABLET, FILM COATED ORAL at 08:48

## 2025-02-10 RX ADMIN — TRAZODONE HYDROCHLORIDE 25 MG: 50 TABLET ORAL at 20:07

## 2025-02-10 RX ADMIN — SUCRALFATE 1 G: 1 TABLET ORAL at 06:06

## 2025-02-10 RX ADMIN — ALBUMIN HUMAN 37.5 G: 0.25 SOLUTION INTRAVENOUS at 21:32

## 2025-02-10 RX ADMIN — INSULIN LISPRO 1 UNITS: 100 INJECTION, SOLUTION INTRAVENOUS; SUBCUTANEOUS at 14:21

## 2025-02-10 RX ADMIN — SPIRONOLACTONE 50 MG: 25 TABLET, FILM COATED ORAL at 20:07

## 2025-02-10 RX ADMIN — SUCRALFATE 1 G: 1 TABLET ORAL at 20:07

## 2025-02-10 RX ADMIN — INSULIN GLARGINE 20 UNITS: 100 INJECTION, SOLUTION SUBCUTANEOUS at 20:00

## 2025-02-10 RX ADMIN — URSODIOL 300 MG: 300 CAPSULE ORAL at 20:07

## 2025-02-10 RX ADMIN — ENOXAPARIN SODIUM 40 MG: 100 INJECTION SUBCUTANEOUS at 11:36

## 2025-02-10 RX ADMIN — ACETAMINOPHEN 650 MG: 325 TABLET ORAL at 06:06

## 2025-02-10 RX ADMIN — LACTULOSE 20 G: 20 SOLUTION ORAL at 08:48

## 2025-02-10 RX ADMIN — OXYCODONE 5 MG: 5 TABLET ORAL at 12:04

## 2025-02-10 RX ADMIN — NADOLOL 40 MG: 40 TABLET ORAL at 08:48

## 2025-02-10 RX ADMIN — URSODIOL 300 MG: 300 CAPSULE ORAL at 14:21

## 2025-02-10 RX ADMIN — FUROSEMIDE 40 MG: 40 TABLET ORAL at 20:07

## 2025-02-10 RX ADMIN — URSODIOL 300 MG: 300 CAPSULE ORAL at 08:48

## 2025-02-10 RX ADMIN — SUCRALFATE 1 G: 1 TABLET ORAL at 16:44

## 2025-02-10 RX ADMIN — ATORVASTATIN CALCIUM 80 MG: 80 TABLET, FILM COATED ORAL at 20:07

## 2025-02-10 RX ADMIN — FENOFIBRATE 160 MG: 160 TABLET ORAL at 08:50

## 2025-02-10 ASSESSMENT — PAIN DESCRIPTION - ORIENTATION: ORIENTATION: UPPER

## 2025-02-10 ASSESSMENT — COGNITIVE AND FUNCTIONAL STATUS - GENERAL
DAILY ACTIVITIY SCORE: 24
MOBILITY SCORE: 24

## 2025-02-10 ASSESSMENT — PAIN SCALES - GENERAL
PAINLEVEL_OUTOF10: 6
PAINLEVEL_OUTOF10: 3
PAINLEVEL_OUTOF10: 7
PAINLEVEL_OUTOF10: 0 - NO PAIN

## 2025-02-10 ASSESSMENT — ACTIVITIES OF DAILY LIVING (ADL): LACK_OF_TRANSPORTATION: NO

## 2025-02-10 NOTE — CARE PLAN
Pt had no BM overnight. Pt educated on stool sample collection.     The clinical goals for the shift include Pt is remain HDS on my shift    Problem: Pain  Goal: Turns in bed with improved pain control throughout the shift  Outcome: Progressing     Problem: Pain - Adult  Goal: Verbalizes/displays adequate comfort level or baseline comfort level  Outcome: Progressing     Problem: Safety - Adult  Goal: Free from fall injury  Outcome: Progressing     Problem: Discharge Planning  Goal: Discharge to home or other facility with appropriate resources  Outcome: Progressing     Problem: Chronic Conditions and Co-morbidities  Goal: Patient's chronic conditions and co-morbidity symptoms are monitored and maintained or improved  Outcome: Progressing     Problem: Nutrition  Goal: Nutrient intake appropriate for maintaining nutritional needs  Outcome: Progressing     Problem: Fall/Injury  Goal: Not fall by end of shift  Outcome: Progressing

## 2025-02-10 NOTE — PROGRESS NOTES
02/10/25 1100   Discharge Planning   Living Arrangements Spouse/significant other;Family members   Support Systems Spouse/significant other;Family members   Assistance Needed no   Type of Residence Private residence   Number of Stairs to Enter Residence 10   Number of Stairs Within Residence 0   Do you have animals or pets at home? No   Who is requesting discharge planning? Provider   Home or Post Acute Services None   Expected Discharge Disposition Home   Does the patient need discharge transport arranged? No   Financial Resource Strain   How hard is it for you to pay for the very basics like food, housing, medical care, and heating? Somewhat   Housing Stability   In the last 12 months, was there a time when you were not able to pay the mortgage or rent on time? N   In the past 12 months, how many times have you moved where you were living? 0   At any time in the past 12 months, were you homeless or living in a shelter (including now)? N   Transportation Needs   In the past 12 months, has lack of transportation kept you from medical appointments or from getting medications? no   In the past 12 months, has lack of transportation kept you from meetings, work, or from getting things needed for daily living? No     Met with pt and introduced myself as care coordinator with Care Transitions Team for discharge planning. Pt reports being independent with ADL's. Pt uses Walker for assistance with ambulation. Pt Resides with Family. Pt reported no safety concerns at home, she stated One falls in last year. Pt's address, phone number, and contact information was verified.      PCP: Pete  DATE OF LAST VISIT: 6 Months Ago  PHARMACY: [facility]  MEDICATIONS AFFORDABLE:Yes  FEELS SAFE AT HOME: Yes  RECENT FALLS: 1  EQUIPMENT USED IN HOME: Walker  HOME O2/CPAP/NEBS: n/a  DME SUPPLIER: Unknown  TRANSPORT HOME: No  DIABETIC/SUPPLIES NEEDED: Yes, No supplies Needed  HD SCHEDULE: n/a    Transitional Care Coordinator Note:  Patient discussed with medical team, per medical team patient is not medically ready. Discharge dispo: Anticipate [facility] ADOD 2-3 Days. Patient information was sent to [facility]. Alfonzo Flanagan is a 50 y.o. female on day 1 of Readmission, Admitted under Cirrhosis, with hx biopsy proven PBC with cirrhosis c/b portal HTN with ascites, esophageal varices sp banding 2021 with eradication, hepatic encephalopathy, celiac disease, chronic pancreatitis and recent pancreatic tail mass. She was admitted from 1/26-1/29 for hematemesis and EGD was notable for normal esophagus no varices moderate diffuse portal hypertensive gastropathy and no signs of bleeding.   Canelo Hillman RN  Transitional Care Coordinator

## 2025-02-10 NOTE — PROGRESS NOTES
"    Daily Progress Note    Chief Concern  Alfonzo Flanagan is a 50 y.o. female on day 1 of hospitalization for Cirrhosis (Multi)    Subjective   Subjective:  Alfonzo Flanagan reports diffuse abdominal pain with some back pain.  She feels full    Interval events:  -No acute events overnight       Objective   /76 (BP Location: Right arm, Patient Position: Lying)   Pulse 101   Temp 36.5 °C (97.7 °F) (Temporal)   Resp 16   Ht 1.575 m (5' 2\")   Wt 93.5 kg (206 lb 1.6 oz)   SpO2 99%   BMI 37.70 kg/m²   24 Hour Vitals  Temp:  [36.2 °C (97.2 °F)-36.5 °C (97.7 °F)] 36.5 °C (97.7 °F)  Heart Rate:  [] 101  Resp:  [16-21] 16  BP: (119-131)/(76-98) 129/76  SpO2:  [95 %-99 %] 99 %    Temp (24hrs), Av.4 °C (97.5 °F), Min:36.2 °C (97.2 °F), Max:36.5 °C (97.7 °F)     24 hour Intake/Output  No intake or output data in the 24 hours ending 02/10/25 0738     Physical exam:  Constitutional: Well-developed female in no acute distress, laying back in bed  HEENT: NCAT, EOMI, sclera anicteric, MMM  Respiratory: Normal respiratory effort on RA.  Cardiovascular: RRR, normal S1/S2, No murmurs  Abdominal: Soft, distended and tense, diffusely tender to palpation. No rebound, masses, or guarding.  Stretch marks visible on skin.  No erythema or purulence at sites of prior paracentesis mostly on the right lower quadrant  Neuro: Moving all extremities with no focal deficits  MSK: WWP, trace peripheral edema  Skin: Some ecchymoses   Psych: Appropriate mood and affect, alert and oriented, linear thought process and judgement intact      Medications:  Scheduled Medications  atorvastatin, 80 mg, oral, Nightly  ceFAZolin, , ,   cefTRIAXone, 1 g, intravenous, q24h  enoxaparin, 40 mg, subcutaneous, q24h  fenofibrate, 160 mg, oral, Daily  furosemide, 40 mg, oral, Daily  insulin glargine, 20 Units, subcutaneous, BID  insulin lispro, 0-5 Units, subcutaneous, TID AC  lactulose, 20 g, oral, BID  nadolol, 40 mg, oral, " Daily  pantoprazole, 40 mg, oral, Daily before breakfast  spironolactone, 50 mg, oral, BID  sucralfate, 1 g, oral, Before meals & nightly  traZODone, 25 mg, oral, Nightly  ursodiol, 300 mg, oral, TID     Continuous Medications    PRN Medications  PRN medications: acetaminophen, ceFAZolin, hydrOXYzine HCL, ondansetron **OR** ondansetron, pramoxine     Labs:  Notable for elevated AST, alk phos, bilirubin, low albumin, mildly elevated INR 1.3, urinalysis negative for nitrate, positive white blood cells  CBC:  Results from last 7 days   Lab Units 02/09/25  0609 02/05/25  1620   WBC AUTO x10*3/uL 5.2 4.5   HEMOGLOBIN g/dL 11.7* 11.4*   HEMATOCRIT % 35.9* 35.5*   MCV fL 97 98   PLATELETS AUTO x10*3/uL 85* 77*     RFP:  Results from last 72 hours   Lab Units 02/09/25  0609   SODIUM mmol/L 135*   POTASSIUM mmol/L 3.9   CHLORIDE mmol/L 108*   CO2 mmol/L 25   BUN mg/dL 17   CREATININE mg/dL 0.81   CALCIUM mg/dL 7.5*     HFP:  Results from last 7 days   Lab Units 02/09/25  0609 02/05/25  1620   AST U/L 61* 84*   ALT U/L 34 42   ALK PHOS U/L 340* 381*   BILIRUBIN TOTAL mg/dL 2.5* 3.2*   ALBUMIN g/dL 2.1* 2.5*     Cardiac:  Results from last 7 days   Lab Units 02/05/25  1658 02/05/25  1620   TROPHS ng/L 3 3   BNP pg/mL  --  44     Coag:  Results from last 7 days   Lab Units 02/09/25  0648 02/05/25  1658   PROTIME seconds 13.8* 13.2*   APTT seconds  --  27.8   INR  1.3* 1.2     ABG/VBG:            UA:   Results from last 7 days   Lab Units 02/09/25  0911   COLOR U  Yellow   PH U  6.0   SPEC GRAV UR  >1.050*   PROTEIN U mg/dL 30 (1+)*   BLOOD UR mg/dL NEGATIVE   NITRITE U  NEGATIVE   WBC UR /HPF 21-50*      Cultures:   Susceptibility data from last 90 days.  Collected Specimen Info Organism Ampicillin Ciprofloxacin Levofloxacin Nitrofurantoin Penicillin Trimethoprim/Sulfamethoxazole Vancomycin   01/22/25 Urine from Clean Catch/Voided Enterococcus faecium  R  R  R  R  R  R  S       Imaging in last 24hr:  XR chest 1 view    Result  Date: 2/9/2025  STUDY: XR CHEST 1 VIEW;  2/9/2025 10:05 pm   INDICATION: Signs/Symptoms:evaluate for pleural effusion.     COMPARISON: Chest radiograph dated 2/5/2025   ACCESSION NUMBER(S): [id number]   ORDERING CLINICIAN: EVELYN DENTON   FINDINGS: AP radiograph of the chest:     CARDIOMEDIASTINAL SILHOUETTE: The cardiomediastinal silhouette is normal in size and configuration.   LUNGS: No consolidation, pleural effusion, or pneumothorax.   ABDOMEN: No remarkable upper abdominal findings.   BONES: No acute osseous abnormality.       1. No acute cardiopulmonary process.     I personally reviewed the images/study and I agree with the findings as stated by Dr. Alonso Sanford. This study was interpreted at [place], [place].   MACRO: None     Dictation workstation:   [id number]    CT abdomen pelvis w IV contrast    Result Date: 2/9/2025  Interpreted By:  Pratibha Davis, STUDY: CT ABDOMEN PELVIS W IV CONTRAST;  2/9/2025 7:08 am   INDICATION: Signs/Symptoms:recent paracentesis, hx of liver cirrhosis, distended abdomen with upper abdominal and back pain.     COMPARISON: CT abdomen pelvis with contrast dated 02/05/2025.   ACCESSION NUMBER(S): [id number]   ORDERING CLINICIAN: LAVONNE LEWIS   TECHNIQUE: CT of the abdomen and pelvis was performed after administration of intravenous contrast. Standard contiguous axial images were obtained at 3 mm slice thickness through the abdomen and pelvis. Coronal and sagittal reconstructions at 3 mm slice thickness were performed.  75 ML of Omnipaque 350 was administered intravenously without immediate complication.   FINDINGS: LOWER CHEST: Visualized lung bases are clear. Again noted is a 6 mm pulmonary nodule in the right middle lobe, similar compared to prior imaging. No pleural or pericardial effusion. Visualized heart is normal in size.   ABDOMEN:   LIVER: Liver demonstrates shrunken morphology with nodular contour consistent with  cirrhosis. No suspicious focal hepatic mass lesion within limits of single phase contrast examination. There is recanalization of the paraumbilical vein.   BILE DUCTS: No intrahepatic or extrahepatic biliary ductal dilatation.   GALLBLADDER: Gallbladder is surgically absent.   PANCREAS: The pancreas appears unremarkable without evidence of ductal dilatation or masses.   SPLEEN: Spleen is enlarged measuring up to 14 cm in AP dimension.   ADRENAL GLANDS: Bilateral adrenal glands are unremarkable.   KIDNEYS AND URETERS: Bilateral kidneys enhance symmetrically. No hydronephrosis.   PELVIS:   BLADDER: Urinary bladder is not well distended limiting evaluation.   REPRODUCTIVE ORGANS: Uterus and bilateral ovaries appear grossly unremarkable.   BOWEL: Stomach is moderately distended and appears grossly unremarkable. There is diffuse edematous thickening of the small bowel loops in the mid abdomen, new compared to prior CT examination dated 02/05/2025. Large bowel demonstrates small stool volume. There are few scattered diverticula in the sigmoid colon without acute diverticulitis. There is also diffuse circumferential edematous wall thickening of the ascending and transverse colon, more pronounced on today's examination compared to prior. Appendix is not discretely visualized.   VESSELS: Abdominal aorta is normal in course and caliber and demonstrates mild atherosclerotic calcifications without aneurysmal dilatation. IVC appears grossly unremarkable.   PERITONEUM/RETROPERITONEUM/LYMPH NODES: There is moderate to large volume abdominopelvic ascites. No evidence of enlarged lymphadenopathy in the abdomen and pelvis.   BONES AND ABDOMINAL WALL: No suspicious osseous lesions. There are midline multifocal abdominal wall defects around the level of umbilicus with extension of ascitic fluid.       1.  Redemonstration of hepatic cirrhosis with evidence of portal hypertension as evidenced by mild splenomegaly and moderate to large  volume abdominopelvic ascites. 2. Interval increase in edematous wall thickening throughout the small bowel loops, ascending colon and transverse colon compared to prior CT examination dated 02/05/2025. This could again be related to hypoproteinemia from hepatic cirrhosis, however infectious/inflammatory enterocolitis can also be considered in the differential in appropriate clinical setting. 3. Stable 6 mm pulmonary nodule in the right middle lobe. This is similar compared to prior CT examination dated 07/04/2024. Recommend short interval follow-up CT chest in 12 months to document stability.   MACRO: None   Signed by: Pratibha Davis 2/9/2025 8:16 AM Dictation workstation:   [id number]      Assessment/Plan   Assessment and Plan:  Alfonzo Flanagan is a 50 y.o. 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 She has rapidly re-accumulating ascites than usual despite diuretic therapy concerning for refractory ascites. She says she has been compliant with diuretics, can try to optimize diet, but otherwise she may need eval for TIPS as well as for transplant. She also has diarrhea and nausea for the past 4 days as well as chills, and CT evidence of edematous small bowel and colon which potentially could represent an infectious gastroenteritis (c diff possibly given recent hospitalization and spotty antibiotic use, vs norovirus given its recent prevalence in the area) so pending stool studies. Also possibly UTI or possible that she has SBP with more rapid accumulation of her ascites. For  now will cover empirically with ceftriaxone for SBP vs UTI while awaiting para results; urine cultures neg. 4.4L paracentesis completed today followed by 37.5g albumin given recent para as well.    Updates 02/10/25:  - paracentesis 4.4L removed today  - fluid studies sent  - oxycodone added for pain    #PBC cirrhosis cb portal hypertension   #concern for refractory ascites  - Based on OSH labs: meld NA 15 child class C  MELD 3.0: 18 at 2/9/2025  6:48 AM  MELD-Na: 15 at 2/9/2025  6:48 AM  Calculated from:  Serum Creatinine: 0.81 mg/dL (Using min of 1 mg/dL) at 2/9/2025  6:09 AM  Serum Sodium: 135 mmol/L at 2/9/2025  6:09 AM  Total Bilirubin: 2.5 mg/dL at 2/9/2025  6:09 AM  Serum Albumin: 2.1 g/dL at 2/9/2025  6:09 AM  INR(ratio): 1.3 at 2/9/2025  6:48 AM  Age at listing (hypothetical): 50 years  Sex: Female at 2/9/2025  6:48 AM  Plan:  - continue home furosemide 40mg daily  - continue home spironolactone 50mg BID  - 2g sodium diet (no fluid restriction for now given no hyponatremia)  - nutrition consult for low sodium diet  - diagnostic and therapeutic para completed   - continue home ursodiol 300mg TID  - daily meld labs      #ruleout SBP  #ruleout UTI   - fu urine culture from OSH, negative urine culture here   - fu bcx sent pre-abx 2/9, NGTD  - ceftriaxone until SBP ruled out  - recent cultures notable for a drug resistant enterococcus faecalis susceptible only to vanc on cultures, thought contaminant as this was not treated, if decompensating would add vanc to cover this     #diarrhea, nausea, vomiting, small intestine and colon edema  - stool studies, cdiff pending  - zofran PRN  - repeat EKG to evaluate QTC  - clear liquid diet for now   - advance in am if stable nausea/vomiting     #Hx hepatic encephalopathy  - continue home lactulose 20mg, increased to BID from daily PRN     #recent GI bleed (non variceal, no clear etiology)  #hx varices now sp eradication 2021  - counseled against NSAID use  - continue home  pantoprazole 40mg   - continue home nadalol 40mg for ppx     #celiac disease  - gluten free diet     #DM2 (A1c 7.6 most recently)  - home lantus is 50 units BID which she doubled on her own given high blood sugars, but she sometimes has low sugars with this regimen  - lantus 20units BID  - mild SSI   - would benefit from optimization of her diabetes regimen, patient states is limited by insurance and may need socialwork  - holding home metformin 1000mg daily XR     #anxiety/depression   - continue home trazodone 25mg nightly     #pain  - acetaminophen PRN (max daily 2g)  - continue home carafate     #itching  - continue home prn atarax 25mg   - sarna cream     #HLD  -  2 years ago, will order repeat lipid panel for AM   - continue atorvastatin 80mg daily  - continue home fenofibrate (145mg at home 160mg inpatient due to therapeutic interchange)     #other  - holding home nitroglycerine patch - using it prn for chest pain?  - holding home meclizine prn     F: prn   E: prn   N: Adult diet Clear Liquid for now due to nausea/vomiting  A: PIV    DVT prophylaxis: Lovenox subq  GI Prophylaxis: PPI   Antimicrobials: ceftriaxone    CODE STATUS: Full Code, confirmed on admission  NOK:  Rah Modi [phone]  Extended Emergency Contact Information  Primary Emergency Contact: Rah Modi  Address: [address] [place] [address]  Home Phone: [phone]  Relation: Spouse  Secondary Emergency Contact: Claudia Hartley  Mobile Phone: [phone]  Relation: Daughter       Patient and plan discussed with attending physician.  ---  Rosario Ragland MD (Anna)  PGY1, Internal Medicine  Available by Epic Chat

## 2025-02-10 NOTE — CARE PLAN
The patient's goals for the shift include Patient to start treatment after transfer from [facility] and orient to [place] [id number] by end of shift    The clinical goals for the shift include pt will remain free from injury throughout shift      Problem: Pain  Goal: Takes deep breaths with improved pain control throughout the shift  Outcome: Progressing  Goal: Turns in bed with improved pain control throughout the shift  Outcome: Progressing  Goal: Walks with improved pain control throughout the shift  Outcome: Progressing  Goal: Performs ADL's with improved pain control throughout shift  Outcome: Progressing  Goal: Participates in PT with improved pain control throughout the shift  Outcome: Progressing  Goal: Free from opioid side effects throughout the shift  Outcome: Progressing  Goal: Free from acute confusion related to pain meds throughout the shift  Outcome: Progressing     Problem: Pain - Adult  Goal: Verbalizes/displays adequate comfort level or baseline comfort level  Outcome: Progressing     Problem: Safety - Adult  Goal: Free from fall injury  Outcome: Progressing     Problem: Discharge Planning  Goal: Discharge to home or other facility with appropriate resources  Outcome: Progressing     Problem: Chronic Conditions and Co-morbidities  Goal: Patient's chronic conditions and co-morbidity symptoms are monitored and maintained or improved  Outcome: Progressing     Problem: Nutrition  Goal: Nutrient intake appropriate for maintaining nutritional needs  Outcome: Progressing     Problem: Fall/Injury  Goal: Not fall by end of shift  Outcome: Progressing  Goal: Be free from injury by end of the shift  Outcome: Progressing  Goal: Verbalize understanding of personal risk factors for fall in the hospital  Outcome: Progressing  Goal: Verbalize understanding of risk factor reduction measures to prevent injury from fall in the home  Outcome: Progressing  Goal: Use assistive devices by end of the shift  Outcome:  Progressing  Goal: Pace activities to prevent fatigue by end of the shift  Outcome: Progressing

## 2025-02-11 ENCOUNTER — APPOINTMENT (OUTPATIENT)
Dept: CARDIOLOGY | Facility: HOSPITAL | Age: 50
End: 2025-02-11
Payer: COMMERCIAL

## 2025-02-11 LAB
ALBUMIN SERPL BCP-MCNC: 2.1 G/DL (ref 3.4–5)
ALBUMIN SERPL BCP-MCNC: 2.1 G/DL (ref 3.4–5)
ALP SERPL-CCNC: 177 U/L (ref 33–110)
ALT SERPL W P-5'-P-CCNC: 21 U/L (ref 7–45)
ANION GAP SERPL CALC-SCNC: 7 MMOL/L (ref 10–20)
ANION GAP SERPL CALC-SCNC: 9 MMOL/L (ref 10–20)
APTT PPP: 34 SECONDS (ref 27–38)
AST SERPL W P-5'-P-CCNC: 42 U/L (ref 9–39)
BASOPHILS # BLD AUTO: 0.01 X10*3/UL (ref 0–0.1)
BASOPHILS NFR BLD AUTO: 0.3 %
BILIRUB DIRECT SERPL-MCNC: 1 MG/DL (ref 0–0.3)
BILIRUB SERPL-MCNC: 2.1 MG/DL (ref 0–1.2)
BUN SERPL-MCNC: 16 MG/DL (ref 6–23)
BUN SERPL-MCNC: 16 MG/DL (ref 6–23)
C COLI+JEJ+UPSA DNA STL QL NAA+PROBE: NOT DETECTED
C DIF TOX TCDA+TCDB STL QL NAA+PROBE: NOT DETECTED
CALCIUM SERPL-MCNC: 7.7 MG/DL (ref 8.6–10.6)
CALCIUM SERPL-MCNC: 7.9 MG/DL (ref 8.6–10.6)
CHLORIDE SERPL-SCNC: 107 MMOL/L (ref 98–107)
CHLORIDE SERPL-SCNC: 108 MMOL/L (ref 98–107)
CO2 SERPL-SCNC: 25 MMOL/L (ref 21–32)
CO2 SERPL-SCNC: 28 MMOL/L (ref 21–32)
CREAT SERPL-MCNC: 0.71 MG/DL (ref 0.5–1.05)
CREAT SERPL-MCNC: 0.82 MG/DL (ref 0.5–1.05)
EC STX1 GENE STL QL NAA+PROBE: NOT DETECTED
EC STX2 GENE STL QL NAA+PROBE: NOT DETECTED
EGFRCR SERPLBLD CKD-EPI 2021: 88 ML/MIN/1.73M*2
EGFRCR SERPLBLD CKD-EPI 2021: >90 ML/MIN/1.73M*2
EOSINOPHIL # BLD AUTO: 0.23 X10*3/UL (ref 0–0.7)
EOSINOPHIL NFR BLD AUTO: 6.4 %
ERYTHROCYTE [DISTWIDTH] IN BLOOD BY AUTOMATED COUNT: 15.5 % (ref 11.5–14.5)
GLUCOSE BLD MANUAL STRIP-MCNC: 115 MG/DL (ref 74–99)
GLUCOSE BLD MANUAL STRIP-MCNC: 55 MG/DL (ref 74–99)
GLUCOSE BLD MANUAL STRIP-MCNC: 80 MG/DL (ref 74–99)
GLUCOSE BLD MANUAL STRIP-MCNC: 88 MG/DL (ref 74–99)
GLUCOSE BLD MANUAL STRIP-MCNC: 95 MG/DL (ref 74–99)
GLUCOSE SERPL-MCNC: 111 MG/DL (ref 74–99)
GLUCOSE SERPL-MCNC: 163 MG/DL (ref 74–99)
HCT VFR BLD AUTO: 30.6 % (ref 36–46)
HGB BLD-MCNC: 9.7 G/DL (ref 12–16)
IMM GRANULOCYTES # BLD AUTO: 0.01 X10*3/UL (ref 0–0.7)
IMM GRANULOCYTES NFR BLD AUTO: 0.3 % (ref 0–0.9)
INR PPP: 1.4 (ref 0.9–1.1)
LYMPHOCYTES # BLD AUTO: 1 X10*3/UL (ref 1.2–4.8)
LYMPHOCYTES NFR BLD AUTO: 27.9 %
MAGNESIUM SERPL-MCNC: 1.69 MG/DL (ref 1.6–2.4)
MAGNESIUM SERPL-MCNC: 1.89 MG/DL (ref 1.6–2.4)
MCH RBC QN AUTO: 31.3 PG (ref 26–34)
MCHC RBC AUTO-ENTMCNC: 31.7 G/DL (ref 32–36)
MCV RBC AUTO: 99 FL (ref 80–100)
MONOCYTES # BLD AUTO: 0.45 X10*3/UL (ref 0.1–1)
MONOCYTES NFR BLD AUTO: 12.5 %
NEUTROPHILS # BLD AUTO: 1.89 X10*3/UL (ref 1.2–7.7)
NEUTROPHILS NFR BLD AUTO: 52.6 %
NOROVIRUS GI + GII RNA STL NAA+PROBE: NOT DETECTED
NRBC BLD-RTO: 0 /100 WBCS (ref 0–0)
PHOSPHATE SERPL-MCNC: 3.3 MG/DL (ref 2.5–4.9)
PHOSPHATE SERPL-MCNC: 3.4 MG/DL (ref 2.5–4.9)
PLATELET # BLD AUTO: 77 X10*3/UL (ref 150–450)
POTASSIUM SERPL-SCNC: 3.5 MMOL/L (ref 3.5–5.3)
POTASSIUM SERPL-SCNC: 3.8 MMOL/L (ref 3.5–5.3)
PROT SERPL-MCNC: 5.7 G/DL (ref 6.4–8.2)
PROTHROMBIN TIME: 15.4 SECONDS (ref 9.8–12.8)
RBC # BLD AUTO: 3.1 X10*6/UL (ref 4–5.2)
RV RNA STL NAA+PROBE: NOT DETECTED
SALMONELLA DNA STL QL NAA+PROBE: NOT DETECTED
SHIGELLA DNA SPEC QL NAA+PROBE: NOT DETECTED
SODIUM SERPL-SCNC: 137 MMOL/L (ref 136–145)
SODIUM SERPL-SCNC: 139 MMOL/L (ref 136–145)
V CHOLERAE DNA STL QL NAA+PROBE: NOT DETECTED
WBC # BLD AUTO: 3.6 X10*3/UL (ref 4.4–11.3)
Y ENTEROCOL DNA STL QL NAA+PROBE: NOT DETECTED

## 2025-02-11 PROCEDURE — 97161 PT EVAL LOW COMPLEX 20 MIN: CPT | Mod: GP

## 2025-02-11 PROCEDURE — 2500000004 HC RX 250 GENERAL PHARMACY W/ HCPCS (ALT 636 FOR OP/ED)

## 2025-02-11 PROCEDURE — 80069 RENAL FUNCTION PANEL: CPT | Mod: CCI

## 2025-02-11 PROCEDURE — 82248 BILIRUBIN DIRECT: CPT

## 2025-02-11 PROCEDURE — 83735 ASSAY OF MAGNESIUM: CPT

## 2025-02-11 PROCEDURE — 1100000001 HC PRIVATE ROOM DAILY

## 2025-02-11 PROCEDURE — 84100 ASSAY OF PHOSPHORUS: CPT

## 2025-02-11 PROCEDURE — 80053 COMPREHEN METABOLIC PANEL: CPT

## 2025-02-11 PROCEDURE — 36415 COLL VENOUS BLD VENIPUNCTURE: CPT

## 2025-02-11 PROCEDURE — 2500000002 HC RX 250 W HCPCS SELF ADMINISTERED DRUGS (ALT 637 FOR MEDICARE OP, ALT 636 FOR OP/ED)

## 2025-02-11 PROCEDURE — 99233 SBSQ HOSP IP/OBS HIGH 50: CPT | Performed by: INTERNAL MEDICINE

## 2025-02-11 PROCEDURE — 85025 COMPLETE CBC W/AUTO DIFF WBC: CPT

## 2025-02-11 PROCEDURE — 93005 ELECTROCARDIOGRAM TRACING: CPT

## 2025-02-11 PROCEDURE — 2500000001 HC RX 250 WO HCPCS SELF ADMINISTERED DRUGS (ALT 637 FOR MEDICARE OP)

## 2025-02-11 PROCEDURE — 85610 PROTHROMBIN TIME: CPT

## 2025-02-11 PROCEDURE — 82947 ASSAY GLUCOSE BLOOD QUANT: CPT

## 2025-02-11 PROCEDURE — 97165 OT EVAL LOW COMPLEX 30 MIN: CPT | Mod: GO

## 2025-02-11 RX ORDER — POTASSIUM CHLORIDE 14.9 MG/ML
20 INJECTION INTRAVENOUS ONCE
Status: COMPLETED | OUTPATIENT
Start: 2025-02-12 | End: 2025-02-12

## 2025-02-11 RX ORDER — SPIRONOLACTONE 25 MG/1
150 TABLET ORAL DAILY
Status: DISCONTINUED | OUTPATIENT
Start: 2025-02-12 | End: 2025-02-15 | Stop reason: HOSPADM

## 2025-02-11 RX ORDER — FUROSEMIDE 20 MG/1
20 TABLET ORAL ONCE
Status: COMPLETED | OUTPATIENT
Start: 2025-02-11 | End: 2025-02-11

## 2025-02-11 RX ORDER — MAGNESIUM SULFATE HEPTAHYDRATE 40 MG/ML
2 INJECTION, SOLUTION INTRAVENOUS ONCE
Status: COMPLETED | OUTPATIENT
Start: 2025-02-11 | End: 2025-02-11

## 2025-02-11 RX ORDER — SPIRONOLACTONE 25 MG/1
100 TABLET ORAL ONCE
Status: COMPLETED | OUTPATIENT
Start: 2025-02-11 | End: 2025-02-11

## 2025-02-11 RX ORDER — INSULIN GLARGINE 100 [IU]/ML
15 INJECTION, SOLUTION SUBCUTANEOUS EVERY 24 HOURS
Status: DISCONTINUED | OUTPATIENT
Start: 2025-02-11 | End: 2025-02-14

## 2025-02-11 RX ADMIN — ATORVASTATIN CALCIUM 80 MG: 80 TABLET, FILM COATED ORAL at 20:25

## 2025-02-11 RX ADMIN — FUROSEMIDE 40 MG: 40 TABLET ORAL at 10:10

## 2025-02-11 RX ADMIN — SPIRONOLACTONE 50 MG: 25 TABLET, FILM COATED ORAL at 10:10

## 2025-02-11 RX ADMIN — SUCRALFATE 1 G: 1 TABLET ORAL at 20:25

## 2025-02-11 RX ADMIN — PANTOPRAZOLE SODIUM 40 MG: 40 TABLET, DELAYED RELEASE ORAL at 06:03

## 2025-02-11 RX ADMIN — LACTULOSE 20 G: 20 SOLUTION ORAL at 10:10

## 2025-02-11 RX ADMIN — LACTULOSE 20 G: 20 SOLUTION ORAL at 20:25

## 2025-02-11 RX ADMIN — NADOLOL 40 MG: 40 TABLET ORAL at 10:10

## 2025-02-11 RX ADMIN — SUCRALFATE 1 G: 1 TABLET ORAL at 06:03

## 2025-02-11 RX ADMIN — SUCRALFATE 1 G: 1 TABLET ORAL at 13:03

## 2025-02-11 RX ADMIN — SUCRALFATE 1 G: 1 TABLET ORAL at 15:07

## 2025-02-11 RX ADMIN — URSODIOL 300 MG: 300 CAPSULE ORAL at 20:25

## 2025-02-11 RX ADMIN — OXYCODONE 5 MG: 5 TABLET ORAL at 20:29

## 2025-02-11 RX ADMIN — FENOFIBRATE 160 MG: 160 TABLET ORAL at 10:16

## 2025-02-11 RX ADMIN — FUROSEMIDE 20 MG: 20 TABLET ORAL at 13:18

## 2025-02-11 RX ADMIN — MAGNESIUM SULFATE HEPTAHYDRATE 2 G: 40 INJECTION, SOLUTION INTRAVENOUS at 13:18

## 2025-02-11 RX ADMIN — ACETAMINOPHEN 650 MG: 325 TABLET ORAL at 10:10

## 2025-02-11 RX ADMIN — URSODIOL 300 MG: 300 CAPSULE ORAL at 10:10

## 2025-02-11 RX ADMIN — ENOXAPARIN SODIUM 40 MG: 100 INJECTION SUBCUTANEOUS at 13:03

## 2025-02-11 RX ADMIN — TRAZODONE HYDROCHLORIDE 25 MG: 50 TABLET ORAL at 20:25

## 2025-02-11 RX ADMIN — SPIRONOLACTONE 100 MG: 25 TABLET, FILM COATED ORAL at 13:18

## 2025-02-11 RX ADMIN — URSODIOL 300 MG: 300 CAPSULE ORAL at 14:24

## 2025-02-11 ASSESSMENT — COGNITIVE AND FUNCTIONAL STATUS - GENERAL
DAILY ACTIVITIY SCORE: 21
TURNING FROM BACK TO SIDE WHILE IN FLAT BAD: A LITTLE
STANDING UP FROM CHAIR USING ARMS: A LITTLE
HELP NEEDED FOR BATHING: A LITTLE
MOBILITY SCORE: 24
WALKING IN HOSPITAL ROOM: A LITTLE
DAILY ACTIVITIY SCORE: 24
MOVING TO AND FROM BED TO CHAIR: A LITTLE
CLIMB 3 TO 5 STEPS WITH RAILING: A LITTLE
DRESSING REGULAR LOWER BODY CLOTHING: A LITTLE
TOILETING: A LITTLE
MOBILITY SCORE: 19

## 2025-02-11 ASSESSMENT — PAIN SCALES - GENERAL
PAINLEVEL_OUTOF10: 8
PAINLEVEL_OUTOF10: 4

## 2025-02-11 ASSESSMENT — ACTIVITIES OF DAILY LIVING (ADL)
ADL_ASSISTANCE: INDEPENDENT
ADL_ASSISTANCE: INDEPENDENT
BATHING_ASSISTANCE: STAND BY

## 2025-02-11 ASSESSMENT — PAIN - FUNCTIONAL ASSESSMENT
PAIN_FUNCTIONAL_ASSESSMENT: 0-10

## 2025-02-11 NOTE — CONSULTS
Nutrition Diet Education  Reason for Assessment: Diet education (Low sodium diet)     Pt is a 50 y.o female w/ pmhx of cirrhosis c/b portal HTN. Pt with rapidly reaccumulating ascites despite diuretic therapy.     Education Documentation  Nutrition Related Education, taught by Benita Carlson RDN, LD at 2/11/2025 11:43 AM.  Learner: Patient  Readiness: Acceptance  Method: Explanation, Handout  Response: Verbalizes Understanding  Comment: Handout: Reduce el Sodio from [facility]. Reviewed most important points from handout. Pt has already implemented some recommendations. Discussed strategies to increase PO intake with early satiety 2/2 ascites. All nutr related questions answered.        Time Spent (min): 30 minutes

## 2025-02-11 NOTE — PROGRESS NOTES
"    Daily Progress Note    Chief Concern  Alfonzo Flanagan is a 50 y.o. female on day 2 of hospitalization for Cirrhosis (Multi)    Subjective   Subjective:  Alfonzo Flanagan reports that overnight she woke up in the middle of the night having soaked through her sheets. Her vitals were normal, she was told, but she had BG checked and it was 55. However at home her low glucose episodes feel different and she has never sweated so much as she did last night. She has intermittently had chills since but now feels better overall. She still feels less full in her belly than yesterday and has no new pain although she does have some flank tenderness with palpation on the left that she cannot recall if is new or not.    She tolerated CLD without nausea and is ready to advance today.    Interval events:  -No acute events overnight       Objective   /65 (BP Location: Left arm, Patient Position: Lying)   Pulse 68   Temp 36.6 °C (97.9 °F) (Temporal)   Resp 16   Ht 1.575 m (5' 2\")   Wt 93.5 kg (206 lb 1.6 oz)   SpO2 97%   BMI 37.70 kg/m²   24 Hour Vitals  Temp:  [36.2 °C (97.2 °F)-36.6 °C (97.9 °F)] 36.6 °C (97.9 °F)  Heart Rate:  [68-70] 68  Resp:  [14-16] 16  BP: (107-110)/(65-70) 107/65  SpO2:  [97 %] 97 %    Temp (24hrs), Av.4 °C (97.6 °F), Min:36.2 °C (97.2 °F), Max:36.6 °C (97.9 °F)     24 hour Intake/Output  No intake or output data in the 24 hours ending 25 1122     Physical exam:  Constitutional: Well-developed female in no acute distress, laying back in bed  HEENT: NCAT, EOMI, sclera anicteric, MMM  Respiratory: Normal respiratory effort on RA.  Cardiovascular: RRR, normal S1/S2, No murmurs  Abdominal: Soft, distended and tense, diffusely tender to palpation with increased tenderness over left middle quadrant. No rebound or guarding.  Stretch marks visible on skin.  No erythema or purulence at sites of prior paracentesis mostly on the right lower quadrant. Bandaid c/d/I over 2/10/25 para " site  Neuro: Moving all extremities with no focal deficits  MSK: WWP, trace peripheral edema  Skin: Some ecchymoses   Psych: Appropriate mood and affect, alert and oriented, linear thought process and judgement intact      Medications:  Scheduled Medications  atorvastatin, 80 mg, oral, Nightly  enoxaparin, 40 mg, subcutaneous, q24h  fenofibrate, 160 mg, oral, Daily  furosemide, 40 mg, oral, Daily  insulin glargine, 15 Units, subcutaneous, q24h  insulin lispro, 0-5 Units, subcutaneous, TID AC  lactulose, 20 g, oral, BID  magnesium sulfate, 2 g, intravenous, Once  nadolol, 40 mg, oral, Daily  pantoprazole, 40 mg, oral, Daily before breakfast  spironolactone, 50 mg, oral, BID  sucralfate, 1 g, oral, Before meals & nightly  traZODone, 25 mg, oral, Nightly  ursodiol, 300 mg, oral, TID     Continuous Medications    PRN Medications  PRN medications: acetaminophen, hydrOXYzine HCL, ondansetron **OR** ondansetron, oxyCODONE, pramoxine     Labs:  CBC:  Results from last 7 days   Lab Units 02/11/25  0807 02/10/25  0818 02/09/25  0609   WBC AUTO x10*3/uL 3.6* 5.8 5.2   HEMOGLOBIN g/dL 9.7* 11.0* 11.7*   HEMATOCRIT % 30.6* 35.6* 35.9*   MCV fL 99 98 97   PLATELETS AUTO x10*3/uL 77* 82* 85*     RFP:  Results from last 72 hours   Lab Units 02/11/25  0807 02/10/25  0818 02/09/25  0609   SODIUM mmol/L 139  --  135*   POTASSIUM mmol/L 3.8  --  3.9   CHLORIDE mmol/L 108*  --  108*   CO2 mmol/L 28  --  25   BUN mg/dL 16  --  17   CREATININE mg/dL 0.71  --  0.81   CALCIUM mg/dL 7.7*  --  7.5*   MAGNESIUM mg/dL 1.69 1.73  --    PHOSPHORUS mg/dL 3.4  --   --      HFP:  Results from last 7 days   Lab Units 02/11/25  0807 02/10/25  0818 02/09/25  0609   AST U/L 42* 61* 61*   ALT U/L 21 27 34   ALK PHOS U/L 177* 257* 340*   BILIRUBIN TOTAL mg/dL 2.1* 2.4* 2.5*   BILIRUBIN DIRECT mg/dL 1.0* 0.7*  --    ALBUMIN g/dL 2.1* 2.0* 2.1*     Cardiac:  Results from last 7 days   Lab Units 02/05/25  1658 02/05/25  1620   TROPHS ng/L 3 3   BNP pg/mL  --   44     Coag:  Results from last 7 days   Lab Units 02/10/25  0818   PROTIME seconds 14.3*   APTT seconds 30   INR  1.3*     ABG/VBG:            UA:   Results from last 7 days   Lab Units 02/09/25  0911   COLOR U  Yellow   PH U  6.0   SPEC GRAV UR  >1.050*   PROTEIN U mg/dL 30 (1+)*   BLOOD UR mg/dL NEGATIVE   NITRITE U  NEGATIVE   WBC UR /HPF 21-50*      Cultures:   Susceptibility data from last 90 days.  Collected Specimen Info Organism Ampicillin Ciprofloxacin Levofloxacin Nitrofurantoin Penicillin Trimethoprim/Sulfamethoxazole Vancomycin   01/22/25 Urine from Clean Catch/Voided Enterococcus faecium  R  R  R  R  R  R  S     Paracentesis fluid   Latest Reference Range & Units Most Recent   Albumin, Fluid Not established g/dL <0.5  2/10/25 16:31   Amylase, Fluid Not established. U/L 18  2/10/25 16:31   Glucose, Fluid Not established mg/dL 142  2/10/25 16:31   LD, Fluid Not established. U/L 49  2/10/25 16:31   Protein, Total Fluid Not established g/dL 1.1  2/10/25 16:31   Color, Fluid Colorless, Straw, Yellow  Yellow  2/10/25 16:32   Clarity, Fluid Clear  Clear  2/10/25 16:32   WBC, Fluid See Comment /uL 67  2/10/25 16:32   RBC, Fluid 0  /uL /uL 30  2/10/25 16:32   Neutrophils %, Manual, Fluid <25 % % 10  2/10/25 16:31   Lymphocytes %, Manual, Fluid <75 % % 27  2/10/25 16:31   Mono/Macrophages %, Manual, Fluid <70 % % 63  2/10/25 16:31   Eosinophils %, Manual, Fluid 0 % % 0  2/10/25 16:31   Basophils %, Manual, Fluid 0 % % 0  2/10/25 16:31   Immature Granulocytes %, Manual, Fluid 0 % % 0  2/10/25 16:31   Blasts %, Manual, Fluid 0 % % 0  2/10/25 16:31   Unclassified Cells %, Manual, Fluid 0 % % 0  2/10/25 16:31   Plasma Cells %, Manual, Fluid 0 % % 0  2/10/25 16:31   Total Cells Counted, Fluid  100  2/10/25 16:31   Gram stain negative    Imaging in last 24hr:  XR chest 1 view    Result Date: 2/10/2025  Interpreted By:  Pop Cervantes and Sheng Max STUDY: XR CHEST 1 VIEW;  2/9/2025 10:05 pm   INDICATION:  Signs/Symptoms:evaluate for pleural effusion.     COMPARISON: Chest radiograph dated 2/5/2025   ACCESSION NUMBER(S): [id number]   ORDERING CLINICIAN: EVELYN DENTON   FINDINGS: AP radiograph of the chest:     CARDIOMEDIASTINAL SILHOUETTE: The cardiomediastinal silhouette is normal in size and configuration.   LUNGS: No consolidation, sizeable pleural effusion, or pneumothorax.   ABDOMEN: No remarkable upper abdominal findings.   BONES: No acute osseous abnormality.       1. No acute cardiopulmonary process.     I personally reviewed the images/study and I agree with the findings as stated by Dr. Alonso Sanford. This study was interpreted at [place], [place].   MACRO: None   Signed by: Pop Cervantes 2/10/2025 9:01 AM Dictation workstation:   [id number]      Assessment/Plan   Assessment and Plan:  Alfonzo Flanagan is a 50 y.o. 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 Tolerated 4.4L paracentesis 2/10/24 followed by 37.5g albumin given recent para, with improvement of abdominal pain and fullness.    On initial presentation, she had diarrhea and nausea for the past 4 days as well as chills, and CT evidence of edematous small bowel and colon but negative c diff and stool studies. Had sterile pyuria at OSH and here, without growth on repeat urine culture and received empiric ceftriaxone for three days before negative fluid studies resulted. Leukopenic and with night sweats  c/f new source of infection but afebrile; possible source of pyelonephritis although kidneys unremarkable on admission CT.    Updates 02/11/25:  - overnight sweats and chills but no fever, leukopenia without source at this time, VSS  - discontinued ceftriaxone given negative gram stain  - lantus insulin decreased 20 BID to 15 nightly, given hypoglycemia overnight  - stool panel & c diff negative  - MELD 3.0 16 from 18  - Advancing diet as tolerated  - Paracentesis fluid neg for infection, SAAG>1.1, protein <2.5    #PBC cirrhosis cb portal hypertension   #concern for refractory ascites  :: Ammonia on admission 71, lipase 200 (stable from last few admissions)  :: Based on OSH labs: meld NA 15 child class C  MELD 3.0: 16 at 2/11/2025  8:07 AM  MELD-Na: 12 at 2/11/2025  8:07 AM  Calculated from:  Serum Creatinine: 0.71 mg/dL (Using min of 1 mg/dL) at 2/11/2025  8:07 AM  Serum Sodium: 139 mmol/L (Using max of 137 mmol/L) at 2/11/2025  8:07 AM  Total Bilirubin: 2.1 mg/dL at 2/11/2025  8:07 AM  Serum Albumin: 2.1 g/dL at 2/11/2025  8:07 AM  INR(ratio): 1.3 at 2/10/2025  8:18 AM  Age at listing (hypothetical): 50 years  Sex: Female at 2/11/2025  8:07 AM  :: Paracentesis 2/10/25 4.4L clear yellow without immediate complications, fluid studies consistent with portal hypertensive ascites without SBP  Plan:  - continue home furosemide 40mg daily  - continue home spironolactone 50mg BID  - 2g sodium diet with 1500cc fluid restriction  - nutrition consult for low sodium diet  - continue home ursodiol 300mg TID  - daily meld labs      #Night sweats and chills without SBP  #Sterile pyuria   :: recent cultures notable for a drug resistant enterococcus faecalis susceptible only to vanc on cultures, thought contaminant as this was not treated, if decompensating would add vanc to cover this  :: urine culture from OSH, urine culture here negative   :: bcx sent pre-abx 2/9, NGTD  :: s/p ceftriaxone 2/9-2/11/25 for c/f UTI and SBP, both  negative studies  :: ongoing sterile pyuria  Plan:  - CTM CBC and symptoms     #diarrhea, nausea, vomiting, small intestine and colon edema  #celiac disease  :: stool studies, cdiff negative  :: no n/v with CLD 2/9-2/10  :: CT A/P on admission with edematous small bowel and colon possibly related to celiac disease as negative infectious workup as above  Plan:  - zofran PRN  - repeat EKG to evaluate QTC  - gluten free, soft diet advancing from CLD as tolerated      #DM2 (A1c 7.6 most recently)  :: home lantus is 50 units BID which she doubled on her own given high blood sugars, but she sometimes has low sugars with this regimen  :: overnight 2/10-2/11/25 pt had low BG with symptoms that improved with glucose. Switched lantus 20units BID to 15U nightly  Plan:  - lantus 15u nightly, mild SSI   - would benefit from optimization of her diabetes regimen, patient states is limited by insurance and may need socialwork  - holding home metformin 1000mg daily XR    #Hx hepatic encephalopathy  - continue home lactulose 20mg, increased to BID from daily PRN     #recent GI bleed (non variceal, no clear etiology)  #hx varices now sp eradication 2021  - counseled against NSAID use  - continue home pantoprazole 40mg   - continue home nadalol 40mg for ppx     #anxiety/depression   - continue home trazodone 25mg nightly     #pain  - acetaminophen PRN (max daily 2g)  - oxycodone 5mg q6h for mod-severe pain  - continue home carafate     #itching  - continue home prn atarax 25mg   - sarna cream     #HLD  ::  2 years ago  :: repeat panel HDL 21, LDL 62, TG 55, total cholesterol 94 on admission  - continue atorvastatin 80mg daily  - continue home fenofibrate (145mg at home 160mg inpatient due to therapeutic interchange)     #other  - holding home nitroglycerine patch - using it prn for chest pain?  - holding home meclizine prn     F: prn   E: prn   N: Adult diet 2-3 grams sodium, Gluten Free; Soft and bite sized 6; Thin 0; 1500 mL  fluid  A: PIV    DVT prophylaxis: Lovenox subq  GI Prophylaxis: PPI   Antimicrobials: ceftriaxone    CODE STATUS: Full Code, confirmed on admission  NOK:  Rah Modi [phone]  Extended Emergency Contact Information  Primary Emergency Contact: Rah Modi  Address: [address] [place] [address]  Home Phone: [phone]  Relation: Spouse  Secondary Emergency Contact: Claudia Hartley  Mobile Phone: [phone]  Relation: Daughter       Patient and plan discussed with attending physician.  ---  Rosario Ragland MD (Anna)  PGY1, Internal Medicine  Available by Epic Chat

## 2025-02-11 NOTE — CARE PLAN
The patient's goals for the shift include keep pain at a tolerable level throughout shift.     The clinical goals for the shift include pt will remain free from injury throughout shift      Problem: Pain  Goal: Takes deep breaths with improved pain control throughout the shift  Outcome: Progressing  Goal: Turns in bed with improved pain control throughout the shift  Outcome: Progressing  Goal: Walks with improved pain control throughout the shift  Outcome: Progressing  Goal: Performs ADL's with improved pain control throughout shift  Outcome: Progressing  Goal: Participates in PT with improved pain control throughout the shift  Outcome: Progressing  Goal: Free from opioid side effects throughout the shift  Outcome: Progressing  Goal: Free from acute confusion related to pain meds throughout the shift  Outcome: Progressing     Problem: Pain - Adult  Goal: Verbalizes/displays adequate comfort level or baseline comfort level  Outcome: Progressing     Problem: Safety - Adult  Goal: Free from fall injury  Outcome: Progressing     Problem: Discharge Planning  Goal: Discharge to home or other facility with appropriate resources  Outcome: Progressing     Problem: Chronic Conditions and Co-morbidities  Goal: Patient's chronic conditions and co-morbidity symptoms are monitored and maintained or improved  Outcome: Progressing     Problem: Nutrition  Goal: Nutrient intake appropriate for maintaining nutritional needs  Outcome: Progressing     Problem: Fall/Injury  Goal: Not fall by end of shift  Outcome: Progressing  Goal: Be free from injury by end of the shift  Outcome: Progressing  Goal: Verbalize understanding of personal risk factors for fall in the hospital  Outcome: Progressing  Goal: Verbalize understanding of risk factor reduction measures to prevent injury from fall in the home  Outcome: Progressing  Goal: Use assistive devices by end of the shift  Outcome: Progressing  Goal: Pace activities to prevent fatigue by end  of the shift  Outcome: Progressing

## 2025-02-11 NOTE — PROGRESS NOTES
Physical Therapy    Physical Therapy Evaluation    Patient Name: Alfonzo Flanagan  MRN: [id number]  Department: [place] [address]  Room: [facility]  Today's Date: 2/11/2025   Time Calculation  Start Time: 0902  Stop Time: 0914  Time Calculation (min): 12 min    Assessment/Plan   PT Assessment  Rehab Prognosis: Good  Barriers to Discharge Home: No anticipated barriers  Evaluation/Treatment Tolerance: Patient tolerated treatment well  Medical Staff Made Aware: Yes  Strengths: Attitude of self  Barriers to Participation: Comorbidities  End of Session Communication: Bedside nurse  Assessment Comment: pt admitted for an  evaluation by hepatology. pt grossly SBA for all mobility. low falls risk according to Tinetti balance assessment. pt does not demonstrate any acute PT needs at this time. will DC orders  End of Session Patient Position: Bed, 3 rail up, Alarm off, not on at start of session  IP OR SWING BED PT PLAN  Inpatient or Swing Bed: Inpatient  PT Plan  PT Plan: PT Eval only  PT Eval Only Reason: At baseline function  PT Frequency: PT eval only  PT Discharge Recommendations: No PT needed after discharge  PT Recommended Transfer Status: Independent  PT - OK to Discharge: Yes (DC rec made)    Subjective   General Visit Information:  General  Reason for Referral: Cirrhosis  Past Medical History Relevant to Rehab: PBC with cirrhosis c/b portal HTN with ascites, esophageal varices sp banding 2021 with eradication, hepatic encephalopathy, celiac disease, chronic pancreatitis and 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  Prior to Session Communication: Bedside nurse  Patient Position Received: Bed, 3 rail up, Alarm off, not on at start of session  General Comment: pt supine  alert and agreeable for PT. able to communicate in english without BRITTANY  Home Living:  Home Living  Type of Home: House  Lives With: Spouse, Adult children (dtr)  Home Adaptive Equipment: Walker rolling or standard  Home Layout: One level  Home Access: Stairs to enter with rails  Entrance Stairs-Rails: Right  Entrance Stairs-Number of Steps: 11  Bathroom Shower/Tub: Tub/shower unit  Bathroom Toilet: Standard  Bathroom Equipment: None  Prior Level of Function:  Prior Function Per Pt/Caregiver Report  Level of [place]: Needs assistance with ADLs, Needs assistance with homemaking  Receives Help From: Family  ADL Assistance: Independent  Homemaking Assistance: Needs assistance  Ambulatory Assistance: Independent (rollator)  Prior Function Comments: stated x1 fall ~2-3 weeks ago  Precautions:  Precautions  Medical Precautions: Fall precautions      Date/Time Vitals Session Patient Position Pulse Resp SpO2 BP MAP (mmHg)    02/11/25 0826 --  --  68  16  97 %  107/65  --                 Objective   Pain:  Pain Assessment  Pain Assessment: 0-10  0-10 (Numeric) Pain Score: 4  Pain Type: Acute pain  Pain Location: Abdomen  Cognition:  Cognition  Overall Cognitive Status: Within Functional Limits    General Assessments:                  Activity Tolerance  Endurance: Tolerates 10 - 20 min exercise with multiple rests    Sensation  Light Touch:  (neuropathy)            Perception  Inattention/Neglect: Appears intact      Coordination  Movements are Fluid and Coordinated: Yes    Postural Control  Postural Control: Within Functional Limits    Static Sitting Balance  Static Sitting-Balance Support: No upper extremity supported, Feet supported  Static Sitting-Level of Assistance: Distant supervision  Dynamic Sitting Balance  Dynamic Sitting-Balance Support: No upper extremity supported, Feet supported  Dynamic Sitting-Level of Assistance: Close supervision  Dynamic Sitting-Balance: Trunk control activities    Static Standing  Balance  Static Standing-Balance Support: No upper extremity supported  Static Standing-Level of Assistance: Close supervision  Dynamic Standing Balance  Dynamic Standing-Balance Support: No upper extremity supported  Dynamic Standing-Level of Assistance: Close supervision  Dynamic Standing-Balance: Turning  Functional Assessments:  Bed Mobility  Bed Mobility: Yes  Bed Mobility 1  Bed Mobility 1: Supine to sitting  Level of Assistance 1: Close supervision  Bed Mobility Comments 1: HOB elevated    Transfers  Transfer: Yes  Transfer 1  Transfer From 1: Bed to  Transfer to 1: Stand, Sit  Technique 1: Stand to sit, Sit to stand  Transfer Device 1:  (none)  Transfer Level of Assistance 1: Close supervision  Trials/Comments 1: x1; VC hand placement    Ambulation/Gait Training  Ambulation/Gait Training Performed: Yes  Ambulation/Gait Training 1  Surface 1: Level tile  Device 1: No device  Assistance 1: Close supervision  Quality of Gait 1: Narrow base of support, Decreased step length  Comments/Distance (ft) 1: ~70ft    Stairs  Stairs: No (reports no concerns)  Extremity/Trunk Assessments:  RLE   RLE : Within Functional Limits  LLE   LLE : Within Functional Limits  Outcome Measures:  [facility] Basic Mobility  Turning from your back to your side while in a flat bed without using bedrails: None  Moving from lying on your back to sitting on the side of a flat bed without using bedrails: A little  Moving to and from bed to chair (including a wheelchair): A little  Standing up from a chair using your arms (e.g. wheelchair or bedside chair): A little  To walk in hospital room: A little  Climbing 3-5 steps with railing: A little  Basic Mobility - Total Score: 19    Tinetti  Sitting Balance: Steady, safe  Arises: Able, uses arms to help  Attempts to Arise: Able to arise, one attempt  Immediate Standing Balance (First 5 Seconds): Steady without walker or other support  Standing Balance: Narrow stance without support  Nudged: Steady  without walker or other support  Eyes Closed: Unsteady  Turned 360 Degrees: Steadiness: Steady  Turned 360 Degrees: Continuity of Steps: Continuous  Sitting Down: Uses arms or not a smooth motion  Balance Score: 13  Initiation of Gait: No hesitancy  Step Height: R Swing Foot: Right foot complete clears floor  Step Length: R Swing Foot: Passes left stance foot  Step Height: L Swing Foot: Left foot complete clears floor  Step Length: L Swing Foot: Passes right stance foot  Step Symmetry: Right and left step appear equal  Step Continuity: Steps appear continuous  Path: Mild/moderate deviation or uses walking aid  Trunk: No sway, no flexion, no use of arms, no walking aid  Walking Time: Heels almost touching while walking  Gait Score: 11  Total Score: 24          Education Documentation  Mobility Training, taught by Isabella Morel PT at 2/11/2025  9:51 AM.  Learner: Patient  Readiness: Acceptance  Method: Explanation  Response: Verbalizes Understanding    Education Comments  No comments found.        02/11/25 at 9:51 AM - Isabella Morel, PT

## 2025-02-11 NOTE — PROCEDURES
Patient ID: Alfonzo Flanagan is a 50 y.o. female.    Paracentesis    Date/Time: 2/10/2025 3:15 PM    Performed by: Rosario Ragland MD  Authorized by: Alexus Ross MD    Consent:     Consent obtained:  Written    Consent given by:  Patient    Risks, benefits, and alternatives were discussed: yes      Risks discussed:  Bleeding, bowel perforation, infection and pain    Alternatives discussed:  No treatment and observation  Universal protocol:     Procedure explained and questions answered to patient or proxy's satisfaction: yes      Relevant documents present and verified: yes      Test results available: yes      Imaging studies available: yes      Required blood products, implants, devices, and special equipment available: yes      Site/side marked: yes      Immediately prior to procedure, a time out was called: yes      Patient identity confirmed:  Verbally with patient and arm band  Pre-procedure details:     Procedure purpose:  Therapeutic    Preparation: Patient was prepped and draped in usual sterile fashion    Anesthesia:     Anesthesia method:  Local infiltration    Local anesthetic:  Lidocaine 1% WITH epi  Procedure details:     Needle gauge:  18    Ultrasound guidance: yes      Puncture site:  R lower quadrant    Fluid removed amount:  4.4L    Fluid appearance:  Yellow and clear    Dressing:  Adhesive bandage  Post-procedure details:     Procedure completion:  Tolerated well, no immediate complications  Comments:      Megan Chavez MD present for entire procedure. Fluid sent for studies

## 2025-02-11 NOTE — PROGRESS NOTES
Occupational Therapy    Evaluation    Patient Name: Alfonzo Flanagan  MRN: [id number]  Department: [facility]  Room: [facility]6066-A  Today's Date: 2/11/2025  Time Calculation  Start Time: 0902  Stop Time: 0914  Time Calculation (min): 12 min        Assessment:  OT Assessment: no further acute OT needs, no OT needs post d/c  Prognosis: Excellent  Barriers to Discharge Home: No anticipated barriers  Evaluation/Treatment Tolerance: Patient tolerated treatment well  Medical Staff Made Aware: Yes  End of Session Communication: Bedside nurse  End of Session Patient Position: Bed, 3 rail up, Alarm off, not on at start of session  OT Assessment Results: Decreased ADL status, Decreased upper extremity strength, Decreased endurance, Decreased functional mobility, Decreased IADLs  Prognosis: Excellent  Evaluation/Treatment Tolerance: Patient tolerated treatment well  Medical Staff Made Aware: Yes  Strengths: Attitude of self  Barriers to Participation: Comorbidities  Plan:  No Skilled OT: Independent with ADLs  OT Frequency: OT eval only  OT Discharge Recommendations: No further acute OT, No OT needed after discharge  Equipment Recommended upon Discharge:  (owns)  OT Recommended Transfer Status: Assist of 1  OT - OK to Discharge: Yes (upon medical clearance)       Subjective   Current Problem:  1. Cirrhosis (Multi)  Paracentesis    Paracentesis      2. SBP (spontaneous bacterial peritonitis) (Multi)  Paracentesis    Paracentesis      3. Liver cirrhosis secondary to REED (nonalcoholic steatohepatitis) (Multi)  Paracentesis    Paracentesis      4. 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  Prior to Session Communication: Bedside nurse  Patient Position Received: Bed, 3 rail up, Alarm off, not on at start of session  General Comment: Pt supine in bed upon arrival, agreeable to OT  Precautions:  Medical Precautions: Fall precautions    Pain:  Pain Assessment  Pain Assessment: 0-10  0-10 (Numeric) Pain Score: 4  Pain Type: Acute pain  Pain Location: Abdomen  Pain Interventions: Repositioned, Distraction    Objective   Cognition:  Overall Cognitive Status: Within Functional Limits     Home Living:  Type of Home: House  Lives With: Spouse, Adult children (dtr)  Home Adaptive Equipment: Walker rolling or standard  Home Layout: One level  Home Access: Stairs to enter with rails  Entrance Stairs-Rails: Right  Entrance Stairs-Number of Steps: 11  Bathroom Shower/Tub: Tub/shower unit  Bathroom Toilet: Standard  Bathroom Equipment: None  Prior Function:  Level of [place]: Independent with ADLs and functional transfers  ADL Assistance: Independent  Homemaking Assistance: Needs assistance  Ambulatory Assistance: Independent (rollator PRN)  Prior Function Comments: reports 1 recent fall  IADL History:  Homemaking Responsibilities: Yes  ADL:  Eating Assistance: Independent (anticipated)  Grooming Assistance: Independent (Pt performed teethbrushing standing at sink IND)  Bathing Assistance: Stand by (anticipated)  UE Dressing Assistance: Independent (donned gown over back seated EOB IND)  LE Dressing Assistance: Stand by (donned/doffed B socks seated EOB SBA)  Toileting Assistance with Device: Stand by (anticipated)  Activity Tolerance:  Endurance: Tolerates 10 - 20 min exercise with multiple rests  Bed Mobility/Transfers: Bed Mobility  Bed Mobility: Yes  Bed Mobility 1  Bed Mobility 1:  Supine to sitting  Level of Assistance 1: Close supervision  Bed Mobility Comments 1: HOB elevated    Transfers  Transfer: Yes  Transfer 1  Transfer From 1: Sit to, Stand to  Transfer to 1: Stand, Sit  Technique 1: Sit to stand, Stand to sit  Transfer Level of Assistance 1: Close supervision  Functional Mobility:  Functional Mobility  Functional Mobility Performed: Yes  Functional Mobility 1  Surface 1: Level tile  Device 1: No device  Assistance 1: Close supervision  Comments 1: bed > bathroom> mod household distance in hallway  Sitting Balance:  Static Sitting Balance  Static Sitting-Balance Support: Feet supported  Static Sitting-Level of Assistance: Independent  Dynamic Sitting Balance  Dynamic Sitting-Balance Support: Feet supported  Dynamic Sitting-Level of Assistance: Independent  Standing Balance:  Static Standing Balance  Static Standing-Balance Support: No upper extremity supported  Static Standing-Level of Assistance: Close supervision  Dynamic Standing Balance  Dynamic Standing-Balance Support: No upper extremity supported  Dynamic Standing-Level of Assistance: Close supervision   Modalities:  Modalities Used: No  Sensation:  Sensation Comment: B feet neuropathy  Strength:  Strength Comments: BUE WFL  Perception:  Inattention/Neglect: Appears intact  Coordination:  Movements are Fluid and Coordinated: Yes   Hand Function:  Gross Grasp: Functional  Coordination: Functional  Extremities: RUE   RUE : Within Functional Limits and LUE   LUE: Within Functional Limits    Outcome Measures:[facility] Daily Activity  Putting on and taking off regular lower body clothing: A little  Bathing (including washing, rinsing, drying): A little  Putting on and taking off regular upper body clothing: None  Toileting, which includes using toilet, bedpan or urinal: A little  Taking care of personal grooming such as brushing teeth: None  Eating Meals: None  Daily Activity - Total Score: 21     and OT Adult Other Outcome  Measures  4AT: unable to assess 2/2 language barrier    Education Documentation  Precautions, taught by Imer Plata OT at 2/11/2025 12:19 PM.  Learner: Patient  Readiness: Acceptance  Method: Explanation  Response: Verbalizes Understanding    Body Mechanics, taught by Imer Plata OT at 2/11/2025 12:19 PM.  Learner: Patient  Readiness: Acceptance  Method: Explanation  Response: Verbalizes Understanding    ADL Training, taught by Imer Plata OT at 2/11/2025 12:19 PM.  Learner: Patient  Readiness: Acceptance  Method: Explanation  Response: Verbalizes Understanding    Education Comments  No comments found.    Imer Plata OTR/L

## 2025-02-11 NOTE — CARE PLAN
The patient's goals for the shift include Patient to start treatment after transfer from [facility] and orient to [place] [address] by end of shift    The clinical goals for the shift include pt will remain safe and use call light    Over the shift, the patient did not make progress toward the following goals. Barriers to progression include language barrier. Recommendations to address these barriers include  at bedside.

## 2025-02-12 ENCOUNTER — APPOINTMENT (OUTPATIENT)
Dept: CARDIOLOGY | Facility: HOSPITAL | Age: 50
End: 2025-02-12

## 2025-02-12 LAB
ACID FAST STN SPEC: NORMAL
ALBUMIN SERPL BCP-MCNC: 2.1 G/DL (ref 3.4–5)
ALBUMIN SERPL BCP-MCNC: 2.2 G/DL (ref 3.4–5)
ALP SERPL-CCNC: 192 U/L (ref 33–110)
ALT SERPL W P-5'-P-CCNC: 19 U/L (ref 7–45)
ANION GAP SERPL CALC-SCNC: 10 MMOL/L (ref 10–20)
ANION GAP SERPL CALC-SCNC: 7 MMOL/L (ref 10–20)
AORTIC VALVE PEAK VELOCITY: 1.41 M/S
AST SERPL W P-5'-P-CCNC: 42 U/L (ref 9–39)
AV PEAK GRADIENT: 8 MMHG
AVA (PEAK VEL): 1.94 CM2
BASOPHILS # BLD AUTO: 0.03 X10*3/UL (ref 0–0.1)
BASOPHILS NFR BLD AUTO: 0.8 %
BILIRUB DIRECT SERPL-MCNC: 0.9 MG/DL (ref 0–0.3)
BILIRUB SERPL-MCNC: 1.9 MG/DL (ref 0–1.2)
BNP SERPL-MCNC: 78 PG/ML (ref 0–99)
BUN SERPL-MCNC: 12 MG/DL (ref 6–23)
BUN SERPL-MCNC: 13 MG/DL (ref 6–23)
CALCIUM SERPL-MCNC: 7.9 MG/DL (ref 8.6–10.6)
CALCIUM SERPL-MCNC: 8.2 MG/DL (ref 8.6–10.6)
CHLORIDE SERPL-SCNC: 104 MMOL/L (ref 98–107)
CHLORIDE SERPL-SCNC: 107 MMOL/L (ref 98–107)
CO2 SERPL-SCNC: 26 MMOL/L (ref 21–32)
CO2 SERPL-SCNC: 30 MMOL/L (ref 21–32)
CREAT SERPL-MCNC: 0.75 MG/DL (ref 0.5–1.05)
CREAT SERPL-MCNC: 0.85 MG/DL (ref 0.5–1.05)
EGFRCR SERPLBLD CKD-EPI 2021: 85 ML/MIN/1.73M*2
EGFRCR SERPLBLD CKD-EPI 2021: >90 ML/MIN/1.73M*2
EJECTION FRACTION APICAL 4 CHAMBER: 72.8
EJECTION FRACTION: 63 %
EOSINOPHIL # BLD AUTO: 0.2 X10*3/UL (ref 0–0.7)
EOSINOPHIL NFR BLD AUTO: 5.4 %
ERYTHROCYTE [DISTWIDTH] IN BLOOD BY AUTOMATED COUNT: 15.7 % (ref 11.5–14.5)
GLUCOSE BLD MANUAL STRIP-MCNC: 119 MG/DL (ref 74–99)
GLUCOSE BLD MANUAL STRIP-MCNC: 75 MG/DL (ref 74–99)
GLUCOSE SERPL-MCNC: 178 MG/DL (ref 74–99)
GLUCOSE SERPL-MCNC: 76 MG/DL (ref 74–99)
HCT VFR BLD AUTO: 31.7 % (ref 36–46)
HGB BLD-MCNC: 9.9 G/DL (ref 12–16)
IMM GRANULOCYTES # BLD AUTO: 0.01 X10*3/UL (ref 0–0.7)
IMM GRANULOCYTES NFR BLD AUTO: 0.3 % (ref 0–0.9)
LABORATORY COMMENT REPORT: NORMAL
LABORATORY COMMENT REPORT: NORMAL
LEFT ATRIUM VOLUME AREA LENGTH INDEX BSA: 21.5 ML/M2
LEFT VENTRICLE INTERNAL DIMENSION DIASTOLE: 4.4 CM (ref 3.5–6)
LEFT VENTRICULAR OUTFLOW TRACT DIAMETER: 1.9 CM
LYMPHOCYTES # BLD AUTO: 1.39 X10*3/UL (ref 1.2–4.8)
LYMPHOCYTES NFR BLD AUTO: 37.4 %
MAGNESIUM SERPL-MCNC: 1.63 MG/DL (ref 1.6–2.4)
MAGNESIUM SERPL-MCNC: 1.66 MG/DL (ref 1.6–2.4)
MCH RBC QN AUTO: 30.8 PG (ref 26–34)
MCHC RBC AUTO-ENTMCNC: 31.2 G/DL (ref 32–36)
MCV RBC AUTO: 99 FL (ref 80–100)
MITRAL VALVE E/A RATIO: 1.25
MONOCYTES # BLD AUTO: 0.49 X10*3/UL (ref 0.1–1)
MONOCYTES NFR BLD AUTO: 13.2 %
MYCOBACTERIUM SPEC CULT: NORMAL
NEUTROPHILS # BLD AUTO: 1.6 X10*3/UL (ref 1.2–7.7)
NEUTROPHILS NFR BLD AUTO: 42.9 %
NRBC BLD-RTO: 0 /100 WBCS (ref 0–0)
PATH REPORT.FINAL DX SPEC: NORMAL
PATH REPORT.GROSS SPEC: NORMAL
PATH REPORT.RELEVANT HX SPEC: NORMAL
PATH REPORT.TOTAL CANCER: NORMAL
PHOSPHATE SERPL-MCNC: 3.4 MG/DL (ref 2.5–4.9)
PHOSPHATE SERPL-MCNC: 3.7 MG/DL (ref 2.5–4.9)
PLATELET # BLD AUTO: 79 X10*3/UL (ref 150–450)
POTASSIUM SERPL-SCNC: 3.9 MMOL/L (ref 3.5–5.3)
POTASSIUM SERPL-SCNC: 4 MMOL/L (ref 3.5–5.3)
PROT SERPL-MCNC: 6 G/DL (ref 6.4–8.2)
RBC # BLD AUTO: 3.21 X10*6/UL (ref 4–5.2)
RIGHT VENTRICLE FREE WALL PEAK S': 11 CM/S
SODIUM SERPL-SCNC: 136 MMOL/L (ref 136–145)
SODIUM SERPL-SCNC: 140 MMOL/L (ref 136–145)
TRICUSPID ANNULAR PLANE SYSTOLIC EXCURSION: 2.5 CM
WBC # BLD AUTO: 3.7 X10*3/UL (ref 4.4–11.3)

## 2025-02-12 PROCEDURE — 1100000001 HC PRIVATE ROOM DAILY

## 2025-02-12 PROCEDURE — 2500000002 HC RX 250 W HCPCS SELF ADMINISTERED DRUGS (ALT 637 FOR MEDICARE OP, ALT 636 FOR OP/ED)

## 2025-02-12 PROCEDURE — 93306 TTE W/DOPPLER COMPLETE: CPT | Performed by: INTERNAL MEDICINE

## 2025-02-12 PROCEDURE — 85025 COMPLETE CBC W/AUTO DIFF WBC: CPT

## 2025-02-12 PROCEDURE — 2500000004 HC RX 250 GENERAL PHARMACY W/ HCPCS (ALT 636 FOR OP/ED)

## 2025-02-12 PROCEDURE — 80069 RENAL FUNCTION PANEL: CPT | Mod: CCI

## 2025-02-12 PROCEDURE — 2500000001 HC RX 250 WO HCPCS SELF ADMINISTERED DRUGS (ALT 637 FOR MEDICARE OP)

## 2025-02-12 PROCEDURE — 36415 COLL VENOUS BLD VENIPUNCTURE: CPT

## 2025-02-12 PROCEDURE — 83880 ASSAY OF NATRIURETIC PEPTIDE: CPT

## 2025-02-12 PROCEDURE — 99233 SBSQ HOSP IP/OBS HIGH 50: CPT | Performed by: INTERNAL MEDICINE

## 2025-02-12 PROCEDURE — 83735 ASSAY OF MAGNESIUM: CPT

## 2025-02-12 PROCEDURE — 93306 TTE W/DOPPLER COMPLETE: CPT

## 2025-02-12 PROCEDURE — 82947 ASSAY GLUCOSE BLOOD QUANT: CPT

## 2025-02-12 PROCEDURE — 84100 ASSAY OF PHOSPHORUS: CPT

## 2025-02-12 PROCEDURE — 82248 BILIRUBIN DIRECT: CPT

## 2025-02-12 RX ORDER — FUROSEMIDE 10 MG/ML
30 INJECTION INTRAMUSCULAR; INTRAVENOUS ONCE
Status: COMPLETED | OUTPATIENT
Start: 2025-02-12 | End: 2025-02-12

## 2025-02-12 RX ORDER — MAGNESIUM SULFATE HEPTAHYDRATE 40 MG/ML
4 INJECTION, SOLUTION INTRAVENOUS ONCE
Status: COMPLETED | OUTPATIENT
Start: 2025-02-12 | End: 2025-02-13

## 2025-02-12 RX ORDER — FUROSEMIDE 10 MG/ML
30 INJECTION INTRAMUSCULAR; INTRAVENOUS EVERY 12 HOURS
Status: DISCONTINUED | OUTPATIENT
Start: 2025-02-13 | End: 2025-02-14

## 2025-02-12 RX ORDER — MAGNESIUM SULFATE HEPTAHYDRATE 40 MG/ML
2 INJECTION, SOLUTION INTRAVENOUS ONCE
Status: COMPLETED | OUTPATIENT
Start: 2025-02-12 | End: 2025-02-12

## 2025-02-12 RX ADMIN — SUCRALFATE 1 G: 1 TABLET ORAL at 17:05

## 2025-02-12 RX ADMIN — NADOLOL 40 MG: 40 TABLET ORAL at 09:18

## 2025-02-12 RX ADMIN — RIFAXIMIN 550 MG: 550 TABLET ORAL at 17:05

## 2025-02-12 RX ADMIN — SUCRALFATE 1 G: 1 TABLET ORAL at 20:38

## 2025-02-12 RX ADMIN — URSODIOL 300 MG: 300 CAPSULE ORAL at 09:18

## 2025-02-12 RX ADMIN — URSODIOL 300 MG: 300 CAPSULE ORAL at 17:05

## 2025-02-12 RX ADMIN — URSODIOL 300 MG: 300 CAPSULE ORAL at 20:38

## 2025-02-12 RX ADMIN — MAGNESIUM SULFATE HEPTAHYDRATE 2 G: 40 INJECTION, SOLUTION INTRAVENOUS at 04:00

## 2025-02-12 RX ADMIN — FUROSEMIDE 60 MG: 40 TABLET ORAL at 09:18

## 2025-02-12 RX ADMIN — PANTOPRAZOLE SODIUM 40 MG: 40 TABLET, DELAYED RELEASE ORAL at 06:27

## 2025-02-12 RX ADMIN — SPIRONOLACTONE 150 MG: 25 TABLET, FILM COATED ORAL at 09:18

## 2025-02-12 RX ADMIN — ENOXAPARIN SODIUM 40 MG: 100 INJECTION SUBCUTANEOUS at 12:38

## 2025-02-12 RX ADMIN — FUROSEMIDE 30 MG: 10 INJECTION, SOLUTION INTRAMUSCULAR; INTRAVENOUS at 20:38

## 2025-02-12 RX ADMIN — POTASSIUM CHLORIDE 20 MEQ: 14.9 INJECTION, SOLUTION INTRAVENOUS at 00:46

## 2025-02-12 RX ADMIN — MAGNESIUM SULFATE HEPTAHYDRATE 4 G: 40 INJECTION, SOLUTION INTRAVENOUS at 23:24

## 2025-02-12 RX ADMIN — TRAZODONE HYDROCHLORIDE 25 MG: 50 TABLET ORAL at 20:38

## 2025-02-12 RX ADMIN — OXYCODONE 5 MG: 5 TABLET ORAL at 20:38

## 2025-02-12 RX ADMIN — SUCRALFATE 1 G: 1 TABLET ORAL at 06:27

## 2025-02-12 RX ADMIN — RIFAXIMIN 550 MG: 550 TABLET ORAL at 12:38

## 2025-02-12 RX ADMIN — FENOFIBRATE 160 MG: 160 TABLET ORAL at 09:18

## 2025-02-12 RX ADMIN — RIFAXIMIN 550 MG: 550 TABLET ORAL at 20:38

## 2025-02-12 RX ADMIN — SUCRALFATE 1 G: 1 TABLET ORAL at 09:19

## 2025-02-12 RX ADMIN — ATORVASTATIN CALCIUM 80 MG: 80 TABLET, FILM COATED ORAL at 20:38

## 2025-02-12 ASSESSMENT — COGNITIVE AND FUNCTIONAL STATUS - GENERAL
DAILY ACTIVITIY SCORE: 24
MOBILITY SCORE: 24
DAILY ACTIVITIY SCORE: 24
MOBILITY SCORE: 24

## 2025-02-12 ASSESSMENT — PAIN SCALES - GENERAL
PAINLEVEL_OUTOF10: 0 - NO PAIN
PAINLEVEL_OUTOF10: 5 - MODERATE PAIN

## 2025-02-12 ASSESSMENT — PAIN - FUNCTIONAL ASSESSMENT
PAIN_FUNCTIONAL_ASSESSMENT: 0-10
PAIN_FUNCTIONAL_ASSESSMENT: 0-10

## 2025-02-12 NOTE — CARE PLAN
The clinical goals for the shift include pt will remain safe and use call light      Problem: Pain  Goal: Takes deep breaths with improved pain control throughout the shift  Outcome: Progressing  Goal: Turns in bed with improved pain control throughout the shift  Outcome: Progressing  Goal: Walks with improved pain control throughout the shift  Outcome: Progressing  Goal: Performs ADL's with improved pain control throughout shift  Outcome: Progressing  Goal: Participates in PT with improved pain control throughout the shift  Outcome: Progressing  Goal: Free from opioid side effects throughout the shift  Outcome: Progressing  Goal: Free from acute confusion related to pain meds throughout the shift  Outcome: Progressing     Problem: Pain - Adult  Goal: Verbalizes/displays adequate comfort level or baseline comfort level  Outcome: Progressing     Problem: Safety - Adult  Goal: Free from fall injury  Outcome: Progressing     Problem: Discharge Planning  Goal: Discharge to home or other facility with appropriate resources  Outcome: Progressing     Problem: Chronic Conditions and Co-morbidities  Goal: Patient's chronic conditions and co-morbidity symptoms are monitored and maintained or improved  Outcome: Progressing     Problem: Nutrition  Goal: Nutrient intake appropriate for maintaining nutritional needs  Outcome: Progressing     Problem: Fall/Injury  Goal: Not fall by end of shift  Outcome: Progressing  Goal: Be free from injury by end of the shift  Outcome: Progressing  Goal: Verbalize understanding of personal risk factors for fall in the hospital  Outcome: Progressing  Goal: Verbalize understanding of risk factor reduction measures to prevent injury from fall in the home  Outcome: Progressing  Goal: Use assistive devices by end of the shift  Outcome: Progressing  Goal: Pace activities to prevent fatigue by end of the shift  Outcome: Progressing     Problem: Diabetes  Goal: Achieve decreasing blood glucose levels  by end of shift  Outcome: Progressing  Goal: Increase stability of blood glucose readings by end of shift  Outcome: Progressing  Goal: Decrease in ketones present in urine by end of shift  Outcome: Progressing  Goal: Maintain electrolyte levels within acceptable range throughout shift  Outcome: Progressing  Goal: Maintain glucose levels >70mg/dl to <250mg/dl throughout shift  Outcome: Progressing  Goal: No changes in neurological exam by end of shift  Outcome: Progressing  Goal: Learn about and adhere to nutrition recommendations by end of shift  Outcome: Progressing  Goal: Vital signs within normal range for age by end of shift  Outcome: Progressing  Goal: Increase self care and/or family involovement by end of shift  Outcome: Progressing  Goal: Receive DSME education by end of shift  Outcome: Progressing

## 2025-02-12 NOTE — CARE PLAN
Transitional Care Coordinator Note: Patient discussed with medical team, per medical team patient is not medically ready. Discharge dispo: [facility]. Patient was seen by [facility], and now has a Pending Medicaid Number. PENDING MEDICAID [id number]. [facility] Made PCP appointment for patient for 2/25/2025 at 2 pm with Dr. Freeman at the [facility] patient Made aware. ADOD 2-3 Days  Canelo Hillman RN  Transitional Care Coordinator

## 2025-02-12 NOTE — PROGRESS NOTES
"    Daily Progress Note    Chief Concern  Alfonzo Flanagan is a 50 y.o. female on day 3 of hospitalization for Cirrhosis (Multi)    Subjective   Subjective:  Alfonzo Flanagan reports that last night she was up to the bathroom every hour until 5 AM having to run for diarrhea was very watery and large volume; she does not want to take lactulose anymore.  She has increased fullness and abdominal pain and feels nauseated anytime she tries to eat.  Her belly feels harder to her like it is filled back up again.    She also reports later that her para site from 2 days ago turned yellowish and was itchy.  Today it looks better to her but is still itchy    Interval events:  -No acute events overnight       Objective   /65 (BP Location: Left arm, Patient Position: Lying)   Pulse 69   Temp 35.7 °C (96.2 °F) (Temporal)   Resp 16   Ht 1.575 m (5' 2\")   Wt 93.5 kg (206 lb 1.6 oz)   SpO2 98%   BMI 37.70 kg/m²   24 Hour Vitals  Temp:  [35.7 °C (96.2 °F)-36.7 °C (98.1 °F)] 35.7 °C (96.2 °F)  Heart Rate:  [69] 69  Resp:  [16] 16  BP: (100-107)/(61-65) 107/65  SpO2:  [95 %-98 %] 98 %    Temp (24hrs), Av.2 °C (97.2 °F), Min:35.7 °C (96.2 °F), Max:36.7 °C (98.1 °F)     24 hour Intake/Output    Intake/Output Summary (Last 24 hours) at 2025 1520  Last data filed at 2025 1842  Gross per 24 hour   Intake 50 ml   Output --   Net 50 ml        Physical exam:  Constitutional: Chronically ill-appearing female in no acute distress, laying back in bed  HEENT: NCAT, sclera anicteric, MMM  Respiratory: Normal respiratory effort on RA.  Bilateral crackles at the lung bases right> left  Cardiovascular: RRR, normal S1/S2, No murmurs  Abdominal: Tense, distention increased from yesterday, diffusely tender to palpation with increased tenderness.  Ventral hernia.  No rebound or guarding.  Stretch marks visible on skin.  No erythema or purulence at sites of prior paracentesis mostly on the right lower quadrant.  Small " bruise over 2/10/25 para site  Neuro: Moving all extremities with no focal deficits  MSK: WWP, trace peripheral edema  Skin: Some ecchymoses   Psych: Appropriate mood and affect, alert and oriented, linear thought process and judgement intact      Medications:  Scheduled Medications  atorvastatin, 80 mg, oral, Nightly  enoxaparin, 40 mg, subcutaneous, q24h  fenofibrate, 160 mg, oral, Daily  furosemide, 30 mg, intravenous, Once  [START ON 2/13/2025] furosemide, 30 mg, intravenous, q12h  furosemide, 60 mg, oral, Daily  insulin glargine, 15 Units, subcutaneous, q24h  insulin lispro, 0-5 Units, subcutaneous, TID AC  nadolol, 40 mg, oral, Daily  pantoprazole, 40 mg, oral, Daily before breakfast  rifAXIMin, 550 mg, oral, TID  spironolactone, 150 mg, oral, Daily  sucralfate, 1 g, oral, Before meals & nightly  traZODone, 25 mg, oral, Nightly  ursodiol, 300 mg, oral, TID     Continuous Medications    PRN Medications  PRN medications: acetaminophen, diphenhydramine-zinc acetate, hydrOXYzine HCL, ondansetron **OR** ondansetron, oxyCODONE, pramoxine     Labs:  Notable for persistent leukopenia, stable H&H, thrombocytopenia, persistently elevated AST and Alk phos, improving bilirubinemia  CBC:  Results from last 7 days   Lab Units 02/12/25  1152 02/11/25  0807 02/10/25  0818   WBC AUTO x10*3/uL 3.7* 3.6* 5.8   HEMOGLOBIN g/dL 9.9* 9.7* 11.0*   HEMATOCRIT % 31.7* 30.6* 35.6*   MCV fL 99 99 98   PLATELETS AUTO x10*3/uL 79* 77* 82*     RFP:  Results from last 72 hours   Lab Units 02/12/25  1152 02/11/25 2024 02/11/25  0807   SODIUM mmol/L 140 137 139   POTASSIUM mmol/L 4.0 3.5 3.8   CHLORIDE mmol/L 107 107 108*   CO2 mmol/L 30 25 28   BUN mg/dL 13 16 16   CREATININE mg/dL 0.85 0.82 0.71   CALCIUM mg/dL 8.2* 7.9* 7.7*   MAGNESIUM mg/dL 1.66 1.89 1.69   PHOSPHORUS mg/dL 3.7 3.3 3.4     HFP:  Results from last 7 days   Lab Units 02/12/25  1152 02/11/25 2024 02/11/25  0807 02/10/25  0818   AST U/L 42*  --  42* 61*   ALT U/L 19  --   21 27   ALK PHOS U/L 192*  --  177* 257*   BILIRUBIN TOTAL mg/dL 1.9*  --  2.1* 2.4*   BILIRUBIN DIRECT mg/dL 0.9*  --  1.0* 0.7*   ALBUMIN g/dL 2.1* 2.1* 2.1* 2.0*     Cardiac:  Results from last 7 days   Lab Units 02/12/25  1152 02/05/25  1658 02/05/25  1620   TROPHS ng/L  --  3 3   BNP pg/mL 78  --  44     Coag:  Results from last 7 days   Lab Units 02/11/25  1051   PROTIME seconds 15.4*   APTT seconds 34   INR  1.4*     ABG/VBG:            UA:   Results from last 7 days   Lab Units 02/09/25  0911   COLOR U  Yellow   PH U  6.0   SPEC GRAV UR  >1.050*   PROTEIN U mg/dL 30 (1+)*   BLOOD UR mg/dL NEGATIVE   NITRITE U  NEGATIVE   WBC UR /HPF 21-50*      Cultures:   Susceptibility data from last 90 days.  Collected Specimen Info Organism Ampicillin Ciprofloxacin Levofloxacin Nitrofurantoin Penicillin Trimethoprim/Sulfamethoxazole Vancomycin   01/22/25 Urine from Clean Catch/Voided Enterococcus faecium  R  R  R  R  R  R  S     MELD 3.0: 16 at 2/12/2025 11:52 AM  MELD-Na: 13 at 2/12/2025 11:52 AM  Calculated from:  Serum Creatinine: 0.85 mg/dL (Using min of 1 mg/dL) at 2/12/2025 11:52 AM  Serum Sodium: 140 mmol/L (Using max of 137 mmol/L) at 2/12/2025 11:52 AM  Total Bilirubin: 1.9 mg/dL at 2/12/2025 11:52 AM  Serum Albumin: 2.1 g/dL at 2/12/2025 11:52 AM  INR(ratio): 1.4 at 2/11/2025 10:51 AM  Age at listing (hypothetical): 50 years  Sex: Female at 2/12/2025 11:52 AM     Imaging in last 24hr:  Transthoracic Echo (TTE) Complete    Result Date: 2/12/2025   [facility] (formerly [facility]), [address] [place] [address]                Tel [phone] and Fax [phone] TRANSTHORACIC ECHOCARDIOGRAM REPORT  Patient Name:       MATILDE CISSE William Physician:    [id number] Serenity Wills MD Study Date:         2/12/2025           Ordering Provider:    [id number] EVELYN DENTON MRN/PID:             [id number]            Fellow:               [id number] Lesia Fong MD Accession#:         [id number]        Nurse:                Nell Gibbs RN Date of Birth/Age:  1975 / 50 years Sonographer:          AGA Ken RDCS Gender assigned at  F                   Additional Staff: Birth: Height:             157.48 cm           Admit Date:           2/9/2025 Weight:             93.44 kg            Admission Status:     Inpatient -                                                               Routine BSA / BMI:          1.94 m2 / 37.68     Encounter#:           [id number]                     kg/m2 Blood Pressure:     107/65 mmHg         Department Location:  [facility]                                                               Non Invasive Study Type:    TRANSTHORACIC ECHO (TTE) COMPLETE Diagnosis/ICD: Encounter for other preprocedural examination-Z01.818 Indication:    Pre-TIPS CPT Code:      Echo Complete w Full Doppler-[id number] Patient History: Diabetes:          Yes Pertinent History: Cirrhosis, ascites, portal HTN, esophageal varices. Study Detail: The following Echo studies were performed: 2D, M-Mode, Doppler and               color flow. Agitated saline used as a contrast agent for               intraseptal flow evaluation.  PHYSICIAN INTERPRETATION: Left Ventricle: Left ventricular ejection fraction is normal, by visual estimate at 60-65%. There are no regional left ventricular wall motion abnormalities. The left ventricular cavity size is normal. There is normal septal and normal posterior left ventricular wall thickness. Spectral Doppler shows a normal pattern of left ventricular diastolic filling. Left Atrium: The left atrial size is normal. A bubble study using agitated saline was performed. Bubble study is  negative. Agitated saline contrast study was negative for intracardiac shunt. Right Ventricle: The right ventricle is normal in size. There is normal right ventricular global systolic function. Right Atrium: The right atrium is normal in size. Aortic Valve: The aortic valve is trileaflet. There is minimal aortic valve cusp calcification. There is no evidence of aortic valve regurgitation. The peak instantaneous gradient of the aortic valve is 8 mmHg. Mitral Valve: The mitral valve is normal in structure. There is trace mitral valve regurgitation. Tricuspid Valve: The tricuspid valve is structurally normal. There is trace tricuspid regurgitation. The right ventricular systolic pressure is unable to be estimated. Pulmonic Valve: The pulmonic valve is structurally normal. There is physiologic pulmonic valve regurgitation. Pericardium: Trivial pericardial effusion. Aorta: The aortic root is normal. Systemic Veins: The inferior vena cava appears normal in size, with IVC inspiratory collapse greater than 50%. In comparison to the previous echocardiogram(s): Compared with study dated 10/29/2024, no significant change Prior exam did not include an agitated slaine contrast study.  CONCLUSIONS:  1. Left ventricular ejection fraction is normal, by visual estimate at 60-65%.  2. There is normal right ventricular global systolic function.  3. Agitated saline contrast study was negative for intracardiac shunt.  4. Compared with study dated 10/29/2024, no significant change Prior exam did not include an agitated slaine contrast study. QUANTITATIVE DATA SUMMARY:  2D MEASUREMENTS:          Normal Ranges: LAs:             3.60 cm  (2.7-4.0cm) IVSd:            0.70 cm  (0.6-1.1cm) LVPWd:           0.70 cm  (0.6-1.1cm) LVIDd:           4.40 cm  (3.9-5.9cm) LVIDs:           2.50 cm LV Mass Index:   48 g/m2 LVEDV Index:     49 ml/m2 LV % FS          43.2 %  LEFT ATRIUM:                  Normal Ranges: LA Vol A4C:        34.4 ml     (22+/-6mL/m2) LA Vol A2C:        48.6 ml LA Vol BP:         41.6 ml LA Vol Index A4C:  17.8ml/m2 LA Vol Index A2C:  25.1 ml/m2 LA Vol Index BP:   21.5 ml/m2 LA Area A4C:       14.2 cm2 LA Area A2C:       16.6 cm2 LA Major Axis A4C: 5.0 cm LA Major Axis A2C: 4.8 cm LA Volume Index:   21.5 ml/m2  RIGHT ATRIUM:         Normal Ranges: RA Area A4C:  9.9 cm2  AORTA MEASUREMENTS:         Normal Ranges: Ao Sinus, d:        3.00 cm (2.1-3.5cm) Asc Ao, d:          3.10 cm (2.1-3.4cm)  LV SYSTOLIC FUNCTION:                      Normal Ranges: EF-A4C View:    73 % (>=55%) EF-A2C View:    70 % EF-Biplane:     72 % EF-Visual:      63 % LV EF Reported: 63 %  LV DIASTOLIC FUNCTION:             Normal Ranges: MV Peak E:             0.90 m/s    (0.7-1.2 m/s) MV Peak A:             0.72 m/s    (0.42-0.7 m/s) E/A Ratio:             1.25        (1.0-2.2) MV e'                  0.092 m/s   (>8.0) MV lateral e'          0.10 m/s MV medial e'           0.08 m/s MV A Dur:              148.00 msec E/e' Ratio:            9.73        (<8.0) MV DT:                 201 msec    (150-240 msec)  MITRAL VALVE:          Normal Ranges: MV DT:        201 msec (150-240msec)  AORTIC VALVE:           Normal Ranges: AoV Vmax:      1.41 m/s (<=1.7m/s) AoV Peak P.0 mmHg (<20mmHg) LVOT Max Shay:  0.97 m/s (<=1.1m/s) LVOT VTI:      20.30 cm LVOT Diameter: 1.90 cm  (1.8-2.4cm) AoV Area,Vmax: 1.94 cm2 (2.5-4.5cm2)  RIGHT VENTRICLE: RV Basal 2.60 cm RV Mid   1.50 cm RV Major 6.6 cm TAPSE:   24.8 mm RV s'    0.11 m/s  TRICUSPID VALVE/RVSP:         Normal Ranges: IVC Diam:             1.48 cm  PULMONIC VALVE:          Normal Ranges: PV Max Shay:     1.2 m/s  (0.6-0.9m/s) PV Max P.0 mmHg  [id number] Serenity Wills MD Electronically signed on 2025 at 3:15:32 PM  ** Final **       Assessment/Plan   Assessment and Plan:  Alfonzo Flanagan is a 50 y.o. female with PMH significant for biopsy-proven PBC with cirrhosis c/b portal HTN with ascites, esophageal  varices (s/p banding 2021 with eradication), hepatic encephalopathy, antibody-positive celiac disease, chronic pancreatitis and recent pancreatic tail mass suggestive of neuroendocrine tumor s/p EUS on 3/2024 (no biopsy) recommended repeat in 1 year, IDDM2 (A1c 7.6), HTN, GERD, anxiety, depression, CORBY (2013 sleep study, untreated) who presented on 2/9/2025 from OSH ED for evaluation by hepatology; given advanced disease may need eval for TIPS as well as for transplant. Tolerated 4.4L paracentesis 2/10/24 followed by 37.5g albumin given recent para, with improvement of abdominal pain and fullness but rapid re-accumulation and symptom recurrence on 2/12/25.    On initial presentation, she had diarrhea and nausea for the past 4 days as well as chills, and CT evidence of edematous small bowel and colon but negative c diff and stool studies. Had sterile pyuria at OSH and here, without growth on repeat urine culture, and received empiric ceftriaxone for three days before negative fluid studies resulted. Ongoing diarrhea possibly 2/2 bowel wall edema vs poor po as stool studies are negative; will trial rifaximin for symptom management.     Updates 02/12/25:  - overnight diarrhea, starting rifaximin and stopped lactulose  - Advancing diet as tolerated to see if more options are more appealing for patient  - Benadryl cream added for itching   - Echo and BNP ordered for TIPS workup  -IV lasix tonight, switch to IV BID tomorrow     #PBC cirrhosis cb portal hypertension   #concern for refractory ascites  :: Ammonia on admission 71, lipase 200 (stable from last few admissions)  :: Based on OSH labs: meld NA 15 child class C  MELD 3.0: 16 at 2/12/2025 11:52 AM  MELD-Na: 13 at 2/12/2025 11:52 AM  Calculated from:  Serum Creatinine: 0.85 mg/dL (Using min of 1 mg/dL) at 2/12/2025 11:52 AM  Serum Sodium: 140 mmol/L (Using max of 137 mmol/L) at 2/12/2025 11:52 AM  Total Bilirubin: 1.9 mg/dL at 2/12/2025 11:52 AM  Serum Albumin: 2.1  g/dL at 2/12/2025 11:52 AM  INR(ratio): 1.4 at 2/11/2025 10:51 AM  Age at listing (hypothetical): 50 years  Sex: Female at 2/12/2025 11:52 AM  :: Paracentesis 2/10/25 4.4L clear yellow without immediate complications, fluid studies consistent with portal hypertensive ascites without SBP: neutrophils<250, SAAG>1.1, protein <2.5  Plan:  - increase home furosemide 40mg to 60mg daily  - IV lasix 30mg tonight for increased abdominal swelling  - increase home spironolactone 50mg BID to 150mg daily  - 2g sodium diet with 2000cc fluid restriction  - nutrition consult for low sodium diet  - continue home ursodiol 300mg TID  - daily meld labs   - consider IR for large volume para      #Night sweats and chills without SBP  #Sterile pyuria   :: recent urine cultures notable for a drug resistant enterococcus faecalis susceptible only to vanc on cultures, thought contaminant as this was not treated, if decompensating would add vanc to cover this  :: urine culture from OSH, urine culture here negative   :: bcx sent pre-abx 2/9, NGTD  :: s/p ceftriaxone 2/9-2/11/25 for c/f UTI and SBP, both negative studies  :: ongoing sterile pyuria  :: overnight sweats and chills (patient reports some at home but these are worse) but no fever, leukopenia without source at this time, VSS  Plan:  - CTM CBC and symptoms     #diarrhea, nausea, vomiting, small intestine and colon edema  #celiac disease  :: stool studies, cdiff negative  :: no n/v with [facility]D 2/9-2/10  :: CT A/P on admission with edematous small bowel and colon possibly related to celiac disease as negative infectious workup as above  Plan:  - zofran PRN  - repeat EKG to evaluate QTC  - gluten free, soft diet advancing from [facility]D as tolerated      #DM2 (A1c 7.6 most recently)  :: home lantus is 50 units BID which she doubled on her own given high blood sugars, but she sometimes has low sugars with this regimen  :: overnight 2/10-2/11/25 pt had low BG with symptoms that improved with  glucose. Switched lantus 20units BID to 15U nightly  Plan:  - lantus 15u nightly, mild SSI   - would benefit from optimization of her diabetes regimen, patient states is limited by insurance and may need socialwork  - holding home metformin 1000mg daily XR    #Hx hepatic encephalopathy  - continue home lactulose 20mg, increased to BID from daily PRN     #recent GI bleed (non variceal, no clear etiology)  #hx varices now sp eradication 2021  - counseled against NSAID use  - continue home pantoprazole 40mg   - continue home nadalol 40mg for ppx     #anxiety/depression   - continue home trazodone 25mg nightly     #pain  - acetaminophen PRN (max daily 2g)  - oxycodone 5mg q6h for mod-severe pain  - continue home carafate     #itching  - continue home prn atarax 25mg   - sarna cream  - benadryl cream     #HLD  ::  2 years ago  :: repeat panel HDL 21, LDL 62, TG 55, total cholesterol 94 on admission  - continue atorvastatin 80mg daily  - continue home fenofibrate (145mg at home 160mg inpatient due to therapeutic interchange)     #other  - holding home nitroglycerine patch - using it prn for chest pain?  - holding home meclizine prn     F: prn   E: prn   N: Adult diet 2-3 grams sodium, Gluten Free; Thin 0; 2000 mL fluid  A: PIV    DVT prophylaxis: Lovenox subq  GI Prophylaxis: PPI   Antimicrobials: ceftriaxone    CODE STATUS: Full Code, confirmed on admission  NOK:  Rah Modi [phone]  Extended Emergency Contact Information  Primary Emergency Contact: Rah Modi  Address: [id number] [place], [place] [address]  Home Phone: [phone]  Relation: Spouse  Secondary Emergency Contact: Claudia Hartley  Mobile Phone: [phone]  Relation: Daughter       Patient and plan discussed with attending physician.  ---  Rosario Ragland MD (Anna)  PGY1, Internal Medicine  Available by Epic Chat

## 2025-02-12 NOTE — CARE PLAN
Problem: Pain  Goal: Takes deep breaths with improved pain control throughout the shift  Outcome: Progressing  Goal: Walks with improved pain control throughout the shift  Outcome: Progressing  Goal: Free from opioid side effects throughout the shift  Outcome: Progressing     Problem: Discharge Planning  Goal: Discharge to home or other facility with appropriate resources  Outcome: Progressing  Flowsheets (Taken 2/12/2025 1848)  Discharge to home or other facility with appropriate resources:   Identify barriers to discharge with patient and caregiver   Identify discharge learning needs (meds, wound care, etc)     Problem: Nutrition  Goal: Nutrient intake appropriate for maintaining nutritional needs  Outcome: Progressing     Problem: Fall/Injury  Goal: Verbalize understanding of personal risk factors for fall in the hospital  Outcome: Progressing  Goal: Use assistive devices by end of the shift  Outcome: Progressing     Problem: Diabetes  Goal: Increase stability of blood glucose readings by end of shift  Outcome: Progressing  Flowsheets (Taken 2/12/2025 1848)  Increase stability of blood glucose readings by end of shift: Med administration/monitoring of effect  Goal: Maintain glucose levels >70mg/dl to <250mg/dl throughout shift  Outcome: Progressing  Flowsheets (Taken 2/12/2025 1848)  Maintain glucose levels >70mg/dl to <250mg/dl throughout shift: Med administration/monitoring of effect   The patient's goals for the shift include Patient to start treatment after transfer from [facility] and orient to [place] [id number] by end of shift    The clinical goals for the shift include pt will remain safe and use call light

## 2025-02-13 LAB
ALBUMIN SERPL BCP-MCNC: 2.1 G/DL (ref 3.4–5)
ALP SERPL-CCNC: 194 U/L (ref 33–110)
ALT SERPL W P-5'-P-CCNC: 19 U/L (ref 7–45)
ANION GAP SERPL CALC-SCNC: 8 MMOL/L (ref 10–20)
AST SERPL W P-5'-P-CCNC: 45 U/L (ref 9–39)
BACTERIA BLD CULT: NORMAL
BACTERIA BLD CULT: NORMAL
BACTERIA FLD CULT: NORMAL
BASOPHILS # BLD AUTO: 0.02 X10*3/UL (ref 0–0.1)
BASOPHILS NFR BLD AUTO: 0.5 %
BILIRUB DIRECT SERPL-MCNC: 0.7 MG/DL (ref 0–0.3)
BILIRUB SERPL-MCNC: 1.8 MG/DL (ref 0–1.2)
BUN SERPL-MCNC: 11 MG/DL (ref 6–23)
CALCIUM SERPL-MCNC: 8.1 MG/DL (ref 8.6–10.6)
CHLORIDE SERPL-SCNC: 104 MMOL/L (ref 98–107)
CO2 SERPL-SCNC: 30 MMOL/L (ref 21–32)
CREAT SERPL-MCNC: 0.93 MG/DL (ref 0.5–1.05)
EGFRCR SERPLBLD CKD-EPI 2021: 76 ML/MIN/1.73M*2
EOSINOPHIL # BLD AUTO: 0.18 X10*3/UL (ref 0–0.7)
EOSINOPHIL NFR BLD AUTO: 4.2 %
ERYTHROCYTE [DISTWIDTH] IN BLOOD BY AUTOMATED COUNT: 15.9 % (ref 11.5–14.5)
GLUCOSE BLD MANUAL STRIP-MCNC: 120 MG/DL (ref 74–99)
GLUCOSE BLD MANUAL STRIP-MCNC: 125 MG/DL (ref 74–99)
GLUCOSE BLD MANUAL STRIP-MCNC: 161 MG/DL (ref 74–99)
GLUCOSE BLD MANUAL STRIP-MCNC: 226 MG/DL (ref 74–99)
GLUCOSE SERPL-MCNC: 116 MG/DL (ref 74–99)
GRAM STN SPEC: NORMAL
GRAM STN SPEC: NORMAL
HCT VFR BLD AUTO: 32 % (ref 36–46)
HGB BLD-MCNC: 10.2 G/DL (ref 12–16)
IMM GRANULOCYTES # BLD AUTO: 0.01 X10*3/UL (ref 0–0.7)
IMM GRANULOCYTES NFR BLD AUTO: 0.2 % (ref 0–0.9)
INR PPP: 1.4 (ref 0.9–1.1)
LYMPHOCYTES # BLD AUTO: 1.35 X10*3/UL (ref 1.2–4.8)
LYMPHOCYTES NFR BLD AUTO: 31.2 %
MAGNESIUM SERPL-MCNC: 2.13 MG/DL (ref 1.6–2.4)
MCH RBC QN AUTO: 31.9 PG (ref 26–34)
MCHC RBC AUTO-ENTMCNC: 31.9 G/DL (ref 32–36)
MCV RBC AUTO: 100 FL (ref 80–100)
MONOCYTES # BLD AUTO: 0.59 X10*3/UL (ref 0.1–1)
MONOCYTES NFR BLD AUTO: 13.6 %
NEUTROPHILS # BLD AUTO: 2.18 X10*3/UL (ref 1.2–7.7)
NEUTROPHILS NFR BLD AUTO: 50.3 %
NRBC BLD-RTO: 0 /100 WBCS (ref 0–0)
PHOSPHATE SERPL-MCNC: 3.3 MG/DL (ref 2.5–4.9)
PLATELET # BLD AUTO: 78 X10*3/UL (ref 150–450)
POTASSIUM SERPL-SCNC: 4.3 MMOL/L (ref 3.5–5.3)
PROT SERPL-MCNC: 6.2 G/DL (ref 6.4–8.2)
PROTHROMBIN TIME: 16.1 SECONDS (ref 9.8–12.8)
RBC # BLD AUTO: 3.2 X10*6/UL (ref 4–5.2)
SODIUM SERPL-SCNC: 138 MMOL/L (ref 136–145)
WBC # BLD AUTO: 4.3 X10*3/UL (ref 4.4–11.3)

## 2025-02-13 PROCEDURE — 1100000001 HC PRIVATE ROOM DAILY

## 2025-02-13 PROCEDURE — 83735 ASSAY OF MAGNESIUM: CPT

## 2025-02-13 PROCEDURE — 99233 SBSQ HOSP IP/OBS HIGH 50: CPT | Performed by: INTERNAL MEDICINE

## 2025-02-13 PROCEDURE — 36415 COLL VENOUS BLD VENIPUNCTURE: CPT

## 2025-02-13 PROCEDURE — 2500000004 HC RX 250 GENERAL PHARMACY W/ HCPCS (ALT 636 FOR OP/ED)

## 2025-02-13 PROCEDURE — 2500000001 HC RX 250 WO HCPCS SELF ADMINISTERED DRUGS (ALT 637 FOR MEDICARE OP)

## 2025-02-13 PROCEDURE — 85025 COMPLETE CBC W/AUTO DIFF WBC: CPT

## 2025-02-13 PROCEDURE — 84075 ASSAY ALKALINE PHOSPHATASE: CPT

## 2025-02-13 PROCEDURE — 85610 PROTHROMBIN TIME: CPT

## 2025-02-13 PROCEDURE — 2500000002 HC RX 250 W HCPCS SELF ADMINISTERED DRUGS (ALT 637 FOR MEDICARE OP, ALT 636 FOR OP/ED)

## 2025-02-13 PROCEDURE — 84100 ASSAY OF PHOSPHORUS: CPT

## 2025-02-13 PROCEDURE — 82947 ASSAY GLUCOSE BLOOD QUANT: CPT

## 2025-02-13 PROCEDURE — P9047 ALBUMIN (HUMAN), 25%, 50ML: HCPCS

## 2025-02-13 RX ORDER — ALBUMIN HUMAN 250 G/1000ML
12.5 SOLUTION INTRAVENOUS ONCE
Status: COMPLETED | OUTPATIENT
Start: 2025-02-13 | End: 2025-02-13

## 2025-02-13 RX ORDER — ALBUMIN HUMAN 250 G/1000ML
12.5 SOLUTION INTRAVENOUS ONCE
Status: DISCONTINUED | OUTPATIENT
Start: 2025-02-14 | End: 2025-02-13

## 2025-02-13 RX ADMIN — INSULIN LISPRO 1 UNITS: 100 INJECTION, SOLUTION INTRAVENOUS; SUBCUTANEOUS at 13:21

## 2025-02-13 RX ADMIN — RIFAXIMIN 550 MG: 550 TABLET ORAL at 17:41

## 2025-02-13 RX ADMIN — INSULIN LISPRO 2 UNITS: 100 INJECTION, SOLUTION INTRAVENOUS; SUBCUTANEOUS at 17:44

## 2025-02-13 RX ADMIN — FENOFIBRATE 160 MG: 160 TABLET ORAL at 08:34

## 2025-02-13 RX ADMIN — TRAZODONE HYDROCHLORIDE 25 MG: 50 TABLET ORAL at 21:03

## 2025-02-13 RX ADMIN — SUCRALFATE 1 G: 1 TABLET ORAL at 21:03

## 2025-02-13 RX ADMIN — SUCRALFATE 1 G: 1 TABLET ORAL at 13:21

## 2025-02-13 RX ADMIN — FUROSEMIDE 30 MG: 10 INJECTION, SOLUTION INTRAMUSCULAR; INTRAVENOUS at 21:02

## 2025-02-13 RX ADMIN — ENOXAPARIN SODIUM 40 MG: 100 INJECTION SUBCUTANEOUS at 13:21

## 2025-02-13 RX ADMIN — FUROSEMIDE 30 MG: 10 INJECTION, SOLUTION INTRAMUSCULAR; INTRAVENOUS at 08:33

## 2025-02-13 RX ADMIN — ALBUMIN HUMAN 12.5 G: 0.25 SOLUTION INTRAVENOUS at 21:03

## 2025-02-13 RX ADMIN — URSODIOL 300 MG: 300 CAPSULE ORAL at 17:42

## 2025-02-13 RX ADMIN — RIFAXIMIN 550 MG: 550 TABLET ORAL at 08:34

## 2025-02-13 RX ADMIN — ONDANSETRON 4 MG: 2 INJECTION INTRAMUSCULAR; INTRAVENOUS at 22:14

## 2025-02-13 RX ADMIN — OXYCODONE 5 MG: 5 TABLET ORAL at 21:03

## 2025-02-13 RX ADMIN — SUCRALFATE 1 G: 1 TABLET ORAL at 08:33

## 2025-02-13 RX ADMIN — SUCRALFATE 1 G: 1 TABLET ORAL at 17:43

## 2025-02-13 RX ADMIN — RIFAXIMIN 550 MG: 550 TABLET ORAL at 21:03

## 2025-02-13 RX ADMIN — DIPHENHYDRAMINE HYDROCHLORIDE, ZINC ACETATE: 2; .1 CREAM TOPICAL at 13:21

## 2025-02-13 RX ADMIN — ATORVASTATIN CALCIUM 80 MG: 80 TABLET, FILM COATED ORAL at 21:03

## 2025-02-13 RX ADMIN — PANTOPRAZOLE SODIUM 40 MG: 40 TABLET, DELAYED RELEASE ORAL at 08:35

## 2025-02-13 RX ADMIN — NADOLOL 40 MG: 40 TABLET ORAL at 08:35

## 2025-02-13 RX ADMIN — URSODIOL 300 MG: 300 CAPSULE ORAL at 21:03

## 2025-02-13 RX ADMIN — URSODIOL 300 MG: 300 CAPSULE ORAL at 08:34

## 2025-02-13 RX ADMIN — SPIRONOLACTONE 150 MG: 25 TABLET, FILM COATED ORAL at 08:35

## 2025-02-13 ASSESSMENT — COGNITIVE AND FUNCTIONAL STATUS - GENERAL
DAILY ACTIVITIY SCORE: 24
MOBILITY SCORE: 24

## 2025-02-13 ASSESSMENT — PAIN - FUNCTIONAL ASSESSMENT
PAIN_FUNCTIONAL_ASSESSMENT: 0-10
PAIN_FUNCTIONAL_ASSESSMENT: 0-10

## 2025-02-13 ASSESSMENT — PAIN SCALES - GENERAL
PAINLEVEL_OUTOF10: 0 - NO PAIN
PAINLEVEL_OUTOF10: 3
PAINLEVEL_OUTOF10: 7

## 2025-02-13 NOTE — CARE PLAN
Problem: Pain  Goal: Takes deep breaths with improved pain control throughout the shift  Outcome: Progressing  Goal: Turns in bed with improved pain control throughout the shift  Outcome: Progressing  Goal: Walks with improved pain control throughout the shift  Outcome: Progressing  Goal: Performs ADL's with improved pain control throughout shift  Outcome: Progressing  Goal: Participates in PT with improved pain control throughout the shift  Outcome: Progressing  Goal: Free from opioid side effects throughout the shift  Outcome: Progressing  Goal: Free from acute confusion related to pain meds throughout the shift  Outcome: Progressing     Problem: Pain - Adult  Goal: Verbalizes/displays adequate comfort level or baseline comfort level  Outcome: Progressing     Problem: Safety - Adult  Goal: Free from fall injury  Outcome: Progressing     Problem: Discharge Planning  Goal: Discharge to home or other facility with appropriate resources  Outcome: Progressing     Problem: Chronic Conditions and Co-morbidities  Goal: Patient's chronic conditions and co-morbidity symptoms are monitored and maintained or improved  Outcome: Progressing     Problem: Nutrition  Goal: Nutrient intake appropriate for maintaining nutritional needs  Outcome: Progressing     Problem: Fall/Injury  Goal: Be free from injury by end of the shift  Outcome: Progressing  Goal: Verbalize understanding of personal risk factors for fall in the hospital  Outcome: Progressing  Goal: Verbalize understanding of risk factor reduction measures to prevent injury from fall in the home  Outcome: Progressing  Goal: Use assistive devices by end of the shift  Outcome: Progressing  Goal: Pace activities to prevent fatigue by end of the shift  Outcome: Progressing     Problem: Diabetes  Goal: Achieve decreasing blood glucose levels by end of shift  Outcome: Progressing  Goal: Increase stability of blood glucose readings by end of shift  Outcome: Progressing  Goal:  Decrease in ketones present in urine by end of shift  Outcome: Progressing  Goal: Maintain electrolyte levels within acceptable range throughout shift  Outcome: Progressing  Goal: Maintain glucose levels >70mg/dl to <250mg/dl throughout shift  Outcome: Progressing  Goal: No changes in neurological exam by end of shift  Outcome: Progressing  Goal: Learn about and adhere to nutrition recommendations by end of shift  Outcome: Progressing  Goal: Vital signs within normal range for age by end of shift  Outcome: Progressing  Goal: Increase self care and/or family involovement by end of shift  Outcome: Progressing  Goal: Receive DSME education by end of shift  Outcome: Progressing   The patient's goals for the shift include     The clinical goals for the shift include pt will remain free of falls and injury this shift

## 2025-02-13 NOTE — PROGRESS NOTES
"    Daily Progress Note    Chief Concern  Alfonzo Flanagan is a 50 y.o. female on day 4 of hospitalization for Cirrhosis (Multi)    Subjective   Subjective:  Alfonzo Flanagan much better and did not have any diarrhea overnight.  Her abdominal discomfort is improved significantly and she is able to eat some; she is asking about how to restrict her sodium and how to eat many small meals as was instructed by the nutritionist    Interval events:  -No acute events overnight       Objective   /63 (BP Location: Right arm, Patient Position: Lying)   Pulse 75   Temp 36.7 °C (98.1 °F) (Temporal)   Resp 16   Ht 1.575 m (5' 2\")   Wt 93.5 kg (206 lb 1.6 oz)   SpO2 96%   BMI 37.70 kg/m²   24 Hour Vitals  Temp:  [36.6 °C (97.9 °F)-37.1 °C (98.8 °F)] 36.7 °C (98.1 °F)  Heart Rate:  [75-80] 75  Resp:  [16-17] 16  BP: (101-106)/(63-65) 101/63  SpO2:  [94 %-96 %] 96 %    Temp (24hrs), Av.8 °C (98.3 °F), Min:36.6 °C (97.9 °F), Max:37.1 °C (98.8 °F)     24 hour Intake/Output    Intake/Output Summary (Last 24 hours) at 2025  Last data filed at 2025 1300  Gross per 24 hour   Intake --   Output 700 ml   Net -700 ml          Physical exam:  Constitutional: Chronically ill-appearing female in no acute distress, laying back in bed  HEENT: NCAT, sclera anicteric, MMM  Respiratory: Normal respiratory effort on RA.  Bilateral crackles at the lung bases right> left  Cardiovascular: RRR, normal S1/S2, No murmurs  Abdominal: Soft and compressible, distention significantly decreased from yesterday, diffusely tender to palpation but improved from prior. No rebound or guarding.  Stretch marks visible on skin.  No erythema or purulence at sites of prior paracentesis mostly on the right lower quadrant.  Small bruise over 2/10/25 para site  Neuro: Moving all extremities with no focal deficits  MSK: WWP, trace peripheral edema  Skin: Some ecchymoses   Psych: Appropriate mood and affect, alert and oriented, linear " thought process and judgement intact      Medications:  Scheduled Medications  albumin human, 12.5 g, intravenous, Once  atorvastatin, 80 mg, oral, Nightly  enoxaparin, 40 mg, subcutaneous, q24h  fenofibrate, 160 mg, oral, Daily  furosemide, 30 mg, intravenous, q12h  [Held by provider] furosemide, 60 mg, oral, Daily  insulin glargine, 15 Units, subcutaneous, q24h  insulin lispro, 0-5 Units, subcutaneous, TID AC  nadolol, 40 mg, oral, Daily  pantoprazole, 40 mg, oral, Daily before breakfast  rifAXIMin, 550 mg, oral, TID  spironolactone, 150 mg, oral, Daily  sucralfate, 1 g, oral, Before meals & nightly  traZODone, 25 mg, oral, Nightly  ursodiol, 300 mg, oral, TID     Continuous Medications    PRN Medications  PRN medications: acetaminophen, diphenhydramine-zinc acetate, hydrOXYzine HCL, ondansetron **OR** ondansetron, oxyCODONE, pramoxine     Labs:  Notable for improving leukopenia, stable H&H, thrombocytopenia, persistently elevated AST and Alk phos, improving bilirubinemia  CBC:  Results from last 7 days   Lab Units 02/13/25  0754 02/12/25  1152 02/11/25  0807   WBC AUTO x10*3/uL 4.3* 3.7* 3.6*   HEMOGLOBIN g/dL 10.2* 9.9* 9.7*   HEMATOCRIT % 32.0* 31.7* 30.6*   MCV fL 100 99 99   PLATELETS AUTO x10*3/uL 78* 79* 77*     RFP:  Results from last 72 hours   Lab Units 02/13/25  0754 02/12/25 1915 02/12/25  1152   SODIUM mmol/L 138 136 140   POTASSIUM mmol/L 4.3 3.9 4.0   CHLORIDE mmol/L 104 104 107   CO2 mmol/L 30 26 30   BUN mg/dL 11 12 13   CREATININE mg/dL 0.93 0.75 0.85   CALCIUM mg/dL 8.1* 7.9* 8.2*   MAGNESIUM mg/dL 2.13 1.63 1.66   PHOSPHORUS mg/dL 3.3 3.4 3.7     HFP:  Results from last 7 days   Lab Units 02/13/25  0754 02/12/25 1915 02/12/25  1152 02/11/25 2024 02/11/25  0807   AST U/L 45*  --  42*  --  42*   ALT U/L 19  --  19  --  21   ALK PHOS U/L 194*  --  192*  --  177*   BILIRUBIN TOTAL mg/dL 1.8*  --  1.9*  --  2.1*   BILIRUBIN DIRECT mg/dL 0.7*  --  0.9*  --  1.0*   ALBUMIN g/dL 2.1* 2.2* 2.1*   < >  2.1*    < > = values in this interval not displayed.     Cardiac:  Results from last 7 days   Lab Units 02/12/25  1152   BNP pg/mL 78     Coag:  Results from last 7 days   Lab Units 02/13/25  0754 02/11/25  1051   PROTIME seconds 16.1* 15.4*   APTT seconds  --  34   INR  1.4* 1.4*     ABG/VBG:            UA:   Results from last 7 days   Lab Units 02/09/25  0911   COLOR U  Yellow   PH U  6.0   SPEC GRAV UR  >1.050*   PROTEIN U mg/dL 30 (1+)*   BLOOD UR mg/dL NEGATIVE   NITRITE U  NEGATIVE   WBC UR /HPF 21-50*      Cultures:   Susceptibility data from last 90 days.  Collected Specimen Info Organism Ampicillin Ciprofloxacin Levofloxacin Nitrofurantoin Penicillin Trimethoprim/Sulfamethoxazole Vancomycin   01/22/25 Urine from Clean Catch/Voided Enterococcus faecium  R  R  R  R  R  R  S     MELD 3.0: 16 at 2/13/2025  7:54 AM  MELD-Na: 12 at 2/13/2025  7:54 AM  Calculated from:  Serum Creatinine: 0.93 mg/dL (Using min of 1 mg/dL) at 2/13/2025  7:54 AM  Serum Sodium: 138 mmol/L (Using max of 137 mmol/L) at 2/13/2025  7:54 AM  Total Bilirubin: 1.8 mg/dL at 2/13/2025  7:54 AM  Serum Albumin: 2.1 g/dL at 2/13/2025  7:54 AM  INR(ratio): 1.4 at 2/13/2025  7:54 AM  Age at listing (hypothetical): 50 years  Sex: Female at 2/13/2025  7:54 AM        Imaging in last 24hr:  Transthoracic Echo (TTE) Complete    Result Date: 2/12/2025   [facility], [address] [place] [address]                Tel [phone] and Fax [phone] TRANSTHORACIC ECHOCARDIOGRAM REPORT  Patient Name:       MATILDE Thomas Physician:    [id number] Serenity Wills MD Study Date:         2/12/2025           Ordering Provider:    [id number] EVELYN DENTON MRN/PID:            [id number]            Fellow:               [id number] Lesia Fong MD Accession#:          [id number]        Nurse:                Nell Gibbs RN Date of Birth/Age:  1975 / 50 years Sonographer:          AGA Ken RDCS Gender assigned at  F                   Additional Staff: Birth: Height:             157.48 cm           Admit Date:           2/9/2025 Weight:             93.44 kg            Admission Status:     Inpatient -                                                               Routine BSA / BMI:          1.94 m2 / 37.68     Encounter#:           [id number]                     kg/m2 Blood Pressure:     107/65 mmHg         Department Location:  [facility]                                                               Non Invasive Study Type:    TRANSTHORACIC ECHO (TTE) COMPLETE Diagnosis/ICD: Encounter for other preprocedural examination-Z01.818 Indication:    Pre-TIPS CPT Code:      Echo Complete w Full Doppler-[id number] Patient History: Diabetes:          Yes Pertinent History: Cirrhosis, ascites, portal HTN, esophageal varices. Study Detail: The following Echo studies were performed: 2D, M-Mode, Doppler and               color flow. Agitated saline used as a contrast agent for               intraseptal flow evaluation.  PHYSICIAN INTERPRETATION: Left Ventricle: Left ventricular ejection fraction is normal, by visual estimate at 60-65%. There are no regional left ventricular wall motion abnormalities. The left ventricular cavity size is normal. There is normal septal and normal posterior left ventricular wall thickness. Spectral Doppler shows a normal pattern of left ventricular diastolic filling. Left Atrium: The left atrial size is normal. A bubble study using agitated saline was performed. Bubble study is negative. Agitated saline contrast study was negative for intracardiac shunt. Right Ventricle: The right ventricle is normal in size. There is  normal right ventricular global systolic function. Right Atrium: The right atrium is normal in size. Aortic Valve: The aortic valve is trileaflet. There is minimal aortic valve cusp calcification. There is no evidence of aortic valve regurgitation. The peak instantaneous gradient of the aortic valve is 8 mmHg. Mitral Valve: The mitral valve is normal in structure. There is trace mitral valve regurgitation. Tricuspid Valve: The tricuspid valve is structurally normal. There is trace tricuspid regurgitation. The right ventricular systolic pressure is unable to be estimated. Pulmonic Valve: The pulmonic valve is structurally normal. There is physiologic pulmonic valve regurgitation. Pericardium: Trivial pericardial effusion. Aorta: The aortic root is normal. Systemic Veins: The inferior vena cava appears normal in size, with IVC inspiratory collapse greater than 50%. In comparison to the previous echocardiogram(s): Compared with study dated 10/29/2024, no significant change Prior exam did not include an agitated slaine contrast study.  CONCLUSIONS:  1. Left ventricular ejection fraction is normal, by visual estimate at 60-65%.  2. There is normal right ventricular global systolic function.  3. Agitated saline contrast study was negative for intracardiac shunt.  4. Compared with study dated 10/29/2024, no significant change Prior exam did not include an agitated slaine contrast study. QUANTITATIVE DATA SUMMARY:  2D MEASUREMENTS:          Normal Ranges: LAs:             3.60 cm  (2.7-4.0cm) IVSd:            0.70 cm  (0.6-1.1cm) LVPWd:           0.70 cm  (0.6-1.1cm) LVIDd:           4.40 cm  (3.9-5.9cm) LVIDs:           2.50 cm LV Mass Index:   48 g/m2 LVEDV Index:     49 ml/m2 LV % FS          43.2 %  LEFT ATRIUM:                  Normal Ranges: LA Vol A4C:        34.4 ml    (22+/-6mL/m2) LA Vol A2C:        48.6 ml LA Vol BP:         41.6 ml LA Vol Index A4C:  17.8ml/m2 LA Vol Index A2C:  25.1 ml/m2 LA Vol Index BP:    21.5 ml/m2 LA Area A4C:       14.2 cm2 LA Area A2C:       16.6 cm2 LA Major Axis A4C: 5.0 cm LA Major Axis A2C: 4.8 cm LA Volume Index:   21.5 ml/m2  RIGHT ATRIUM:         Normal Ranges: RA Area A4C:  9.9 cm2  AORTA MEASUREMENTS:         Normal Ranges: Ao Sinus, d:        3.00 cm (2.1-3.5cm) Asc Ao, d:          3.10 cm (2.1-3.4cm)  LV SYSTOLIC FUNCTION:                      Normal Ranges: EF-A4C View:    73 % (>=55%) EF-A2C View:    70 % EF-Biplane:     72 % EF-Visual:      63 % LV EF Reported: 63 %  LV DIASTOLIC FUNCTION:             Normal Ranges: MV Peak E:             0.90 m/s    (0.7-1.2 m/s) MV Peak A:             0.72 m/s    (0.42-0.7 m/s) E/A Ratio:             1.25        (1.0-2.2) MV e'                  0.092 m/s   (>8.0) MV lateral e'          0.10 m/s MV medial e'           0.08 m/s MV A Dur:              148.00 msec E/e' Ratio:            9.73        (<8.0) MV DT:                 201 msec    (150-240 msec)  MITRAL VALVE:          Normal Ranges: MV DT:        201 msec (150-240msec)  AORTIC VALVE:           Normal Ranges: AoV Vmax:      1.41 m/s (<=1.7m/s) AoV Peak P.0 mmHg (<20mmHg) LVOT Max Shay:  0.97 m/s (<=1.1m/s) LVOT VTI:      20.30 cm LVOT Diameter: 1.90 cm  (1.8-2.4cm) AoV Area,Vmax: 1.94 cm2 (2.5-4.5cm2)  RIGHT VENTRICLE: RV Basal 2.60 cm RV Mid   1.50 cm RV Major 6.6 cm TAPSE:   24.8 mm RV s'    0.11 m/s  TRICUSPID VALVE/RVSP:         Normal Ranges: IVC Diam:             1.48 cm  PULMONIC VALVE:          Normal Ranges: PV Max Shay:     1.2 m/s  (0.6-0.9m/s) PV Max P.0 mmHg  [id number] Serenity Wills MD Electronically signed on 2025 at 3:15:32 PM  ** Final **       Assessment/Plan   Assessment and Plan:  Alfonzo Flanagan is a 50 y.o. female with PMH significant for biopsy-proven PBC with cirrhosis c/b portal HTN with ascites, esophageal varices (s/p banding  with eradication), hepatic encephalopathy, antibody-positive celiac disease, recent pancreatic tail mass suggestive of  neuroendocrine tumor s/p EUS on 3/2024 (no biopsy) recommended repeat in 1 year, IDDM2 (A1c 7.6), HTN, GERD, anxiety, depression, CORBY (2013 sleep study, untreated) who presented on 2/9/2025 from OSH ED for evaluation by hepatology; given advanced disease may need eval for TIPS as well as for transplant. Tolerated 4.4L paracentesis 2/10/24 followed by 37.5g albumin given recent para, with improvement of abdominal pain and fullness but rapid re-accumulation and symptom recurrence on 2/12/25, improved with IV diuresis.    On initial presentation, she had diarrhea and nausea for the past 4 days as well as chills, and CT evidence of edematous small bowel and colon but negative c diff and stool studies. Had sterile pyuria at OSH and here, without growth on repeat urine culture, and received empiric ceftriaxone for three days before negative fluid studies resulted. Ongoing diarrhea possibly 2/2 bowel wall edema vs poor po as stool studies are negative; trial of rifaximin for symptom management with significant improvement 2/13/25.    Updates 02/13/25:  -improved ascites with IV lasix, will continue 30IV x2 today, with albumin given prior given slightly worsening kidney function  -improved diarrhea with rifaximin  -Echo and BNP for TIPS workup WNL    #PBC cirrhosis cb portal hypertension   #concern for refractory ascites  #AMY  :: Ammonia on admission 71, lipase 200 (stable from last few admissions)  :: Based on OSH labs: MELD NA 15 child class C, MELD stable this admission, 16  :: Paracentesis 2/10/25 4.4L clear yellow without immediate complications, fluid studies consistent with portal hypertensive ascites without SBP: neutrophils<250, SAAG>1.1, protein <2.5  :: Increased home furosemide 40mg to 60mg daily  :: Cr>1.5x baseline (~0.4) in last 7 days  :: Better response with IV lasix compare to po lasix 60mg, possible contribution of bowel edema  :: Echo 2/12/25 The inferior vena cava appears normal in size, with IVC  inspiratory collapse greater than 50%   Plan:  - IV lasix 30mg BID with albumin before  - increased home spironolactone 50mg BID to 150mg daily  - 2g sodium diet with 2000cc fluid restriction  - nutrition consult for low sodium diet  - continue home ursodiol 300mg TID, fenofibrate  - daily MELD labs   - consider IR for large volume para, possibly outpatient    #Diarrhea, nausea, vomiting, small intestine and colon edema  #celiac disease  #Hx hepatic encephalopathy  :: stool studies, cdiff negative  :: no n/v with CLD 2/9-2/10  :: CT A/P on admission with edematous small bowel and colon possibly related to celiac disease as negative infectious workup as above  Plan:  - holding home lactulose 20mg, increased to BID from daily PRN  - zofran PRN  - gluten free, soft diet advancing from CLD as tolerated   - rifaximin 550mg TID x14d for IBS-D dosing    #Night sweats and chills without SBP  #Sterile pyuria   :: recent urine cultures notable for a drug resistant enterococcus faecalis susceptible only to vanc on cultures, thought contaminant as this was not treated, if decompensating would add vanc to cover this  :: urine culture from OSH, urine culture here negative   :: bcx sent pre-abx 2/9, NGTD  :: s/p ceftriaxone 2/9-2/11/25 for c/f UTI and SBP, both negative studies  :: ongoing sterile pyuria  :: overnight sweats and chills (patient reports some at home but these are worse) but no fever, leukopenia without source at this time, VSS  Plan:  - CTM CBC and symptoms      #DM2 (A1c 7.6 most recently)  :: home lantus is 50 units BID which she doubled on her own given high blood sugars, but she sometimes has low sugars with this regimen  :: overnight 2/10-2/11/25 pt had low BG with symptoms that improved with glucose. Switched lantus 20units BID to 15U nightly  Plan:  - lantus 15u nightly, mild SSI (not needing)  - would benefit from optimization of her diabetes regimen, patient states is limited by insurance and may need  socialwork  - holding home metformin 1000mg daily XR     #recent GI bleed (non variceal, no clear etiology)  #hx varices now sp eradication 2021  - counseled against NSAID use  - continue home pantoprazole 40mg   - continue home nadalol 40mg for ppx     #anxiety/depression   - continue home trazodone 25mg nightly     #Abdominal stretch pain  - acetaminophen PRN (max daily 2g)  - oxycodone 5mg q6h for mod-severe pain  - continue home carafate     #itching  - continue home prn atarax 25mg   - sarna cream  - benadryl cream     #HLD  ::  2 years ago  :: repeat panel HDL 21, LDL 62, TG 55, total cholesterol 94 on admission  - continue atorvastatin 80mg daily  - continue home fenofibrate (145mg at home 160mg inpatient due to therapeutic interchange)     #other  - holding home nitroglycerine patch - using it prn for chest pain?  - holding home meclizine prn     F: prn   E: prn   N: Adult diet 2-3 grams sodium, Gluten Free; Thin 0; 2000 mL fluid  A: PIV    DVT prophylaxis: Lovenox subq  GI Prophylaxis: PPI   Antimicrobials: ceftriaxone    CODE STATUS: Full Code, confirmed on admission  NOK:  Rah Modi [phone]  Extended Emergency Contact Information  Primary Emergency Contact: Rah Modi  Address: [id number] [place] [address]  Home Phone: [phone]  Relation: Spouse  Secondary Emergency Contact: Claudia Hartley  Mobile Phone: [phone]  Relation: Daughter       Patient and plan discussed with attending physician.  ---  Rosario Ragland MD (Anna)  PGY1, Internal Medicine  Available by Epic Chat

## 2025-02-13 NOTE — CARE PLAN
The patient's goals for the shift include Patient to start treatment after transfer from [facility] and orient to [place] [address] by end of shift    The clinical goals for the shift include pt will remain free of falls and injury this shift      Problem: Pain  Goal: Turns in bed with improved pain control throughout the shift  Outcome: Progressing     Problem: Fall/Injury  Goal: Not fall by end of shift  Outcome: Met

## 2025-02-14 ENCOUNTER — APPOINTMENT (OUTPATIENT)
Dept: RADIOLOGY | Facility: HOSPITAL | Age: 50
End: 2025-02-14
Payer: COMMERCIAL

## 2025-02-14 LAB
ALBUMIN SERPL BCP-MCNC: 2.2 G/DL (ref 3.4–5)
ALP SERPL-CCNC: 179 U/L (ref 33–110)
ALT SERPL W P-5'-P-CCNC: 17 U/L (ref 7–45)
ANION GAP SERPL CALC-SCNC: 10 MMOL/L (ref 10–20)
AST SERPL W P-5'-P-CCNC: 40 U/L (ref 9–39)
BASOPHILS # BLD AUTO: 0.01 X10*3/UL (ref 0–0.1)
BASOPHILS NFR BLD AUTO: 0.3 %
BILIRUB DIRECT SERPL-MCNC: 0.9 MG/DL (ref 0–0.3)
BILIRUB SERPL-MCNC: 1.7 MG/DL (ref 0–1.2)
BUN SERPL-MCNC: 10 MG/DL (ref 6–23)
CALCIUM SERPL-MCNC: 7.9 MG/DL (ref 8.6–10.6)
CHLORIDE SERPL-SCNC: 102 MMOL/L (ref 98–107)
CO2 SERPL-SCNC: 30 MMOL/L (ref 21–32)
CREAT SERPL-MCNC: 0.95 MG/DL (ref 0.5–1.05)
EGFRCR SERPLBLD CKD-EPI 2021: 74 ML/MIN/1.73M*2
EOSINOPHIL # BLD AUTO: 0.19 X10*3/UL (ref 0–0.7)
EOSINOPHIL NFR BLD AUTO: 5 %
ERYTHROCYTE [DISTWIDTH] IN BLOOD BY AUTOMATED COUNT: 15.8 % (ref 11.5–14.5)
GLUCOSE BLD MANUAL STRIP-MCNC: 141 MG/DL (ref 74–99)
GLUCOSE BLD MANUAL STRIP-MCNC: 174 MG/DL (ref 74–99)
GLUCOSE BLD MANUAL STRIP-MCNC: 214 MG/DL (ref 74–99)
GLUCOSE BLD MANUAL STRIP-MCNC: 69 MG/DL (ref 74–99)
GLUCOSE BLD MANUAL STRIP-MCNC: 70 MG/DL (ref 74–99)
GLUCOSE SERPL-MCNC: 70 MG/DL (ref 74–99)
HCT VFR BLD AUTO: 28 % (ref 36–46)
HGB BLD-MCNC: 9.2 G/DL (ref 12–16)
IMM GRANULOCYTES # BLD AUTO: 0.02 X10*3/UL (ref 0–0.7)
IMM GRANULOCYTES NFR BLD AUTO: 0.5 % (ref 0–0.9)
INR PPP: 1.5 (ref 0.9–1.1)
LYMPHOCYTES # BLD AUTO: 1.3 X10*3/UL (ref 1.2–4.8)
LYMPHOCYTES NFR BLD AUTO: 33.9 %
MAGNESIUM SERPL-MCNC: 1.66 MG/DL (ref 1.6–2.4)
MCH RBC QN AUTO: 31 PG (ref 26–34)
MCHC RBC AUTO-ENTMCNC: 32.9 G/DL (ref 32–36)
MCV RBC AUTO: 94 FL (ref 80–100)
MONOCYTES # BLD AUTO: 0.55 X10*3/UL (ref 0.1–1)
MONOCYTES NFR BLD AUTO: 14.4 %
NEUTROPHILS # BLD AUTO: 1.76 X10*3/UL (ref 1.2–7.7)
NEUTROPHILS NFR BLD AUTO: 45.9 %
NRBC BLD-RTO: 0 /100 WBCS (ref 0–0)
PHOSPHATE SERPL-MCNC: 2.9 MG/DL (ref 2.5–4.9)
PLATELET # BLD AUTO: 67 X10*3/UL (ref 150–450)
POTASSIUM SERPL-SCNC: 3.8 MMOL/L (ref 3.5–5.3)
PROT SERPL-MCNC: 5.7 G/DL (ref 6.4–8.2)
PROTHROMBIN TIME: 16.8 SECONDS (ref 9.8–12.8)
RBC # BLD AUTO: 2.97 X10*6/UL (ref 4–5.2)
SODIUM SERPL-SCNC: 138 MMOL/L (ref 136–145)
WBC # BLD AUTO: 3.8 X10*3/UL (ref 4.4–11.3)

## 2025-02-14 PROCEDURE — 2500000004 HC RX 250 GENERAL PHARMACY W/ HCPCS (ALT 636 FOR OP/ED)

## 2025-02-14 PROCEDURE — 85610 PROTHROMBIN TIME: CPT

## 2025-02-14 PROCEDURE — 84100 ASSAY OF PHOSPHORUS: CPT

## 2025-02-14 PROCEDURE — 49083 ABD PARACENTESIS W/IMAGING: CPT

## 2025-02-14 PROCEDURE — 82248 BILIRUBIN DIRECT: CPT

## 2025-02-14 PROCEDURE — 2500000001 HC RX 250 WO HCPCS SELF ADMINISTERED DRUGS (ALT 637 FOR MEDICARE OP)

## 2025-02-14 PROCEDURE — 2500000002 HC RX 250 W HCPCS SELF ADMINISTERED DRUGS (ALT 637 FOR MEDICARE OP, ALT 636 FOR OP/ED)

## 2025-02-14 PROCEDURE — 99222 1ST HOSP IP/OBS MODERATE 55: CPT | Performed by: PHYSICIAN ASSISTANT

## 2025-02-14 PROCEDURE — 99235 HOSP IP/OBS SAME DATE MOD 70: CPT

## 2025-02-14 PROCEDURE — 1100000001 HC PRIVATE ROOM DAILY

## 2025-02-14 PROCEDURE — 80048 BASIC METABOLIC PNL TOTAL CA: CPT

## 2025-02-14 PROCEDURE — 83735 ASSAY OF MAGNESIUM: CPT

## 2025-02-14 PROCEDURE — 2500000002 HC RX 250 W HCPCS SELF ADMINISTERED DRUGS (ALT 637 FOR MEDICARE OP, ALT 636 FOR OP/ED): Performed by: STUDENT IN AN ORGANIZED HEALTH CARE EDUCATION/TRAINING PROGRAM

## 2025-02-14 PROCEDURE — 85025 COMPLETE CBC W/AUTO DIFF WBC: CPT

## 2025-02-14 PROCEDURE — 82947 ASSAY GLUCOSE BLOOD QUANT: CPT

## 2025-02-14 PROCEDURE — 36415 COLL VENOUS BLD VENIPUNCTURE: CPT

## 2025-02-14 RX ORDER — ATORVASTATIN CALCIUM 20 MG/1
20 TABLET, FILM COATED ORAL NIGHTLY
Status: DISCONTINUED | OUTPATIENT
Start: 2025-02-14 | End: 2025-02-15 | Stop reason: HOSPADM

## 2025-02-14 RX ORDER — INSULIN GLARGINE 100 [IU]/ML
10 INJECTION, SOLUTION SUBCUTANEOUS EVERY 24 HOURS
Status: DISCONTINUED | OUTPATIENT
Start: 2025-02-14 | End: 2025-02-15

## 2025-02-14 RX ORDER — CARVEDILOL 6.25 MG/1
6.25 TABLET ORAL 2 TIMES DAILY
Status: DISCONTINUED | OUTPATIENT
Start: 2025-02-14 | End: 2025-02-14

## 2025-02-14 RX ORDER — MAGNESIUM SULFATE HEPTAHYDRATE 40 MG/ML
2 INJECTION, SOLUTION INTRAVENOUS ONCE
Status: COMPLETED | OUTPATIENT
Start: 2025-02-14 | End: 2025-02-14

## 2025-02-14 RX ORDER — CARVEDILOL 6.25 MG/1
6.25 TABLET ORAL 2 TIMES DAILY
Qty: 60 TABLET | Refills: 0 | Status: SHIPPED | OUTPATIENT
Start: 2025-02-15 | End: 2025-03-17

## 2025-02-14 RX ORDER — INSULIN GLARGINE 100 [IU]/ML
10 INJECTION, SOLUTION SUBCUTANEOUS EVERY 24 HOURS
Qty: 15 ML | Refills: 0 | Status: SHIPPED | OUTPATIENT
Start: 2025-02-14 | End: 2025-02-15 | Stop reason: HOSPADM

## 2025-02-14 RX ORDER — TORSEMIDE 20 MG/1
40 TABLET ORAL DAILY
Status: DISCONTINUED | OUTPATIENT
Start: 2025-02-14 | End: 2025-02-15

## 2025-02-14 RX ORDER — SPIRONOLACTONE 50 MG/1
150 TABLET, FILM COATED ORAL DAILY
Qty: 90 TABLET | Refills: 0 | Status: SHIPPED | OUTPATIENT
Start: 2025-02-15 | End: 2025-03-17

## 2025-02-14 RX ORDER — TORSEMIDE 20 MG/1
40 TABLET ORAL DAILY
Qty: 60 TABLET | Refills: 0 | Status: SHIPPED | OUTPATIENT
Start: 2025-02-15 | End: 2025-02-15 | Stop reason: HOSPADM

## 2025-02-14 RX ORDER — ATORVASTATIN CALCIUM 20 MG/1
20 TABLET, FILM COATED ORAL NIGHTLY
Qty: 30 TABLET | Refills: 0 | Status: SHIPPED | OUTPATIENT
Start: 2025-02-14 | End: 2025-03-17

## 2025-02-14 RX ORDER — CARVEDILOL 6.25 MG/1
6.25 TABLET ORAL 2 TIMES DAILY
Status: DISCONTINUED | OUTPATIENT
Start: 2025-02-15 | End: 2025-02-15 | Stop reason: HOSPADM

## 2025-02-14 RX ADMIN — ATORVASTATIN CALCIUM 20 MG: 20 TABLET, FILM COATED ORAL at 20:39

## 2025-02-14 RX ADMIN — SUCRALFATE 1 G: 1 TABLET ORAL at 20:38

## 2025-02-14 RX ADMIN — URSODIOL 300 MG: 300 CAPSULE ORAL at 08:35

## 2025-02-14 RX ADMIN — URSODIOL 300 MG: 300 CAPSULE ORAL at 15:47

## 2025-02-14 RX ADMIN — SUCRALFATE 1 G: 1 TABLET ORAL at 10:53

## 2025-02-14 RX ADMIN — SPIRONOLACTONE 150 MG: 25 TABLET, FILM COATED ORAL at 08:35

## 2025-02-14 RX ADMIN — NADOLOL 40 MG: 40 TABLET ORAL at 08:36

## 2025-02-14 RX ADMIN — TRAZODONE HYDROCHLORIDE 25 MG: 50 TABLET ORAL at 20:39

## 2025-02-14 RX ADMIN — URSODIOL 625 MG: 500 TABLET ORAL at 20:38

## 2025-02-14 RX ADMIN — FENOFIBRATE 160 MG: 160 TABLET ORAL at 08:35

## 2025-02-14 RX ADMIN — INSULIN LISPRO 2 UNITS: 100 INJECTION, SOLUTION INTRAVENOUS; SUBCUTANEOUS at 18:23

## 2025-02-14 RX ADMIN — RIFAXIMIN 550 MG: 550 TABLET ORAL at 08:35

## 2025-02-14 RX ADMIN — MAGNESIUM SULFATE HEPTAHYDRATE 2 G: 40 INJECTION, SOLUTION INTRAVENOUS at 10:54

## 2025-02-14 RX ADMIN — RIFAXIMIN 550 MG: 550 TABLET ORAL at 15:47

## 2025-02-14 RX ADMIN — SUCRALFATE 1 G: 1 TABLET ORAL at 08:35

## 2025-02-14 RX ADMIN — INSULIN GLARGINE 10 UNITS: 100 INJECTION, SOLUTION SUBCUTANEOUS at 20:43

## 2025-02-14 RX ADMIN — ENOXAPARIN SODIUM 40 MG: 100 INJECTION SUBCUTANEOUS at 11:06

## 2025-02-14 RX ADMIN — TORSEMIDE 40 MG: 20 TABLET ORAL at 10:54

## 2025-02-14 RX ADMIN — RIFAXIMIN 550 MG: 550 TABLET ORAL at 20:38

## 2025-02-14 RX ADMIN — FUROSEMIDE 30 MG: 10 INJECTION, SOLUTION INTRAMUSCULAR; INTRAVENOUS at 08:35

## 2025-02-14 RX ADMIN — SUCRALFATE 1 G: 1 TABLET ORAL at 15:47

## 2025-02-14 ASSESSMENT — COGNITIVE AND FUNCTIONAL STATUS - GENERAL
MOBILITY SCORE: 24
DAILY ACTIVITIY SCORE: 24

## 2025-02-14 ASSESSMENT — ENCOUNTER SYMPTOMS
NEUROLOGICAL NEGATIVE: 1
GASTROINTESTINAL NEGATIVE: 1
CONSTITUTIONAL NEGATIVE: 1
RESPIRATORY NEGATIVE: 1
CARDIOVASCULAR NEGATIVE: 1
PSYCHIATRIC NEGATIVE: 1

## 2025-02-14 ASSESSMENT — PAIN SCALES - WONG BAKER: WONGBAKER_NUMERICALRESPONSE: NO HURT

## 2025-02-14 ASSESSMENT — PAIN SCALES - GENERAL
PAINLEVEL_OUTOF10: 5 - MODERATE PAIN
PAINLEVEL_OUTOF10: 0 - NO PAIN

## 2025-02-14 NOTE — DISCHARGE INSTRUCTIONS
You came to the hospital because you had nausea and vomiting as well as diarrhea and worsening of your abdominal swelling and we found that your liver disease was causing fluid to build up in your belly which was making you have these symptoms.  We ruled out an infection in your stool and urine and treated you with 2 paracenteses in which we removed fluid from your belly; we found that this fluid was also not infected. We're glad you are feeling well enough to go home. While you were here, we changed some of your medications to try to improve your symptoms including decreasing your insulin because you are having low blood sugar which we think was making your nausea and vomiting worse, and stopping your lactulose because you are having diarrhea which is a side effect of this medication.  We started you on a medication called rifaximin to try to improve your diarrhea symptoms.  We changed your water pills to try to improve your abdominal swelling symptoms.  We started testing but would like you to follow-up outpatient for more definitive management of your symptoms.  Your liver disease is advanced and may require a TIPS procedure or a liver transplant.  Information on these procedures is included in your packet.    Team helped you enroll in Medicaid and you should now have access to medications at more affordable prices as well as be able to schedule doctors appointments that will be covered by this insurance.  Please review all your medication changes. If you have any questions, please let us know before you leave, or reach out to your primary care provider (PCP). Your on-file PCP is [phone].    Appointments/follow-up:  Primary Care at [facility] 2/25/25 at 2pm  Landen Freeman MD  Internal Medicine  NPI: [id number]  [id number] [address] [address]  Phone: [phone]  Fax: [phone]  GI follow up with your [facility] provider is scheduled in February.  We scheduled GI follow-up with  your new  provider Dr. Khanna in April; please bring your  or daughter with you to the appointment. You will be called if an earlier appointment opens up  The central scheduling line is [phone] if you do not hear from one of these services or if you need to reschedule.    It was a pleasure to take care of you; we hope you continue to feel better!

## 2025-02-14 NOTE — HOSPITAL COURSE
Alfonzo Flanagan is a 50 y.o. female with PMH significant for biopsy-proven PBC with cirrhosis c/b portal HTN with ascites, esophageal varices (s/p banding 2021 with eradication), hepatic encephalopathy, antibody-positive celiac disease, recent pancreatic tail mass suggestive of neuroendocrine tumor s/p EUS on 3/2024 (no biopsy) recommended repeat in 1 year, IDDM2 (A1c 7.6), HTN, GERD, anxiety, depression, CORBY (2013 sleep study, untreated) who presented on 2/9/2025 from OSH ED for evaluation by hepatology; given advanced disease may need eval for TIPS as well as for transplant. Tolerated 4.4L paracentesis 2/10/24 followed by 37.5g albumin given recent para, with improvement of abdominal pain and fullness but rapid re-accumulation and symptom recurrence on 2/12/25, improved with IV diuresis more so than p.o. and patient was transitioned to torsemide on 2/14/2025 for oral diuresis.  Echo and BNP completed for TIPS workup within normal limits. Repeated 2.5 L para with IR on 2/14 for symptomatic management.     On initial presentation, she had diarrhea and nausea for the past 4 days as well as chills, and CT evidence of edematous small bowel and colon but negative c diff and stool studies.  Nausea and vomiting resolved with clear liquid diet and was able to advance as tolerated over 3 days without recurrence of vomiting.  Had sterile pyuria at OSH and here, without growth on repeat urine culture, and received empiric ceftriaxone for three days before negative fluid studies resulted.  Diarrhea worsened overnight to 12/12 and patient started on empiric rifaximin for IBS-D dosing with significant improvement.  Ongoing chronic diarrhea possibly 2/2 bowel wall edema vs poor po as stool studies are negative and improving with diuresis and rifaximin.

## 2025-02-14 NOTE — CONSULTS
INTERVENTIONAL RADIOLOGY INPATIENT CONSULT NOTE  [facility]      Assessment & Plan   Interventional Radiology is consulted for paracentesis.     I personally reviewed the CT abdomen/pelvis from 25 which shows ascites.    Plan:  -inpatient notes reviewed  -labs: PLT 67, INR 1.5  -5L limit per primary team  -bedside US demonstrated ascites, paracentesis was performed; please see separate post-procedure note    Subjective  Alfonzo Flanagan, 50 y.o. female is a patent presenting with:  Cirrhosis (Multi) [K74.60]     HPI  Alfonzo Flanagan is a 50 y.o. 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    Patient has had routine outpatient paracenteses.  Patient with previous complaints of abdominal pain and nausea/vomiting which has improved.    Interventional Radiology is consulted for paracentesis.     Review of Systems   Constitutional: Negative.    Respiratory: Negative.     Cardiovascular: Negative.    Gastrointestinal: Negative.    Genitourinary: Negative.    Neurological: Negative.    Psychiatric/Behavioral: Negative.     All other systems reviewed and are negative.      Past Medical History:   Diagnosis Date    Ascites     Asthma     Cirrhosis of liver (Multi)     Diabetes mellitus (Multi)     GERD (gastroesophageal reflux disease)     CORBY (obstructive sleep apnea)     Portal hypertension (Multi)     Thrombocytopenia (CMS-HCC)      Past Surgical History:   Procedure Laterality Date     SECTION, LOW TRANSVERSE      CHOLECYSTECTOMY      COLONOSCOPY      CT GUIDED IMAGING FOR NEEDLE PLACEMENT  10/14/2020    CT GUIDED IMAGING FOR NEEDLE PLACEMENT  [facility]    ESOPHAGOGASTRODUODENOSCOPY      INGUINAL HIDRADENITIS EXCISION      TUBAL LIGATION      US GUIDED ABDOMINAL PARACENTESIS  10/08/2020    US GUIDED ABDOMINAL PARACENTESIS [facility]     Social History     Socioeconomic History    Marital status:      Spouse name: Not on file    Number of children: Not on file    Years of education: Not on file    Highest education level: Not on file   Occupational History    Not on file   Tobacco Use    Smoking status: Never    Smokeless tobacco: Never   Vaping Use    Vaping status: Never Used   Substance and Sexual Activity    Alcohol use: Never    Drug use: Never    Sexual activity: Yes     Partners: Male     Birth control/protection: None   Other Topics Concern    Not on file   Social History Narrative    Not on file     Social Drivers of Health     Financial Resource Strain: Medium Risk (2/10/2025)    Overall Financial Resource Strain (CARDIA)     Difficulty of Paying Living Expenses: Somewhat hard   Food Insecurity: No Food Insecurity (2/9/2025)    Hunger Vital Sign     Worried About Running Out of Food in the Last Year: Never true     Ran Out of Food in the Last Year: Never true   Transportation Needs: No Transportation Needs (2/10/2025)    PRAPARE - Transportation     Lack of Transportation (Medical): No     Lack of Transportation (Non-Medical): No   Recent Concern: Transportation Needs - Unmet Transportation Needs (12/12/2024)    PRAPARE - Transportation     Lack of Transportation (Medical): Yes     Lack of Transportation (Non-Medical): No   Physical Activity: Inactive (2/9/2025)    Exercise Vital Sign     Days of Exercise per Week: 0 days     Minutes of Exercise per Session: 0 min   Stress: No Stress Concern Present (12/12/2024)    [employer] [place] [employer] - Occupational Stress Questionnaire     Feeling of Stress : Only a little   Social Connections: Moderately Integrated (12/12/2024)    Social Connection and Isolation  Panel [NHANES]     Frequency of Communication with Friends and Family: More than three times a week     Frequency of Social Gatherings with Friends and Family: More than three times a week     Attends Gnosticism Services: More than 4 times per year     Active Member of Clubs or Organizations: No     Attends Club or Organization Meetings: Never     Marital Status:    Intimate Partner Violence: Not At Risk (2025)    Humiliation, Afraid, Rape, and Kick questionnaire     Fear of Current or Ex-Partner: No     Emotionally Abused: No     Physically Abused: No     Sexually Abused: No   Housing Stability: Low Risk  (2/10/2025)    Housing Stability Vital Sign     Unable to Pay for Housing in the Last Year: No     Number of Times Moved in the Last Year: 0     Homeless in the Last Year: No     No family history on file.  Allergies   Allergen Reactions    Gluten Diarrhea     No current facility-administered medications on file prior to encounter.     Current Outpatient Medications on File Prior to Encounter   Medication Sig Dispense Refill    acetaminophen (Tylenol) 500 mg tablet Take 1-2 tablets (500-1,000 mg) by mouth every 6 hours if needed (pain).      atorvastatin (Lipitor) 80 mg tablet Take 1 tablet (80 mg) by mouth once daily at bedtime. 30 tablet 0    [] cephalexin (Keflex) 500 mg capsule Take 1 capsule (500 mg) by mouth 2 times a day for 7 days. 14 capsule 0    clotrimazole (Lotrimin) 1 % vaginal cream Insert 1 applicator into the vagina once daily at bedtime for 10 days. 45 g 0    fenofibrate (Tricor) 145 mg tablet Take 1 tablet (145 mg) by mouth once daily. 30 tablet 0    furosemide (Lasix) 40 mg tablet Take 1 tablet (40 mg) by mouth once daily. Hold for dizziness. 30 tablet 0    hydrOXYzine HCL (Atarax) 25 mg tablet Take 1 tablet (25 mg) by mouth every 4 hours if needed for anxiety. 30 tablet 0    insulin glargine (Lantus Solostar U-100 Insulin) 100 unit/mL (3 mL) pen Inject 25 Units under the skin 2  "times a day. (Patient taking differently: Inject 50 Units under the skin 2 times a day.) 15 mL 0    lactulose 20 gram/30 mL oral solution Take 30 mL (20 g) by mouth if needed.      meclizine (Antivert) 25 mg tablet Take 1 tablet (25 mg) by mouth if needed for dizziness.      metFORMIN XR (Glucophage-XR) 500 mg 24 hr tablet Take 2 tablets (1,000 mg) by mouth once daily in the evening. Take with meals. Do not crush, chew, or split. 60 tablet 0    nadolol (Corgard) 20 mg tablet Take 1 tablet (20 mg) by mouth once daily. (Patient not taking: Reported on 2/5/2025) 30 tablet 0    nitroglycerin (Nitrodur) 0.1 mg/hr patch Place 1 patch over 12 hours on the skin once daily. Hold for dizziness. 30 patch 0    pantoprazole (ProtoNix) 40 mg EC tablet Take 1 tablet (40 mg) by mouth once daily in the morning. Take before meals. Do not crush, chew, or split. 60 tablet 0    pen needle, diabetic (Sure Comfort Pen Needle) 32 gauge x 5/32\" needle Use 4 times a day 100 each 0    polyethylene glycol (Glycolax, Miralax) 17 gram/dose powder Mix 17 grams of powder in 8 ounces of water and drink 2 times a day. (Patient taking differently: Mix 17 g of powder and drink if needed.) 510 g 0    spironolactone (Aldactone) 50 mg tablet Take 1 tablet (50 mg) by mouth 2 times a day. Hold for dizziness. 60 tablet 0    sucralfate (Carafate) 1 gram tablet Take 1 tablet (1 g) by mouth 4 times a day before meals.      traZODone (Desyrel) 50 mg tablet Take 0.5 tablets (25 mg) by mouth once daily at bedtime. (Patient taking differently: Take 0.5 tablets (25 mg) by mouth as needed at bedtime.) 15 tablet 0    ursodiol (Actigall) 300 mg capsule Take 1 capsule (300 mg) by mouth 3 times a day. 90 capsule 0    [DISCONTINUED] docusate sodium (Colace) 100 mg capsule Take 1 capsule (100 mg) by mouth 2 times a day. Hold for loose stool. (Patient taking differently: Take 1 capsule (100 mg) by mouth if needed for constipation. Hold for loose stool.) 60 capsule 0    " [DISCONTINUED] insulin lispro 100 unit/mL injection Inject 0-15 Units under the skin 3 times a day before meals. Take as directed per insulin instructions.Do not hold when patient is not eating, continue order as scheduled for hyperglycemia management.  Insulin Lispro Corrective Scale #3     Hypoglycemia protocol Call LIP unit(s) if Blood Glucose is between 0 - 70 mg/dL     0 unit(s) if Blood glucose is between    3 unit(s) if Blood glucose is between 151-200   6 unit(s) if Blood glucose is between 201-250   9 unit(s) if Blood glucose is between 251-300   12 unit(s) if Blood glucose is between 301-350   15 unit(s) if Blood glucose is between 351-400    If blood glucose is greater than 400 mg/dL, give max insulin per sliding scale AND then contact provider. (Patient not taking: Reported on 2/5/2025)      [DISCONTINUED] magnesium oxide (Mag-Ox) 400 mg (241.3 mg magnesium) tablet Take 1 tablet (400 mg) by mouth once daily. (Patient not taking: Reported on 2/5/2025) 30 tablet 0    [DISCONTINUED] midodrine (Proamatine) 10 mg tablet Take 0.5 tablets (5 mg) by mouth 3 times daily (morning, midday, late afternoon). 45 tablet 0    [DISCONTINUED] oxyCODONE (Roxicodone) 5 mg immediate release tablet Take 1 tablet (5 mg) by mouth every 6 hours if needed for severe pain (7 - 10) for up to 6 doses. (Patient not taking: Reported on 2/5/2025) 6 tablet 0    [DISCONTINUED] rifAXIMin (Xifaxan) 550 mg tablet Take 1 tablet (550 mg) by mouth every 12 hours. (Patient not taking: Reported on 2/5/2025) 60 tablet 0       Objective    Vitals:    02/13/25 0753 02/13/25 1540 02/13/25 2345 02/14/25 0819   BP: 106/65 101/63 96/59 98/56   BP Location: Right arm Right arm  Left arm   Patient Position: Sitting Lying Lying Lying   Pulse: 75 75 79 70   Resp: 17 16 16 15   Temp: 36.6 °C (97.9 °F) 36.7 °C (98.1 °F) 36.7 °C (98.1 °F) 36.4 °C (97.5 °F)   TempSrc: Temporal Temporal Temporal Temporal   SpO2: 94% 96% 96% 95%   Weight:       Height:            Physical Exam  Constitutional:       General: She is not in acute distress.     Appearance: She is obese.      Comments: Cooperative, resting comfortably in bed   HENT:      Head: Normocephalic.      Mouth/Throat:      Mouth: Mucous membranes are moist.   Eyes:      Conjunctiva/sclera: Conjunctivae normal.   Pulmonary:      Effort: Pulmonary effort is normal. No respiratory distress.      Comments: Breathing comfortably on room air  Abdominal:      General: There is distension.      Tenderness: There is no abdominal tenderness.   Musculoskeletal:         General: Normal range of motion.   Skin:     General: Skin is warm and dry.      Coloration: Skin is not jaundiced.   Neurological:      General: No focal deficit present.      Mental Status: She is alert and oriented to person, place, and time. Mental status is at baseline.   Psychiatric:         Mood and Affect: Mood normal.         Behavior: Behavior normal.         Relevant Results  LABS:  Lab Results   Component Value Date    WBC 3.8 (L) 02/14/2025    HGB 9.2 (L) 02/14/2025    HCT 28.0 (L) 02/14/2025    MCV 94 02/14/2025    PLT 67 (L) 02/14/2025      Results from last 72 hours   Lab Units 02/14/25  0612   SODIUM mmol/L 138   POTASSIUM mmol/L 3.8   CHLORIDE mmol/L 102   CO2 mmol/L 30   BUN mg/dL 10   CREATININE mg/dL 0.95   GLUCOSE mg/dL 70*   CALCIUM mg/dL 7.9*   ANION GAP mmol/L 10   EGFR mL/min/1.73m*2 74     Results from last 72 hours   Lab Units 02/14/25  0612   ALK PHOS U/L 179*   BILIRUBIN TOTAL mg/dL 1.7*   BILIRUBIN DIRECT mg/dL 0.9*   PROTEIN TOTAL g/dL 5.7*   ALT U/L 17   AST U/L 40*   ALBUMIN g/dL 2.2*     Results from last 72 hours   Lab Units 02/14/25  0612   INR  1.5*       MELD 3.0: 16 at 2/14/2025  6:12 AM  MELD-Na: 13 at 2/14/2025  6:12 AM  Calculated from:  Serum Creatinine: 0.95 mg/dL (Using min of 1 mg/dL) at 2/14/2025  6:12 AM  Serum Sodium: 138 mmol/L (Using max of 137 mmol/L) at 2/14/2025  6:12 AM  Total Bilirubin: 1.7 mg/dL at  2/14/2025  6:12 AM  Serum Albumin: 2.2 g/dL at 2/14/2025  6:12 AM  INR(ratio): 1.5 at 2/14/2025  6:12 AM  Age at listing (hypothetical): 50 years  Sex: Female at 2/14/2025  6:12 AM      MICRO:  No results found for the last 14 days.      IMAGING:  Transthoracic Echo (TTE) Complete   Final Result      XR chest 1 view   Final Result   1. No acute cardiopulmonary process.             I personally reviewed the images/study and I agree with the findings   as stated by Dr. Alonso Sanford. This study was interpreted at [place], [place].        MACRO:   None        Signed by: Pop Cervantes 2/10/2025 9:01 AM   Dictation workstation:   [id number]      US guided abdominal paracentesis    (Results Pending)           I personally spent 60 minutes on this consult with over 50% of time spent discussing management and care of specified diagnosis with patient and/or coordinating care for this patient.     Nida Gibson PA-C     Vascular and Interventional Radiology  [facility]  [phone] Inpatient Nursing Quarterback  [phone] Nursing Line 7a-5p  [phone] Resident on-call pager    NON-Urgent on call weekends and after hours weekdays (5pm - 5am) IR pager: [phone]  Urgent & emergent on call weekends and after hours weekdays (5pm-7am) IR pager: [phone]

## 2025-02-14 NOTE — POST-PROCEDURE NOTE
INTERVENTIONAL RADIOLOGY ADVANCED PRACTICE PROCEDURE  [facility]    A time out was performed and Right Hemiabdomen was examined with US and appropriate entry point was confirmed and marked.   The patient was prepped and draped in a sterile manner, 1% lidocaine was used to anesthesize the skin and subcutaneous tissue.   A 5F Centesis needle was then introduced through the skin into the peritoneal space, the centesis catheter was then threaded without difficulty.   2500 ml of yellow fluid was removed without difficulty. The catheter was then removed.   No immediate complications were noted during and immediately following the procedure.

## 2025-02-14 NOTE — CODE DOCUMENTATION
Transitional Care Coordinator Note: Patient discussed with medical team, per medical team patient is not medically ready. Patient is transitioning to po diuretics. Discharge dispo: [facility].  ADOD 1-2 Days  Canelo Hillman RN  Transitional Care Coordinator

## 2025-02-14 NOTE — CARE PLAN
The patient's goals for the shift include Patient to start treatment after transfer from [facility] and orient to [place] [facility] by end of shift    The clinical goals for the shift include pt will remain [facility] this shift      Problem: Pain  Goal: Turns in bed with improved pain control throughout the shift  Outcome: Progressing     Problem: Safety - Adult  Goal: Free from fall injury  Outcome: Met

## 2025-02-14 NOTE — PROGRESS NOTES
"    Daily Progress Note    Chief Concern  Alfonzo Flanagan is a 50 y.o. female on day 5 of hospitalization for Cirrhosis (Multi)    Subjective   Subjective:  Alfonzo Flanagan is still feeling better but vomited once overnight. She was eating 3 carrots and broth and suddenly her mouth filled with saliva and she vomited without blood. She did not have any diarrhea though    Interval events:  -No acute events overnight       Objective   BP 98/56 (BP Location: Left arm, Patient Position: Lying)   Pulse 70   Temp 36.4 °C (97.5 °F) (Temporal)   Resp 15   Ht 1.575 m (5' 2\")   Wt 93.5 kg (206 lb 1.6 oz)   SpO2 95%   BMI 37.70 kg/m²   24 Hour Vitals  Temp:  [36.4 °C (97.5 °F)-36.7 °C (98.1 °F)] 36.4 °C (97.5 °F)  Heart Rate:  [70-79] 70  Resp:  [15-16] 15  BP: ()/(56-63) 98/56  SpO2:  [95 %-96 %] 95 %    Temp (24hrs), Av.6 °C (97.9 °F), Min:36.4 °C (97.5 °F), Max:36.7 °C (98.1 °F)     24 hour Intake/Output    Intake/Output Summary (Last 24 hours) at 2025 1013  Last data filed at 2025 0500  Gross per 24 hour   Intake --   Output 1400 ml   Net -1400 ml          Physical exam:  Constitutional: Chronically ill-appearing female in no acute distress, laying back in bed  HEENT: NCAT, sclera anicteric  Respiratory: Normal respiratory effort on RA.  Bilateral crackles at the lung bases right> left  Cardiovascular: RRR, normal S1/S2, No murmurs  Abdominal: Soft and compressible, distention significantly decreased from yesterday, ventral hernia, diffusely tender to palpation but improved from prior. No rebound or guarding.  Stretch marks visible on skin.  No erythema or purulence at sites of prior paracentesis mostly on the right lower quadrant.  Small bruise over 2/10/25 para site  Neuro: Moving all extremities with no focal deficits  MSK: WWP, trace peripheral edema  Skin: Some ecchymoses   Psych: Appropriate mood and affect, alert and oriented, linear thought process and judgement " intact      Medications:  Scheduled Medications  atorvastatin, 20 mg, oral, Nightly  [START ON 2/15/2025] carvedilol, 6.25 mg, oral, BID  enoxaparin, 40 mg, subcutaneous, q24h  fenofibrate, 160 mg, oral, Daily  insulin glargine, 10 Units, subcutaneous, q24h  insulin lispro, 0-5 Units, subcutaneous, TID AC  magnesium sulfate, 2 g, intravenous, Once  pantoprazole, 40 mg, oral, Daily before breakfast  rifAXIMin, 550 mg, oral, TID  spironolactone, 150 mg, oral, Daily  sucralfate, 1 g, oral, Before meals & nightly  torsemide, 40 mg, oral, Daily  traZODone, 25 mg, oral, Nightly  ursodiol, 300 mg, oral, TID     Continuous Medications    PRN Medications  PRN medications: acetaminophen, diphenhydramine-zinc acetate, hydrOXYzine HCL, ondansetron **OR** ondansetron, oxyCODONE, pramoxine     Labs:  Notable for stable leukopenia, stable H&H, worsening thrombocytopenia, persistently elevated AST and Alk phos, improving bilirubinemia  CBC:  Results from last 7 days   Lab Units 02/14/25  0612 02/13/25  0754 02/12/25  1152   WBC AUTO x10*3/uL 3.8* 4.3* 3.7*   HEMOGLOBIN g/dL 9.2* 10.2* 9.9*   HEMATOCRIT % 28.0* 32.0* 31.7*   MCV fL 94 100 99   PLATELETS AUTO x10*3/uL 67* 78* 79*     RFP:  Results from last 72 hours   Lab Units 02/14/25  0612 02/13/25  0754 02/12/25 1915   SODIUM mmol/L 138 138 136   POTASSIUM mmol/L 3.8 4.3 3.9   CHLORIDE mmol/L 102 104 104   CO2 mmol/L 30 30 26   BUN mg/dL 10 11 12   CREATININE mg/dL 0.95 0.93 0.75   CALCIUM mg/dL 7.9* 8.1* 7.9*   MAGNESIUM mg/dL 1.66 2.13 1.63   PHOSPHORUS mg/dL 2.9 3.3 3.4     HFP:  Results from last 7 days   Lab Units 02/14/25  0612 02/13/25  0754 02/12/25 1915 02/12/25  1152   AST U/L 40* 45*  --  42*   ALT U/L 17 19  --  19   ALK PHOS U/L 179* 194*  --  192*   BILIRUBIN TOTAL mg/dL 1.7* 1.8*  --  1.9*   BILIRUBIN DIRECT mg/dL 0.9* 0.7*  --  0.9*   ALBUMIN g/dL 2.2* 2.1* 2.2* 2.1*     Cardiac:  Results from last 7 days   Lab Units 02/12/25  1152   BNP pg/mL 78      Coag:  Results from last 7 days   Lab Units 02/14/25  0612 02/13/25  0754 02/11/25  1051   PROTIME seconds 16.8*   < > 15.4*   APTT seconds  --   --  34   INR  1.5*   < > 1.4*    < > = values in this interval not displayed.     ABG/VBG:            UA:   Results from last 7 days   Lab Units 02/09/25  0911   COLOR U  Yellow   PH U  6.0   SPEC GRAV UR  >1.050*   PROTEIN U mg/dL 30 (1+)*   BLOOD UR mg/dL NEGATIVE   NITRITE U  NEGATIVE   WBC UR /HPF 21-50*      Cultures:   Susceptibility data from last 90 days.  Collected Specimen Info Organism Ampicillin Ciprofloxacin Levofloxacin Nitrofurantoin Penicillin Trimethoprim/Sulfamethoxazole Vancomycin   01/22/25 Urine from Clean Catch/Voided Enterococcus faecium  R  R  R  R  R  R  S     MELD 3.0: 16 at 2/14/2025  6:12 AM  MELD-Na: 13 at 2/14/2025  6:12 AM  Calculated from:  Serum Creatinine: 0.95 mg/dL (Using min of 1 mg/dL) at 2/14/2025  6:12 AM  Serum Sodium: 138 mmol/L (Using max of 137 mmol/L) at 2/14/2025  6:12 AM  Total Bilirubin: 1.7 mg/dL at 2/14/2025  6:12 AM  Serum Albumin: 2.2 g/dL at 2/14/2025  6:12 AM  INR(ratio): 1.5 at 2/14/2025  6:12 AM  Age at listing (hypothetical): 50 years  Sex: Female at 2/14/2025  6:12 AM        Imaging in last 24hr:  Transthoracic Echo (TTE) Complete    Result Date: 2/12/2025   [facility], [address] [place] [address]                Tel [phone] and Fax [phone] TRANSTHORACIC ECHOCARDIOGRAM REPORT  Patient Name:       MATILDE Thomas Physician:    [id number] Serenity Wills MD Study Date:         2/12/2025           Ordering Provider:    [id number] EVELYN DENTON MRN/PID:            [id number]            Fellow:               [id number] Lesia Fong MD Accession#:         [id number]        Nurse:                Nell                                                                Bernie LOWE Date of Birth/Age:  1975 / 50 years Sonographer:          AGA Ken RDCS Gender assigned at  F                   Additional Staff: Birth: Height:             157.48 cm           Admit Date:           2/9/2025 Weight:             93.44 kg            Admission Status:     Inpatient -                                                               Routine BSA / BMI:          1.94 m2 / 37.68     Encounter#:           [id number]                     kg/m2 Blood Pressure:     107/65 mmHg         Department Location:  [facility]                                                               Non Invasive Study Type:    TRANSTHORACIC ECHO (TTE) COMPLETE Diagnosis/ICD: Encounter for other preprocedural examination-Z01.818 Indication:    Pre-TIPS CPT Code:      Echo Complete w Full [id number] Patient History: Diabetes:          Yes Pertinent History: Cirrhosis, ascites, portal HTN, esophageal varices. Study Detail: The following Echo studies were performed: 2D, M-Mode, Doppler and               color flow. Agitated saline used as a contrast agent for               intraseptal flow evaluation.  PHYSICIAN INTERPRETATION: Left Ventricle: Left ventricular ejection fraction is normal, by visual estimate at 60-65%. There are no regional left ventricular wall motion abnormalities. The left ventricular cavity size is normal. There is normal septal and normal posterior left ventricular wall thickness. Spectral Doppler shows a normal pattern of left ventricular diastolic filling. Left Atrium: The left atrial size is normal. A bubble study using agitated saline was performed. Bubble study is negative. Agitated saline contrast study was negative for intracardiac shunt. Right Ventricle: The right ventricle is normal in size. There is normal right ventricular global systolic function.  Right Atrium: The right atrium is normal in size. Aortic Valve: The aortic valve is trileaflet. There is minimal aortic valve cusp calcification. There is no evidence of aortic valve regurgitation. The peak instantaneous gradient of the aortic valve is 8 mmHg. Mitral Valve: The mitral valve is normal in structure. There is trace mitral valve regurgitation. Tricuspid Valve: The tricuspid valve is structurally normal. There is trace tricuspid regurgitation. The right ventricular systolic pressure is unable to be estimated. Pulmonic Valve: The pulmonic valve is structurally normal. There is physiologic pulmonic valve regurgitation. Pericardium: Trivial pericardial effusion. Aorta: The aortic root is normal. Systemic Veins: The inferior vena cava appears normal in size, with IVC inspiratory collapse greater than 50%. In comparison to the previous echocardiogram(s): Compared with study dated 10/29/2024, no significant change Prior exam did not include an agitated slaine contrast study.  CONCLUSIONS:  1. Left ventricular ejection fraction is normal, by visual estimate at 60-65%.  2. There is normal right ventricular global systolic function.  3. Agitated saline contrast study was negative for intracardiac shunt.  4. Compared with study dated 10/29/2024, no significant change Prior exam did not include an agitated slaine contrast study. QUANTITATIVE DATA SUMMARY:  2D MEASUREMENTS:          Normal Ranges: LAs:             3.60 cm  (2.7-4.0cm) IVSd:            0.70 cm  (0.6-1.1cm) LVPWd:           0.70 cm  (0.6-1.1cm) LVIDd:           4.40 cm  (3.9-5.9cm) LVIDs:           2.50 cm LV Mass Index:   48 g/m2 LVEDV Index:     49 ml/m2 LV % FS          43.2 %  LEFT ATRIUM:                  Normal Ranges: LA Vol A4C:        34.4 ml    (22+/-6mL/m2) LA Vol A2C:        48.6 ml LA Vol BP:         41.6 ml LA Vol Index A4C:  17.8ml/m2 LA Vol Index A2C:  25.1 ml/m2 LA Vol Index BP:   21.5 ml/m2 LA Area A4C:       14.2 cm2 LA Area A2C:        16.6 cm2 LA Major Axis A4C: 5.0 cm LA Major Axis A2C: 4.8 cm LA Volume Index:   21.5 ml/m2  RIGHT ATRIUM:         Normal Ranges: RA Area A4C:  9.9 cm2  AORTA MEASUREMENTS:         Normal Ranges: Ao Sinus, d:        3.00 cm (2.1-3.5cm) Asc Ao, d:          3.10 cm (2.1-3.4cm)  LV SYSTOLIC FUNCTION:                      Normal Ranges: EF-A4C View:    73 % (>=55%) EF-A2C View:    70 % EF-Biplane:     72 % EF-Visual:      63 % LV EF Reported: 63 %  LV DIASTOLIC FUNCTION:             Normal Ranges: MV Peak E:             0.90 m/s    (0.7-1.2 m/s) MV Peak A:             0.72 m/s    (0.42-0.7 m/s) E/A Ratio:             1.25        (1.0-2.2) MV e'                  0.092 m/s   (>8.0) MV lateral e'          0.10 m/s MV medial e'           0.08 m/s MV A Dur:              148.00 msec E/e' Ratio:            9.73        (<8.0) MV DT:                 201 msec    (150-240 msec)  MITRAL VALVE:          Normal Ranges: MV DT:        201 msec (150-240msec)  AORTIC VALVE:           Normal Ranges: AoV Vmax:      1.41 m/s (<=1.7m/s) AoV Peak P.0 mmHg (<20mmHg) LVOT Max Shay:  0.97 m/s (<=1.1m/s) LVOT VTI:      20.30 cm LVOT Diameter: 1.90 cm  (1.8-2.4cm) AoV Area,Vmax: 1.94 cm2 (2.5-4.5cm2)  RIGHT VENTRICLE: RV Basal 2.60 cm RV Mid   1.50 cm RV Major 6.6 cm TAPSE:   24.8 mm RV s'    0.11 m/s  TRICUSPID VALVE/RVSP:         Normal Ranges: IVC Diam:             1.48 cm  PULMONIC VALVE:          Normal Ranges: PV Max Shay:     1.2 m/s  (0.6-0.9m/s) PV Max P.0 mmHg  [id number] Serenity Wills MD Electronically signed on 2025 at 3:15:32 PM  ** Final **       Assessment/Plan   Assessment and Plan:  Alfonzo Flanagan is a 50 y.o. female with PMH significant for biopsy-proven PBC with cirrhosis c/b portal HTN with ascites, esophageal varices (s/p banding  with eradication), hepatic encephalopathy, antibody-positive celiac disease, recent pancreatic tail mass suggestive of neuroendocrine tumor s/p EUS on 3/2024 (no biopsy)  recommended repeat in 1 year, IDDM2 (A1c 7.6), HTN, GERD, anxiety, depression, CORBY (2013 sleep study, untreated) who presented on 2/9/2025 from OSH ED for evaluation by hepatology; given advanced disease may need eval for TIPS as well as for transplant. Tolerated 4.4L paracentesis 2/10/24 followed by 37.5g albumin given recent para, with improvement of abdominal pain and fullness but rapid re-accumulation and symptom recurrence on 2/12/25, improved with IV diuresis and transitioning to po torsemide.    On initial presentation, she had diarrhea and nausea for the past 4 days as well as chills, and CT evidence of edematous small bowel and colon but negative c diff and stool studies. Had sterile pyuria at OSH and here, without growth on repeat urine culture, and received empiric ceftriaxone for three days before negative fluid studies resulted. Ongoing diarrhea possibly 2/2 bowel wall edema vs poor po as stool studies are negative; trial of rifaximin for symptom management with significant improvement 2/13/25. One time episode of emesis 2/13 possibly related to hypoglycemia, will encourage zofran and decrease lantus, CTM.    Updates 02/14/25:  -transition to po torsemide for diuresis  -decrease insulin dose 15U lantus to 10U  -decrease atorvastatin dose to 20mg daily given worsening liver function for discharge  -IR para requested  -likely discharge tomorrow    #PBC cirrhosis cb portal hypertension   #concern for refractory ascites  #AMY  :: Ammonia on admission 71, lipase 200 (stable from last few admissions)  :: Based on OSH labs: MELD NA 15 child class C, MELD stable this admission, 16  :: Paracentesis 2/10/25 4.4L clear yellow without immediate complications, fluid studies consistent with portal hypertensive ascites without SBP: neutrophils<250, SAAG>1.1, protein <2.5  :: Increased home furosemide 40mg to 60mg daily  :: Cr>1.5x baseline (~0.4) in last 7 days  :: Better response with IV lasix compare to po lasix  60mg, possible contribution of bowel edema  :: Echo 2/12/25 The inferior vena cava appears normal in size, with IVC inspiratory collapse greater than 50%   :: improved ascites with IV diuresis, s/p 2 doses IV 30mg lasix with albumin given prior on 2/13 for slightly worsening kidney function  Plan:  - po torsemide 40mg today  - increased home spironolactone 50mg BID to 150mg daily  - 2g sodium diet with 2000cc fluid restriction  - nutrition consult for low sodium diet  - continue home ursodiol 300mg TID, fenofibrate  - daily MELD labs   - request IR for para prior to discharge if possible    #Diarrhea, nausea, vomiting, small intestine and colon edema  #celiac disease  #Hx hepatic encephalopathy  :: stool studies, cdiff negative  :: no n/v with [facility] 2/9-2/10  :: CT A/P on admission with edematous small bowel and colon possibly related to celiac disease as negative infectious workup as above  :: was tolerating diet but one episode emesis overnight into 2/14 without blood or recurrence  Plan:  - holding home lactulose 20mg, increased to BID from daily PRN  - zofran PRN, encourage use  - gluten free, soft diet advancing from [facility]D as tolerated   - rifaximin 550mg TID x14d for IBS-D dosing    #Night sweats and chills without SBP  #Sterile pyuria   :: recent urine cultures notable for a drug resistant enterococcus faecalis susceptible only to vanc on cultures, thought contaminant as this was not treated, if decompensating would add vanc to cover this  :: urine culture from OSH, urine culture here negative   :: bcx sent pre-abx 2/9, NGTD  :: s/p ceftriaxone 2/9-2/11/25 for c/f UTI and SBP, both negative studies  :: ongoing sterile pyuria  :: overnight sweats and chills (patient reports some at home but these are worse) but no fever, leukopenia without source at this time, VSS  Plan:  - CTM CBC and symptoms      #DM2 (A1c 7.6 most recently)  :: home lantus is 50 units BID which she doubled on her own given high blood sugars,  but she sometimes has low sugars with this regimen  :: overnight 2/10-2/11/25 pt had low BG with symptoms that improved with glucose. Switched lantus 20units BID to 15U nightly 2/11 then 10U on 2/14  Plan:  - lantus 10U nightly, mild SSI  - would benefit from optimization of her diabetes regimen, patient states is limited by insurance and may need socialwork  - holding home metformin 1000mg daily XR     #recent GI bleed (non variceal, no clear etiology)  #hx varices now sp eradication 2021  - counseled against NSAID use  - continue home pantoprazole 40mg   - continue home nadalol 40mg for ppx, consider discontinuing at discharge     #anxiety/depression   - continue home trazodone 25mg nightly     #Abdominal stretch pain  - acetaminophen PRN (max daily 2g)  - oxycodone 5mg q6h for mod-severe pain  - continue home carafate     #itching  - continue home prn atarax 25mg   - sarna cream  - benadryl cream     #HLD  ::  2 years ago  :: repeat panel HDL 21, LDL 62, TG 55, total cholesterol 94 on admission  - decrease atorvastatin 80mg daily to 20mg daily given worsening liver function  - continue home fenofibrate (145mg at home 160mg inpatient due to therapeutic interchange)     #other  - holding home nitroglycerine patch - using it prn for chest pain?  - holding home meclizine prn     F: prn   E: prn   N: Adult diet 2-3 grams sodium, Gluten Free; Thin 0; 2000 mL fluid  A: PIV    DVT prophylaxis: Lovenox subq  GI Prophylaxis: PPI   Antimicrobials: not indicated    CODE STATUS: Full Code, confirmed on admission  NOK:  Rah Modi [phone]  Extended Emergency Contact Information  Primary Emergency Contact: Rah Modi  Address: [address] [place] [address]  Home Phone: [phone]  Relation: Spouse  Secondary Emergency Contact: Claudia Hartley  Mobile Phone: [phone]  Relation: Daughter       Patient and plan discussed with attending physician.  ---  Rosario Ragland MD (Anna)  PGY1,  Internal Medicine  Available by [employer]

## 2025-02-14 NOTE — CARE PLAN
Problem: Pain  Goal: Takes deep breaths with improved pain control throughout the shift  Outcome: Progressing  Goal: Turns in bed with improved pain control throughout the shift  Outcome: Progressing  Goal: Walks with improved pain control throughout the shift  Outcome: Progressing  Goal: Performs ADL's with improved pain control throughout shift  Outcome: Progressing  Goal: Participates in PT with improved pain control throughout the shift  Outcome: Progressing  Goal: Free from opioid side effects throughout the shift  Outcome: Progressing  Goal: Free from acute confusion related to pain meds throughout the shift  Outcome: Progressing     Problem: Pain - Adult  Goal: Verbalizes/displays adequate comfort level or baseline comfort level  Outcome: Progressing     Problem: Discharge Planning  Goal: Discharge to home or other facility with appropriate resources  Outcome: Progressing  Flowsheets (Taken 2/12/2025 5027)  Discharge to home or other facility with appropriate resources:   Identify barriers to discharge with patient and caregiver   Identify discharge learning needs (meds, wound care, etc)     Problem: Chronic Conditions and Co-morbidities  Goal: Patient's chronic conditions and co-morbidity symptoms are monitored and maintained or improved  Outcome: Progressing  Flowsheets (Taken 2/14/2025 1152)  Care Plan - Patient's Chronic Conditions and Co-Morbidity Symptoms are Monitored and Maintained or Improved:   Monitor and assess patient's chronic conditions and comorbid symptoms for stability, deterioration, or improvement   Collaborate with multidisciplinary team to address chronic and comorbid conditions and prevent exacerbation or deterioration   Update acute care plan with appropriate goals if chronic or comorbid symptoms are exacerbated and prevent overall improvement and discharge     Problem: Nutrition  Goal: Nutrient intake appropriate for maintaining nutritional needs  Outcome: Progressing     Problem:  Fall/Injury  Goal: Be free from injury by end of the shift  Outcome: Progressing  Goal: Verbalize understanding of personal risk factors for fall in the hospital  Outcome: Progressing  Goal: Verbalize understanding of risk factor reduction measures to prevent injury from fall in the home  Outcome: Progressing  Goal: Use assistive devices by end of the shift  Outcome: Progressing  Goal: Pace activities to prevent fatigue by end of the shift  Outcome: Progressing     Problem: Diabetes  Goal: Achieve decreasing blood glucose levels by end of shift  Outcome: Progressing  Flowsheets (Taken 2/14/2025 1152)  Achieve decreasing blood glucose levels by end of shift:   Med administration/monitoring of effect   Self monitor blood glucose with staff oversight  Goal: Increase stability of blood glucose readings by end of shift  Outcome: Progressing  Flowsheets (Taken 2/12/2025 1612)  Increase stability of blood glucose readings by end of shift: Med administration/monitoring of effect  Goal: Decrease in ketones present in urine by end of shift  Outcome: Progressing  Flowsheets (Taken 2/14/2025 1152)  Decrease in ketones present in urine by end of shift:   Monitor urine output   Med administration/monitoring of effect  Goal: Learn about and adhere to nutrition recommendations by end of shift  Outcome: Progressing  Flowsheets (Taken 2/14/2025 1152)  Learn about and adhere to nutrition recommendations by end of shift: Ensure/encourage compliance with appropriate diet  Goal: Vital signs within normal range for age by end of shift  Outcome: Progressing  Flowsheets (Taken 2/14/2025 1152)  Vital signs within normal range for age by end of shift: Med administration/monitoring of effect   The patient's goals for the shift include Patient to start treatment after transfer from [facility] and orient to [place] [address] by end of shift    The clinical goals for the shift include Continue to maintain a safe environment

## 2025-02-15 ENCOUNTER — PHARMACY VISIT (OUTPATIENT)
Dept: PHARMACY | Facility: CLINIC | Age: 50
End: 2025-02-15
Payer: COMMERCIAL

## 2025-02-15 VITALS
RESPIRATION RATE: 16 BRPM | WEIGHT: 206.1 LBS | BODY MASS INDEX: 37.93 KG/M2 | HEART RATE: 73 BPM | TEMPERATURE: 98.6 F | SYSTOLIC BLOOD PRESSURE: 91 MMHG | DIASTOLIC BLOOD PRESSURE: 53 MMHG | HEIGHT: 62 IN | OXYGEN SATURATION: 92 %

## 2025-02-15 LAB
ALBUMIN SERPL BCP-MCNC: 2.2 G/DL (ref 3.4–5)
ALP SERPL-CCNC: 176 U/L (ref 33–110)
ALT SERPL W P-5'-P-CCNC: 16 U/L (ref 7–45)
ANION GAP SERPL CALC-SCNC: 7 MMOL/L (ref 10–20)
AST SERPL W P-5'-P-CCNC: 41 U/L (ref 9–39)
BASOPHILS # BLD AUTO: 0.02 X10*3/UL (ref 0–0.1)
BASOPHILS NFR BLD AUTO: 0.5 %
BILIRUB DIRECT SERPL-MCNC: 0.9 MG/DL (ref 0–0.3)
BILIRUB SERPL-MCNC: 1.6 MG/DL (ref 0–1.2)
BUN SERPL-MCNC: 11 MG/DL (ref 6–23)
CALCIUM SERPL-MCNC: 8.2 MG/DL (ref 8.6–10.6)
CHLORIDE SERPL-SCNC: 98 MMOL/L (ref 98–107)
CO2 SERPL-SCNC: 37 MMOL/L (ref 21–32)
CREAT SERPL-MCNC: 1.05 MG/DL (ref 0.5–1.05)
EGFRCR SERPLBLD CKD-EPI 2021: 66 ML/MIN/1.73M*2
EOSINOPHIL # BLD AUTO: 0.2 X10*3/UL (ref 0–0.7)
EOSINOPHIL NFR BLD AUTO: 4.8 %
ERYTHROCYTE [DISTWIDTH] IN BLOOD BY AUTOMATED COUNT: 15.1 % (ref 11.5–14.5)
GLUCOSE BLD MANUAL STRIP-MCNC: 194 MG/DL (ref 74–99)
GLUCOSE BLD MANUAL STRIP-MCNC: 76 MG/DL (ref 74–99)
GLUCOSE SERPL-MCNC: 69 MG/DL (ref 74–99)
HCT VFR BLD AUTO: 30 % (ref 36–46)
HGB BLD-MCNC: 10.3 G/DL (ref 12–16)
IMM GRANULOCYTES # BLD AUTO: 0.02 X10*3/UL (ref 0–0.7)
IMM GRANULOCYTES NFR BLD AUTO: 0.5 % (ref 0–0.9)
INR PPP: 1.5 (ref 0.9–1.1)
LYMPHOCYTES # BLD AUTO: 1.19 X10*3/UL (ref 1.2–4.8)
LYMPHOCYTES NFR BLD AUTO: 28.8 %
MAGNESIUM SERPL-MCNC: 1.56 MG/DL (ref 1.6–2.4)
MCH RBC QN AUTO: 31.9 PG (ref 26–34)
MCHC RBC AUTO-ENTMCNC: 34.3 G/DL (ref 32–36)
MCV RBC AUTO: 93 FL (ref 80–100)
MONOCYTES # BLD AUTO: 0.59 X10*3/UL (ref 0.1–1)
MONOCYTES NFR BLD AUTO: 14.3 %
NEUTROPHILS # BLD AUTO: 2.11 X10*3/UL (ref 1.2–7.7)
NEUTROPHILS NFR BLD AUTO: 51.1 %
NRBC BLD-RTO: 0 /100 WBCS (ref 0–0)
PHOSPHATE SERPL-MCNC: 3.2 MG/DL (ref 2.5–4.9)
PLATELET # BLD AUTO: 64 X10*3/UL (ref 150–450)
POTASSIUM SERPL-SCNC: 3.5 MMOL/L (ref 3.5–5.3)
PROT SERPL-MCNC: 6.1 G/DL (ref 6.4–8.2)
PROTHROMBIN TIME: 16.6 SECONDS (ref 9.8–12.8)
RBC # BLD AUTO: 3.23 X10*6/UL (ref 4–5.2)
SODIUM SERPL-SCNC: 138 MMOL/L (ref 136–145)
WBC # BLD AUTO: 4.1 X10*3/UL (ref 4.4–11.3)

## 2025-02-15 PROCEDURE — 85610 PROTHROMBIN TIME: CPT

## 2025-02-15 PROCEDURE — 2500000004 HC RX 250 GENERAL PHARMACY W/ HCPCS (ALT 636 FOR OP/ED)

## 2025-02-15 PROCEDURE — 84100 ASSAY OF PHOSPHORUS: CPT

## 2025-02-15 PROCEDURE — 2500000002 HC RX 250 W HCPCS SELF ADMINISTERED DRUGS (ALT 637 FOR MEDICARE OP, ALT 636 FOR OP/ED)

## 2025-02-15 PROCEDURE — 82248 BILIRUBIN DIRECT: CPT

## 2025-02-15 PROCEDURE — 85025 COMPLETE CBC W/AUTO DIFF WBC: CPT

## 2025-02-15 PROCEDURE — 82947 ASSAY GLUCOSE BLOOD QUANT: CPT

## 2025-02-15 PROCEDURE — 80048 BASIC METABOLIC PNL TOTAL CA: CPT

## 2025-02-15 PROCEDURE — 36415 COLL VENOUS BLD VENIPUNCTURE: CPT

## 2025-02-15 PROCEDURE — RXMED WILLOW AMBULATORY MEDICATION CHARGE

## 2025-02-15 PROCEDURE — 2500000001 HC RX 250 WO HCPCS SELF ADMINISTERED DRUGS (ALT 637 FOR MEDICARE OP): Performed by: STUDENT IN AN ORGANIZED HEALTH CARE EDUCATION/TRAINING PROGRAM

## 2025-02-15 PROCEDURE — 99239 HOSP IP/OBS DSCHRG MGMT >30: CPT

## 2025-02-15 PROCEDURE — 2500000001 HC RX 250 WO HCPCS SELF ADMINISTERED DRUGS (ALT 637 FOR MEDICARE OP)

## 2025-02-15 PROCEDURE — 83735 ASSAY OF MAGNESIUM: CPT

## 2025-02-15 PROCEDURE — 2500000002 HC RX 250 W HCPCS SELF ADMINISTERED DRUGS (ALT 637 FOR MEDICARE OP, ALT 636 FOR OP/ED): Performed by: STUDENT IN AN ORGANIZED HEALTH CARE EDUCATION/TRAINING PROGRAM

## 2025-02-15 RX ORDER — TORSEMIDE 20 MG/1
20 TABLET ORAL DAILY
Status: DISCONTINUED | OUTPATIENT
Start: 2025-02-16 | End: 2025-02-15 | Stop reason: HOSPADM

## 2025-02-15 RX ORDER — ISOPROPYL ALCOHOL 70 ML/100ML
1 SWAB TOPICAL NIGHTLY
Qty: 100 EACH | Refills: 0 | Status: SHIPPED | OUTPATIENT
Start: 2025-02-15

## 2025-02-15 RX ORDER — INSULIN GLARGINE 100 [IU]/ML
7 INJECTION, SOLUTION SUBCUTANEOUS EVERY 24 HOURS
Status: DISCONTINUED | OUTPATIENT
Start: 2025-02-15 | End: 2025-02-15 | Stop reason: HOSPADM

## 2025-02-15 RX ORDER — INSULIN GLARGINE 100 [IU]/ML
7 INJECTION, SOLUTION SUBCUTANEOUS NIGHTLY
Qty: 15 ML | Refills: 0 | Status: SHIPPED | OUTPATIENT
Start: 2025-02-15

## 2025-02-15 RX ORDER — TORSEMIDE 20 MG/1
20 TABLET ORAL DAILY
Qty: 30 TABLET | Refills: 0 | Status: SHIPPED | OUTPATIENT
Start: 2025-02-16 | End: 2025-03-18

## 2025-02-15 RX ORDER — URSODIOL 500 MG/1
TABLET, FILM COATED ORAL
Qty: 75 TABLET | Refills: 1 | Status: SHIPPED | OUTPATIENT
Start: 2025-02-15 | End: 2025-03-17

## 2025-02-15 RX ORDER — PEN NEEDLE, DIABETIC 29 G X1/2"
NEEDLE, DISPOSABLE MISCELLANEOUS
Qty: 100 EACH | Refills: 11 | Status: SHIPPED | OUTPATIENT
Start: 2025-02-15 | End: 2026-02-15

## 2025-02-15 RX ADMIN — RIFAXIMIN 550 MG: 550 TABLET ORAL at 09:24

## 2025-02-15 RX ADMIN — PANTOPRAZOLE SODIUM 40 MG: 40 TABLET, DELAYED RELEASE ORAL at 07:37

## 2025-02-15 RX ADMIN — RIFAXIMIN 550 MG: 550 TABLET ORAL at 15:52

## 2025-02-15 RX ADMIN — ENOXAPARIN SODIUM 40 MG: 100 INJECTION SUBCUTANEOUS at 11:45

## 2025-02-15 RX ADMIN — SUCRALFATE 1 G: 1 TABLET ORAL at 11:45

## 2025-02-15 RX ADMIN — SUCRALFATE 1 G: 1 TABLET ORAL at 07:37

## 2025-02-15 RX ADMIN — TORSEMIDE 40 MG: 20 TABLET ORAL at 11:46

## 2025-02-15 RX ADMIN — URSODIOL 625 MG: 500 TABLET ORAL at 09:24

## 2025-02-15 RX ADMIN — SPIRONOLACTONE 150 MG: 25 TABLET, FILM COATED ORAL at 10:19

## 2025-02-15 ASSESSMENT — COGNITIVE AND FUNCTIONAL STATUS - GENERAL
DAILY ACTIVITIY SCORE: 24
MOBILITY SCORE: 24

## 2025-02-15 ASSESSMENT — PAIN SCALES - GENERAL: PAINLEVEL_OUTOF10: 2

## 2025-02-15 NOTE — DISCHARGE SUMMARY
Discharge Diagnosis  Cirrhosis (Multi)    Issues Requiring Follow-Up  Outpatient follow-up needed:  Primary care:   [ ] Follow up on diabetes regimen. Significantly decreased insulin d/t poor po intake and multiple hypoglycemia episodes inpatient  [ ] Follow-up on pulmonary nodule, recommended rescan in September 2025  [ ] Follow-up on blood pressures and electrolytes with increased diuretic regimen  GI:  [ ] Workup for TIPS versus liver transplant  [ ] OP EUS w/bx for suspicion of neuroendocrine tumor on tail of pancreas    Discharge Meds     Medication List      START taking these medications     carvedilol 6.25 mg tablet; Commonly known as: Coreg; Take 1 tablet (6.25   mg) by mouth 2 times a day. Do not fill before February 15, 2025.   Easy Touch Alcohol Prep Pads pads, medicated; Generic drug: alcohol   swabs; Apply 1 each topically once daily at bedtime.   torsemide 20 mg tablet; Commonly known as: Demadex; Take 1 tablet (20   mg) by mouth once daily. Do not start before February 16, 2025.; Start   taking on: February 16, 2025   ursodiol 500 mg tablet; Commonly known as: Actigall; Take 1 tablet (500   mg) by mouth 2 times a day AND 0.5 tablets (250 mg) once daily.; Replaces:   ursodiol 300 mg capsule   Xifaxan 550 mg tablet; Generic drug: rifAXIMin; Take 1 tablet (550 mg)   by mouth 3 times a day for 34 doses.     CHANGE how you take these medications     atorvastatin 20 mg tablet; Commonly known as: Lipitor; Take 1 tablet (20   mg) by mouth once daily at bedtime.; What changed: medication strength,   how much to take   Lantus Solostar U-100 Insulin 100 unit/mL (3 mL) pen; Generic drug:   insulin glargine; Inject 7 Units under the skin once daily at bedtime.   Take as directed per insulin instructions.; What changed: how much to   take, when to take this, additional instructions   spironolactone 50 mg tablet; Commonly known as: Aldactone; Take 3   tablets (150 mg) by mouth once daily.; What changed: how much to  "take,   when to take this, additional instructions   * Sure Comfort Pen Needle 32 gauge x 5/32\" needle; Generic drug: pen   needle, diabetic; 4 veces cada día; (Use 4 times a day); What changed:   Another medication with the same name was added. Make sure you understand   how and when to take each.   * BD Ultra-Fine Orig Pen Needle 29 gauge x 1/2\" needle; Generic drug:   pen needle 1/2\"; Usar para inyectarse de 1 a 4 veces al día según las   indicaciones.; (Use to inject 1-4 times daily as directed.); What changed:   You were already taking a medication with the same name, and this   prescription was added. Make sure you understand how and when to take   each.   traZODone 50 mg tablet; Commonly known as: Desyrel; Bradner 1/2 tableta 1   vez cada noche; (Take 0.5 tablets (25 mg) by mouth once daily at   bedtime.); What changed: when to take this, reasons to take this  * This list has 2 medication(s) that are the same as other medications   prescribed for you. Read the directions carefully, and ask your doctor or   other care provider to review them with you.     CONTINUE taking these medications     acetaminophen 500 mg tablet; Commonly known as: Tylenol   clotrimazole 1 % vaginal cream; Commonly known as: Lotrimin; Insert 1   applicator into the vagina once daily at bedtime for 10 days.   hydrOXYzine HCL 25 mg tablet; Commonly known as: Atarax; Take 1 tablet   (25 mg) by mouth every 4 hours if needed for anxiety.   meclizine 25 mg tablet; Commonly known as: Antivert   metFORMIN  mg 24 hr tablet; Commonly known as: Glucophage-XR; Bradner   2 tabletas 1 vez cada día por la tarde. Bradner con comida.; (Take 2 tablets   (1,000 mg) by mouth once daily in the evening. Take with meals. Do not   crush, chew, or split.)   pantoprazole 40 mg EC tablet; Commonly known as: ProtoNix; Bradner 1   tableta 1 vez cada mañana.; (Take 1 tablet (40 mg) by mouth once daily in   the morning. Take before meals. Do not crush, chew, or split.)   " polyethylene glycol 17 gram/dose powder; Commonly known as: Glycolax,   Miralax; Mezcle 1 tapa en un vaso de agua y sindhu 2 veces cada día.; (Mix   17 grams of powder in 8 ounces of water and drink 2 times a day.)   sucralfate 1 gram tablet; Commonly known as: Carafate     STOP taking these medications     cephalexin 500 mg capsule; Commonly known as: Keflex   fenofibrate 145 mg tablet; Commonly known as: Tricor   furosemide 40 mg tablet; Commonly known as: Lasix   lactulose 20 gram/30 mL oral solution   nadolol 20 mg tablet; Commonly known as: Corgard   nitroglycerin 0.1 mg/hr patch; Commonly known as: Nitrodur   ursodiol 300 mg capsule; Commonly known as: Actigall; Replaced by:   ursodiol 500 mg tablet       Test Results Pending At Discharge  Pending Labs       Order Current Status    AFB Culture/Smear Preliminary result            Hospital Course  Alfonzo Flanagan is a 50 y.o. female with PMH significant for biopsy-proven PBC with cirrhosis c/b portal HTN with ascites, esophageal varices (s/p banding 2021 with eradication), hepatic encephalopathy, antibody-positive celiac disease, recent pancreatic tail mass suggestive of neuroendocrine tumor s/p EUS on 3/2024 (no biopsy) recommended repeat in 1 year, IDDM2 (A1c 7.6), HTN, GERD, anxiety, depression, CORBY (2013 sleep study, untreated) who presented on 2/9/2025 from [facility] for evaluation by hepatology; given advanced disease may need eval for TIPS as well as for transplant. Tolerated 4.4L paracentesis 2/10/24 followed by 37.5g albumin given recent para, with improvement of abdominal pain and fullness but rapid re-accumulation and symptom recurrence on 2/12/25, improved with IV diuresis more so than p.o. and patient was transitioned to torsemide on 2/14/2025 for oral diuresis.  Echo and BNP completed for TIPS workup within normal limits. Repeated 2.5 L para with IR on 2/14 for symptomatic management.     On initial presentation, she had diarrhea and nausea for the  past 4 days as well as chills, and CT evidence of edematous small bowel and colon but negative c diff and stool studies.  Nausea and vomiting resolved with clear liquid diet and was able to advance as tolerated over 3 days without recurrence of vomiting.  Had sterile pyuria at OSH and here, without growth on repeat urine culture, and received empiric ceftriaxone for three days before negative fluid studies resulted.  Diarrhea worsened overnight to 12/12 and patient started on empiric rifaximin for IBS-D dosing with significant improvement.  Ongoing chronic diarrhea possibly 2/2 bowel wall edema vs poor po as stool studies are negative and improving with diuresis and rifaximin.      Outpatient follow-up needed:  Primary care:   [ ] Follow up on diabetes regimen. Significantly decreased insulin d/t poor po intake and multiple hypoglycemia episodes inpatient  [ ] Follow-up on pulmonary nodule, recommended rescan in September 2025  [ ] Follow-up on blood pressures and electrolytes with increased diuretic regimen  GI:  [ ] Workup for TIPS versus liver transplant    Pertinent Physical Exam At Time of Discharge  Constitutional: Chronically ill-appearing female in no acute distress, lying in bed  HEENT: NCAT, sclera anicteric  Respiratory: Normal respiratory effort on RA.  Bilateral crackles at the lung bases right> left  Cardiovascular: RRR, normal S1/S2, No murmurs  Abdominal: Soft and compressible, distended, ventral hernia, diffusely tender to palpation but improved from prior. No rebound or guarding.  Stretch marks visible on skin.  No erythema or purulence at sites of prior paracentesis mostly on the right lower quadrant.  Small bruise over 2/10/25 para site  Neuro: Moving all extremities with no focal deficits  MSK: WWP, trace peripheral edema  Skin: Some ecchymoses   Psych: Appropriate mood and affect, alert and oriented, linear thought process and judgement intact      Outpatient Follow-Up  Future Appointments    Date Time Provider Department Center   4/23/2025  1:40 PM Dwain Khanna MD [id number]         Jesse Ballesteros MD

## 2025-02-15 NOTE — CARE PLAN
Problem: Pain - Adult  Goal: Verbalizes/displays adequate comfort level or baseline comfort level  Outcome: Progressing     Problem: Fall/Injury  Goal: Be free from injury by end of the shift  Outcome: Progressing  Goal: Verbalize understanding of personal risk factors for fall in the hospital  Outcome: Progressing   The patient's goals for the shift include feel better     The clinical goals for the shift include pt will remain safe throughout the shift

## 2025-02-19 ENCOUNTER — TELEPHONE (OUTPATIENT)
Dept: GASTROENTEROLOGY | Facility: HOSPITAL | Age: 50
End: 2025-02-19
Payer: COMMERCIAL

## 2025-02-19 LAB
ACID FAST STN SPEC: NORMAL
MYCOBACTERIUM SPEC CULT: NORMAL

## 2025-02-19 NOTE — TELEPHONE ENCOUNTER
Patient's daughter called with questions for Dr. Khanna.     Since her discharge from the ED, the patient has been experiencing severe fatigue along with twitching of the arms and legs.     There is pain under the ribs where the drain was placed while she was in the hospital.     She is asking if these symptoms are normal or if there is anything they can do to lessen the symptoms.     Please contact daughter at [phone]    I was able to give her an earlier appointment of 3/26/25 at 2:40 PM.

## 2025-02-21 ENCOUNTER — TELEPHONE (OUTPATIENT)
Dept: GASTROENTEROLOGY | Facility: HOSPITAL | Age: 50
End: 2025-02-21
Payer: COMMERCIAL

## 2025-02-21 NOTE — TELEPHONE ENCOUNTER
Patient's daughter reached back out following up with the symptoms her mother is experiencing.     Phone number for her is [phone]

## 2025-02-26 LAB
ACID FAST STN SPEC: NORMAL
MYCOBACTERIUM SPEC CULT: NORMAL

## 2025-02-27 ENCOUNTER — CLINICAL SUPPORT (OUTPATIENT)
Dept: EMERGENCY MEDICINE | Facility: HOSPITAL | Age: 50
End: 2025-02-27
Payer: COMMERCIAL

## 2025-02-27 ENCOUNTER — HOSPITAL ENCOUNTER (OUTPATIENT)
Facility: HOSPITAL | Age: 50
Setting detail: OBSERVATION
Discharge: HOME | End: 2025-03-02
Attending: EMERGENCY MEDICINE | Admitting: INTERNAL MEDICINE
Payer: COMMERCIAL

## 2025-02-27 DIAGNOSIS — R11.2 NAUSEA AND VOMITING, UNSPECIFIED VOMITING TYPE: ICD-10-CM

## 2025-02-27 DIAGNOSIS — R53.1 WEAKNESS: ICD-10-CM

## 2025-02-27 DIAGNOSIS — K74.60 LIVER CIRRHOSIS SECONDARY TO NASH (NONALCOHOLIC STEATOHEPATITIS) (MULTI): ICD-10-CM

## 2025-02-27 DIAGNOSIS — Z79.4 TYPE 2 DIABETES MELLITUS WITH OTHER SPECIFIED COMPLICATION, WITH LONG-TERM CURRENT USE OF INSULIN: ICD-10-CM

## 2025-02-27 DIAGNOSIS — G47.00 INSOMNIA, UNSPECIFIED TYPE: Primary | ICD-10-CM

## 2025-02-27 DIAGNOSIS — K74.60 CIRRHOSIS (MULTI): ICD-10-CM

## 2025-02-27 DIAGNOSIS — L29.9 ITCH: ICD-10-CM

## 2025-02-27 DIAGNOSIS — Z79.4 TYPE 2 DIABETES MELLITUS WITHOUT COMPLICATION, WITH LONG-TERM CURRENT USE OF INSULIN (MULTI): ICD-10-CM

## 2025-02-27 DIAGNOSIS — R10.84 ABDOMINAL PAIN, GENERALIZED: ICD-10-CM

## 2025-02-27 DIAGNOSIS — K75.81 LIVER CIRRHOSIS SECONDARY TO NASH (NONALCOHOLIC STEATOHEPATITIS) (MULTI): ICD-10-CM

## 2025-02-27 DIAGNOSIS — R10.13 ABDOMINAL PAIN, EPIGASTRIC: ICD-10-CM

## 2025-02-27 DIAGNOSIS — E11.69 TYPE 2 DIABETES MELLITUS WITH OTHER SPECIFIED COMPLICATION, WITH LONG-TERM CURRENT USE OF INSULIN: ICD-10-CM

## 2025-02-27 DIAGNOSIS — R18.8 OTHER ASCITES: ICD-10-CM

## 2025-02-27 DIAGNOSIS — K21.9 GASTROESOPHAGEAL REFLUX DISEASE WITHOUT ESOPHAGITIS: ICD-10-CM

## 2025-02-27 DIAGNOSIS — R10.84 GENERALIZED ABDOMINAL PAIN: ICD-10-CM

## 2025-02-27 DIAGNOSIS — E11.9 TYPE 2 DIABETES MELLITUS WITHOUT COMPLICATION, WITH LONG-TERM CURRENT USE OF INSULIN (MULTI): ICD-10-CM

## 2025-02-27 DIAGNOSIS — K59.00 CONSTIPATION, UNSPECIFIED CONSTIPATION TYPE: ICD-10-CM

## 2025-02-27 DIAGNOSIS — K72.90 LIVER FAILURE WITHOUT HEPATIC COMA, UNSPECIFIED CHRONICITY (MULTI): ICD-10-CM

## 2025-02-27 LAB
ALBUMIN SERPL BCP-MCNC: 2.3 G/DL (ref 3.4–5)
ALP SERPL-CCNC: 274 U/L (ref 33–110)
ALT SERPL W P-5'-P-CCNC: 30 U/L (ref 7–45)
AMMONIA PLAS-SCNC: 61 UMOL/L (ref 16–53)
ANION GAP BLDV CALCULATED.4IONS-SCNC: 6 MMOL/L (ref 10–25)
ANION GAP SERPL CALC-SCNC: 8 MMOL/L (ref 10–20)
AST SERPL W P-5'-P-CCNC: 75 U/L (ref 9–39)
ATRIAL RATE: 103 BPM
BASE EXCESS BLDV CALC-SCNC: 6.3 MMOL/L (ref -2–3)
BASOPHILS # BLD AUTO: 0.02 X10*3/UL (ref 0–0.1)
BASOPHILS NFR BLD AUTO: 0.3 %
BILIRUB SERPL-MCNC: 3.6 MG/DL (ref 0–1.2)
BODY TEMPERATURE: 37 DEGREES CELSIUS
BUN SERPL-MCNC: 11 MG/DL (ref 6–23)
CA-I BLDV-SCNC: 1.19 MMOL/L (ref 1.1–1.33)
CALCIUM SERPL-MCNC: 7.8 MG/DL (ref 8.6–10.6)
CHLORIDE BLDV-SCNC: 98 MMOL/L (ref 98–107)
CHLORIDE SERPL-SCNC: 96 MMOL/L (ref 98–107)
CO2 SERPL-SCNC: 29 MMOL/L (ref 21–32)
CREAT SERPL-MCNC: 0.58 MG/DL (ref 0.5–1.05)
EGFRCR SERPLBLD CKD-EPI 2021: >90 ML/MIN/1.73M*2
EOSINOPHIL # BLD AUTO: 0.16 X10*3/UL (ref 0–0.7)
EOSINOPHIL NFR BLD AUTO: 2.6 %
ERYTHROCYTE [DISTWIDTH] IN BLOOD BY AUTOMATED COUNT: 14.9 % (ref 11.5–14.5)
GLUCOSE BLDV-MCNC: 347 MG/DL (ref 74–99)
GLUCOSE SERPL-MCNC: 348 MG/DL (ref 74–99)
HCO3 BLDV-SCNC: 31.8 MMOL/L (ref 22–26)
HCT VFR BLD AUTO: 31.9 % (ref 36–46)
HCT VFR BLD EST: 35 % (ref 36–46)
HGB BLD-MCNC: 10.9 G/DL (ref 12–16)
HGB BLDV-MCNC: 11.6 G/DL (ref 12–16)
IMM GRANULOCYTES # BLD AUTO: 0.02 X10*3/UL (ref 0–0.7)
IMM GRANULOCYTES NFR BLD AUTO: 0.3 % (ref 0–0.9)
INHALED O2 CONCENTRATION: 21 %
INR PPP: 1.5 (ref 0.9–1.1)
LACTATE BLDV-SCNC: 1.6 MMOL/L (ref 0.4–2)
LACTATE SERPL-SCNC: 1.7 MMOL/L (ref 0.4–2)
LYMPHOCYTES # BLD AUTO: 1.35 X10*3/UL (ref 1.2–4.8)
LYMPHOCYTES NFR BLD AUTO: 22 %
MCH RBC QN AUTO: 31.3 PG (ref 26–34)
MCHC RBC AUTO-ENTMCNC: 34.2 G/DL (ref 32–36)
MCV RBC AUTO: 92 FL (ref 80–100)
MONOCYTES # BLD AUTO: 0.64 X10*3/UL (ref 0.1–1)
MONOCYTES NFR BLD AUTO: 10.4 %
NEUTROPHILS # BLD AUTO: 3.94 X10*3/UL (ref 1.2–7.7)
NEUTROPHILS NFR BLD AUTO: 64.4 %
NRBC BLD-RTO: 0 /100 WBCS (ref 0–0)
OXYHGB MFR BLDV: 42.6 % (ref 45–75)
P AXIS: 58 DEGREES
P OFFSET: 192 MS
P ONSET: 143 MS
PCO2 BLDV: 49 MM HG (ref 41–51)
PH BLDV: 7.42 PH (ref 7.33–7.43)
PLATELET # BLD AUTO: 70 X10*3/UL (ref 150–450)
PO2 BLDV: 29 MM HG (ref 35–45)
POTASSIUM BLDV-SCNC: 3.6 MMOL/L (ref 3.5–5.3)
POTASSIUM SERPL-SCNC: 4.3 MMOL/L (ref 3.5–5.3)
PR INTERVAL: 168 MS
PROT SERPL-MCNC: 7 G/DL (ref 6.4–8.2)
PROTHROMBIN TIME: 16.5 SECONDS (ref 9.8–12.4)
Q ONSET: 227 MS
QRS COUNT: 16 BEATS
QRS DURATION: 76 MS
QT INTERVAL: 342 MS
QTC CALCULATION(BAZETT): 448 MS
QTC FREDERICIA: 409 MS
R AXIS: 9 DEGREES
RBC # BLD AUTO: 3.48 X10*6/UL (ref 4–5.2)
SAO2 % BLDV: 44 % (ref 45–75)
SODIUM BLDV-SCNC: 132 MMOL/L (ref 136–145)
SODIUM SERPL-SCNC: 129 MMOL/L (ref 136–145)
T AXIS: 21 DEGREES
T OFFSET: 398 MS
VENTRICULAR RATE: 103 BPM
WBC # BLD AUTO: 6.1 X10*3/UL (ref 4.4–11.3)

## 2025-02-27 PROCEDURE — 2500000004 HC RX 250 GENERAL PHARMACY W/ HCPCS (ALT 636 FOR OP/ED)

## 2025-02-27 PROCEDURE — 84075 ASSAY ALKALINE PHOSPHATASE: CPT | Performed by: EMERGENCY MEDICINE

## 2025-02-27 PROCEDURE — 84132 ASSAY OF SERUM POTASSIUM: CPT | Performed by: EMERGENCY MEDICINE

## 2025-02-27 PROCEDURE — 82140 ASSAY OF AMMONIA: CPT | Performed by: EMERGENCY MEDICINE

## 2025-02-27 PROCEDURE — 93005 ELECTROCARDIOGRAM TRACING: CPT

## 2025-02-27 PROCEDURE — G0378 HOSPITAL OBSERVATION PER HR: HCPCS

## 2025-02-27 PROCEDURE — 96376 TX/PRO/DX INJ SAME DRUG ADON: CPT | Mod: 59

## 2025-02-27 PROCEDURE — 85610 PROTHROMBIN TIME: CPT | Performed by: EMERGENCY MEDICINE

## 2025-02-27 PROCEDURE — 2500000004 HC RX 250 GENERAL PHARMACY W/ HCPCS (ALT 636 FOR OP/ED): Performed by: EMERGENCY MEDICINE

## 2025-02-27 PROCEDURE — 49083 ABD PARACENTESIS W/IMAGING: CPT

## 2025-02-27 PROCEDURE — 96375 TX/PRO/DX INJ NEW DRUG ADDON: CPT | Mod: 59

## 2025-02-27 PROCEDURE — 83605 ASSAY OF LACTIC ACID: CPT | Performed by: EMERGENCY MEDICINE

## 2025-02-27 PROCEDURE — 36415 COLL VENOUS BLD VENIPUNCTURE: CPT | Performed by: EMERGENCY MEDICINE

## 2025-02-27 PROCEDURE — 99285 EMERGENCY DEPT VISIT HI MDM: CPT | Mod: 25 | Performed by: EMERGENCY MEDICINE

## 2025-02-27 PROCEDURE — 85025 COMPLETE CBC W/AUTO DIFF WBC: CPT | Performed by: EMERGENCY MEDICINE

## 2025-02-27 RX ORDER — ENOXAPARIN SODIUM 100 MG/ML
40 INJECTION SUBCUTANEOUS EVERY 24 HOURS
Status: DISCONTINUED | OUTPATIENT
Start: 2025-02-28 | End: 2025-03-02 | Stop reason: HOSPADM

## 2025-02-27 RX ORDER — SUCRALFATE 1 G/1
1 TABLET ORAL
Status: DISCONTINUED | OUTPATIENT
Start: 2025-02-28 | End: 2025-03-02 | Stop reason: HOSPADM

## 2025-02-27 RX ORDER — ACETAMINOPHEN 650 MG/1
650 SUPPOSITORY RECTAL EVERY 4 HOURS PRN
Status: DISCONTINUED | OUTPATIENT
Start: 2025-02-27 | End: 2025-03-02

## 2025-02-27 RX ORDER — TRAZODONE HYDROCHLORIDE 50 MG/1
25 TABLET ORAL NIGHTLY PRN
Status: DISCONTINUED | OUTPATIENT
Start: 2025-02-27 | End: 2025-03-02 | Stop reason: HOSPADM

## 2025-02-27 RX ORDER — TALC
3 POWDER (GRAM) TOPICAL NIGHTLY PRN
Status: DISCONTINUED | OUTPATIENT
Start: 2025-02-27 | End: 2025-03-02

## 2025-02-27 RX ORDER — ONDANSETRON HYDROCHLORIDE 2 MG/ML
4 INJECTION, SOLUTION INTRAVENOUS ONCE
Status: DISCONTINUED | OUTPATIENT
Start: 2025-02-27 | End: 2025-02-28

## 2025-02-27 RX ORDER — URSODIOL 250 MG/1
250 TABLET, FILM COATED ORAL DAILY
Status: DISCONTINUED | OUTPATIENT
Start: 2025-02-28 | End: 2025-03-02 | Stop reason: HOSPADM

## 2025-02-27 RX ORDER — SPIRONOLACTONE 25 MG/1
150 TABLET ORAL DAILY
Status: DISCONTINUED | OUTPATIENT
Start: 2025-02-28 | End: 2025-03-01

## 2025-02-27 RX ORDER — HYDROXYZINE HYDROCHLORIDE 25 MG/1
25 TABLET, FILM COATED ORAL EVERY 4 HOURS PRN
Status: DISCONTINUED | OUTPATIENT
Start: 2025-02-27 | End: 2025-03-02 | Stop reason: HOSPADM

## 2025-02-27 RX ORDER — MORPHINE SULFATE 4 MG/ML
4 INJECTION INTRAVENOUS ONCE
Status: COMPLETED | OUTPATIENT
Start: 2025-02-27 | End: 2025-02-27

## 2025-02-27 RX ORDER — ONDANSETRON HYDROCHLORIDE 2 MG/ML
4 INJECTION, SOLUTION INTRAVENOUS ONCE
Status: COMPLETED | OUTPATIENT
Start: 2025-02-27 | End: 2025-02-27

## 2025-02-27 RX ORDER — ACETAMINOPHEN 160 MG/5ML
650 SOLUTION ORAL EVERY 4 HOURS PRN
Status: DISCONTINUED | OUTPATIENT
Start: 2025-02-27 | End: 2025-03-02

## 2025-02-27 RX ORDER — URSODIOL 500 MG/1
500 TABLET, FILM COATED ORAL 2 TIMES DAILY
Status: DISCONTINUED | OUTPATIENT
Start: 2025-02-27 | End: 2025-03-02 | Stop reason: HOSPADM

## 2025-02-27 RX ORDER — CARVEDILOL 6.25 MG/1
6.25 TABLET ORAL 2 TIMES DAILY
Status: DISCONTINUED | OUTPATIENT
Start: 2025-02-27 | End: 2025-03-02 | Stop reason: HOSPADM

## 2025-02-27 RX ORDER — POLYETHYLENE GLYCOL 3350 17 G/17G
17 POWDER, FOR SOLUTION ORAL DAILY PRN
Status: DISCONTINUED | OUTPATIENT
Start: 2025-02-27 | End: 2025-03-02

## 2025-02-27 RX ORDER — MECLIZINE HYDROCHLORIDE 25 MG/1
25 TABLET ORAL DAILY PRN
Status: DISCONTINUED | OUTPATIENT
Start: 2025-02-27 | End: 2025-03-02 | Stop reason: HOSPADM

## 2025-02-27 RX ORDER — ACETAMINOPHEN 325 MG/1
500-1000 TABLET ORAL EVERY 6 HOURS PRN
Status: CANCELLED | OUTPATIENT
Start: 2025-02-27

## 2025-02-27 RX ORDER — PANTOPRAZOLE SODIUM 40 MG/1
40 TABLET, DELAYED RELEASE ORAL
Status: DISCONTINUED | OUTPATIENT
Start: 2025-02-28 | End: 2025-03-02

## 2025-02-27 RX ORDER — TORSEMIDE 20 MG/1
20 TABLET ORAL DAILY
Status: DISCONTINUED | OUTPATIENT
Start: 2025-02-28 | End: 2025-03-02 | Stop reason: HOSPADM

## 2025-02-27 RX ORDER — METFORMIN HYDROCHLORIDE 500 MG/1
1000 TABLET, EXTENDED RELEASE ORAL
Status: DISCONTINUED | OUTPATIENT
Start: 2025-02-28 | End: 2025-02-28

## 2025-02-27 RX ORDER — ACETAMINOPHEN 325 MG/1
500 TABLET ORAL EVERY 6 HOURS PRN
Status: DISCONTINUED | OUTPATIENT
Start: 2025-02-27 | End: 2025-03-02

## 2025-02-27 RX ORDER — ATORVASTATIN CALCIUM 20 MG/1
20 TABLET, FILM COATED ORAL NIGHTLY
Status: DISCONTINUED | OUTPATIENT
Start: 2025-02-27 | End: 2025-03-02 | Stop reason: HOSPADM

## 2025-02-27 RX ADMIN — MORPHINE SULFATE 4 MG: 4 INJECTION INTRAVENOUS at 18:20

## 2025-02-27 RX ADMIN — MORPHINE SULFATE 4 MG: 4 INJECTION INTRAVENOUS at 21:54

## 2025-02-27 RX ADMIN — ONDANSETRON 4 MG: 2 INJECTION INTRAMUSCULAR; INTRAVENOUS at 21:54

## 2025-02-27 RX ADMIN — ONDANSETRON 4 MG: 2 INJECTION INTRAMUSCULAR; INTRAVENOUS at 18:23

## 2025-02-27 ASSESSMENT — PAIN DESCRIPTION - LOCATION: LOCATION: ABDOMEN

## 2025-02-27 ASSESSMENT — LIFESTYLE VARIABLES
EVER HAD A DRINK FIRST THING IN THE MORNING TO STEADY YOUR NERVES TO GET RID OF A HANGOVER: NO
HAVE YOU EVER FELT YOU SHOULD CUT DOWN ON YOUR DRINKING: NO
HAVE PEOPLE ANNOYED YOU BY CRITICIZING YOUR DRINKING: NO
EVER FELT BAD OR GUILTY ABOUT YOUR DRINKING: NO
TOTAL SCORE: 0

## 2025-02-27 ASSESSMENT — PAIN SCALES - GENERAL
PAINLEVEL_OUTOF10: 9
PAINLEVEL_OUTOF10: 9

## 2025-02-27 ASSESSMENT — ENCOUNTER SYMPTOMS
ABDOMINAL PAIN: 1
VOMITING: 1

## 2025-02-27 NOTE — ED TRIAGE NOTES
Weight gain, nausea, abdominal swelling. Hx liver failure. No vomiting. No fever. Chills. Pain progressively worse x 1 week. GCS15. Denies cp/sob

## 2025-02-27 NOTE — ED PROCEDURE NOTE
Procedure    Performed by: Danna Duggan MD  Authorized by: Radha Pickard MD        Gastrointestinal Indications: abdominal pain and vomiting              Procedure: GI Ultrasound    Findings:          Impression:  GI: The focused GI ultrasound exam was POSITIVE for abnormal findings as described.      Comments: Free fluid in multiple quadrants noted, most likely ascites.               Danna Duggan MD  Resident  02/27/25 8940

## 2025-02-28 ENCOUNTER — APPOINTMENT (OUTPATIENT)
Dept: RADIOLOGY | Facility: HOSPITAL | Age: 50
End: 2025-02-28
Payer: COMMERCIAL

## 2025-02-28 LAB
ABO GROUP (TYPE) IN BLOOD: NORMAL
ALBUMIN SERPL BCP-MCNC: 2.2 G/DL (ref 3.4–5)
ALP SERPL-CCNC: 221 U/L (ref 33–110)
ALT SERPL W P-5'-P-CCNC: 28 U/L (ref 7–45)
ANION GAP SERPL CALC-SCNC: 7 MMOL/L (ref 10–20)
ANTIBODY SCREEN: NORMAL
AST SERPL W P-5'-P-CCNC: 73 U/L (ref 9–39)
BASOPHILS NFR FLD MANUAL: 0 %
BILIRUB SERPL-MCNC: 4.1 MG/DL (ref 0–1.2)
BLASTS NFR FLD MANUAL: 0 %
BUN SERPL-MCNC: 13 MG/DL (ref 6–23)
CALCIUM SERPL-MCNC: 8 MG/DL (ref 8.6–10.6)
CHLORIDE SERPL-SCNC: 99 MMOL/L (ref 98–107)
CLARITY FLD: CLEAR
CO2 SERPL-SCNC: 31 MMOL/L (ref 21–32)
COLOR FLD: YELLOW
CREAT SERPL-MCNC: 0.66 MG/DL (ref 0.5–1.05)
EGFRCR SERPLBLD CKD-EPI 2021: >90 ML/MIN/1.73M*2
EOSINOPHIL NFR FLD MANUAL: 0 %
ERYTHROCYTE [DISTWIDTH] IN BLOOD BY AUTOMATED COUNT: 14.9 % (ref 11.5–14.5)
GLUCOSE BLD MANUAL STRIP-MCNC: 138 MG/DL (ref 74–99)
GLUCOSE BLD MANUAL STRIP-MCNC: 176 MG/DL (ref 74–99)
GLUCOSE BLD MANUAL STRIP-MCNC: 186 MG/DL (ref 74–99)
GLUCOSE BLD MANUAL STRIP-MCNC: 219 MG/DL (ref 74–99)
GLUCOSE BLD MANUAL STRIP-MCNC: 252 MG/DL (ref 74–99)
GLUCOSE SERPL-MCNC: 145 MG/DL (ref 74–99)
HCT VFR BLD AUTO: 29.9 % (ref 36–46)
HGB BLD-MCNC: 10.7 G/DL (ref 12–16)
IMMATURE GRANULOCYTES IN FLUID: 0 %
LYMPHOCYTES NFR FLD MANUAL: 45 %
MAGNESIUM SERPL-MCNC: 1.75 MG/DL (ref 1.6–2.4)
MCH RBC QN AUTO: 31.8 PG (ref 26–34)
MCHC RBC AUTO-ENTMCNC: 35.8 G/DL (ref 32–36)
MCV RBC AUTO: 89 FL (ref 80–100)
MONOS+MACROS NFR FLD MANUAL: 54 %
NEUTROPHILS NFR FLD MANUAL: 1 %
NRBC BLD-RTO: 0 /100 WBCS (ref 0–0)
OTHER CELLS NFR FLD MANUAL: 0 %
PLASMA CELLS NFR FLD MANUAL: 0 %
PLATELET # BLD AUTO: 81 X10*3/UL (ref 150–450)
POTASSIUM SERPL-SCNC: 4.1 MMOL/L (ref 3.5–5.3)
PROT FLD-MCNC: 1.4 G/DL
PROT SERPL-MCNC: 6.6 G/DL (ref 6.4–8.2)
RBC # BLD AUTO: 3.37 X10*6/UL (ref 4–5.2)
RBC # FLD AUTO: 205 /UL
RH FACTOR (ANTIGEN D): NORMAL
SODIUM SERPL-SCNC: 133 MMOL/L (ref 136–145)
TOTAL CELLS COUNTED FLD: 100
WBC # BLD AUTO: 6 X10*3/UL (ref 4.4–11.3)
WBC # FLD AUTO: 110 /UL

## 2025-02-28 PROCEDURE — 84075 ASSAY ALKALINE PHOSPHATASE: CPT

## 2025-02-28 PROCEDURE — 96375 TX/PRO/DX INJ NEW DRUG ADDON: CPT | Mod: 59

## 2025-02-28 PROCEDURE — 2500000002 HC RX 250 W HCPCS SELF ADMINISTERED DRUGS (ALT 637 FOR MEDICARE OP, ALT 636 FOR OP/ED)

## 2025-02-28 PROCEDURE — 82947 ASSAY GLUCOSE BLOOD QUANT: CPT | Mod: 59

## 2025-02-28 PROCEDURE — 84157 ASSAY OF PROTEIN OTHER: CPT | Performed by: NUTRITIONIST

## 2025-02-28 PROCEDURE — 99222 1ST HOSP IP/OBS MODERATE 55: CPT

## 2025-02-28 PROCEDURE — 49083 ABD PARACENTESIS W/IMAGING: CPT

## 2025-02-28 PROCEDURE — 97161 PT EVAL LOW COMPLEX 20 MIN: CPT | Mod: GP

## 2025-02-28 PROCEDURE — 96372 THER/PROPH/DIAG INJ SC/IM: CPT | Mod: 59

## 2025-02-28 PROCEDURE — 89051 BODY FLUID CELL COUNT: CPT | Performed by: EMERGENCY MEDICINE

## 2025-02-28 PROCEDURE — 2500000004 HC RX 250 GENERAL PHARMACY W/ HCPCS (ALT 636 FOR OP/ED)

## 2025-02-28 PROCEDURE — 86901 BLOOD TYPING SEROLOGIC RH(D): CPT

## 2025-02-28 PROCEDURE — 88112 CYTOPATH CELL ENHANCE TECH: CPT | Mod: TC,MCY | Performed by: NUTRITIONIST

## 2025-02-28 PROCEDURE — G0378 HOSPITAL OBSERVATION PER HR: HCPCS

## 2025-02-28 PROCEDURE — 2500000002 HC RX 250 W HCPCS SELF ADMINISTERED DRUGS (ALT 637 FOR MEDICARE OP, ALT 636 FOR OP/ED): Performed by: NUTRITIONIST

## 2025-02-28 PROCEDURE — 85027 COMPLETE CBC AUTOMATED: CPT

## 2025-02-28 PROCEDURE — 87070 CULTURE OTHR SPECIMN AEROBIC: CPT | Performed by: EMERGENCY MEDICINE

## 2025-02-28 PROCEDURE — 36415 COLL VENOUS BLD VENIPUNCTURE: CPT

## 2025-02-28 PROCEDURE — 82947 ASSAY GLUCOSE BLOOD QUANT: CPT

## 2025-02-28 PROCEDURE — 96366 THER/PROPH/DIAG IV INF ADDON: CPT | Mod: 59

## 2025-02-28 PROCEDURE — 2500000001 HC RX 250 WO HCPCS SELF ADMINISTERED DRUGS (ALT 637 FOR MEDICARE OP): Performed by: NUTRITIONIST

## 2025-02-28 PROCEDURE — 96365 THER/PROPH/DIAG IV INF INIT: CPT | Mod: 59

## 2025-02-28 PROCEDURE — 83735 ASSAY OF MAGNESIUM: CPT

## 2025-02-28 PROCEDURE — 2500000001 HC RX 250 WO HCPCS SELF ADMINISTERED DRUGS (ALT 637 FOR MEDICARE OP)

## 2025-02-28 RX ORDER — INSULIN LISPRO 100 [IU]/ML
0-5 INJECTION, SOLUTION INTRAVENOUS; SUBCUTANEOUS
Status: DISCONTINUED | OUTPATIENT
Start: 2025-02-28 | End: 2025-03-02 | Stop reason: HOSPADM

## 2025-02-28 RX ORDER — MAGNESIUM SULFATE HEPTAHYDRATE 40 MG/ML
INJECTION, SOLUTION INTRAVENOUS
Status: COMPLETED
Start: 2025-02-28 | End: 2025-02-28

## 2025-02-28 RX ORDER — DEXTROSE 50 % IN WATER (D50W) INTRAVENOUS SYRINGE
25
Status: DISCONTINUED | OUTPATIENT
Start: 2025-02-28 | End: 2025-03-02 | Stop reason: HOSPADM

## 2025-02-28 RX ORDER — INSULIN GLARGINE 100 [IU]/ML
7 INJECTION, SOLUTION SUBCUTANEOUS NIGHTLY
Status: DISCONTINUED | OUTPATIENT
Start: 2025-02-28 | End: 2025-03-02

## 2025-02-28 RX ORDER — MAGNESIUM SULFATE HEPTAHYDRATE 40 MG/ML
2 INJECTION, SOLUTION INTRAVENOUS ONCE
Status: COMPLETED | OUTPATIENT
Start: 2025-02-28 | End: 2025-02-28

## 2025-02-28 RX ORDER — FUROSEMIDE 10 MG/ML
30 INJECTION INTRAMUSCULAR; INTRAVENOUS ONCE
Status: COMPLETED | OUTPATIENT
Start: 2025-02-28 | End: 2025-02-28

## 2025-02-28 RX ORDER — LACTULOSE 10 G/15ML
10 SOLUTION ORAL DAILY
Status: DISCONTINUED | OUTPATIENT
Start: 2025-02-28 | End: 2025-03-01

## 2025-02-28 RX ORDER — ONDANSETRON 4 MG/1
4 TABLET, ORALLY DISINTEGRATING ORAL EVERY 8 HOURS PRN
Status: DISCONTINUED | OUTPATIENT
Start: 2025-02-28 | End: 2025-03-02

## 2025-02-28 RX ORDER — DEXTROSE 50 % IN WATER (D50W) INTRAVENOUS SYRINGE
12.5
Status: DISCONTINUED | OUTPATIENT
Start: 2025-02-28 | End: 2025-03-02 | Stop reason: HOSPADM

## 2025-02-28 RX ORDER — PRAMOXINE HYDROCHLORIDE 10 MG/ML
LOTION TOPICAL EVERY 4 HOURS PRN
Status: DISCONTINUED | OUTPATIENT
Start: 2025-02-28 | End: 2025-03-02 | Stop reason: HOSPADM

## 2025-02-28 RX ORDER — OXYCODONE HYDROCHLORIDE 5 MG/1
5 TABLET ORAL EVERY 6 HOURS PRN
Status: DISCONTINUED | OUTPATIENT
Start: 2025-02-28 | End: 2025-03-02 | Stop reason: HOSPADM

## 2025-02-28 RX ADMIN — URSODIOL 500 MG: 500 TABLET ORAL at 08:42

## 2025-02-28 RX ADMIN — ACETAMINOPHEN 487.5 MG: 325 TABLET ORAL at 03:41

## 2025-02-28 RX ADMIN — ATORVASTATIN CALCIUM 20 MG: 20 TABLET, FILM COATED ORAL at 00:12

## 2025-02-28 RX ADMIN — RIFAXIMIN 550 MG: 550 TABLET ORAL at 14:21

## 2025-02-28 RX ADMIN — PANTOPRAZOLE SODIUM 40 MG: 40 TABLET, DELAYED RELEASE ORAL at 08:09

## 2025-02-28 RX ADMIN — INSULIN GLARGINE 7 UNITS: 100 INJECTION, SOLUTION SUBCUTANEOUS at 00:42

## 2025-02-28 RX ADMIN — FUROSEMIDE 30 MG: 10 INJECTION INTRAMUSCULAR; INTRAVENOUS at 03:37

## 2025-02-28 RX ADMIN — SUCRALFATE 1 G: 1 TABLET ORAL at 08:09

## 2025-02-28 RX ADMIN — LACTULOSE 10 G: 20 SOLUTION ORAL at 11:52

## 2025-02-28 RX ADMIN — INSULIN LISPRO 1 UNITS: 100 INJECTION, SOLUTION INTRAVENOUS; SUBCUTANEOUS at 00:44

## 2025-02-28 RX ADMIN — INSULIN LISPRO 1 UNITS: 100 INJECTION, SOLUTION INTRAVENOUS; SUBCUTANEOUS at 11:52

## 2025-02-28 RX ADMIN — CARVEDILOL 6.25 MG: 6.25 TABLET, FILM COATED ORAL at 00:12

## 2025-02-28 RX ADMIN — RIFAXIMIN 550 MG: 550 TABLET ORAL at 20:23

## 2025-02-28 RX ADMIN — INSULIN GLARGINE 7 UNITS: 100 INJECTION, SOLUTION SUBCUTANEOUS at 20:18

## 2025-02-28 RX ADMIN — URSODIOL 500 MG: 500 TABLET ORAL at 20:23

## 2025-02-28 RX ADMIN — SPIRONOLACTONE 150 MG: 25 TABLET, FILM COATED ORAL at 08:09

## 2025-02-28 RX ADMIN — TORSEMIDE 20 MG: 20 TABLET ORAL at 08:09

## 2025-02-28 RX ADMIN — ATORVASTATIN CALCIUM 20 MG: 20 TABLET, FILM COATED ORAL at 20:23

## 2025-02-28 RX ADMIN — URSODIOL 500 MG: 500 TABLET ORAL at 01:51

## 2025-02-28 RX ADMIN — MAGNESIUM SULFATE HEPTAHYDRATE 2 G: 40 INJECTION, SOLUTION INTRAVENOUS at 06:25

## 2025-02-28 RX ADMIN — SUCRALFATE 1 G: 1 TABLET ORAL at 16:46

## 2025-02-28 RX ADMIN — ENOXAPARIN SODIUM 40 MG: 40 INJECTION SUBCUTANEOUS at 08:09

## 2025-02-28 RX ADMIN — SUCRALFATE 1 G: 1 TABLET ORAL at 11:52

## 2025-02-28 RX ADMIN — URSODIOL 250 MG: 250 TABLET ORAL at 08:42

## 2025-02-28 RX ADMIN — INSULIN LISPRO 3 UNITS: 100 INJECTION, SOLUTION INTRAVENOUS; SUBCUTANEOUS at 16:46

## 2025-02-28 RX ADMIN — SUCRALFATE 1 G: 1 TABLET ORAL at 20:23

## 2025-02-28 RX ADMIN — RIFAXIMIN 550 MG: 550 TABLET ORAL at 00:12

## 2025-02-28 SDOH — SOCIAL STABILITY: SOCIAL INSECURITY: HAS ANYONE EVER THREATENED TO HURT YOUR FAMILY OR YOUR PETS?: NO

## 2025-02-28 SDOH — SOCIAL STABILITY: SOCIAL INSECURITY: DOES ANYONE TRY TO KEEP YOU FROM HAVING/CONTACTING OTHER FRIENDS OR DOING THINGS OUTSIDE YOUR HOME?: NO

## 2025-02-28 SDOH — SOCIAL STABILITY: SOCIAL INSECURITY: HAVE YOU HAD THOUGHTS OF HARMING ANYONE ELSE?: NO

## 2025-02-28 SDOH — HEALTH STABILITY: MENTAL HEALTH: HOW OFTEN DO YOU HAVE SIX OR MORE DRINKS ON ONE OCCASION?: NEVER

## 2025-02-28 SDOH — ECONOMIC STABILITY: INCOME INSECURITY: IN THE PAST 12 MONTHS HAS THE ELECTRIC, GAS, OIL, OR WATER COMPANY THREATENED TO SHUT OFF SERVICES IN YOUR HOME?: NO

## 2025-02-28 SDOH — SOCIAL STABILITY: SOCIAL INSECURITY: ARE THERE ANY APPARENT SIGNS OF INJURIES/BEHAVIORS THAT COULD BE RELATED TO ABUSE/NEGLECT?: NO

## 2025-02-28 SDOH — SOCIAL STABILITY: SOCIAL INSECURITY: ABUSE: ADULT

## 2025-02-28 SDOH — SOCIAL STABILITY: SOCIAL INSECURITY: WITHIN THE LAST YEAR, HAVE YOU BEEN AFRAID OF YOUR PARTNER OR EX-PARTNER?: NO

## 2025-02-28 SDOH — SOCIAL STABILITY: SOCIAL INSECURITY: WITHIN THE LAST YEAR, HAVE YOU BEEN HUMILIATED OR EMOTIONALLY ABUSED IN OTHER WAYS BY YOUR PARTNER OR EX-PARTNER?: NO

## 2025-02-28 SDOH — ECONOMIC STABILITY: FOOD INSECURITY: WITHIN THE PAST 12 MONTHS, THE FOOD YOU BOUGHT JUST DIDN'T LAST AND YOU DIDN'T HAVE MONEY TO GET MORE.: NEVER TRUE

## 2025-02-28 SDOH — HEALTH STABILITY: MENTAL HEALTH: HOW MANY DRINKS CONTAINING ALCOHOL DO YOU HAVE ON A TYPICAL DAY WHEN YOU ARE DRINKING?: PATIENT DOES NOT DRINK

## 2025-02-28 SDOH — ECONOMIC STABILITY: FOOD INSECURITY: WITHIN THE PAST 12 MONTHS, YOU WORRIED THAT YOUR FOOD WOULD RUN OUT BEFORE YOU GOT THE MONEY TO BUY MORE.: NEVER TRUE

## 2025-02-28 SDOH — HEALTH STABILITY: MENTAL HEALTH: HOW OFTEN DO YOU HAVE A DRINK CONTAINING ALCOHOL?: NEVER

## 2025-02-28 SDOH — SOCIAL STABILITY: SOCIAL INSECURITY: DO YOU FEEL ANYONE HAS EXPLOITED OR TAKEN ADVANTAGE OF YOU FINANCIALLY OR OF YOUR PERSONAL PROPERTY?: NO

## 2025-02-28 SDOH — SOCIAL STABILITY: SOCIAL INSECURITY: WERE YOU ABLE TO COMPLETE ALL THE BEHAVIORAL HEALTH SCREENINGS?: YES

## 2025-02-28 SDOH — SOCIAL STABILITY: SOCIAL INSECURITY: DO YOU FEEL UNSAFE GOING BACK TO THE PLACE WHERE YOU ARE LIVING?: NO

## 2025-02-28 SDOH — SOCIAL STABILITY: SOCIAL INSECURITY: HAVE YOU HAD ANY THOUGHTS OF HARMING ANYONE ELSE?: NO

## 2025-02-28 SDOH — SOCIAL STABILITY: SOCIAL INSECURITY: ARE YOU OR HAVE YOU BEEN THREATENED OR ABUSED PHYSICALLY, EMOTIONALLY, OR SEXUALLY BY ANYONE?: NO

## 2025-02-28 ASSESSMENT — COGNITIVE AND FUNCTIONAL STATUS - GENERAL
MOBILITY SCORE: 18
STANDING UP FROM CHAIR USING ARMS: A LITTLE
WALKING IN HOSPITAL ROOM: A LITTLE
MOVING FROM LYING ON BACK TO SITTING ON SIDE OF FLAT BED WITH BEDRAILS: A LITTLE
PATIENT BASELINE BEDBOUND: NO
MOVING TO AND FROM BED TO CHAIR: A LITTLE
MOBILITY SCORE: 23
CLIMB 3 TO 5 STEPS WITH RAILING: A LITTLE
CLIMB 3 TO 5 STEPS WITH RAILING: A LITTLE
DAILY ACTIVITIY SCORE: 24
TURNING FROM BACK TO SIDE WHILE IN FLAT BAD: A LITTLE

## 2025-02-28 ASSESSMENT — ACTIVITIES OF DAILY LIVING (ADL)
ADL_ASSISTANCE: INDEPENDENT
DRESSING YOURSELF: INDEPENDENT
LACK_OF_TRANSPORTATION: NO
PATIENT'S MEMORY ADEQUATE TO SAFELY COMPLETE DAILY ACTIVITIES?: YES
JUDGMENT_ADEQUATE_SAFELY_COMPLETE_DAILY_ACTIVITIES: YES
BATHING: INDEPENDENT
WALKS IN HOME: INDEPENDENT
HEARING - LEFT EAR: FUNCTIONAL
HEARING - RIGHT EAR: FUNCTIONAL
GROOMING: INDEPENDENT
TOILETING: INDEPENDENT
ADEQUATE_TO_COMPLETE_ADL: YES
FEEDING YOURSELF: INDEPENDENT

## 2025-02-28 ASSESSMENT — LIFESTYLE VARIABLES
HOW MANY STANDARD DRINKS CONTAINING ALCOHOL DO YOU HAVE ON A TYPICAL DAY: PATIENT DOES NOT DRINK
AUDIT-C TOTAL SCORE: 0
AUDIT-C TOTAL SCORE: 0
SKIP TO QUESTIONS 9-10: 1
AUDIT-C TOTAL SCORE: 0
HOW OFTEN DO YOU HAVE A DRINK CONTAINING ALCOHOL: NEVER
SKIP TO QUESTIONS 9-10: 1
HOW OFTEN DO YOU HAVE 6 OR MORE DRINKS ON ONE OCCASION: NEVER

## 2025-02-28 ASSESSMENT — PAIN - FUNCTIONAL ASSESSMENT: PAIN_FUNCTIONAL_ASSESSMENT: 0-10

## 2025-02-28 ASSESSMENT — PAIN SCALES - GENERAL
PAINLEVEL_OUTOF10: 6
PAINLEVEL_OUTOF10: 0 - NO PAIN

## 2025-02-28 NOTE — POST-PROCEDURE NOTE
INTERVENTIONAL RADIOLOGY ADVANCED PRACTICE PROCEDURE  [facility]    A time out was performed and Right Hemiabdomen was examined with US and appropriate entry point was confirmed and marked.   The patient was prepped and draped in a sterile manner, 1% lidocaine was used to anesthesize the skin and subcutaneous tissue.   A 5F Centesis needle was then introduced through the skin into the peritoneal space, the centesis catheter was then threaded without difficulty.   2500 ml of yellow fluid was removed without difficulty. The catheter was then removed.   No immediate complications were noted during and immediately following the procedure.

## 2025-02-28 NOTE — ED PROVIDER NOTES
HPI   Chief Complaint   Patient presents with    Abdominal Pain       HPI  Patient is a 50-year-old female with past medical history of biopsy-proven PBC with cirrhosis C/B portal HTN with ascites, esophageal varices (s/p banding  with eradication), hepatic encephalopathy, antibody-positive celiac disease, pancreatic tail mass suggestive of neuroendocrine tumor Jack s/p EUS 3/2024), I DD M2, HTN, GERD, anxiety, depression, CORBY who presents emergency department with abdominal pain.  Patient states that she regularly gets fluid drainage from her abdomen with the last one being in couple of weeks ago.  Patient states that she has developed worsening pain in her abdomen over the last 24 hours, however.  She states that she has been able to eat and drink but is uncomfortable.  Chart review shows that she was just discharged on 2/15 and is being considered for TIPS versus liver manage plan.      Patient History   Past Medical History:   Diagnosis Date    Ascites     Asthma     Cirrhosis of liver (Multi)     Diabetes mellitus (Multi)     GERD (gastroesophageal reflux disease)     CORBY (obstructive sleep apnea)     Portal hypertension (Multi)     Thrombocytopenia (CMS-HCC)      Past Surgical History:   Procedure Laterality Date     SECTION, LOW TRANSVERSE      CHOLECYSTECTOMY      COLONOSCOPY      CT GUIDED IMAGING FOR NEEDLE PLACEMENT  10/14/2020    CT GUIDED IMAGING FOR NEEDLE PLACEMENT [facility]    ESOPHAGOGASTRODUODENOSCOPY      INGUINAL HIDRADENITIS EXCISION      TUBAL LIGATION      US GUIDED ABDOMINAL PARACENTESIS  10/08/2020    US GUIDED ABDOMINAL PARACENTESIS [facility]     No family history on file.  Social History     Tobacco Use    Smoking status: Never    Smokeless tobacco: Never   Vaping Use    Vaping status: Never Used   Substance Use Topics    Alcohol use: Never    Drug use: Never       Physical Exam   ED Triage Vitals   Temperature Heart Rate Respirations BP   25  1338 02/27/25 1338 02/27/25 1338 02/27/25 1338   36.6 °C (97.9 °F) 95 16 122/74      Pulse Ox Temp Source Heart Rate Source Patient Position   02/27/25 1338 02/27/25 1338 02/27/25 1700 02/27/25 1700   100 % Temporal Monitor Lying      BP Location FiO2 (%)     02/27/25 1700 --     Right arm        Physical Exam  Vitals and nursing note reviewed.   Constitutional:       General: She is not in acute distress.     Appearance: She is well-developed.   HENT:      Head: Normocephalic and atraumatic.   Eyes:      Conjunctiva/sclera: Conjunctivae normal.   Cardiovascular:      Rate and Rhythm: Normal rate and regular rhythm.      Pulses: Normal pulses.      Heart sounds: No murmur heard.  Pulmonary:      Effort: Pulmonary effort is normal. No respiratory distress.      Breath sounds: Normal breath sounds.   Abdominal:      General: There is distension.      Palpations: Abdomen is soft. There is shifting dullness and fluid wave.      Tenderness: There is generalized abdominal tenderness.   Musculoskeletal:         General: No swelling.      Cervical back: Neck supple.   Skin:     General: Skin is warm and dry.      Capillary Refill: Capillary refill takes less than 2 seconds.   Neurological:      Mental Status: She is alert.   Psychiatric:         Mood and Affect: Mood normal.       ED Course & MDM   Diagnoses as of 02/27/25 2219   Generalized abdominal pain   Other ascites       Medical Decision Making  Patient is a 50-year-old female with past medical history of biopsy-proven PBC with cirrhosis C/B portal HTN with ascites, esophageal varices (s/p banding 2021 with eradication), hepatic encephalopathy, antibody-positive celiac disease, pancreatic tail mass suggestive of neuroendocrine tumor [facility] s/p EUS 3/2024), I DD M2, HTN, GERD, anxiety, depression, CORBY who presents emergency department with abdominal pain.  Patient's vitals are stable and within normal is upon presentation.  Patient given morphine for pain control and  Zofran for nausea control.  Patient is EKG showed tachycardia at a rate of 103 bpm, normal intervals, normal axis, no sign of ST elevation or depression.  Patient CBC showed no evidence of leukocytosis with WBC of 6.1 and no concern for severe anemia with hemoglobin of 10.9, elevated from 12 days ago.  Patient's VBG showed a normal pH at 7.42 and lactate at 1.6.  Patient CT MP showed an elevated glucose at 348 and a sodium at 129, which corrected to mid 130s for glucose.  Patient's AST was elevated at 75 with ALT normal at 30.  An attempt was made at paracentesis, but was unsuccessful.  Patient admitted to general medicine under Dr. Hammer in stable condition for drainage of ascites and continued treatment.  Patient understood and was agreeable with the plan.    Procedure  Procedures none     Venu Maki DO  Resident  02/27/25 2228       Venu Maki DO  Resident  02/27/25 2220

## 2025-02-28 NOTE — CONSULTS
Subjective   Interval History: has complaints abdominal distension, pain.     Objective   Vital signs in last 24 hours:  BP 98/56   Pulse 86   Temp 36.9 °C (98.4 °F) (Oral)   Resp 16   Wt 79.8 kg (176 lb)   SpO2 96%     Intake/Output last 3 shifts:  No intake/output data recorded.  Intake/Output this shift:  I/O this shift:  In: 50 [I.V.:50]  Out: -     Physical Exam  Neuro: oriented to person, place, time, and general circumstances  HEENT: normocephalic, atraumatic  Pulm: clear to auscultation bilaterally, no wheezes, good air entry  Cardiac: Regular rate and rhythm or without murmur or extra heart sounds  Abdomen:  distended, tender to palpation, BS + x4  Pulses: 2+ and symmetric    Relevant Results  LABS:  Lab Results   Component Value Date    WBC 6.0 02/28/2025    HGB 10.7 (L) 02/28/2025    HCT 29.9 (L) 02/28/2025    MCV 89 02/28/2025    PLT 81 (L) 02/28/2025      Results from last 72 hours   Lab Units 02/28/25  0508   SODIUM mmol/L 133*   POTASSIUM mmol/L 4.1   CHLORIDE mmol/L 99   CO2 mmol/L 31   BUN mg/dL 13   CREATININE mg/dL 0.66   GLUCOSE mg/dL 145*   CALCIUM mg/dL 8.0*   ANION GAP mmol/L 7*   EGFR mL/min/1.73m*2 >90     Results from last 72 hours   Lab Units 02/28/25  0508   ALK PHOS U/L 221*   BILIRUBIN TOTAL mg/dL 4.1*   PROTEIN TOTAL g/dL 6.6   ALT U/L 28   AST U/L 73*   ALBUMIN g/dL 2.2*     Results from last 72 hours   Lab Units 02/27/25  1715   INR  1.5*       MICRO:  No results found for the last 14 days.      IMAGING:  Point of Care Ultrasound         US guided abdominal paracentesis    (Results Pending)       Assessment/Plan   Paracentesis. Please refer to imaging study for further detail.     LOS: 0 days     MADYSON Milton-CNP      I personally spent over half of a total 35 minutes in counseling and discussion with the patient and coordination of care as described above.

## 2025-02-28 NOTE — PROGRESS NOTES
Alfonzo Flanagan is a 50 y.o. female on day 0 of admission presenting with Generalized abdominal pain.      Subjective   Paracentesis 2/28 am. Some abd pain and pt with recent fall. Follows with gi. Not regularly taking lactulose.     Objective     Last Recorded Vitals  /65   Pulse 84   Temp 36.9 °C (98.4 °F) (Oral)   Resp 16   Wt 79.8 kg (176 lb)   SpO2 97%   Intake/Output last 3 Shifts:    Intake/Output Summary (Last 24 hours) at 2/28/2025 1742  Last data filed at 2/28/2025 0844  Gross per 24 hour   Intake 50 ml   Output --   Net 50 ml       Admission Weight  Weight: 79.8 kg (176 lb) (02/27/25 1338)    Daily Weight  02/27/25 : 79.8 kg (176 lb)    Image Results  US guided abdominal paracentesis  Narrative: Interpreted By:  Henok Perez,   STUDY:  US GUIDED ABDOMINAL PARACENTESIS; 2/28/20253:21 pm      INDICATION:  Signs/Symptoms:Paracentesis, diagnostic and therapeutic (previously  tolerated 2-5 L removal).      COMPARISON:  None.      ACCESSION NUMBER(S):  [id number]      ORDERING CLINICIAN:  ERASMO MARTINZE      TECHNIQUE:  INTERVENTIONALIST(S):  Henok Perez      CONSENT:  The patient/patient's POA/next of kin was informed of the nature of  the proposed procedure. The purposes, alternatives, risks, and  benefits were explained and discussed. All questions were answered  and consent was obtained.      SEDATION:  None      MEDICATION/CONTRAST:          TIME OUT:  A time out was performed immediately prior to procedure start with  the interventional team, correctly identifying the patient name, date  of birth, MRN, procedure, anatomy (including marking of site and  side), patient position, procedure consent form, relevant laboratory  and imaging test results, antibiotic administration, safety  precautions, and procedure-specific equipment needs.      FINDINGS:  The patient was placed in the supine position.      The abdominal space was examined with grey scale ultrasound, and the  most  accessible fluid identified and marked for paracentesis.      The skin was prepped and draped in usual manner. Local anesthesia  with Lidocaine was administered and a right-sided paracentesis was  performed.  A 5 Peruvian One-Step paracentesis needle/catheter was then  placed where marked.  Approximately 2500 mL of yellowish colored  fluid was removed.  The needle/catheter was then withdrawn.      The patient tolerated the procedure well and there were no immediate  complications. Specimen(s) sent to the laboratory and pathology for  further evaluation, per the requesting team.      Impression: Uneventful paracentesis, as detailed above. Right Hemiabdomen, 2500 mL      I personally performed and/or directly supervised this study and was  present for the entire procedure.      Performed and dictated at [facility].      Signed by: Henok Perez 2/28/2025 3:21 PM  Dictation workstation:   [id number]      Physical Exam  Constitutional:       General: She is not in acute distress.     Appearance: She is ill-appearing.   HENT:      Head: Normocephalic.      Mouth/Throat:      Mouth: Mucous membranes are moist.   Eyes:      General:         Right eye: No discharge.         Left eye: No discharge.   Pulmonary:      Effort: Pulmonary effort is normal.   Abdominal:      General: There is distension.      Tenderness: There is abdominal tenderness.   Musculoskeletal:      Right lower leg: Edema present.      Left lower leg: Edema present.   Skin:     General: Skin is dry.   Neurological:      Mental Status: She is alert and oriented to person, place, and time. Mental status is at baseline.      Comments: Minor asterixis    Psychiatric:         Mood and Affect: Mood normal.         Behavior: Behavior normal.         A/P:  Alfonzo Flanagan is a 50 y.o. female with PMH significant for biopsy-proven PBC with cirrhosis c/b portal HTN with ascites, esophageal varices (s/p banding 2021 with  eradication), hepatic encephalopathy, antibody-positive celiac disease, recent pancreatic tail mass suggestive of neuroendocrine tumor s/p EUS on 3/2024 (no biopsy) recommended repeat in 1 year, IDDM2 (A1c 7.6), HTN, GERD, anxiety, depression, CORBY (2013 sleep study, untreated) who presented with worsening abdominal discomfort and distension associated with nausea. Her weight is down 30 lbs since weight recorded on 2/14, still symptoms could be related to fluid overload from ascites.      She is overall well appearing with exception of chronic liver disease, without leukocytosis or severe abdominal pain and is hemodynamically stable, so SBP is not high on the differential at this time to start empiric SBP coverage, though is a possible diagnosis. ER was unsuccessful in obtaining paracentesis, based on POCUS, bowel is close to the wall and the best pocket in the LUQ is close to the spleen with significant subcutaneous tissue, may need IR drainage. Will admit for paracentesis and hepatology evaluation with worsening symptoms and trial 1x dose 30 IV lasix as this improved ascites on last admission. Para done am 2/28 remove a little over 1L fluid, no albumin given, pt continued home bp meds with soft bp. Unable to add total protein or albumin study onto ascitic fluid.      Additionally, symptoms could be 2/2 hyperglycemia in setting of recently decreased lantus dose (due to hypoglycemia on last admit).        #PBC cirrhosis cb portal hypertension   #refractory ascites, no hx of SBP  #hepatic encephalopathy  :: Admission MELD NA increased at 22; Prior MELD NA 15-16  :: Last paracentesis 2/14, with 2.5L removal  ::Patient with recent hospitalization, had paracentesis done am 2/28  Plan:  - Continue home po torsemide 40mg with hold parameters   -  increased home spironolactone 150mg daily with hold parameters   - 2g sodium diet with 2000cc fluid restriction  - continue home ursodiol 300mg TID  - daily MELD labs   - ir  diagnostic and theraptuic para sent cultures, added on cytology, unable to ad don albumin and total protein   -Follow fluid studies, so far negative for SBP, Gram smear with PMNs. So hold on antibiotics for now  -Blood pressure soft, received diuretics earlier today and para and no albumin, so started bp med holding parameters   -Continue rifaximin, lactulose (daily then titrate up to home dose), aim for 3-4 bowel movements per day.  -cw carvedilol for portal hypertensive gastropathy with hold parameters      #Hyponatremia  ::129 on admission, previously 138  ::Likely 2/2 fluid overload   -Fluid and sodium restriction  -1x IV diuresis with lasix     #nausea, vomiting  #celiac disease  #h/o hepatic encephalopathy  :: No asterixis or confusion/AMS, notably tired appearing possibly 2/2 HE (vs recent morphine administration)  :: Presented with emesis, lactulose was discontinued on discharge 2 weeks ago  Plan:  - Order lactulose daily PRN for goal 2-3 BM/day (patient reports this is currently her baseline)  - zofran PRN, encourage use  - s/p rifaximin 550mg TID x14d for IBS-D dosing (end date 2/27)     #Night sweats and chills without SBP (were also present last admission)  :: h/o MDR UTI, no current dysuria  :: no leukocytosis or fevers, overall well appearing  Plan:  - CTM CBC and symptoms      #DM2 (A1c 7.6 most recently), insulin-dependent  :: Lantus decreased from 50 units BID -> 10 units at bedtime on last admission in Feb 2024  ::metformin at home   :: Hyperglycemic on admission to 300s  Plan:  -Started on lower dose of Lantus, continue to monitor, hold home metformin, will likely discontinue on discharge given cirrhosis.  -ssi        #hx varices now sp eradication 2021, GI bleed  - avoid NSAID use  - continue home pantoprazole 40mg      #anxiety/depression   - continue home trazodone 25mg nightly PRN     #Abdominal stretch pain  - acetaminophen PRN (max daily 2g)  - oxycodone 5mg q6h for mod-severe pain  -  continue home carafate     #Pruritus   - continue home prn atarax 25mg   - sarna cream  - benadryl cream     #HLD  :: HDL 21, LDL 62, TG 55, total cholesterol 94   - continue atorvastatin 20mg daily given worsening liver function    #GERD  Plan:  -cw ppi     #Falls  ::S/p recent fall at home  Plan:  -fall precautions  -PT/OT     F: prn with caution given cirrhosis  E: prn   N: Adult diet 2-3 grams sodium, Gluten Free, 2L fluid restrict  A: PIV     DVT prophylaxis: Lovenox subq  GI Prophylaxis: PPI   Antimicrobials: not indicated     CODE STATUS: Full Code, confirmed on admission  Emergency contact: Claudia Hartley ([place]) Daughter  Mobile Phone: [phone]         Shannan Estrada MD

## 2025-02-28 NOTE — PROGRESS NOTES
"Pharmacy Medication History Review    Alfonzo Flanagan is a 50 y.o. female admitted for Generalized abdominal pain. Pharmacy reviewed the patient's alnne-aq-mmxvxcuhk medications and allergies for accuracy.    Medications ADDED:  N/A  Medications CHANGED:  N/A  Medications REMOVED:   N/A     The list below reflects the updated PTA list.   Prior to Admission Medications   Prescriptions Last Dose Informant   acetaminophen (Tylenol) 500 mg tablet  Self   Sig: Take 1-2 tablets (500-1,000 mg) by mouth every 6 hours if needed (pain).   alcohol swabs pads, medicated  Self   Sig: Apply 1 each topically once daily at bedtime.   atorvastatin (Lipitor) 20 mg tablet 2/26/2025 Bedtime Self   Sig: Take 1 tablet (20 mg) by mouth once daily at bedtime.   carvedilol (Coreg) 6.25 mg tablet 2/27/2025 Morning Self   Sig: Take 1 tablet (6.25 mg) by mouth 2 times a day. Do not fill before February 15, 2025.   hydrOXYzine HCL (Atarax) 25 mg tablet Not Taking Self   Sig: Take 1 tablet (25 mg) by mouth every 4 hours if needed for anxiety.   Patient not taking: Reported on 2/28/2025   insulin glargine (Lantus) 100 unit/mL (3 mL) pen 2/26/2025 Bedtime Self   Sig: Inject 7 Units under the skin once daily at bedtime. Take as directed per insulin instructions.   meclizine (Antivert) 25 mg tablet  Self   Sig: Take 1 tablet (25 mg) by mouth if needed for dizziness.   metFORMIN XR (Glucophage-XR) 500 mg 24 hr tablet 2/26/2025 Self   Sig: Take 2 tablets (1,000 mg) by mouth once daily in the evening. Take with meals. Do not crush, chew, or split.   pantoprazole (ProtoNix) 40 mg EC tablet 2/26/2025 Self   Sig: Take 1 tablet (40 mg) by mouth once daily in the morning. Take before meals. Do not crush, chew, or split.   pen needle 1/2\" 29G X 12mm needle  Self   Sig: Use to inject 1-4 times daily as directed.   pen needle, diabetic (Sure Comfort Pen Needle) 32 gauge x 5/32\" needle  Self   Sig: Use 4 times a day   polyethylene glycol (Glycolax, Miralax) " "17 gram/dose powder  Self   Sig: Mix 17 grams of powder in 8 ounces of water and drink 2 times a day.   Patient taking differently: Mix 17 g of powder and drink if needed for constipation.   rifAXIMin (Xifaxan) 550 mg tablet 2/26/2025    Sig: Take 1 tablet (550 mg) by mouth 3 times a day for 34 doses.   spironolactone (Aldactone) 50 mg tablet  Self   Sig: Take 3 tablets (150 mg) by mouth once daily.   sucralfate (Carafate) 1 gram tablet  Self   Sig: Take 1 tablet (1 g) by mouth 4 times a day before meals.   torsemide (Demadex) 20 mg tablet  Self   Sig: Take 1 tablet (20 mg) by mouth once daily. Do not start before February 16, 2025.   traZODone (Desyrel) 50 mg tablet  Self   Sig: Take 0.5 tablets (25 mg) by mouth once daily at bedtime.   Patient taking differently: Take 0.5 tablets (25 mg) by mouth as needed at bedtime.   ursodiol (Actigall) 500 mg tablet  Self   Sig: Take 1 tablet (500 mg) by mouth 2 times a day AND 0.5 tablets (250 mg) once daily.      Facility-Administered Medications: None        The list below reflects the updated allergy list. Please review each documented allergy for additional clarification and justification.  Allergies  Reviewed by John King on 2/28/2025        Severity Reactions Comments    Gluten Low Diarrhea             Patient declines M2B at discharge.     Sources:   [facility]  Pharmacy dispense history  Patient interview Good historian     Additional Comments:  Patient reports she is completely done with taking rifaximin 550 mg tablet.      JOHN KING  Pharmacy Technician  02/28/25     Secure Chat preferred   If no response call [phone] or [employer] \"Med Rec\"   "

## 2025-02-28 NOTE — PROGRESS NOTES
Physical Therapy    Physical Therapy Evaluation    Patient Name: Alfonzo Flanagan  MRN: [id number]  Department: [facility]  Room: DPOD03/DPOD03  Today's Date: 2/28/2025   Time Calculation  Start Time: 0937  Stop Time: 0948  Time Calculation (min): 11 min    Assessment/Plan   PT Assessment  PT Assessment Results: Decreased mobility, Impaired balance, Decreased endurance  Rehab Prognosis: Good  Barriers to Discharge Home: No anticipated barriers  End of Session Communication: Bedside nurse  Assessment Comment: Pt completing mobility tasks with CGA-> close (S), reports would need to use whw for further ambulation distance as she feels weak.  Will benefit from continued skilled PT due to decreased strength and recent falls history.  End of Session Patient Position:  (seated at edge of transport cart 1 rail up, in ED hallway)  IP OR SWING BED PT PLAN  Inpatient or Swing Bed: Inpatient  PT Plan  Treatment/Interventions: Gait training, Stair training, Balance training, Therapeutic exercise, Therapeutic activity  PT Plan: Ongoing PT  PT Frequency: 2 times per week  PT Discharge Recommendations: Low intensity level of continued care  PT Recommended Transfer Status: Assistive device, Stand by assist  PT - OK to Discharge: Yes (POC/goals/discharge rec intensity created)    Subjective   General Visit Information:  General  Reason for Referral: abdominal pain,back pain, cirrhosis  Past Medical History Relevant to Rehab: cirrhosis, portal hypertension, ascities, esopahgeal varices, hepatic encephalopathy, celiac dz, chronic pancreatitis, DM  Prior to Session Communication:  (bedside nurse unavailable, was recently moved from ED room to garcia bed off tele)  General Comment: Pt agreeable to participate as needing to use the bathroom.  Admits to recent fall and overall weakness.  Mobilizing with CGA-> close (S).  Will continue to follow if remains inpatient.  Home Living:  Home Living  Type of Home: Apartment  Lives With: Spouse  "(dtr)  Home Adaptive Equipment: Walker rolling or standard  Home Access: Stairs to enter with rails  Entrance Stairs-Number of Steps: 11  Prior Level of Function:  Prior Function Per Pt/Caregiver Report  ADL Assistance: Independent  Ambulatory Assistance:  (reports mod (I) with whw, admits to recent fall from \"weakness\")  Precautions:  Precautions  Hearing/Visual Limitations: appears WFL  Medical Precautions: Fall precautions    Objective   Pain:  Pain Assessment  Pain Assessment: 0-10  0-10 (Numeric) Pain Score: 6  Pain Location:  (\"all over\")  Cognition:  Cognition  Arousal/Alertness: Appropriate responses to stimuli  Following Commands: Follows one step commands consistently  Cognition Comments: understands and speaks English though Kyrgyz is primary    General Assessments:     Activity Tolerance  Endurance: Tolerates 10 - 20 min exercise with multiple rests    Sensation  Light Touch: No apparent deficits        Perception  Inattention/Neglect: Appears intact  Initiation: Appears intact  Motor Planning: Appears intact  Perseveration: Not present    Coordination  Movements are Fluid and Coordinated: Yes    Postural Control  Postural Control: Within Functional Limits    Static Sitting Balance  Static Sitting-Balance Support: Feet supported  Static Sitting-Level of Assistance: Distant supervision, Independent    Static Standing Balance  Static Standing-Balance Support: No upper extremity supported  Static Standing-Level of Assistance: Close supervision  Dynamic Standing Balance  Dynamic Standing-Balance Support: No upper extremity supported  Dynamic Standing-Level of Assistance: Contact guard, Close supervision  Functional Assessments:  ADL  ADL's Addressed:  (pt able to complete bathroom hygiene (I), hand washing (S)-> (I) for safety during session)    Bed Mobility  Bed Mobility: Yes  Bed Mobility 1  Bed Mobility 1: Supine to sitting  Level of Assistance 1: Close supervision  Bed Mobility Comments 1: completes " slowly    Transfers  Transfer: Yes  Transfer 1  Transfer From 1: Bed to  Transfer to 1: Stand  Transfer Level of Assistance 1: Contact guard, Minimal verbal cues  Transfers 2  Transfer From 2: Stand to  Transfer to 2: Toilet  Transfer Device 2:  (grab bar)  Transfer Level of Assistance 2: Distant supervision  Transfers 3  Transfer From 3: Toilet to  Transfer to 3: Stand  Transfer Device 3:  (grab bar)  Transfer Level of Assistance 3: Distant supervision  Transfers 4  Transfer From 4: Stand to  Transfer to 4: Bed  Transfer Level of Assistance 4: Distant supervision    Ambulation/Gait Training  Ambulation/Gait Training Performed: Yes  Ambulation/Gait Training 1  Surface 1: Level tile  Device 1: No device  Assistance 1: Contact guard, Close supervision, Minimal verbal cues  Quality of Gait 1: Decreased step length, Narrow base of support (overall slow pace)  Comments/Distance (ft) 1: 40 ft x 2  Extremity/Trunk Assessments:  RLE   RLE : Exceptions to WFL  Strength RLE  RLE Overall Strength: Greater than or equal to 3/5 as evidenced by functional mobility  LLE   LLE : Exceptions to WFL  Strength LLE  LLE Overall Strength: Greater than or equal to 3/5 as evidenced by functional mobility  Outcome Measures:  [facility] Basic Mobility  Turning from your back to your side while in a flat bed without using bedrails: A little  Moving from lying on your back to sitting on the side of a flat bed without using bedrails: A little  Moving to and from bed to chair (including a wheelchair): A little  Standing up from a chair using your arms (e.g. wheelchair or bedside chair): A little  To walk in hospital room: A little  Climbing 3-5 steps with railing: A little  Basic Mobility - Total Score: 18    Encounter Problems       Encounter Problems (Active)       Balance       Patient will maintain balance to allow for safe mobility (S) -> mod (I) with whw.        Start:  02/28/25    Expected End:  03/14/25               Mobility       STG -  Patient will ambulate (S)-> mod (I) with whw, 200 ft.       Start:  02/28/25    Expected End:  03/14/25            STG - Patient will ascend and descend a flight of stairs with (S).        Start:  02/28/25    Expected End:  03/14/25               PT Transfers       STG - Patient will transfer sit to and from stand (I).        Start:  02/28/25    Expected End:  03/14/25 02/28/25 at 10:37 AM - Tawny Edwards, PT

## 2025-02-28 NOTE — ED PROCEDURE NOTE
Procedure  Paracentesis    Performed by: Danna Duggan MD  Authorized by: Radha Pickard MD    Consent:     Consent obtained:  Written    Consent given by:  Patient    Risks, benefits, and alternatives were discussed: yes      Risks discussed:  Bowel perforation, infection, pain and bleeding    Alternatives discussed:  No treatment and alternative treatment  Universal protocol:     Procedure explained and questions answered to patient or proxy's satisfaction: yes      Imaging studies available: yes      Required blood products, implants, devices, and special equipment available: yes      Patient identity confirmed:  Verbally with patient and arm band  Pre-procedure details:     Procedure purpose:  Therapeutic    Preparation: Patient was prepped and draped in usual sterile fashion    Anesthesia:     Anesthesia method:  Local infiltration    Local anesthetic:  Lidocaine 1% w/o epi  Procedure details:     Needle gauge:  22    Ultrasound guidance: yes      Puncture site:  R lower quadrant  Post-procedure details:     Procedure completion:  Procedure terminated electively by provider  Comments:      2 attempts of puncture in the right lower quadrant were made but neither aspiration nor fluid retrieval was successful.               Danna Duggan MD  Resident  02/27/25 [id number]

## 2025-02-28 NOTE — H&P
"Internal Medicine - History and Physical   Subjective    Alfonzo Flanagan is a 50 y.o. 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  Her back pain started 3 days ago, and has been worsening since then. She endorses chills and subjective feelings of warmth. None of these symptoms are new or atypical, she has had them associated with her cirrhosis for a while. She has noticed increased abdominal distension. She feels weaker as well. Reports having 2-3 bowel movements per day.    Patient states that she gets fluid drainage from her abdomen with the last one being in couple of weeks ago. She was discharged from  on 2/15 for TIPS evaluation vs liver transplant given advanced disease. She had multiple sebastian (2/10 4.4 L, 2/14 2.5 L) and received IV diuresis for fluid accumulation.     Review of Systems:  Review of Systems   Negative for dyspnea, chest pains.     Allergies:NKDA   SocHx: Lives at home with  or daughter        ED Course:     BP: 122/74  Temperature: 36.6 °C (97.9 °F)  Heart Rate: 95  Respirations: 16  Pulse Ox: 100 %  Given 2x IV morphine 4mg for pain, was comfortable after this.  IV zofran for nausea.  Attempted paracentesis but were unable to get fluid back.       Current Vitals     /68 (BP Location: Left arm, Patient Position: Lying)   Pulse 95   Temp 36.9 °C (98.4 °F) (Oral)   Resp 16   Ht 1.575 m (5' 2\")   Wt 79.8 kg (176 lb)   SpO2 94%   BMI 32.19 kg/m²          Prior History:        Cardiac Hx:  Last echo: Feb " 2025:  CONCLUSIONS:   1. Left ventricular ejection fraction is normal, by visual estimate at 60-65%.   2. There is normal right ventricular global systolic function.   3. Agitated saline contrast study was negative for intracardiac shunt.   4. Compared with study dated 10/29/2024, no significant change Prior exam did not include an agitated slaine contrast study.  Last cath: CV NCDR CATHPCI V5 COLLECTION FORM   Last EKG:   Encounter Date: 02/27/25   ECG 12 lead   Result Value    Ventricular Rate 103    Atrial Rate 103    NE Interval 168    QRS Duration 76    QT Interval 342    QTC Calculation(Bazett) 448    P Axis 58    R Axis 9    T Axis 21    QRS Count 16    Q Onset 227    P Onset 143    P Offset 192    T Offset 398    QTC Fredericia 409    Narrative    Sinus tachycardia  Otherwise normal ECG  When compared with ECG of 25-JAN-2025 07:35,  No significant change was found    See ED provider note for full interpretation and clinical correlation  Confirmed by Serenity Kay ([id number]) on 2/27/2025 10:40:54 PM        GI Hx:  Last EGD: === 01/11/25 ===    Findings  The esophagus appeared normal. Additional information: No esophageal varices were visualized.  Irregular Z-line 39 cm from the incisors  Moderate and diffuse portal hypertensive gastropathy in the cardia, fundus of the stomach, body of the stomach, incisura, antrum, prepyloric region and pylorus  The stomach was otherwise normal on direct and retroflexion views. Additional information: No gastric varices were visualized.  The duodenal bulb and 1st part of the duodenum appeared normal.    Infectious Hx:  Urine Cultures: MDR E. Faecium, most recently 1/2025 (susceptible to vancomycin); Klebsiella UTI 10/2024  Peritoneal fluid: Aug 2024: Ascites fluid with GPC + gram neg bacilli, no clear bacteria grew    Meds    Home medications:  Current Outpatient Medications   Medication Instructions    acetaminophen (TYLENOL) 500-1,000 mg, oral, Every 6 hours PRN    alcohol  "swabs pads, medicated 1 each, topical (top), Nightly    atorvastatin (LIPITOR) 20 mg, oral, Nightly    carvedilol (Coreg) 6.25 mg tablet Take 1 tablet (6.25 mg) by mouth 2 times a day. Do not fill before February 15, 2025.    hydrOXYzine HCL (ATARAX) 25 mg, oral, Every 4 hours PRN    Lantus Solostar U-100 Insulin 7 Units, subcutaneous, Nightly, Take as directed per insulin instructions.    meclizine (ANTIVERT) 25 mg, As needed    metFORMIN XR (GLUCOPHAGE-XR) 1,000 mg, oral, Daily with evening meal, Do not crush, chew, or split.    pantoprazole (PROTONIX) 40 mg, oral, Daily before breakfast, Do not crush, chew, or split.    pen needle 1/2\" 29G X 12mm needle Use to inject 1-4 times daily as directed.    pen needle, diabetic (Sure Comfort Pen Needle) 32 gauge x 5/32\" needle Use 4 times a day    polyethylene glycol (Glycolax, Miralax) 17 gram/dose powder Mix 17 grams of powder in 8 ounces of water and drink 2 times a day.    spironolactone (ALDACTONE) 150 mg, oral, Daily    sucralfate (CARAFATE) 1 g, 4 times daily before meals and nightly    torsemide (Demadex) 20 mg tablet Take 1 tablet (20 mg) by mouth once daily. Do not start before February 16, 2025.    traZODone (DESYREL) 25 mg, oral, Nightly    ursodiol (Actigall) 500 mg tablet Take 1 tablet (500 mg) by mouth 2 times a day AND 0.5 tablets (250 mg) once daily.        Inpatient medications:  Scheduled medications  atorvastatin, 20 mg, oral, Nightly  carvedilol, 6.25 mg, oral, BID  enoxaparin, 40 mg, subcutaneous, q24h  metFORMIN XR, 1,000 mg, oral, Daily with evening meal  ondansetron, 4 mg, intravenous, Once  pantoprazole, 40 mg, oral, Daily before breakfast  rifAXIMin, 550 mg, oral, TID  spironolactone, 150 mg, oral, Daily  sucralfate, 1 g, oral, Before meals & nightly  torsemide, 20 mg, oral, Daily  ursodiol, 500 mg, oral, BID   And  ursodiol, 250 mg, oral, Daily      Continuous medications     PRN medications  PRN medications: acetaminophen **OR** acetaminophen " "**OR** acetaminophen, hydrOXYzine HCL, meclizine, melatonin, polyethylene glycol, traZODone     Objective    Blood pressure 107/68, pulse 95, temperature 36.9 °C (98.4 °F), temperature source Oral, resp. rate 16, height 1.575 m (5' 2\"), weight 79.8 kg (176 lb), SpO2 94%.     Physical Exam   General: Sleeping, easily arousable, sleeps intermittently during exam  HEENT: Normocephalic, atraumatic, mild scleral icterus, MMM, yellowing of roof of mouth  CV: Heart with regular rate and rhythm, no murmurs appreciated  Lungs: Clear to auscultation bilaterally with no wheezes, crackles, or rhonchi  GI: Distended, +abdominal striae, mildly tender, kavita on R side and L flank  Extremities: 2+ pulses bilaterally, 1+ pitting edema to mid-calf bilaterally  Neuro: Moves all extremities equally, strength 4/5 bilaterally. Aox4, slow to answer questions, but answers all questions about medications and history accurately     No intake or output data in the 24 hours ending 02/28/25 0018  Labs:   Results from last 72 hours   Lab Units 02/27/25  1715   SODIUM mmol/L 129*   POTASSIUM mmol/L 4.3   CHLORIDE mmol/L 96*   CO2 mmol/L 29   BUN mg/dL 11   CREATININE mg/dL 0.58   GLUCOSE mg/dL 348*   CALCIUM mg/dL 7.8*   ANION GAP mmol/L 8*   EGFR mL/min/1.73m*2 >90      Results from last 72 hours   Lab Units 02/27/25  1715   WBC AUTO x10*3/uL 6.1   HEMOGLOBIN g/dL 10.9*   HEMATOCRIT % 31.9*   PLATELETS AUTO x10*3/uL 70*   NEUTROS PCT AUTO % 64.4   LYMPHS PCT AUTO % 22.0   MONOS PCT AUTO % 10.4   EOS PCT AUTO % 2.6          Results from last 72 hours   Lab Units 02/27/25  1715   POCT PH, VENOUS pH 7.42   POCT PCO2, VENOUS mm Hg 49   POCT PO2, VENOUS mm Hg 29*        Micro/ID:     Lab Results   Component Value Date    URINECULTURE  02/09/2025     Clinically insignificant growth based on current clinical standards.    BLOODCULT No growth at 4 days -  FINAL REPORT 02/09/2025    BLOODCULT No growth at 4 days -  FINAL REPORT 02/09/2025 "         Assessment and Plan     Assessment:  Alfonzo Flanagan is a 50 y.o. female with PMH significant for biopsy-proven PBC with cirrhosis c/b portal HTN with ascites, esophageal varices (s/p banding 2021 with eradication), hepatic encephalopathy, antibody-positive celiac disease, recent pancreatic tail mass suggestive of neuroendocrine tumor s/p EUS on 3/2024 (no biopsy) recommended repeat in 1 year, IDDM2 (A1c 7.6), HTN, GERD, anxiety, depression, CORBY (2013 sleep study, untreated) who presented with worsening abdominal discomfort and distension associated with nausea. Her weight is down 30 lbs since weight recorded on 2/14, still symptoms could be related to fluid overload from ascites.     She is overall well appearing with exception of chronic liver disease, without leukocytosis or severe abdominal pain and is hemodynamically stable, so SBP is not high on the differential at this time to start empiric SBP coverage, though is a possible diagnosis. ER was unsuccessful in obtaining paracentesis, based on POCUS, bowel is close to the wall and the best pocket in the LUQ is close to the spleen with significant subcutaneous tissue, may need IR drainage. Will admit for paracentesis and hepatology evaluation with worsening symptoms and trial 1x dose 30 IV lasix as this improved ascites on last admission.    Additionally, symptoms could be 2/2 hyperglycemia in setting of recently decreased lantus dose (due to hypoglycemia on last admit).     Plan:    #PBC cirrhosis cb portal hypertension   #refractory ascites  #hepatic encephalopathy  :: Admission MELD NA increased at 22; Prior MELD NA 15-16  :: Last paracentesis 2/14, with 2.5L removal  Plan:  - Continue home po torsemide 40mg today, consider IV diuresis in the morning  - increased home spironolactone 150mg daily  - 2g sodium diet with 2000cc fluid restriction  - continue home ursodiol 300mg TID  - Continue   - daily MELD labs   - request IR for diagnostic and  therapeutic paracentesis in the morning     #Hyponatremia  ::129 on admission, previously 138  ::Likely 2/2 fluid overload   -Fluid and sodium restriction  -1x IV diuresis with lasix    #nausea, vomiting  #celiac disease  #h/o hepatic encephalopathy  :: No asterixis or confusion/AMS, notably tired appearing possibly 2/2 HE (vs recent morphine administration)  :: Presented with emesis, lactulose was discontinued on discharge 2 weeks ago  Plan:  - Order lactulose daily PRN for goal 2-3 BM/day (patient reports this is currently her baseline)  - zofran PRN, encourage use  - s/p rifaximin 550mg TID x14d for IBS-D dosing (end date 2/27)     #Night sweats and chills without SBP (were also present last admission)  :: h/o MDR UTI, no current dysuria  :: no leukocytosis or fevers, overall well appearing  Plan:  - CTM CBC and symptoms      #DM2 (A1c 7.6 most recently)  :: Lantus decreased from 50 units BID -> 10 units at bedtime on last admission in Feb 2024  :: Hyperglycemic on admission to 300s  Plan:  - lantus 7U nightly, mild SSI  - hold home metformin 1000mg daily XR     #hx varices now sp eradication 2021, GI bleed  - avoid NSAID use  - continue home pantoprazole 40mg      #anxiety/depression   - continue home trazodone 25mg nightly PRN     #Abdominal stretch pain  - acetaminophen PRN (max daily 2g)  - oxycodone 5mg q6h for mod-severe pain  - continue home carafate     #itching  - continue home prn atarax 25mg   - sarna cream  - benadryl cream     #HLD  :: HDL 21, LDL 62, TG 55, total cholesterol 94   - continue atorvastatin 20mg daily given worsening liver function     F: prn with caution given cirrhosis  E: prn   N: Adult diet 2-3 grams sodium, Gluten Free, 2L fluid restrict  A: PIV     DVT prophylaxis: Lovenox subq  GI Prophylaxis: PPI   Antimicrobials: not indicated     CODE STATUS: Full Code, confirmed on admission  Emergency contact: Claudia Hartley ([place])  Mobile Phone: [phone]  Relation:  Daughter    Patient will be seen and discussed with attending Dr. Hammer in the morning    Leena Jones MD  Internal Medicine and Pediatrics, PGY3

## 2025-03-01 LAB
ALBUMIN SERPL BCP-MCNC: 1.9 G/DL (ref 3.4–5)
ALP SERPL-CCNC: 197 U/L (ref 33–110)
ALT SERPL W P-5'-P-CCNC: 19 U/L (ref 7–45)
ANION GAP SERPL CALC-SCNC: 11 MMOL/L (ref 10–20)
APTT PPP: 29 SECONDS (ref 26–36)
AST SERPL W P-5'-P-CCNC: 42 U/L (ref 9–39)
BASOPHILS # BLD AUTO: 0.02 X10*3/UL (ref 0–0.1)
BASOPHILS NFR BLD AUTO: 0.4 %
BILIRUB SERPL-MCNC: 2.8 MG/DL (ref 0–1.2)
BUN SERPL-MCNC: 16 MG/DL (ref 6–23)
CALCIUM SERPL-MCNC: 7.8 MG/DL (ref 8.6–10.6)
CHLORIDE SERPL-SCNC: 100 MMOL/L (ref 98–107)
CO2 SERPL-SCNC: 27 MMOL/L (ref 21–32)
CREAT SERPL-MCNC: 0.8 MG/DL (ref 0.5–1.05)
EGFRCR SERPLBLD CKD-EPI 2021: >90 ML/MIN/1.73M*2
EOSINOPHIL # BLD AUTO: 0.13 X10*3/UL (ref 0–0.7)
EOSINOPHIL NFR BLD AUTO: 2.9 %
ERYTHROCYTE [DISTWIDTH] IN BLOOD BY AUTOMATED COUNT: 15.2 % (ref 11.5–14.5)
GLUCOSE BLD MANUAL STRIP-MCNC: 119 MG/DL (ref 74–99)
GLUCOSE BLD MANUAL STRIP-MCNC: 135 MG/DL (ref 74–99)
GLUCOSE BLD MANUAL STRIP-MCNC: 185 MG/DL (ref 74–99)
GLUCOSE BLD MANUAL STRIP-MCNC: 193 MG/DL (ref 74–99)
GLUCOSE SERPL-MCNC: 146 MG/DL (ref 74–99)
HCT VFR BLD AUTO: 31.6 % (ref 36–46)
HGB BLD-MCNC: 10.7 G/DL (ref 12–16)
IMM GRANULOCYTES # BLD AUTO: 0.01 X10*3/UL (ref 0–0.7)
IMM GRANULOCYTES NFR BLD AUTO: 0.2 % (ref 0–0.9)
INR PPP: 1.5 (ref 0.9–1.1)
LYMPHOCYTES # BLD AUTO: 1.28 X10*3/UL (ref 1.2–4.8)
LYMPHOCYTES NFR BLD AUTO: 28.5 %
MAGNESIUM SERPL-MCNC: 1.87 MG/DL (ref 1.6–2.4)
MCH RBC QN AUTO: 31.8 PG (ref 26–34)
MCHC RBC AUTO-ENTMCNC: 33.9 G/DL (ref 32–36)
MCV RBC AUTO: 94 FL (ref 80–100)
MONOCYTES # BLD AUTO: 0.66 X10*3/UL (ref 0.1–1)
MONOCYTES NFR BLD AUTO: 14.7 %
NEUTROPHILS # BLD AUTO: 2.39 X10*3/UL (ref 1.2–7.7)
NEUTROPHILS NFR BLD AUTO: 53.3 %
NRBC BLD-RTO: 0 /100 WBCS (ref 0–0)
PHOSPHATE SERPL-MCNC: 3.2 MG/DL (ref 2.5–4.9)
PLATELET # BLD AUTO: 64 X10*3/UL (ref 150–450)
POTASSIUM SERPL-SCNC: 3.6 MMOL/L (ref 3.5–5.3)
PROT SERPL-MCNC: 5.9 G/DL (ref 6.4–8.2)
PROTHROMBIN TIME: 16.6 SECONDS (ref 9.8–12.4)
RBC # BLD AUTO: 3.37 X10*6/UL (ref 4–5.2)
SODIUM SERPL-SCNC: 134 MMOL/L (ref 136–145)
WBC # BLD AUTO: 4.5 X10*3/UL (ref 4.4–11.3)

## 2025-03-01 PROCEDURE — 85025 COMPLETE CBC W/AUTO DIFF WBC: CPT | Performed by: NUTRITIONIST

## 2025-03-01 PROCEDURE — G0378 HOSPITAL OBSERVATION PER HR: HCPCS

## 2025-03-01 PROCEDURE — 85610 PROTHROMBIN TIME: CPT | Performed by: NUTRITIONIST

## 2025-03-01 PROCEDURE — 2500000001 HC RX 250 WO HCPCS SELF ADMINISTERED DRUGS (ALT 637 FOR MEDICARE OP)

## 2025-03-01 PROCEDURE — 2500000001 HC RX 250 WO HCPCS SELF ADMINISTERED DRUGS (ALT 637 FOR MEDICARE OP): Performed by: NUTRITIONIST

## 2025-03-01 PROCEDURE — 99232 SBSQ HOSP IP/OBS MODERATE 35: CPT

## 2025-03-01 PROCEDURE — 36415 COLL VENOUS BLD VENIPUNCTURE: CPT | Performed by: NUTRITIONIST

## 2025-03-01 PROCEDURE — 2500000002 HC RX 250 W HCPCS SELF ADMINISTERED DRUGS (ALT 637 FOR MEDICARE OP, ALT 636 FOR OP/ED)

## 2025-03-01 PROCEDURE — 82947 ASSAY GLUCOSE BLOOD QUANT: CPT | Mod: 59

## 2025-03-01 PROCEDURE — 83735 ASSAY OF MAGNESIUM: CPT | Performed by: NUTRITIONIST

## 2025-03-01 PROCEDURE — 80053 COMPREHEN METABOLIC PANEL: CPT | Performed by: NUTRITIONIST

## 2025-03-01 PROCEDURE — 2500000002 HC RX 250 W HCPCS SELF ADMINISTERED DRUGS (ALT 637 FOR MEDICARE OP, ALT 636 FOR OP/ED): Performed by: NUTRITIONIST

## 2025-03-01 PROCEDURE — 96372 THER/PROPH/DIAG INJ SC/IM: CPT

## 2025-03-01 PROCEDURE — 84100 ASSAY OF PHOSPHORUS: CPT | Performed by: NUTRITIONIST

## 2025-03-01 PROCEDURE — 2500000004 HC RX 250 GENERAL PHARMACY W/ HCPCS (ALT 636 FOR OP/ED)

## 2025-03-01 RX ORDER — LACTULOSE 10 G/15ML
20 SOLUTION ORAL 2 TIMES DAILY
Status: DISCONTINUED | OUTPATIENT
Start: 2025-03-01 | End: 2025-03-02 | Stop reason: HOSPADM

## 2025-03-01 RX ORDER — MIRTAZAPINE 15 MG/1
7.5 TABLET, FILM COATED ORAL NIGHTLY
Status: DISCONTINUED | OUTPATIENT
Start: 2025-03-01 | End: 2025-03-02 | Stop reason: HOSPADM

## 2025-03-01 RX ORDER — SPIRONOLACTONE 25 MG/1
100 TABLET ORAL DAILY
Status: DISCONTINUED | OUTPATIENT
Start: 2025-03-02 | End: 2025-03-02 | Stop reason: HOSPADM

## 2025-03-01 RX ADMIN — INSULIN LISPRO 1 UNITS: 100 INJECTION, SOLUTION INTRAVENOUS; SUBCUTANEOUS at 17:00

## 2025-03-01 RX ADMIN — SUCRALFATE 1 G: 1 TABLET ORAL at 20:53

## 2025-03-01 RX ADMIN — TRAZODONE HYDROCHLORIDE 25 MG: 50 TABLET ORAL at 23:20

## 2025-03-01 RX ADMIN — INSULIN LISPRO 1 UNITS: 100 INJECTION, SOLUTION INTRAVENOUS; SUBCUTANEOUS at 20:57

## 2025-03-01 RX ADMIN — MIRTAZAPINE 7.5 MG: 15 TABLET, FILM COATED ORAL at 20:53

## 2025-03-01 RX ADMIN — TORSEMIDE 20 MG: 20 TABLET ORAL at 09:11

## 2025-03-01 RX ADMIN — INSULIN GLARGINE 7 UNITS: 100 INJECTION, SOLUTION SUBCUTANEOUS at 20:58

## 2025-03-01 RX ADMIN — URSODIOL 500 MG: 500 TABLET ORAL at 20:56

## 2025-03-01 RX ADMIN — RIFAXIMIN 550 MG: 550 TABLET ORAL at 09:11

## 2025-03-01 RX ADMIN — ENOXAPARIN SODIUM 40 MG: 40 INJECTION SUBCUTANEOUS at 09:11

## 2025-03-01 RX ADMIN — SUCRALFATE 1 G: 1 TABLET ORAL at 09:09

## 2025-03-01 RX ADMIN — URSODIOL 250 MG: 250 TABLET ORAL at 09:12

## 2025-03-01 RX ADMIN — ATORVASTATIN CALCIUM 20 MG: 20 TABLET, FILM COATED ORAL at 20:53

## 2025-03-01 RX ADMIN — PANTOPRAZOLE SODIUM 40 MG: 40 TABLET, DELAYED RELEASE ORAL at 09:11

## 2025-03-01 RX ADMIN — RIFAXIMIN 550 MG: 550 TABLET ORAL at 17:00

## 2025-03-01 RX ADMIN — URSODIOL 500 MG: 500 TABLET ORAL at 09:13

## 2025-03-01 RX ADMIN — SUCRALFATE 1 G: 1 TABLET ORAL at 17:00

## 2025-03-01 RX ADMIN — SUCRALFATE 1 G: 1 TABLET ORAL at 12:29

## 2025-03-01 RX ADMIN — LACTULOSE 10 G: 20 SOLUTION ORAL at 09:12

## 2025-03-01 ASSESSMENT — COGNITIVE AND FUNCTIONAL STATUS - GENERAL
DAILY ACTIVITIY SCORE: 24
MOBILITY SCORE: 24
MOBILITY SCORE: 24
DAILY ACTIVITIY SCORE: 24

## 2025-03-01 ASSESSMENT — ACTIVITIES OF DAILY LIVING (ADL): LACK_OF_TRANSPORTATION: NO

## 2025-03-01 ASSESSMENT — PAIN - FUNCTIONAL ASSESSMENT: PAIN_FUNCTIONAL_ASSESSMENT: 0-10

## 2025-03-01 ASSESSMENT — PAIN SCALES - GENERAL
PAINLEVEL_OUTOF10: 4
PAINLEVEL_OUTOF10: 0 - NO PAIN

## 2025-03-01 NOTE — CARE PLAN
The patient's goals for the shift include      The clinical goals for the shift include Patiebnt will remain safe and stable with no c/o pain    Patient remained safe and stable, no acute events this shift.

## 2025-03-01 NOTE — PROGRESS NOTES
03/01/25 1407   Discharge Planning   Living Arrangements Spouse/significant other;Children  (and dtr)   Support Systems Spouse/significant other;Children   Assistance Needed yes-dtr assists pt as needed   Type of Residence Private residence   Home or Post Acute Services In home services   Expected Discharge Disposition Home H   Does the patient need discharge transport arranged? Yes   RoundTrip coordination needed? Yes   Financial Resource Strain   How hard is it for you to pay for the very basics like food, housing, medical care, and heating? Not very   Housing Stability   In the last 12 months, was there a time when you were not able to pay the mortgage or rent on time? N   At any time in the past 12 months, were you homeless or living in a shelter (including now)? N   Transportation Needs   In the past 12 months, has lack of transportation kept you from medical appointments or from getting medications? no   In the past 12 months, has lack of transportation kept you from meetings, work, or from getting things needed for daily living? No   Patient Choice   Provider Choice list and CMS website (https://medicare.gov/care-compare#search) for post-acute Quality and Resource Measure Data were provided and reviewed with: Patient   Intensity of Service   Intensity of Service 0-30 min     PCP: Misti Freeman   DATE OF LAST VISIT: 2/27-per pt PCP sent her to    PHARMACY: [facility] in [place]   RECENT FALLS: yes/fell about 3 days ago resulting in multiple bruises  EQUIPMENT USED IN HOME:  rollator  HOME O2/CPAP/NEBS: CPAP at home but does not use  DME SUPPLIER: does not remember name   TRANSPORT HOME: spouse   CURRENT HC: N/A   DIABETIC/SUPPLIES NEEDED: yes/denies needing any supplies     Address, phone and emergency contact information verified. Pt states her dtr assists with ADLS as needed. TCC discussed care team reccs for LOW intensity and per pt she wants to talk to her dtr first before making a final  decision about HC vs outpt therapy. Pt states she has [employer] insurance. TCC gave pt outpt therapy list just in case that is pt's preference. All questions and concerns answered. Will continue to follow.  Medical team updated.

## 2025-03-01 NOTE — PROGRESS NOTES
Alfonzo Flanagan is a 50 y.o. female on day 0 of admission presenting with Generalized abdominal pain.      Subjective   States that she slept better last night but is still having some stomach and back pain. She also still feels a bit dizzy, states that this dizziness has been going on for past 3-4 days. She also states that her blood pressures run on the lower side at home ~108 systolic.       Objective     Last Recorded Vitals  BP 85/51   Pulse 84   Temp 36.2 °C (97.2 °F)   Resp 17   Wt 79.8 kg (176 lb)   SpO2 95%   Intake/Output last 3 Shifts:  No intake or output data in the 24 hours ending 03/01/25 1302      Admission Weight  Weight: 79.8 kg (176 lb) (02/27/25 1338)    Daily Weight  02/27/25 : 79.8 kg (176 lb)    Image Results  US guided abdominal paracentesis  Narrative: Interpreted By:  Henok Perez,   STUDY:  US GUIDED ABDOMINAL PARACENTESIS; 2/28/20253:21 pm      INDICATION:  Signs/Symptoms:Paracentesis, diagnostic and therapeutic (previously  tolerated 2-5 L removal).      COMPARISON:  None.      ACCESSION NUMBER(S):  [id number]      ORDERING CLINICIAN:  ERASMO MARTINEZ      TECHNIQUE:  INTERVENTIONALIST(S):  Henok Perez      CONSENT:  The patient/patient's POA/next of kin was informed of the nature of  the proposed procedure. The purposes, alternatives, risks, and  benefits were explained and discussed. All questions were answered  and consent was obtained.      SEDATION:  None      MEDICATION/CONTRAST:          TIME OUT:  A time out was performed immediately prior to procedure start with  the interventional team, correctly identifying the patient name, date  of birth, MRN, procedure, anatomy (including marking of site and  side), patient position, procedure consent form, relevant laboratory  and imaging test results, antibiotic administration, safety  precautions, and procedure-specific equipment needs.      FINDINGS:  The patient was placed in the supine position.      The  abdominal space was examined with grey scale ultrasound, and the  most accessible fluid identified and marked for paracentesis.      The skin was prepped and draped in usual manner. Local anesthesia  with Lidocaine was administered and a right-sided paracentesis was  performed.  A 5 Irish One-Step paracentesis needle/catheter was then  placed where marked.  Approximately 2500 mL of yellowish colored  fluid was removed.  The needle/catheter was then withdrawn.      The patient tolerated the procedure well and there were no immediate  complications. Specimen(s) sent to the laboratory and pathology for  further evaluation, per the requesting team.      Impression: Uneventful paracentesis, as detailed above. Right Hemiabdomen, 2500 mL      I personally performed and/or directly supervised this study and was  present for the entire procedure.      Performed and dictated at [facility].      Signed by: Henok Perez 2/28/2025 3:21 PM  Dictation workstation:   [id number]      Physical Exam  Constitutional:       General: She is not in acute distress.  HENT:      Head: Normocephalic.      Mouth/Throat:      Mouth: Mucous membranes are moist.   Eyes:      General:         Right eye: No discharge.         Left eye: No discharge.   Pulmonary:      Effort: Pulmonary effort is normal.   Abdominal:      General: There is distension.      Palpations: Abdomen is soft.      Tenderness: There is no abdominal tenderness.   Musculoskeletal:      Comments: Improved BLE edema    Skin:     General: Skin is dry.   Neurological:      Mental Status: She is alert and oriented to person, place, and time. Mental status is at baseline.   Psychiatric:         Mood and Affect: Mood normal.         Behavior: Behavior normal.         A/P:  Alfonzo Flanagan is a 50 y.o. female with PMH significant for biopsy-proven PBC with cirrhosis c/b portal HTN with ascites, esophageal varices (s/p banding 2021 with  eradication), hepatic encephalopathy, antibody-positive celiac disease, recent pancreatic tail mass suggestive of neuroendocrine tumor s/p EUS on 3/2024 (no biopsy) recommended repeat in 1 year, IDDM2 (A1c 7.6), HTN, GERD, anxiety, depression, CORBY (2013 sleep study, untreated) who presented with worsening abdominal discomfort and distension associated with nausea. Her weight is down 30 lbs since weight recorded on 2/14, still symptoms could be related to fluid overload from ascites.      She is overall well appearing with exception of chronic liver disease, without leukocytosis or severe abdominal pain and is hemodynamically stable, so SBP is not high on the differential at this time to start empiric SBP coverage, though is a possible diagnosis. ER was unsuccessful in obtaining paracentesis, based on POCUS, bowel is close to the wall and the best pocket in the LUQ is close to the spleen with significant subcutaneous tissue, may need IR drainage. Will admit for paracentesis and hepatology evaluation with worsening symptoms and trial 1x dose 30 IV lasix as this improved ascites on last admission. Para done am 2/28 remove a little over 1L fluid, no albumin given, pt continued home bp meds with soft bp. Unable to add total protein or albumin study onto ascitic fluid.      Additionally, symptoms could be 2/2 hyperglycemia in setting of recently decreased lantus dose (due to hypoglycemia on last admit).       Updates 3/1:   -s/p paracentesis with no SBP  -pt feels improved today with decreased abdominal distension   -will check orthostats for dizziness   -due to lower blood pressures, parameters have been set for BB, torsemide, nataly. Also reduced nataly dose to 100mg (from 150mg)   -due to reduced po intake and mood symptoms, will start remeron 7.5 tonight   -spoke w patient regarding PT recommendation of home care and she will discuss with her daughter and let us know        #PBC cirrhosis cb portal hypertension    #refractory ascites, no hx of SBP  #hepatic encephalopathy  :: Admission MELD NA increased at 22; Prior MELD NA 15-16  :: Last paracentesis 2/14, with 2.5L removal  ::Patient with recent hospitalization, had paracentesis done am 2/28  Plan:  - Continue home po torsemide 40mg with hold parameters   -  increased home spironolactone 150mg daily with hold parameters   - 2g sodium diet with 2000cc fluid restriction  - continue home ursodiol 300mg TID  - daily MELD labs   - ir diagnostic and theraptuic para sent cultures, added on cytology, unable to ad don albumin and total protein   -Follow fluid studies, so far negative for SBP, Gram smear with PMNs. So hold on antibiotics for now  -Blood pressure soft, received diuretics earlier today and para and no albumin, so started bp med holding parameters   -Continue rifaximin, lactulose (daily then titrate up to home dose), aim for 3-4 bowel movements per day.  -cw carvedilol for portal hypertensive gastropathy with hold parameters      #Hyponatremia  ::129 on admission, previously 138  ::Likely 2/2 fluid overload   -Fluid and sodium restriction  -1x IV diuresis with lasix     #nausea, vomiting  #celiac disease  #h/o hepatic encephalopathy  :: No asterixis or confusion/AMS, notably tired appearing possibly 2/2 HE (vs recent morphine administration)  :: Presented with emesis, lactulose was discontinued on discharge 2 weeks ago  Plan:  - Order lactulose daily PRN for goal 2-3 BM/day (patient reports this is currently her baseline)  - zofran PRN, encourage use  - s/p rifaximin 550mg TID x14d for IBS-D dosing (end date 2/27)     #Night sweats and chills without SBP (were also present last admission)  :: h/o MDR UTI, no current dysuria  :: no leukocytosis or fevers, overall well appearing  Plan:  - CTM CBC and symptoms      #DM2 (A1c 7.6 most recently), insulin-dependent  :: Lantus decreased from 50 units BID -> 10 units at bedtime on last admission in Feb 2024  ::metformin at  home   :: Hyperglycemic on admission to 300s  Plan:  -Started on lower dose of Lantus, continue to monitor, hold home metformin, will likely discontinue on discharge given cirrhosis.  -ssi        #hx varices now sp eradication 2021, GI bleed  - avoid NSAID use  - continue home pantoprazole 40mg      #anxiety/depression   - continue home trazodone 25mg nightly PRN     #Abdominal stretch pain  - acetaminophen PRN (max daily 2g)  - oxycodone 5mg q6h for mod-severe pain  - continue home carafate     #Pruritus   - continue home prn atarax 25mg   - sarna cream  - benadryl cream     #HLD  :: HDL 21, LDL 62, TG 55, total cholesterol 94   - continue atorvastatin 20mg daily given worsening liver function    #GERD  Plan:  -cw ppi     #Falls  ::S/p recent fall at home  Plan:  -fall precautions  -PT/OT     F: prn with caution given cirrhosis  E: prn   N: Adult diet 2-3 grams sodium, Gluten Free, 2L fluid restrict  A: PIV     DVT prophylaxis: Lovenox subq  GI Prophylaxis: PPI   Antimicrobials: not indicated     CODE STATUS: Full Code, confirmed on admission  Emergency contact: Claudia Hartley ([place]) Daughter  Mobile Phone: [phone]         Steph Diane MD  IM PGY-3

## 2025-03-01 NOTE — CARE PLAN
Problem: Respiratory  Goal: Minimize anxiety/maximize coping throughout shift  3/1/2025 1400 by Shannan Lala RN  Outcome: Progressing  3/1/2025 1359 by Shannan Lala RN  Outcome: Progressing     Problem: Pain - Adult  Goal: Verbalizes/displays adequate comfort level or baseline comfort level  Outcome: Progressing     Problem: Safety - Adult  Goal: Free from fall injury  Outcome: Progressing     Problem: Discharge Planning  Goal: Discharge to home or other facility with appropriate resources  Outcome: Progressing     Problem: Chronic Conditions and Co-morbidities  Goal: Patient's chronic conditions and co-morbidity symptoms are monitored and maintained or improved  Outcome: Progressing     Problem: Nutrition  Goal: Nutrient intake appropriate for maintaining nutritional needs  Outcome: Progressing     Problem: Pain  Goal: Takes deep breaths with improved pain control throughout the shift  Outcome: Progressing  Goal: Turns in bed with improved pain control throughout the shift  Outcome: Progressing  Goal: Walks with improved pain control throughout the shift  Outcome: Progressing  Goal: Performs ADL's with improved pain control throughout shift  Outcome: Progressing  Goal: Participates in PT with improved pain control throughout the shift  Outcome: Progressing  Goal: Free from acute confusion related to pain meds throughout the shift  Outcome: Progressing   The patient's goals for the shift include  getting rest    The clinical goals for the shift include Patinet will be able to tolerate eating 25% of each meal

## 2025-03-02 VITALS
DIASTOLIC BLOOD PRESSURE: 60 MMHG | HEART RATE: 80 BPM | SYSTOLIC BLOOD PRESSURE: 97 MMHG | RESPIRATION RATE: 18 BRPM | WEIGHT: 176 LBS | HEIGHT: 62 IN | OXYGEN SATURATION: 100 % | BODY MASS INDEX: 32.39 KG/M2 | TEMPERATURE: 97.3 F

## 2025-03-02 LAB
ALBUMIN SERPL BCP-MCNC: 1.9 G/DL (ref 3.4–5)
ALP SERPL-CCNC: 219 U/L (ref 33–110)
ALT SERPL W P-5'-P-CCNC: 21 U/L (ref 7–45)
ANION GAP SERPL CALC-SCNC: 8 MMOL/L (ref 10–20)
APTT PPP: 30 SECONDS (ref 26–36)
AST SERPL W P-5'-P-CCNC: 48 U/L (ref 9–39)
BACTERIA FLD CULT: NORMAL
BASOPHILS # BLD AUTO: 0.02 X10*3/UL (ref 0–0.1)
BASOPHILS NFR BLD AUTO: 0.5 %
BILIRUB SERPL-MCNC: 2.3 MG/DL (ref 0–1.2)
BUN SERPL-MCNC: 13 MG/DL (ref 6–23)
CALCIUM SERPL-MCNC: 7.9 MG/DL (ref 8.6–10.6)
CHLORIDE SERPL-SCNC: 103 MMOL/L (ref 98–107)
CO2 SERPL-SCNC: 28 MMOL/L (ref 21–32)
CREAT SERPL-MCNC: 0.78 MG/DL (ref 0.5–1.05)
EGFRCR SERPLBLD CKD-EPI 2021: >90 ML/MIN/1.73M*2
EOSINOPHIL # BLD AUTO: 0.17 X10*3/UL (ref 0–0.7)
EOSINOPHIL NFR BLD AUTO: 4.2 %
ERYTHROCYTE [DISTWIDTH] IN BLOOD BY AUTOMATED COUNT: 15.5 % (ref 11.5–14.5)
GLUCOSE BLD MANUAL STRIP-MCNC: 199 MG/DL (ref 74–99)
GLUCOSE BLD MANUAL STRIP-MCNC: 229 MG/DL (ref 74–99)
GLUCOSE SERPL-MCNC: 201 MG/DL (ref 74–99)
GRAM STN SPEC: NORMAL
GRAM STN SPEC: NORMAL
HCT VFR BLD AUTO: 34.6 % (ref 36–46)
HGB BLD-MCNC: 11.3 G/DL (ref 12–16)
IMM GRANULOCYTES # BLD AUTO: 0.02 X10*3/UL (ref 0–0.7)
IMM GRANULOCYTES NFR BLD AUTO: 0.5 % (ref 0–0.9)
INR PPP: 1.3 (ref 0.9–1.1)
LYMPHOCYTES # BLD AUTO: 1.04 X10*3/UL (ref 1.2–4.8)
LYMPHOCYTES NFR BLD AUTO: 25.9 %
MAGNESIUM SERPL-MCNC: 2.03 MG/DL (ref 1.6–2.4)
MCH RBC QN AUTO: 31.2 PG (ref 26–34)
MCHC RBC AUTO-ENTMCNC: 32.7 G/DL (ref 32–36)
MCV RBC AUTO: 96 FL (ref 80–100)
MONOCYTES # BLD AUTO: 0.55 X10*3/UL (ref 0.1–1)
MONOCYTES NFR BLD AUTO: 13.7 %
NEUTROPHILS # BLD AUTO: 2.21 X10*3/UL (ref 1.2–7.7)
NEUTROPHILS NFR BLD AUTO: 55.2 %
NRBC BLD-RTO: 0 /100 WBCS (ref 0–0)
PHOSPHATE SERPL-MCNC: 2.5 MG/DL (ref 2.5–4.9)
PLATELET # BLD AUTO: 66 X10*3/UL (ref 150–450)
POTASSIUM SERPL-SCNC: 3.9 MMOL/L (ref 3.5–5.3)
PROT SERPL-MCNC: 6.4 G/DL (ref 6.4–8.2)
PROTHROMBIN TIME: 14.8 SECONDS (ref 9.8–12.4)
RBC # BLD AUTO: 3.62 X10*6/UL (ref 4–5.2)
SODIUM SERPL-SCNC: 135 MMOL/L (ref 136–145)
WBC # BLD AUTO: 4 X10*3/UL (ref 4.4–11.3)

## 2025-03-02 PROCEDURE — 2500000001 HC RX 250 WO HCPCS SELF ADMINISTERED DRUGS (ALT 637 FOR MEDICARE OP)

## 2025-03-02 PROCEDURE — 36415 COLL VENOUS BLD VENIPUNCTURE: CPT | Performed by: NUTRITIONIST

## 2025-03-02 PROCEDURE — 2500000002 HC RX 250 W HCPCS SELF ADMINISTERED DRUGS (ALT 637 FOR MEDICARE OP, ALT 636 FOR OP/ED)

## 2025-03-02 PROCEDURE — 2500000004 HC RX 250 GENERAL PHARMACY W/ HCPCS (ALT 636 FOR OP/ED): Performed by: NUTRITIONIST

## 2025-03-02 PROCEDURE — 2500000001 HC RX 250 WO HCPCS SELF ADMINISTERED DRUGS (ALT 637 FOR MEDICARE OP): Performed by: NUTRITIONIST

## 2025-03-02 PROCEDURE — 83735 ASSAY OF MAGNESIUM: CPT | Performed by: NUTRITIONIST

## 2025-03-02 PROCEDURE — 80053 COMPREHEN METABOLIC PANEL: CPT | Performed by: NUTRITIONIST

## 2025-03-02 PROCEDURE — 85025 COMPLETE CBC W/AUTO DIFF WBC: CPT | Performed by: NUTRITIONIST

## 2025-03-02 PROCEDURE — 82947 ASSAY GLUCOSE BLOOD QUANT: CPT

## 2025-03-02 PROCEDURE — 85610 PROTHROMBIN TIME: CPT | Performed by: NUTRITIONIST

## 2025-03-02 PROCEDURE — 99239 HOSP IP/OBS DSCHRG MGMT >30: CPT | Performed by: NUTRITIONIST

## 2025-03-02 PROCEDURE — 2500000002 HC RX 250 W HCPCS SELF ADMINISTERED DRUGS (ALT 637 FOR MEDICARE OP, ALT 636 FOR OP/ED): Performed by: NUTRITIONIST

## 2025-03-02 PROCEDURE — G0378 HOSPITAL OBSERVATION PER HR: HCPCS

## 2025-03-02 PROCEDURE — 96372 THER/PROPH/DIAG INJ SC/IM: CPT

## 2025-03-02 PROCEDURE — 2500000004 HC RX 250 GENERAL PHARMACY W/ HCPCS (ALT 636 FOR OP/ED)

## 2025-03-02 PROCEDURE — 84100 ASSAY OF PHOSPHORUS: CPT | Performed by: NUTRITIONIST

## 2025-03-02 RX ORDER — PANTOPRAZOLE SODIUM 40 MG/1
40 TABLET, DELAYED RELEASE ORAL
Qty: 14 TABLET | Refills: 0 | Status: SHIPPED | OUTPATIENT
Start: 2025-03-02 | End: 2025-03-02

## 2025-03-02 RX ORDER — ATORVASTATIN CALCIUM 20 MG/1
20 TABLET, FILM COATED ORAL NIGHTLY
Qty: 30 TABLET | Refills: 1 | Status: SHIPPED | OUTPATIENT
Start: 2025-03-02 | End: 2025-03-02

## 2025-03-02 RX ORDER — MECLIZINE HYDROCHLORIDE 25 MG/1
25 TABLET ORAL DAILY PRN
Qty: 30 TABLET | Refills: 0 | Status: SHIPPED | OUTPATIENT
Start: 2025-03-02 | End: 2025-03-02

## 2025-03-02 RX ORDER — ACETAMINOPHEN 650 MG/1
650 SUPPOSITORY RECTAL EVERY 6 HOURS
Status: DISCONTINUED | OUTPATIENT
Start: 2025-03-02 | End: 2025-03-02 | Stop reason: HOSPADM

## 2025-03-02 RX ORDER — MIRTAZAPINE 7.5 MG/1
7.5 TABLET, FILM COATED ORAL NIGHTLY
Qty: 30 TABLET | Refills: 1 | Status: SHIPPED | OUTPATIENT
Start: 2025-03-02 | End: 2025-03-02

## 2025-03-02 RX ORDER — PANTOPRAZOLE SODIUM 40 MG/1
40 TABLET, DELAYED RELEASE ORAL
Status: DISCONTINUED | OUTPATIENT
Start: 2025-03-02 | End: 2025-03-02 | Stop reason: HOSPADM

## 2025-03-02 RX ORDER — SPIRONOLACTONE 100 MG/1
100 TABLET, FILM COATED ORAL DAILY
Qty: 30 TABLET | Refills: 0 | Status: SHIPPED | OUTPATIENT
Start: 2025-03-03 | End: 2025-04-02

## 2025-03-02 RX ORDER — ONDANSETRON 4 MG/1
4 TABLET, ORALLY DISINTEGRATING ORAL EVERY 6 HOURS
Qty: 20 TABLET | Refills: 0 | Status: SHIPPED | OUTPATIENT
Start: 2025-03-02 | End: 2025-03-02

## 2025-03-02 RX ORDER — CARVEDILOL 6.25 MG/1
6.25 TABLET ORAL 2 TIMES DAILY
Qty: 60 TABLET | Refills: 0 | Status: SHIPPED | OUTPATIENT
Start: 2025-03-02 | End: 2025-03-02

## 2025-03-02 RX ORDER — INSULIN GLARGINE 100 [IU]/ML
8 INJECTION, SOLUTION SUBCUTANEOUS NIGHTLY
Status: DISCONTINUED | OUTPATIENT
Start: 2025-03-02 | End: 2025-03-02 | Stop reason: HOSPADM

## 2025-03-02 RX ORDER — INSULIN GLARGINE 100 [IU]/ML
7 INJECTION, SOLUTION SUBCUTANEOUS NIGHTLY
Qty: 30 EACH | Refills: 0 | Status: SHIPPED | OUTPATIENT
Start: 2025-03-02 | End: 2025-03-02

## 2025-03-02 RX ORDER — LACTULOSE 10 G/15ML
20 SOLUTION ORAL 2 TIMES DAILY
Qty: 1800 ML | Refills: 0 | Status: SHIPPED | OUTPATIENT
Start: 2025-03-02 | End: 2025-03-02

## 2025-03-02 RX ORDER — SPIRONOLACTONE 100 MG/1
100 TABLET, FILM COATED ORAL DAILY
Qty: 30 TABLET | Refills: 1 | Status: SHIPPED | OUTPATIENT
Start: 2025-03-03 | End: 2025-03-02

## 2025-03-02 RX ORDER — INSULIN GLARGINE 100 [IU]/ML
7 INJECTION, SOLUTION SUBCUTANEOUS NIGHTLY
Qty: 2.1 ML | Refills: 1 | Status: SHIPPED | OUTPATIENT
Start: 2025-03-02 | End: 2025-05-01

## 2025-03-02 RX ORDER — POLYETHYLENE GLYCOL 3350 17 G/17G
17 POWDER, FOR SOLUTION ORAL 2 TIMES DAILY
Qty: 1020 G | Refills: 0 | Status: CANCELLED | OUTPATIENT
Start: 2025-03-02 | End: 2025-04-01

## 2025-03-02 RX ORDER — MECLIZINE HYDROCHLORIDE 25 MG/1
25 TABLET ORAL DAILY PRN
Qty: 30 TABLET | Refills: 0 | Status: SHIPPED | OUTPATIENT
Start: 2025-03-02

## 2025-03-02 RX ORDER — PANTOPRAZOLE SODIUM 40 MG/1
40 TABLET, DELAYED RELEASE ORAL
Qty: 30 TABLET | Refills: 1 | Status: SHIPPED | OUTPATIENT
Start: 2025-03-10 | End: 2025-03-02

## 2025-03-02 RX ORDER — ACETAMINOPHEN 500 MG
500-1000 TABLET ORAL EVERY 6 HOURS PRN
Qty: 30 TABLET | Refills: 0 | Status: SHIPPED | OUTPATIENT
Start: 2025-03-02

## 2025-03-02 RX ORDER — TORSEMIDE 20 MG/1
20 TABLET ORAL DAILY
Qty: 30 TABLET | Refills: 1 | Status: SHIPPED | OUTPATIENT
Start: 2025-03-03 | End: 2025-03-02

## 2025-03-02 RX ORDER — TRAZODONE HYDROCHLORIDE 50 MG/1
25 TABLET ORAL NIGHTLY PRN
Qty: 15 TABLET | Refills: 0 | Status: SHIPPED | OUTPATIENT
Start: 2025-03-02 | End: 2025-04-01

## 2025-03-02 RX ORDER — ONDANSETRON 4 MG/1
4 TABLET, ORALLY DISINTEGRATING ORAL EVERY 6 HOURS
Qty: 120 TABLET | Refills: 0 | Status: SHIPPED | OUTPATIENT
Start: 2025-03-02 | End: 2025-03-02

## 2025-03-02 RX ORDER — CARVEDILOL 6.25 MG/1
6.25 TABLET ORAL 2 TIMES DAILY
Qty: 60 TABLET | Refills: 0 | Status: SHIPPED | OUTPATIENT
Start: 2025-03-02 | End: 2025-04-01

## 2025-03-02 RX ORDER — TALC
3 POWDER (GRAM) TOPICAL NIGHTLY
Status: DISCONTINUED | OUTPATIENT
Start: 2025-03-02 | End: 2025-03-02 | Stop reason: HOSPADM

## 2025-03-02 RX ORDER — METFORMIN HYDROCHLORIDE 500 MG/1
1000 TABLET, EXTENDED RELEASE ORAL
Qty: 30 TABLET | Refills: 0 | Status: SHIPPED | OUTPATIENT
Start: 2025-03-02 | End: 2025-03-02

## 2025-03-02 RX ORDER — INSULIN GLARGINE 100 [IU]/ML
7 INJECTION, SOLUTION SUBCUTANEOUS NIGHTLY
Qty: 30 EACH | Refills: 1 | Status: SHIPPED | OUTPATIENT
Start: 2025-03-02 | End: 2025-03-02

## 2025-03-02 RX ORDER — METFORMIN HYDROCHLORIDE 500 MG/1
1000 TABLET, EXTENDED RELEASE ORAL
Qty: 60 TABLET | Refills: 0 | Status: SHIPPED | OUTPATIENT
Start: 2025-03-02 | End: 2025-04-01

## 2025-03-02 RX ORDER — URSODIOL 250 MG/1
TABLET, FILM COATED ORAL
Qty: 150 TABLET | Refills: 0 | Status: SHIPPED | OUTPATIENT
Start: 2025-03-02 | End: 2025-04-01

## 2025-03-02 RX ORDER — ACETAMINOPHEN 160 MG/5ML
650 SOLUTION ORAL EVERY 6 HOURS
Status: DISCONTINUED | OUTPATIENT
Start: 2025-03-02 | End: 2025-03-02 | Stop reason: HOSPADM

## 2025-03-02 RX ORDER — ACETAMINOPHEN 325 MG/1
500 TABLET ORAL EVERY 6 HOURS
Status: DISCONTINUED | OUTPATIENT
Start: 2025-03-02 | End: 2025-03-02 | Stop reason: HOSPADM

## 2025-03-02 RX ORDER — ONDANSETRON 4 MG/1
4 TABLET, ORALLY DISINTEGRATING ORAL EVERY 6 HOURS
Qty: 120 TABLET | Refills: 0 | Status: SHIPPED | OUTPATIENT
Start: 2025-03-02 | End: 2025-04-01

## 2025-03-02 RX ORDER — PANTOPRAZOLE SODIUM 40 MG/1
40 TABLET, DELAYED RELEASE ORAL
Qty: 14 TABLET | Refills: 0 | Status: SHIPPED | OUTPATIENT
Start: 2025-03-02 | End: 2025-03-09

## 2025-03-02 RX ORDER — TALC
3 POWDER (GRAM) TOPICAL NIGHTLY
Qty: 30 TABLET | Refills: 1 | Status: SHIPPED | OUTPATIENT
Start: 2025-03-02 | End: 2025-03-02

## 2025-03-02 RX ORDER — SUCRALFATE 1 G/1
1 TABLET ORAL
Qty: 120 TABLET | Refills: 0 | Status: SHIPPED | OUTPATIENT
Start: 2025-03-02 | End: 2025-04-01

## 2025-03-02 RX ORDER — LACTULOSE 10 G/15ML
20 SOLUTION ORAL 2 TIMES DAILY
Qty: 1860 ML | Refills: 0 | Status: SHIPPED | OUTPATIENT
Start: 2025-03-02 | End: 2025-03-02

## 2025-03-02 RX ORDER — TRAZODONE HYDROCHLORIDE 50 MG/1
25 TABLET ORAL NIGHTLY PRN
Qty: 30 TABLET | Refills: 1 | Status: SHIPPED | OUTPATIENT
Start: 2025-03-02 | End: 2025-03-02 | Stop reason: HOSPADM

## 2025-03-02 RX ORDER — TRAZODONE HYDROCHLORIDE 50 MG/1
25 TABLET ORAL NIGHTLY PRN
Qty: 30 TABLET | Refills: 0 | Status: SHIPPED | OUTPATIENT
Start: 2025-03-02 | End: 2025-03-02

## 2025-03-02 RX ORDER — SYRING-NEEDL,DISP,INSUL,0.3 ML 29 G X1/2"
148 SYRINGE, EMPTY DISPOSABLE MISCELLANEOUS ONCE
Status: DISCONTINUED | OUTPATIENT
Start: 2025-03-02 | End: 2025-03-02 | Stop reason: HOSPADM

## 2025-03-02 RX ORDER — LACTULOSE 10 G/15ML
20 SOLUTION ORAL 2 TIMES DAILY
Qty: 1800 ML | Refills: 0 | Status: SHIPPED | OUTPATIENT
Start: 2025-03-02 | End: 2025-04-01

## 2025-03-02 RX ORDER — PRAMOXINE HYDROCHLORIDE 10 MG/ML
1 LOTION TOPICAL EVERY 8 HOURS PRN
Qty: 60 ML | Refills: 0 | Status: SHIPPED | OUTPATIENT
Start: 2025-03-02 | End: 2025-04-01

## 2025-03-02 RX ORDER — TORSEMIDE 20 MG/1
20 TABLET ORAL DAILY
Qty: 30 TABLET | Refills: 1 | Status: SHIPPED | OUTPATIENT
Start: 2025-03-03

## 2025-03-02 RX ORDER — PANTOPRAZOLE SODIUM 40 MG/1
40 TABLET, DELAYED RELEASE ORAL
Qty: 30 TABLET | Refills: 1 | Status: SHIPPED | OUTPATIENT
Start: 2025-03-10 | End: 2025-05-09

## 2025-03-02 RX ORDER — URSODIOL 250 MG/1
TABLET, FILM COATED ORAL
Qty: 30 TABLET | Refills: 1 | Status: SHIPPED | OUTPATIENT
Start: 2025-03-02 | End: 2025-03-02

## 2025-03-02 RX ORDER — MIRTAZAPINE 7.5 MG/1
7.5 TABLET, FILM COATED ORAL NIGHTLY
Qty: 30 TABLET | Refills: 0 | Status: SHIPPED | OUTPATIENT
Start: 2025-03-02 | End: 2025-04-01

## 2025-03-02 RX ORDER — TALC
3 POWDER (GRAM) TOPICAL NIGHTLY
Qty: 30 TABLET | Refills: 0 | Status: SHIPPED | OUTPATIENT
Start: 2025-03-02 | End: 2025-04-01

## 2025-03-02 RX ORDER — PRAMOXINE HYDROCHLORIDE 10 MG/ML
1 LOTION TOPICAL EVERY 8 HOURS PRN
Qty: 60 ML | Refills: 0 | Status: SHIPPED | OUTPATIENT
Start: 2025-03-02 | End: 2025-03-02

## 2025-03-02 RX ORDER — ACETAMINOPHEN 500 MG
500-1000 TABLET ORAL EVERY 6 HOURS PRN
Qty: 30 TABLET | Refills: 0 | Status: SHIPPED | OUTPATIENT
Start: 2025-03-02 | End: 2025-03-02

## 2025-03-02 RX ORDER — ATORVASTATIN CALCIUM 20 MG/1
20 TABLET, FILM COATED ORAL NIGHTLY
Qty: 30 TABLET | Refills: 0 | Status: SHIPPED | OUTPATIENT
Start: 2025-03-02 | End: 2025-04-01

## 2025-03-02 RX ORDER — URSODIOL 250 MG/1
TABLET, FILM COATED ORAL
Qty: 150 TABLET | Refills: 0 | Status: SHIPPED | OUTPATIENT
Start: 2025-03-02 | End: 2025-03-02

## 2025-03-02 RX ORDER — POLYETHYLENE GLYCOL 3350 17 G/17G
17 POWDER, FOR SOLUTION ORAL DAILY
Status: DISCONTINUED | OUTPATIENT
Start: 2025-03-02 | End: 2025-03-02 | Stop reason: HOSPADM

## 2025-03-02 RX ORDER — ONDANSETRON 4 MG/1
4 TABLET, ORALLY DISINTEGRATING ORAL EVERY 6 HOURS
Status: DISCONTINUED | OUTPATIENT
Start: 2025-03-02 | End: 2025-03-02 | Stop reason: HOSPADM

## 2025-03-02 RX ORDER — POLYETHYLENE GLYCOL 3350 17 G/17G
17 POWDER, FOR SOLUTION ORAL DAILY
Qty: 30 PACKET | Refills: 0 | Status: SHIPPED | OUTPATIENT
Start: 2025-03-03 | End: 2025-04-02

## 2025-03-02 RX ORDER — SUCRALFATE 1 G/1
1 TABLET ORAL
Qty: 60 TABLET | Refills: 1 | Status: SHIPPED | OUTPATIENT
Start: 2025-03-02 | End: 2025-03-02

## 2025-03-02 RX ORDER — POLYETHYLENE GLYCOL 3350 17 G/17G
17 POWDER, FOR SOLUTION ORAL DAILY
Qty: 510 G | Refills: 0 | Status: SHIPPED | OUTPATIENT
Start: 2025-03-02 | End: 2025-03-02 | Stop reason: HOSPADM

## 2025-03-02 RX ORDER — TRAZODONE HYDROCHLORIDE 50 MG/1
25 TABLET ORAL NIGHTLY PRN
Qty: 30 TABLET | Refills: 1 | Status: SHIPPED | OUTPATIENT
Start: 2025-03-02 | End: 2025-03-02

## 2025-03-02 RX ADMIN — ONDANSETRON 4 MG: 4 TABLET, ORALLY DISINTEGRATING ORAL at 09:07

## 2025-03-02 RX ADMIN — POLYETHYLENE GLYCOL 3350 17 G: 17 POWDER, FOR SOLUTION ORAL at 09:13

## 2025-03-02 RX ADMIN — URSODIOL 500 MG: 500 TABLET ORAL at 09:13

## 2025-03-02 RX ADMIN — LACTULOSE 20 G: 20 SOLUTION ORAL at 09:09

## 2025-03-02 RX ADMIN — INSULIN LISPRO 1 UNITS: 100 INJECTION, SOLUTION INTRAVENOUS; SUBCUTANEOUS at 09:09

## 2025-03-02 RX ADMIN — RIFAXIMIN 550 MG: 550 TABLET ORAL at 09:09

## 2025-03-02 RX ADMIN — INSULIN LISPRO 1 UNITS: 100 INJECTION, SOLUTION INTRAVENOUS; SUBCUTANEOUS at 12:12

## 2025-03-02 RX ADMIN — TORSEMIDE 20 MG: 20 TABLET ORAL at 09:08

## 2025-03-02 RX ADMIN — ACETAMINOPHEN 487.5 MG: 325 TABLET ORAL at 09:07

## 2025-03-02 RX ADMIN — RIFAXIMIN 550 MG: 550 TABLET ORAL at 01:37

## 2025-03-02 RX ADMIN — SUCRALFATE 1 G: 1 TABLET ORAL at 12:15

## 2025-03-02 RX ADMIN — SUCRALFATE 1 G: 1 TABLET ORAL at 06:39

## 2025-03-02 RX ADMIN — URSODIOL 250 MG: 250 TABLET ORAL at 09:08

## 2025-03-02 RX ADMIN — CARVEDILOL 6.25 MG: 6.25 TABLET, FILM COATED ORAL at 09:08

## 2025-03-02 RX ADMIN — PANTOPRAZOLE SODIUM 40 MG: 40 TABLET, DELAYED RELEASE ORAL at 09:09

## 2025-03-02 RX ADMIN — ENOXAPARIN SODIUM 40 MG: 40 INJECTION SUBCUTANEOUS at 09:09

## 2025-03-02 RX ADMIN — SPIRONOLACTONE 100 MG: 25 TABLET, FILM COATED ORAL at 09:07

## 2025-03-02 ASSESSMENT — COGNITIVE AND FUNCTIONAL STATUS - GENERAL
MOBILITY SCORE: 24
DAILY ACTIVITIY SCORE: 24

## 2025-03-02 ASSESSMENT — PAIN - FUNCTIONAL ASSESSMENT
PAIN_FUNCTIONAL_ASSESSMENT: 0-10
PAIN_FUNCTIONAL_ASSESSMENT: 0-10

## 2025-03-02 ASSESSMENT — PAIN SCALES - GENERAL
PAINLEVEL_OUTOF10: 1
PAINLEVEL_OUTOF10: 3

## 2025-03-02 NOTE — CARE PLAN
The patient's goals for the shift include      The clinical goals for the shift include Patiebt will remain safe and stable this shift    Patient remained safe and stable this shift, discharge to home today.

## 2025-03-02 NOTE — DISCHARGE SUMMARY
Discharge Diagnosis  Generalized abdominal pain    Issues Requiring Follow-Up  -PCP within 1 week (a referral has been placed), continue to monitor and adjust home bp/cirrhosis meds as pt tolerates     -GI, appointment scheduled 3/26/2025 at 2:40pm with Dr. Clemencia mariano with outpatient physical therapy (referral placed)    -FU ascitic fluid cultures     Discharge Meds     Medication List      START taking these medications     lactulose 20 gram/30 mL oral solution; Take 30 mL (20 g) by mouth 2   times a day.   melatonin 3 mg tablet; Take 1 tablet (3 mg) by mouth once daily at   bedtime.   mirtazapine 7.5 mg tablet; Commonly known as: Remeron; Take 1 tablet   (7.5 mg) by mouth once daily at bedtime.   ondansetron ODT 4 mg disintegrating tablet; Commonly known as:   Zofran-ODT; Dissolve 1 tablet (4 mg) in the mouth every 6 hours.   polyethylene glycol 17 gram packet; Commonly known as: Glycolax,   Miralax; Take 17 g by mouth once daily.; Start taking on: March 3, 2025;   Replaces: polyethylene glycol 17 gram/dose powder   pramoxine 1 % lotion; Commonly known as: Sarna Sensitive; Apply 1   Application topically every 8 hours if needed (itch).     CHANGE how you take these medications     meclizine 25 mg tablet; Commonly known as: Antivert; Take 1 tablet (25   mg) by mouth once daily as needed for dizziness.; What changed: when to   take this   * pantoprazole 40 mg EC tablet; Commonly known as: ProtoNix; Take 1   tablet (40 mg) by mouth 2 times a day before meals for 7 days. Do not   crush, chew, or split.; What changed: when to take this   * pantoprazole 40 mg EC tablet; Commonly known as: ProtoNix; Take 1   tablet (40 mg) by mouth once daily in the morning. Take before meals. Do   not crush, chew, or split. Do not fill before March 10, 2025.; Start   taking on: March 10, 2025; What changed: You were already taking a   medication with the same name, and this prescription was added. Make sure   you understand how and  when to take each.   spironolactone 100 mg tablet; Commonly known as: Aldactone; Take 1   tablet (100 mg) by mouth once daily. Do not fill before March 3, 2025.;   Start taking on: March 3, 2025; What changed: medication strength, how   much to take   traZODone 50 mg tablet; Commonly known as: Desyrel; Take 0.5 tablets (25   mg) by mouth as needed at bedtime for sleep.; What changed: when to take   this, reasons to take this   ursodiol 250 mg tablet; Commonly known as: Actigall; Take 2 tablets (500   mg) by mouth 2 times a day AND 1 tablet (250 mg) once daily.; What   changed: medication strength, See the new instructions.  * This list has 2 medication(s) that are the same as other medications   prescribed for you. Read the directions carefully, and ask your doctor or   other care provider to review them with you.     CONTINUE taking these medications     acetaminophen 500 mg tablet; Commonly known as: Tylenol; Take 1-2   tablets (500-1,000 mg) by mouth every 6 hours if needed (pain).   atorvastatin 20 mg tablet; Commonly known as: Lipitor; Take 1 tablet (20   mg) by mouth once daily at bedtime.   carvedilol 6.25 mg tablet; Commonly known as: Coreg; Take 1 tablet (6.25   mg) by mouth 2 times a day.   Easy Touch Alcohol Prep Pads pads, medicated; Generic drug: alcohol   swabs; Apply 1 each topically once daily at bedtime.   insulin glargine 100 unit/mL (3 mL) pen; Commonly known as: Lantus;   Inject 7 Units under the skin once daily at bedtime. Take as directed per   insulin instructions.   metFORMIN  mg 24 hr tablet; Commonly known as: Glucophage-XR; [place]   2 tabletas 1 vez cada día por la tarde. [place] con comida.; (Take 2 tablets   (1,000 mg) by mouth once daily in the evening. Take with meals. Do not   crush, chew, or split.)   rifAXIMin 550 mg tablet; Commonly known as: Xifaxan; Take 1 tablet (550   mg) by mouth 3 times a day for 10 days.   sucralfate 1 gram tablet; Commonly known as: Carafate; Take 1 tablet  "(1   g) by mouth 4 times a day before meals.   * Sure Comfort Pen Needle 32 gauge x 5/32\" needle; Generic drug: pen   needle, diabetic; 4 veces cada día; (Use 4 times a day)   * BD Ultra-Fine Orig Pen Needle 29 gauge x 1/2\" needle; Generic drug:   pen needle 1/2\"; Usar para inyectarse de 1 a 4 veces al día según las   indicaciones.; (Use to inject 1-4 times daily as directed.)   torsemide 20 mg tablet; Commonly known as: Demadex; Take 1 tablet (20   mg) by mouth once daily. Do not fill before March 3, 2025.; Start taking   on: March 3, 2025  * This list has 2 medication(s) that are the same as other medications   prescribed for you. Read the directions carefully, and ask your doctor or   other care provider to review them with you.     STOP taking these medications     hydrOXYzine HCL 25 mg tablet; Commonly known as: Atarax   polyethylene glycol 17 gram/dose powder; Commonly known as: Glycolax,   Miralax; Replaced by: polyethylene glycol 17 gram packet       Test Results Pending At Discharge  Pending Labs       Order Current Status    Non-gynecologic cytology Collected (02/28/25 4209)    Sterile Fluid Culture/Smear Preliminary result            Hospital Course  Alfonzo Flanagan is a 50 y.o. female with PMH significant for biopsy-proven PBC with cirrhosis c/b portal HTN with ascites, esophageal varices (s/p banding 2021 with eradication), hepatic encephalopathy, antibody-positive celiac disease, recent pancreatic tail mass suggestive of neuroendocrine tumor s/p EUS on 3/2024 (no biopsy) recommended repeat in 1 year, IDDM2 (A1c 7.6), HTN, GERD, anxiety, depression, CORBY (2013 sleep study, untreated) who presented 2/27 with worsening abdominal discomfort and distension associated with nausea. Her weight is down 30 lbs since weight recorded on 2/14, still symptoms could be related to fluid overload from ascites.      She was overall well appearing with exception of chronic liver disease, without leukocytosis or severe " abdominal pain and remained overall hemodynamically stable, so SBP was not high on the differential (no previous hx of sbp after thorough chart review, so did not start antibiotics).     ER was unsuccessful in obtaining paracentesis, based on POCUS, bowel was close to the wall and the best pocket in the LUQ was close to the spleen with significant subcutaneous tissue. Therefore IR consulted for paracentesis. The pt was admitted for that and hepatology evaluation with worsening symptoms and trial 1x dose 30 IV lasix as this improved ascites on last admission. Para done am 2/28 removed 2500 ml of yellow fluid fluid, no albumin given, pt continued home bp meds with soft bp. Unable to add total protein or albumin study onto ascitic fluid. However ascitic cultures with gram stain showing 1+ rare polynuclear leukocytes with no organisms. Continued no SBP.     Pt sx not completely resolved on discharge but she is stable and improved. Pt had one bowel movement after increasing lactulose and miralax. Abdominal pain improved and nausea improved (outpt tylenol and zofran). Also plan to increase ppi to bid for one week then to have pt continue on ppi daily.     Because of decreased mood, sleep, and appetite, also added on mirtazapine 7.5 at bedtime.     BP medication parameters were set where if her systolic bp was less than  we would hold spironolactone (decreased from 150 to 100), coreg, torsemide. There parameters can be continued outpt or home reg can be adjusted by primary care/GI.      Pt discharged to home with stable labs and vitals at baseline. Plan for outpt physical therapy to improve strength and function.          Medications:  -Please START   lactulose twice daily (for confusion and constipation)  melatonin nightly (for trouble sleeping) mirtazapine nightly (for trouble sleeping and difficulty eating)  zofran as needed (for nausea and vomiting)  Sarna lotion (for itch)  Miralax daily stop twice a day (for  constipation)    -Please STOP Atarax (for anxiety or itch or trouble sleeping)    -Please CHANGE how you take  Meclizine take one pill daily if needed (for dizziness)  Pantoprazole twice daily for one week (until 2/9) then once daily, this medication is for acid reflux.   Spironolactone decreased to 1000mg (for fluid retention with cirrhosis)   Trazadone at night as needed (for sleep)  Ursodiol 750 in the morning and 500 at night (liver gall bladder and liver health)      Follow Up:  -PCP within 1 week (a referral has been placed), continue to monitor and adjust home bp/cirrhosis meds as pt tolerates     -GI, appointment scheduled 3/26/2025 at 2:40pm with Dr. Khanna     -fu with outpatient physical therapy (referral placed)    -FU ascitic fluid cultures     Pertinent Physical Exam At Time of Discharge  Physical Exam  Constitutional:       General: She is not in acute distress.     Comments: Chronically ill appearing   HENT:      Head: Normocephalic and atraumatic.      Nose: No rhinorrhea.      Mouth/Throat:      Mouth: Mucous membranes are moist.   Eyes:      General:         Right eye: No discharge.         Left eye: No discharge.      Extraocular Movements: Extraocular movements intact.   Cardiovascular:      Rate and Rhythm: Normal rate and regular rhythm.      Heart sounds: No murmur heard.     No friction rub. No gallop.   Pulmonary:      Effort: Pulmonary effort is normal. No respiratory distress.      Breath sounds: No wheezing, rhonchi or rales.   Chest:      Chest wall: No tenderness.   Abdominal:      General: Abdomen is flat. Bowel sounds are normal. There is no distension.      Palpations: Abdomen is soft.      Tenderness: There is abdominal tenderness.   Musculoskeletal:         General: No tenderness.      Right lower leg: No edema.      Left lower leg: No edema.   Skin:     General: Skin is warm and dry.   Neurological:      General: No focal deficit present.      Mental Status: She is oriented to  person, place, and time. Mental status is at baseline.      Motor: Weakness present.   Psychiatric:         Mood and Affect: Mood normal.         Behavior: Behavior normal.         Outpatient Follow-Up  Future Appointments   Date Time Provider Department Center   3/26/2025  2:40 PM Dwain Khanna MD [id number]         Shannan Estrada MD

## 2025-03-02 NOTE — HOSPITAL COURSE
Alfonzo Flanagan is a 50 y.o. female with PMH significant for biopsy-proven PBC with cirrhosis c/b portal HTN with ascites, esophageal varices (s/p banding 2021 with eradication), hepatic encephalopathy, antibody-positive celiac disease, recent pancreatic tail mass suggestive of neuroendocrine tumor s/p EUS on 3/2024 (no biopsy) recommended repeat in 1 year, IDDM2 (A1c 7.6), HTN, GERD, anxiety, depression, CORBY (2013 sleep study, untreated) who presented 2/27 with worsening abdominal discomfort and distension associated with nausea. Her weight is down 30 lbs since weight recorded on 2/14, still symptoms could be related to fluid overload from ascites.      She was overall well appearing with exception of chronic liver disease, without leukocytosis or severe abdominal pain and remained overall hemodynamically stable, so SBP was not high on the differential (no previous hx of sbp after thorough chart review, so did not start antibiotics).     ER was unsuccessful in obtaining paracentesis, based on POCUS, bowel was close to the wall and the best pocket in the LUQ was close to the spleen with significant subcutaneous tissue. Therefore IR consulted for paracentesis. The pt was admitted for that and hepatology evaluation with worsening symptoms and trial 1x dose 30 IV lasix as this improved ascites on last admission. Para done am 2/28 removed 2500 ml of yellow fluid fluid, no albumin given, pt continued home bp meds with soft bp. Unable to add total protein or albumin study onto ascitic fluid. However ascitic cultures with gram stain showing 1+ rare polynuclear leukocytes with no organisms. Continued no SBP.     Pt sx not completely resolved on discharge but she is stable and improved. Pt had one bowel movement after increasing lactulose and miralax. Abdominal pain improved and nausea improved (outpt tylenol and zofran). Also plan to increase ppi to bid for one week then to have pt continue on ppi daily.     Because of  decreased mood, sleep, and appetite, also added on mirtazapine 7.5 at bedtime.     BP medication parameters were set where if her systolic bp was less than  we would hold spironolactone (decreased from 150 to 100), coreg, torsemide. There parameters can be continued outpt or home reg can be adjusted by primary care/GI.      Pt discharged to home with stable labs and vitals at baseline. Plan for outpt physical therapy to improve strength and function.          Medications:  -Please START   lactulose twice daily (for confusion and constipation)  melatonin nightly (for trouble sleeping) mirtazapine nightly (for trouble sleeping and difficulty eating)  zofran as needed (for nausea and vomiting)  Sarna lotion (for itch)  Miralax daily stop twice a day (for constipation)    -Please STOP Atarax (for anxiety or itch or trouble sleeping)    -Please CHANGE how you take  Meclizine take one pill daily if needed (for dizziness)  Pantoprazole twice daily for one week (until 2/9) then once daily, this medication is for acid reflux.   Spironolactone decreased to 1000mg (for fluid retention with cirrhosis)   Trazadone at night as needed (for sleep)  Ursodiol 750 in the morning and 500 at night (liver gall bladder and liver health)      Follow Up:  -PCP within 1 week (a referral has been placed), continue to monitor and adjust home bp/cirrhosis meds as pt tolerates     -GI, appointment scheduled 3/26/2025 at 2:40pm with Dr. Khanna     -marquez with outpatient physical therapy (referral placed)    -FU ascitic fluid cultures

## 2025-03-02 NOTE — PROGRESS NOTES
Transitional Care Coordinator Progress Note:   Pt would like to have outpatient PT, prescription was requested.  Pt will discharge home today.    Sol Zavala MSN, RN-BC  Transitional Care Coordinator (TCC)  [phone]

## 2025-03-02 NOTE — CARE PLAN
The patient's goals for the shift include      The clinical goals for the shift include patient will remain free from abdominal pain and anxiety    Over the shift, the patient did not make progress toward the following goals. Barriers to progression include `  Problem: Respiratory  Goal: Minimize anxiety/maximize coping throughout shift  Outcome: Progressing     Problem: Pain - Adult  Goal: Verbalizes/displays adequate comfort level or baseline comfort level  Outcome: Progressing     Problem: Safety - Adult  Goal: Free from fall injury  Outcome: Progressing     Problem: Discharge Planning  Goal: Discharge to home or other facility with appropriate resources  Outcome: Progressing     Problem: Chronic Conditions and Co-morbidities  Goal: Patient's chronic conditions and co-morbidity symptoms are monitored and maintained or improved  Outcome: Progressing     Problem: Nutrition  Goal: Nutrient intake appropriate for maintaining nutritional needs  Outcome: Progressing     Problem: Pain  Goal: Takes deep breaths with improved pain control throughout the shift  Outcome: Progressing  Goal: Turns in bed with improved pain control throughout the shift  Outcome: Progressing  Goal: Walks with improved pain control throughout the shift  Outcome: Progressing  Goal: Performs ADL's with improved pain control throughout shift  Outcome: Progressing  Goal: Participates in PT with improved pain control throughout the shift  Outcome: Progressing  Goal: Free from acute confusion related to pain meds throughout the shift  Outcome: Progressing   . Recommendations to address these barriers include `.

## 2025-03-02 NOTE — DISCHARGE INSTRUCTIONS
Estimada Geovanna Coronaó al hospital debido a dolor abdominal, hinchazón y líquidos. Hicimos un procedimiento de paracentesis para eliminar el líquido del vientre el primer día en el hospital. Probamos el líquido que drenamos del vientre en busca de bacterias y no creció ninguna.  Por lo tanto, no necesitábamos empezar a demetri antibióticos. Tenía presión arterial blanda, por lo que poco a poco reiniciamos los medicamentos para la presión arterial de holloway hogar. Disminuimos la dosis de holloway espironolactona. Estabas estreñido mientras estabas aquí, así que reiniciamos tu lactulosa y miralax, lo que te ayudó a tener devan evacuación intestinal. Es importante que sigas tomando estos medicamentos diariamente en casa. Le dieron el vibha a casa 3/2 en condición estable con laboratorios estables y devan mejora en holloway dolor e hinchazón abdominal. Realice un seguimiento estrecho con holloway médico ambulatorio.         Medicamentos:  -Te kathie ÎNCEPE   lactulosa dos veces al día (para confusión y estreñimiento)  melatonina todas las noches (para problemas para dormir)   mirtazapina todas las noches (para problemas para dormir y comer)  zofran según sea necesario (para náuseas y vómitos)  Loción Sarna (para la picazón)  Arriaza diaria de Miralax dos veces al día (para estreñimiento)    -Por favor DEJE de dormir con Atarax (por ansiedad, picazón o problemas para dormir)    -Por favor CAMBIA cómo toribio  Meclizine tome devan pastilla al día si es necesario (para mareos)  Pantoprazol dos veces al día tuan devan semana (hasta 2/9) y luego devan vez al día, olimpia medicamento es para el reflujo ácido.   La espironolactona disminuyó a 1000 mg (para retención de líquidos con cirrosis)   Trazadona por la noche según sea necesario (para dormir)  Ursodiol 750 por la mañana y 500 por la noche (hepático, vesícula biliar y yuliana hepática)        Urm?rire:  -Harini un seguimiento con holloway médico de atención primaria dentro de 1 semana (se ha realizado devan  derivación)    -Sigue con tus médicos de seguimiento digestivo, tienes devan kapil programada para el 26/03/2025 a las 2:40 p.m. con el Dr. Khanna     -Harini un seguimiento con fisioterapia ambulatoria (referencia realizada)      Sinceramente,    [facility]       Dear Mrs. Flanagan,    You came to the hospital because of abdominal pain, swelling, and fluid. We did a paracentesis procedure to remove fluid from your belly on your first day in the hospital. We tested the fluid we drained from your belly for bacteria and it did not grow any.  So we did not need to start you on any antibiotics. You had soft blood pressure so we slowly restarted your home blood pressure medications. We decreased the dose of your spironolactone. You were constipated while here so we restarted your lactulose and miralax which helped you to have a bowel movement. It is important that you continue to take these medications daily at home. You discharged to home 3/2 in stable condition with stable labs and an improvement in your abdominal pain and swelling. Please follow up closely with your doctors outpatient.         Medications:  -Please START   lactulose twice daily (for confusion and constipation)  melatonin nightly (for trouble sleeping)   mirtazapine nightly (for trouble sleeping and difficulty eating)  zofran as needed (for nausea and vomiting)  Sarna lotion (for itch)  Miralax daily stop twice a day (for constipation)    -Please STOP Atarax (for anxiety or itch or trouble sleeping)    -Please CHANGE how you take  Meclizine take one pill daily if needed (for dizziness)  Pantoprazole twice daily for one week (until 2/9) then once daily, this medication is for acid reflux.   Spironolactone decreased to 1000mg (for fluid retention with cirrhosis)   Trazadone at night as needed (for sleep)  Ursodiol 750 in the morning and 500 at night (liver gall bladder and liver health)        Follow Up:  -Follow up with your primary care  doctor within 1 week (a referral has been placed)    -Follow up with your digestive track doctors, you have an appointment scheduled 3/26/2025 at 2:40pm with Dr. Khanna     -Please follow up with outpatient physical therapy (referral placed)    Sincerely,    [facility] Medicine Team

## 2025-03-03 ENCOUNTER — TELEPHONE (OUTPATIENT)
Dept: GASTROENTEROLOGY | Facility: HOSPITAL | Age: 50
End: 2025-03-03
Payer: COMMERCIAL

## 2025-03-03 LAB
BACTERIA FLD CULT: NORMAL
GRAM STN SPEC: NORMAL
GRAM STN SPEC: NORMAL
LABORATORY COMMENT REPORT: NORMAL
LABORATORY COMMENT REPORT: NORMAL
PATH REPORT.FINAL DX SPEC: NORMAL
PATH REPORT.GROSS SPEC: NORMAL
PATH REPORT.RELEVANT HX SPEC: NORMAL
PATH REPORT.TOTAL CANCER: NORMAL
RESIDENT REVIEW: NORMAL

## 2025-03-05 LAB
ACID FAST STN SPEC: NORMAL
MYCOBACTERIUM SPEC CULT: NORMAL

## 2025-03-12 ENCOUNTER — APPOINTMENT (OUTPATIENT)
Dept: RADIOLOGY | Facility: HOSPITAL | Age: 50
End: 2025-03-12
Payer: COMMERCIAL

## 2025-03-12 ENCOUNTER — CLINICAL SUPPORT (OUTPATIENT)
Dept: EMERGENCY MEDICINE | Facility: HOSPITAL | Age: 50
End: 2025-03-12
Payer: COMMERCIAL

## 2025-03-12 ENCOUNTER — HOSPITAL ENCOUNTER (INPATIENT)
Facility: HOSPITAL | Age: 50
End: 2025-03-12
Attending: STUDENT IN AN ORGANIZED HEALTH CARE EDUCATION/TRAINING PROGRAM | Admitting: INTERNAL MEDICINE

## 2025-03-12 PROBLEM — R10.9 ABDOMINAL PAIN, UNSPECIFIED ABDOMINAL LOCATION: Status: ACTIVE | Noted: 2025-03-12

## 2025-03-12 LAB
ACID FAST STN SPEC: NORMAL
MYCOBACTERIUM SPEC CULT: NORMAL

## 2025-03-12 ASSESSMENT — PAIN DESCRIPTION - DESCRIPTORS: DESCRIPTORS: DISCOMFORT;SHARP

## 2025-03-12 ASSESSMENT — PAIN DESCRIPTION - PAIN TYPE: TYPE: ACUTE PAIN

## 2025-03-12 ASSESSMENT — LIFESTYLE VARIABLES
EVER FELT BAD OR GUILTY ABOUT YOUR DRINKING: NO
EVER HAD A DRINK FIRST THING IN THE MORNING TO STEADY YOUR NERVES TO GET RID OF A HANGOVER: NO
TOTAL SCORE: 0
HAVE PEOPLE ANNOYED YOU BY CRITICIZING YOUR DRINKING: NO
HAVE YOU EVER FELT YOU SHOULD CUT DOWN ON YOUR DRINKING: NO

## 2025-03-12 ASSESSMENT — PAIN DESCRIPTION - PROGRESSION: CLINICAL_PROGRESSION: NOT CHANGED

## 2025-03-12 ASSESSMENT — PAIN - FUNCTIONAL ASSESSMENT: PAIN_FUNCTIONAL_ASSESSMENT: 0-10

## 2025-03-12 ASSESSMENT — PAIN SCALES - GENERAL: PAINLEVEL_OUTOF10: 8

## 2025-03-12 ASSESSMENT — PAIN DESCRIPTION - FREQUENCY: FREQUENCY: CONSTANT/CONTINUOUS

## 2025-03-12 ASSESSMENT — PAIN DESCRIPTION - ONSET: ONSET: ONGOING

## 2025-03-12 NOTE — ED TRIAGE NOTES
This patient was seen in triage.     Vitals are noted.      HPI:  Patient is a 50-year-old female, history of cirrhosis, underwent paracentesis with subsequent 4 L removed 2 weeks ago, presenting with worsening abdominal distention, back pain, shortness of breath, worse with exertion, pedal edema.  Feels like she may need another paracentesis.  Denies any fevers, does endorse some chills.  Reports nausea, vomiting.  Focused PE:  Gen: Well-appearing, not in acute distress  Cardiovascular: Regular rate, normal rhythm, no murmur, no gallop  Respiratory: No adventitious lung sounds auscultated.  Abdomen: No reproducible abdominal tenderness upon palpation,  physical exam may be limited by patient positioning sitting up in a chair  Neuro:  Alert and Oriented, speech clear and coherent     Plan:  IV, lab work, imaging        For the remainder of the patient's workup and ED course, please see the main ED provider note.  We discussed need for diagnostic testing including lab studies and imaging.  We also discussed that they may be asked to wait in the waiting room while these test are pending.  They understand that if they choose to leave without having the testing completed or resulted that we cannot rule out acute life-threatening illnesses and the risks involved to lead to worsening condition, permanent disability or even death.

## 2025-03-12 NOTE — ED PROVIDER NOTES
"CC: Abdominal Pain and Ascites     HPI:   Patient is a 50-year-old female with history of PBC with cirrhosis complicated by portal hypertension and recurrent ascites, esophageal varices with banding, history of hepatic encephalopathy, celiac disease, concern for neuroendocrine tumor, insulin-dependent diabetes, hypertension, GERD, anxiety, CORBY, depression presenting due to acute abdominal discomfort, distention, chills for the past 4 days.  Patient reports that she frequently gets over 8 L of fluid drained 2-3 times a week in different emergency departments.  She endorses that she has chills and pain from abdominal distention prior to getting drain.  She has been compliant with both her \"water pill \"as well as her lactulose and MiraLAX that she takes for intermittent constipation.  She is not disoriented at this time, had any falls, does not drink any alcohol.  She has no asterixis and reports that her eyes are mildly yellow but around her baseline.  She denies any acute vision changes, hemoptysis, hematemesis is not on any blood thinning medication.  She is due to see GI on the  for further evaluation to see if she would qualify for a TIPS procedure.  She does endorse that she frequently uses Tylenol for symptom management for her abdominal distention and pain and as a result believes she has not been offered full therapies.    Limitations to History: none  Additional History Obtained from:     PMHx/PSHx:  Per HPI.   - has a past medical history of Ascites, Asthma, Cirrhosis of liver (Multi), Diabetes mellitus (Multi), GERD (gastroesophageal reflux disease), CORBY (obstructive sleep apnea), Portal hypertension (Multi), and Thrombocytopenia (CMS-HCC).  - has a past surgical history that includes CT guided imaging for needle placement (10/14/2020); US guided abdominal paracentesis (10/08/2020); Cholecystectomy ();  section, low transverse; Tubal ligation; Esophagogastroduodenoscopy; Colonoscopy; " and Inguinal hidradenitis excision.    Social History:  - Tobacco:  reports that she has never smoked. She has never used smokeless tobacco.   - Alcohol:  reports no history of alcohol use.   - Drugs:  reports no history of drug use.     Medications: Reviewed in EMR.     Allergies:  Gluten    ???????????????????????????????????????????????????????????????  Triage Vitals:  T 36.4 °C (97.5 °F)  HR 90  /70  RR 16  O2 100 % None (Room air)    Physical Exam  Vitals and nursing note reviewed.   Constitutional:       General: She is not in acute distress.     Appearance: She is well-developed. She is obese.   HENT:      Head: Normocephalic and atraumatic.      Mouth/Throat:      Mouth: Mucous membranes are dry.      Pharynx: Oropharynx is clear.   Eyes:      Conjunctiva/sclera: Conjunctivae normal.   Cardiovascular:      Rate and Rhythm: Normal rate and regular rhythm.      Heart sounds: No murmur heard.  Pulmonary:      Effort: Pulmonary effort is normal. No respiratory distress.      Breath sounds: Normal breath sounds. No wheezing, rhonchi or rales.   Abdominal:      General: Abdomen is protuberant. There is distension (Significant abdominal distention likely causing mild tenderness that is diffuse without any focality).      Palpations: Abdomen is soft. There is fluid wave.      Tenderness: There is generalized abdominal tenderness. There is no guarding or rebound.      Comments: No obvious spider angiomata or caput medusa noted on the chest or abdomen.   Musculoskeletal:         General: No swelling or tenderness.      Cervical back: Neck supple.   Skin:     General: Skin is warm and dry.      Capillary Refill: Capillary refill takes less than 2 seconds.   Neurological:      Mental Status: She is alert and oriented to person, place, and time.      Sensory: No sensory deficit.      Motor: No weakness.      Gait: Gait normal.   Psychiatric:         Mood and Affect: Mood normal.        ???????????????????????????????????????????????????????????????  EKG (per my interpretation):  not obtained    ED Course  ED Course as of 03/13/25 1407   Wed Mar 12, 2025   2039 Attending summary:  51 y/o F with PMHx HTN, DM, PBC c/b cirrhosis w/ ascites w/ frequent paracentesis and prior esophageal varices s/p banding who is presenting for 2-3 days of abdominal distension, diffuse abdomen pain, fatigue, and dyspnea on exertion. Reports nausea and vomiting, no hematemesis or coffee ground emesis. No fevers, chest pain. Reports this feels similar to prior times she requires a para. Last was 2 days ago per patient although not on chart review. Vitals unremarkable. Exam shows chronically ill but nontoxic appearing female in no resp distress, clear lungs, distended diffusely tender abdomen without focal tenderness or signs of peritonitis, 2+ bilateral symmetric pitting edema to calves without skin changes or tenderness.     Pt would likely benefit from therapeutic para based on hx and exam however no emergent indication to perform in ED. Will do a diagnosis para to r/o SBP with her hx of cirrhosis and diffuse abd tenderness in setting of ascites. No focal tenderness, low suspicion for other acute intraabd pathology. I suspect dyspnea due to pulmonary edema or increased abd pressure from ascites. Lower suspicion for ACS, EKG- did not get these in triage, will add. Labs in triage on my independent review without leukocytosis, CMP at baseline, coags at baseline. Anticipate admission.  [SS]      ED Course User Index  [SS] Milka Burns MD         Diagnoses as of 03/13/25 1407   Abdominal pain, unspecified abdominal location   Cirrhosis of liver with ascites, unspecified hepatic cirrhosis type (Multi)       Medical Decision Making:  Patient is a 50-year-old female with history of PBC with cirrhosis complicated by portal hypertension and recurrent ascites, esophageal varices with banding, history of hepatic  encephalopathy, celiac disease, concern for neuroendocrine tumor, insulin-dependent diabetes, hypertension, GERD, anxiety, CORBY, depression presenting due to acute abdominal discomfort, distention, chills for the past 4 days.  Differentials considered but not limited to SBP, choledocholithiasis, worsening ascites, progressive cirrhosis.    Patient on patient's history physical exam basic lab work was obtained by initial provider in triage.  Patient is still pending diagnostic paracentesis to evaluate for SBP.  My concern is low given the fact that patient reports that the symptoms are consistent prior to her needing a paracentesis.  She has stable vitals, normal white count and does not trigger SIRS or sepsis at this time.  Bilirubin is the same as it was a few months ago.  Patient will likely need admission for a plan to figure out definitive management as well as therapeutic paracentesis.  Patient does not have any acute changes in lab work however her MELD-Na score is 15. Patient was admitted to medicine for further workup and management and care was overseen by attending physician agrees with plan position    External records reviewed: recent inpatient, clinic, and prior ED notes  Diagnostic imaging independently reviewed/interpreted by me (as reflected in MDM) includes: POCUS, CXR  Social Determinants Affecting Care: None identified  Discussion of management with other providers: attending  Prescription Drug Consideration: per inpatient team  Escalation of Care: admission    Impression:   Cirrhosis  Ascites    Disposition: Admitted      Procedures ? SmartLinks last updated 3/12/2025 7:52 PM        Dixie Schumacher MD  Resident  03/13/25 2809

## 2025-03-13 ENCOUNTER — APPOINTMENT (OUTPATIENT)
Dept: RADIOLOGY | Facility: HOSPITAL | Age: 50
End: 2025-03-13
Payer: COMMERCIAL

## 2025-03-13 SDOH — ECONOMIC STABILITY: FOOD INSECURITY: HOW HARD IS IT FOR YOU TO PAY FOR THE VERY BASICS LIKE FOOD, HOUSING, MEDICAL CARE, AND HEATING?: NOT HARD AT ALL

## 2025-03-13 SDOH — SOCIAL STABILITY: SOCIAL INSECURITY: ARE THERE ANY APPARENT SIGNS OF INJURIES/BEHAVIORS THAT COULD BE RELATED TO ABUSE/NEGLECT?: NO

## 2025-03-13 SDOH — SOCIAL STABILITY: SOCIAL INSECURITY: WERE YOU ABLE TO COMPLETE ALL THE BEHAVIORAL HEALTH SCREENINGS?: YES

## 2025-03-13 SDOH — SOCIAL STABILITY: SOCIAL INSECURITY: DOES ANYONE TRY TO KEEP YOU FROM HAVING/CONTACTING OTHER FRIENDS OR DOING THINGS OUTSIDE YOUR HOME?: NO

## 2025-03-13 SDOH — HEALTH STABILITY: MENTAL HEALTH: HOW MANY DRINKS CONTAINING ALCOHOL DO YOU HAVE ON A TYPICAL DAY WHEN YOU ARE DRINKING?: PATIENT DOES NOT DRINK

## 2025-03-13 SDOH — ECONOMIC STABILITY: HOUSING INSECURITY: AT ANY TIME IN THE PAST 12 MONTHS, WERE YOU HOMELESS OR LIVING IN A SHELTER (INCLUDING NOW)?: NO

## 2025-03-13 SDOH — SOCIAL STABILITY: SOCIAL INSECURITY: DO YOU FEEL ANYONE HAS EXPLOITED OR TAKEN ADVANTAGE OF YOU FINANCIALLY OR OF YOUR PERSONAL PROPERTY?: NO

## 2025-03-13 SDOH — HEALTH STABILITY: MENTAL HEALTH: HOW OFTEN DO YOU HAVE SIX OR MORE DRINKS ON ONE OCCASION?: NEVER

## 2025-03-13 SDOH — ECONOMIC STABILITY: TRANSPORTATION INSECURITY: IN THE PAST 12 MONTHS, HAS LACK OF TRANSPORTATION KEPT YOU FROM MEDICAL APPOINTMENTS OR FROM GETTING MEDICATIONS?: NO

## 2025-03-13 SDOH — ECONOMIC STABILITY: INCOME INSECURITY: IN THE PAST 12 MONTHS HAS THE ELECTRIC, GAS, OIL, OR WATER COMPANY THREATENED TO SHUT OFF SERVICES IN YOUR HOME?: NO

## 2025-03-13 SDOH — ECONOMIC STABILITY: FOOD INSECURITY: WITHIN THE PAST 12 MONTHS, YOU WORRIED THAT YOUR FOOD WOULD RUN OUT BEFORE YOU GOT THE MONEY TO BUY MORE.: NEVER TRUE

## 2025-03-13 SDOH — ECONOMIC STABILITY: FOOD INSECURITY: WITHIN THE PAST 12 MONTHS, THE FOOD YOU BOUGHT JUST DIDN'T LAST AND YOU DIDN'T HAVE MONEY TO GET MORE.: NEVER TRUE

## 2025-03-13 SDOH — SOCIAL STABILITY: SOCIAL INSECURITY: WITHIN THE LAST YEAR, HAVE YOU BEEN HUMILIATED OR EMOTIONALLY ABUSED IN OTHER WAYS BY YOUR PARTNER OR EX-PARTNER?: NO

## 2025-03-13 SDOH — ECONOMIC STABILITY: HOUSING INSECURITY: IN THE PAST 12 MONTHS, HOW MANY TIMES HAVE YOU MOVED WHERE YOU WERE LIVING?: 0

## 2025-03-13 SDOH — ECONOMIC STABILITY: HOUSING INSECURITY: IN THE LAST 12 MONTHS, WAS THERE A TIME WHEN YOU WERE NOT ABLE TO PAY THE MORTGAGE OR RENT ON TIME?: NO

## 2025-03-13 SDOH — SOCIAL STABILITY: SOCIAL INSECURITY: ABUSE: ADULT

## 2025-03-13 SDOH — SOCIAL STABILITY: SOCIAL INSECURITY: ARE YOU OR HAVE YOU BEEN THREATENED OR ABUSED PHYSICALLY, EMOTIONALLY, OR SEXUALLY BY ANYONE?: NO

## 2025-03-13 SDOH — SOCIAL STABILITY: SOCIAL INSECURITY: HAVE YOU HAD THOUGHTS OF HARMING ANYONE ELSE?: NO

## 2025-03-13 SDOH — SOCIAL STABILITY: SOCIAL INSECURITY: WITHIN THE LAST YEAR, HAVE YOU BEEN AFRAID OF YOUR PARTNER OR EX-PARTNER?: NO

## 2025-03-13 SDOH — HEALTH STABILITY: MENTAL HEALTH: HOW OFTEN DO YOU HAVE A DRINK CONTAINING ALCOHOL?: NEVER

## 2025-03-13 SDOH — SOCIAL STABILITY: SOCIAL INSECURITY: HAS ANYONE EVER THREATENED TO HURT YOUR FAMILY OR YOUR PETS?: NO

## 2025-03-13 SDOH — SOCIAL STABILITY: SOCIAL INSECURITY: HAVE YOU HAD ANY THOUGHTS OF HARMING ANYONE ELSE?: NO

## 2025-03-13 SDOH — SOCIAL STABILITY: SOCIAL INSECURITY: DO YOU FEEL UNSAFE GOING BACK TO THE PLACE WHERE YOU ARE LIVING?: NO

## 2025-03-13 ASSESSMENT — ENCOUNTER SYMPTOMS
ABDOMINAL PAIN: 1
APPETITE CHANGE: 1
MUSCULOSKELETAL NEGATIVE: 1
PSYCHIATRIC NEGATIVE: 1
ABDOMINAL DISTENTION: 1
ACTIVITY CHANGE: 1
NEUROLOGICAL NEGATIVE: 1
RESPIRATORY NEGATIVE: 1
CARDIOVASCULAR NEGATIVE: 1

## 2025-03-13 ASSESSMENT — COGNITIVE AND FUNCTIONAL STATUS - GENERAL
TOILETING: A LITTLE
DAILY ACTIVITIY SCORE: 19
DRESSING REGULAR LOWER BODY CLOTHING: A LITTLE
WALKING IN HOSPITAL ROOM: A LITTLE
CLIMB 3 TO 5 STEPS WITH RAILING: A LOT
HELP NEEDED FOR BATHING: A LITTLE
PERSONAL GROOMING: A LITTLE
MOBILITY SCORE: 18
TURNING FROM BACK TO SIDE WHILE IN FLAT BAD: A LITTLE
DRESSING REGULAR UPPER BODY CLOTHING: A LITTLE
PATIENT BASELINE BEDBOUND: NO
MOVING TO AND FROM BED TO CHAIR: A LITTLE
STANDING UP FROM CHAIR USING ARMS: A LITTLE

## 2025-03-13 ASSESSMENT — ACTIVITIES OF DAILY LIVING (ADL)
DRESSING YOURSELF: INDEPENDENT
GROOMING: NEEDS ASSISTANCE
FEEDING YOURSELF: INDEPENDENT
PATIENT'S MEMORY ADEQUATE TO SAFELY COMPLETE DAILY ACTIVITIES?: YES
JUDGMENT_ADEQUATE_SAFELY_COMPLETE_DAILY_ACTIVITIES: YES
ASSISTIVE_DEVICE: WALKER
WALKS IN HOME: NEEDS ASSISTANCE
FEEDING YOURSELF: INDEPENDENT
JUDGMENT_ADEQUATE_SAFELY_COMPLETE_DAILY_ACTIVITIES: YES
TOILETING: INDEPENDENT
BATHING: NEEDS ASSISTANCE
DRESSING YOURSELF: INDEPENDENT
HEARING - RIGHT EAR: FUNCTIONAL
HEARING - LEFT EAR: FUNCTIONAL
HEARING - RIGHT EAR: FUNCTIONAL
LACK_OF_TRANSPORTATION: NO
TOILETING: INDEPENDENT
WALKS IN HOME: INDEPENDENT
GROOMING: NEEDS ASSISTANCE
HEARING - LEFT EAR: FUNCTIONAL
PATIENT'S MEMORY ADEQUATE TO SAFELY COMPLETE DAILY ACTIVITIES?: YES
LACK_OF_TRANSPORTATION: NO
ASSISTIVE_DEVICE: WALKER
ADEQUATE_TO_COMPLETE_ADL: YES
ADEQUATE_TO_COMPLETE_ADL: YES

## 2025-03-13 ASSESSMENT — PAIN - FUNCTIONAL ASSESSMENT
PAIN_FUNCTIONAL_ASSESSMENT: 0-10
PAIN_FUNCTIONAL_ASSESSMENT: 0-10

## 2025-03-13 ASSESSMENT — LIFESTYLE VARIABLES
HOW OFTEN DO YOU HAVE 6 OR MORE DRINKS ON ONE OCCASION: NEVER
SKIP TO QUESTIONS 9-10: 1
AUDIT-C TOTAL SCORE: 0
SKIP TO QUESTIONS 9-10: 1
HOW MANY STANDARD DRINKS CONTAINING ALCOHOL DO YOU HAVE ON A TYPICAL DAY: PATIENT DOES NOT DRINK
AUDIT-C TOTAL SCORE: 0
HOW OFTEN DO YOU HAVE A DRINK CONTAINING ALCOHOL: NEVER
AUDIT-C TOTAL SCORE: 0

## 2025-03-13 ASSESSMENT — PAIN SCALES - GENERAL
PAINLEVEL_OUTOF10: 0 - NO PAIN

## 2025-03-13 NOTE — ED PROCEDURE NOTE
Procedure  Paracentesis    Performed by: Nadege Palomares MD  Authorized by: Milka Burns MD    Consent:     Consent obtained:  Verbal and written    Consent given by:  Patient    Risks discussed:  Bleeding, bowel perforation, infection and pain    Alternatives discussed:  No treatment  Universal protocol:     Procedure explained and questions answered to patient or proxy's satisfaction: yes      Patient identity confirmed:  Verbally with patient and hospital-assigned identification number  Pre-procedure details:     Procedure purpose:  Diagnostic    Preparation: Patient was prepped and draped in usual sterile fashion    Anesthesia:     Anesthesia method:  Local infiltration    Local anesthetic:  Lidocaine 1% w/o epi  Procedure details:     Needle gauge:  18    Ultrasound guidance: yes      Puncture site:  R lower quadrant    Fluid removed amount:  50 cc    Fluid appearance:  Serous    Dressing:  Adhesive bandage  Post-procedure details:     Procedure completion:  Tolerated               Nadege Palomares MD  Resident  03/12/25 1395

## 2025-03-13 NOTE — PROGRESS NOTES
Alfonzo Flanagan is a 50 y.o. female on day 1 of admission presenting with Abdominal pain, unspecified abdominal location.      Subjective   No acute events overnight. This AM, patient reports that she came in due to fluid in her belly that was causing significant pain. She also endorses leg swelling and pain d/t the swelling. She reports that she has been taking about 6 pills of acetaminophen 500 mg daily for pain control. She is concerned about her insurance coverage since the pharmacy yesterday told her that her meds were not covered. Denies shortness of breath and chest pain.       Objective   Last Recorded Vitals  BP 92/58 (BP Location: Left arm, Patient Position: Lying)   Pulse 89   Temp 36.9 °C (98.4 °F) (Temporal)   Resp 15   SpO2 97%   Intake/Output last 3 Shifts:    Intake/Output Summary (Last 24 hours) at 3/13/2025 1424  Last data filed at 3/13/2025 1245  Gross per 24 hour   Intake 150 ml   Output --   Net 150 ml       Admission Weight       Daily Weight  02/27/25 : 79.8 kg (176 lb)    Image Results  ECG 12 lead  Normal sinus rhythm  Possible Anterolateral infarct , age undetermined  Abnormal ECG  When compared with ECG of 27-FEB-2025 18:32,  Borderline criteria for Anterolateral infarct are now Present  See ED provider note for full interpretation and clinical correlation  Confirmed by Imer Evans ([id number]) on 3/13/2025 4:45:37 AM    Physical Exam  General: no acute distress, resting comfortably in bed  HEENT: normocephalic, atraumatic  Cardio: regular rate and rhythm, normal S1 and S2, no murmurs  Pulm: clear to auscultation bilaterally, no wheezes, crackles, or rhonchi, breathing comfortably on room air  Abd: significant distension, tense, tender to palpation, no rebound or guarding  Extremities: no peripheral edema, scars, or lesions  Neuro: alert and oriented to person, place, and time, CN II-XII grossly intact  Psych: mood and affect are appropriate    Results  Results for orders placed  or performed during the hospital encounter of 03/12/25 (from the past 24 hours)   CBC and Auto Differential   Result Value Ref Range    WBC 4.4 4.4 - 11.3 x10*3/uL    nRBC 0.0 0.0 - 0.0 /100 WBCs    RBC 3.51 (L) 4.00 - 5.20 x10*6/uL    Hemoglobin 10.9 (L) 12.0 - 16.0 g/dL    Hematocrit 32.9 (L) 36.0 - 46.0 %    MCV 94 80 - 100 fL    MCH 31.1 26.0 - 34.0 pg    MCHC 33.1 32.0 - 36.0 g/dL    RDW 15.9 (H) 11.5 - 14.5 %    Platelets 107 (L) 150 - 450 x10*3/uL    Neutrophils % 58.4 40.0 - 80.0 %    Immature Granulocytes %, Automated 0.5 0.0 - 0.9 %    Lymphocytes % 27.1 13.0 - 44.0 %    Monocytes % 10.6 2.0 - 10.0 %    Eosinophils % 2.9 0.0 - 6.0 %    Basophils % 0.5 0.0 - 2.0 %    Neutrophils Absolute 2.59 1.20 - 7.70 x10*3/uL    Immature Granulocytes Absolute, Automated 0.02 0.00 - 0.70 x10*3/uL    Lymphocytes Absolute 1.20 1.20 - 4.80 x10*3/uL    Monocytes Absolute 0.47 0.10 - 1.00 x10*3/uL    Eosinophils Absolute 0.13 0.00 - 0.70 x10*3/uL    Basophils Absolute 0.02 0.00 - 0.10 x10*3/uL   Basic metabolic panel   Result Value Ref Range    Glucose 350 (H) 74 - 99 mg/dL    Sodium 134 (L) 136 - 145 mmol/L    Potassium 5.1 3.5 - 5.3 mmol/L    Chloride 101 98 - 107 mmol/L    Bicarbonate 30 21 - 32 mmol/L    Anion Gap 8 (L) 10 - 20 mmol/L    Urea Nitrogen 12 6 - 23 mg/dL    Creatinine 0.60 0.50 - 1.05 mg/dL    eGFR >90 >60 mL/min/1.73m*2    Calcium 8.1 (L) 8.6 - 10.6 mg/dL   Hepatic function panel   Result Value Ref Range    Albumin 2.4 (L) 3.4 - 5.0 g/dL    Bilirubin, Total 2.4 (H) 0.0 - 1.2 mg/dL    Bilirubin, Direct 0.7 (H) 0.0 - 0.3 mg/dL    Alkaline Phosphatase 337 (H) 33 - 110 U/L    ALT 32 7 - 45 U/L    AST 72 (H) 9 - 39 U/L    Total Protein 7.3 6.4 - 8.2 g/dL   Protime-INR   Result Value Ref Range    Protime 15.9 (H) 9.8 - 12.4 seconds    INR 1.4 (H) 0.9 - 1.1   B-Type Natriuretic Peptide   Result Value Ref Range     (H) 0 - 99 pg/mL   Lipase   Result Value Ref Range    Lipase 178 (H) 9 - 82 U/L   Troponin I,  High Sensitivity   Result Value Ref Range    Troponin I, High Sensitivity (CMC) <3 0 - 34 ng/L   Urinalysis with Reflex Culture and Microscopic   Result Value Ref Range    Color, Urine Yellow Light-Yellow, Yellow, Dark-Yellow    Appearance, Urine Turbid (N) Clear    Specific Gravity, Urine 1.033 1.005 - 1.035    pH, Urine 5.5 5.0, 5.5, 6.0, 6.5, 7.0, 7.5, 8.0    Protein, Urine 30 (1+) (A) NEGATIVE, 10 (TRACE), 20 (TRACE) mg/dL    Glucose, Urine 1000 (4+) (A) Normal mg/dL    Blood, Urine OVER (3+) (A) NEGATIVE mg/dL    Ketones, Urine NEGATIVE NEGATIVE mg/dL    Bilirubin, Urine 0.5 (1+) (A) NEGATIVE mg/dL    Urobilinogen, Urine 12 (3+) (A) Normal mg/dL    Nitrite, Urine NEGATIVE NEGATIVE    Leukocyte Esterase, Urine 500 Mckenzie/uL (A) NEGATIVE   Extra Urine Gray Tube   Result Value Ref Range    Extra Tube Hold for add-ons.    Sterile Fluid Culture/Smear    Specimen: Ascites Fluid   Result Value Ref Range    Sterile Fluid Culture/Smear No growth to date     Gram Stain (2+) Few Polymorphonuclear leukocytes     Gram Stain No organisms seen    Albumin, Body Fluid   Result Value Ref Range    Albumin, Fluid <0.5 Not established g/dL   Lactate Dehydrogenase, Body Fluid   Result Value Ref Range    LD, Fluid 40 Not established. U/L   Body Fluid Cell Count   Result Value Ref Range    Color, Fluid Yellow Colorless, Straw, Yellow    Clarity, Fluid Clear Clear    WBC, Fluid 43 See Comment /uL    RBC, Fluid 142 see comment /uL   Body Fluid Differential   Result Value Ref Range    Neutrophils %, Manual, Fluid 6 see comment %    Lymphocytes %, Manual, Fluid 68 see comment %    Mono/Macrophages %, Manual, Fluid 23 see comment %    Eosinophils %, Manual, Fluid 0 see comment %    Basophils %, Manual, Fluid 1 not established %    Immature Granulocytes %, Manual, Fluid 0 not established %    Blasts %, Manual, Fluid 0 not established %    Unclassified Cells %, Manual, Fluid 2 (H) not established %    Plasma Cells %, Manual, Fluid 0 not  established %    Total Cells Counted, Fluid 100    Microscopic Only, Urine   Result Value Ref Range    WBC, Urine >50 (A) 1-5, NONE /HPF    RBC, Urine >20 (A) NONE, 1-2, 3-5 /HPF    Squamous Epithelial Cells, Urine 10-25 (FEW) Reference range not established. /HPF    Mucus, Urine 3+ Reference range not established. /LPF   ECG 12 lead   Result Value Ref Range    Ventricular Rate 81 BPM    Atrial Rate 81 BPM    RI Interval 174 ms    QRS Duration 82 ms    QT Interval 394 ms    QTC Calculation(Bazett) 457 ms    P Axis 50 degrees    R Axis -6 degrees    T Axis 6 degrees    QRS Count 13 beats    Q Onset 217 ms    P Onset 130 ms    P Offset 184 ms    T Offset 414 ms    QTC Fredericia 435 ms   POCT GLUCOSE   Result Value Ref Range    POCT Glucose 171 (H) 74 - 99 mg/dL   Lactate   Result Value Ref Range    Lactate 1.0 0.4 - 2.0 mmol/L   Magnesium   Result Value Ref Range    Magnesium 1.60 1.60 - 2.40 mg/dL   Comprehensive metabolic panel   Result Value Ref Range    Glucose 197 (H) 74 - 99 mg/dL    Sodium 137 136 - 145 mmol/L    Potassium 3.6 3.5 - 5.3 mmol/L    Chloride 103 98 - 107 mmol/L    Bicarbonate 31 21 - 32 mmol/L    Anion Gap 7 (L) 10 - 20 mmol/L    Urea Nitrogen 12 6 - 23 mg/dL    Creatinine 0.63 0.50 - 1.05 mg/dL    eGFR >90 >60 mL/min/1.73m*2    Calcium 7.8 (L) 8.6 - 10.6 mg/dL    Albumin 2.1 (L) 3.4 - 5.0 g/dL    Alkaline Phosphatase 282 (H) 33 - 110 U/L    Total Protein 6.5 6.4 - 8.2 g/dL    AST 60 (H) 9 - 39 U/L    Bilirubin, Total 2.6 (H) 0.0 - 1.2 mg/dL    ALT 32 7 - 45 U/L   CBC   Result Value Ref Range    WBC 3.7 (L) 4.4 - 11.3 x10*3/uL    nRBC 0.0 0.0 - 0.0 /100 WBCs    RBC 3.29 (L) 4.00 - 5.20 x10*6/uL    Hemoglobin 10.2 (L) 12.0 - 16.0 g/dL    Hematocrit 29.1 (L) 36.0 - 46.0 %    MCV 88 80 - 100 fL    MCH 31.0 26.0 - 34.0 pg    MCHC 35.1 32.0 - 36.0 g/dL    RDW 15.7 (H) 11.5 - 14.5 %    Platelets 102 (L) 150 - 450 x10*3/uL   Coagulation Screen   Result Value Ref Range    Protime 15.1 (H) 9.8 - 12.4  seconds    INR 1.4 (H) 0.9 - 1.1    aPTT 31 26 - 36 seconds   TSH   Result Value Ref Range    Thyroid Stimulating Hormone 4.64 (H) 0.44 - 3.98 mIU/L   T4, free   Result Value Ref Range    Thyroxine, Free 1.28 0.78 - 1.48 ng/dL   POCT GLUCOSE   Result Value Ref Range    POCT Glucose 206 (H) 74 - 99 mg/dL   POCT GLUCOSE   Result Value Ref Range    POCT Glucose 221 (H) 74 - 99 mg/dL     *Note: Due to a large number of results and/or encounters for the requested time period, some results have not been displayed. A complete set of results can be found in Results Review.     MELD 3.0: 17 at 3/13/2025  6:23 AM  MELD-Na: 14 at 3/13/2025  6:23 AM  Calculated from:  Serum Creatinine: 0.63 mg/dL (Using min of 1 mg/dL) at 3/13/2025  6:23 AM  Serum Sodium: 137 mmol/L at 3/13/2025  6:23 AM  Total Bilirubin: 2.6 mg/dL at 3/13/2025  6:23 AM  Serum Albumin: 2.1 g/dL at 3/13/2025  6:23 AM  INR(ratio): 1.4 at 3/13/2025  6:23 AM  Age at listing (hypothetical): 50 years  Sex: Female at 3/13/2025  6:23 AM    Assessment/Plan:  Alfonzo Flanagan is a 51 yo F w PMH biopsy-proven PBC (with cirrhosis, portal HTN, ascites, esophageal varices s/p banding 2021 and eradication, hepatic encephalopathy), celiac disease, 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 This represents patient's 3rd ED visit/hospital admission for recurrent and refractory ascites and poorly controlled portal hypertension. Some concern regarding patient's access to medications, previously did not have insurance on prior admission but now [facility], though she reports they did not cover her medications yesterday.    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 History of esophageal varices s/p banding 2021. Has required several paracenteses for fluid removal. S/p large volume paracentesis with 2.5 L removed today, prior paracentesis on 2/28 with 2.5 L removed. Diagnostic para consistent with portal HTN (SAAG > 1.1) and no c/f SBP (ANC 6). Therapeutic paracentesis with 2.5 L removed toady. MELD-Na 14 today.  PLAN:  - continue torsemide 40 mg d/t volume overload   - continue spironolactone 100 mg   - continue rifaximin 550 mg TID, lactulose 20 g BID titrated to bowel movements  - continue pantoprazole 40 mg daily  - continue acetaminophen 487.5 mg q6h PRN pain (dose reduction for cirrhosis)  - continue ursodiol 500 mg BID and ursodiol 250 mg nightly  - start coreg 6.25 daily (on 6.25 BID at home but was held iso hypotension on arrival to ED)  - gave 1 dose albumin 25 g following paracentesis  - current diet: 2-3 g sodium diet, 2 L fluid restriction  - IR consulted for therapeutic paracentesis, also discussing possible TIPS procedure with IR, will have to gather more information regarding patient's hx of hepatic encephalopathy  - social work consulted regarding insurance  - daily MELD labs    #UTI  #Possible vaginal candidiasis  Patient presenting with dysuria and pruritus in her groin. Endorses some white discharge as well. Unable to do genital exam given that patient has been in a hallway bed. UA with 1+ protein, 3+ blood, >50 WBC, 500  leuk esterase, consistent with UTI. Urine culture pending. Patient has history of E faecium UTI with resistance to ampicillin, ciprofloxacin, levofloxacin, nitrofurantoin, penicillin, and bactrim.  PLAN:  - discontinued bactrim  - started ceftriaxone (3/13- )  - will do genital exam once patient is in a private room    #Nausea  #Vomiting  #Appetite  #Dizziness  - continue zofran ODT 4 mg q6h PRN  - continue mirtazapine 7.6 mg nightly  - continue meclizine 25 mg daily PRN    #T2DM  Last A1c 7.6 in 12/2024. On insulin glargine 7 units nightly and metformin at home. Patient reports compliance with glargine dosing. POC glucose on admission 350.  PLAN:  - continue insulin glargine 7 units nightly  - hold home metformin  - SSI   - hypoglycemic protocol    #Anxiety/Depression  - continue home trazodone 25 mg nightly PRN    #HLD  - continue home atorvastatin 20 mg nightly    #Subclinical Hypothyroidism  TSH 4.64 and T4 1.28 (wnl).  PLAN:  - follow-up outpatient    F: w caution d/t cirrhosis/fluid overload  E: K > 4, Mg > 2  N: 2-3 g sodium, 2L fluid restriction  A: PIV  GI: pantoprazole 40 daily  O2: RA  DVT: lovenox  Abx: ceftriaxone (3/13- )  Pain: acetaminophen    Code: full (confirmed on admission)  NOK: [place] (daughter, [phone])    Patient seen and discussed with attending    Svetlana Solorzano MD  Internal Medicine PGY1  [facility]

## 2025-03-13 NOTE — CONSULTS
INTERVENTIONAL RADIOLOGY INPATIENT CONSULT NOTE  [facility]    Assessment & Plan     Review of pertinent imaging demonstrates hepatic cirrhosis, Portal HTN.    Plan: At bedside we discussed TIPS procedure including risks, benefits, and anticipated outcomes.    MELD 3.0: 17 at 3/13/2025  6:23 AM  MELD-Na: 14 at 3/13/2025  6:23 AM  Calculated from:  Serum Creatinine: 0.63 mg/dL (Using min of 1 mg/dL) at 3/13/2025  6:23 AM  Serum Sodium: 137 mmol/L at 3/13/2025  6:23 AM  Total Bilirubin: 2.6 mg/dL at 3/13/2025  6:23 AM  Serum Albumin: 2.1 g/dL at 3/13/2025  6:23 AM  INR(ratio): 1.4 at 3/13/2025  6:23 AM  Age at listing (hypothetical): 50 years  Sex: Female at 3/13/2025  6:23 AM    Echo 2/2025 favorable EF 60-65%    Patient will be scheduled for outpatient TIPS procedure.      Subjective  Alfonzo Flanagan, 50 y.o. 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  Her back pain started 3 days ago, and has been worsening since then. She endorses chills and subjective feelings of warmth. None of these symptoms are new or atypical, she has had them associated with her cirrhosis for a while. She has noticed increased abdominal distension. She feels weaker as well. Reports having 2-3 bowel movements per day.     Patient states that she gets fluid drainage from her abdomen with the last one being in couple of weeks ago. She was discharged from  on 2/15 for TIPS evaluation vs liver transplant given advanced disease. She had multiple sebastian (2/10 4.4 L, 2/14 2.5 L) and  received IV diuresis for fluid accumulation.     States that she has a history of GIB, remote hx HE, c/o significant ascites requiring multiple paracentesis. States that her quality of life has suffered because of her frequent ascites.        Review of Systems   Constitutional:  Positive for activity change and appetite change.   Respiratory: Negative.     Cardiovascular: Negative.    Gastrointestinal:  Positive for abdominal distention and abdominal pain.   Musculoskeletal: Negative.    Skin: Negative.    Neurological: Negative.    Psychiatric/Behavioral: Negative.         Past Medical History:   Diagnosis Date    Ascites     Asthma     Cirrhosis of liver (Multi)     Diabetes mellitus (Multi)     GERD (gastroesophageal reflux disease)     CORBY (obstructive sleep apnea)     Portal hypertension (Multi)     Thrombocytopenia (CMS-HCC)      Past Surgical History:   Procedure Laterality Date     SECTION, LOW TRANSVERSE      CHOLECYSTECTOMY      COLONOSCOPY      CT GUIDED IMAGING FOR NEEDLE PLACEMENT  10/14/2020    CT GUIDED IMAGING FOR NEEDLE PLACEMENT [facility]    ESOPHAGOGASTRODUODENOSCOPY      INGUINAL HIDRADENITIS EXCISION      TUBAL LIGATION      US GUIDED ABDOMINAL PARACENTESIS  10/08/2020    US GUIDED ABDOMINAL PARACENTESIS [facility]     Social History     Socioeconomic History    Marital status:      Spouse name: Not on file    Number of children: Not on file    Years of education: Not on file    Highest education level: Not on file   Occupational History    Not on file   Tobacco Use    Smoking status: Never    Smokeless tobacco: Never   Vaping Use    Vaping status: Never Used   Substance and Sexual Activity    Alcohol use: Never    Drug use: Never    Sexual activity: Yes     Partners: Male     Birth control/protection: None   Other Topics Concern    Not on file   Social History Narrative    Not on file     Social Drivers of Health     Financial Resource Strain: Low Risk   (3/1/2025)    Overall Financial Resource Strain (CARDIA)     Difficulty of Paying Living Expenses: Not very hard   Recent Concern: Financial Resource Strain - Medium Risk (2/28/2025)    Overall Financial Resource Strain (CARDIA)     Difficulty of Paying Living Expenses: Somewhat hard   Food Insecurity: No Food Insecurity (2/28/2025)    Hunger Vital Sign     Worried About Running Out of Food in the Last Year: Never true     Ran Out of Food in the Last Year: Never true   Transportation Needs: No Transportation Needs (3/1/2025)    PRAPARE - Transportation     Lack of Transportation (Medical): No     Lack of Transportation (Non-Medical): No   Recent Concern: Transportation Needs - Unmet Transportation Needs (12/12/2024)    PRAPARE - Transportation     Lack of Transportation (Medical): Yes     Lack of Transportation (Non-Medical): No   Physical Activity: Inactive (2/9/2025)    Exercise Vital Sign     Days of Exercise per Week: 0 days     Minutes of Exercise per Session: 0 min   Stress: No Stress Concern Present (12/12/2024)    [employer] - Occupational Stress Questionnaire     Feeling of Stress : Only a little   Social Connections: Moderately Integrated (12/12/2024)    Social Connection and Isolation Panel [NHANES]     Frequency of Communication with Friends and Family: More than three times a week     Frequency of Social Gatherings with Friends and Family: More than three times a week     Attends Zoroastrianism Services: More than 4 times per year     Active Member of Clubs or Organizations: No     Attends Club or Organization Meetings: Never     Marital Status:    Intimate Partner Violence: Not At Risk (2/28/2025)    Humiliation, Afraid, Rape, and Kick questionnaire     Fear of Current or Ex-Partner: No     Emotionally Abused: No     Physically Abused: No     Sexually Abused: No   Housing Stability: Low Risk  (3/1/2025)    Housing Stability Vital Sign     Unable to Pay for Housing in the  Last Year: No     Number of Times Moved in the Last Year: 0     Homeless in the Last Year: No     No family history on file.  Allergies   Allergen Reactions    Gluten Diarrhea     No current facility-administered medications on file prior to encounter.     Current Outpatient Medications on File Prior to Encounter   Medication Sig Dispense Refill    acetaminophen (Tylenol) 500 mg tablet Take 1-2 tablets (500-1,000 mg) by mouth every 6 hours if needed (pain). 30 tablet 0    atorvastatin (Lipitor) 20 mg tablet Take 1 tablet (20 mg) by mouth once daily at bedtime. 30 tablet 0    carvedilol (Coreg) 6.25 mg tablet Take 1 tablet (6.25 mg) by mouth 2 times a day. 60 tablet 0    insulin glargine (Lantus) 100 unit/mL (3 mL) pen Inject 7 Units under the skin once daily at bedtime. Take as directed per insulin instructions. 2.1 mL 1    lactulose 20 gram/30 mL oral solution Take 30 mL (20 g) by mouth 2 times a day. (Patient taking differently: Take 30 mL (20 g) by mouth 2 times a day as needed.) 1800 mL 0    meclizine (Antivert) 25 mg tablet Take 1 tablet (25 mg) by mouth once daily as needed for dizziness. 30 tablet 0    melatonin 3 mg tablet Take 1 tablet (3 mg) by mouth once daily at bedtime. 30 tablet 0    metFORMIN XR (Glucophage-XR) 500 mg 24 hr tablet Take 2 tablets (1,000 mg) by mouth once daily in the evening. Take with meals. Do not crush, chew, or split. (Patient taking differently: Take 1 tablet (500 mg) by mouth 2 times daily (morning and late afternoon). Do not crush, chew, or split.) 60 tablet 0    mirtazapine (Remeron) 7.5 mg tablet Take 1 tablet (7.5 mg) by mouth once daily at bedtime. 30 tablet 0    ondansetron ODT (Zofran-ODT) 4 mg disintegrating tablet Dissolve 1 tablet (4 mg) in the mouth every 6 hours. (Patient taking differently: Dissolve 1 tablet (4 mg) in the mouth every 6 hours if needed for vomiting or nausea.) 120 tablet 0    pantoprazole (ProtoNix) 40 mg EC tablet Take 1 tablet (40 mg) by mouth 2  "times a day before meals for 7 days. Do not crush, chew, or split. 14 tablet 0    polyethylene glycol (Glycolax, Miralax) 17 gram packet Take 17 g by mouth once daily. (Patient taking differently: Take 17 g by mouth once daily as needed.) 30 packet 0    spironolactone (Aldactone) 100 mg tablet Take 1 tablet (100 mg) by mouth once daily. Do not fill before March 3, 2025. (Patient taking differently: Take 1.5 tablets (150 mg) by mouth once daily.) 30 tablet 0    sucralfate (Carafate) 1 gram tablet Take 1 tablet (1 g) by mouth 4 times a day before meals. 120 tablet 0    torsemide (Demadex) 20 mg tablet Take 1 tablet (20 mg) by mouth once daily. Do not fill before March 3, 2025. 30 tablet 1    traZODone (Desyrel) 50 mg tablet Take 0.5 tablets (25 mg) by mouth as needed at bedtime for sleep. 15 tablet 0    ursodiol (Actigall) 250 mg tablet Take 2 tablets (500 mg) by mouth 2 times a day AND 1 tablet (250 mg) once daily. 150 tablet 0    alcohol swabs pads, medicated Apply 1 each topically once daily at bedtime. 100 each 0    pantoprazole (ProtoNix) 40 mg EC tablet Take 1 tablet (40 mg) by mouth once daily in the morning. Take before meals. Do not crush, chew, or split. Do not fill before March 10, 2025. (Patient not taking: Reported on 3/13/2025) 30 tablet 1    pen needle 1/2\" 29G X 12mm needle Use to inject 1-4 times daily as directed. 100 each 11    pen needle, diabetic (Sure Comfort Pen Needle) 32 gauge x /32\" needle Use 4 times a day 100 each 0    pramoxine (Sarna Sensitive) 1 % lotion Apply 1 Application topically every 8 hours if needed (itch). 60 mL 0    [] rifAXIMin (Xifaxan) 550 mg tablet Take 1 tablet (550 mg) by mouth 3 times a day for 10 days. (Patient taking differently: Take 1 tablet (550 mg) by mouth 3 times a day as needed.) 30 tablet 0       Objective    Vitals:    25 0223 25 0635 25 1112 25 1614   BP: 99/65 100/62 92/58 117/78   BP Location:  Left arm Left arm    Patient " Position:  Lying Lying    Pulse: 86 90 89 93   Resp: 16 16 15 16   Temp:  36.9 °C (98.4 °F)  36.7 °C (98.1 °F)   TempSrc:  Temporal  Temporal   SpO2: 100% 94% 97% 99%       Results for orders placed or performed during the hospital encounter of 03/12/25 (from the past 24 hours)   Urinalysis with Reflex Culture and Microscopic   Result Value Ref Range    Color, Urine Yellow Light-Yellow, Yellow, Dark-Yellow    Appearance, Urine Turbid (N) Clear    Specific Gravity, Urine 1.033 1.005 - 1.035    pH, Urine 5.5 5.0, 5.5, 6.0, 6.5, 7.0, 7.5, 8.0    Protein, Urine 30 (1+) (A) NEGATIVE, 10 (TRACE), 20 (TRACE) mg/dL    Glucose, Urine 1000 (4+) (A) Normal mg/dL    Blood, Urine OVER (3+) (A) NEGATIVE mg/dL    Ketones, Urine NEGATIVE NEGATIVE mg/dL    Bilirubin, Urine 0.5 (1+) (A) NEGATIVE mg/dL    Urobilinogen, Urine 12 (3+) (A) Normal mg/dL    Nitrite, Urine NEGATIVE NEGATIVE    Leukocyte Esterase, Urine 500 Mckenzie/uL (A) NEGATIVE   Extra Urine Gray Tube   Result Value Ref Range    Extra Tube Hold for add-ons.    Sterile Fluid Culture/Smear    Specimen: Ascites Fluid   Result Value Ref Range    Sterile Fluid Culture/Smear No growth to date     Gram Stain (2+) Few Polymorphonuclear leukocytes     Gram Stain No organisms seen    Albumin, Body Fluid   Result Value Ref Range    Albumin, Fluid <0.5 Not established g/dL   Lactate Dehydrogenase, Body Fluid   Result Value Ref Range    LD, Fluid 40 Not established. U/L   Body Fluid Cell Count   Result Value Ref Range    Color, Fluid Yellow Colorless, Straw, Yellow    Clarity, Fluid Clear Clear    WBC, Fluid 43 See Comment /uL    RBC, Fluid 142 see comment /uL   Body Fluid Differential   Result Value Ref Range    Neutrophils %, Manual, Fluid 6 see comment %    Lymphocytes %, Manual, Fluid 68 see comment %    Mono/Macrophages %, Manual, Fluid 23 see comment %    Eosinophils %, Manual, Fluid 0 see comment %    Basophils %, Manual, Fluid 1 not established %    Immature Granulocytes %,  Manual, Fluid 0 not established %    Blasts %, Manual, Fluid 0 not established %    Unclassified Cells %, Manual, Fluid 2 (H) not established %    Plasma Cells %, Manual, Fluid 0 not established %    Total Cells Counted, Fluid 100    Pathologist Review-Cell Count,Fluid   Result Value Ref Range    Pathologist Review-Cell Count, Fluid No malignant cells identified.    Microscopic Only, Urine   Result Value Ref Range    WBC, Urine >50 (A) 1-5, NONE /HPF    RBC, Urine >20 (A) NONE, 1-2, 3-5 /HPF    Squamous Epithelial Cells, Urine 10-25 (FEW) Reference range not established. /HPF    Mucus, Urine 3+ Reference range not established. /LPF   ECG 12 lead   Result Value Ref Range    Ventricular Rate 81 BPM    Atrial Rate 81 BPM    NY Interval 174 ms    QRS Duration 82 ms    QT Interval 394 ms    QTC Calculation(Bazett) 457 ms    P Axis 50 degrees    R Axis -6 degrees    T Axis 6 degrees    QRS Count 13 beats    Q Onset 217 ms    P Onset 130 ms    P Offset 184 ms    T Offset 414 ms    QTC Fredericia 435 ms   POCT GLUCOSE   Result Value Ref Range    POCT Glucose 171 (H) 74 - 99 mg/dL   Lactate   Result Value Ref Range    Lactate 1.0 0.4 - 2.0 mmol/L   Magnesium   Result Value Ref Range    Magnesium 1.60 1.60 - 2.40 mg/dL   Comprehensive metabolic panel   Result Value Ref Range    Glucose 197 (H) 74 - 99 mg/dL    Sodium 137 136 - 145 mmol/L    Potassium 3.6 3.5 - 5.3 mmol/L    Chloride 103 98 - 107 mmol/L    Bicarbonate 31 21 - 32 mmol/L    Anion Gap 7 (L) 10 - 20 mmol/L    Urea Nitrogen 12 6 - 23 mg/dL    Creatinine 0.63 0.50 - 1.05 mg/dL    eGFR >90 >60 mL/min/1.73m*2    Calcium 7.8 (L) 8.6 - 10.6 mg/dL    Albumin 2.1 (L) 3.4 - 5.0 g/dL    Alkaline Phosphatase 282 (H) 33 - 110 U/L    Total Protein 6.5 6.4 - 8.2 g/dL    AST 60 (H) 9 - 39 U/L    Bilirubin, Total 2.6 (H) 0.0 - 1.2 mg/dL    ALT 32 7 - 45 U/L   CBC   Result Value Ref Range    WBC 3.7 (L) 4.4 - 11.3 x10*3/uL    nRBC 0.0 0.0 - 0.0 /100 WBCs    RBC 3.29 (L) 4.00 -  5.20 x10*6/uL    Hemoglobin 10.2 (L) 12.0 - 16.0 g/dL    Hematocrit 29.1 (L) 36.0 - 46.0 %    MCV 88 80 - 100 fL    MCH 31.0 26.0 - 34.0 pg    MCHC 35.1 32.0 - 36.0 g/dL    RDW 15.7 (H) 11.5 - 14.5 %    Platelets 102 (L) 150 - 450 x10*3/uL   Coagulation Screen   Result Value Ref Range    Protime 15.1 (H) 9.8 - 12.4 seconds    INR 1.4 (H) 0.9 - 1.1    aPTT 31 26 - 36 seconds   TSH   Result Value Ref Range    Thyroid Stimulating Hormone 4.64 (H) 0.44 - 3.98 mIU/L   T4, free   Result Value Ref Range    Thyroxine, Free 1.28 0.78 - 1.48 ng/dL   POCT GLUCOSE   Result Value Ref Range    POCT Glucose 206 (H) 74 - 99 mg/dL   POCT GLUCOSE   Result Value Ref Range    POCT Glucose 221 (H) 74 - 99 mg/dL       MELD 3.0: 17 at 3/13/2025  6:23 AM  MELD-Na: 14 at 3/13/2025  6:23 AM  Calculated from:  Serum Creatinine: 0.63 mg/dL (Using min of 1 mg/dL) at 3/13/2025  6:23 AM  Serum Sodium: 137 mmol/L at 3/13/2025  6:23 AM  Total Bilirubin: 2.6 mg/dL at 3/13/2025  6:23 AM  Serum Albumin: 2.1 g/dL at 3/13/2025  6:23 AM  INR(ratio): 1.4 at 3/13/2025  6:23 AM  Age at listing (hypothetical): 50 years  Sex: Female at 3/13/2025  6:23 AM      Physical Exam  Cardiovascular:      Pulses: Normal pulses.      Heart sounds: Normal heart sounds.   Pulmonary:      Effort: Pulmonary effort is normal.   Abdominal:      General: Bowel sounds are normal. There is distension.      Tenderness: There is abdominal tenderness.      Comments: Umbilical hernia   Musculoskeletal:         General: Normal range of motion.      Cervical back: Normal range of motion.   Skin:     General: Skin is warm and dry.      Capillary Refill: Capillary refill takes less than 2 seconds.      Coloration: Skin is jaundiced.   Neurological:      Mental Status: She is alert and oriented to person, place, and time.   Psychiatric:         Mood and Affect: Mood normal.         Behavior: Behavior normal.         Thought Content: Thought content normal.         Judgment: Judgment  normal.             I personally spent 60 minutes on this consult with over 50% of time spent discussing management and care of specified diagnosis with patient and/or coordinating care for this patient.       Vascular and Interventional Radiology  [facility]  [phone] Inpatient Nursing Quarterback  [phone] Nursing Line 7a-5p  [phone] Resident on-call pager    NON-Urgent on call weekends and after hours weekdays (5pm - 5am) IR pager: [phone]  Urgent & emergent on call weekends and after hours weekdays (5pm-7am) IR pager: [phone]

## 2025-03-13 NOTE — PROGRESS NOTES
Pharmacy Medication History Review    Alfonzo Flanagan is a 50 y.o. female admitted for Abdominal pain, unspecified abdominal location. Pharmacy reviewed the patient's [id number] medications and allergies for accuracy.    The list below reflects the updated PTA list.   Prior to Admission Medications   Prescriptions Last Dose Informant   acetaminophen (Tylenol) 500 mg tablet 3/12/2025 Self   Sig: Take 1-2 tablets (500-1,000 mg) by mouth every 6 hours if needed (pain).   alcohol swabs pads, medicated  Self   Sig: Apply 1 each topically once daily at bedtime.   atorvastatin (Lipitor) 20 mg tablet 3/13/2025 Morning Self   Sig: Take 1 tablet (20 mg) by mouth once daily at bedtime.   carvedilol (Coreg) 6.25 mg tablet 3/12/2025 Self   Sig: Take 1 tablet (6.25 mg) by mouth 2 times a day.   insulin glargine (Lantus) 100 unit/mL (3 mL) pen 3/13/2025 Morning Self   Sig: Inject 7 Units under the skin once daily at bedtime. Take as directed per insulin instructions.   lactulose 20 gram/30 mL oral solution 3/13/2025 Morning Self   Sig: Take 30 mL (20 g) by mouth 2 times a day.   Patient taking differently: Take 30 mL (20 g) by mouth 2 times a day as needed.   meclizine (Antivert) 25 mg tablet 3/13/2025 Morning Self   Sig: Take 1 tablet (25 mg) by mouth once daily as needed for dizziness.   melatonin 3 mg tablet Past Week Self   Sig: Take 1 tablet (3 mg) by mouth once daily at bedtime.   metFORMIN XR (Glucophage-XR) 500 mg 24 hr tablet Past Week Self   Sig: Take 2 tablets (1,000 mg) by mouth once daily in the evening. Take with meals. Do not crush, chew, or split.   Patient taking differently: Take 1 tablet (500 mg) by mouth 2 times daily (morning and late afternoon). Do not crush, chew, or split.   mirtazapine (Remeron) 7.5 mg tablet 3/13/2025 Morning Self   Sig: Take 1 tablet (7.5 mg) by mouth once daily at bedtime.   ondansetron ODT (Zofran-ODT) 4 mg disintegrating tablet Past Week Self   Sig: Dissolve 1 tablet (4  "mg) in the mouth every 6 hours.   Patient taking differently: Dissolve 1 tablet (4 mg) in the mouth every 6 hours if needed for vomiting or nausea.   pantoprazole (ProtoNix) 40 mg EC tablet 3/12/2025 Self   Sig: Take 1 tablet (40 mg) by mouth 2 times a day before meals for 7 days. Do not crush, chew, or split.   pantoprazole (ProtoNix) 40 mg EC tablet Not Taking Self   Sig: Take 1 tablet (40 mg) by mouth once daily in the morning. Take before meals. Do not crush, chew, or split. Do not fill before March 10, 2025.   Patient not taking: Reported on 3/13/2025   pen needle 1/2\" 29G X 12mm needle  Self   Sig: Use to inject 1-4 times daily as directed.   pen needle, diabetic (Sure Comfort Pen Needle) 32 gauge x 5/32\" needle  Self   Sig: Use 4 times a day   polyethylene glycol (Glycolax, Miralax) 17 gram packet 3/12/2025 Self   Sig: Take 17 g by mouth once daily.   Patient taking differently: Take 17 g by mouth once daily as needed.   pramoxine (Sarna Sensitive) 1 % lotion Unknown Self   Sig: Apply 1 Application topically every 8 hours if needed (itch).   rifAXIMin (Xifaxan) 550 mg tablet Unknown    Sig: Take 1 tablet (550 mg) by mouth 3 times a day for 10 days.   Patient taking differently: Take 1 tablet (550 mg) by mouth 3 times a day as needed.   spironolactone (Aldactone) 100 mg tablet 3/12/2025 Self   Sig: Take 1 tablet (100 mg) by mouth once daily. Do not fill before March 3, 2025.   Patient taking differently: Take 1.5 tablets (150 mg) by mouth once daily.   sucralfate (Carafate) 1 gram tablet 3/12/2025 Self   Sig: Take 1 tablet (1 g) by mouth 4 times a day before meals.   torsemide (Demadex) 20 mg tablet 3/12/2025 Self   Sig: Take 1 tablet (20 mg) by mouth once daily. Do not fill before March 3, 2025.   traZODone (Desyrel) 50 mg tablet 3/13/2025 Morning Self   Sig: Take 0.5 tablets (25 mg) by mouth as needed at bedtime for sleep.   ursodiol (Actigall) 250 mg tablet 3/12/2025 Self   Sig: Take 2 tablets (500 mg) by " "mouth 2 times a day AND 1 tablet (250 mg) once daily.      Facility-Administered Medications: None        The list below reflects the updated allergy list. Please review each documented allergy for additional clarification and justification.  Allergies  Reviewed by Grace Ferrara RN on 3/12/2025        Severity Reactions Comments    Gluten Low Diarrhea             Patient accepts M2B at discharge.     Sources:   Patient Interview - fair historian  Admission MedRec Grid   3/2/25 Discharge Summary  OARRS - none recent  EPIC medication dispense report    Medications ADDED:  None  Medications CHANGED:  Lactulose - added PRN to sig  Metformin - changed sig from 2 tabs Qday to 1 tab BID (patient taking differently)  Ondansetron ODT 4mg - added PRN to sig  Polyethylene glycol - added PRN to sig  Rifaximin - added PRN to sig  Spironolactone - changed quantity administered from 100mg daily to 150mg daily  Medications REMOVED/MARKED NOT TAKING:   Mirtazapine 7.5mg    Additional Comments:  Patient appears to still be taking spironolactone as it was originally  prescribed and not incorporating the new directions form 3/2/25 discharge      Echo Lopez, PharmD  Transitions of Care Pharmacist  03/13/25     Secure Chat preferred   If no response call [phone] or Vocera \"Med Rec\"    "

## 2025-03-13 NOTE — CARE PLAN
The clinical goals for the shift include Patient to remain HDS during shift    Problem: Pain - Adult  Goal: Verbalizes/displays adequate comfort level or baseline comfort level  Outcome: Progressing     Problem: Safety - Adult  Goal: Free from fall injury  Outcome: Progressing     Problem: Discharge Planning  Goal: Discharge to home or other facility with appropriate resources  Outcome: Progressing     Problem: Chronic Conditions and Co-morbidities  Goal: Patient's chronic conditions and co-morbidity symptoms are monitored and maintained or improved  Outcome: Progressing     Problem: Nutrition  Goal: Nutrient intake appropriate for maintaining nutritional needs  Outcome: Progressing

## 2025-03-13 NOTE — H&P
History Of Present Illness  Alfonzo Flanagan is a 50 y.o. female presenting with PMH significant for biopsy-proven PBC with cirrhosis c/b portal HTN with ascites, esophageal varices (s/p banding 2021 with eradication), hepatic encephalopathy, antibody-positive celiac disease, recent pancreatic tail mass suggestive of neuroendocrine tumor s/p EUS on 3/2024 (no biopsy) recommended repeat in 1 year, IDDM2 (A1c 7.6), HTN, GERD, anxiety, depression, CORBY (2013 sleep study, untreated) presenting for 4 days of abdominal discomfort and distention, and chills. Patient routinely gets around 8L drained from her abdomen up to 3x a week but can sometimes be once every 2 weeks. Patient is compliant with her water pill, lactulose, and miralax at home. She presented to the ED with these symptoms as she contacted her GI doctor, whom could not schedule her for an appointment until 3/26. Patient states her pain is at 8/10, but with the morphine it was brought to a 5/10. Tylenol works for her but only lasts about 2 hours. She states she has pain from her abdominal distention that is causing back pain. Her symptoms and presentation the patient state is similar in extent to previous presentations to the ED for the same thing. She has about 1-2 BM a day. She does state she has had increased nausea, vomiting (no blood), headache, and new b/l LE swelling. The nausea and vomiting has been present for 2 days which responds to zofran. Also attributes having dysuria and itchiness in the groin for the past 4 days. She has had UTI in the past. Swelling in her feet has caused increased pain while walking for the past 3 days. Diet has been diminshed due to abdominal distenstion and has not had any food since dinner yesterday when she had a few bites. She states her weight on Monday was 170 and is now 189 pounds. Review of system is otherwise negative, with pertinent positives in HPI. No chest pain or SOB noted.     Past Medical History  She has a  past medical history of Ascites, Asthma, Cirrhosis of liver (Multi), Diabetes mellitus (Multi), GERD (gastroesophageal reflux disease), CORBY (obstructive sleep apnea), Portal hypertension (Multi), and Thrombocytopenia (CMS-HCC).    Surgical History  She has a past surgical history that includes CT guided imaging for needle placement (10/14/2020); US guided abdominal paracentesis (10/08/2020); Cholecystectomy ();  section, low transverse; Tubal ligation; Esophagogastroduodenoscopy; Colonoscopy; and Inguinal hidradenitis excision.     Social History  She reports that she has never smoked. She has never used smokeless tobacco. She reports that she does not drink alcohol and does not use drugs.    Family History  No family history on file.     Allergies  Gluten     Physical Exam     Last Recorded Vitals  /58   Pulse 75   Temp 36.4 °C (97.5 °F) (Temporal)   Resp 16   SpO2 97%     Relevant Results  Scheduled medications  atorvastatin, 20 mg, oral, Nightly  [Held by provider] carvedilol, 6.25 mg, oral, BID  enoxaparin, 40 mg, subcutaneous, Daily  insulin glargine, 7 Units, subcutaneous, Nightly  insulin lispro, 0-5 Units, subcutaneous, TID AC  lactulose, 20 g, oral, BID  mirtazapine, 7.5 mg, oral, Nightly  pantoprazole, 40 mg, oral, Daily before breakfast  polyethylene glycol, 17 g, oral, Daily  rifAXIMin, 550 mg, oral, TID  spironolactone, 100 mg, oral, Daily  sucralfate, 1 g, oral, Before meals & nightly  sulfamethoxazole-trimethoprim, 1 tablet, oral, BID  ursodiol, 500 mg, oral, BID   And  ursodiol, 250 mg, oral, Nightly      Continuous medications     PRN medications  PRN medications: acetaminophen, dextrose, dextrose, glucagon, glucagon, meclizine, ondansetron ODT, oxyCODONE, traZODone    Results for orders placed or performed during the hospital encounter of 25 (from the past 24 hours)   CBC and Auto Differential   Result Value Ref Range    WBC 4.4 4.4 - 11.3 x10*3/uL    nRBC 0.0 0.0 - 0.0  /100 WBCs    RBC 3.51 (L) 4.00 - 5.20 x10*6/uL    Hemoglobin 10.9 (L) 12.0 - 16.0 g/dL    Hematocrit 32.9 (L) 36.0 - 46.0 %    MCV 94 80 - 100 fL    MCH 31.1 26.0 - 34.0 pg    MCHC 33.1 32.0 - 36.0 g/dL    RDW 15.9 (H) 11.5 - 14.5 %    Platelets 107 (L) 150 - 450 x10*3/uL    Neutrophils % 58.4 40.0 - 80.0 %    Immature Granulocytes %, Automated 0.5 0.0 - 0.9 %    Lymphocytes % 27.1 13.0 - 44.0 %    Monocytes % 10.6 2.0 - 10.0 %    Eosinophils % 2.9 0.0 - 6.0 %    Basophils % 0.5 0.0 - 2.0 %    Neutrophils Absolute 2.59 1.20 - 7.70 x10*3/uL    Immature Granulocytes Absolute, Automated 0.02 0.00 - 0.70 x10*3/uL    Lymphocytes Absolute 1.20 1.20 - 4.80 x10*3/uL    Monocytes Absolute 0.47 0.10 - 1.00 x10*3/uL    Eosinophils Absolute 0.13 0.00 - 0.70 x10*3/uL    Basophils Absolute 0.02 0.00 - 0.10 x10*3/uL   Basic metabolic panel   Result Value Ref Range    Glucose 350 (H) 74 - 99 mg/dL    Sodium 134 (L) 136 - 145 mmol/L    Potassium 5.1 3.5 - 5.3 mmol/L    Chloride 101 98 - 107 mmol/L    Bicarbonate 30 21 - 32 mmol/L    Anion Gap 8 (L) 10 - 20 mmol/L    Urea Nitrogen 12 6 - 23 mg/dL    Creatinine 0.60 0.50 - 1.05 mg/dL    eGFR >90 >60 mL/min/1.73m*2    Calcium 8.1 (L) 8.6 - 10.6 mg/dL   Hepatic function panel   Result Value Ref Range    Albumin 2.4 (L) 3.4 - 5.0 g/dL    Bilirubin, Total 2.4 (H) 0.0 - 1.2 mg/dL    Bilirubin, Direct 0.7 (H) 0.0 - 0.3 mg/dL    Alkaline Phosphatase 337 (H) 33 - 110 U/L    ALT 32 7 - 45 U/L    AST 72 (H) 9 - 39 U/L    Total Protein 7.3 6.4 - 8.2 g/dL   Protime-INR   Result Value Ref Range    Protime 15.9 (H) 9.8 - 12.4 seconds    INR 1.4 (H) 0.9 - 1.1   B-Type Natriuretic Peptide   Result Value Ref Range     (H) 0 - 99 pg/mL   Lipase   Result Value Ref Range    Lipase 178 (H) 9 - 82 U/L   Troponin I, High Sensitivity   Result Value Ref Range    Troponin I, High Sensitivity (CMC) <3 0 - 34 ng/L   Urinalysis with Reflex Culture and Microscopic   Result Value Ref Range    Color, Urine  Yellow Light-Yellow, Yellow, Dark-Yellow    Appearance, Urine Turbid (N) Clear    Specific Gravity, Urine 1.033 1.005 - 1.035    pH, Urine 5.5 5.0, 5.5, 6.0, 6.5, 7.0, 7.5, 8.0    Protein, Urine 30 (1+) (A) NEGATIVE, 10 (TRACE), 20 (TRACE) mg/dL    Glucose, Urine 1000 (4+) (A) Normal mg/dL    Blood, Urine OVER (3+) (A) NEGATIVE mg/dL    Ketones, Urine NEGATIVE NEGATIVE mg/dL    Bilirubin, Urine 0.5 (1+) (A) NEGATIVE mg/dL    Urobilinogen, Urine 12 (3+) (A) Normal mg/dL    Nitrite, Urine NEGATIVE NEGATIVE    Leukocyte Esterase, Urine 500 Mckenzie/uL (A) NEGATIVE   Sterile Fluid Culture/Smear    Specimen: Ascites Fluid   Result Value Ref Range    Gram Stain (2+) Few Polymorphonuclear leukocytes     Gram Stain No organisms seen    Albumin, Body Fluid   Result Value Ref Range    Albumin, Fluid <0.5 Not established g/dL   Lactate Dehydrogenase, Body Fluid   Result Value Ref Range    LD, Fluid 40 Not established. U/L   Body Fluid Cell Count   Result Value Ref Range    Color, Fluid Yellow Colorless, Straw, Yellow    Clarity, Fluid Clear Clear    WBC, Fluid 43 See Comment /uL    RBC, Fluid 142 see comment /uL   Body Fluid Differential   Result Value Ref Range    Neutrophils %, Manual, Fluid 6 see comment %    Lymphocytes %, Manual, Fluid 68 see comment %    Mono/Macrophages %, Manual, Fluid 23 see comment %    Eosinophils %, Manual, Fluid 0 see comment %    Basophils %, Manual, Fluid 1 not established %    Immature Granulocytes %, Manual, Fluid 0 not established %    Blasts %, Manual, Fluid 0 not established %    Unclassified Cells %, Manual, Fluid 2 (H) not established %    Plasma Cells %, Manual, Fluid 0 not established %    Total Cells Counted, Fluid 100    Microscopic Only, Urine   Result Value Ref Range    WBC, Urine >50 (A) 1-5, NONE /HPF    RBC, Urine >20 (A) NONE, 1-2, 3-5 /HPF    Squamous Epithelial Cells, Urine 10-25 (FEW) Reference range not established. /HPF    Mucus, Urine 3+ Reference range not established. /LPF    POCT GLUCOSE   Result Value Ref Range    POCT Glucose 171 (H) 74 - 99 mg/dL     *Note: Due to a large number of results and/or encounters for the requested time period, some results have not been displayed. A complete set of results can be found in Results Review.     XR chest 1 view    Result Date: 3/12/2025  Interpreted By:  Vasile Pires, STUDY: XR CHEST 1 VIEW   INDICATION: Signs/Symptoms:sob.   COMPARISON: None   ACCESSION NUMBER(S): [id number]   ORDERING CLINICIAN: SAVANNA CHAPMAN   FINDINGS: No consolidation, effusion, edema, or pneumothorax. Heart size within normal limits.       No evidence of acute intrathoracic abnormality.   Signed by: Vasile Pires 3/12/2025 3:52 PM Dictation workstation:   [id number]    US guided abdominal paracentesis    Result Date: 2/28/2025  Interpreted By:  Henok Perez, STUDY: US GUIDED ABDOMINAL PARACENTESIS; 2/28/20253:21 pm   INDICATION: Signs/Symptoms:Paracentesis, diagnostic and therapeutic (previously tolerated 2-5 L removal).   COMPARISON: None.   ACCESSION NUMBER(S): [id number]   ORDERING CLINICIAN: ERASMO MARTINEZ   TECHNIQUE: INTERVENTIONALIST(S): Henok Perez   CONSENT: The patient/patient's POA/next of kin was informed of the nature of the proposed procedure. The purposes, alternatives, risks, and benefits were explained and discussed. All questions were answered and consent was obtained.   SEDATION: None   MEDICATION/CONTRAST:     TIME OUT: A time out was performed immediately prior to procedure start with the interventional team, correctly identifying the patient name, date of birth, MRN, procedure, anatomy (including marking of site and side), patient position, procedure consent form, relevant laboratory and imaging test results, antibiotic administration, safety precautions, and procedure-specific equipment needs.   FINDINGS: The patient was placed in the supine position.   The abdominal space was examined with grey scale ultrasound, and the most  accessible fluid identified and marked for paracentesis.   The skin was prepped and draped in usual manner. Local anesthesia with Lidocaine was administered and a right-sided paracentesis was performed.  A 5 American One-Step paracentesis needle/catheter was then placed where marked.  Approximately 2500 mL of yellowish colored fluid was removed.  The needle/catheter was then withdrawn.   The patient tolerated the procedure well and there were no immediate complications. Specimen(s) sent to the laboratory and pathology for further evaluation, per the requesting team.       Uneventful paracentesis, as detailed above. Right Hemiabdomen, 2500 mL   I personally performed and/or directly supervised this study and was present for the entire procedure.   Performed and dictated at [facility].   Signed by: Henok Perez 2/28/2025 3:21 PM Dictation workstation:   [id number]    ECG 12 lead    Result Date: 2/27/2025  Sinus tachycardia Otherwise normal ECG When compared with ECG of 25-JAN-2025 07:35, No significant change was found See ED provider note for full interpretation and clinical correlation Confirmed by Serenity Kay ([id number]) on 2/27/2025 10:40:54 PM       Assessment/Plan   Alfonzo Flanagan is a 50 y.o. female with PMH significant for biopsy-proven PBC with cirrhosis c/b portal HTN with ascites, esophageal varices (s/p banding 2021 with eradication), hepatic encephalopathy, antibody-positive celiac disease, recent pancreatic tail mass suggestive of neuroendocrine tumor s/p EUS on 3/2024 (no biopsy) recommended repeat in 1 year, IDDM2 (A1c 7.6), HTN, GERD, anxiety, depression, CORBY (2013 sleep study, untreated) who presented with worsening abdominal discomfort and distension associated with nausea.     #PBC cirrhosis cb portal hypertension   #refractory ascites, no hx of SBP  #hepatic encephalopathy  :: Admission MELD NA of 17, last admission went up to 22, but prior to that MELD  NA of 15-16  :: Last paracentesis 2/28, with 2.5 L fluid removed  ::Patient with recent hospitalization discharged 3/2, had paracentesis done am 2/28  Plan:  - Increased home torsemide to 40mg once due to increased water retention  -  continue home spironolactone 100mg  - 2-3g sodium diet with 2000cc fluid restriction  - continue home ursodiol 500mg BID with 250mg qd  - daily MELD labs   - checking TSH as indicated with Cirrhosis yearly, last checked 2021 at 2.25  - diagnostic paracentesis performed in ED, with labs ordered  - Follow fluid studies, so far negative for SBP, Gram smear with PMNs. Holding on antibiotics for now  -Blood pressure soft, gave torsemide 40 once due to LE swelling, hold parameters for BP affecting meds  -Continue rifaximin, lactulose aim for 3-4 bowel movements per day  -holding carvedilol for portal hypertensive gastropathy due to hypotension     #Hyponatremia  ::134 on admission, during last hospitalization was down to 129  ::Likely 2/2 fluid overload   -Fluid and sodium restriction  -Increased torsemide to 40mg once due to LE swelling     #nausea, vomiting  #celiac disease  #h/o hepatic encephalopathy  :: No asterixis or confusion/AMS, notably tired appearing possibly 2/2 HE (vs recent morphine administration)  :: Presented with non-bloody emesis and nausea for pat 2 days  Plan:  - Order lactulose 20mg BID for goal 2-3 BM/day (b/l right now is 1-2BM daily)  - zofran PRN, encourage use  - Rifaximin 550mg TID set to finish on 3/12, but given current presentation of tiredness and acute reaccumulation of ascites have re continued     #Night sweats and chills without SBP (were also present last admission)  :: h/o MDR UTI, has current dysuria and genital itching.  :: UA positive for WBC, RBC, blood, and LE positive, with protein  :: no leukocytosis or fevers  Plan:  - Given symptoms and UA results showing WBC started on Bactrim DS BID for 14 days will titrate based on Ucx when back      #DM2  (A1c 7.6 most recently), insulin-dependent  :: Lantus decreased from 50 units BID -> 10 units at bedtime on last admission in Feb 2024  :: Metformin at home   :: Hyperglycemic on admission to 300s  Plan:  - Continued home dosage of Lantus 7 units at bedtime given admission glucose of 350, holding home metformin  - SSI #1  - Consider discontinuing metformin given cirrhosis      #hx varices now sp eradication 2021, GI bleed  - avoid NSAID use  - continue home pantoprazole 40mg      #anxiety/depression   - continue home trazodone 25mg nightly PRN     #Abdominal stretch pain  - acetaminophen PRN (max daily 2g)  - oxycodone 5mg q6h for mod-severe pain  - continue home carafate     #Pruritus   - sarna cream     #HLD  :: HDL 21, LDL 62, TG 55, total cholesterol 94   - continue atorvastatin 20mg daily given worsening liver function     #GERD  - cw ppi      #Falls  ::S/p recent fall at home  Plan:  -fall precautions  -PT/OT     F: prn with caution given cirrhosis  E: prn   N: Adult diet 2-3 grams sodium, Gluten Free, 2L fluid restrict  A: PIV     DVT prophylaxis: Lovenox subq  GI Prophylaxis: PPI   Antimicrobials: Bactrim DS     CODE STATUS: Full Code, confirmed on admission  Emergency contact: Claudia Hartley ([place]) Daughter  Mobile Phone: [phone]    Assessment & Plan  Abdominal pain, unspecified abdominal location        Ruben Holden MD

## 2025-03-13 NOTE — NURSING NOTE
Nursing Admission Note    Patient Alfonzo Flanagan is a 50 yr female who was admitted for abd pain. Patient was admitted to [place] [id number]. Patients VSS. Family (daughter and ) were at bedside. Admission questions were answered by patient and daughter. Patient is from home with her family. Patient has all belongings (phone and clothes) at bedside within reach. Patient and family are aware of visitation policy and hours. Patient and patient's family had no further questions at this moment.    Patient would benefit from having a Mariluz at bedside to help with language barrier (Haitian speaking). During interview she had family translate for her.    Bed is locked and at lowest position. Bed alarm is on. Call light within reach.       Echo Sanchez RN      03/13/25 1806   Vital Signs   Temp 36.6 °C (97.9 °F)   Temp Source Temporal   Heart Rate 92   Heart Rate Source Monitor   Resp 16   /63   MAP (mmHg) 81   BP Location Left arm   BP Method Automatic   Patient Position Lying   Medical Gas Therapy   SpO2 100 %   Oximetry Probe Site Location Finger   Medical Gas Therapy None (Room air)   Pain Assessment   Pain Assessment 0-10   0-10 (Numeric) Pain Score 0 - No pain

## 2025-03-13 NOTE — POST-PROCEDURE NOTE
INTERVENTIONAL RADIOLOGY ADVANCED PRACTICE PROCEDURE  [facility]    A time out was performed and Right Hemiabdomen was examined with US and appropriate entry point was confirmed and marked.   The patient was prepped and draped in a sterile manner, 1% lidocaine was used to anesthesize the skin and subcutaneous tissue.   A 5F Centesis needle was then introduced through the skin into the peritoneal space, the centesis catheter was then threaded without difficulty.   2500 ml of yellow fluid was removed without difficulty. The catheter was then removed.   No immediate complications were noted during and immediately following the procedure.

## 2025-03-14 ENCOUNTER — DOCUMENTATION (OUTPATIENT)
Dept: TRANSPLANT | Facility: HOSPITAL | Age: 50
End: 2025-03-14

## 2025-03-14 ENCOUNTER — DOCUMENTATION (OUTPATIENT)
Dept: TRANSPLANT | Facility: HOSPITAL | Age: 50
End: 2025-03-14
Payer: COMMERCIAL

## 2025-03-14 ASSESSMENT — COGNITIVE AND FUNCTIONAL STATUS - GENERAL
WALKING IN HOSPITAL ROOM: A LITTLE
MOBILITY SCORE: 19
CLIMB 3 TO 5 STEPS WITH RAILING: A LITTLE
TURNING FROM BACK TO SIDE WHILE IN FLAT BAD: A LITTLE
STANDING UP FROM CHAIR USING ARMS: A LITTLE
MOVING TO AND FROM BED TO CHAIR: A LITTLE

## 2025-03-14 ASSESSMENT — PAIN - FUNCTIONAL ASSESSMENT
PAIN_FUNCTIONAL_ASSESSMENT: 0-10
PAIN_FUNCTIONAL_ASSESSMENT: 0-10

## 2025-03-14 ASSESSMENT — PAIN SCALES - GENERAL
PAINLEVEL_OUTOF10: 3
PAINLEVEL_OUTOF10: 5 - MODERATE PAIN
PAINLEVEL_OUTOF10: 5 - MODERATE PAIN

## 2025-03-14 ASSESSMENT — ACTIVITIES OF DAILY LIVING (ADL)
LACK_OF_TRANSPORTATION: NO
ADL_ASSISTANCE: INDEPENDENT

## 2025-03-14 NOTE — PROGRESS NOTES
03/14/25 1315   Discharge Planning   Living Arrangements Children;Spouse/significant other   Support Systems Children;Spouse/significant other   Assistance Needed none   Type of Residence Private residence   Do you have animals or pets at home? No  ( or daughter to provide)   Who is requesting discharge planning? Provider   Home or Post Acute Services None   Expected Discharge Disposition Home   Does the patient need discharge transport arranged? No  (daughter or  to provide)   Financial Resource Strain   How hard is it for you to pay for the very basics like food, housing, medical care, and heating? Not hard   Housing Stability   In the last 12 months, was there a time when you were not able to pay the mortgage or rent on time? N   In the past 12 months, how many times have you moved where you were living? 0   At any time in the past 12 months, were you homeless or living in a shelter (including now)? N   Transportation Needs   In the past 12 months, has lack of transportation kept you from medical appointments or from getting medications? no   In the past 12 months, has lack of transportation kept you from meetings, work, or from getting things needed for daily living? No     TCC ASSESSMENT:  Met with pt and introduced myself as care coordinator and member of the Care Transitions team for discharge planning. Pt lives at home with her daughter and . Pt reports being independent with ADL's but daughter helps her out a lot at home. She uses a rollator for assistance with ambulation. Pt reports falling in the kitchen 3 days ago and scraped her right knee. Pt stated she feels safe at home. Pt's address, phone number, and emergency contact information was verified. Pt first stated she has [employer] insurance but then she stated she has been trying to get it for the past 1.5 months but has been running into issues because of her 's income. Pt stated she recently applied for [place] Medicaid  with only her income because she and her  are . Email sent to [employer] to screen pt for [place] Medicaid as she is listed as self pay. Per [employer] pt has active third-party generic insurance named [employer]. Since the pt has active insurance, they wouldn't be able to screen her for Medicaid but they will email a different dept to get it allocated.    Home care: None.  DME: Rollator.  : None.  PCP: Beltran Freeman MD at [facility]. Pt stated she has been having a hard time following up with GI outpatient because they want her to pay $800 up front to schedule doctor visits.  Last appt: Upcoming appt 3/26.     Transport to appts: Daughter provides.   Pharmacy: [facility] in [place] (denies issues affording/obtaining medications). Pt reports she needs refills of all medications at time of discharge.    Discharge Planning: Pt presenting with c/o abdominal pain, per team she is volume overloaded and is planned for paracentesis and possible TIPS procedure, they will have Nutrition to see her as well, anticipated discharge date is 3/15. Pt voiced no additional home going needs at time of discharge. Care coordinator will continue to follow for discharge planning needs.    Herb Colon RN  Transitional Care Coordinator (TCC)  [phone] or [phone]

## 2025-03-14 NOTE — PROGRESS NOTES
[facility] but it appears to have termed around 03/01/24.  Per Epic notes account was referred to [facility] for Medicaid screening on 03/12/25.

## 2025-03-14 NOTE — CARE PLAN
The patient's goals for the shift include      The clinical goals for the shift include Patient to remain HDS during shift    Over the shift, the patient did not make progress toward the following goals. Barriers to progression include . Recommendations to address these barriers include   Problem: Pain - Adult  Goal: Verbalizes/displays adequate comfort level or baseline comfort level  Outcome: Progressing     Problem: Safety - Adult  Goal: Free from fall injury  Outcome: Progressing     Problem: Nutrition  Goal: Nutrient intake appropriate for maintaining nutritional needs  Outcome: Progressing     Problem: Diabetes  Goal: Achieve decreasing blood glucose levels by end of shift  Outcome: Progressing  Goal: Increase stability of blood glucose readings by end of shift  Outcome: Progressing  Goal: Decrease in ketones present in urine by end of shift  Outcome: Progressing  Goal: Maintain electrolyte levels within acceptable range throughout shift  Outcome: Progressing  Goal: Maintain glucose levels >70mg/dl to <250mg/dl throughout shift  Outcome: Progressing  Goal: No changes in neurological exam by end of shift  Outcome: Progressing  Goal: Learn about and adhere to nutrition recommendations by end of shift  Outcome: Progressing  Goal: Vital signs within normal range for age by end of shift  Outcome: Progressing  Goal: Increase self care and/or family involovement by end of shift  Outcome: Progressing  Goal: Receive DSME education by end of shift  Outcome: Progressing   .

## 2025-03-14 NOTE — PROGRESS NOTES
Alfonzo Flanagan is a 50 y.o. female on day 2 of admission presenting with Abdominal pain, unspecified abdominal location.      Subjective   No acute events overnight.     This AM, the patient was woken up from sleep. That patient says her abdominal pain is a little bit better this morning. She states her legs are feeling better this morning.     On rounds, she states she has had difficulty getting her meds, especially over the past 2 weeks. At home, she takes lactulose when she is constipated. She states that she follows a low-sodium diet, but it was explained to her that is she followed a low-sodium diet she could not make the volume of ascites that she does. She states she still has dysuria.    Objective   Last Recorded Vitals  /68 (BP Location: Left arm, Patient Position: Lying)   Pulse 97   Temp 36.7 °C (98.1 °F) (Temporal)   Resp 17   SpO2 93%   Intake/Output last 3 Shifts:    Intake/Output Summary (Last 24 hours) at 3/14/2025 0657  Last data filed at 3/13/2025 1758  Gross per 24 hour   Intake 250 ml   Output --   Net 250 ml       Admission Weight       Daily Weight  02/27/25 : 79.8 kg (176 lb)    Image Results  US guided abdominal paracentesis  Narrative: Interpreted By:  Henok Perez,   STUDY:  US GUIDED ABDOMINAL PARACENTESIS; 3/13/00360:32 pm      INDICATION:  Signs/Symptoms:therapeutic paracentesis, consideration for TIPS.      COMPARISON:  None.      ACCESSION NUMBER(S):  [id number]      ORDERING CLINICIAN:  FÉLIX GALVEZ      TECHNIQUE:  INTERVENTIONALIST(S):  Henok Perez      CONSENT:  The patient/patient's POA/next of kin was informed of the nature of  the proposed procedure. The purposes, alternatives, risks, and  benefits were explained and discussed. All questions were answered  and consent was obtained.      SEDATION:  None      MEDICATION/CONTRAST:          TIME OUT:  A time out was performed immediately prior to procedure start with  the interventional team,  correctly identifying the patient name, date  of birth, MRN, procedure, anatomy (including marking of site and  side), patient position, procedure consent form, relevant laboratory  and imaging test results, antibiotic administration, safety  precautions, and procedure-specific equipment needs.      FINDINGS:  The patient was placed in the supine position.      The abdominal space was examined with grey scale ultrasound, and the  most accessible fluid identified and marked for paracentesis.      The skin was prepped and draped in usual manner. Local anesthesia  with Lidocaine was administered and a right-sided paracentesis was  performed.  A 5 [place] One-Step paracentesis needle/catheter was then  placed where marked.  Approximately 2500 mL of yellowish colored  fluid was removed.  The needle/catheter was then withdrawn.      The patient tolerated the procedure well and there were no immediate  complications.      Impression: Uneventful paracentesis, as detailed above. Right Hemiabdomen, 2500 mL      I personally performed and/or directly supervised this study and was  present for the entire procedure.      Performed and dictated at [facility].      Signed by: Henok Perez 3/13/2025 4:32 PM  Dictation workstation:   [id number]  ECG 12 lead  Normal sinus rhythm  Possible Anterolateral infarct , age undetermined  Abnormal ECG  When compared with ECG of 27-FEB-2025 18:32,  Borderline criteria for Anterolateral infarct are now Present  See ED provider note for full interpretation and clinical correlation  Confirmed by Imer Evans ([id number]) on 3/13/2025 4:45:37 AM    Physical Exam  General: no acute distress, resting comfortably in bed  HEENT: normocephalic, atraumatic  Cardio: regular rate and rhythm, normal S1 and S2, no murmurs  Pulm: clear to auscultation bilaterally, no wheezes, crackles, or rhonchi, breathing comfortably on room air  Abd: significant distension,  tense, tender to palpation, no rebound or guarding  Extremities: 2+ pitting edema of bilateral LE  Neuro: alert and oriented to person, place, and time, CN II-XII grossly intact  Psych: mood and affect are appropriate    Results  Results for orders placed or performed during the hospital encounter of 03/12/25 (from the past 24 hours)   POCT GLUCOSE   Result Value Ref Range    POCT Glucose 206 (H) 74 - 99 mg/dL   POCT GLUCOSE   Result Value Ref Range    POCT Glucose 221 (H) 74 - 99 mg/dL   POCT GLUCOSE   Result Value Ref Range    POCT Glucose 208 (H) 74 - 99 mg/dL   POCT GLUCOSE   Result Value Ref Range    POCT Glucose 144 (H) 74 - 99 mg/dL   POCT GLUCOSE   Result Value Ref Range    POCT Glucose 157 (H) 74 - 99 mg/dL     *Note: Due to a large number of results and/or encounters for the requested time period, some results have not been displayed. A complete set of results can be found in Results Review.     MELD 3.0: 17 at 3/13/2025  6:23 AM  MELD-Na: 14 at 3/13/2025  6:23 AM  Calculated from:  Serum Creatinine: 0.63 mg/dL (Using min of 1 mg/dL) at 3/13/2025  6:23 AM  Serum Sodium: 137 mmol/L at 3/13/2025  6:23 AM  Total Bilirubin: 2.6 mg/dL at 3/13/2025  6:23 AM  Serum Albumin: 2.1 g/dL at 3/13/2025  6:23 AM  INR(ratio): 1.4 at 3/13/2025  6:23 AM  Age at listing (hypothetical): 50 years  Sex: Female at 3/13/2025  6:23 AM    Assessment/Plan:  Alfonzo Flanagan is a 49 yo F w PMH biopsy-proven PBC (with cirrhosis, portal HTN, ascites, esophageal varices s/p banding 2021 and eradication, hepatic encephalopathy), celiac disease, 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 This represents patient's 3rd ED  visit/hospital admission for recurrent and refractory ascites and poorly controlled portal hypertension. Some concern regarding patient's access to medications, previously did not have insurance on prior admission but now has [facility], though she reports they did not cover her medications yesterday.    With her MELD score 17 and recurrent ascites, it is likely she will eventually need a liver transplant. Transplant SW can come and speak with the patient about it and see her thoughts on liver transplant.     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 History of esophageal varices s/p banding 2021. Has required several paracenteses for fluid removal. S/p large volume paracentesis with 2.5 L removed today, prior paracentesis on 2/28 with 2.5 L removed. Diagnostic para consistent with portal HTN (SAAG > 1.1) and no c/f SBP (ANC 6). Therapeutic paracentesis with 2.5 L removed 3/13. MELD-Na 17 today.  PLAN:  - continue torsemide 40 mg d/t volume overload   - continue spironolactone 100 mg   - continue rifaximin 550 mg TID, lactulose 20 g BID titrated to bowel movements  - continue pantoprazole 40 mg daily  - continue acetaminophen 487.5 mg q6h PRN pain (dose reduction for cirrhosis)  - continue ursodiol 500 mg BID and ursodiol 250 mg nightly  - Restart Coreg 6.25 daily (on 6.25 BID at home but was held iso hypotension on arrival to ED) since BP good this morning  - gave 1 dose albumin 25 g following paracentesis on 3/13  - current  diet: 2-3 g sodium diet, 2 L fluid restriction  - IR consulted for therapeutic paracentesis, also discussing possible TIPS procedure with IR, will have to gather more information regarding patient's hx of hepatic encephalopathy  - social work consulted regarding insurance  - daily MELD labs  - Accurate Is and Os  - Transplant SW to discuss possibility of liver transplant  - Consult dietary to educate patient on low sodium diet    #UTI  #Possible vaginal candidiasis  Patient presenting with dysuria and pruritus in her groin. Endorses some white discharge as well. Physical exam 3/13 did not appear to have fungal infection. UA with 1+ protein, 3+ blood, >50 WBC, 500 leuk esterase, consistent with UTI. Urine culture contaminated with multifora. Patient has history of E faecium UTI with resistance to ampicillin, ciprofloxacin, levofloxacin, nitrofurantoin, penicillin, and bactrim.  PLAN:  - discontinued bactrim  - continue ceftriaxone (3/13- )    #Nausea  #Vomiting  #Appetite  #Dizziness  - continue zofran ODT 4 mg q6h PRN  - continue mirtazapine 7.6 mg nightly  - continue meclizine 25 mg daily PRN    #T2DM  Last A1c 7.6 in 12/2024. On insulin glargine 7 units nightly and metformin at home. Patient reports compliance with glargine dosing. POC glucose on admission 350.  PLAN:  - continue insulin glargine 7 units nightly  - hold home metformin  - SSI   - hypoglycemic protocol    #Anxiety/Depression  - continue home trazodone 25 mg nightly PRN    #HLD  - continue home atorvastatin 20 mg nightly    #Subclinical Hypothyroidism  TSH 4.64 and T4 1.28 (wnl).  PLAN:  - follow-up outpatient    F: w caution d/t cirrhosis/fluid overload  E: K > 4, Mg > 2  N: 2-3 g sodium, 2L fluid restriction  A: PIV  GI: pantoprazole 40 daily  O2: RA  DVT: lovenox  Abx: ceftriaxone (3/13- )  Pain: acetaminophen    Code: full (confirmed on admission)  NOK: [place] (daughter, [phone])    Patient seen and discussed with attending,   Ryan.    Mer Sauer MD  Neurology PGY1

## 2025-03-14 NOTE — PROGRESS NOTES
Per email from Rosaura Strickland of [facility]-She has [place] ins but its bare minimal insurance. We screened and accepted her for Medicaid in February. It seems someone in the billing dept allocated the [employer] insurance which caused it to recall on our end which is why she didn't show up at first. She is still pending Medicaid and it can take up to 90 days for her to get approved.    Also, the [employer] insurance phone # is [phone] where you can call to see what her plan covers exactly.

## 2025-03-14 NOTE — HOSPITAL COURSE
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     IR removed 2500 ml of ascites and she was diuresed with torsemide. Patient was also treated for urinary tract infection with ceftriaxone. There was some concern regarding patient's access to medications, previously did not have insurance on prior admission but now has [employer], though she reports they have not been covering her medications, so social work was consulted for assistance with medication coverage. There was also concern that she could not make that amount of ascitic fluid so quickly if she was adherent to a low sodium diet. Dietary was consulted to give education on low sodium diet.    Due to her recurrent ascites and MELD of 17, transplant social work was consulted to discuss liver transplant options with the patient.    [ ] Transplant SW to see to talk about liver transplant  [ ] Dietician to talk about low sodium diet since pt likely nonadherent  [ ]  further workup of pancreatic tail mass  [ ] social work to help with medication assistance

## 2025-03-14 NOTE — DISCHARGE INSTRUCTIONS
Dear Ms. Flanagan,    You presented to the hospital because of pain and swelling in your belly. Your belly was drained by our radiology team. We also treated you with antibiotics for a urinary tract infection. We discussed a TIPS procedure for you with our radiology team. We also had our social work team speak with you to figure out your insurance coverage for your medications. You are being discharged home on ***.     Medication changes: please change the doses of your medications to torsemide *** mg daily, coreg *** mg daily    Please take your medications as instructed and attend all your follow up appointments. If your belly feels like it is full of fluid again, please give Dr. Khanna's office a call so they can set you up for a paracentesis outpatient. Please limit the salt in your diet to 2-3 grams of sodium per day and also make sure that you don't have too much fluid. Only take 4 pills a day of your tylenol 500 mg pills at home.    Follow-up appointments:  Follow-up with Dr. Khanna on 3/26  Follow-up with your primary care doctor on 4/18  We have placed a referral to *** for you, if you do not hear back about scheduling this appointment within 1 week of discharge, please call [phone] ([phone]).    Thank you for allowing [facility] to be a part of your care!  Your  Care Team

## 2025-03-14 NOTE — PROGRESS NOTES
Physical Therapy    Physical Therapy Evaluation & Treatment    Patient Name: Alfonzo Flanagan  MRN: [id number]  Department: [place] [address]  Room: [id number]  Today's Date: 3/14/2025   Time Calculation  Start Time: 1625  Stop Time: 1648  Time Calculation (min): 23 min    Assessment/Plan   PT Assessment  PT Assessment Results: Impaired balance, Decreased strength, Decreased mobility  Rehab Prognosis: Good  Barriers to Discharge Home: No anticipated barriers  Assessment Comment: Pt mobilizing well, encouraged continued mobility and use of whw for safety.  Will continue to benefit from skilled PT to progress with decreasing falls risk.  End of Session Patient Position: Bed, 3 rail up, Alarm off, not on at start of session   IP OR SWING BED PT PLAN  Inpatient or Swing Bed: Inpatient  PT Plan  Treatment/Interventions: Gait training, Stair training, Therapeutic exercise, Therapeutic activity, Balance training  PT Plan: Ongoing PT  PT Frequency: 2 times per week  PT Discharge Recommendations: Low intensity level of continued care  PT Recommended Transfer Status: Assistive device, Stand by assist  PT - OK to Discharge: Yes (meaning POC/goals/discharge rec intensity created)      Subjective     General Visit Information:  General  Reason for Referral: PBC cirrhoisis c/b portal HTN, evaluation for possible TIPS procedure  Past Medical History Relevant to Rehab: cirrhosis, ascites, esophageal varices, hepatic encephalopathy, celiac, chronic pancreatitis, DM  General Comment: Pt agreeable to participate, reports mobilizing similar to last time this PT saw pt.  (S)->(I) with transfers and short distances.  Home Living:  Home Living  Type of Home: Apartment  Lives With:  (dtr)  Home Adaptive Equipment: Walker rolling or standard  Home Access: Stairs to enter with rails  Entrance Stairs-Number of Steps: 12  Prior Level of Function:  Prior Function Per Pt/Caregiver Report  ADL Assistance: Independent  Ambulatory Assistance:  (mod (I)  with walker, struggles with stairs though able to complete (I))  Precautions:  Precautions  Hearing/Visual Limitations: WFL  Medical Precautions: Fall precautions         Objective   Pain:  Pain Assessment  Pain Assessment: 0-10  0-10 (Numeric) Pain Score: 3  Pain Location: Abdomen  Pain Interventions: Repositioned  Cognition:  Cognition  Arousal/Alertness: Appropriate responses to stimuli  Orientation Level: Oriented X4  Following Commands: Follows all commands and directions without difficulty  Cognition Comments: speaks and understand English though intermittently will speak Lithuanian words    General Assessments:     Activity Tolerance  Endurance: Tolerates 10 - 20 min exercise with multiple rests    Sensation  Light Touch: No apparent deficits        Perception  Inattention/Neglect: Appears intact    Coordination  Movements are Fluid and Coordinated: Yes    Postural Control  Postural Control: Within Functional Limits    Static Sitting Balance  Static Sitting-Balance Support: Feet supported  Static Sitting-Level of Assistance: Independent    Static Standing Balance  Static Standing-Balance Support: No upper extremity supported  Static Standing-Level of Assistance: Close supervision, Distant supervision  Dynamic Standing Balance  Dynamic Standing-Balance Support: No upper extremity supported  Dynamic Standing-Level of Assistance: Close supervision, Distant supervision  Functional Assessments:  Bed Mobility  Bed Mobility: Yes  Bed Mobility 1  Bed Mobility 1: Sitting to supine  Level of Assistance 1: Distant supervision, Independent    Transfers  Transfer: Yes  Transfer 1  Technique 1: Sit to stand, Stand to sit  Transfer Level of Assistance 1: Distant supervision, Independent  Trials/Comments 1: stood from toilet, sat onto bed    Ambulation/Gait Training  Ambulation/Gait Training Performed: Yes  Ambulation/Gait Training 1  Surface 1: Level tile  Device 1:  (furniture walking)  Assistance 1: Close supervision,  Distant supervision  Quality of Gait 1: Decreased step length  Comments/Distance (ft) 1: 10 ft  Extremity/Trunk Assessments:  RLE   RLE : Exceptions to WFL  Strength RLE  RLE Overall Strength: Greater than or equal to 3/5 as evidenced by functional mobility  LLE   LLE : Exceptions to WFL  Strength LLE  LLE Overall Strength: Greater than or equal to 3/5 as evidenced by functional mobility  Treatments:  Therapeutic Exercise  Therapeutic Exercise Performed: Yes  Therapeutic Exercise Activity 1: APs, HS x10, SLRx2  Outcome Measures:  [facility] Basic Mobility  Turning from your back to your side while in a flat bed without using bedrails: None  Moving from lying on your back to sitting on the side of a flat bed without using bedrails: A little  Moving to and from bed to chair (including a wheelchair): A little  Standing up from a chair using your arms (e.g. wheelchair or bedside chair): A little  To walk in hospital room: A little  Climbing 3-5 steps with railing: A little  Basic Mobility - Total Score: 19    Encounter Problems       Encounter Problems (Active)       Balance       Will score > 22 on Tinetti to indicate improvements in balance.        Start:  03/14/25               Mobility       STG - Patient will ambulate mod (I), whw, 100 ft x2.       Start:  03/14/25    Expected End:  03/28/25            STG - Patient will ascend and descend a flight of stairs (S)->(I).        Start:  03/14/25    Expected End:  03/28/25               PT Transfers       STG - Patient will transfer sit to and from stand (I).       Start:  03/14/25    Expected End:  03/28/25                   Education Documentation  Mobility Training, taught by Tawny Edwards PT at 3/14/2025  5:28 PM.  Learner: Patient  Readiness: Acceptance  Method: Explanation  Response: Verbalizes Understanding  Comment: Safe mobility, use of AD, therex    03/14/25 at 5:30 PM - Tawny Edwards, PT

## 2025-03-15 ASSESSMENT — COGNITIVE AND FUNCTIONAL STATUS - GENERAL
CLIMB 3 TO 5 STEPS WITH RAILING: A LITTLE
MOBILITY SCORE: 23
DAILY ACTIVITIY SCORE: 24

## 2025-03-15 ASSESSMENT — PAIN SCALES - GENERAL
PAINLEVEL_OUTOF10: 4
PAINLEVEL_OUTOF10: 0 - NO PAIN

## 2025-03-15 ASSESSMENT — PAIN DESCRIPTION - LOCATION: LOCATION: ABDOMEN

## 2025-03-15 ASSESSMENT — PAIN - FUNCTIONAL ASSESSMENT
PAIN_FUNCTIONAL_ASSESSMENT: 0-10
PAIN_FUNCTIONAL_ASSESSMENT: 0-10

## 2025-03-15 NOTE — CARE PLAN
Problem: Pain - Adult  Goal: Verbalizes/displays adequate comfort level or baseline comfort level  Outcome: Progressing  Flowsheets (Taken 3/15/2025 0354)  Verbalizes/displays adequate comfort level or baseline comfort level:   Encourage patient to monitor pain and request assistance   Assess pain using appropriate pain scale   Administer analgesics based on type and severity of pain and evaluate response     Problem: Safety - Adult  Goal: Free from fall injury  Outcome: Progressing  Flowsheets (Taken 3/15/2025 0354)  Free from fall injury: Instruct family/caregiver on patient safety     Problem: Chronic Conditions and Co-morbidities  Goal: Patient's chronic conditions and co-morbidity symptoms are monitored and maintained or improved  Outcome: Progressing  Flowsheets (Taken 3/15/2025 0354)  Care Plan - Patient's Chronic Conditions and Co-Morbidity Symptoms are Monitored and Maintained or Improved:   Monitor and assess patient's chronic conditions and comorbid symptoms for stability, deterioration, or improvement   Collaborate with multidisciplinary team to address chronic and comorbid conditions and prevent exacerbation or deterioration   Update acute care plan with appropriate goals if chronic or comorbid symptoms are exacerbated and prevent overall improvement and discharge   The patient's goals for the shift include      The clinical goals for the shift include Will have controlled pain during the shift

## 2025-03-15 NOTE — PROGRESS NOTES
Alfonzo Flanagan is a 50 y.o. female on day 3 of admission presenting with Abdominal pain, unspecified abdominal location.      Subjective .  Patient seen and examined this morning.  Endorsing some abdominal pain and increased distention.  Denying chest pain and shortness of breath.    Objective   Last Recorded Vitals  BP 99/60   Pulse 74   Temp 36.4 °C (97.5 °F)   Resp 18   SpO2 95%   Intake/Output last 3 Shifts:    Intake/Output Summary (Last 24 hours) at 3/15/2025 0710  Last data filed at 3/14/2025 2200  Gross per 24 hour   Intake 170 ml   Output --   Net 170 ml       Admission Weight       Daily Weight  02/27/25 : 79.8 kg (176 lb)    Image Results  US guided abdominal paracentesis  Narrative: Interpreted By:  Henok Perez,   STUDY:  US GUIDED ABDOMINAL PARACENTESIS; 3/13/43951:32 pm      INDICATION:  Signs/Symptoms:therapeutic paracentesis, consideration for TIPS.      COMPARISON:  None.      ACCESSION NUMBER(S):  [id number]      ORDERING CLINICIAN:  FÉLIX GALVEZ      TECHNIQUE:  INTERVENTIONALIST(S):  Henok Perez      CONSENT:  The patient/patient's POA/next of kin was informed of the nature of  the proposed procedure. The purposes, alternatives, risks, and  benefits were explained and discussed. All questions were answered  and consent was obtained.      SEDATION:  None      MEDICATION/CONTRAST:          TIME OUT:  A time out was performed immediately prior to procedure start with  the interventional team, correctly identifying the patient name, date  of birth, MRN, procedure, anatomy (including marking of site and  side), patient position, procedure consent form, relevant laboratory  and imaging test results, antibiotic administration, safety  precautions, and procedure-specific equipment needs.      FINDINGS:  The patient was placed in the supine position.      The abdominal space was examined with grey scale ultrasound, and the  most accessible fluid identified and marked for  paracentesis.      The skin was prepped and draped in usual manner. Local anesthesia  with Lidocaine was administered and a right-sided paracentesis was  performed.  A 5 Azeri One-Step paracentesis needle/catheter was then  placed where marked.  Approximately 2500 mL of yellowish colored  fluid was removed.  The needle/catheter was then withdrawn.      The patient tolerated the procedure well and there were no immediate  complications.      Impression: Uneventful paracentesis, as detailed above. Right Hemiabdomen, 2500 mL      I personally performed and/or directly supervised this study and was  present for the entire procedure.      Performed and dictated at [facility].      Signed by: Henok Perez 3/13/2025 4:32 PM  Dictation workstation:   [id number]  ECG 12 lead  Normal sinus rhythm  Possible Anterolateral infarct , age undetermined  Abnormal ECG  When compared with ECG of 27-FEB-2025 18:32,  Borderline criteria for Anterolateral infarct are now Present  See ED provider note for full interpretation and clinical correlation  Confirmed by Imer Evans ([id number]) on 3/13/2025 4:45:37 AM    Physical Exam  General: no acute distress, resting comfortably in bed  HEENT: normocephalic, atraumatic  Cardio: regular rate and rhythm, normal S1 and S2, no murmurs  Pulm: clear to auscultation bilaterally, no wheezes, crackles, or rhonchi, breathing comfortably on room air  Abd: significant distension, tense, tender to palpation, no rebound or guarding  Extremities: 2+ pitting edema of bilateral LE  Neuro: alert and oriented to person, place, and time, CN II-XII grossly intact  Psych: mood and affect are appropriate    Results  Results for orders placed or performed during the hospital encounter of 03/12/25 (from the past 24 hours)   Magnesium   Result Value Ref Range    Magnesium 2.09 1.60 - 2.40 mg/dL   Coagulation Screen   Result Value Ref Range    Protime 15.1 (H) 9.8 - 12.4  seconds    INR 1.4 (H) 0.9 - 1.1    aPTT 33 26 - 36 seconds   CBC and Auto Differential   Result Value Ref Range    WBC 3.8 (L) 4.4 - 11.3 x10*3/uL    nRBC 0.0 0.0 - 0.0 /100 WBCs    RBC 3.07 (L) 4.00 - 5.20 x10*6/uL    Hemoglobin 9.8 (L) 12.0 - 16.0 g/dL    Hematocrit 29.3 (L) 36.0 - 46.0 %    MCV 95 80 - 100 fL    MCH 31.9 26.0 - 34.0 pg    MCHC 33.4 32.0 - 36.0 g/dL    RDW 16.5 (H) 11.5 - 14.5 %    Platelets 85 (L) 150 - 450 x10*3/uL    Neutrophils % 57.0 40.0 - 80.0 %    Immature Granulocytes %, Automated 0.3 0.0 - 0.9 %    Lymphocytes % 25.3 13.0 - 44.0 %    Monocytes % 13.2 2.0 - 10.0 %    Eosinophils % 3.7 0.0 - 6.0 %    Basophils % 0.5 0.0 - 2.0 %    Neutrophils Absolute 2.16 1.20 - 7.70 x10*3/uL    Immature Granulocytes Absolute, Automated 0.01 0.00 - 0.70 x10*3/uL    Lymphocytes Absolute 0.96 (L) 1.20 - 4.80 x10*3/uL    Monocytes Absolute 0.50 0.10 - 1.00 x10*3/uL    Eosinophils Absolute 0.14 0.00 - 0.70 x10*3/uL    Basophils Absolute 0.02 0.00 - 0.10 x10*3/uL   Hepatic Function Panel   Result Value Ref Range    Albumin 2.1 (L) 3.4 - 5.0 g/dL    Bilirubin, Total 2.2 (H) 0.0 - 1.2 mg/dL    Bilirubin, Direct 1.3 (H) 0.0 - 0.3 mg/dL    Alkaline Phosphatase 256 (H) 33 - 110 U/L    ALT 22 7 - 45 U/L    AST 46 (H) 9 - 39 U/L    Total Protein 5.7 (L) 6.4 - 8.2 g/dL   Phosphorus   Result Value Ref Range    Phosphorus 3.2 2.5 - 4.9 mg/dL   Basic Metabolic Panel   Result Value Ref Range    Glucose 206 (H) 74 - 99 mg/dL    Sodium 136 136 - 145 mmol/L    Potassium 4.4 3.5 - 5.3 mmol/L    Chloride 104 98 - 107 mmol/L    Bicarbonate 29 21 - 32 mmol/L    Anion Gap 7 (L) 10 - 20 mmol/L    Urea Nitrogen 12 6 - 23 mg/dL    Creatinine 0.88 0.50 - 1.05 mg/dL    eGFR 81 >60 mL/min/1.73m*2    Calcium 7.9 (L) 8.6 - 10.6 mg/dL   POCT GLUCOSE   Result Value Ref Range    POCT Glucose 177 (H) 74 - 99 mg/dL   POCT GLUCOSE   Result Value Ref Range    POCT Glucose 236 (H) 74 - 99 mg/dL   Renal function panel   Result Value Ref Range     Glucose 153 (H) 74 - 99 mg/dL    Sodium 135 (L) 136 - 145 mmol/L    Potassium 3.8 3.5 - 5.3 mmol/L    Chloride 103 98 - 107 mmol/L    Bicarbonate 27 21 - 32 mmol/L    Anion Gap 9 (L) 10 - 20 mmol/L    Urea Nitrogen 12 6 - 23 mg/dL    Creatinine 0.88 0.50 - 1.05 mg/dL    eGFR 81 >60 mL/min/1.73m*2    Calcium 8.4 (L) 8.6 - 10.6 mg/dL    Phosphorus 3.0 2.5 - 4.9 mg/dL    Albumin 2.2 (L) 3.4 - 5.0 g/dL   Magnesium   Result Value Ref Range    Magnesium 1.89 1.60 - 2.40 mg/dL   POCT GLUCOSE   Result Value Ref Range    POCT Glucose 154 (H) 74 - 99 mg/dL   POCT GLUCOSE   Result Value Ref Range    POCT Glucose 212 (H) 74 - 99 mg/dL     *Note: Due to a large number of results and/or encounters for the requested time period, some results have not been displayed. A complete set of results can be found in Results Review.     MELD 3.0: 18 at 3/14/2025  6:38 PM  MELD-Na: 15 at 3/14/2025  6:38 PM  Calculated from:  Serum Creatinine: 0.88 mg/dL (Using min of 1 mg/dL) at 3/14/2025  6:38 PM  Serum Sodium: 135 mmol/L at 3/14/2025  6:38 PM  Total Bilirubin: 2.2 mg/dL at 3/14/2025  7:27 AM  Serum Albumin: 2.2 g/dL at 3/14/2025  6:38 PM  INR(ratio): 1.4 at 3/14/2025  7:27 AM  Age at listing (hypothetical): 50 years  Sex: Female at 3/14/2025  6:38 PM    Assessment/Plan:  Alfonzo Flanagan is a 49 yo F w PMH biopsy-proven PBC (with cirrhosis, portal HTN, ascites, esophageal varices s/p banding 2021 and eradication, hepatic encephalopathy), celiac disease, 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 This represents patient's 3rd ED visit/hospital admission for recurrent and refractory ascites and poorly controlled portal  hypertension. Some concern regarding patient's access to medications, previously did not have insurance on prior admission but now has [facility], though she reports they did not cover her medications yesterday.    With her MELD score 17 and recurrent ascites, it is likely she will eventually need a liver transplant. Transplant SW can come and speak with the patient about it and see her thoughts on liver transplant.     Updates 03/15/25:  -Will hold p.o. 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 History of esophageal varices s/p banding 2021. Has required several paracenteses for fluid removal. S/p large volume paracentesis with 2.5 L removed today, prior paracentesis on 2/28 with 2.5 L removed. Diagnostic para consistent with portal HTN (SAAG > 1.1) and no c/f SBP (ANC 6). Therapeutic paracentesis with 2.5 L removed 3/13. MELD-Na 17 today.  PLAN:  -Hold p.o. torsemide  - continue spironolactone 100 mg   - continue rifaximin 550 mg TID, lactulose 20 g BID titrated to bowel movements  - continue pantoprazole 40 mg daily  - continue acetaminophen 487.5 mg q6h PRN pain (dose reduction for cirrhosis)  - continue ursodiol 500 mg BID and ursodiol 250 mg nightly  - Restart Coreg 6.25 daily (on 6.25 BID at home but was held iso hypotension on arrival to ED) since BP good this morning  - gave 1 dose albumin 25 g following paracentesis on 3/13  - current diet: 2-3 g sodium diet, 2 L fluid restriction  - IR consulted for therapeutic paracentesis, also discussing possible TIPS procedure with IR, will have to gather more information regarding patient's hx of hepatic encephalopathy  - social work consulted regarding insurance  - daily MELD labs  - Accurate Is and Os  - Transplant SW to discuss possibility of  liver transplant  - Consult dietary to educate patient on low sodium diet    #UTI  #Possible vaginal candidiasis  Patient presenting with dysuria and pruritus in her groin. Endorses some white discharge as well. Physical exam 3/13 did not appear to have fungal infection. UA with 1+ protein, 3+ blood, >50 WBC, 500 leuk esterase, consistent with UTI. Urine culture contaminated with multifora. Patient has history of E faecium UTI with resistance to ampicillin, ciprofloxacin, levofloxacin, nitrofurantoin, penicillin, and bactrim.  PLAN:  - discontinued bactrim  - continue ceftriaxone (3/13- )    #Nausea  #Vomiting  #Appetite  #Dizziness  - continue zofran ODT 4 mg q6h PRN  - continue mirtazapine 7.6 mg nightly  - continue meclizine 25 mg daily PRN    #T2DM  Last A1c 7.6 in 12/2024. On insulin glargine 7 units nightly and metformin at home. Patient reports compliance with glargine dosing. POC glucose on admission 350.  PLAN:  - continue insulin glargine 7 units nightly  - hold home metformin  - SSI   - hypoglycemic protocol    #Anxiety/Depression  - continue home trazodone 25 mg nightly PRN    #HLD  - continue home atorvastatin 20 mg nightly    #Subclinical Hypothyroidism  TSH 4.64 and T4 1.28 (wnl).  PLAN:  - follow-up outpatient    F: w caution d/t cirrhosis/fluid overload  E: K > 4, Mg > 2  N: 2-3 g sodium, 2L fluid restriction  A: PIV  GI: pantoprazole 40 daily  O2: RA  DVT: lovenox  Abx: ceftriaxone (3/13- )  Pain: acetaminophen    Code: full (confirmed on admission)  NOK: [place] (daughter, [phone])    Patient seen and discussed with attending, Dr. Davis.      03/15/25 at 10:13 AM - Patti De Leon MD

## 2025-03-16 ASSESSMENT — COGNITIVE AND FUNCTIONAL STATUS - GENERAL
MOBILITY SCORE: 23
DAILY ACTIVITIY SCORE: 24
CLIMB 3 TO 5 STEPS WITH RAILING: A LITTLE

## 2025-03-16 ASSESSMENT — PAIN SCALES - GENERAL
PAINLEVEL_OUTOF10: 4
PAINLEVEL_OUTOF10: 4
PAINLEVEL_OUTOF10: 7
PAINLEVEL_OUTOF10: 4

## 2025-03-16 ASSESSMENT — PAIN - FUNCTIONAL ASSESSMENT
PAIN_FUNCTIONAL_ASSESSMENT: 0-10

## 2025-03-16 ASSESSMENT — PAIN DESCRIPTION - LOCATION: LOCATION: ABDOMEN

## 2025-03-16 ASSESSMENT — PAIN DESCRIPTION - ORIENTATION: ORIENTATION: UPPER

## 2025-03-16 NOTE — PROGRESS NOTES
Alfonzo Flanagan is a 50 y.o. female on day 4 of admission presenting with Abdominal pain, unspecified abdominal location.      Subjective .    Patient seen and examined this morning.  States that abdominal pain is improved, urinated a lot after receiving IV lasix yesterday.     Objective   Last Recorded Vitals  /66 (BP Location: Right arm, Patient Position: Lying)   Pulse 95   Temp 36.8 °C (98.2 °F) (Temporal)   Resp 17   Wt 80.8 kg (178 lb 2.1 oz)   SpO2 98%   Intake/Output last 3 Shifts:    Intake/Output Summary (Last 24 hours) at 3/16/2025 0724  Last data filed at 3/15/2025 2245  Gross per 24 hour   Intake 350 ml   Output 1650 ml   Net -1300 ml       Admission Weight  Weight: 80.8 kg (178 lb 2.1 oz) (03/15/25 0816)    Daily Weight  03/15/25 : 80.8 kg (178 lb 2.1 oz)    Image Results  Point of Care Ultrasound  Radha Pickard MD     3/15/2025  4:12 PM    Performed by: Danna Duggan MD  Authorized by: Radha Pickard MD      Gastrointestinal Indications: abdominal pain and vomiting    Procedure: GI Ultrasound    Findings:    Impression:  GI: The focused GI ultrasound exam was POSITIVE for abnormal findings as   described.    Comments: Free fluid in multiple quadrants noted, most likely ascites.    Physical Exam  General: no acute distress, resting comfortably in bed  HEENT: normocephalic, atraumatic  Cardio: regular rate and rhythm, normal S1 and S2, no murmurs  Pulm: clear to auscultation bilaterally, no wheezes, crackles, or rhonchi, breathing comfortably on room air  Abd: significant distension, tense, tender to palpation, no rebound or guarding  Extremities: 2+ pitting edema of bilateral LE  Neuro: alert and oriented to person, place, and time, CN II-XII grossly intact  Psych: mood and affect are appropriate    Results  Results for orders placed or performed during the hospital encounter of 03/12/25 (from the past 24 hours)   Magnesium   Result Value Ref Range    Magnesium 1.75 1.60 -  2.40 mg/dL   Coagulation Screen   Result Value Ref Range    Protime 16.1 (H) 9.8 - 12.4 seconds    INR 1.5 (H) 0.9 - 1.1    aPTT 34 26 - 36 seconds   CBC and Auto Differential   Result Value Ref Range    WBC 3.4 (L) 4.4 - 11.3 x10*3/uL    nRBC 0.0 0.0 - 0.0 /100 WBCs    RBC 3.25 (L) 4.00 - 5.20 x10*6/uL    Hemoglobin 10.1 (L) 12.0 - 16.0 g/dL    Hematocrit 31.0 (L) 36.0 - 46.0 %    MCV 95 80 - 100 fL    MCH 31.1 26.0 - 34.0 pg    MCHC 32.6 32.0 - 36.0 g/dL    RDW 16.6 (H) 11.5 - 14.5 %    Platelets 85 (L) 150 - 450 x10*3/uL    Neutrophils % 51.1 40.0 - 80.0 %    Immature Granulocytes %, Automated 0.3 0.0 - 0.9 %    Lymphocytes % 27.6 13.0 - 44.0 %    Monocytes % 16.0 2.0 - 10.0 %    Eosinophils % 4.1 0.0 - 6.0 %    Basophils % 0.9 0.0 - 2.0 %    Neutrophils Absolute 1.76 1.20 - 7.70 x10*3/uL    Immature Granulocytes Absolute, Automated 0.01 0.00 - 0.70 x10*3/uL    Lymphocytes Absolute 0.95 (L) 1.20 - 4.80 x10*3/uL    Monocytes Absolute 0.55 0.10 - 1.00 x10*3/uL    Eosinophils Absolute 0.14 0.00 - 0.70 x10*3/uL    Basophils Absolute 0.03 0.00 - 0.10 x10*3/uL   Hepatic Function Panel   Result Value Ref Range    Albumin 2.1 (L) 3.4 - 5.0 g/dL    Bilirubin, Total 2.0 (H) 0.0 - 1.2 mg/dL    Bilirubin, Direct 1.2 (H) 0.0 - 0.3 mg/dL    Alkaline Phosphatase 245 (H) 33 - 110 U/L    ALT 20 7 - 45 U/L    AST 37 9 - 39 U/L    Total Protein 5.9 (L) 6.4 - 8.2 g/dL   Phosphorus   Result Value Ref Range    Phosphorus 3.6 2.5 - 4.9 mg/dL   Basic Metabolic Panel   Result Value Ref Range    Glucose 130 (H) 74 - 99 mg/dL    Sodium 140 136 - 145 mmol/L    Potassium 4.0 3.5 - 5.3 mmol/L    Chloride 104 98 - 107 mmol/L    Bicarbonate 31 21 - 32 mmol/L    Anion Gap 9 (L) 10 - 20 mmol/L    Urea Nitrogen 11 6 - 23 mg/dL    Creatinine 0.89 0.50 - 1.05 mg/dL    eGFR 80 >60 mL/min/1.73m*2    Calcium 8.2 (L) 8.6 - 10.6 mg/dL   POCT GLUCOSE   Result Value Ref Range    POCT Glucose 110 (H) 74 - 99 mg/dL   POCT GLUCOSE   Result Value Ref Range     POCT Glucose 141 (H) 74 - 99 mg/dL   Renal function panel   Result Value Ref Range    Glucose 264 (H) 74 - 99 mg/dL    Sodium 136 136 - 145 mmol/L    Potassium 3.8 3.5 - 5.3 mmol/L    Chloride 99 98 - 107 mmol/L    Bicarbonate 29 21 - 32 mmol/L    Anion Gap 12 10 - 20 mmol/L    Urea Nitrogen 13 6 - 23 mg/dL    Creatinine 0.90 0.50 - 1.05 mg/dL    eGFR 79 >60 mL/min/1.73m*2    Calcium 8.6 8.6 - 10.6 mg/dL    Phosphorus 3.8 2.5 - 4.9 mg/dL    Albumin 2.4 (L) 3.4 - 5.0 g/dL   Magnesium   Result Value Ref Range    Magnesium 1.96 1.60 - 2.40 mg/dL   POCT GLUCOSE   Result Value Ref Range    POCT Glucose 298 (H) 74 - 99 mg/dL   POCT GLUCOSE   Result Value Ref Range    POCT Glucose 217 (H) 74 - 99 mg/dL   POCT GLUCOSE   Result Value Ref Range    POCT Glucose 287 (H) 74 - 99 mg/dL     *Note: Due to a large number of results and/or encounters for the requested time period, some results have not been displayed. A complete set of results can be found in Results Review.     MELD 3.0: 17 at 3/15/2025  3:13 PM  MELD-Na: 15 at 3/15/2025  3:13 PM  Calculated from:  Serum Creatinine: 0.9 mg/dL (Using min of 1 mg/dL) at 3/15/2025  3:13 PM  Serum Sodium: 136 mmol/L at 3/15/2025  3:13 PM  Total Bilirubin: 2 mg/dL at 3/15/2025  7:37 AM  Serum Albumin: 2.4 g/dL at 3/15/2025  3:13 PM  INR(ratio): 1.5 at 3/15/2025  7:37 AM  Age at listing (hypothetical): 50 years  Sex: Female at 3/15/2025  3:13 PM    Assessment/Plan:  Alfonzo Flanagan is a 49 yo F w PM biopsy-proven PBC (with cirrhosis, portal HTN, ascites, esophageal varices s/p banding 2021 and eradication, hepatic encephalopathy), celiac disease, 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 This represents patient's 3rd ED visit/hospital admission for recurrent and refractory ascites and poorly controlled portal hypertension. Some concern regarding patient's access to medications, previously did not have insurance on prior admission but now has [facility], though she reports they did not cover her medications yesterday.    With her MELD score 17 and recurrent ascites, it is likely she will eventually need a liver transplant. Transplant SW can come and speak with the patient about it and see her thoughts on liver transplant.     Updates 03/16/25:  -Will continue to hold p.o. 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 History of esophageal varices s/p banding 2021. Has required several paracenteses for fluid removal. S/p large volume paracentesis with 2.5 L removed today, prior paracentesis on 2/28 with 2.5 L removed. Diagnostic para consistent with portal HTN (SAAG > 1.1) and no c/f SBP (ANC 6). Therapeutic paracentesis with 2.5 L removed 3/13. MELD-Na 17 today.  PLAN:  -Hold p.o. torsemide  - continue spironolactone 100 mg   - continue rifaximin 550 mg TID, lactulose 20 g BID titrated to bowel movements  - continue pantoprazole 40 mg daily  - continue acetaminophen 487.5 mg q6h PRN pain (dose reduction for cirrhosis)  - continue ursodiol 500 mg BID and ursodiol 250 mg nightly  - Restart Coreg 6.25 daily (on 6.25 BID at home but was held iso hypotension on arrival to ED) since BP good this morning  - gave 1 dose albumin 25 g following paracentesis on 3/13  - current diet: 2-3 g sodium diet, 2 L fluid restriction  - IR consulted for therapeutic paracentesis, also discussing possible TIPS procedure with IR, will have to gather more information regarding  patient's hx of hepatic encephalopathy  - social work consulted regarding insurance  - daily MELD labs  - Accurate Is and Os  - Transplant SW to discuss possibility of liver transplant  - Consult dietary to educate patient on low sodium diet    #UTI  #Possible vaginal candidiasis  Patient presenting with dysuria and pruritus in her groin. Endorses some white discharge as well. Physical exam 3/13 did not appear to have fungal infection. UA with 1+ protein, 3+ blood, >50 WBC, 500 leuk esterase, consistent with UTI. Urine culture contaminated with multifora. Patient has history of E faecium UTI with resistance to ampicillin, ciprofloxacin, levofloxacin, nitrofurantoin, penicillin, and bactrim.  PLAN:  - discontinued bactrim  - continue ceftriaxone (3/13- )    #Nausea  #Vomiting  #Appetite  #Dizziness  - continue zofran ODT 4 mg q6h PRN  - continue mirtazapine 7.6 mg nightly  - continue meclizine 25 mg daily PRN    #T2DM  Last A1c 7.6 in 12/2024. On insulin glargine 7 units nightly and metformin at home. Patient reports compliance with glargine dosing. POC glucose on admission 350.  PLAN:  - continue insulin glargine 7 units nightly  - hold home metformin  - SSI   - hypoglycemic protocol    #Anxiety/Depression  - continue home trazodone 25 mg nightly PRN    #HLD  - continue home atorvastatin 20 mg nightly    #Subclinical Hypothyroidism  TSH 4.64 and T4 1.28 (wnl).  PLAN:  - follow-up outpatient    F: w caution d/t cirrhosis/fluid overload  E: K > 4, Mg > 2  N: 2-3 g sodium, 2L fluid restriction  A: PIV  GI: pantoprazole 40 daily  O2: RA  DVT: lovenox  Abx: ceftriaxone (3/13- )  Pain: acetaminophen    Code: full (confirmed on admission)  NOK: [place] (daughter, [phone])    Patient seen and discussed with attending, Dr. Davis.      03/16/25 at 7:24 AM - Patti De Leon MD

## 2025-03-16 NOTE — CARE PLAN
Problem: Pain - Adult  Goal: Verbalizes/displays adequate comfort level or baseline comfort level  Outcome: Progressing     Problem: Safety - Adult  Goal: Free from fall injury  Outcome: Progressing     Problem: Chronic Conditions and Co-morbidities  Goal: Patient's chronic conditions and co-morbidity symptoms are monitored and maintained or improved  Outcome: Progressing     Problem: Nutrition  Goal: Nutrient intake appropriate for maintaining nutritional needs  Outcome: Progressing   The patient's goals for the shift include      The clinical goals for the shift include Will have pain controlled during shift

## 2025-03-17 ENCOUNTER — APPOINTMENT (OUTPATIENT)
Dept: RADIOLOGY | Facility: HOSPITAL | Age: 50
End: 2025-03-17
Payer: COMMERCIAL

## 2025-03-17 ASSESSMENT — COGNITIVE AND FUNCTIONAL STATUS - GENERAL
CLIMB 3 TO 5 STEPS WITH RAILING: A LITTLE
DAILY ACTIVITIY SCORE: 24
DAILY ACTIVITIY SCORE: 24
MOBILITY SCORE: 23
CLIMB 3 TO 5 STEPS WITH RAILING: A LITTLE
MOBILITY SCORE: 23

## 2025-03-17 ASSESSMENT — PAIN SCALES - GENERAL
PAINLEVEL_OUTOF10: 0 - NO PAIN
PAINLEVEL_OUTOF10: 0 - NO PAIN

## 2025-03-17 ASSESSMENT — PAIN - FUNCTIONAL ASSESSMENT: PAIN_FUNCTIONAL_ASSESSMENT: 0-10

## 2025-03-17 NOTE — PROGRESS NOTES
Alfonzo Flanagan is a 50 y.o. female on day 5 of admission presenting with Abdominal pain, unspecified abdominal location.    Subjective   No acute events overnight. Last BM was last night. She reports increased urination secondary to Lasix and says has slight burning and pain sensation during urination. Last meal was lunch yesterday, she is not hungry because she feels so full. Her pain has improved to a 5/10 and remains in the midline and her back bilaterally. She reports some shortness of breath overnight secondary to being so full. Otherwise, no new complaints.        Objective     Physical Exam  Constitutional:       General: She is not in acute distress.  Cardiovascular:      Rate and Rhythm: Normal rate and regular rhythm.      Heart sounds: Normal heart sounds. No murmur heard.     No friction rub. No gallop.   Pulmonary:      Effort: Pulmonary effort is normal. No respiratory distress.      Breath sounds: Normal breath sounds.   Abdominal:      General: There is distension.      Tenderness: There is no abdominal tenderness.   Musculoskeletal:      Right lower leg: Edema present.      Left lower leg: Edema present.      Comments: 1+ Edema bilateral LE   Neurological:      Mental Status: She is alert. Mental status is at baseline.       Last Recorded Vitals  Blood pressure 102/57, pulse 65, temperature 36.5 °C (97.7 °F), temperature source Temporal, resp. rate 18, weight 79.6 kg (175 lb 7.8 oz), SpO2 99%.  Intake/Output last 3 Shifts:  I/O last 3 completed shifts:  In: 530 (6.7 mL/kg) [P.O.:480; IV Piggyback:50]  Out: 3000 (37.7 mL/kg) [Urine:3000 (1 mL/kg/hr)]  Weight: 79.6 kg     Relevant Results      Results from last 72 hours   Lab Units 03/17/25  0649 03/16/25  0745 03/15/25  1513 03/15/25  0737   WBC AUTO x10*3/uL 3.5* 3.7*  --  3.4*   HEMOGLOBIN g/dL 10.8* 10.3*  --  10.1*   HEMATOCRIT % 33.7* 31.7*  --  31.0*   PLATELETS AUTO x10*3/uL 80* 89*  --  85*   INR  1.4* 1.4*  --  1.5*   SODIUM mmol/L  --   137 136 140   POTASSIUM mmol/L  --  4.1 3.8 4.0   CHLORIDE mmol/L  --  102 99 104   CO2 mmol/L  --  32 29 31   BUN mg/dL  --  12 13 11   CREATININE mg/dL  --  0.87 0.90 0.89   GLUCOSE mg/dL  --  172* 264* 130*   CALCIUM mg/dL  --  8.2* 8.6 8.2*   MAGNESIUM mg/dL  --  1.66 1.96 1.75   PHOSPHORUS mg/dL  --  3.7 3.8 3.6   ALBUMIN g/dL  --  2.1* 2.4* 2.1*   ALK PHOS U/L  --  246*  --  245*   ALT U/L  --  18  --  20   AST U/L  --  34  --  37   BILIRUBIN TOTAL mg/dL  --  1.7*  --  2.0*       MELD 3.0: 16 at 3/17/2025  6:49 AM  MELD-Na: 13 at 3/17/2025  6:49 AM  Calculated from:  Serum Creatinine: 0.77 mg/dL (Using min of 1 mg/dL) at 3/17/2025  6:49 AM  Serum Sodium: 137 mmol/L at 3/17/2025  6:49 AM  Total Bilirubin: 1.9 mg/dL at 3/17/2025  6:49 AM  Serum Albumin: 2 g/dL at 3/17/2025  6:49 AM  INR(ratio): 1.4 at 3/17/2025  6:49 AM  Age at listing (hypothetical): 50 years  Sex: Female at 3/17/2025  6:49 AM        Assessment/Plan   Assessment & Plan  Abdominal pain, unspecified abdominal location    Assessment/Plan:  Alfonzo Flanagan is a 49 yo F w PMH biopsy-proven PBC (with cirrhosis, portal HTN, ascites, esophageal varices s/p banding 2021 and eradication, hepatic encephalopathy), celiac disease, 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 This represents patient's 3rd ED visit/hospital admission for recurrent and refractory ascites and poorly controlled portal hypertension. Some concern regarding patient's access to medications, previously did not have insurance on prior admission but now has [facility], though she reports they did not cover her medications yesterday.     With her MELD score 17 and  recurrent ascites, it is likely she will eventually need a liver transplant. Transplant SW will speak with the patient about it and see her thoughts on liver transplant.      Updates 03/17/25:  -Paracentesis today, will give albumin based on output   -Switch to p.o. 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 History of esophageal varices s/p banding 2021. Has required several paracenteses for fluid removal. S/p large volume paracentesis with 2.5 L removed today, prior paracentesis on 2/28 with 2.5 L removed. Diagnostic para consistent with portal HTN (SAAG > 1.1) and no c/f SBP (ANC 6). Therapeutic paracentesis with 2.5 L removed 3/13. MELD-Na 13 today.  PLAN:  -Switch to p.o. torsemide  -Discontinue IV Lasix 3/17  - Continue spironolactone 100 mg   - continue rifaximin 550 mg TID, lactulose 20 g BID titrated to bowel movements  - continue pantoprazole 40 mg daily  - continue acetaminophen 487.5 mg q6h PRN pain (dose reduction for cirrhosis)  - continue ursodiol 500 mg BID and ursodiol 250 mg nightly  - Restart Coreg 6.25 daily (on 6.25 BID at home but was held iso hypotension on arrival to ED) since BP good this morning  - current diet: 2-3 g sodium diet, 2 L fluid restriction  -IR performed therapeutic paracentesis 3/13, also saw patient and discussed TIPS procedure at bedside including risks, benefits, and anticipated outcomes. Recommended TIPS procedure outpatient.   - social work consulted regarding insurance  - daily MELD labs  - Accurate Is and Os  - Transplant SW to discuss possibility of liver transplant 3/17, appreciate reccs   - Consult dietary to educate patient on low sodium diet, order for consult  3/13, follow up before patient's possible discharge 3/18     #UTI  #Possible vaginal candidiasis  Patient presenting with dysuria and pruritus in her groin. Endorses some white discharge as well. Physical exam 3/13 did not appear to have fungal infection. UA with 1+ protein, 3+ blood, >50 WBC, 500 leuk esterase, consistent with UTI. Urine culture contaminated with multifora. Patient has history of E faecium UTI with resistance to ampicillin, ciprofloxacin, levofloxacin, nitrofurantoin, penicillin, and bactrim.  PLAN:  - discontinued bactrim  - discontinue ceftriaxone (3/13-3/17 )     #Nausea  #Vomiting  #Appetite  #Dizziness  - continue zofran ODT 4 mg q6h PRN  - continue mirtazapine 7.6 mg nightly  - continue meclizine 25 mg daily PRN     #T2DM  Last A1c 7.6 in 12/2024. On insulin glargine 7 units nightly and metformin at home. Patient reports compliance with glargine dosing. POC glucose on admission 350.  PLAN:  - continue insulin glargine 7 units nightly  - hold home metformin  - SSI   - hypoglycemic protocol     #Anxiety/Depression  - continue home trazodone 25 mg nightly PRN     #HLD  - continue home atorvastatin 20 mg nightly     #Subclinical Hypothyroidism  TSH 4.64 and T4 1.28 (wnl).  PLAN:  - follow-up outpatient     F: w caution d/t cirrhosis/fluid overload  E: K > 4, Mg > 2  N: 2-3 g sodium, 2L fluid restriction  A: PIV  GI: pantoprazole 40 daily  O2: RA  DVT: lovenox  Abx: ceftriaxone (3/13-3/17 )  Pain: acetaminophen         ROLAND STAHL

## 2025-03-17 NOTE — CARE PLAN
Problem: Pain - Adult  Goal: Verbalizes/displays adequate comfort level or baseline comfort level  Outcome: Progressing  Flowsheets (Taken 3/17/2025 0509)  Verbalizes/displays adequate comfort level or baseline comfort level:   Encourage patient to monitor pain and request assistance   Administer analgesics based on type and severity of pain and evaluate response     Problem: Safety - Adult  Goal: Free from fall injury  Outcome: Progressing  Flowsheets (Taken 3/17/2025 0509)  Free from fall injury: Instruct family/caregiver on patient safety     Problem: Chronic Conditions and Co-morbidities  Goal: Patient's chronic conditions and co-morbidity symptoms are monitored and maintained or improved  Outcome: Progressing  Flowsheets (Taken 3/17/2025 0509)  Care Plan - Patient's Chronic Conditions and Co-Morbidity Symptoms are Monitored and Maintained or Improved:   Monitor and assess patient's chronic conditions and comorbid symptoms for stability, deterioration, or improvement   Collaborate with multidisciplinary team to address chronic and comorbid conditions and prevent exacerbation or deterioration   The patient's goals for the shift include      The clinical goals for the shift include Will remain HDS during shift, be prep for paracentesis

## 2025-03-17 NOTE — POST-PROCEDURE NOTE
INTERVENTIONAL RADIOLOGY ADVANCED PRACTICE PROCEDURE  [facility]    A time out was performed and Right Hemiabdomen was examined with US and appropriate entry point was confirmed and marked.   The patient was prepped and draped in a sterile manner, 1% lidocaine was used to anesthesize the skin and subcutaneous tissue.   A 5F Centesis needle was then introduced through the skin into the peritoneal space, the centesis catheter was then threaded without difficulty.   2000 ml of yellow fluid was removed without difficulty. The catheter was then removed.   No immediate complications were noted during and immediately following the procedure.

## 2025-03-17 NOTE — PROGRESS NOTES
03/17/25 1338   Discharge Planning   Who is requesting discharge planning? Provider   Home or Post Acute Services None  (pending insurance verification)   Expected Discharge Disposition Home     Transitional Care Coordination Progress Note:  Patient discussed during interdisciplinary rounds.  Team members present: FABIANO STREETER  Plan per Medical/Surgical team: Plan for paracentesis and meeting with transplant SW today, team to discontinue IV abx Ceftriaxone.  Payer: [email or web address] # [phone]   Status: Inpatient  Discharge disposition: Home. PT is recommending low intensity therapy post discharge however, pt's insurance does not cover home PT/OT and/or outpatient therapy.   Potential Barriers: none  ADOD: 3/18  Call placed to 3rd party insurance company [email or web address] # [phone] to see if home PT/OT vs outpatient therapy, and prescriptions are covered by insurance company. Spoke with rep Flores who stated pt's plan is for preventative only so home PT/OT and outpatient therapy is NOT covered under insurance plan. Care coordinator will continue to follow for discharge planning needs.     Herb Colon RN  Transitional Care Coordinator (TCC)  [phone] or [phone]

## 2025-03-17 NOTE — CONSULTS
Nutrition Initial Assessment:   Nutrition Assessment    Reason for Assessment: Admission nursing screening  Malnutrition Screening Tool (MST)  Have you recently lost weight without trying?: Yes  If yes, how much weight have you lost?: Lost 14 - 23 pounds  Weight Loss Score: 2  Have you been eating poorly because of a decreased appetite?: Yes  Malnutrition Score: 3    Patient is a 50 y.o. 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  Last paracentesis 3/13/25 with 2.5L removed.  Poor intake over hospital stay based on review of meal intake records and reported poor intake/weight loss prior to admit.  This morning upon visit patient just finished taking some of her meds on an emply stomach while awaiting paracentesis.  She feels abd pain and some nausea.  Yesterday was described as the best day of intake since her hosptial admit.  She had coffee and a a muffin for breakfast then a small amount of rice and pork chop for lunch, no dinner.  +early satiety.  During last hospital admit patient drank vanilla Ensure and liked it but it was not covered by her insurance for home consumption and is too pricey for her to purchase.  Patient reports she made changes in her diet at home in regards to low sodium.  She did not add salt to her foods, instead used Mrs Dash or sodium free flavoring.  She also bought fresh versus canned beans and corn.  Patient likes fruits, vegetables and some dairy along with gluten free grains.  She mainly likes and knows how to cook [place] foods.  She does not like  fish or any type of seafood.  Food Allergy: Gluten/wheat       Anthropometrics:      Weight: 79.6 kg (175 lb 7.8 oz)      IBW/kg (Dietitian Calculated): 50 kg  Percent of IBW: 159 % (with ascites)       Weight History:   Wt Readings from Last 35 Encounters:   03/16/25 79.6 kg (175 lb 7.8 oz)   02/27/25 79.8 kg (176 lb)   02/09/25 93.5 kg (206 lb 1.6 oz)   02/09/25 95.3 kg (210 lb)   02/05/25 95.3 kg (210 lb)   01/25/25 93 kg (205 lb)   01/22/25 86.2 kg (190 lb)   01/13/25 81.6 kg (179 lb 14.3 oz)   01/10/25 81.6 kg (180 lb)   01/08/25 83 kg (183 lb)   12/27/24 83 kg (183 lb)   12/24/24 82.6 kg (182 lb)   12/11/24 78.9 kg (173 lb 15.1 oz)   12/08/24 78.9 kg (174 lb)   11/20/24 78.9 kg (174 lb)   10/27/24 97.2 kg (214 lb 4.6 oz)   09/24/24 90.7 kg (200 lb)   09/19/24 90.7 kg (200 lb)   08/17/24 86.3 kg (190 lb 3.2 oz)   08/11/24 83.5 kg (184 lb)   08/05/24 93.3 kg (205 lb 11 oz)   07/21/24 79 kg (174 lb 2.6 oz)   07/12/24 79 kg (174 lb 2.6 oz)   07/04/24 77.1 kg (170 lb)   06/22/24 77.7 kg (171 lb 4.8 oz)   06/14/24 77.6 kg (171 lb)   06/14/24 77.6 kg (171 lb)   06/09/24 81.6 kg (180 lb)   05/25/24 81.6 kg (180 lb)   05/15/24 80.3 kg (177 lb)   04/17/24 77.2 kg (170 lb 3.2 oz)   02/07/24 72.6 kg (160 lb)   01/26/24 83.9 kg (185 lb)   05/03/23 80.7 kg (178 lb)   03/29/23 86.4 kg (190 lb 6.4 oz)     Weight Change %:  Weight History / % Weight Change: 3/16/25) 79.6kg, 2/5/25) 95.3kg, 12/8/24) 78.9kg. 9/19/24) 90.7kg, 4/17/24) 77.2kg.  Patient reports usual weight before illness was 210# (95.5kg) losing down to 175#  (79.5kg). >16% overall loss  Significant Weight Loss: Yes    Nutrition Focused Physical Exam Findings:    Subcutaneous Fat Loss:   Orbital Fat Pads: Mild-Moderate (slight dark circles and slight hollowing)  Buccal Fat Pads: Mild-Moderate (flat cheeks, minimal bounce)  Triceps: Mild-Moderate (less than ample fat tissue) (loose hanging skin indicative of weight loss)  Ribs: Defer  Muscle Wasting:  Temporalis:  Mild-Moderate (slight depression)  Pectoralis (Clavicular Region): Mild-Moderate (some protrusion of clavicle)  Deltoid/Trapezius: Mild-Moderate (slight protrusion of acromion process)  Interosseous: Well nourished (muscle bulges)  Trapezius/Infraspinatus/Supraspinatus (Scapular Region): Defer  Quadriceps: Mild-Moderate (mild depression on inner and outer thigh)  Gastrocnemius: Mild-Moderate (not well developed muscle)  Edema:  Edema:  (non pitting)  Edema Location: BL/LE  Physical Findings:  Hair: Negative  Eyes: Negative  Nails: Negative  Skin: Positive (L pelvic wound)  Digestive System Findings: Abdominal distension, Ascites, Nausea, Early satiety  Mouth Findings: Dysphagia (at times with solids has to drink liquids to swallow)    Nutrition Significant Labs:  CBC Trend:   Results from last 7 days   Lab Units 03/17/25  0649 03/16/25  0745 03/15/25  0737 03/14/25  0727   WBC AUTO x10*3/uL 3.5* 3.7* 3.4* 3.8*   RBC AUTO x10*6/uL 3.52* 3.31* 3.25* 3.07*   HEMOGLOBIN g/dL 10.8* 10.3* 10.1* 9.8*   HEMATOCRIT % 33.7* 31.7* 31.0* 29.3*   MCV fL 96 96 95 95   PLATELETS AUTO x10*3/uL 80* 89* 85* 85*    , A1C:  Lab Results   Component Value Date    HGBA1C 7.6 (H) 12/12/2024   , Liver Function Trend:   Results from last 7 days   Lab Units 03/17/25  0649 03/16/25  0745 03/15/25  0737 03/14/25  0727   ALK PHOS U/L 225* 246* 245* 256*   AST U/L 35 34 37 46*   ALT U/L 15 18 20 22   BILIRUBIN TOTAL mg/dL 1.9* 1.7* 2.0* 2.2*    , Renal Lab Trend:   Results from last 7 days   Lab Units 03/17/25  0649 03/16/25  0745 03/15/25  1513 03/15/25  0737   POTASSIUM mmol/L 3.8 4.1 3.8 4.0   PHOSPHORUS mg/dL 3.6 3.7 3.8 3.6   SODIUM mmol/L 137 137 136 140   MAGNESIUM mg/dL 1.94 1.66 1.96 1.75   EGFR mL/min/1.73m*2 >90 82 79 80   BUN mg/dL 11 12 13 11   CREATININE mg/dL 0.77 0.87 0.90 0.89        Nutrition Specific Medications:  Scheduled medications  atorvastatin, 20 mg, oral, Nightly  carvedilol, 6.25 mg, oral, Daily  cefTRIAXone, 1 g,  intravenous, q24h  dicyclomine, 10 mg, oral, 4x daily  enoxaparin, 40 mg, subcutaneous, Daily  insulin glargine, 10 Units, subcutaneous, Nightly  insulin lispro, 0-10 Units, subcutaneous, TID AC  lactulose, 20 g, oral, BID  lidocaine, 3 patch, transdermal, Daily  magnesium oxide, 400 mg, oral, Daily  mirtazapine, 7.5 mg, oral, Nightly  pantoprazole, 40 mg, oral, Daily before breakfast  polyethylene glycol, 17 g, oral, Daily  rifAXIMin, 550 mg, oral, TID  spironolactone, 100 mg, oral, Daily  sucralfate, 1 g, oral, Before meals & nightly  torsemide, 40 mg, oral, Daily  ursodiol, 500 mg, oral, BID   And  ursodiol, 250 mg, oral, Nightly      I/O:   Last BM Date: 03/16/25; Stool Appearance: Unable to assess (03/15/25 0816)    Dietary Orders (From admission, onward)       Start     Ordered    03/17/25 1121  Oral nutritional supplements  Until discontinued        Comments: VANILLA FLAVOR   Question Answer Comment   Deliver with All meals    Select supplement: Ensure High Protein        03/17/25 1120    03/13/25 1822  May Participate in Room Service  ( ROOM SERVICE MAY PARTICIPATE)  Once        Question:  .  Answer:  Yes    03/13/25 1821    03/13/25 0002  Adult diet 2-3 grams sodium; 2000 mL fluid  Diet effective now        Question Answer Comment   Diet type 2-3 grams sodium    Dietary fluid restriction / 24h: 2000 mL fluid        03/13/25 0008                     Estimated Needs:   Total Energy Estimated Needs in 24 hours (kCal): 1900 kCal  Method for Estimating Needs: 38kcal/kg/IBW  Total Protein Estimated Needs in 24 Hours (g): 75 g  Method for Estimating 24 Hour Protein Needs: 1.5g/kg/IBW              Nutrition Diagnosis   Malnutrition Diagnosis  Patient has Malnutrition Diagnosis: Yes  Diagnosis Status: New  Malnutrition Diagnosis: Severe malnutrition related to chronic disease or condition  Related to: complicated cirrhosis  As Evidenced by: <75% of estimated energy intake >1 month, significant unintended weight  loss >16%, range of mild/moderate muscle wasting and recurrent ascites            Nutrition Interventions/Recommendations   Nutrition prescription for oral nutrition    Nutrition Recommendations:  Individualized Nutrition Prescription Provided for : 1) Continue 2-3g Na diet as tolerated.  2) Monitor need for SLP eval.     Nutrition Interventions/Goals:   Interventions: Medical food supplement  Medical Food Supplement: Commercial beverage medical food supplement therapy  Goal: Ordered Ensure high protein TID (160kcal and 16g protein per each)  Additional Interventions: Diet education: Discussed and answered questions on low sodium diet, small frequent meals and oral nutritional supplements to promote adequate intake.  Reviewed high protein sources and encouraged overall variety throughout each day. Patient participated in and verbalized an understanding of diet education provided.      Nutrition Monitoring and Evaluation   Food/Nutrient Related History Monitoring  Monitoring and Evaluation Plan: Estimated Energy Intake  Estimated Energy Intake: Energy intake greater or equal to 75% of estimated energy needs    Anthropometric Measurements  Monitoring and Evaluation Plan: Body weight  Body Weight: Body weight - Weight reduction from fluids, as needed         Physical Exam Findings  Monitoring and Evaluation Plan: Skin  Skin Finding: Promote intact skin - Promote skin integrity    Goal Status: New goal(s) identified    Time Spent (min): 90 minutes

## 2025-03-17 NOTE — CONSULTS
Subjective   Interval History: has complaints abd distension, abd pain.     Objective   Vital signs in last 24 hours:  /63 (BP Location: Left arm, Patient Position: Lying)   Pulse 80   Temp 36.4 °C (97.5 °F) (Temporal)   Resp 18   Wt 79.6 kg (175 lb 7.8 oz)   SpO2 99%     Intake/Output last 3 shifts:  I/O last 3 completed shifts:  In: 530 (6.7 mL/kg) [P.O.:480; IV Piggyback:50]  Out: 3000 (37.7 mL/kg) [Urine:3000 (1 mL/kg/hr)]  Weight: 79.6 kg   Intake/Output this shift:  I/O this shift:  In: -   Out: 200 [Urine:200]    Physical Exam  Neuro: oriented to person, place, time, and general circumstances  HEENT: normocephalic, atraumatic  Pulm: clear to auscultation bilaterally, no wheezes, good air entry  Cardiac: Regular rate and rhythm or without murmur or extra heart sounds  Abdomen:  distended, tender to palpation, BS + x4  Pulses: 2+ and symmetric    Relevant Results  LABS:  Lab Results   Component Value Date    WBC 3.5 (L) 03/17/2025    HGB 10.8 (L) 03/17/2025    HCT 33.7 (L) 03/17/2025    MCV 96 03/17/2025    PLT 80 (L) 03/17/2025      Results from last 72 hours   Lab Units 03/17/25  0649   SODIUM mmol/L 137   POTASSIUM mmol/L 3.8   CHLORIDE mmol/L 103   CO2 mmol/L 29   BUN mg/dL 11   CREATININE mg/dL 0.77   GLUCOSE mg/dL 101*   CALCIUM mg/dL 8.3*   ANION GAP mmol/L 9*   EGFR mL/min/1.73m*2 >90     Results from last 72 hours   Lab Units 03/17/25  0649   ALK PHOS U/L 225*   BILIRUBIN TOTAL mg/dL 1.9*   BILIRUBIN DIRECT mg/dL 0.8*   PROTEIN TOTAL g/dL 6.0*   ALT U/L 15   AST U/L 35   ALBUMIN g/dL 2.0*     Results from last 72 hours   Lab Units 03/17/25  0649   INR  1.4*       MICRO:  No results found for the last 14 days.      IMAGING:  US guided abdominal paracentesis   Final Result   Uneventful paracentesis, as detailed above. Right Hemiabdomen, 2500 mL        I personally performed and/or directly supervised this study and was   present for the entire procedure.        Performed and dictated at  [facility].        Signed by: Henok Perez 3/13/2025 4:32 PM   Dictation workstation:   [id number]      XR chest 1 view   Final Result   No evidence of acute intrathoracic abnormality.        Signed by: Vasile Pires 3/12/2025 3:52 PM   Dictation workstation:   [id number]      IR intervention tips placement    (Results Pending)   US guided abdominal paracentesis    (Results Pending)       Assessment/Plan   Paracentesis, please refer to imaging study for further detail.     LOS: 5 days     MADYSON Milton-CNP      I personally spent over half of a total 35 minutes in counseling and discussion with the patient and coordination of care as described above.

## 2025-03-18 ENCOUNTER — PHARMACY VISIT (OUTPATIENT)
Dept: PHARMACY | Facility: CLINIC | Age: 50
End: 2025-03-18
Payer: COMMERCIAL

## 2025-03-18 PROCEDURE — RXMED WILLOW AMBULATORY MEDICATION CHARGE

## 2025-03-18 ASSESSMENT — COGNITIVE AND FUNCTIONAL STATUS - GENERAL
MOBILITY SCORE: 24
DAILY ACTIVITIY SCORE: 24

## 2025-03-18 ASSESSMENT — PAIN SCALES - GENERAL
PAINLEVEL_OUTOF10: 0 - NO PAIN
PAINLEVEL_OUTOF10: 0 - NO PAIN

## 2025-03-18 ASSESSMENT — PAIN - FUNCTIONAL ASSESSMENT
PAIN_FUNCTIONAL_ASSESSMENT: 0-10
PAIN_FUNCTIONAL_ASSESSMENT: 0-10

## 2025-03-18 NOTE — PROGRESS NOTES
03/18/25 1505   Discharge Planning   Expected Discharge Disposition Home  (pt's insurance only covers preventative care, and not outpatient therapy or home PT/OT)     Per medical team pt is medically ready for discharge home today. Team to order [id number] and they requested for CT team member to speak with pt regarding the importance of taking her prescription medications + finalizing Medicaid insurance plan outpatient due to frequent hospitalizations. Met with pt at bedside to assess for any barriers with med compliance + obtaining medical coverage. Pt stated that her daughter Claudia [phone] is more fluent than she is with speaking English and is in the process of assisting her with [place] Medicaid application. Pt confirmed her daughter has all of the contact numbers to [facility] to follow up and was told to call the [facility] hotline for an update. Pt verbalized understanding of taking prescription medications as prescribed and reported no issues with med compliance. Pt stated it is okay with me contacting her daughter to include her with discussion. Pt was updated her 3rd party commercial plan is preventative and does not cover any outpatient therapy and/or home PT/OT services.  Spoke with daughter Claudia who informed me she actually has a telephone appt with [facility] to redo the Medicaid application today. Daughter Claudia confirmed she has all the contact numbers and location for S so there are no barriers with her following up to finalize insurance. Daughter agreed to stay on top of the Medication application process and assist pt with submitting any required paperwork necessary to obtain an insurance plan. Daughter was educated regarding the importance of pt taking her prescriptions as prescribed, and finalizing insurance plan so that she can schedule follow up appts for outpatient care. Pt/daughter voiced no additional questions/concerns regarding discharge planning at this time. Medical  team/nursing updated of above. Care coordinator will continue to follow for discharge planning needs.    Herb Colon RN  Transitional Care Coordinator (TCC)  [phone] or [phone]

## 2025-03-18 NOTE — DISCHARGE SUMMARY
Discharge Diagnosis  Abdominal pain, unspecified abdominal location    Issues Requiring Follow-Up  Follow-up with Dr. 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     Medication List      CHANGE how you take these medications     carvedilol 6.25 mg tablet; Commonly known as: Coreg; Take 1 tablet (6.25   mg) by mouth once daily.; What changed: when to take this   insulin glargine 100 unit/mL (3 mL) pen; Commonly known as: Lantus;   Inject 7 Units under the skin once daily at bedtime. Take as directed per   insulin instructions. Discard pen 28 days after first use.; What changed:   additional instructions   lactulose 20 gram/30 mL oral solution; Take 30 mL (20 g) by mouth 2   times a day.; What changed: when to take this, reasons to take this   metFORMIN  mg 24 hr tablet; Commonly known as: Glucophage-XR; Pump Back   2 tabletas 1 vez cada día por la tarde. Pump Back con comida.; (Take 2 tablets   (1,000 mg) by mouth once daily in the evening. Take with meals. Do not   crush, chew, or split.); What changed: how much to take, when to take this   ondansetron ODT 4 mg disintegrating tablet; Commonly known as:   Zofran-ODT; Dissolve 1 tablet (4 mg) in the mouth every 6 hours.; What   changed: when to take this, reasons to take this   pantoprazole 40 mg EC tablet; Commonly known as: ProtoNix; Take 1 tablet   (40 mg) by mouth once daily in the morning. Take before meals. Do not   crush, chew, or split.; What changed: Another medication with the same   name was removed. Continue taking this medication, and follow the   directions you see here.   polyethylene glycol 17 gram packet; Commonly known as: Glycolax,   Miralax; Take 17 g by mouth once daily.; What changed: when to take this,   reasons to take this    "rifAXIMin 550 mg tablet; Commonly known as: Xifaxan; Take 1 tablet (550   mg) by mouth 3 times a day for 28 days.; What changed: when to take this,   reasons to take this   spironolactone 100 mg tablet; Commonly known as: Aldactone; Take 1   tablet (100 mg) by mouth once daily.; What changed: how much to take   torsemide 20 mg tablet; Commonly known as: Demadex; Take 2 tablets (40   mg) by mouth once daily.; What changed: how much to take     CONTINUE taking these medications     acetaminophen 500 mg tablet; Commonly known as: Tylenol; Take 1-2   tablets (500-1,000 mg) by mouth every 6 hours if needed (pain).   atorvastatin 20 mg tablet; Commonly known as: Lipitor; Take 1 tablet (20   mg) by mouth once daily at bedtime.   Easy Touch Alcohol Prep Pads pads, medicated; Generic drug: alcohol   swabs; Apply 1 each topically once daily at bedtime.   meclizine 25 mg tablet; Commonly known as: Antivert; Take 1 tablet (25   mg) by mouth once daily as needed for dizziness.   melatonin 3 mg tablet; Take 1 tablet (3 mg) by mouth once daily at   bedtime.   mirtazapine 7.5 mg tablet; Commonly known as: Remeron; Take 1 tablet   (7.5 mg) by mouth once daily at bedtime.   pramoxine 1 % lotion; Commonly known as: Sarna Sensitive; Apply 1   Application topically every 8 hours if needed (itch).   sucralfate 1 gram tablet; Commonly known as: Carafate; Take 1 tablet (1   g) by mouth 4 times a day before meals.   * Sure Comfort Pen Needle 32 gauge x 5/32\" needle; Generic drug: pen   needle, diabetic; 4 veces cada día; (Use 4 times a day)   * BD Ultra-Fine Orig Pen Needle 29 gauge x 1/2\" needle; Generic drug:   pen needle 1/2\"; Usar para inyectarse de 1 a 4 veces al día según las   indicaciones.; (Use to inject 1-4 times daily as directed.)   traZODone 50 mg tablet; Commonly known as: Desyrel; Take 0.5 tablets (25   mg) by mouth as needed at bedtime for sleep.   ursodiol 250 mg tablet; Commonly known as: Actigall; Take 2 tablets (500 "   mg) by mouth 2 times a day AND 1 tablet (250 mg) once daily.  * This list has 2 medication(s) that are the same as other medications   prescribed for you. Read the directions carefully, and ask your doctor or   other care provider to review them with you.       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     During hospital admission, patient underwent therapeutic paracentesis with IR on 3/13 (2.5 L removed) and 3/17 (2 L removed). Patient responded well to diuresis with IV lasix and spironolactone. Patient was also treated for UTI with 5 day course of ceftriaxone (3/13-3/17). Patient was also seen by nutrition d/t concerns that patient was not compliant with low sodium diet and fluid restriction and patient received education regarding importance of dietary changes. Patient's lasix was transitioned to PO torsemide 40 on 3/17.     During admission, there was some concern regarding patient's access to medications, previously did not have insurance on prior admission but now has [employer], though she reports they have not been covering her medications, so social work was consulted for assistance with medication coverage. Patient discharged with 1 month supply of all medications.    Pertinent Physical Exam At Time of Discharge  General: no acute distress, resting  comfortably in bed  HEENT: normocephalic, atraumatic  Cardio: regular rate and rhythm, normal S1 and S2, no murmurs  Pulm: clear to auscultation bilaterally, no wheezes, crackles, or rhonchi, breathing comfortably on room air  Abd: normal, active bowel sounds; soft, non-tender, distended abdomen  Extremities: no peripheral edema, scars, or lesions  Neuro: alert and oriented to person, place, and time, CN II-XII grossly intact  Psych: mood and affect are appropriate    Outpatient Follow-Up  Future Appointments   Date Time Provider Department Center   3/26/2025  2:40 PM Dwain Khanna MD [id number]     Svetlana Solorzano MD  Internal Medicine PGY1

## 2025-03-18 NOTE — NURSING NOTE
Discharge Note:    Pt A&O x 4 at time of discharge. Reviewed discharge instructions, medication changes and follow up appointments with pt who verbalized understanding with no further questions. IV removed with catheter intact by bedside RN. Pt in possession of all belongings. Pt to be transported home via daughter and resting comfortably at time of discharge.

## 2025-03-18 NOTE — PROGRESS NOTES
Alfonzo Flanagan is a 50 y.o. female on day 6 of admission presenting with Abdominal pain, unspecified abdominal location.      Subjective   No acute events overnight. LBM was in the early morning. Eating well, ordered breakfast. Abdominal pain is at a 4/10 and has improved. No burning or pain with urination. No SOB or other new symptoms.        Objective     Last Recorded Vitals  /62 (BP Location: Left arm, Patient Position: Sitting)   Pulse 77   Temp 36.6 °C (97.9 °F) (Temporal)   Resp 16   Wt 79.6 kg (175 lb 7.8 oz)   SpO2 100%   Intake/Output last 3 Shifts:    Intake/Output Summary (Last 24 hours) at 3/18/2025 1320  Last data filed at 3/18/2025 0845  Gross per 24 hour   Intake 600 ml   Output 2175 ml   Net -1575 ml       Admission Weight  Weight: 80.8 kg (178 lb 2.1 oz) (03/15/25 0816)    Daily Weight  03/16/25 : 79.6 kg (175 lb 7.8 oz)    Image Results  US Guided Abdominal Paracentesis 3/17/25  2L of fluid removed        Results from last 72 hours   Lab Units 03/18/25  0649 03/17/25  0649 03/16/25  0745   WBC AUTO x10*3/uL 3.7* 3.5* 3.7*   HEMOGLOBIN g/dL 10.7* 10.8* 10.3*   HEMATOCRIT % 32.5* 33.7* 31.7*   PLATELETS AUTO x10*3/uL 79* 80* 89*   INR  1.5* 1.4* 1.4*   SODIUM mmol/L 136 137 137   POTASSIUM mmol/L 3.5 3.8 4.1   CHLORIDE mmol/L 100 103 102   CO2 mmol/L 31 29 32   BUN mg/dL 11 11 12   CREATININE mg/dL 0.85 0.77 0.87   GLUCOSE mg/dL 125* 101* 172*   CALCIUM mg/dL 8.2* 8.3* 8.2*   MAGNESIUM mg/dL 1.59* 1.94 1.66   PHOSPHORUS mg/dL 3.5 3.6 3.7   ALBUMIN g/dL 2.3* 2.0* 2.1*   ALK PHOS U/L 211* 225* 246*   ALT U/L 16 15 18   AST U/L 38 35 34   BILIRUBIN TOTAL mg/dL 1.9* 1.9* 1.7*         MELD 3.0: 17 at 3/18/2025  6:49 AM  MELD-Na: 14 at 3/18/2025  6:49 AM  Calculated from:  Serum Creatinine: 0.85 mg/dL (Using min of 1 mg/dL) at 3/18/2025  6:49 AM  Serum Sodium: 136 mmol/L at 3/18/2025  6:49 AM  Total Bilirubin: 1.9 mg/dL at 3/18/2025  6:49 AM  Serum Albumin: 2.3 g/dL at 3/18/2025  6:49  AM  INR(ratio): 1.5 at 3/18/2025  6:49 AM  Age at listing (hypothetical): 50 years  Sex: Female at 3/18/2025  6:49 AM        Physical Exam  Constitutional:       General: She is not in acute distress.  Cardiovascular:      Rate and Rhythm: Normal rate and regular rhythm.      Heart sounds: No murmur heard.     No friction rub. No gallop.   Pulmonary:      Effort: Pulmonary effort is normal.      Breath sounds: Normal breath sounds.   Abdominal:      General: Bowel sounds are normal. There is distension.      Palpations: Abdomen is soft.      Tenderness: There is abdominal tenderness.      Comments: Distention improved from yesterday. Slight tenderness to palpation, also improved   Musculoskeletal:      Right lower leg: Edema present.      Left lower leg: Edema present.      Comments: 1+   Skin:     General: Skin is warm and dry.   Neurological:      Mental Status: She is alert and oriented to person, place, and time.       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 This represents patient's 3rd ED visit/hospital admission for recurrent and refractory ascites and poorly controlled portal hypertension. Some concern regarding patient's access to medications, previously did not have insurance on prior admission but now has [employer], though she reports they did not cover her medications.     With her MELD score 14 and recurrent ascites, it is likely  she will eventually need a liver transplant. Transplant SW will speak with the patient about it and see her thoughts on liver transplant. Plan for discharge home 3/18.     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 History of esophageal varices s/p banding 2021. Has required several paracenteses for fluid removal. S/p large volume paracentesis with 2.5 L removed today, prior paracentesis on 2/28 with 2.5 L removed. Diagnostic para consistent with portal HTN (SAAG > 1.1) and no c/f SBP (ANC 6). Therapeutic paracentesis with 2.5 L removed 3/13, 2.0L removed 3/17. MELD-Na 14 today.   PLAN:  -Continue p.o. torsemide  - Continue spironolactone 100 mg   - continue rifaximin 550 mg TID, lactulose 20 g BID titrated to bowel movements  - continue pantoprazole 40 mg daily  - continue acetaminophen 487.5 mg q6h PRN pain (dose reduction for cirrhosis)  - continue ursodiol 500 mg BID and ursodiol 250 mg nightly  - Restart Coreg 6.25 daily (on 6.25 BID at home but was held iso hypotension on arrival to ED) since BP good this morning  - current diet: 2-3 g sodium diet, 2 L fluid restriction  -IR performed therapeutic paracentesis 3/13, 3,17, also saw patient and discussed TIPS procedure at bedside including risks, benefits, and anticipated outcomes. Recommended TIPS procedure outpatient.   - social work consulted regarding insurance  - daily MELD labs  - Accurate Is and Os  - Transplant SW to discuss possibility of liver transplant 3/18, appreciate reccs   - Nutrition consulted 3/17, recc continue 2-3g Na diet as tolerated      #UTI  #Possible vaginal candidiasis  Patient presenting with dysuria and pruritus in her groin. Endorses some white discharge as well. Physical exam 3/13 did  not appear to have fungal infection. UA with 1+ protein, 3+ blood, >50 WBC, 500 leuk esterase, consistent with UTI. Urine culture contaminated with multifora. Patient has history of E faecium UTI with resistance to ampicillin, ciprofloxacin, levofloxacin, nitrofurantoin, penicillin, and bactrim.  PLAN:  - discontinued bactrim  - discontinued ceftriaxone (3/13-3/17 )     #Nausea  #Vomiting  #Appetite  #Dizziness  - continue zofran ODT 4 mg q6h PRN  - continue mirtazapine 7.6 mg nightly  - continue meclizine 25 mg daily PRN     #T2DM  Last A1c 7.6 in 12/2024. On insulin glargine 7 units nightly and metformin at home. Patient reports compliance with glargine dosing. POC glucose on admission 350.  PLAN:  - continue insulin glargine 7 units nightly  - hold home metformin  - SSI   - hypoglycemic protocol     #Anxiety/Depression  - continue home trazodone 25 mg nightly PRN     #HLD  - continue home atorvastatin 20 mg nightly     #Subclinical Hypothyroidism  TSH 4.64 and T4 1.28 (wnl).  PLAN:  - follow-up outpatient     F: w caution d/t cirrhosis/fluid overload  E: K > 4, Mg > 2  N: 2-3 g sodium, 2L fluid restriction  A: PIV  GI: pantoprazole 40 daily  O2: RA  DVT: lovenox  Abx: ceftriaxone (3/13-3/17 )  Pain: acetaminophen         ROLAND STAHL, M3

## 2025-03-18 NOTE — CARE PLAN
The clinical goals for the shift include Pt will remain HDS this shift. Pt progressed to goal. Pt is ready to discharge. Pt PIV is removed, discharge instruction is given and educated. Pt home medication is delivered to bedside. Pt is discharged home with private vehicle.

## 2025-03-19 LAB
ACID FAST STN SPEC: NORMAL
MYCOBACTERIUM SPEC CULT: NORMAL

## 2025-03-20 ENCOUNTER — TELEPHONE (OUTPATIENT)
Facility: HOSPITAL | Age: 50
End: 2025-03-20
Payer: COMMERCIAL

## 2025-03-20 LAB
ATRIAL RATE: 97 BPM
P AXIS: 44 DEGREES
P OFFSET: 182 MS
P ONSET: 130 MS
PR INTERVAL: 168 MS
Q ONSET: 214 MS
QRS COUNT: 16 BEATS
QRS DURATION: 72 MS
QT INTERVAL: 384 MS
QTC CALCULATION(BAZETT): 487 MS
QTC FREDERICIA: 450 MS
R AXIS: -1 DEGREES
T AXIS: -1 DEGREES
T OFFSET: 406 MS
VENTRICULAR RATE: 97 BPM

## 2025-03-23 LAB
ATRIAL RATE: 74 BPM
P AXIS: 41 DEGREES
P OFFSET: 185 MS
P ONSET: 135 MS
PR INTERVAL: 184 MS
Q ONSET: 227 MS
QRS COUNT: 12 BEATS
QRS DURATION: 80 MS
QT INTERVAL: 434 MS
QTC CALCULATION(BAZETT): 481 MS
QTC FREDERICIA: 465 MS
R AXIS: 0 DEGREES
T AXIS: 9 DEGREES
T OFFSET: 444 MS
VENTRICULAR RATE: 74 BPM

## 2025-03-24 ENCOUNTER — TELEPHONE (OUTPATIENT)
Facility: HOSPITAL | Age: 50
End: 2025-03-24

## 2025-03-24 NOTE — TELEPHONE ENCOUNTER
3/24 - had [facility] call Alfonzo - this time they could leave a VM.  Told her to call us back to do an intake for liver Txp jimena.

## 2025-03-25 ENCOUNTER — TELEPHONE (OUTPATIENT)
Facility: HOSPITAL | Age: 50
End: 2025-03-25

## 2025-03-25 NOTE — DOCUMENTATION CLARIFICATION NOTE
"    PATIENT:               MATILDE CISSE  ACCT #:                  [id number]  MRN:                       [id number]  :                       1975  ADMIT DATE:       3/12/2025 5:31 PM  DISCH DATE:        3/18/2025 6:11 PM  RESPONDING PROVIDER #:        [id number]          PROVIDER RESPONSE TEXT:    I agree with dietician diagnosis of severe malnutrition on 3/17/25    CDI QUERY TEXT:    Clarification        Instruction:    Based on your assessment of the patient and the clinical information, please provide the requested documentation by clicking on the appropriate radio button and enter any additional information if prompted.    Question: Please further clarify this patient nutritional status as    When answering this query, please exercise your independent professional judgment. The fact that a question is being asked, does not imply that any particular answer is desired or expected.    The patient's clinical indicators include:  Clinical Information:  51 yo F w PMH biopsy-proven PBC (with cirrhosis, portal HTN, ascites, esophageal varices) presented with worsening ascites and pain    3/17 Nutrition Consult:    BMI  32.10  \" c/o abdominal discomfort,  recurrent ascites, nausea, early satiety  Diagnosis Status: New    Malnutrition Diagnosis: Severe malnutrition related to chronic disease or condition  Related to: complicated cirrhosis  As Evidenced by: <75% of estimated energy intake >1 month, significant unintended weight loss >16%, range of mild/moderate muscle wasting and recurrent ascites    Treatment:  Continue 2-3g Na diet as tolerated  Ensure high protein TID (160kcal and 16g protein per each)  Educated on small frequent meals, high protein sources \"  Risk Factors:  abdominal discomfort with recurrent ascites, nausea, early satiety  Options provided:  -- I agree with dietician diagnosis of severe malnutrition on 3/17/25  -- Other - I will add my own diagnosis  -- Refer to Clinical Documentation " Reviewer    Query created by: Alexandra Padilla on 3/25/2025 10:31 AM      Electronically signed by:  DELMY NUNEZ MD 3/25/2025 5:01 PM

## 2025-03-26 ENCOUNTER — OFFICE VISIT (OUTPATIENT)
Dept: GASTROENTEROLOGY | Facility: HOSPITAL | Age: 50
End: 2025-03-26

## 2025-03-26 VITALS
SYSTOLIC BLOOD PRESSURE: 112 MMHG | OXYGEN SATURATION: 100 % | TEMPERATURE: 97.8 F | BODY MASS INDEX: 31.83 KG/M2 | HEART RATE: 89 BPM | WEIGHT: 174 LBS | DIASTOLIC BLOOD PRESSURE: 63 MMHG | RESPIRATION RATE: 18 BRPM

## 2025-03-26 DIAGNOSIS — Z09 HOSPITAL DISCHARGE FOLLOW-UP: Primary | ICD-10-CM

## 2025-03-26 DIAGNOSIS — K74.60 DECOMPENSATED LIVER DISEASE: ICD-10-CM

## 2025-03-26 DIAGNOSIS — R18.8 OTHER ASCITES: ICD-10-CM

## 2025-03-26 DIAGNOSIS — K72.90 DECOMPENSATED LIVER DISEASE: ICD-10-CM

## 2025-03-26 DIAGNOSIS — K74.60 CIRRHOSIS (MULTI): ICD-10-CM

## 2025-03-26 DIAGNOSIS — K74.3 PRIMARY BILIARY CHOLANGITIS (MULTI): ICD-10-CM

## 2025-03-26 LAB
ACID FAST STN SPEC: NORMAL
MYCOBACTERIUM SPEC CULT: NORMAL

## 2025-03-26 PROCEDURE — 3048F LDL-C <100 MG/DL: CPT | Performed by: INTERNAL MEDICINE

## 2025-03-26 PROCEDURE — 99215 OFFICE O/P EST HI 40 MIN: CPT | Performed by: INTERNAL MEDICINE

## 2025-03-26 PROCEDURE — 3074F SYST BP LT 130 MM HG: CPT | Performed by: INTERNAL MEDICINE

## 2025-03-26 PROCEDURE — 3078F DIAST BP <80 MM HG: CPT | Performed by: INTERNAL MEDICINE

## 2025-03-26 PROCEDURE — G2211 COMPLEX E/M VISIT ADD ON: HCPCS | Performed by: INTERNAL MEDICINE

## 2025-03-26 RX ORDER — SPIRONOLACTONE 100 MG/1
150 TABLET, FILM COATED ORAL DAILY
Qty: 45 TABLET | Refills: 3 | Status: SHIPPED | OUTPATIENT
Start: 2025-03-26 | End: 2025-07-24

## 2025-03-26 ASSESSMENT — PAIN SCALES - GENERAL: PAINLEVEL_OUTOF10: 5

## 2025-03-26 NOTE — PROGRESS NOTES
Subjective     Hospital discharge follow up.    History of Present Illness:   Alfonzo Flanagan is a 50 y.o. female who presents to [facility] for hospital discharge follow up.    Has decompensated cirrhosis, due to Primary Biliary Cholangitis.  Struggling with difficult to control ascites.  Previously with bleeding esophageal varices.  No recent or significant HE episodes.    Hospitalized from 3/13/25 - 3/18/25.    During hospital admission, patient underwent therapeutic paracentesis with IR on 3/13 (2.5 L removed) and 3/17 (2 L removed). Patient responded well to diuresis with IV lasix and spironolactone. Patient was also treated for UTI with 5 day course of ceftriaxone (3/13-3/17). Patient was also seen by nutrition d/t concerns that patient was not compliant with low sodium diet and fluid restriction and patient received education regarding importance of dietary changes. Patient's lasix was transitioned to PO torsemide 40 on 3/17.  She continues to have good diuresis on her oral diuretics of torsemide 40 mg daily and Aldactone 100 mg daily.      TIPS Consult with IR.  The decision was first to open a LT evaluation first. Questions about medical insurance, raised by the Transplant Financial Counselor.    Previous hospitalizations:  2/28/25 - 3/2/25  2/9/25 - 2/15/25    Review of Systems  Review of Systems    Past Medical History   has a past medical history of Ascites, Asthma, Cirrhosis of liver (Multi), Diabetes mellitus (Multi), GERD (gastroesophageal reflux disease), CORBY (obstructive sleep apnea), Portal hypertension (Multi), and Thrombocytopenia (CMS-HCC).     Social History   reports that she has never smoked. She has never used smokeless tobacco. She reports that she does not drink alcohol and does not use drugs.     Family History  family history is not on file.     Allergies  Allergies   Allergen Reactions    Gluten Diarrhea     Medications  Current Outpatient Medications   Medication Instructions     "acetaminophen (TYLENOL) 500-1,000 mg, oral, Every 6 hours PRN    alcohol swabs pads, medicated 1 each, topical (top), Nightly    atorvastatin (LIPITOR) 20 mg, oral, Nightly    carvedilol (COREG) 6.25 mg, oral, Daily    insulin glargine (LANTUS) 7 Units, subcutaneous, Nightly, Take as directed per insulin instructions. Discard pen 28 days after first use.    lactulose 20 g, oral, 2 times daily    meclizine (ANTIVERT) 25 mg, oral, Daily PRN    melatonin 3 mg, oral, Nightly    metFORMIN XR (GLUCOPHAGE-XR) 1,000 mg, oral, Daily with evening meal, Do not crush, chew, or split.    mirtazapine (REMERON) 7.5 mg, oral, Nightly    ondansetron ODT (ZOFRAN-ODT) 4 mg, oral, Every 6 hours    pantoprazole (PROTONIX) 40 mg, oral, Daily before breakfast, Do not crush, chew, or split.    pen needle 1/2\" 29G X 12mm needle Use to inject 1-4 times daily as directed.    pen needle, diabetic (Sure Comfort Pen Needle) 32 gauge x 5/32\" needle Use 4 times a day    polyethylene glycol (GLYCOLAX, MIRALAX) 17 g, oral, Daily    pramoxine (Sarna Sensitive) 1 % lotion 1 Application, Topical, Every 8 hours PRN    rifAXIMin (XIFAXAN) 550 mg, oral, 3 times daily    spironolactone (ALDACTONE) 100 mg, oral, Daily    sucralfate (CARAFATE) 1 g, oral, 4 times daily before meals and nightly    torsemide (DEMADEX) 40 mg, oral, Daily    traZODone (DESYREL) 25 mg, oral, Nightly PRN    ursodiol (Actigall) 250 mg tablet Take 2 tablets (500 mg) by mouth 2 times a day AND 1 tablet (250 mg) once daily.        Objective   Visit Vitals  /63 (BP Location: Left arm, Patient Position: Sitting, BP Cuff Size: Large adult)   Pulse 89   Temp 36.6 °C (97.8 °F) (Temporal)   Resp 18          3/17/2025     8:41 PM 3/18/2025    12:37 AM 3/18/2025     4:34 AM 3/18/2025     8:01 AM 3/18/2025    11:44 AM 3/18/2025     3:00 PM 3/26/2025     3:24 PM   Vitals   Systolic 101 109 105 91 109 106 112   Diastolic 58 54 63 63 62 66 63   BP Location    Left arm Left arm Left arm Left " arm   Heart Rate 83 77 86 72 77 85 89   Temp 36.5 °C (97.7 °F) 36.4 °C (97.5 °F) 36.4 °C (97.5 °F) 36.4 °C (97.5 °F) 36.6 °C (97.9 °F) 36.7 °C (98.1 °F) 36.6 °C (97.8 °F)   Resp 18 16 17 16  16 18   Weight (lb)       174   BMI       31.83 kg/m2   BSA (m2)       1.86 m2   Visit Report       Report     Physical Exam  General: no acute distress, resting comfortably in bed  HEENT: normocephalic, atraumatic  Cardio: regular rate and rhythm, normal S1 and S2, no murmurs  Pulm: clear to auscultation bilaterally, no wheezes, crackles, or rhonchi, breathing comfortably on room air  Abd: normal, active bowel sounds; soft, non-tender, distended abdomen  Extremities: no peripheral edema, scars, or lesions  Neuro: alert and oriented to person, place, and time, CN II-XII grossly intact  Psych: mood and affect are appropriate    Labs    WBC   Date/Time Value Ref Range Status   03/18/2025 06:49 AM 3.7 (L) 4.4 - 11.3 x10*3/uL Final     Hemoglobin   Date/Time Value Ref Range Status   03/18/2025 06:49 AM 10.7 (L) 12.0 - 16.0 g/dL Final     Hematocrit   Date/Time Value Ref Range Status   03/18/2025 06:49 AM 32.5 (L) 36.0 - 46.0 % Final     MCV   Date/Time Value Ref Range Status   03/18/2025 06:49 AM 95 80 - 100 fL Final     Platelets   Date/Time Value Ref Range Status   03/18/2025 06:49 AM 79 (L) 150 - 450 x10*3/uL Final        Total Protein   Date/Time Value Ref Range Status   03/18/2025 06:49 AM 6.3 (L) 6.4 - 8.2 g/dL Final     Albumin   Date/Time Value Ref Range Status   03/18/2025 06:49 AM 2.3 (L) 3.4 - 5.0 g/dL Final     AST   Date/Time Value Ref Range Status   03/18/2025 06:49 AM 38 9 - 39 U/L Final     ALT   Date/Time Value Ref Range Status   03/18/2025 06:49 AM 16 7 - 45 U/L Final     Comment:     Patients treated with Sulfasalazine may generate falsely decreased results for ALT.     Alkaline Phosphatase   Date/Time Value Ref Range Status   03/18/2025 06:49  (H) 33 - 110 U/L Final     Bilirubin, Total   Date/Time Value  Ref Range Status   03/18/2025 06:49 AM 1.9 (H) 0.0 - 1.2 mg/dL Final     Bilirubin, Direct   Date/Time Value Ref Range Status   03/18/2025 06:49 AM 1.0 (H) 0.0 - 0.3 mg/dL Final      AST   Date/Time Value Ref Range Status   03/18/2025 06:49 AM 38 9 - 39 U/L Final     ALT   Date/Time Value Ref Range Status   03/18/2025 06:49 AM 16 7 - 45 U/L Final     Comment:     Patients treated with Sulfasalazine may generate falsely decreased results for ALT.     Alkaline Phosphatase   Date/Time Value Ref Range Status   03/18/2025 06:49  (H) 33 - 110 U/L Final     Bilirubin, Total   Date/Time Value Ref Range Status   03/18/2025 06:49 AM 1.9 (H) 0.0 - 1.2 mg/dL Final     Bilirubin, Direct   Date/Time Value Ref Range Status   03/18/2025 06:49 AM 1.0 (H) 0.0 - 0.3 mg/dL Final     Albumin   Date/Time Value Ref Range Status   03/18/2025 06:49 AM 2.3 (L) 3.4 - 5.0 g/dL Final     Total Protein   Date/Time Value Ref Range Status   03/18/2025 06:49 AM 6.3 (L) 6.4 - 8.2 g/dL Final        Protime   Date/Time Value Ref Range Status   03/18/2025 06:49 AM 16.4 (H) 9.8 - 12.4 seconds Final     INR   Date/Time Value Ref Range Status   03/18/2025 06:49 AM 1.5 (H) 0.9 - 1.1 Final     MELD 3.0: 17 at 3/18/2025  6:49 AM  MELD-Na: 14 at 3/18/2025  6:49 AM  Calculated from:  Serum Creatinine: 0.85 mg/dL (Using min of 1 mg/dL) at 3/18/2025  6:49 AM  Serum Sodium: 136 mmol/L at 3/18/2025  6:49 AM  Total Bilirubin: 1.9 mg/dL at 3/18/2025  6:49 AM  Serum Albumin: 2.3 g/dL at 3/18/2025  6:49 AM  INR(ratio): 1.5 at 3/18/2025  6:49 AM  Age at listing (hypothetical): 50 years  Sex: Female at 3/18/2025  6:49 AM    Assessment/Plan   Alfonzo Flanagan is a 50 y.o. female who presents to Liver clinic for hospital discharge follow up.    Decompensated PBC related cirrhosis.  She has very difficult to manage ascites.  There are medication access and compliance issues that are major contributing factors. I think a TIPS was would potentially be a good plan.  However, with her degree of decompensation, and MELD score, there would be elevated risk post TIPS. The better course, would to be able to evaluate for TIPS and Transplant in parallel. However, there remain financial/insurance barriers present at this time.     Plan:  I will follow up with the LT office.  Increase Spironolactone to 150 mg daily.  Continue Torsemide 40 mg daily.  Will schedule US Paracentesis as an outpatient.    I asked the patient to bring medication bottles to next appointment.     Follow up with me in clinic in 6 weeks.    Instructions    High level of medical complexity for a patient at high risk for re-hospitalization.    Dwain Khanna MD

## 2025-03-26 NOTE — PATIENT INSTRUCTIONS
Welcome to Dr. [facility].  Dr. Khanna sees patients at the following sites:  [place] [address] [facility] at [facility], [place]  [address], [address] at [facility] at [facility], [facility] [address]    Dr. Khanna's hepatology care coordinator, Dilcia LOWE, can be reached at [phone].  Dr. Khanna's , Jacey Vega, can be reached at [phone].     Increase Spironolactone to 150 mg daily.    Bring your medication bottles to next appointment.     Schedule a paracentesis for Monday. Call [phone].  If unable to get in, call the office to see if help be coordinated.     Follow up with me in clinic in 6 weeks.  Schedule your visit on your way out today. If unable, please call [phone] to schedule.

## 2025-03-27 ENCOUNTER — TELEPHONE (OUTPATIENT)
Facility: HOSPITAL | Age: 50
End: 2025-03-27

## 2025-03-27 ENCOUNTER — DOCUMENTATION (OUTPATIENT)
Dept: TRANSPLANT | Facility: HOSPITAL | Age: 50
End: 2025-03-27

## 2025-03-31 ENCOUNTER — DOCUMENTATION (OUTPATIENT)
Dept: TRANSPLANT | Facility: HOSPITAL | Age: 50
End: 2025-03-31

## 2025-03-31 NOTE — Clinical Note
Appointments are not to be made.  Patient does not have benefits for transplant.  HRS is working on Medicaid but it could take up to 90 days.

## 2025-04-02 ENCOUNTER — HOSPITAL ENCOUNTER (OUTPATIENT)
Dept: RADIOLOGY | Facility: HOSPITAL | Age: 50
Discharge: HOME | End: 2025-04-02

## 2025-04-02 VITALS
HEART RATE: 86 BPM | HEIGHT: 62 IN | TEMPERATURE: 97.9 F | OXYGEN SATURATION: 99 % | WEIGHT: 175 LBS | SYSTOLIC BLOOD PRESSURE: 130 MMHG | DIASTOLIC BLOOD PRESSURE: 66 MMHG | RESPIRATION RATE: 18 BRPM | BODY MASS INDEX: 32.2 KG/M2

## 2025-04-02 DIAGNOSIS — K74.60 LIVER CIRRHOSIS SECONDARY TO NASH (NONALCOHOLIC STEATOHEPATITIS) (MULTI): ICD-10-CM

## 2025-04-02 DIAGNOSIS — K75.81 LIVER CIRRHOSIS SECONDARY TO NASH (NONALCOHOLIC STEATOHEPATITIS) (MULTI): ICD-10-CM

## 2025-04-02 DIAGNOSIS — K74.60 CIRRHOSIS (MULTI): ICD-10-CM

## 2025-04-02 DIAGNOSIS — R18.8 OTHER ASCITES: ICD-10-CM

## 2025-04-02 LAB
ACID FAST STN SPEC: NORMAL
MYCOBACTERIUM SPEC CULT: NORMAL

## 2025-04-02 PROCEDURE — C1729 CATH, DRAINAGE: HCPCS

## 2025-04-02 PROCEDURE — 49083 ABD PARACENTESIS W/IMAGING: CPT

## 2025-04-02 PROCEDURE — 2720000007 HC OR 272 NO HCPCS

## 2025-04-02 PROCEDURE — 2500000004 HC RX 250 GENERAL PHARMACY W/ HCPCS (ALT 636 FOR OP/ED): Performed by: RADIOLOGY

## 2025-04-02 RX ORDER — LIDOCAINE HYDROCHLORIDE 10 MG/ML
INJECTION, SOLUTION EPIDURAL; INFILTRATION; INTRACAUDAL; PERINEURAL
Status: COMPLETED | OUTPATIENT
Start: 2025-04-02 | End: 2025-04-02

## 2025-04-02 RX ADMIN — LIDOCAINE HYDROCHLORIDE 5 ML: 10 INJECTION, SOLUTION EPIDURAL; INFILTRATION; INTRACAUDAL; PERINEURAL at 12:51

## 2025-04-02 ASSESSMENT — PAIN SCALES - GENERAL
PAINLEVEL_OUTOF10: 0 - NO PAIN
PAINLEVEL_OUTOF10: 6
PAINLEVEL_OUTOF10: 0 - NO PAIN

## 2025-04-02 ASSESSMENT — PAIN - FUNCTIONAL ASSESSMENT: PAIN_FUNCTIONAL_ASSESSMENT: 0-10

## 2025-04-02 NOTE — Clinical Note
Pt. Drained 1.9L clear yellow fluid. Pt. Tolerated well and denies any pain at this time. Band aide applied to puncture site.

## 2025-04-02 NOTE — NURSING NOTE
Pt discharged home at this time. Pt leaving in stable condition. Pt ambulatory with steady gait and VSS. Pt resp even and unlabored with no s/s of distress noted. Pt able to speak in complete sentences without difficulty. IV removed and pt with  on departure. No sedation given.

## 2025-04-02 NOTE — DISCHARGE INSTRUCTIONS
Patient and Family Education  If you have questions or need to change the time or date of your Paracentesis, call  Radiology  Scheduling at [phone].  What is a Paracentesis Test?  A paracentesis is a procedure where a thin needle or catheter is placed into the abdomen to  withdraw fluid. Sometimes the procedure is performed for diagnostic purposes, and only a small  amount of fluid is withdrawn. Sometimes the procedure is performed for therapeutic purposes,  to remove fluid. The procedure can take up to 2 hours.  Before the Test:  ? If you take blood thinning medications, you Must ask your doctor when you should stop  taking the medications. You may need to stop taking the medicine up to 7 days prior to  the exam.  ? Do Not Drink or Eat Anything after midnight the day before the test (unless your  doctor tells you otherwise).  ? You should take any medications you normally take with small amount of clear liquid.  Be sure to take any high blood pressure medications that your doctor has prescribed.  ? If you have diabetes, ask your doctor if you should take your medication before the test.  ? Make plans for a ride home if you are an outpatient.  During the Test:  ? Often the procedure is performed while you are lying on your back.  ? An IV may be placed in your arm.  ? You will be given a medication to numb the procedure site.  ? You will feel pressure during the procedure.  After the Test:  ? You will be watched for a period of time after the procedure. Your blood pressure and heart  rate will be checked prior to leaving the department.     Page 2 of 2  Follow these Guidelines for a Safe Recovery:  ? Activity:  o You will need someone to drive you home, you may drive the next day.  o Limit activity for 24 hours after the test  ? Diet:  o You may resume your normal diet.  ? Medicines:  o You may resume your medications (including aspirin) as ordered by your doctor.  Call your Doctor Right Away if you have:  ?  Bleeding from the procedure site.  ? Shortness of breath or trouble breathing.  ? Increased pain at the procedure site.  ? Dizziness or fainting.  If you are not able to contact your doctor, go to the nearest Emergency Room.  Also, Call your Doctor if you have:  ? Redness, swelling or drainage at the procedure site.  ? Temperature of 100.4°F (38°C) or higher.  ? Pain or tenderness at the procedure site.  ? Any questions.  ? Leakage at site is not unusual if procedure was done for drainage. Change the dressing if  wet to dry sterile dressing.   How to Reach your Doctor:    Call Dr. Lacy at [phone] with problems or questions.

## 2025-04-07 ENCOUNTER — DOCUMENTATION (OUTPATIENT)
Dept: TRANSPLANT | Facility: HOSPITAL | Age: 50
End: 2025-04-07
Payer: COMMERCIAL

## 2025-04-09 ENCOUNTER — APPOINTMENT (OUTPATIENT)
Dept: RADIOLOGY | Facility: HOSPITAL | Age: 50
DRG: 433 | End: 2025-04-09
Payer: COMMERCIAL

## 2025-04-09 ENCOUNTER — HOSPITAL ENCOUNTER (INPATIENT)
Facility: HOSPITAL | Age: 50
Discharge: HOME | DRG: 433 | End: 2025-04-09
Attending: EMERGENCY MEDICINE | Admitting: STUDENT IN AN ORGANIZED HEALTH CARE EDUCATION/TRAINING PROGRAM
Payer: COMMERCIAL

## 2025-04-09 DIAGNOSIS — R18.8 OTHER ASCITES: ICD-10-CM

## 2025-04-09 DIAGNOSIS — K74.60 DECOMPENSATED CIRRHOSIS: Primary | ICD-10-CM

## 2025-04-09 DIAGNOSIS — R18.8 CIRRHOSIS OF LIVER WITH ASCITES, UNSPECIFIED HEPATIC CIRRHOSIS TYPE (MULTI): ICD-10-CM

## 2025-04-09 DIAGNOSIS — R87.619 ABNORMAL CERVICAL PAPANICOLAOU SMEAR, UNSPECIFIED ABNORMAL PAP FINDING: ICD-10-CM

## 2025-04-09 DIAGNOSIS — L73.2 HIDRADENITIS SUPPURATIVA: ICD-10-CM

## 2025-04-09 DIAGNOSIS — Z79.4 TYPE 2 DIABETES MELLITUS WITHOUT COMPLICATION, WITH LONG-TERM CURRENT USE OF INSULIN: ICD-10-CM

## 2025-04-09 DIAGNOSIS — N39.0 URINARY TRACT INFECTION WITH HEMATURIA, SITE UNSPECIFIED: ICD-10-CM

## 2025-04-09 DIAGNOSIS — B02.9 VZV (VARICELLA-ZOSTER VIRUS) INFECTION: ICD-10-CM

## 2025-04-09 DIAGNOSIS — R31.9 URINARY TRACT INFECTION WITH HEMATURIA, SITE UNSPECIFIED: ICD-10-CM

## 2025-04-09 DIAGNOSIS — K72.90 DECOMPENSATED CIRRHOSIS: Primary | ICD-10-CM

## 2025-04-09 DIAGNOSIS — R10.9 ABDOMINAL PAIN, UNSPECIFIED ABDOMINAL LOCATION: ICD-10-CM

## 2025-04-09 DIAGNOSIS — K65.2 SBP (SPONTANEOUS BACTERIAL PERITONITIS) (MULTI): ICD-10-CM

## 2025-04-09 DIAGNOSIS — R73.9 HYPERGLYCEMIA: ICD-10-CM

## 2025-04-09 DIAGNOSIS — K74.60 CIRRHOSIS OF LIVER WITH ASCITES, UNSPECIFIED HEPATIC CIRRHOSIS TYPE (MULTI): ICD-10-CM

## 2025-04-09 DIAGNOSIS — E11.9 TYPE 2 DIABETES MELLITUS WITHOUT COMPLICATION, WITH LONG-TERM CURRENT USE OF INSULIN: ICD-10-CM

## 2025-04-09 LAB
ALBUMIN SERPL BCP-MCNC: 2.5 G/DL (ref 3.4–5)
ALP SERPL-CCNC: 434 U/L (ref 33–110)
ALT SERPL W P-5'-P-CCNC: 29 U/L (ref 7–45)
AMMONIA PLAS-SCNC: 32 UMOL/L (ref 16–53)
ANION GAP BLDV CALCULATED.4IONS-SCNC: 5 MMOL/L (ref 10–25)
ANION GAP SERPL CALC-SCNC: 10 MMOL/L (ref 10–20)
APPEARANCE UR: ABNORMAL
AST SERPL W P-5'-P-CCNC: 56 U/L (ref 9–39)
BACTERIA #/AREA URNS AUTO: ABNORMAL /HPF
BASE EXCESS BLDV CALC-SCNC: 6.4 MMOL/L (ref -2–3)
BASOPHILS # BLD AUTO: 0.02 X10*3/UL (ref 0–0.1)
BASOPHILS NFR BLD AUTO: 0.3 %
BILIRUB SERPL-MCNC: 2.9 MG/DL (ref 0–1.2)
BILIRUB UR STRIP.AUTO-MCNC: NEGATIVE MG/DL
BNP SERPL-MCNC: 24 PG/ML (ref 0–99)
BODY TEMPERATURE: 37 DEGREES CELSIUS
BUN SERPL-MCNC: 11 MG/DL (ref 6–23)
CA-I BLDV-SCNC: 1.14 MMOL/L (ref 1.1–1.33)
CALCIUM SERPL-MCNC: 8.3 MG/DL (ref 8.6–10.6)
CHLORIDE BLDV-SCNC: 102 MMOL/L (ref 98–107)
CHLORIDE SERPL-SCNC: 97 MMOL/L (ref 98–107)
CO2 SERPL-SCNC: 28 MMOL/L (ref 21–32)
COLOR UR: YELLOW
CREAT SERPL-MCNC: 0.76 MG/DL (ref 0.5–1.05)
EGFRCR SERPLBLD CKD-EPI 2021: >90 ML/MIN/1.73M*2
EOSINOPHIL # BLD AUTO: 0.32 X10*3/UL (ref 0–0.7)
EOSINOPHIL NFR BLD AUTO: 5.4 %
ERYTHROCYTE [DISTWIDTH] IN BLOOD BY AUTOMATED COUNT: 15.5 % (ref 11.5–14.5)
GLUCOSE BLD MANUAL STRIP-MCNC: 229 MG/DL (ref 74–99)
GLUCOSE BLDV-MCNC: 350 MG/DL (ref 74–99)
GLUCOSE SERPL-MCNC: 509 MG/DL (ref 74–99)
GLUCOSE UR STRIP.AUTO-MCNC: ABNORMAL MG/DL
HCO3 BLDV-SCNC: 30.5 MMOL/L (ref 22–26)
HCT VFR BLD AUTO: 33.8 % (ref 36–46)
HCT VFR BLD EST: 34 % (ref 36–46)
HGB BLD-MCNC: 11.8 G/DL (ref 12–16)
HGB BLDV-MCNC: 11.2 G/DL (ref 12–16)
IMM GRANULOCYTES # BLD AUTO: 0.02 X10*3/UL (ref 0–0.7)
IMM GRANULOCYTES NFR BLD AUTO: 0.3 % (ref 0–0.9)
INHALED O2 CONCENTRATION: 21 %
INR PPP: 1.6 (ref 0.9–1.1)
KETONES UR STRIP.AUTO-MCNC: NEGATIVE MG/DL
LACTATE BLDV-SCNC: 0.6 MMOL/L (ref 0.4–2)
LEUKOCYTE ESTERASE UR QL STRIP.AUTO: ABNORMAL
LIPASE SERPL-CCNC: 248 U/L (ref 9–82)
LYMPHOCYTES # BLD AUTO: 1.25 X10*3/UL (ref 1.2–4.8)
LYMPHOCYTES NFR BLD AUTO: 21.2 %
MAGNESIUM SERPL-MCNC: 1.94 MG/DL (ref 1.6–2.4)
MCH RBC QN AUTO: 31.6 PG (ref 26–34)
MCHC RBC AUTO-ENTMCNC: 34.9 G/DL (ref 32–36)
MCV RBC AUTO: 90 FL (ref 80–100)
MONOCYTES # BLD AUTO: 0.66 X10*3/UL (ref 0.1–1)
MONOCYTES NFR BLD AUTO: 11.2 %
NEUTROPHILS # BLD AUTO: 3.63 X10*3/UL (ref 1.2–7.7)
NEUTROPHILS NFR BLD AUTO: 61.6 %
NITRITE UR QL STRIP.AUTO: NEGATIVE
NRBC BLD-RTO: 0 /100 WBCS (ref 0–0)
OXYHGB MFR BLDV: 93.6 % (ref 45–75)
PCO2 BLDV: 41 MM HG (ref 41–51)
PH BLDV: 7.48 PH (ref 7.33–7.43)
PH UR STRIP.AUTO: 5.5 [PH]
PLATELET # BLD AUTO: 91 X10*3/UL (ref 150–450)
PO2 BLDV: 72 MM HG (ref 35–45)
POTASSIUM BLDV-SCNC: 4 MMOL/L (ref 3.5–5.3)
POTASSIUM SERPL-SCNC: 3.6 MMOL/L (ref 3.5–5.3)
PROT SERPL-MCNC: 7.5 G/DL (ref 6.4–8.2)
PROT UR STRIP.AUTO-MCNC: NEGATIVE MG/DL
PROTHROMBIN TIME: 17.8 SECONDS (ref 9.8–12.4)
RBC # BLD AUTO: 3.74 X10*6/UL (ref 4–5.2)
RBC # UR STRIP.AUTO: ABNORMAL MG/DL
RBC #/AREA URNS AUTO: >20 /HPF
SAO2 % BLDV: 96 % (ref 45–75)
SODIUM BLDV-SCNC: 133 MMOL/L (ref 136–145)
SODIUM SERPL-SCNC: 131 MMOL/L (ref 136–145)
SP GR UR STRIP.AUTO: 1.03
SQUAMOUS #/AREA URNS AUTO: ABNORMAL /HPF
UROBILINOGEN UR STRIP.AUTO-MCNC: NORMAL MG/DL
WBC # BLD AUTO: 5.9 X10*3/UL (ref 4.4–11.3)
WBC #/AREA URNS AUTO: >50 /HPF
WBC CLUMPS #/AREA URNS AUTO: ABNORMAL /HPF
YEAST BUDDING #/AREA UR COMP ASSIST: PRESENT /HPF

## 2025-04-09 PROCEDURE — 85025 COMPLETE CBC W/AUTO DIFF WBC: CPT | Performed by: EMERGENCY MEDICINE

## 2025-04-09 PROCEDURE — 1100000001 HC PRIVATE ROOM DAILY

## 2025-04-09 PROCEDURE — 99285 EMERGENCY DEPT VISIT HI MDM: CPT | Performed by: EMERGENCY MEDICINE

## 2025-04-09 PROCEDURE — 82947 ASSAY GLUCOSE BLOOD QUANT: CPT

## 2025-04-09 PROCEDURE — 81001 URINALYSIS AUTO W/SCOPE: CPT

## 2025-04-09 PROCEDURE — 36415 COLL VENOUS BLD VENIPUNCTURE: CPT | Performed by: EMERGENCY MEDICINE

## 2025-04-09 PROCEDURE — 82247 BILIRUBIN TOTAL: CPT

## 2025-04-09 PROCEDURE — 96375 TX/PRO/DX INJ NEW DRUG ADDON: CPT

## 2025-04-09 PROCEDURE — 87389 HIV-1 AG W/HIV-1&-2 AB AG IA: CPT

## 2025-04-09 PROCEDURE — 85610 PROTHROMBIN TIME: CPT

## 2025-04-09 PROCEDURE — 80053 COMPREHEN METABOLIC PANEL: CPT | Performed by: EMERGENCY MEDICINE

## 2025-04-09 PROCEDURE — 99285 EMERGENCY DEPT VISIT HI MDM: CPT | Mod: 25 | Performed by: EMERGENCY MEDICINE

## 2025-04-09 PROCEDURE — 71045 X-RAY EXAM CHEST 1 VIEW: CPT | Mod: FOREIGN READ | Performed by: RADIOLOGY

## 2025-04-09 PROCEDURE — 82248 BILIRUBIN DIRECT: CPT

## 2025-04-09 PROCEDURE — 83880 ASSAY OF NATRIURETIC PEPTIDE: CPT

## 2025-04-09 PROCEDURE — 83735 ASSAY OF MAGNESIUM: CPT

## 2025-04-09 PROCEDURE — 2500000004 HC RX 250 GENERAL PHARMACY W/ HCPCS (ALT 636 FOR OP/ED): Mod: SE

## 2025-04-09 PROCEDURE — 82140 ASSAY OF AMMONIA: CPT

## 2025-04-09 PROCEDURE — 96365 THER/PROPH/DIAG IV INF INIT: CPT

## 2025-04-09 PROCEDURE — 36415 COLL VENOUS BLD VENIPUNCTURE: CPT

## 2025-04-09 PROCEDURE — 71045 X-RAY EXAM CHEST 1 VIEW: CPT

## 2025-04-09 PROCEDURE — 84132 ASSAY OF SERUM POTASSIUM: CPT

## 2025-04-09 PROCEDURE — 87086 URINE CULTURE/COLONY COUNT: CPT

## 2025-04-09 PROCEDURE — 83690 ASSAY OF LIPASE: CPT

## 2025-04-09 RX ORDER — POLYETHYLENE GLYCOL 3350 17 G/17G
17 POWDER, FOR SOLUTION ORAL DAILY
Status: DISCONTINUED | OUTPATIENT
Start: 2025-04-10 | End: 2025-04-09 | Stop reason: SDUPTHER

## 2025-04-09 RX ORDER — CEFTRIAXONE 1 G/50ML
1 INJECTION, SOLUTION INTRAVENOUS EVERY 24 HOURS
Status: DISCONTINUED | OUTPATIENT
Start: 2025-04-10 | End: 2025-04-10

## 2025-04-09 RX ORDER — ONDANSETRON HYDROCHLORIDE 2 MG/ML
4 INJECTION, SOLUTION INTRAVENOUS ONCE
Status: COMPLETED | OUTPATIENT
Start: 2025-04-09 | End: 2025-04-09

## 2025-04-09 RX ORDER — DEXTROSE 50 % IN WATER (D50W) INTRAVENOUS SYRINGE
12.5
Status: DISCONTINUED | OUTPATIENT
Start: 2025-04-09 | End: 2025-04-14 | Stop reason: HOSPADM

## 2025-04-09 RX ORDER — TRAZODONE HYDROCHLORIDE 50 MG/1
25 TABLET ORAL NIGHTLY PRN
Status: DISCONTINUED | OUTPATIENT
Start: 2025-04-09 | End: 2025-04-14 | Stop reason: HOSPADM

## 2025-04-09 RX ORDER — PANTOPRAZOLE SODIUM 40 MG/1
40 TABLET, DELAYED RELEASE ORAL
Status: DISCONTINUED | OUTPATIENT
Start: 2025-04-10 | End: 2025-04-09 | Stop reason: SDUPTHER

## 2025-04-09 RX ORDER — DEXTROSE 50 % IN WATER (D50W) INTRAVENOUS SYRINGE
25
Status: DISCONTINUED | OUTPATIENT
Start: 2025-04-09 | End: 2025-04-14 | Stop reason: HOSPADM

## 2025-04-09 RX ORDER — METFORMIN HYDROCHLORIDE 500 MG/1
1000 TABLET, EXTENDED RELEASE ORAL
Status: DISCONTINUED | OUTPATIENT
Start: 2025-04-10 | End: 2025-04-14 | Stop reason: HOSPADM

## 2025-04-09 RX ORDER — PANTOPRAZOLE SODIUM 40 MG/1
40 TABLET, DELAYED RELEASE ORAL
Status: DISCONTINUED | OUTPATIENT
Start: 2025-04-10 | End: 2025-04-14 | Stop reason: HOSPADM

## 2025-04-09 RX ORDER — ENOXAPARIN SODIUM 100 MG/ML
40 INJECTION SUBCUTANEOUS EVERY 24 HOURS
Status: DISCONTINUED | OUTPATIENT
Start: 2025-04-09 | End: 2025-04-14 | Stop reason: HOSPADM

## 2025-04-09 RX ORDER — MORPHINE SULFATE 4 MG/ML
4 INJECTION INTRAVENOUS ONCE
Status: COMPLETED | OUTPATIENT
Start: 2025-04-09 | End: 2025-04-09

## 2025-04-09 RX ORDER — TALC
3 POWDER (GRAM) TOPICAL NIGHTLY
Status: DISCONTINUED | OUTPATIENT
Start: 2025-04-09 | End: 2025-04-14 | Stop reason: HOSPADM

## 2025-04-09 RX ORDER — ONDANSETRON HYDROCHLORIDE 2 MG/ML
4 INJECTION, SOLUTION INTRAVENOUS EVERY 8 HOURS PRN
Status: DISCONTINUED | OUTPATIENT
Start: 2025-04-09 | End: 2025-04-14 | Stop reason: HOSPADM

## 2025-04-09 RX ORDER — INSULIN GLARGINE 100 [IU]/ML
7 INJECTION, SOLUTION SUBCUTANEOUS NIGHTLY
Status: DISCONTINUED | OUTPATIENT
Start: 2025-04-09 | End: 2025-04-12

## 2025-04-09 RX ORDER — SUCRALFATE 1 G/1
1 TABLET ORAL
Status: DISCONTINUED | OUTPATIENT
Start: 2025-04-09 | End: 2025-04-14 | Stop reason: HOSPADM

## 2025-04-09 RX ORDER — LACTULOSE 10 G/15ML
20 SOLUTION ORAL 2 TIMES DAILY
Status: DISCONTINUED | OUTPATIENT
Start: 2025-04-09 | End: 2025-04-14 | Stop reason: HOSPADM

## 2025-04-09 RX ORDER — CEFTRIAXONE 1 G/50ML
1 INJECTION, SOLUTION INTRAVENOUS ONCE
Status: COMPLETED | OUTPATIENT
Start: 2025-04-09 | End: 2025-04-09

## 2025-04-09 RX ORDER — KETOROLAC TROMETHAMINE 15 MG/ML
15 INJECTION, SOLUTION INTRAMUSCULAR; INTRAVENOUS ONCE
Status: COMPLETED | OUTPATIENT
Start: 2025-04-09 | End: 2025-04-09

## 2025-04-09 RX ORDER — MIRTAZAPINE 15 MG/1
7.5 TABLET, FILM COATED ORAL NIGHTLY
Status: DISCONTINUED | OUTPATIENT
Start: 2025-04-09 | End: 2025-04-14 | Stop reason: HOSPADM

## 2025-04-09 RX ORDER — INSULIN LISPRO 100 [IU]/ML
0-15 INJECTION, SOLUTION INTRAVENOUS; SUBCUTANEOUS
Status: DISCONTINUED | OUTPATIENT
Start: 2025-04-10 | End: 2025-04-14 | Stop reason: HOSPADM

## 2025-04-09 RX ORDER — ONDANSETRON 4 MG/1
4 TABLET, FILM COATED ORAL EVERY 8 HOURS PRN
Status: DISCONTINUED | OUTPATIENT
Start: 2025-04-09 | End: 2025-04-14 | Stop reason: HOSPADM

## 2025-04-09 RX ORDER — CARVEDILOL 6.25 MG/1
6.25 TABLET ORAL DAILY
Status: DISCONTINUED | OUTPATIENT
Start: 2025-04-10 | End: 2025-04-14 | Stop reason: HOSPADM

## 2025-04-09 RX ORDER — SPIRONOLACTONE 25 MG/1
150 TABLET ORAL DAILY
Status: DISCONTINUED | OUTPATIENT
Start: 2025-04-10 | End: 2025-04-14 | Stop reason: HOSPADM

## 2025-04-09 RX ORDER — ONDANSETRON 4 MG/1
4 TABLET, ORALLY DISINTEGRATING ORAL EVERY 6 HOURS
Status: DISCONTINUED | OUTPATIENT
Start: 2025-04-09 | End: 2025-04-09 | Stop reason: SDUPTHER

## 2025-04-09 RX ORDER — ATORVASTATIN CALCIUM 20 MG/1
20 TABLET, FILM COATED ORAL NIGHTLY
Status: DISCONTINUED | OUTPATIENT
Start: 2025-04-09 | End: 2025-04-14 | Stop reason: HOSPADM

## 2025-04-09 RX ORDER — PANTOPRAZOLE SODIUM 40 MG/10ML
40 INJECTION, POWDER, LYOPHILIZED, FOR SOLUTION INTRAVENOUS
Status: DISCONTINUED | OUTPATIENT
Start: 2025-04-10 | End: 2025-04-14 | Stop reason: HOSPADM

## 2025-04-09 RX ORDER — TORSEMIDE 20 MG/1
40 TABLET ORAL DAILY
Status: DISCONTINUED | OUTPATIENT
Start: 2025-04-10 | End: 2025-04-13

## 2025-04-09 RX ORDER — POLYETHYLENE GLYCOL 3350 17 G/17G
17 POWDER, FOR SOLUTION ORAL DAILY
Status: DISCONTINUED | OUTPATIENT
Start: 2025-04-10 | End: 2025-04-14 | Stop reason: HOSPADM

## 2025-04-09 RX ADMIN — KETOROLAC TROMETHAMINE 15 MG: 15 INJECTION, SOLUTION INTRAMUSCULAR; INTRAVENOUS at 16:39

## 2025-04-09 RX ADMIN — ONDANSETRON 4 MG: 2 INJECTION INTRAMUSCULAR; INTRAVENOUS at 16:39

## 2025-04-09 RX ADMIN — MORPHINE SULFATE 4 MG: 4 INJECTION, SOLUTION INTRAMUSCULAR; INTRAVENOUS at 17:38

## 2025-04-09 RX ADMIN — CEFTRIAXONE SODIUM 1 G: 1 INJECTION, SOLUTION INTRAVENOUS at 20:23

## 2025-04-09 NOTE — ED PROVIDER NOTES
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  She states that she was recently admitted to the hospital a couple of weeks ago for similar symptoms.  She underwent diagnostic paracentesis which did help with her symptoms.  She was discharged and have been doing fine at home.  She states over the past 2 or 3 days her abdomen has become more and more distended.  She also complains of pain in her abdomen as well as in her back.  Reports that the pain has progressed to include various parts of her body.  She reports significant amount of pain in her right hand and wrist especially in the mornings.  She developed a small rash on the palm of her hand 2 days ago as well and the rest of her symptoms began.  She reports some nausea as well as 2 episodes of vomiting today.  She denies any fevers but has had intermittent periods of chills that are short-lived.  She also had some diarrhea earlier today.  Finally endorses burning and itching when she pees.  She has been compliant with her medications.  No other symptoms at this time.    Limitations to history: Mild language barrier  Independent historian(s): Patient's  at bedside  Records Reviewed: Recent available ED and inpatient notes reviewed in EMR.  Discharge summary from 3/18 when patient had been admitted for abdominal pain, underwent paracentesis, was recommended for hepatology follow-up to consider liver transplant.    PMHx/PSHx:  Per HPI.   - has a past medical history of Ascites, Asthma, Cirrhosis of liver (Multi), Diabetes mellitus  (Multi), GERD (gastroesophageal reflux disease), CORBY (obstructive sleep apnea), Portal hypertension (Multi), and Thrombocytopenia (CMS-HCC).  - has a past surgical history that includes CT guided imaging for needle placement (10/14/2020); US guided abdominal paracentesis (10/08/2020); Cholecystectomy (2007);  section, low transverse; Tubal ligation; Esophagogastroduodenoscopy; Colonoscopy; and Inguinal hidradenitis excision.    Medications:  Reviewed in EMR. See EMR for complete list of medications and doses.    Allergies:  Gluten    Social History:  - Tobacco:  reports that she has never smoked. She has never used smokeless tobacco.   - Alcohol:  reports no history of alcohol use.   - Illicit Drugs:  reports no history of drug use.     ROS:  Per HPI.       Physical Exam     Triage Vitals:  T 36.5 °C (97.7 °F)  HR (!) 103  /79  RR 17  O2 98 %      General: Patient resting in bed, no acute distress, breathing easily, appropriately conversational without confusion or gross mental status changes.  Head: Normocephalic. Atraumatic.  Neck:  FROM. No gross masses.   Eyes: EOMI. mild scleral icterus.  ENT: Moist mucous membranes, no apparent trauma or lesions.  CV: Regular rhythm. No murmurs, rubs, gallops appreciated. 2+ radial and DP pulses bilaterally.  Resp: Clear to auscultation bilaterally. No respiratory distress.   GI: Firm, distended.  Mildly tender to palpation diffusely.  Fluid wave positive.    EXT: No peripheral edema, contusions, or wounds.  Skin: Warm and dry, no rashes or lesions.  Neuro: Alert and oriented.  No focal neurological deficits.  Equal motor strength in bilateral upper and lower extremities.  Sensation intact throughout.  Speech fluent.      Medical Decision Making & ED Course     Labs:   Labs Reviewed   CBC WITH AUTO DIFFERENTIAL - Abnormal       Result Value    WBC 5.9      nRBC 0.0      RBC 3.74 (*)     Hemoglobin 11.8 (*)     Hematocrit 33.8 (*)     MCV 90      MCH 31.6       MCHC 34.9      RDW 15.5 (*)     Platelets 91 (*)     Neutrophils % 61.6      Immature Granulocytes %, Automated 0.3      Lymphocytes % 21.2      Monocytes % 11.2      Eosinophils % 5.4      Basophils % 0.3      Neutrophils Absolute 3.63      Immature Granulocytes Absolute, Automated 0.02      Lymphocytes Absolute 1.25      Monocytes Absolute 0.66      Eosinophils Absolute 0.32      Basophils Absolute 0.02     COMPREHENSIVE METABOLIC PANEL - Abnormal    Glucose 509 (*)     Sodium 131 (*)     Potassium 3.6      Chloride 97 (*)     Bicarbonate 28      Anion Gap 10      Urea Nitrogen 11      Creatinine 0.76      eGFR >90      Calcium 8.3 (*)     Albumin 2.5 (*)     Alkaline Phosphatase 434 (*)     Total Protein 7.5      AST 56 (*)     Bilirubin, Total 2.9 (*)     ALT 29     LIPASE - Abnormal    Lipase 248 (*)     Narrative:     Venipuncture immediately after or during the administration of Metamizole may lead to falsely low results. Testing should be performed immediately prior to Metamizole dosing.   PROTIME-INR - Abnormal    Protime 17.8 (*)     INR 1.6 (*)    URINALYSIS WITH REFLEX CULTURE AND MICROSCOPIC - Abnormal    Color, Urine Yellow      Appearance, Urine Turbid (*)     Specific Gravity, Urine 1.030      pH, Urine 5.5      Protein, Urine NEGATIVE      Glucose, Urine OVER (4+) (*)     Blood, Urine 1.0 (3+) (*)     Ketones, Urine NEGATIVE      Bilirubin, Urine NEGATIVE      Urobilinogen, Urine Normal      Nitrite, Urine NEGATIVE      Leukocyte Esterase, Urine 500 Mckenzie/uL (*)     Narrative:     OVER is reported when the result is greater than the clinically reportable range.   MICROSCOPIC ONLY, URINE - Abnormal    WBC, Urine >50 (*)     WBC Clumps, Urine RARE      RBC, Urine >20 (*)     Squamous Epithelial Cells, Urine 10-25 (FEW)      Bacteria, Urine 1+ (*)     Budding Yeast, Urine PRESENT (*)    BLOOD GAS VENOUS FULL PANEL - Abnormal    POCT pH, Venous 7.48 (*)     POCT pCO2, Venous 41      POCT pO2, Venous 72  (*)     POCT SO2, Venous 96 (*)     POCT Oxy Hemoglobin, Venous 93.6 (*)     POCT Hematocrit Calculated, Venous 34.0 (*)     POCT Sodium, Venous 133 (*)     POCT Potassium, Venous 4.0      POCT Chloride, Venous 102      POCT Ionized Calicum, Venous 1.14      POCT Glucose, Venous 350 (*)     POCT Lactate, Venous 0.6      POCT Base Excess, Venous 6.4 (*)     POCT HCO3 Calculated, Venous 30.5 (*)     POCT Hemoglobin, Venous 11.2 (*)     POCT Anion Gap, Venous 5.0 (*)     Patient Temperature 37.0      FiO2 21     BILIRUBIN, TOTAL - Abnormal    Bilirubin, Total 2.9 (*)    BILIRUBIN, DIRECT - Abnormal    Bilirubin, Direct 0.9 (*)    BILIRUBIN, DIRECT - Abnormal    Bilirubin, Direct 1.1 (*)    COAGULATION SCREEN - Abnormal    Protime 15.5 (*)     INR 1.4 (*)     aPTT 32      Narrative:     The APTT is no longer used for monitoring Unfractionated Heparin Therapy. For monitoring Heparin Therapy, use the Heparin Assay.   CBC WITH AUTO DIFFERENTIAL - Abnormal    WBC 4.0 (*)     nRBC 0.0      RBC 3.62 (*)     Hemoglobin 11.2 (*)     Hematocrit 35.0 (*)     MCV 97      MCH 30.9      MCHC 32.0      RDW 15.9 (*)     Platelets 88 (*)     Neutrophils % 53.1      Immature Granulocytes %, Automated 0.2      Lymphocytes % 26.6      Monocytes % 10.7      Eosinophils % 8.9      Basophils % 0.5      Neutrophils Absolute 2.14      Immature Granulocytes Absolute, Automated 0.01      Lymphocytes Absolute 1.07 (*)     Monocytes Absolute 0.43      Eosinophils Absolute 0.36      Basophils Absolute 0.02     COMPREHENSIVE METABOLIC PANEL - Abnormal    Glucose 232 (*)     Sodium 135 (*)     Potassium 3.5      Chloride 101      Bicarbonate 28      Anion Gap 10      Urea Nitrogen 14      Creatinine 0.69      eGFR >90      Calcium 8.0 (*)     Albumin 2.1 (*)     Alkaline Phosphatase 317 (*)     Total Protein 6.6      AST 53 (*)     Bilirubin, Total 3.2 (*)     ALT 23     LACTATE DEHYDROGENASE - Abnormal     (*)    POCT GLUCOSE - Abnormal     POCT Glucose 229 (*)    POCT GLUCOSE - Abnormal    POCT Glucose 228 (*)    POCT GLUCOSE - Abnormal    POCT Glucose 231 (*)    URINE CULTURE - Normal    Urine Culture        Value: Growth indicates contamination with mixed bacterial kassidy. Repeat culture if clinically indicated.   BLOOD CULTURE - Normal    Blood Culture Loaded on Instrument - Culture in progress     BLOOD CULTURE - Normal    Blood Culture Loaded on Instrument - Culture in progress     MAGNESIUM - Normal    Magnesium 1.94     B-TYPE NATRIURETIC PEPTIDE - Normal    BNP 24      Narrative:        <100 pg/mL - Heart failure unlikely  100-299 pg/mL - Intermediate probability of acute heart                  failure exacerbation. Correlate with clinical                  context and patient history.    >=300 pg/mL - Heart Failure likely. Correlate with clinical                  context and patient history.     Biotin interference may cause falsely decreased results. Patients taking a Biotin dose of up to 5 mg/day should refrain from taking Biotin for 24 hours before sample  collection. Providers may contact their local laboratory for further information.   AMMONIA - Normal    Ammonia 32     OSMOLALITY - Normal    Osmolality, Serum 296     HIV 1/2 ANTIGEN/ANTIBODY SCREEN WIH REFLEX TO CONFIRMATION - Normal    HIV 1/2 Antigen/Antibody Screen with Reflex to Confirmation Nonreactive      Narrative:     HIV Ag/Ab screen is performed using the Siemens [employer] HIV Ag/Ab Combo assay which detects the presence of HIV p24 antigen as well as antibodies to HIV-1 (Group M and O) and HIV-2.  No laboratory evidence of HIV infection. If acute HIV infection is suspected, consider testing for HIV RNA by PCR (viral load).   STERILE FLUID CULTURE/SMEAR   URINALYSIS WITH REFLEX CULTURE AND MICROSCOPIC    Narrative:     The following orders were created for panel order Urinalysis with Reflex Culture and Microscopic.  Procedure                               Abnormality          Status                     ---------                               -----------         ------                     Urinalysis with Reflex C...[[id number]]  Abnormal            Final result               Extra Urine Gray Tube[[id number]]                            Final result                 Please view results for these tests on the individual orders.   EXTRA URINE GRAY TUBE    Extra Tube Hold for add-ons.     HEMOGLOBIN A1C   SODIUM, URINE RANDOM   ALBUMIN, BODY FLUID    Narrative:     The following orders were created for panel order Albumin, Body Fluid.  Procedure                               Abnormality         Status                     ---------                               -----------         ------                     Albumin, Body Fluid[[id number]]                                                           Please view results for these tests on the individual orders.   AMYLASE, BODY FLUID    Narrative:     The following orders were created for panel order Amylase, Body Fluid.  Procedure                               Abnormality         Status                     ---------                               -----------         ------                     Amylase, Body Fluid[[id number]]                                                           Please view results for these tests on the individual orders.   BODY FLUID CELL COUNT WITH DIFFERENTIAL    Narrative:     The following orders were created for panel order Body Fluid Cell Count With Differential.  Procedure                               Abnormality         Status                     ---------                               -----------         ------                     Body Fluid Cell Count[[id number]]                                                       Body Fluid Differential[[id number]]                                                       Please view results for these tests on the individual orders.   PROTEIN, TOTAL, BODY FLUID    Narrative:     The following  orders were created for panel order Protein, Total, Body Fluid.  Procedure                               Abnormality         Status                     ---------                               -----------         ------                     Protein, Total, Body Fluid[[id number]]                                                    Please view results for these tests on the individual orders.   GLUCOSE, BODY FLUID    Narrative:     The following orders were created for panel order Glucose, Body Fluid.  Procedure                               Abnormality         Status                     ---------                               -----------         ------                     Glucose, Body Fluid[[id number]]                                                           Please view results for these tests on the individual orders.   LACTATE DEHYDROGENASE, BODY FLUID    Narrative:     The following orders were created for panel order Lactate Dehydrogenase, Body Fluid.  Procedure                               Abnormality         Status                     ---------                               -----------         ------                     Lactate Dehydrogenase, B...[[id number]]                                                   Please view results for these tests on the individual orders.   BILIRUBIN, TOTAL, BODY FLUID    Narrative:     The following orders were created for panel order Bilirubin, Total, Body Fluid.  Procedure                               Abnormality         Status                     ---------                               -----------         ------                     Bilirubin, Total, Body F...[[id number]]                                                   Please view results for these tests on the individual orders.   CHOLESTEROL, BODY FLUID    Narrative:     The following orders were created for panel order Cholesterol, Body Fluid.  Procedure                               Abnormality         Status                      ---------                               -----------         ------                     Cholesterol, Body Fluid[[id number]]                                                       Please view results for these tests on the individual orders.   ALBUMIN, BODY FLUID   AMYLASE, BODY FLUID   BODY FLUID CELL COUNT   PROTEIN, TOTAL, BODY FLUID   GLUCOSE, BODY FLUID   LACTATE DEHYDROGENASE, BODY FLUID   BILIRUBIN, TOTAL, BODY FLUID   CHOLESTEROL, BODY FLUID   HUMAN CHORIONIC GONADOTROPIN, SERUM QUANTITATIVE   CYTOLOGY CONSULTATION (NON-GYNECOLOGIC)   POCT GLUCOSE METER   POCT GLUCOSE METER   POCT GLUCOSE METER   POCT GLUCOSE METER   BODY FLUID CELL DIFFERENTIAL        Imaging:   XR chest 1 view   Final Result   No acute cardiopulmonary disease.  There does not appear to be any   change when compared to the prior chest x-ray.   Signed by Aydin Garcia MD      US guided abdominal paracentesis    (Results Pending)   CT abdomen pelvis w IV contrast    (Results Pending)   US liver with doppler    (Results Pending)        EKG:  None    MDM:  This is a 50-year-old female presents to the emergency department with concerns for abdominal distention and pain in multiple areas.  She is hemodynamically stable.  Vital signs with mild tachycardia at 103 however other vitals are normal.  Clinically she is generally well-appearing.  On physical exam she has a distended and firm abdomen, mild and diffusely tender throughout.  Differential considered includes decompensated cirrhosis, uremia, metabolic abnormalities, infection.  I have lower suspicion at this time for SBP given lack of fevers, constitutional symptoms.  Workup initiated with labs and imaging.  Point-of-care ultrasound obtained at the patient's bedside does not reveal any significantly large pockets of fluid amenable to paracentesis, this will be deferred at this time.  Initial lab work showed hyperglycemia however this was appropriately coming down on repeat checks.  Patient  is diabetic and takes long-acting insulin.  There is no significant leukocytosis or anemia.  No other significant electrolyte abnormalities.  Urinalysis is consistent with urinary tract infection the patient was given ceftriaxone.  She was treated symptomatically however continued to be in pain.  I do feel that she does require a therapeutic paracentesis and further management of her decompensated cirrhosis.  She is unable to get in with hepatology in a short period of time and therefore will require admission for further workup and management.  Admissions  contacted and the patient accepted to medicine.  She remained stable and awaits transfer to regular nursing floor.      ED Course:  ED Course as of 04/10/25 1425   Wed Apr 09, 2025   1722 Performed bedside abdominal ultrasound without visualization of adequate pocket of fluid to perform paracentesis. [VM]   1900 GLUCOSE(!!): 509 [VM]   1900 Comprehensive Metabolic Panel(!!)  No significant electrolyte abnormalities.  Normal renal function.  AST mildly elevated however ALT normal.  Bilirubin mildly up.  Alkaline phosphatase elevated. [VM]   1901 CBC with Differential(!)  No leukocytosis or significant anemia. [VM]   1901 Platelets(!): 91 [VM]   1901 INR(!): 1.6 [VM]   1901 WBC, Urine(!): >50 [VM]   1901 Leukocyte Esterase, Urine(!): 500 Mckenzie/uL [VM]   1948 XR chest 1 view  No acute cardiopulmonary process. [VM]   2015 BLOOD GAS VENOUS FULL PANEL(!)  No acidosis or lactate elevation.  Glucose is 350. [VM]      ED Course User Index  [VM] Mahesh Hogue DO         Diagnoses as of 04/10/25 1425   Decompensated cirrhosis   Hyperglycemia   Urinary tract infection with hematuria, site unspecified       Independent Result Review and Interpretation: Relevant laboratory and radiographic results were reviewed and independently interpreted by myself.  As necessary, they are commented on in the ED Course.    Social Determinants Limiting Care:  None  identified      Patient seen by and discussed with the attending emergency medicine physician.       Disposition    Admit    Mahesh Hogue DO   Emergency Medicine PGY-3  [facility]      Procedures      Procedures ? Smart[employer] last updated 4/10/2025 2:25 PM        Mahesh Hogue DO  Resident  04/10/25 1427

## 2025-04-09 NOTE — ED TRIAGE NOTES
Pt presents to ED for swollen abdomen x 1 month. Pt states that she has a hx of cirrhosis and has required drainage, with last one being two weeks ago where they took off 2 litres. Pt states that she has a hx of DM, hydronephrosis. Pt states that she has N/V/D, back pain, and loss of appetite. Pt denies chest pain and shortness of breathe.  Pt is Aox4.

## 2025-04-10 ENCOUNTER — APPOINTMENT (OUTPATIENT)
Dept: RADIOLOGY | Facility: HOSPITAL | Age: 50
DRG: 433 | End: 2025-04-10
Payer: COMMERCIAL

## 2025-04-10 LAB
ALBUMIN FLD-MCNC: <0.5 G/DL
ALBUMIN SERPL BCP-MCNC: 2.1 G/DL (ref 3.4–5)
ALP SERPL-CCNC: 317 U/L (ref 33–110)
ALT SERPL W P-5'-P-CCNC: 23 U/L (ref 7–45)
AMYLASE FLD-CCNC: 18 U/L
ANION GAP SERPL CALC-SCNC: 10 MMOL/L (ref 10–20)
APTT PPP: 32 SECONDS (ref 26–36)
AST SERPL W P-5'-P-CCNC: 53 U/L (ref 9–39)
B-HCG SERPL-ACNC: <3 MIU/ML
BACTERIA UR CULT: NORMAL
BASOPHILS # BLD AUTO: 0.02 X10*3/UL (ref 0–0.1)
BASOPHILS NFR BLD AUTO: 0.5 %
BASOPHILS NFR FLD MANUAL: 0 %
BILIRUB DIRECT SERPL-MCNC: 0.9 MG/DL (ref 0–0.3)
BILIRUB DIRECT SERPL-MCNC: 1.1 MG/DL (ref 0–0.3)
BILIRUB FLD-MCNC: 0.6 MG/DL
BILIRUB SERPL-MCNC: 2.9 MG/DL (ref 0–1.2)
BILIRUB SERPL-MCNC: 3.2 MG/DL (ref 0–1.2)
BLASTS NFR FLD MANUAL: 0 %
BUN SERPL-MCNC: 14 MG/DL (ref 6–23)
CALCIUM SERPL-MCNC: 8 MG/DL (ref 8.6–10.6)
CHLORIDE SERPL-SCNC: 101 MMOL/L (ref 98–107)
CHOLEST FLD-MCNC: <25 MG/DL
CLARITY FLD: CLEAR
CO2 SERPL-SCNC: 28 MMOL/L (ref 21–32)
COLOR FLD: YELLOW
CREAT SERPL-MCNC: 0.69 MG/DL (ref 0.5–1.05)
CREAT UR-MCNC: 82.2 MG/DL (ref 20–320)
EGFRCR SERPLBLD CKD-EPI 2021: >90 ML/MIN/1.73M*2
EOSINOPHIL # BLD AUTO: 0.36 X10*3/UL (ref 0–0.7)
EOSINOPHIL NFR BLD AUTO: 8.9 %
EOSINOPHIL NFR FLD MANUAL: 0 %
ERYTHROCYTE [DISTWIDTH] IN BLOOD BY AUTOMATED COUNT: 15.9 % (ref 11.5–14.5)
EST. AVERAGE GLUCOSE BLD GHB EST-MCNC: 237 MG/DL
GLUCOSE BLD MANUAL STRIP-MCNC: 212 MG/DL (ref 74–99)
GLUCOSE BLD MANUAL STRIP-MCNC: 227 MG/DL (ref 74–99)
GLUCOSE BLD MANUAL STRIP-MCNC: 228 MG/DL (ref 74–99)
GLUCOSE BLD MANUAL STRIP-MCNC: 231 MG/DL (ref 74–99)
GLUCOSE FLD-MCNC: 271 MG/DL
GLUCOSE SERPL-MCNC: 232 MG/DL (ref 74–99)
HBA1C MFR BLD: 9.9 %
HCT VFR BLD AUTO: 35 % (ref 36–46)
HGB BLD-MCNC: 11.2 G/DL (ref 12–16)
HIV 1+2 AB+HIV1 P24 AG SERPL QL IA: NONREACTIVE
HOLD SPECIMEN: NORMAL
IMM GRANULOCYTES # BLD AUTO: 0.01 X10*3/UL (ref 0–0.7)
IMM GRANULOCYTES NFR BLD AUTO: 0.2 % (ref 0–0.9)
IMMATURE GRANULOCYTES IN FLUID: 0 %
INR PPP: 1.4 (ref 0.9–1.1)
LDH FLD L TO P-CCNC: 48 U/L
LDH SERPL L TO P-CCNC: 325 U/L (ref 84–246)
LYMPHOCYTES # BLD AUTO: 1.07 X10*3/UL (ref 1.2–4.8)
LYMPHOCYTES NFR BLD AUTO: 26.6 %
LYMPHOCYTES NFR FLD MANUAL: 20 %
MCH RBC QN AUTO: 30.9 PG (ref 26–34)
MCHC RBC AUTO-ENTMCNC: 32 G/DL (ref 32–36)
MCV RBC AUTO: 97 FL (ref 80–100)
MONOCYTES # BLD AUTO: 0.43 X10*3/UL (ref 0.1–1)
MONOCYTES NFR BLD AUTO: 10.7 %
MONOS+MACROS NFR FLD MANUAL: 74 %
NEUTROPHILS # BLD AUTO: 2.14 X10*3/UL (ref 1.2–7.7)
NEUTROPHILS NFR BLD AUTO: 53.1 %
NEUTROPHILS NFR FLD MANUAL: 6 %
NRBC BLD-RTO: 0 /100 WBCS (ref 0–0)
OSMOLALITY SERPL: 296 MOSM/KG (ref 280–300)
OTHER CELLS NFR FLD MANUAL: 0 %
PLASMA CELLS NFR FLD MANUAL: 0 %
PLATELET # BLD AUTO: 88 X10*3/UL (ref 150–450)
POTASSIUM SERPL-SCNC: 3.5 MMOL/L (ref 3.5–5.3)
PROT FLD-MCNC: 1.3 G/DL
PROT SERPL-MCNC: 6.6 G/DL (ref 6.4–8.2)
PROTHROMBIN TIME: 15.5 SECONDS (ref 9.8–12.4)
RBC # BLD AUTO: 3.62 X10*6/UL (ref 4–5.2)
RBC # FLD MANUAL: 20 /UL
SODIUM SERPL-SCNC: 135 MMOL/L (ref 136–145)
SODIUM UR-SCNC: 49 MMOL/L
SODIUM/CREAT UR-RTO: 60 MMOL/G CREAT
TOTAL CELLS COUNTED FLD: 100
WBC # BLD AUTO: 4 X10*3/UL (ref 4.4–11.3)
WBC # FLD MANUAL: 98 /UL

## 2025-04-10 PROCEDURE — 2500000001 HC RX 250 WO HCPCS SELF ADMINISTERED DRUGS (ALT 637 FOR MEDICARE OP): Mod: SE

## 2025-04-10 PROCEDURE — 2500000004 HC RX 250 GENERAL PHARMACY W/ HCPCS (ALT 636 FOR OP/ED): Mod: SE

## 2025-04-10 PROCEDURE — 88112 CYTOPATH CELL ENHANCE TECH: CPT | Mod: TC,MCY

## 2025-04-10 PROCEDURE — 82150 ASSAY OF AMYLASE: CPT

## 2025-04-10 PROCEDURE — 85610 PROTHROMBIN TIME: CPT

## 2025-04-10 PROCEDURE — 83615 LACTATE (LD) (LDH) ENZYME: CPT

## 2025-04-10 PROCEDURE — 89051 BODY FLUID CELL COUNT: CPT

## 2025-04-10 PROCEDURE — 36415 COLL VENOUS BLD VENIPUNCTURE: CPT

## 2025-04-10 PROCEDURE — 74177 CT ABD & PELVIS W/CONTRAST: CPT

## 2025-04-10 PROCEDURE — 85025 COMPLETE CBC W/AUTO DIFF WBC: CPT

## 2025-04-10 PROCEDURE — 99232 SBSQ HOSP IP/OBS MODERATE 35: CPT | Performed by: STUDENT IN AN ORGANIZED HEALTH CARE EDUCATION/TRAINING PROGRAM

## 2025-04-10 PROCEDURE — 82570 ASSAY OF URINE CREATININE: CPT

## 2025-04-10 PROCEDURE — 99222 1ST HOSP IP/OBS MODERATE 55: CPT | Performed by: STUDENT IN AN ORGANIZED HEALTH CARE EDUCATION/TRAINING PROGRAM

## 2025-04-10 PROCEDURE — 1100000001 HC PRIVATE ROOM DAILY

## 2025-04-10 PROCEDURE — 82947 ASSAY GLUCOSE BLOOD QUANT: CPT

## 2025-04-10 PROCEDURE — 49083 ABD PARACENTESIS W/IMAGING: CPT

## 2025-04-10 PROCEDURE — 84157 ASSAY OF PROTEIN OTHER: CPT

## 2025-04-10 PROCEDURE — 84702 CHORIONIC GONADOTROPIN TEST: CPT

## 2025-04-10 PROCEDURE — 0W9G3ZZ DRAINAGE OF PERITONEAL CAVITY, PERCUTANEOUS APPROACH: ICD-10-PCS | Performed by: NURSE PRACTITIONER

## 2025-04-10 PROCEDURE — 82247 BILIRUBIN TOTAL: CPT

## 2025-04-10 PROCEDURE — 2500000002 HC RX 250 W HCPCS SELF ADMINISTERED DRUGS (ALT 637 FOR MEDICARE OP, ALT 636 FOR OP/ED): Mod: SE

## 2025-04-10 PROCEDURE — 87040 BLOOD CULTURE FOR BACTERIA: CPT

## 2025-04-10 PROCEDURE — 87070 CULTURE OTHR SPECIMN AEROBIC: CPT

## 2025-04-10 PROCEDURE — 83036 HEMOGLOBIN GLYCOSYLATED A1C: CPT

## 2025-04-10 PROCEDURE — 49083 ABD PARACENTESIS W/IMAGING: CPT | Performed by: NURSE PRACTITIONER

## 2025-04-10 PROCEDURE — 2500000005 HC RX 250 GENERAL PHARMACY W/O HCPCS: Mod: SE

## 2025-04-10 PROCEDURE — 82248 BILIRUBIN DIRECT: CPT

## 2025-04-10 PROCEDURE — 88112 CYTOPATH CELL ENHANCE TECH: CPT | Performed by: PATHOLOGY

## 2025-04-10 PROCEDURE — 82042 OTHER SOURCE ALBUMIN QUAN EA: CPT

## 2025-04-10 PROCEDURE — 82465 ASSAY BLD/SERUM CHOLESTEROL: CPT

## 2025-04-10 PROCEDURE — 80053 COMPREHEN METABOLIC PANEL: CPT

## 2025-04-10 PROCEDURE — 2500000001 HC RX 250 WO HCPCS SELF ADMINISTERED DRUGS (ALT 637 FOR MEDICARE OP): Mod: SE | Performed by: STUDENT IN AN ORGANIZED HEALTH CARE EDUCATION/TRAINING PROGRAM

## 2025-04-10 PROCEDURE — 2550000001 HC RX 255 CONTRASTS: Mod: SE | Performed by: STUDENT IN AN ORGANIZED HEALTH CARE EDUCATION/TRAINING PROGRAM

## 2025-04-10 PROCEDURE — 82945 GLUCOSE OTHER FLUID: CPT

## 2025-04-10 PROCEDURE — 88305 TISSUE EXAM BY PATHOLOGIST: CPT | Performed by: PATHOLOGY

## 2025-04-10 PROCEDURE — 83930 ASSAY OF BLOOD OSMOLALITY: CPT

## 2025-04-10 PROCEDURE — 89050 BODY FLUID CELL COUNT: CPT

## 2025-04-10 RX ORDER — ACETAMINOPHEN 160 MG/5ML
650 SOLUTION ORAL EVERY 4 HOURS PRN
Status: DISCONTINUED | OUTPATIENT
Start: 2025-04-10 | End: 2025-04-14 | Stop reason: HOSPADM

## 2025-04-10 RX ORDER — ACETAMINOPHEN 650 MG/1
650 SUPPOSITORY RECTAL EVERY 4 HOURS PRN
Status: DISCONTINUED | OUTPATIENT
Start: 2025-04-10 | End: 2025-04-14 | Stop reason: HOSPADM

## 2025-04-10 RX ORDER — POTASSIUM CHLORIDE 14.9 MG/ML
20 INJECTION INTRAVENOUS ONCE
Status: COMPLETED | OUTPATIENT
Start: 2025-04-10 | End: 2025-04-10

## 2025-04-10 RX ORDER — URSODIOL 500 MG/1
500 TABLET, FILM COATED ORAL DAILY
Status: DISCONTINUED | OUTPATIENT
Start: 2025-04-10 | End: 2025-04-10

## 2025-04-10 RX ORDER — VANCOMYCIN HYDROCHLORIDE 1 G/20ML
INJECTION, POWDER, LYOPHILIZED, FOR SOLUTION INTRAVENOUS DAILY PRN
Status: DISCONTINUED | OUTPATIENT
Start: 2025-04-10 | End: 2025-04-11

## 2025-04-10 RX ORDER — ACETAMINOPHEN 325 MG/1
487 TABLET ORAL EVERY 6 HOURS PRN
Status: DISCONTINUED | OUTPATIENT
Start: 2025-04-10 | End: 2025-04-14 | Stop reason: HOSPADM

## 2025-04-10 RX ORDER — NYSTATIN 100000 [USP'U]/G
1 POWDER TOPICAL 2 TIMES DAILY
Status: DISCONTINUED | OUTPATIENT
Start: 2025-04-10 | End: 2025-04-14 | Stop reason: HOSPADM

## 2025-04-10 RX ORDER — URSODIOL 250 MG/1
250 TABLET, FILM COATED ORAL DAILY
Status: DISCONTINUED | OUTPATIENT
Start: 2025-04-10 | End: 2025-04-14 | Stop reason: HOSPADM

## 2025-04-10 RX ORDER — POTASSIUM CHLORIDE 20 MEQ/1
40 TABLET, EXTENDED RELEASE ORAL ONCE
Status: COMPLETED | OUTPATIENT
Start: 2025-04-10 | End: 2025-04-10

## 2025-04-10 RX ORDER — ACYCLOVIR 200 MG/1
400 CAPSULE ORAL 3 TIMES DAILY
Status: DISCONTINUED | OUTPATIENT
Start: 2025-04-10 | End: 2025-04-12

## 2025-04-10 RX ORDER — URSODIOL 250 MG/1
250 TABLET, FILM COATED ORAL DAILY
Status: DISCONTINUED | OUTPATIENT
Start: 2025-04-10 | End: 2025-04-10

## 2025-04-10 RX ORDER — URSODIOL 500 MG/1
500 TABLET, FILM COATED ORAL 2 TIMES DAILY
Status: DISCONTINUED | OUTPATIENT
Start: 2025-04-10 | End: 2025-04-14 | Stop reason: HOSPADM

## 2025-04-10 RX ADMIN — TRAZODONE HYDROCHLORIDE 25 MG: 50 TABLET ORAL at 00:44

## 2025-04-10 RX ADMIN — SUCRALFATE 1 G: 1 TABLET ORAL at 09:57

## 2025-04-10 RX ADMIN — LACTULOSE 20 G: 20 SOLUTION ORAL at 09:57

## 2025-04-10 RX ADMIN — ATORVASTATIN CALCIUM 20 MG: 20 TABLET, FILM COATED ORAL at 00:43

## 2025-04-10 RX ADMIN — POTASSIUM CHLORIDE 40 MEQ: 1500 TABLET, EXTENDED RELEASE ORAL at 15:11

## 2025-04-10 RX ADMIN — MIRTAZAPINE 7.5 MG: 15 TABLET, FILM COATED ORAL at 00:43

## 2025-04-10 RX ADMIN — IOHEXOL 80 ML: 350 INJECTION, SOLUTION INTRAVENOUS at 18:03

## 2025-04-10 RX ADMIN — ONDANSETRON 4 MG: 2 INJECTION INTRAMUSCULAR; INTRAVENOUS at 01:13

## 2025-04-10 RX ADMIN — NYSTATIN 1 APPLICATION: 100000 POWDER TOPICAL at 22:24

## 2025-04-10 RX ADMIN — ENOXAPARIN SODIUM 40 MG: 40 INJECTION, SOLUTION SUBCUTANEOUS at 00:45

## 2025-04-10 RX ADMIN — RIFAXIMIN 550 MG: 550 TABLET ORAL at 22:10

## 2025-04-10 RX ADMIN — ACYCLOVIR 400 MG: 200 CAPSULE ORAL at 16:18

## 2025-04-10 RX ADMIN — ATORVASTATIN CALCIUM 20 MG: 20 TABLET, FILM COATED ORAL at 22:10

## 2025-04-10 RX ADMIN — HYDROMORPHONE HYDROCHLORIDE 0.4 MG: 0.5 INJECTION, SOLUTION INTRAMUSCULAR; INTRAVENOUS; SUBCUTANEOUS at 00:44

## 2025-04-10 RX ADMIN — INSULIN GLARGINE 7 UNITS: 100 INJECTION, SOLUTION SUBCUTANEOUS at 22:21

## 2025-04-10 RX ADMIN — TORSEMIDE 40 MG: 20 TABLET ORAL at 09:57

## 2025-04-10 RX ADMIN — ACETAMINOPHEN 487.5 MG: 325 TABLET, FILM COATED ORAL at 22:21

## 2025-04-10 RX ADMIN — URSODIOL 250 MG: 250 TABLET ORAL at 22:08

## 2025-04-10 RX ADMIN — URSODIOL 500 MG: 500 TABLET ORAL at 22:10

## 2025-04-10 RX ADMIN — Medication 3 MG: at 22:10

## 2025-04-10 RX ADMIN — SPIRONOLACTONE 150 MG: 25 TABLET, FILM COATED ORAL at 09:57

## 2025-04-10 RX ADMIN — ACYCLOVIR 400 MG: 200 CAPSULE ORAL at 22:10

## 2025-04-10 RX ADMIN — VANCOMYCIN HYDROCHLORIDE 1250 MG: 1.25 INJECTION, POWDER, LYOPHILIZED, FOR SOLUTION INTRAVENOUS at 23:00

## 2025-04-10 RX ADMIN — RIFAXIMIN 550 MG: 550 TABLET ORAL at 00:43

## 2025-04-10 RX ADMIN — POTASSIUM CHLORIDE 20 MEQ: 14.9 INJECTION, SOLUTION INTRAVENOUS at 15:11

## 2025-04-10 RX ADMIN — PIPERACILLIN SODIUM AND TAZOBACTAM SODIUM 3.38 G: 3; .375 INJECTION, SOLUTION INTRAVENOUS at 22:11

## 2025-04-10 RX ADMIN — INSULIN LISPRO 6 UNITS: 100 INJECTION, SOLUTION INTRAVENOUS; SUBCUTANEOUS at 10:00

## 2025-04-10 RX ADMIN — SUCRALFATE 1 G: 1 TABLET ORAL at 22:10

## 2025-04-10 RX ADMIN — NYSTATIN 1 APPLICATION: 100000 POWDER TOPICAL at 16:18

## 2025-04-10 RX ADMIN — CARVEDILOL 6.25 MG: 6.25 TABLET, FILM COATED ORAL at 09:57

## 2025-04-10 RX ADMIN — MIRTAZAPINE 7.5 MG: 15 TABLET, FILM COATED ORAL at 22:10

## 2025-04-10 RX ADMIN — RIFAXIMIN 550 MG: 550 TABLET ORAL at 15:11

## 2025-04-10 RX ADMIN — ENOXAPARIN SODIUM 40 MG: 40 INJECTION, SOLUTION SUBCUTANEOUS at 22:11

## 2025-04-10 RX ADMIN — Medication 3 MG: at 00:44

## 2025-04-10 RX ADMIN — PANTOPRAZOLE SODIUM 40 MG: 40 INJECTION, POWDER, FOR SOLUTION INTRAVENOUS at 06:56

## 2025-04-10 RX ADMIN — RIFAXIMIN 550 MG: 550 TABLET ORAL at 09:57

## 2025-04-10 RX ADMIN — ACETAMINOPHEN 487.5 MG: 325 TABLET, FILM COATED ORAL at 07:32

## 2025-04-10 RX ADMIN — INSULIN GLARGINE 7 UNITS: 100 INJECTION, SOLUTION SUBCUTANEOUS at 00:49

## 2025-04-10 SDOH — SOCIAL STABILITY: SOCIAL INSECURITY: HAVE YOU HAD THOUGHTS OF HARMING ANYONE ELSE?: NO

## 2025-04-10 SDOH — SOCIAL STABILITY: SOCIAL NETWORK: HOW OFTEN DO YOU ATTEND MEETINGS OF THE CLUBS OR ORGANIZATIONS YOU BELONG TO?: NEVER

## 2025-04-10 SDOH — HEALTH STABILITY: PHYSICAL HEALTH: ON AVERAGE, HOW MANY MINUTES DO YOU ENGAGE IN EXERCISE AT THIS LEVEL?: 0 MIN

## 2025-04-10 SDOH — ECONOMIC STABILITY: FOOD INSECURITY: WITHIN THE PAST 12 MONTHS, THE FOOD YOU BOUGHT JUST DIDN'T LAST AND YOU DIDN'T HAVE MONEY TO GET MORE.: NEVER TRUE

## 2025-04-10 SDOH — HEALTH STABILITY: PHYSICAL HEALTH: ON AVERAGE, HOW MANY DAYS PER WEEK DO YOU ENGAGE IN MODERATE TO STRENUOUS EXERCISE (LIKE A BRISK WALK)?: 0 DAYS

## 2025-04-10 SDOH — SOCIAL STABILITY: SOCIAL INSECURITY: ABUSE: ADULT

## 2025-04-10 SDOH — SOCIAL STABILITY: SOCIAL INSECURITY: DOES ANYONE TRY TO KEEP YOU FROM HAVING/CONTACTING OTHER FRIENDS OR DOING THINGS OUTSIDE YOUR HOME?: NO

## 2025-04-10 SDOH — SOCIAL STABILITY: SOCIAL INSECURITY: ARE THERE ANY APPARENT SIGNS OF INJURIES/BEHAVIORS THAT COULD BE RELATED TO ABUSE/NEGLECT?: NO

## 2025-04-10 SDOH — SOCIAL STABILITY: SOCIAL INSECURITY: HAS ANYONE EVER THREATENED TO HURT YOUR FAMILY OR YOUR PETS?: NO

## 2025-04-10 SDOH — ECONOMIC STABILITY: HOUSING INSECURITY: IN THE PAST 12 MONTHS, HOW MANY TIMES HAVE YOU MOVED WHERE YOU WERE LIVING?: 0

## 2025-04-10 SDOH — SOCIAL STABILITY: SOCIAL INSECURITY: ARE YOU OR HAVE YOU BEEN THREATENED OR ABUSED PHYSICALLY, EMOTIONALLY, OR SEXUALLY BY ANYONE?: NO

## 2025-04-10 SDOH — ECONOMIC STABILITY: FOOD INSECURITY: WITHIN THE PAST 12 MONTHS, YOU WORRIED THAT YOUR FOOD WOULD RUN OUT BEFORE YOU GOT THE MONEY TO BUY MORE.: NEVER TRUE

## 2025-04-10 SDOH — SOCIAL STABILITY: SOCIAL INSECURITY: WITHIN THE LAST YEAR, HAVE YOU BEEN HUMILIATED OR EMOTIONALLY ABUSED IN OTHER WAYS BY YOUR PARTNER OR EX-PARTNER?: NO

## 2025-04-10 SDOH — SOCIAL STABILITY: SOCIAL INSECURITY: DO YOU FEEL ANYONE HAS EXPLOITED OR TAKEN ADVANTAGE OF YOU FINANCIALLY OR OF YOUR PERSONAL PROPERTY?: NO

## 2025-04-10 SDOH — SOCIAL STABILITY: SOCIAL INSECURITY: HAVE YOU HAD ANY THOUGHTS OF HARMING ANYONE ELSE?: NO

## 2025-04-10 SDOH — SOCIAL STABILITY: SOCIAL NETWORK: HOW OFTEN DO YOU ATTEND CHURCH OR RELIGIOUS SERVICES?: MORE THAN 4 TIMES PER YEAR

## 2025-04-10 SDOH — SOCIAL STABILITY: SOCIAL INSECURITY: WITHIN THE LAST YEAR, HAVE YOU BEEN AFRAID OF YOUR PARTNER OR EX-PARTNER?: NO

## 2025-04-10 SDOH — ECONOMIC STABILITY: INCOME INSECURITY: IN THE PAST 12 MONTHS HAS THE ELECTRIC, GAS, OIL, OR WATER COMPANY THREATENED TO SHUT OFF SERVICES IN YOUR HOME?: NO

## 2025-04-10 SDOH — SOCIAL STABILITY: SOCIAL INSECURITY: DO YOU FEEL UNSAFE GOING BACK TO THE PLACE WHERE YOU ARE LIVING?: NO

## 2025-04-10 SDOH — SOCIAL STABILITY: SOCIAL INSECURITY: WERE YOU ABLE TO COMPLETE ALL THE BEHAVIORAL HEALTH SCREENINGS?: YES

## 2025-04-10 ASSESSMENT — COGNITIVE AND FUNCTIONAL STATUS - GENERAL
DRESSING REGULAR LOWER BODY CLOTHING: A LITTLE
HELP NEEDED FOR BATHING: A LITTLE
TOILETING: A LITTLE
CLIMB 3 TO 5 STEPS WITH RAILING: A LITTLE
WALKING IN HOSPITAL ROOM: A LITTLE
PATIENT BASELINE BEDBOUND: NO
STANDING UP FROM CHAIR USING ARMS: A LITTLE
MOBILITY SCORE: 21
DAILY ACTIVITIY SCORE: 21

## 2025-04-10 ASSESSMENT — ACTIVITIES OF DAILY LIVING (ADL)
HEARING - RIGHT EAR: FUNCTIONAL
TOILETING: INDEPENDENT
JUDGMENT_ADEQUATE_SAFELY_COMPLETE_DAILY_ACTIVITIES: YES
LACK_OF_TRANSPORTATION: NO
BATHING: NEEDS ASSISTANCE
GROOMING: NEEDS ASSISTANCE
ASSISTIVE_DEVICE: WALKER;EYEGLASSES
ADEQUATE_TO_COMPLETE_ADL: YES
PATIENT'S MEMORY ADEQUATE TO SAFELY COMPLETE DAILY ACTIVITIES?: YES
LACK_OF_TRANSPORTATION: NO
WALKS IN HOME: NEEDS ASSISTANCE
HEARING - LEFT EAR: FUNCTIONAL
DRESSING YOURSELF: INDEPENDENT
FEEDING YOURSELF: INDEPENDENT

## 2025-04-10 ASSESSMENT — LIFESTYLE VARIABLES
HOW MANY STANDARD DRINKS CONTAINING ALCOHOL DO YOU HAVE ON A TYPICAL DAY: PATIENT DOES NOT DRINK
SKIP TO QUESTIONS 9-10: 1
PRESCIPTION_ABUSE_PAST_12_MONTHS: NO
HOW OFTEN DO YOU HAVE 6 OR MORE DRINKS ON ONE OCCASION: NEVER
SUBSTANCE_ABUSE_PAST_12_MONTHS: NO
HOW OFTEN DO YOU HAVE A DRINK CONTAINING ALCOHOL: NEVER
AUDIT-C TOTAL SCORE: 0
AUDIT-C TOTAL SCORE: 0

## 2025-04-10 ASSESSMENT — PAIN - FUNCTIONAL ASSESSMENT
PAIN_FUNCTIONAL_ASSESSMENT: 0-10

## 2025-04-10 ASSESSMENT — PAIN SCALES - GENERAL
PAINLEVEL_OUTOF10: 0 - NO PAIN
PAINLEVEL_OUTOF10: 5 - MODERATE PAIN
PAINLEVEL_OUTOF10: 6
PAINLEVEL_OUTOF10: 5 - MODERATE PAIN
PAINLEVEL_OUTOF10: 9

## 2025-04-10 ASSESSMENT — PAIN DESCRIPTION - LOCATION: LOCATION: HAND

## 2025-04-10 ASSESSMENT — PAIN DESCRIPTION - ORIENTATION: ORIENTATION: RIGHT

## 2025-04-10 NOTE — CONSULTS
[facility]   [facility] [place]  INITIAL CONSULT NOTE       Reason For Consult  Decompensated cirrhosis, consideration for TIPS and transplant evaluation    SUBJECTIVE     History Of Present Illness  Alfonzo Flanagan is a 50 y.o. female with a past medical history of biopsy-proven PBC with cirrhosis c/b portal HTN with ascites, esophageal varices (previous bleeding, s/p banding 2021 with eradication), hepatic encephalopathy, antibody-positive celiac disease, recent pancreatic tail mass suggestive of neuroendocrine tumor s/p EUS on 3/2024 (no biopsy) recommended repeat in 1 year, IDDM2 (A1c 7.6), HTN, GERD, anxiety, depression, CORBY who resented to ED 4/9/25 for abdominal distension and pain and dysuria. Hepatology is consulted for decompensated cirrhosis and consideration for TIPS and transplant evaluation.    Pt has been hospitalized multiple times in the past (this is her 5th admission since January) for abdominal distension. Most recently 3/12-3/18. Underwent therapeutic paracentesis with IR on 3/13 (2.5 L removed) and 3/17 (2 L removed). Patient responded well to diuresis with IV lasix and spironolactone. Patient was also treated for UTI with 5 day course of ceftriaxone (3/13-3/17). Patient was also seen by nutrition d/t concerns that patient was not compliant with low sodium diet and fluid restriction and patient received education regarding importance of dietary changes. It was thought that her issue with persistent ascites was partly due to issues with affording her diuretics. Last seen in hepatology clinic (Dr. Khanna 3/26/25). There was a TIPS consult placed and the decision was first to open a LT evaluation first however there were questions about her medical insurance. Based on transplant SW note from 3/31, pt is still pending medicaid.    Today, pt reports worsening of her abdominal distension which is causing her abdominal pain. She reports good medication  compliance but she has been unable to afford the recently increased dose of nataly so she is taking the old dose (100mg instead of 150mg). Reports taking torsemide (doesn't recall dose) and lactulose. She had 4BMs yesterday. Reports last para was approx 1.5 weeks ago outpt.       Review of Systems  12-point ROS has been reviewed and it is negative, except if mentioned otherwise above.    Social History:  -denies tobacco, etoh or drug use    Family History:  No family history on file.    Surgical History:    Past Surgical History:   Procedure Laterality Date     SECTION, LOW TRANSVERSE      CHOLECYSTECTOMY      COLONOSCOPY      CT GUIDED IMAGING FOR NEEDLE PLACEMENT  10/14/2020    CT GUIDED IMAGING FOR NEEDLE PLACEMENT [facility]    ESOPHAGOGASTRODUODENOSCOPY      INGUINAL HIDRADENITIS EXCISION      TUBAL LIGATION      US GUIDED ABDOMINAL PARACENTESIS  10/08/2020    US GUIDED ABDOMINAL PARACENTESIS [facility]       Home Medications  Medications Prior to Admission   Medication Sig Dispense Refill Last Dose/Taking    acetaminophen (Tylenol) 500 mg tablet Take 1-2 tablets (500-1,000 mg) by mouth every 6 hours if needed (pain). 30 tablet 0     alcohol swabs pads, medicated Apply 1 each topically once daily at bedtime. 100 each 0     atorvastatin (Lipitor) 20 mg tablet Take 1 tablet (20 mg) by mouth once daily at bedtime. 30 tablet 0     carvedilol (Coreg) 6.25 mg tablet Take 1 tablet (6.25 mg) by mouth once daily. 30 tablet 0     insulin glargine (Lantus) 100 unit/mL (3 mL) pen Inject 7 Units under the skin once daily at bedtime. Take as directed per insulin instructions. Discard pen 28 days after first use. 15 mL 0     lactulose 20 gram/30 mL oral solution Take 30 mL (20 g) by mouth 2 times a day. 1800 mL 0     meclizine (Antivert) 25 mg tablet Take 1 tablet (25 mg) by mouth once daily as needed for dizziness. 30 tablet 0     melatonin 3 mg tablet Take 1 tablet (3 mg) by mouth once daily at  "bedtime. 30 tablet 0     metFORMIN XR (Glucophage-XR) 500 mg 24 hr tablet Take 2 tablets (1,000 mg) by mouth once daily in the evening. Take with meals. Do not crush, chew, or split. 60 tablet 0     mirtazapine (Remeron) 7.5 mg tablet Take 1 tablet (7.5 mg) by mouth once daily at bedtime. 30 tablet 0     ondansetron ODT (Zofran-ODT) 4 mg disintegrating tablet Dissolve 1 tablet (4 mg) in the mouth every 6 hours. 120 tablet 0     pantoprazole (ProtoNix) 40 mg EC tablet Take 1 tablet (40 mg) by mouth once daily in the morning. Take before meals. Do not crush, chew, or split. 30 tablet 0     pen needle 1/2\" 29G X 12mm needle Use to inject 1-4 times daily as directed. 100 each 11     pen needle, diabetic (Sure Comfort Pen Needle) 32 gauge x 5/32\" needle Use 4 times a day 100 each 0     polyethylene glycol (Glycolax, Miralax) 17 gram packet Take 17 g by mouth once daily. 30 packet 0     pramoxine (Sarna Sensitive) 1 % lotion Apply 1 Application topically every 8 hours if needed (itch). 60 mL 0     rifAXIMin (Xifaxan) 550 mg tablet Take 1 tablet (550 mg) by mouth 3 times a day for 28 days. 42 tablet 1     spironolactone (Aldactone) 100 mg tablet Take 1.5 tablets (150 mg) by mouth once daily. 45 tablet 3     sucralfate (Carafate) 1 gram tablet Take 1 tablet (1 g) by mouth 4 times a day before meals. 120 tablet 0     torsemide (Demadex) 20 mg tablet Take 2 tablets (40 mg) by mouth once daily. 60 tablet 0     traZODone (Desyrel) 50 mg tablet Take 0.5 tablets (25 mg) by mouth as needed at bedtime for sleep. 15 tablet 0     ursodiol (Actigall) 250 mg tablet Take 2 tablets (500 mg) by mouth 2 times a day AND 1 tablet (250 mg) once daily. 150 tablet 0        Allergies:    Allergies   Allergen Reactions    Gluten Diarrhea         EXAM     Vitals:    Vitals:    04/09/25 1633 04/09/25 2001 04/09/25 2352 04/10/25 0534   BP: 119/61 113/72 122/75 126/73   BP Location: Right arm Left arm Right arm    Patient Position: Lying Lying Lying  "   Pulse: (!) 102 88 78 87   Resp: 18 16 18 17   Temp: 36.6 °C (97.9 °F) 36.8 °C (98.2 °F) 36.3 °C (97.3 °F) 36.3 °C (97.3 °F)   TempSrc: Oral Oral Temporal    SpO2: 100% 98% 98% 94%   Weight:       Height:         Failed to redirect to the Timeline version of the [employer] SmartLink.    Intake/Output Summary (Last 24 hours) at 4/10/2025 0903  Last data filed at 4/9/2025 2103  Gross per 24 hour   Intake 50 ml   Output --   Net 50 ml       Physical Exam   General: well-developed, well-nourished, no acute distress, AAOx3  HEENT: EOM intact, scleral icterus  CV: regular rate and rhythm  Pulm: normal respiratory effort, clear to auscultation bilaterally   Abd: soft, nontender, distended  Extremities: warm, no LE edema  Neuro: moves all extremities equally, mild asterixis   Skin: warm, dry, intact    OBJECTIVE                                                                              Medications       Current Facility-Administered Medications:     acetaminophen (Tylenol) tablet 487.5 mg, 487.5 mg, oral, q6h PRN, 487.5 mg at 04/10/25 0732 **OR** acetaminophen (Tylenol) oral liquid 650 mg, 650 mg, nasogastric tube, q4h PRN **OR** acetaminophen (Tylenol) suppository 650 mg, 650 mg, rectal, q4h PRN, Rekha Denny MD    atorvastatin (Lipitor) tablet 20 mg, 20 mg, oral, Nightly, Rekha Denny MD, 20 mg at 04/10/25 0043    carvedilol (Coreg) tablet 6.25 mg, 6.25 mg, oral, Daily, Rekha Denny MD    cefTRIAXone (Rocephin) 1 g in dextrose (iso) IV 50 mL, 1 g, intravenous, q24h, Rekha Denny MD    dextrose 50 % injection 12.5 g, 12.5 g, intravenous, q15 min PRN, Rekha Denny MD    dextrose 50 % injection 25 g, 25 g, intravenous, q15 min PRN, Rekha Denny MD    enoxaparin (Lovenox) syringe 40 mg, 40 mg, subcutaneous, q24h, Rekha Denny MD, 40 mg at 04/10/25 0045    glucagon (Glucagen) injection 1 mg, 1 mg, intramuscular, q15 min PRN, Rekha Denny MD    glucagon (Glucagen) injection 1 mg, 1 mg, intramuscular, q15 min PRN, Rekha Denny,  MD    insulin glargine (Lantus) injection 7 Units, 7 Units, subcutaneous, Nightly, Rekha Denny MD, 7 Units at 04/10/25 0049    insulin lispro injection 0-15 Units, 0-15 Units, subcutaneous, TID AC, Rekha Denny MD    lactulose 20 gram/30 mL oral solution 20 g, 20 g, oral, BID, Rekha Denny MD    melatonin tablet 3 mg, 3 mg, oral, Nightly, Rekha Denny MD, 3 mg at 04/10/25 0044    [Held by provider] metFORMIN XR (Glucophage-XR) 24 hr tablet 1,000 mg, 1,000 mg, oral, Daily with evening meal, Rekha Denny MD    mirtazapine (Remeron) tablet 7.5 mg, 7.5 mg, oral, Nightly, Rekha Denny MD, 7.5 mg at 04/10/25 0043    ondansetron (Zofran) tablet 4 mg, 4 mg, oral, q8h PRN **OR** ondansetron (Zofran) injection 4 mg, 4 mg, intravenous, q8h PRN, Rekha Denny MD, 4 mg at 04/10/25 0113    pantoprazole (ProtoNix) EC tablet 40 mg, 40 mg, oral, Daily before breakfast **OR** pantoprazole (Protonix) injection 40 mg, 40 mg, intravenous, Daily before breakfast, Rekha Denny MD, 40 mg at 04/10/25 0656    polyethylene glycol (Glycolax, Miralax) packet 17 g, 17 g, oral, Daily, Rekha Denny MD    rifAXIMin (Xifaxan) tablet 550 mg, 550 mg, oral, TID, Rekha Denny MD, 550 mg at 04/10/25 0043    spironolactone (Aldactone) tablet 150 mg, 150 mg, oral, Daily, Rekha Denny MD    sucralfate (Carafate) tablet 1 g, 1 g, oral, Before meals & nightly, Rekha Denny MD    torsemide (Demadex) tablet 40 mg, 40 mg, oral, Daily, Rekha Denny MD    traZODone (Desyrel) tablet 25 mg, 25 mg, oral, Nightly PRN, Rekha Denny MD, 25 mg at 04/10/25 0044    ursodiol (Actigall) tablet 500 mg, 500 mg, oral, Daily **AND** ursodiol (Actigall) tablet 250 mg, 250 mg, oral, Daily, Rekha Denny MD                                                                            Labs     CBC RFP   Lab Results   Component Value Date    WBC 5.9 04/09/2025    HGB 11.8 (L) 04/09/2025    HCT 33.8 (L) 04/09/2025    MCV 90 04/09/2025    PLT 91 (L) 04/09/2025    NEUTROABS 3.63 04/09/2025     Lab Results   Component Value Date     (L) 04/09/2025    K 3.6 04/09/2025    CL 97 (L) 04/09/2025    CO2 28 04/09/2025    BUN 11 04/09/2025    CREATININE 0.76 04/09/2025     Lab Results   Component Value Date    MG 1.94 04/09/2025    PHOS 3.5 03/18/2025    CALCIUM 8.3 (L) 04/09/2025    ALBUMIN 2.5 (L) 04/09/2025         Hepatic Function ABG/VBG   Lab Results   Component Value Date    ALT 29 04/09/2025    AST 56 (H) 04/09/2025    ALKPHOS 434 (H) 04/09/2025     Lab Results   Component Value Date    BILITOT 2.9 (H) 04/09/2025    BILITOT 2.9 (H) 04/09/2025    BILIDIR 0.9 (H) 04/09/2025     Lab Results   Component Value Date    PROTIME 17.8 (H) 04/09/2025    APTT 33 03/18/2025    INR 1.6 (H) 04/09/2025    Lab Results   Component Value Date    LACTATE 1.0 03/13/2025                                                                                     Imaging           CT ABDOMEN PELVIS W IV CONTRAST; 2/9/2025   IMPRESSION:  1.  Redemonstration of hepatic cirrhosis with evidence of portal  hypertension as evidenced by mild splenomegaly and moderate to large  volume abdominopelvic ascites.  2. Interval increase in edematous wall thickening throughout the  small bowel loops, ascending colon and transverse colon compared to  prior CT examination dated 02/05/2025. This could again be related to  hypoproteinemia from hepatic cirrhosis, however  infectious/inflammatory enterocolitis can also be considered in the  differential in appropriate clinical setting.  3. Stable 6 mm pulmonary nodule in the right middle lobe. This is  similar compared to prior CT examination dated 07/04/2024. Recommend  short interval follow-up CT chest in 12 months to document stability.    CT ABDOMEN PELVIS W IV CONTRAST; 2/5/2025   IMPRESSION:  1.  Hepatic cirrhosis with evidence of portal hypertension and  moderate volume ascites.  2. 6 mm right middle lobe pulmonary nodule can be followed up with  chest CT in 12 months for surveillance.    CT  ABDOMEN PELVIS W IV CONTRAST; 1/26/2025   IMPRESSION:     Cirrhosis and stigmata of portal hypertension, similar appearance to the   prior.  No focal hepatic lesion.                                                                          GI Procedures     EGD 1/13/25:  Impression  The esophagus appeared normal.  Irregular Z-line  Moderate portal hypertensive gastropathy in the cardia, fundus of the stomach, body of the stomach, incisura, antrum, prepyloric region and pylorus  The stomach was otherwise normal on direct and retroflexion views. Additional information: No gastric varices were visualized.  The duodenal bulb and 1st part of the duodenum appeared normal.     Findings  The esophagus appeared normal. Additional information: No esophageal varices were visualized.  Irregular Z-line 39 cm from the incisors  Moderate and diffuse portal hypertensive gastropathy in the cardia, fundus of the stomach, body of the stomach, incisura, antrum, prepyloric region and pylorus  The stomach was otherwise normal on direct and retroflexion views. Additional information: No gastric varices were visualized.  The duodenal bulb and 1st part of the duodenum appeared normal.      ASSESSMENT / PLAN                  ASSESSMENT/PLAN:  Alfonzo Flanagan is a 50 y.o. female with a past medical history of biopsy-proven PBC with cirrhosis c/b portal HTN with ascites, esophageal varices (previous bleeding, s/p banding 2021 with eradication), hepatic encephalopathy, antibody-positive celiac disease, recent pancreatic tail mass suggestive of neuroendocrine tumor s/p EUS on 3/2024 (no biopsy) recommended repeat in 1 year, IDDM2 (A1c 7.6), HTN, GERD, anxiety, depression, CORBY who resented to ED 4/9/25 for abdominal distension and pain and dysuria. Hepatology is consulted for decompensated cirrhosis and consideration for TIPS and transplant evaluation.    It seems like a large obstacle to pt's ability to comply with her diuretics stems from  financial issues (she has to pay out of pocket every time); unfortunately pt remains without insurance and is still pending Medicaid.  Would plan to r/o SBP with diagnostic (and therapeutic) paracentesis and evaluate for other causes for the ascites. Meanwhile will would try to optimize her volume status in an effort to keep her out of the hospital longer. With pt's current financial/insurance situation, there are no plans to open liver transplant evaluation and therefore no plans for TIPS evaluation.    CIRRHOSIS CLASSIFICATION:  -Etiology: PBC  -Hepatologist: Dr. Khanna  MELD 3.0: 19 at 4/10/2025  8:45 AM  MELD-Na: 17 at 4/10/2025  8:45 AM  Calculated from:  Serum Creatinine: 0.69 mg/dL (Using min of 1 mg/dL) at 4/10/2025  8:45 AM  Serum Sodium: 135 mmol/L at 4/10/2025  8:45 AM  Total Bilirubin: 3.2 mg/dL at 4/10/2025  8:45 AM  Serum Albumin: 2.1 g/dL at 4/10/2025  8:45 AM  INR(ratio): 1.4 at 4/10/2025  8:45 AM  Age at listing (hypothetical): 50 years  Sex: Female at 4/10/2025  8:45 AM    #decompensated cirrhosis 2/2 PBC (ascites, EV bleeding, ?HE)  #PBC    Recommendations:  -Therapeutic and diagnostic paracentesis today to evaluate for SBP. Please get fluid cytology, cultures, cell count and diff, albumin, total protein.  -Please pause pt's current PO torsemide orders after today's dose as we may change to IV based on tomorrow's labs  -Continue nataly 150mg daily  -Continue home ursodiol 500mg bid and 250mg daily.   -Obtain liver US doppler  -Continue with lactulose (titrate to 3-4BMs) and rifaximin  -Strict I/O and daily weights  -Low Na diet  -Trend MELD labs daily    Patient was seen and discussed with Dr. Bobby .    Thank you for the consultation. Hepatology will continue to follow.  During weekday hours of 7am-5pm, please do not hesitate to contact me on [employer] or page [phone], if there are any further questions.   After hours, on weekends, and on holidays, please page the on-call GI fellow at [phone].    Thank you.

## 2025-04-10 NOTE — CARE PLAN
The patient's goals for the shift include      The clinical goals for the shift include to remain stable during shift    Over the shift, the patient did not make progress toward the following goals. Barriers to progression include acute disease process. Recommendations to address these barriers include adherence to treatment plan.

## 2025-04-10 NOTE — PROGRESS NOTES
Alfonzo Flanagan is a 50 y.o. female on day 1 of admission presenting with Decompensated cirrhosis.      Subjective   NAEON, pt states that she was able to rest well. She reports that the pain on her palm, epigastric area, and groin have been causing her discomfort. She also noticed another lesion this morning on the posterior aspect of the 5th digit of her right hand.       Objective     Last Recorded Vitals  /64   Pulse 88   Temp 36.3 °C (97.3 °F)   Resp 17   Wt 84.8 kg (187 lb)   SpO2 94%   Intake/Output last 3 Shifts:    Intake/Output Summary (Last 24 hours) at 4/10/2025 1044  Last data filed at 4/9/2025 2103  Gross per 24 hour   Intake 50 ml   Output --   Net 50 ml       Admission Weight  Weight: 84.8 kg (187 lb) (04/09/25 1436)    Daily Weight  04/09/25 : 84.8 kg (187 lb)    Image Results  XR chest 1 view  Narrative: STUDY:  Chest Radiograph;  4/9/2025 at 6:17 p.m.  INDICATION:  Cirrhosis.  Abdominal and back pain.  COMPARISON:  One-view CXR 3/12/2025.  ACCESSION NUMBER(S):  [id number]  ORDERING CLINICIAN:  FLO THURSTON  TECHNIQUE:  Frontal chest was obtained at 18:16:00 hours.  FINDINGS:  CARDIOMEDIASTINAL SILHOUETTE:  Cardiomediastinal silhouette is normal in size and configuration.     LUNGS:  Lungs are clear.  There is no visible pleural effusion and no evidence  of pneumothorax.     ABDOMEN:  No remarkable upper abdominal findings.     BONES:  No acute osseous changes.  Impression: No acute cardiopulmonary disease.  There does not appear to be any  change when compared to the prior chest x-ray.  Signed by Aydin Garcia MD      Physical Exam  Constitutional:       General: She is not in acute distress.  HENT:      Head: Normocephalic and atraumatic.   Eyes:      Extraocular Movements: Extraocular movements intact.      Pupils: Pupils are equal, round, and reactive to light.   Cardiovascular:      Rate and Rhythm: Normal rate and regular rhythm.      Heart sounds: Normal heart sounds.    Pulmonary:      Effort: Pulmonary effort is normal.      Breath sounds: Normal breath sounds.   Abdominal:      General: Bowel sounds are normal. There is distension.      Tenderness: There is abdominal tenderness.      Comments: Tenderness to palpation over right and left lower quadrants.   Genitourinary:     Comments: Indurated lesion that is draining pus noted on left inner thigh.  Musculoskeletal:      Comments: Group of vesicular lesions noted on patient's right proximal palm. Raised lesions on erythematous base. No similar lesions noted anywhere else on patients arm, chest or back. Lesions are painful to touch.   Neurological:      General: No focal deficit present.      Mental Status: She is alert and oriented to person, place, and time.      Comments: Mild asterixis noted on exam.         Relevant Results  Results for orders placed or performed during the hospital encounter of 04/09/25 (from the past 24 hours)   CBC with Differential   Result Value Ref Range    WBC 5.9 4.4 - 11.3 x10*3/uL    nRBC 0.0 0.0 - 0.0 /100 WBCs    RBC 3.74 (L) 4.00 - 5.20 x10*6/uL    Hemoglobin 11.8 (L) 12.0 - 16.0 g/dL    Hematocrit 33.8 (L) 36.0 - 46.0 %    MCV 90 80 - 100 fL    MCH 31.6 26.0 - 34.0 pg    MCHC 34.9 32.0 - 36.0 g/dL    RDW 15.5 (H) 11.5 - 14.5 %    Platelets 91 (L) 150 - 450 x10*3/uL    Neutrophils % 61.6 40.0 - 80.0 %    Immature Granulocytes %, Automated 0.3 0.0 - 0.9 %    Lymphocytes % 21.2 13.0 - 44.0 %    Monocytes % 11.2 2.0 - 10.0 %    Eosinophils % 5.4 0.0 - 6.0 %    Basophils % 0.3 0.0 - 2.0 %    Neutrophils Absolute 3.63 1.20 - 7.70 x10*3/uL    Immature Granulocytes Absolute, Automated 0.02 0.00 - 0.70 x10*3/uL    Lymphocytes Absolute 1.25 1.20 - 4.80 x10*3/uL    Monocytes Absolute 0.66 0.10 - 1.00 x10*3/uL    Eosinophils Absolute 0.32 0.00 - 0.70 x10*3/uL    Basophils Absolute 0.02 0.00 - 0.10 x10*3/uL   Comprehensive Metabolic Panel   Result Value Ref Range    Glucose 509 (HH) 74 - 99 mg/dL    Sodium  131 (L) 136 - 145 mmol/L    Potassium 3.6 3.5 - 5.3 mmol/L    Chloride 97 (L) 98 - 107 mmol/L    Bicarbonate 28 21 - 32 mmol/L    Anion Gap 10 10 - 20 mmol/L    Urea Nitrogen 11 6 - 23 mg/dL    Creatinine 0.76 0.50 - 1.05 mg/dL    eGFR >90 >60 mL/min/1.73m*2    Calcium 8.3 (L) 8.6 - 10.6 mg/dL    Albumin 2.5 (L) 3.4 - 5.0 g/dL    Alkaline Phosphatase 434 (H) 33 - 110 U/L    Total Protein 7.5 6.4 - 8.2 g/dL    AST 56 (H) 9 - 39 U/L    Bilirubin, Total 2.9 (H) 0.0 - 1.2 mg/dL    ALT 29 7 - 45 U/L   Magnesium   Result Value Ref Range    Magnesium 1.94 1.60 - 2.40 mg/dL   Lipase   Result Value Ref Range    Lipase 248 (H) 9 - 82 U/L   B-Type Natriuretic Peptide   Result Value Ref Range    BNP 24 0 - 99 pg/mL   Bilirubin, Total   Result Value Ref Range    Bilirubin, Total 2.9 (H) 0.0 - 1.2 mg/dL   Bilirubin, Direct   Result Value Ref Range    Bilirubin, Direct 0.9 (H) 0.0 - 0.3 mg/dL   HIV 1/2 Antigen/Antibody Screen with Reflex to Confirmation   Result Value Ref Range    HIV 1/2 Antigen/Antibody Screen with Reflex to Confirmation Nonreactive Nonreactive   Protime-INR   Result Value Ref Range    Protime 17.8 (H) 9.8 - 12.4 seconds    INR 1.6 (H) 0.9 - 1.1   Ammonia   Result Value Ref Range    Ammonia 32 16 - 53 umol/L   Urinalysis with Reflex Culture and Microscopic   Result Value Ref Range    Color, Urine Yellow Light-Yellow, Yellow, Dark-Yellow    Appearance, Urine Turbid (N) Clear    Specific Gravity, Urine 1.030 1.005 - 1.035    pH, Urine 5.5 5.0, 5.5, 6.0, 6.5, 7.0, 7.5, 8.0    Protein, Urine NEGATIVE NEGATIVE, 10 (TRACE), 20 (TRACE) mg/dL    Glucose, Urine OVER (4+) (A) Normal mg/dL    Blood, Urine 1.0 (3+) (A) NEGATIVE mg/dL    Ketones, Urine NEGATIVE NEGATIVE mg/dL    Bilirubin, Urine NEGATIVE NEGATIVE mg/dL    Urobilinogen, Urine Normal Normal mg/dL    Nitrite, Urine NEGATIVE NEGATIVE    Leukocyte Esterase, Urine 500 Mckenzie/uL (A) NEGATIVE   Extra Urine Gray Tube   Result Value Ref Range    Extra Tube Hold for  add-ons.    Microscopic Only, Urine   Result Value Ref Range    WBC, Urine >50 (A) 1-5, NONE /HPF    WBC Clumps, Urine RARE Reference range not established. /HPF    RBC, Urine >20 (A) NONE, 1-2, 3-5 /HPF    Squamous Epithelial Cells, Urine 10-25 (FEW) Reference range not established. /HPF    Bacteria, Urine 1+ (A) NONE SEEN /HPF    Budding Yeast, Urine PRESENT (A) NONE /HPF   BLOOD GAS VENOUS FULL PANEL   Result Value Ref Range    POCT pH, Venous 7.48 (H) 7.33 - 7.43 pH    POCT pCO2, Venous 41 41 - 51 mm Hg    POCT pO2, Venous 72 (H) 35 - 45 mm Hg    POCT SO2, Venous 96 (H) 45 - 75 %    POCT Oxy Hemoglobin, Venous 93.6 (H) 45.0 - 75.0 %    POCT Hematocrit Calculated, Venous 34.0 (L) 36.0 - 46.0 %    POCT Sodium, Venous 133 (L) 136 - 145 mmol/L    POCT Potassium, Venous 4.0 3.5 - 5.3 mmol/L    POCT Chloride, Venous 102 98 - 107 mmol/L    POCT Ionized Calicum, Venous 1.14 1.10 - 1.33 mmol/L    POCT Glucose, Venous 350 (H) 74 - 99 mg/dL    POCT Lactate, Venous 0.6 0.4 - 2.0 mmol/L    POCT Base Excess, Venous 6.4 (H) -2.0 - 3.0 mmol/L    POCT HCO3 Calculated, Venous 30.5 (H) 22.0 - 26.0 mmol/L    POCT Hemoglobin, Venous 11.2 (L) 12.0 - 16.0 g/dL    POCT Anion Gap, Venous 5.0 (L) 10.0 - 25.0 mmol/L    Patient Temperature 37.0 degrees Celsius    FiO2 21 %   POCT GLUCOSE   Result Value Ref Range    POCT Glucose 229 (H) 74 - 99 mg/dL   POCT GLUCOSE   Result Value Ref Range    POCT Glucose 228 (H) 74 - 99 mg/dL   Coagulation Screen   Result Value Ref Range    Protime 15.5 (H) 9.8 - 12.4 seconds    INR 1.4 (H) 0.9 - 1.1    aPTT 32 26 - 36 seconds   CBC and Auto Differential   Result Value Ref Range    WBC 4.0 (L) 4.4 - 11.3 x10*3/uL    nRBC 0.0 0.0 - 0.0 /100 WBCs    RBC 3.62 (L) 4.00 - 5.20 x10*6/uL    Hemoglobin 11.2 (L) 12.0 - 16.0 g/dL    Hematocrit 35.0 (L) 36.0 - 46.0 %    MCV 97 80 - 100 fL    MCH 30.9 26.0 - 34.0 pg    MCHC 32.0 32.0 - 36.0 g/dL    RDW 15.9 (H) 11.5 - 14.5 %    Platelets 88 (L) 150 - 450 x10*3/uL     Neutrophils % 53.1 40.0 - 80.0 %    Immature Granulocytes %, Automated 0.2 0.0 - 0.9 %    Lymphocytes % 26.6 13.0 - 44.0 %    Monocytes % 10.7 2.0 - 10.0 %    Eosinophils % 8.9 0.0 - 6.0 %    Basophils % 0.5 0.0 - 2.0 %    Neutrophils Absolute 2.14 1.20 - 7.70 x10*3/uL    Immature Granulocytes Absolute, Automated 0.01 0.00 - 0.70 x10*3/uL    Lymphocytes Absolute 1.07 (L) 1.20 - 4.80 x10*3/uL    Monocytes Absolute 0.43 0.10 - 1.00 x10*3/uL    Eosinophils Absolute 0.36 0.00 - 0.70 x10*3/uL    Basophils Absolute 0.02 0.00 - 0.10 x10*3/uL     *Note: Due to a large number of results and/or encounters for the requested time period, some results have not been displayed. A complete set of results can be found in Results Review.       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 Patient was recently discharged from the hospital, with course noted above. She presents again with decompensated cirrhosis (+asterixis). On labs, urinalysis is positive for LE, WBC, bacteria, which in the setting of her symptoms is concerning for a recurrent UTI.     Updates 4/10  - Hepatology consulted, recommended the following: therapeutic paracentesis (with cell count, cytology, cultures, albumin, total protein), pausing torsemide after today's dose (will potentially switch to IV), liver US with doppler, and no plans to open up transplant evaluation during this admission.     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 Khanna  :: Biopsy proven PBC 3/2021. History of esophageal varices s/p banding 2021. Has required several paracenteses in the past   : :  Prior diagnostic para consistent with portal HTN (SAAG > 1.1) and no c/f SBP (ANC 6) on prior admission.      PLAN:  - IR consulted to perform therapeutic paracentesis  - will hold torsemide after this dose per hepatology recs  - C/w CFTX for SBP prophylaxis   - continue spironolactone 150 mg   - continue rifaximin 550 mg TID, lactulose 20 g BID titrated to bowel movements  - continue pantoprazole 40 mg daily  - continue acetaminophen 487.5 mg q6h PRN pain (dose reduction for cirrhosis)  - continue ursodiol 500 mg BID and ursodiol 250 mg nightly  - current diet: 2-3 g sodium diet  - daily MELD labs     #Hypervolemic hyponatremia  : : Na 131  : : Suspect d/t underlying splanchnic vasodilation      Plan  - C/w diuresis as above  - 2L fluid restriction   - Ordered urine Na and serum osms; urine Na <20 would favor hypervolemic hyponatremia, suspect urine osm will be >100     #Left palmar rash (image in media)  : : Vesicular rash in cluster  : : Differential includes rickettsial rash, herpetic lesion, secondary syphilis, Coxsackie A virus, HIV    Plan:  -Concern for herpes, will obtain sample to run PCR and determine what cause is    #UTI  #Possible vaginal candidiasis  : : UA with LE, WBC, UCX pending  : : S/p ceftriaxone in the ED     PLAN:  - C/w ceftriaxone (4/9- )  - Re-evaluate genitalia for possible vulvovaginal candidiasis   - Suspect her inguinal hidradenitis suppurativa may also be a contributory factor  - will also order bcx, pt reports 1 week history of rigors    #Groin abscess  ::Wound appears to still be draining pus, has some induration on palpation    PLAN  -ACS consulted to evaluate groin and determine if I&D or other intervention is appropriate for treatment  - Wound care also consulted      #Nausea  #Vomiting  #Appetite  #Dizziness  - continue zofran ODT 4 mg q6h PRN  - continue mirtazapine 7.6 mg nightly        #T2DM  : : Last A1c 7.6 in 12/2024. On insulin glargine 7 units nightly and metformin at home. Patient reports compliance with glargine dosing. POC glucose on admission >500 --> 229  : : No ketones on UA   PLAN:  - continue insulin glargine 7 units nightly  - hold home metformin  - SSI #3  - hypoglycemic protocol     #Anxiety/Depression  - continue home trazodone 25 mg nightly PRN     #HLD  - continue home atorvastatin 20 mg nightly        Diet: 2-3g Na restricted  DVT prophylaxis: Lovenox  Code status: FULL CODE  NOK: [place] (daughter, [phone])       This patient was seen and discussed with resident physician Dr. Kate Maguire MD.  Awilda Blakely, MS3

## 2025-04-10 NOTE — POST-PROCEDURE NOTE
INTERVENTIONAL RADIOLOGY ADVANCED PRACTICE PROCEDURE  [facility]    A time out was performed and Right Hemiabdomen was examined with US and appropriate entry point was confirmed and marked.   The patient was prepped and draped in a sterile manner, 1% lidocaine was used to anesthesize the skin and subcutaneous tissue.   A 5F Centesis needle was then introduced through the skin into the peritoneal space, the centesis catheter was then threaded without difficulty.   3500 ml of yellow fluid was removed without difficulty. The catheter was then removed.   No immediate complications were noted during and immediately following the procedure.

## 2025-04-10 NOTE — PROGRESS NOTES
04/10/25 1425   Discharge Planning   Living Arrangements Children  (dtr)   Support Systems Children;Spouse/significant other   Assistance Needed dtr assists per pt   Type of Residence Private residence   Who is requesting discharge planning? Provider   Expected Discharge Disposition Home   Does the patient need discharge transport arranged? Yes   Financial Resource Strain   How hard is it for you to pay for the very basics like food, housing, medical care, and heating? Not very   Housing Stability   In the last 12 months, was there a time when you were not able to pay the mortgage or rent on time? N   At any time in the past 12 months, were you homeless or living in a shelter (including now)? N   Transportation Needs   In the past 12 months, has lack of transportation kept you from medical appointments or from getting medications? no   In the past 12 months, has lack of transportation kept you from meetings, work, or from getting things needed for daily living? No   Patient Choice   Provider Choice list and CMS website (https://medicare.gov/care-compare#search) for post-acute Quality and Resource Measure Data were provided and reviewed with: Patient   Intensity of Service   Intensity of Service 0-30 min     PCP: Landen Freeman   DATE OF LAST VISIT: 4/9   PHARMACY: [facility] on [address] in [place]   RECENT FALLS: denies   EQUIPMENT USED IN HOME: rollator  HOME O2/CPAP/NEBS: N/A   TRANSPORT HOME: dtr   CURRENT HC: N/A     Address, phone and emergency contact information verified. Pt states her dtr assists her with ADLs but pt denies needing any therapy on DC. All questions and concerns answered. Will continue to follow for discharge needs.     Transitional Care Coordination Progress Note:  Patient discussed during interdisciplinary rounds.   Team members present: MD FABIANO  Plan per medical/surgical team: Pt with cirrhosis, ?liver transplant eval and ? TIPS needed.  Payer: [employer]   Status:  inpatient  Discharge disposition: Home  Potential Barriers: none   ADOD: 4/14

## 2025-04-10 NOTE — H&P
Chief complaint: Decompensated cirrhosis, UTI  HPI:  Alfonzo Flanagan is a 50 y.o. 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     Per patient, for the past 2-3 days, she has noticed that her abdomen has gotten very distended to the point that it is causing her significant discomfort and pain. She denies recent fevers or chills, and says that she has been compliant with her water pills, lactulose, and rifaximin at home. She states that she often targets 2-3 bowel movements a day but sometimes has up to 4 movements. Patient states that she initially called Dr. Khanna's office with these symptoms, but since he is unable to see her until 5/28, she presented to the ED with these symptoms. She endorses itchiness in her groin for the past 2 days and says that she has a history of hidradenitis suppurativa affecting her groin, and she had a lesion in her left groin that just drained open yesterday/today, leaking pus.  Interestingly, patient notes that there is a lesion on her right palm; it appears vesicular, in group, with associated significant pain to the degree that patient is unable to make a fist. She does not endorse any insect bites or recent travel and says she has never had this before. I included this image under media.     Patient was recently discharged from the hospital on 3/18. Per documentation, she underwent diagnostic para consistent with portal HTN (SAAG > 1.1) and no concern for SBP (ANC 6). During hospital admission, patient underwent  therapeutic paracentesis with IR on 3/13 (2.5 L removed) and 3/17 (2 L removed). Patient responded well to diuresis with IV lasix and spironolactone. Patient was also treated for UTI with 5 day course of ceftriaxone (3/13-3/17). Patient was also seen by nutrition d/t concerns that patient was not compliant with low sodium diet and fluid restriction and patient received education regarding importance of dietary changes. Patient's lasix was transitioned to PO torsemide 40 on 3/17.     Patient was ultimately discharged with plan to follow up with Dr. Khanna for optimization of her medications and consider for TIPS/Liver transplant. She recently saw Dr. Khanna in his office, with plan to evaluate her for TIPS and transplant simultaneously. Jordan was increased to 150mg.     ED course:  T 36.5 °C (97.7 °F)  HR (!) 103  /79  RR 17  O2 98 %      ED Interventions  Ceftriaxone 1g  Morphine  Toradol    Labs  POCT glucose 509  CBC WBC 5.9 Hb 11.8 PLT 91  RFP Na 131 K 3.6 Cl 97 HCO3 28 BUN 11 Cr 0.76 Alb 2.5   AlkP 434 ALT 29 AST 56 Tbili 2.9 dBili 0.9  PT 17.8 INR 1.6    Imaging  CXR: Unremarkable     Medications prior to admission:  Prior to Admission medications    Medication Sig Start Date End Date Taking? Authorizing Provider   acetaminophen (Tylenol) 500 mg tablet Take 1-2 tablets (500-1,000 mg) by mouth every 6 hours if needed (pain). 3/18/25 4/17/25  Svetlana Solorzano MD   alcohol swabs pads, medicated Apply 1 each topically once daily at bedtime. 2/15/25   Jesse Ballesteros MD   atorvastatin (Lipitor) 20 mg tablet Take 1 tablet (20 mg) by mouth once daily at bedtime. 3/18/25 4/17/25  Svetlana Solorzano MD   carvedilol (Coreg) 6.25 mg tablet Take 1 tablet (6.25 mg) by mouth once daily. 3/18/25 4/17/25  Svetlana Solorzano MD   insulin glargine (Lantus) 100 unit/mL (3 mL) pen Inject 7 Units under the skin once daily at bedtime. Take as directed per insulin instructions. Discard pen 28 days after first use. 3/18/25 4/17/25   "Svetlana Solorzano MD   lactulose 20 gram/30 mL oral solution Take 30 mL (20 g) by mouth 2 times a day. 3/18/25 4/17/25  Svetlana Solorzano MD   meclizine (Antivert) 25 mg tablet Take 1 tablet (25 mg) by mouth once daily as needed for dizziness. 3/18/25 4/17/25  Svetlana Solorzano MD   melatonin 3 mg tablet Take 1 tablet (3 mg) by mouth once daily at bedtime. 3/18/25 4/17/25  Svetlana Solorzano MD   metFORMIN XR (Glucophage-XR) 500 mg 24 hr tablet Take 2 tablets (1,000 mg) by mouth once daily in the evening. Take with meals. Do not crush, chew, or split. 3/18/25 4/17/25  Svetlana Solorzano MD   mirtazapine (Remeron) 7.5 mg tablet Take 1 tablet (7.5 mg) by mouth once daily at bedtime. 3/18/25 4/17/25  Svetlana Solorzano MD   ondansetron ODT (Zofran-ODT) 4 mg disintegrating tablet Dissolve 1 tablet (4 mg) in the mouth every 6 hours. 3/18/25 4/17/25  Svetlana Solorzano MD   pantoprazole (ProtoNix) 40 mg EC tablet Take 1 tablet (40 mg) by mouth once daily in the morning. Take before meals. Do not crush, chew, or split. 3/18/25 4/17/25  Svetlana Solorzano MD   pen needle 1/2\" 29G X 12mm needle Use to inject 1-4 times daily as directed. 2/15/25 2/15/26  Jesse Ballesteros MD   pen needle, diabetic (Sure Comfort Pen Needle) 32 gauge x 5/32\" needle Use 4 times a day 11/1/24   MADYSON Sotelo-CNP   polyethylene glycol (Glycolax, Miralax) 17 gram packet Take 17 g by mouth once daily. 3/18/25 4/17/25  Svetlana Solorzano MD   pramoxine (Sarna Sensitive) 1 % lotion Apply 1 Application topically every 8 hours if needed (itch). 3/18/25 4/17/25  Svetlana Solorzano MD   rifAXIMin (Xifaxan) 550 mg tablet Take 1 tablet (550 mg) by mouth 3 times a day for 28 days. 3/18/25 4/15/25  Svetlana Solorzano MD   spironolactone (Aldactone) 100 mg tablet Take 1.5 tablets (150 mg) by mouth once daily. 3/26/25 7/24/25  Dwain Khanna MD   sucralfate (Carafate) 1 gram tablet Take 1 tablet (1 g) by mouth 4 times a day before meals. 3/18/25 4/17/25  Svetlana Solorzano MD "   torsemide (Demadex) 20 mg tablet Take 2 tablets (40 mg) by mouth once daily. 3/18/25   Svetlana Solorzano MD   traZODone (Desyrel) 50 mg tablet Take 0.5 tablets (25 mg) by mouth as needed at bedtime for sleep. 3/18/25 4/17/25  Svetlana Solorzano MD   ursodiol (Actigall) 250 mg tablet Take 2 tablets (500 mg) by mouth 2 times a day AND 1 tablet (250 mg) once daily. 3/18/25 4/17/25  Svetlana Solorzano MD     Medication Documentation Review Audit       Reviewed by Gaston Huertas RN (Registered Nurse) on 04/09/25 at 1441      Medication Order Taking? Sig Documenting Provider Last Dose Status   acetaminophen (Tylenol) 500 mg tablet [id number]  Take 1-2 tablets (500-1,000 mg) by mouth every 6 hours if needed (pain). Svetlana Solorzano MD  Active   alcohol swabs pads, medicated [id number]  Apply 1 each topically once daily at bedtime. Jesse Ballesteros MD  Active   atorvastatin (Lipitor) 20 mg tablet [id number]  Take 1 tablet (20 mg) by mouth once daily at bedtime. Svetlana Solorzano MD  Active   carvedilol (Coreg) 6.25 mg tablet [id number]  Take 1 tablet (6.25 mg) by mouth once daily. Svetlana Solorzano MD  Active   insulin glargine (Lantus) 100 unit/mL (3 mL) pen [id number]  Inject 7 Units under the skin once daily at bedtime. Take as directed per insulin instructions. Discard pen 28 days after first use. Svetlana Solorzano MD  Active   lactulose 20 gram/30 mL oral solution [id number]  Take 30 mL (20 g) by mouth 2 times a day. Svetlana Solorzano MD  Active   meclizine (Antivert) 25 mg tablet [id number]  Take 1 tablet (25 mg) by mouth once daily as needed for dizziness. Svetlana Solorzano MD  Active   melatonin 3 mg tablet [id number]  Take 1 tablet (3 mg) by mouth once daily at bedtime. Svetlana Solorzano MD  Active   metFORMIN XR (Glucophage-XR) 500 mg 24 hr tablet [id number]  Take 2 tablets (1,000 mg) by mouth once daily in the evening. Take with meals. Do not crush, chew, or split. Svetlana Solorzano MD  Active   mirtazapine (Remeron) 7.5 mg tablet  "[id number]  Take 1 tablet (7.5 mg) by mouth once daily at bedtime. Svetlana Solorzano MD  Active   ondansetron ODT (Zofran-ODT) 4 mg disintegrating tablet [id number]  Dissolve 1 tablet (4 mg) in the mouth every 6 hours. Svetlana Solorzano MD  Active   pantoprazole (ProtoNix) 40 mg EC tablet [id number]  Take 1 tablet (40 mg) by mouth once daily in the morning. Take before meals. Do not crush, chew, or split. Svetlana Solorzano MD  Active   pen needle 1/2\" 29G X 12mm needle [id number]  Use to inject 1-4 times daily as directed. Jesse Ballesteros MD  Active   pen needle, diabetic (Sure Comfort Pen Needle) 32 gauge x 5/32\" needle [id number]  Use 4 times a day ESME Sotelo  Active   polyethylene glycol (Glycolax, Miralax) 17 gram packet [id number]  Take 17 g by mouth once daily. Svetlana Solorzano MD  Active   pramoxine (Sarna Sensitive) 1 % lotion [id number]  Apply 1 Application topically every 8 hours if needed (itch). Svetlana Solorzano MD  Active   rifAXIMin (Xifaxan) 550 mg tablet [id number]  Take 1 tablet (550 mg) by mouth 3 times a day for 28 days. Svetlana Solorzano MD  Active   spironolactone (Aldactone) 100 mg tablet [id number]  Take 1.5 tablets (150 mg) by mouth once daily. Dwain Khanna MD  Active   sucralfate (Carafate) 1 gram tablet [id number]  Take 1 tablet (1 g) by mouth 4 times a day before meals. Svetlana Solorzano MD  Active   torsemide (Demadex) 20 mg tablet [id number]  Take 2 tablets (40 mg) by mouth once daily. Svetlana Solorzano MD  Active   traZODone (Desyrel) 50 mg tablet [id number]  Take 0.5 tablets (25 mg) by mouth as needed at bedtime for sleep. Svetlana Solorzano MD  Active   ursodiol (Actigall) 250 mg tablet [id number]  Take 2 tablets (500 mg) by mouth 2 times a day AND 1 tablet (250 mg) once daily. Svetlana Solorzano MD  Active                    Allergies:  Allergies   Allergen Reactions    Gluten Diarrhea       Past medical history:  Past Medical History:   Diagnosis Date    Ascites     Asthma     Cirrhosis of " liver (Multi)     Diabetes mellitus (Multi)     GERD (gastroesophageal reflux disease)     CORBY (obstructive sleep apnea)     Portal hypertension (Multi)     Thrombocytopenia (CMS-HCC)        Surgical history:  Past Surgical History:   Procedure Laterality Date     SECTION, LOW TRANSVERSE      CHOLECYSTECTOMY  2007    COLONOSCOPY      CT GUIDED IMAGING FOR NEEDLE PLACEMENT  10/14/2020    CT GUIDED IMAGING FOR NEEDLE PLACEMENT [facility]    ESOPHAGOGASTRODUODENOSCOPY      INGUINAL HIDRADENITIS EXCISION      TUBAL LIGATION      US GUIDED ABDOMINAL PARACENTESIS  10/08/2020    US GUIDED ABDOMINAL PARACENTESIS [facility]       Family history:  No family history on file.    Social history:   reports that she has never smoked. She has never used smokeless tobacco. She reports that she does not drink alcohol and does not use drugs.    Review of systems:  Constitutional: negative for fevers, chills, weight loss, weight gain, change in appetite, fatigue, weakness.  HEENT: negative for headache, changes in vision or hearing, congestion, sore throat.  Respiratory: negative for SOB, cough, hemoptysis, wheezing  Cardiovascular: negative for chest pain, palpitations, orthopnea, PND  GI: negative for dysphagia, abdominal pain, nausea, vomiting, diarrhea, constipation, melena, hematochezia, BRBPR  : POSITIVE for frequency, urgency, dysuria, negative for hematuria, incontinence  MSK: negative for myalgia, arthralgia, decreased joint ROM, LE swelling  Skin: negative for rash, wounds  Heme/lymph: negative for easy bruising, bleeding, epistaxis  Neuro: negative for LOC, numbness, tingling, tremor, vertigo, dizziness    Vitals:  Vitals:    25 2352   BP: 122/75   Pulse: 78   Resp: 18   Temp: 36.3 °C (97.3 °F)   SpO2: 98%       Physical exam:  Constitutional: Well-developed female in no acute distress.  HEENT: Normocephalic, atraumatic. PERRL. EOMI. No cervical lymphadenopathy.  Respiratory: CTA  bilaterally. No wheezes, rales, or rhonchi. Normal respiratory effort.  Cardiovascular: RRR. No murmurs, gallops, or rubs. No JVD. Radial pulses 2+.  Abdominal: Significantly distended and tender, but no r/g. Bowel sounds present. No hepatosplenomegaly or masses. No CVA tenderness.  Neuro: Asterixis +. Alert and oriented x3, moving extremities equally.  MSK: No LE edema bilaterally.  Skin: Warm, dry. No rashes or wounds.  Psych: Appropriate mood and affect.  Genital exam: (performed with chaperone): left inguinal region draining abscess, now resolved    Labs:  Results for orders placed or performed during the hospital encounter of 04/09/25 (from the past 24 hours)   CBC with Differential   Result Value Ref Range    WBC 5.9 4.4 - 11.3 x10*3/uL    nRBC 0.0 0.0 - 0.0 /100 WBCs    RBC 3.74 (L) 4.00 - 5.20 x10*6/uL    Hemoglobin 11.8 (L) 12.0 - 16.0 g/dL    Hematocrit 33.8 (L) 36.0 - 46.0 %    MCV 90 80 - 100 fL    MCH 31.6 26.0 - 34.0 pg    MCHC 34.9 32.0 - 36.0 g/dL    RDW 15.5 (H) 11.5 - 14.5 %    Platelets 91 (L) 150 - 450 x10*3/uL    Neutrophils % 61.6 40.0 - 80.0 %    Immature Granulocytes %, Automated 0.3 0.0 - 0.9 %    Lymphocytes % 21.2 13.0 - 44.0 %    Monocytes % 11.2 2.0 - 10.0 %    Eosinophils % 5.4 0.0 - 6.0 %    Basophils % 0.3 0.0 - 2.0 %    Neutrophils Absolute 3.63 1.20 - 7.70 x10*3/uL    Immature Granulocytes Absolute, Automated 0.02 0.00 - 0.70 x10*3/uL    Lymphocytes Absolute 1.25 1.20 - 4.80 x10*3/uL    Monocytes Absolute 0.66 0.10 - 1.00 x10*3/uL    Eosinophils Absolute 0.32 0.00 - 0.70 x10*3/uL    Basophils Absolute 0.02 0.00 - 0.10 x10*3/uL   Comprehensive Metabolic Panel   Result Value Ref Range    Glucose 509 (HH) 74 - 99 mg/dL    Sodium 131 (L) 136 - 145 mmol/L    Potassium 3.6 3.5 - 5.3 mmol/L    Chloride 97 (L) 98 - 107 mmol/L    Bicarbonate 28 21 - 32 mmol/L    Anion Gap 10 10 - 20 mmol/L    Urea Nitrogen 11 6 - 23 mg/dL    Creatinine 0.76 0.50 - 1.05 mg/dL    eGFR >90 >60 mL/min/1.73m*2     Calcium 8.3 (L) 8.6 - 10.6 mg/dL    Albumin 2.5 (L) 3.4 - 5.0 g/dL    Alkaline Phosphatase 434 (H) 33 - 110 U/L    Total Protein 7.5 6.4 - 8.2 g/dL    AST 56 (H) 9 - 39 U/L    Bilirubin, Total 2.9 (H) 0.0 - 1.2 mg/dL    ALT 29 7 - 45 U/L   Magnesium   Result Value Ref Range    Magnesium 1.94 1.60 - 2.40 mg/dL   Lipase   Result Value Ref Range    Lipase 248 (H) 9 - 82 U/L   B-Type Natriuretic Peptide   Result Value Ref Range    BNP 24 0 - 99 pg/mL   Protime-INR   Result Value Ref Range    Protime 17.8 (H) 9.8 - 12.4 seconds    INR 1.6 (H) 0.9 - 1.1   Ammonia   Result Value Ref Range    Ammonia 32 16 - 53 umol/L   Urinalysis with Reflex Culture and Microscopic   Result Value Ref Range    Color, Urine Yellow Light-Yellow, Yellow, Dark-Yellow    Appearance, Urine Turbid (N) Clear    Specific Gravity, Urine 1.030 1.005 - 1.035    pH, Urine 5.5 5.0, 5.5, 6.0, 6.5, 7.0, 7.5, 8.0    Protein, Urine NEGATIVE NEGATIVE, 10 (TRACE), 20 (TRACE) mg/dL    Glucose, Urine OVER (4+) (A) Normal mg/dL    Blood, Urine 1.0 (3+) (A) NEGATIVE mg/dL    Ketones, Urine NEGATIVE NEGATIVE mg/dL    Bilirubin, Urine NEGATIVE NEGATIVE mg/dL    Urobilinogen, Urine Normal Normal mg/dL    Nitrite, Urine NEGATIVE NEGATIVE    Leukocyte Esterase, Urine 500 Mckenzie/uL (A) NEGATIVE   Microscopic Only, Urine   Result Value Ref Range    WBC, Urine >50 (A) 1-5, NONE /HPF    WBC Clumps, Urine RARE Reference range not established. /HPF    RBC, Urine >20 (A) NONE, 1-2, 3-5 /HPF    Squamous Epithelial Cells, Urine 10-25 (FEW) Reference range not established. /HPF    Bacteria, Urine 1+ (A) NONE SEEN /HPF    Budding Yeast, Urine PRESENT (A) NONE /HPF   BLOOD GAS VENOUS FULL PANEL   Result Value Ref Range    POCT pH, Venous 7.48 (H) 7.33 - 7.43 pH    POCT pCO2, Venous 41 41 - 51 mm Hg    POCT pO2, Venous 72 (H) 35 - 45 mm Hg    POCT SO2, Venous 96 (H) 45 - 75 %    POCT Oxy Hemoglobin, Venous 93.6 (H) 45.0 - 75.0 %    POCT Hematocrit Calculated, Venous 34.0 (L) 36.0 -  46.0 %    POCT Sodium, Venous 133 (L) 136 - 145 mmol/L    POCT Potassium, Venous 4.0 3.5 - 5.3 mmol/L    POCT Chloride, Venous 102 98 - 107 mmol/L    POCT Ionized Calicum, Venous 1.14 1.10 - 1.33 mmol/L    POCT Glucose, Venous 350 (H) 74 - 99 mg/dL    POCT Lactate, Venous 0.6 0.4 - 2.0 mmol/L    POCT Base Excess, Venous 6.4 (H) -2.0 - 3.0 mmol/L    POCT HCO3 Calculated, Venous 30.5 (H) 22.0 - 26.0 mmol/L    POCT Hemoglobin, Venous 11.2 (L) 12.0 - 16.0 g/dL    POCT Anion Gap, Venous 5.0 (L) 10.0 - 25.0 mmol/L    Patient Temperature 37.0 degrees Celsius    FiO2 21 %   POCT GLUCOSE   Result Value Ref Range    POCT Glucose 229 (H) 74 - 99 mg/dL     *Note: Due to a large number of results and/or encounters for the requested time period, some results have not been displayed. A complete set of results can be found in Results Review.       Imaging:      Assessment and Plan:    Alfonzo Flanagan is a 51 yo F w PMH biopsy-proven PBC (with cirrhosis, portal HTN, ascites, esophageal varices s/p banding 2021 and eradication, hepatic encephalopathy) follows with Dr Khanna, celiac disease, pancreatic tail mass (suggestive of neuroendocrine tumor, diagnosed 3/2024), T2DM (last A1c 7.6 in 12/2024), CORBY who presented to the ED 4/10 with worsening abdominal distension, dysuria, and urinary frequency. Patient was recently discharged from the hospital, with course noted above. She presents again with decompensated cirrhosis (+asterixis). On labs, urinalysis is positive for LE, WBC, bacteria, which in the setting of her symptoms is concerning for a recurrent UTI. Patient follows up with Dr. Khanna and there are discussion about evaluating her for TIPS with simultaneous liver transplant evaluation.        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 Khanna  :: Biopsy proven PBC 3/2021. History of esophageal varices s/p banding 2021. Has required several paracenteses in the past   : :  Prior diagnostic para consistent with portal HTN (SAAG > 1.1) and no c/f SBP (ANC 6) on prior admission.     PLAN:  - continue home torsemide 40 mg d/t volume overload   - C/w CFTX for SBP prophylaxis   - continue spironolactone 150 mg   - continue rifaximin 550 mg TID, lactulose 20 g BID titrated to bowel movements  - continue pantoprazole 40 mg daily  - continue acetaminophen 487.5 mg q6h PRN pain (dose reduction for cirrhosis)  - continue ursodiol 500 mg BID and ursodiol 250 mg nightly  - current diet: 2-3 g sodium diet  - Consult IR tomorrow for therapeutic paracentesis, pending IR consult for possible TIPS procedure with IR. Follows with Dr. Davis.   - Hepatology consult in the AM  - daily MELD labs     #Hypervolemic hyponatremia  : : Na 131  : : Suspect d/t underlying splanchnic vasodilation     Plan  - C/w diuresis as above  - 2L fluid restriction   - Ordered urine Na and serum osms; urine Na <20 would favor hypervolemic hyponatremia, suspect urine osm will be >100    #Left palmar rash (image in media)  : : Vesicular rash in cluster, seems resolved  : : Differential includes rickettsial rash, herpetic lesion, secondary syphilis, Coxsackie A virus, HIV  : : Given the nature and duration of symptoms, suspect this is dyshidrotic eczema   #UTI  #Possible vaginal candidiasis  : : UA with LE, WBC, UCX pending  : : S/p ceftriaxone in the ED    PLAN:  - C/w ceftriaxone (4/9- )  - Re-evaluate genitalia for possible vulvovaginal candidiasis   - Suspect her inguinal hidradenitis suppurativa may also be a contributory factor towards her      #Nausea  #Vomiting  #Appetite  #Dizziness  - continue zofran ODT 4 mg q6h PRN  - continue mirtazapine 7.6 mg nightly       #T2DM  : : Last A1c 7.6 in 12/2024. On insulin glargine 7 units nightly and metformin at home. Patient  reports compliance with glargine dosing. POC glucose on admission >500 --> 229  : : No ketones on UA   PLAN:  - continue insulin glargine 7 units nightly  - hold home metformin  - SSI #3  - hypoglycemic protocol     #Anxiety/Depression  - continue home trazodone 25 mg nightly PRN     #HLD  - continue home atorvastatin 20 mg nightly       Diet: 2-3g Na restricted  DVT prophylaxis: Lovenox  Code status: FULL CODE  NOK: [place] (daughter, [phone])     Patient to be officially staffed in the AM.     Rekha Denny MD  Internal Medicine PGY-2

## 2025-04-10 NOTE — PROGRESS NOTES
Alfonzo Flanagan is a 50 y.o. female on day 1 of admission presenting with Decompensated cirrhosis.      Subjective   Patient seen today. Doing well with no new complaints. Stable abdominal pain and distension. Tolerating orally and passing BM.       Objective     Last Recorded Vitals  /73   Pulse 87   Temp 36.3 °C (97.3 °F)   Resp 17   Wt 84.8 kg (187 lb)   SpO2 94%   Intake/Output last 3 Shifts:    Intake/Output Summary (Last 24 hours) at 4/10/2025 0702  Last data filed at 4/9/2025 2103  Gross per 24 hour   Intake 50 ml   Output --   Net 50 ml       Admission Weight  Weight: 84.8 kg (187 lb) (04/09/25 1436)    Daily Weight  04/09/25 : 84.8 kg (187 lb)    Image Results  XR chest 1 view  Narrative: STUDY:  Chest Radiograph;  4/9/2025 at 6:17 p.m.  INDICATION:  Cirrhosis.  Abdominal and back pain.  COMPARISON:  One-view CXR 3/12/2025.  ACCESSION NUMBER(S):  [id number]  ORDERING CLINICIAN:  FLO THURSTON  TECHNIQUE:  Frontal chest was obtained at 18:16:00 hours.  FINDINGS:  CARDIOMEDIASTINAL SILHOUETTE:  Cardiomediastinal silhouette is normal in size and configuration.     LUNGS:  Lungs are clear.  There is no visible pleural effusion and no evidence  of pneumothorax.     ABDOMEN:  No remarkable upper abdominal findings.     BONES:  No acute osseous changes.  Impression: No acute cardiopulmonary disease.  There does not appear to be any  change when compared to the prior chest x-ray.  Signed by Aydin Garcia MD      Physical Exam  Constitutional:       Appearance: Normal appearance.   Pulmonary:      Effort: Pulmonary effort is normal. No respiratory distress.      Breath sounds: Normal breath sounds. No wheezing.   Abdominal:      General: There is distension.      Palpations: Abdomen is soft.      Tenderness: There is abdominal tenderness.   Skin:     Coloration: Skin is jaundiced.   Neurological:      Mental Status: She is alert. Mental status is at baseline.         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 Patient was recently discharged from the hospital, with course noted above. She presents again with decompensated cirrhosis (+asterixis). On labs, urinalysis is positive for LE, WBC, bacteria, which in the setting of her symptoms is concerning for a recurrent UTI. Patient follows up with Dr. Khanna and there are discussion about evaluating her for TIPS with simultaneous liver transplant evaluation.        Updates 4/10:  - Pending IR paracentesis  - Pending hepatology consult.     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 Khanna  :: Biopsy proven PBC 3/2021. History of esophageal varices s/p banding 2021. Has required several paracenteses in the past   : :  Prior diagnostic para consistent with portal HTN (SAAG > 1.1) and no c/f SBP (ANC 6) on prior admission.      PLAN:  - continue home torsemide 40 mg d/t volume overload   - C/w CFTX for SBP prophylaxis   - continue spironolactone 150 mg   - continue rifaximin 550 mg TID, lactulose 20 g BID titrated to bowel movements  - continue pantoprazole 40 mg daily  - continue acetaminophen 487.5 mg q6h PRN pain (dose reduction for cirrhosis)  - continue ursodiol 500 mg BID and ursodiol 250 mg nightly  - current diet: 2-3 g sodium diet  - Consult IR tomorrow for therapeutic paracentesis, pending IR consult for possible TIPS procedure with IR. Follows with Dr. Davis.   -  Hepatology consult in the AM  - daily MELD labs     #Hypervolemic hyponatremia  : : Na 131  : : Suspect d/t underlying splanchnic vasodilation     Plan  - C/w diuresis as above  - 2L fluid restriction   - Ordered urine Na and serum osms; urine Na <20 would favor hypervolemic hyponatremia, suspect urine osm will be >100     #Left palmar rash (image in media)  : : Vesicular rash in cluster, seems resolved  : : Differential includes rickettsial rash, herpetic lesion, secondary syphilis, Coxsackie A virus, HIV  : : Given the nature and duration of symptoms, suspect this is dyshidrotic eczema   #UTI  #Possible vaginal candidiasis  : : UA with LE, WBC, UCX pending  : : S/p ceftriaxone in the ED     PLAN:  - C/w ceftriaxone (4/9- )  - Re-evaluate genitalia for possible vulvovaginal candidiasis   - Suspect her inguinal hidradenitis suppurativa may also be a contributory factor towards her        #Nausea  #Vomiting  #Appetite  #Dizziness  - continue zofran ODT 4 mg q6h PRN  - continue mirtazapine 7.6 mg nightly        #T2DM  : : Last A1c 7.6 in 12/2024. On insulin glargine 7 units nightly and metformin at home. Patient reports compliance with glargine dosing. POC glucose on admission >500 --> 229  : : No ketones on UA   PLAN:  - continue insulin glargine 7 units nightly  - hold home metformin  - SSI #3  - hypoglycemic protocol       #Anxiety/Depression  - continue home trazodone 25 mg nightly PRN       #HLD  - continue home atorvastatin 20 mg nightly       Diet: 2-3g Na restricted  DVT prophylaxis: Lovenox  Code status: FULL CODE  NOK: [place] (daughter, [phone])       Kate Maguire MD

## 2025-04-10 NOTE — CONSULTS
[facility]  ACUTE CARE SURGERY - CONSULT    Patient Name: Alfonzo Flanagan  MRN: [id number]  Admit Date: 2025  : 1975  AGE: 50 y.o.   GENDER: female  ==============================================================================  TODAY'S ASSESSMENT AND PLAN OF CARE:  ASSESSMENT:  Alfonzo Flanagan is a 50 y.o. 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 ACS is consulted for further evaluation.    On exam, pt with induration, tenderness to palpation, and cellulitis of left labia with some extension toward her mons. There is also evidence of a sinus that may have drained previously but is not actively draining. CT A/P without evidence of fluid collection or foci of air, only cellulitic changes. Reassuringly, pt is hemodynamically normal without leukocytosis.    Recommendations:  - consider broadening coverage to Zosyn and Vancomycin for now  - consider Gynecology consult given involvement of labia major and concern for possible infected Bartholin cyst or related pathology  - will follow    D/w Dr. Kaveh Salinas MD  PGY-1 General Surgery  Acute Care Surgery [id number]    Subjective   ==============================================================================  CHIEF COMPLAINT/REASON FOR CONSULT:  Chief Complaint: Left groin abscess    HPI:  Alfonzo Flanagan is a 50 y.o. female with history of PMH biopsy-proven PBC (with cirrhosis, portal HTN, ascites, esophageal varices s/p banding  and eradication, hepatic encephalopathy), celiac disease, pancreatic tail mass (suggestive of neuroendocrine tumor, diagnosed  3/2024), T2DM (last A1c 7.6 in 2024), CORBY and HS  who presents to  ED for abdominal distention and pain found to have R groin abscess likely 2/2 HS. ACS is consulted for evaluation of left groin cellulitis.     Pan positive ROS. Patient states she has had more frequent HA for 2 weeks and vision changes where she cannot see up close well. Endorses CP, SOB and abdominal pain with increased ascites. Has nausea with occasional vomiting as well as diarrhea. Reports rash on R hand that hurts. Additionally states she has had b/l LE swelling for the last 3 days. Has pain with urination and vaginal pain, has known UTI and vaginitis.      Objective   PAST MEDICAL HISTORY:   PMH:   Past Medical History:   Diagnosis Date    Ascites     Asthma     Cirrhosis of liver (Multi)     Diabetes mellitus (Multi)     GERD (gastroesophageal reflux disease)     CORBY (obstructive sleep apnea)     Portal hypertension (Multi)     Thrombocytopenia (CMS-HCC)      PSH:   Past Surgical History:   Procedure Laterality Date     SECTION, LOW TRANSVERSE      CHOLECYSTECTOMY      COLONOSCOPY      CT GUIDED IMAGING FOR NEEDLE PLACEMENT  10/14/2020    CT GUIDED IMAGING FOR NEEDLE PLACEMENT [facility]    ESOPHAGOGASTRODUODENOSCOPY      INGUINAL HIDRADENITIS EXCISION      TUBAL LIGATION      US GUIDED ABDOMINAL PARACENTESIS  10/08/2020    US GUIDED ABDOMINAL PARACENTESIS [facility]     FH:   No family history on file.    SOCIAL HISTORY:    Smoking: Denies smoking Hx   Social History     Tobacco Use   Smoking Status Never   Smokeless Tobacco Never       Alcohol: Denies alcohol use   Social History     Substance and Sexual Activity   Alcohol Use Never       Drug use: Denies drug use    MEDICATIONS:   Prior to Admission medications    Medication Sig Start Date End Date Taking? Authorizing Provider   acetaminophen (Tylenol) 500 mg tablet Take 1-2 tablets (500-1,000 mg) by mouth every 6 hours if needed (pain). 3/18/25 4/17/25   "Svetlana Solorzano MD   alcohol swabs pads, medicated Apply 1 each topically once daily at bedtime. 2/15/25   Jesse Ballesteros MD   atorvastatin (Lipitor) 20 mg tablet Take 1 tablet (20 mg) by mouth once daily at bedtime. 3/18/25 4/17/25  Svetlana Solorzano MD   carvedilol (Coreg) 6.25 mg tablet Take 1 tablet (6.25 mg) by mouth once daily. 3/18/25 4/17/25  Svetlana Solorzano MD   insulin glargine (Lantus) 100 unit/mL (3 mL) pen Inject 7 Units under the skin once daily at bedtime. Take as directed per insulin instructions. Discard pen 28 days after first use. 3/18/25 4/17/25  Svetlana Solorzano MD   lactulose 20 gram/30 mL oral solution Take 30 mL (20 g) by mouth 2 times a day. 3/18/25 4/17/25  Svetlana Solorzano MD   meclizine (Antivert) 25 mg tablet Take 1 tablet (25 mg) by mouth once daily as needed for dizziness. 3/18/25 4/17/25  Svetlana Solorzano MD   melatonin 3 mg tablet Take 1 tablet (3 mg) by mouth once daily at bedtime. 3/18/25 4/17/25  Svetlana Solorzano MD   metFORMIN XR (Glucophage-XR) 500 mg 24 hr tablet Take 2 tablets (1,000 mg) by mouth once daily in the evening. Take with meals. Do not crush, chew, or split. 3/18/25 4/17/25  Svetlana Solorzano MD   mirtazapine (Remeron) 7.5 mg tablet Take 1 tablet (7.5 mg) by mouth once daily at bedtime. 3/18/25 4/17/25  Svetlana Solorzano MD   ondansetron ODT (Zofran-ODT) 4 mg disintegrating tablet Dissolve 1 tablet (4 mg) in the mouth every 6 hours. 3/18/25 4/17/25  Svetlana Solorzano MD   pantoprazole (ProtoNix) 40 mg EC tablet Take 1 tablet (40 mg) by mouth once daily in the morning. Take before meals. Do not crush, chew, or split. 3/18/25 4/17/25  Svetlana Solorzano MD   pen needle 1/2\" 29G X 12mm needle Use to inject 1-4 times daily as directed. 2/15/25 2/15/26  Jesse Ballesteros MD   pen needle, diabetic (Sure Comfort Pen Needle) 32 gauge x 5/32\" needle Use 4 times a day 11/1/24   Leny Daniels, APRN-CNP   polyethylene glycol (Glycolax, Miralax) 17 gram packet Take 17 g by mouth once " daily. 3/18/25 4/17/25  Svetlana Solorzano MD   pramoxine (Sarna Sensitive) 1 % lotion Apply 1 Application topically every 8 hours if needed (itch). 3/18/25 4/17/25  Svetlana Solorzano MD   rifAXIMin (Xifaxan) 550 mg tablet Take 1 tablet (550 mg) by mouth 3 times a day for 28 days. 3/18/25 4/15/25  Svetlana Solorzano MD   spironolactone (Aldactone) 100 mg tablet Take 1.5 tablets (150 mg) by mouth once daily. 3/26/25 7/24/25  Dwain Khanna MD   sucralfate (Carafate) 1 gram tablet Take 1 tablet (1 g) by mouth 4 times a day before meals. 3/18/25 4/17/25  Svetlana Solorzano MD   torsemide (Demadex) 20 mg tablet Take 2 tablets (40 mg) by mouth once daily. 3/18/25   Svetlana Solorzano MD   traZODone (Desyrel) 50 mg tablet Take 0.5 tablets (25 mg) by mouth as needed at bedtime for sleep. 3/18/25 4/17/25  Svetlana Solorzano MD   ursodiol (Actigall) 250 mg tablet Take 2 tablets (500 mg) by mouth 2 times a day AND 1 tablet (250 mg) once daily. 3/18/25 4/17/25  Svetlana Solorzano MD     ALLERGIES: NKDA  Allergies   Allergen Reactions    Gluten Diarrhea       REVIEW OF SYSTEMS:  A 12-point ROS was performed and was unremarkable except as above.    PHYSICAL EXAM:  GEN: No acute distress. Alert, awake and conversant.  HEENT: Sclera anicteric. Moist mucous membranes.  RESP: Breathing non-labored, equal chest rise.  CV: Regular  GI: Abdomen soft, distended, non-tender  : left groin cellulitis and induration involving labia majora and extending toward mons pubis without active drainage from a possible sinus  MSK: No gross deformities. Moves all extremities spontaneously.  NEURO: Alert and oriented x3. No focal deficits.    IMAGING SUMMARY:   Imaging  CT abdomen pelvis w IV contrast    Result Date: 4/10/2025  *Interval increase in the degree of soft tissue edema and skin thickening within the left groin without formation of a discrete collection. Findings are favored to be reflective of cellulitis. *Re-demonstration of hepatic cirrhosis with  evidence of portal hypertension as evidenced by splenomegaly, large volume ascites, and tortuous venous collaterals. *Similar appearance of edematous wall thickening throughout several small and large bowel loops. Findings may be reflective of hypoproteinemia from hepatic cirrhosis versus less likely infectious/inflammatory enterocolitis. *Stable 6 mm pulmonary nodule within the right middle lobe.   I personally reviewed the images/study and I agree with the findings as stated by Giovanny Felix DO PGY-2. This study was interpreted at [facility], [place], [place].   MACRO: None.     Dictation workstation:   [id number]    US guided abdominal paracentesis    Result Date: 4/10/2025  Uneventful paracentesis, as detailed above. Right Hemiabdomen, 3500 mL   I personally performed and/or directly supervised this study and was present for the entire procedure.   Performed and dictated at [facility].   Signed by: Henok Perez 4/10/2025 3:23 PM Dictation workstation:   [id number]    XR chest 1 view    Result Date: 4/9/2025  No acute cardiopulmonary disease.  There does not appear to be any change when compared to the prior chest x-ray. Signed by Aydin Garcia MD     CT A/P 4/10/25 (final read pending):  Significant inflammation throughout soft tissue of lower abdomen/groin. No abscess/drainable fluid collection in groin. Has umbilical hernia with ascites.    Cardiology, Vascular, and Other Imaging  No other imaging results found for the past 2 days     I have reviewed the imaging above as it pertains to the patient's surgical concerns and agree with the radiologist's interpretation.  LABS:  Results for orders placed or performed during the hospital encounter of 04/09/25 (from the past 24 hours)   BLOOD GAS VENOUS FULL PANEL   Result Value Ref Range    POCT pH, Venous 7.48 (H) 7.33 - 7.43 pH    POCT pCO2, Venous 41 41 - 51 mm Hg    POCT pO2, Venous  72 (H) 35 - 45 mm Hg    POCT SO2, Venous 96 (H) 45 - 75 %    POCT Oxy Hemoglobin, Venous 93.6 (H) 45.0 - 75.0 %    POCT Hematocrit Calculated, Venous 34.0 (L) 36.0 - 46.0 %    POCT Sodium, Venous 133 (L) 136 - 145 mmol/L    POCT Potassium, Venous 4.0 3.5 - 5.3 mmol/L    POCT Chloride, Venous 102 98 - 107 mmol/L    POCT Ionized Calicum, Venous 1.14 1.10 - 1.33 mmol/L    POCT Glucose, Venous 350 (H) 74 - 99 mg/dL    POCT Lactate, Venous 0.6 0.4 - 2.0 mmol/L    POCT Base Excess, Venous 6.4 (H) -2.0 - 3.0 mmol/L    POCT HCO3 Calculated, Venous 30.5 (H) 22.0 - 26.0 mmol/L    POCT Hemoglobin, Venous 11.2 (L) 12.0 - 16.0 g/dL    POCT Anion Gap, Venous 5.0 (L) 10.0 - 25.0 mmol/L    Patient Temperature 37.0 degrees Celsius    FiO2 21 %   POCT GLUCOSE   Result Value Ref Range    POCT Glucose 229 (H) 74 - 99 mg/dL   POCT GLUCOSE   Result Value Ref Range    POCT Glucose 228 (H) 74 - 99 mg/dL   Bilirubin, Direct   Result Value Ref Range    Bilirubin, Direct 1.1 (H) 0.0 - 0.3 mg/dL   Coagulation Screen   Result Value Ref Range    Protime 15.5 (H) 9.8 - 12.4 seconds    INR 1.4 (H) 0.9 - 1.1    aPTT 32 26 - 36 seconds   Osmolality   Result Value Ref Range    Osmolality, Serum 296 280 - 300 mOsm/kg   CBC and Auto Differential   Result Value Ref Range    WBC 4.0 (L) 4.4 - 11.3 x10*3/uL    nRBC 0.0 0.0 - 0.0 /100 WBCs    RBC 3.62 (L) 4.00 - 5.20 x10*6/uL    Hemoglobin 11.2 (L) 12.0 - 16.0 g/dL    Hematocrit 35.0 (L) 36.0 - 46.0 %    MCV 97 80 - 100 fL    MCH 30.9 26.0 - 34.0 pg    MCHC 32.0 32.0 - 36.0 g/dL    RDW 15.9 (H) 11.5 - 14.5 %    Platelets 88 (L) 150 - 450 x10*3/uL    Neutrophils % 53.1 40.0 - 80.0 %    Immature Granulocytes %, Automated 0.2 0.0 - 0.9 %    Lymphocytes % 26.6 13.0 - 44.0 %    Monocytes % 10.7 2.0 - 10.0 %    Eosinophils % 8.9 0.0 - 6.0 %    Basophils % 0.5 0.0 - 2.0 %    Neutrophils Absolute 2.14 1.20 - 7.70 x10*3/uL    Immature Granulocytes Absolute, Automated 0.01 0.00 - 0.70 x10*3/uL    Lymphocytes  Absolute 1.07 (L) 1.20 - 4.80 x10*3/uL    Monocytes Absolute 0.43 0.10 - 1.00 x10*3/uL    Eosinophils Absolute 0.36 0.00 - 0.70 x10*3/uL    Basophils Absolute 0.02 0.00 - 0.10 x10*3/uL   Comprehensive metabolic panel   Result Value Ref Range    Glucose 232 (H) 74 - 99 mg/dL    Sodium 135 (L) 136 - 145 mmol/L    Potassium 3.5 3.5 - 5.3 mmol/L    Chloride 101 98 - 107 mmol/L    Bicarbonate 28 21 - 32 mmol/L    Anion Gap 10 10 - 20 mmol/L    Urea Nitrogen 14 6 - 23 mg/dL    Creatinine 0.69 0.50 - 1.05 mg/dL    eGFR >90 >60 mL/min/1.73m*2    Calcium 8.0 (L) 8.6 - 10.6 mg/dL    Albumin 2.1 (L) 3.4 - 5.0 g/dL    Alkaline Phosphatase 317 (H) 33 - 110 U/L    Total Protein 6.6 6.4 - 8.2 g/dL    AST 53 (H) 9 - 39 U/L    Bilirubin, Total 3.2 (H) 0.0 - 1.2 mg/dL    ALT 23 7 - 45 U/L   Lactate Dehydrogenase   Result Value Ref Range     (H) 84 - 246 U/L   Blood Culture    Specimen: Peripheral Venipuncture; Blood culture   Result Value Ref Range    Blood Culture Loaded on Instrument - Culture in progress    Blood Culture    Specimen: Peripheral Venipuncture; Blood culture   Result Value Ref Range    Blood Culture Loaded on Instrument - Culture in progress    POCT GLUCOSE   Result Value Ref Range    POCT Glucose 231 (H) 74 - 99 mg/dL   Sterile Fluid Culture/Smear    Specimen: Ascites Fluid   Result Value Ref Range    Gram Stain (2+) Few Polymorphonuclear leukocytes     Gram Stain No organisms seen    Albumin, Body Fluid   Result Value Ref Range    Albumin, Fluid <0.5 Not established g/dL   Amylase, Body Fluid   Result Value Ref Range    Amylase, Fluid 18 Not established. U/L   Body Fluid Cell Count   Result Value Ref Range    Color, Fluid Yellow Colorless, Straw, Yellow    Clarity, Fluid Clear Clear    WBC, Fluid 98 See Comment /uL    RBC, Fluid 20 see comment /uL   Body Fluid Differential   Result Value Ref Range    Neutrophils %, Manual, Fluid 6 see comment %    Lymphocytes %, Manual, Fluid 20 see comment %     Mono/Macrophages %, Manual, Fluid 74 see comment %    Eosinophils %, Manual, Fluid 0 see comment %    Basophils %, Manual, Fluid 0 not established %    Immature Granulocytes %, Manual, Fluid 0 not established %    Blasts %, Manual, Fluid 0 not established %    Unclassified Cells %, Manual, Fluid 0 not established %    Plasma Cells %, Manual, Fluid 0 not established %    Total Cells Counted, Fluid 100    Protein, Total, Body Fluid   Result Value Ref Range    Protein, Total Fluid 1.3 Not established g/dL   Glucose, Body Fluid   Result Value Ref Range    Glucose, Fluid 271 Not established mg/dL   Lactate Dehydrogenase, Body Fluid   Result Value Ref Range    LD, Fluid 48 Not established. U/L   Bilirubin, Total, Body Fluid   Result Value Ref Range    Total Bili, Fluid 0.6 Not established mg/dL   Cholesterol, Body Fluid   Result Value Ref Range    Cholesterol, Fluid <25 Not established mg/dL   hCG, quantitative, pregnancy   Result Value Ref Range    HCG, Beta-Quantitative <3 <5 mIU/mL   POCT GLUCOSE   Result Value Ref Range    POCT Glucose 227 (H) 74 - 99 mg/dL   Sodium, Urine Random   Result Value Ref Range    Sodium, Urine Random 49 mmol/L    Creatinine, Urine Random 82.2 20.0 - 320.0 mg/dL    Sodium/Creatinine Ratio 60 Not established. mmol/g Creat     *Note: Due to a large number of results and/or encounters for the requested time period, some results have not been displayed. A complete set of results can be found in Results Review.     I/O past 24h:  I/O last 3 completed shifts:  In: 50 (0.6 mL/kg) [IV Piggyback:50]  Out: 350 (4.1 mL/kg) [Urine:350 (0.1 mL/kg/hr)]  Weight: 84.8 kg     I have reviewed all laboratory and imaging results ordered/pertinent for this encounter.

## 2025-04-10 NOTE — PROGRESS NOTES
Physical Therapy                 Therapy Communication Note    Patient Name: Alfonzo Flanagan  MRN: [id number]  Department:   Room: 2029/2029-A  Today's Date: 4/10/2025     Discipline: Physical Therapy    PT Missed Visit: Yes     Missed Visit Reason: Missed Visit Reason: Other (Comment) (per MYNOR Baker, pt at paracentesis and also possibly US of liver)    Missed Time: Attempt    Comment: 14:05pm

## 2025-04-11 ENCOUNTER — APPOINTMENT (OUTPATIENT)
Dept: RADIOLOGY | Facility: HOSPITAL | Age: 50
DRG: 433 | End: 2025-04-11
Payer: COMMERCIAL

## 2025-04-11 LAB
ALBUMIN SERPL BCP-MCNC: 1.8 G/DL (ref 3.4–5)
ALP SERPL-CCNC: 251 U/L (ref 33–110)
ALT SERPL W P-5'-P-CCNC: 21 U/L (ref 7–45)
ANION GAP SERPL CALC-SCNC: 10 MMOL/L (ref 10–20)
APTT PPP: 33 SECONDS (ref 26–36)
AST SERPL W P-5'-P-CCNC: 48 U/L (ref 9–39)
BASOPHILS # BLD AUTO: 0.02 X10*3/UL (ref 0–0.1)
BASOPHILS NFR BLD AUTO: 0.5 %
BILIRUB SERPL-MCNC: 2.3 MG/DL (ref 0–1.2)
BUN SERPL-MCNC: 13 MG/DL (ref 6–23)
CALCIUM SERPL-MCNC: 7.5 MG/DL (ref 8.6–10.6)
CHLORIDE SERPL-SCNC: 103 MMOL/L (ref 98–107)
CLUE CELLS VAG LPF-#/AREA: ABNORMAL /[LPF]
CO2 SERPL-SCNC: 29 MMOL/L (ref 21–32)
CREAT SERPL-MCNC: 0.83 MG/DL (ref 0.5–1.05)
EGFRCR SERPLBLD CKD-EPI 2021: 87 ML/MIN/1.73M*2
EOSINOPHIL # BLD AUTO: 0.31 X10*3/UL (ref 0–0.7)
EOSINOPHIL NFR BLD AUTO: 8.4 %
ERYTHROCYTE [DISTWIDTH] IN BLOOD BY AUTOMATED COUNT: 15.7 % (ref 11.5–14.5)
GLUCOSE BLD MANUAL STRIP-MCNC: 163 MG/DL (ref 74–99)
GLUCOSE BLD MANUAL STRIP-MCNC: 191 MG/DL (ref 74–99)
GLUCOSE BLD MANUAL STRIP-MCNC: 220 MG/DL (ref 74–99)
GLUCOSE BLD MANUAL STRIP-MCNC: 264 MG/DL (ref 74–99)
GLUCOSE SERPL-MCNC: 188 MG/DL (ref 74–99)
HCT VFR BLD AUTO: 33 % (ref 36–46)
HGB BLD-MCNC: 10.8 G/DL (ref 12–16)
IMM GRANULOCYTES # BLD AUTO: 0.02 X10*3/UL (ref 0–0.7)
IMM GRANULOCYTES NFR BLD AUTO: 0.5 % (ref 0–0.9)
INR PPP: 1.4 (ref 0.9–1.1)
LYMPHOCYTES # BLD AUTO: 0.9 X10*3/UL (ref 1.2–4.8)
LYMPHOCYTES NFR BLD AUTO: 24.4 %
MCH RBC QN AUTO: 31.3 PG (ref 26–34)
MCHC RBC AUTO-ENTMCNC: 32.7 G/DL (ref 32–36)
MCV RBC AUTO: 96 FL (ref 80–100)
MONOCYTES # BLD AUTO: 0.57 X10*3/UL (ref 0.1–1)
MONOCYTES NFR BLD AUTO: 15.4 %
NEUTROPHILS # BLD AUTO: 1.87 X10*3/UL (ref 1.2–7.7)
NEUTROPHILS NFR BLD AUTO: 50.8 %
NRBC BLD-RTO: 0 /100 WBCS (ref 0–0)
NUGENT SCORE: 2
PLATELET # BLD AUTO: 86 X10*3/UL (ref 150–450)
POTASSIUM SERPL-SCNC: 3.9 MMOL/L (ref 3.5–5.3)
PROT SERPL-MCNC: 5.6 G/DL (ref 6.4–8.2)
PROTHROMBIN TIME: 15.2 SECONDS (ref 9.8–12.4)
RBC # BLD AUTO: 3.45 X10*6/UL (ref 4–5.2)
SODIUM SERPL-SCNC: 138 MMOL/L (ref 136–145)
WBC # BLD AUTO: 3.7 X10*3/UL (ref 4.4–11.3)
YEAST VAG WET PREP-#/AREA: PRESENT

## 2025-04-11 PROCEDURE — 2500000004 HC RX 250 GENERAL PHARMACY W/ HCPCS (ALT 636 FOR OP/ED)

## 2025-04-11 PROCEDURE — 93975 VASCULAR STUDY: CPT | Performed by: STUDENT IN AN ORGANIZED HEALTH CARE EDUCATION/TRAINING PROGRAM

## 2025-04-11 PROCEDURE — 87205 SMEAR GRAM STAIN: CPT

## 2025-04-11 PROCEDURE — 2500000001 HC RX 250 WO HCPCS SELF ADMINISTERED DRUGS (ALT 637 FOR MEDICARE OP)

## 2025-04-11 PROCEDURE — 85025 COMPLETE CBC W/AUTO DIFF WBC: CPT

## 2025-04-11 PROCEDURE — 82947 ASSAY GLUCOSE BLOOD QUANT: CPT

## 2025-04-11 PROCEDURE — 93975 VASCULAR STUDY: CPT

## 2025-04-11 PROCEDURE — 2500000005 HC RX 250 GENERAL PHARMACY W/O HCPCS

## 2025-04-11 PROCEDURE — 99221 1ST HOSP IP/OBS SF/LOW 40: CPT

## 2025-04-11 PROCEDURE — 80053 COMPREHEN METABOLIC PANEL: CPT

## 2025-04-11 PROCEDURE — 2500000002 HC RX 250 W HCPCS SELF ADMINISTERED DRUGS (ALT 637 FOR MEDICARE OP, ALT 636 FOR OP/ED)

## 2025-04-11 PROCEDURE — 1100000001 HC PRIVATE ROOM DAILY

## 2025-04-11 PROCEDURE — 99232 SBSQ HOSP IP/OBS MODERATE 35: CPT

## 2025-04-11 PROCEDURE — 36415 COLL VENOUS BLD VENIPUNCTURE: CPT

## 2025-04-11 PROCEDURE — 99233 SBSQ HOSP IP/OBS HIGH 50: CPT | Performed by: STUDENT IN AN ORGANIZED HEALTH CARE EDUCATION/TRAINING PROGRAM

## 2025-04-11 PROCEDURE — 76705 ECHO EXAM OF ABDOMEN: CPT | Performed by: STUDENT IN AN ORGANIZED HEALTH CARE EDUCATION/TRAINING PROGRAM

## 2025-04-11 PROCEDURE — 97161 PT EVAL LOW COMPLEX 20 MIN: CPT | Mod: GP | Performed by: PHYSICAL THERAPIST

## 2025-04-11 PROCEDURE — 85730 THROMBOPLASTIN TIME PARTIAL: CPT

## 2025-04-11 PROCEDURE — 85610 PROTHROMBIN TIME: CPT

## 2025-04-11 RX ORDER — FUROSEMIDE 10 MG/ML
40 INJECTION INTRAMUSCULAR; INTRAVENOUS EVERY 12 HOURS
Status: DISCONTINUED | OUTPATIENT
Start: 2025-04-12 | End: 2025-04-13

## 2025-04-11 RX ORDER — CHLORHEXIDINE GLUCONATE 40 MG/ML
SOLUTION TOPICAL DAILY
Status: DISCONTINUED | OUTPATIENT
Start: 2025-04-12 | End: 2025-04-14 | Stop reason: HOSPADM

## 2025-04-11 RX ORDER — FUROSEMIDE 10 MG/ML
40 INJECTION INTRAMUSCULAR; INTRAVENOUS ONCE
Status: COMPLETED | OUTPATIENT
Start: 2025-04-11 | End: 2025-04-11

## 2025-04-11 RX ORDER — FUROSEMIDE 10 MG/ML
40 INJECTION INTRAMUSCULAR; INTRAVENOUS EVERY 12 HOURS
Status: DISCONTINUED | OUTPATIENT
Start: 2025-04-11 | End: 2025-04-11

## 2025-04-11 RX ADMIN — NYSTATIN 1 APPLICATION: 100000 POWDER TOPICAL at 21:20

## 2025-04-11 RX ADMIN — FUROSEMIDE 40 MG: 10 INJECTION INTRAMUSCULAR; INTRAVENOUS at 21:19

## 2025-04-11 RX ADMIN — FUROSEMIDE 40 MG: 10 INJECTION INTRAMUSCULAR; INTRAVENOUS at 14:54

## 2025-04-11 RX ADMIN — RIFAXIMIN 550 MG: 550 TABLET ORAL at 21:19

## 2025-04-11 RX ADMIN — ACETAMINOPHEN 650 MG: 160 SOLUTION ORAL at 11:09

## 2025-04-11 RX ADMIN — RIFAXIMIN 550 MG: 550 TABLET ORAL at 11:04

## 2025-04-11 RX ADMIN — LACTULOSE 20 G: 20 SOLUTION ORAL at 21:18

## 2025-04-11 RX ADMIN — INSULIN LISPRO 5 UNITS: 100 INJECTION, SOLUTION INTRAVENOUS; SUBCUTANEOUS at 12:51

## 2025-04-11 RX ADMIN — URSODIOL 250 MG: 250 TABLET ORAL at 21:32

## 2025-04-11 RX ADMIN — PIPERACILLIN SODIUM AND TAZOBACTAM SODIUM 3.38 G: 3; .375 INJECTION, SOLUTION INTRAVENOUS at 19:03

## 2025-04-11 RX ADMIN — SUCRALFATE 1 G: 1 TABLET ORAL at 11:04

## 2025-04-11 RX ADMIN — ACYCLOVIR 400 MG: 200 CAPSULE ORAL at 11:04

## 2025-04-11 RX ADMIN — CARVEDILOL 6.25 MG: 6.25 TABLET, FILM COATED ORAL at 11:03

## 2025-04-11 RX ADMIN — ATORVASTATIN CALCIUM 20 MG: 20 TABLET, FILM COATED ORAL at 21:32

## 2025-04-11 RX ADMIN — ENOXAPARIN SODIUM 40 MG: 40 INJECTION, SOLUTION SUBCUTANEOUS at 22:06

## 2025-04-11 RX ADMIN — URSODIOL 500 MG: 500 TABLET ORAL at 11:04

## 2025-04-11 RX ADMIN — SUCRALFATE 1 G: 1 TABLET ORAL at 06:12

## 2025-04-11 RX ADMIN — LACTULOSE 20 G: 20 SOLUTION ORAL at 11:03

## 2025-04-11 RX ADMIN — PANTOPRAZOLE SODIUM 40 MG: 40 TABLET, DELAYED RELEASE ORAL at 06:12

## 2025-04-11 RX ADMIN — SPIRONOLACTONE 150 MG: 25 TABLET, FILM COATED ORAL at 11:03

## 2025-04-11 RX ADMIN — PIPERACILLIN SODIUM AND TAZOBACTAM SODIUM 3.38 G: 3; .375 INJECTION, SOLUTION INTRAVENOUS at 11:04

## 2025-04-11 RX ADMIN — INSULIN LISPRO 9 UNITS: 100 INJECTION, SOLUTION INTRAVENOUS; SUBCUTANEOUS at 19:04

## 2025-04-11 RX ADMIN — PIPERACILLIN SODIUM AND TAZOBACTAM SODIUM 3.38 G: 3; .375 INJECTION, SOLUTION INTRAVENOUS at 04:25

## 2025-04-11 RX ADMIN — ACYCLOVIR 400 MG: 200 CAPSULE ORAL at 14:54

## 2025-04-11 RX ADMIN — SUCRALFATE 1 G: 1 TABLET ORAL at 19:04

## 2025-04-11 RX ADMIN — ACYCLOVIR 400 MG: 200 CAPSULE ORAL at 21:19

## 2025-04-11 RX ADMIN — RIFAXIMIN 550 MG: 550 TABLET ORAL at 14:55

## 2025-04-11 RX ADMIN — INSULIN GLARGINE 7 UNITS: 100 INJECTION, SOLUTION SUBCUTANEOUS at 21:20

## 2025-04-11 RX ADMIN — Medication 3 MG: at 21:19

## 2025-04-11 RX ADMIN — NYSTATIN 1 APPLICATION: 100000 POWDER TOPICAL at 11:06

## 2025-04-11 RX ADMIN — URSODIOL 500 MG: 500 TABLET ORAL at 21:32

## 2025-04-11 RX ADMIN — MIRTAZAPINE 7.5 MG: 15 TABLET, FILM COATED ORAL at 21:18

## 2025-04-11 ASSESSMENT — PAIN SCALES - GENERAL
PAINLEVEL_OUTOF10: 0 - NO PAIN
PAINLEVEL_OUTOF10: 5 - MODERATE PAIN
PAINLEVEL_OUTOF10: 3

## 2025-04-11 ASSESSMENT — ACTIVITIES OF DAILY LIVING (ADL): ADLS_ADDRESSED: NO

## 2025-04-11 ASSESSMENT — COGNITIVE AND FUNCTIONAL STATUS - GENERAL
TOILETING: A LITTLE
DRESSING REGULAR LOWER BODY CLOTHING: A LITTLE
MOBILITY SCORE: 20
CLIMB 3 TO 5 STEPS WITH RAILING: A LITTLE
CLIMB 3 TO 5 STEPS WITH RAILING: A LITTLE
MOBILITY SCORE: 21
WALKING IN HOSPITAL ROOM: A LITTLE
HELP NEEDED FOR BATHING: A LITTLE
STANDING UP FROM CHAIR USING ARMS: A LITTLE
DAILY ACTIVITIY SCORE: 21
MOVING TO AND FROM BED TO CHAIR: A LITTLE
WALKING IN HOSPITAL ROOM: A LITTLE
STANDING UP FROM CHAIR USING ARMS: A LITTLE

## 2025-04-11 ASSESSMENT — PAIN DESCRIPTION - LOCATION: LOCATION: HAND

## 2025-04-11 ASSESSMENT — PAIN - FUNCTIONAL ASSESSMENT
PAIN_FUNCTIONAL_ASSESSMENT: 0-10

## 2025-04-11 NOTE — CARE PLAN
The patient's goals for the shift include      The clinical goals for the shift include Patient will have her Paracentesis completed    Patient went to IR and had her Paracentesis completed.

## 2025-04-11 NOTE — PROGRESS NOTES
Vancomycin Dosing by Pharmacy- Cessation of Therapy    Consult to pharmacy for vancomycin dosing has been discontinued by the prescriber, pharmacy will sign off at this time.    Please call pharmacy if there are further questions or re-enter a consult if vancomycin is resumed.     Papi Giles, PharmD

## 2025-04-11 NOTE — SIGNIFICANT EVENT
ACUTE CARE SURGERY  UPDATED RECOMMENDATIONS    Reevaluated patient at bedside. On exam, indurated and erythematous left labia majora and groin slightly improved compared to yesterday. There is some serosanguinous drainage from at least one evident sinus. No crepitus or worsening of overlying skin discoloration.    Recommend continued supportive care and broad spectrum antibiotics for the time being. Can consider deescalating in the coming days based upon clinical exam. Would recommend primary team evaluate at least BID. Appreciate Gynecology evaluation. Agree with Dermatology referral for possible HS.    ACS to sign off. Please reach out with questions, concerns, or worsening exam findings.    Jesse Sánchez MD  General Surgery PGY5  ACS [id number]

## 2025-04-11 NOTE — CONSULTS
Consults    Reason For Consult  IP GYN HPI/REASON FOR CONSULT: Inguinal abscess    History Of Present Illness  Alfonzo Flanagan is a 50 y.o.  female presenting with left groin abscess in the s/o Hidradenitis suppurativa which ruptured 3 days ago.  Patient reports  she has a history of hidradenitis suppurativa affecting her groin, and she had a lesion in her left groin that just drained open on  leaking pus and minimal blood. She states she intermittently has smaller abscesses in armpits, groin and under breasts, not currently on any treatment for HS. After the spontaneous drainage of the abscess the area is hard and itchy.   She also states that  there is a lesion on her right palm; it appears vesicular, in group, with associated significant pain.She does not endorse any insect bites or recent travel and says she has never had this before.      PMH significant for biopsy-proven PBC with cirrhosis c/b portal HTN with ascites, esophageal varices (s/p banding  with eradication), hepatic encephalopathy, antibody-positive celiac disease, recent pancreatic tail mass suggestive of neuroendocrine tumor s/p EUS on 3/2024 (no biopsy) recommended repeat in 1 year, IDDM2 (A1c 7.6), HTN, GERD, anxiety, depression, CORBY ( sleep study, untreated) admitted for 2 days of abdominal distension and discomfort, urinary frequency, and dysuria.     Past Medical History  She has a past medical history of Ascites, Asthma, Cirrhosis of liver (Multi), Diabetes mellitus (Multi), GERD (gastroesophageal reflux disease), CORBY (obstructive sleep apnea), Portal hypertension (Multi), and Thrombocytopenia (CMS-HCC).    Surgical History  She has a past surgical history that includes CT guided imaging for needle placement (10/14/2020); US guided abdominal paracentesis (10/08/2020); Cholecystectomy ();  section, low transverse; Tubal ligation; Esophagogastroduodenoscopy; Colonoscopy; and Inguinal hidradenitis  "excision.    OB/Gyn History    SABX2  SVDX3  CSX1  Amenorrheic for 10 months, occasional hot flushes  BC: Tubal sterilization  STI: denies  , not sexually active for 1 yr  Last PAP: 3/2020 NILM, HPV high risk positive    Social History  She reports that she has never smoked. She has never used smokeless tobacco. She reports that she does not drink alcohol and does not use drugs.    Family History  No family history on file.     Allergies  Gluten    Physical Exam  General: no acute distress  HEENT: normocephalic, atraumatic  Lungs: no increased WOB, CTAB  Abd: soft, distended  Extremities: moving all extremities  Neuro: awake and conversant  : White discharge at introitus, vaginitis swab collected, Bartholin cyst or abscess not observed, 2x2 cm indurated mass in left inguinal area     Last Recorded Vitals  Blood pressure 93/57, pulse 85, temperature 36.5 °C (97.7 °F), resp. rate 16, height 1.575 m (5' 2\"), weight 84.8 kg (187 lb), SpO2 97%.      Assessment/Plan   Alfonzo Flanagan is a 50 y.o.  female presenting with ruptured left groin abscess in the setting of HS.    - We recommend  routine outpatient Gynecology visit in the setting of Last PAP in  with high risk HPV.  - Inguinal abscess drained spontaneously likely due to hidradenitis suppurativa. No Bartholin abscess or cyst on examination.  - Agree with antibiotic coverage by primary team.  - Topical antiseptic washes with Chlorhexidine 4%, management of DM for optimum BG levels, Dermatology referral per the primary team recommended.    To be seen and d/w Dr. Gavin Moraes MD PGY2          "

## 2025-04-11 NOTE — PROGRESS NOTES
"[facility]  [facility]  CONSULT FOLLOW-UP     Reason For Consult  Decompensated cirrhosis, consideration for TIPS and transplant evaluation     SUBJECTIVE     -s/p Para 4/10: 3.5L removed. PMN60s, SAAG >1.6, TP 1.3  -350cc UOP charted with torsemide 40mg PO and nataly 150  -Reports improved abdominal distension, no Bms yesterday    EXAM     Last Recorded Vitals  Blood pressure 118/72, pulse 76, temperature 36.5 °C (97.7 °F), resp. rate 16, height 1.575 m (5' 2\"), weight 84.8 kg (187 lb), SpO2 96%.      Intake/Output Summary (Last 24 hours) at 4/11/2025 1313  Last data filed at 4/11/2025 1241  Gross per 24 hour   Intake 50 ml   Output 1800 ml   Net -1750 ml       Physical Exam   General: well-developed, well-nourished, no acute distress, AAOx3  HEENT: EOM intact, scleral icterus  CV: regular rate and rhythm  Pulm: normal respiratory effort, clear to auscultation bilaterally   Abd: soft, nontender, distended (slightly improved)  Extremities: warm, no LE edema  Neuro: moves all extremities equally, mild asterixis   Skin: warm, dry, intact      OBJECTIVE                                                                              Medications             Current Facility-Administered Medications:     acetaminophen (Tylenol) tablet 487.5 mg, 487.5 mg, oral, q6h PRN, 487.5 mg at 04/10/25 2221 **OR** acetaminophen (Tylenol) oral liquid 650 mg, 650 mg, nasogastric tube, q4h PRN, 650 mg at 04/11/25 1109 **OR** acetaminophen (Tylenol) suppository 650 mg, 650 mg, rectal, q4h PRN, Rekha Denny MD    acyclovir (Zovirax) capsule 400 mg, 400 mg, oral, TID, Lane Caba MD, 400 mg at 04/11/25 1104    atorvastatin (Lipitor) tablet 20 mg, 20 mg, oral, Nightly, Rekha Denny MD, 20 mg at 04/10/25 2210    carvedilol (Coreg) tablet 6.25 mg, 6.25 mg, oral, Daily, Rekha Denny MD, 6.25 mg at 04/11/25 1103    dextrose 50 % injection 12.5 g, 12.5 g, intravenous, q15 min PRN, Rekha Denny, " MD    dextrose 50 % injection 25 g, 25 g, intravenous, q15 min PRN, Rekha Denny MD    enoxaparin (Lovenox) syringe 40 mg, 40 mg, subcutaneous, q24h, Rekha Denny MD, 40 mg at 04/10/25 2211    glucagon (Glucagen) injection 1 mg, 1 mg, intramuscular, q15 min PRN, Rekha Denny MD    glucagon (Glucagen) injection 1 mg, 1 mg, intramuscular, q15 min PRN, Rekha Denny MD    insulin glargine (Lantus) injection 7 Units, 7 Units, subcutaneous, Nightly, Rekha Denny MD, 7 Units at 04/10/25 2221    insulin lispro injection 0-15 Units, 0-15 Units, subcutaneous, TID AC, Rekha Denny MD, 5 Units at 04/11/25 1251    lactulose 20 gram/30 mL oral solution 20 g, 20 g, oral, BID, Rekha Denny MD, 20 g at 04/11/25 1103    melatonin tablet 3 mg, 3 mg, oral, Nightly, Rekha Denny MD, 3 mg at 04/10/25 2210    [Held by provider] metFORMIN XR (Glucophage-XR) 24 hr tablet 1,000 mg, 1,000 mg, oral, Daily with evening meal, Rekha Denny MD    mirtazapine (Remeron) tablet 7.5 mg, 7.5 mg, oral, Nightly, Rekha Denny MD, 7.5 mg at 04/10/25 2210    nystatin (Mycostatin) 100,000 unit/gram powder 1 Application, 1 Application, Topical, BID, Liz Wise MD, 1 Application at 04/11/25 1106    ondansetron (Zofran) tablet 4 mg, 4 mg, oral, q8h PRN **OR** ondansetron (Zofran) injection 4 mg, 4 mg, intravenous, q8h PRN, Rekha Denny MD, 4 mg at 04/10/25 0113    pantoprazole (ProtoNix) EC tablet 40 mg, 40 mg, oral, Daily before breakfast, 40 mg at 04/11/25 0612 **OR** pantoprazole (Protonix) injection 40 mg, 40 mg, intravenous, Daily before breakfast, Rekha Denny MD, 40 mg at 04/10/25 0656    piperacillin-tazobactam (Zosyn) 3.375 g in dextrose (iso) IV 50 mL, 3.375 g, intravenous, q6h, Agustina Almeida MD, Stopped at 04/11/25 1241    polyethylene glycol (Glycolax, Miralax) packet 17 g, 17 g, oral, Daily, Rekha Denny MD    rifAXIMin (Xifaxan) tablet 550 mg, 550 mg, oral, TID, Rekha Denny MD, 550 mg at 04/11/25 1104    spironolactone (Aldactone)  tablet 150 mg, 150 mg, oral, Daily, Rekha Denny MD, 150 mg at 04/11/25 1103    sucralfate (Carafate) tablet 1 g, 1 g, oral, Before meals & nightly, Rekha Denny MD, 1 g at 04/11/25 1104    [Held by provider] torsemide (Demadex) tablet 40 mg, 40 mg, oral, Daily, Rekha Denny MD, 40 mg at 04/10/25 0957    traZODone (Desyrel) tablet 25 mg, 25 mg, oral, Nightly PRN, Rekha Denny MD, 25 mg at 04/10/25 0044    ursodiol (Actigall) tablet 500 mg, 500 mg, oral, BID, 500 mg at 04/11/25 1104 **AND** ursodiol (Actigall) tablet 250 mg, 250 mg, oral, Daily, Lane Caba MD, 250 mg at 04/10/25 2208                                                                            Labs     CBC RFP   Lab Results   Component Value Date    WBC 3.7 (L) 04/11/2025    HGB 10.8 (L) 04/11/2025    HCT 33.0 (L) 04/11/2025    MCV 96 04/11/2025    PLT 86 (L) 04/11/2025    NEUTROABS 1.87 04/11/2025    Lab Results   Component Value Date     04/11/2025    K 3.9 04/11/2025     04/11/2025    CO2 29 04/11/2025    BUN 13 04/11/2025    CREATININE 0.83 04/11/2025     Lab Results   Component Value Date    MG 1.94 04/09/2025    PHOS 3.5 03/18/2025    CALCIUM 7.5 (L) 04/11/2025    ALBUMIN 1.8 (L) 04/11/2025         Hepatic Function ABG/VBG   Lab Results   Component Value Date    ALT 21 04/11/2025    AST 48 (H) 04/11/2025    ALKPHOS 251 (H) 04/11/2025     Lab Results   Component Value Date    BILITOT 2.3 (H) 04/11/2025    BILIDIR 1.1 (H) 04/10/2025     Lab Results   Component Value Date    PROTIME 15.2 (H) 04/11/2025    APTT 33 04/11/2025    INR 1.4 (H) 04/11/2025    Lab Results   Component Value Date    LACTATE 1.0 03/13/2025                                                                                  Imaging     CT ABDOMEN PELVIS W IV CONTRAST; 2/9/2025   IMPRESSION:  1.  Redemonstration of hepatic cirrhosis with evidence of portal  hypertension as evidenced by mild splenomegaly and moderate to large  volume abdominopelvic ascites.  2.  Interval increase in edematous wall thickening throughout the  small bowel loops, ascending colon and transverse colon compared to  prior CT examination dated 02/05/2025. This could again be related to  hypoproteinemia from hepatic cirrhosis, however  infectious/inflammatory enterocolitis can also be considered in the  differential in appropriate clinical setting.  3. Stable 6 mm pulmonary nodule in the right middle lobe. This is  similar compared to prior CT examination dated 07/04/2024. Recommend  short interval follow-up CT chest in 12 months to document stability.     CT ABDOMEN PELVIS W IV CONTRAST; 2/5/2025   IMPRESSION:  1.  Hepatic cirrhosis with evidence of portal hypertension and  moderate volume ascites.  2. 6 mm right middle lobe pulmonary nodule can be followed up with  chest CT in 12 months for surveillance.     CT ABDOMEN PELVIS W IV CONTRAST; 1/26/2025   IMPRESSION:     Cirrhosis and stigmata of portal hypertension, similar appearance to the   prior.  No focal hepatic lesion.                                                                          GI Procedures     EGD 1/13/25:  Impression  The esophagus appeared normal.  Irregular Z-line  Moderate portal hypertensive gastropathy in the cardia, fundus of the stomach, body of the stomach, incisura, antrum, prepyloric region and pylorus  The stomach was otherwise normal on direct and retroflexion views. Additional information: No gastric varices were visualized.  The duodenal bulb and 1st part of the duodenum appeared normal.     Findings  The esophagus appeared normal. Additional information: No esophageal varices were visualized.  Irregular Z-line 39 cm from the incisors  Moderate and diffuse portal hypertensive gastropathy in the cardia, fundus of the stomach, body of the stomach, incisura, antrum, prepyloric region and pylorus  The stomach was otherwise normal on direct and retroflexion views. Additional information: No gastric varices were  visualized.  The duodenal bulb and 1st part of the duodenum appeared normal.       ASSESSMENT / PLAN     ASSESSMENT/PLAN:  Alfonzo Flanagan is a 50 y.o. female with a past medical history of biopsy-proven PBC with cirrhosis c/b portal HTN with ascites, esophageal varices (previous bleeding, s/p banding 2021 with eradication), hepatic encephalopathy, antibody-positive celiac disease, recent pancreatic tail mass suggestive of neuroendocrine tumor s/p EUS on 3/2024 (no biopsy) recommended repeat in 1 year, IDDM2 (A1c 7.6), HTN, GERD, anxiety, depression, CORBY who resented to ED 4/9/25 for abdominal distension and pain and dysuria. Hepatology is consulted for decompensated cirrhosis and consideration for TIPS and transplant evaluation.     It seems like a large obstacle to pt's ability to comply with her diuretics stems from financial issues (she has to pay out of pocket every time); unfortunately pt remains without insurance and is still pending Medicaid.  Would plan to r/o SBP with diagnostic (and therapeutic) paracentesis and evaluate for other causes for the ascites. Meanwhile will would try to optimize her volume status in an effort to keep her out of the hospital longer. With pt's current financial/insurance situation, there are no plans to open liver transplant evaluation and therefore no plans for TIPS evaluation.     CIRRHOSIS CLASSIFICATION:  -Etiology: PBC  -Hepatologist: Dr. Khanna  MELD 3.0: 17 at 4/11/2025  6:39 AM  MELD-Na: 13 at 4/11/2025  6:39 AM  Calculated from:  Serum Creatinine: 0.83 mg/dL (Using min of 1 mg/dL) at 4/11/2025  6:39 AM  Serum Sodium: 138 mmol/L (Using max of 137 mmol/L) at 4/11/2025  6:39 AM  Total Bilirubin: 2.3 mg/dL at 4/11/2025  6:39 AM  Serum Albumin: 1.8 g/dL at 4/11/2025  6:39 AM  INR(ratio): 1.4 at 4/11/2025  6:39 AM  Age at listing (hypothetical): 50 years  Sex: Female at 4/11/2025  6:39 AM      #decompensated cirrhosis 2/2 PBC (ascites, EV bleeding, ?HE)  #PBC  :: Para  4/10/25: 3.5L removed. PMN60s, SAAG >1.6, TP 1.3    Recommendations:  -Continue diuresis with lasix IV 40mg bid  -Continue nataly 150mg daily  -Hold home torsemide   -Strict I/O and daily weights  -Continue home ursodiol 500mg bid and 250mg daily.   -Will follow up on liver US doppler  -Continue with lactulose (titrate to 3-4BMs) and rifaximin  -Low Na diet, 2L fluid restriction  -Trend MELD labs daily     Patient was seen and discussed with Dr. Aldana .    Thank you for the consultation. Hepatology will continue to follow.  During weekday hours of 7am-5pm, please do not hesitate to contact me on [employer] Chat or page [phone], if there are any further questions.  After hours, on weekends, and on holidays, please page the [place] GI fellow at [phone].   Thank you.

## 2025-04-11 NOTE — SIGNIFICANT EVENT
Overnight Check    Patient was seen and examined at bedside. She reports pain in left groin and vagina, but manageable. Still having pain with urination. She denies any bleeding or discharge from the wound. Denies fevers or chills.      Most recent vital signs reviewed and was significant for BP 83/50 around 20:00, with HR 85, MAP 61, afebrile. She denies dizziness, shortness of breath, or chest pain. I asked bedside nurse to recheck BP and it was 93/57. She overall appears well and comfortable.    On exam, her left groin appears erythematous, but not significantly edematous. There is induration of labia majora, which was documented earlier in our consult note. There is a possible sinus tract at the lateral edge of left labia majora, with pinpoint bleeding, no reproducible drainage. Wound area is tender to palpation, as expected. No areas of fluctuance.     Will continue to monitor patient for signs/sx of worsening infection / sepsis throughout the night.     Plan/Recs:  - Broad spectrum antibiotics ie Vanc/Zosyn (ordered)  - consider Gynecology consult given involvement of labia major and concern for possible infected Bartholin cyst or related pathology  - reach out with questions or concerns    Discussed w/ night chief resident    Claribel Mai MD  General Surgery PGY-2  ACS [id number]  ACS ED Consults [id number]

## 2025-04-11 NOTE — PROGRESS NOTES
Alfonzo Flanagan is a 50 y.o. female on day 2 of admission presenting with Decompensated cirrhosis.    TCC met with pt to discuss care team reccs for LOW intensity but pt declined. TCC also discussed outpt therapy but pt also declined. Pt states her dtr helps at home so she doesn't feel she needs PT/OT on DC. TCC will update medical team.

## 2025-04-11 NOTE — PROGRESS NOTES
Physical Therapy    Physical Therapy Evaluation    Patient Name: Alfonzo Flanagan  MRN: [id number]  Department: [place] [address]  Room: 2029/2029-A  Today's Date: 4/11/2025   Time Calculation  Start Time: 1102  Stop Time: 1121  Time Calculation (min): 19 min    Assessment/Plan   PT Assessment  PT Assessment Results: Decreased strength, Decreased endurance, Impaired balance, Decreased mobility, Decreased cognition, Pain  Rehab Prognosis: Good  Barriers to Discharge Home: No anticipated barriers  Evaluation/Treatment Tolerance: Patient tolerated treatment well, Other (Comment) (mild dizziness after stairclimbing, resolved with standing rest break)  Medical Staff Made Aware: Yes (RN present and aware)  Strengths: Support of Caregivers, Premorbid level of function  Barriers to Participation: Other (Comment) (none)  End of Session Communication: Bedside nurse  Assessment Comment: 51 yo female presents with decreased balance, decreased activity tolerance, pain as noted and functional weakness overall negatively impacting mobility. Recommend home with assist and low intensity home therapy.  End of Session Patient Position: Up in chair, Alarm off, not on at start of session (RN present changing bed)  IP OR SWING BED PT PLAN  Inpatient or Swing Bed: Inpatient  PT Plan  Treatment/Interventions: Bed mobility, Transfer training, Gait training, Stair training, Balance training, Neuromuscular re-education, Strengthening, Endurance training, Therapeutic exercise, Therapeutic activity, Home exercise program, Postural re-education  PT Plan: Ongoing PT  PT Frequency: 3 times per week  PT Discharge Recommendations: Low intensity level of continued care, Other (comment) (home with assist and home PT)  Equipment Recommended upon Discharge:  (shower chair with back)  PT Recommended Transfer Status: Assist x1, Assistive device (min A no device, CGA with WW; pt a little unsteady)  PT - OK to Discharge: Yes (PT eval completed & DC recs  "made)    Subjective   General Visit Information:  General  Reason for Referral: Admitted 4/9 with swollen abdomen x 1 month with N/V/D, back pain and loss of appetite; dx: decompensated cirrhosis, Left groin abscess, hepatic encephalopathy, UTI, possible vaginal candidiasis, Left palmar rash; 4/10 s/p paracentesis  Past Medical History Relevant to Rehab: Ascites, Asthma, Cirrhosis, DM GERD CORBY, Portal hypertension (Multi), Thrombocytopenia celiac disease, hepatic encephalopathy, pancreatic tail mass, anxiety, depression, esophageal varices  PT Missed Visit:  (no)  Missed Visit Reason:  (.)  Family/Caregiver Present: No  Prior to Session Communication: Bedside nurse (RN present for part of session and reports that pt has been ambulating to bathroom by herself without device)  Patient Position Received: Alarm off, not on at start of session, Bed, 2 rail up  Preferred Learning Style: kinesthetic, verbal  General Comment: Pt alert, oriented as noted, following all commands and able to walk without device and do a few stairs; pt unsteady and little dizzy after doing 3 stairs assisted  (has 11 to enter home). Pt reports that this is close to her baseline and either her son or daughter will help her as needed.  Home Living:  Home Living  Type of Home: Apartment  Lives With:  (daughter (works FT); \"\" per pt (on phone at start of session) can help sometimes)  Home Adaptive Equipment:  (WW)  Home Layout:  (11 KATE with 1 rail, bed/tub shower on same level)  Home Access: Stairs to enter with rails  Entrance Stairs-Rails:  (1)  Entrance Stairs-Number of Steps: 11  Bathroom Shower/Tub: Tub/shower unit  Bathroom Toilet: Standard  Bathroom Equipment: None  Bathroom Accessibility: up 11 steps upon entry per pt (language barrier so may be misunderstanding)  Prior Level of Function:  Prior Function Per Pt/Caregiver Report  Level of [place]:  (ind no device indoors, assist with WW amb outdoors, assist on stairs, 2-3 " "falls)  Receives Help From:  (dtr or son, \"\" sometimes)  ADL Assistance:  (ind dress, assist shower)  Homemaking Assistance: Needs assistance (dtr does laundry and helps otherwise as needed)  Ambulatory Assistance: Independent (as noted)  Vocational:  (not working)  Prior Function Comments: +falls  Precautions:  Precautions  Hearing/Visual Limitations: hearing WFL grossly  Medical Precautions: Fall precautions  Post-Surgical Precautions:  (DM, encephalopathy, Right hand rash/pain)  Precautions Comment: mild memory deficits, speaks English and [place]      Date/Time Vitals Session Patient Position Pulse Resp SpO2 BP MAP (mmHg)    04/11/25 1102 Post PT  Sitting  76  --  96 %  118/72  --    Vital Signs Comment: post gait/stairs; dizziness resolved     Objective   Pain:  Pain Assessment  Pain Assessment: 0-10  0-10 (Numeric) Pain Score:  (unrated pain pre/during/post Right hand (rash) and lower abdomen/pelvic area (RN present giving meds))  Pain Interventions: Repositioned, Ambulation/increased activity, Emotional support, Rest, Therapeutic presence, Therapeutic touch  Response to Interventions:  (comfortable sitting, still pain in Right hand (RN present and aware))  Cognition:  Cognition  Arousal/Alertness: Delayed responses to stimuli (speaks English and [place])  Orientation Level:  (oriented to self, situation, hospital, month, year (not day/date but close))  Following Commands: Follows all commands and directions without difficulty  Problem Solving: Assistance required to identify errors made  Memory: Exceptions to WFL  Short-Term Memory: Impaired (per history taking)  Processing Speed: Delayed    General Assessments:        Activity Tolerance  Endurance:  (dizziness with stairs; did well with intermittent standing rest breaks)    Sensation  Light Touch: Not tested       Postural Control  Postural Control: Impaired  Posture Comment: standing no device: wide SLADE    Static Sitting Balance  Static " Sitting-Balance Support: Feet supported, No upper extremity supported  Static Sitting-Level of Assistance: Independent    Static Standing Balance  Static Standing-Balance Support: No upper extremity supported (no device)  Static Standing-Level of Assistance: Contact guard (SB/CGA, mild unsteadiness at times)  Dynamic Standing Balance  Dynamic Standing-Balance Support: No upper extremity supported (no device)  Dynamic Standing-Level of Assistance: Minimum assistance, Contact guard  Dynamic Standing-Balance:  (gait including turns, transfers)  Dynamic Standing-Comments: little unsteady at times  Functional Assessments:  ADL  ADL's Addressed: No    Bed Mobility  Bed Mobility: Yes  Bed Mobility 1  Bed Mobility 1: Supine to sitting  Level of Assistance 1: Close supervision  Bed Mobility Comments 1: HOB relateively flat, use of rail    Transfers  Transfer: Yes  Transfer 1  Transfer From 1: Bed to  Transfer to 1: Stand  Technique 1: Sit to stand  Transfer Device 1:  (no device)  Transfer Level of Assistance 1: Contact guard (SBA/CGA)  Transfers 2  Transfer From 2: Stand to  Transfer to 2: Chair with arms  Technique 2: Stand pivot, Stand to sit  Transfer Device 2:  (no device)  Transfer Level of Assistance 2: Close supervision, Contact guard    Ambulation/Gait Training  Ambulation/Gait Training Performed: Yes  Ambulation/Gait Training 1  Surface 1: Level tile  Device 1: No device  Assistance 1: Contact guard, Minimum assistance, Minimal verbal cues, Minimal tactile cues  Quality of Gait 1:  (unsteady, increase lateral sway either direction, slow, small steps bilaterally)  Comments/Distance (ft) 1: 85    Stairs  Stairs: Yes (up/down 3 steps with 1 rail and CG/min A, unsteady and dizzy so could not do more to simulate home stairs)  Extremity/Trunk Assessments:  RUE   RUE :  (AROM grossly WFL elbow flex/ext and shoulder elevation, wrist finger ext slightly limited by painful rash; strength grossly: grasp >3, elbow flex/ext and  shoulder elevation 4)  LUE   LUE:  (AROM grossly WFL (grasp, elbow flex/ext and shoulder elevation); strength grossly: grasp 4+, elbow flex/ext and shoulder elevation 4)  RLE   RLE :  (AROM grossly WFL (ankle DF/PF, knee flex/ext and hip flex); strength grossly: knee ext 5, ankle DF 5)  LLE   LLE :  (AROM grossly WFL (ankle DF/PF, knee flex/ext and hip flex); strength grossly: knee ext 5, ankle DF 5)  Outcome Measures:  [facility] Basic Mobility  Turning from your back to your side while in a flat bed without using bedrails: None  Moving from lying on your back to sitting on the side of a flat bed without using bedrails: None  Moving to and from bed to chair (including a wheelchair): A little  Standing up from a chair using your arms (e.g. wheelchair or bedside chair): A little  To walk in hospital room: A little  Climbing 3-5 steps with railing: A little  Basic Mobility - Total Score: 20    [place]  Sitting Balance: Steady, safe  Arises: Able, uses arms to help  Attempts to Arise: Able to arise, one attempt  Immediate Standing Balance (First 5 Seconds): Unsteady (Swaggers, moves feet, trunk sway)  Standing Balance: Steady but wide stance, uses cane or other support  Nudged: Staggers, grabs, catches self  Eyes Closed: Unsteady  Turned 360 Degrees: Steadiness: Unsteady (Grabs, staggers)  Turned 360 Degrees: Continuity of Steps: Discontinuous steps  Sitting Down: Uses arms or not a smooth motion  Balance Score: 7  Initiation of Gait: Any hesitancy or multiple attempts to start  Step Height: R Swing Foot: Right foot complete clears floor  Step Length: R Swing Foot: Passes left stance foot  Step Height: L Swing Foot: Left foot complete clears floor  Step Length: L Swing Foot: Passes right stance foot  Step Symmetry: Right and left step appear equal  Step Continuity: Stopping or discontinuity between steps  Path: Mild/moderate deviation or uses walking aid  Trunk: No sway but flexion of knees or back or spreads arms out while  walking  Walking Time: Heels apart  Gait Score: 7  Total Score: 14    Encounter Problems       Encounter Problems (Active)       General Goals       Pt will be independent with supine & sitting LE HEP and SBA for standing balance HEP (Not Progressing)       Start:  04/11/25    Expected End:  04/25/25            Pt will come supine to/from sit (HOB flat, no rail) independenlty (Progressing)       Start:  04/11/25    Expected End:  04/25/25            Pt will score >45 on the Beverly Balance Scale test indicating lower risk for falls (Not Progressing)       Start:  04/11/25    Expected End:  04/25/25               Mobility       Pt will come sit to/from stand and bed to/from chair no device independently safely, no LOB (Progressing)       Start:  04/11/25    Expected End:  04/25/25            Pt will ambulate 350' with LRAD or no device at gait speed > 1.0 meters/second modified independent, no LOB, no dizziness, stable vitals (Progressing)       Start:  04/11/25    Expected End:  04/25/25            Pt will ascend/descend 11 steps with 1 rail with CG/min A, 1 step at a time, rest breaks as needed (Progressing)       Start:  04/11/25    Expected End:  04/25/25               Pain - Adult          Safety       LTG - Patient will adhere to hip precautions during ADL's and transfers       Start:  04/10/25            LTG - Patient will demonstrate safety requirements appropriate to situation/environment       Start:  04/10/25            LTG - Patient will utilize safety techniques       Start:  04/10/25            STG - Patient locks brakes on wheelchair       Start:  04/10/25            STG - Patient uses call light consistently to request assistance with transfers       Start:  04/10/25            STG - Patient uses gait belt during all transfers       Start:  04/10/25            Goal 1       Start:  04/10/25            Goal 2       Start:  04/10/25            Goal 3       Start:  04/10/25                   Education  Documentation  Body Mechanics, taught by Latanya Maya, PT at 4/11/2025 11:32 AM.  Learner: Patient  Readiness: Acceptance  Method: Explanation, Demonstration  Response: Needs Reinforcement, Verbalizes Understanding  Comment: PT purpose/POC/DC recommendations and rationale, fall risk, safe gait and stairclimbing, vitals    Mobility Training, taught by Latanya Maya PT at 4/11/2025 11:32 AM.  Learner: Patient  Readiness: Acceptance  Method: Explanation, Demonstration  Response: Needs Reinforcement, Verbalizes Understanding  Comment: PT purpose/POC/DC recommendations and rationale, fall risk, safe gait and stairclimbing, vitals    Education Comments  No comments found.

## 2025-04-11 NOTE — PROGRESS NOTES
Alfonzo Flanagan is a 50 y.o. female on day 2 of admission presenting with Decompensated cirrhosis.      Subjective   Patient seen today. Doing the same. Reported having more lesions growing on her hands. Tolerating orally and passing BM.       Objective     Last Recorded Vitals  BP 93/57   Pulse 85   Temp 36.5 °C (97.7 °F)   Resp 16   Wt 84.8 kg (187 lb)   SpO2 97%   Intake/Output last 3 Shifts:    Intake/Output Summary (Last 24 hours) at 4/11/2025 1056  Last data filed at 4/11/2025 0454  Gross per 24 hour   Intake --   Output 1800 ml   Net -1800 ml       Admission Weight  Weight: 84.8 kg (187 lb) (04/09/25 1436)    Daily Weight  04/09/25 : 84.8 kg (187 lb)    Image Results  CT abdomen pelvis w IV contrast  Narrative: Interpreted By:  Pop Cervantes and Stevens Alex   STUDY:  CT ABDOMEN PELVIS W IV CONTRAST;  4/10/2025 6:01 pm      INDICATION:  Signs/Symptoms:evaluate L groin collection.          COMPARISON:  CT abdomen pelvis 02/09/2025      ACCESSION NUMBER(S):  [id number]      ORDERING CLINICIAN:  JUSTICE STARK      TECHNIQUE:  Contiguous axial images of the abdomen and pelvis were obtained after  the intravenous administration of iodinated contrast. Coronal and  sagittal reformatted images were reconstructed from the axial data.      FINDINGS:  LOWER CHEST: The visualized lung bases are clear. Similar appearance  of a 6 mm pulmonary nodule within the right middle lobe. No pleural  or pericardial effusion. The visualized heart is normal in appearance.          ABDOMEN/PELVIS:      ABDOMINAL WALL: Multiple midline abdominal wall defects, adjacent to  the umbilicus, with extension of ascitic fluid, similar to prior.  Extensive venous collaterals are noted coursing through the  subcutaneous tissues of the anterior abdominal wall. There has been  an interval increase in the degree of soft tissue edema and skin  thickening within the left groin without a discrete collection  measuring 5.6 x 0.6 cm  (series 201, images 159 through 166).      LIVER: The liver is shrunken and displays a nodular contour,  compatible with known cirrhosis. No suspicious focal hepatic lesions.  Note is made of a recannulized paraumbilical vein.      BILE DUCTS: No significant intrahepatic or extrahepatic dilatation.      GALLBLADDER: Gallbladder is surgically absent.      PANCREAS: No significant abnormality.      SPLEEN: Splenomegaly, similar to prior.      ADRENALS: No significant abnormality.      KIDNEYS, URETERS, BLADDER: No hydronephrosis or nephrolithiasis. The  bladder is distended.      REPRODUCTIVE ORGANS: Uterus is present.      VESSELS: Mild calcific atherosclerosis of the visualized abdominal  aorta. No aneurysmal dilatation. The IVC is within normal limits.      RETROPERITONEUM/LYMPH NODES: No acute retroperitoneal abnormality. No  enlarged lymph nodes.      BOWEL/PERITONEUM: The stomach is distended, similar to prior. Diffuse  edematous thickening of multiple small bowel loops within the mid  abdomen. Diverticulosis without evidence of diverticulitis. The  appendix is not definitively identified. There is large volume  ascites, similar to prior.          MUSCULOSKELETAL: No acute osseous abnormality.  No suspicious osseous  lesion.      Impression: *Interval increase in the degree of soft tissue edema and skin  thickening within the left groin without formation of a discrete  collection. Findings may be reflective of cellulitis.  *Re-demonstration of hepatic cirrhosis with evidence of portal  hypertension as evidenced by splenomegaly, large volume ascites, and  tortuous venous collaterals. *Similar appearance of edematous wall  thickening throughout several small and large bowel loops. Findings  may be reflective of hypoproteinemia from hepatic cirrhosis versus  less likely infectious/inflammatory enterocolitis. *Stable 6 mm  pulmonary nodule within the right middle lobe.      I personally reviewed the images/study and  I agree with the findings  as stated by Giovanny Felix DO PGY-2. This study was interpreted at  [facility], [place], [place].      MACRO:  None.      Signed by: Pop Cervantes 4/10/2025 8:47 PM  Dictation workstation:   [id number]  US guided abdominal paracentesis  Narrative: Interpreted By:  Henok Perez,   STUDY:  US GUIDED ABDOMINAL PARACENTESIS; 4/10/56960:23 pm      INDICATION:  Signs/Symptoms:paracentesis, diagnositic and therapeutic. primary  team will order fluid studies.      COMPARISON:  None.      ACCESSION NUMBER(S):  [id number]      ORDERING CLINICIAN:  JUSTICE STARK      TECHNIQUE:  INTERVENTIONALIST(S):  Henok Perez      CONSENT:  The patient/patient's POA/next of kin was informed of the nature of  the proposed procedure. The purposes, alternatives, risks, and  benefits were explained and discussed. All questions were answered  and consent was obtained.      SEDATION:  None      MEDICATION/CONTRAST:          TIME OUT:  A time out was performed immediately prior to procedure start with  the interventional team, correctly identifying the patient name, date  of birth, MRN, procedure, anatomy (including marking of site and  side), patient position, procedure consent form, relevant laboratory  and imaging test results, antibiotic administration, safety  precautions, and procedure-specific equipment needs.      FINDINGS:  The patient was placed in the supine position.      The abdominal space was examined with grey scale ultrasound, and the  most accessible fluid identified and marked for paracentesis.      The skin was prepped and draped in usual manner. Local anesthesia  with Lidocaine was administered and a right-sided paracentesis was  performed.  A 5 Malian One-Step paracentesis needle/catheter was then  placed where marked.  Approximately 3500 mL of yellowish colored  fluid was removed.  The needle/catheter was then withdrawn.      The patient  tolerated the procedure well and there were no immediate  complications. Specimen(s) sent to the laboratory and pathology for  further evaluation, per the requesting team.      Impression: Uneventful paracentesis, as detailed above. Right Hemiabdomen, 3500 mL      I personally performed and/or directly supervised this study and was  present for the entire procedure.      Performed and dictated at [facility].      Signed by: Henok Perez 4/10/2025 3:23 PM  Dictation workstation:   [id number]      Physical Exam  Constitutional:       Appearance: Normal appearance.   Cardiovascular:      Rate and Rhythm: Normal rate and regular rhythm.      Pulses: Normal pulses.      Heart sounds: Normal heart sounds.   Pulmonary:      Effort: Pulmonary effort is normal. No respiratory distress.      Breath sounds: Normal breath sounds. No wheezing.   Abdominal:      General: There is distension.      Palpations: Abdomen is soft.      Tenderness: There is abdominal tenderness.   Musculoskeletal:      Right lower leg: Edema present.   Neurological:      Mental Status: She is alert and oriented to person, place, and time. Mental status is at baseline.             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 Patient was recently discharged from the hospital, with course noted above. She presents again with decompensated cirrhosis (+asterixis). On labs, urinalysis is positive for LE, WBC, bacteria, which in the setting of her symptoms is concerning for a recurrent UTI. S/p paracentesis 4/10 with 3.5L  Removed.     Updates 4/11   -S/p US guided paracentesis 4/10 with 3.5L removed.  - ACS recommended vanc/zosyn due to HS flare up and CT abdomen pelvis.  - Started vanc/zosyn 4/10 then narrowed to zosyn only today.  - Pending GYN consult  - Pending liver US.  - Following hepatology recs  - Started Valtrex for hand lesions and took samples for HSV labs.       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 Khanna  :: Biopsy proven PBC 3/2021. History of esophageal varices s/p banding 2021. Has required several paracenteses in the past   : :  Prior diagnostic para consistent with portal HTN (SAAG > 1.1) and no c/f SBP (ANC 6) on prior admission.      PLAN:  -S/p US guided paracentesis 4/10 with 3.5L removed.  - ACS recommended vanc/zosyn due to HS flare up and CT abdomen pelvis.  - Started vanc/zosyn 4/10 then narrowed to zosyn only today.  - continue spironolactone 150 mg   - continue rifaximin 550 mg TID, lactulose 20 g BID titrated to bowel movements  - continue pantoprazole 40 mg daily  - continue acetaminophen 487.5 mg q6h PRN pain (dose reduction for cirrhosis)  - continue ursodiol 500 mg BID and ursodiol 250 mg nightly  - current diet: 2-3 g sodium diet  - daily MELD labs     #Hypervolemic hyponatremia  : : Na 131  : : Suspect d/t underlying splanchnic vasodilation      Plan  - C/w diuresis as above  - 2L fluid restriction        #Left palmar rash (image in media)  : : Vesicular rash in cluster  : : Differential includes rickettsial rash, herpetic lesion, secondary syphilis, Coxsackie A virus, HIV     Plan:  -Concern for herpes, pending HSV PCR     #UTI  #Possible vaginal candidiasis  : : UA with LE, WBC, UCX pending  : : S/p ceftriaxone in the ED     PLAN:  - S/p ceftriaxone (4/9-4/10)  - Started Vanc (4/10-1/11) and  zosyn (4/10-) as per ACS  - Pending GYN consult  -     #Groin abscess  ::Wound appears to still be draining pus, has some induration on palpation     PLAN  -ACS consulted to evaluate groin and determine if I&D or other intervention is appropriate for treatment  - Wound care also consulted     #Nausea  #Vomiting  #Appetite  #Dizziness  - continue zofran ODT 4 mg q6h PRN  - continue mirtazapine 7.6 mg nightly        #T2DM  : : Last A1c 7.6 in 12/2024. On insulin glargine 7 units nightly and metformin at home. Patient reports compliance with glargine dosing. POC glucose on admission >500 --> 229  : : No ketones on UA   PLAN:  - continue insulin glargine 7 units nightly  - hold home metformin  - SSI #3  - hypoglycemic protocol     #Anxiety/Depression  - continue home trazodone 25 mg nightly PRN     #HLD  - continue home atorvastatin 20 mg nightly        Diet: 2-3g Na restricted  DVT prophylaxis: Lovenox  Code status: FULL CODE  NOK: [place] (daughter, [phone])        Kate Maguire MD

## 2025-04-11 NOTE — PROGRESS NOTES
Physical Therapy                 Therapy Communication Note    Patient Name: Alfonzo Flanagan  MRN: [id number]  Department: [facility]  Room: 2029/2029-A  Today's Date: 4/11/2025     Discipline: Physical Therapy    PT Missed Visit: Yes     Missed Visit Reason: Missed Visit Reason: Other (Comment) (per MYNOR Baker pt is at [facility] now.)    Missed Time: Attempt    Comment: 10:12am

## 2025-04-11 NOTE — CONSULTS
Vancomycin Dosing by Pharmacy- INITIAL    Alfonzo Flanagan is a 50 y.o. year old female who Pharmacy has been consulted for vancomycin dosing for cellulitis, skin and soft tissue. Based on the patient's indication and renal status this patient will be dosed based on a goal AUC of 400-600.     Renal function is currently stable.    Visit Vitals  BP 83/50   Pulse 85   Temp 36.5 °C (97.7 °F)   Resp 16        Lab Results   Component Value Date    CREATININE 0.69 04/10/2025    CREATININE 0.76 2025    CREATININE 0.85 2025    CREATININE 0.77 2025        Patient weight is as follows:   Vitals:    25 1436   Weight: 84.8 kg (187 lb)       Cultures:  No results found for the encounter in last 14 days.        I/O last 3 completed shifts:  In: 50 (0.6 mL/kg) [IV Piggyback:50]  Out: 350 (4.1 mL/kg) [Urine:350 (0.1 mL/kg/hr)]  Weight: 84.8 kg   I/O during current shift:  No intake/output data recorded.    Temp (24hrs), Av.3 °C (97.4 °F), Min:36.3 °C (97.3 °F), Max:36.5 °C (97.7 °F)         Assessment/Plan     Patient will not be given a loading dose.  Will initiate vancomycin maintenance,  1250 mg every 12 hours.    This dosing regimen is predicted by [employer] to result in the following pharmacokinetic parameters:    Regimen: 1250 mg IV every 12 hours.  Exposure target: AUC24 (range)400-600 mg/L.hr   SHW23-04: 424 mg/L.hr  AUC24,ss: 502 mg/L.hr  Probability of AUC24 > 400: 73 %  Ctrough,ss: 15.1 mg/L  Probability of Ctrough,ss > 20: 28 %      Follow-up level will be ordered on  at 1st am labs unless clinically indicated sooner.  Will continue to monitor renal function daily while on vancomycin and order serum creatinine at least every 48 hours if not already ordered.  Follow for continued vancomycin needs, clinical response, and signs/symptoms of toxicity.       Gilmer Oscar, CanD

## 2025-04-11 NOTE — CONSULTS
Wound Care Consult     Visit Date: 4/11/2025      Patient Name: Alfonzo Flanagan         MRN: [id number]           YOB: 1975     Reason for Consult: HS, groin wound        Wound Team Plan: Patient was assessed by ACS on 4/10 who recommended gyn consult. Wound care will defer consult for now, please reconsult if wound care is needed while patient is admitted to the hospital. While inpatient, Secure chat with questions or reconsult wound care if condition worsens or changes. For urgent communications please message the group through [employer] messaging at: [facility] Wound Care Team, Thank you.        Carolyn Pires RN, CWON  4/11/2025  12:11 PM

## 2025-04-12 PROBLEM — K72.90 DECOMPENSATED CIRRHOSIS: Status: RESOLVED | Noted: 2025-04-09 | Resolved: 2025-04-12

## 2025-04-12 PROBLEM — K74.60 DECOMPENSATED CIRRHOSIS: Status: RESOLVED | Noted: 2025-04-09 | Resolved: 2025-04-12

## 2025-04-12 LAB
ALBUMIN SERPL BCP-MCNC: 1.7 G/DL (ref 3.4–5)
ALBUMIN SERPL BCP-MCNC: 2 G/DL (ref 3.4–5)
ALP SERPL-CCNC: 233 U/L (ref 33–110)
ALT SERPL W P-5'-P-CCNC: 19 U/L (ref 7–45)
ANION GAP SERPL CALC-SCNC: 10 MMOL/L (ref 10–20)
ANION GAP SERPL CALC-SCNC: 9 MMOL/L (ref 10–20)
APTT PPP: 32 SECONDS (ref 26–36)
AST SERPL W P-5'-P-CCNC: 41 U/L (ref 9–39)
BASOPHILS # BLD AUTO: 0.02 X10*3/UL (ref 0–0.1)
BASOPHILS NFR BLD AUTO: 0.6 %
BILIRUB SERPL-MCNC: 1.9 MG/DL (ref 0–1.2)
BUN SERPL-MCNC: 16 MG/DL (ref 6–23)
BUN SERPL-MCNC: 16 MG/DL (ref 6–23)
CALCIUM SERPL-MCNC: 7.5 MG/DL (ref 8.6–10.6)
CALCIUM SERPL-MCNC: 7.7 MG/DL (ref 8.6–10.6)
CHLORIDE SERPL-SCNC: 101 MMOL/L (ref 98–107)
CHLORIDE SERPL-SCNC: 103 MMOL/L (ref 98–107)
CO2 SERPL-SCNC: 29 MMOL/L (ref 21–32)
CO2 SERPL-SCNC: 29 MMOL/L (ref 21–32)
CREAT SERPL-MCNC: 0.95 MG/DL (ref 0.5–1.05)
CREAT SERPL-MCNC: 0.97 MG/DL (ref 0.5–1.05)
EGFRCR SERPLBLD CKD-EPI 2021: 72 ML/MIN/1.73M*2
EGFRCR SERPLBLD CKD-EPI 2021: 74 ML/MIN/1.73M*2
EOSINOPHIL # BLD AUTO: 0.3 X10*3/UL (ref 0–0.7)
EOSINOPHIL NFR BLD AUTO: 9.4 %
ERYTHROCYTE [DISTWIDTH] IN BLOOD BY AUTOMATED COUNT: 16 % (ref 11.5–14.5)
GLUCOSE BLD MANUAL STRIP-MCNC: 188 MG/DL (ref 74–99)
GLUCOSE BLD MANUAL STRIP-MCNC: 192 MG/DL (ref 74–99)
GLUCOSE BLD MANUAL STRIP-MCNC: 281 MG/DL (ref 74–99)
GLUCOSE BLD MANUAL STRIP-MCNC: 336 MG/DL (ref 74–99)
GLUCOSE SERPL-MCNC: 244 MG/DL (ref 74–99)
GLUCOSE SERPL-MCNC: 247 MG/DL (ref 74–99)
HCT VFR BLD AUTO: 32.8 % (ref 36–46)
HGB BLD-MCNC: 10.4 G/DL (ref 12–16)
IMM GRANULOCYTES # BLD AUTO: 0.01 X10*3/UL (ref 0–0.7)
IMM GRANULOCYTES NFR BLD AUTO: 0.3 % (ref 0–0.9)
INR PPP: 1.4 (ref 0.9–1.1)
LYMPHOCYTES # BLD AUTO: 1 X10*3/UL (ref 1.2–4.8)
LYMPHOCYTES NFR BLD AUTO: 31.3 %
MAGNESIUM SERPL-MCNC: 1.68 MG/DL (ref 1.6–2.4)
MCH RBC QN AUTO: 30.7 PG (ref 26–34)
MCHC RBC AUTO-ENTMCNC: 31.7 G/DL (ref 32–36)
MCV RBC AUTO: 97 FL (ref 80–100)
MONOCYTES # BLD AUTO: 0.39 X10*3/UL (ref 0.1–1)
MONOCYTES NFR BLD AUTO: 12.2 %
NEUTROPHILS # BLD AUTO: 1.47 X10*3/UL (ref 1.2–7.7)
NEUTROPHILS NFR BLD AUTO: 46.2 %
NRBC BLD-RTO: 0 /100 WBCS (ref 0–0)
PHOSPHATE SERPL-MCNC: 3.2 MG/DL (ref 2.5–4.9)
PLATELET # BLD AUTO: 87 X10*3/UL (ref 150–450)
POTASSIUM SERPL-SCNC: 3.8 MMOL/L (ref 3.5–5.3)
POTASSIUM SERPL-SCNC: 3.9 MMOL/L (ref 3.5–5.3)
PROT SERPL-MCNC: 5.6 G/DL (ref 6.4–8.2)
PROTHROMBIN TIME: 15.3 SECONDS (ref 9.8–12.4)
RBC # BLD AUTO: 3.39 X10*6/UL (ref 4–5.2)
SODIUM SERPL-SCNC: 135 MMOL/L (ref 136–145)
SODIUM SERPL-SCNC: 138 MMOL/L (ref 136–145)
WBC # BLD AUTO: 3.2 X10*3/UL (ref 4.4–11.3)

## 2025-04-12 PROCEDURE — 1100000001 HC PRIVATE ROOM DAILY

## 2025-04-12 PROCEDURE — 80053 COMPREHEN METABOLIC PANEL: CPT

## 2025-04-12 PROCEDURE — 99232 SBSQ HOSP IP/OBS MODERATE 35: CPT

## 2025-04-12 PROCEDURE — 2500000001 HC RX 250 WO HCPCS SELF ADMINISTERED DRUGS (ALT 637 FOR MEDICARE OP)

## 2025-04-12 PROCEDURE — 82947 ASSAY GLUCOSE BLOOD QUANT: CPT

## 2025-04-12 PROCEDURE — 36415 COLL VENOUS BLD VENIPUNCTURE: CPT

## 2025-04-12 PROCEDURE — 2500000004 HC RX 250 GENERAL PHARMACY W/ HCPCS (ALT 636 FOR OP/ED)

## 2025-04-12 PROCEDURE — 85025 COMPLETE CBC W/AUTO DIFF WBC: CPT

## 2025-04-12 PROCEDURE — 85730 THROMBOPLASTIN TIME PARTIAL: CPT

## 2025-04-12 PROCEDURE — 99233 SBSQ HOSP IP/OBS HIGH 50: CPT | Performed by: STUDENT IN AN ORGANIZED HEALTH CARE EDUCATION/TRAINING PROGRAM

## 2025-04-12 PROCEDURE — 2500000005 HC RX 250 GENERAL PHARMACY W/O HCPCS

## 2025-04-12 PROCEDURE — 2500000002 HC RX 250 W HCPCS SELF ADMINISTERED DRUGS (ALT 637 FOR MEDICARE OP, ALT 636 FOR OP/ED)

## 2025-04-12 PROCEDURE — 2500000001 HC RX 250 WO HCPCS SELF ADMINISTERED DRUGS (ALT 637 FOR MEDICARE OP): Performed by: STUDENT IN AN ORGANIZED HEALTH CARE EDUCATION/TRAINING PROGRAM

## 2025-04-12 PROCEDURE — 84295 ASSAY OF SERUM SODIUM: CPT

## 2025-04-12 PROCEDURE — 83735 ASSAY OF MAGNESIUM: CPT

## 2025-04-12 PROCEDURE — 85610 PROTHROMBIN TIME: CPT

## 2025-04-12 PROCEDURE — 80069 RENAL FUNCTION PANEL: CPT | Mod: CCI

## 2025-04-12 RX ORDER — DICYCLOMINE HYDROCHLORIDE 10 MG/1
10 CAPSULE ORAL 4 TIMES DAILY PRN
Status: DISCONTINUED | OUTPATIENT
Start: 2025-04-12 | End: 2025-04-14 | Stop reason: HOSPADM

## 2025-04-12 RX ORDER — TORSEMIDE 20 MG/1
40 TABLET ORAL DAILY
Qty: 60 TABLET | Refills: 4 | COMMUNITY
Start: 2025-04-12 | End: 2025-04-13 | Stop reason: HOSPADM

## 2025-04-12 RX ORDER — MAGNESIUM SULFATE HEPTAHYDRATE 40 MG/ML
2 INJECTION, SOLUTION INTRAVENOUS ONCE
Status: COMPLETED | OUTPATIENT
Start: 2025-04-12 | End: 2025-04-12

## 2025-04-12 RX ORDER — POTASSIUM CHLORIDE 20 MEQ/1
20 TABLET, EXTENDED RELEASE ORAL ONCE
Status: COMPLETED | OUTPATIENT
Start: 2025-04-12 | End: 2025-04-12

## 2025-04-12 RX ORDER — VALACYCLOVIR HYDROCHLORIDE 500 MG/1
1000 TABLET, FILM COATED ORAL EVERY 8 HOURS SCHEDULED
Status: DISCONTINUED | OUTPATIENT
Start: 2025-04-12 | End: 2025-04-14 | Stop reason: HOSPADM

## 2025-04-12 RX ORDER — INSULIN GLARGINE 100 [IU]/ML
12 INJECTION, SOLUTION SUBCUTANEOUS NIGHTLY
Status: DISCONTINUED | OUTPATIENT
Start: 2025-04-12 | End: 2025-04-14 | Stop reason: HOSPADM

## 2025-04-12 RX ORDER — GABAPENTIN 300 MG/1
300 CAPSULE ORAL 2 TIMES DAILY
Status: DISCONTINUED | OUTPATIENT
Start: 2025-04-12 | End: 2025-04-14 | Stop reason: HOSPADM

## 2025-04-12 RX ORDER — TORSEMIDE 20 MG/1
40 TABLET ORAL DAILY
Qty: 60 TABLET | Refills: 3 | COMMUNITY
Start: 2025-04-12 | End: 2025-04-12

## 2025-04-12 RX ORDER — OXYCODONE HYDROCHLORIDE 5 MG/1
5 TABLET ORAL EVERY 4 HOURS PRN
Status: DISCONTINUED | OUTPATIENT
Start: 2025-04-12 | End: 2025-04-14 | Stop reason: HOSPADM

## 2025-04-12 RX ADMIN — FUROSEMIDE 40 MG: 10 INJECTION, SOLUTION INTRAMUSCULAR; INTRAVENOUS at 08:20

## 2025-04-12 RX ADMIN — INSULIN LISPRO 9 UNITS: 100 INJECTION, SOLUTION INTRAVENOUS; SUBCUTANEOUS at 16:06

## 2025-04-12 RX ADMIN — FUROSEMIDE 40 MG: 10 INJECTION, SOLUTION INTRAMUSCULAR; INTRAVENOUS at 20:38

## 2025-04-12 RX ADMIN — SUCRALFATE 1 G: 1 TABLET ORAL at 05:54

## 2025-04-12 RX ADMIN — SUCRALFATE 1 G: 1 TABLET ORAL at 16:32

## 2025-04-12 RX ADMIN — GABAPENTIN 300 MG: 300 CAPSULE ORAL at 15:42

## 2025-04-12 RX ADMIN — PIPERACILLIN SODIUM AND TAZOBACTAM SODIUM 3.38 G: 3; .375 INJECTION, SOLUTION INTRAVENOUS at 20:36

## 2025-04-12 RX ADMIN — MIRTAZAPINE 7.5 MG: 15 TABLET, FILM COATED ORAL at 20:39

## 2025-04-12 RX ADMIN — POLYETHYLENE GLYCOL 3350 17 G: 17 POWDER, FOR SOLUTION ORAL at 08:19

## 2025-04-12 RX ADMIN — PANTOPRAZOLE SODIUM 40 MG: 40 TABLET, DELAYED RELEASE ORAL at 05:54

## 2025-04-12 RX ADMIN — VALACYCLOVIR HYDROCHLORIDE 1000 MG: 500 TABLET, FILM COATED ORAL at 13:56

## 2025-04-12 RX ADMIN — PIPERACILLIN SODIUM AND TAZOBACTAM SODIUM 3.38 G: 3; .375 INJECTION, SOLUTION INTRAVENOUS at 00:21

## 2025-04-12 RX ADMIN — ENOXAPARIN SODIUM 40 MG: 40 INJECTION, SOLUTION SUBCUTANEOUS at 20:47

## 2025-04-12 RX ADMIN — ATORVASTATIN CALCIUM 20 MG: 20 TABLET, FILM COATED ORAL at 20:37

## 2025-04-12 RX ADMIN — OXYCODONE 5 MG: 5 TABLET ORAL at 09:28

## 2025-04-12 RX ADMIN — INSULIN LISPRO 3 UNITS: 100 INJECTION, SOLUTION INTRAVENOUS; SUBCUTANEOUS at 08:15

## 2025-04-12 RX ADMIN — INSULIN LISPRO 12 UNITS: 100 INJECTION, SOLUTION INTRAVENOUS; SUBCUTANEOUS at 11:50

## 2025-04-12 RX ADMIN — PIPERACILLIN SODIUM AND TAZOBACTAM SODIUM 3.38 G: 3; .375 INJECTION, SOLUTION INTRAVENOUS at 13:55

## 2025-04-12 RX ADMIN — SPIRONOLACTONE 150 MG: 25 TABLET, FILM COATED ORAL at 08:20

## 2025-04-12 RX ADMIN — MAGNESIUM SULFATE HEPTAHYDRATE 2 G: 40 INJECTION, SOLUTION INTRAVENOUS at 20:47

## 2025-04-12 RX ADMIN — INSULIN GLARGINE 12 UNITS: 100 INJECTION, SOLUTION SUBCUTANEOUS at 20:36

## 2025-04-12 RX ADMIN — URSODIOL 250 MG: 250 TABLET ORAL at 20:39

## 2025-04-12 RX ADMIN — RIFAXIMIN 550 MG: 550 TABLET ORAL at 15:42

## 2025-04-12 RX ADMIN — SUCRALFATE 1 G: 1 TABLET ORAL at 20:38

## 2025-04-12 RX ADMIN — ACETAMINOPHEN 487.5 MG: 325 TABLET, FILM COATED ORAL at 02:47

## 2025-04-12 RX ADMIN — URSODIOL 500 MG: 500 TABLET ORAL at 08:23

## 2025-04-12 RX ADMIN — NYSTATIN 1 APPLICATION: 100000 POWDER TOPICAL at 08:21

## 2025-04-12 RX ADMIN — LACTULOSE 20 G: 20 SOLUTION ORAL at 20:36

## 2025-04-12 RX ADMIN — VALACYCLOVIR HYDROCHLORIDE 1000 MG: 500 TABLET, FILM COATED ORAL at 20:47

## 2025-04-12 RX ADMIN — Medication 3 MG: at 20:38

## 2025-04-12 RX ADMIN — NYSTATIN 1 APPLICATION: 100000 POWDER TOPICAL at 20:36

## 2025-04-12 RX ADMIN — PIPERACILLIN SODIUM AND TAZOBACTAM SODIUM 3.38 G: 3; .375 INJECTION, SOLUTION INTRAVENOUS at 08:11

## 2025-04-12 RX ADMIN — SUCRALFATE 1 G: 1 TABLET ORAL at 10:48

## 2025-04-12 RX ADMIN — POTASSIUM CHLORIDE 20 MEQ: 1500 TABLET, EXTENDED RELEASE ORAL at 20:47

## 2025-04-12 RX ADMIN — LACTULOSE 20 G: 20 SOLUTION ORAL at 08:20

## 2025-04-12 RX ADMIN — RIFAXIMIN 550 MG: 550 TABLET ORAL at 08:20

## 2025-04-12 RX ADMIN — URSODIOL 500 MG: 500 TABLET ORAL at 20:38

## 2025-04-12 RX ADMIN — ACYCLOVIR 400 MG: 200 CAPSULE ORAL at 08:24

## 2025-04-12 RX ADMIN — CARVEDILOL 6.25 MG: 6.25 TABLET, FILM COATED ORAL at 08:20

## 2025-04-12 RX ADMIN — RIFAXIMIN 550 MG: 550 TABLET ORAL at 20:38

## 2025-04-12 ASSESSMENT — COGNITIVE AND FUNCTIONAL STATUS - GENERAL
CLIMB 3 TO 5 STEPS WITH RAILING: A LITTLE
DAILY ACTIVITIY SCORE: 24
WALKING IN HOSPITAL ROOM: A LITTLE
STANDING UP FROM CHAIR USING ARMS: A LITTLE
STANDING UP FROM CHAIR USING ARMS: A LITTLE
MOBILITY SCORE: 21
DAILY ACTIVITIY SCORE: 24
CLIMB 3 TO 5 STEPS WITH RAILING: A LITTLE
MOBILITY SCORE: 21
WALKING IN HOSPITAL ROOM: A LITTLE

## 2025-04-12 ASSESSMENT — PAIN SCALES - GENERAL
PAINLEVEL_OUTOF10: 4
PAINLEVEL_OUTOF10: 4
PAINLEVEL_OUTOF10: 7
PAINLEVEL_OUTOF10: 4

## 2025-04-12 ASSESSMENT — PAIN DESCRIPTION - ORIENTATION: ORIENTATION: LOWER;MID

## 2025-04-12 ASSESSMENT — PAIN DESCRIPTION - LOCATION: LOCATION: ABDOMEN

## 2025-04-12 NOTE — CARE PLAN
The patient's goals for the shift include      The clinical goals for the shift include   Problem: Pain - Adult  Goal: Verbalizes/displays adequate comfort level or baseline comfort level  Outcome: Progressing     Problem: Safety - Adult  Goal: Free from fall injury  Outcome: Progressing     Problem: Discharge Planning  Goal: Discharge to home or other facility with appropriate resources  Outcome: Progressing     Problem: Chronic Conditions and Co-morbidities  Goal: Patient's chronic conditions and co-morbidity symptoms are monitored and maintained or improved  Outcome: Progressing     Problem: Nutrition  Goal: Nutrient intake appropriate for maintaining nutritional needs  Outcome: Progressing     Problem: Pain  Goal: Takes deep breaths with improved pain control throughout the shift  Outcome: Progressing  Goal: Turns in bed with improved pain control throughout the shift  Outcome: Progressing  Goal: Walks with improved pain control throughout the shift  Outcome: Progressing  Goal: Performs ADL's with improved pain control throughout shift  Outcome: Progressing  Goal: Participates in PT with improved pain control throughout the shift  Outcome: Progressing  Goal: Free from opioid side effects throughout the shift  Outcome: Progressing  Goal: Free from acute confusion related to pain meds throughout the shift  Outcome: Progressing

## 2025-04-12 NOTE — PROGRESS NOTES
Updates:  -hepatology - furos 40mg IV BID (w/ PM lytes), nataly 150mg qd, 2L fluid restriction  -switched to valacyclovir for the zoster  -went up on basal insulin  -test script sent to [facility] for torsemide pricing - considering switching OP regimen to furosemide if there is a significant cost barrier to the torsemide      S  NAOE.       Objective     Last Recorded Vitals  /59 (BP Location: Right arm, Patient Position: Lying)   Pulse 81   Temp 36.5 °C (97.7 °F) (Temporal)   Resp 18   Wt 84.8 kg (187 lb)   SpO2 96%   Intake/Output last 3 Shifts:    Intake/Output Summary (Last 24 hours) at 4/12/2025 1101  Last data filed at 4/12/2025 0913  Gross per 24 hour   Intake 1160 ml   Output 600 ml   Net 560 ml       Admission Weight  Weight: 84.8 kg (187 lb) (04/09/25 1436)    Daily Weight  04/09/25 : 84.8 kg (187 lb)    Image Results  US liver with doppler  Narrative: Interpreted By:  Hayley Lacey and Ritchie Brandon   STUDY:  US LIVER WITH DOPPLER;  4/11/2025 10:38 am      INDICATION:  Signs/Symptoms:cirrhosis.      COMPARISON:  CT abdomen and pelvis 04/10/2025.      ACCESSION NUMBER(S):  [id number]      ORDERING CLINICIAN:  JUSTICE STARK      TECHNIQUE:  Multiple images of the right upper quadrant were obtained.  Gray  scale, color Doppler and spectral Doppler waveform analysis was  performed.  This examination was interpreted at [facility].      FINDINGS:  LIVER:  The liver measures 11.7 cm and is diffusely echogenic in appearance  and demonstrates nodular contour. The resulting increased beam  attenuation thereby limiting evaluation of the liver for focal  lesions. Within the limitations, no focal lesions are seen.      GALLBLADDER:  Gallbladder is surgically absent.      BILIARY SYSTEM:  No evidence of intra or extrahepatic biliary dilatation is  identified; the common bile duct measures 0.3 cm.      DOPPLER EVALUATION:      HEPATIC  ARTERIES:  Hepatic artery and its right and left branches RI's are estimated at  0.8, 0.7 and 0.7, respectively.      PORTAL VEIN:      There is a large recanalized umbilical vein which is present.      Portal vein is patent and measures 0.6 cm . There is normal  respiratory variation. Portal vein velocities are calculated as  follows: main portal vein 10.1 cm/s, antegrade flow; left portal vein  branch 22.5 cm/s, antegrade flow; right portal vein anterior branch  6.4 cm/s , antegrade flow; right portal vein posterior branch 4.4  cm/s , antegrade flow. The splenic vein is also patent.      HEPATIC VEIN:  The right, middle and left hepatic veins are patent and demonstrate  triphasic antegrade flow. IVC appears also patent.      PANCREAS:  The pancreas is poorly visualized due to overlying bowel gas.      RIGHT KIDNEY:  The right kidney measures 9.2 cm in length. No hydronephrosis or  renal calculi are seen.      SPLEEN:  The spleen measures 12.0 cm and is grossly unremarkable.      PERITONEUM AND RETROPERITONEUM:  There is incompletely characterized abdominopelvic ascites which is  present.      Impression: 1. Liver cirrhosis with patent hepatic vasculature and no evidence of  focal lesion, as described.  2. Findings compatible with portal hypertension with ascites,  recanalized umbilical vein and slow hepatopetal flow in the portal  venous system.      I personally reviewed the images/study and I agree with the findings  as stated by resident Grady Denson. This study was interpreted  at [place], [place].      MACRO:  None      Signed by: Hayley Lacey 4/11/2025 7:37 PM  Dictation workstation:   [id number]      Physical Exam  Constitutional:       Appearance: Normal appearance.   Cardiovascular:      Rate and Rhythm: Normal rate and regular rhythm.      Pulses: Normal pulses.      Heart sounds: Normal heart sounds.   Pulmonary:      Effort: Pulmonary effort is  normal. No respiratory distress.      Breath sounds: Normal breath sounds. No wheezing.   Abdominal:      General: There is distension.      Palpations: Abdomen is soft.      Tenderness: There is abdominal tenderness.   Musculoskeletal:      Right lower leg: Edema present.   Neurological:      Mental Status: She is alert and oriented to person, place, and time. Mental status is at baseline.             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 Patient was recently discharged from the hospital, with course noted above. She presents again with decompensated cirrhosis (+asterixis). On labs, urinalysis is positive for LE, WBC, bacteria, which in the setting of her symptoms is concerning for a recurrent UTI. S/p paracentesis 4/10 with 3.5L Removed.     Updates 4/11   -S/p US guided paracentesis 4/10 with 3.5L removed.  - ACS recommended vanc/zosyn due to HS flare up and CT abdomen pelvis.  - Started vanc/zosyn 4/10 then narrowed to zosyn only today.  - Pending GYN consult  - Pending liver US.  - Following hepatology recs  - Started Valtrex for hand lesions and took samples for HSV labs.       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 Khanna  :: Biopsy proven PBC 3/2021. History of esophageal varices s/p banding 2021. Has required several paracenteses in the past   : :  Prior diagnostic para consistent with portal HTN (SAAG >  1.1) and no c/f SBP (ANC 6) on prior admission.      PLAN:  -S/p US guided paracentesis 4/10 with 3.5L removed.  - ACS recommended vanc/zosyn due to HS flare up and CT abdomen pelvis.  - Started vanc/zosyn 4/10 then narrowed to zosyn only today.  - continue spironolactone 150 mg   - continue rifaximin 550 mg TID, lactulose 20 g BID titrated to bowel movements  - continue pantoprazole 40 mg daily  - continue acetaminophen 487.5 mg q6h PRN pain (dose reduction for cirrhosis)  - continue ursodiol 500 mg BID and ursodiol 250 mg nightly  - current diet: 2-3 g sodium diet  - daily MELD labs     #Hypervolemic hyponatremia  : : Na 131  : : Suspect d/t underlying splanchnic vasodilation      Plan  - C/w diuresis as above  - 2L fluid restriction        #Left palmar rash (image in media)  : : Vesicular rash in cluster  : : Differential includes rickettsial rash, herpetic lesion, secondary syphilis, Coxsackie A virus, HIV     Plan:  -Concern for herpes, pending HSV PCR     #UTI  #Possible vaginal candidiasis  : : UA with LE, WBC, UCX pending  : : S/p ceftriaxone in the ED     PLAN:  - S/p ceftriaxone (4/9-4/10)  - Started Vanc (4/10-1/11) and zosyn (4/10-) as per ACS  - Pending GYN consult  -     #Groin abscess  ::Wound appears to still be draining pus, has some induration on palpation     PLAN  -ACS consulted to evaluate groin and determine if I&D or other intervention is appropriate for treatment  - Wound care also consulted     #Nausea  #Vomiting  #Appetite  #Dizziness  - continue zofran ODT 4 mg q6h PRN  - continue mirtazapine 7.6 mg nightly        #T2DM  : : Last A1c 7.6 in 12/2024. On insulin glargine 7 units nightly and metformin at home. Patient reports compliance with glargine dosing. POC glucose on admission >500 --> 229  : : No ketones on UA   PLAN:  - continue insulin glargine 7 units nightly  - hold home metformin  - SSI #3  - hypoglycemic protocol     #Anxiety/Depression  - continue home trazodone 25 mg nightly  PRN     #HLD  - continue home atorvastatin 20 mg nightly        Diet: 2-3g Na restricted  DVT prophylaxis: Lovenox  Code status: FULL CODE  NOK: [place] (daughter, [phone])

## 2025-04-12 NOTE — PROGRESS NOTES
"[facility]  [facility] [place]  CONSULT FOLLOW-UP     Reason For Consult  Decompensated cirrhosis, consideration for TIPS and transplant evaluation     SUBJECTIVE     -600cc UOP charted with IV lasix 40mg x1 and nataly 150  -Reports improved abdominal distension, 2BMs yesterday    EXAM     Last Recorded Vitals  Blood pressure 102/59, pulse 81, temperature 36.5 °C (97.7 °F), temperature source Temporal, resp. rate 18, height 1.575 m (5' 2\"), weight 84.8 kg (187 lb), SpO2 96%.      Intake/Output Summary (Last 24 hours) at 4/12/2025 1043  Last data filed at 4/12/2025 0913  Gross per 24 hour   Intake 1160 ml   Output 600 ml   Net 560 ml       Physical Exam   General: well-developed, well-nourished, no acute distress, AAOx3  HEENT: EOM intact  CV: regular rate and rhythm  Pulm: normal respiratory effort, clear to auscultation bilaterally   Abd: soft, nontender, distended (improved)  Extremities: warm, no LE edema  Neuro: moves all extremities equally, mild asterixis   Skin: warm, dry, intact      OBJECTIVE                                                                              Medications             Current Facility-Administered Medications:     acetaminophen (Tylenol) tablet 487.5 mg, 487.5 mg, oral, q6h PRN, 487.5 mg at 04/12/25 0247 **OR** acetaminophen (Tylenol) oral liquid 650 mg, 650 mg, nasogastric tube, q4h PRN, 650 mg at 04/11/25 1109 **OR** acetaminophen (Tylenol) suppository 650 mg, 650 mg, rectal, q4h PRN, Rekha Denny MD    atorvastatin (Lipitor) tablet 20 mg, 20 mg, oral, Nightly, Rekha Denny MD, 20 mg at 04/11/25 2132    carvedilol (Coreg) tablet 6.25 mg, 6.25 mg, oral, Daily, Rekha Denny MD, 6.25 mg at 04/12/25 0820    chlorhexidine (Hibiclens) 4 % liquid, , Topical, Daily, Agustina Almeida MD    dextrose 50 % injection 12.5 g, 12.5 g, intravenous, q15 min PRN, Rekha Denny MD    dextrose 50 % injection 25 g, 25 g, intravenous, q15 min PRN, Rekha Denny, " MD    dicyclomine (Bentyl) capsule 10 mg, 10 mg, oral, 4x daily PRN, Yogi Garcia MD    enoxaparin (Lovenox) syringe 40 mg, 40 mg, subcutaneous, q24h, Rekha Denny MD, 40 mg at 04/11/25 2206    furosemide (Lasix) injection 40 mg, 40 mg, intravenous, q12h, Agustina Almeida MD, 40 mg at 04/12/25 0820    glucagon (Glucagen) injection 1 mg, 1 mg, intramuscular, q15 min PRN, Rekha Denny MD    glucagon (Glucagen) injection 1 mg, 1 mg, intramuscular, q15 min PRN, Rekha Denny MD    insulin glargine (Lantus) injection 12 Units, 12 Units, subcutaneous, Nightly, Yogi Gacria MD    insulin lispro injection 0-15 Units, 0-15 Units, subcutaneous, TID AC, Rekha Denny MD, 3 Units at 04/12/25 0815    lactulose 20 gram/30 mL oral solution 20 g, 20 g, oral, BID, Rekha Denny MD, 20 g at 04/12/25 0820    melatonin tablet 3 mg, 3 mg, oral, Nightly, Rekha Denny MD, 3 mg at 04/11/25 2119    [Held by provider] metFORMIN XR (Glucophage-XR) 24 hr tablet 1,000 mg, 1,000 mg, oral, Daily with evening meal, Rekha Denny MD    mirtazapine (Remeron) tablet 7.5 mg, 7.5 mg, oral, Nightly, Rekha Denny MD, 7.5 mg at 04/11/25 2118    nystatin (Mycostatin) 100,000 unit/gram powder 1 Application, 1 Application, Topical, BID, Liz Wise MD, 1 Application at 04/12/25 0821    ondansetron (Zofran) tablet 4 mg, 4 mg, oral, q8h PRN **OR** ondansetron (Zofran) injection 4 mg, 4 mg, intravenous, q8h PRN, Rekha Denny MD, 4 mg at 04/10/25 0113    oxyCODONE (Roxicodone) immediate release tablet 5 mg, 5 mg, oral, q4h PRN, Yogi Garcia MD, 5 mg at 04/12/25 0928    pantoprazole (ProtoNix) EC tablet 40 mg, 40 mg, oral, Daily before breakfast, 40 mg at 04/12/25 0554 **OR** pantoprazole (Protonix) injection 40 mg, 40 mg, intravenous, Daily before breakfast, Rekha Denny MD, 40 mg at 04/10/25 0656    piperacillin-tazobactam (Zosyn) 3.375 g in dextrose (iso) IV 50 mL, 3.375 g, intravenous, q6h, Agustina Almeida MD, Stopped at 04/12/25 0913     polyethylene glycol (Glycolax, Miralax) packet 17 g, 17 g, oral, Daily, Rekha Denny MD, 17 g at 04/12/25 0819    rifAXIMin (Xifaxan) tablet 550 mg, 550 mg, oral, TID, Rekha Denny MD, 550 mg at 04/12/25 0820    spironolactone (Aldactone) tablet 150 mg, 150 mg, oral, Daily, Rekha Denny MD, 150 mg at 04/12/25 0820    sucralfate (Carafate) tablet 1 g, 1 g, oral, Before meals & nightly, Rekha Denny MD, 1 g at 04/12/25 0554    [Held by provider] torsemide (Demadex) tablet 40 mg, 40 mg, oral, Daily, Rekha Denny MD, 40 mg at 04/10/25 0957    traZODone (Desyrel) tablet 25 mg, 25 mg, oral, Nightly PRN, Rekha Denny MD, 25 mg at 04/10/25 0044    ursodiol (Actigall) tablet 500 mg, 500 mg, oral, BID, 500 mg at 04/12/25 0823 **AND** ursodiol (Actigall) tablet 250 mg, 250 mg, oral, Daily, Lane Caba MD, 250 mg at 04/11/25 2132    valACYclovir (Valtrex) tablet 1,000 mg, 1,000 mg, oral, q8h [facility], Yogi Garcia MD                                                                            Labs     CBC RFP   Lab Results   Component Value Date    WBC 3.2 (L) 04/12/2025    HGB 10.4 (L) 04/12/2025    HCT 32.8 (L) 04/12/2025    MCV 97 04/12/2025    PLT 87 (L) 04/12/2025    NEUTROABS 1.47 04/12/2025    Lab Results   Component Value Date     04/12/2025    K 3.8 04/12/2025     04/12/2025    CO2 29 04/12/2025    BUN 16 04/12/2025    CREATININE 0.97 04/12/2025     Lab Results   Component Value Date    MG 1.94 04/09/2025    PHOS 3.5 03/18/2025    CALCIUM 7.5 (L) 04/12/2025    ALBUMIN 1.7 (L) 04/12/2025         Hepatic Function ABG/VBG   Lab Results   Component Value Date    ALT 19 04/12/2025    AST 41 (H) 04/12/2025    ALKPHOS 233 (H) 04/12/2025     Lab Results   Component Value Date    BILITOT 1.9 (H) 04/12/2025    BILIDIR 1.1 (H) 04/10/2025     Lab Results   Component Value Date    PROTIME 15.3 (H) 04/12/2025    APTT 32 04/12/2025    INR 1.4 (H) 04/12/2025    Lab Results   Component Value Date    LACTATE 1.0 03/13/2025                                                                                   Imaging   US LIVER WITH DOPPLER; 4/11/2025   IMPRESSION:  1. Liver cirrhosis with patent hepatic vasculature and no evidence of  focal lesion, as described.  2. Findings compatible with portal hypertension with ascites,  recanalized umbilical vein and slow hepatopetal flow in the portal  venous system.    CT ABDOMEN PELVIS W IV CONTRAST; 2/9/2025   IMPRESSION:  1.  Redemonstration of hepatic cirrhosis with evidence of portal  hypertension as evidenced by mild splenomegaly and moderate to large  volume abdominopelvic ascites.  2. Interval increase in edematous wall thickening throughout the  small bowel loops, ascending colon and transverse colon compared to  prior CT examination dated 02/05/2025. This could again be related to  hypoproteinemia from hepatic cirrhosis, however  infectious/inflammatory enterocolitis can also be considered in the  differential in appropriate clinical setting.  3. Stable 6 mm pulmonary nodule in the right middle lobe. This is  similar compared to prior CT examination dated 07/04/2024. Recommend  short interval follow-up CT chest in 12 months to document stability.     CT ABDOMEN PELVIS W IV CONTRAST; 2/5/2025   IMPRESSION:  1.  Hepatic cirrhosis with evidence of portal hypertension and  moderate volume ascites.  2. 6 mm right middle lobe pulmonary nodule can be followed up with  chest CT in 12 months for surveillance.     CT ABDOMEN PELVIS W IV CONTRAST; 1/26/2025   IMPRESSION:     Cirrhosis and stigmata of portal hypertension, similar appearance to the   prior.  No focal hepatic lesion.                                                                          GI Procedures     EGD 1/13/25:  Impression  The esophagus appeared normal.  Irregular Z-line  Moderate portal hypertensive gastropathy in the cardia, fundus of the stomach, body of the stomach, incisura, antrum, prepyloric region and pylorus  The  stomach was otherwise normal on direct and retroflexion views. Additional information: No gastric varices were visualized.  The duodenal bulb and 1st part of the duodenum appeared normal.     Findings  The esophagus appeared normal. Additional information: No esophageal varices were visualized.  Irregular Z-line 39 cm from the incisors  Moderate and diffuse portal hypertensive gastropathy in the cardia, fundus of the stomach, body of the stomach, incisura, antrum, prepyloric region and pylorus  The stomach was otherwise normal on direct and retroflexion views. Additional information: No gastric varices were visualized.  The duodenal bulb and 1st part of the duodenum appeared normal.       ASSESSMENT / PLAN     ASSESSMENT/PLAN:  Alfonzo Flanagan is a 50 y.o. female with a past medical history of biopsy-proven PBC with cirrhosis c/b portal HTN with ascites, esophageal varices (previous bleeding, s/p banding 2021 with eradication), hepatic encephalopathy, antibody-positive celiac disease, recent pancreatic tail mass suggestive of neuroendocrine tumor s/p EUS on 3/2024 (no biopsy) recommended repeat in 1 year, IDDM2 (A1c 7.6), HTN, GERD, anxiety, depression, CORBY who resented to ED 4/9/25 for abdominal distension and pain and dysuria. Hepatology is consulted for decompensated cirrhosis and consideration for TIPS and transplant evaluation.     It seems like a large obstacle to pt's ability to comply with her diuretics stems from financial issues (she has to pay out of pocket every time); unfortunately pt remains without insurance and is still pending Medicaid.  Would plan to r/o SBP with diagnostic (and therapeutic) paracentesis and evaluate for other causes for the ascites. Meanwhile will would try to optimize her volume status in an effort to keep her out of the hospital longer. With pt's current financial/insurance situation, there are no plans to open liver transplant evaluation and therefore no plans for TIPS  evaluation.     CIRRHOSIS CLASSIFICATION:  -Etiology: PBC  -Hepatologist: Dr. Khanna  MELD 3.0: 17 at 4/12/2025  7:03 AM  MELD-Na: 13 at 4/12/2025  7:03 AM  Calculated from:  Serum Creatinine: 0.97 mg/dL (Using min of 1 mg/dL) at 4/12/2025  7:03 AM  Serum Sodium: 138 mmol/L (Using max of 137 mmol/L) at 4/12/2025  7:03 AM  Total Bilirubin: 1.9 mg/dL at 4/12/2025  7:03 AM  Serum Albumin: 1.7 g/dL at 4/12/2025  7:03 AM  INR(ratio): 1.4 at 4/12/2025  7:03 AM  Age at listing (hypothetical): 50 years  Sex: Female at 4/12/2025  7:03 AM      #decompensated cirrhosis 2/2 PBC (ascites, EV bleeding, ?HE)  #PBC  :: Para 4/10/25: 3.5L removed. PMN60s, SAAG >1.6, TP 1.3    Recommendations:  -Continue diuresis with lasix IV 40mg bid today, can likely switch to PO equivalent next day as pt is close to baseline volume status   -Continue nataly 150mg daily  -On discharge, can send home on same home diuretic doses of torsemide 40mg and nataly 150mg. Please check with pharmacy on how much it will cost her. Torsemide tends to be more expensive than lasix so switching her torsemide to lasix equivalent dose is acceptable; whatever makes the meds more affordable so that she'll take it   -Strict I/O and daily weights  -Continue home ursodiol 500mg bid and 250mg daily.   -Continue with lactulose (titrate to 3-4BMs) and rifaximin  -Low Na diet, 2L fluid restriction  -Trend MELD labs daily     Patient was seen and discussed with Dr. Aldana .    Thank you for the consultation. Hepatology will continue to follow.  During weekday hours of 7am-5pm, please do not hesitate to contact me on [employer] Chat or page [phone], if there are any further questions.  After hours, on weekends, and on holidays, please page the on-call GI fellow at [phone].   Thank you.

## 2025-04-12 NOTE — HOSPITAL COURSE
Alfonzo Flanagan is a 50 y.o. female with PMH significant for biopsy-proven PBC with cirrhosis c/b portal HTN with ascites, esophageal varices (s/p banding 2021 with eradication), hepatic encephalopathy, antibody-positive celiac disease, recent pancreatic tail mass, IDDM2 (A1c 7.6), HTN, GERD, anxiety, depression, CORBY (2013 sleep study, untreated) who presented on 4/9 for 2 days of abdominal distension and discomfort, urinary frequency, and dysuria. She was also noted the new R palmar rash concerning for shingles.     Exam was remarkable for distended abdomen concerning for worsening ascites. She underwent therapeutic paracentesis with 3.5L of yellow fluid drained. Not concerning for SBP. She also received IV diuresis with home spironolactone 150mg and IV lasix 40 BID. Liver US with dopplers done and showed portal hypertension but no evidence of thrombus.  Hepatology did not recommend TIPS given concern for decompensation. Not listed for transplant at this time for financial reasons ( pending medicaid number).     Patient also noted to have painful L groin lesion likely related to known HS. Also with surrounding swelling. CT showed no fluid collected. Likely related to cellulitis. Patient was started on abx with inpatient with planned 5 day course . Patient also with dysuria and + LE on US although culture with contamination. Will complete 5 day course.     Throughout hospitalization noted to have worsening hand lesions concerning for shingles. Patient was started on valtrex ( anticipate 7 day course)  as well as gabapentin ( 300mg BID, closely monitored for mental status change). Appeared to be in C7 dermatome. No facial lesions present.     Patient also noted to have hyperglycemia present on admission with gradual uptitration to *** units of lantus.

## 2025-04-12 NOTE — CARE PLAN
The patient's goals for the shift include      The clinical goals for the shift include Patient will have her US completed    US completed. Started on diet. In no distress

## 2025-04-13 VITALS
HEART RATE: 90 BPM | DIASTOLIC BLOOD PRESSURE: 69 MMHG | RESPIRATION RATE: 18 BRPM | WEIGHT: 187 LBS | SYSTOLIC BLOOD PRESSURE: 107 MMHG | HEIGHT: 62 IN | TEMPERATURE: 97.9 F | BODY MASS INDEX: 34.41 KG/M2 | OXYGEN SATURATION: 95 %

## 2025-04-13 LAB
ALBUMIN SERPL BCP-MCNC: 1.7 G/DL (ref 3.4–5)
ALP SERPL-CCNC: 236 U/L (ref 33–110)
ALT SERPL W P-5'-P-CCNC: 17 U/L (ref 7–45)
ANION GAP SERPL CALC-SCNC: 8 MMOL/L (ref 10–20)
APTT PPP: 33 SECONDS (ref 26–36)
AST SERPL W P-5'-P-CCNC: 39 U/L (ref 9–39)
BACTERIA BLD CULT: NORMAL
BACTERIA BLD CULT: NORMAL
BACTERIA FLD CULT: NORMAL
BASOPHILS # BLD AUTO: 0.02 X10*3/UL (ref 0–0.1)
BASOPHILS NFR BLD AUTO: 0.5 %
BILIRUB SERPL-MCNC: 1.8 MG/DL (ref 0–1.2)
BUN SERPL-MCNC: 15 MG/DL (ref 6–23)
CALCIUM SERPL-MCNC: 7.7 MG/DL (ref 8.6–10.6)
CHLORIDE SERPL-SCNC: 103 MMOL/L (ref 98–107)
CO2 SERPL-SCNC: 29 MMOL/L (ref 21–32)
CREAT SERPL-MCNC: 0.95 MG/DL (ref 0.5–1.05)
EGFRCR SERPLBLD CKD-EPI 2021: 74 ML/MIN/1.73M*2
EOSINOPHIL # BLD AUTO: 0.32 X10*3/UL (ref 0–0.7)
EOSINOPHIL NFR BLD AUTO: 8.6 %
ERYTHROCYTE [DISTWIDTH] IN BLOOD BY AUTOMATED COUNT: 15.9 % (ref 11.5–14.5)
GLUCOSE BLD MANUAL STRIP-MCNC: 187 MG/DL (ref 74–99)
GLUCOSE BLD MANUAL STRIP-MCNC: 192 MG/DL (ref 74–99)
GLUCOSE BLD MANUAL STRIP-MCNC: 254 MG/DL (ref 74–99)
GLUCOSE BLD MANUAL STRIP-MCNC: 361 MG/DL (ref 74–99)
GLUCOSE SERPL-MCNC: 196 MG/DL (ref 74–99)
GRAM STN SPEC: NORMAL
GRAM STN SPEC: NORMAL
HCT VFR BLD AUTO: 34.8 % (ref 36–46)
HGB BLD-MCNC: 11 G/DL (ref 12–16)
IMM GRANULOCYTES # BLD AUTO: 0.01 X10*3/UL (ref 0–0.7)
IMM GRANULOCYTES NFR BLD AUTO: 0.3 % (ref 0–0.9)
INR PPP: 1.3 (ref 0.9–1.1)
LYMPHOCYTES # BLD AUTO: 1.21 X10*3/UL (ref 1.2–4.8)
LYMPHOCYTES NFR BLD AUTO: 32.4 %
MCH RBC QN AUTO: 31 PG (ref 26–34)
MCHC RBC AUTO-ENTMCNC: 31.6 G/DL (ref 32–36)
MCV RBC AUTO: 98 FL (ref 80–100)
MONOCYTES # BLD AUTO: 0.47 X10*3/UL (ref 0.1–1)
MONOCYTES NFR BLD AUTO: 12.6 %
NEUTROPHILS # BLD AUTO: 1.7 X10*3/UL (ref 1.2–7.7)
NEUTROPHILS NFR BLD AUTO: 45.6 %
NRBC BLD-RTO: 0 /100 WBCS (ref 0–0)
PLATELET # BLD AUTO: 87 X10*3/UL (ref 150–450)
POTASSIUM SERPL-SCNC: 4.1 MMOL/L (ref 3.5–5.3)
PROT SERPL-MCNC: 5.9 G/DL (ref 6.4–8.2)
PROTHROMBIN TIME: 14.4 SECONDS (ref 9.8–12.4)
RBC # BLD AUTO: 3.55 X10*6/UL (ref 4–5.2)
SODIUM SERPL-SCNC: 136 MMOL/L (ref 136–145)
WBC # BLD AUTO: 3.7 X10*3/UL (ref 4.4–11.3)

## 2025-04-13 PROCEDURE — RXMED WILLOW AMBULATORY MEDICATION CHARGE

## 2025-04-13 PROCEDURE — 2500000001 HC RX 250 WO HCPCS SELF ADMINISTERED DRUGS (ALT 637 FOR MEDICARE OP)

## 2025-04-13 PROCEDURE — 85025 COMPLETE CBC W/AUTO DIFF WBC: CPT

## 2025-04-13 PROCEDURE — 2500000001 HC RX 250 WO HCPCS SELF ADMINISTERED DRUGS (ALT 637 FOR MEDICARE OP): Performed by: STUDENT IN AN ORGANIZED HEALTH CARE EDUCATION/TRAINING PROGRAM

## 2025-04-13 PROCEDURE — 2500000002 HC RX 250 W HCPCS SELF ADMINISTERED DRUGS (ALT 637 FOR MEDICARE OP, ALT 636 FOR OP/ED)

## 2025-04-13 PROCEDURE — 2500000004 HC RX 250 GENERAL PHARMACY W/ HCPCS (ALT 636 FOR OP/ED)

## 2025-04-13 PROCEDURE — 99232 SBSQ HOSP IP/OBS MODERATE 35: CPT

## 2025-04-13 PROCEDURE — 84075 ASSAY ALKALINE PHOSPHATASE: CPT

## 2025-04-13 PROCEDURE — 36415 COLL VENOUS BLD VENIPUNCTURE: CPT

## 2025-04-13 PROCEDURE — 2500000005 HC RX 250 GENERAL PHARMACY W/O HCPCS: Performed by: STUDENT IN AN ORGANIZED HEALTH CARE EDUCATION/TRAINING PROGRAM

## 2025-04-13 PROCEDURE — 2500000005 HC RX 250 GENERAL PHARMACY W/O HCPCS

## 2025-04-13 PROCEDURE — 85610 PROTHROMBIN TIME: CPT

## 2025-04-13 PROCEDURE — 1100000001 HC PRIVATE ROOM DAILY

## 2025-04-13 PROCEDURE — 82947 ASSAY GLUCOSE BLOOD QUANT: CPT

## 2025-04-13 RX ORDER — GABAPENTIN 300 MG/1
300 CAPSULE ORAL 2 TIMES DAILY
Qty: 60 CAPSULE | Refills: 1 | Status: SHIPPED | OUTPATIENT
Start: 2025-04-13 | End: 2025-06-12

## 2025-04-13 RX ORDER — LIDOCAINE 40 MG/G
CREAM TOPICAL 4 TIMES DAILY PRN
Status: DISCONTINUED | OUTPATIENT
Start: 2025-04-13 | End: 2025-04-14 | Stop reason: HOSPADM

## 2025-04-13 RX ORDER — NYSTATIN 100000 [USP'U]/G
1 POWDER TOPICAL 2 TIMES DAILY
Qty: 450 G | Refills: 1 | Status: SHIPPED | OUTPATIENT
Start: 2025-04-13 | End: 2025-06-12

## 2025-04-13 RX ORDER — VALACYCLOVIR HYDROCHLORIDE 1 G/1
1000 TABLET, FILM COATED ORAL EVERY 8 HOURS SCHEDULED
Qty: 17 TABLET | Refills: 0 | Status: SHIPPED | OUTPATIENT
Start: 2025-04-13 | End: 2025-04-20

## 2025-04-13 RX ORDER — FUROSEMIDE 40 MG/1
80 TABLET ORAL DAILY
Status: DISCONTINUED | OUTPATIENT
Start: 2025-04-13 | End: 2025-04-14

## 2025-04-13 RX ORDER — LIDOCAINE 40 MG/G
CREAM TOPICAL 4 TIMES DAILY PRN
Qty: 45 G | Refills: 1 | Status: SHIPPED | OUTPATIENT
Start: 2025-04-13

## 2025-04-13 RX ORDER — FUROSEMIDE 80 MG/1
80 TABLET ORAL DAILY
Qty: 30 TABLET | Refills: 1 | Status: SHIPPED | OUTPATIENT
Start: 2025-04-14 | End: 2025-06-13

## 2025-04-13 RX ORDER — INSULIN GLARGINE 100 [IU]/ML
12 INJECTION, SOLUTION SUBCUTANEOUS NIGHTLY
Qty: 30 ML | Refills: 1 | Status: SHIPPED | OUTPATIENT
Start: 2025-04-13

## 2025-04-13 RX ORDER — SULFAMETHOXAZOLE AND TRIMETHOPRIM 800; 160 MG/1; MG/1
1 TABLET ORAL DAILY
Qty: 30 TABLET | Refills: 1 | Status: SHIPPED | OUTPATIENT
Start: 2025-04-13 | End: 2025-06-12

## 2025-04-13 RX ORDER — PANTOPRAZOLE SODIUM 40 MG/1
40 TABLET, DELAYED RELEASE ORAL
Qty: 30 TABLET | Refills: 1 | Status: SHIPPED | OUTPATIENT
Start: 2025-04-14

## 2025-04-13 RX ADMIN — INSULIN GLARGINE 12 UNITS: 100 INJECTION, SOLUTION SUBCUTANEOUS at 21:39

## 2025-04-13 RX ADMIN — MIRTAZAPINE 7.5 MG: 15 TABLET, FILM COATED ORAL at 20:57

## 2025-04-13 RX ADMIN — NYSTATIN 1 APPLICATION: 100000 POWDER TOPICAL at 20:58

## 2025-04-13 RX ADMIN — GABAPENTIN 300 MG: 300 CAPSULE ORAL at 08:17

## 2025-04-13 RX ADMIN — PANTOPRAZOLE SODIUM 40 MG: 40 INJECTION, POWDER, FOR SOLUTION INTRAVENOUS at 06:00

## 2025-04-13 RX ADMIN — VALACYCLOVIR HYDROCHLORIDE 1000 MG: 500 TABLET, FILM COATED ORAL at 05:49

## 2025-04-13 RX ADMIN — URSODIOL 500 MG: 500 TABLET ORAL at 20:57

## 2025-04-13 RX ADMIN — SUCRALFATE 1 G: 1 TABLET ORAL at 15:14

## 2025-04-13 RX ADMIN — VALACYCLOVIR HYDROCHLORIDE 1000 MG: 500 TABLET, FILM COATED ORAL at 13:28

## 2025-04-13 RX ADMIN — SPIRONOLACTONE 150 MG: 25 TABLET, FILM COATED ORAL at 08:17

## 2025-04-13 RX ADMIN — LACTULOSE 20 G: 20 SOLUTION ORAL at 08:16

## 2025-04-13 RX ADMIN — NYSTATIN 1 APPLICATION: 100000 POWDER TOPICAL at 08:15

## 2025-04-13 RX ADMIN — GABAPENTIN 300 MG: 300 CAPSULE ORAL at 20:56

## 2025-04-13 RX ADMIN — URSODIOL 500 MG: 500 TABLET ORAL at 08:18

## 2025-04-13 RX ADMIN — INSULIN LISPRO 15 UNITS: 100 INJECTION, SOLUTION INTRAVENOUS; SUBCUTANEOUS at 17:13

## 2025-04-13 RX ADMIN — LIDOCAINE 4%: 4 CREAM TOPICAL at 13:26

## 2025-04-13 RX ADMIN — RIFAXIMIN 550 MG: 550 TABLET ORAL at 08:18

## 2025-04-13 RX ADMIN — Medication 3 MG: at 20:57

## 2025-04-13 RX ADMIN — SUCRALFATE 1 G: 1 TABLET ORAL at 20:58

## 2025-04-13 RX ADMIN — SUCRALFATE 1 G: 1 TABLET ORAL at 06:00

## 2025-04-13 RX ADMIN — INSULIN LISPRO 9 UNITS: 100 INJECTION, SOLUTION INTRAVENOUS; SUBCUTANEOUS at 12:40

## 2025-04-13 RX ADMIN — PIPERACILLIN SODIUM AND TAZOBACTAM SODIUM 3.38 G: 3; .375 INJECTION, SOLUTION INTRAVENOUS at 08:10

## 2025-04-13 RX ADMIN — PIPERACILLIN SODIUM AND TAZOBACTAM SODIUM 3.38 G: 3; .375 INJECTION, SOLUTION INTRAVENOUS at 01:11

## 2025-04-13 RX ADMIN — FUROSEMIDE 40 MG: 10 INJECTION, SOLUTION INTRAMUSCULAR; INTRAVENOUS at 08:15

## 2025-04-13 RX ADMIN — CARVEDILOL 6.25 MG: 6.25 TABLET, FILM COATED ORAL at 08:18

## 2025-04-13 RX ADMIN — ATORVASTATIN CALCIUM 20 MG: 20 TABLET, FILM COATED ORAL at 20:56

## 2025-04-13 RX ADMIN — INSULIN LISPRO 3 UNITS: 100 INJECTION, SOLUTION INTRAVENOUS; SUBCUTANEOUS at 08:07

## 2025-04-13 RX ADMIN — URSODIOL 250 MG: 250 TABLET ORAL at 22:06

## 2025-04-13 RX ADMIN — PIPERACILLIN SODIUM AND TAZOBACTAM SODIUM 3.38 G: 3; .375 INJECTION, SOLUTION INTRAVENOUS at 20:56

## 2025-04-13 RX ADMIN — SUCRALFATE 1 G: 1 TABLET ORAL at 11:55

## 2025-04-13 RX ADMIN — RIFAXIMIN 550 MG: 550 TABLET ORAL at 20:57

## 2025-04-13 RX ADMIN — VALACYCLOVIR HYDROCHLORIDE 1000 MG: 500 TABLET, FILM COATED ORAL at 21:17

## 2025-04-13 RX ADMIN — RIFAXIMIN 550 MG: 550 TABLET ORAL at 14:35

## 2025-04-13 RX ADMIN — ENOXAPARIN SODIUM 40 MG: 40 INJECTION, SOLUTION SUBCUTANEOUS at 22:15

## 2025-04-13 RX ADMIN — PIPERACILLIN SODIUM AND TAZOBACTAM SODIUM 3.38 G: 3; .375 INJECTION, SOLUTION INTRAVENOUS at 13:28

## 2025-04-13 ASSESSMENT — COGNITIVE AND FUNCTIONAL STATUS - GENERAL
CLIMB 3 TO 5 STEPS WITH RAILING: A LITTLE
DAILY ACTIVITIY SCORE: 24
MOBILITY SCORE: 23
MOBILITY SCORE: 23
CLIMB 3 TO 5 STEPS WITH RAILING: A LITTLE
DAILY ACTIVITIY SCORE: 24

## 2025-04-13 ASSESSMENT — PAIN - FUNCTIONAL ASSESSMENT: PAIN_FUNCTIONAL_ASSESSMENT: 0-10

## 2025-04-13 ASSESSMENT — PAIN SCALES - GENERAL
PAINLEVEL_OUTOF10: 0 - NO PAIN
PAINLEVEL_OUTOF10: 0 - NO PAIN

## 2025-04-13 NOTE — DISCHARGE INSTRUCTIONS
Dear Ms Panchito    You were admitted with us because of abdominal pain and swelling. You were found to have a urinary tract infection which we treated with antibiotics. The liver doctors followed-up with you and recommended getting some of your stomach fluid drained which we did. You were also treated for Herpes infection of your hand. You will be discharged with a slight change of your medications to help you take them. You will stop taking Torsamide and instead take 80mg of Lasix. Please take your medication as prescribed. Please follow-up with your appointments with Hepatology, Dermatology, Gynecology, and your primary care doctor.    It was a pleasure taking care of you!  Your [facility] care team

## 2025-04-13 NOTE — PROGRESS NOTES
Alfonzo Flanagan is a 50 y.o. female on day 4 of admission presenting with Decompensated cirrhosis.      Subjective   Patient seen today. Doing well with no new complaints. Tolerating orally and passing BM.       Objective     Last Recorded Vitals  /66 (BP Location: Right arm, Patient Position: Lying)   Pulse 86   Temp 36.6 °C (97.9 °F) (Temporal)   Resp 16   Wt 84.8 kg (187 lb)   SpO2 95%   Intake/Output last 3 Shifts:    Intake/Output Summary (Last 24 hours) at 4/13/2025 1034  Last data filed at 4/13/2025 0846  Gross per 24 hour   Intake 250 ml   Output 850 ml   Net -600 ml       Admission Weight  Weight: 84.8 kg (187 lb) (04/09/25 1436)    Daily Weight  04/09/25 : 84.8 kg (187 lb)    Image Results  US liver with doppler  Narrative: Interpreted By:  Hayley Lacey and Ritchie Brandon   STUDY:  US LIVER WITH DOPPLER;  4/11/2025 10:38 am      INDICATION:  Signs/Symptoms:cirrhosis.      COMPARISON:  CT abdomen and pelvis 04/10/2025.      ACCESSION NUMBER(S):  [id number]      ORDERING CLINICIAN:  JUSTICE STARK      TECHNIQUE:  Multiple images of the right upper quadrant were obtained.  Gray  scale, color Doppler and spectral Doppler waveform analysis was  performed.  This examination was interpreted at [facility].      FINDINGS:  LIVER:  The liver measures 11.7 cm and is diffusely echogenic in appearance  and demonstrates nodular contour. The resulting increased beam  attenuation thereby limiting evaluation of the liver for focal  lesions. Within the limitations, no focal lesions are seen.      GALLBLADDER:  Gallbladder is surgically absent.      BILIARY SYSTEM:  No evidence of intra or extrahepatic biliary dilatation is  identified; the common bile duct measures 0.3 cm.      DOPPLER EVALUATION:      HEPATIC ARTERIES:  Hepatic artery and its right and left branches RI's are estimated at  0.8, 0.7 and 0.7, respectively.      PORTAL VEIN:       There is a large recanalized umbilical vein which is present.      Portal vein is patent and measures 0.6 cm . There is normal  respiratory variation. Portal vein velocities are calculated as  follows: main portal vein 10.1 cm/s, antegrade flow; left portal vein  branch 22.5 cm/s, antegrade flow; right portal vein anterior branch  6.4 cm/s , antegrade flow; right portal vein posterior branch 4.4  cm/s , antegrade flow. The splenic vein is also patent.      HEPATIC VEIN:  The right, middle and left hepatic veins are patent and demonstrate  triphasic antegrade flow. IVC appears also patent.      PANCREAS:  The pancreas is poorly visualized due to overlying bowel gas.      RIGHT KIDNEY:  The right kidney measures 9.2 cm in length. No hydronephrosis or  renal calculi are seen.      SPLEEN:  The spleen measures 12.0 cm and is grossly unremarkable.      PERITONEUM AND RETROPERITONEUM:  There is incompletely characterized abdominopelvic ascites which is  present.      Impression: 1. Liver cirrhosis with patent hepatic vasculature and no evidence of  focal lesion, as described.  2. Findings compatible with portal hypertension with ascites,  recanalized umbilical vein and slow hepatopetal flow in the portal  venous system.      I personally reviewed the images/study and I agree with the findings  as stated by resident Grady Denson. This study was interpreted  at [facility], [place], [place].      MACRO:  None      Signed by: Hayley Lacey 4/11/2025 7:37 PM  Dictation workstation:   [id number]      Physical Exam  Constitutional:       Appearance: Normal appearance.   Cardiovascular:      Rate and Rhythm: Normal rate and regular rhythm.      Pulses: Normal pulses.      Heart sounds: Normal heart sounds.   Pulmonary:      Effort: No respiratory distress.      Breath sounds: Wheezing present.   Abdominal:      General: There is distension.      Palpations: Abdomen is soft.       Tenderness: There is no abdominal tenderness.   Musculoskeletal:      Comments: Multiple vesicles seen on palm of left hand now covered with bandages   Neurological:      Mental Status: She is alert and oriented to person, place, and time. Mental status is at baseline.             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 Patient was recently discharged from the hospital, with course noted above. She presents again with decompensated cirrhosis (+asterixis). On labs, urinalysis is positive for LE, WBC, bacteria, which in the setting of her symptoms is concerning for a recurrent UTI. S/p paracentesis 4/10 with 3.5L Removed.     Updates 4/13   -Switched to oral Lasix 80 once daily starting tomorrow  - Will plan for tentative discharge 4/14  - Will switch to bactrim ppx after completing CTX course tomorrow  - Gyn noted no acute intervention from their end.     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 Kahnna  :: Biopsy proven PBC 3/2021. History of esophageal varices s/p banding 2021. Has required several paracenteses in the past   : :  Prior diagnostic para consistent with portal HTN (SAAG > 1.1) and no c/f SBP (ANC 6) on prior admission.      PLAN:  -S/p US guided paracentesis 4/10 with 3.5L removed.  - Started vanc/zosyn 4/10 then narrowed to zosyn only today.  - continue spironolactone 150 mg   - continue rifaximin 550 mg  TID, lactulose 20 g BID titrated to bowel movements  - continue pantoprazole 40 mg daily  - continue acetaminophen 487.5 mg q6h PRN pain (dose reduction for cirrhosis)  - continue ursodiol 500 mg BID and ursodiol 250 mg nightly  - current diet: 2-3 g sodium diet  - daily MELD labs  - Switched to Lasix 80mg Oral once.  - Will start bactrim SBP PPX tomorrow     #Hypervolemic hyponatremia  : : Na 131  : : Suspect d/t underlying splanchnic vasodilation      Plan  - C/w diuresis as above  - 2L fluid restriction         #Left palmar rash (image in media)  : : Vesicular rash in cluster  : : Differential includes rickettsial rash, herpetic lesion, secondary syphilis, Coxsackie A virus, HIV     Plan:  -Concern for herpes.  - Started Acyclovir (4/10-4/12) switched to Valtrex (4/12-)     #UTI  #Possible vaginal candidiasis  : : UA with LE, WBC, UCX pending  : : S/p ceftriaxone in the ED     PLAN:  - S/p ceftriaxone (4/9-4/10)  - Started Vanc (4/10-1/11) and zosyn (4/10-) as per ACS  - Gyn noted no further intervention as groin wound has drained  - Will continue CTX until tomorrow 4/14     ppears to still be draining pus, has some induration on palpation       #Nausea  #Vomiting  #Appetite  #Dizziness  - continue zofran ODT 4 mg q6h PRN  - continue mirtazapine 7.6 mg nightly       #T2DM  : : Last A1c 7.6 in 12/2024. On insulin glargine 7 units nightly and metformin at home. Patient reports compliance with glargine dosing. POC glucose on admission >500 --> 229  : : No ketones on UA   PLAN:  - continue insulin glargine 7 units nightly  - hold home metformin  - SSI #3  - hypoglycemic protocol     #Anxiety/Depression  - continue home trazodone 25 mg nightly PRN     #HLD  - continue home atorvastatin 20 mg nightly        Diet: 2-3g Na restricted  DVT prophylaxis: Lovenox  Code status: FULL CODE  NOK: [place] (daughter, [phone])               Kate Maguire MD

## 2025-04-13 NOTE — PROGRESS NOTES
Transitional Care Coordination     TCC made aware patient will discharge home with [facility]. Patient continues to politely decline home PT/OT.     Judit Molina RN, BSN CM   PRN Care Coordinator

## 2025-04-14 ENCOUNTER — APPOINTMENT (OUTPATIENT)
Dept: RADIOLOGY | Facility: HOSPITAL | Age: 50
DRG: 433 | End: 2025-04-14
Payer: COMMERCIAL

## 2025-04-14 ENCOUNTER — PHARMACY VISIT (OUTPATIENT)
Dept: PHARMACY | Facility: CLINIC | Age: 50
End: 2025-04-14
Payer: MEDICAID

## 2025-04-14 VITALS
OXYGEN SATURATION: 97 % | RESPIRATION RATE: 16 BRPM | DIASTOLIC BLOOD PRESSURE: 80 MMHG | WEIGHT: 187 LBS | SYSTOLIC BLOOD PRESSURE: 112 MMHG | HEART RATE: 90 BPM | HEIGHT: 62 IN | BODY MASS INDEX: 34.41 KG/M2 | TEMPERATURE: 97.7 F

## 2025-04-14 DIAGNOSIS — R18.8 CIRRHOSIS OF LIVER WITH ASCITES, UNSPECIFIED HEPATIC CIRRHOSIS TYPE (MULTI): Primary | ICD-10-CM

## 2025-04-14 DIAGNOSIS — K74.60 CIRRHOSIS OF LIVER WITH ASCITES, UNSPECIFIED HEPATIC CIRRHOSIS TYPE (MULTI): Primary | ICD-10-CM

## 2025-04-14 LAB
ALBUMIN SERPL BCP-MCNC: 1.8 G/DL (ref 3.4–5)
ALP SERPL-CCNC: 281 U/L (ref 33–110)
ALT SERPL W P-5'-P-CCNC: 18 U/L (ref 7–45)
ANION GAP SERPL CALC-SCNC: 8 MMOL/L (ref 10–20)
APTT PPP: 32 SECONDS (ref 26–36)
AST SERPL W P-5'-P-CCNC: 37 U/L (ref 9–39)
BACTERIA BLD CULT: NORMAL
BACTERIA BLD CULT: NORMAL
BASOPHILS # BLD AUTO: 0.02 X10*3/UL (ref 0–0.1)
BASOPHILS NFR BLD AUTO: 0.4 %
BILIRUB SERPL-MCNC: 1.6 MG/DL (ref 0–1.2)
BUN SERPL-MCNC: 13 MG/DL (ref 6–23)
CALCIUM SERPL-MCNC: 7.3 MG/DL (ref 8.6–10.6)
CHLORIDE SERPL-SCNC: 99 MMOL/L (ref 98–107)
CO2 SERPL-SCNC: 30 MMOL/L (ref 21–32)
CREAT SERPL-MCNC: 0.91 MG/DL (ref 0.5–1.05)
EGFRCR SERPLBLD CKD-EPI 2021: 78 ML/MIN/1.73M*2
EOSINOPHIL # BLD AUTO: 0.37 X10*3/UL (ref 0–0.7)
EOSINOPHIL NFR BLD AUTO: 8 %
ERYTHROCYTE [DISTWIDTH] IN BLOOD BY AUTOMATED COUNT: 16.1 % (ref 11.5–14.5)
GLUCOSE BLD MANUAL STRIP-MCNC: 253 MG/DL (ref 74–99)
GLUCOSE BLD MANUAL STRIP-MCNC: 292 MG/DL (ref 74–99)
GLUCOSE SERPL-MCNC: 285 MG/DL (ref 74–99)
HCT VFR BLD AUTO: 33.7 % (ref 36–46)
HGB BLD-MCNC: 10.7 G/DL (ref 12–16)
IMM GRANULOCYTES # BLD AUTO: 0.01 X10*3/UL (ref 0–0.7)
IMM GRANULOCYTES NFR BLD AUTO: 0.2 % (ref 0–0.9)
INR PPP: 1.3 (ref 0.9–1.1)
LABORATORY COMMENT REPORT: NORMAL
LABORATORY COMMENT REPORT: NORMAL
LYMPHOCYTES # BLD AUTO: 1.36 X10*3/UL (ref 1.2–4.8)
LYMPHOCYTES NFR BLD AUTO: 29.4 %
MCH RBC QN AUTO: 30.4 PG (ref 26–34)
MCHC RBC AUTO-ENTMCNC: 31.8 G/DL (ref 32–36)
MCV RBC AUTO: 96 FL (ref 80–100)
MONOCYTES # BLD AUTO: 0.5 X10*3/UL (ref 0.1–1)
MONOCYTES NFR BLD AUTO: 10.8 %
NEUTROPHILS # BLD AUTO: 2.37 X10*3/UL (ref 1.2–7.7)
NEUTROPHILS NFR BLD AUTO: 51.2 %
NRBC BLD-RTO: 0 /100 WBCS (ref 0–0)
PATH REPORT.FINAL DX SPEC: NORMAL
PATH REPORT.GROSS SPEC: NORMAL
PATH REPORT.RELEVANT HX SPEC: NORMAL
PATH REPORT.TOTAL CANCER: NORMAL
PLATELET # BLD AUTO: 79 X10*3/UL (ref 150–450)
POTASSIUM SERPL-SCNC: 4.1 MMOL/L (ref 3.5–5.3)
PROT SERPL-MCNC: 5.7 G/DL (ref 6.4–8.2)
PROTHROMBIN TIME: 14.7 SECONDS (ref 9.8–12.4)
RBC # BLD AUTO: 3.52 X10*6/UL (ref 4–5.2)
SODIUM SERPL-SCNC: 133 MMOL/L (ref 136–145)
WBC # BLD AUTO: 4.6 X10*3/UL (ref 4.4–11.3)

## 2025-04-14 PROCEDURE — 80053 COMPREHEN METABOLIC PANEL: CPT

## 2025-04-14 PROCEDURE — 2500000001 HC RX 250 WO HCPCS SELF ADMINISTERED DRUGS (ALT 637 FOR MEDICARE OP)

## 2025-04-14 PROCEDURE — 49083 ABD PARACENTESIS W/IMAGING: CPT | Performed by: NURSE PRACTITIONER

## 2025-04-14 PROCEDURE — 85025 COMPLETE CBC W/AUTO DIFF WBC: CPT

## 2025-04-14 PROCEDURE — 82947 ASSAY GLUCOSE BLOOD QUANT: CPT

## 2025-04-14 PROCEDURE — 2500000002 HC RX 250 W HCPCS SELF ADMINISTERED DRUGS (ALT 637 FOR MEDICARE OP, ALT 636 FOR OP/ED)

## 2025-04-14 PROCEDURE — 2500000004 HC RX 250 GENERAL PHARMACY W/ HCPCS (ALT 636 FOR OP/ED)

## 2025-04-14 PROCEDURE — 88305 TISSUE EXAM BY PATHOLOGIST: CPT | Mod: TC,MCY | Performed by: NUTRITIONIST

## 2025-04-14 PROCEDURE — 2500000005 HC RX 250 GENERAL PHARMACY W/O HCPCS

## 2025-04-14 PROCEDURE — 88112 CYTOPATH CELL ENHANCE TECH: CPT | Performed by: STUDENT IN AN ORGANIZED HEALTH CARE EDUCATION/TRAINING PROGRAM

## 2025-04-14 PROCEDURE — 99238 HOSP IP/OBS DSCHRG MGMT 30/<: CPT

## 2025-04-14 PROCEDURE — RXMED WILLOW AMBULATORY MEDICATION CHARGE

## 2025-04-14 PROCEDURE — 2500000001 HC RX 250 WO HCPCS SELF ADMINISTERED DRUGS (ALT 637 FOR MEDICARE OP): Performed by: STUDENT IN AN ORGANIZED HEALTH CARE EDUCATION/TRAINING PROGRAM

## 2025-04-14 PROCEDURE — 88112 CYTOPATH CELL ENHANCE TECH: CPT | Mod: TC,MCY | Performed by: NUTRITIONIST

## 2025-04-14 PROCEDURE — 36415 COLL VENOUS BLD VENIPUNCTURE: CPT

## 2025-04-14 PROCEDURE — 85610 PROTHROMBIN TIME: CPT

## 2025-04-14 PROCEDURE — 0W9G3ZZ DRAINAGE OF PERITONEAL CAVITY, PERCUTANEOUS APPROACH: ICD-10-PCS | Performed by: NURSE PRACTITIONER

## 2025-04-14 PROCEDURE — 88305 TISSUE EXAM BY PATHOLOGIST: CPT | Performed by: STUDENT IN AN ORGANIZED HEALTH CARE EDUCATION/TRAINING PROGRAM

## 2025-04-14 PROCEDURE — 99233 SBSQ HOSP IP/OBS HIGH 50: CPT | Performed by: STUDENT IN AN ORGANIZED HEALTH CARE EDUCATION/TRAINING PROGRAM

## 2025-04-14 PROCEDURE — 49083 ABD PARACENTESIS W/IMAGING: CPT

## 2025-04-14 RX ORDER — CHLORHEXIDINE GLUCONATE 40 MG/ML
SOLUTION TOPICAL DAILY
Qty: 236 ML | Refills: 1 | Status: SHIPPED | OUTPATIENT
Start: 2025-04-14

## 2025-04-14 RX ORDER — SULFAMETHOXAZOLE AND TRIMETHOPRIM 800; 160 MG/1; MG/1
1 TABLET ORAL DAILY
Status: DISCONTINUED | OUTPATIENT
Start: 2025-04-14 | End: 2025-04-14 | Stop reason: HOSPADM

## 2025-04-14 RX ORDER — FUROSEMIDE 40 MG/1
40 TABLET ORAL EVERY EVENING
Status: DISCONTINUED | OUTPATIENT
Start: 2025-04-14 | End: 2025-04-14 | Stop reason: HOSPADM

## 2025-04-14 RX ORDER — FUROSEMIDE 40 MG/1
80 TABLET ORAL
Status: DISCONTINUED | OUTPATIENT
Start: 2025-04-15 | End: 2025-04-14 | Stop reason: HOSPADM

## 2025-04-14 RX ORDER — FUROSEMIDE 40 MG/1
40 TABLET ORAL EVERY EVENING
Qty: 30 TABLET | Refills: 1 | Status: SHIPPED | OUTPATIENT
Start: 2025-04-14 | End: 2025-06-13

## 2025-04-14 RX ADMIN — CARVEDILOL 6.25 MG: 6.25 TABLET, FILM COATED ORAL at 09:22

## 2025-04-14 RX ADMIN — PIPERACILLIN SODIUM AND TAZOBACTAM SODIUM 3.38 G: 3; .375 INJECTION, SOLUTION INTRAVENOUS at 01:27

## 2025-04-14 RX ADMIN — SUCRALFATE 1 G: 1 TABLET ORAL at 12:59

## 2025-04-14 RX ADMIN — Medication: at 13:43

## 2025-04-14 RX ADMIN — PANTOPRAZOLE SODIUM 40 MG: 40 TABLET, DELAYED RELEASE ORAL at 06:01

## 2025-04-14 RX ADMIN — VALACYCLOVIR HYDROCHLORIDE 1000 MG: 500 TABLET, FILM COATED ORAL at 13:57

## 2025-04-14 RX ADMIN — SULFAMETHOXAZOLE AND TRIMETHOPRIM 1 TABLET: 800; 160 TABLET ORAL at 09:22

## 2025-04-14 RX ADMIN — RIFAXIMIN 550 MG: 550 TABLET ORAL at 14:00

## 2025-04-14 RX ADMIN — NYSTATIN 1 APPLICATION: 100000 POWDER TOPICAL at 10:51

## 2025-04-14 RX ADMIN — URSODIOL 500 MG: 500 TABLET ORAL at 09:23

## 2025-04-14 RX ADMIN — INSULIN LISPRO 9 UNITS: 100 INJECTION, SOLUTION INTRAVENOUS; SUBCUTANEOUS at 09:27

## 2025-04-14 RX ADMIN — LIDOCAINE 4%: 4 CREAM TOPICAL at 10:51

## 2025-04-14 RX ADMIN — GABAPENTIN 300 MG: 300 CAPSULE ORAL at 09:23

## 2025-04-14 RX ADMIN — INSULIN LISPRO 9 UNITS: 100 INJECTION, SOLUTION INTRAVENOUS; SUBCUTANEOUS at 13:01

## 2025-04-14 RX ADMIN — SUCRALFATE 1 G: 1 TABLET ORAL at 06:01

## 2025-04-14 RX ADMIN — SPIRONOLACTONE 150 MG: 25 TABLET, FILM COATED ORAL at 09:22

## 2025-04-14 RX ADMIN — VALACYCLOVIR HYDROCHLORIDE 1000 MG: 500 TABLET, FILM COATED ORAL at 05:43

## 2025-04-14 RX ADMIN — FUROSEMIDE 80 MG: 40 TABLET ORAL at 09:22

## 2025-04-14 RX ADMIN — RIFAXIMIN 550 MG: 550 TABLET ORAL at 09:23

## 2025-04-14 ASSESSMENT — COGNITIVE AND FUNCTIONAL STATUS - GENERAL
MOBILITY SCORE: 24
DAILY ACTIVITIY SCORE: 24

## 2025-04-14 ASSESSMENT — PAIN - FUNCTIONAL ASSESSMENT: PAIN_FUNCTIONAL_ASSESSMENT: 0-10

## 2025-04-14 ASSESSMENT — PAIN SCALES - GENERAL: PAINLEVEL_OUTOF10: 5 - MODERATE PAIN

## 2025-04-14 NOTE — PROGRESS NOTES
"[facility] [place]  [facility]  CONSULT FOLLOW-UP     Reason For Consult  Decompensated cirrhosis, consideration for TIPS and transplant evaluation     SUBJECTIVE     -No overnight events  -Abdominal distension bothering her more this AM    EXAM     Last Recorded Vitals  Blood pressure 112/80, pulse 90, temperature 36.5 °C (97.7 °F), temperature source Temporal, resp. rate 16, height 1.575 m (5' 2\"), weight 84.8 kg (187 lb), SpO2 97%.      Intake/Output Summary (Last 24 hours) at 4/14/2025 1112  Last data filed at 4/14/2025 0600  Gross per 24 hour   Intake 290 ml   Output 2 ml   Net 288 ml       Physical Exam   General: well-developed, well-nourished, no acute distress, AAOx3  HEENT: EOM intact  CV: regular rate and rhythm  Pulm: normal respiratory effort, clear to auscultation bilaterally   Abd: soft, nontender, distended  Extremities: warm, no LE edema  Neuro: moves all extremities equally, mild asterixis   Skin: warm, dry, intact      OBJECTIVE                                                                              Medications             Current Facility-Administered Medications:     acetaminophen (Tylenol) tablet 487.5 mg, 487.5 mg, oral, q6h PRN, 487.5 mg at 04/12/25 0247 **OR** acetaminophen (Tylenol) oral liquid 650 mg, 650 mg, nasogastric tube, q4h PRN, 650 mg at 04/11/25 1109 **OR** acetaminophen (Tylenol) suppository 650 mg, 650 mg, rectal, q4h PRN, Rekha Denny MD    atorvastatin (Lipitor) tablet 20 mg, 20 mg, oral, Nightly, Rekha Denny MD, 20 mg at 04/13/25 2056    carvedilol (Coreg) tablet 6.25 mg, 6.25 mg, oral, Daily, Rekha Denny MD, 6.25 mg at 04/14/25 0922    chlorhexidine (Hibiclens) 4 % liquid, , Topical, Daily, Agustina Almeida MD    dextrose 50 % injection 12.5 g, 12.5 g, intravenous, q15 min PRN, Rekha Denny MD    dextrose 50 % injection 25 g, 25 g, intravenous, q15 min PRN, Rekha Denny MD    dicyclomine (Bentyl) capsule 10 mg, 10 mg, oral, 4x " daily PRN, Yogi Garcia MD    enoxaparin (Lovenox) syringe 40 mg, 40 mg, subcutaneous, q24h, Rekha Denny MD, 40 mg at 04/13/25 2215    furosemide (Lasix) tablet 40 mg, 40 mg, oral, q PM, Lane Caba MD    [START ON 4/15/2025] furosemide (Lasix) tablet 80 mg, 80 mg, oral, Daily before breakfast, Lane Caba MD    gabapentin (Neurontin) capsule 300 mg, 300 mg, oral, BID, Liz Wise MD, 300 mg at 04/14/25 0923    glucagon (Glucagen) injection 1 mg, 1 mg, intramuscular, q15 min PRN, Rekha Denny MD    glucagon (Glucagen) injection 1 mg, 1 mg, intramuscular, q15 min PRN, Rekha Denny MD    insulin glargine (Lantus) injection 12 Units, 12 Units, subcutaneous, Nightly, Yogi Garcia MD, 12 Units at 04/13/25 2139    insulin lispro injection 0-15 Units, 0-15 Units, subcutaneous, TID AC, Rekha Denny MD, 9 Units at 04/14/25 0927    lactulose 20 gram/30 mL oral solution 20 g, 20 g, oral, BID, Rekha Denny MD, 20 g at 04/13/25 0816    lidocaine (LMX) 4 % cream, , Topical, 4x daily PRN, Liz Wise MD, Given at 04/14/25 1051    melatonin tablet 3 mg, 3 mg, oral, Nightly, Rekha Denny MD, 3 mg at 04/13/25 2057    [Held by provider] metFORMIN XR (Glucophage-XR) 24 hr tablet 1,000 mg, 1,000 mg, oral, Daily with evening meal, Rekha Denny MD    mirtazapine (Remeron) tablet 7.5 mg, 7.5 mg, oral, Nightly, Rekha Denny MD, 7.5 mg at 04/13/25 2057    nystatin (Mycostatin) 100,000 unit/gram powder 1 Application, 1 Application, Topical, BID, Liz Wise MD, 1 Application at 04/14/25 1051    ondansetron (Zofran) tablet 4 mg, 4 mg, oral, q8h PRN **OR** ondansetron (Zofran) injection 4 mg, 4 mg, intravenous, q8h PRN, Rekha Denny MD, 4 mg at 04/10/25 0113    oxyCODONE (Roxicodone) immediate release tablet 5 mg, 5 mg, oral, q4h PRN, Yogi Garcia MD, 5 mg at 04/12/25 0928    pantoprazole (ProtoNix) EC tablet 40 mg, 40 mg, oral, Daily before breakfast, 40 mg at 04/14/25 0601 **OR**  pantoprazole (Protonix) injection 40 mg, 40 mg, intravenous, Daily before breakfast, Rekha Denny MD, 40 mg at 04/13/25 0600    polyethylene glycol (Glycolax, Miralax) packet 17 g, 17 g, oral, Daily, Rekha Denny MD, 17 g at 04/12/25 0819    rifAXIMin (Xifaxan) tablet 550 mg, 550 mg, oral, TID, Rekha Denny MD, 550 mg at 04/14/25 0923    spironolactone (Aldactone) tablet 150 mg, 150 mg, oral, Daily, Rekha Denny MD, 150 mg at 04/14/25 0922    sucralfate (Carafate) tablet 1 g, 1 g, oral, Before meals & nightly, Rekha Denny MD, 1 g at 04/14/25 0601    sulfamethoxazole-trimethoprim (Bactrim DS) 800-160 mg per tablet 1 tablet, 1 tablet, oral, Daily, Lane Caba MD, 1 tablet at 04/14/25 0922    traZODone (Desyrel) tablet 25 mg, 25 mg, oral, Nightly PRN, Rekha Denny MD, 25 mg at 04/10/25 0044    ursodiol (Actigall) tablet 500 mg, 500 mg, oral, BID, 500 mg at 04/14/25 0923 **AND** ursodiol (Actigall) tablet 250 mg, 250 mg, oral, Daily, Lane Caba MD, 250 mg at 04/13/25 2206    valACYclovir (Valtrex) tablet 1,000 mg, 1,000 mg, oral, q8h RIVKA, Yogi Garcia MD, 1,000 mg at 04/14/25 0543                                                                            Labs     CBC RFP   Lab Results   Component Value Date    WBC 4.6 04/14/2025    HGB 10.7 (L) 04/14/2025    HCT 33.7 (L) 04/14/2025    MCV 96 04/14/2025    PLT 79 (L) 04/14/2025    NEUTROABS 2.37 04/14/2025    Lab Results   Component Value Date     (L) 04/14/2025    K 4.1 04/14/2025    CL 99 04/14/2025    CO2 30 04/14/2025    BUN 13 04/14/2025    CREATININE 0.91 04/14/2025     Lab Results   Component Value Date    MG 1.68 04/12/2025    PHOS 3.2 04/12/2025    CALCIUM 7.3 (L) 04/14/2025    ALBUMIN 1.8 (L) 04/14/2025         Hepatic Function ABG/VBG   Lab Results   Component Value Date    ALT 18 04/14/2025    AST 37 04/14/2025    ALKPHOS 281 (H) 04/14/2025     Lab Results   Component Value Date    BILITOT 1.6 (H) 04/14/2025    BILIDIR 1.1 (H) 04/10/2025      Lab Results   Component Value Date    PROTIME 14.7 (H) 04/14/2025    APTT 32 04/14/2025    INR 1.3 (H) 04/14/2025    Lab Results   Component Value Date    LACTATE 1.0 03/13/2025                                                                                  Imaging   US LIVER WITH DOPPLER; 4/11/2025   IMPRESSION:  1. Liver cirrhosis with patent hepatic vasculature and no evidence of  focal lesion, as described.  2. Findings compatible with portal hypertension with ascites,  recanalized umbilical vein and slow hepatopetal flow in the portal  venous system.    CT ABDOMEN PELVIS W IV CONTRAST; 2/9/2025   IMPRESSION:  1.  Redemonstration of hepatic cirrhosis with evidence of portal  hypertension as evidenced by mild splenomegaly and moderate to large  volume abdominopelvic ascites.  2. Interval increase in edematous wall thickening throughout the  small bowel loops, ascending colon and transverse colon compared to  prior CT examination dated 02/05/2025. This could again be related to  hypoproteinemia from hepatic cirrhosis, however  infectious/inflammatory enterocolitis can also be considered in the  differential in appropriate clinical setting.  3. Stable 6 mm pulmonary nodule in the right middle lobe. This is  similar compared to prior CT examination dated 07/04/2024. Recommend  short interval follow-up CT chest in 12 months to document stability.     CT ABDOMEN PELVIS W IV CONTRAST; 2/5/2025   IMPRESSION:  1.  Hepatic cirrhosis with evidence of portal hypertension and  moderate volume ascites.  2. 6 mm right middle lobe pulmonary nodule can be followed up with  chest CT in 12 months for surveillance.     CT ABDOMEN PELVIS W IV CONTRAST; 1/26/2025   IMPRESSION:     Cirrhosis and stigmata of portal hypertension, similar appearance to the   prior.  No focal hepatic lesion.                                                                          GI Procedures     EGD 1/13/25:  Impression  The esophagus appeared  normal.  Irregular Z-line  Moderate portal hypertensive gastropathy in the cardia, fundus of the stomach, body of the stomach, incisura, antrum, prepyloric region and pylorus  The stomach was otherwise normal on direct and retroflexion views. Additional information: No gastric varices were visualized.  The duodenal bulb and 1st part of the duodenum appeared normal.     Findings  The esophagus appeared normal. Additional information: No esophageal varices were visualized.  Irregular Z-line 39 cm from the incisors  Moderate and diffuse portal hypertensive gastropathy in the cardia, fundus of the stomach, body of the stomach, incisura, antrum, prepyloric region and pylorus  The stomach was otherwise normal on direct and retroflexion views. Additional information: No gastric varices were visualized.  The duodenal bulb and 1st part of the duodenum appeared normal.       ASSESSMENT / PLAN     ASSESSMENT/PLAN:  Alfonzo Flanagan is a 50 y.o. female with a past medical history of biopsy-proven PBC with cirrhosis c/b portal HTN with ascites, esophageal varices (previous bleeding, s/p banding 2021 with eradication), hepatic encephalopathy, antibody-positive celiac disease, recent pancreatic tail mass suggestive of neuroendocrine tumor s/p EUS on 3/2024 (no biopsy) recommended repeat in 1 year, IDDM2 (A1c 7.6), HTN, GERD, anxiety, depression, CORBY who resented to ED 4/9/25 for abdominal distension and pain and dysuria. Hepatology is consulted for decompensated cirrhosis and consideration for TIPS and transplant evaluation.     It seems like a large obstacle to pt's ability to comply with her diuretics stems from financial issues (she has to pay out of pocket every time); unfortunately pt remains without insurance and is still pending Medicaid.  Would plan to r/o SBP with diagnostic (and therapeutic) paracentesis and evaluate for other causes for the ascites. Meanwhile will would try to optimize her volume status in an effort  to keep her out of the hospital longer. With pt's current financial/insurance situation, there are no plans to open liver transplant evaluation and therefore no plans for TIPS evaluation.     CIRRHOSIS CLASSIFICATION:  -Etiology: PBC  -Hepatologist: Dr. Khanna  MELD 3.0: 18 at 4/14/2025  8:08 AM  MELD-Na: 11 at 4/14/2025  8:08 AM  Calculated from:  Serum Creatinine: 0.91 mg/dL (Using min of 1 mg/dL) at 4/14/2025  8:08 AM  Serum Sodium: 133 mmol/L at 4/14/2025  8:08 AM  Total Bilirubin: 1.6 mg/dL at 4/14/2025  8:08 AM  Serum Albumin: 1.8 g/dL at 4/14/2025  8:08 AM  INR(ratio): 1.3 at 4/14/2025  8:07 AM  Age at listing (hypothetical): 50 years  Sex: Female at 4/14/2025  8:08 AM      #decompensated cirrhosis 2/2 PBC (ascites, EV bleeding, ?HE)  #PBC  :: Para 4/10/25: 3.5L removed. PMN60s, SAAG >1.6, TP 1.3    Recommendations:  -Home diuretics: lasix 80mg and 40mg, antaly 200mg  -Please have pt get outpt CMP in one week to be sent to Dr. Khanna  -Consider therapeutic paracentesis before discharge  -Strict I/O and daily weights  -Continue home ursodiol 500mg bid and 250mg daily.   -Continue with lactulose (titrate to 3-4BMs) and rifaximin  -Low Na diet, 2L fluid restriction  -Trend MELD labs daily     Patient was seen and discussed with Dr. Aldana .    Thank you for the consultation. Hepatology will sign off.  During weekday hours of 7am-5pm, please do not hesitate to contact me on [employer] Chat or page [phone], if there are any further questions.  After hours, on weekends, and on holidays, please page the on-call GI fellow at [phone].   Thank you.

## 2025-04-14 NOTE — CARE PLAN
The patient's goals for the shift include      The clinical goals for the shift include patient will remain HDS in this shift    Over the shift, the patient did not make progress toward the following goals. Barriers to progression include . Recommendations to address these barriers include   Problem: Safety - Adult  Goal: Free from fall injury  Outcome: Progressing     Problem: Chronic Conditions and Co-morbidities  Goal: Patient's chronic conditions and co-morbidity symptoms are monitored and maintained or improved  Outcome: Progressing   .

## 2025-04-14 NOTE — POST-PROCEDURE NOTE
INTERVENTIONAL RADIOLOGY ADVANCED PRACTICE PROCEDURE  [facility]    A time out was performed and Left Hemiabdomen was examined with US and appropriate entry point was confirmed and marked.   The patient was prepped and draped in a sterile manner, 1% lidocaine was used to anesthesize the skin and subcutaneous tissue.   A 5F Centesis needle was then introduced through the skin into the peritoneal space, the centesis catheter was then threaded without difficulty.   2600 ml of yellow fluid was removed without difficulty. The catheter was then removed.   No immediate complications were noted during and immediately following the procedure.

## 2025-04-14 NOTE — CARE PLAN
The patient's goals for the shift include      The clinical goals for the shift include pt will discharge home today               Patient discharged to home today with her   Patient sent home with Meds to Bed from [facility]  RN discussed discharge instructions with Patient  and copy of AVS was given to the Patient.  Patient receive AVS both in English and Vietnamese

## 2025-04-14 NOTE — DISCHARGE SUMMARY
Discharge Diagnosis  Decompensated cirrhosis    Issues Requiring Follow-Up  Recurrent ascites, PBC, Pancreatic tail mass--> [ ] F/U with Dr. Patton.  Herpetic sourav--> Dermatology    Discharge Meds     Medication List      START taking these medications     Nat-Hex 4 % external liquid; Generic drug: chlorhexidine; Apply   topically once daily.   * furosemide 80 mg tablet; Commonly known as: Lasix; Take 1 tablet (80   mg) by mouth once daily.   * furosemide 40 mg tablet; Commonly known as: Lasix; Take 1 tablet (40   mg) by mouth once daily in the evening.   gabapentin 300 mg capsule; Commonly known as: Neurontin; Take 1 capsule   (300 mg) by mouth 2 times a day.   lidocaine 4 % cream; Commonly known as: LMX; Apply topically 4 times a   day as needed (hand pain).   Nyamyc 100,000 unit/gram powder; Generic drug: nystatin; Apply 1   Application topically 2 times a day.   sulfamethoxazole-trimethoprim 800-160 mg tablet; Commonly known as:   Bactrim DS; tome 1 tableta por vía oral devan vez al día.; (Take 1 tablet by   mouth once daily.)   valACYclovir 1 gram tablet; Commonly known as: Valtrex; Take 1 tablet   (1,000 mg) by mouth every 8 hours for 17 doses.  * This list has 2 medication(s) that are the same as other medications   prescribed for you. Read the directions carefully, and ask your doctor or   other care provider to review them with you.     CHANGE how you take these medications     Lantus Solostar U-100 Insulin 100 unit/mL (3 mL) pen; Generic drug:   insulin glargine; Inject 12 Units under the skin once daily at bedtime.   Take as directed per insulin instructions.; What changed: how much to   take, additional instructions   * pantoprazole 40 mg EC tablet; Commonly known as: ProtoNix; Take 1   tablet (40 mg) by mouth once daily in the morning. Take before meals. Do   not crush, chew, or split.; What changed: Another medication with the same   name was added. Make sure you understand how and when to take  each.   * pantoprazole 40 mg EC tablet; Commonly known as: ProtoNix; Take 1   tablet (40 mg) by mouth once daily in the morning. Take before meals. Do   not crush, chew, or split.; What changed: You were already taking a   medication with the same name, and this prescription was added. Make sure   you understand how and when to take each.  * This list has 2 medication(s) that are the same as other medications   prescribed for you. Read the directions carefully, and ask your doctor or   other care provider to review them with you.     CONTINUE taking these medications     atorvastatin 20 mg tablet; Commonly known as: Lipitor; Take 1 tablet (20   mg) by mouth once daily at bedtime.   carvedilol 6.25 mg tablet; Commonly known as: Coreg; Take 1 tablet (6.25   mg) by mouth once daily.   Easy Touch Alcohol Prep Pads; Generic drug: alcohol swabs; Apply 1 each   topically once daily at bedtime.   lactulose 20 gram/30 mL oral solution; Take 30 mL (20 g) by mouth 2   times a day.   meclizine 25 mg tablet; Commonly known as: Antivert; Take 1 tablet (25   mg) by mouth once daily as needed for dizziness.   melatonin 3 mg tablet; Take 1 tablet (3 mg) by mouth once daily at   bedtime.   metFORMIN  mg 24 hr tablet; Commonly known as: Glucophage-XR; [place]   2 tabletas 1 vez cada día por la tarde. [place] con comida.; (Take 2 tablets   (1,000 mg) by mouth once daily in the evening. Take with meals. Do not   crush, chew, or split.)   mirtazapine 7.5 mg tablet; Commonly known as: Remeron; Take 1 tablet   (7.5 mg) by mouth once daily at bedtime.   ondansetron ODT 4 mg disintegrating tablet; Commonly known as:   Zofran-ODT; Dissolve 1 tablet (4 mg) in the mouth every 6 hours.   polyethylene glycol 17 gram packet; Commonly known as: Glycolax,   Miralax; Take 17 g by mouth once daily.   pramoxine 1 % lotion; Commonly known as: [place] Sensitive; Apply 1   Application topically every 8 hours if needed (itch).   rifAXIMin 550 mg tablet;  "Commonly known as: Xifaxan; Take 1 tablet (550   mg) by mouth 3 times a day for 28 days.   spironolactone 100 mg tablet; Commonly known as: Aldactone; Take 1.5   tablets (150 mg) by mouth once daily.   sucralfate 1 gram tablet; Commonly known as: Carafate; Take 1 tablet (1   g) by mouth 4 times a day before meals.   * Sure Comfort Pen Needle 32 gauge x 5/32\" needle; Generic drug: pen   needle, diabetic; 4 veces cada día; (Use 4 times a day)   * BD Ultra-Fine Orig Pen Needle 29 gauge x 1/2\" needle; Generic drug:   pen needle 1/2\"; Usar para inyectarse de 1 a 4 veces al día según las   indicaciones.; (Use to inject 1-4 times daily as directed.)   traZODone 50 mg tablet; Commonly known as: Desyrel; Take 0.5 tablets (25   mg) by mouth as needed at bedtime for sleep.   ursodiol 250 mg tablet; Commonly known as: Actigall; Take 2 tablets (500   mg) by mouth 2 times a day AND 1 tablet (250 mg) once daily.  * This list has 2 medication(s) that are the same as other medications   prescribed for you. Read the directions carefully, and ask your doctor or   other care provider to review them with you.     STOP taking these medications     acetaminophen 500 mg tablet; Commonly known as: Tylenol   torsemide 20 mg tablet; Commonly known as: Demadex       Test Results Pending At Discharge  Pending Labs       Order Current Status    Non-gynecologic cytology In process            Hospital Course  Alfonzo Flanagan is a 50 y.o. female with PMH significant for biopsy-proven PBC with cirrhosis c/b portal HTN with ascites, esophageal varices (s/p banding 2021 with eradication), hepatic encephalopathy, antibody-positive celiac disease, recent pancreatic tail mass, IDDM2 (A1c 7.6), HTN, GERD, anxiety, depression, CORBY (2013 sleep study, untreated) who presented on 4/9 for 2 days of abdominal distension and discomfort, urinary frequency, and dysuria. She was also noted the new R palmar rash concerning for shingles.     Exam was remarkable " for distended abdomen concerning for worsening ascites. She underwent therapeutic paracentesis with 3.5L of yellow fluid drained. Not concerning for SBP. She also received IV diuresis with home spironolactone 150mg and IV lasix 40 BID. Liver US with dopplers done and showed portal hypertension but no evidence of thrombus.  Hepatology did not recommend TIPS given concern for decompensation. Not listed for transplant at this time for financial reasons ( pending medicaid number).     Patient also noted to have painful L groin lesion likely related to known HS. Also with surrounding swelling. CT showed no fluid collected. Likely related to cellulitis. Patient was started on abx with inpatient with planned 5 day course . Patient also with dysuria and + LE on US although culture with contamination. Will complete 5 day course.     Throughout hospitalization noted to have worsening hand lesions concerning for shingles. Patient was started on valtrex ( anticipate 7 day course)  as well as gabapentin ( 300mg BID, closely monitored for mental status change). Appeared to be in C7 dermatome. No facial lesions present.     Patient also noted to have hyperglycemia present on admission with gradual uptitration to 12 units of lantus.     4/14 hepatology recommended increasing her Lasix dose in the setting of frequent ascites to 80 AM 40 PM. She underwent US guided para 4/14 with 3.6L removed.                    Pertinent Physical Exam At Time of Discharge  Physical Exam  Constitutional:       Appearance: Normal appearance.   Cardiovascular:      Pulses: Normal pulses.      Heart sounds: Murmur heard.   Pulmonary:      Effort: Pulmonary effort is normal. No respiratory distress.      Breath sounds: Normal breath sounds. No wheezing.   Abdominal:      General: Abdomen is flat. There is distension.      Palpations: Abdomen is soft.      Tenderness: There is no abdominal tenderness.   Skin:     Comments: Grouped vesicles covered by  dressing   Neurological:      Mental Status: She is alert.         Outpatient Follow-Up  Future Appointments   Date Time Provider Department Center   5/28/2025  2:00 PM Dwain Khanna MD [id number]         Kate Maguire MD

## 2025-04-15 LAB
LABORATORY COMMENT REPORT: NORMAL
LABORATORY COMMENT REPORT: NORMAL
PATH REPORT.FINAL DX SPEC: NORMAL
PATH REPORT.GROSS SPEC: NORMAL
PATH REPORT.RELEVANT HX SPEC: NORMAL
PATH REPORT.TOTAL CANCER: NORMAL
RESIDENT REVIEW: NORMAL

## 2025-04-21 DIAGNOSIS — K74.60 CIRRHOSIS OF LIVER WITH ASCITES, UNSPECIFIED HEPATIC CIRRHOSIS TYPE (MULTI): ICD-10-CM

## 2025-04-21 DIAGNOSIS — R18.8 CIRRHOSIS OF LIVER WITH ASCITES, UNSPECIFIED HEPATIC CIRRHOSIS TYPE (MULTI): ICD-10-CM

## 2025-04-23 ENCOUNTER — APPOINTMENT (OUTPATIENT)
Dept: GASTROENTEROLOGY | Facility: HOSPITAL | Age: 50
End: 2025-04-23
Payer: COMMERCIAL

## 2025-04-23 ENCOUNTER — TELEPHONE (OUTPATIENT)
Dept: GASTROENTEROLOGY | Facility: HOSPITAL | Age: 50
End: 2025-04-23
Payer: COMMERCIAL

## 2025-04-23 DIAGNOSIS — R18.8 OTHER ASCITES: ICD-10-CM

## 2025-04-23 DIAGNOSIS — K74.60 CIRRHOSIS (MULTI): ICD-10-CM

## 2025-04-23 RX ORDER — SPIRONOLACTONE 100 MG/1
200 TABLET, FILM COATED ORAL DAILY
Qty: 60 TABLET | Refills: 3 | Status: SHIPPED | OUTPATIENT
Start: 2025-04-23 | End: 2025-08-21

## 2025-04-23 NOTE — TELEPHONE ENCOUNTER
"Hepatology Nurse Coordinator Note   Received the following CRM regarding patient:  CRM # [id number]  Owner: None  Status: Unresolved  Open  Priority: Routine Created on: 04/23/2025 03:03 PM By: Devika Mock     Primary Information    Source   Alfonzo Flanagan (Patient)    Subject   Panchito Moiseelmer (Patient)    Topic   Transfer to Department for Scheduling        Summary   Dr. Dwain Khanna   Communication   Good afternoon. This pt has a FUV appt with 5/28/25 with Dr. Dwain Khanna for cirrhosis and ascites. The pts daughter called requesting a sooner appt or to speak to someone in his clinical the office about the issue. The daughter said her abdomen is filling up with liquid and she said she would like her seen sooner to have it removed. Please assist. Thank you!                   Called patient utilizing  services. Call completed with  #[phone].  Patient states she has concerns over \"stomach, leg, and feet swelling.\" Patient states she was \"165 lbs at discharge from the hospital and is now 187 lbs.\" Patient is aware on last documentation her weight was 187 lbs on 4/9/2025. Patient states the reported weights were according to her home scale. She endorses SOB with stair climbing and walking long distances. She states she \"has to bend\" to relieve abdominal pain from fluid. She states it always hurts, but is \"6/10\" most of the time, but will \"worsen to 8/10.\" She states it hurts \"if she eats too much.\"     Patient reported her diuretic dosing she is taking as follows:  Spironolactone 150 mg daily.  Furosemide 80 mg every morning and 40 mg every evening.     Asked patient if she was able to sort out her insurance, as Dr. Khanna wanted to refer her to transplant. Patient states the insurance company called, but she hasn't heard back.    Reviewed with Dr. Khanna who recommended the following:  -Continue Furosemide at Current Dose.   -Increase Spironolactone to 200 mg daily.  Patient aware an " updated prescription will be sent to her pharmacy.   -Keep daily weight log.  -Get blood work a week prior to her appointment in May.  -Limit Dietary sodium intake to 2 grams in 24 hour period.   -Get Paracentesis. Provided scheduling number.   -Bring all pill bottles to upcoming appointment.   -Follow back up with insurance company on reinstating insurance. Will discuss liver transplant referral once her insurance is back in place.    Patient verbalized understanding of results and recommendations. She had no further questions, or concerns at this time.

## 2025-04-28 ENCOUNTER — HOSPITAL ENCOUNTER (OUTPATIENT)
Dept: RADIOLOGY | Facility: HOSPITAL | Age: 50
Discharge: HOME | End: 2025-04-28
Payer: COMMERCIAL

## 2025-04-28 VITALS — HEART RATE: 85 BPM | DIASTOLIC BLOOD PRESSURE: 71 MMHG | OXYGEN SATURATION: 99 % | SYSTOLIC BLOOD PRESSURE: 119 MMHG

## 2025-04-28 DIAGNOSIS — K74.60 CIRRHOSIS OF LIVER WITH ASCITES, UNSPECIFIED HEPATIC CIRRHOSIS TYPE (MULTI): ICD-10-CM

## 2025-04-28 DIAGNOSIS — R18.8 CIRRHOSIS OF LIVER WITH ASCITES, UNSPECIFIED HEPATIC CIRRHOSIS TYPE (MULTI): ICD-10-CM

## 2025-04-28 PROCEDURE — 49083 ABD PARACENTESIS W/IMAGING: CPT

## 2025-04-28 NOTE — POST-PROCEDURE NOTE
INTERVENTIONAL RADIOLOGY ADVANCED PRACTICE PROCEDURE  [facility]    A time out was performed and Right Hemiabdomen was examined with US and appropriate entry point was confirmed and marked.   The patient was prepped and draped in a sterile manner, 1% lidocaine was used to anesthesize the skin and subcutaneous tissue.   A 5F Centesis needle was then introduced through the skin into the peritoneal space, the centesis catheter was then threaded without difficulty.   3400 ml of yellow fluid was removed without difficulty. The catheter was then removed.   No immediate complications were noted during and immediately following the procedure.

## 2025-05-06 ENCOUNTER — HOSPITAL ENCOUNTER (OUTPATIENT)
Dept: RADIOLOGY | Facility: HOSPITAL | Age: 50
Discharge: HOME | End: 2025-05-06
Payer: COMMERCIAL

## 2025-05-06 VITALS — SYSTOLIC BLOOD PRESSURE: 129 MMHG | HEART RATE: 86 BPM | OXYGEN SATURATION: 99 % | DIASTOLIC BLOOD PRESSURE: 80 MMHG

## 2025-05-06 DIAGNOSIS — R18.8 OTHER ASCITES: ICD-10-CM

## 2025-05-06 DIAGNOSIS — K74.60 CIRRHOSIS (MULTI): ICD-10-CM

## 2025-05-06 PROCEDURE — 49083 ABD PARACENTESIS W/IMAGING: CPT

## 2025-05-06 NOTE — POST-PROCEDURE NOTE
INTERVENTIONAL RADIOLOGY ADVANCED PRACTICE PROCEDURE  [facility]    A time out was performed and Right Hemiabdomen was examined with US and appropriate entry point was confirmed and marked.   The patient was prepped and draped in a sterile manner, 1% lidocaine was used to anesthesize the skin and subcutaneous tissue.   A 5F Centesis needle was then introduced through the skin into the peritoneal space, the centesis catheter was then threaded without difficulty.   3500 ml of yellow fluid was removed without difficulty. The catheter was then removed.   No immediate complications were noted during and immediately following the procedure.

## 2025-05-11 ENCOUNTER — APPOINTMENT (OUTPATIENT)
Dept: RADIOLOGY | Facility: HOSPITAL | Age: 50
End: 2025-05-11
Payer: COMMERCIAL

## 2025-05-11 ENCOUNTER — HOSPITAL ENCOUNTER (OUTPATIENT)
Facility: HOSPITAL | Age: 50
Setting detail: OBSERVATION
Discharge: HOME | End: 2025-05-12
Attending: EMERGENCY MEDICINE | Admitting: STUDENT IN AN ORGANIZED HEALTH CARE EDUCATION/TRAINING PROGRAM
Payer: COMMERCIAL

## 2025-05-11 DIAGNOSIS — R10.9 ABDOMINAL PAIN, UNSPECIFIED ABDOMINAL LOCATION: ICD-10-CM

## 2025-05-11 DIAGNOSIS — K72.90 DECOMPENSATED HEPATIC CIRRHOSIS (MULTI): Primary | ICD-10-CM

## 2025-05-11 DIAGNOSIS — K74.60 DECOMPENSATED HEPATIC CIRRHOSIS (MULTI): Primary | ICD-10-CM

## 2025-05-11 DIAGNOSIS — R11.0 NAUSEA: ICD-10-CM

## 2025-05-11 LAB
ALBUMIN SERPL BCP-MCNC: 2.2 G/DL (ref 3.4–5)
ALP SERPL-CCNC: 473 U/L (ref 33–110)
ALT SERPL W P-5'-P-CCNC: 55 U/L (ref 7–45)
ANION GAP SERPL CALC-SCNC: 9 MMOL/L (ref 10–20)
APPEARANCE UR: CLEAR
AST SERPL W P-5'-P-CCNC: 87 U/L (ref 9–39)
BASOPHILS # BLD AUTO: 0.03 X10*3/UL (ref 0–0.1)
BASOPHILS NFR BLD AUTO: 0.6 %
BILIRUB SERPL-MCNC: 2.2 MG/DL (ref 0–1.2)
BILIRUB UR STRIP.AUTO-MCNC: NEGATIVE MG/DL
BNP SERPL-MCNC: 60 PG/ML (ref 0–99)
BUN SERPL-MCNC: 8 MG/DL (ref 6–23)
CALCIUM SERPL-MCNC: 8.1 MG/DL (ref 8.6–10.6)
CARDIAC TROPONIN I PNL SERPL HS: <3 NG/L (ref 0–34)
CARDIAC TROPONIN I PNL SERPL HS: <3 NG/L (ref 0–34)
CHLORIDE SERPL-SCNC: 99 MMOL/L (ref 98–107)
CO2 SERPL-SCNC: 27 MMOL/L (ref 21–32)
COLOR UR: ABNORMAL
CREAT SERPL-MCNC: 0.81 MG/DL (ref 0.5–1.05)
EGFRCR SERPLBLD CKD-EPI 2021: 90 ML/MIN/1.73M*2
EOSINOPHIL # BLD AUTO: 0.32 X10*3/UL (ref 0–0.7)
EOSINOPHIL NFR BLD AUTO: 6.8 %
ERYTHROCYTE [DISTWIDTH] IN BLOOD BY AUTOMATED COUNT: 15.8 % (ref 11.5–14.5)
GLUCOSE BLD MANUAL STRIP-MCNC: 421 MG/DL (ref 74–99)
GLUCOSE SERPL-MCNC: 507 MG/DL (ref 74–99)
GLUCOSE UR STRIP.AUTO-MCNC: ABNORMAL MG/DL
HCT VFR BLD AUTO: 32.7 % (ref 36–46)
HGB BLD-MCNC: 11.2 G/DL (ref 12–16)
IMM GRANULOCYTES # BLD AUTO: 0.03 X10*3/UL (ref 0–0.7)
IMM GRANULOCYTES NFR BLD AUTO: 0.6 % (ref 0–0.9)
INR PPP: 1.4 (ref 0.9–1.1)
KETONES UR STRIP.AUTO-MCNC: NEGATIVE MG/DL
LEUKOCYTE ESTERASE UR QL STRIP.AUTO: NEGATIVE
LIPASE SERPL-CCNC: 239 U/L (ref 9–82)
LYMPHOCYTES # BLD AUTO: 0.92 X10*3/UL (ref 1.2–4.8)
LYMPHOCYTES NFR BLD AUTO: 19.4 %
MAGNESIUM SERPL-MCNC: 1.81 MG/DL (ref 1.6–2.4)
MCH RBC QN AUTO: 31.5 PG (ref 26–34)
MCHC RBC AUTO-ENTMCNC: 34.3 G/DL (ref 32–36)
MCV RBC AUTO: 92 FL (ref 80–100)
MONOCYTES # BLD AUTO: 0.49 X10*3/UL (ref 0.1–1)
MONOCYTES NFR BLD AUTO: 10.3 %
NEUTROPHILS # BLD AUTO: 2.95 X10*3/UL (ref 1.2–7.7)
NEUTROPHILS NFR BLD AUTO: 62.3 %
NITRITE UR QL STRIP.AUTO: NEGATIVE
NRBC BLD-RTO: 0 /100 WBCS (ref 0–0)
PH UR STRIP.AUTO: 5.5 [PH]
PLATELET # BLD AUTO: 81 X10*3/UL (ref 150–450)
POTASSIUM SERPL-SCNC: 4.3 MMOL/L (ref 3.5–5.3)
PREGNANCY TEST URINE, POC: NEGATIVE
PROT SERPL-MCNC: 7.5 G/DL (ref 6.4–8.2)
PROT UR STRIP.AUTO-MCNC: NEGATIVE MG/DL
PROTHROMBIN TIME: 15.1 SECONDS (ref 9.8–12.4)
RBC # BLD AUTO: 3.56 X10*6/UL (ref 4–5.2)
RBC # UR STRIP.AUTO: NEGATIVE MG/DL
SODIUM SERPL-SCNC: 131 MMOL/L (ref 136–145)
SP GR UR STRIP.AUTO: 1.03
UROBILINOGEN UR STRIP.AUTO-MCNC: NORMAL MG/DL
WBC # BLD AUTO: 4.7 X10*3/UL (ref 4.4–11.3)

## 2025-05-11 PROCEDURE — 99285 EMERGENCY DEPT VISIT HI MDM: CPT | Mod: 25 | Performed by: EMERGENCY MEDICINE

## 2025-05-11 PROCEDURE — 99285 EMERGENCY DEPT VISIT HI MDM: CPT | Performed by: EMERGENCY MEDICINE

## 2025-05-11 PROCEDURE — 71046 X-RAY EXAM CHEST 2 VIEWS: CPT | Performed by: RADIOLOGY

## 2025-05-11 PROCEDURE — 2500000004 HC RX 250 GENERAL PHARMACY W/ HCPCS (ALT 636 FOR OP/ED): Mod: JZ | Performed by: STUDENT IN AN ORGANIZED HEALTH CARE EDUCATION/TRAINING PROGRAM

## 2025-05-11 PROCEDURE — 81025 URINE PREGNANCY TEST: CPT | Performed by: EMERGENCY MEDICINE

## 2025-05-11 PROCEDURE — 36415 COLL VENOUS BLD VENIPUNCTURE: CPT | Performed by: EMERGENCY MEDICINE

## 2025-05-11 PROCEDURE — 2500000001 HC RX 250 WO HCPCS SELF ADMINISTERED DRUGS (ALT 637 FOR MEDICARE OP): Performed by: STUDENT IN AN ORGANIZED HEALTH CARE EDUCATION/TRAINING PROGRAM

## 2025-05-11 PROCEDURE — 71046 X-RAY EXAM CHEST 2 VIEWS: CPT

## 2025-05-11 PROCEDURE — 83880 ASSAY OF NATRIURETIC PEPTIDE: CPT | Performed by: EMERGENCY MEDICINE

## 2025-05-11 PROCEDURE — 81003 URINALYSIS AUTO W/O SCOPE: CPT | Performed by: EMERGENCY MEDICINE

## 2025-05-11 PROCEDURE — 83930 ASSAY OF BLOOD OSMOLALITY: CPT

## 2025-05-11 PROCEDURE — 85025 COMPLETE CBC W/AUTO DIFF WBC: CPT | Performed by: EMERGENCY MEDICINE

## 2025-05-11 PROCEDURE — 2500000002 HC RX 250 W HCPCS SELF ADMINISTERED DRUGS (ALT 637 FOR MEDICARE OP, ALT 636 FOR OP/ED): Performed by: STUDENT IN AN ORGANIZED HEALTH CARE EDUCATION/TRAINING PROGRAM

## 2025-05-11 PROCEDURE — 83735 ASSAY OF MAGNESIUM: CPT | Performed by: EMERGENCY MEDICINE

## 2025-05-11 PROCEDURE — 84484 ASSAY OF TROPONIN QUANT: CPT | Performed by: EMERGENCY MEDICINE

## 2025-05-11 PROCEDURE — 84132 ASSAY OF SERUM POTASSIUM: CPT | Performed by: EMERGENCY MEDICINE

## 2025-05-11 PROCEDURE — 85610 PROTHROMBIN TIME: CPT | Performed by: EMERGENCY MEDICINE

## 2025-05-11 PROCEDURE — 2500000004 HC RX 250 GENERAL PHARMACY W/ HCPCS (ALT 636 FOR OP/ED): Mod: JZ | Performed by: EMERGENCY MEDICINE

## 2025-05-11 PROCEDURE — 96375 TX/PRO/DX INJ NEW DRUG ADDON: CPT

## 2025-05-11 PROCEDURE — 83690 ASSAY OF LIPASE: CPT | Performed by: EMERGENCY MEDICINE

## 2025-05-11 PROCEDURE — 80053 COMPREHEN METABOLIC PANEL: CPT | Performed by: EMERGENCY MEDICINE

## 2025-05-11 PROCEDURE — 83935 ASSAY OF URINE OSMOLALITY: CPT

## 2025-05-11 PROCEDURE — 82947 ASSAY GLUCOSE BLOOD QUANT: CPT

## 2025-05-11 RX ORDER — DEXTROSE 50 % IN WATER (D50W) INTRAVENOUS SYRINGE
25
Status: DISCONTINUED | OUTPATIENT
Start: 2025-05-11 | End: 2025-05-12

## 2025-05-11 RX ORDER — MORPHINE SULFATE 4 MG/ML
4 INJECTION INTRAVENOUS ONCE
Status: COMPLETED | OUTPATIENT
Start: 2025-05-11 | End: 2025-05-11

## 2025-05-11 RX ORDER — ONDANSETRON HYDROCHLORIDE 2 MG/ML
4 INJECTION, SOLUTION INTRAVENOUS ONCE
Status: COMPLETED | OUTPATIENT
Start: 2025-05-11 | End: 2025-05-11

## 2025-05-11 RX ORDER — INSULIN LISPRO 100 [IU]/ML
0-5 INJECTION, SOLUTION INTRAVENOUS; SUBCUTANEOUS
Status: DISCONTINUED | OUTPATIENT
Start: 2025-05-12 | End: 2025-05-12

## 2025-05-11 RX ORDER — DIPHENHYDRAMINE HCL 25 MG
25 CAPSULE ORAL ONCE
Status: COMPLETED | OUTPATIENT
Start: 2025-05-11 | End: 2025-05-11

## 2025-05-11 RX ORDER — DEXTROSE 50 % IN WATER (D50W) INTRAVENOUS SYRINGE
12.5
Status: DISCONTINUED | OUTPATIENT
Start: 2025-05-11 | End: 2025-05-12

## 2025-05-11 RX ADMIN — DIPHENHYDRAMINE HYDROCHLORIDE 25 MG: 25 CAPSULE ORAL at 23:39

## 2025-05-11 RX ADMIN — INSULIN HUMAN 7 UNITS: 100 INJECTION, SOLUTION PARENTERAL at 22:44

## 2025-05-11 RX ADMIN — MORPHINE SULFATE 4 MG: 4 INJECTION, SOLUTION INTRAMUSCULAR; INTRAVENOUS at 20:29

## 2025-05-11 RX ADMIN — HYDROMORPHONE HYDROCHLORIDE 0.5 MG: 0.5 INJECTION, SOLUTION INTRAMUSCULAR; INTRAVENOUS; SUBCUTANEOUS at 23:05

## 2025-05-11 RX ADMIN — ONDANSETRON 4 MG: 2 INJECTION INTRAMUSCULAR; INTRAVENOUS at 20:29

## 2025-05-11 ASSESSMENT — PAIN SCALES - GENERAL
PAINLEVEL_OUTOF10: 8
PAINLEVEL_OUTOF10: 0 - NO PAIN

## 2025-05-11 NOTE — ED TRIAGE NOTES
Pt reports for abd pain, back pain and nausea with increased abd swelling. Hx of ascites, cirrhosis with multiple episodes requiring draining.

## 2025-05-12 ENCOUNTER — PHARMACY VISIT (OUTPATIENT)
Dept: PHARMACY | Facility: CLINIC | Age: 50
End: 2025-05-12
Payer: MEDICAID

## 2025-05-12 ENCOUNTER — APPOINTMENT (OUTPATIENT)
Dept: RADIOLOGY | Facility: HOSPITAL | Age: 50
End: 2025-05-12
Payer: COMMERCIAL

## 2025-05-12 ENCOUNTER — TELEPHONE (OUTPATIENT)
Dept: GASTROENTEROLOGY | Facility: HOSPITAL | Age: 50
End: 2025-05-12
Payer: COMMERCIAL

## 2025-05-12 VITALS
SYSTOLIC BLOOD PRESSURE: 122 MMHG | DIASTOLIC BLOOD PRESSURE: 71 MMHG | HEIGHT: 62 IN | WEIGHT: 186.51 LBS | RESPIRATION RATE: 18 BRPM | TEMPERATURE: 97.2 F | BODY MASS INDEX: 34.32 KG/M2 | HEART RATE: 87 BPM | OXYGEN SATURATION: 100 %

## 2025-05-12 DIAGNOSIS — R18.8 OTHER ASCITES: Primary | ICD-10-CM

## 2025-05-12 PROBLEM — K74.60 DECOMPENSATED HEPATIC CIRRHOSIS (MULTI): Status: ACTIVE | Noted: 2025-05-12

## 2025-05-12 PROBLEM — K72.90 DECOMPENSATED HEPATIC CIRRHOSIS (MULTI): Status: ACTIVE | Noted: 2025-05-12

## 2025-05-12 LAB
ALBUMIN SERPL BCP-MCNC: 1.9 G/DL (ref 3.4–5)
ALP SERPL-CCNC: 427 U/L (ref 33–110)
ALT SERPL W P-5'-P-CCNC: 52 U/L (ref 7–45)
ANION GAP BLDV CALCULATED.4IONS-SCNC: 7 MMOL/L (ref 10–25)
ANION GAP SERPL CALC-SCNC: 8 MMOL/L (ref 10–20)
AST SERPL W P-5'-P-CCNC: 80 U/L (ref 9–39)
BASE EXCESS BLDV CALC-SCNC: 1.7 MMOL/L (ref -2–3)
BASOPHILS # BLD MANUAL: 0 X10*3/UL (ref 0–0.1)
BASOPHILS NFR BLD MANUAL: 0 %
BILIRUB SERPL-MCNC: 2.1 MG/DL (ref 0–1.2)
BODY TEMPERATURE: 37 DEGREES CELSIUS
BUN SERPL-MCNC: 8 MG/DL (ref 6–23)
CA-I BLDV-SCNC: 1.18 MMOL/L (ref 1.1–1.33)
CALCIUM SERPL-MCNC: 7.8 MG/DL (ref 8.6–10.6)
CHLORIDE BLDV-SCNC: 103 MMOL/L (ref 98–107)
CHLORIDE SERPL-SCNC: 99 MMOL/L (ref 98–107)
CO2 SERPL-SCNC: 27 MMOL/L (ref 21–32)
CREAT SERPL-MCNC: 0.74 MG/DL (ref 0.5–1.05)
EGFRCR SERPLBLD CKD-EPI 2021: >90 ML/MIN/1.73M*2
EOSINOPHIL # BLD MANUAL: 0.29 X10*3/UL (ref 0–0.7)
EOSINOPHIL NFR BLD MANUAL: 4.4 %
ERYTHROCYTE [DISTWIDTH] IN BLOOD BY AUTOMATED COUNT: 16.1 % (ref 11.5–14.5)
GLUCOSE BLD MANUAL STRIP-MCNC: 234 MG/DL (ref 74–99)
GLUCOSE BLD MANUAL STRIP-MCNC: 311 MG/DL (ref 74–99)
GLUCOSE BLD MANUAL STRIP-MCNC: 359 MG/DL (ref 74–99)
GLUCOSE BLDV-MCNC: 474 MG/DL (ref 74–99)
GLUCOSE SERPL-MCNC: 355 MG/DL (ref 74–99)
HCO3 BLDV-SCNC: 26.6 MMOL/L (ref 22–26)
HCT VFR BLD AUTO: 31.6 % (ref 36–46)
HCT VFR BLD EST: 34 % (ref 36–46)
HGB BLD-MCNC: 10.5 G/DL (ref 12–16)
HGB BLDV-MCNC: 11.3 G/DL (ref 12–16)
HOLD SPECIMEN: NORMAL
IMM GRANULOCYTES # BLD AUTO: 0.03 X10*3/UL (ref 0–0.7)
IMM GRANULOCYTES NFR BLD AUTO: 0.5 % (ref 0–0.9)
INHALED O2 CONCENTRATION: 21 %
LACTATE BLDV-SCNC: 1.7 MMOL/L (ref 0.4–2)
LYMPHOCYTES # BLD MANUAL: 0.41 X10*3/UL (ref 1.2–4.8)
LYMPHOCYTES NFR BLD MANUAL: 6.1 %
MCH RBC QN AUTO: 31.6 PG (ref 26–34)
MCHC RBC AUTO-ENTMCNC: 33.2 G/DL (ref 32–36)
MCV RBC AUTO: 95 FL (ref 80–100)
MONOCYTES # BLD MANUAL: 0.35 X10*3/UL (ref 0.1–1)
MONOCYTES NFR BLD MANUAL: 5.2 %
NEUTROPHILS # BLD MANUAL: 5.65 X10*3/UL (ref 1.2–7.7)
NEUTS BAND # BLD MANUAL: 0.29 X10*3/UL (ref 0–0.7)
NEUTS BAND NFR BLD MANUAL: 4.3 %
NEUTS SEG # BLD MANUAL: 5.36 X10*3/UL (ref 1.2–7)
NEUTS SEG NFR BLD MANUAL: 80 %
NRBC BLD-RTO: 0 /100 WBCS (ref 0–0)
OSMOLALITY SERPL: 307 MOSM/KG (ref 280–300)
OSMOLALITY UR: 547 MOSM/KG (ref 200–1200)
OXYHGB MFR BLDV: 77 % (ref 45–75)
PCO2 BLDV: 42 MM HG (ref 41–51)
PH BLDV: 7.41 PH (ref 7.33–7.43)
PLATELET # BLD AUTO: 85 X10*3/UL (ref 150–450)
PO2 BLDV: 46 MM HG (ref 35–45)
POTASSIUM BLDV-SCNC: 4.3 MMOL/L (ref 3.5–5.3)
POTASSIUM SERPL-SCNC: 4.5 MMOL/L (ref 3.5–5.3)
PROT SERPL-MCNC: 7 G/DL (ref 6.4–8.2)
RBC # BLD AUTO: 3.32 X10*6/UL (ref 4–5.2)
RBC MORPH BLD: ABNORMAL
SAO2 % BLDV: 78 % (ref 45–75)
SODIUM BLDV-SCNC: 132 MMOL/L (ref 136–145)
SODIUM SERPL-SCNC: 129 MMOL/L (ref 136–145)
TOTAL CELLS COUNTED BLD: 115
WBC # BLD AUTO: 6.7 X10*3/UL (ref 4.4–11.3)

## 2025-05-12 PROCEDURE — G0378 HOSPITAL OBSERVATION PER HR: HCPCS

## 2025-05-12 PROCEDURE — 76705 ECHO EXAM OF ABDOMEN: CPT

## 2025-05-12 PROCEDURE — 99239 HOSP IP/OBS DSCHRG MGMT >30: CPT | Performed by: STUDENT IN AN ORGANIZED HEALTH CARE EDUCATION/TRAINING PROGRAM

## 2025-05-12 PROCEDURE — 2500000002 HC RX 250 W HCPCS SELF ADMINISTERED DRUGS (ALT 637 FOR MEDICARE OP, ALT 636 FOR OP/ED)

## 2025-05-12 PROCEDURE — 36415 COLL VENOUS BLD VENIPUNCTURE: CPT

## 2025-05-12 PROCEDURE — 80053 COMPREHEN METABOLIC PANEL: CPT

## 2025-05-12 PROCEDURE — 2500000004 HC RX 250 GENERAL PHARMACY W/ HCPCS (ALT 636 FOR OP/ED): Mod: JZ | Performed by: STUDENT IN AN ORGANIZED HEALTH CARE EDUCATION/TRAINING PROGRAM

## 2025-05-12 PROCEDURE — 74177 CT ABD & PELVIS W/CONTRAST: CPT | Mod: FOREIGN READ | Performed by: RADIOLOGY

## 2025-05-12 PROCEDURE — 1100000001 HC PRIVATE ROOM DAILY

## 2025-05-12 PROCEDURE — 85027 COMPLETE CBC AUTOMATED: CPT

## 2025-05-12 PROCEDURE — 76705 ECHO EXAM OF ABDOMEN: CPT | Performed by: PHYSICIAN ASSISTANT

## 2025-05-12 PROCEDURE — 74177 CT ABD & PELVIS W/CONTRAST: CPT

## 2025-05-12 PROCEDURE — 85007 BL SMEAR W/DIFF WBC COUNT: CPT

## 2025-05-12 PROCEDURE — 96365 THER/PROPH/DIAG IV INF INIT: CPT

## 2025-05-12 PROCEDURE — 2550000001 HC RX 255 CONTRASTS: Performed by: EMERGENCY MEDICINE

## 2025-05-12 PROCEDURE — 82947 ASSAY GLUCOSE BLOOD QUANT: CPT

## 2025-05-12 PROCEDURE — 96376 TX/PRO/DX INJ SAME DRUG ADON: CPT

## 2025-05-12 PROCEDURE — 99221 1ST HOSP IP/OBS SF/LOW 40: CPT | Performed by: PHYSICIAN ASSISTANT

## 2025-05-12 PROCEDURE — RXMED WILLOW AMBULATORY MEDICATION CHARGE

## 2025-05-12 RX ORDER — CEFTRIAXONE 2 G/50ML
2 INJECTION, SOLUTION INTRAVENOUS ONCE
Status: COMPLETED | OUTPATIENT
Start: 2025-05-12 | End: 2025-05-12

## 2025-05-12 RX ORDER — ONDANSETRON HYDROCHLORIDE 2 MG/ML
4 INJECTION, SOLUTION INTRAVENOUS ONCE
Status: COMPLETED | OUTPATIENT
Start: 2025-05-12 | End: 2025-05-12

## 2025-05-12 RX ORDER — INSULIN LISPRO 100 [IU]/ML
0-10 INJECTION, SOLUTION INTRAVENOUS; SUBCUTANEOUS
Status: DISCONTINUED | OUTPATIENT
Start: 2025-05-12 | End: 2025-05-12 | Stop reason: HOSPADM

## 2025-05-12 RX ORDER — POLYETHYLENE GLYCOL 3350 17 G/17G
17 POWDER, FOR SOLUTION ORAL DAILY PRN
Status: DISCONTINUED | OUTPATIENT
Start: 2025-05-12 | End: 2025-05-12 | Stop reason: HOSPADM

## 2025-05-12 RX ORDER — LACTULOSE 10 G/15ML
20 SOLUTION ORAL 2 TIMES DAILY
Status: CANCELLED | OUTPATIENT
Start: 2025-05-12

## 2025-05-12 RX ORDER — PANTOPRAZOLE SODIUM 40 MG/1
40 TABLET, DELAYED RELEASE ORAL
Status: CANCELLED | OUTPATIENT
Start: 2025-05-12

## 2025-05-12 RX ORDER — POLYETHYLENE GLYCOL 3350 17 G/17G
17 POWDER, FOR SOLUTION ORAL DAILY PRN
COMMUNITY

## 2025-05-12 RX ORDER — ONDANSETRON 4 MG/1
4 TABLET, ORALLY DISINTEGRATING ORAL EVERY 8 HOURS PRN
Qty: 20 TABLET | Refills: 0 | Status: SHIPPED | OUTPATIENT
Start: 2025-05-12

## 2025-05-12 RX ORDER — FUROSEMIDE 40 MG/1
40 TABLET ORAL EVERY EVENING
Status: CANCELLED | OUTPATIENT
Start: 2025-05-12

## 2025-05-12 RX ORDER — SPIRONOLACTONE 25 MG/1
200 TABLET ORAL DAILY
Status: CANCELLED | OUTPATIENT
Start: 2025-05-12

## 2025-05-12 RX ORDER — URSODIOL 250 MG/1
250 TABLET, FILM COATED ORAL
COMMUNITY

## 2025-05-12 RX ORDER — ONDANSETRON 4 MG/1
4 TABLET, ORALLY DISINTEGRATING ORAL EVERY 6 HOURS PRN
COMMUNITY

## 2025-05-12 RX ORDER — INSULIN GLARGINE 100 [IU]/ML
7 INJECTION, SOLUTION SUBCUTANEOUS NIGHTLY
Status: DISCONTINUED | OUTPATIENT
Start: 2025-05-12 | End: 2025-05-12

## 2025-05-12 RX ORDER — CEFTRIAXONE 2 G/50ML
2 INJECTION, SOLUTION INTRAVENOUS EVERY 24 HOURS
Status: DISCONTINUED | OUTPATIENT
Start: 2025-05-13 | End: 2025-05-12 | Stop reason: HOSPADM

## 2025-05-12 RX ORDER — ACETAMINOPHEN 500 MG
1-2 TABLET ORAL EVERY 6 HOURS PRN
COMMUNITY

## 2025-05-12 RX ORDER — ACETAMINOPHEN 325 MG/1
485.7 TABLET ORAL EVERY 6 HOURS PRN
Status: CANCELLED | OUTPATIENT
Start: 2025-05-12

## 2025-05-12 RX ORDER — MECLIZINE HYDROCHLORIDE 25 MG/1
25 TABLET ORAL DAILY PRN
COMMUNITY

## 2025-05-12 RX ORDER — TRAZODONE HYDROCHLORIDE 50 MG/1
25 TABLET ORAL NIGHTLY PRN
Status: CANCELLED | OUTPATIENT
Start: 2025-05-12

## 2025-05-12 RX ORDER — OXYCODONE HYDROCHLORIDE 5 MG/1
5 TABLET ORAL ONCE
Qty: 1 TABLET | Refills: 0 | Status: SHIPPED | OUTPATIENT
Start: 2025-05-12 | End: 2025-05-13

## 2025-05-12 RX ORDER — DEXTROSE 50 % IN WATER (D50W) INTRAVENOUS SYRINGE
12.5
Status: DISCONTINUED | OUTPATIENT
Start: 2025-05-12 | End: 2025-05-12 | Stop reason: HOSPADM

## 2025-05-12 RX ORDER — ONDANSETRON HYDROCHLORIDE 2 MG/ML
4 INJECTION, SOLUTION INTRAVENOUS EVERY 4 HOURS PRN
Status: DISCONTINUED | OUTPATIENT
Start: 2025-05-12 | End: 2025-05-12 | Stop reason: HOSPADM

## 2025-05-12 RX ORDER — DEXTROSE 50 % IN WATER (D50W) INTRAVENOUS SYRINGE
25
Status: DISCONTINUED | OUTPATIENT
Start: 2025-05-12 | End: 2025-05-12 | Stop reason: HOSPADM

## 2025-05-12 RX ORDER — MIRTAZAPINE 15 MG/1
7.5 TABLET, FILM COATED ORAL NIGHTLY
Status: CANCELLED | OUTPATIENT
Start: 2025-05-12

## 2025-05-12 RX ORDER — FUROSEMIDE 40 MG/1
80 TABLET ORAL DAILY
Status: CANCELLED | OUTPATIENT
Start: 2025-05-12

## 2025-05-12 RX ORDER — LACTULOSE 10 G/15ML
30 SOLUTION ORAL 2 TIMES DAILY PRN
COMMUNITY

## 2025-05-12 RX ORDER — SUCRALFATE 1 G/1
1 TABLET ORAL 4 TIMES DAILY PRN
COMMUNITY

## 2025-05-12 RX ORDER — INSULIN GLARGINE 100 [IU]/ML
12 INJECTION, SOLUTION SUBCUTANEOUS NIGHTLY
Status: DISCONTINUED | OUTPATIENT
Start: 2025-05-12 | End: 2025-05-12 | Stop reason: HOSPADM

## 2025-05-12 RX ADMIN — INSULIN LISPRO 4 UNITS: 100 INJECTION, SOLUTION INTRAVENOUS; SUBCUTANEOUS at 13:11

## 2025-05-12 RX ADMIN — IOHEXOL 80 ML: 350 INJECTION, SOLUTION INTRAVENOUS at 01:04

## 2025-05-12 RX ADMIN — ONDANSETRON 4 MG: 2 INJECTION INTRAMUSCULAR; INTRAVENOUS at 00:14

## 2025-05-12 RX ADMIN — ONDANSETRON 4 MG: 2 INJECTION INTRAMUSCULAR; INTRAVENOUS at 11:42

## 2025-05-12 RX ADMIN — INSULIN LISPRO 8 UNITS: 100 INJECTION, SOLUTION INTRAVENOUS; SUBCUTANEOUS at 09:52

## 2025-05-12 RX ADMIN — CEFTRIAXONE SODIUM 2 G: 2 INJECTION, SOLUTION INTRAVENOUS at 00:14

## 2025-05-12 SDOH — ECONOMIC STABILITY: HOUSING INSECURITY: IN THE LAST 12 MONTHS, WAS THERE A TIME WHEN YOU WERE NOT ABLE TO PAY THE MORTGAGE OR RENT ON TIME?: NO

## 2025-05-12 SDOH — SOCIAL STABILITY: SOCIAL INSECURITY: DOES ANYONE TRY TO KEEP YOU FROM HAVING/CONTACTING OTHER FRIENDS OR DOING THINGS OUTSIDE YOUR HOME?: NO

## 2025-05-12 SDOH — ECONOMIC STABILITY: FOOD INSECURITY: HOW HARD IS IT FOR YOU TO PAY FOR THE VERY BASICS LIKE FOOD, HOUSING, MEDICAL CARE, AND HEATING?: SOMEWHAT HARD

## 2025-05-12 SDOH — ECONOMIC STABILITY: INCOME INSECURITY: IN THE PAST 12 MONTHS HAS THE ELECTRIC, GAS, OIL, OR WATER COMPANY THREATENED TO SHUT OFF SERVICES IN YOUR HOME?: NO

## 2025-05-12 SDOH — ECONOMIC STABILITY: FOOD INSECURITY: WITHIN THE PAST 12 MONTHS, YOU WORRIED THAT YOUR FOOD WOULD RUN OUT BEFORE YOU GOT THE MONEY TO BUY MORE.: NEVER TRUE

## 2025-05-12 SDOH — SOCIAL STABILITY: SOCIAL INSECURITY: WITHIN THE LAST YEAR, HAVE YOU BEEN HUMILIATED OR EMOTIONALLY ABUSED IN OTHER WAYS BY YOUR PARTNER OR EX-PARTNER?: NO

## 2025-05-12 SDOH — ECONOMIC STABILITY: FOOD INSECURITY: WITHIN THE PAST 12 MONTHS, THE FOOD YOU BOUGHT JUST DIDN'T LAST AND YOU DIDN'T HAVE MONEY TO GET MORE.: NEVER TRUE

## 2025-05-12 SDOH — ECONOMIC STABILITY: HOUSING INSECURITY: AT ANY TIME IN THE PAST 12 MONTHS, WERE YOU HOMELESS OR LIVING IN A SHELTER (INCLUDING NOW)?: NO

## 2025-05-12 SDOH — SOCIAL STABILITY: SOCIAL INSECURITY: WITHIN THE LAST YEAR, HAVE YOU BEEN AFRAID OF YOUR PARTNER OR EX-PARTNER?: NO

## 2025-05-12 SDOH — ECONOMIC STABILITY: HOUSING INSECURITY: IN THE PAST 12 MONTHS, HOW MANY TIMES HAVE YOU MOVED WHERE YOU WERE LIVING?: 0

## 2025-05-12 SDOH — SOCIAL STABILITY: SOCIAL INSECURITY: HAVE YOU HAD THOUGHTS OF HARMING ANYONE ELSE?: NO

## 2025-05-12 SDOH — ECONOMIC STABILITY: TRANSPORTATION INSECURITY: IN THE PAST 12 MONTHS, HAS LACK OF TRANSPORTATION KEPT YOU FROM MEDICAL APPOINTMENTS OR FROM GETTING MEDICATIONS?: NO

## 2025-05-12 SDOH — SOCIAL STABILITY: SOCIAL INSECURITY: ABUSE: ADULT

## 2025-05-12 SDOH — SOCIAL STABILITY: SOCIAL INSECURITY: HAS ANYONE EVER THREATENED TO HURT YOUR FAMILY OR YOUR PETS?: NO

## 2025-05-12 SDOH — SOCIAL STABILITY: SOCIAL INSECURITY: ARE YOU OR HAVE YOU BEEN THREATENED OR ABUSED PHYSICALLY, EMOTIONALLY, OR SEXUALLY BY ANYONE?: NO

## 2025-05-12 SDOH — SOCIAL STABILITY: SOCIAL INSECURITY: DO YOU FEEL ANYONE HAS EXPLOITED OR TAKEN ADVANTAGE OF YOU FINANCIALLY OR OF YOUR PERSONAL PROPERTY?: NO

## 2025-05-12 SDOH — SOCIAL STABILITY: SOCIAL INSECURITY: HAVE YOU HAD ANY THOUGHTS OF HARMING ANYONE ELSE?: NO

## 2025-05-12 SDOH — SOCIAL STABILITY: SOCIAL INSECURITY: DO YOU FEEL UNSAFE GOING BACK TO THE PLACE WHERE YOU ARE LIVING?: NO

## 2025-05-12 SDOH — SOCIAL STABILITY: SOCIAL INSECURITY: WERE YOU ABLE TO COMPLETE ALL THE BEHAVIORAL HEALTH SCREENINGS?: YES

## 2025-05-12 SDOH — SOCIAL STABILITY: SOCIAL INSECURITY: ARE THERE ANY APPARENT SIGNS OF INJURIES/BEHAVIORS THAT COULD BE RELATED TO ABUSE/NEGLECT?: NO

## 2025-05-12 ASSESSMENT — ACTIVITIES OF DAILY LIVING (ADL)
HEARING - LEFT EAR: FUNCTIONAL
GROOMING: INDEPENDENT
JUDGMENT_ADEQUATE_SAFELY_COMPLETE_DAILY_ACTIVITIES: YES
PATIENT'S MEMORY ADEQUATE TO SAFELY COMPLETE DAILY ACTIVITIES?: YES
BATHING: INDEPENDENT
ADEQUATE_TO_COMPLETE_ADL: YES
LACK_OF_TRANSPORTATION: NO
EFFECT OF PAIN ON DAILY ACTIVITIES: INABILITY TO COMPLETE ADLS
TOILETING: INDEPENDENT
DRESSING YOURSELF: INDEPENDENT
ASSISTIVE_DEVICE: WALKER;EYEGLASSES
FEEDING YOURSELF: INDEPENDENT
HEARING - RIGHT EAR: FUNCTIONAL
WALKS IN HOME: NEEDS ASSISTANCE
LACK_OF_TRANSPORTATION: NO
LACK_OF_TRANSPORTATION: NO

## 2025-05-12 ASSESSMENT — PAIN SCALES - GENERAL
PAINLEVEL_OUTOF10: 0 - NO PAIN
PAINLEVEL_OUTOF10: 5 - MODERATE PAIN
PAINLEVEL_OUTOF10: 5 - MODERATE PAIN
PAINLEVEL_OUTOF10: 4

## 2025-05-12 ASSESSMENT — LIFESTYLE VARIABLES
SKIP TO QUESTIONS 9-10: 1
HOW OFTEN DO YOU HAVE 6 OR MORE DRINKS ON ONE OCCASION: NEVER
AUDIT-C TOTAL SCORE: 0
HOW MANY STANDARD DRINKS CONTAINING ALCOHOL DO YOU HAVE ON A TYPICAL DAY: PATIENT DOES NOT DRINK
AUDIT-C TOTAL SCORE: 0
HOW OFTEN DO YOU HAVE A DRINK CONTAINING ALCOHOL: NEVER

## 2025-05-12 ASSESSMENT — COGNITIVE AND FUNCTIONAL STATUS - GENERAL
DAILY ACTIVITIY SCORE: 24
CLIMB 3 TO 5 STEPS WITH RAILING: A LITTLE
WALKING IN HOSPITAL ROOM: A LITTLE
PATIENT BASELINE BEDBOUND: NO
MOVING TO AND FROM BED TO CHAIR: A LITTLE
MOBILITY SCORE: 20
STANDING UP FROM CHAIR USING ARMS: A LITTLE

## 2025-05-12 ASSESSMENT — ENCOUNTER SYMPTOMS
ACTIVITY CHANGE: 1
CARDIOVASCULAR NEGATIVE: 1
ABDOMINAL PAIN: 1
RESPIRATORY NEGATIVE: 1
PSYCHIATRIC NEGATIVE: 1
BACK PAIN: 1
NEUROLOGICAL NEGATIVE: 1

## 2025-05-12 ASSESSMENT — PAIN DESCRIPTION - DESCRIPTORS: DESCRIPTORS: DISCOMFORT;CRAMPING;ACHING

## 2025-05-12 ASSESSMENT — PAIN - FUNCTIONAL ASSESSMENT
PAIN_FUNCTIONAL_ASSESSMENT: 0-10
PAIN_FUNCTIONAL_ASSESSMENT: 0-10

## 2025-05-12 NOTE — CARE PLAN
The patient's goals for the shift include      The clinical goals for the shift include Patient will remain free from injury during shift    Patient remains free from injury during shift.  She is to be discharged home, awaiting meds to bed.  She reports intermittent nausea relieved by nausea medication.   to  patient upon discharge

## 2025-05-12 NOTE — DISCHARGE SUMMARY
Discharge Diagnosis  Decompensated hepatic cirrhosis (Multi)     Issues Requiring Follow-Up  Outpatient follow up for paracentesis     Discharge Meds     Medication List      START taking these medications     ondansetron ODT 4 mg disintegrating tablet; Commonly known as:   Zofran-ODT; Dissolve 1 tablet (4 mg) in the mouth every 8 hours if needed   for nausea or vomiting.   oxyCODONE 5 mg immediate release tablet; Commonly known as: Roxicodone;   Take 1 tablet (5 mg) by mouth 1 time for 1 dose.     CONTINUE taking these medications     atorvastatin 20 mg tablet; Commonly known as: Lipitor; Take 1 tablet (20   mg) by mouth once daily at bedtime.   carvedilol 6.25 mg tablet; Commonly known as: Coreg; Take 1 tablet (6.25   mg) by mouth once daily.   Nat-Hex 4 % external liquid; Generic drug: chlorhexidine; Apply   topically once daily.   Easy Touch Alcohol Prep Pads; Generic drug: alcohol swabs; Apply 1 each   topically once daily at bedtime.   * furosemide 80 mg tablet; Commonly known as: Lasix; Take 1 tablet (80   mg) by mouth once daily.   * furosemide 40 mg tablet; Commonly known as: Lasix; Take 1 tablet (40   mg) by mouth once daily in the evening.   gabapentin 300 mg capsule; Commonly known as: Neurontin; Take 1 capsule   (300 mg) by mouth 2 times a day.   Lantus Solostar U-100 Insulin 100 unit/mL (3 mL) pen; Generic drug:   insulin glargine; Inject 12 Units under the skin once daily at bedtime.   Take as directed per insulin instructions.   lidocaine 4 % cream; Commonly known as: LMX; Apply topically 4 times a   day as needed (hand pain).   metFORMIN  mg 24 hr tablet; Commonly known as: Glucophage-XR; [place]   2 tabletas 1 vez cada día por la tarde. [place] con comida.; (Take 2 tablets   (1,000 mg) by mouth once daily in the evening. Take with meals. Do not   crush, chew, or split.)   mirtazapine 7.5 mg tablet; Commonly known as: Remeron; Take 1 tablet   (7.5 mg) by mouth once daily at bedtime.   Nyamyc  "100,000 unit/gram powder; Generic drug: nystatin; Apply 1   Application topically 2 times a day.   * pantoprazole 40 mg EC tablet; Commonly known as: ProtoNix; Take 1   tablet (40 mg) by mouth once daily in the morning. Take before meals. Do   not crush, chew, or split.   * pantoprazole 40 mg EC tablet; Commonly known as: ProtoNix; Take 1   tablet (40 mg) by mouth once daily in the morning. Take before meals. Do   not crush, chew, or split.   spironolactone 100 mg tablet; Commonly known as: Aldactone; Take 2   tablets (200 mg) by mouth once daily.   sulfamethoxazole-trimethoprim 800-160 mg tablet; Commonly known as:   Bactrim DS; tome 1 tableta por vía oral devan vez al día.; (Take 1 tablet by   mouth once daily.)   * Sure Comfort Pen Needle 32 gauge x 5/32\" needle; Generic drug: pen   needle, diabetic; 4 veces cada día; (Use 4 times a day)   * BD Ultra-Fine Orig Pen Needle 29 gauge x 1/2\" needle; Generic drug:   pen needle 1/2\"; Usar para inyectarse de 1 a 4 veces al día según las   indicaciones.; (Use to inject 1-4 times daily as directed.)   traZODone 50 mg tablet; Commonly known as: Desyrel; Take 0.5 tablets (25   mg) by mouth as needed at bedtime for sleep.  * This list has 6 medication(s) that are the same as other medications   prescribed for you. Read the directions carefully, and ask your doctor or   other care provider to review them with you.       Test Results Pending At Discharge  Pending Labs       No current pending labs.            Hospital Course  50 y.o. female with PMHx of significant for biopsy-proven PBC with cirrhosis c/b portal HTN with ascites (follows up with Dr. 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 Lately, patient has had more frequent need for paracentesis. Last Para was 5/6 with 3500 ml drained. Declined diagnostic para in the Ed and empirically started on IV cef for SBP prophylaxis. Tachycardic on arrival in the ED, otherwise HDS. Per last admission (4/9-4/14), patient is not a candidate for TIPS at this time (citing financial reasons).   Pt was admitted for paracentesis, IR was consulted. IR performed bedside abdominal ultrasound today and noticed small ascites and did not perform paracentesis.   Pt flank and back pains are more likely secondary to recent fall sustained 3 days ago. She will be discharged home with tylenol for mild pain and tramadol for moderate to severe pain     Pertinent Physical Exam At Time of Discharge  Physical Exam  Constitutional: Alert, no acute distress, resting comfortably in bed, cooperative  HEENT: Normocephalic, atraumatic, PERRLA, EOMI, moist mucous membranes, no pharyngeal erythema  Cardiovascular: RRR, normal S1/S2, no murmurs noted  Pulmonary: Clear to auscultation b/l, no wheezes/crackles/rhonchi, no increased work of breathing, no supplemental oxygen  GI: Soft, striae distensae, distended, tenderness around the LUQ and umbilical region, normoactive bowel sounds  : No granda catheter  Lower extremities: Warm and well perfused, bilateral pitting lower edema to mid shin  Neuro: A&O x3, able to move all 4 extremities spontaneously, +1 asterixis  Psych: Appropriate mood and affect     Outpatient Follow-Up  Future Appointments   Date Time Provider Department Center   5/28/2025  2:00 PM Dwain Khanna MD [id number]   6/13/2025 10:00 AM [facility] Lane Fisher MD

## 2025-05-12 NOTE — PROGRESS NOTES
05/12/25 1433   Discharge Planning   Living Arrangements Children  (dtr)   Support Systems Children;Spouse/significant other   Assistance Needed yes-dtr assists   Type of Residence Private residence   Who is requesting discharge planning? Provider   Expected Discharge Disposition Home   Does the patient need discharge transport arranged? Yes   Financial Resource Strain   How hard is it for you to pay for the very basics like food, housing, medical care, and heating? Not very   Housing Stability   In the last 12 months, was there a time when you were not able to pay the mortgage or rent on time? N   At any time in the past 12 months, were you homeless or living in a shelter (including now)? N   Transportation Needs   In the past 12 months, has lack of transportation kept you from medical appointments or from getting medications? no   In the past 12 months, has lack of transportation kept you from meetings, work, or from getting things needed for daily living? No   Patient Choice   Provider Choice list and CMS website (https://medicare.gov/care-compare#search) for post-acute Quality and Resource Measure Data were provided and reviewed with: Patient   Intensity of Service   Intensity of Service 0-30 min     PCP: Landen Freeman   DATE OF LAST VISIT: 2 months ago   PH[facility]Y:  [facility] on [address] in [place]   RECENT FALLS: yes per pt she fell 4 days ago   EQUIPMENT USED IN HOME: rollator   HOME O2/CPAP/NEBS: N/A   TRANSPORT HOME: dtr   CURRENT HC: N/A     Address, phone and emergency contact information verified. Pt states her dtr assists her with ADLs and denies needing any HC or outpt therapy on DC. All questions and concerns answered. Will continue to follow.

## 2025-05-12 NOTE — CONSULTS
INTERVENTIONAL RADIOLOGY INPATIENT CONSULT NOTE  [facility]      Assessment & Plan   Interventional Radiology is consulted for paracentesis.     I personally reviewed the CT abdomen/pelvis from 5/12/25 which shows ascites.    Plan:  -bedside US demonstrated small amount of ascites, pocket remained small even with positional maneuvering  -d/w patient risks/benefits of paracentesis of small pocket, including small amount of therapeutic benefit; patient decided to defer paracentesis for now; please see separate significant event note  -please re-consult IR to reassess for paracentesis as clinically indicated    -please consider social work consult to help assist with pending medicaid number/financial barriers for care    Subjective  Alfonzo Flanagan, 50 y.o. female is a patent presenting with:  Decompensated hepatic cirrhosis (Multi) [K72.90, K74.60]     Back Pain  Associated symptoms include abdominal pain.   Abdominal Pain      Alfonzo Flanagan is a 50 y.o. female with PMHx of significant for biopsy-proven PBC with cirrhosis c/b portal HTN with ascites (follows up with Dr. 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     According to the patient, abd pain is said to have started about 2 days ago, which is typical of her ascites. Pain is said to be achy, progressively worsening, non radiating, aggravated by movement and relieved temporarily with pain medications. Pain is said to be worse on the left side which is abdominal for her, although she thinks this may be related to the fall she had a day prior to onset  "of symptoms. She was coming down the stairs and missed her step. She did not hit her head or loose consciousness. However, she has had some back pain since then. Patient also endorses occasionally chills and cough productive of yellowish sputum. She denies any fever, chest pain, SOB, headache, dizziness, n/v/c/d or lower extremity swelling. She is not on any anticoagulation.      Most recent paracentesis completed on 5/6/25 with removal of 3.5L ascitic fluid.     Patient had previously been evaluated for TIPS by IR due to recurrent ascites with plan for outpatient TIPS 6/13/25 due to anesthesia availability.  Per hepatology note from previous admission on 4/14/25: \"It seems like a large obstacle to pt's ability to comply with her diuretics stems from financial issues (she has to pay out of pocket every time); unfortunately pt remains without insurance and is still pending Medicaid. ... With pt's current financial/insurance situation, there are no plans to open liver transplant evaluation and therefore no plans for TIPS evaluation.\"    Interventional Radiology is consulted for paracentesis.     Review of Systems   Constitutional:  Positive for activity change.   Respiratory: Negative.     Cardiovascular: Negative.    Gastrointestinal:  Positive for abdominal pain.   Genitourinary: Negative.    Musculoskeletal:  Positive for back pain.   Neurological: Negative.    Psychiatric/Behavioral: Negative.     All other systems reviewed and are negative.      Medical History[1]  Surgical History[2]  Social History     Socioeconomic History    Marital status:      Spouse name: Not on file    Number of children: Not on file    Years of education: Not on file    Highest education level: Not on file   Occupational History    Not on file   Tobacco Use    Smoking status: Never    Smokeless tobacco: Never   Vaping Use    Vaping status: Never Used   Substance and Sexual Activity    Alcohol use: Never    Drug use: Never    " Sexual activity: Yes     Partners: Male     Birth control/protection: None   Other Topics Concern    Not on file   Social History Narrative    Not on file     Social Drivers of Health     Financial Resource Strain: Medium Risk (5/12/2025)    Overall Financial Resource Strain (CARDIA)     Difficulty of Paying Living Expenses: Somewhat hard   Food Insecurity: No Food Insecurity (5/12/2025)    Hunger Vital Sign     Worried About Running Out of Food in the Last Year: Never true     Ran Out of Food in the Last Year: Never true   Transportation Needs: No Transportation Needs (5/12/2025)    PRAPARE - Transportation     Lack of Transportation (Medical): No     Lack of Transportation (Non-Medical): No   Physical Activity: Inactive (4/10/2025)    Exercise Vital Sign     Days of Exercise per Week: 0 days     Minutes of Exercise per Session: 0 min   Stress: No Stress Concern Present (4/10/2025)    [employer] - Occupational Stress Questionnaire     Feeling of Stress : Only a little   Social Connections: Moderately Integrated (4/10/2025)    Social Connection and Isolation Panel [NHANES]     Frequency of Communication with Friends and Family: More than three times a week     Frequency of Social Gatherings with Friends and Family: More than three times a week     Attends Rastafarian Services: More than 4 times per year     Active Member of Clubs or Organizations: No     Attends Club or Organization Meetings: Never     Marital Status:    Intimate Partner Violence: Not At Risk (5/12/2025)    Humiliation, Afraid, Rape, and Kick questionnaire     Fear of Current or Ex-Partner: No     Emotionally Abused: No     Physically Abused: No     Sexually Abused: No   Housing Stability: Low Risk  (5/12/2025)    Housing Stability Vital Sign     Unable to Pay for Housing in the Last Year: No     Number of Times Moved in the Last Year: 0     Homeless in the Last Year: No     Family  "History[3]  Allergies[4]  Medications Ordered Prior to Encounter[5]    Objective    Vitals:    05/11/25 1902 05/11/25 2307 05/12/25 0203 05/12/25 0600   BP: 139/70 120/80 117/77 132/69   BP Location:   Left arm Left arm   Patient Position:   Lying Lying   Pulse: (!) 109 (!) 106 (!) 104 107   Resp: 18 16 14 16   Temp: 37.1 °C (98.7 °F)  36.9 °C (98.4 °F) 36.6 °C (97.9 °F)   TempSrc:   Oral Temporal   SpO2: 99% 96% 95% 98%   Weight:   83 kg (183 lb) 84.6 kg (186 lb 8.2 oz)   Height:   1.575 m (5' 2\") 1.575 m (5' 2.01\")       Physical Exam  Constitutional:       General: She is not in acute distress.     Appearance: She is obese. She is not ill-appearing.      Comments: Cooperative, resting in bed   HENT:      Head: Normocephalic.      Mouth/Throat:      Pharynx: Oropharynx is clear.   Pulmonary:      Effort: Pulmonary effort is normal. No respiratory distress.      Comments: Breathing comfortably on room air  Abdominal:      General: There is distension.      Tenderness: There is no abdominal tenderness.   Musculoskeletal:         General: Normal range of motion.   Skin:     General: Skin is warm and dry.   Neurological:      General: No focal deficit present.      Mental Status: She is alert and oriented to person, place, and time. Mental status is at baseline.   Psychiatric:         Mood and Affect: Mood normal.         Behavior: Behavior normal.         Relevant Results  LABS:  Lab Results   Component Value Date    WBC 6.7 05/12/2025    HGB 10.5 (L) 05/12/2025    HCT 31.6 (L) 05/12/2025    MCV 95 05/12/2025    PLT 85 (L) 05/12/2025      Results from last 72 hours   Lab Units 05/12/25  0740   SODIUM mmol/L 129*   POTASSIUM mmol/L 4.5   CHLORIDE mmol/L 99   CO2 mmol/L 27   BUN mg/dL 8   CREATININE mg/dL 0.74   GLUCOSE mg/dL 355*   CALCIUM mg/dL 7.8*   ANION GAP mmol/L 8*   EGFR mL/min/1.73m*2 >90     Results from last 72 hours   Lab Units 05/12/25  0740   ALK PHOS U/L 427*   BILIRUBIN TOTAL mg/dL 2.1*   PROTEIN TOTAL " g/dL 7.0   ALT U/L 52*   AST U/L 80*   ALBUMIN g/dL 1.9*     Results from last 72 hours   Lab Units 05/11/25 2041   INR  1.4*       MELD 3.0: 22 at 5/12/2025  7:40 AM  MELD-Na: 20 at 5/12/2025  7:40 AM  Calculated from:  Serum Creatinine: 0.74 mg/dL (Using min of 1 mg/dL) at 5/12/2025  7:40 AM  Serum Sodium: 129 mmol/L at 5/12/2025  7:40 AM  Total Bilirubin: 2.1 mg/dL at 5/12/2025  7:40 AM  Serum Albumin: 1.9 g/dL at 5/12/2025  7:40 AM  INR(ratio): 1.4 at 5/11/2025  8:41 PM  Age at listing (hypothetical): 50 years  Sex: Female at 5/12/2025  7:40 AM      MICRO:  No results found for the last 14 days.      IMAGING:  CT abdomen pelvis w IV contrast   Final Result   1.Moderate to large amount ascites.   2.Cirrhosis morphology of the liver.   3.Mild amount of fecal debris throughout the colon.   Signed by Sean Concepcion, DO      XR chest 2 views   Final Result   No acute cardiopulmonary process.        MACRO:   None        Signed by: Ariela Beckford 5/11/2025 11:32 PM   Dictation workstation:   [id number]      US guided abdominal paracentesis    (Results Pending)           I personally spent 35 minutes on this consult with over 50% of time spent discussing management and care of specified diagnosis with patient and/or coordinating care for this patient.       Nida Gibson PA-C     Vascular and Interventional Radiology  [facility]  [phone] Inpatient Nursing Quarterback  [phone] Nursing Line 7a-5p  [phone] Resident on-call pager    NON-Urgent on call weekends and after hours weekdays (5pm - 5am) IR pager: [phone]  Urgent & emergent on call weekends and after hours weekdays (5pm-7am) IR pager: [phone]                [1]   Past Medical History:  Diagnosis Date    Ascites     Asthma     Cirrhosis of liver (Multi)     Diabetes mellitus (Multi)     GERD (gastroesophageal reflux disease)     CORBY (obstructive sleep apnea)     Portal hypertension (Multi)     Thrombocytopenia (CMS-HCC)     [2]   Past Surgical History:  Procedure Laterality Date     SECTION, LOW TRANSVERSE      CHOLECYSTECTOMY  2007    COLONOSCOPY      CT GUIDED IMAGING FOR NEEDLE PLACEMENT  10/14/2020    CT GUIDED IMAGING FOR NEEDLE PLACEMENT [facility]    ESOPHAGOGASTRODUODENOSCOPY      INGUINAL HIDRADENITIS EXCISION      TUBAL LIGATION      US GUIDED ABDOMINAL PARACENTESIS  10/08/2020    US GUIDED ABDOMINAL PARACENTESIS [facility]   [3] No family history on file.  [4]   Allergies  Allergen Reactions    Gluten Diarrhea   [5]   No current facility-administered medications on file prior to encounter.     Current Outpatient Medications on File Prior to Encounter   Medication Sig Dispense Refill    alcohol swabs pads, medicated Apply 1 each topically once daily at bedtime. 100 each 0    atorvastatin (Lipitor) 20 mg tablet Take 1 tablet (20 mg) by mouth once daily at bedtime. 30 tablet 0    carvedilol (Coreg) 6.25 mg tablet Take 1 tablet (6.25 mg) by mouth once daily. 30 tablet 0    chlorhexidine (Hibiclens) 4 % external liquid Apply topically once daily. 236 mL 1    furosemide (Lasix) 40 mg tablet Take 1 tablet (40 mg) by mouth once daily in the evening. 30 tablet 1    furosemide (Lasix) 80 mg tablet Take 1 tablet (80 mg) by mouth once daily. 30 tablet 1    gabapentin (Neurontin) 300 mg capsule Take 1 capsule (300 mg) by mouth 2 times a day. 60 capsule 1    insulin glargine (Lantus) 100 unit/mL (3 mL) pen Inject 12 Units under the skin once daily at bedtime. Take as directed per insulin instructions. 30 mL 1    lidocaine (LMX) 4 % cream Apply topically 4 times a day as needed (hand pain). 45 g 1    metFORMIN XR (Glucophage-XR) 500 mg 24 hr tablet Take 2 tablets (1,000 mg) by mouth once daily in the evening. Take with meals. Do not crush, chew, or split. 60 tablet 0    mirtazapine (Remeron) 7.5 mg tablet Take 1 tablet (7.5 mg) by mouth once daily at bedtime. 30 tablet 0    nystatin (Mycostatin) 100,000  "unit/gram powder Apply 1 Application topically 2 times a day. 450 g 1    pantoprazole (ProtoNix) 40 mg EC tablet Take 1 tablet (40 mg) by mouth once daily in the morning. Take before meals. Do not crush, chew, or split. 30 tablet 0    pantoprazole (ProtoNix) 40 mg EC tablet Take 1 tablet (40 mg) by mouth once daily in the morning. Take before meals. Do not crush, chew, or split. 30 tablet 1    pen needle 1/2\" 29G X 12mm needle Use to inject 1-4 times daily as directed. 100 each 11    pen needle, diabetic (Sure Comfort Pen Needle) 32 gauge x 5/32\" needle Use 4 times a day 100 each 0    spironolactone (Aldactone) 100 mg tablet Take 2 tablets (200 mg) by mouth once daily. 60 tablet 3    sulfamethoxazole-trimethoprim (Bactrim DS) 800-160 mg tablet Take 1 tablet by mouth once daily. 30 tablet 1    traZODone (Desyrel) 50 mg tablet Take 0.5 tablets (25 mg) by mouth as needed at bedtime for sleep. 15 tablet 0     "

## 2025-05-12 NOTE — ED PROVIDER NOTES
Emergency Department Provider Note        History of Present Illness     History provided by: Patient  Limitations to History: None  External Records Reviewed with Brief Summary: Outpatient progress note from 25 which showed US guided paracentesis performed.     HPI:  Alfonzo Flanagan is a 50 y.o. female with h/o biopsy-proven PBC (with cirrhosis, portal HTN, ascites, esophageal varices s/p banding  and eradication, hepatic encephalopathy), celiac disease, pancreatic tail mass (suggestive of neuroendocrine tumor, diagnosed 3/2024), T2DM (last A1c 7.6 in 2024), CORBY presenting with 2 days of abd pain. Abd pain is achy, typical of ascites, but now on left side of abd, which is unusual for her. Also reporting back pain, which is usual. Does report cough productive of yellow sputum. No fever, but reports chills. No urinary complaints. No stool changes. Had US guided paracentesis on 25. Not getting scheduled paracentesis, only when needing it. Daughter called doctor Saturday without a call back.     Physical Exam   Triage vitals:  T 37.1 °C (98.7 °F)  HR (!) 109  /70  RR 18  O2 99 % None (Room air)    PE:  Vital signs reviewed in nursing triage note, EMR flowsheets, and at patient's bedside  GEN: Patient is awake, alert, calm, cooperative, and in moderate painful distress.  HEAD: Normocephalic and atraumatic.  EYES: Anicteric sclera.  MOUTH: Mucous membranes moist.  CV: Regular rhythm. Mildly tachycardic. (+) s1/s2. No murmurs/rubs/gallops.  PULM: CTAB. No wheezes, rales, or crackles.  GI: Soft, moderate diffuse TTP, moderate to severe distention without rebound or guarding.  EXT: No deformities noted.  NEURO: Moves all extremities.   SKIN: Warm, dry. No erythema or ecchymosis.      Medical Decision Making & ED Course   Medical Decision Makin y.o. female with medical history as per HPI presenting with diffuse abd pain and distention. Likely ascites. Will obtain labs and CT abd/pelvis with  IV contrast. CXR ordered for cough.     Labs Reviewed   CBC WITH AUTO DIFFERENTIAL - Abnormal       Result Value    WBC 4.7      nRBC 0.0      RBC 3.56 (*)     Hemoglobin 11.2 (*)     Hematocrit 32.7 (*)     MCV 92      MCH 31.5      MCHC 34.3      RDW 15.8 (*)     Platelets 81 (*)     Neutrophils % 62.3      Immature Granulocytes %, Automated 0.6      Lymphocytes % 19.4      Monocytes % 10.3      Eosinophils % 6.8      Basophils % 0.6      Neutrophils Absolute 2.95      Immature Granulocytes Absolute, Automated 0.03      Lymphocytes Absolute 0.92 (*)     Monocytes Absolute 0.49      Eosinophils Absolute 0.32      Basophils Absolute 0.03     COMPREHENSIVE METABOLIC PANEL - Abnormal    Glucose 507 (*)     Sodium 131 (*)     Potassium 4.3      Chloride 99      Bicarbonate 27      Anion Gap 9 (*)     Urea Nitrogen 8      Creatinine 0.81      eGFR 90      Calcium 8.1 (*)     Albumin 2.2 (*)     Alkaline Phosphatase 473 (*)     Total Protein 7.5      AST 87 (*)     Bilirubin, Total 2.2 (*)     ALT 55 (*)    LIPASE - Abnormal    Lipase 239 (*)     Narrative:     Venipuncture immediately after or during the administration of Metamizole may lead to falsely low results. Testing should be performed immediately prior to Metamizole dosing.   PROTIME-INR - Abnormal    Protime 15.1 (*)     INR 1.4 (*)    URINALYSIS WITH REFLEX CULTURE AND MICROSCOPIC - Abnormal    Color, Urine Light-Yellow      Appearance, Urine Clear      Specific Gravity, Urine 1.029      pH, Urine 5.5      Protein, Urine NEGATIVE      Glucose, Urine OVER (4+) (*)     Blood, Urine NEGATIVE      Ketones, Urine NEGATIVE      Bilirubin, Urine NEGATIVE      Urobilinogen, Urine Normal      Nitrite, Urine NEGATIVE      Leukocyte Esterase, Urine NEGATIVE      Narrative:     OVER is reported when the result is greater than the clinically reportable range.   POCT GLUCOSE - Abnormal    POCT Glucose 421 (*)    MAGNESIUM - Normal    Magnesium 1.81     B-TYPE NATRIURETIC  PEPTIDE - Normal    BNP 60      Narrative:        <100 pg/mL - Heart failure unlikely  100-299 pg/mL - Intermediate probability of acute heart                  failure exacerbation. Correlate with clinical                  context and patient history.    >=300 pg/mL - Heart Failure likely. Correlate with clinical                  context and patient history.     Biotin interference may cause falsely decreased results. Patients taking a Biotin dose of up to 5 mg/day should refrain from taking Biotin for 24 hours before sample  collection. Providers may contact their local laboratory for further information.   SERIAL TROPONIN-INITIAL - Normal    Troponin I, High Sensitivity (CMC) <3      Narrative:     Less than 99th percentile of normal range cutoff-  Female and children under 18 years old <35 ng/L; Male <54 ng/L: Negative  Repeat testing should be performed if clinically indicated.     Female and children under 18 years old  ng/L; Male  ng/L:  Consistent with possible cardiac damage and possible increased clinical   risk. Serial measurements may help to assess extent of myocardial damage.     >120 ng/L: Consistent with cardiac damage, increased clinical risk and  myocardial infarction. Serial measurements may help assess extent of   myocardial damage.      NOTE: Children less than 1 year old may have higher baseline troponin   levels and results should be interpreted in conjunction with the overall   clinical context.    NOTE: Troponin I testing is performed using a different   testing methodology at [facility] than at other   [facility]. Direct result comparisons should only   be made within the same method.     SERIAL TROPONIN, 1 HOUR - Normal    Troponin I, High Sensitivity (CMC) <3      Narrative:     Less than 99th percentile of normal range cutoff-  Female and children under 18 years old <35 ng/L; Male <54 ng/L: Negative  Repeat testing should be performed if clinically  indicated.     Female and children under 18 years old  ng/L; Male  ng/L:  Consistent with possible cardiac damage and possible increased clinical   risk. Serial measurements may help to assess extent of myocardial damage.     >120 ng/L: Consistent with cardiac damage, increased clinical risk and  myocardial infarction. Serial measurements may help assess extent of   myocardial damage.      NOTE: Children less than 1 year old may have higher baseline troponin   levels and results should be interpreted in conjunction with the overall   clinical context.    NOTE: Troponin I testing is performed using a different   testing methodology at [facility] than at other   [facility]. Direct result comparisons should only   be made within the same method.     POCT PREGNANCY, URINE - Normal    Preg Test, Ur Negative     TROPONIN SERIES- (INITIAL, 1 HR)    Narrative:     The following orders were created for panel order Troponin I Series, High Sensitivity (0, 1 HR).  Procedure                               Abnormality         Status                     ---------                               -----------         ------                     Troponin I, High Sensiti...[[id number]]  Normal              Final result               Troponin, High Sensitivi...[[id number]]  Normal              Final result                 Please view results for these tests on the individual orders.   URINALYSIS WITH REFLEX CULTURE AND MICROSCOPIC    Narrative:     The following orders were created for panel order Urinalysis with Reflex Culture and Microscopic.  Procedure                               Abnormality         Status                     ---------                               -----------         ------                     Urinalysis with Reflex C...[[id number]]  Abnormal            Final result               Extra Urine Gray Tube[[id number]]                            In process                   Please view results  for these tests on the individual orders.   EXTRA URINE GRAY TUBE   BLOOD GAS VENOUS FULL PANEL     XR chest 2 views   Final Result   No acute cardiopulmonary process.        MACRO:   None        Signed by: Ariela Beckford 5/11/2025 11:32 PM   Dictation workstation:   [id number]      CT abdomen pelvis w IV contrast    (Results Pending)     Insulin IV given for elevated blood sugar. Awaiting CT abd/pelvis with IV contrast results.   ----      Differential diagnoses considered include but are not limited to: Ascites, Colitis, Cirrhosis.      Social Determinants of Health which Significantly Impact Care: None identified     Independent Result Review and Interpretation: Chest X-Ray as interpreted by me revealed no acute cardiopulmonary disease.    Chronic conditions affecting the patient's care: See HPI    The patient was discussed with the following consultants/services: None    Care Considerations: None    Disposition   Patient was signed out to Dr. Cornelius at 2300 pending completion of their work-up.  Please see the next provider's transition of care note for the remainder of the patient's care.     Procedures   Procedures    Patient was seen independently    Cheko Junior MD  Emergency Medicine     Cheko Junior MD  05/11/25 [id number]       Cheko Junior MD  05/11/25 0690

## 2025-05-12 NOTE — SIGNIFICANT EVENT
INTERVENTIONAL RADIOLOGY ADVANCED PRACTICE  [facility]    A time out was performed and Right and Left Hemiabdomen was examined with US and small amount of ascitic fluid was identified.  Fluid pocket remained small even with various positional maneuvers.  Extensive conversation with patient regarding risks of procedure with a smaller pocket vs. limited therapeutic benefit from removing small volume of fluid, decision made to defer paracentesis at this time.  Patient agreeable with plan.

## 2025-05-12 NOTE — PROGRESS NOTES
Emergency Department Transition of Care Note       Signout   I received Alfonzo Flanagan in signout from Dr. Junior.  Please see the ED Provider Note for all HPI, PE and MDM up to the time of signout at 2300.  This is in addition to the primary record.    In brief Alfonzo Flanagan is an 50 y.o. female presenting for abdominal pain, back pain, left side pain    At the time of signout we were awaiting:  Results of CT scan and improvement of glucose levels    ED Course & Medical Decision Making   Medical Decision Making:  Under my care, patient remained hemodynamically stable.  Tachycardia started slowly downtrending from 109-104.  Blood pressure remained stable, patient remained afebrile.  CT scan showed ascites with constipation no other acute findings.  Due to the fact the patient is mildly tachycardic, as well as known ascites.  I discussed further with the patient who notes she has never had SBP before.  We did have concerns about this however and patient was offered a diagnostic paracentesis.  She noted that she would not want to have 2 of these and the only paracentesis she would want is 1 that drains the fluid out for her.  I do think this is reasonable at this time, however with discussion about the overall care of the patient, she was amenable to getting empiric ceftriaxone in the event that she has SBP.  Patient was empirically started on ceftriaxone, given 7 units of insulin and glucose rechecked showing appropriate downtrending of 420-350.  At that point I reached out to the inpatient team who ultimately agreed to meet the patient for further management.    ED Course:       Disposition   As a result of their workup, the patient will require admission to the hospital.  The patient was informed of her diagnosis.  The patient was given the opportunity to ask questions and I answered them. The patient agreed to be admitted to the hospital.    Procedures   Procedures    Gigi Evans, DO  Emergency  Medicine

## 2025-05-12 NOTE — CARE PLAN
"Nursing Note: Admission  0632         05/12/2025    Patient admitted to [place] [address], presenting from the ED, this morning. Patient comes to the unit with the following belongings at bedside and no home medications being brought with the patient: purse with wallet, cell phone, cell phone  and dress. Patient prefers to keep belongings to the bedside at this time. 4-eye skin check completed with Cande Garcia RN upon admission - see LDA/EMR. Wound care consult placed. VSS upon admission.     Patient states that she lives with her daughter. Patient verbalizes needing assistance with obtaining her medications at this time. Patient utilizes a walker \"sometimes\" at baseline to complete ADLs. Patient is anticipated to return home with outpatient follow-up at this time.    Discussion of fall risk and interventions upon admission to the floor. Patient educated on the use of non-skid footwear, bed alarm and calling out for assistance prior to ambulation. Patient verbalized understanding, however, stated that she is refusing the non-skid footwear at this time. Bed alarm placed to the on position and bed in the lowest position with the wheels locked.    Patient plan of care ongoing. Will continue to assess discharge needs throughout this hospitalization.     Nathalie GUPTA, RN-BC      Problem: Pain - Adult  Goal: Verbalizes/displays adequate comfort level or baseline comfort level  Outcome: Progressing     Problem: Safety - Adult  Goal: Free from fall injury  Outcome: Progressing     Problem: Discharge Planning  Goal: Discharge to home or other facility with appropriate resources  Outcome: Progressing     Problem: Chronic Conditions and Co-morbidities  Goal: Patient's chronic conditions and co-morbidity symptoms are monitored and maintained or improved  Outcome: Progressing     Problem: Nutrition  Goal: Nutrient intake appropriate for maintaining nutritional needs  Outcome: Progressing     Problem: Diabetes  Goal: " Achieve decreasing blood glucose levels by end of shift  Outcome: Progressing  Goal: Increase stability of blood glucose readings by end of shift  Outcome: Progressing  Goal: Decrease in ketones present in urine by end of shift  Outcome: Progressing  Goal: Maintain electrolyte levels within acceptable range throughout shift  Outcome: Progressing  Goal: Maintain glucose levels >70mg/dl to <250mg/dl throughout shift  Outcome: Progressing  Goal: No changes in neurological exam by end of shift  Outcome: Progressing  Goal: Learn about and adhere to nutrition recommendations by end of shift  Outcome: Progressing  Goal: Vital signs within normal range for age by end of shift  Outcome: Progressing  Goal: Increase self care and/or family involovement by end of shift  Outcome: Progressing  Goal: Receive DSME education by end of shift  Outcome: Progressing     Problem: Pain  Goal: Takes deep breaths with improved pain control throughout the shift  Outcome: Progressing  Goal: Turns in bed with improved pain control throughout the shift  Outcome: Progressing  Goal: Walks with improved pain control throughout the shift  Outcome: Progressing  Goal: Performs ADL's with improved pain control throughout shift  Outcome: Progressing  Goal: Participates in PT with improved pain control throughout the shift  Outcome: Progressing  Goal: Free from opioid side effects throughout the shift  Outcome: Progressing  Goal: Free from acute confusion related to pain meds throughout the shift  Outcome: Progressing     Problem: Fall/Injury  Goal: Not fall by end of shift  Outcome: Progressing  Goal: Be free from injury by end of the shift  Outcome: Progressing  Goal: Verbalize understanding of personal risk factors for fall in the hospital  Outcome: Progressing  Goal: Verbalize understanding of risk factor reduction measures to prevent injury from fall in the home  Outcome: Progressing  Goal: Use assistive devices by end of the shift  Outcome:  Progressing  Goal: Pace activities to prevent fatigue by end of the shift  Outcome: Progressing

## 2025-05-12 NOTE — PROGRESS NOTES
Pharmacy Medication History Review    Alfonzo Flanagan is a 50 y.o. female admitted for Decompensated hepatic cirrhosis (Multi). Pharmacy reviewed the patient's [id number] medications and allergies for accuracy.    Medications ADDED:  Meclizine   Zofran  Miralax   Sucralfate   Lactulose   Ursodiol   Tylenol   Medications CHANGED:  Remeron  Ursodiol  Medications REMOVED/NOT TAKING:   Hibiclens   Lidocaine cream  Mycostatin   Bactrim DS     The list below reflects the updated PTA list.   Prior to Admission Medications   Prescriptions Last Dose Informant   acetaminophen (Tylenol) 500 mg tablet  Self   Sig: Take 1-2 tablets (500-1,000 mg) by mouth every 6 hours if needed for mild pain (1 - 3).   alcohol swabs pads, medicated  Self   Sig: Apply 1 each topically once daily at bedtime.   atorvastatin (Lipitor) 20 mg tablet     Sig: Take 1 tablet (20 mg) by mouth once daily at bedtime.   Pt reported ran out, needs a new script.    carvedilol (Coreg) 6.25 mg tablet     Sig: Take 1 tablet (6.25 mg) by mouth once daily.   Patient taking differently: Take 1 tablet (6.25 mg) by mouth once daily.  Pt reported ran out, needs a new script.    chlorhexidine (Hibiclens) 4 % external liquid Not Taking Self   Sig: Apply topically once daily.   Patient not taking: Reported on 5/12/2025   furosemide (Lasix) 40 mg tablet  Self   Sig: Take 1 tablet (40 mg) by mouth once daily in the evening.   furosemide (Lasix) 80 mg tablet  Self   Sig: Take 1 tablet (80 mg) by mouth once daily.   gabapentin (Neurontin) 300 mg capsule  Self   Sig: Take 1 capsule (300 mg) by mouth 2 times a day.   insulin glargine (Lantus) 100 unit/mL (3 mL) pen  Self   Sig: Inject 12 Units under the skin once daily at bedtime. Take as directed per insulin instructions.   lactulose 20 gram/30 mL oral solution  Self   Sig: Take 30 mL (20 g) by mouth 2 times a day as needed (for constipation).   lidocaine (LMX) 4 % cream Not Taking Self   Sig: Apply topically 4  "times a day as needed (hand pain).   Patient not taking: Reported on 5/12/2025   meclizine (Antivert) 25 mg tablet  Self   Sig: Take 1 tablet (25 mg) by mouth once daily as needed for dizziness.   metFORMIN XR (Glucophage-XR) 500 mg 24 hr tablet     Sig: Take 2 tablets (1,000 mg) by mouth once daily in the evening. Take with meals. Do not crush, chew, or split. Pt reported ran out, needs a new script.    mirtazapine (Remeron) 7.5 mg tablet     Sig: Take 1 tablet (7.5 mg) by mouth once daily at bedtime.   Patient taking differently: Take 1 tablet (7.5 mg) by mouth as needed at bedtime (for depression).   nystatin (Mycostatin) 100,000 unit/gram powder Not Taking Self   Sig: Apply 1 Application topically 2 times a day.   Patient not taking: Reported on 5/12/2025   ondansetron ODT (Zofran-ODT) 4 mg disintegrating tablet  Self   Sig: Dissolve 1 tablet (4 mg) in the mouth every 6 hours if needed for nausea or vomiting. Pt requested a new script.    pantoprazole (ProtoNix) 40 mg EC tablet Not Taking    Sig: Take 1 tablet (40 mg) by mouth once daily in the morning. Take before meals. Do not crush, chew, or split.   Patient not taking: Reported on 5/12/2025 Duplicate    pantoprazole (ProtoNix) 40 mg EC tablet  Self   Sig: Take 1 tablet (40 mg) by mouth once daily in the morning. Take before meals. Do not crush, chew, or split.    pen needle 1/2\" 29G X 12mm needle  Self   Sig: Use to inject 1-4 times daily as directed.   pen needle, diabetic (Sure Comfort Pen Needle) 32 gauge x 5/32\" needle  Self   Sig: Use 4 times a day   polyethylene glycol (Glycolax, Miralax) 17 gram packet  Self   Sig: Take 17 g by mouth once daily as needed (for constipation).   spironolactone (Aldactone) 100 mg tablet  Self   Sig: Take 2 tablets (200 mg) by mouth once daily.   sucralfate (Carafate) 1 gram tablet  Self   Sig: Take 1 tablet (1 g) by mouth 4 times a day as needed (Antacid).   sulfamethoxazole-trimethoprim (Bactrim DS) 800-160 mg tablet Not " "Taking Self   Sig: Take 1 tablet by mouth once daily.   Patient not taking: Reported on 5/12/2025   traZODone (Desyrel) 50 mg tablet     Sig: Take 0.5 tablets (25 mg) by mouth as needed at bedtime for sleep.   ursodiol (Actigall) 250 mg tablet  Self   Sig: Take 1 tablet (250 mg) by mouth. 2 tabs twice daily and one tab once daily.  Pt reported taking med 1 tab twice daily.       Facility-Administered Medications: None        The list below reflects the updated allergy list. Please review each documented allergy for additional clarification and justification.  Allergies  Reviewed by Breanna Phoenix on 5/12/2025        Severity Reactions Comments    Gluten Low Diarrhea             Patient accepts M2B at discharge.     Sources:   [facility]  Pharmacy dispense history  Patient Interview Moderate historian  Chart Review     Additional Comments:  Pt was able to confirm her meds and dosages.   Pt was unsure if she still takes Humalog, based upon the dispense history and discharge summary from 4/14/25, it was not apart of her discharge med list and was discontinued on the 12/13/24 due to pt not taking.       SORAIDA ZAMBRANO [place]  Pharmacy Technician  05/12/25     Secure Chat preferred   If no response call [phone] or [employer] [place] \"Med Rec\"   "

## 2025-05-12 NOTE — TELEPHONE ENCOUNTER
Patient called requesting a referral for a paracentesis.     Patient was told during last visit to schedule but there is not an order in her chart.     Best contact is [phone]

## 2025-05-13 ENCOUNTER — PATIENT OUTREACH (OUTPATIENT)
Dept: CARE COORDINATION | Facility: CLINIC | Age: 50
End: 2025-05-13
Payer: COMMERCIAL

## 2025-05-13 SDOH — ECONOMIC STABILITY: GENERAL: WOULD YOU LIKE HELP WITH ANY OF THE FOLLOWING NEEDS?: I DONT NEED HELP WITH ANY OF THESE

## 2025-05-13 NOTE — PROGRESS NOTES
Outreach call to patient to support a smooth transition of care from recent admission.  Spoke with patient's daughter Claudia, reviewed discharge medications, discharge instructions, assessed social needs, and provided education on importance of follow-up appointment with provider.  Will continue to monitor through transition period.  Wrap Up  Call End Time: 1439 (5/13/2025  2:38 PM)    Engagement  Call Start Time: 1438 (5/13/2025  2:38 PM)    Medications  Medications reviewed with patient/caregiver?: Yes (5/13/2025  2:38 PM)  Is the patient having any side effects they believe may be caused by any medication additions or changes?: No (5/13/2025  2:38 PM)  Does the patient have all medications ordered at discharge?: Yes (5/13/2025  2:38 PM)  Is the patient taking all medications as directed (includes completed medication regime)?: Yes (5/13/2025  2:38 PM)    Appointments  Does the patient have a primary care provider?: Yes (5/13/2025  2:38 PM)  Care Management Interventions: Verified appointment date/time/provider (5/13/2025  2:38 PM)  Care Management Interventions: Advised patient to keep appointment; Educated on importance of keeping appointment (5/13/2025  2:38 PM)    Patient Teaching  Does the patient have access to their discharge instructions?: Yes (5/13/2025  2:38 PM)  What is the patient's perception of their health status since discharge?: Improving (5/13/2025  2:38 PM)  Is the patient/caregiver able to teach back the hierarchy of who to call/visit for symptoms/problems? PCP, Specialist, Home Health nurse, Urgent Care, ED, 911: Yes (5/13/2025  2:38 PM)      Eyad Dutta RN [facility]  [phone]

## 2025-05-13 NOTE — SIGNIFICANT EVENT
05/13/25 1439   Social Determinants of Health- Help Requested   Would you like help with any of the following needs? I dont need help with any of these

## 2025-05-14 ENCOUNTER — HOSPITAL ENCOUNTER (OUTPATIENT)
Dept: RADIOLOGY | Facility: HOSPITAL | Age: 50
Discharge: HOME | End: 2025-05-14
Payer: COMMERCIAL

## 2025-05-14 ENCOUNTER — PREP FOR PROCEDURE (OUTPATIENT)
Dept: RADIOLOGY | Facility: HOSPITAL | Age: 50
End: 2025-05-14
Payer: COMMERCIAL

## 2025-05-14 ENCOUNTER — APPOINTMENT (OUTPATIENT)
Dept: RADIOLOGY | Facility: HOSPITAL | Age: 50
End: 2025-05-14
Payer: COMMERCIAL

## 2025-05-14 VITALS — DIASTOLIC BLOOD PRESSURE: 82 MMHG | OXYGEN SATURATION: 100 % | HEART RATE: 90 BPM | SYSTOLIC BLOOD PRESSURE: 135 MMHG

## 2025-05-14 DIAGNOSIS — K75.81 LIVER CIRRHOSIS SECONDARY TO NASH (NONALCOHOLIC STEATOHEPATITIS) (MULTI): Primary | ICD-10-CM

## 2025-05-14 DIAGNOSIS — K74.60 LIVER CIRRHOSIS SECONDARY TO NASH (NONALCOHOLIC STEATOHEPATITIS) (MULTI): ICD-10-CM

## 2025-05-14 DIAGNOSIS — K75.81 LIVER CIRRHOSIS SECONDARY TO NASH (NONALCOHOLIC STEATOHEPATITIS) (MULTI): ICD-10-CM

## 2025-05-14 DIAGNOSIS — K74.60 LIVER CIRRHOSIS SECONDARY TO NASH (NONALCOHOLIC STEATOHEPATITIS) (MULTI): Primary | ICD-10-CM

## 2025-05-14 PROCEDURE — 49083 ABD PARACENTESIS W/IMAGING: CPT

## 2025-05-14 NOTE — POST-PROCEDURE NOTE
INTERVENTIONAL RADIOLOGY ADVANCED PRACTICE PROCEDURE  [facility]    A time out was performed and Right Hemiabdomen was examined with US and appropriate entry point was confirmed and marked.   The patient was prepped and draped in a sterile manner, 1% lidocaine was used to anesthesize the skin and subcutaneous tissue.   A 5F Centesis needle was then introduced through the skin into the peritoneal space, the centesis catheter was then threaded without difficulty.   4000 ml of yellow fluid was removed without difficulty. The catheter was then removed.   No immediate complications were noted during and immediately following the procedure.         Orbicularis Oris Muscle Flap Text: The defect edges were debeveled with a #15 scalpel blade.  Given that the defect affected the competency of the oral sphincter an obicularis oris muscle flap was deemed most appropriate to restore this competency and normal muscle function.  Using a sterile surgical marker, an appropriate flap was drawn incorporating the defect. The area thus outlined was incised with a #15 scalpel blade.

## 2025-05-17 ENCOUNTER — APPOINTMENT (OUTPATIENT)
Dept: RADIOLOGY | Facility: HOSPITAL | Age: 50
End: 2025-05-17
Payer: MEDICAID

## 2025-05-17 ENCOUNTER — HOSPITAL ENCOUNTER (EMERGENCY)
Facility: HOSPITAL | Age: 50
Discharge: HOME | End: 2025-05-17
Payer: MEDICAID

## 2025-05-17 ENCOUNTER — APPOINTMENT (OUTPATIENT)
Dept: CARDIOLOGY | Facility: HOSPITAL | Age: 50
End: 2025-05-17
Payer: MEDICAID

## 2025-05-17 VITALS
WEIGHT: 199.5 LBS | DIASTOLIC BLOOD PRESSURE: 73 MMHG | HEART RATE: 96 BPM | HEIGHT: 62 IN | RESPIRATION RATE: 16 BRPM | TEMPERATURE: 98.4 F | SYSTOLIC BLOOD PRESSURE: 123 MMHG | OXYGEN SATURATION: 100 % | BODY MASS INDEX: 36.71 KG/M2

## 2025-05-17 DIAGNOSIS — Z87.19 HISTORY OF CIRRHOSIS: ICD-10-CM

## 2025-05-17 DIAGNOSIS — R11.0 NAUSEA: ICD-10-CM

## 2025-05-17 DIAGNOSIS — R10.84 GENERALIZED ABDOMINAL PAIN: Primary | ICD-10-CM

## 2025-05-17 LAB
ALBUMIN SERPL BCP-MCNC: 1.9 G/DL (ref 3.4–5)
ALP SERPL-CCNC: 377 U/L (ref 33–110)
ALT SERPL W P-5'-P-CCNC: 45 U/L (ref 7–45)
ANION GAP SERPL CALCULATED.3IONS-SCNC: <7 MMOL/L (ref 10–20)
AST SERPL W P-5'-P-CCNC: 69 U/L (ref 9–39)
BASOPHILS # BLD AUTO: 0.03 X10*3/UL (ref 0–0.1)
BASOPHILS NFR BLD AUTO: 0.5 %
BILIRUB SERPL-MCNC: 2.1 MG/DL (ref 0–1.2)
BUN SERPL-MCNC: 11 MG/DL (ref 6–23)
CALCIUM SERPL-MCNC: 7.4 MG/DL (ref 8.6–10.3)
CARDIAC TROPONIN I PNL SERPL HS: 4 NG/L (ref 0–13)
CHLORIDE SERPL-SCNC: 105 MMOL/L (ref 98–107)
CO2 SERPL-SCNC: 25 MMOL/L (ref 21–32)
CREAT SERPL-MCNC: 0.81 MG/DL (ref 0.5–1.05)
EGFRCR SERPLBLD CKD-EPI 2021: 90 ML/MIN/1.73M*2
EOSINOPHIL # BLD AUTO: 0.31 X10*3/UL (ref 0–0.7)
EOSINOPHIL NFR BLD AUTO: 5.4 %
ERYTHROCYTE [DISTWIDTH] IN BLOOD BY AUTOMATED COUNT: 16.1 % (ref 11.5–14.5)
GLUCOSE SERPL-MCNC: 235 MG/DL (ref 74–99)
HCT VFR BLD AUTO: 32.8 % (ref 36–46)
HGB BLD-MCNC: 10.7 G/DL (ref 12–16)
IMM GRANULOCYTES # BLD AUTO: 0.02 X10*3/UL (ref 0–0.7)
IMM GRANULOCYTES NFR BLD AUTO: 0.4 % (ref 0–0.9)
LIPASE SERPL-CCNC: 213 U/L (ref 9–82)
LYMPHOCYTES # BLD AUTO: 1.32 X10*3/UL (ref 1.2–4.8)
LYMPHOCYTES NFR BLD AUTO: 23.2 %
MAGNESIUM SERPL-MCNC: 1.53 MG/DL (ref 1.6–2.4)
MCH RBC QN AUTO: 31.6 PG (ref 26–34)
MCHC RBC AUTO-ENTMCNC: 32.6 G/DL (ref 32–36)
MCV RBC AUTO: 97 FL (ref 80–100)
MONOCYTES # BLD AUTO: 0.6 X10*3/UL (ref 0.1–1)
MONOCYTES NFR BLD AUTO: 10.5 %
NEUTROPHILS # BLD AUTO: 3.42 X10*3/UL (ref 1.2–7.7)
NEUTROPHILS NFR BLD AUTO: 60 %
NRBC BLD-RTO: 0 /100 WBCS (ref 0–0)
PLATELET # BLD AUTO: 92 X10*3/UL (ref 150–450)
POTASSIUM SERPL-SCNC: 3.8 MMOL/L (ref 3.5–5.3)
PROT SERPL-MCNC: 6.8 G/DL (ref 6.4–8.2)
RBC # BLD AUTO: 3.39 X10*6/UL (ref 4–5.2)
SODIUM SERPL-SCNC: 132 MMOL/L (ref 136–145)
WBC # BLD AUTO: 5.7 X10*3/UL (ref 4.4–11.3)

## 2025-05-17 PROCEDURE — 71045 X-RAY EXAM CHEST 1 VIEW: CPT | Performed by: RADIOLOGY

## 2025-05-17 PROCEDURE — 83690 ASSAY OF LIPASE: CPT

## 2025-05-17 PROCEDURE — 85025 COMPLETE CBC W/AUTO DIFF WBC: CPT

## 2025-05-17 PROCEDURE — 36415 COLL VENOUS BLD VENIPUNCTURE: CPT

## 2025-05-17 PROCEDURE — 96374 THER/PROPH/DIAG INJ IV PUSH: CPT

## 2025-05-17 PROCEDURE — 84484 ASSAY OF TROPONIN QUANT: CPT

## 2025-05-17 PROCEDURE — 2500000004 HC RX 250 GENERAL PHARMACY W/ HCPCS (ALT 636 FOR OP/ED): Mod: JZ

## 2025-05-17 PROCEDURE — 83735 ASSAY OF MAGNESIUM: CPT

## 2025-05-17 PROCEDURE — 71045 X-RAY EXAM CHEST 1 VIEW: CPT

## 2025-05-17 PROCEDURE — 93005 ELECTROCARDIOGRAM TRACING: CPT

## 2025-05-17 PROCEDURE — 80053 COMPREHEN METABOLIC PANEL: CPT

## 2025-05-17 PROCEDURE — 99285 EMERGENCY DEPT VISIT HI MDM: CPT | Mod: 25

## 2025-05-17 PROCEDURE — 96375 TX/PRO/DX INJ NEW DRUG ADDON: CPT

## 2025-05-17 RX ORDER — OXYCODONE HYDROCHLORIDE 5 MG/1
5 TABLET ORAL EVERY 8 HOURS PRN
Qty: 5 TABLET | Refills: 0 | Status: SHIPPED | OUTPATIENT
Start: 2025-05-17 | End: 2025-05-17

## 2025-05-17 RX ORDER — OXYCODONE HYDROCHLORIDE 5 MG/1
5 TABLET ORAL EVERY 8 HOURS PRN
Qty: 5 TABLET | Refills: 0 | Status: SHIPPED | OUTPATIENT
Start: 2025-05-17

## 2025-05-17 RX ORDER — MORPHINE SULFATE 4 MG/ML
4 INJECTION, SOLUTION INTRAMUSCULAR; INTRAVENOUS ONCE
Status: COMPLETED | OUTPATIENT
Start: 2025-05-17 | End: 2025-05-17

## 2025-05-17 RX ORDER — HYDROMORPHONE HYDROCHLORIDE 1 MG/ML
1 INJECTION, SOLUTION INTRAMUSCULAR; INTRAVENOUS; SUBCUTANEOUS ONCE
Status: COMPLETED | OUTPATIENT
Start: 2025-05-17 | End: 2025-05-17

## 2025-05-17 RX ORDER — ONDANSETRON HYDROCHLORIDE 2 MG/ML
4 INJECTION, SOLUTION INTRAVENOUS ONCE
Status: COMPLETED | OUTPATIENT
Start: 2025-05-17 | End: 2025-05-17

## 2025-05-17 RX ADMIN — MORPHINE SULFATE 4 MG: 4 INJECTION, SOLUTION INTRAMUSCULAR; INTRAVENOUS at 19:55

## 2025-05-17 RX ADMIN — ONDANSETRON 4 MG: 2 INJECTION, SOLUTION INTRAMUSCULAR; INTRAVENOUS at 19:55

## 2025-05-17 RX ADMIN — HYDROMORPHONE HYDROCHLORIDE 1 MG: 1 INJECTION, SOLUTION INTRAMUSCULAR; INTRAVENOUS; SUBCUTANEOUS at 21:41

## 2025-05-17 ASSESSMENT — PAIN DESCRIPTION - PAIN TYPE: TYPE: ACUTE PAIN

## 2025-05-17 ASSESSMENT — LIFESTYLE VARIABLES
TOTAL SCORE: 0
HAVE YOU EVER FELT YOU SHOULD CUT DOWN ON YOUR DRINKING: NO
EVER FELT BAD OR GUILTY ABOUT YOUR DRINKING: NO
HAVE PEOPLE ANNOYED YOU BY CRITICIZING YOUR DRINKING: NO
EVER HAD A DRINK FIRST THING IN THE MORNING TO STEADY YOUR NERVES TO GET RID OF A HANGOVER: NO

## 2025-05-17 ASSESSMENT — PAIN DESCRIPTION - LOCATION: LOCATION: ABDOMEN

## 2025-05-17 ASSESSMENT — PAIN SCALES - GENERAL: PAINLEVEL_OUTOF10: 8

## 2025-05-17 ASSESSMENT — PAIN DESCRIPTION - PROGRESSION: CLINICAL_PROGRESSION: NOT CHANGED

## 2025-05-17 ASSESSMENT — PAIN - FUNCTIONAL ASSESSMENT: PAIN_FUNCTIONAL_ASSESSMENT: 0-10

## 2025-05-18 NOTE — DISCHARGE INSTRUCTIONS
Continue with your home pain medicine regimen and use oxycodone only for severe breakthrough pain.  Continue with Zofran for nausea.  It is very important you follow up at your scheduled appointment on the 22nd for the fluid in your abdomen to be drained as I do think this will help relieve your pain and discomfort.    It is important to remember that your care does not end here and you must continue to monitor your condition closely. Please return to the emergency department for any worsening or concerning signs or symptoms as directed by our conversations and the discharge instructions. If you do not have a doctor please contact the referral number on your discharge instructions. Please contact any physician specialists provided in your discharge notes as it is very important to follow up with them regarding your condition. If you are unable to reach the physicians provided, please come back to the Emergency Department at any time.

## 2025-05-18 NOTE — ED PROVIDER NOTES
HPI   Chief Complaint   Patient presents with    Abdominal Pain     Pt comes in for nausea, vomiting, pain in her head and back, her feet are swollen, difficulty breathing.       HPI  Patient is a 50-year-old female with history of cirrhosis and ascites presenting for evaluation of abdominal pain, nausea and vomiting and swelling in her lower extremities and feet.  She states that she does have pain in the epigastric portion of her abdomen as well as her lower rib cage area.  States that she has been nauseous and vomiting.  She did recently get out of the hospital 2 days ago.  She states she does not have anything at home to take for pain relief.  She states that she is supposed to be getting her abdomen drained on the 22nd of this month.  She denies any fevers or chills.  Denies chest pain.  States that the swelling in her abdomen is very large and makes her feel short of breath.      Patient History   Medical History[1]  Surgical History[2]  Family History[3]  Social History[4]    Physical Exam   ED Triage Vitals [05/17/25 1914]   Temperature Heart Rate Respirations BP   36.9 °C (98.4 °F) 96 16 123/73      Pulse Ox Temp Source Heart Rate Source Patient Position   100 % Temporal -- --      BP Location FiO2 (%)     -- --       Physical Exam  Vitals and nursing note reviewed.   Constitutional:       General: She is not in acute distress.     Appearance: She is well-developed.   HENT:      Head: Normocephalic and atraumatic.   Eyes:      Conjunctiva/sclera: Conjunctivae normal.   Cardiovascular:      Rate and Rhythm: Normal rate and regular rhythm.      Heart sounds: No murmur heard.  Pulmonary:      Effort: Pulmonary effort is normal. No respiratory distress.      Breath sounds: Normal breath sounds.   Abdominal:      Comments: Abdomen distended but nontender to palpation   Musculoskeletal:         General: No swelling.      Cervical back: Neck supple.   Skin:     General: Skin is warm and dry.      Capillary Refill:  Capillary refill takes less than 2 seconds.      Comments: +2 pitting edema bilaterally   Neurological:      Mental Status: She is alert.   Psychiatric:         Mood and Affect: Mood normal.           ED Course & MDM   ED Course as of 05/18/25 1736   Sat May 17, 2025   1945 EKG personally interpreted by me performed at 1944  Normal sinus rhythm ventricular 93 normal axis and intervals no acute ischemic changes [EF]      ED Course User Index  [EF] Sunni Hall, DO         Diagnoses as of 05/18/25 1736   Generalized abdominal pain   History of cirrhosis   Nausea                 No data recorded     [place] Coma Scale Score: 15 (05/17/25 1917 : Pablo Wellington, EMT)                           Medical Decision Making  Parts of this chart have been completed using voice recognition software. Please excuse any errors of transcription.  My thought process and reason for plan has been formulated from the time that I saw the patient until the time of disposition and is not specific to one specific moment during their visit and furthermore my MDM encompasses this entire chart and not only this text box.      HPI: Detailed above.    Exam: A medically appropriate exam performed, outlined above, given the known history and presentation.    History obtained from: Patient    Medications given during visit:  Medications   morphine injection 4 mg (4 mg intravenous Given 5/17/25 1955)   ondansetron (Zofran) injection 4 mg (4 mg intravenous Given 5/17/25 1955)   HYDROmorphone (Dilaudid) injection 1 mg (1 mg intravenous Given 5/17/25 2141)        Diagnostic/tests  Labs Reviewed   CBC WITH AUTO DIFFERENTIAL - Abnormal       Result Value    WBC 5.7      nRBC 0.0      RBC 3.39 (*)     Hemoglobin 10.7 (*)     Hematocrit 32.8 (*)     MCV 97      MCH 31.6      MCHC 32.6      RDW 16.1 (*)     Platelets 92 (*)     Neutrophils % 60.0      Immature Granulocytes %, Automated 0.4      Lymphocytes % 23.2      Monocytes % 10.5      Eosinophils %  5.4      Basophils % 0.5      Neutrophils Absolute 3.42      Immature Granulocytes Absolute, Automated 0.02      Lymphocytes Absolute 1.32      Monocytes Absolute 0.60      Eosinophils Absolute 0.31      Basophils Absolute 0.03     COMPREHENSIVE METABOLIC PANEL - Abnormal    Glucose 235 (*)     Sodium 132 (*)     Potassium 3.8      Chloride 105      Bicarbonate 25      Anion Gap <7 (*)     Urea Nitrogen 11      Creatinine 0.81      eGFR 90      Calcium 7.4 (*)     Albumin 1.9 (*)     Alkaline Phosphatase 377 (*)     Total Protein 6.8      AST 69 (*)     Bilirubin, Total 2.1 (*)     ALT 45     LIPASE - Abnormal    Lipase 213 (*)     Narrative:     Venipuncture immediately after or during the administration of Metamizole may lead to falsely low results. Testing should be performed immediately prior to Metamizole dosing.   MAGNESIUM - Abnormal    Magnesium 1.53 (*)    TROPONIN I, HIGH SENSITIVITY - Normal    Troponin I, High Sensitivity 4      Narrative:     Less than 99th percentile of normal range cutoff-  Female and children under 18 years old <14 ng/L; Male <21 ng/L: Negative  Repeat testing should be performed if clinically indicated.     Female and children under 18 years old 14-50 ng/L; Male 21-50 ng/L:  Consistent with possible cardiac damage and possible increased clinical   risk. Serial measurements may help to assess extent of myocardial damage.     >50 ng/L: Consistent with cardiac damage, increased clinical risk and  myocardial infarction. Serial measurements may help assess extent of   myocardial damage.      NOTE: Children less than 1 year old may have higher baseline troponin   levels and results should be interpreted in conjunction with the overall   clinical context.     NOTE: Troponin I testing is performed using a different   testing methodology at [facility] than at other   [facility]. Direct result comparisons should only   be made within the same method.      XR chest 1 view    Final Result   No acute cardiopulmonary process is evident.        MACRO:   None        Signed by: Sean Jean Paul 5/17/2025 8:12 PM   Dictation workstation:   [id number]           Considerations/further MDM:  Patient is a 50-year-old female presenting for evaluation of abdominal pain    Attending physician was available for consultation on this patient.    Patient awake alert nontoxic-appearing.  Her vital signs are within normal limits during the visit.  Her abdomen is distended but otherwise nontender to palpation.  No rebound or guarding to suggest peritonitis.  Laboratory workup is without significant leukocytosis and laboratory workup is similar to patient's baseline and recent lab studies.  The patient was provided pain medication and Zofran with improvement of symptoms.  Her history and exam is most consistent with her history of cirrhosis and ascites.  I did inform the patient that she likely would benefit from her outpatient paracentesis to help with symptom relief.  Encouraged her to attend this appointment.  Patient is without evidence of sepsis or toxicity abdomen is nontender to palpation without rebound or guarding to suggest peritonitis or SBP thus I do not feel repeat imaging is warranted at this time.  The patient is agreeable with this plan of care.  She is released in good condition with limited Rx for pain relief.   I estimate there is low risk for acute abdomen.  I have considered the following: acute appendicitis, bowel obstruction, cholecystitis, diverticulitis, incarcerated hernia, mesenteric ischemia, pancreatitis, acute liver failure, renal failure, UTI/Pyelonephritis to an extent that requires admission and IV abx, perforated bowel, or ulcers.    Thus I consider the discharge disposition reasonable. Also, there is no evidence or peritonitis, sepsis, or toxicity. We have discussed the diagnosis and risks, and we agree with discharging home to follow-up with appropriate physician as  directed. We also discussed returning to the Emergency Department immediately if new or worsening symptoms occur. We have discussed the symptoms which are most concerning (e.g., bloody stool, fever, changing or worsening pain, nausea, vomiting) that necessitate immediate return. Pt symptoms have been well controlled here with medications and the patient is lower risk for acute/surgical abdomen and is safe for discharge with appropriate outpatient follow up.  The patient has verbalized understanding to return to ER without delay for new or worsening pains or for any other symptoms or concerns.            Procedure  Procedures         [1]   Past Medical History:  Diagnosis Date    Ascites     Asthma     Cirrhosis of liver (Multi)     Diabetes mellitus (Multi)     GERD (gastroesophageal reflux disease)     CORBY (obstructive sleep apnea)     Portal hypertension (Multi)     Thrombocytopenia (CMS-HCC)    [2]   Past Surgical History:  Procedure Laterality Date     SECTION, LOW TRANSVERSE      CHOLECYSTECTOMY      COLONOSCOPY      CT GUIDED IMAGING FOR NEEDLE PLACEMENT  10/14/2020    CT GUIDED IMAGING FOR NEEDLE PLACEMENT [facility]    ESOPHAGOGASTRODUODENOSCOPY      INGUINAL HIDRADENITIS EXCISION      TUBAL LIGATION      US GUIDED ABDOMINAL PARACENTESIS  10/08/2020    US GUIDED ABDOMINAL PARACENTESIS [facility]   [3] No family history on file.  [4]   Social History  Tobacco Use    Smoking status: Never    Smokeless tobacco: Never   Vaping Use    Vaping status: Never Used   Substance Use Topics    Alcohol use: Never    Drug use: Never        Keiry Gomez PA-C  [phone]

## 2025-05-19 ENCOUNTER — PATIENT OUTREACH (OUTPATIENT)
Dept: CARE COORDINATION | Facility: CLINIC | Age: 50
End: 2025-05-19
Payer: MEDICAID

## 2025-05-19 DIAGNOSIS — R18.8 OTHER ASCITES: ICD-10-CM

## 2025-05-19 DIAGNOSIS — K74.60 CIRRHOSIS (MULTI): ICD-10-CM

## 2025-05-19 NOTE — PROGRESS NOTES
Outreach call to patient to support a smooth transition of care from recent ED visit.  Person that answered the phone stated pt was not there.  Eyad Dutta RN [facility]  [phone]

## 2025-05-21 DIAGNOSIS — K76.82 HEPATIC ENCEPHALOPATHY: Primary | ICD-10-CM

## 2025-05-21 LAB
ATRIAL RATE: 93 BPM
P AXIS: 50 DEGREES
P OFFSET: 193 MS
P ONSET: 143 MS
PR INTERVAL: 170 MS
Q ONSET: 228 MS
QRS COUNT: 16 BEATS
QRS DURATION: 72 MS
QT INTERVAL: 378 MS
QTC CALCULATION(BAZETT): 469 MS
QTC FREDERICIA: 437 MS
R AXIS: -3 DEGREES
T AXIS: 10 DEGREES
T OFFSET: 417 MS
VENTRICULAR RATE: 93 BPM

## 2025-05-22 ENCOUNTER — APPOINTMENT (OUTPATIENT)
Dept: RADIOLOGY | Facility: HOSPITAL | Age: 50
End: 2025-05-22
Payer: MEDICAID

## 2025-06-02 ENCOUNTER — PATIENT OUTREACH (OUTPATIENT)
Dept: CARE COORDINATION | Facility: CLINIC | Age: 50
End: 2025-06-02
Payer: COMMERCIAL

## 2025-06-02 NOTE — PROGRESS NOTES
Outreach call to patient following up on appointment with primary care provider.  Spoke with pt's daughter who states mom is back in the hospital as of last evening.  Will continue to follow.  Eyad Dutta RN [facility]  [phone]

## 2025-06-05 ENCOUNTER — APPOINTMENT (OUTPATIENT)
Dept: GASTROENTEROLOGY | Facility: CLINIC | Age: 50
End: 2025-06-05
Payer: COMMERCIAL

## 2025-06-09 ENCOUNTER — PATIENT OUTREACH (OUTPATIENT)
Dept: CARE COORDINATION | Facility: CLINIC | Age: 50
End: 2025-06-09
Payer: COMMERCIAL

## 2025-06-09 ENCOUNTER — DOCUMENTATION (OUTPATIENT)
Dept: TRANSPLANT | Facility: HOSPITAL | Age: 50
End: 2025-06-09
Payer: COMMERCIAL

## 2025-06-09 NOTE — PROGRESS NOTES
Outreach call to patient to support a smooth transition of care from recent readmission.  Spoke with patient's daughter she provided me with pt's direct number [phone]. Called pt left voicemail.  Eyad Dutta RN [facility] [place]  [phone]

## 2025-06-09 NOTE — PROGRESS NOTES
Per [employer] a/e 03/01/25.  [facility] is OON for medical and transplant services.

## 2025-06-13 ENCOUNTER — COMMITTEE REVIEW (OUTPATIENT)
Facility: HOSPITAL | Age: 50
End: 2025-06-13
Payer: COMMERCIAL

## 2025-06-13 ENCOUNTER — APPOINTMENT (OUTPATIENT)
Dept: RADIOLOGY | Facility: HOSPITAL | Age: 50
End: 2025-06-13

## 2025-06-13 NOTE — LETTER
06/17/25      Alfonzo Panchito  [address]   [facility] [address]     RE:  Transplant Evaluation    Dear MsSarah Panchito:    In compliance with the regulations set forth by the [employer] Organ Sharing (UNOS), this letter serves as notification of the decision made by [facility] regarding your transplant candidacy.  On 06/09/2025 the Liver Transplant Selection Committee met to review your transplant evaluation testing and consultations. Based on that discussion it was determined that you are not a candidate for liver transplant at this time due to the following reason(s):     Insurance coverage is out of network  Referral will be sent to [facility] for Transplant Evaluation    Dr. Dwain Khanna will continue to manage your liver disease in the general liver clinic. If you have not been contacted within 2 weeks to schedule a follow up appointment, please call [phone] to schedule.     If you have any questions about the committee's decision, please call [phone] to speak with a liver transplant coordinator. If you would like to seek a second opinion at another transplant center, please visit the Organ Procurement and Transplantation Network for contact information at: https://optn.transplant.hrsa.gov.    Sincerely,     Liver Transplant Team  [facility] [place]   [facility]    CC: Dr. Dwain Khanna        Enclosures: UNOS Patient Information Sheet

## 2025-06-17 NOTE — COMMITTEE REVIEW
"Presentation for Listing: Evaluation Date:     Committee Review Date: 6/9/2025   Organ being evaluated for: Liver     Transplant Phase:  Referral   Transplant Status: Deferred     Referring Physician: Phill Davis   Transplant Physician: Phill Davis     Primary Diagnosis:  REED    Committee Members:   Edith Quinonez, NICKN, LD   Claye Shannan Weeks   Pharmacy Yuliana Choi, PharmD   Psychology Kiki Lowry, PhD   Quality AedScott ayala MD    Behlke, Sarah, [facility] [place]; Areli Ramirez, [facility] [place]   Transplant Suellen Mackenzie RN; Katherine Reyes RN; Kerry Dawson, MYNOR; Gia Metzger RN   Transplant Hepatology Ric Aldana MD; Fahad Grewal MD; Mir Cha MD; Feliberto Bobby MD; Dwain Khanna MD   Transplant Surgery Venu Lucio MD; Damian Millard MD; Maryann Hinds MD; Coty Nieto MD       Committee Review Decision: Declined     Committee Discussion Details:   The candidate's evaluation was presented and discussed at the Liver Transplant Selection Committee meeting. After review of the candidate's diagnosis and the evaluations of the multidisciplinary team members, it was the consensus of the Selection Committee that the candidate does not meet Liver Selection Criteria at this time and is not a candidate for liver transplant listing at our center.     Committee Recommendations:  -Refer to [facility] for Transplant evaluation due to Insurance     Patient attests to being fully vaccinated against Hepatitis B: No   Reason(s) for deferral:     Other, specify No clarification from Patient for Immunizations    No results found for: \"HEPBSAB\"    There is no immunization history for the selected administration types on file for this patient.  "
medical evaluation

## 2025-06-30 ENCOUNTER — TELEPHONE (OUTPATIENT)
Dept: GASTROENTEROLOGY | Facility: HOSPITAL | Age: 50
End: 2025-06-30
Payer: COMMERCIAL

## 2025-06-30 NOTE — TELEPHONE ENCOUNTER
Flavio Bialey,    pts daughter called and stated that her mom needs another paracentesis because her bag is full and her feet are swollen and she is leaking from her stomach due to a full bag. Please advise as to what the patient should do.

## 2025-06-30 NOTE — TELEPHONE ENCOUNTER
This isn't one of my patients and it looks like all of her care, including her liver disease care is all through the [facility]-she was just discharged on 6/28/25 from [facility].

## 2025-07-07 ENCOUNTER — HOSPITAL ENCOUNTER (OUTPATIENT)
Dept: RADIOLOGY | Facility: HOSPITAL | Age: 50
Discharge: HOME | End: 2025-07-07
Payer: MEDICAID

## 2025-07-07 VITALS
HEART RATE: 103 BPM | DIASTOLIC BLOOD PRESSURE: 75 MMHG | BODY MASS INDEX: 38.46 KG/M2 | HEIGHT: 62 IN | RESPIRATION RATE: 20 BRPM | SYSTOLIC BLOOD PRESSURE: 121 MMHG | WEIGHT: 209 LBS | OXYGEN SATURATION: 98 % | TEMPERATURE: 98.2 F

## 2025-07-07 DIAGNOSIS — R18.8 OTHER ASCITES: ICD-10-CM

## 2025-07-07 PROCEDURE — C1729 CATH, DRAINAGE: HCPCS

## 2025-07-07 PROCEDURE — 7100000010 HC PHASE TWO TIME - EACH INCREMENTAL 1 MINUTE

## 2025-07-07 PROCEDURE — 49083 ABD PARACENTESIS W/IMAGING: CPT

## 2025-07-07 PROCEDURE — 2720000007 HC OR 272 NO HCPCS

## 2025-07-07 PROCEDURE — 7100000009 HC PHASE TWO TIME - INITIAL BASE CHARGE

## 2025-07-07 PROCEDURE — 2500000004 HC RX 250 GENERAL PHARMACY W/ HCPCS (ALT 636 FOR OP/ED): Performed by: RADIOLOGY

## 2025-07-07 RX ORDER — LIDOCAINE HYDROCHLORIDE 10 MG/ML
INJECTION, SOLUTION EPIDURAL; INFILTRATION; INTRACAUDAL; PERINEURAL
Status: COMPLETED | OUTPATIENT
Start: 2025-07-07 | End: 2025-07-07

## 2025-07-07 RX ADMIN — LIDOCAINE HYDROCHLORIDE 7 ML: 10 INJECTION, SOLUTION EPIDURAL; INFILTRATION; INTRACAUDAL; PERINEURAL at 13:05

## 2025-07-07 ASSESSMENT — PAIN SCALES - GENERAL
PAINLEVEL_OUTOF10: 0 - NO PAIN
PAINLEVEL_OUTOF10: 6
PAINLEVEL_OUTOF10: 0 - NO PAIN
PAINLEVEL_OUTOF10: 5 - MODERATE PAIN
PAINLEVEL_OUTOF10: 0 - NO PAIN

## 2025-07-07 ASSESSMENT — PAIN DESCRIPTION - DESCRIPTORS: DESCRIPTORS: BURNING;TIGHTNESS

## 2025-07-07 ASSESSMENT — PAIN - FUNCTIONAL ASSESSMENT: PAIN_FUNCTIONAL_ASSESSMENT: 0-10

## 2025-07-07 NOTE — DISCHARGE INSTRUCTIONS
Patient and Family Education  If you have questions or need to change the time or date of your Paracentesis, call  Radiology  Scheduling at [phone].  What is a Paracentesis Test?  A paracentesis is a procedure where a thin needle or catheter is placed into the abdomen to  withdraw fluid. Sometimes the procedure is performed for diagnostic purposes, and only a small  amount of fluid is withdrawn. Sometimes the procedure is performed for therapeutic purposes,  to remove fluid. The procedure can take up to 2 hours.  Before the Test:  ? If you take blood thinning medications, you Must ask your doctor when you should stop  taking the medications. You may need to stop taking the medicine up to 7 days prior to  the exam.  ? Do Not Drink or Eat Anything after midnight the day before the test (unless your  doctor tells you otherwise).  ? You should take any medications you normally take with small amount of clear liquid.  Be sure to take any high blood pressure medications that your doctor has prescribed.  ? If you have diabetes, ask your doctor if you should take your medication before the test.  ? Make plans for a ride home if you are an outpatient.  During the Test:  ? Often the procedure is performed while you are lying on your back.  ? An IV may be placed in your arm.  ? You will be given a medication to numb the procedure site.  ? You will feel pressure during the procedure.  After the Test:  ? You will be watched for a period of time after the procedure. Your blood pressure and heart  rate will be checked prior to leaving the department.     Page 2 of 2  Follow these Guidelines for a Safe Recovery:  ? Activity:  o You will need someone to drive you home, you may drive the next day.  o Limit activity for 24 hours after the test  ? Diet:  o You may resume your normal diet.  ? Medicines:  o You may resume your medications (including aspirin) as ordered by your doctor.  Call your Doctor Right Away if you have:  ?  Bleeding from the procedure site.  ? Shortness of breath or trouble breathing.  ? Increased pain at the procedure site.  ? Dizziness or fainting.  If you are not able to contact your doctor, go to the nearest Emergency Room.  Also, Call your Doctor if you have:  ? Redness, swelling or drainage at the procedure site.  ? Temperature of 100.4°F (38°C) or higher.  ? Pain or tenderness at the procedure site.  ? Any questions.  ? Leakage at site is not unusual if procedure was done for drainage. Change the dressing if  wet to dry sterile dressing.   How to Reach your Doctor:    Call Dr. Khanna at [phone] with problems or questions.